# Patient Record
Sex: FEMALE | Race: WHITE | NOT HISPANIC OR LATINO | Employment: OTHER | URBAN - METROPOLITAN AREA
[De-identification: names, ages, dates, MRNs, and addresses within clinical notes are randomized per-mention and may not be internally consistent; named-entity substitution may affect disease eponyms.]

---

## 2018-02-05 ENCOUNTER — TELEPHONE (OUTPATIENT)
Dept: TRANSPLANT | Facility: CLINIC | Age: 48
End: 2018-02-05

## 2018-02-05 NOTE — TELEPHONE ENCOUNTER
----- Message from Eb Swanson sent at 2/5/2018  4:07 PM CST -----  Have medical recorders that where scanned into media from [facility]. Will call referring MD office if we need any additional information on the pt.    By: Eb Swanson

## 2018-02-05 NOTE — TELEPHONE ENCOUNTER
----- Message from Paula Estrella sent at 2/5/2018  3:24 PM CST -----  Regarding: External Referral/ Records   Dr Janes Retana from [facility] referring pt for Lozoya cirrhosis. Pt has . Records are saved in . Please review and call pt to set up consult. Fore more records, please contact Lulu with [facility] at [phone]. Thanks!

## 2018-02-09 ENCOUNTER — TELEPHONE (OUTPATIENT)
Dept: TRANSPLANT | Facility: CLINIC | Age: 48
End: 2018-02-09

## 2018-02-12 ENCOUNTER — TELEPHONE (OUTPATIENT)
Dept: TRANSPLANT | Facility: CLINIC | Age: 48
End: 2018-02-12

## 2018-02-12 NOTE — TELEPHONE ENCOUNTER
----- Message from Eb Swanson sent at 2/12/2018 12:01 PM CST -----  Called pt to [facility] appt w/MD. Pt [facility]'ed for FEB 26 @ 2PM/3PM w/Dr. Smallwood. Will mail out appt slip.

## 2018-02-19 DIAGNOSIS — Z76.82 ORGAN TRANSPLANT CANDIDATE: ICD-10-CM

## 2018-02-19 DIAGNOSIS — K75.81 NASH (NONALCOHOLIC STEATOHEPATITIS): Primary | ICD-10-CM

## 2018-02-23 RX ORDER — FERROUS SULFATE, DRIED 160(50) MG
1 TABLET, EXTENDED RELEASE ORAL 2 TIMES DAILY WITH MEALS
COMMUNITY
End: 2018-02-26

## 2018-02-23 RX ORDER — LEVOTHYROXINE SODIUM 112 UG/1
112 TABLET ORAL DAILY
Status: ON HOLD | COMMUNITY
End: 2019-06-07 | Stop reason: HOSPADM

## 2018-02-23 RX ORDER — HYDROGEN PEROXIDE 3 %
20 SOLUTION, NON-ORAL MISCELLANEOUS
COMMUNITY
End: 2018-05-09

## 2018-02-23 RX ORDER — VALSARTAN 80 MG/1
80 TABLET ORAL DAILY
Status: ON HOLD | COMMUNITY
End: 2019-04-24

## 2018-02-26 ENCOUNTER — OFFICE VISIT (OUTPATIENT)
Dept: TRANSPLANT | Facility: CLINIC | Age: 48
End: 2018-02-26
Payer: OTHER GOVERNMENT

## 2018-02-26 ENCOUNTER — LAB VISIT (OUTPATIENT)
Dept: LAB | Facility: HOSPITAL | Age: 48
End: 2018-02-26
Payer: OTHER GOVERNMENT

## 2018-02-26 ENCOUNTER — TELEPHONE (OUTPATIENT)
Dept: TRANSPLANT | Facility: CLINIC | Age: 48
End: 2018-02-26

## 2018-02-26 VITALS
BODY MASS INDEX: 42.57 KG/M2 | SYSTOLIC BLOOD PRESSURE: 161 MMHG | HEART RATE: 89 BPM | RESPIRATION RATE: 17 BRPM | OXYGEN SATURATION: 96 % | TEMPERATURE: 99 F | WEIGHT: 255.5 LBS | DIASTOLIC BLOOD PRESSURE: 78 MMHG | HEIGHT: 65 IN

## 2018-02-26 DIAGNOSIS — Z76.82 ORGAN TRANSPLANT CANDIDATE: ICD-10-CM

## 2018-02-26 DIAGNOSIS — Z86.010 HX OF COLONIC POLYPS: ICD-10-CM

## 2018-02-26 DIAGNOSIS — E03.9 HYPOTHYROIDISM, UNSPECIFIED TYPE: ICD-10-CM

## 2018-02-26 DIAGNOSIS — I10 ESSENTIAL HYPERTENSION: ICD-10-CM

## 2018-02-26 DIAGNOSIS — E66.01 MORBID OBESITY: ICD-10-CM

## 2018-02-26 DIAGNOSIS — E55.9 VITAMIN D DEFICIENCY: ICD-10-CM

## 2018-02-26 DIAGNOSIS — M19.90 OSTEOARTHRITIS, UNSPECIFIED OSTEOARTHRITIS TYPE, UNSPECIFIED SITE: ICD-10-CM

## 2018-02-26 DIAGNOSIS — K35.890 OTHER ACUTE APPENDICITIS: ICD-10-CM

## 2018-02-26 DIAGNOSIS — I85.00 ESOPHAGEAL VARICES WITHOUT BLEEDING, UNSPECIFIED ESOPHAGEAL VARICES TYPE: ICD-10-CM

## 2018-02-26 DIAGNOSIS — K76.0 FATTY LIVER: ICD-10-CM

## 2018-02-26 DIAGNOSIS — K75.81 NASH (NONALCOHOLIC STEATOHEPATITIS): ICD-10-CM

## 2018-02-26 DIAGNOSIS — R91.1 PULMONARY NODULE: ICD-10-CM

## 2018-02-26 DIAGNOSIS — G47.33 OSA (OBSTRUCTIVE SLEEP APNEA): ICD-10-CM

## 2018-02-26 DIAGNOSIS — K74.69 OTHER CIRRHOSIS OF LIVER: ICD-10-CM

## 2018-02-26 DIAGNOSIS — K21.9 GASTROESOPHAGEAL REFLUX DISEASE, ESOPHAGITIS PRESENCE NOT SPECIFIED: ICD-10-CM

## 2018-02-26 PROBLEM — K74.60 CIRRHOSIS: Status: ACTIVE | Noted: 2018-02-26

## 2018-02-26 PROBLEM — Z86.0100 HX OF COLONIC POLYPS: Status: ACTIVE | Noted: 2018-02-26

## 2018-02-26 PROBLEM — K35.80 ACUTE APPENDICITIS: Status: ACTIVE | Noted: 2018-02-26

## 2018-02-26 LAB
ABO + RH BLD: NORMAL
AFP SERPL-MCNC: 6.2 NG/ML
ALBUMIN SERPL BCP-MCNC: 2.8 G/DL
ALP SERPL-CCNC: 135 U/L
ALT SERPL W/O P-5'-P-CCNC: 37 U/L
AMPHET+METHAMPHET UR QL: NEGATIVE
ANION GAP SERPL CALC-SCNC: 8 MMOL/L
AST SERPL-CCNC: 80 U/L
BACTERIA #/AREA URNS AUTO: ABNORMAL /HPF
BARBITURATES UR QL SCN>200 NG/ML: NEGATIVE
BASOPHILS # BLD AUTO: 0.02 K/UL
BASOPHILS NFR BLD: 0.7 %
BENZODIAZ UR QL SCN>200 NG/ML: NEGATIVE
BILIRUB DIRECT SERPL-MCNC: 1.2 MG/DL
BILIRUB SERPL-MCNC: 3.3 MG/DL
BILIRUB UR QL STRIP: NEGATIVE
BLD GP AB SCN CELLS X3 SERPL QL: NORMAL
BUN SERPL-MCNC: 5 MG/DL
BZE UR QL SCN: NEGATIVE
CALCIUM SERPL-MCNC: 8.9 MG/DL
CANNABINOIDS UR QL SCN: NEGATIVE
CHLORIDE SERPL-SCNC: 109 MMOL/L
CLARITY UR REFRACT.AUTO: CLEAR
CO2 SERPL-SCNC: 23 MMOL/L
COLOR UR AUTO: YELLOW
CREAT SERPL-MCNC: 0.7 MG/DL
CREAT UR-MCNC: 82 MG/DL
DIFFERENTIAL METHOD: ABNORMAL
EOSINOPHIL # BLD AUTO: 0.2 K/UL
EOSINOPHIL NFR BLD: 7.5 %
ERYTHROCYTE [DISTWIDTH] IN BLOOD BY AUTOMATED COUNT: 15.3 %
EST. GFR  (AFRICAN AMERICAN): >60 ML/MIN/1.73 M^2
EST. GFR  (NON AFRICAN AMERICAN): >60 ML/MIN/1.73 M^2
ETHANOL UR-MCNC: <10 MG/DL
GGT SERPL-CCNC: 72 U/L
GLUCOSE SERPL-MCNC: 163 MG/DL
GLUCOSE UR QL STRIP: NEGATIVE
HCT VFR BLD AUTO: 34.3 %
HGB BLD-MCNC: 11.5 G/DL
HGB UR QL STRIP: ABNORMAL
IMM GRANULOCYTES # BLD AUTO: 0.01 K/UL
IMM GRANULOCYTES NFR BLD AUTO: 0.4 %
INR PPP: 1.3
KETONES UR QL STRIP: NEGATIVE
LEUKOCYTE ESTERASE UR QL STRIP: NEGATIVE
LYMPHOCYTES # BLD AUTO: 1 K/UL
LYMPHOCYTES NFR BLD: 35.6 %
MCH RBC QN AUTO: 29 PG
MCHC RBC AUTO-ENTMCNC: 33.5 G/DL
MCV RBC AUTO: 86 FL
METHADONE UR QL SCN>300 NG/ML: NEGATIVE
MICROSCOPIC COMMENT: ABNORMAL
MONOCYTES # BLD AUTO: 0.3 K/UL
MONOCYTES NFR BLD: 9.3 %
NEUTROPHILS # BLD AUTO: 1.3 K/UL
NEUTROPHILS NFR BLD: 46.5 %
NITRITE UR QL STRIP: NEGATIVE
NRBC BLD-RTO: 0 /100 WBC
OPIATES UR QL SCN: NEGATIVE
PCP UR QL SCN>25 NG/ML: NEGATIVE
PH UR STRIP: 6 [PH] (ref 5–8)
PLATELET # BLD AUTO: 69 K/UL
PMV BLD AUTO: 11.5 FL
POTASSIUM SERPL-SCNC: 3.7 MMOL/L
PROT SERPL-MCNC: 7 G/DL
PROT UR QL STRIP: NEGATIVE
PROTHROMBIN TIME: 13 SEC
RBC # BLD AUTO: 3.97 M/UL
RBC #/AREA URNS AUTO: 5 /HPF (ref 0–4)
SODIUM SERPL-SCNC: 140 MMOL/L
SP GR UR STRIP: 1.01 (ref 1–1.03)
SQUAMOUS #/AREA URNS AUTO: 3 /HPF
TOXICOLOGY INFORMATION: NORMAL
URN SPEC COLLECT METH UR: ABNORMAL
UROBILINOGEN UR STRIP-ACNC: 2 EU/DL
WBC # BLD AUTO: 2.81 K/UL
WBC #/AREA URNS AUTO: 2 /HPF (ref 0–5)

## 2018-02-26 PROCEDURE — 99205 OFFICE O/P NEW HI 60 MIN: CPT | Mod: S$PBB,TXP,, | Performed by: INTERNAL MEDICINE

## 2018-02-26 PROCEDURE — 82248 BILIRUBIN DIRECT: CPT | Mod: TXP

## 2018-02-26 PROCEDURE — 99214 OFFICE O/P EST MOD 30 MIN: CPT | Mod: PBBFAC,25,TXP | Performed by: INTERNAL MEDICINE

## 2018-02-26 PROCEDURE — 86850 RBC ANTIBODY SCREEN: CPT | Mod: TXP

## 2018-02-26 PROCEDURE — 99999 PR PBB SHADOW E&M-EST. PATIENT-LVL IV: CPT | Mod: PBBFAC,TXP,, | Performed by: INTERNAL MEDICINE

## 2018-02-26 PROCEDURE — 82105 ALPHA-FETOPROTEIN SERUM: CPT | Mod: TXP

## 2018-02-26 PROCEDURE — 81001 URINALYSIS AUTO W/SCOPE: CPT | Mod: 59,TXP

## 2018-02-26 PROCEDURE — 85610 PROTHROMBIN TIME: CPT | Mod: TXP

## 2018-02-26 PROCEDURE — 82977 ASSAY OF GGT: CPT | Mod: TXP

## 2018-02-26 PROCEDURE — 80053 COMPREHEN METABOLIC PANEL: CPT | Mod: TXP

## 2018-02-26 PROCEDURE — 80321 ALCOHOLS BIOMARKERS 1OR 2: CPT | Mod: TXP

## 2018-02-26 PROCEDURE — 85025 COMPLETE CBC W/AUTO DIFF WBC: CPT | Mod: TXP

## 2018-02-26 PROCEDURE — 80307 DRUG TEST PRSMV CHEM ANLYZR: CPT | Mod: TXP

## 2018-02-26 RX ORDER — IBUPROFEN 800 MG/1
1 TABLET ORAL EVERY 8 HOURS PRN
Status: ON HOLD | COMMUNITY
Start: 2018-01-24 | End: 2019-04-24

## 2018-02-26 NOTE — PATIENT INSTRUCTIONS
1. Obtain liver biopsy slides for review  2. Sleep study to evaluate for sleep apnea  3. Ct chest to evaluate pulmonary nodule  4. Repeat EGD in 07/18 to f/u on esophageal varices  5. Appendectomy after liver tx eval- urgent

## 2018-02-26 NOTE — Clinical Note
1. Obtain liver biopsy slides for review 2. Sleep study to evaluate for sleep apnea 3. Ct chest to evaluate pulmonary nodule 4. Repeat EGD in 07/18 to f/u on esophageal varices 5. Appendectomy after liver tx eval- urgent- can we try to do within a couple of weeks; will need to see general surgery as part of tx evaluation

## 2018-02-26 NOTE — LETTER
March 1, 2018        Janes Retana  [address] [place] [address]  Phone: [phone]  Fax: [phone]             [facility] - Liver Transplant  [address]  [facility] [address]  Phone: [phone]   Patient: Jihan Zamudio   MR Number: [id number]   YOB: 1970   Date of Visit: 2/26/2018       Dear Dr. Janes Retana    Thank you for referring Jihan Zamudio to me for evaluation. Attached you will find relevant portions of my assessment and plan of care.    If you have questions, please do not hesitate to call me. I look forward to following Jihan Zamudio along with you.    Sincerely,    Laura Smallwood MD    Enclosure    If you would like to receive this communication electronically, please contact [email or web address] or [phone] to request [employer] Link access.    PhoneAndPhone Link is a tool which provides read-only access to select patient information with whom you have a relationship. Its easy to use and provides real time access to review your patients record including encounter summaries, notes, results, and demographic information.    If you feel you have received this communication in error or would no longer like to receive these types of communications, please e-mail [email or web address]

## 2018-02-26 NOTE — TELEPHONE ENCOUNTER
Patient seen in clinic today by Dr. Smallwood.  Urgent liver transplant evaluation recommended. Patient needs appendectomy.  Eval needed prior to scheduling the surgery. Requesting financial clearance to proceed w/ outpatient work-up.

## 2018-02-26 NOTE — PROGRESS NOTES
Transplant Hepatology  Liver Transplant Recipient Evaluation      Original Referring Provider: Janes Retana  Current Corresponding Physician: Janes CARCAMO Native Liver Diagnosis: Cirrhosis: Fatty Liver (Lozoya)    Reason for Visit: evaluation for liver transplant     Subjective:     Jihan Zamudio is a 48 y.o. 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    She has a history of a lap band placement (2006) and then release (2009). She was diagnosed with a fatty liver back in 2006. She has since developed cirrhosis of the liver and had a confirmatory liver biopsy nov 30, 2017. EGD July 2017 revealed portal htn with small esophageal varices. She has not had any ascites or HE.    In Jan of this year (2018) she developed acute appendicitis and was not taken to the OR due to a history of cirrhosis with decompensation, MELD 18 (INR 1.7, Tbil elevated). There was concern for excessive bleeding and further decompensation of her liver disease. She was treated with 3 weeks of ABX.    She overall is feeling better now although she is c/o of fatigue. She intermittently has some RLQ pain.    Imaging of the abdomen revealed a pulmonary nodule.    Review of Systems   Constitutional: Negative.    HENT: Negative.    Eyes: Negative.    Respiratory: Negative.    Cardiovascular: Negative.    Gastrointestinal: Negative.    Genitourinary: Negative.    Musculoskeletal: Negative.    Skin: Negative.    Neurological: Negative.    Psychiatric/Behavioral: Negative.        Objective:   Physical Exam   Constitutional: She is oriented to person, place, and time. She appears well-developed and well-nourished.   HENT:   Head: Normocephalic and atraumatic.   Eyes: Conjunctivae and EOM are normal. Pupils are equal, round, and reactive to light. No scleral icterus.   Neck: Normal range of motion. Neck supple. No thyromegaly present.   Cardiovascular: Normal rate, regular rhythm and normal heart sounds.     Pulmonary/Chest: Effort normal and breath sounds normal. She has no rales.   Abdominal: Soft. Bowel sounds are normal. She exhibits no distension and no mass. There is no tenderness.   Musculoskeletal: Normal range of motion. She exhibits no edema.   Neurological: She is alert and oriented to person, place, and time.   Skin: Skin is warm and dry. No rash noted.   Psychiatric: She has a normal mood and affect.   Vitals reviewed.    MELD-Na score: 14 at 2/26/2018 12:50 PM  MELD score: 14 at 2/26/2018 12:50 PM  Calculated from:  Serum Creatinine: 0.7 mg/dL (Rounded to 1) at 2/26/2018 12:50 PM  Serum Sodium: 140 mmol/L (Rounded to 137) at 2/26/2018 12:50 PM  Total Bilirubin: 3.3 mg/dL at 2/26/2018 12:50 PM  INR(ratio): 1.3 at 2/26/2018 12:50 PM  Age: 48 years  Lab Results   Component Value Date     (H) 02/26/2018    BUN 5 (L) 02/26/2018    CREATININE 0.7 02/26/2018    CALCIUM 8.9 02/26/2018     02/26/2018    K 3.7 02/26/2018     02/26/2018    PROT 7.0 02/26/2018    CO2 23 02/26/2018    WBC 2.81 (L) 02/26/2018    RBC 3.97 (L) 02/26/2018    HGB 11.5 (L) 02/26/2018    HCT 34.3 (L) 02/26/2018    PLT 69 (L) 02/26/2018     Lab Results   Component Value Date    ALBUMIN 2.8 (L) 02/26/2018    BILITOT 3.3 (H) 02/26/2018    AST 80 (H) 02/26/2018    ALT 37 02/26/2018    ALKPHOS 135 02/26/2018    LABPROT 13.0 (H) 02/26/2018    INR 1.3 (H) 02/26/2018         Transplant Hepatology - Candidacy   Assessment/Plan:     Transplant Candidacy: Jihan Zamudio is a 48 y.o. female with ESLD secondary to nonalcoholic steatohepatitis here for evaluation for possible OLT.  In my opinion, because she requires an appendectomy and has decompensation of her liver disease, she should be urgently evaluated for liver transplant. She may require an urgent liver transplant brenna-operatively. She will be presented to selection committee after all tests and evaluations are complete. Current recommendations:  1. Decompensated cirrhosis,  MELD 14, proceed with URGENT liver tx evaluation. Obtain liver biopsy slides for review  2. Pulmonary nodule: ct chest  3. Portal htn and EV: repeat EGD 07/18  4. Acute appendicitis: to see general sx at time of liver tx eval.  5. Fatigue: sleep study      Laura Smallwood MD         [facility] Patient Status  Functional Status: 80% - Normal activity with effort: some symptoms of disease  Physical Capacity: No Limitations    Outside Records Request: liver biopsy

## 2018-03-05 LAB — PHOSPHATIDYLETHANOL (PETH): NEGATIVE NG/ML

## 2018-03-13 ENCOUNTER — TELEPHONE (OUTPATIENT)
Dept: TRANSPLANT | Facility: CLINIC | Age: 48
End: 2018-03-13

## 2018-03-14 NOTE — TELEPHONE ENCOUNTER
Called pt to inform her that financial clearance has been obtained from the insurance company today to initiate liver transplant evaluation.  Informed patient that evaluation will take approx 3 days to complete with all testing being done outpatient.  Patient voiced understanding of the need to have her caregiver present for the  and surgeon consult, as well as for the patient education.  All questions/ concerns regarding liver transplant evaluation answered/ addressed.  Will schedule liver transplant evaluation April 4, 5, 6.

## 2018-03-19 DIAGNOSIS — K21.9 GASTROESOPHAGEAL REFLUX DISEASE, ESOPHAGITIS PRESENCE NOT SPECIFIED: ICD-10-CM

## 2018-03-19 DIAGNOSIS — K76.0 FATTY LIVER: ICD-10-CM

## 2018-03-19 DIAGNOSIS — Z76.82 ORGAN TRANSPLANT CANDIDATE: ICD-10-CM

## 2018-03-19 DIAGNOSIS — Z76.82 ORGAN TRANSPLANT CANDIDATE: Primary | ICD-10-CM

## 2018-03-19 DIAGNOSIS — K35.890 OTHER ACUTE APPENDICITIS: ICD-10-CM

## 2018-03-19 DIAGNOSIS — K74.69 OTHER CIRRHOSIS OF LIVER: Primary | ICD-10-CM

## 2018-03-28 ENCOUNTER — OFFICE VISIT (OUTPATIENT)
Dept: SURGERY | Facility: CLINIC | Age: 48
End: 2018-03-28
Payer: OTHER GOVERNMENT

## 2018-03-28 VITALS
BODY MASS INDEX: 44.18 KG/M2 | HEIGHT: 64 IN | WEIGHT: 258.81 LBS | HEART RATE: 89 BPM | DIASTOLIC BLOOD PRESSURE: 72 MMHG | SYSTOLIC BLOOD PRESSURE: 140 MMHG | TEMPERATURE: 98 F

## 2018-03-28 DIAGNOSIS — K38.9 APPENDICOLITH: Primary | ICD-10-CM

## 2018-03-28 PROCEDURE — 99214 OFFICE O/P EST MOD 30 MIN: CPT | Mod: S$PBB,NTX,, | Performed by: SURGERY

## 2018-03-28 PROCEDURE — 99999 PR PBB SHADOW E&M-EST. PATIENT-LVL III: CPT | Mod: PBBFAC,TXP,, | Performed by: SURGERY

## 2018-03-28 PROCEDURE — 99213 OFFICE O/P EST LOW 20 MIN: CPT | Mod: PBBFAC,NTX | Performed by: SURGERY

## 2018-03-28 RX ORDER — TAMSULOSIN HYDROCHLORIDE 0.4 MG/1
0.4 CAPSULE ORAL DAILY
Status: ON HOLD | COMMUNITY
Start: 2018-03-05 | End: 2019-04-24

## 2018-03-28 RX ORDER — NYSTATIN 100000 [USP'U]/ML
SUSPENSION ORAL
Status: ON HOLD | COMMUNITY
Start: 2018-02-01 | End: 2019-04-24

## 2018-03-28 RX ORDER — OXYCODONE HYDROCHLORIDE 5 MG/1
TABLET ORAL
COMMUNITY
Start: 2018-01-24 | End: 2018-03-28

## 2018-03-28 RX ORDER — DOCUSATE CALCIUM 240 MG
CAPSULE ORAL
COMMUNITY
Start: 2018-01-24 | End: 2018-05-09 | Stop reason: CLARIF

## 2018-03-28 RX ORDER — OMEPRAZOLE 40 MG/1
40 CAPSULE, DELAYED RELEASE ORAL EVERY MORNING
Status: ON HOLD | COMMUNITY
Start: 2018-03-02 | End: 2019-05-02 | Stop reason: HOSPADM

## 2018-03-28 RX ORDER — CIPROFLOXACIN 500 MG/1
TABLET ORAL
COMMUNITY
Start: 2018-01-24 | End: 2018-03-28

## 2018-03-28 NOTE — LETTER
April 1, 2018      Laura Smallwood MD  [address] [facility] [address]           [facility]  [address]  [place] [address]  Phone: [phone]          Patient: Jihan Zamudio   MR Number: [id number]   YOB: 1970   Date of Visit: 3/28/2018       Dear Dr. Laura Smallwood:    Thank you for referring Jihan Zamudio to me for evaluation. Attached you will find relevant portions of my assessment and plan of care.    If you have questions, please do not hesitate to call me. I look forward to following Jihan Zamudio along with you.    Sincerely,    Lele Puentes MD    Enclosure  CC:  No Recipients    If you would like to receive this communication electronically, please contact [email or web address] or [phone] to request more information on [facility] Link access.    For providers and/or their staff who would like to refer a patient to [facility], please contact us through our one-stop-shop provider referral line, [facility], at [phone].    If you feel you have received this communication in error or would no longer like to receive these types of communications, please e-mail [email or web address]

## 2018-03-29 DIAGNOSIS — K74.69 OTHER CIRRHOSIS OF LIVER: Primary | ICD-10-CM

## 2018-03-29 DIAGNOSIS — Z76.82 ORGAN TRANSPLANT CANDIDATE: ICD-10-CM

## 2018-03-29 DIAGNOSIS — G47.33 OSA (OBSTRUCTIVE SLEEP APNEA): ICD-10-CM

## 2018-04-01 NOTE — PROGRESS NOTES
History & Physical    SUBJECTIVE:     History of Present Illness:  Patient is a 48 y.o. female presents with prior history of appendicolith and upcoming evaluation for possible liver transplant.   Patient denies current RLQ pain or othe GI cpmplaints. .     Chief Complaint   Patient presents with    Consult       Review of patient's allergies indicates:   Allergen Reactions    Metformin Diarrhea       Current Outpatient Prescriptions   Medication Sig Dispense Refill    CHOLECALCIFEROL, VITAMIN D3, (VITAMIN D3 ORAL) Take 1,200 mg by mouth once daily. Takes 2-600 mg gummies daily      esomeprazole (NEXIUM) 20 MG capsule Take 20 mg by mouth before breakfast.      ibuprofen (ADVIL,MOTRIN) 800 MG tablet Take 1 tablet by mouth every 8 (eight) hours as needed.      levothyroxine (SYNTHROID) 112 MCG tablet Take 112 mcg by mouth once daily.      nystatin (MYCOSTATIN) 100,000 unit/mL suspension       omeprazole (PRILOSEC) 40 MG capsule Take 40 mg by mouth every morning.       tamsulosin (FLOMAX) 0.4 mg Cp24 Take 0.4 mg by mouth once daily.       valsartan (DIOVAN) 80 MG tablet Take 80 mg by mouth once daily.      STOOL SOFTENER, DOCUSATE JUAN, 240 mg capsule        No current facility-administered medications for this visit.        Past Medical History:   Diagnosis Date    Cirrhosis     Esophageal varices 2018    Small with no banding     Essential hypertension 2018    Fatty liver 2018    GERD (gastroesophageal reflux disease)     Hx of colonic polyps 2018    On colonoscopy     Hypertension     Hypothyroidism 2018    Kidney stones     Morbid obesity 2018    Lap band with subsequent release    KADEEM (obstructive sleep apnea) 2018    Osteoarthritis 2018    Pulmonary nodule 2018    Vitamin D deficiency 2018     Past Surgical History:   Procedure Laterality Date     SECTION      TIMES 2     CHOLECYSTECTOMY      LAPAROSCOPIC      "CYSTOSCOPY W/ STONE MANIPULATION  2000    kidney stone removal    LAPAROSCOPIC GASTRIC BANDING  2006    removal 2009    LIVER BIOPSY  11/29/2017    KESSLER with bridging 11/29/17     Family History   Problem Relation Age of Onset    Heart disease Mother     Heart disease Father     Cancer Father      Social History   Substance Use Topics    Smoking status: Never Smoker    Smokeless tobacco: Never Used      Comment: patient denies    Alcohol use No      Comment: patient denies        Review of Systems:      I have reviewed the Patient Summary Reports.        Review of Systems  Hematology/Oncology:  Hematology Normal   Oncology Normal     Renal/:   Chronic Renal Disease    Musculoskeletal:   Arthritis     Neurological:  Neurology Normal    Dermatological:  Skin Normal    Psych:  Psychiatric Normal           OBJECTIVE:     Vital Signs (Most Recent)  Temp: 98.3 °F (36.8 °C) (03/28/18 1337)  Pulse: 89 (03/28/18 1337)  BP: (!) 140/72 (03/28/18 1337)  5' 4" (1.626 m)  117.4 kg (258 lb 13.1 oz)     Physical Exam:    Physical Exam  General:  Obesity, Well nourished    Airway/Jaw/Neck:  AIRWAY FINDINGS: Normal      Eyes/Ears/Nose:  EYES/EARS/NOSE FINDINGS: Normal   Dental:  DENTAL FINDINGS: Normal    Heart/Vascular:  Heart Findings: Normal Vascular Findings: Normal    Abdomen:  Abdomen Findings: Normal    Musculoskeletal:  Musculoskeletal Findings: Normal   Skin:  Skin Findings: Normal    Mental Status:  Mental Status Findings: Normal        Laboratory  none    Diagnostic Results:  none    ASSESSMENT/PLAN:     Will review previous CT abdomen    PLAN:Plan     Would doubt that if patient requires liver transplantation, that prophylactic appendectomy will satisfy a risk/benefit evaluation.       "

## 2018-04-02 ENCOUNTER — TELEPHONE (OUTPATIENT)
Dept: SLEEP MEDICINE | Facility: CLINIC | Age: 48
End: 2018-04-02

## 2018-04-04 ENCOUNTER — DOCUMENTATION ONLY (OUTPATIENT)
Dept: TRANSPLANT | Facility: CLINIC | Age: 48
End: 2018-04-04

## 2018-04-04 ENCOUNTER — SOCIAL WORK (OUTPATIENT)
Dept: TRANSPLANT | Facility: CLINIC | Age: 48
End: 2018-04-04
Payer: OTHER GOVERNMENT

## 2018-04-04 ENCOUNTER — LAB VISIT (OUTPATIENT)
Dept: LAB | Facility: HOSPITAL | Age: 48
End: 2018-04-04
Attending: INTERNAL MEDICINE
Payer: OTHER GOVERNMENT

## 2018-04-04 ENCOUNTER — HOSPITAL ENCOUNTER (OUTPATIENT)
Dept: CARDIOLOGY | Facility: CLINIC | Age: 48
Discharge: HOME | End: 2018-04-04
Attending: INTERNAL MEDICINE
Payer: OTHER GOVERNMENT

## 2018-04-04 ENCOUNTER — NUTRITION (OUTPATIENT)
Dept: TRANSPLANT | Facility: CLINIC | Age: 48
End: 2018-04-04
Payer: OTHER GOVERNMENT

## 2018-04-04 VITALS — HEIGHT: 64 IN | BODY MASS INDEX: 43.81 KG/M2 | WEIGHT: 256.63 LBS

## 2018-04-04 DIAGNOSIS — K76.0 FATTY LIVER: ICD-10-CM

## 2018-04-04 DIAGNOSIS — K74.69 OTHER CIRRHOSIS OF LIVER: ICD-10-CM

## 2018-04-04 DIAGNOSIS — Z76.82 ORGAN TRANSPLANT CANDIDATE: ICD-10-CM

## 2018-04-04 DIAGNOSIS — K35.890 OTHER ACUTE APPENDICITIS: ICD-10-CM

## 2018-04-04 DIAGNOSIS — Z01.818 ENCOUNTER FOR PRE-TRANSPLANT EVALUATION FOR LIVER TRANSPLANT: ICD-10-CM

## 2018-04-04 DIAGNOSIS — K75.81 NASH (NONALCOHOLIC STEATOHEPATITIS): Primary | ICD-10-CM

## 2018-04-04 DIAGNOSIS — K21.9 GASTROESOPHAGEAL REFLUX DISEASE, ESOPHAGITIS PRESENCE NOT SPECIFIED: ICD-10-CM

## 2018-04-04 LAB
A1AT SERPL-MCNC: 163 MG/DL
ABO + RH BLD: NORMAL
ALBUMIN SERPL BCP-MCNC: 3 G/DL
ALP SERPL-CCNC: 150 U/L
ALT SERPL W/O P-5'-P-CCNC: 35 U/L
ANION GAP SERPL CALC-SCNC: 7 MMOL/L
ANISOCYTOSIS BLD QL SMEAR: SLIGHT
AST SERPL-CCNC: 76 U/L
BASOPHILS # BLD AUTO: ABNORMAL K/UL
BASOPHILS NFR BLD: 1 %
BILIRUB SERPL-MCNC: 3.4 MG/DL
BLD GP AB SCN CELLS X3 SERPL QL: NORMAL
BUN SERPL-MCNC: 8 MG/DL
CALCIUM SERPL-MCNC: 8.8 MG/DL
CERULOPLASMIN SERPL-MCNC: 22 MG/DL
CHLORIDE SERPL-SCNC: 109 MMOL/L
CO2 SERPL-SCNC: 24 MMOL/L
CREAT SERPL-MCNC: 0.7 MG/DL
DIASTOLIC DYSFUNCTION: YES
DIFFERENTIAL METHOD: ABNORMAL
EOSINOPHIL # BLD AUTO: ABNORMAL K/UL
EOSINOPHIL NFR BLD: 8 %
ERYTHROCYTE [DISTWIDTH] IN BLOOD BY AUTOMATED COUNT: 15.7 %
EST. GFR  (AFRICAN AMERICAN): >60 ML/MIN/1.73 M^2
EST. GFR  (NON AFRICAN AMERICAN): >60 ML/MIN/1.73 M^2
ESTIMATED PA SYSTOLIC PRESSURE: 40.26
FERRITIN SERPL-MCNC: 82 NG/ML
GLUCOSE SERPL-MCNC: 118 MG/DL
HBV CORE AB SERPL QL IA: NEGATIVE
HBV SURFACE AB SER-ACNC: NEGATIVE M[IU]/ML
HBV SURFACE AG SERPL QL IA: NEGATIVE
HCG INTACT+B SERPL-ACNC: 1.2 MIU/ML
HCT VFR BLD AUTO: 34.4 %
HCV AB SERPL QL IA: NEGATIVE
HEPATITIS A ANTIBODY, IGG: NEGATIVE
HGB BLD-MCNC: 11.4 G/DL
HIV 1+2 AB+HIV1 P24 AG SERPL QL IA: NEGATIVE
HYPOCHROMIA BLD QL SMEAR: ABNORMAL
IMM GRANULOCYTES # BLD AUTO: ABNORMAL K/UL
IMM GRANULOCYTES NFR BLD AUTO: ABNORMAL %
INR PPP: 1.3
IRON SERPL-MCNC: 77 UG/DL
LYMPHOCYTES # BLD AUTO: ABNORMAL K/UL
LYMPHOCYTES NFR BLD: 17 %
MCH RBC QN AUTO: 29.5 PG
MCHC RBC AUTO-ENTMCNC: 33.1 G/DL
MCV RBC AUTO: 89 FL
MONOCYTES # BLD AUTO: ABNORMAL K/UL
MONOCYTES NFR BLD: 3 %
NEUTROPHILS NFR BLD: 70 %
NEUTS BAND NFR BLD MANUAL: 1 %
NRBC BLD-RTO: 0 /100 WBC
OVALOCYTES BLD QL SMEAR: ABNORMAL
PLATELET # BLD AUTO: 62 K/UL
PMV BLD AUTO: 12.6 FL
POIKILOCYTOSIS BLD QL SMEAR: SLIGHT
POLYCHROMASIA BLD QL SMEAR: ABNORMAL
POTASSIUM SERPL-SCNC: 3.7 MMOL/L
PROT SERPL-MCNC: 6.7 G/DL
PROTHROMBIN TIME: 13.3 SEC
RBC # BLD AUTO: 3.87 M/UL
RETIRED EF AND QEF - SEE NOTES: 70 (ref 55–65)
SATURATED IRON: 25 %
SODIUM SERPL-SCNC: 140 MMOL/L
TOTAL IRON BINDING CAPACITY: 309 UG/DL
TRANSFERRIN SERPL-MCNC: 209 MG/DL
TRICUSPID VALVE REGURGITATION: ABNORMAL
TSH SERPL DL<=0.005 MIU/L-ACNC: 2.08 UIU/ML
WBC # BLD AUTO: 1.55 K/UL

## 2018-04-04 PROCEDURE — 36415 COLL VENOUS BLD VENIPUNCTURE: CPT | Mod: TXP

## 2018-04-04 PROCEDURE — 82390 ASSAY OF CERULOPLASMIN: CPT | Mod: TXP

## 2018-04-04 PROCEDURE — 86644 CMV ANTIBODY: CPT | Mod: TXP

## 2018-04-04 PROCEDURE — 80053 COMPREHEN METABOLIC PANEL: CPT | Mod: TXP

## 2018-04-04 PROCEDURE — 86480 TB TEST CELL IMMUN MEASURE: CPT | Mod: TXP

## 2018-04-04 PROCEDURE — 99999 PR PBB SHADOW E&M-EST. PATIENT-LVL I: CPT | Mod: PBBFAC,TXP,, | Performed by: DIETITIAN, REGISTERED

## 2018-04-04 PROCEDURE — 86787 VARICELLA-ZOSTER ANTIBODY: CPT | Mod: TXP

## 2018-04-04 PROCEDURE — 86703 HIV-1/HIV-2 1 RESULT ANTBDY: CPT | Mod: TXP

## 2018-04-04 PROCEDURE — 85027 COMPLETE CBC AUTOMATED: CPT | Mod: TXP

## 2018-04-04 PROCEDURE — 93325 DOPPLER ECHO COLOR FLOW MAPG: CPT | Mod: PBBFAC,TXP | Performed by: INTERNAL MEDICINE

## 2018-04-04 PROCEDURE — 86790 VIRUS ANTIBODY NOS: CPT | Mod: TXP

## 2018-04-04 PROCEDURE — 93351 STRESS TTE COMPLETE: CPT | Mod: 26,S$PBB,TXP, | Performed by: INTERNAL MEDICINE

## 2018-04-04 PROCEDURE — 93320 DOPPLER ECHO COMPLETE: CPT | Mod: 26,S$PBB,TXP, | Performed by: INTERNAL MEDICINE

## 2018-04-04 PROCEDURE — 86850 RBC ANTIBODY SCREEN: CPT | Mod: TXP

## 2018-04-04 PROCEDURE — 84443 ASSAY THYROID STIM HORMONE: CPT | Mod: TXP

## 2018-04-04 PROCEDURE — 86803 HEPATITIS C AB TEST: CPT | Mod: TXP

## 2018-04-04 PROCEDURE — 87340 HEPATITIS B SURFACE AG IA: CPT | Mod: TXP

## 2018-04-04 PROCEDURE — 82103 ALPHA-1-ANTITRYPSIN TOTAL: CPT | Mod: TXP

## 2018-04-04 PROCEDURE — 80321 ALCOHOLS BIOMARKERS 1OR 2: CPT | Mod: TXP

## 2018-04-04 PROCEDURE — 86706 HEP B SURFACE ANTIBODY: CPT | Mod: TXP

## 2018-04-04 PROCEDURE — 85610 PROTHROMBIN TIME: CPT | Mod: TXP

## 2018-04-04 PROCEDURE — 84702 CHORIONIC GONADOTROPIN TEST: CPT | Mod: TXP

## 2018-04-04 PROCEDURE — 85007 BL SMEAR W/DIFF WBC COUNT: CPT | Mod: TXP

## 2018-04-04 PROCEDURE — 97802 MEDICAL NUTRITION INDIV IN: CPT | Mod: PBBFAC,TXP,59 | Performed by: DIETITIAN, REGISTERED

## 2018-04-04 PROCEDURE — 86592 SYPHILIS TEST NON-TREP QUAL: CPT | Mod: TXP

## 2018-04-04 PROCEDURE — 83540 ASSAY OF IRON: CPT | Mod: TXP

## 2018-04-04 PROCEDURE — 86704 HEP B CORE ANTIBODY TOTAL: CPT | Mod: TXP

## 2018-04-04 PROCEDURE — 99211 OFF/OP EST MAY X REQ PHY/QHP: CPT | Mod: PBBFAC,TXP,25 | Performed by: DIETITIAN, REGISTERED

## 2018-04-04 PROCEDURE — 82728 ASSAY OF FERRITIN: CPT | Mod: TXP

## 2018-04-04 NOTE — PROGRESS NOTES
TRANSPLANT NUTRITIONAL ASSESSMENT    Referring Provider: Laura Smallwood MD    Reason for Visit: Pre-liver transplant work-up    Age: 48 y.o.  Sex: female    Patient Active Problem List   Diagnosis    Cirrhosis    Morbid obesity    Essential hypertension    KADEEM (obstructive sleep apnea)    Hypothyroidism    Osteoarthritis    GERD (gastroesophageal reflux disease)    Fatty liver    Vitamin D deficiency    Hx of colonic polyps    Esophageal varices    Pulmonary nodule    History of Acute appendicitis     Past Medical History:   Diagnosis Date    Cirrhosis     Esophageal varices 2/26/2018    Small with no banding 07/17    Essential hypertension 2/26/2018    Fatty liver 2/26/2018    GERD (gastroesophageal reflux disease)     Hx of colonic polyps 2/26/2018    On colonoscopy 07/17    Hypertension     Hypothyroidism 2/26/2018    Kidney stones     Morbid obesity 2/26/2018    Lap band with subsequent release    KADEEM (obstructive sleep apnea) 2/26/2018    Osteoarthritis 2/26/2018    Pulmonary nodule 2/26/2018    Vitamin D deficiency 2/26/2018     Lab Results   Component Value Date     (H) 04/04/2018    K 3.7 04/04/2018    ALBUMIN 3.0 (L) 04/04/2018    CALCIUM 8.8 04/04/2018     Other Pertinent Labs: none    Current Outpatient Prescriptions   Medication Sig    CHOLECALCIFEROL, VITAMIN D3, (VITAMIN D3 ORAL) Take 1,200 mg by mouth once daily. Takes 2-600 mg gummies daily    esomeprazole (NEXIUM) 20 MG capsule Take 20 mg by mouth before breakfast.    ibuprofen (ADVIL,MOTRIN) 800 MG tablet Take 1 tablet by mouth every 8 (eight) hours as needed.    levothyroxine (SYNTHROID) 112 MCG tablet Take 112 mcg by mouth once daily.    nystatin (MYCOSTATIN) 100,000 unit/mL suspension     omeprazole (PRILOSEC) 40 MG capsule Take 40 mg by mouth every morning.     STOOL SOFTENER, DOCUSATE JUAN, 240 mg capsule     tamsulosin (FLOMAX) 0.4 mg Cp24 Take 0.4 mg by mouth once daily.     valsartan (DIOVAN) 80 MG  "tablet Take 80 mg by mouth once daily.     No current facility-administered medications for this visit.      Allergies: Metformin    Ht Readings from Last 1 Encounters:   04/04/18 5' 4.33" (1.634 m)     Wt Readings from Last 1 Encounters:   04/04/18 116.4 kg (256 lb 9.9 oz)      BMI: Body mass index is 43.6 kg/m².    Usual Weight: 225-230 lb  Weight Change/Time: weight gain over past 3 months  Current Diet: Regular  Appetite/Current Intake: good   Exercise/Physical Activity: functional in ADLs, has treadmill, was walking 30 min @ 2.5-2.7 pace, some fatigue  Nutritional/Herbal Supplements: none  Chewing/Swallowing Problems: none  Symptoms: none    Estimated Kcal Need: 5871-6311 kcal (15-20 kcal/kg)  Estimated Protein Need:  gm (0.8-1 gm/kg)    Nutritional History:   Pt present with her spouse today. Pt has good appetite, some taste changes/aversions. Pt reports at lowest adult weight around 220 lb, over past 5 years. Pt and spouse do grocery shopping and cooking at home. Pt uses treadmill when able to at home, walking up to 30 minutes. Pt provided the following diet recall:  B: maybe eggs, potatoes, cheese, onions (baked), or Activia yogurt, fresh fruit, toast, whole milk or orange juice    L: turkey/lettuce/tomato/cheese (1) @ home, or out [employer] (Italian)/BK/[employer] (big mac, 1 piece of bun only, fries), sweet tea (1-2 x/wk)  D: out to eat several times/week, steak restaurant/burger/Mexican, at home; Gabriel's pie/Lasagna/baked chicken/various vegetables (frozen or canned), sweet tea/sprite/water w/ crystal light  Uses low sodium salt in cooking  Snack: fresh fruit, maybe a few cookies    Nutritional Diagnoses  Problem: overweight/obesity  Etiology: r/t excessive calorie intake  Symptoms: aeb BMI >30    Educational Need? yes  Barriers: none identified  Discussed with: patient and spouse  Interventions: Patient taught nutrition information regarding Pre-liver transplant work-up.    Reviewed Low Sodium packet " (2g Na diet, Na content of foods, food label strategies, flavoring tips, & low sodium recipes).   Weight loss tips, 1700 calorie sample meal plan.  Reduce sugary beverage intake. Reduced starch intake.   Continue walking daily, at least 30 mins.   Reduce fast food intake, review option prior to going and select best option.  Goal: reduce weight to 220 lb.   Goals/Recommendations: gradual weight loss, choose healthy options when dining out and limit intake of concentrated sweets  Actions Taken: instruct/provide written information and provide resources for weight management  Patient and/or family comprehend instructions: yes  Outcome: Verbalizes understanding  Monitoring: Follow up in clinic if needed. RD contact info provided.    Counseling Time: 30 minutes

## 2018-04-04 NOTE — PROGRESS NOTES
Transplant Recipient Adult Psychosocial Assessment    Jihan Zamudio  [address]  [place] [address]    Telephone Information:   Mobile [phone]   Home  [phone] (home)  Work  There is no work phone number on file.  E-mail  No e-mail address on record    Sex: female  YOB: 1970  Age: 48 y.o.    Encounter Date: 2018  [place] Citizen: yes  Primary Language: English   Needed: no    Emergency Contact:  Name: Freeman Zamudio  Relationship:   Address: Same as patient   Phone Numbers:  [phone] (home), retired (work), n/a (mobile)    Family/Social Support:   Number of dependents/: One. The pt reported she has a 14 y.o. Daughter Catie who is currently being cared for by her brother in [place].   Marital history: The pt and  have been  for 25 yrs.    Other family dynamics: The pt reported both parents are .  The pt reported she has three brothers only one to assist with transplant Jay 48y.o. Pt reported she has one son Bhavesh 25 y.o. And one dtr 14 y.o. The pt reported they have one dog who is her husbands service dog.      Household Composition:  Name: Jihan Zamudio   Age: 48 y.o.  Relationship: patient  Does person drive? yes not at this time as pt reported she does not feel well     Name: Freeman Zaumdio   Age: 46 y.o.  Relationship:   Does person drive? yes    Name: Catie  Age: 14 y.o.  Relationship: daughter  Does person drive? no    Do you and your caregivers have access to reliable transportation? yes  PRIMARY CAREGIVER: Freeman Zamudio will be primary caregiver, phone number [phone].      provided in-depth information to patient and caregiver regarding pre- and post-transplant caregiver role.   strongly encourages patient and caregiver to have concrete plan regarding post-transplant care giving, including back-up caregiver(s) to ensure care giving needs are met as needed.    Patient and Caregiver  states understanding all aspects of caregiver role/commitment and is able/willing/committed to being caregiver to the fullest extent necessary.    Patient and Caregiver verbalizes understanding of the education provided today and caregiver responsibilities.         remains available. Patient and Caregiver agree to contact  in a timely manner if concerns arise.      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     Additional Significant Others who will Assist with Transplant:  Name: Jay Ulloa ph# [phone]  Age: 46 y.o.  City: [place] State: [place]  Relationship: brother  Does person drive? yes  Works as a  but reports he can take time off if needed to assist the patient     Name: Jenni Zamudio ph# [phone]  Age: 23 y.o.  City: [place]: [place]  Relationship: DIL   Does person drive? yes  Does not work and has a 7 y.o. But willing to pull her out of school and register local if needed.     Living Will: no  Healthcare Power of : no  Advance Directives on file: <<no information> per medical record.  Verbally reviewed LW/HCPA information.   provided patient with copy of LW/HCPA documents and provided education on completion of forms.    Living Donors: No.    Highest Education Level: High School (9-12) or GED  Reading Ability: 12th grade  Reports difficulty with: seeing, comprehension and memory  Learns Best By:  Hands on demonstration at this time and reading material      Status: no  VA Benefits: no     Working for Income: No  If no, reason not working: Patient Choice - Other has not worked in over 2 years. Pt was caregiver for her  2 yrs ago who had a brain tumor removed.    Patient is not working and has not applied for disabilty.  Pt plans to apply soon. ..    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      provided fundraising information/education.  Patient and Caregiver verbalizes understanding.   remains available.    Insurance:   Payor/Plan Subscr  Sex Relation Sub. Ins. ID Effective Group Num   1.  - TRI* JANAE MCDANIEL 1971 Male Self [id number]                                     [id number], [facility] [id number]     Primary Insurance (for UNOS reporting): Private Insurance  Secondary Insurance (for UNOS reporting): None  Patient and Caregiver verbalizes clear understanding that patient may experience difficulty obtaining and/or be denied insurance coverage post-surgery. This includes and is not limited to disability insurance, life insurance, health insurance, burial insurance, long term care insurance, and other insurances.    Patient and Caregiver also reports understanding that future health concerns related to or unrelated to transplantation may not be covered by patient's insurance.  Resources and information provided and reviewed.      Patient and Caregiver provides verbal permission to release any necessary information to outside resources for patient care and discharge planning.  Resources and information provided are reviewed.      Dialysis Adherence:  Patient and Caregiver reports she has had no history of HD.      Infusion Service: patient utilizing? no  Home Health: patient utilizing? no  DME: yes cane, walker, sc and wc.  Pt reported these are from her  and not ordered directly for her  Pulmonary/Cardiac Rehab: NONE   ADLS:  Pt reported she is completely independent with ADLs.  "    Adherence:   Pt reported high adherence stating "none of my doctors ever called me non compliant."   Adherence education and counseling provided.     Per History Section:  Past Medical History:   Diagnosis Date    Cirrhosis     Esophageal varices 2/26/2018    Small with no banding 07/17    Essential hypertension 2/26/2018    Fatty liver 2/26/2018    GERD (gastroesophageal reflux disease)     Hx of colonic polyps 2/26/2018    On colonoscopy 07/17    Hypertension     Hypothyroidism 2/26/2018    Kidney stones     Morbid obesity 2/26/2018    Lap band with subsequent release    KADEEM (obstructive sleep apnea) 2/26/2018    Osteoarthritis 2/26/2018    Pulmonary nodule 2/26/2018    Vitamin D deficiency 2/26/2018     Social History   Substance Use Topics    Smoking status: Never Smoker    Smokeless tobacco: Never Used      Comment: patient denies    Alcohol use No      Comment: patient denies     History   Drug Use No     Comment: patient denies     History   Sexual Activity    Sexual activity: Not on file       Per Today's Psychosocial:  Tobacco: none, patient denies any use.  Alcohol: none, patient denies any use in the past year stating "when I would have a glass of wine it would be a holiday or special occasion.  We stayed busy traveling with the [employer] so I was always busy and drinking wasn't for me."   Illicit Drugs/Non-prescribed Medications: none, patient denies any use.    Patient and Caregiver states clear understanding of the potential impact of substance use as it relates to transplant candidacy and is aware of possible random substance screening.  Substance abstinence/cessation counseling, education and resources provided and reviewed.     Arrests/DWI/Treatment/Rehab: patient denies    Psychiatric History:    Mental Health: depression with the dead of her parents and her grandmother.   Psychiatrist/Counselor: NONE  Medications:  Pt denied any medications.    Suicide/Homicide Issues: Pt " denied any currrent and past S.I./H.I.    Safety at home: Pt reported she feels safe at home and is free from abuse, verbal, emotional and physical     Knowledge: Patient and Caregiver states having clear understanding and realistic expectations regarding the potential risks and potential benefits of organ transplantation and organ donation, agrees to discuss with health care team members and support system members and to utilize available resources and express questions and/or concerns in order to further facilitate the pt informed decision-making.  Resources and information provided and reviewed.     Patient and Caregiver is aware of [facility]'s affiliation and/or partnership with agencies in home health care, LTAC, SNF, [facility], and other hospitals and clinics.    Understanding: Patient and Caregiver reports having a clear understanding of the many lifetime commitments involved with being a transplant recipient, including costs, compliance, medications, lab work, procedures, appointments, concrete and financial planning, preparedness, timely and appropriate communication of concerns, abstinence (ETOH, tobacco, illicit non-prescribed drugs), adherence to all health care team recommendations, support system and caregiver involvement, appropriate and timely resource utilization and follow-through, mental health counseling as needed/recommended, and patient and caregiver responsibilities.  Social Service Handbook, resources and detailed educational information provided and reviewed.  Educational information provided.    Patient and Caregiver also reports current and expected compliance with health care regime and states having a clear understanding of the importance of compliance.      Patient and Caregiver reports a clear understanding that risks and benefits may be involved with organ transplantation and with organ donation.      Patient and Caregiver also reports clear understanding that psychosocial risk factors may  "affect patient, and include but are not limited to feelings of depression, generalized anxiety, anxiety regarding dependence on others, post traumatic stress disorder, feelings of guilt and other emotional and/or mental concerns, and/or exacerbation of existing mental health concerns.  Detailed resources provided and discussed.     Patient and Caregiver agrees to access appropriate resources in a timely manner as needed and/or as recommended, and to communicate concerns appropriately.  Patient and Caregiver also reports a clear understanding of treatment options available.      reviewed education, provided additional information, and answered questions.    Feelings or Concerns: Pt denied any questions or concerns at this time but reported as the time past she is sure she will have some.     Coping: The reported adequate coping stating that she uses her "grandbaby and family to deal with her bad days."    Goals: Pt reported to travel and enjoy life and her family.      Interview Behavior: Patient and Caregiver presents as alert and oriented x 4, pleasant, good eye contact, well groomed, communicative, cooperative and asking and answering questions appropriately.          Transplant Social Work - Candidacy  Assessment/Plan:     Psychosocial Suitability: Patient presents as an acceptable candidate for liver transplant at this time. Based on psychosocial risk factors, patient presents as  medium risk, due to good suport, stable commiteed relationships; Although SW wants to note the  of the pt has a seizure d/o that is well managed with [facility]. Backups appear to be committed to caring for the pt in the event primary cannot fulfill caregiver duties.  Pts  who will serve as primary caregiver has a service animal.  As of now adequate income to meet needs. Good understanding of medical situation; history of good follow through on medical recommendations. Realistic expectations regarding liver " transplant.     Recommendations/Additional Comments:  Local Lodging and Fundraising.     Chanelle Ambriz

## 2018-04-05 ENCOUNTER — CLINICAL SUPPORT (OUTPATIENT)
Dept: TRANSPLANT | Facility: CLINIC | Age: 48
End: 2018-04-05
Payer: OTHER GOVERNMENT

## 2018-04-05 ENCOUNTER — HOSPITAL ENCOUNTER (OUTPATIENT)
Dept: RADIOLOGY | Facility: CLINIC | Age: 48
Discharge: HOME | End: 2018-04-05
Attending: INTERNAL MEDICINE
Payer: OTHER GOVERNMENT

## 2018-04-05 ENCOUNTER — HOSPITAL ENCOUNTER (OUTPATIENT)
Dept: RADIOLOGY | Facility: HOSPITAL | Age: 48
Discharge: HOME | End: 2018-04-05
Attending: INTERNAL MEDICINE
Payer: OTHER GOVERNMENT

## 2018-04-05 DIAGNOSIS — K35.890 OTHER ACUTE APPENDICITIS: ICD-10-CM

## 2018-04-05 DIAGNOSIS — Z76.82 ORGAN TRANSPLANT CANDIDATE: ICD-10-CM

## 2018-04-05 DIAGNOSIS — K74.69 OTHER CIRRHOSIS OF LIVER: ICD-10-CM

## 2018-04-05 DIAGNOSIS — K76.0 FATTY LIVER: ICD-10-CM

## 2018-04-05 DIAGNOSIS — G47.33 OSA (OBSTRUCTIVE SLEEP APNEA): ICD-10-CM

## 2018-04-05 DIAGNOSIS — K21.9 GASTROESOPHAGEAL REFLUX DISEASE, ESOPHAGITIS PRESENCE NOT SPECIFIED: ICD-10-CM

## 2018-04-05 LAB
AMPHET+METHAMPHET UR QL: NEGATIVE
BARBITURATES UR QL SCN>200 NG/ML: NEGATIVE
BENZODIAZ UR QL SCN>200 NG/ML: NEGATIVE
BILIRUB UR QL STRIP: NEGATIVE
BZE UR QL SCN: NEGATIVE
CANNABINOIDS UR QL SCN: NEGATIVE
CLARITY UR REFRACT.AUTO: ABNORMAL
COLOR UR AUTO: ABNORMAL
CREAT UR-MCNC: 148 MG/DL
ETHANOL UR-MCNC: <10 MG/DL
GLUCOSE UR QL STRIP: NEGATIVE
HGB UR QL STRIP: NEGATIVE
KETONES UR QL STRIP: NEGATIVE
LEUKOCYTE ESTERASE UR QL STRIP: NEGATIVE
METHADONE UR QL SCN>300 NG/ML: NEGATIVE
NITRITE UR QL STRIP: NEGATIVE
OPIATES UR QL SCN: NEGATIVE
PCP UR QL SCN>25 NG/ML: NEGATIVE
PH UR STRIP: 6 [PH] (ref 5–8)
PROT UR QL STRIP: NEGATIVE
RPR SER QL: NORMAL
SP GR UR STRIP: 1.02 (ref 1–1.03)
TOXICOLOGY INFORMATION: NORMAL
URN SPEC COLLECT METH UR: ABNORMAL
UROBILINOGEN UR STRIP-ACNC: 4 EU/DL
VARICELLA INTERPRETATION: POSITIVE
VARICELLA ZOSTER IGG: 3.28 ISR

## 2018-04-05 PROCEDURE — 77080 DXA BONE DENSITY AXIAL: CPT | Mod: 26,TXP,, | Performed by: INTERNAL MEDICINE

## 2018-04-05 PROCEDURE — 93975 VASCULAR STUDY: CPT | Mod: 26,TXP,, | Performed by: RADIOLOGY

## 2018-04-05 PROCEDURE — 93975 VASCULAR STUDY: CPT | Mod: TC,TXP

## 2018-04-05 PROCEDURE — 77080 DXA BONE DENSITY AXIAL: CPT | Mod: TC,TXP

## 2018-04-05 PROCEDURE — 81003 URINALYSIS AUTO W/O SCOPE: CPT | Mod: TXP,59

## 2018-04-05 PROCEDURE — 71046 X-RAY EXAM CHEST 2 VIEWS: CPT | Mod: 26,,, | Performed by: RADIOLOGY

## 2018-04-05 PROCEDURE — 74178 CT ABD&PLV WO CNTR FLWD CNTR: CPT | Mod: 26,,, | Performed by: RADIOLOGY

## 2018-04-05 PROCEDURE — 71046 X-RAY EXAM CHEST 2 VIEWS: CPT | Mod: TC,FY,TXP

## 2018-04-05 PROCEDURE — 71260 CT THORAX DX C+: CPT | Mod: TC,TXP

## 2018-04-05 PROCEDURE — 80307 DRUG TEST PRSMV CHEM ANLYZR: CPT | Mod: TXP

## 2018-04-05 PROCEDURE — 76700 US EXAM ABDOM COMPLETE: CPT | Mod: 26,59,TXP, | Performed by: RADIOLOGY

## 2018-04-05 PROCEDURE — 71260 CT THORAX DX C+: CPT | Mod: 26,,, | Performed by: RADIOLOGY

## 2018-04-05 PROCEDURE — 25500020 PHARM REV CODE 255: Mod: TXP | Performed by: INTERNAL MEDICINE

## 2018-04-05 PROCEDURE — 74178 CT ABD&PLV WO CNTR FLWD CNTR: CPT | Mod: TC,TXP

## 2018-04-05 RX ADMIN — IOHEXOL 100 ML: 350 INJECTION, SOLUTION INTRAVENOUS at 03:04

## 2018-04-05 NOTE — PROGRESS NOTES
PRE EDUCATION TEACHING NOTE    Jihan Zamudio was seen in clinic today.  Handbook on pre-liver transplant information (see outline below) was given to the patient and time was allowed for questions.  Patient's  and daughter accompanied her.  Informed consent signed and written information given on selection criteria.    LIVER TRANSPLANT WORK-UP EDUCATION   I. UNDERSTANDING THE TRANSPLANT PROCESS     A. Transplant team      B. MELD score      C. Balancing urgency and outcome     D. Liver Transplant Options         1.  Donor         2. Living Donor--rationale, benefits     E. Transplant Work-up         1. Medical         2. Psychological and Social--lifetime commitment, life changes, personal plan/ goal         3. Financial--fundraising     F.  Completed work-up and Next Steps    G. Wait Time         1.  Can be listed at more than one center         2.  Can transfer wait time     H. The Call       I. Possible donor options         1. DCD         2. Hep B Core and Hep C Positive         3. Increased Risk     J.  Liver Transplant Surgery         1. Length         2. Transfusions, cell saver         3. Surgical risks         4. What to expect after sugery--Central lines, drains, Hernandes catheter, incision, endotracheal              tube, NG tube, length of stay in ICU/ TSU  II.  HOW TO BEST CARE FOR YOURSELF (Take Five To Thrive)  III. UNDERSTANDING LIFE AFTER TRANSPLANT  A. Medicines after transplant      1. Immunosuppression--infection and rejection  B. Labs   IV. ADULT LIVER SURVIVAL RATES

## 2018-04-06 ENCOUNTER — LAB VISIT (OUTPATIENT)
Dept: LAB | Facility: HOSPITAL | Age: 48
End: 2018-04-06
Attending: INTERNAL MEDICINE
Payer: OTHER GOVERNMENT

## 2018-04-06 ENCOUNTER — EVALUATION (OUTPATIENT)
Dept: TRANSPLANT | Facility: CLINIC | Age: 48
End: 2018-04-06
Payer: OTHER GOVERNMENT

## 2018-04-06 VITALS
SYSTOLIC BLOOD PRESSURE: 140 MMHG | HEIGHT: 64 IN | TEMPERATURE: 98 F | OXYGEN SATURATION: 98 % | HEART RATE: 89 BPM | BODY MASS INDEX: 43.81 KG/M2 | RESPIRATION RATE: 16 BRPM | DIASTOLIC BLOOD PRESSURE: 70 MMHG | WEIGHT: 256.63 LBS

## 2018-04-06 DIAGNOSIS — Z01.818 PRE-TRANSPLANT EVALUATION FOR LIVER TRANSPLANT: ICD-10-CM

## 2018-04-06 DIAGNOSIS — K76.0 FATTY LIVER: ICD-10-CM

## 2018-04-06 DIAGNOSIS — Z76.82 ORGAN TRANSPLANT CANDIDATE: ICD-10-CM

## 2018-04-06 DIAGNOSIS — K35.890 OTHER ACUTE APPENDICITIS: ICD-10-CM

## 2018-04-06 DIAGNOSIS — K21.9 GASTROESOPHAGEAL REFLUX DISEASE, ESOPHAGITIS PRESENCE NOT SPECIFIED: ICD-10-CM

## 2018-04-06 DIAGNOSIS — K74.69 OTHER CIRRHOSIS OF LIVER: ICD-10-CM

## 2018-04-06 LAB
CMV IGG SERPL QL IA: REACTIVE
CREAT CL/1.73 SQ M 12H UR+SERPL-ARVRAT: 56 ML/MIN
CREAT SERPL-MCNC: 0.7 MG/DL
CREAT UR-MCNC: 81 MG/DL
CREATININE, URINE (MG/SPEC): 567 MG/SPEC
MITOGEN NIL: 2.9 IU/ML
NIL: 0.03 IU/ML
PROT 24H UR-MRATE: NORMAL MG/SPEC
PROT UR-MCNC: <7 MG/DL
TB ANTIGEN NIL: -0 IU/ML
TB ANTIGEN: 0.02 IU/ML
TB GOLD: NEGATIVE
URINE COLLECTION DURATION: 24 HR
URINE COLLECTION DURATION: 24 HR
URINE VOLUME: 700 ML
URINE VOLUME: 700 ML

## 2018-04-06 PROCEDURE — 99999 PR PBB SHADOW E&M-EST. PATIENT-LVL III: CPT | Mod: PBBFAC,TXP,,

## 2018-04-06 PROCEDURE — 82575 CREATININE CLEARANCE TEST: CPT | Mod: TXP

## 2018-04-06 PROCEDURE — 84156 ASSAY OF PROTEIN URINE: CPT | Mod: TXP

## 2018-04-06 PROCEDURE — 99204 OFFICE O/P NEW MOD 45 MIN: CPT | Mod: S$PBB,TXP,, | Performed by: TRANSPLANT SURGERY

## 2018-04-06 PROCEDURE — 99213 OFFICE O/P EST LOW 20 MIN: CPT | Mod: PBBFAC,TXP

## 2018-04-06 NOTE — PROGRESS NOTES
Transplant Surgery  Liver Transplant Recipient Evaluation    Referring Provider: Janes Retana    Subjective:     Reason for Visit: evaluation for liver transplant    History of Present Illness: Jihan Zamudio is a 48 y.o. female who is being evaluated for liver transplant due to Cirrhosis: Fatty Liver (Lozoya). Jihan reports fatigue, jaundice and thrombocytopenia.    Review of Systems   Constitutional: Negative for activity change, appetite change, chills and fever.   Respiratory: Negative for cough and shortness of breath.    Gastrointestinal: Negative for abdominal distention, constipation, diarrhea, nausea and vomiting.   Genitourinary: Negative for difficulty urinating and dyspareunia.   Musculoskeletal: Negative.    Skin: Negative for color change and rash.   Allergic/Immunologic: Negative for immunocompromised state.   Neurological: Negative for dizziness and headaches.   Psychiatric/Behavioral: Negative.        Objective:     Physical Exam   Constitutional: She is oriented to person, place, and time. She appears well-developed and well-nourished. No distress.   HENT:   Mouth/Throat: No oropharyngeal exudate.   Eyes: Pupils are equal, round, and reactive to light. No scleral icterus.   Neck: Neck supple. No thyromegaly present.   Cardiovascular: Normal rate, normal heart sounds and intact distal pulses.    No murmur heard.  Pulmonary/Chest: No respiratory distress. She has no wheezes. She has no rales.   Abdominal: Soft. Bowel sounds are normal. She exhibits no distension and no mass. There is no tenderness. There is no rebound and no guarding. No hernia.   Musculoskeletal: She exhibits no edema.   Lymphadenopathy:     She has no cervical adenopathy.   Neurological: She is alert and oriented to person, place, and time.   Skin: Skin is dry. She is not diaphoretic. No pallor.   Psychiatric: She has a normal mood and affect.   Nursing note and vitals reviewed.    Incision- lap port sites x3 RUQ, umbilical,  LUQ    MELD-Na score: 14 at 2018  9:10 AM  MELD score: 14 at 2018  9:10 AM  Calculated from:  Serum Creatinine: 0.7 mg/dL (Rounded to 1) at 2018  9:10 AM  Serum Sodium: 140 mmol/L (Rounded to 137) at 2018  9:53 AM  Total Bilirubin: 3.4 mg/dL at 2018  9:53 AM  INR(ratio): 1.3 at 2018  9:53 AM  Age: 48 years    Diagnoses:  No diagnosis found.    Transplant Surgery - Candidacy   Assessment/Plan:     Transplant Candidacy: Jihan Zamudio is a 48 y.o. female with ESLD secondary to Cirrhosis: Fatty Liver (Lozoya) here for evaluation for possible OLT.  Based on available information, Jihan is a suitable liver transplant candidate.  She will be presented to selection committee after all tests and evaluations are complete.    Dima Farfan MD       OS Patient Status  Functional Status: 50% - Requires considerable assistance and frequent medical care  Physical Capacity: No Limitations    Counseling: I discussed with Jihan the benefits of liver transplantation.  We discussed the evaluation and listing procedures.  We discussed the MELD system and the associated waiting times.  We discussed national and center specific survival results.  We discussed the option of being multiply listed in different OPOs.  We discussed the option of living donation versus  donor transplantation and the advantages and relative disadvantages of each.   We discussed the risks, benefits and potential complications related to surgery including the risks related to anesthesia, bleeding, infection, primary non-function of the allograft, the risk of reoperation as well as the risk of death.  We discussed the typical post-operative course, length of hospitalization, the long-term use of immunosuppressive therapy as well as the need for long-term routine followup.    PHS: I discussed the use of organs from donors with PHS increased-risk behavior, including the testing protocols utilized, as well as data from the  literature regarding the likelihood of transmission of hepatitis or HIV.  The patient is willing to consider such grafts.  DCD: I discussed the use of organs recovered by donation after cardiac death (DCD), including slightly decreased graft survival and greater risk of arterial and biliary complications. The potential advantage to the recipient is possibly receiving a transplant sooner by accepting such an organ. The patient is willing to consider such grafts.  HBcAb: I discussed the use of organs from donors with HBcAb in conjunction with long term use of HBV antiviral drugs, such as lamivudine. The small but measurable risk of hepatitis B seroconversion was discussed as well as the potentially life long need to continue antiviral drugs. The patient is willing to consider such grafts.  LDLT: I discussed the nature of living donor liver transplant, including donor risks and more frequent recipient complications. The patient is willing to consider such grafts.

## 2018-04-09 ENCOUNTER — DOCUMENTATION ONLY (OUTPATIENT)
Dept: TRANSPLANT | Facility: CLINIC | Age: 48
End: 2018-04-09

## 2018-04-11 ENCOUNTER — OFFICE VISIT (OUTPATIENT)
Dept: INFECTIOUS DISEASES | Facility: CLINIC | Age: 48
End: 2018-04-11
Payer: OTHER GOVERNMENT

## 2018-04-11 VITALS
HEART RATE: 77 BPM | DIASTOLIC BLOOD PRESSURE: 77 MMHG | WEIGHT: 256.63 LBS | HEIGHT: 64 IN | BODY MASS INDEX: 43.81 KG/M2 | SYSTOLIC BLOOD PRESSURE: 138 MMHG | TEMPERATURE: 98 F

## 2018-04-11 DIAGNOSIS — Z23 NEED FOR HEPATITIS A AND B VACCINATION: ICD-10-CM

## 2018-04-11 DIAGNOSIS — K76.0 FATTY LIVER: Primary | ICD-10-CM

## 2018-04-11 DIAGNOSIS — I85.00 ESOPHAGEAL VARICES WITHOUT BLEEDING, UNSPECIFIED ESOPHAGEAL VARICES TYPE: ICD-10-CM

## 2018-04-11 DIAGNOSIS — Z23 NEED FOR 23-POLYVALENT PNEUMOCOCCAL POLYSACCHARIDE VACCINE: ICD-10-CM

## 2018-04-11 DIAGNOSIS — Z23 NEED FOR SHINGLES VACCINE: ICD-10-CM

## 2018-04-11 DIAGNOSIS — Z01.818 PRE-TRANSPLANT EVALUATION FOR LIVER TRANSPLANT: ICD-10-CM

## 2018-04-11 DIAGNOSIS — Z71.85 VACCINE COUNSELING: ICD-10-CM

## 2018-04-11 PROCEDURE — 99999 PR PBB SHADOW E&M-EST. PATIENT-LVL III: CPT | Mod: PBBFAC,TXP,, | Performed by: INTERNAL MEDICINE

## 2018-04-11 PROCEDURE — 99213 OFFICE O/P EST LOW 20 MIN: CPT | Mod: PBBFAC,TXP | Performed by: INTERNAL MEDICINE

## 2018-04-11 PROCEDURE — 99204 OFFICE O/P NEW MOD 45 MIN: CPT | Mod: S$PBB,,, | Performed by: INTERNAL MEDICINE

## 2018-04-11 NOTE — PROGRESS NOTES
Pre Transplant Infectious Diseases Consult  Liver Transplant Recipient Evaluation    Requesting Physician: Selin    Reason for Visit:  Pre-transplant evaluation    History of Present Illness  48 y.o. female Liver disease currently being evaluated for Liver transplant.  Patient with history of KESSLER cirrhosis - biopsy proven - complicated by thrombocytopenia, coagulopathy, pHTN with Ev. No history of ascites, HE.      Patient denies any recent fever, chills, or infective illnesses.      1) Do you have a history of:   YES NO   Diabetes      [] [x]     Diabetic Foot Infection/Bone Infection  []        [x]     Surgical Removal of Spleen   []  [x]                  2) Have you had recurrent infections involving:             YES NO  Sinus infections  []         [x]   Sore Throat   []         [x]                            Bladder Infections  []         [x]                     Kidney Infections  []         [x]                               Intestinal Infections  []         [x]      Skin Infections   []         [x]       Reproductive Infections          []  [x]   Periodontal Disease  []         [x]        3)Have you ever had: YES     NO UNKNOWN      Chicken Pox   [x]         []  []   Shingles   []         [x]  []   Orolabial Herpes             []  [x]  []   Genital Herpes  []         [x]  []   Cytomegalovirus  []         [x]  []   Saadia-Barr Virus  []         [x]  []   Genital Warts   []         [x]  []   Hepatitis A   []         [x]  []   Hepatitis B   []         [x]  []   Hepatitis C   []         [x]  []   Syphilis   []         [x]  []   Gonorrhea   []         [x]  []   Pelvic Inflammatory   Disease   []         [x]  []   Chlamydia Infection  []         [x]  []   Intestinal parasites   or worms   []         [x]  []   Fungal Infections  []         [x]  []   Blood Infections  []         [x]  []       Comment:       4) Have you ever been exposed   YES NO  To someone with tuberculosis?  []   [x]   If yes, what treatment did  you receive:     5) What states have you lived in?   Multiple states - but never in [facility]     6) What countries have you visited for more than 2 weeks?      [place] - 2 years with                    YES NO  7) Did you have any associated infections?  []  [x]       8) Are you planning to travel outside the    [x]  []   [place] after your transplant?    9) Household                  YES NO  Do you have pets living in your house?   [x]         []   If yes, describe: 2 dogs    Do you spend time or live on a farm or    []         [x]   have livestock or other farm animals?  If yes, which ones:    Do you have a fish tank?         []  [x]       Do you have a litter box?     []         [x]     Do you fish or hunt?      [x]         []     Do you clean or skin fish or animals?   []         [x]     Do you consume raw or undercooked   []         [x]   meat, fish, or shellfish?      10) What occupations have you had?          11)Do you garden or otherwise  YES NO   work in the soil?    [x]         []   12)Do you hike, camp, or spend     time in wooded areas?   []         [x]        13) The patient's immunization history was reviewed.    Have you ever received:  YES NO UNKNOWN DATES   Routine Childhood vaccines  [x]         []  []       Influenza vaccine   [x]  []  [] 2017   Prevnar    [x]  []  [] 2017   Pneumovax    []  [x]  []     Tetanus-diptheria   [x]         []  [] 2017   Hepatitis A vaccine series       [x]  []  [] 2017   Hepatitis B vaccine series         [x]  []  [] 2017   Meningitis vaccine   []         [x]  []    Zoster vaccine    []         [x]  []      Review of Systems   Constitutional: Negative for chills, diaphoresis, fever and weight loss.   HENT: Negative for congestion, sinus pain and sore throat.    Eyes: Negative for photophobia and pain.   Respiratory: Negative for cough, sputum production and shortness of breath.    Cardiovascular: Negative for chest pain and leg swelling.    Gastrointestinal: Negative for abdominal pain, diarrhea, nausea and vomiting.   Genitourinary: Negative for dysuria and hematuria.   Musculoskeletal: Negative for joint pain.   Skin: Positive for itching and rash.   Neurological: Negative for focal weakness and headaches.   Psychiatric/Behavioral: Negative for depression. The patient is not nervous/anxious.        Physical Exam   Constitutional: She is oriented to person, place, and time. She appears well-developed and well-nourished. No distress.   HENT:   Head: Normocephalic and atraumatic.   Eyes: Conjunctivae and EOM are normal.   Neck: Normal range of motion. Neck supple.   Cardiovascular: Normal rate.    Pulmonary/Chest: Effort normal. No respiratory distress.   Abdominal: Soft. She exhibits no distension.   Red itchy rash in between abdominal skin folds consistent with Candida intertrigo   Musculoskeletal: Normal range of motion. She exhibits no edema.   Neurological: She is alert and oriented to person, place, and time.   Skin: Skin is warm and dry. No rash noted. She is not diaphoretic. No erythema.   Psychiatric: She has a normal mood and affect. Her behavior is normal.       Significant Labs Reviewed:  HepA Ab neg  HepBs Ab neg  HepBs Ag neg  HepBc Ab neg  HepC Ab neg    HIV neg  RPR neg  Quant gold neg    Pending Labs: Strongyloides - not sent       Transplant Infectious Diseases - Candidacy    Assessment   48-year-old female  - KESSLER cirrhosis c/b thrombocytopenia, coagulopathy, EV  - Vaccine counseling  - Liver transplant candidate  - Need for Hepatitis A/B vaccination  - Need for shingles vaccination  - Need for Pneumococcal vaccination  - Candida intertrigo    Plan:     Transplant Candidacy:   Based on available information, there are no identified significant barriers to transplantation from an infectious disease standpoint pending acceptable serologies.    - Strongyloides - lab ordered, please add to her next lab appointment when returns  Final  determination of transplant candidacy will be made once evaluation is complete and reviewed by the Transplant Selection Committee.    Counseling:  I discussed with the patient the risk for increased susceptibility to infections following transplantation including increased risk for infection right after transplant and if rejection should occur.    Specific guidance has been provided to the patient regarding the patients occupation, hobbies and activities to avoid future infectious complications including but not limited to avoiding undercooked meats and seafood, proper hygiene, and contact with animals.  The patients has been counseled on the importance of vaccinations including but not limited to a yearly flu vaccine.    Immunizations:  Based on the patients immunization history and serologies, prescription written for following immunizations [place] [facility]:  - Pneumovax  - TDaP  - Twinrix 0, 1, 6 months  - Shingrix 0, 2 months        The patient was encouraged to contact us about any problems that may develop after immunization and possible side effects were reviewed.     Candida intertrigo  - Fluconazole 150 mg PO x1, qweekly as needed   - clotrimazole-betamethasone cream BID topically    Roselyn Cabrera MD MPH  Infectious Diseases [facility]

## 2018-04-11 NOTE — LETTER
April 11, 2018      Laura Smallwood MD  [address]  [facility] [address]           [facility]  [address]  [facility] [address]  Phone: [phone]  Fax: [phone]          Patient: Jihan Zamudio   MR Number: [id number]   YOB: 1970   Date of Visit: 4/11/2018       Dear Dr. Laura Smallwood:    Thank you for referring Jihan Zamudio to me for evaluation. Attached you will find relevant portions of my assessment and plan of care.    If you have questions, please do not hesitate to call me. I look forward to following Jihan Zamudio along with you.    Sincerely,    Roselyn Cabrera MD    Enclosure  CC:  No Recipients    If you would like to receive this communication electronically, please contact [email or web address] or [phone] to request more information on [employer] access.    For providers and/or their staff who would like to refer a patient to [facility], please contact us through our one-stop-shop provider referral line, [facility], at [phone].    If you feel you have received this communication in error or would no longer like to receive these types of communications, please e-mail [email or web address]

## 2018-04-13 LAB — PHOSPHATIDYLETHANOL (PETH): NEGATIVE NG/ML

## 2018-04-18 ENCOUNTER — COMMITTEE REVIEW (OUTPATIENT)
Dept: TRANSPLANT | Facility: CLINIC | Age: 48
End: 2018-04-18

## 2018-04-18 ENCOUNTER — DOCUMENTATION ONLY (OUTPATIENT)
Dept: TRANSPLANT | Facility: CLINIC | Age: 48
End: 2018-04-18

## 2018-04-18 NOTE — COMMITTEE REVIEW
Jihan Zamudio's case presented to selection committee.  Patient has been declined. Patient does not meet criteria. Patient needs to be referred to cardiology for increased systolic PA pressure.   I was present at the committee meeting and attest to the decision of the committee.    Ronal Coello  04/18/2018

## 2018-04-18 NOTE — NURSING
PRE-TRANSPLANT NOTE:    This patient's medication therapy was evaluated as part of her pre-transplant evaluation.    The following pharmacologic concerns were noted: Patient currently on ibuprofen and levothyroxine.    Current Outpatient Prescriptions   Medication Sig Dispense Refill    CHOLECALCIFEROL, VITAMIN D3, (VITAMIN D3 ORAL) Take 1,200 mg by mouth once daily. Takes 2-600 mg gummies daily      esomeprazole (NEXIUM) 20 MG capsule Take 20 mg by mouth before breakfast.      ibuprofen (ADVIL,MOTRIN) 800 MG tablet Take 1 tablet by mouth every 8 (eight) hours as needed.      levothyroxine (SYNTHROID) 112 MCG tablet Take 112 mcg by mouth once daily.      nystatin (MYCOSTATIN) 100,000 unit/mL suspension       omeprazole (PRILOSEC) 40 MG capsule Take 40 mg by mouth every morning.       STOOL SOFTENER, DOCUSATE JUAN, 240 mg capsule       tamsulosin (FLOMAX) 0.4 mg Cp24 Take 0.4 mg by mouth once daily.       valsartan (DIOVAN) 80 MG tablet Take 80 mg by mouth once daily.       No current facility-administered medications for this visit.        I am available for consultation and can be contacted, as needed by the other members of the Liver Transplant team.

## 2018-04-19 ENCOUNTER — TELEPHONE (OUTPATIENT)
Dept: HEPATOLOGY | Facility: CLINIC | Age: 48
End: 2018-04-19

## 2018-04-19 DIAGNOSIS — I27.20 PULMONARY HYPERTENSION: Primary | ICD-10-CM

## 2018-04-19 NOTE — TELEPHONE ENCOUNTER
callt o pt re liver tx recs- medically early for liver transplant. No current need for appendectomy. Needs f/u appt with me. Explained also needs to see Dr Franklin for pulm htn evaluation. Does have sleep apnea which puts her at risk. Please mail her the quantiferon gold test result.

## 2018-04-19 NOTE — TELEPHONE ENCOUNTER
Patient to follow-up in Hepatology clinic.  Will inform that department of the need to schedule follow-up appt w/ Dr. Smallwood accordingly.    Appt card completed to schedule consult w/ cardiology for next available.  Results of quant gold mailed to patient as requested.

## 2018-05-09 ENCOUNTER — INITIAL CONSULT (OUTPATIENT)
Dept: TRANSPLANT | Facility: CLINIC | Age: 48
End: 2018-05-09
Payer: OTHER GOVERNMENT

## 2018-05-09 VITALS
HEIGHT: 64 IN | OXYGEN SATURATION: 97 % | HEART RATE: 85 BPM | BODY MASS INDEX: 45.27 KG/M2 | SYSTOLIC BLOOD PRESSURE: 141 MMHG | DIASTOLIC BLOOD PRESSURE: 63 MMHG | WEIGHT: 265.19 LBS

## 2018-05-09 DIAGNOSIS — Z01.818 PRE-TRANSPLANT EVALUATION FOR LIVER TRANSPLANT: ICD-10-CM

## 2018-05-09 DIAGNOSIS — I85.00 ESOPHAGEAL VARICES WITHOUT BLEEDING, UNSPECIFIED ESOPHAGEAL VARICES TYPE: ICD-10-CM

## 2018-05-09 DIAGNOSIS — E66.01 MORBID OBESITY: ICD-10-CM

## 2018-05-09 DIAGNOSIS — I10 ESSENTIAL HYPERTENSION: ICD-10-CM

## 2018-05-09 DIAGNOSIS — G47.33 OSA (OBSTRUCTIVE SLEEP APNEA): ICD-10-CM

## 2018-05-09 DIAGNOSIS — K76.0 FATTY LIVER: ICD-10-CM

## 2018-05-09 DIAGNOSIS — I35.0 AORTIC VALVE STENOSIS, ETIOLOGY OF CARDIAC VALVE DISEASE UNSPECIFIED: Primary | ICD-10-CM

## 2018-05-09 PROCEDURE — 99204 OFFICE O/P NEW MOD 45 MIN: CPT | Mod: S$PBB,,, | Performed by: INTERNAL MEDICINE

## 2018-05-09 PROCEDURE — 99999 PR PBB SHADOW E&M-EST. PATIENT-LVL III: CPT | Mod: PBBFAC,,, | Performed by: INTERNAL MEDICINE

## 2018-05-09 PROCEDURE — 99213 OFFICE O/P EST LOW 20 MIN: CPT | Mod: PBBFAC | Performed by: INTERNAL MEDICINE

## 2018-05-09 NOTE — PROGRESS NOTES
Subjective:    Patient ID:  Jihan Zamudio is a 48 y.o. female who presents for evaluation of Pulmonary Hypertension (New Consult visit)      HPI    48 year old female obese with a history of a murmur. ASymptomatic    Aortic valve 2.1 cm    CONCLUSIONS     1 - Normal left ventricular systolic function (EF 65-70%).     2 - Normal right ventricular systolic function .     3 - Impaired LV relaxation, elevated LAP (grade 2 diastolic dysfunction).     4 - Biatrial enlargement.     5 - The estimated PA systolic pressure is 40 mmHg.     6 - Intermediate central venous pressure.     No evidence of stress induced myocardial ischemia.  Review of Systems   Constitution: Negative for chills, decreased appetite, diaphoresis, fever, weakness, malaise/fatigue, night sweats, weight gain and weight loss.   Cardiovascular: Negative for chest pain, claudication, cyanosis, dyspnea on exertion, irregular heartbeat, leg swelling, near-syncope, orthopnea and palpitations.   Respiratory: Negative for cough, hemoptysis, shortness of breath, sleep disturbances due to breathing, snoring, sputum production and wheezing.    Gastrointestinal: Negative for abdominal pain and diarrhea.        Objective:    Physical Exam   Constitutional: She is oriented to person, place, and time. She appears well-developed and well-nourished.   HENT:   Head: Normocephalic and atraumatic.   Eyes: Conjunctivae and EOM are normal. Pupils are equal, round, and reactive to light.   Neck: Neck supple. No JVD present. No tracheal deviation present. No thyromegaly present.   Cardiovascular: Normal rate and regular rhythm.  Exam reveals no gallop and no friction rub.    Murmur heard.   Harsh midsystolic murmur is present with a grade of 2/6  at the upper right sternal border radiating to the neck  Pulmonary/Chest: Effort normal and breath sounds normal. No respiratory distress. She has no wheezes. She has no rales. She exhibits no tenderness.   Abdominal: Soft. Bowel  sounds are normal. She exhibits no distension and no mass. There is no tenderness. There is no rebound and no guarding.   Musculoskeletal: Normal range of motion. She exhibits no edema or tenderness.   Lymphadenopathy:     She has no cervical adenopathy.   Neurological: She is alert and oriented to person, place, and time. She has normal reflexes. No cranial nerve deficit. She exhibits normal muscle tone. Coordination normal.   Skin: Skin is warm and dry.   Psychiatric: She has a normal mood and affect. Her behavior is normal. Thought content normal.         Assessment:       1. Morbid obesity    2. Essential hypertension    3. Esophageal varices without bleeding, unspecified esophageal varices type    4. Fatty liver    5. KADEEM (obstructive sleep apnea)    6. Pre-transplant evaluation for liver transplant         Plan:       Aortic valve stenosis ( No need for further therapy for now)    PA pressure 40 mmHg ( I dont think a right heart cath is warranted at this time) more likely to obesity, diastolic dysfunction and sleep apnea?    She is not listed but she will be a low risk in my opinion    RTC 6 months with 2 D echo (check PA pressure)

## 2018-05-09 NOTE — LETTER
May 9, 2018        Janes Retana  [address] [place] [address]  Phone: [phone]  Fax: [phone]             [facility] [address] [place] [address]  Phone: [phone]   Patient: Jihan Zamudio   MR Number: [id number]   YOB: 1970   Date of Visit: 5/9/2018       Dear Dr. Janes Retana    Thank you for referring Jihan Zamudio to me for evaluation. Attached you will find relevant portions of my assessment and plan of care.    If you have questions, please do not hesitate to call me. I look forward to following Jihan Zamudio along with you.    Sincerely,    Mao Santiago MD    Enclosure    If you would like to receive this communication electronically, please contact [email or web address] or [phone] to request [facility] Link access.    [facility] Link is a tool which provides read-only access to select patient information with whom you have a relationship. Its easy to use and provides real time access to review your patients record including encounter summaries, notes, results, and demographic information.    If you feel you have received this communication in error or would no longer like to receive these types of communications, please e-mail [email or web address]

## 2018-05-15 ENCOUNTER — OFFICE VISIT (OUTPATIENT)
Dept: HEPATOLOGY | Facility: CLINIC | Age: 48
End: 2018-05-15
Payer: OTHER GOVERNMENT

## 2018-05-15 VITALS
BODY MASS INDEX: 45.69 KG/M2 | DIASTOLIC BLOOD PRESSURE: 67 MMHG | TEMPERATURE: 97 F | HEART RATE: 91 BPM | RESPIRATION RATE: 18 BRPM | SYSTOLIC BLOOD PRESSURE: 165 MMHG | OXYGEN SATURATION: 96 % | HEIGHT: 64 IN | WEIGHT: 267.63 LBS

## 2018-05-15 DIAGNOSIS — K74.60 HEPATIC CIRRHOSIS, UNSPECIFIED HEPATIC CIRRHOSIS TYPE, UNSPECIFIED WHETHER ASCITES PRESENT: Primary | ICD-10-CM

## 2018-05-15 DIAGNOSIS — K76.0 FATTY LIVER: ICD-10-CM

## 2018-05-15 DIAGNOSIS — I85.00 ESOPHAGEAL VARICES WITHOUT BLEEDING, UNSPECIFIED ESOPHAGEAL VARICES TYPE: ICD-10-CM

## 2018-05-15 PROCEDURE — 99214 OFFICE O/P EST MOD 30 MIN: CPT | Mod: S$PBB,,, | Performed by: INTERNAL MEDICINE

## 2018-05-15 PROCEDURE — 99999 PR PBB SHADOW E&M-EST. PATIENT-LVL IV: CPT | Mod: PBBFAC,,, | Performed by: INTERNAL MEDICINE

## 2018-05-15 PROCEDURE — 99214 OFFICE O/P EST MOD 30 MIN: CPT | Mod: PBBFAC | Performed by: INTERNAL MEDICINE

## 2018-05-15 NOTE — PATIENT INSTRUCTIONS
1. Dr Cabrera to help with the health dept issue re quantiferon gold that is positive at [facility] but negative here  2. I have asked Dr Franklin to review your echo results and determine if you need a Right heart cath.  3. Labs every 8 weeks  Return 8-12 weeks

## 2018-05-15 NOTE — PROGRESS NOTES
HEPATOLOGY FOLLOW UP    Referring Physician: Primary Doctor No  Current Corresponding Physician: Primary Doctor No    Jihan Zamudio is here for follow up of decompensated cirrhosis.    HPI  Since Jihan Zamudio's last visit she underwent a liver tx evaluation and saw general surgery. Her appendicitis had resolved and therefore an appendectomy was not done.MELD score is <15.    She saw Dr Santiago who feels her PA pressures are up due to diastolic dysfunction. She has no uncontrolled fluid retention or cognitive issues that would suggest HE.    Outpatient Encounter Prescriptions as of 5/15/2018   Medication Sig Dispense Refill    levothyroxine (SYNTHROID) 112 MCG tablet Take 112 mcg by mouth once daily.      omeprazole (PRILOSEC) 40 MG capsule Take 40 mg by mouth every morning.       valsartan (DIOVAN) 80 MG tablet Take 80 mg by mouth once daily.      ibuprofen (ADVIL,MOTRIN) 800 MG tablet Take 1 tablet by mouth every 8 (eight) hours as needed.      nystatin (MYCOSTATIN) 100,000 unit/mL suspension       tamsulosin (FLOMAX) 0.4 mg Cp24 Take 0.4 mg by mouth once daily.       [DISCONTINUED] CHOLECALCIFEROL, VITAMIN D3, (VITAMIN D3 ORAL) Take 1,200 mg by mouth once daily. Takes 2-600 mg gummies daily       No facility-administered encounter medications on file as of 5/15/2018.      Review of patient's allergies indicates:   Allergen Reactions    Metformin Diarrhea     Past Medical History:   Diagnosis Date    Cirrhosis     Esophageal varices 2/26/2018    Small with no banding 07/17    Essential hypertension 2/26/2018    Fatty liver 2/26/2018    GERD (gastroesophageal reflux disease)     Hx of colonic polyps 2/26/2018    On colonoscopy 07/17    Hypertension     Hypothyroidism 2/26/2018    Kidney stones     Morbid obesity 2/26/2018    Lap band with subsequent release    KADEEM (obstructive sleep apnea) 2/26/2018    Osteoarthritis 2/26/2018    Pulmonary nodule 2/26/2018    Vitamin D deficiency  2/26/2018       Review of Systems   Constitutional: Negative.    HENT: Negative.    Eyes: Negative.    Respiratory: Negative.    Cardiovascular: Negative.    Gastrointestinal: Negative.    Genitourinary: Negative.    Musculoskeletal: Negative.    Skin: Negative.    Neurological: Negative.    Psychiatric/Behavioral: Negative.      Vitals:    05/15/18 1349   BP: (!) 165/67   Pulse: 91   Resp: 18   Temp: 96.7 °F (35.9 °C)       Physical Exam   Constitutional: She is oriented to person, place, and time. She appears well-nourished.   HENT:   Head: Normocephalic.   Eyes: Pupils are equal, round, and reactive to light. No scleral icterus.   Neck: Neck supple. No thyromegaly present.   Cardiovascular: Normal rate, regular rhythm and normal heart sounds.    Pulmonary/Chest: Effort normal and breath sounds normal. She has no wheezes.   Abdominal: Soft. She exhibits no distension and no mass. There is no tenderness.   Musculoskeletal: Normal range of motion.   Neurological: She is alert and oriented to person, place, and time.   Skin: Skin is warm and dry. No rash noted.   Psychiatric: She has a normal mood and affect.       MELD-Na score: 14 at 4/6/2018  9:10 AM  MELD score: 14 at 4/6/2018  9:10 AM  Calculated from:  Serum Creatinine: 0.7 mg/dL (Rounded to 1) at 4/6/2018  9:10 AM  Serum Sodium: 140 mmol/L (Rounded to 137) at 4/4/2018  9:53 AM  Total Bilirubin: 3.4 mg/dL at 4/4/2018  9:53 AM  INR(ratio): 1.3 at 4/4/2018  9:53 AM  Age: 48 years    Lab Results   Component Value Date     (H) 04/04/2018    BUN 8 04/04/2018    CREATININE 0.7 04/06/2018    CALCIUM 8.8 04/04/2018     04/04/2018    K 3.7 04/04/2018     04/04/2018    PROT 6.7 04/04/2018    CO2 24 04/04/2018    ANIONGAP 7 (L) 04/04/2018    WBC 1.55 (LL) 04/04/2018    RBC 3.87 (L) 04/04/2018    HGB 11.4 (L) 04/04/2018    HCT 34.4 (L) 04/04/2018    MCV 89 04/04/2018    MCH 29.5 04/04/2018    MCHC 33.1 04/04/2018     Lab Results   Component Value Date     RDW 15.7 (H) 04/04/2018    PLT 62 (L) 04/04/2018    MPV 12.6 04/04/2018    GRAN 70.0 04/04/2018    GRAN 1.3 (L) 02/26/2018    LYMPH Test Not Performed 04/04/2018    LYMPH 17.0 (L) 04/04/2018    MONO Test Not Performed 04/04/2018    MONO 3.0 (L) 04/04/2018    EOSINOPHIL 8.0 04/04/2018    BASOPHIL 1.0 04/04/2018    EOS Test Not Performed 04/04/2018    BASO Test Not Performed 04/04/2018    GROUPTRH O POS 04/04/2018    ALBUMIN 3.0 (L) 04/04/2018    BILIDIR 1.2 (H) 02/26/2018    AST 76 (H) 04/04/2018    ALT 35 04/04/2018    ALKPHOS 150 (H) 04/04/2018    LABPROT 13.3 (H) 04/04/2018    INR 1.3 (H) 04/04/2018     MELD-Na score: 14 at 4/6/2018  9:10 AM  MELD score: 14 at 4/6/2018  9:10 AM  Calculated from:  Serum Creatinine: 0.7 mg/dL (Rounded to 1) at 4/6/2018  9:10 AM  Serum Sodium: 140 mmol/L (Rounded to 137) at 4/4/2018  9:53 AM  Total Bilirubin: 3.4 mg/dL at 4/4/2018  9:53 AM  INR(ratio): 1.3 at 4/4/2018  9:53 AM  Age: 48 years  Assessment and Plan:    Jihan Zamudio is a 48 y.o. female with decompensated cirrhosis. Current recommendations:  1. Decompensated cirrhosis: check meld labs every 8 weeks and hcc screening every 6 months  2. Elevated PA pressures - will have Dr Franklin review as second opinion  Return 8 weeks

## 2018-05-18 ENCOUNTER — TELEPHONE (OUTPATIENT)
Dept: HEPATOLOGY | Facility: CLINIC | Age: 48
End: 2018-05-18

## 2018-05-18 NOTE — TELEPHONE ENCOUNTER
MA called Kenia Swanson from [facility] she is asking for a copy of patient lab results for Quantiferon GOLD and HIV. Inform kenia we need to have patient sign a release first before faxing them the lab results. Kenia understood and we'll be waiting for the results.     MA called patient she want the release of info email to [email or web address]    Patient will sign it and email it back to us.

## 2018-05-18 NOTE — TELEPHONE ENCOUNTER
----- Message from Nuvia Ferrara sent at 5/18/2018 10:17 AM CDT -----  Contact: [facility]  Needs Medical Advice    Who Called: Kenia Swanson or Adarsh  Symptoms (please be specific):  n/a  How long has patient had these symptoms:  n/a  Pharmacy name and phone # if needed:    Communication Preference:  Additional Information:    Pt stated she had Quantrferon Gold and HIV test done and facility is requesting the results.     Best Contact:[phone]  FAX to :[phone]

## 2018-06-05 ENCOUNTER — TELEPHONE (OUTPATIENT)
Dept: HEPATOLOGY | Facility: CLINIC | Age: 48
End: 2018-06-05

## 2018-06-05 DIAGNOSIS — Z00.6 RESEARCH SUBJECT: Primary | ICD-10-CM

## 2018-06-05 NOTE — TELEPHONE ENCOUNTER
Pt contacted re Lilly 17 study. Unfortunately, pt is not a candidate at this time. Informed pt I would keep her on a list in case we have any clinical trials in the future that she may qualify for.

## 2019-04-23 ENCOUNTER — HOSPITAL ENCOUNTER (INPATIENT)
Facility: HOSPITAL | Age: 49
LOS: 9 days | Discharge: HOME | DRG: 441 | End: 2019-05-02
Attending: INTERNAL MEDICINE | Admitting: INTERNAL MEDICINE
Payer: OTHER GOVERNMENT

## 2019-04-23 DIAGNOSIS — G47.33 OSA (OBSTRUCTIVE SLEEP APNEA): ICD-10-CM

## 2019-04-23 DIAGNOSIS — I86.4 ESOPHAGEAL AND GASTRIC VARICES: ICD-10-CM

## 2019-04-23 DIAGNOSIS — E66.01 MORBID OBESITY: Primary | ICD-10-CM

## 2019-04-23 DIAGNOSIS — K21.9 GASTROESOPHAGEAL REFLUX DISEASE, ESOPHAGITIS PRESENCE NOT SPECIFIED: ICD-10-CM

## 2019-04-23 DIAGNOSIS — M19.90 OSTEOARTHRITIS, UNSPECIFIED OSTEOARTHRITIS TYPE, UNSPECIFIED SITE: ICD-10-CM

## 2019-04-23 DIAGNOSIS — I27.20 PULMONARY HYPERTENSION: ICD-10-CM

## 2019-04-23 DIAGNOSIS — I85.00 ESOPHAGEAL AND GASTRIC VARICES: ICD-10-CM

## 2019-04-23 DIAGNOSIS — R91.1 PULMONARY NODULE: ICD-10-CM

## 2019-04-23 DIAGNOSIS — E03.9 ACQUIRED HYPOTHYROIDISM: ICD-10-CM

## 2019-04-23 DIAGNOSIS — D62 ACUTE BLOOD LOSS ANEMIA: ICD-10-CM

## 2019-04-23 DIAGNOSIS — Z01.818 ENCOUNTER FOR PRE-TRANSPLANT EVALUATION FOR CHRONIC LIVER DISEASE: ICD-10-CM

## 2019-04-23 DIAGNOSIS — Z01.818 PRE-TRANSPLANT EVALUATION FOR LIVER TRANSPLANT: ICD-10-CM

## 2019-04-23 DIAGNOSIS — Z86.010 HX OF COLONIC POLYPS: ICD-10-CM

## 2019-04-23 DIAGNOSIS — E44.0 MODERATE MALNUTRITION: ICD-10-CM

## 2019-04-23 DIAGNOSIS — R60.1 ANASARCA: ICD-10-CM

## 2019-04-23 DIAGNOSIS — I10 ESSENTIAL HYPERTENSION: ICD-10-CM

## 2019-04-23 DIAGNOSIS — D68.9 COAGULOPATHY: ICD-10-CM

## 2019-04-23 DIAGNOSIS — R18.8 OTHER ASCITES: ICD-10-CM

## 2019-04-23 DIAGNOSIS — E55.9 VITAMIN D DEFICIENCY: ICD-10-CM

## 2019-04-23 DIAGNOSIS — K72.90 DECOMPENSATED HEPATIC CIRRHOSIS: ICD-10-CM

## 2019-04-23 DIAGNOSIS — K74.60 DECOMPENSATED HEPATIC CIRRHOSIS: ICD-10-CM

## 2019-04-23 DIAGNOSIS — E87.1 HYPONATREMIA: ICD-10-CM

## 2019-04-23 DIAGNOSIS — K76.82 HEPATIC ENCEPHALOPATHY: ICD-10-CM

## 2019-04-23 DIAGNOSIS — K35.890 OTHER ACUTE APPENDICITIS: ICD-10-CM

## 2019-04-23 DIAGNOSIS — I85.00 ESOPHAGEAL VARICES WITHOUT BLEEDING, UNSPECIFIED ESOPHAGEAL VARICES TYPE: ICD-10-CM

## 2019-04-23 DIAGNOSIS — K92.2 GI BLEED: ICD-10-CM

## 2019-04-23 PROCEDURE — 85730 THROMBOPLASTIN TIME PARTIAL: CPT

## 2019-04-23 PROCEDURE — 84443 ASSAY THYROID STIM HORMONE: CPT

## 2019-04-23 PROCEDURE — 85025 COMPLETE CBC W/AUTO DIFF WBC: CPT

## 2019-04-23 PROCEDURE — 20000000 HC ICU ROOM

## 2019-04-23 PROCEDURE — 85610 PROTHROMBIN TIME: CPT

## 2019-04-23 PROCEDURE — 86850 RBC ANTIBODY SCREEN: CPT

## 2019-04-23 PROCEDURE — 86920 COMPATIBILITY TEST SPIN: CPT

## 2019-04-23 PROCEDURE — 99291 CRITICAL CARE FIRST HOUR: CPT | Mod: ,,, | Performed by: NURSE PRACTITIONER

## 2019-04-23 PROCEDURE — 83880 ASSAY OF NATRIURETIC PEPTIDE: CPT

## 2019-04-23 PROCEDURE — 83735 ASSAY OF MAGNESIUM: CPT

## 2019-04-23 PROCEDURE — 80053 COMPREHEN METABOLIC PANEL: CPT

## 2019-04-23 PROCEDURE — 99291 PR CRITICAL CARE, E/M 30-74 MINUTES: ICD-10-PCS | Mod: ,,, | Performed by: NURSE PRACTITIONER

## 2019-04-23 PROCEDURE — 85384 FIBRINOGEN ACTIVITY: CPT

## 2019-04-23 PROCEDURE — 84100 ASSAY OF PHOSPHORUS: CPT

## 2019-04-24 ENCOUNTER — ANESTHESIA (OUTPATIENT)
Dept: ENDOSCOPY | Facility: HOSPITAL | Age: 49
DRG: 441 | End: 2019-04-24
Payer: OTHER GOVERNMENT

## 2019-04-24 ENCOUNTER — ANESTHESIA EVENT (OUTPATIENT)
Dept: ENDOSCOPY | Facility: HOSPITAL | Age: 49
DRG: 441 | End: 2019-04-24
Payer: OTHER GOVERNMENT

## 2019-04-24 DIAGNOSIS — K31.89 PORTAL HYPERTENSIVE GASTROPATHY: Primary | ICD-10-CM

## 2019-04-24 DIAGNOSIS — K76.6 PORTAL HYPERTENSIVE GASTROPATHY: Primary | ICD-10-CM

## 2019-04-24 PROBLEM — K72.90 DECOMPENSATED HEPATIC CIRRHOSIS: Status: ACTIVE | Noted: 2018-02-26

## 2019-04-24 LAB
ABO + RH BLD: NORMAL
ALBUMIN SERPL BCP-MCNC: 2.3 G/DL (ref 3.5–5.2)
ALBUMIN SERPL BCP-MCNC: 2.3 G/DL (ref 3.5–5.2)
ALP SERPL-CCNC: 89 U/L (ref 55–135)
ALP SERPL-CCNC: 91 U/L (ref 55–135)
ALT SERPL W/O P-5'-P-CCNC: 23 U/L (ref 10–44)
ALT SERPL W/O P-5'-P-CCNC: 26 U/L (ref 10–44)
ANION GAP SERPL CALC-SCNC: 6 MMOL/L (ref 8–16)
ANION GAP SERPL CALC-SCNC: 7 MMOL/L (ref 8–16)
ANISOCYTOSIS BLD QL SMEAR: SLIGHT
APTT BLDCRRT: 35.1 SEC (ref 21–32)
AST SERPL-CCNC: 89 U/L (ref 10–40)
AST SERPL-CCNC: 91 U/L (ref 10–40)
BACTERIA #/AREA URNS AUTO: NORMAL /HPF
BASOPHILS # BLD AUTO: 0.02 K/UL (ref 0–0.2)
BASOPHILS # BLD AUTO: 0.03 K/UL (ref 0–0.2)
BASOPHILS # BLD AUTO: 0.03 K/UL (ref 0–0.2)
BASOPHILS NFR BLD: 0.8 % (ref 0–1.9)
BASOPHILS NFR BLD: 0.9 % (ref 0–1.9)
BASOPHILS NFR BLD: 0.9 % (ref 0–1.9)
BASOPHILS NFR BLD: 1 % (ref 0–1.9)
BASOPHILS NFR BLD: 1.1 % (ref 0–1.9)
BILIRUB SERPL-MCNC: 9.1 MG/DL (ref 0.1–1)
BILIRUB SERPL-MCNC: 9.4 MG/DL (ref 0.1–1)
BILIRUB UR QL STRIP: NEGATIVE
BLD GP AB SCN CELLS X3 SERPL QL: NORMAL
BNP SERPL-MCNC: 103 PG/ML (ref 0–99)
BUN SERPL-MCNC: 6 MG/DL (ref 6–20)
BUN SERPL-MCNC: 7 MG/DL (ref 6–20)
CALCIUM SERPL-MCNC: 7.5 MG/DL (ref 8.7–10.5)
CALCIUM SERPL-MCNC: 7.7 MG/DL (ref 8.7–10.5)
CHLORIDE SERPL-SCNC: 106 MMOL/L (ref 95–110)
CHLORIDE SERPL-SCNC: 106 MMOL/L (ref 95–110)
CLARITY UR REFRACT.AUTO: CLEAR
CO2 SERPL-SCNC: 22 MMOL/L (ref 23–29)
CO2 SERPL-SCNC: 22 MMOL/L (ref 23–29)
COLOR UR AUTO: YELLOW
CREAT SERPL-MCNC: 0.7 MG/DL (ref 0.5–1.4)
CREAT SERPL-MCNC: 0.8 MG/DL (ref 0.5–1.4)
DIFFERENTIAL METHOD: ABNORMAL
EOSINOPHIL # BLD AUTO: 0.2 K/UL (ref 0–0.5)
EOSINOPHIL # BLD AUTO: 0.3 K/UL (ref 0–0.5)
EOSINOPHIL # BLD AUTO: 0.4 K/UL (ref 0–0.5)
EOSINOPHIL NFR BLD: 10.2 % (ref 0–8)
EOSINOPHIL NFR BLD: 10.3 % (ref 0–8)
EOSINOPHIL NFR BLD: 10.6 % (ref 0–8)
EOSINOPHIL NFR BLD: 12.2 % (ref 0–8)
EOSINOPHIL NFR BLD: 13.1 % (ref 0–8)
ERYTHROCYTE [DISTWIDTH] IN BLOOD BY AUTOMATED COUNT: 17.1 % (ref 11.5–14.5)
ERYTHROCYTE [DISTWIDTH] IN BLOOD BY AUTOMATED COUNT: 17.3 % (ref 11.5–14.5)
ERYTHROCYTE [DISTWIDTH] IN BLOOD BY AUTOMATED COUNT: 17.4 % (ref 11.5–14.5)
ERYTHROCYTE [DISTWIDTH] IN BLOOD BY AUTOMATED COUNT: 17.5 % (ref 11.5–14.5)
ERYTHROCYTE [DISTWIDTH] IN BLOOD BY AUTOMATED COUNT: 17.6 % (ref 11.5–14.5)
EST. GFR  (AFRICAN AMERICAN): >60 ML/MIN/1.73 M^2
EST. GFR  (AFRICAN AMERICAN): >60 ML/MIN/1.73 M^2
EST. GFR  (NON AFRICAN AMERICAN): >60 ML/MIN/1.73 M^2
EST. GFR  (NON AFRICAN AMERICAN): >60 ML/MIN/1.73 M^2
FIBRINOGEN PPP-MCNC: 128 MG/DL (ref 182–366)
GLUCOSE SERPL-MCNC: 103 MG/DL (ref 70–110)
GLUCOSE SERPL-MCNC: 117 MG/DL (ref 70–110)
GLUCOSE UR QL STRIP: NEGATIVE
HCT VFR BLD AUTO: 25.6 % (ref 37–48.5)
HCT VFR BLD AUTO: 25.9 % (ref 37–48.5)
HCT VFR BLD AUTO: 26 % (ref 37–48.5)
HCT VFR BLD AUTO: 26.7 % (ref 37–48.5)
HCT VFR BLD AUTO: 26.8 % (ref 37–48.5)
HGB BLD-MCNC: 8.4 G/DL (ref 12–16)
HGB BLD-MCNC: 8.4 G/DL (ref 12–16)
HGB BLD-MCNC: 8.5 G/DL (ref 12–16)
HGB BLD-MCNC: 8.8 G/DL (ref 12–16)
HGB BLD-MCNC: 8.8 G/DL (ref 12–16)
HGB UR QL STRIP: ABNORMAL
IMM GRANULOCYTES # BLD AUTO: 0.01 K/UL (ref 0–0.04)
IMM GRANULOCYTES # BLD AUTO: 0.01 K/UL (ref 0–0.04)
IMM GRANULOCYTES # BLD AUTO: 0.02 K/UL (ref 0–0.04)
IMM GRANULOCYTES # BLD AUTO: 0.02 K/UL (ref 0–0.04)
IMM GRANULOCYTES # BLD AUTO: 0.04 K/UL (ref 0–0.04)
IMM GRANULOCYTES NFR BLD AUTO: 0.4 % (ref 0–0.5)
IMM GRANULOCYTES NFR BLD AUTO: 0.5 % (ref 0–0.5)
IMM GRANULOCYTES NFR BLD AUTO: 0.8 % (ref 0–0.5)
IMM GRANULOCYTES NFR BLD AUTO: 0.9 % (ref 0–0.5)
IMM GRANULOCYTES NFR BLD AUTO: 1.3 % (ref 0–0.5)
INR PPP: 2.1 (ref 0.8–1.2)
KETONES UR QL STRIP: NEGATIVE
LEUKOCYTE ESTERASE UR QL STRIP: ABNORMAL
LYMPHOCYTES # BLD AUTO: 0.5 K/UL (ref 1–4.8)
LYMPHOCYTES # BLD AUTO: 0.5 K/UL (ref 1–4.8)
LYMPHOCYTES # BLD AUTO: 0.6 K/UL (ref 1–4.8)
LYMPHOCYTES # BLD AUTO: 0.6 K/UL (ref 1–4.8)
LYMPHOCYTES # BLD AUTO: 0.8 K/UL (ref 1–4.8)
LYMPHOCYTES NFR BLD: 18.8 % (ref 18–48)
LYMPHOCYTES NFR BLD: 22.1 % (ref 18–48)
LYMPHOCYTES NFR BLD: 24.9 % (ref 18–48)
LYMPHOCYTES NFR BLD: 26.1 % (ref 18–48)
LYMPHOCYTES NFR BLD: 28.2 % (ref 18–48)
MAGNESIUM SERPL-MCNC: 1.6 MG/DL (ref 1.6–2.6)
MAGNESIUM SERPL-MCNC: 1.9 MG/DL (ref 1.6–2.6)
MCH RBC QN AUTO: 29.8 PG (ref 27–31)
MCH RBC QN AUTO: 30 PG (ref 27–31)
MCH RBC QN AUTO: 30 PG (ref 27–31)
MCH RBC QN AUTO: 30.2 PG (ref 27–31)
MCH RBC QN AUTO: 30.4 PG (ref 27–31)
MCHC RBC AUTO-ENTMCNC: 32.4 G/DL (ref 32–36)
MCHC RBC AUTO-ENTMCNC: 32.7 G/DL (ref 32–36)
MCHC RBC AUTO-ENTMCNC: 32.8 G/DL (ref 32–36)
MCHC RBC AUTO-ENTMCNC: 32.8 G/DL (ref 32–36)
MCHC RBC AUTO-ENTMCNC: 33 G/DL (ref 32–36)
MCV RBC AUTO: 91 FL (ref 82–98)
MCV RBC AUTO: 91 FL (ref 82–98)
MCV RBC AUTO: 93 FL (ref 82–98)
MICROSCOPIC COMMENT: NORMAL
MONOCYTES # BLD AUTO: 0.2 K/UL (ref 0.3–1)
MONOCYTES # BLD AUTO: 0.3 K/UL (ref 0.3–1)
MONOCYTES NFR BLD: 10.6 % (ref 4–15)
MONOCYTES NFR BLD: 12.2 % (ref 4–15)
MONOCYTES NFR BLD: 12.2 % (ref 4–15)
MONOCYTES NFR BLD: 12.5 % (ref 4–15)
MONOCYTES NFR BLD: 9.9 % (ref 4–15)
NEUTROPHILS # BLD AUTO: 1 K/UL (ref 1.8–7.7)
NEUTROPHILS # BLD AUTO: 1.1 K/UL (ref 1.8–7.7)
NEUTROPHILS # BLD AUTO: 1.5 K/UL (ref 1.8–7.7)
NEUTROPHILS NFR BLD: 45.1 % (ref 38–73)
NEUTROPHILS NFR BLD: 49.5 % (ref 38–73)
NEUTROPHILS NFR BLD: 52.1 % (ref 38–73)
NEUTROPHILS NFR BLD: 53.9 % (ref 38–73)
NEUTROPHILS NFR BLD: 56.9 % (ref 38–73)
NITRITE UR QL STRIP: NEGATIVE
NRBC BLD-RTO: 0 /100 WBC
PH UR STRIP: 7 [PH] (ref 5–8)
PHOSPHATE SERPL-MCNC: 2.9 MG/DL (ref 2.7–4.5)
PHOSPHATE SERPL-MCNC: 3 MG/DL (ref 2.7–4.5)
PLATELET # BLD AUTO: 67 K/UL (ref 150–350)
PLATELET # BLD AUTO: 73 K/UL (ref 150–350)
PLATELET # BLD AUTO: 74 K/UL (ref 150–350)
PLATELET # BLD AUTO: 75 K/UL (ref 150–350)
PLATELET # BLD AUTO: 86 K/UL (ref 150–350)
PLATELET BLD QL SMEAR: ABNORMAL
PMV BLD AUTO: 10.9 FL (ref 9.2–12.9)
PMV BLD AUTO: 10.9 FL (ref 9.2–12.9)
PMV BLD AUTO: 11.9 FL (ref 9.2–12.9)
PMV BLD AUTO: 12.2 FL (ref 9.2–12.9)
PMV BLD AUTO: 12.6 FL (ref 9.2–12.9)
POCT GLUCOSE: 124 MG/DL (ref 70–110)
POLYCHROMASIA BLD QL SMEAR: ABNORMAL
POTASSIUM SERPL-SCNC: 3.7 MMOL/L (ref 3.5–5.1)
POTASSIUM SERPL-SCNC: 3.8 MMOL/L (ref 3.5–5.1)
PROT SERPL-MCNC: 5.3 G/DL (ref 6–8.4)
PROT SERPL-MCNC: 5.3 G/DL (ref 6–8.4)
PROT UR QL STRIP: NEGATIVE
PROTHROMBIN TIME: 20.1 SEC (ref 9–12.5)
RBC # BLD AUTO: 2.76 M/UL (ref 4–5.4)
RBC # BLD AUTO: 2.8 M/UL (ref 4–5.4)
RBC # BLD AUTO: 2.81 M/UL (ref 4–5.4)
RBC # BLD AUTO: 2.93 M/UL (ref 4–5.4)
RBC # BLD AUTO: 2.95 M/UL (ref 4–5.4)
RBC #/AREA URNS AUTO: 1 /HPF (ref 0–4)
SODIUM SERPL-SCNC: 134 MMOL/L (ref 136–145)
SODIUM SERPL-SCNC: 135 MMOL/L (ref 136–145)
SP GR UR STRIP: 1.01 (ref 1–1.03)
SQUAMOUS #/AREA URNS AUTO: 5 /HPF
TSH SERPL DL<=0.005 MIU/L-ACNC: 1.24 UIU/ML (ref 0.4–4)
URN SPEC COLLECT METH UR: ABNORMAL
WBC # BLD AUTO: 2.13 K/UL (ref 3.9–12.7)
WBC # BLD AUTO: 2.17 K/UL (ref 3.9–12.7)
WBC # BLD AUTO: 2.55 K/UL (ref 3.9–12.7)
WBC # BLD AUTO: 2.71 K/UL (ref 3.9–12.7)
WBC # BLD AUTO: 3.03 K/UL (ref 3.9–12.7)
WBC #/AREA URNS AUTO: 2 /HPF (ref 0–5)

## 2019-04-24 PROCEDURE — 37000008 HC ANESTHESIA 1ST 15 MINUTES: Performed by: INTERNAL MEDICINE

## 2019-04-24 PROCEDURE — 27202087 HC PROBE, APC: Performed by: INTERNAL MEDICINE

## 2019-04-24 PROCEDURE — D9220A PRA ANESTHESIA: Mod: CRNA,NTX,, | Performed by: NURSE ANESTHETIST, CERTIFIED REGISTERED

## 2019-04-24 PROCEDURE — 80053 COMPREHEN METABOLIC PANEL: CPT

## 2019-04-24 PROCEDURE — 25000003 PHARM REV CODE 250: Performed by: NURSE PRACTITIONER

## 2019-04-24 PROCEDURE — 99223 PR INITIAL HOSPITAL CARE,LEVL III: ICD-10-PCS | Mod: 25,,, | Performed by: INTERNAL MEDICINE

## 2019-04-24 PROCEDURE — 99222 1ST HOSP IP/OBS MODERATE 55: CPT | Mod: ,,, | Performed by: INTERNAL MEDICINE

## 2019-04-24 PROCEDURE — 80321 ALCOHOLS BIOMARKERS 1OR 2: CPT

## 2019-04-24 PROCEDURE — 43255 EGD CONTROL BLEEDING ANY: CPT | Mod: ,,, | Performed by: INTERNAL MEDICINE

## 2019-04-24 PROCEDURE — 85025 COMPLETE CBC W/AUTO DIFF WBC: CPT

## 2019-04-24 PROCEDURE — 81001 URINALYSIS AUTO W/SCOPE: CPT

## 2019-04-24 PROCEDURE — 84100 ASSAY OF PHOSPHORUS: CPT

## 2019-04-24 PROCEDURE — 25000003 PHARM REV CODE 250: Mod: NTX | Performed by: NURSE ANESTHETIST, CERTIFIED REGISTERED

## 2019-04-24 PROCEDURE — 94761 N-INVAS EAR/PLS OXIMETRY MLT: CPT

## 2019-04-24 PROCEDURE — C9113 INJ PANTOPRAZOLE SODIUM, VIA: HCPCS | Performed by: NURSE PRACTITIONER

## 2019-04-24 PROCEDURE — D9220A PRA ANESTHESIA: Mod: ANES,NTX,, | Performed by: ANESTHESIOLOGY

## 2019-04-24 PROCEDURE — D9220A PRA ANESTHESIA: ICD-10-PCS | Mod: ANES,NTX,, | Performed by: ANESTHESIOLOGY

## 2019-04-24 PROCEDURE — 20000000 HC ICU ROOM

## 2019-04-24 PROCEDURE — 43255 PR EGD, FLEX, W/CTRL BLEED, ANY METHOD: ICD-10-PCS | Mod: ,,, | Performed by: INTERNAL MEDICINE

## 2019-04-24 PROCEDURE — 99222 PR INITIAL HOSPITAL CARE,LEVL II: ICD-10-PCS | Mod: ,,, | Performed by: INTERNAL MEDICINE

## 2019-04-24 PROCEDURE — 63600175 PHARM REV CODE 636 W HCPCS: Mod: NTX | Performed by: NURSE ANESTHETIST, CERTIFIED REGISTERED

## 2019-04-24 PROCEDURE — 43255 EGD CONTROL BLEEDING ANY: CPT | Performed by: INTERNAL MEDICINE

## 2019-04-24 PROCEDURE — 99223 1ST HOSP IP/OBS HIGH 75: CPT | Mod: 25,,, | Performed by: INTERNAL MEDICINE

## 2019-04-24 PROCEDURE — 83735 ASSAY OF MAGNESIUM: CPT

## 2019-04-24 PROCEDURE — 63600175 PHARM REV CODE 636 W HCPCS: Performed by: NURSE PRACTITIONER

## 2019-04-24 PROCEDURE — 37000009 HC ANESTHESIA EA ADD 15 MINS: Performed by: INTERNAL MEDICINE

## 2019-04-24 PROCEDURE — D9220A PRA ANESTHESIA: ICD-10-PCS | Mod: CRNA,NTX,, | Performed by: NURSE ANESTHETIST, CERTIFIED REGISTERED

## 2019-04-24 RX ORDER — CEFTRIAXONE 1 G/1
1 INJECTION, POWDER, FOR SOLUTION INTRAMUSCULAR; INTRAVENOUS
Status: DISCONTINUED | OUTPATIENT
Start: 2019-04-24 | End: 2019-04-25

## 2019-04-24 RX ORDER — LIDOCAINE HCL/PF 100 MG/5ML
SYRINGE (ML) INTRAVENOUS
Status: DISCONTINUED | OUTPATIENT
Start: 2019-04-24 | End: 2019-04-24

## 2019-04-24 RX ORDER — SPIRONOLACTONE 25 MG/1
25 TABLET ORAL DAILY
Status: ON HOLD | COMMUNITY
End: 2019-05-02 | Stop reason: HOSPADM

## 2019-04-24 RX ORDER — SODIUM CHLORIDE 0.9 % (FLUSH) 0.9 %
10 SYRINGE (ML) INJECTION
Status: DISCONTINUED | OUTPATIENT
Start: 2019-04-24 | End: 2019-05-02 | Stop reason: HOSPADM

## 2019-04-24 RX ORDER — FUROSEMIDE 20 MG/1
20 TABLET ORAL DAILY
Status: ON HOLD | COMMUNITY
End: 2019-05-02 | Stop reason: HOSPADM

## 2019-04-24 RX ORDER — HYDROCODONE BITARTRATE AND ACETAMINOPHEN 500; 5 MG/1; MG/1
TABLET ORAL
Status: DISCONTINUED | OUTPATIENT
Start: 2019-04-24 | End: 2019-04-25

## 2019-04-24 RX ORDER — PROPOFOL 10 MG/ML
VIAL (ML) INTRAVENOUS
Status: DISCONTINUED | OUTPATIENT
Start: 2019-04-24 | End: 2019-04-24

## 2019-04-24 RX ORDER — PANTOPRAZOLE SODIUM 40 MG/1
40 TABLET, DELAYED RELEASE ORAL DAILY
Qty: 90 TABLET | Refills: 0 | Status: ON HOLD | OUTPATIENT
Start: 2019-04-24 | End: 2019-06-11 | Stop reason: HOSPADM

## 2019-04-24 RX ORDER — PANTOPRAZOLE SODIUM 40 MG/1
40 TABLET, DELAYED RELEASE ORAL DAILY
Status: DISCONTINUED | OUTPATIENT
Start: 2019-04-25 | End: 2019-05-02 | Stop reason: HOSPADM

## 2019-04-24 RX ORDER — LIDOCAINE 50 MG/G
1 PATCH TOPICAL
Status: ON HOLD | COMMUNITY
End: 2019-06-11 | Stop reason: HOSPADM

## 2019-04-24 RX ORDER — CIPROFLOXACIN 750 MG/1
750 TABLET, FILM COATED ORAL WEEKLY
Status: ON HOLD | COMMUNITY
End: 2019-04-26 | Stop reason: CLARIF

## 2019-04-24 RX ORDER — ONDANSETRON 2 MG/ML
4 INJECTION INTRAMUSCULAR; INTRAVENOUS EVERY 8 HOURS PRN
Status: DISCONTINUED | OUTPATIENT
Start: 2019-04-24 | End: 2019-04-26

## 2019-04-24 RX ORDER — LEVOTHYROXINE SODIUM 112 UG/1
112 TABLET ORAL
Status: DISCONTINUED | OUTPATIENT
Start: 2019-04-24 | End: 2019-05-02 | Stop reason: HOSPADM

## 2019-04-24 RX ORDER — SODIUM CHLORIDE 0.9 % (FLUSH) 0.9 %
10 SYRINGE (ML) INJECTION
Status: DISCONTINUED | OUTPATIENT
Start: 2019-04-24 | End: 2019-04-24 | Stop reason: HOSPADM

## 2019-04-24 RX ORDER — GUAIFENESIN/DEXTROMETHORPHAN 100-10MG/5
5 SYRUP ORAL EVERY 6 HOURS PRN
Status: ON HOLD | COMMUNITY
End: 2019-04-26 | Stop reason: CLARIF

## 2019-04-24 RX ORDER — PANTOPRAZOLE SODIUM 40 MG/10ML
40 INJECTION, POWDER, LYOPHILIZED, FOR SOLUTION INTRAVENOUS 2 TIMES DAILY
Status: DISCONTINUED | OUTPATIENT
Start: 2019-04-24 | End: 2019-04-24

## 2019-04-24 RX ORDER — SODIUM CHLORIDE 9 MG/ML
INJECTION, SOLUTION INTRAVENOUS CONTINUOUS PRN
Status: DISCONTINUED | OUTPATIENT
Start: 2019-04-24 | End: 2019-04-24

## 2019-04-24 RX ADMIN — SODIUM CHLORIDE: 0.9 INJECTION, SOLUTION INTRAVENOUS at 02:04

## 2019-04-24 RX ADMIN — PROPOFOL 100 MG: 10 INJECTION, EMULSION INTRAVENOUS at 02:04

## 2019-04-24 RX ADMIN — PANTOPRAZOLE SODIUM 40 MG: 40 INJECTION, POWDER, LYOPHILIZED, FOR SOLUTION INTRAVENOUS at 08:04

## 2019-04-24 RX ADMIN — RIFAXIMIN 550 MG: 550 TABLET ORAL at 08:04

## 2019-04-24 RX ADMIN — PROPOFOL 40 MG: 10 INJECTION, EMULSION INTRAVENOUS at 03:04

## 2019-04-24 RX ADMIN — CEFTRIAXONE SODIUM 1 G: 1 INJECTION, POWDER, FOR SOLUTION INTRAMUSCULAR; INTRAVENOUS at 02:04

## 2019-04-24 RX ADMIN — OCTREOTIDE ACETATE 50 MCG/HR: 1000 INJECTION, SOLUTION INTRAVENOUS; SUBCUTANEOUS at 02:04

## 2019-04-24 RX ADMIN — LEVOTHYROXINE SODIUM 112 MCG: 112 TABLET ORAL at 06:04

## 2019-04-24 RX ADMIN — RIFAXIMIN 550 MG: 550 TABLET ORAL at 09:04

## 2019-04-24 RX ADMIN — OCTREOTIDE ACETATE 50 MCG/HR: 1000 INJECTION, SOLUTION INTRAVENOUS; SUBCUTANEOUS at 11:04

## 2019-04-24 RX ADMIN — LIDOCAINE HYDROCHLORIDE 75 MG: 20 INJECTION, SOLUTION INTRAVENOUS at 02:04

## 2019-04-24 RX ADMIN — PROPOFOL 50 MG: 10 INJECTION, EMULSION INTRAVENOUS at 02:04

## 2019-04-24 RX ADMIN — PROPOFOL 40 MG: 10 INJECTION, EMULSION INTRAVENOUS at 02:04

## 2019-04-24 RX ADMIN — OCTREOTIDE ACETATE 50 MCG/HR: 1000 INJECTION, SOLUTION INTRAVENOUS; SUBCUTANEOUS at 09:04

## 2019-04-24 NOTE — [employer]OVATION PATIENT INSTRUCTIONS
Discharge Summary/Instructions after an Endoscopic Procedure  Patient Name: Jihan Zamudio  Patient MRN: [id number]  Patient YOB: 1970 Wednesday, April 24, 2019  Davian Hernández MD  RESTRICTIONS:  During your procedure today, you received medications for sedation.  These   medications may affect your judgment, balance and coordination.  Therefore,   for 24 hours, you have the following restrictions:   - DO NOT drive a car, operate machinery, make legal/financial decisions,   sign important papers or drink alcohol.    ACTIVITY:  Today: no heavy lifting, straining or running due to procedural   sedation/anesthesia.  The following day: return to full activity including work.  DIET:  Eat and drink normally unless instructed otherwise.     TREATMENT FOR COMMON SIDE EFFECTS:  - Mild abdominal pain, nausea, belching, bloating or excessive gas:  rest,   eat lightly and use a heating pad.  - Sore Throat: treat with throat lozenges and/or gargle with warm salt   water.  - Because air was used during the procedure, expelling large amounts of air   from your rectum or belching is normal.  - If a bowel prep was taken, you may not have a bowel movement for 1-3 days.    This is normal.  SYMPTOMS TO WATCH FOR AND REPORT TO YOUR PHYSICIAN:  1. Abdominal pain or bloating, other than gas cramps.  2. Chest pain.  3. Back pain.  4. Signs of infection such as: chills or fever occurring within 24 hours   after the procedure.  5. Rectal bleeding, which would show as bright red, maroon, or black stools.   (A tablespoon of blood from the rectum is not serious, especially if   hemorrhoids are present.)  6. Vomiting.  7. Weakness or dizziness.  GO DIRECTLY TO THE NEAREST EMERGENCY ROOM IF YOU HAVE ANY OF THE FOLLOWING:      Difficulty breathing              Chills and/or fever over 101 F   Persistent vomiting and/or vomiting blood   Severe abdominal pain   Severe chest pain   Black, tarry stools   Bleeding- more than one  tablespoon   Any other symptom or condition that you feel may need urgent attention  Your doctor recommends these additional instructions:  If any biopsies were taken, your doctors clinic will contact you in 1 to 2   weeks with any results.  - Return patient to hospital [facility].   - Repeat upper endoscopy in 6 months for surveillance.   - The findings and recommendations were discussed with the referring   physician.   - The findings and recommendations were discussed with the patient.   - Use Protonix 40 mg once daily (or any other full strength proton pump   inhibitor) - best taken 45 minutes before your first protein containing   meal for 12 weeks.  For questions, problems or results please call your physician - Davian Hernández MD at Work:  [phone].  [facility], EMERGENCY ROOM PHONE NUMBER: [phone]  IF A COMPLICATION OR EMERGENCY SITUATION ARISES AND YOU ARE UNABLE TO REACH   YOUR PHYSICIAN - GO DIRECTLY TO THE EMERGENCY ROOM.  Davian Hernández MD  4/24/2019 3:44:54 PM  This report has been verified and signed electronically.  [employer]

## 2019-04-24 NOTE — SUBJECTIVE & OBJECTIVE
Past Medical History:   Diagnosis Date    Cirrhosis     Esophageal varices 2018    Small with no banding     Essential hypertension 2018    Fatty liver 2018    GERD (gastroesophageal reflux disease)     Hx of colonic polyps 2018    On colonoscopy     Hypertension     Hypothyroidism 2018    Kidney stones     Morbid obesity 2018    Lap band with subsequent release    KADEEM (obstructive sleep apnea) 2018    Osteoarthritis 2018    Pulmonary nodule 2018    Vitamin D deficiency 2018       Past Surgical History:   Procedure Laterality Date     SECTION      TIMES 2     CHOLECYSTECTOMY      LAPAROSCOPIC     CYSTOSCOPY W/ STONE MANIPULATION      kidney stone removal    LAPAROSCOPIC GASTRIC BANDING  2006    removal 2009    LIVER BIOPSY  2017    KESSLER with bridging 17       Review of patient's allergies indicates:   Allergen Reactions    Metformin Rash    Pcn [penicillins] Other (See Comments)     Unsure of reaction, states it was as a child. Tolerated rocephin at [facility]       Family History     Problem Relation (Age of Onset)    Cancer Father    Heart disease Mother, Father        Tobacco Use    Smoking status: Never Smoker    Smokeless tobacco: Never Used    Tobacco comment: patient denies   Substance and Sexual Activity    Alcohol use: No     Comment: patient denies    Drug use: No     Comment: patient denies    Sexual activity: Not on file      Review of Systems   Constitutional: Negative for chills, diaphoresis and fever.   HENT: Negative for nosebleeds, sinus pressure and trouble swallowing.    Eyes: Negative for pain, discharge and itching.   Respiratory: Negative for cough, chest tightness, shortness of breath and wheezing.    Cardiovascular: Negative for chest pain, palpitations and leg swelling.   Gastrointestinal: Positive for abdominal distention, abdominal pain and blood in stool. Negative for nausea and  vomiting.   Endocrine: Negative for polydipsia, polyphagia and polyuria.   Genitourinary: Negative for dysuria, flank pain and hematuria.   Musculoskeletal: Negative for myalgias, neck pain and neck stiffness.   Neurological: Negative for speech difficulty, light-headedness and headaches.   Psychiatric/Behavioral: Negative for confusion and decreased concentration. The patient is not nervous/anxious.      Objective:     Vital Signs (Most Recent):  Temp: 99.1 °F (37.3 °C) (04/23/19 2330)  Pulse: 79 (04/24/19 0100)  Resp: (!) 31 (04/24/19 0100)  BP: 134/63 (04/24/19 0100)  SpO2: (!) 94 % (04/24/19 0100) Vital Signs (24h Range):  Temp:  [98.8 °F (37.1 °C)-99.5 °F (37.5 °C)] 99.1 °F (37.3 °C)  Pulse:  [79-83] 79  Resp:  [18-31] 31  SpO2:  [94 %-98 %] 94 %  BP: (124-141)/(59-63) 134/63   Weight: 119.7 kg (263 lb 14.3 oz)  Body mass index is 43.91 kg/m².    No intake or output data in the 24 hours ending 04/24/19 0125    Physical Exam   Constitutional: She is oriented to person, place, and time. She appears well-developed and well-nourished. No distress.   HENT:   Head: Normocephalic and atraumatic.   Right Ear: External ear normal.   Left Ear: External ear normal.   Nose: Nose normal.   Mouth/Throat: Oropharynx is clear and moist.   Eyes: Pupils are equal, round, and reactive to light. Conjunctivae and EOM are normal.   Neck: Normal range of motion. Neck supple.   Cardiovascular: Normal rate, regular rhythm and intact distal pulses.   Murmur heard.  Pulmonary/Chest: Effort normal and breath sounds normal. No respiratory distress. She has no wheezes.   Abdominal: Soft. Bowel sounds are normal. She exhibits distension. There is tenderness in the epigastric area.   Musculoskeletal: Normal range of motion. She exhibits no tenderness or deformity.   Neurological: She is alert and oriented to person, place, and time.   Skin: Skin is warm and dry. Capillary refill takes less than 2 seconds. She is not diaphoretic.    Psychiatric: She has a normal mood and affect. Her behavior is normal. Judgment and thought content normal.   Nursing note and vitals reviewed.      Vents:     Lines/Drains/Airways     Peripheral Intravenous Line                 Peripheral IV - Single Lumen 04/05/18 1443 Left Antecubital 383 days              Significant Labs:    CBC/Anemia Profile:  Recent Labs   Lab 04/23/19  2355   WBC 2.71*   HGB 8.4*   HCT 25.9*   PLT 73*   MCV 93   RDW 17.6*        Chemistries:  Recent Labs   Lab 04/23/19  2355   *   K 3.7      CO2 22*   BUN 7   CREATININE 0.8   CALCIUM 7.7*   ALBUMIN 2.3*   PROT 5.3*   BILITOT 9.4*   ALKPHOS 89   ALT 23   AST 89*   MG 1.6   PHOS 3.0       All pertinent labs within the past 24 hours have been reviewed.    Significant Imaging: I have reviewed and interpreted all pertinent imaging results/findings within the past 24 hours.

## 2019-04-24 NOTE — ASSESSMENT & PLAN NOTE
48 yo F with KESSLER cirrhosis and varices transferred from OSH after presenting with melena for evaluation of TIPS or BRTO. EGD done 4/23 showed varices. Unclear if she has gastric, esophageal, or both types of varices. Hepatology not considering TIPS given her high MELD score. We will further evaluate with an EGD, but may ultimately need advanced interventions if found to have gastric varices that are not amenable to banding.    Recommendations:  - Keep NPO  - Agree with octreotide gtt  - Transfuse to keep Hg>7  - Continue antibiotics  - Plan for EGD today  - Further recs to follow

## 2019-04-24 NOTE — PLAN OF CARE
Problem: Adult Inpatient Plan of Care  Goal: Patient-Specific Goal (Individualization)  Outcome: Ongoing (interventions implemented as appropriate)  Plan of care reviewed with pt and spouse.  Questions answered and encouraged.  VSS.  NPO since MN except for meds for possible TIPS procedure.  Ambulates to bathroom with minimal assist.  Abdomen large, obese, and with ascites.  Dressing to right lower quadrant cdi over old puncture site.  Voids spontaneously and without difficulty.  No skin breakdown noted.  Free from falls and injuries.  Refer to [facility] for assessments and updates.  Emotional support offered.

## 2019-04-24 NOTE — PROGRESS NOTES
[facility]  Critical Care Medicine  Progress Note    Patient Name: Jihan Zamudio  MRN: [id number]  Admission Date: 4/23/2019  Hospital Length of Stay: 1 days  Code Status: Full Code  Attending Provider: Arnoldo Stoll MD  Primary Care Provider: Primary Doctor No   Principal Problem: GI bleed    Subjective:     HPI:  Patient is a 49 y.o. 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 The patient denied recent NSAID and ETOH use. She denies any hematemesis and states that she has never had a similar episode to this previously. Paracentesis was performed in the ED with 5L removed. Para studies with PMN < 250. The patient was admitted there and had EGD performed on 4/23 which revealed portal hypertensive gastropathy, blood in the gastric body, grade 2 gastroesophageal varices without active bleeding but with stigmata of recent bleeding and and non-bleeding small esophageal varices without stigmata of recent bleeding. The esophageal varices were not banded as they were not suspected to be the source of bleeding.  CT abdomen showed sequela of portal HTN and patent portal vein without evidence of PVT. The patient was transferred to [facility] for evaluation for TIPS vs Balloon-Occluded Retrograde Transvenous Obliteration for treatment of gastric varices.         Hospital/ICU Course:  No notes on file    Interval History/Significant Events: No acute events overnight.. Pt is afebrile, hemodynamically stable. No report of further melanotic stools since admit. Hgb stable at 8.8. Will consult Hepatology this AM to evaluate for TIPS procedure.    Review of Systems   Constitutional: Negative for chills, diaphoresis and fever.   HENT: Negative for nosebleeds, sinus pressure and trouble swallowing.    Eyes: Negative for pain, discharge and  itching.   Respiratory: Negative for cough, chest tightness, shortness of breath and wheezing.    Cardiovascular: Negative for chest pain, palpitations and leg swelling.   Gastrointestinal: Positive for abdominal distention, abdominal pain and blood in stool. Negative for nausea and vomiting.   Endocrine: Negative for polydipsia, polyphagia and polyuria.   Genitourinary: Negative for dysuria, flank pain and hematuria.   Musculoskeletal: Negative for myalgias, neck pain and neck stiffness.   Neurological: Negative for speech difficulty, light-headedness and headaches.   Psychiatric/Behavioral: Negative for confusion and decreased concentration. The patient is not nervous/anxious.      Objective:     Vital Signs (Most Recent):  Temp: 99 °F (37.2 °C) (04/24/19 0300)  Pulse: 78 (04/24/19 0700)  Resp: (!) 21 (04/24/19 0700)  BP: (!) 116/55 (04/24/19 0700)  SpO2: (!) 94 % (04/24/19 0700) Vital Signs (24h Range):  Temp:  [98.8 °F (37.1 °C)-99.5 °F (37.5 °C)] 99 °F (37.2 °C)  Pulse:  [73-85] 78  Resp:  [18-31] 21  SpO2:  [93 %-98 %] 94 %  BP: (116-141)/(55-63) 116/55   Weight: 119.7 kg (263 lb 14.3 oz)  Body mass index is 43.91 kg/m².      Intake/Output Summary (Last 24 hours) at 4/24/2019 0744  Last data filed at 4/24/2019 0500  Gross per 24 hour   Intake 152 ml   Output 375 ml   Net -223 ml       Physical Exam   Constitutional: She is oriented to person, place, and time. She appears well-developed and well-nourished. No distress.   HENT:   Head: Normocephalic and atraumatic.   Right Ear: External ear normal.   Left Ear: External ear normal.   Nose: Nose normal.   Mouth/Throat: Oropharynx is clear and moist.   Eyes: Pupils are equal, round, and reactive to light. Conjunctivae and EOM are normal.   Neck: Normal range of motion. Neck supple.   Cardiovascular: Normal rate, regular rhythm and intact distal pulses.   Murmur heard.  Pulmonary/Chest: Effort normal and breath sounds normal. No respiratory distress. She has no  wheezes.   Abdominal: Soft. Bowel sounds are normal. She exhibits distension. There is tenderness in the epigastric area.   Musculoskeletal: Normal range of motion. She exhibits no tenderness or deformity.   Neurological: She is alert and oriented to person, place, and time.   Skin: Skin is warm and dry. Capillary refill takes less than 2 seconds. She is not diaphoretic.   Psychiatric: She has a normal mood and affect. Her behavior is normal. Judgment and thought content normal.   Nursing note and vitals reviewed.      Vents:     Lines/Drains/Airways     Peripheral Intravenous Line                 Peripheral IV - Single Lumen 04/23/19 1300 20 G Right Wrist less than 1 day         Peripheral IV - Single Lumen 04/23/19 1500 18 G Left Antecubital less than 1 day              Significant Labs:    CBC/Anemia Profile:  Recent Labs   Lab 04/23/19 2355 04/24/19 0345   WBC 2.71* 3.03*   HGB 8.4* 8.8*   HCT 25.9* 26.7*   PLT 73* 86*   MCV 93 91   RDW 17.6* 17.4*        Chemistries:  Recent Labs   Lab 04/23/19 2355 04/24/19  0345   * 134*   K 3.7 3.8    106   CO2 22* 22*   BUN 7 6   CREATININE 0.8 0.7   CALCIUM 7.7* 7.5*   ALBUMIN 2.3* 2.3*   PROT 5.3* 5.3*   BILITOT 9.4* 9.1*   ALKPHOS 89 91   ALT 23 26   AST 89* 91*   MG 1.6 1.9   PHOS 3.0 2.9       Bilirubin:   Recent Labs   Lab 04/23/19 2355 04/24/19 0345   BILITOT 9.4* 9.1*     Coagulation:   Recent Labs   Lab 04/23/19 2355   INR 2.1*   APTT 35.1*     Urine Studies:   Recent Labs   Lab 04/24/19  0330   COLORU Yellow   APPEARANCEUA Clear   PHUR 7.0   SPECGRAV 1.010   PROTEINUA Negative   GLUCUA Negative   KETONESU Negative   BILIRUBINUA Negative   OCCULTUA 1+*   NITRITE Negative   LEUKOCYTESUR 1+*   RBCUA 1   WBCUA 2   BACTERIA Rare   SQUAMEPITHEL 5       Significant Imaging:  I have reviewed all pertinent imaging results/findings within the past 24 hours.      ABG  No results for input(s): PH, PO2, PCO2, HCO3, BE in the last 168 hours.  Assessment/Plan:      Cardiac/Vascular  Essential hypertension  --holding lasix & aldactone in setting of ongoing bleeding  --currently normotensive off meds    Endocrine  Hypothyroidism  --checking TSH  --continue home synthroid    GI  * GI bleed  --EGD at [facility] showed portal hypertensive gastropathy, blood in the gastric body, grade 2 gastroesophageal varices without active bleeding but with stigmata of recent bleeding and and non-bleeding small esophageal varices without stigmata of recent bleeding  --transferred for TIPS vs Balloon-Occluded Retrograde Transvenous Obliteration for treatment of gastric varices  --continue ppi, octreotide, rifaximin & rocephin  --cbc Q6, transfuse for hgb < 7  --Hepatology consult in am    Fatty liver  MELD-Na score: 24 at 4/23/2019 11:55 PM  MELD score: 23 at 4/23/2019 11:55 PM  Calculated from:  Serum Creatinine: 0.8 mg/dL (Rounded to 1 mg/dL) at 4/23/2019 11:55 PM  Serum Sodium: 135 mmol/L at 4/23/2019 11:55 PM  Total Bilirubin: 9.4 mg/dL at 4/23/2019 11:55 PM  INR(ratio): 2.1 at 4/23/2019 11:55 PM  Age: 49 years  --KESSLER dx by liver biopsy in 11/2017  --paracentesis at [facility] with PMN < 250 & no bacteria on gram stain  --see GIB for further plan       Critical Care Daily Checklist:    A: Awake: RASS Goal/Actual Goal: RASS Goal: 0-->alert and calm  Actual: Alaniz Agitation Sedation Scale (RASS): Alert and calm   B: Spontaneous Breathing Trial Performed?  n/a   C: SAT & SBT Coordinated?  n/a   D: Delirium: CAM-ICU     E: Early Mobility Performed? Yes   F: Feeding Goal:    Status:     Current Diet Order   Procedures    Diet NPO Except for: Medication     Order Specific Question:   Except for     Answer:   Medication      AS: Analgesia/Sedation none   T: Thromboembolic Prophylaxis SCD's   H: HOB > 300 Yes   U: Stress Ulcer Prophylaxis (if needed) PPi   G: Glucose Control none   B: Bowel Function  no melanotic stools   I: Indwelling Catheter (Lines & Hernandes) Necessity PIV x2    D: De-escalation  of Antimicrobials/Pharmacotherapies Rocpehin, rifaximin    Plan for the day/ETD Hepatology eval    Code Status:  Family/Goals of Care: Full Code  Discussing with pt and family at bedside       Critical secondary to Patient has a condition that poses threat to life and bodily function: GIB due to gastric varices     Critical care was time spent personally by me on the following activities: development of treatment plan with patient or surrogate and bedside caregivers, discussions with consultants, evaluation of patient's response to treatment, examination of patient, ordering and performing treatments and interventions, ordering and review of laboratory studies, ordering and review of radiographic studies, pulse oximetry, re-evaluation of patient's condition. This critical care time did not overlap with that of any other provider or involve time for any procedures.     Mt Bocanegra MD  Critical Care Medicine  [facility]

## 2019-04-24 NOTE — CONSULTS
"[facility]  Gastroenterology  Consult Note    Patient Name: Jihan Zamudio  MRN: [id number]  Admission Date: 4/23/2019  Hospital Length of Stay: 1 days  Code Status: Full Code   Attending Provider: Arnoldo Stoll MD   Consulting Provider: Jt Cosby MD  Primary Care Physician: Primary Doctor No  Principal Problem:GI bleed    Inpatient consult to Gastroenterology  Consult performed by: Jt Cosby MD  Consult ordered by: Mt Bocanegra MD        Subjective:     HPI:  This is a 50 yo F with KESSLER cirrhosis c/b varices, latent TB, GERD, hypothyroidism, and hx of lap band in 2007 who was transferred from OSH for hepatology evaluation for TIPS. Patient presented to OSH on Saturday after reporting black, tarry stools. She had an EGD done there on 4/23 which found "grade 2 gastroesophageal varices without active bleeding but with stigmata of recent bleeding and non-bleeding small esophageal varices without stigmata of recent bleeding." CT AP obtained showed sequela of portal HTN, patent portal vein, and no PVT. She was transferred here for further evaluation and management of gastric varices. She denies any history of GI bleed. She reports some abdominal discomfort and distention. She denies any hematemesis or hematochezia. Per hepatology, TIPS is not favored given her high MELD (25) and hx of HE.    Past Medical History:   Diagnosis Date    Cirrhosis     Esophageal varices 2/26/2018    Small with no banding 07/17    Essential hypertension 2/26/2018    Fatty liver 2/26/2018    GERD (gastroesophageal reflux disease)     Hx of colonic polyps 2/26/2018    On colonoscopy 07/17    Hypertension     Hypothyroidism 2/26/2018    Kidney stones     Morbid obesity 2/26/2018    Lap band with subsequent release    KADEEM (obstructive sleep apnea) 2/26/2018    Osteoarthritis 2/26/2018    Pulmonary nodule 2/26/2018    Vitamin D deficiency 2/26/2018       Past Surgical History:   Procedure " Laterality Date     SECTION      TIMES 2     CHOLECYSTECTOMY      LAPAROSCOPIC     CYSTOSCOPY W/ STONE MANIPULATION      kidney stone removal    LAPAROSCOPIC GASTRIC BANDING  2006    removal 2009    LIVER BIOPSY  2017    KESSLER with bridging 17       Review of patient's allergies indicates:   Allergen Reactions    Metformin Rash    Pcn [penicillins] Other (See Comments)     Unsure of reaction, states it was as a child. Tolerated rocephin at osh     Family History     Problem Relation (Age of Onset)    Cancer Mother (50), Father (65)    Heart disease Mother, Father        Tobacco Use    Smoking status: Never Smoker    Smokeless tobacco: Never Used    Tobacco comment: patient denies   Substance and Sexual Activity    Alcohol use: No     Comment: patient denies    Drug use: No     Comment: patient denies    Sexual activity: Not on file     Review of Systems   Constitutional: Positive for activity change and appetite change. Negative for chills and fever.   HENT: Negative for sore throat and trouble swallowing.    Respiratory: Negative for cough and shortness of breath.    Cardiovascular: Negative for chest pain and leg swelling.   Gastrointestinal: Positive for abdominal distention, abdominal pain and blood in stool. Negative for constipation, diarrhea, nausea and vomiting.   Genitourinary: Negative for decreased urine volume and difficulty urinating.   Musculoskeletal: Positive for back pain. Negative for arthralgias.   Skin: Negative for color change and pallor.   Neurological: Negative for dizziness and light-headedness.   Psychiatric/Behavioral: Negative for agitation and confusion.     Objective:     Vital Signs (Most Recent):  Temp: 99 °F (37.2 °C) (19 0300)  Pulse: 83 (19 0815)  Resp: (!) 27 (19 0815)  BP: (!) 116/55 (19 0700)  SpO2: 95 % (19 0815) Vital Signs (24h Range):  Temp:  [98.8 °F (37.1 °C)-99.5 °F (37.5 °C)] 99 °F (37.2 °C)  Pulse:   [73-85] 83  Resp:  [18-31] 27  SpO2:  [93 %-98 %] 95 %  BP: (116-141)/(55-63) 116/55     Weight: 119.7 kg (263 lb 14.3 oz) (04/23/19 2330)  Body mass index is 43.91 kg/m².      Intake/Output Summary (Last 24 hours) at 4/24/2019 1350  Last data filed at 4/24/2019 1100  Gross per 24 hour   Intake 152 ml   Output 575 ml   Net -423 ml       Lines/Drains/Airways     Peripheral Intravenous Line                 Peripheral IV - Single Lumen 04/23/19 1300 20 G Right Wrist 1 day         Peripheral IV - Single Lumen 04/23/19 1500 18 G Left Antecubital less than 1 day                Physical Exam   Constitutional: No distress.   HENT:   Mouth/Throat: Oropharynx is clear and moist.   Eyes: Scleral icterus is present.   Cardiovascular: Normal rate and regular rhythm.   Pulmonary/Chest: Effort normal and breath sounds normal. No respiratory distress.   Abdominal: Bowel sounds are normal. She exhibits distension. She exhibits no mass. There is no guarding.   Tenderness to palpation epigastric/RUQ area   Skin: Skin is warm and dry.   jaundiced   Psychiatric: She has a normal mood and affect.   Vitals reviewed.      Significant Labs:  CBC:   Recent Labs   Lab 04/23/19  2355 04/24/19  0345 04/24/19  0912   WBC 2.71* 3.03* 2.13*   HGB 8.4* 8.8* 8.4*   HCT 25.9* 26.7* 25.6*   PLT 73* 86* 67*     CMP:   Recent Labs   Lab 04/24/19  0345      CALCIUM 7.5*   ALBUMIN 2.3*   PROT 5.3*   *   K 3.8   CO2 22*      BUN 6   CREATININE 0.7   ALKPHOS 91   ALT 26   AST 91*   BILITOT 9.1*     Coagulation:   Recent Labs   Lab 04/23/19  2355   INR 2.1*   APTT 35.1*           Assessment/Plan:     * GI bleed  50 yo F with KESSLER cirrhosis and varices transferred from OSH after presenting with melena for evaluation of TIPS or BRTO. EGD done 4/23 showed varices. Unclear if she has gastric, esophageal, or both types of varices. Hepatology not considering TIPS given her high MELD score. We will further evaluate with an EGD, but may ultimately  need advanced interventions if found to have gastric varices that are not amenable to banding.    Recommendations:  - Keep NPO  - Agree with octreotide gtt  - Transfuse to keep Hg>7  - Continue antibiotics  - Plan for EGD today  - Further recs to follow          Thank you for your consult. I will follow-up with patient. Please contact us if you have any additional questions.    Jt Cosby MD  Gastroenterology  [facility]

## 2019-04-24 NOTE — HOSPITAL COURSE
Pt was admitted to Critical Care Medicine as a direct transfer for further monitoring and evaluation of her GI bleed. Upon arrival to the floor, pt was found to be afebrile, hemodynamically stable, in no acute distress. Initial labs revealed no leukocytosis, stable Hgb @ 8.4, mildly elevated PT/INR (20.1/2.1), hyperbilirubinemia @ 9.1, and a MELD score of 24. Patient was continued on abx with Rocephin, PPI, rifaximin, and octreotide gtt. Hepatology was consulted to evaluate for possible TIPS procedure but recommended against TIPS at this time given her high MELD score and hx of HE. GI was consulted on case and agreed to re-perform EGD in attempt to address pt's gastric varices with advanced therapy. EGD with bleeding pre-pyloric PHG, s/p APC.

## 2019-04-24 NOTE — PLAN OF CARE
Dr. Gray at [facility] in [place]    No future appointments.    [facility] in [place]    Payor:  / Plan:  [employer] / Product Type: Government /     Extended Emergency Contact Information  Primary Emergency Contact: Freeman Zamudio   [place]  Mobile Phone: [phone]  Relation: Spouse        04/24/19 1338   Discharge Assessment   Assessment Type Discharge Planning Assessment   Confirmed/corrected address and phone number on facesheet? Yes   Assessment information obtained from? Patient   Expected Length of Stay (days) 4   Communicated expected length of stay with patient/caregiver no  (Per MD)   Prior to hospitilization cognitive status: Alert/Oriented   Prior to hospitalization functional status: Needs Assistance;Assistive Equipment   Current cognitive status: Alert/Oriented   Current Functional Status: Needs Assistance;Assistive Equipment   Lives With spouse;child(kennedy), dependent   Able to Return to Prior Arrangements yes   Is patient able to care for self after discharge? Unable to determine at this time (comments)   Who are your caregiver(s) and their phone number(s)? Freeman Zamudio- [phone]   Patient's perception of discharge disposition home or selfcare   Readmission Within the Last 30 Days no previous admission in last 30 days   Patient currently being followed by outpatient case management?   (Pt has a  at [facility] but she doesn't have CM name or #)   Patient currently receives any other outside agency services? No   Equipment Currently Used at Home bath bench;grab bar  (CPAP)   Do you have any problems affording any of your prescribed medications? No   Is the patient taking medications as prescribed? yes   Does the patient have transportation home? Yes   Does the patient receive services at the [facility]? No   Discharge Plan A Home with family   Discharge Plan B Home with family;Home Health   DME Needed Upon Discharge  none     CM met  with pt at bedside. Pt transferred to [facility] from [facility] in [place]. Pt lives with her  & 17 y/o daughter in a single story house with 1 step to enter.  provides assistance at home. Pt has/uses RW, Tub Bench, Grab Bar x1 & CPAP at home. Pt uses [facility] pharmacy and/or [facility] on [address]. Pt's PCP is Dr. Gray at [facility]. Pt's  will provide transportation home upon dc.     [employer] Packet explained & provided to pt.

## 2019-04-24 NOTE — ASSESSMENT & PLAN NOTE
MELD-Na score: 24 at 4/23/2019 11:55 PM  MELD score: 23 at 4/23/2019 11:55 PM  Calculated from:  Serum Creatinine: 0.8 mg/dL (Rounded to 1 mg/dL) at 4/23/2019 11:55 PM  Serum Sodium: 135 mmol/L at 4/23/2019 11:55 PM  Total Bilirubin: 9.4 mg/dL at 4/23/2019 11:55 PM  INR(ratio): 2.1 at 4/23/2019 11:55 PM  Age: 49 years  --KESSLER dx by liver biopsy in 11/2017  --paracentesis at [facility] with PMN < 250 & no bacteria on gram stain  --see GIB for further plan

## 2019-04-24 NOTE — ANESTHESIA PREPROCEDURE EVALUATION
[facility]  Anesthesia Pre-Operative Evaluation         Patient Name: Jihan Zamudio  YOB: 1970  MRN: [id number]    SUBJECTIVE:     04/24/2019    Pre-operative evaluation for Procedure(s) (LRB):  EGD (ESOPHAGOGASTRODUODENOSCOPY) (N/A)    Jihan Zamudio is a 49 y.o. 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 Paracentesis was performed in the ED with 5L removed. EGD performed on 4/23 which revealed portal hypertensive gastropathy, blood in the gastric body, grade 2 gastroesophageal varices without active bleeding but with stigmata of recent bleeding and and non-bleeding small esophageal varices without stigmata of recent bleeding. The esophageal varices were not banded as they were not suspected to be the source of bleeding. She was transferred to [facility] for evaluation for TIPS vs Balloon-Occluded Retrograde Transvenous Obliteration for treatment of gastric varices. Patient is HDS and is being stepped down to the floor today.    Patient now presents for the above procedure(s).      LDA:      18G Peripheral IV - Single Lumen 04/23/19 1500 18 G Left Antecubital (Active)   Number of days: 0           20G Peripheral IV - Single Lumen 04/23/19 1300 20 G Right Wrist (Active)   Number of days: 0       Previous airway:   None documented.      Drips:   octreotide (SANDOSTATIN) infusion 50 mcg/hr (04/24/19 1101)       Patient Active Problem List   Diagnosis    Cirrhosis    Morbid obesity    Essential hypertension    KADEEM (obstructive sleep apnea)    Hypothyroidism    Osteoarthritis    GERD (gastroesophageal reflux disease)    Fatty liver    Vitamin D deficiency    Hx of colonic polyps    Esophageal varices    Pulmonary nodule    History of Acute appendicitis    Pre-transplant evaluation for liver transplant    GI bleed        Review of patient's allergies indicates:   Allergen Reactions    Metformin Rash    Pcn [penicillins] Other (See Comments)     Unsure of reaction, states it was as a child. Tolerated rocephin at osh       Current Inpatient Medications:   cefTRIAXone (ROCEPHIN) IVPB  1 g Intravenous Q24H    levothyroxine  112 mcg Oral Before breakfast    pantoprazole  40 mg Intravenous BID    rifAXImin  550 mg Oral BID       No current facility-administered medications on file prior to encounter.      Current Outpatient Medications on File Prior to Encounter   Medication Sig Dispense Refill    ciprofloxacin HCl (CIPRO) 750 MG tablet Take 750 mg by mouth once a week.      dextromethorphan-guaifenesin  mg/5 ml (ROBITUSSIN-DM)  mg/5 mL liquid Take 5 mLs by mouth every 6 (six) hours as needed.      furosemide (LASIX) 20 MG tablet Take 20 mg by mouth once daily.      lidocaine (LIDODERM) 5 % Place 1 patch onto the skin every 24 hours. Remove & Discard patch within 12 hours or as directed by MD      rifAXImin (XIFAXAN) 200 mg Tab Take 550 mg by mouth 2 (two) times daily.      spironolactone (ALDACTONE) 25 MG tablet Take 25 mg by mouth once daily.      levothyroxine (SYNTHROID) 112 MCG tablet Take 112 mcg by mouth once daily.      omeprazole (PRILOSEC) 40 MG capsule Take 40 mg by mouth every morning.          Past Surgical History:   Procedure Laterality Date     SECTION      TIMES 2     CHOLECYSTECTOMY      LAPAROSCOPIC     CYSTOSCOPY W/ STONE MANIPULATION      kidney stone removal    LAPAROSCOPIC GASTRIC BANDING  2006    removal 2009    LIVER BIOPSY  2017    KESSLER with bridging 17       Social History     Socioeconomic History    Marital status:      Spouse name: Not on file    Number of children: Not on file    Years of education: Not on file    Highest education level: Not on file   Occupational History    Occupation: homemaker   Social Needs    Financial  resource strain: Not on file    Food insecurity:     Worry: Not on file     Inability: Not on file    Transportation needs:     Medical: Not on file     Non-medical: Not on file   Tobacco Use    Smoking status: Never Smoker    Smokeless tobacco: Never Used    Tobacco comment: patient denies   Substance and Sexual Activity    Alcohol use: No     Comment: patient denies    Drug use: No     Comment: patient denies    Sexual activity: Not on file   Lifestyle    Physical activity:     Days per week: Not on file     Minutes per session: Not on file    Stress: Not on file   Relationships    Social connections:     Talks on phone: Not on file     Gets together: Not on file     Attends Quaker service: Not on file     Active member of club or organization: Not on file     Attends meetings of clubs or organizations: Not on file     Relationship status: Not on file   Other Topics Concern    Not on file   Social History Narrative    Not on file       OBJECTIVE:     Vital Signs Range (Last 24H):  Temp:  [37.1 °C (98.8 °F)-37.5 °C (99.5 °F)]   Pulse:  [73-85]   Resp:  [18-31]   BP: (116-141)/(55-63)   SpO2:  [93 %-98 %]       Significant Labs:  Lab Results   Component Value Date    WBC 2.13 (L) 04/24/2019    HGB 8.4 (L) 04/24/2019    HCT 25.6 (L) 04/24/2019    PLT 67 (L) 04/24/2019    ALT 26 04/24/2019    AST 91 (H) 04/24/2019     (L) 04/24/2019    K 3.8 04/24/2019     04/24/2019    CREATININE 0.7 04/24/2019    BUN 6 04/24/2019    CO2 22 (L) 04/24/2019    TSH 1.244 04/23/2019    INR 2.1 (H) 04/23/2019         Diagnostic Studies:  No relevant studies.      Cardiac Studies:  EKG:   No recent studies available.    Dobutamine Stress Test w/ Color Flow (4/4/2018):  CONCLUSIONS     1 - Normal left ventricular systolic function (EF 65-70%).     2 - Normal right ventricular systolic function .     3 - Impaired LV relaxation, elevated LAP (grade 2 diastolic dysfunction).     4 - Biatrial enlargement.     5 - The  estimated PA systolic pressure is 40 mmHg.     6 - Intermediate central venous pressure.   No evidence of stress induced myocardial ischemia.       ASSESSMENT/PLAN:     Anesthesia Evaluation    I have reviewed the Patient Summary Reports.    I have reviewed the Nursing Notes.   I have reviewed the Medications.     Review of Systems  Anesthesia Hx:  No problems with previous Anesthesia Denies Hx of Anesthetic complications    Cardiovascular:   Hypertension    Pulmonary:   Denies COPD.  Denies Asthma. Sleep Apnea    Renal/:   Denies Chronic Renal Disease. renal calculi     Hepatic/GI:   GERD, well controlled Liver Disease,    Musculoskeletal:   Arthritis     Endocrine:   Hypothyroidism        Physical Exam  General:  Jaundice, Malnutrition    Airway/Jaw/Neck:  Airway Findings: Mouth Opening: Normal Tongue: Normal  Mallampati: III  Improves to II with phonation.  TM Distance: Normal, at least 6 cm  Jaw/Neck Findings:  Neck ROM: Normal ROM      Dental:  Dental Findings:    Chest/Lungs:  Chest/Lungs Clear    Heart/Vascular:  Heart Findings: Normal       Mental Status:  Mental Status Findings:  Cooperative, Alert and Oriented         Anesthesia Plan  Type of Anesthesia, risks & benefits discussed:  Anesthesia Type:  general, MAC  Patient's Preference:   Intra-op Monitoring Plan: standard ASA monitors  Intra-op Monitoring Plan Comments:   Post Op Pain Control Plan: multimodal analgesia, IV/PO Opioids PRN and per primary service following discharge from PACU  Post Op Pain Control Plan Comments:   Induction:   IV  Beta Blocker:  Patient is not currently on a Beta-Blocker (No further documentation required).       Informed Consent: Patient understands risks and agrees with Anesthesia plan.  Questions answered. Anesthesia consent signed with patient.  ASA Score: 3     Day of Surgery Review of History & Physical:    H&P update referred to the provider.         Ready For Surgery From Anesthesia Perspective.

## 2019-04-24 NOTE — ASSESSMENT & PLAN NOTE
Patient is a 50 y/o female with history of KESSLER cirrhosis, declined in the past for transplant due to elevated PA pressure, and has been lost to follow up since then, presented with GI bleeding, with reported GOV and EV and signs of recent bleeding, [place] [facility] [place]    The patient is not a candidate for TIPS given her high MELD score and high TBilirubin. We requested GI evaluation, and the patient underwent EGD earlier today, was found to have bleeding PHG s/p APC. No GV described, and small EV.   Overall the patient is decompensated currently with MELD of 25, and she needs evaluation for transplant. Since last TTE was >1 year ago will recommend re-evaluation with TTE. If PA pressure >35, will need RHC. We will proceed with inpatient evaluation for transplant.    -Decompensated cirrhosis due to GI bleeding: s/p EGD with PHG s/p APC. Can d/c octreotide, continue PPI PO daily. Continue ceftriaxone IV x5 days  -Volume overload: Recommend restarting home diuresis given volume overload. Will likely need uptitration of doses given decompensation  -HE: Continue home rifaximin. Mental status appropriate  -EV: Small varices on evaluation today  -HCC: Recommend U/S abdomen with dopplers; and AFP  -Transplant: Will re-evaluate TTE. Initiate inpatient workup given decompensation.   -Trend CBC, CMP, INR.

## 2019-04-24 NOTE — PROGRESS NOTES
Patient returned from PACU. VSS. Patient responsive and follows all commands. Will continue to monitor.

## 2019-04-24 NOTE — HPI
Patient is a 49 y.o. 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 The patient denied recent NSAID and ETOH use. She denies any hematemesis and states that she has never had a similar episode to this previously. Paracentesis was performed in the ED with 5L removed. Para studies with PMN < 250. The patient was admitted there and had EGD performed on 4/23 which revealed portal hypertensive gastropathy, blood in the gastric body, grade 2 gastroesophageal varices without active bleeding but with stigmata of recent bleeding and and non-bleeding small esophageal varices without stigmata of recent bleeding. The esophageal varices were not banded as they were not suspected to be the source of bleeding.  CT abdomen showed sequela of portal HTN and patent portal vein without evidence of PVT. The patient was transferred to [facility] for evaluation for TIPS vs Balloon-Occluded Retrograde Transvenous Obliteration for treatment of gastric varices.

## 2019-04-24 NOTE — SUBJECTIVE & OBJECTIVE
Interval History/Significant Events: No acute events overnight.. Pt is afebrile, hemodynamically stable. No report of further melanotic stools since admit. Hgb stable at 8.8. Will consult Hepatology this AM to evaluate for TIPS procedure.    Review of Systems   Constitutional: Negative for chills, diaphoresis and fever.   HENT: Negative for nosebleeds, sinus pressure and trouble swallowing.    Eyes: Negative for pain, discharge and itching.   Respiratory: Negative for cough, chest tightness, shortness of breath and wheezing.    Cardiovascular: Negative for chest pain, palpitations and leg swelling.   Gastrointestinal: Positive for abdominal distention, abdominal pain and blood in stool. Negative for nausea and vomiting.   Endocrine: Negative for polydipsia, polyphagia and polyuria.   Genitourinary: Negative for dysuria, flank pain and hematuria.   Musculoskeletal: Negative for myalgias, neck pain and neck stiffness.   Neurological: Negative for speech difficulty, light-headedness and headaches.   Psychiatric/Behavioral: Negative for confusion and decreased concentration. The patient is not nervous/anxious.      Objective:     Vital Signs (Most Recent):  Temp: 99 °F (37.2 °C) (04/24/19 0300)  Pulse: 78 (04/24/19 0700)  Resp: (!) 21 (04/24/19 0700)  BP: (!) 116/55 (04/24/19 0700)  SpO2: (!) 94 % (04/24/19 0700) Vital Signs (24h Range):  Temp:  [98.8 °F (37.1 °C)-99.5 °F (37.5 °C)] 99 °F (37.2 °C)  Pulse:  [73-85] 78  Resp:  [18-31] 21  SpO2:  [93 %-98 %] 94 %  BP: (116-141)/(55-63) 116/55   Weight: 119.7 kg (263 lb 14.3 oz)  Body mass index is 43.91 kg/m².      Intake/Output Summary (Last 24 hours) at 4/24/2019 0744  Last data filed at 4/24/2019 0500  Gross per 24 hour   Intake 152 ml   Output 375 ml   Net -223 ml       Physical Exam   Constitutional: She is oriented to person, place, and time. She appears well-developed and well-nourished. No distress.   HENT:   Head: Normocephalic and atraumatic.   Right Ear:  External ear normal.   Left Ear: External ear normal.   Nose: Nose normal.   Mouth/Throat: Oropharynx is clear and moist.   Eyes: Pupils are equal, round, and reactive to light. Conjunctivae and EOM are normal.   Neck: Normal range of motion. Neck supple.   Cardiovascular: Normal rate, regular rhythm and intact distal pulses.   Murmur heard.  Pulmonary/Chest: Effort normal and breath sounds normal. No respiratory distress. She has no wheezes.   Abdominal: Soft. Bowel sounds are normal. She exhibits distension. There is tenderness in the epigastric area.   Musculoskeletal: Normal range of motion. She exhibits no tenderness or deformity.   Neurological: She is alert and oriented to person, place, and time.   Skin: Skin is warm and dry. Capillary refill takes less than 2 seconds. She is not diaphoretic.   Psychiatric: She has a normal mood and affect. Her behavior is normal. Judgment and thought content normal.   Nursing note and vitals reviewed.      Vents:     Lines/Drains/Airways     Peripheral Intravenous Line                 Peripheral IV - Single Lumen 04/23/19 1300 20 G Right Wrist less than 1 day         Peripheral IV - Single Lumen 04/23/19 1500 18 G Left Antecubital less than 1 day              Significant Labs:    CBC/Anemia Profile:  Recent Labs   Lab 04/23/19 2355 04/24/19  0345   WBC 2.71* 3.03*   HGB 8.4* 8.8*   HCT 25.9* 26.7*   PLT 73* 86*   MCV 93 91   RDW 17.6* 17.4*        Chemistries:  Recent Labs   Lab 04/23/19 2355 04/24/19  0345   * 134*   K 3.7 3.8    106   CO2 22* 22*   BUN 7 6   CREATININE 0.8 0.7   CALCIUM 7.7* 7.5*   ALBUMIN 2.3* 2.3*   PROT 5.3* 5.3*   BILITOT 9.4* 9.1*   ALKPHOS 89 91   ALT 23 26   AST 89* 91*   MG 1.6 1.9   PHOS 3.0 2.9       Bilirubin:   Recent Labs   Lab 04/23/19 2355 04/24/19  0345   BILITOT 9.4* 9.1*     Coagulation:   Recent Labs   Lab 04/23/19 2355   INR 2.1*   APTT 35.1*     Urine Studies:   Recent Labs   Lab 04/24/19  0330   COLORU Yellow    APPEARANCEUA Clear   PHUR 7.0   SPECGRAV 1.010   PROTEINUA Negative   GLUCUA Negative   KETONESU Negative   BILIRUBINUA Negative   OCCULTUA 1+*   NITRITE Negative   LEUKOCYTESUR 1+*   RBCUA 1   WBCUA 2   BACTERIA Rare   SQUAMEPITHEL 5       Significant Imaging:  I have reviewed all pertinent imaging results/findings within the past 24 hours.

## 2019-04-24 NOTE — ANESTHESIA POSTPROCEDURE EVALUATION
Anesthesia Post Evaluation    Patient: Jihan Zamudio    Procedure(s) Performed: Procedure(s) (LRB):  EGD (ESOPHAGOGASTRODUODENOSCOPY) (N/A)    Final Anesthesia Type: general  Patient location during evaluation: PACU  Patient participation: Yes- Able to Participate  Level of consciousness: awake and alert and oriented  Post-procedure vital signs: reviewed and stable  Pain management: adequate  Airway patency: patent  PONV status at discharge: No PONV  Anesthetic complications: no      Cardiovascular status: blood pressure returned to baseline and hemodynamically stable  Respiratory status: unassisted  Hydration status: euvolemic  Follow-up not needed.          Vitals Value Taken Time   /53 4/24/2019  3:31 PM   Temp 36.7 °C (98.1 °F) 4/24/2019  3:29 PM   Pulse 79 4/24/2019  3:44 PM   Resp 26 4/24/2019  3:44 PM   SpO2 96 % 4/24/2019  3:47 PM   Vitals shown include unvalidated device data.      No case tracking events are documented in the log.      Pain/Lorin Score: No data recorded

## 2019-04-24 NOTE — HPI
Mrs. Zamudio is a 48 y/o female with history of KESSLER cirrhosis, was followed by Dr. Smallwood until 5/2018. Her cirrhosis is c/b volume overload, HE, and EV. She was being evaluated for transplant, however was deferred due to high PA pressure on echo. She was evaluated by cardiology who thought RHC is not needed. On last appointment with Dr. Smallwood, she was referred for a second opinion with Dr. Franklin, however the patient was lost to follow up, which she states was due to insurance issues.    The patient was doing well until 4/19 when she started to notice that her abdomen was more distended, and her LE were swollen. On 4/20, she started noticing her stool was black, and her  took her to the ED the next day. On 4/22, the patient underwent a paracentesis at OSH with ~5L removed, negative for SBP. Underwent EGD on 4/23, which was reported with small EV, Grade 2 GOV, and PHG, with blood in stomach, but no active bleeding. She was referred to [facility] for TIPS considerations.    The patient states that she is feeling well today, last BM was last night, and was black looking like coffee grounds. Denies abdominal pain but endorses continuous distention.   She is HDS since arrival

## 2019-04-24 NOTE — H&P
[facility]  Critical Care Medicine  History & Physical    Patient Name: Jihan Zamudio  MRN: [id number]  Admission Date: 4/23/2019  Hospital Length of Stay: 1 days  Code Status: Full Code  Attending Physician: Arnoldo Stoll MD   Primary Care Provider: Primary Doctor No   Principal Problem: GI bleed    Subjective:     HPI:  Patient is a 49 y.o. 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 The patient denied recent NSAID and ETOH use. She denies any hematemesis and states that she has never had a similar episode to this previously. Paracentesis was performed in the ED with 5L removed. Para studies with PMN < 250. The patient was admitted there and had EGD performed on 4/23 which revealed portal hypertensive gastropathy, blood in the gastric body, grade 2 gastroesophageal varices without active bleeding but with stigmata of recent bleeding and and non-bleeding small esophageal varices without stigmata of recent bleeding. The esophageal varices were not banded as they were not suspected to be the source of bleeding.  CT abdomen showed sequela of portal HTN and patent portal vein without evidence of PVT. The patient was transferred to [facility] [place] for evaluation for TIPS vs Balloon-Occluded Retrograde Transvenous Obliteration for treatment of gastric varices.         Hospital/ICU Course:  No notes on file     Past Medical History:   Diagnosis Date    Cirrhosis     Esophageal varices 2/26/2018    Small with no banding 07/17    Essential hypertension 2/26/2018    Fatty liver 2/26/2018    GERD (gastroesophageal reflux disease)     Hx of colonic polyps 2/26/2018    On colonoscopy 07/17    Hypertension     Hypothyroidism 2/26/2018    Kidney stones     Morbid obesity 2/26/2018    Lap band with subsequent release    KADEEM  (obstructive sleep apnea) 2018    Osteoarthritis 2018    Pulmonary nodule 2018    Vitamin D deficiency 2018       Past Surgical History:   Procedure Laterality Date     SECTION      TIMES 2     CHOLECYSTECTOMY      LAPAROSCOPIC     CYSTOSCOPY W/ STONE MANIPULATION      kidney stone removal    LAPAROSCOPIC GASTRIC BANDING  2006    removal 2009    LIVER BIOPSY  2017    KESSLER with bridging 17       Review of patient's allergies indicates:   Allergen Reactions    Metformin Rash    Pcn [penicillins] Other (See Comments)     Unsure of reaction, states it was as a child. Tolerated rocephin at osh       Family History     Problem Relation (Age of Onset)    Cancer Father    Heart disease Mother, Father        Tobacco Use    Smoking status: Never Smoker    Smokeless tobacco: Never Used    Tobacco comment: patient denies   Substance and Sexual Activity    Alcohol use: No     Comment: patient denies    Drug use: No     Comment: patient denies    Sexual activity: Not on file      Review of Systems   Constitutional: Negative for chills, diaphoresis and fever.   HENT: Negative for nosebleeds, sinus pressure and trouble swallowing.    Eyes: Negative for pain, discharge and itching.   Respiratory: Negative for cough, chest tightness, shortness of breath and wheezing.    Cardiovascular: Negative for chest pain, palpitations and leg swelling.   Gastrointestinal: Positive for abdominal distention, abdominal pain and blood in stool. Negative for nausea and vomiting.   Endocrine: Negative for polydipsia, polyphagia and polyuria.   Genitourinary: Negative for dysuria, flank pain and hematuria.   Musculoskeletal: Negative for myalgias, neck pain and neck stiffness.   Neurological: Negative for speech difficulty, light-headedness and headaches.   Psychiatric/Behavioral: Negative for confusion and decreased concentration. The patient is not nervous/anxious.      Objective:      Vital Signs (Most Recent):  Temp: 99.1 °F (37.3 °C) (04/23/19 2330)  Pulse: 79 (04/24/19 0100)  Resp: (!) 31 (04/24/19 0100)  BP: 134/63 (04/24/19 0100)  SpO2: (!) 94 % (04/24/19 0100) Vital Signs (24h Range):  Temp:  [98.8 °F (37.1 °C)-99.5 °F (37.5 °C)] 99.1 °F (37.3 °C)  Pulse:  [79-83] 79  Resp:  [18-31] 31  SpO2:  [94 %-98 %] 94 %  BP: (124-141)/(59-63) 134/63   Weight: 119.7 kg (263 lb 14.3 oz)  Body mass index is 43.91 kg/m².    No intake or output data in the 24 hours ending 04/24/19 0125    Physical Exam   Constitutional: She is oriented to person, place, and time. She appears well-developed and well-nourished. No distress.   HENT:   Head: Normocephalic and atraumatic.   Right Ear: External ear normal.   Left Ear: External ear normal.   Nose: Nose normal.   Mouth/Throat: Oropharynx is clear and moist.   Eyes: Pupils are equal, round, and reactive to light. Conjunctivae and EOM are normal.   Neck: Normal range of motion. Neck supple.   Cardiovascular: Normal rate, regular rhythm and intact distal pulses.   Murmur heard.  Pulmonary/Chest: Effort normal and breath sounds normal. No respiratory distress. She has no wheezes.   Abdominal: Soft. Bowel sounds are normal. She exhibits distension. There is tenderness in the epigastric area.   Musculoskeletal: Normal range of motion. She exhibits no tenderness or deformity.   Neurological: She is alert and oriented to person, place, and time.   Skin: Skin is warm and dry. Capillary refill takes less than 2 seconds. She is not diaphoretic.   Psychiatric: She has a normal mood and affect. Her behavior is normal. Judgment and thought content normal.   Nursing note and vitals reviewed.      Vents:     Lines/Drains/Airways     Peripheral Intravenous Line                 Peripheral IV - Single Lumen 04/05/18 1443 Left Antecubital 383 days              Significant Labs:    CBC/Anemia Profile:  Recent Labs   Lab 04/23/19  2355   WBC 2.71*   HGB 8.4*   HCT 25.9*   PLT 73*    MCV 93   RDW 17.6*        Chemistries:  Recent Labs   Lab 04/23/19  2355   *   K 3.7      CO2 22*   BUN 7   CREATININE 0.8   CALCIUM 7.7*   ALBUMIN 2.3*   PROT 5.3*   BILITOT 9.4*   ALKPHOS 89   ALT 23   AST 89*   MG 1.6   PHOS 3.0       All pertinent labs within the past 24 hours have been reviewed.    Significant Imaging: I have reviewed and interpreted all pertinent imaging results/findings within the past 24 hours.    Assessment/Plan:     Cardiac/Vascular  Essential hypertension  --holding lasix & aldactone in setting of ongoing bleeding  --currently normotensive off meds    Endocrine  Hypothyroidism  --checking TSH  --continue home synthroid    GI  * GI bleed  --EGD at [facility] showed portal hypertensive gastropathy, blood in the gastric body, grade 2 gastroesophageal varices without active bleeding but with stigmata of recent bleeding and and non-bleeding small esophageal varices without stigmata of recent bleeding  --transferred for TIPS vs Balloon-Occluded Retrograde Transvenous Obliteration for treatment of gastric varices  --continue ppi, octreotide, rifaximin & rocephin  --cbc Q6, transfuse for hgb < 7  --Hepatology consult in am    Fatty liver  MELD-Na score: 24 at 4/23/2019 11:55 PM  MELD score: 23 at 4/23/2019 11:55 PM  Calculated from:  Serum Creatinine: 0.8 mg/dL (Rounded to 1 mg/dL) at 4/23/2019 11:55 PM  Serum Sodium: 135 mmol/L at 4/23/2019 11:55 PM  Total Bilirubin: 9.4 mg/dL at 4/23/2019 11:55 PM  INR(ratio): 2.1 at 4/23/2019 11:55 PM  Age: 49 years  --KESSLER dx by liver biopsy in 11/2017  --paracentesis at [facility] with PMN < 250 & no bacteria on gram stain  --see GIB for further plan      Critical Care Daily Checklist:    A: Awake: RASS Goal/Actual Goal:    Actual:     B: Spontaneous Breathing Trial Performed?  n/a   C: SAT & SBT Coordinated?  n/a                      D: Delirium: CAM-ICU     E: Early Mobility Performed? Yes   F: Feeding Goal:    Status:     Current Diet Order    Procedures    Diet NPO Except for: Medication     Order Specific Question:   Except for     Answer:   Medication      AS: Analgesia/Sedation    T: Thromboembolic Prophylaxis scds   H: HOB > 300 Yes   U: Stress Ulcer Prophylaxis (if needed) ppi   G: Glucose Control    B: Bowel Function     I: Indwelling Catheter (Lines & Hernandes) Necessity PIV x2   D: De-escalation of Antimicrobials/Pharmacotherapies Rocephin, rifaximin    Plan for the day/ETD Hepatology eval    Code Status:  Family/Goals of Care: Full Code  Discussed with pt and family at bedside     Case discussed with [facility] Fellow Dr. Linda Kirkpatrick    Critical Care Time: 45 minutes  Critical secondary to Patient has a condition that poses threat to life and bodily function: GIB due to gastric varices     Critical care was time spent personally by me on the following activities: development of treatment plan with patient or surrogate and bedside caregivers, discussions with consultants, evaluation of patient's response to treatment, examination of patient, ordering and performing treatments and interventions, ordering and review of laboratory studies, ordering and review of radiographic studies, pulse oximetry, re-evaluation of patient's condition. This critical care time did not overlap with that of any other provider or involve time for any procedures.     Sarah Beyer NP  Critical Care Medicine  [facility]

## 2019-04-24 NOTE — CONSULTS
[facility] [address]  Hepatology  Consult Note    Patient Name: Jihan Zamudio  MRN: [id number]  Admission Date: 4/23/2019  Hospital Length of Stay: 1 days  Attending Provider: Arnoldo Stoll MD   Primary Care Physician: Primary Doctor No  Principal Problem:GI bleed    Hepatology  Consult performed by: Rod Sheppard MD  Consult ordered by: Sarah Beyer NP        Subjective:     Transplant status: No    HPI:  Mrs. Zamudio is a 48 y/o female with history of KESSLER cirrhosis, was followed by Dr. Smallwood until 5/2018. Her cirrhosis is c/b volume overload, HE, and EV. She was being evaluated for transplant, however was deferred due to high PA pressure on echo. She was evaluated by cardiology who thought RHC is not needed. On last appointment with Dr. Smallwood, she was referred for a second opinion with Dr. Franklin, however the patient was lost to follow up, which she states was due to insurance issues.    The patient was doing well until 4/19 when she started to notice that her abdomen was more distended, and her LE were swollen. On 4/20, she started noticing her stool was black, and her  took her to the ED the next day. On 4/22, the patient underwent a paracentesis at OSH with ~5L removed, negative for SBP. Underwent EGD on 4/23, which was reported with small EV, Grade 2 GOV, and PHG, with blood in stomach, but no active bleeding. She was referred to [facility] for TIPS considerations.    The patient states that she is feeling well today, last BM was last night, and was black looking like coffee grounds. Denies abdominal pain but endorses continuous distention.   She is HDS since arrival    Review of Systems   Constitutional: Positive for activity change and fatigue. Negative for appetite change, chills and fever.   HENT: Negative for trouble swallowing.    Respiratory: Positive for shortness of breath. Negative for cough, choking and chest tightness.    Cardiovascular: Positive for leg swelling. Negative for  chest pain.   Gastrointestinal: Positive for abdominal distention, abdominal pain and blood in stool. Negative for anal bleeding, constipation, diarrhea, nausea and vomiting.   Genitourinary: Negative for difficulty urinating and dysuria.   Musculoskeletal: Negative for arthralgias and back pain.   Skin: Positive for pallor. Negative for color change.   Neurological: Positive for dizziness. Negative for headaches.   Psychiatric/Behavioral: Negative for agitation and confusion.       Past Medical History:   Diagnosis Date    Cirrhosis     Esophageal varices 2018    Small with no banding     Essential hypertension 2018    Fatty liver 2018    GERD (gastroesophageal reflux disease)     Hx of colonic polyps 2018    On colonoscopy     Hypertension     Hypothyroidism 2018    Kidney stones     Morbid obesity 2018    Lap band with subsequent release    KADEEM (obstructive sleep apnea) 2018    Osteoarthritis 2018    Pulmonary nodule 2018    Vitamin D deficiency 2018       Past Surgical History:   Procedure Laterality Date     SECTION      TIMES 2     CHOLECYSTECTOMY      LAPAROSCOPIC     CYSTOSCOPY W/ STONE MANIPULATION      kidney stone removal    LAPAROSCOPIC GASTRIC BANDING  2006    removal     LIVER BIOPSY  2017    KESSLER with bridging 17       Family history of liver disease: No    Review of patient's allergies indicates:   Allergen Reactions    Metformin Rash    Pcn [penicillins] Other (See Comments)     Unsure of reaction, states it was as a child. Tolerated rocephin at osh       Tobacco Use    Smoking status: Never Smoker    Smokeless tobacco: Never Used    Tobacco comment: patient denies   Substance and Sexual Activity    Alcohol use: No     Comment: patient denies    Drug use: No     Comment: patient denies    Sexual activity: Not on file       Medications Prior to Admission   Medication Sig Dispense  Refill Last Dose    ciprofloxacin HCl (CIPRO) 750 MG tablet Take 750 mg by mouth once a week.       dextromethorphan-guaifenesin  mg/5 ml (ROBITUSSIN-DM)  mg/5 mL liquid Take 5 mLs by mouth every 6 (six) hours as needed.       furosemide (LASIX) 20 MG tablet Take 20 mg by mouth once daily.       lidocaine (LIDODERM) 5 % Place 1 patch onto the skin every 24 hours. Remove & Discard patch within 12 hours or as directed by MD       rifAXImin (XIFAXAN) 200 mg Tab Take 550 mg by mouth 2 (two) times daily.       spironolactone (ALDACTONE) 25 MG tablet Take 25 mg by mouth once daily.       levothyroxine (SYNTHROID) 112 MCG tablet Take 112 mcg by mouth once daily.   Taking    omeprazole (PRILOSEC) 40 MG capsule Take 40 mg by mouth every morning.    Taking       Objective:     Vital Signs (Most Recent):  Temp: 98.3 °F (36.8 °C) (04/24/19 1600)  Pulse: 84 (04/24/19 1700)  Resp: (!) 27 (04/24/19 1700)  BP: (!) 99/49 (04/24/19 1700)  SpO2: 96 % (04/24/19 1700) Vital Signs (24h Range):  Temp:  [98.1 °F (36.7 °C)-99.5 °F (37.5 °C)] 98.3 °F (36.8 °C)  Pulse:  [73-86] 84  Resp:  [18-31] 27  SpO2:  [93 %-98 %] 96 %  BP: ()/(49-67) 99/49     Weight: 119.7 kg (263 lb 14.3 oz) (04/23/19 2330)  Body mass index is 43.91 kg/m².    Physical Exam   Constitutional: She is oriented to person, place, and time. She appears well-developed. She appears lethargic. No distress.   HENT:   Head: Normocephalic.   Eyes: Conjunctivae are normal. Scleral icterus is present.   Neck: Normal range of motion. Neck supple.   Cardiovascular: Normal rate and regular rhythm.   Pulmonary/Chest: Effort normal and breath sounds normal.   Abdominal: Soft. Bowel sounds are normal. She exhibits distension. She exhibits no mass. There is no tenderness. There is no rebound and no guarding.   Musculoskeletal: Normal range of motion.   Neurological: She is oriented to person, place, and time. She appears lethargic.   Skin: Skin is warm and dry.    Psychiatric: She has a normal mood and affect.       MELD-Na score: 25 at 4/24/2019  3:45 AM  MELD score: 23 at 4/24/2019  3:45 AM  Calculated from:  Serum Creatinine: 0.7 mg/dL (Rounded to 1 mg/dL) at 4/24/2019  3:45 AM  Serum Sodium: 134 mmol/L at 4/24/2019  3:45 AM  Total Bilirubin: 9.1 mg/dL at 4/24/2019  3:45 AM  INR(ratio): 2.1 at 4/23/2019 11:55 PM  Age: 49 years    Significant Labs:  CBC:   Recent Labs   Lab 04/24/19  1615   WBC 2.55*   RBC 2.95*   HGB 8.8*   HCT 26.8*   PLT 74*     BMP:   Recent Labs   Lab 04/24/19  0345      *   K 3.8      CO2 22*   BUN 6   CREATININE 0.7   CALCIUM 7.5*     CMP:   Recent Labs   Lab 04/24/19  0345      CALCIUM 7.5*   ALBUMIN 2.3*   PROT 5.3*   *   K 3.8   CO2 22*      BUN 6   CREATININE 0.7   ALKPHOS 91   ALT 26   AST 91*   BILITOT 9.1*     Coagulation:   Recent Labs   Lab 04/23/19  2355   INR 2.1*   APTT 35.1*       Significant Imaging:  Labs: Reviewed    Assessment/Plan:     Decompensated hepatic cirrhosis  Patient is a 48 y/o female with history of KESSLER cirrhosis, declined in the past for transplant due to elevated PA pressure, and has been lost to follow up since then, presented with GI bleeding, with reported GOV and EV and signs of recent bleeding, transferred to [facility] for TIPS consideration    The patient is not a candidate for TIPS given her high MELD score and high TBilirubin. We requested GI evaluation, and the patient underwent EGD earlier today, was found to have bleeding PHG s/p APC. No GV described, and small EV.   Overall the patient is decompensated currently with MELD of 25, and she needs evaluation for transplant. Since last TTE was >1 year ago will recommend re-evaluation with TTE. If PA pressure >35, will need RHC. We will proceed with inpatient evaluation for transplant.    -Decompensated cirrhosis due to GI bleeding: s/p EGD with PHG s/p APC. Can d/c octreotide, continue PPI PO daily. Continue ceftriaxone IV x5  days  -Volume overload: Recommend restarting home diuresis given volume overload. Will likely need uptitration of doses given decompensation  -HE: Continue home rifaximin. Mental status appropriate  -EV: Small varices on evaluation today  -HCC: Recommend U/S abdomen with dopplers; and AFP  -Transplant: Will re-evaluate TTE. Initiate inpatient workup given decompensation.   -Trend CBC, CMP, INR.         Thank you for your consult. I will follow-up with patient. Please contact us if you have any additional questions.    Rod Sheppard MD  Hepatology  [facility]

## 2019-04-24 NOTE — DISCHARGE INSTRUCTIONS

## 2019-04-24 NOTE — H&P
[facility]  History & Physical    Subjective:      Chief Complaint/Reason for Admission:     EGD    Jihan Zamudio is a 49 y.o. female.    Past Medical History:   Diagnosis Date    Cirrhosis     Esophageal varices 2018    Small with no banding     Essential hypertension 2018    Fatty liver 2018    GERD (gastroesophageal reflux disease)     Hx of colonic polyps 2018    On colonoscopy     Hypertension     Hypothyroidism 2018    Kidney stones     Morbid obesity 2018    Lap band with subsequent release    KADEEM (obstructive sleep apnea) 2018    Osteoarthritis 2018    Pulmonary nodule 2018    Vitamin D deficiency 2018     Past Surgical History:   Procedure Laterality Date     SECTION      TIMES 2     CHOLECYSTECTOMY      LAPAROSCOPIC     CYSTOSCOPY W/ STONE MANIPULATION      kidney stone removal    LAPAROSCOPIC GASTRIC BANDING  2006    removal 2009    LIVER BIOPSY  2017    KESSLER with bridging 17     Family History   Problem Relation Age of Onset    Heart disease Mother     Heart disease Father     Cancer Father      Social History     Tobacco Use    Smoking status: Never Smoker    Smokeless tobacco: Never Used    Tobacco comment: patient denies   Substance Use Topics    Alcohol use: No     Comment: patient denies    Drug use: No     Comment: patient denies       PTA Medications   Medication Sig    ciprofloxacin HCl (CIPRO) 750 MG tablet Take 750 mg by mouth once a week.    dextromethorphan-guaifenesin  mg/5 ml (ROBITUSSIN-DM)  mg/5 mL liquid Take 5 mLs by mouth every 6 (six) hours as needed.    furosemide (LASIX) 20 MG tablet Take 20 mg by mouth once daily.    lidocaine (LIDODERM) 5 % Place 1 patch onto the skin every 24 hours. Remove & Discard patch within 12 hours or as directed by MD    rifAXImin (XIFAXAN) 200 mg Tab Take 550 mg by mouth 2 (two) times daily.     spironolactone (ALDACTONE) 25 MG tablet Take 25 mg by mouth once daily.    levothyroxine (SYNTHROID) 112 MCG tablet Take 112 mcg by mouth once daily.    omeprazole (PRILOSEC) 40 MG capsule Take 40 mg by mouth every morning.      Review of patient's allergies indicates:   Allergen Reactions    Metformin Rash    Pcn [penicillins] Other (See Comments)     Unsure of reaction, states it was as a child. Tolerated rocephin at osh        Review of Systems   Constitutional: Negative for chills and fever.   Respiratory: Negative for shortness of breath.    Cardiovascular: Negative for chest pain.   Gastrointestinal: Positive for melena.       Objective:      Vital Signs (Most Recent)  Temp: 99 °F (37.2 °C) (04/24/19 0300)  Pulse: 83 (04/24/19 0815)  Resp: (!) 27 (04/24/19 0815)  BP: (!) 116/55 (04/24/19 0700)  SpO2: 95 % (04/24/19 0815)    Vital Signs Range (Last 24H):  Temp:  [98.8 °F (37.1 °C)-99.5 °F (37.5 °C)]   Pulse:  [73-85]   Resp:  [18-31]   BP: (116-141)/(55-63)   SpO2:  [93 %-98 %]     Physical Exam   Constitutional: She is oriented to person, place, and time. She appears well-developed and well-nourished.   Cardiovascular: Normal rate.   Pulmonary/Chest: Effort normal.   Abdominal: Soft.   Neurological: She is alert and oriented to person, place, and time.   Psychiatric: She has a normal mood and affect. Her behavior is normal. Judgment and thought content normal.         Assessment:      Active Hospital Problems    Diagnosis  POA    *GI bleed [K92.2]  Yes    Essential hypertension [I10]  Yes    Hypothyroidism [E03.9]  Yes    Fatty liver [K76.0]  Yes     Dx 2006        Resolved Hospital Problems   No resolved problems to display.       Plan:    Direct access EGD for melena and cirrhosis to rule out varices.

## 2019-04-24 NOTE — TREATMENT PLAN
GI Treatment Plan    Jihan Zamudio is a 49 y.o. female admitted to hospital 4/23/2019 (Hospital Day: 2) due to GI bleed.     - Normal examined duodenum.  - Portal hypertensive gastropathy.  - Small (< 5 mm) esophageal varices.  - 1 cm hiatal hernia.  - Portal hypertensive gastropathy. Treated with argon plasma coagulation (APC).  - Red blood in the prepyloric region of the stomach. Treated with argon plasma coagulation (APC).    Plan  - s/p EGD with bleeding pre-pyloric PHG, s/p APC  - can d/c octreotide  - Use Protonix 40 mg once daily for 12 weeks.  - antibiotics x5 days for secondary prophylaxis   - Repeat upper endoscopy in 6 months for surveillance.  - further management per hepatology  - We are signing-off. Please call with any questions.    Thank you for involving us in the care of Jihan Zamudio. Please call with any additional questions, concerns or changes in the patient's clinical status.    Jaret Armando MD  Gastroenterology Fellow, PGY IV  Spectralink: [id number]

## 2019-04-24 NOTE — RESIDENT HANDOFF
Handoff     Primary Team: [facility] Room Number: [facility] ENDO POOL ROOM 2ND *     Patient Name: Jihan Zamudio MRN: [id number]     Date of Birth: [id number] Allergies: Metformin and Pcn [penicillins]     Age: 49 y.o. Admit Date: 4/23/2019     Sex: female  BMI: Body mass index is 43.91 kg/m².     Code Status: Full Code        Illness Level (current clinical status): Watcher - Yes - GI Bleed    Reason for Admission: GI bleed    Brief HPI (pertinent PMH and diagnosis or differential diagnosis):   Patient is a 49 y.o. 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 The patient denied recent NSAID and ETOH use. She denies any hematemesis and states that she has never had a similar episode to this previously. Paracentesis was performed in the ED with 5L removed. Para studies with PMN < 250. The patient was admitted there and had EGD performed on 4/23 which revealed portal hypertensive gastropathy, blood in the gastric body, grade 2 gastroesophageal varices without active bleeding but with stigmata of recent bleeding and and non-bleeding small esophageal varices without stigmata of recent bleeding. The esophageal varices were not banded as they were not suspected to be the source of bleeding.  CT abdomen showed sequela of portal HTN and patent portal vein without evidence of PVT. The patient was transferred [place] [facility] [place] for TIPS vs Balloon-Occluded Retrograde Transvenous Obliteration for treatment of gastric varices.     Procedure Date: EGD (04/24)    Hospital Course (updated, brief assessment by system or problem, significant events):     Pt was admitted to Critical Care Medicine as a direct transfer for further monitoring and evaluation of her GI bleed. Upon arrival to the floor, pt was found to be afebrile, hemodynamically  stable, in no acute distress. Initial labs revealed no leukocytosis, stable Hgb @ 8.4, mildly elevated PT/INR (20.1/2.1), hyperbilirubinemia @ 9.1, and a MELD score of 24. Patient was continued on abx with Rocephin, PPI, rifaximin, and octreotide gtt. Hepatology was consulted to evaluate for possible TIPS procedure but recommended against TIPS at this time given her high MELD score and hx of HE. GI was consulted on case and agreed to re-perform EGD in attempt to address pt's gastric varices with advanced therapy. She is hemodynamically stable at this time.       Tasks (specific, using if-then statements):     GI bleed  --EGD at [facility] showed portal hypertensive gastropathy, blood in the gastric body, grade 2 gastroesophageal varices without active bleeding but with stigmata of recent bleeding and and non-bleeding small esophageal varices without stigmata of recent bleeding  --transferred for TIPS vs Balloon-Occluded Retrograde Transvenous Obliteration for treatment of gastric varices  --continue ppi, octreotide, rifaximin & rocephin  --cbc Q6, transfuse for hgb < 7  --Hepatology consulted  - GI consulted -> performing EGD today -> follow-up results    Fatty liver  MELD-Na score: 25 at 4/24/2019  3:45 AM  MELD score: 23 at 4/24/2019  3:45 AM  Calculated from:  Serum Creatinine: 0.7 mg/dL (Rounded to 1 mg/dL) at 4/24/2019  3:45 AM  Serum Sodium: 134 mmol/L at 4/24/2019  3:45 AM  Total Bilirubin: 9.1 mg/dL at 4/24/2019  3:45 AM  INR(ratio): 2.1 at 4/23/2019 11:55 PM  Age: 49 years     Hypothyroidism  - Continue home synthroid    Essential HTN  - --holding lasix & aldactone in setting of ongoing bleeding  --currently normotensive off meds    Contingency Plan (special circumstances anticipated and plan):     - Based on findings of EGD, if patient continues to bleed and begins to become hemodynamically unstable, please contact Critical care medicine    Estimated Discharge Date: uncertain    Discharge Disposition: Home or  Self Care    Mentored By: ICU staff

## 2019-04-24 NOTE — TRANSFER OF CARE
"Anesthesia Transfer of Care Note    Patient: Jihan Zamudio    Procedure(s) Performed: Procedure(s) (LRB):  EGD (ESOPHAGOGASTRODUODENOSCOPY) (N/A)    Patient location: OPS    Anesthesia Type: general    Transport from OR: Transported from OR on room air with adequate spontaneous ventilation    Post pain: adequate analgesia    Post assessment: no apparent anesthetic complications    Post vital signs: stable    Level of consciousness: awake    Nausea/Vomiting: no nausea/vomiting    Complications: none    Transfer of care protocol was followed      Last vitals:   Visit Vitals  BP (!) 108/53 (BP Location: Right arm, Patient Position: Lying)   Pulse 81   Temp 37.2 °C (99 °F) (Oral)   Resp (!) 27   Ht 5' 5" (1.651 m)   Wt 119.7 kg (263 lb 14.3 oz)   SpO2 95%   Breastfeeding? No   BMI 43.91 kg/m²     "

## 2019-04-24 NOTE — HPI
"This is a 48 yo F with KESSLER cirrhosis c/b varices, latent TB, GERD, hypothyroidism, and hx of lap band in 2007 who was transferred from OSH for hepatology evaluation for TIPS. Patient presented to OSH on Saturday after reporting black, tarry stools. She had an EGD done there on 4/23 which found "grade 2 gastroesophageal varices without active bleeding but with stigmata of recent bleeding and non-bleeding small esophageal varices without stigmata of recent bleeding." CT AP obtained showed sequela of portal HTN, patent portal vein, and no PVT. She was transferred here for further evaluation and management of gastric varices. She denies any history of GI bleed. She reports some abdominal discomfort and distention. She denies any hematemesis or hematochezia. Per hepatology, TIPS is not favored given her high MELD (25) and hx of HE.  "

## 2019-04-24 NOTE — NURSING
Admitted to room 62970 from outside hospital facility via EMS.  Walked from stretcher to bed.  Oriented to SICU, monitors and call light.  Connected to monitors.  Questions answered and encouraged.  O2 sats >95% on room air.  Lungs clear/diminished bilat.  Abdomen large and obese.  Dressing to right lower quadrant cdi.  Voids spontaneously and without difficulty.  No skin breakdown noted.  MEGHANA Beyer NP, at bedside.  Refer to [facility] for assessments and updates.

## 2019-04-24 NOTE — SUBJECTIVE & OBJECTIVE
Review of Systems   Constitutional: Positive for activity change and fatigue. Negative for appetite change, chills and fever.   HENT: Negative for trouble swallowing.    Respiratory: Positive for shortness of breath. Negative for cough, choking and chest tightness.    Cardiovascular: Positive for leg swelling. Negative for chest pain.   Gastrointestinal: Positive for abdominal distention, abdominal pain and blood in stool. Negative for anal bleeding, constipation, diarrhea, nausea and vomiting.   Genitourinary: Negative for difficulty urinating and dysuria.   Musculoskeletal: Negative for arthralgias and back pain.   Skin: Positive for pallor. Negative for color change.   Neurological: Positive for dizziness. Negative for headaches.   Psychiatric/Behavioral: Negative for agitation and confusion.       Past Medical History:   Diagnosis Date    Cirrhosis     Esophageal varices 2018    Small with no banding     Essential hypertension 2018    Fatty liver 2018    GERD (gastroesophageal reflux disease)     Hx of colonic polyps 2018    On colonoscopy     Hypertension     Hypothyroidism 2018    Kidney stones     Morbid obesity 2018    Lap band with subsequent release    KADEEM (obstructive sleep apnea) 2018    Osteoarthritis 2018    Pulmonary nodule 2018    Vitamin D deficiency 2018       Past Surgical History:   Procedure Laterality Date     SECTION      TIMES 2     CHOLECYSTECTOMY      LAPAROSCOPIC     CYSTOSCOPY W/ STONE MANIPULATION      kidney stone removal    LAPAROSCOPIC GASTRIC BANDING  2006    removal     LIVER BIOPSY  2017    KESSLER with bridging 17       Family history of liver disease: No    Review of patient's allergies indicates:   Allergen Reactions    Metformin Rash    Pcn [penicillins] Other (See Comments)     Unsure of reaction, states it was as a child. Tolerated rocephin at [facility]       Tobacco Use     Smoking status: Never Smoker    Smokeless tobacco: Never Used    Tobacco comment: patient denies   Substance and Sexual Activity    Alcohol use: No     Comment: patient denies    Drug use: No     Comment: patient denies    Sexual activity: Not on file       Medications Prior to Admission   Medication Sig Dispense Refill Last Dose    ciprofloxacin HCl (CIPRO) 750 MG tablet Take 750 mg by mouth once a week.       dextromethorphan-guaifenesin  mg/5 ml (ROBITUSSIN-DM)  mg/5 mL liquid Take 5 mLs by mouth every 6 (six) hours as needed.       furosemide (LASIX) 20 MG tablet Take 20 mg by mouth once daily.       lidocaine (LIDODERM) 5 % Place 1 patch onto the skin every 24 hours. Remove & Discard patch within 12 hours or as directed by MD       rifAXImin (XIFAXAN) 200 mg Tab Take 550 mg by mouth 2 (two) times daily.       spironolactone (ALDACTONE) 25 MG tablet Take 25 mg by mouth once daily.       levothyroxine (SYNTHROID) 112 MCG tablet Take 112 mcg by mouth once daily.   Taking    omeprazole (PRILOSEC) 40 MG capsule Take 40 mg by mouth every morning.    Taking       Objective:     Vital Signs (Most Recent):  Temp: 98.3 °F (36.8 °C) (04/24/19 1600)  Pulse: 84 (04/24/19 1700)  Resp: (!) 27 (04/24/19 1700)  BP: (!) 99/49 (04/24/19 1700)  SpO2: 96 % (04/24/19 1700) Vital Signs (24h Range):  Temp:  [98.1 °F (36.7 °C)-99.5 °F (37.5 °C)] 98.3 °F (36.8 °C)  Pulse:  [73-86] 84  Resp:  [18-31] 27  SpO2:  [93 %-98 %] 96 %  BP: ()/(49-67) 99/49     Weight: 119.7 kg (263 lb 14.3 oz) (04/23/19 2330)  Body mass index is 43.91 kg/m².    Physical Exam   Constitutional: She is oriented to person, place, and time. She appears well-developed. She appears lethargic. No distress.   HENT:   Head: Normocephalic.   Eyes: Conjunctivae are normal. Scleral icterus is present.   Neck: Normal range of motion. Neck supple.   Cardiovascular: Normal rate and regular rhythm.   Pulmonary/Chest: Effort normal and breath  sounds normal.   Abdominal: Soft. Bowel sounds are normal. She exhibits distension. She exhibits no mass. There is no tenderness. There is no rebound and no guarding.   Musculoskeletal: Normal range of motion.   Neurological: She is oriented to person, place, and time. She appears lethargic.   Skin: Skin is warm and dry.   Psychiatric: She has a normal mood and affect.       MELD-Na score: 25 at 4/24/2019  3:45 AM  MELD score: 23 at 4/24/2019  3:45 AM  Calculated from:  Serum Creatinine: 0.7 mg/dL (Rounded to 1 mg/dL) at 4/24/2019  3:45 AM  Serum Sodium: 134 mmol/L at 4/24/2019  3:45 AM  Total Bilirubin: 9.1 mg/dL at 4/24/2019  3:45 AM  INR(ratio): 2.1 at 4/23/2019 11:55 PM  Age: 49 years    Significant Labs:  CBC:   Recent Labs   Lab 04/24/19  1615   WBC 2.55*   RBC 2.95*   HGB 8.8*   HCT 26.8*   PLT 74*     BMP:   Recent Labs   Lab 04/24/19  0345      *   K 3.8      CO2 22*   BUN 6   CREATININE 0.7   CALCIUM 7.5*     CMP:   Recent Labs   Lab 04/24/19  0345      CALCIUM 7.5*   ALBUMIN 2.3*   PROT 5.3*   *   K 3.8   CO2 22*      BUN 6   CREATININE 0.7   ALKPHOS 91   ALT 26   AST 91*   BILITOT 9.1*     Coagulation:   Recent Labs   Lab 04/23/19  2355   INR 2.1*   APTT 35.1*       Significant Imaging:  Labs: Reviewed

## 2019-04-24 NOTE — SUBJECTIVE & OBJECTIVE
Past Medical History:   Diagnosis Date    Cirrhosis     Esophageal varices 2018    Small with no banding     Essential hypertension 2018    Fatty liver 2018    GERD (gastroesophageal reflux disease)     Hx of colonic polyps 2018    On colonoscopy     Hypertension     Hypothyroidism 2018    Kidney stones     Morbid obesity 2018    Lap band with subsequent release    KADEEM (obstructive sleep apnea) 2018    Osteoarthritis 2018    Pulmonary nodule 2018    Vitamin D deficiency 2018       Past Surgical History:   Procedure Laterality Date     SECTION      TIMES 2     CHOLECYSTECTOMY      LAPAROSCOPIC     CYSTOSCOPY W/ STONE MANIPULATION      kidney stone removal    LAPAROSCOPIC GASTRIC BANDING  2006    removal 2009    LIVER BIOPSY  2017    KESSLER with bridging 17       Review of patient's allergies indicates:   Allergen Reactions    Metformin Rash    Pcn [penicillins] Other (See Comments)     Unsure of reaction, states it was as a child. Tolerated [facility]cephin at [facility]     Family History     Problem Relation (Age of Onset)    Cancer Mother (50), Father (65)    Heart disease Mother, Father        Tobacco Use    Smoking status: Never Smoker    Smokeless tobacco: Never Used    Tobacco comment: patient denies   Substance and Sexual Activity    Alcohol use: No     Comment: patient denies    Drug use: No     Comment: patient denies    Sexual activity: Not on file     Review of Systems   Constitutional: Positive for activity change and appetite change. Negative for chills and fever.   HENT: Negative for sore throat and trouble swallowing.    Respiratory: Negative for cough and shortness of breath.    Cardiovascular: Negative for chest pain and leg swelling.   Gastrointestinal: Positive for abdominal distention, abdominal pain and blood in stool. Negative for constipation, diarrhea, nausea and vomiting.   Genitourinary:  Negative for decreased urine volume and difficulty urinating.   Musculoskeletal: Positive for back pain. Negative for arthralgias.   Skin: Negative for color change and pallor.   Neurological: Negative for dizziness and light-headedness.   Psychiatric/Behavioral: Negative for agitation and confusion.     Objective:     Vital Signs (Most Recent):  Temp: 99 °F (37.2 °C) (04/24/19 0300)  Pulse: 83 (04/24/19 0815)  Resp: (!) 27 (04/24/19 0815)  BP: (!) 116/55 (04/24/19 0700)  SpO2: 95 % (04/24/19 0815) Vital Signs (24h Range):  Temp:  [98.8 °F (37.1 °C)-99.5 °F (37.5 °C)] 99 °F (37.2 °C)  Pulse:  [73-85] 83  Resp:  [18-31] 27  SpO2:  [93 %-98 %] 95 %  BP: (116-141)/(55-63) 116/55     Weight: 119.7 kg (263 lb 14.3 oz) (04/23/19 2330)  Body mass index is 43.91 kg/m².      Intake/Output Summary (Last 24 hours) at 4/24/2019 1350  Last data filed at 4/24/2019 1100  Gross per 24 hour   Intake 152 ml   Output 575 ml   Net -423 ml       Lines/Drains/Airways     Peripheral Intravenous Line                 Peripheral IV - Single Lumen 04/23/19 1300 20 G Right Wrist 1 day         Peripheral IV - Single Lumen 04/23/19 1500 18 G Left Antecubital less than 1 day                Physical Exam   Constitutional: No distress.   HENT:   Mouth/Throat: Oropharynx is clear and moist.   Eyes: Scleral icterus is present.   Cardiovascular: Normal rate and regular rhythm.   Pulmonary/Chest: Effort normal and breath sounds normal. No respiratory distress.   Abdominal: Bowel sounds are normal. She exhibits distension. She exhibits no mass. There is no guarding.   Tenderness to palpation epigastric/RUQ area   Skin: Skin is warm and dry.   jaundiced   Psychiatric: She has a normal mood and affect.   Vitals reviewed.      Significant Labs:  CBC:   Recent Labs   Lab 04/23/19  2355 04/24/19  0345 04/24/19  0912   WBC 2.71* 3.03* 2.13*   HGB 8.4* 8.8* 8.4*   HCT 25.9* 26.7* 25.6*   PLT 73* 86* 67*     CMP:   Recent Labs   Lab 04/24/19  0345       CALCIUM 7.5*   ALBUMIN 2.3*   PROT 5.3*   *   K 3.8   CO2 22*      BUN 6   CREATININE 0.7   ALKPHOS 91   ALT 26   AST 91*   BILITOT 9.1*     Coagulation:   Recent Labs   Lab 04/23/19  2355   INR 2.1*   APTT 35.1*

## 2019-04-25 LAB
AFP SERPL-MCNC: 2.7 NG/ML (ref 0–8.4)
ALBUMIN SERPL BCP-MCNC: 2.2 G/DL (ref 3.5–5.2)
ALP SERPL-CCNC: 87 U/L (ref 55–135)
ALT SERPL W/O P-5'-P-CCNC: 24 U/L (ref 10–44)
ANION GAP SERPL CALC-SCNC: 5 MMOL/L (ref 8–16)
ASCENDING AORTA: 2.73 CM
AST SERPL-CCNC: 88 U/L (ref 10–40)
AV INDEX (PROSTH): 0.58
AV MEAN GRADIENT: 17.2 MMHG
AV PEAK GRADIENT: 33.18 MMHG
AV VALVE AREA: 1.77 CM2
AV VELOCITY RATIO: 0.54
BASOPHILS # BLD AUTO: 0.02 K/UL (ref 0–0.2)
BASOPHILS # BLD AUTO: 0.03 K/UL (ref 0–0.2)
BASOPHILS NFR BLD: 0.8 % (ref 0–1.9)
BASOPHILS NFR BLD: 0.8 % (ref 0–1.9)
BILIRUB SERPL-MCNC: 8 MG/DL (ref 0.1–1)
BSA FOR ECHO PROCEDURE: 2.37 M2
BUN SERPL-MCNC: 7 MG/DL (ref 6–20)
CALCIUM SERPL-MCNC: 7.4 MG/DL (ref 8.7–10.5)
CHLORIDE SERPL-SCNC: 106 MMOL/L (ref 95–110)
CO2 SERPL-SCNC: 22 MMOL/L (ref 23–29)
CREAT SERPL-MCNC: 0.6 MG/DL (ref 0.5–1.4)
CV ECHO LV RWT: 0.24 CM
DIFFERENTIAL METHOD: ABNORMAL
DIFFERENTIAL METHOD: ABNORMAL
DOP CALC AO PEAK VEL: 2.88 M/S
DOP CALC AO VTI: 60.27 CM
DOP CALC LVOT AREA: 3.05 CM2
DOP CALC LVOT DIAMETER: 1.97 CM
DOP CALC LVOT PEAK VEL: 1.56 M/S
DOP CALC LVOT STROKE VOLUME: 106.69 CM3
DOP CALCLVOT PEAK VEL VTI: 35.02 CM
E WAVE DECELERATION TIME: 214.13 MSEC
E/A RATIO: 1.12
E/E' RATIO: 9.52
ECHO LV POSTERIOR WALL: 0.64 CM (ref 0.6–1.1)
EOSINOPHIL # BLD AUTO: 0.3 K/UL (ref 0–0.5)
EOSINOPHIL # BLD AUTO: 0.5 K/UL (ref 0–0.5)
EOSINOPHIL NFR BLD: 11.4 % (ref 0–8)
EOSINOPHIL NFR BLD: 11.9 % (ref 0–8)
ERYTHROCYTE [DISTWIDTH] IN BLOOD BY AUTOMATED COUNT: 17.2 % (ref 11.5–14.5)
ERYTHROCYTE [DISTWIDTH] IN BLOOD BY AUTOMATED COUNT: 17.2 % (ref 11.5–14.5)
EST. GFR  (AFRICAN AMERICAN): >60 ML/MIN/1.73 M^2
EST. GFR  (NON AFRICAN AMERICAN): >60 ML/MIN/1.73 M^2
FRACTIONAL SHORTENING: 37 % (ref 28–44)
GLUCOSE SERPL-MCNC: 81 MG/DL (ref 70–110)
HCT VFR BLD AUTO: 26.2 % (ref 37–48.5)
HCT VFR BLD AUTO: 28 % (ref 37–48.5)
HGB BLD-MCNC: 8.3 G/DL (ref 12–16)
HGB BLD-MCNC: 9 G/DL (ref 12–16)
IMM GRANULOCYTES # BLD AUTO: 0.02 K/UL (ref 0–0.04)
IMM GRANULOCYTES # BLD AUTO: 0.05 K/UL (ref 0–0.04)
IMM GRANULOCYTES NFR BLD AUTO: 0.8 % (ref 0–0.5)
IMM GRANULOCYTES NFR BLD AUTO: 1.3 % (ref 0–0.5)
INR PPP: 2.2 (ref 0.8–1.2)
INTERVENTRICULAR SEPTUM: 0.63 CM (ref 0.6–1.1)
LA MAJOR: 5.71 CM
LA MINOR: 5.39 CM
LA WIDTH: 4.56 CM
LEFT ATRIUM SIZE: 4.93 CM
LEFT ATRIUM VOLUME INDEX: 47.2 ML/M2
LEFT ATRIUM VOLUME: 105.97 CM3
LEFT INTERNAL DIMENSION IN SYSTOLE: 3.41 CM (ref 2.1–4)
LEFT VENTRICLE DIASTOLIC VOLUME INDEX: 64.07 ML/M2
LEFT VENTRICLE DIASTOLIC VOLUME: 143.98 ML
LEFT VENTRICLE MASS INDEX: 52.5 G/M2
LEFT VENTRICLE SYSTOLIC VOLUME INDEX: 21.2 ML/M2
LEFT VENTRICLE SYSTOLIC VOLUME: 47.64 ML
LEFT VENTRICULAR INTERNAL DIMENSION IN DIASTOLE: 5.44 CM (ref 3.5–6)
LEFT VENTRICULAR MASS: 118.01 G
LV LATERAL E/E' RATIO: 8.33
LV SEPTAL E/E' RATIO: 11.11
LYMPHOCYTES # BLD AUTO: 0.6 K/UL (ref 1–4.8)
LYMPHOCYTES # BLD AUTO: 0.7 K/UL (ref 1–4.8)
LYMPHOCYTES NFR BLD: 18.3 % (ref 18–48)
LYMPHOCYTES NFR BLD: 24.3 % (ref 18–48)
MAGNESIUM SERPL-MCNC: 1.7 MG/DL (ref 1.6–2.6)
MCH RBC QN AUTO: 29.5 PG (ref 27–31)
MCH RBC QN AUTO: 29.8 PG (ref 27–31)
MCHC RBC AUTO-ENTMCNC: 31.7 G/DL (ref 32–36)
MCHC RBC AUTO-ENTMCNC: 32.1 G/DL (ref 32–36)
MCV RBC AUTO: 93 FL (ref 82–98)
MCV RBC AUTO: 93 FL (ref 82–98)
MONOCYTES # BLD AUTO: 0.3 K/UL (ref 0.3–1)
MONOCYTES # BLD AUTO: 0.4 K/UL (ref 0.3–1)
MONOCYTES NFR BLD: 10.7 % (ref 4–15)
MONOCYTES NFR BLD: 9.4 % (ref 4–15)
MV PEAK A VEL: 0.89 M/S
MV PEAK E VEL: 1 M/S
NEUTROPHILS # BLD AUTO: 1.3 K/UL (ref 1.8–7.7)
NEUTROPHILS # BLD AUTO: 2.3 K/UL (ref 1.8–7.7)
NEUTROPHILS NFR BLD: 51.5 % (ref 38–73)
NEUTROPHILS NFR BLD: 58.8 % (ref 38–73)
NRBC BLD-RTO: 0 /100 WBC
NRBC BLD-RTO: 0 /100 WBC
PHOSPHATE SERPL-MCNC: 2.8 MG/DL (ref 2.7–4.5)
PISA TR MAX VEL: 2.5 M/S
PLATELET # BLD AUTO: 80 K/UL (ref 150–350)
PLATELET # BLD AUTO: 89 K/UL (ref 150–350)
PMV BLD AUTO: 10.8 FL (ref 9.2–12.9)
PMV BLD AUTO: 12.9 FL (ref 9.2–12.9)
POTASSIUM SERPL-SCNC: 4 MMOL/L (ref 3.5–5.1)
PROT SERPL-MCNC: 5.1 G/DL (ref 6–8.4)
PROTHROMBIN TIME: 21.4 SEC (ref 9–12.5)
PULM VEIN S/D RATIO: 1.14
PV PEAK D VEL: 0.59 M/S
PV PEAK S VEL: 0.67 M/S
RA MAJOR: 5.26 CM
RA WIDTH: 2.87 CM
RBC # BLD AUTO: 2.81 M/UL (ref 4–5.4)
RBC # BLD AUTO: 3.02 M/UL (ref 4–5.4)
RIGHT VENTRICULAR END-DIASTOLIC DIMENSION: 3.72 CM
SINUS: 3.08 CM
SODIUM SERPL-SCNC: 133 MMOL/L (ref 136–145)
STJ: 2.38 CM
TDI LATERAL: 0.12
TDI SEPTAL: 0.09
TDI: 0.11
TR MAX PG: 25 MMHG
TRICUSPID ANNULAR PLANE SYSTOLIC EXCURSION: 3.6 CM
WBC # BLD AUTO: 2.43 K/UL (ref 3.9–12.7)
WBC # BLD AUTO: 3.94 K/UL (ref 3.9–12.7)

## 2019-04-25 PROCEDURE — 99233 SBSQ HOSP IP/OBS HIGH 50: CPT | Mod: ,,, | Performed by: INTERNAL MEDICINE

## 2019-04-25 PROCEDURE — 82105 ALPHA-FETOPROTEIN SERUM: CPT

## 2019-04-25 PROCEDURE — 25000003 PHARM REV CODE 250: Performed by: NURSE PRACTITIONER

## 2019-04-25 PROCEDURE — 63600175 PHARM REV CODE 636 W HCPCS: Performed by: NURSE PRACTITIONER

## 2019-04-25 PROCEDURE — 84100 ASSAY OF PHOSPHORUS: CPT

## 2019-04-25 PROCEDURE — 99233 PR SUBSEQUENT HOSPITAL CARE,LEVL III: ICD-10-PCS | Mod: ,,, | Performed by: INTERNAL MEDICINE

## 2019-04-25 PROCEDURE — 80053 COMPREHEN METABOLIC PANEL: CPT

## 2019-04-25 PROCEDURE — 25000003 PHARM REV CODE 250: Performed by: INTERNAL MEDICINE

## 2019-04-25 PROCEDURE — 85610 PROTHROMBIN TIME: CPT

## 2019-04-25 PROCEDURE — 83735 ASSAY OF MAGNESIUM: CPT

## 2019-04-25 PROCEDURE — 63600175 PHARM REV CODE 636 W HCPCS: Performed by: STUDENT IN AN ORGANIZED HEALTH CARE EDUCATION/TRAINING PROGRAM

## 2019-04-25 PROCEDURE — 25000003 PHARM REV CODE 250: Performed by: STUDENT IN AN ORGANIZED HEALTH CARE EDUCATION/TRAINING PROGRAM

## 2019-04-25 PROCEDURE — 85025 COMPLETE CBC W/AUTO DIFF WBC: CPT | Mod: 91

## 2019-04-25 PROCEDURE — 20000000 HC ICU ROOM

## 2019-04-25 RX ORDER — CEFTRIAXONE 1 G/1
1 INJECTION, POWDER, FOR SOLUTION INTRAMUSCULAR; INTRAVENOUS
Status: COMPLETED | OUTPATIENT
Start: 2019-04-26 | End: 2019-04-27

## 2019-04-25 RX ORDER — SPIRONOLACTONE 25 MG/1
25 TABLET ORAL DAILY
Status: DISCONTINUED | OUTPATIENT
Start: 2019-04-25 | End: 2019-04-26

## 2019-04-25 RX ORDER — FUROSEMIDE 20 MG/1
20 TABLET ORAL DAILY
Status: DISCONTINUED | OUTPATIENT
Start: 2019-04-25 | End: 2019-04-26

## 2019-04-25 RX ORDER — MAGNESIUM SULFATE HEPTAHYDRATE 40 MG/ML
2 INJECTION, SOLUTION INTRAVENOUS ONCE
Status: COMPLETED | OUTPATIENT
Start: 2019-04-25 | End: 2019-04-25

## 2019-04-25 RX ORDER — CEFTRIAXONE 1 G/1
1 INJECTION, POWDER, FOR SOLUTION INTRAMUSCULAR; INTRAVENOUS
Status: DISCONTINUED | OUTPATIENT
Start: 2019-04-26 | End: 2019-04-25

## 2019-04-25 RX ORDER — TRAMADOL HYDROCHLORIDE 50 MG/1
50 TABLET ORAL ONCE
Status: COMPLETED | OUTPATIENT
Start: 2019-04-25 | End: 2019-04-25

## 2019-04-25 RX ORDER — CALCIUM CARBONATE 200(500)MG
1000 TABLET,CHEWABLE ORAL DAILY PRN
Status: DISCONTINUED | OUTPATIENT
Start: 2019-04-25 | End: 2019-05-02 | Stop reason: HOSPADM

## 2019-04-25 RX ADMIN — RIFAXIMIN 550 MG: 550 TABLET ORAL at 08:04

## 2019-04-25 RX ADMIN — SPIRONOLACTONE 25 MG: 25 TABLET, FILM COATED ORAL at 09:04

## 2019-04-25 RX ADMIN — MAGNESIUM SULFATE IN WATER 2 G: 40 INJECTION, SOLUTION INTRAVENOUS at 09:04

## 2019-04-25 RX ADMIN — TRAMADOL HYDROCHLORIDE 50 MG: 50 TABLET, FILM COATED ORAL at 08:04

## 2019-04-25 RX ADMIN — CEFTRIAXONE SODIUM 1 G: 1 INJECTION, POWDER, FOR SOLUTION INTRAMUSCULAR; INTRAVENOUS at 02:04

## 2019-04-25 RX ADMIN — CALCIUM CARBONATE (ANTACID) CHEW TAB 500 MG 1000 MG: 500 CHEW TAB at 03:04

## 2019-04-25 RX ADMIN — LEVOTHYROXINE SODIUM 112 MCG: 112 TABLET ORAL at 06:04

## 2019-04-25 RX ADMIN — PANTOPRAZOLE SODIUM 40 MG: 40 TABLET, DELAYED RELEASE ORAL at 08:04

## 2019-04-25 RX ADMIN — FUROSEMIDE 20 MG: 20 TABLET ORAL at 09:04

## 2019-04-25 NOTE — NURSING
Pt AAOx4. VSS. RA. Diet advanced. 1 normal BM. Voids per toilet. Ambulatory. Abdominal US and TTEs done today. Awaiting transfer. No acute events. Bed locked in low position. Call light in reach. Spouse at bedside. Updated on plan of care.

## 2019-04-25 NOTE — PROGRESS NOTES
[facility]  Critical Care Medicine  Progress Note    Patient Name: Jihan Zamudio  MRN: [id number]  Admission Date: 4/23/2019  Hospital Length of Stay: 2 days  Code Status: Full Code  Attending Provider: Arnoldo Stoll MD  Primary Care Provider: Primary Doctor No   Principal Problem: GI bleed    Subjective:     HPI:  Patient is a 49 y.o. 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 The patient denied recent NSAID and ETOH use. She denies any hematemesis and states that she has never had a similar episode to this previously. Paracentesis was performed in the ED with 5L removed. Para studies with PMN < 250. The patient was admitted there and had EGD performed on 4/23 which revealed portal hypertensive gastropathy, blood in the gastric body, grade 2 gastroesophageal varices without active bleeding but with stigmata of recent bleeding and and non-bleeding small esophageal varices without stigmata of recent bleeding. The esophageal varices were not banded as they were not suspected to be the source of bleeding.  CT abdomen showed sequela of portal HTN and patent portal vein without evidence of PVT. The patient was transferred to [facility] for evaluation for TIPS vs Balloon-Occluded Retrograde Transvenous Obliteration for treatment of gastric varices.         Hospital/ICU Course:  Pt was admitted to Critical Care Medicine as a direct transfer for further monitoring and evaluation of her GI bleed. Upon arrival to the floor, pt was found to be afebrile, hemodynamically stable, in no acute distress. Initial labs revealed no leukocytosis, stable Hgb @ 8.4, mildly elevated PT/INR (20.1/2.1), hyperbilirubinemia @ 9.1, and a MELD score of 24. Patient was continued on abx with Rocephin, PPI, rifaximin, and octreotide gtt. Hepatology was  consulted to evaluate for possible TIPS procedure but recommended against TIPS at this time given her high MELD score and hx of HE. GI was consulted on case and agreed to re-perform EGD in attempt to address pt's gastric varices with advanced therapy. EGD with bleeding pre-pyloric PHG, s/p APC.        Interval History/Significant Events: EGD with bleeding pre-pyloric PHG, s/p APC. Patient hemodynamically stable. Hgb stable at 8.5.     Review of Systems   Constitutional: Negative for chills, diaphoresis and fever.   HENT: Negative for nosebleeds, sinus pressure and trouble swallowing.    Eyes: Negative for pain, discharge and itching.   Respiratory: Negative for cough, chest tightness, shortness of breath and wheezing.    Cardiovascular: Negative for chest pain, palpitations and leg swelling.   Gastrointestinal: Negative for abdominal distention, abdominal pain, blood in stool, nausea and vomiting.   Endocrine: Negative for polydipsia, polyphagia and polyuria.   Genitourinary: Negative for dysuria, flank pain and hematuria.   Musculoskeletal: Negative for myalgias, neck pain and neck stiffness.   Neurological: Negative for speech difficulty, light-headedness and headaches.   Psychiatric/Behavioral: Negative for confusion and decreased concentration. The patient is not nervous/anxious.      Objective:     Vital Signs (Most Recent):  Temp: 98.5 °F (36.9 °C) (04/25/19 0700)  Pulse: 77 (04/25/19 0700)  Resp: (!) 23 (04/25/19 0700)  BP: (!) 125/59 (04/25/19 0400)  SpO2: (!) 94 % (04/25/19 0700) Vital Signs (24h Range):  Temp:  [98.1 °F (36.7 °C)-98.6 °F (37 °C)] 98.5 °F (36.9 °C)  Pulse:  [77-89] 77  Resp:  [18-35] 23  SpO2:  [91 %-98 %] 94 %  BP: ()/(49-67) 125/59   Weight: 122.7 kg (270 lb 8.1 oz)  Body mass index is 45.01 kg/m².      Intake/Output Summary (Last 24 hours) at 4/25/2019 0808  Last data filed at 4/25/2019 0700  Gross per 24 hour   Intake 751.77 ml   Output 700 ml   Net 51.77 ml       Physical Exam    Constitutional: She is oriented to person, place, and time. She appears well-developed and well-nourished. No distress.   HENT:   Head: Normocephalic and atraumatic.   Right Ear: External ear normal.   Left Ear: External ear normal.   Nose: Nose normal.   Mouth/Throat: Oropharynx is clear and moist.   Eyes: Pupils are equal, round, and reactive to light. Conjunctivae and EOM are normal.   Neck: Normal range of motion. Neck supple.   Cardiovascular: Normal rate, regular rhythm and intact distal pulses.   Murmur heard.  Pulmonary/Chest: Effort normal and breath sounds normal. No respiratory distress. She has no wheezes. She exhibits tenderness.   Abdominal: Soft. Bowel sounds are normal. She exhibits no distension. There is tenderness in the epigastric area.   Musculoskeletal: Normal range of motion. She exhibits no tenderness or deformity.   Neurological: She is alert and oriented to person, place, and time.   Skin: Skin is warm and dry. Capillary refill takes less than 2 seconds. She is not diaphoretic.   Psychiatric: She has a normal mood and affect. Her behavior is normal. Judgment and thought content normal.   Nursing note and vitals reviewed.      Vents:     Lines/Drains/Airways     Peripheral Intravenous Line                 Peripheral IV - Single Lumen 04/23/19 1300 20 G Right Wrist 1 day         Peripheral IV - Single Lumen 04/23/19 1500 18 G Left Antecubital 1 day              Significant Labs:    CBC/Anemia Profile:  Recent Labs   Lab 04/24/19  1615 04/24/19  2159 04/25/19  0321   WBC 2.55* 2.17* 2.43*   HGB 8.8* 8.5* 8.3*   HCT 26.8* 26.0* 26.2*   PLT 74* 75* 80*   MCV 91 93 93   RDW 17.3* 17.1* 17.2*        Chemistries:  Recent Labs   Lab 04/23/19  2355 04/24/19  0345 04/25/19  0321   * 134* 133*   K 3.7 3.8 4.0    106 106   CO2 22* 22* 22*   BUN 7 6 7   CREATININE 0.8 0.7 0.6   CALCIUM 7.7* 7.5* 7.4*   ALBUMIN 2.3* 2.3* 2.2*   PROT 5.3* 5.3* 5.1*   BILITOT 9.4* 9.1* 8.0*   ALKPHOS 89 91 87    ALT 23 26 24   AST 89* 91* 88*   MG 1.6 1.9 1.7   PHOS 3.0 2.9 2.8       None    Significant Imaging:  I have reviewed all pertinent imaging results/findings within the past 24 hours.      ABG  No results for input(s): PH, PO2, PCO2, HCO3, BE in the last 168 hours.  Assessment/Plan:     Cardiac/Vascular  Essential hypertension  - currently normotensive off meds    Endocrine  Hypothyroidism  --checking TSH  --continue home synthroid    GI  * GI bleed  --EGD at [facility] showed portal hypertensive gastropathy, blood in the gastric body, grade 2 gastroesophageal varices without active bleeding but with stigmata of recent bleeding and and non-bleeding small esophageal varices without stigmata of recent bleeding  --transferred for TIPS vs Balloon-Occluded Retrograde Transvenous Obliteration for treatment of gastric varices  --continue ppi, rifaximin & rocephin  --cbc Q6, transfuse for hgb < 7  --Hepatology consulted; appreciate recs     Fatty liver  MELD-Na score: 25 at 4/25/2019  3:21 AM  MELD score: 23 at 4/25/2019  3:21 AM  Calculated from:  Serum Creatinine: 0.6 mg/dL (Rounded to 1 mg/dL) at 4/25/2019  3:21 AM  Serum Sodium: 133 mmol/L at 4/25/2019  3:21 AM  Total Bilirubin: 8.0 mg/dL at 4/25/2019  3:21 AM  INR(ratio): 2.1 at 4/23/2019 11:55 PM  Age: 49 years  --KESSLER dx by liver biopsy in 11/2017  --paracentesis at [facility] with PMN < 250 & no bacteria on gram stain  --see GIB for further plan    Decompensated hepatic cirrhosis  Per Hepatology, patient is not a candidate for TIPS given her high MELD(25) and elevated T-Bili. Recommending evaluation for transplant.     - will order TTE and Liver Ultrasound for pre-transplant evaluation   - will DC octreotide and continue daily PO PPI   - will continue with IV ceftriaxone x 5 days  - will resume diuresis with aldactone and lasix   - will continue with home rifaximin        Critical Care Daily Checklist:    A: Awake: RASS Goal/Actual Goal: RASS Goal: 0-->alert and  calm  Actual: Alaniz Agitation Sedation Scale (RASS): Alert and calm   B: Spontaneous Breathing Trial Performed?     C: SAT & SBT Coordinated?  n/a                      D: Delirium: CAM-ICU Overall CAM-ICU: Negative   E: Early Mobility Performed? Yes   F: Feeding Goal:    Status:     Current Diet Order   Procedures    Diet Dysphagia Mechanical Soft (IDDSI Level 5) [facility]     Order Specific Question:   Indicate patient location for additional diet options:     Answer:   [facility]     Order Specific Question:   Additional Diet Options:     Answer:   [place]      AS: Analgesia/Sedation N/A   T: Thromboembolic Prophylaxis No 2/2 GIB   H: HOB > 300 Yes   U: Stress Ulcer Prophylaxis (if needed) Yes   G: Glucose Control N/A   B: Bowel Function Stool Occurrence: 1   I: Indwelling Catheter (Lines & Hernandes) Necessity    D: De-escalation of Antimicrobials/Pharmacotherapies No    Plan for the day/ETD Liver Tx and stepdown from ICU    Code Status:  Family/Goals of Care: Full Code         Critical secondary to Patient has a condition that poses threat to life and bodily function: [facility]      Critical care was time spent personally by me on the following activities: development of treatment plan with patient or surrogate and bedside caregivers, discussions with consultants, evaluation of patient's response to treatment, examination of patient, ordering and performing treatments and interventions, ordering and review of laboratory studies, ordering and review of radiographic studies, pulse oximetry, re-evaluation of patient's condition. This critical care time did not overlap with that of any other provider or involve time for any procedures.     Clifton Yarbrough MD  Critical Care Medicine  [facility]      I have seen the patient, reviewed the Resident's history and physical, assessment and plan. I have personally interviewed and examined the patient at bedside and: agree with the  findings  Okay to transfer this patient to the floor today.    Arnoldo Stoll MD  [facility]

## 2019-04-25 NOTE — ASSESSMENT & PLAN NOTE
MELD-Na score: 25 at 4/25/2019  3:21 AM  MELD score: 23 at 4/25/2019  3:21 AM  Calculated from:  Serum Creatinine: 0.6 mg/dL (Rounded to 1 mg/dL) at 4/25/2019  3:21 AM  Serum Sodium: 133 mmol/L at 4/25/2019  3:21 AM  Total Bilirubin: 8.0 mg/dL at 4/25/2019  3:21 AM  INR(ratio): 2.1 at 4/23/2019 11:55 PM  Age: 49 years  --KESSLER dx by liver biopsy in 11/2017  --paracentesis at [facility] with PMN < 250 & no bacteria on gram stain  --see GIB for further plan

## 2019-04-25 NOTE — ASSESSMENT & PLAN NOTE
Per Hepatology, patient is not a candidate for TIPS given her high MELD(25) and elevated T-Bili. Recommending evaluation for transplant.     - will order TTE and Liver Ultrasound for pre-transplant evaluation   - will DC octreotide and continue daily PO PPI   - will continue with IV ceftriaxone x 5 days  - will resume diuresis with aldactone and lasix   - will continue with home rifaximin

## 2019-04-25 NOTE — SUBJECTIVE & OBJECTIVE
Interval History/Significant Events: EGD with bleeding pre-pyloric PHG, s/p APC. Patient hemodynamically stable. Hgb stable at 8.5.     Review of Systems   Constitutional: Negative for chills, diaphoresis and fever.   HENT: Negative for nosebleeds, sinus pressure and trouble swallowing.    Eyes: Negative for pain, discharge and itching.   Respiratory: Negative for cough, chest tightness, shortness of breath and wheezing.    Cardiovascular: Negative for chest pain, palpitations and leg swelling.   Gastrointestinal: Negative for abdominal distention, abdominal pain, blood in stool, nausea and vomiting.   Endocrine: Negative for polydipsia, polyphagia and polyuria.   Genitourinary: Negative for dysuria, flank pain and hematuria.   Musculoskeletal: Negative for myalgias, neck pain and neck stiffness.   Neurological: Negative for speech difficulty, light-headedness and headaches.   Psychiatric/Behavioral: Negative for confusion and decreased concentration. The patient is not nervous/anxious.      Objective:     Vital Signs (Most Recent):  Temp: 98.5 °F (36.9 °C) (04/25/19 0700)  Pulse: 77 (04/25/19 0700)  Resp: (!) 23 (04/25/19 0700)  BP: (!) 125/59 (04/25/19 0400)  SpO2: (!) 94 % (04/25/19 0700) Vital Signs (24h Range):  Temp:  [98.1 °F (36.7 °C)-98.6 °F (37 °C)] 98.5 °F (36.9 °C)  Pulse:  [77-89] 77  Resp:  [18-35] 23  SpO2:  [91 %-98 %] 94 %  BP: ()/(49-67) 125/59   Weight: 122.7 kg (270 lb 8.1 oz)  Body mass index is 45.01 kg/m².      Intake/Output Summary (Last 24 hours) at 4/25/2019 0808  Last data filed at 4/25/2019 0700  Gross per 24 hour   Intake 751.77 ml   Output 700 ml   Net 51.77 ml       Physical Exam   Constitutional: She is oriented to person, place, and time. She appears well-developed and well-nourished. No distress.   HENT:   Head: Normocephalic and atraumatic.   Right Ear: External ear normal.   Left Ear: External ear normal.   Nose: Nose normal.   Mouth/Throat: Oropharynx is clear and moist.    Eyes: Pupils are equal, round, and reactive to light. Conjunctivae and EOM are normal.   Neck: Normal range of motion. Neck supple.   Cardiovascular: Normal rate, regular rhythm and intact distal pulses.   Murmur heard.  Pulmonary/Chest: Effort normal and breath sounds normal. No respiratory distress. She has no wheezes. She exhibits tenderness.   Abdominal: Soft. Bowel sounds are normal. She exhibits no distension. There is tenderness in the epigastric area.   Musculoskeletal: Normal range of motion. She exhibits no tenderness or deformity.   Neurological: She is alert and oriented to person, place, and time.   Skin: Skin is warm and dry. Capillary refill takes less than 2 seconds. She is not diaphoretic.   Psychiatric: She has a normal mood and affect. Her behavior is normal. Judgment and thought content normal.   Nursing note and vitals reviewed.      Vents:     Lines/Drains/Airways     Peripheral Intravenous Line                 Peripheral IV - Single Lumen 04/23/19 1300 20 G Right Wrist 1 day         Peripheral IV - Single Lumen 04/23/19 1500 18 G Left Antecubital 1 day              Significant Labs:    CBC/Anemia Profile:  Recent Labs   Lab 04/24/19  1615 04/24/19  2159 04/25/19  0321   WBC 2.55* 2.17* 2.43*   HGB 8.8* 8.5* 8.3*   HCT 26.8* 26.0* 26.2*   PLT 74* 75* 80*   MCV 91 93 93   RDW 17.3* 17.1* 17.2*        Chemistries:  Recent Labs   Lab 04/23/19  2355 04/24/19  0345 04/25/19  0321   * 134* 133*   K 3.7 3.8 4.0    106 106   CO2 22* 22* 22*   BUN 7 6 7   CREATININE 0.8 0.7 0.6   CALCIUM 7.7* 7.5* 7.4*   ALBUMIN 2.3* 2.3* 2.2*   PROT 5.3* 5.3* 5.1*   BILITOT 9.4* 9.1* 8.0*   ALKPHOS 89 91 87   ALT 23 26 24   AST 89* 91* 88*   MG 1.6 1.9 1.7   PHOS 3.0 2.9 2.8       None    Significant Imaging:  I have reviewed all pertinent imaging results/findings within the past 24 hours.

## 2019-04-25 NOTE — ASSESSMENT & PLAN NOTE
--EGD at [facility] showed portal hypertensive gastropathy, blood in the gastric body, grade 2 gastroesophageal varices without active bleeding but with stigmata of recent bleeding and and non-bleeding small esophageal varices without stigmata of recent bleeding  --transferred for TIPS vs Balloon-Occluded Retrograde Transvenous Obliteration for treatment of gastric varices  --continue ppi, rifaximin & rocephin  --cbc Q6, transfuse for hgb < 7  --Hepatology consulted; appreciate recs

## 2019-04-26 ENCOUNTER — DOCUMENTATION ONLY (OUTPATIENT)
Dept: TRANSPLANT | Facility: CLINIC | Age: 49
End: 2019-04-26

## 2019-04-26 PROBLEM — E44.0 MODERATE MALNUTRITION: Status: ACTIVE | Noted: 2019-04-26

## 2019-04-26 LAB
ABO + RH BLD: NORMAL
ALBUMIN SERPL BCP-MCNC: 2.1 G/DL (ref 3.5–5.2)
ALP SERPL-CCNC: 92 U/L (ref 55–135)
ALT SERPL W/O P-5'-P-CCNC: 23 U/L (ref 10–44)
ANION GAP SERPL CALC-SCNC: 6 MMOL/L (ref 8–16)
APPEARANCE FLD: NORMAL
AST SERPL-CCNC: 79 U/L (ref 10–40)
BASOPHILS # BLD AUTO: 0.02 K/UL (ref 0–0.2)
BASOPHILS # BLD AUTO: 0.02 K/UL (ref 0–0.2)
BASOPHILS # BLD AUTO: 0.03 K/UL (ref 0–0.2)
BASOPHILS NFR BLD: 0.6 % (ref 0–1.9)
BASOPHILS NFR BLD: 0.7 % (ref 0–1.9)
BASOPHILS NFR BLD: 0.8 % (ref 0–1.9)
BILIRUB SERPL-MCNC: 7.7 MG/DL (ref 0.1–1)
BLD GP AB SCN CELLS X3 SERPL QL: NORMAL
BODY FLD TYPE: NORMAL
BUN SERPL-MCNC: 8 MG/DL (ref 6–20)
CALCIUM SERPL-MCNC: 7.4 MG/DL (ref 8.7–10.5)
CHLORIDE SERPL-SCNC: 106 MMOL/L (ref 95–110)
CO2 SERPL-SCNC: 21 MMOL/L (ref 23–29)
COLOR FLD: NORMAL
CREAT SERPL-MCNC: 0.7 MG/DL (ref 0.5–1.4)
DIFFERENTIAL METHOD: ABNORMAL
EOSINOPHIL # BLD AUTO: 0.3 K/UL (ref 0–0.5)
EOSINOPHIL NFR BLD: 10.5 % (ref 0–8)
EOSINOPHIL NFR BLD: 11.1 % (ref 0–8)
EOSINOPHIL NFR BLD: 8.2 % (ref 0–8)
EOSINOPHIL NFR FLD MANUAL: 1 %
ERYTHROCYTE [DISTWIDTH] IN BLOOD BY AUTOMATED COUNT: 17.1 % (ref 11.5–14.5)
ERYTHROCYTE [DISTWIDTH] IN BLOOD BY AUTOMATED COUNT: 17.2 % (ref 11.5–14.5)
ERYTHROCYTE [DISTWIDTH] IN BLOOD BY AUTOMATED COUNT: 17.2 % (ref 11.5–14.5)
EST. GFR  (AFRICAN AMERICAN): >60 ML/MIN/1.73 M^2
EST. GFR  (NON AFRICAN AMERICAN): >60 ML/MIN/1.73 M^2
GLUCOSE SERPL-MCNC: 109 MG/DL (ref 70–110)
HAV IGM SERPL QL IA: NEGATIVE
HBV CORE AB SERPL QL IA: NEGATIVE
HBV CORE IGM SERPL QL IA: NEGATIVE
HBV SURFACE AB SER-ACNC: POSITIVE M[IU]/ML
HBV SURFACE AG SERPL QL IA: NEGATIVE
HCT VFR BLD AUTO: 26.7 % (ref 37–48.5)
HCT VFR BLD AUTO: 27.3 % (ref 37–48.5)
HCT VFR BLD AUTO: 28.2 % (ref 37–48.5)
HCV AB SERPL QL IA: NEGATIVE
HEPATITIS A ANTIBODY, IGG: POSITIVE
HGB BLD-MCNC: 8.3 G/DL (ref 12–16)
HGB BLD-MCNC: 8.7 G/DL (ref 12–16)
HGB BLD-MCNC: 9.3 G/DL (ref 12–16)
HIV 1+2 AB+HIV1 P24 AG SERPL QL IA: NEGATIVE
IMM GRANULOCYTES # BLD AUTO: 0.02 K/UL (ref 0–0.04)
IMM GRANULOCYTES # BLD AUTO: 0.03 K/UL (ref 0–0.04)
IMM GRANULOCYTES # BLD AUTO: 0.04 K/UL (ref 0–0.04)
IMM GRANULOCYTES NFR BLD AUTO: 0.6 % (ref 0–0.5)
IMM GRANULOCYTES NFR BLD AUTO: 0.8 % (ref 0–0.5)
IMM GRANULOCYTES NFR BLD AUTO: 1.3 % (ref 0–0.5)
INR PPP: 2.2 (ref 0.8–1.2)
LYMPHOCYTES # BLD AUTO: 0.6 K/UL (ref 1–4.8)
LYMPHOCYTES # BLD AUTO: 0.6 K/UL (ref 1–4.8)
LYMPHOCYTES # BLD AUTO: 0.7 K/UL (ref 1–4.8)
LYMPHOCYTES NFR BLD: 18.3 % (ref 18–48)
LYMPHOCYTES NFR BLD: 19.7 % (ref 18–48)
LYMPHOCYTES NFR BLD: 21.5 % (ref 18–48)
LYMPHOCYTES NFR FLD MANUAL: 85 %
MAGNESIUM SERPL-MCNC: 1.8 MG/DL (ref 1.6–2.6)
MCH RBC QN AUTO: 29.5 PG (ref 27–31)
MCH RBC QN AUTO: 29.9 PG (ref 27–31)
MCH RBC QN AUTO: 30.7 PG (ref 27–31)
MCHC RBC AUTO-ENTMCNC: 31.1 G/DL (ref 32–36)
MCHC RBC AUTO-ENTMCNC: 31.9 G/DL (ref 32–36)
MCHC RBC AUTO-ENTMCNC: 33 G/DL (ref 32–36)
MCV RBC AUTO: 93 FL (ref 82–98)
MCV RBC AUTO: 93 FL (ref 82–98)
MCV RBC AUTO: 96 FL (ref 82–98)
MESOTHL CELL NFR FLD MANUAL: 4 %
MONOCYTES # BLD AUTO: 0.3 K/UL (ref 0.3–1)
MONOCYTES # BLD AUTO: 0.4 K/UL (ref 0.3–1)
MONOCYTES # BLD AUTO: 0.4 K/UL (ref 0.3–1)
MONOCYTES NFR BLD: 10.6 % (ref 4–15)
MONOCYTES NFR BLD: 11.4 % (ref 4–15)
MONOCYTES NFR BLD: 9.4 % (ref 4–15)
MONOS+MACROS NFR FLD MANUAL: 3 %
NEUTROPHILS # BLD AUTO: 1.7 K/UL (ref 1.8–7.7)
NEUTROPHILS # BLD AUTO: 1.8 K/UL (ref 1.8–7.7)
NEUTROPHILS # BLD AUTO: 2.3 K/UL (ref 1.8–7.7)
NEUTROPHILS NFR BLD: 56 % (ref 38–73)
NEUTROPHILS NFR BLD: 57.2 % (ref 38–73)
NEUTROPHILS NFR BLD: 61.3 % (ref 38–73)
NEUTROPHILS NFR FLD MANUAL: 7 %
NRBC BLD-RTO: 0 /100 WBC
NRBC BLD-RTO: 0 /100 WBC
NRBC BLD-RTO: 1 /100 WBC
PHOSPHATE SERPL-MCNC: 2.6 MG/DL (ref 2.7–4.5)
PLATELET # BLD AUTO: 64 K/UL (ref 150–350)
PLATELET # BLD AUTO: 70 K/UL (ref 150–350)
PLATELET # BLD AUTO: 78 K/UL (ref 150–350)
PMV BLD AUTO: 10.8 FL (ref 9.2–12.9)
PMV BLD AUTO: 10.8 FL (ref 9.2–12.9)
PMV BLD AUTO: 11.9 FL (ref 9.2–12.9)
POTASSIUM SERPL-SCNC: 3.8 MMOL/L (ref 3.5–5.1)
PROT SERPL-MCNC: 5.1 G/DL (ref 6–8.4)
PROTHROMBIN TIME: 21 SEC (ref 9–12.5)
RBC # BLD AUTO: 2.78 M/UL (ref 4–5.4)
RBC # BLD AUTO: 2.95 M/UL (ref 4–5.4)
RBC # BLD AUTO: 3.03 M/UL (ref 4–5.4)
RPR SER QL: NORMAL
SODIUM SERPL-SCNC: 133 MMOL/L (ref 136–145)
WBC # BLD AUTO: 2.98 K/UL (ref 3.9–12.7)
WBC # BLD AUTO: 3.15 K/UL (ref 3.9–12.7)
WBC # BLD AUTO: 3.78 K/UL (ref 3.9–12.7)
WBC # FLD: 143 /CU MM

## 2019-04-26 PROCEDURE — 99233 SBSQ HOSP IP/OBS HIGH 50: CPT | Mod: ,,, | Performed by: HOSPITALIST

## 2019-04-26 PROCEDURE — 36415 COLL VENOUS BLD VENIPUNCTURE: CPT

## 2019-04-26 PROCEDURE — 86703 HIV-1/HIV-2 1 RESULT ANTBDY: CPT

## 2019-04-26 PROCEDURE — 25000003 PHARM REV CODE 250: Performed by: NURSE PRACTITIONER

## 2019-04-26 PROCEDURE — 63600175 PHARM REV CODE 636 W HCPCS: Performed by: HOSPITALIST

## 2019-04-26 PROCEDURE — 86644 CMV ANTIBODY: CPT

## 2019-04-26 PROCEDURE — 11000001 HC ACUTE MED/SURG PRIVATE ROOM

## 2019-04-26 PROCEDURE — 80053 COMPREHEN METABOLIC PANEL: CPT

## 2019-04-26 PROCEDURE — 85025 COMPLETE CBC W/AUTO DIFF WBC: CPT | Mod: 91

## 2019-04-26 PROCEDURE — 86682 HELMINTH ANTIBODY: CPT

## 2019-04-26 PROCEDURE — 87070 CULTURE OTHR SPECIMN AEROBIC: CPT

## 2019-04-26 PROCEDURE — 86704 HEP B CORE ANTIBODY TOTAL: CPT

## 2019-04-26 PROCEDURE — 83735 ASSAY OF MAGNESIUM: CPT

## 2019-04-26 PROCEDURE — 86790 VIRUS ANTIBODY NOS: CPT

## 2019-04-26 PROCEDURE — 63600175 PHARM REV CODE 636 W HCPCS: Performed by: STUDENT IN AN ORGANIZED HEALTH CARE EDUCATION/TRAINING PROGRAM

## 2019-04-26 PROCEDURE — 25000003 PHARM REV CODE 250: Performed by: HOSPITALIST

## 2019-04-26 PROCEDURE — 86592 SYPHILIS TEST NON-TREP QUAL: CPT

## 2019-04-26 PROCEDURE — 89051 BODY FLUID CELL COUNT: CPT

## 2019-04-26 PROCEDURE — 86480 TB TEST CELL IMMUN MEASURE: CPT

## 2019-04-26 PROCEDURE — 80074 ACUTE HEPATITIS PANEL: CPT

## 2019-04-26 PROCEDURE — 99233 PR SUBSEQUENT HOSPITAL CARE,LEVL III: ICD-10-PCS | Mod: ,,, | Performed by: HOSPITALIST

## 2019-04-26 PROCEDURE — 86665 EPSTEIN-BARR CAPSID VCA: CPT

## 2019-04-26 PROCEDURE — 85610 PROTHROMBIN TIME: CPT

## 2019-04-26 PROCEDURE — 87075 CULTR BACTERIA EXCEPT BLOOD: CPT

## 2019-04-26 PROCEDURE — 84100 ASSAY OF PHOSPHORUS: CPT

## 2019-04-26 PROCEDURE — 86787 VARICELLA-ZOSTER ANTIBODY: CPT

## 2019-04-26 PROCEDURE — 86706 HEP B SURFACE ANTIBODY: CPT

## 2019-04-26 PROCEDURE — 86850 RBC ANTIBODY SCREEN: CPT

## 2019-04-26 RX ORDER — ACETAMINOPHEN 500 MG
1000 TABLET ORAL DAILY PRN
Status: ON HOLD | COMMUNITY
End: 2019-06-07 | Stop reason: HOSPADM

## 2019-04-26 RX ORDER — ERGOCALCIFEROL 1.25 MG/1
50000 CAPSULE ORAL
Status: ON HOLD | COMMUNITY
End: 2019-06-11 | Stop reason: HOSPADM

## 2019-04-26 RX ORDER — FUROSEMIDE 10 MG/ML
40 INJECTION INTRAMUSCULAR; INTRAVENOUS 3 TIMES DAILY
Status: DISCONTINUED | OUTPATIENT
Start: 2019-04-26 | End: 2019-04-28

## 2019-04-26 RX ORDER — CIPROFLOXACIN 500 MG/1
500 TABLET ORAL DAILY
Status: ON HOLD | COMMUNITY
End: 2019-05-20 | Stop reason: CLARIF

## 2019-04-26 RX ORDER — ONDANSETRON 4 MG/1
4 TABLET, FILM COATED ORAL EVERY 6 HOURS PRN
Status: DISCONTINUED | OUTPATIENT
Start: 2019-04-26 | End: 2019-05-02 | Stop reason: HOSPADM

## 2019-04-26 RX ORDER — SPIRONOLACTONE 100 MG/1
100 TABLET, FILM COATED ORAL DAILY
Status: DISCONTINUED | OUTPATIENT
Start: 2019-04-26 | End: 2019-04-28

## 2019-04-26 RX ORDER — ONDANSETRON 2 MG/ML
4 INJECTION INTRAMUSCULAR; INTRAVENOUS EVERY 8 HOURS PRN
Status: DISCONTINUED | OUTPATIENT
Start: 2019-04-26 | End: 2019-05-02 | Stop reason: HOSPADM

## 2019-04-26 RX ORDER — CHOLECALCIFEROL (VITAMIN D3) 125 MCG
1 TABLET ORAL DAILY
Status: ON HOLD | COMMUNITY
End: 2019-05-02 | Stop reason: HOSPADM

## 2019-04-26 RX ORDER — MINERAL OIL
180 ENEMA (ML) RECTAL DAILY PRN
Status: ON HOLD | COMMUNITY
End: 2019-06-11 | Stop reason: HOSPADM

## 2019-04-26 RX ADMIN — FUROSEMIDE 20 MG: 20 TABLET ORAL at 08:04

## 2019-04-26 RX ADMIN — FUROSEMIDE 40 MG: 10 INJECTION, SOLUTION INTRAMUSCULAR; INTRAVENOUS at 11:04

## 2019-04-26 RX ADMIN — RIFAXIMIN 550 MG: 550 TABLET ORAL at 09:04

## 2019-04-26 RX ADMIN — FUROSEMIDE 40 MG: 10 INJECTION, SOLUTION INTRAMUSCULAR; INTRAVENOUS at 04:04

## 2019-04-26 RX ADMIN — SPIRONOLACTONE 100 MG: 100 TABLET, FILM COATED ORAL at 11:04

## 2019-04-26 RX ADMIN — CEFTRIAXONE SODIUM 1 G: 1 INJECTION, POWDER, FOR SOLUTION INTRAMUSCULAR; INTRAVENOUS at 02:04

## 2019-04-26 RX ADMIN — SPIRONOLACTONE 25 MG: 25 TABLET, FILM COATED ORAL at 08:04

## 2019-04-26 RX ADMIN — RIFAXIMIN 550 MG: 550 TABLET ORAL at 08:04

## 2019-04-26 RX ADMIN — PHYTONADIONE 10 MG: 10 INJECTION, EMULSION INTRAMUSCULAR; INTRAVENOUS; SUBCUTANEOUS at 11:04

## 2019-04-26 RX ADMIN — FUROSEMIDE 40 MG: 10 INJECTION, SOLUTION INTRAMUSCULAR; INTRAVENOUS at 09:04

## 2019-04-26 RX ADMIN — PANTOPRAZOLE SODIUM 40 MG: 40 TABLET, DELAYED RELEASE ORAL at 08:04

## 2019-04-26 RX ADMIN — LEVOTHYROXINE SODIUM 112 MCG: 112 TABLET ORAL at 06:04

## 2019-04-26 NOTE — PLAN OF CARE
04/26/19 1236   Discharge Reassessment   Assessment Type Discharge Planning Reassessment     Critical care stepdown to IML medicine team for ongoing medical management.  CM to continue to follow for ongoing discharge needs.

## 2019-04-26 NOTE — H&P
Inpatient Radiology Pre-procedure Note    History of Present Illness:  Jihan Zamudio is a 49 y.o. female who presents for ultrasound guided paracentesis.  Admission H&P reviewed.  Past Medical History:   Diagnosis Date    Cirrhosis     Esophageal varices 2018    Small with no banding     Essential hypertension 2018    Fatty liver 2018    GERD (gastroesophageal reflux disease)     Hx of colonic polyps 2018    On colonoscopy     Hypertension     Hypothyroidism 2018    Kidney stones     Morbid obesity 2018    Lap band with subsequent release    KADEEM (obstructive sleep apnea) 2018    Osteoarthritis 2018    Pulmonary nodule 2018    Vitamin D deficiency 2018     Past Surgical History:   Procedure Laterality Date     SECTION      TIMES 2     CHOLECYSTECTOMY      LAPAROSCOPIC     CYSTOSCOPY W/ STONE MANIPULATION      kidney stone removal    EGD (ESOPHAGOGASTRODUODENOSCOPY) N/A 2019    Performed by [facility]ian [facility]dez [facility] at [facility] ([facility]ND F[facility])    LAPAROSCOPIC GASTRIC BANDING  2006    removal     LIVER BIOPSY  2017    KESSLER with bridging 17       Review of Systems:   As documented in primary team H&P    Home Meds:   Prior to Admission medications    Medication Sig Start Date End Date Taking? Authorizing Provider   acetaminophen (TYLENOL) 500 MG tablet Take 1,000 mg by mouth daily as needed for Pain (headache and bodyaches).   Yes Historical Provider, MD   ciprofloxacin HCl (CIPRO) 500 MG tablet Take 500 mg by mouth once daily.   Yes Historical Provider, MD   cranberry extract 50 mg Chew Take 1 tablet by mouth daily as needed.   Yes Historical Provider, MD   ergocalciferol (ERGOCALCIFEROL) 50,000 unit Cap Take 50,000 Units by mouth every 7 days. Monday   Yes Historical Provider, MD   ergocalciferol, vitamin D2, 2,000 unit Tab Take 1 tablet by mouth once daily.   Yes Historical Provider, MD   fexofenadine  (ALLEGRA) 180 MG tablet Take 180 mg by mouth daily as needed.   Yes Historical Provider, MD   furosemide (LASIX) 20 MG tablet Take 20 mg by mouth once daily.   Yes Historical Provider, MD   levothyroxine (SYNTHROID) 112 MCG tablet Take 112 mcg by mouth once daily.   Yes Historical Provider, MD   lidocaine (LIDODERM) 5 % Place 1 patch onto the skin every 24 hours. Remove & Discard patch within 12 hours  As needed or as directed by MD   Yes Historical Provider, MD   pantoprazole (PROTONIX) 40 MG tablet Take 1 tablet (40 mg total) by mouth once daily. 4/24/19 7/23/19 Yes Jaret Armando MD   rifAXImin (XIFAXAN) 200 mg Tab Take 550 mg by mouth 2 (two) times daily.   Yes Historical Provider, MD   spironolactone (ALDACTONE) 25 MG tablet Take 25 mg by mouth once daily.   Yes Historical Provider, MD   omeprazole (PRILOSEC) 40 MG capsule Take 40 mg by mouth every morning.  3/2/18   Historical Provider, MD   ciprofloxacin HCl (CIPRO) 750 MG tablet Take 750 mg by mouth once a week.  4/26/19  Historical Provider, MD   dextromethorphan-guaifenesin  mg/5 ml (ROBITUSSIN-DM)  mg/5 mL liquid Take 5 mLs by mouth every 6 (six) hours as needed.  4/26/19  Historical Provider, MD     Scheduled Meds:    cefTRIAXone (ROCEPHIN) IVPB  1 g Intravenous Q24H    furosemide  40 mg Intravenous TID    levothyroxine  112 mcg Oral Before breakfast    pantoprazole  40 mg Oral Daily    phytonadione ((AQUA-MEPHYTON) IVPB  10 mg Intravenous Daily    rifAXImin  550 mg Oral BID    spironolactone  100 mg Oral Daily     Continuous Infusions:   PRN Meds:calcium carbonate, ondansetron, ondansetron, sodium chloride 0.9%  Anticoagulants/Antiplatelets: no anticoagulation    Allergies:   Review of patient's allergies indicates:   Allergen Reactions    Metformin Rash    Pcn [penicillins] Other (See Comments)     Unsure of reaction, states it was as a child. Tolerated rocephin at [facility]     Sedation Hx: have not been any systemic  reactions    Labs:  Recent Labs   Lab 04/26/19  1000   INR 2.2*       Recent Labs   Lab 04/26/19  0856   WBC 2.98*   HGB 8.7*   HCT 27.3*   MCV 93   PLT 70*      Recent Labs   Lab 04/26/19  0243      *   K 3.8      CO2 21*   BUN 8   CREATININE 0.7   CALCIUM 7.4*   MG 1.8   ALT 23   AST 79*   ALBUMIN 2.1*   BILITOT 7.7*         Vitals:  Temp: 98.5 °F (36.9 °C) (04/26/19 1141)  Pulse: 83 (04/26/19 1141)  Resp: 14 (04/26/19 1141)  BP: (!) 131/58 (04/26/19 1141)  SpO2: (!) 94 % (04/26/19 1141)     Physical Exam:  ASA: 3  Mallampati: n/a    General: no acute distress  Mental Status: alert and oriented to person, place and time  HEENT: normocephalic, atraumatic  Chest: unlabored breathing  Heart: regular heart rate  Abdomen: distended  Extremity: moves all extremities    Plan: ultrasound guided paracentesis  Sedation Plan: local    WIN Palacio, ASTON  Interventional Radiology  [phone] spectralink

## 2019-04-26 NOTE — PROGRESS NOTES
PHARM.D. PRE-TRANSPLANT NOTE:    This patient's medication therapy was evaluated as part of her pre-transplant evaluation.      The following general pharmacologic concerns were noted: none    The following pharmacologic concerns related to HCV therapy were noted: none      This patient's medication profile was reviewed for contraindications for DAA Hepatitis C therapy:    [X]  No current inducers of CYP 3A4 or PGP  [X]  No amiodarone on this patient's EMR profile in the last 24 months  [X]  No past or current atrial fibrillation on this patient's EMR profile       Current Facility-Administered Medications   Medication Dose Route Frequency Provider Last Rate Last Dose    calcium carbonate 200 mg calcium (500 mg) chewable tablet 1,000 mg  1,000 mg Oral Daily PRN Sarah Beyer NP   1,000 mg at 04/25/19 0358    cefTRIAXone injection 1 g  1 g Intravenous Q24H Blane Gottlieb MD   1 g at 04/26/19 0212    furosemide tablet 20 mg  20 mg Oral Daily Sarah Beyer NP   20 mg at 04/26/19 0807    levothyroxine tablet 112 mcg  112 mcg Oral Before breakfast Sarah Beyer NP   112 mcg at 04/26/19 0616    ondansetron injection 4 mg  4 mg Intravenous Q8H PRN Blane Gottlieb MD        ondansetron tablet 4 mg  4 mg Oral Q6H PRN Blane Gottlieb MD        pantoprazole EC tablet 40 mg  40 mg Oral Daily Sarah Beyer NP   40 mg at 04/26/19 0807    phytonadione vitamin k (AQUA-MEPHYTON) 10 mg in dextrose 5 % 50 mL IVPB  10 mg Intravenous Daily Blane Gottlieb MD        rifAXIMin tablet 550 mg  550 mg Oral BID Sarah Beyer NP   550 mg at 04/26/19 0807    sodium chloride 0.9% flush 10 mL  10 mL Intravenous PRN Sarah Beyer NP        spironolactone tablet 25 mg  25 mg Oral Daily Sarah Beyer NP   25 mg at 04/26/19 0807       I am available for consultation and can be contacted, as needed by the other members of the Liver Transplant team.

## 2019-04-26 NOTE — PLAN OF CARE
Problem: Adult Inpatient Plan of Care  Goal: Plan of Care Review    Recommendations    Recommendation/Intervention:     Pt with acute moderate malnutrition.     1. Continue Low Na diet as tolerated.   2. If po intake <50%, recommend Boost Plus.   3. RD following.     Goals: consume >50% of all meals  Nutrition Goal Status: new

## 2019-04-26 NOTE — NURSING
PRE EDUCATION TEACHING NOTE    Jihan Zamudio was seen in the hospital today.  Handbook on pre-liver transplant information (see outline below) was given to the patient and time was allowed for questions.  Patient's  and daughter were at her bedside.   Informed consent signed and written information given on selection criteria.      LIVER TRANSPLANT WORK-UP EDUCATION  I. NATIONAL REGISTRY LISTING  A. Information for listing  B. Regions  C. Per UNOS, can be listed at more than one center  II. SURGERY  A. Length  B. Complications: bleeding, infection  C. Central lines, drains, Hernandes catheter, incision, endotracheal tube, NG tube  D. Transfusions, cell saver  III. SHORT TERM RECOVERY  A. ICU, PICU, Hospital stay  IV. LONG TERM RECOVERY  A. Labs at home  B. Clinic visits  C. Complications: infection, rejection, readmissions  D. Normal immunity and immunosuppression  E. Incidence of re-admit in 1st year  V. REJECTION  A. Incidence  B. Treatment: Solumedrol, Prograf, Thymoglobulin (actions and side effects)  VI. IMMUNOSUPPRESSIVES  A. Prednisone  B. Imuran/Cellcept  C. Cyclosporin/Prograf  D. Rapamune  E. Need for lifetime compliance  F. Actions and side effects  G. Costs  VII. RECURRENCE OF VIRAL HEPATITIS

## 2019-04-26 NOTE — CONSULTS
"  [facility]  Adult Nutrition  Consult Note    SUMMARY     Recommendations    Recommendation/Intervention:     Pt with acute moderate malnutrition.     1. Continue Low Na diet as tolerated.   2. If po intake <50%, recommend Boost Plus.   3. RD following.     Goals: consume >50% of all meals  Nutrition Goal Status: new  Communication of RD Recs: (POC)    Reason for Assessment    Reason For Assessment: consult  Diagnosis: ( GI bleed)  Relevant Medical History: cirrhosis, HTN, GERD, HLD  General Information Comments: Paracentesis in ED - 5L removed. Pt reports decreased appetite and eating ~50% of meals. Noted pt ate 100% of breakfast and lunch yesterday. Denies N/V/D/C. Pt states that appetite was very poor for ~1.5 months - only ate bites of meals, altered taste. UBW 325lbs 6 months ago. Wt gain likely 2/2 fluid. NFPE completed. Pt with acute moderate malnutrition.   Nutrition Discharge Planning: adequate po intake    Nutrition Risk Screen    Nutrition Risk Screen: no indicators present    Nutrition/Diet History    Spiritual, Cultural Beliefs, Catholic Practices, Values that Affect Care: no  Factors Affecting Nutritional Intake: decreased appetite, altered taste    Anthropometrics    Temp: 98.5 °F (36.9 °C)  Height Method: Stated  Height: 5' 5" (165.1 cm)  Height (inches): 65 in  Weight Method: Bed Scale  Weight: 122.5 kg (270 lb)  Weight (lb): 270 lb  Ideal Body Weight (IBW), Female: 125 lb  % Ideal Body Weight, Female (lb): 216 lb  BMI (Calculated): 45  BMI Grade: greater than 40 - morbid obesity  Usual Body Weight (UBW), k.8 kg(per pt 6 months ago)  % Usual Body Weight: 114.91  % Weight Change From Usual Weight: 14.67 %     Lab/Procedures/Meds    Pertinent Labs Reviewed: reviewed  Pertinent Labs Comments: Na 133, phos 2.6, alk phos 5.1, T bili 7.7, AST 79  Pertinent Medications Reviewed: reviewed  Pertinent Medications Comments: lasix, levothyroxine, pantoprazole, " tramadol    Estimated/Assessed Needs    Weight Used For Calorie Calculations: 122.5 kg (270 lb 1 oz)  Energy Calorie Requirements (kcal): 2208 kcal/day  Energy Need Method: Hendry-St Jeor(x 1.2)  Protein Requirements: 123 gm/day(1.0 gm/kg)  Weight Used For Protein Calculations: 122.5 kg (270 lb 1 oz)  Fluid Requirements (mL): 1 mL/kcal or per MD     RDA Method (mL): 2208     Nutrition Prescription Ordered    Current Diet Order: Low Na    Evaluation of Received Nutrient/Fluid Intake    Comments: LBM 4/25  Tolerance: tolerating  % Intake of Estimated Energy Needs: 50 - 75 %  % Meal Intake: 50 - 75 %    Nutrition Risk    Level of Risk/Frequency of Follow-up: low(f/u 1 x wk )     Assessment and Plan    Moderate malnutrition  Malnutrition in the context of Acute Illness/Injury    Related to (etiology):  Inadequate Energy Intake    Signs and Symptoms (as evidenced by):  Energy Intake: <50% of estimated energy requirement for 1.5 months per pt  Body Fat Depletion: moderate depletion of triceps   Muscle Mass Depletion: mild depletion of clavicle region ; PATRICIA lower extremities at this time  Weight Loss: PATRICIA 2/2 fluid    Interventions:  Collaboration and Referral of Nutrition Care    Nutrition Diagnosis Status:  New           Monitor and Evaluation    Food and Nutrient Intake: energy intake, food and beverage intake  Food and Nutrient Adminstration: diet order  Physical Activity and Function: nutrition-related ADLs and IADLs  Anthropometric Measurements: weight, weight change, body mass index  Biochemical Data, Medical Tests and Procedures: electrolyte and renal panel, gastrointestinal profile, glucose/endocrine profile, inflammatory profile, lipid profile  Nutrition-Focused Physical Findings: overall appearance     Malnutrition Assessment  Malnutrition Type: acute illness or injury          Weight Loss (Malnutrition): (PATRICIA 2/2 fluid)  Energy Intake (Malnutrition): less than or equal to 50% for greater than or equal to 1  month   Upper Arm Region (Subcutaneous Fat Loss): moderate depletion   Clavicle Bone Region (Muscle Loss): mild depletion  Anterior Thigh Region (Muscle Loss): (PATRICIA)  Posterior Calf Region (Muscle Loss): (PATRICIA)                 Nutrition Follow-Up    RD Follow-up?: Yes

## 2019-04-26 NOTE — PHYSICIAN QUERY
"PT Name: Jihan Zamudio  MR #: [id number]     PHYSICIAN QUERY -  ACUITY OF CONDITION CLARIFICATION      Denise Hernandez RN, CCDS  Desk # [phone]; smooth # [phone] [email or web address]    This form is a permanent document in the medical record.     Query Date: April 26, 2019    By submitting this query, we are merely seeking further clarification of documentation to reflect the severity of illness of your patient. Please utilize your independent clinical judgment when addressing the question(s) below.    The Medical record reflects the following:     Indicators  Supporting Clinical Findings Location in Medical Record   x Documentation of condition  She is oriented to person, place, and time.   She appears lethargic.    Hx of HE     Her cirrhosis is c/b volume overload, HE, and EV.    Hepatology CN 4/25      GI CN 4/24    Hepatology CN 4/24    Lab Value(s)      Radiology Findings     x Treatment                                 Medication HE: Continue home rifaximin.  Being evaluated for transplant    Rifaximin Tablet 550 mg PO 2 times daily start 4/24  Spironolactone 100 mg tab daily start 4/26   Hepatology CN 4/24      MAR     x Other Per hepatology, TIPS is not favored given her high MELD (25) and hx of HE. GI CN 4/24     Provider, please specify the acuity/chronicity of  "HE" (Hepatic Encephalopathy):     [   ] Acute   [   ] Chronic   [x   ] Acute and/on chronic   [   ] Past history only, not a current diagnosis   [   ] Ruled Out   [   ]  Clinically Undetermined     Please document in your progress notes daily for the duration of treatment until resolved, and include in your discharge summary.  "

## 2019-04-26 NOTE — PLAN OF CARE
Problem: Adult Inpatient Plan of Care  Goal: Plan of Care Review  Outcome: Ongoing (interventions implemented as appropriate)  POC reviewed with pt and family.  Pt verbalized understanding.  Questions and concerns addressed, and no acute events today.  Patient had paracentesis performed in IR, also requiring multiple dressing changes to previous paracentesis sites.  Pt progressing toward goals, will continue to monitor.  See flowsheets for full assessment and VS info.

## 2019-04-26 NOTE — PHARMACY MED REC
"Admission Medication Reconciliation - Pharmacy Consult Note    The home medication history was taken by Debbie Perez, Pharmacy Technician.    You may go to "Admission" then "Reconcile Home Medications" tabs to review and/or act upon these items.      No issues noted with the medication reconciliation.    Lorna Partida, PharmD, BCPS  [id number]                .    .            "

## 2019-04-26 NOTE — PROCEDURES
Radiology Post-Procedure Note    Pre Op Diagnosis: Ascites  Post Op Diagnosis: Same    Procedure: Ultrasound Guided Paracentesis    Procedure performed by: Emily MARY, Alessandra     Written Informed Consent Obtained: Yes  Specimen Removed: YES chyna  Estimated Blood Loss: Minimal    Findings:   Successful paracentesis.  Albumin administered PRN per protocol.    Patient tolerated procedure well.    Alessandra Diaz, APRN, FNP  Interventional Radiology  [phone] [employer]

## 2019-04-26 NOTE — NURSING TRANSFER
Nursing Transfer Note      4/24/2019     Transfer to: [id number]    Transfer via: Stretcher    Transfer with: Transport Monitor    Transported by: RN and PCT    Medicines sent: N/A    Chart send with patient: Yes    Notified: Report called to TERE Waters      
  Nursing Transfer Note      4/26/2019     Transfer to MSU room 654 from SICU    Transfer via wheelchair    Transfer with personal belongings     Transported by RN    Medicines sent: none     Chart send with patient: yes    Notified: receiving RN Moise    Patient reassessed at: 4/26/19 0315    Upon arrival to floor: patient ambulated from wheelchair to bed independently, oriented to room, call light in reach  
Strong peripheral pulses

## 2019-04-26 NOTE — PROGRESS NOTES
Transplant Recipient Adult Psychosocial Assessment  Updated completed inpatient with the patient, Primary Caregiver and her daughter Catie Zamudio  [address]  [place] [address]  Telephone Information:   Mobile [phone]   Home  [phone] (home)  Work  There is no work phone number on file.  E-mail  No e-mail address on record    Sex: female  YOB: 1970  Age: 49 y.o.    Encounter Date: 2019  U[place] Citizen: yes  Primary Language: English   Needed: no    Emergency Contact:  Name: Freeman Zamudio  Relationship:   Address: Same as patient   Phone Numbers:  [phone] (moblie), retired (work), n/a (home)    Family/Social Support:   Number of dependents/: One. The pt reported she has a 15 y.o. Daughter Catie who is currently present for the assessment.   Marital history: The pt and  have been  for 26 yrs.    Other family dynamics: The pt reported both parents are .  The pt reported she has three brothers town brother will assist Mitchell 42 y.o.one to assist with transplant Jay 49 y.o. Pt reported she has one son Bhavesh 26 y.o. And one dtr Catie 15 y.o. The pt reported they have one dog who is her husbands service dog and will be accompanying them here post liver transplant.       Household Composition:  Name: Jihan Zamudio   Age: 49 y.o.  Relationship: patient  Does person drive? yes not at this time as pt reported she does not feel well     Name: Freeman Zamudio   Age: 47 y.o.  Relationship:   Does person drive? yes    Name: Catie  Age: 13 y.o.  Relationship: daughter  Does person drive? no    Do you and your caregivers have access to reliable transportation? yes  PRIMARY CAREGIVER: Freeman Zamudio will be primary caregiver, phone number [phone].      provided in-depth information to patient and caregiver regarding pre- and post-transplant caregiver role.   strongly encourages patient  and caregiver to have concrete plan regarding post-transplant care giving, including back-up caregiver(s) to ensure care giving needs are met as needed.    Patient and Caregiver states understanding all aspects of caregiver role/commitment and is able/willing/committed to being caregiver to the fullest extent necessary.    Patient and Caregiver verbalizes understanding of the education provided today and caregiver responsibilities.         remains available. Patient and Caregiver agree to contact  in a timely manner if concerns arise.      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     Additional Significant Others who will Assist with Transplant:  Name: Jay Ulloa ph# [phone] (confirmed)   Age: 47 y.o.  City: [place]: [place]  Relationship: brother  Does person drive? yes  Works as a  but reports he can take time off if needed to assist the patient     Name: Leobardo Ulloa # [phone] (confrimed) Laura is a nurse and will likely assist when the pt returns to [place]  Age: 40 y.o.  City: [place]: [place]  Relationship: brother and DANTE   Does person drive? yes      Living Will: no  Healthcare Power of : no  Advance Directives on file: <<no information> per medical record.  Verbally reviewed LW/HCPA information.   provided patient with copy of LW/HCPA documents and provided education on completion of forms.    Living Donors: No.    Highest Education Level: High School (9-12) or GED  Reading Ability: 12th grade  Reports difficulty with: seeing, comprehension and memory  Learns Best By:  Hands on demonstration at this time and reading material      Status: no  VA Benefits: no     Working for Income: No  If no, reason not working: Patient Choice - Other has not worked in over 3 years. 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    Patient is not working and has not applied for disabilty.  Pt plans to apply soon. . Pts  is the sole provider    Spouse/Significant Other Employment:  of the pt is a retired [employer] Medical Professional due to his brain operation     Disabled: no    Monthly Income:  senior care: $4000.00  Able to afford all costs now and if transplanted, including medications: yes  Patient and Caregiver verbalizes understanding of personal responsibilities related to transplant costs and the importance of having a financial plan to ensure that patients transplant costs are fully covered.      provided fundraising information/education.  Patient and Caregiver verbalizes understanding.   remains available.    Insurance:   Payor/Plan Subscr  Sex Relation Sub. Ins. ID Effective Group Num   1.  - TRI* JANAE MCDANIEL 1971 Male Self [id number]                                     [id number], [facility] [id number]     Primary Insurance (for UNOS reporting): Private Insurance Pt and  asked to speak with  regarding their out of pocket expense for liver transplant.  YULIYA orwarded the request. Austin Bhatt reported she will meet with them today.     Secondary Insurance (for UNOS reporting): None  Patient and Caregiver verbalizes clear understanding that patient may experience difficulty obtaining and/or be denied insurance coverage post-surgery. This includes and is not limited to disability insurance, life insurance, health insurance, burial insurance, long term care insurance, and other insurances.    Patient and Caregiver also reports understanding that future health concerns related to or unrelated to transplantation may not be covered by patient's insurance.  Resources and information provided and reviewed.      Patient and Caregiver provides verbal permission to release any necessary information to  "outside resources for patient care and discharge planning.  Resources and information provided are reviewed.      Dialysis Adherence:  Patient and Caregiver reports she has had no history of HD.      Infusion Service: patient utilizing? no  Home Health: patient utilizing? no  DME: yes walker and cane  Pulmonary/Cardiac Rehab: NONE   ADLS:  Pt reported she is completely independent with ADLs.     Adherence:   Pt reported high adherence stating "none of my doctors ever called me non compliant."   Adherence education and counseling provided.     Per History Section:  Past Medical History:   Diagnosis Date    Cirrhosis     Esophageal varices 2/26/2018    Small with no banding 07/17    Essential hypertension 2/26/2018    Fatty liver 2/26/2018    GERD (gastroesophageal reflux disease)     Hx of colonic polyps 2/26/2018    On colonoscopy 07/17    Hypertension     Hypothyroidism 2/26/2018    Kidney stones     Morbid obesity 2/26/2018    Lap band with subsequent release    KADEEM (obstructive sleep apnea) 2/26/2018    Osteoarthritis 2/26/2018    Pulmonary nodule 2/26/2018    Vitamin D deficiency 2/26/2018     Social History     Tobacco Use    Smoking status: Never Smoker    Smokeless tobacco: Never Used    Tobacco comment: patient denies   Substance Use Topics    Alcohol use: No     Comment: patient denies     Social History     Substance and Sexual Activity   Drug Use No    Comment: patient denies     Social History     Substance and Sexual Activity   Sexual Activity Not on file       Per Today's Psychosocial:  Tobacco: none, patient denies any use.  Alcohol: none, patient denies any use in the past year stating "when I would have a glass of wine it would be a holiday or special occasion.  We stayed busy traveling with the  so I was always busy and drinking wasn't for me."   Illicit Drugs/Non-prescribed Medications: none, patient denies any use.    Patient and Caregiver states clear understanding of " the potential impact of substance use as it relates to transplant candidacy and is aware of possible random substance screening.  Substance abstinence/cessation counseling, education and resources provided and reviewed.     Arrests/DWI/Treatment/Rehab: patient denies    Psychiatric History:    Mental Health: depression with the dead of her parents and her grandmother. Reported she was on medication for a while but was safely tapered off by her doctors after she felt it was not working for her.   Psychiatrist/Counselor: NONE  Medications:  Pt denied any medications currently. Previously took Wellbutrin    Suicide/Homicide Issues: Pt denied any currrent and past S.I./H.I.    Safety at home: Pt reported she feels safe at home and is free from abuse, verbal, emotional and physical     Knowledge: Patient and Caregiver states having clear understanding and realistic expectations regarding the potential risks and potential benefits of organ transplantation and organ donation, agrees to discuss with health care team members and support system members and to utilize available resources and express questions and/or concerns in order to further facilitate the pt informed decision-making.  Resources and information provided and reviewed.     Patient and Caregiver is aware of [facility]'s affiliation and/or partnership with agencies in home health care, LTAC, SNF, [facility] and other hospitals and clinics.    Understanding: Patient and Caregiver reports having a clear understanding of the many lifetime commitments involved with being a transplant recipient, including costs, compliance, medications, lab work, procedures, appointments, concrete and financial planning, preparedness, timely and appropriate communication of concerns, abstinence (ETOH, tobacco, illicit non-prescribed drugs), adherence to all health care team recommendations, support system and caregiver involvement, appropriate and timely resource utilization and  "follow-through, mental health counseling as needed/recommended, and patient and caregiver responsibilities.  Social Service Handbook, resources and detailed educational information provided and reviewed.  Educational information provided.    Patient and Caregiver also reports current and expected compliance with health care regime and states having a clear understanding of the importance of compliance.      Patient and Caregiver reports a clear understanding that risks and benefits may be involved with organ transplantation and with organ donation.      Patient and Caregiver also reports clear understanding that psychosocial risk factors may affect patient, and include but are not limited to feelings of depression, generalized anxiety, anxiety regarding dependence on others, post traumatic stress disorder, feelings of guilt and other emotional and/or mental concerns, and/or exacerbation of existing mental health concerns.  Detailed resources provided and discussed.     Patient and Caregiver agrees to access appropriate resources in a timely manner as needed and/or as recommended, and to communicate concerns appropriately.  Patient and Caregiver also reports a clear understanding of treatment options available.      reviewed education, provided additional information, and answered questions.    Feelings or Concerns: Pt denied any questions or concerns at this time but reported as the time past she is sure she will have some and knows how to contact Transplant if she does.     Coping: The reported adequate coping stating that she uses her "grandbaby and family to deal with her bad days."    Goals: Pt reported to travel and enjoy life and her family.      Interview Behavior: Patient and Caregiver presents as alert and oriented x 4, pleasant, good eye contact, well groomed, communicative, cooperative and asking and answering questions appropriately.          Transplant Social Work - " Candidacy  Assessment/Plan:     Psychosocial Suitability: Patient presents as an acceptable candidate for liver transplant at this time. Based on psychosocial risk factors, patient presents as  Low risk, due to good suport, stable commiteed relationships; The pts  of the pt has a seizure d/o that is well managed with [facility]. Backups appear to be committed to caring for the pt in the event primary cannot fulfill caregiver duties.  Pts  who will serve as primary caregiver has a service animal.  As of now adequate income to meet needs. Good understanding of medical situation; history of good follow through on medical recommendations. Realistic expectations regarding liver transplant.     Recommendations/Additional Comments:  Local Lodging ([employer]) and Fundraising.     Chanelle Ambriz

## 2019-04-26 NOTE — ASSESSMENT & PLAN NOTE
Malnutrition in the context of Acute Illness/Injury    Related to (etiology):  Inadequate Energy Intake    Signs and Symptoms (as evidenced by):  Energy Intake: <50% of estimated energy requirement for 1.5 months per pt  Body Fat Depletion: moderate depletion of triceps   Muscle Mass Depletion: mild depletion of clavicle region ; PATRICIA lower extremities at this time  Weight Loss: PATRICIA 2/2 fluid    Interventions:  Collaboration and Referral of Nutrition Care    Nutrition Diagnosis Status:  New

## 2019-04-27 PROBLEM — D62 ACUTE BLOOD LOSS ANEMIA: Status: ACTIVE | Noted: 2019-04-27

## 2019-04-27 PROBLEM — R18.8 OTHER ASCITES: Status: ACTIVE | Noted: 2019-04-27

## 2019-04-27 PROBLEM — K76.82 HEPATIC ENCEPHALOPATHY: Status: ACTIVE | Noted: 2019-04-27

## 2019-04-27 PROBLEM — D68.9 COAGULOPATHY: Status: ACTIVE | Noted: 2019-04-27

## 2019-04-27 PROBLEM — R60.1 ANASARCA: Status: ACTIVE | Noted: 2019-04-27

## 2019-04-27 PROBLEM — E87.1 HYPONATREMIA: Status: ACTIVE | Noted: 2019-04-27

## 2019-04-27 PROBLEM — I27.20 PULMONARY HYPERTENSION: Status: ACTIVE | Noted: 2019-04-27

## 2019-04-27 LAB
ALBUMIN SERPL BCP-MCNC: 2.2 G/DL (ref 3.5–5.2)
ALP SERPL-CCNC: 94 U/L (ref 55–135)
ALT SERPL W/O P-5'-P-CCNC: 20 U/L (ref 10–44)
ANION GAP SERPL CALC-SCNC: 6 MMOL/L (ref 8–16)
AST SERPL-CCNC: 76 U/L (ref 10–40)
BASOPHILS # BLD AUTO: 0.02 K/UL (ref 0–0.2)
BASOPHILS NFR BLD: 0.7 % (ref 0–1.9)
BILIRUB SERPL-MCNC: 7.8 MG/DL (ref 0.1–1)
BLD PROD TYP BPU: NORMAL
BLOOD UNIT EXPIRATION DATE: NORMAL
BLOOD UNIT TYPE CODE: 5100
BLOOD UNIT TYPE: NORMAL
BUN SERPL-MCNC: 8 MG/DL (ref 6–20)
CALCIUM SERPL-MCNC: 7.3 MG/DL (ref 8.7–10.5)
CHLORIDE SERPL-SCNC: 100 MMOL/L (ref 95–110)
CO2 SERPL-SCNC: 26 MMOL/L (ref 23–29)
CODING SYSTEM: NORMAL
CREAT SERPL-MCNC: 0.7 MG/DL (ref 0.5–1.4)
DIFFERENTIAL METHOD: ABNORMAL
DISPENSE STATUS: NORMAL
EOSINOPHIL # BLD AUTO: 0.3 K/UL (ref 0–0.5)
EOSINOPHIL NFR BLD: 9.7 % (ref 0–8)
ERYTHROCYTE [DISTWIDTH] IN BLOOD BY AUTOMATED COUNT: 17 % (ref 11.5–14.5)
EST. GFR  (AFRICAN AMERICAN): >60 ML/MIN/1.73 M^2
EST. GFR  (NON AFRICAN AMERICAN): >60 ML/MIN/1.73 M^2
FIBRINOGEN PPP-MCNC: 96 MG/DL (ref 182–366)
GLUCOSE SERPL-MCNC: 88 MG/DL (ref 70–110)
HAPTOGLOB SERPL-MCNC: <10 MG/DL (ref 30–250)
HCT VFR BLD AUTO: 25.2 % (ref 37–48.5)
HGB BLD-MCNC: 8.4 G/DL (ref 12–16)
IMM GRANULOCYTES # BLD AUTO: 0.02 K/UL (ref 0–0.04)
IMM GRANULOCYTES NFR BLD AUTO: 0.7 % (ref 0–0.5)
INR PPP: 2 (ref 0.8–1.2)
LDH SERPL L TO P-CCNC: 281 U/L (ref 110–260)
LYMPHOCYTES # BLD AUTO: 0.7 K/UL (ref 1–4.8)
LYMPHOCYTES NFR BLD: 24.3 % (ref 18–48)
MAGNESIUM SERPL-MCNC: 1.5 MG/DL (ref 1.6–2.6)
MCH RBC QN AUTO: 30.2 PG (ref 27–31)
MCHC RBC AUTO-ENTMCNC: 33.3 G/DL (ref 32–36)
MCV RBC AUTO: 91 FL (ref 82–98)
MONOCYTES # BLD AUTO: 0.3 K/UL (ref 0.3–1)
MONOCYTES NFR BLD: 11.6 % (ref 4–15)
NEUTROPHILS # BLD AUTO: 1.4 K/UL (ref 1.8–7.7)
NEUTROPHILS NFR BLD: 53 % (ref 38–73)
NRBC BLD-RTO: 0 /100 WBC
PHOSPHATE SERPL-MCNC: 2.8 MG/DL (ref 2.7–4.5)
PLATELET # BLD AUTO: 54 K/UL (ref 150–350)
PMV BLD AUTO: 12.5 FL (ref 9.2–12.9)
POTASSIUM SERPL-SCNC: 3 MMOL/L (ref 3.5–5.1)
PREALB SERPL-MCNC: 3 MG/DL (ref 20–43)
PROT SERPL-MCNC: 5.1 G/DL (ref 6–8.4)
PROTHROMBIN TIME: 19.7 SEC (ref 9–12.5)
RBC # BLD AUTO: 2.78 M/UL (ref 4–5.4)
SODIUM SERPL-SCNC: 132 MMOL/L (ref 136–145)
TRANS ERYTHROCYTES VOL PATIENT: NORMAL ML
WBC # BLD AUTO: 2.67 K/UL (ref 3.9–12.7)

## 2019-04-27 PROCEDURE — 99233 SBSQ HOSP IP/OBS HIGH 50: CPT | Mod: ,,, | Performed by: HOSPITALIST

## 2019-04-27 PROCEDURE — 25000003 PHARM REV CODE 250: Performed by: NURSE PRACTITIONER

## 2019-04-27 PROCEDURE — 84134 ASSAY OF PREALBUMIN: CPT

## 2019-04-27 PROCEDURE — 84100 ASSAY OF PHOSPHORUS: CPT

## 2019-04-27 PROCEDURE — 63600175 PHARM REV CODE 636 W HCPCS: Performed by: HOSPITALIST

## 2019-04-27 PROCEDURE — 85025 COMPLETE CBC W/AUTO DIFF WBC: CPT

## 2019-04-27 PROCEDURE — 83615 LACTATE (LD) (LDH) ENZYME: CPT

## 2019-04-27 PROCEDURE — 11000001 HC ACUTE MED/SURG PRIVATE ROOM

## 2019-04-27 PROCEDURE — 25000003 PHARM REV CODE 250: Performed by: HOSPITALIST

## 2019-04-27 PROCEDURE — 99233 PR SUBSEQUENT HOSPITAL CARE,LEVL III: ICD-10-PCS | Mod: ,,, | Performed by: HOSPITALIST

## 2019-04-27 PROCEDURE — 85610 PROTHROMBIN TIME: CPT

## 2019-04-27 PROCEDURE — 36415 COLL VENOUS BLD VENIPUNCTURE: CPT

## 2019-04-27 PROCEDURE — 83010 ASSAY OF HAPTOGLOBIN QUANT: CPT

## 2019-04-27 PROCEDURE — 83735 ASSAY OF MAGNESIUM: CPT

## 2019-04-27 PROCEDURE — 80053 COMPREHEN METABOLIC PANEL: CPT

## 2019-04-27 PROCEDURE — 85384 FIBRINOGEN ACTIVITY: CPT

## 2019-04-27 RX ORDER — LACTULOSE 10 G/15ML
30 SOLUTION ORAL 3 TIMES DAILY
Status: DISCONTINUED | OUTPATIENT
Start: 2019-04-28 | End: 2019-04-28

## 2019-04-27 RX ORDER — MAGNESIUM SULFATE HEPTAHYDRATE 40 MG/ML
2 INJECTION, SOLUTION INTRAVENOUS ONCE
Status: COMPLETED | OUTPATIENT
Start: 2019-04-27 | End: 2019-04-27

## 2019-04-27 RX ORDER — POTASSIUM CHLORIDE 20 MEQ/1
40 TABLET, EXTENDED RELEASE ORAL ONCE
Status: COMPLETED | OUTPATIENT
Start: 2019-04-27 | End: 2019-04-27

## 2019-04-27 RX ORDER — ZINC SULFATE 50(220)MG
220 CAPSULE ORAL DAILY
Status: DISCONTINUED | OUTPATIENT
Start: 2019-04-28 | End: 2019-05-02 | Stop reason: HOSPADM

## 2019-04-27 RX ADMIN — CEFTRIAXONE SODIUM 1 G: 1 INJECTION, POWDER, FOR SOLUTION INTRAMUSCULAR; INTRAVENOUS at 01:04

## 2019-04-27 RX ADMIN — MAGNESIUM SULFATE IN WATER 2 G: 40 INJECTION, SOLUTION INTRAVENOUS at 09:04

## 2019-04-27 RX ADMIN — POTASSIUM BICARBONATE 50 MEQ: 977.5 TABLET, EFFERVESCENT ORAL at 09:04

## 2019-04-27 RX ADMIN — PHYTONADIONE 10 MG: 10 INJECTION, EMULSION INTRAMUSCULAR; INTRAVENOUS; SUBCUTANEOUS at 09:04

## 2019-04-27 RX ADMIN — RIFAXIMIN 550 MG: 550 TABLET ORAL at 09:04

## 2019-04-27 RX ADMIN — SPIRONOLACTONE 100 MG: 100 TABLET, FILM COATED ORAL at 09:04

## 2019-04-27 RX ADMIN — FUROSEMIDE 40 MG: 10 INJECTION, SOLUTION INTRAMUSCULAR; INTRAVENOUS at 09:04

## 2019-04-27 RX ADMIN — FUROSEMIDE 40 MG: 10 INJECTION, SOLUTION INTRAMUSCULAR; INTRAVENOUS at 03:04

## 2019-04-27 RX ADMIN — LEVOTHYROXINE SODIUM 112 MCG: 112 TABLET ORAL at 05:04

## 2019-04-27 RX ADMIN — POTASSIUM CHLORIDE 40 MEQ: 1500 TABLET, EXTENDED RELEASE ORAL at 03:04

## 2019-04-27 RX ADMIN — PANTOPRAZOLE SODIUM 40 MG: 40 TABLET, DELAYED RELEASE ORAL at 09:04

## 2019-04-27 NOTE — PROGRESS NOTES
Progress Note   Hospital Medicine         Patient Name: Jihan Zamudio  MRN:  [id number]  [facility] Medicine Team: [facility] Blane Gottlieb MD  Date of Admission:  4/23/2019     Length of Stay:  LOS: 4 days   Expected Discharge Date: 4/29/2019  Principal Problem:  GI bleed       Subjective:     Interval History/Overnight Events:  Patient transferred down from the ICU to [facility] over night; family at the bedside; under going liver transplant eval; patient is markedly volume overloaded, started on IV lasix and IR paracentesis performed with 4.5L removed and negative for SBP; patient states had a bad reaction to dobutamine stress test last year, will plan for cardiac pet  - H/H stable;     Review of Systems   Constitutional: Negative for chills, fatigue, fever.   HENT: Negative for sore throat, trouble swallowing.    Eyes: Negative for photophobia, visual disturbance.   Respiratory: Negative for cough, shortness of breath.    Cardiovascular: Negative for chest pain, palpitations, leg swelling.   Gastrointestinal: Negative for abdominal pain, constipation, diarrhea, nausea, vomiting.   Endocrine: Negative for cold intolerance, heat intolerance.   Genitourinary: Negative for dysuria, frequency.   Musculoskeletal: Negative for arthralgias, myalgias.   Skin: Negative for rash, wound, erythema   Neurological: Negative for dizziness, syncope, weakness, light-headedness.   Psychiatric/Behavioral: Negative for confusion, hallucinations, anxiety  All other systems reviewed and are negative.    Objective:     Temp:  [98.4 °F (36.9 °C)-99.2 °F (37.3 °C)]   Pulse:  [80-91]   Resp:  [14-26]   BP: (128-147)/(58-66)   SpO2:  [91 %-95 %]       Physical Exam:  Constitutional: appears weak and ill  Head: Normocephalic and atraumatic.   Mouth/Throat: Oropharynx is clear and moist.   Eyes: EOM are normal. Pupils are equal, round, and reactive to light. No scleral icterus.   Neck: Normal range of motion. Neck supple.   Cardiovascular:  Normal rate and regular rhythm.  No murmur heard.  Pulmonary/Chest: Effort normal and breath sounds normal. No respiratory distress. No wheezes, rales, or rhonchi  Abdominal: Soft. Bowel sounds are normal.  Positive distension, no TTP  Musculoskeletal: Normal range of motion. Plus 3 pitting edema.   Neurological: Alert and oriented to person, place, and time.   Skin: Skin is warm and dry.   Psychiatric: Normal mood and affect. Behavior is normal.     Recent Labs   Lab 04/24/19  2159 04/25/19  0321 04/25/19  1635 04/26/19  0243 04/26/19  0856 04/26/19  1801   WBC 2.17* 2.43* 3.94 3.15* 2.98* 3.78*   HGB 8.5* 8.3* 9.0* 8.3* 8.7* 9.3*   HCT 26.0* 26.2* 28.0* 26.7* 27.3* 28.2*   PLT 75* 80* 89* 64* 70* 78*     Recent Labs   Lab 04/24/19  0345 04/25/19  0321 04/26/19  0243   * 133* 133*   K 3.8 4.0 3.8    106 106   CO2 22* 22* 21*   BUN 6 7 8   CREATININE 0.7 0.6 0.7    81 109   CALCIUM 7.5* 7.4* 7.4*   MG 1.9 1.7 1.8   PHOS 2.9 2.8 2.6*     Recent Labs   Lab 04/23/19  2355 04/24/19  0345 04/25/19  0321 04/25/19  0935 04/26/19  0243 04/26/19  1000   ALKPHOS 89 91 87  --  92  --    ALT 23 26 24  --  23  --    AST 89* 91* 88*  --  79*  --    ALBUMIN 2.3* 2.3* 2.2*  --  2.1*  --    PROT 5.3* 5.3* 5.1*  --  5.1*  --    BILITOT 9.4* 9.1* 8.0*  --  7.7*  --    INR 2.1*  --   --  2.2*  --  2.2*     Recent Labs   Lab 04/24/19  0009   POCTGLUCOSE 124*        cefTRIAXone (ROCEPHIN) IVPB  1 g Intravenous Q24H    furosemide  40 mg Intravenous TID    levothyroxine  112 mcg Oral Before breakfast    pantoprazole  40 mg Oral Daily    phytonadione ((AQUA-MEPHYTON) IVPB  10 mg Intravenous Daily    rifAXImin  550 mg Oral BID    spironolactone  100 mg Oral Daily       Assessment and Plan     Ms. Jihan Zamudio is a 49 y.o. female who presented to [facility] on 4/23/2019 with     Hospital Course:    Ms. Jihan Zamudio was admitted to Hospital Medicine for management of     Active Hospital Problems    Diagnosis  POA     *GI bleed [K92.2]  Yes    Acute blood loss anemia [D62]  Yes    Coagulopathy [D68.9]  Yes    Anasarca [R60.1]  Yes    Other ascites [R18.8]  Yes    Pulmonary hypertension [I27.20]  Yes    Hyponatremia [E87.1]  Yes    Hepatic encephalopathy [K72.90]  Yes    Moderate malnutrition [E44.0]  Yes    Essential hypertension [I10]  Yes    Hypothyroidism [E03.9]  Yes    Fatty liver [K76.0]  Yes     Dx 2006      Decompensated hepatic cirrhosis [K72.90]  Yes     From KESSLER    Liver biopsy 11/29/17- KESSLER with bridging fibrosis, Laura, interface activity; lymph and occ plasma cells      Esophageal varices [I85.00]  Yes     Small with no banding 07/17      Morbid obesity [E66.01]  Yes     Lap band 2006 (lost 75-80 lbs) with subsequent release (2009) (due to obstruction)        Resolved Hospital Problems   No resolved problems to display.     # Gastrointestinal hemorrhage with melena  # Acute blood loss anemia  # Portal Hypertensive gastropathy  - s/p EGD with APC treatment  - H/H is stable  - cont rocephin for 5 days    # Decompensated KESSLER Cirrhosis with ascites  MELD-Na score: 25 at 4/26/2019 10:00 AM  MELD score: 23 at 4/26/2019 10:00 AM  Calculated from:  Serum Creatinine: 0.7 mg/dL (Rounded to 1 mg/dL) at 4/26/2019  2:43 AM  Serum Sodium: 133 mmol/L at 4/26/2019  2:43 AM  Total Bilirubin: 7.7 mg/dL at 4/26/2019  2:43 AM  INR(ratio): 2.2 at 4/26/2019 10:00 AM  Age: 49 years  - hepatology consulted  - under going liver transplant eval  - multiple consulted and tests pending  - IR paracentesis on 4/26 with 4.5L removed and negative for SBP  - on IV diuresis     # Anasarca  # Pulmonary HTN  - echo shows normal EF  - started on IV lasix 40 mg IV TID and aldactone  - strict I/O and daily weights  - needs PA pressure    # Coagulopathy  # Thrombocytopenia  - vitamin K for 3 days  - DIC labs    # Chronic Hepatic Encephalopathy  - cont rifaximin    # Hyponatremia  - fluid restrict to 1.5L    # Morbid Obesity   Body  mass index is 44.93 kg/m².  - dietary     # Hypothyroidism  - cont levothyroxine     # Moderate protein terrence malnutrition  - PAB and dietary     Diet: low sodium, fluid restrict    GI PPx:    DVT PPx:    Goals of Care:  full      Disposition:  Possibly Monday; under going liver tx arcadio Gottlieb MD  Medical Director [facility] Medicine  Spectra:  [phone]  Pager: [phone]

## 2019-04-27 NOTE — PLAN OF CARE
Problem: Adult Inpatient Plan of Care  Goal: Plan of Care Review  Patient AAOX4. Vital signs stable, no distress noted. Tolerated all meds. Strict I &O's charted. Paracentesis done today (4L removed) ,active drainage to site.Free of falls, will continue to monitor.

## 2019-04-27 NOTE — PLAN OF CARE
Problem: Adult Inpatient Plan of Care  Goal: Plan of Care Review  Outcome: Ongoing (interventions implemented as appropriate)  meds administered as ordered, puncture site to RLQ draining serous drainage, dressing changed multiple times this shift, able to make needs known, no distress noted, will continue to monitor.    Problem: Fall Injury Risk  Goal: Absence of Fall and Fall-Related Injury    Intervention: Identify and Manage Contributors to Fall Injury Risk     04/27/19 1811   Manage Acute Allergic Reaction   Medication Review/Management medications reviewed   Identify and Manage Contributors to Fall Injury Risk   Self-Care Promotion independence encouraged;BADL personal objects within reach

## 2019-04-28 LAB
ALBUMIN SERPL BCP-MCNC: 2.1 G/DL (ref 3.5–5.2)
ALP SERPL-CCNC: 88 U/L (ref 55–135)
ALT SERPL W/O P-5'-P-CCNC: 22 U/L (ref 10–44)
AMMONIA PLAS-SCNC: 81 UMOL/L (ref 10–50)
ANION GAP SERPL CALC-SCNC: 8 MMOL/L (ref 8–16)
AST SERPL-CCNC: 72 U/L (ref 10–40)
BASOPHILS # BLD AUTO: 0.01 K/UL (ref 0–0.2)
BASOPHILS NFR BLD: 0.4 % (ref 0–1.9)
BILIRUB SERPL-MCNC: 8.1 MG/DL (ref 0.1–1)
BUN SERPL-MCNC: 8 MG/DL (ref 6–20)
CALCIUM SERPL-MCNC: 7.3 MG/DL (ref 8.7–10.5)
CHLORIDE SERPL-SCNC: 101 MMOL/L (ref 95–110)
CO2 SERPL-SCNC: 25 MMOL/L (ref 23–29)
CREAT SERPL-MCNC: 0.7 MG/DL (ref 0.5–1.4)
DIFFERENTIAL METHOD: ABNORMAL
EOSINOPHIL # BLD AUTO: 0.2 K/UL (ref 0–0.5)
EOSINOPHIL NFR BLD: 9.5 % (ref 0–8)
ERYTHROCYTE [DISTWIDTH] IN BLOOD BY AUTOMATED COUNT: 17.2 % (ref 11.5–14.5)
EST. GFR  (AFRICAN AMERICAN): >60 ML/MIN/1.73 M^2
EST. GFR  (NON AFRICAN AMERICAN): >60 ML/MIN/1.73 M^2
GLUCOSE SERPL-MCNC: 88 MG/DL (ref 70–110)
HCT VFR BLD AUTO: 24.6 % (ref 37–48.5)
HGB BLD-MCNC: 8.1 G/DL (ref 12–16)
IMM GRANULOCYTES # BLD AUTO: 0.01 K/UL (ref 0–0.04)
IMM GRANULOCYTES NFR BLD AUTO: 0.4 % (ref 0–0.5)
INR PPP: 1.9 (ref 0.8–1.2)
LYMPHOCYTES # BLD AUTO: 0.6 K/UL (ref 1–4.8)
LYMPHOCYTES NFR BLD: 24.3 % (ref 18–48)
MAGNESIUM SERPL-MCNC: 1.6 MG/DL (ref 1.6–2.6)
MCH RBC QN AUTO: 30.2 PG (ref 27–31)
MCHC RBC AUTO-ENTMCNC: 32.9 G/DL (ref 32–36)
MCV RBC AUTO: 92 FL (ref 82–98)
MONOCYTES # BLD AUTO: 0.3 K/UL (ref 0.3–1)
MONOCYTES NFR BLD: 11.1 % (ref 4–15)
NEUTROPHILS # BLD AUTO: 1.3 K/UL (ref 1.8–7.7)
NEUTROPHILS NFR BLD: 54.3 % (ref 38–73)
NRBC BLD-RTO: 0 /100 WBC
PHOSPHATE SERPL-MCNC: 2.4 MG/DL (ref 2.7–4.5)
PLATELET # BLD AUTO: 68 K/UL (ref 150–350)
PMV BLD AUTO: 12.1 FL (ref 9.2–12.9)
POTASSIUM SERPL-SCNC: 3.5 MMOL/L (ref 3.5–5.1)
PROT SERPL-MCNC: 5.1 G/DL (ref 6–8.4)
PROTHROMBIN TIME: 19 SEC (ref 9–12.5)
RBC # BLD AUTO: 2.68 M/UL (ref 4–5.4)
SODIUM SERPL-SCNC: 134 MMOL/L (ref 136–145)
WBC # BLD AUTO: 2.43 K/UL (ref 3.9–12.7)

## 2019-04-28 PROCEDURE — 84100 ASSAY OF PHOSPHORUS: CPT

## 2019-04-28 PROCEDURE — 36415 COLL VENOUS BLD VENIPUNCTURE: CPT

## 2019-04-28 PROCEDURE — 83735 ASSAY OF MAGNESIUM: CPT

## 2019-04-28 PROCEDURE — 11000001 HC ACUTE MED/SURG PRIVATE ROOM

## 2019-04-28 PROCEDURE — 63600175 PHARM REV CODE 636 W HCPCS: Performed by: HOSPITALIST

## 2019-04-28 PROCEDURE — 99231 SBSQ HOSP IP/OBS SF/LOW 25: CPT | Mod: ,,, | Performed by: INTERNAL MEDICINE

## 2019-04-28 PROCEDURE — 25000003 PHARM REV CODE 250: Performed by: NURSE PRACTITIONER

## 2019-04-28 PROCEDURE — 99233 SBSQ HOSP IP/OBS HIGH 50: CPT | Mod: ,,, | Performed by: HOSPITALIST

## 2019-04-28 PROCEDURE — 85025 COMPLETE CBC W/AUTO DIFF WBC: CPT

## 2019-04-28 PROCEDURE — 25000003 PHARM REV CODE 250: Performed by: HOSPITALIST

## 2019-04-28 PROCEDURE — 82140 ASSAY OF AMMONIA: CPT

## 2019-04-28 PROCEDURE — 99233 PR SUBSEQUENT HOSPITAL CARE,LEVL III: ICD-10-PCS | Mod: ,,, | Performed by: HOSPITALIST

## 2019-04-28 PROCEDURE — 80053 COMPREHEN METABOLIC PANEL: CPT

## 2019-04-28 PROCEDURE — 85610 PROTHROMBIN TIME: CPT

## 2019-04-28 PROCEDURE — 99231 PR SUBSEQUENT HOSPITAL CARE,LEVL I: ICD-10-PCS | Mod: ,,, | Performed by: INTERNAL MEDICINE

## 2019-04-28 RX ORDER — LACTULOSE 10 G/15ML
45 SOLUTION ORAL 3 TIMES DAILY
Status: DISCONTINUED | OUTPATIENT
Start: 2019-04-28 | End: 2019-04-29

## 2019-04-28 RX ORDER — SPIRONOLACTONE 100 MG/1
200 TABLET, FILM COATED ORAL DAILY
Status: DISCONTINUED | OUTPATIENT
Start: 2019-04-29 | End: 2019-05-02 | Stop reason: HOSPADM

## 2019-04-28 RX ORDER — FUROSEMIDE 10 MG/ML
80 INJECTION INTRAMUSCULAR; INTRAVENOUS 3 TIMES DAILY
Status: DISCONTINUED | OUTPATIENT
Start: 2019-04-28 | End: 2019-05-01

## 2019-04-28 RX ADMIN — SPIRONOLACTONE 100 MG: 100 TABLET, FILM COATED ORAL at 09:04

## 2019-04-28 RX ADMIN — LACTULOSE 45 G: 20 SOLUTION ORAL at 08:04

## 2019-04-28 RX ADMIN — LACTULOSE 45 G: 20 SOLUTION ORAL at 03:04

## 2019-04-28 RX ADMIN — FUROSEMIDE 40 MG: 10 INJECTION, SOLUTION INTRAMUSCULAR; INTRAVENOUS at 09:04

## 2019-04-28 RX ADMIN — LACTULOSE 30 G: 20 SOLUTION ORAL at 09:04

## 2019-04-28 RX ADMIN — Medication 220 MG: at 09:04

## 2019-04-28 RX ADMIN — PHYTONADIONE 10 MG: 10 INJECTION, EMULSION INTRAMUSCULAR; INTRAVENOUS; SUBCUTANEOUS at 09:04

## 2019-04-28 RX ADMIN — PANTOPRAZOLE SODIUM 40 MG: 40 TABLET, DELAYED RELEASE ORAL at 09:04

## 2019-04-28 RX ADMIN — RIFAXIMIN 550 MG: 550 TABLET ORAL at 09:04

## 2019-04-28 RX ADMIN — FUROSEMIDE 80 MG: 10 INJECTION, SOLUTION INTRAMUSCULAR; INTRAVENOUS at 08:04

## 2019-04-28 RX ADMIN — RIFAXIMIN 550 MG: 550 TABLET ORAL at 08:04

## 2019-04-28 RX ADMIN — LEVOTHYROXINE SODIUM 112 MCG: 112 TABLET ORAL at 05:04

## 2019-04-28 NOTE — PLAN OF CARE
Problem: Adult Inpatient Plan of Care  Goal: Plan of Care Review  Outcome: Ongoing (interventions implemented as appropriate)  RLQ puncture site draining serous drainage, able to make needs known, meds administered as ordered, no distress noted, will continue to monitor.    Problem: Fall Injury Risk  Goal: Absence of Fall and Fall-Related Injury    Intervention: Identify and Manage Contributors to Fall Injury Risk     04/28/19 1815   Manage Acute Allergic Reaction   Medication Review/Management medications reviewed   Identify and Manage Contributors to Fall Injury Risk   Self-Care Promotion independence encouraged;BADL personal objects within reach

## 2019-04-28 NOTE — ASSESSMENT & PLAN NOTE
Patient is a 48 y/o female with history of KESSLER cirrhosis, declined in the past for transplant due to elevated PA pressure, and has been lost to follow up since then, presented with GI bleeding, with reported GOV and EV and signs of recent bleeding, transferred to [facility] for TIPS/transplant consideration    S/p EGD with PHG s/p APC. No GV described, and small EV.   Overall the patient is decompensated currently with MELD of 24, we will proceed with inpatient evaluation for transplant.    -Decompensated cirrhosis due to GI bleeding: s/p EGD with PHG s/p APC. Continue PPI PO daily.   -Volume overload: Continue with IV lasix. Will likely need uptitration of doses given decompensation  -HE: Continue home lactulose and rifaximin. Mental status appropriate  -EV: Small varices on evaluation 4/24/2019  -HCC: Negative on imaging during this admission; and AFP  -Transplant: Proceed with inpatient evaluation. Stress test scheduled for tomorrow.  -Trend CBC, CMP, INR.

## 2019-04-28 NOTE — PROGRESS NOTES
Progress Note   Hospital Medicine         Patient Name: Jihan Zamudio  MRN:  [id number]  [facility] Medicine Team: [facility] MED L Blane Gottlieb MD  Date of Admission:  4/23/2019     Length of Stay:  LOS: 5 days   Expected Discharge Date: 4/29/2019  Principal Problem:  GI bleed       Subjective:     Interval History/Overnight Events:  Patient is still lethargic today, has had 2 large BMs so far, increasing lactulose again today with the goal being to have atleast 5 BMs; family at the bedside;  - diuresing well, increasing lasix to 80 mg IV TID  - finishing patient's liver transplant evaluation, needs a cardiac pet stress and presentation on Wednesday; will get 2d echo to evaluate PA pressures for tomorrow     Review of Systems   Constitutional: Negative for chills, fatigue, fever.   HENT: Negative for sore throat, trouble swallowing.    Eyes: Negative for photophobia, visual disturbance.   Respiratory: Negative for cough, shortness of breath.    Cardiovascular: Negative for chest pain, palpitations, leg swelling.   Gastrointestinal: Negative for abdominal pain, constipation, diarrhea, nausea, vomiting.   Endocrine: Negative for cold intolerance, heat intolerance.   Genitourinary: Negative for dysuria, frequency.   Musculoskeletal: Negative for arthralgias, myalgias.   Skin: Negative for rash, wound, erythema   Neurological: Negative for dizziness, syncope, weakness, light-headedness.   Psychiatric/Behavioral: Negative for confusion, hallucinations, anxiety  All other systems reviewed and are negative.    Objective:     Temp:  [98 °F (36.7 °C)-99.3 °F (37.4 °C)]   Pulse:  [85-88]   Resp:  [13-20]   BP: (103-135)/(51-60)   SpO2:  [93 %-96 %]       Physical Exam:  Constitutional: appears weak and ill  Head: Normocephalic and atraumatic.   Mouth/Throat: Oropharynx is clear and moist.   Eyes: EOM are normal. Pupils are equal, round, and reactive to light. No scleral icterus.   Neck: Normal range of motion. Neck supple.    Cardiovascular: Normal rate and regular rhythm.  No murmur heard.  Pulmonary/Chest: Effort normal and breath sounds normal. No respiratory distress. No wheezes, rales, or rhonchi  Abdominal: Soft. Bowel sounds are normal.  Positive distension, no TTP  Musculoskeletal: Normal range of motion. Plus 3 pitting edema.   Neurological: Alert and oriented to person, place, and time.   Skin: Skin is warm and dry.   Psychiatric: Normal mood and affect. Behavior is normal.     Recent Labs   Lab 04/25/19  1635 04/26/19  0243 04/26/19  0856 04/26/19  1801 04/27/19  0329 04/28/19  0345   WBC 3.94 3.15* 2.98* 3.78* 2.67* 2.43*   HGB 9.0* 8.3* 8.7* 9.3* 8.4* 8.1*   HCT 28.0* 26.7* 27.3* 28.2* 25.2* 24.6*   PLT 89* 64* 70* 78* 54* 68*     Recent Labs   Lab 04/26/19  0243 04/27/19  0329 04/28/19  0345   * 132* 134*   K 3.8 3.0* 3.5    100 101   CO2 21* 26 25   BUN 8 8 8   CREATININE 0.7 0.7 0.7    88 88   CALCIUM 7.4* 7.3* 7.3*   MG 1.8 1.5* 1.6   PHOS 2.6* 2.8 2.4*     Recent Labs   Lab 04/26/19  0243 04/26/19  1000 04/27/19  0329 04/28/19  0345   ALKPHOS 92  --  94 88   ALT 23  --  20 22   AST 79*  --  76* 72*   ALBUMIN 2.1*  --  2.2* 2.1*   PROT 5.1*  --  5.1* 5.1*   BILITOT 7.7*  --  7.8* 8.1*   INR  --  2.2* 2.0* 1.9*     Recent Labs   Lab 04/24/19  0009   POCTGLUCOSE 124*        furosemide  80 mg Intravenous TID    lactulose  45 g Oral TID    levothyroxine  112 mcg Oral Before breakfast    pantoprazole  40 mg Oral Daily    rifAXImin  550 mg Oral BID    [START ON 4/29/2019] spironolactone  200 mg Oral Daily    zinc sulfate  220 mg Oral Daily       Assessment and Plan     Ms. Jihan Zamudio is a 49 y.o. female who presented to [facility] on 4/23/2019 with     Hospital Course:    Ms. Jihan Zamudio was admitted to Hospital Medicine for management of     Active Hospital Problems    Diagnosis  POA    *GI bleed [K92.2]  Yes    Acute blood loss anemia [D62]  Yes    Coagulopathy [D68.9]  Yes    Anasarca  [R60.1]  Yes    Other ascites [R18.8]  Yes    Pulmonary hypertension [I27.20]  Yes    Hyponatremia [E87.1]  Yes    Hepatic encephalopathy [K72.90]  Yes    Moderate malnutrition [E44.0]  Yes    Essential hypertension [I10]  Yes    Hypothyroidism [E03.9]  Yes    Fatty liver [K76.0]  Yes     Dx 2006      Decompensated hepatic cirrhosis [K72.90]  Yes     From KESSLER    Liver biopsy 11/29/17- KESSLER with bridging fibrosis, Laura, interface activity; lymph and occ plasma cells      Esophageal varices [I85.00]  Yes     Small with no banding 07/17      Morbid obesity [E66.01]  Yes     Lap band 2006 (lost 75-80 lbs) with subsequent release (2009) (due to obstruction)        Resolved Hospital Problems   No resolved problems to display.     # Gastrointestinal hemorrhage with melena  # Acute blood loss anemia  # Portal Hypertensive gastropathy  - s/p EGD with APC treatment  - H/H is stable  - cont rocephin for 5 days    # Decompensated KESSLER Cirrhosis with ascites  MELD-Na score: 24 at 4/28/2019  3:45 AM  MELD score: 22 at 4/28/2019  3:45 AM  Calculated from:  Serum Creatinine: 0.7 mg/dL (Rounded to 1 mg/dL) at 4/28/2019  3:45 AM  Serum Sodium: 134 mmol/L at 4/28/2019  3:45 AM  Total Bilirubin: 8.1 mg/dL at 4/28/2019  3:45 AM  INR(ratio): 1.9 at 4/28/2019  3:45 AM  Age: 49 years  - hepatology consulted  - under going liver transplant eval  - multiple consulted and tests pending  - IR paracentesis on 4/26 with 4.5L removed and negative for SBP  - on IV diuresis     # Anasarca  # Pulmonary HTN  - echo shows normal EF  - started on IV lasix 40 mg IV TID and aldactone, put out 6L  - strict I/O and daily weights  - needs PA pressure, plan for tomorrow     # Coagulopathy  # Thrombocytopenia  - vitamin K for 3 days  - DIC labs    # Acute on Chronic Hepatic Encephalopathy  - cont rifaximin  - adding lactulose and zinc today, needs to have 3 to 4 BMs daily    # Hyponatremia  - fluid restrict to 1.5L    # Morbid Obesity   Body  mass index is 40.17 kg/m².  - dietary     # Hypothyroidism  - cont levothyroxine     # Moderate protein terrence malnutrition  - PAB and dietary     # Hypokalemia  - replace    Diet: low sodium, fluid restrict    GI PPx:    DVT PPx:    Goals of Care:  full      Disposition:  Possibly wednesady; under going liver tx arcadio Gottlieb MD  Medical Director [facility] Medicine  Spectra:  [phone]  Pager: [phone]

## 2019-04-28 NOTE — PROGRESS NOTES
Progress Note   Hospital Medicine         Patient Name: Jihan Zamudio  MRN:  [id number]  [facility] Medicine [place] [facility] [facility] [place] Blane Gottlieb MD  Date of Admission:  4/23/2019     Length of Stay:  LOS: 4 days   Expected Discharge Date: 4/29/2019  Principal Problem:  GI bleed       Subjective:     Interval History/Overnight Events:  Patient seems a little encephalopathic on examination, only had 1 BM yesterday and today, only on rifaximin, restarting lactulose to have 3 to 4 BMs daily; patient is having good diuresis and still is atleast 30 pounds of fluid over weight; under going liver eval    Review of Systems   Constitutional: Negative for chills, fatigue, fever.   HENT: Negative for sore throat, trouble swallowing.    Eyes: Negative for photophobia, visual disturbance.   Respiratory: Negative for cough, shortness of breath.    Cardiovascular: Negative for chest pain, palpitations, leg swelling.   Gastrointestinal: Negative for abdominal pain, constipation, diarrhea, nausea, vomiting.   Endocrine: Negative for cold intolerance, heat intolerance.   Genitourinary: Negative for dysuria, frequency.   Musculoskeletal: Negative for arthralgias, myalgias.   Skin: Negative for rash, wound, erythema   Neurological: Negative for dizziness, syncope, weakness, light-headedness.   Psychiatric/Behavioral: Negative for confusion, hallucinations, anxiety  All other systems reviewed and are negative.    Objective:     Temp:  [98.5 °F (36.9 °C)-99.3 °F (37.4 °C)]   Pulse:  [82-88]   Resp:  [17-20]   BP: (103-159)/(51-68)   SpO2:  [93 %-96 %]       Physical Exam:  Constitutional: appears weak and ill  Head: Normocephalic and atraumatic.   Mouth/Throat: Oropharynx is clear and moist.   Eyes: EOM are normal. Pupils are equal, round, and reactive to light. No scleral icterus.   Neck: Normal range of motion. Neck supple.   Cardiovascular: Normal rate and regular rhythm.  No murmur heard.  Pulmonary/Chest: Effort normal and breath  sounds normal. No respiratory distress. No wheezes, rales, or rhonchi  Abdominal: Soft. Bowel sounds are normal.  Positive distension, no TTP  Musculoskeletal: Normal range of motion. Plus 3 pitting edema.   Neurological: Alert and oriented to person, place, and time.   Skin: Skin is warm and dry.   Psychiatric: Normal mood and affect. Behavior is normal.     Recent Labs   Lab 04/25/19  0321 04/25/19  1635 04/26/19  0243 04/26/19  0856 04/26/19  1801 04/27/19  0329   WBC 2.43* 3.94 3.15* 2.98* 3.78* 2.67*   HGB 8.3* 9.0* 8.3* 8.7* 9.3* 8.4*   HCT 26.2* 28.0* 26.7* 27.3* 28.2* 25.2*   PLT 80* 89* 64* 70* 78* 54*     Recent Labs   Lab 04/25/19  0321 04/26/19  0243 04/27/19  0329   * 133* 132*   K 4.0 3.8 3.0*    106 100   CO2 22* 21* 26   BUN 7 8 8   CREATININE 0.6 0.7 0.7   GLU 81 109 88   CALCIUM 7.4* 7.4* 7.3*   MG 1.7 1.8 1.5*   PHOS 2.8 2.6* 2.8     Recent Labs   Lab 04/25/19  0321 04/25/19  0935 04/26/19  0243 04/26/19  1000 04/27/19  0329   ALKPHOS 87  --  92  --  94   ALT 24  --  23  --  20   AST 88*  --  79*  --  76*   ALBUMIN 2.2*  --  2.1*  --  2.2*   PROT 5.1*  --  5.1*  --  5.1*   BILITOT 8.0*  --  7.7*  --  7.8*   INR  --  2.2*  --  2.2* 2.0*     Recent Labs   Lab 04/24/19  0009   POCTGLUCOSE 124*        furosemide  40 mg Intravenous TID    levothyroxine  112 mcg Oral Before breakfast    pantoprazole  40 mg Oral Daily    phytonadione ((AQUA-MEPHYTON) IVPB  10 mg Intravenous Daily    rifAXImin  550 mg Oral BID    spironolactone  100 mg Oral Daily       Assessment and Plan     Ms. Jihan Zamudio is a 49 y.o. female who presented to [facility] on 4/23/2019 with     Hospital Course:    Ms. Jihan Zamudio was admitted to Hospital Medicine for management of     Active Hospital Problems    Diagnosis  POA    *GI bleed [K92.2]  Yes    Acute blood loss anemia [D62]  Yes    Coagulopathy [D68.9]  Yes    Anasarca [R60.1]  Yes    Other ascites [R18.8]  Yes    Pulmonary hypertension [I27.20]   Yes    Hyponatremia [E87.1]  Yes    Hepatic encephalopathy [K72.90]  Yes    Moderate malnutrition [E44.0]  Yes    Essential hypertension [I10]  Yes    Hypothyroidism [E03.9]  Yes    Fatty liver [K76.0]  Yes     Dx 2006      Decompensated hepatic cirrhosis [K72.90]  Yes     From KESSLER    Liver biopsy 11/29/17- KESSLER with bridging fibrosis, Laura, interface activity; lymph and occ plasma cells      Esophageal varices [I85.00]  Yes     Small with no banding 07/17      Morbid obesity [E66.01]  Yes     Lap band 2006 (lost 75-80 lbs) with subsequent release (2009) (due to obstruction)        Resolved Hospital Problems   No resolved problems to display.     # Gastrointestinal hemorrhage with melena  # Acute blood loss anemia  # Portal Hypertensive gastropathy  - s/p EGD with APC treatment  - H/H is stable  - cont rocephin for 5 days    # Decompensated KESSLER Cirrhosis with ascites  MELD-Na score: 25 at 4/27/2019  3:29 AM  MELD score: 22 at 4/27/2019  3:29 AM  Calculated from:  Serum Creatinine: 0.7 mg/dL (Rounded to 1 mg/dL) at 4/27/2019  3:29 AM  Serum Sodium: 132 mmol/L at 4/27/2019  3:29 AM  Total Bilirubin: 7.8 mg/dL at 4/27/2019  3:29 AM  INR(ratio): 2.0 at 4/27/2019  3:29 AM  Age: 49 years  - hepatology consulted  - under going liver transplant eval  - multiple consulted and tests pending  - IR paracentesis on 4/26 with 4.5L removed and negative for SBP  - on IV diuresis     # Anasarca  # Pulmonary HTN  - echo shows normal EF  - started on IV lasix 40 mg IV TID and aldactone, put out 6L  - strict I/O and daily weights  - needs PA pressure    # Coagulopathy  # Thrombocytopenia  - vitamin K for 3 days  - DIC labs    # Acute on Chronic Hepatic Encephalopathy  - cont rifaximin  - adding lactulose and zinc today, needs to have 3 to 4 BMs daily    # Hyponatremia  - fluid restrict to 1.5L    # Morbid Obesity   Body mass index is 44.93 kg/m².  - dietary     # Hypothyroidism  - cont levothyroxine     # Moderate protein  terrence malnutrition  - PAB and dietary     Diet: low sodium, fluid restrict    GI PPx:    DVT PPx:    Goals of Care:  full      Disposition:  Possibly Tuesday; under going liver tx arcadio Gottlieb MD  Medical Director [facility] Medicine  Spectra:  [phone]  Pager: [phone]

## 2019-04-28 NOTE — PT/OT/SLP PROGRESS
Physical Therapy      Patient Name:  Jihan Zamudio   MRN:  [id number]    Patient not seen today secondary to unwilling to participate. Pt stated she did not want to get up and wasn't feeling well. PT encouraged pt to perform OOB activity daily to improve healing and outcomes. Pt stated she has been able to walk to the bathroom and back with (A) x1  . Will follow-up tomorrow.    Howie Cerna, PT

## 2019-04-28 NOTE — SUBJECTIVE & OBJECTIVE
Interval History: Patient feels relatively well today. Slow responses, but appropriate.   Being diuresed with IV lasix    Current Facility-Administered Medications   Medication    calcium carbonate 200 mg calcium (500 mg) chewable tablet 1,000 mg    furosemide injection 40 mg    lactulose 20 gram/30 mL solution Soln 30 g    levothyroxine tablet 112 mcg    ondansetron injection 4 mg    ondansetron tablet 4 mg    pantoprazole EC tablet 40 mg    rifAXIMin tablet 550 mg    sodium chloride 0.9% flush 10 mL    spironolactone tablet 100 mg    zinc sulfate capsule 220 mg       Objective:     Vital Signs (Most Recent):  Temp: 98.8 °F (37.1 °C) (04/28/19 0808)  Pulse: 85 (04/28/19 0808)  Resp: 13 (04/28/19 0808)  BP: (!) 108/54 (04/28/19 0808)  SpO2: (!) 94 % (04/28/19 0808) Vital Signs (24h Range):  Temp:  [98 °F (36.7 °C)-99.3 °F (37.4 °C)] 98.8 °F (37.1 °C)  Pulse:  [85-88] 85  Resp:  [13-20] 13  SpO2:  [93 %-96 %] 94 %  BP: (103-159)/(51-68) 108/54     Weight: 109.5 kg (241 lb 6.4 oz) (04/28/19 0400)  Body mass index is 40.17 kg/m².    Physical Exam   Constitutional: She is oriented to person, place, and time. She appears lethargic. She appears ill. No distress.   HENT:   Head: Normocephalic.   Eyes: Conjunctivae are normal. Scleral icterus is present.   Neck: Normal range of motion. Neck supple.   Cardiovascular: Normal rate and regular rhythm.   Pulmonary/Chest: Effort normal and breath sounds normal.   Abdominal: Soft. Bowel sounds are normal. She exhibits distension. She exhibits no mass. There is no tenderness. There is no rebound and no guarding.   Musculoskeletal: Normal range of motion. She exhibits edema.   Neurological: She is oriented to person, place, and time. She appears lethargic.   Skin: Skin is warm and dry.   Psychiatric: She has a normal mood and affect.       MELD-Na score: 24 at 4/28/2019  3:45 AM  MELD score: 22 at 4/28/2019  3:45 AM  Calculated from:  Serum Creatinine: 0.7 mg/dL (Rounded  to 1 mg/dL) at 4/28/2019  3:45 AM  Serum Sodium: 134 mmol/L at 4/28/2019  3:45 AM  Total Bilirubin: 8.1 mg/dL at 4/28/2019  3:45 AM  INR(ratio): 1.9 at 4/28/2019  3:45 AM  Age: 49 years    Significant Labs:  CBC:   Recent Labs   Lab 04/28/19 0345   WBC 2.43*   RBC 2.68*   HGB 8.1*   HCT 24.6*   PLT 68*     BMP:   Recent Labs   Lab 04/28/19  0345   GLU 88   *   K 3.5      CO2 25   BUN 8   CREATININE 0.7   CALCIUM 7.3*     CMP:   Recent Labs   Lab 04/28/19  0345   GLU 88   CALCIUM 7.3*   ALBUMIN 2.1*   PROT 5.1*   *   K 3.5   CO2 25      BUN 8   CREATININE 0.7   ALKPHOS 88   ALT 22   AST 72*   BILITOT 8.1*     Coagulation:   Recent Labs   Lab 04/23/19  2355  04/28/19 0345   INR 2.1*   < > 1.9*   APTT 35.1*  --   --     < > = values in this interval not displayed.       Significant Imaging:  Labs: Reviewed

## 2019-04-28 NOTE — PROGRESS NOTES
[facility]  Hepatology  Progress Note    Patient Name: Jihan Zamudio  MRN: [id number]  Admission Date: 4/23/2019  Hospital Length of Stay: 5 days  Attending Provider: Blane Gottlieb MD   Primary Care Physician: Primary Doctor No  Principal Problem:GI bleed    Subjective:     Transplant status: No    HPI: Mrs. Zamudio is a 48 y/o female with history of KESSLER cirrhosis, was followed by Dr. Smallwood until 5/2018. Her cirrhosis is c/b volume overload, HE, and EV. She was being evaluated for transplant, however was deferred due to high PA pressure on echo. She was evaluated by cardiology who thought RHC is not needed. On last appointment with Dr. Smallwood, she was referred for a second opinion with Dr. Fraknlin, however the patient was lost to follow up, which she states was due to insurance issues.    The patient was doing well until 4/19 when she started to notice that her abdomen was more distended, and her LE were swollen. On 4/20, she started noticing her stool was black, and her  took her to the ED the next day. On 4/22, the patient underwent a paracentesis at OSH with ~5L removed, negative for SBP. Underwent EGD on 4/23, which was reported with small EV, Grade 2 GOV, and PHG, with blood in stomach, but no active bleeding. She was referred to [facility] for TIPS considerations.    The patient states that she is feeling well today, last BM was last night, and was black looking like coffee grounds. Denies abdominal pain but endorses continuous distention.   She is HDS since arrival    Interval History: Patient feels relatively well today. Slow responses, but appropriate.   Being diuresed with IV lasix    Current Facility-Administered Medications   Medication    calcium carbonate 200 mg calcium (500 mg) chewable tablet 1,000 mg    furosemide injection 40 mg    lactulose 20 gram/30 mL solution Soln 30 g    levothyroxine tablet 112 mcg    ondansetron injection 4 mg    ondansetron tablet 4 mg     pantoprazole EC tablet 40 mg    rifAXIMin tablet 550 mg    sodium chloride 0.9% flush 10 mL    spironolactone tablet 100 mg    zinc sulfate capsule 220 mg       Objective:     Vital Signs (Most Recent):  Temp: 98.8 °F (37.1 °C) (04/28/19 0808)  Pulse: 85 (04/28/19 0808)  Resp: 13 (04/28/19 0808)  BP: (!) 108/54 (04/28/19 0808)  SpO2: (!) 94 % (04/28/19 0808) Vital Signs (24h Range):  Temp:  [98 °F (36.7 °C)-99.3 °F (37.4 °C)] 98.8 °F (37.1 °C)  Pulse:  [85-88] 85  Resp:  [13-20] 13  SpO2:  [93 %-96 %] 94 %  BP: (103-159)/(51-68) 108/54     Weight: 109.5 kg (241 lb 6.4 oz) (04/28/19 0400)  Body mass index is 40.17 kg/m².    Physical Exam   Constitutional: She is oriented to person, place, and time. She appears lethargic. She appears ill. No distress.   HENT:   Head: Normocephalic.   Eyes: Conjunctivae are normal. Scleral icterus is present.   Neck: Normal range of motion. Neck supple.   Cardiovascular: Normal rate and regular rhythm.   Pulmonary/Chest: Effort normal and breath sounds normal.   Abdominal: Soft. Bowel sounds are normal. She exhibits distension. She exhibits no mass. There is no tenderness. There is no rebound and no guarding.   Musculoskeletal: Normal range of motion. She exhibits edema.   Neurological: She is oriented to person, place, and time. She appears lethargic.   Skin: Skin is warm and dry.   Psychiatric: She has a normal mood and affect.       MELD-Na score: 24 at 4/28/2019  3:45 AM  MELD score: 22 at 4/28/2019  3:45 AM  Calculated from:  Serum Creatinine: 0.7 mg/dL (Rounded to 1 mg/dL) at 4/28/2019  3:45 AM  Serum Sodium: 134 mmol/L at 4/28/2019  3:45 AM  Total Bilirubin: 8.1 mg/dL at 4/28/2019  3:45 AM  INR(ratio): 1.9 at 4/28/2019  3:45 AM  Age: 49 years    Significant Labs:  CBC:   Recent Labs   Lab 04/28/19  0345   WBC 2.43*   RBC 2.68*   HGB 8.1*   HCT 24.6*   PLT 68*     BMP:   Recent Labs   Lab 04/28/19  0345   GLU 88   *   K 3.5      CO2 25   BUN 8   CREATININE 0.7    CALCIUM 7.3*     CMP:   Recent Labs   Lab 04/28/19  0345   GLU 88   CALCIUM 7.3*   ALBUMIN 2.1*   PROT 5.1*   *   K 3.5   CO2 25      BUN 8   CREATININE 0.7   ALKPHOS 88   ALT 22   AST 72*   BILITOT 8.1*     Coagulation:   Recent Labs   Lab 04/23/19  2355  04/28/19  0345   INR 2.1*   < > 1.9*   APTT 35.1*  --   --     < > = values in this interval not displayed.       Significant Imaging:  Labs: Reviewed    Assessment/Plan:     Decompensated hepatic cirrhosis  Patient is a 50 y/o female with history of KESSLER cirrhosis, declined in the past for transplant due to elevated PA pressure, and has been lost to follow up since then, presented with GI bleeding, with reported GOV and EV and signs of recent bleeding, transferred to [facility] for TIPS/transplant consideration    S/p EGD with PHG s/p APC. No GV described, and small EV.   Overall the patient is decompensated currently with MELD of 24, we will proceed with inpatient evaluation for transplant.    -Decompensated cirrhosis due to GI bleeding: s/p EGD with PHG s/p APC. Continue PPI PO daily.   -Volume overload: Continue with IV lasix. Will likely need uptitration of doses given decompensation  -HE: Continue home lactulose and rifaximin. Mental status appropriate  -EV: Small varices on evaluation 4/24/2019  -HCC: Negative on imaging during this admission; and AFP  -Transplant: Proceed with inpatient evaluation. Stress test scheduled for tomorrow.  -Trend CBC, CMP, INR.         Thank you for your consult. I will follow-up with patient. Please contact us if you have any additional questions.    Rod Sheppard MD  Hepatology  [facility]

## 2019-04-29 ENCOUNTER — ANESTHESIA EVENT (OUTPATIENT)
Dept: TRANSPLANT | Facility: HOSPITAL | Age: 49
End: 2019-04-29

## 2019-04-29 LAB
ALBUMIN SERPL BCP-MCNC: 2.4 G/DL (ref 3.5–5.2)
ALP SERPL-CCNC: 103 U/L (ref 55–135)
ALT SERPL W/O P-5'-P-CCNC: 24 U/L (ref 10–44)
ANION GAP SERPL CALC-SCNC: 7 MMOL/L (ref 8–16)
ANION GAP SERPL CALC-SCNC: 8 MMOL/L (ref 8–16)
AST SERPL-CCNC: 79 U/L (ref 10–40)
BACTERIA SPEC AEROBE CULT: NO GROWTH
BASOPHILS # BLD AUTO: 0.02 K/UL (ref 0–0.2)
BASOPHILS NFR BLD: 0.8 % (ref 0–1.9)
BILIRUB SERPL-MCNC: 9.4 MG/DL (ref 0.1–1)
BSA FOR ECHO PROCEDURE: 2.24 M2
BUN SERPL-MCNC: 8 MG/DL (ref 6–20)
BUN SERPL-MCNC: 8 MG/DL (ref 6–20)
CALCIUM SERPL-MCNC: 7.8 MG/DL (ref 8.7–10.5)
CALCIUM SERPL-MCNC: 7.8 MG/DL (ref 8.7–10.5)
CHLORIDE SERPL-SCNC: 100 MMOL/L (ref 95–110)
CHLORIDE SERPL-SCNC: 99 MMOL/L (ref 95–110)
CO2 SERPL-SCNC: 26 MMOL/L (ref 23–29)
CO2 SERPL-SCNC: 28 MMOL/L (ref 23–29)
CREAT SERPL-MCNC: 0.7 MG/DL (ref 0.5–1.4)
CREAT SERPL-MCNC: 0.8 MG/DL (ref 0.5–1.4)
CV ECHO LV RWT: 0.3 CM
DIFFERENTIAL METHOD: ABNORMAL
ECHO LV POSTERIOR WALL: 0.75 CM (ref 0.6–1.1)
EOSINOPHIL # BLD AUTO: 0.2 K/UL (ref 0–0.5)
EOSINOPHIL NFR BLD: 8.9 % (ref 0–8)
ERYTHROCYTE [DISTWIDTH] IN BLOOD BY AUTOMATED COUNT: 17.2 % (ref 11.5–14.5)
EST. GFR  (AFRICAN AMERICAN): >60 ML/MIN/1.73 M^2
EST. GFR  (AFRICAN AMERICAN): >60 ML/MIN/1.73 M^2
EST. GFR  (NON AFRICAN AMERICAN): >60 ML/MIN/1.73 M^2
EST. GFR  (NON AFRICAN AMERICAN): >60 ML/MIN/1.73 M^2
FRACTIONAL SHORTENING: 46 % (ref 28–44)
GLUCOSE SERPL-MCNC: 93 MG/DL (ref 70–110)
GLUCOSE SERPL-MCNC: 94 MG/DL (ref 70–110)
HCT VFR BLD AUTO: 26.3 % (ref 37–48.5)
HGB BLD-MCNC: 8.8 G/DL (ref 12–16)
IMM GRANULOCYTES # BLD AUTO: 0.01 K/UL (ref 0–0.04)
IMM GRANULOCYTES NFR BLD AUTO: 0.4 % (ref 0–0.5)
INR PPP: 1.9 (ref 0.8–1.2)
INTERVENTRICULAR SEPTUM: 0.62 CM (ref 0.6–1.1)
LEFT ATRIUM SIZE: 4.37 CM
LEFT INTERNAL DIMENSION IN SYSTOLE: 2.72 CM (ref 2.1–4)
LEFT VENTRICLE DIASTOLIC VOLUME INDEX: 55.72 ML/M2
LEFT VENTRICLE DIASTOLIC VOLUME: 119.32 ML
LEFT VENTRICLE MASS INDEX: 52.5 G/M2
LEFT VENTRICLE SYSTOLIC VOLUME INDEX: 12.9 ML/M2
LEFT VENTRICLE SYSTOLIC VOLUME: 27.64 ML
LEFT VENTRICULAR INTERNAL DIMENSION IN DIASTOLE: 5.02 CM (ref 3.5–6)
LEFT VENTRICULAR MASS: 112.43 G
LYMPHOCYTES # BLD AUTO: 0.6 K/UL (ref 1–4.8)
LYMPHOCYTES NFR BLD: 23.3 % (ref 18–48)
MAGNESIUM SERPL-MCNC: 1.4 MG/DL (ref 1.6–2.6)
MCH RBC QN AUTO: 30.8 PG (ref 27–31)
MCHC RBC AUTO-ENTMCNC: 33.5 G/DL (ref 32–36)
MCV RBC AUTO: 92 FL (ref 82–98)
MONOCYTES # BLD AUTO: 0.3 K/UL (ref 0.3–1)
MONOCYTES NFR BLD: 10.9 % (ref 4–15)
NEUTROPHILS # BLD AUTO: 1.4 K/UL (ref 1.8–7.7)
NEUTROPHILS NFR BLD: 55.7 % (ref 38–73)
NRBC BLD-RTO: 0 /100 WBC
PHOSPHATE SERPL-MCNC: 3.1 MG/DL (ref 2.7–4.5)
PISA TR MAX VEL: 2.38 M/S
PLATELET # BLD AUTO: 75 K/UL (ref 150–350)
PMV BLD AUTO: 10.5 FL (ref 9.2–12.9)
POTASSIUM SERPL-SCNC: 3.2 MMOL/L (ref 3.5–5.1)
POTASSIUM SERPL-SCNC: 3.6 MMOL/L (ref 3.5–5.1)
PROT SERPL-MCNC: 5.7 G/DL (ref 6–8.4)
PROTHROMBIN TIME: 18.4 SEC (ref 9–12.5)
RBC # BLD AUTO: 2.86 M/UL (ref 4–5.4)
SODIUM SERPL-SCNC: 134 MMOL/L (ref 136–145)
SODIUM SERPL-SCNC: 134 MMOL/L (ref 136–145)
STRONGYLOIDES ANTIBODY IGG: NEGATIVE
TR MAX PG: 22.66 MMHG
WBC # BLD AUTO: 2.58 K/UL (ref 3.9–12.7)

## 2019-04-29 PROCEDURE — 36415 COLL VENOUS BLD VENIPUNCTURE: CPT

## 2019-04-29 PROCEDURE — 80053 COMPREHEN METABOLIC PANEL: CPT

## 2019-04-29 PROCEDURE — 99499 NO LOS: ICD-10-PCS | Mod: ,,, | Performed by: PHYSICIAN ASSISTANT

## 2019-04-29 PROCEDURE — 85025 COMPLETE CBC W/AUTO DIFF WBC: CPT

## 2019-04-29 PROCEDURE — 88175 CYTOPATH C/V AUTO FLUID REDO: CPT | Mod: NTX

## 2019-04-29 PROCEDURE — 99232 SBSQ HOSP IP/OBS MODERATE 35: CPT | Mod: NTX,,, | Performed by: INTERNAL MEDICINE

## 2019-04-29 PROCEDURE — 25000003 PHARM REV CODE 250: Performed by: NURSE PRACTITIONER

## 2019-04-29 PROCEDURE — 83735 ASSAY OF MAGNESIUM: CPT

## 2019-04-29 PROCEDURE — 63600175 PHARM REV CODE 636 W HCPCS: Performed by: HOSPITALIST

## 2019-04-29 PROCEDURE — 87624 HPV HI-RISK TYP POOLED RSLT: CPT | Mod: NTX

## 2019-04-29 PROCEDURE — 99233 PR SUBSEQUENT HOSPITAL CARE,LEVL III: ICD-10-PCS | Mod: ,,, | Performed by: HOSPITALIST

## 2019-04-29 PROCEDURE — 99499 UNLISTED E&M SERVICE: CPT | Mod: ,,, | Performed by: PHYSICIAN ASSISTANT

## 2019-04-29 PROCEDURE — 85610 PROTHROMBIN TIME: CPT

## 2019-04-29 PROCEDURE — 99233 SBSQ HOSP IP/OBS HIGH 50: CPT | Mod: ,,, | Performed by: HOSPITALIST

## 2019-04-29 PROCEDURE — 99232 PR SUBSEQUENT HOSPITAL CARE,LEVL II: ICD-10-PCS | Mod: NTX,,, | Performed by: INTERNAL MEDICINE

## 2019-04-29 PROCEDURE — 20600001 HC STEP DOWN PRIVATE ROOM

## 2019-04-29 PROCEDURE — 84100 ASSAY OF PHOSPHORUS: CPT

## 2019-04-29 PROCEDURE — 80048 BASIC METABOLIC PNL TOTAL CA: CPT

## 2019-04-29 PROCEDURE — 25000003 PHARM REV CODE 250: Performed by: HOSPITALIST

## 2019-04-29 RX ORDER — LORAZEPAM/0.9% SODIUM CHLORIDE 100MG/0.1L
2 PLASTIC BAG, INJECTION (ML) INTRAVENOUS ONCE
Status: COMPLETED | OUTPATIENT
Start: 2019-04-29 | End: 2019-04-29

## 2019-04-29 RX ORDER — POTASSIUM CHLORIDE 20 MEQ/1
40 TABLET, EXTENDED RELEASE ORAL ONCE
Status: COMPLETED | OUTPATIENT
Start: 2019-04-29 | End: 2019-04-29

## 2019-04-29 RX ORDER — LACTULOSE 10 G/15ML
30 SOLUTION ORAL 3 TIMES DAILY
Status: DISCONTINUED | OUTPATIENT
Start: 2019-04-30 | End: 2019-05-02 | Stop reason: HOSPADM

## 2019-04-29 RX ADMIN — FUROSEMIDE 80 MG: 10 INJECTION, SOLUTION INTRAMUSCULAR; INTRAVENOUS at 09:04

## 2019-04-29 RX ADMIN — PANTOPRAZOLE SODIUM 40 MG: 40 TABLET, DELAYED RELEASE ORAL at 09:04

## 2019-04-29 RX ADMIN — POTASSIUM CHLORIDE 40 MEQ: 1500 TABLET, EXTENDED RELEASE ORAL at 01:04

## 2019-04-29 RX ADMIN — LEVOTHYROXINE SODIUM 112 MCG: 112 TABLET ORAL at 06:04

## 2019-04-29 RX ADMIN — Medication 220 MG: at 09:04

## 2019-04-29 RX ADMIN — SPIRONOLACTONE 200 MG: 100 TABLET, FILM COATED ORAL at 09:04

## 2019-04-29 RX ADMIN — RIFAXIMIN 550 MG: 550 TABLET ORAL at 08:04

## 2019-04-29 RX ADMIN — FUROSEMIDE 80 MG: 10 INJECTION, SOLUTION INTRAMUSCULAR; INTRAVENOUS at 03:04

## 2019-04-29 RX ADMIN — MAGNESIUM SULFATE IN WATER 2 G: 40 INJECTION, SOLUTION INTRAVENOUS at 08:04

## 2019-04-29 RX ADMIN — POTASSIUM CHLORIDE 40 MEQ: 1500 TABLET, EXTENDED RELEASE ORAL at 09:04

## 2019-04-29 RX ADMIN — RIFAXIMIN 550 MG: 550 TABLET ORAL at 09:04

## 2019-04-29 NOTE — PROGRESS NOTES
[facility]  Hepatology  Progress Note    Patient Name: Jihan Zamudio  MRN: [id number]  Admission Date: 4/23/2019  Hospital Length of Stay: 6 days  Attending Provider: Blane Gottlieb MD   Primary Care Physician: Primary Doctor No  Principal Problem:GI bleed    Subjective:     HPI: Mrs. Zamudio is a 50 y/o female with history of KESSLER cirrhosis, was followed by Dr. Smallwood until 5/2018. Her cirrhosis is c/b volume overload, HE, and EV. She was being evaluated for transplant, however was deferred due to high PA pressure on echo. She was evaluated by cardiology who thought RHC is not needed. On last appointment with Dr. Smallwood, she was referred for a second opinion with Dr. Franklin, however the patient was lost to follow up, which she states was due to insurance issues.    The patient was doing well until 4/19 when she started to notice that her abdomen was more distended, and her LE were swollen. On 4/20, she started noticing her stool was black, and her  took her to the ED the next day. On 4/22, the patient underwent a paracentesis at OSH with ~5L removed, negative for SBP. Underwent EGD on 4/23, which was reported with small EV, Grade 2 GOV, and PHG, with blood in stomach, but no active bleeding. She was referred to [facility] for TIPS considerations.    The patient states that she is feeling well today, last BM was last night, and was black looking like coffee grounds. Denies abdominal pain but endorses continuous distention.   She is HDS since arrival    Interval History:   No acute events overnight, scheduled for a stress test today.    Current Facility-Administered Medications   Medication    calcium carbonate 200 mg calcium (500 mg) chewable tablet 1,000 mg    furosemide injection 80 mg    [START ON 4/30/2019] lactulose 20 gram/30 mL solution Soln 30 g    levothyroxine tablet 112 mcg    ondansetron injection 4 mg    ondansetron tablet 4 mg    pantoprazole EC tablet 40 mg    rifAXIMin  tablet 550 mg    sodium chloride 0.9% flush 10 mL    spironolactone tablet 200 mg    zinc sulfate capsule 220 mg       Objective:     Vital Signs (Most Recent):  Temp: 99.7 °F (37.6 °C) (04/29/19 1157)  Pulse: 93 (04/29/19 1157)  Resp: 16 (04/29/19 1157)  BP: 138/60 (04/29/19 1157)  SpO2: (!) 93 % (04/29/19 1157) Vital Signs (24h Range):  Temp:  [97.2 °F (36.2 °C)-99.7 °F (37.6 °C)] 99.7 °F (37.6 °C)  Pulse:  [76-93] 93  Resp:  [16-18] 16  SpO2:  [93 %-96 %] 93 %  BP: (109-138)/(52-62) 138/60     Weight: 109.3 kg (241 lb) (04/28/19 1830)  Body mass index is 40.1 kg/m².    Physical Exam   Constitutional: She is oriented to person, place, and time. No distress.   HENT:   Head: Normocephalic and atraumatic.   Eyes: Scleral icterus is present.   Cardiovascular: Normal rate and regular rhythm.   Pulmonary/Chest: Effort normal and breath sounds normal.   Abdominal: Soft. Bowel sounds are normal. She exhibits distension. She exhibits no mass. There is no tenderness. There is no rebound and no guarding. No hernia.   Musculoskeletal: She exhibits no edema.   Neurological: She is alert and oriented to person, place, and time.   Skin: She is not diaphoretic.   Nursing note and vitals reviewed.      MELD-Na score: 24 at 4/29/2019  2:53 PM  MELD score: 22 at 4/29/2019  2:53 PM  Calculated from:  Serum Creatinine: 0.7 mg/dL (Rounded to 1 mg/dL) at 4/29/2019  2:53 PM  Serum Sodium: 134 mmol/L at 4/29/2019  2:53 PM  Total Bilirubin: 9.4 mg/dL at 4/29/2019  6:07 AM  INR(ratio): 1.9 at 4/29/2019  6:07 AM  Age: 49 years    Significant Labs:  CBC:   Recent Labs   Lab 04/29/19  0607   WBC 2.58*   RBC 2.86*   HGB 8.8*   HCT 26.3*   PLT 75*     CMP:   Recent Labs   Lab 04/29/19  0607 04/29/19  1453   GLU 94 93   CALCIUM 7.8* 7.8*   ALBUMIN 2.4*  --    PROT 5.7*  --    * 134*   K 3.2* 3.6   CO2 26 28    99   BUN 8 8   CREATININE 0.8 0.7   ALKPHOS 103  --    ALT 24  --    AST 79*  --    BILITOT 9.4*  --      Coagulation:    Recent Labs   Lab 04/23/19  2355  04/29/19  0607   INR 2.1*   < > 1.9*   APTT 35.1*  --   --     < > = values in this interval not displayed.       Significant Imaging:  US: Reviewed    Assessment/Plan:     Decompensated hepatic cirrhosis  49 year old female with history of KESSLER cirrhosis currently undergoing inpatient evaluation for liver transplant. Patient stable with no acute events overnight therefore plan is to proceed with testing and committee presentation on Wednesday.    Recommendations:  --Daily CMP, CBC, and INR  --Strict I/O and monitor Cr closely  --Continue diuretics  --Continue lactulose, rifaximin, zinc  --Continue inpatient evaluation for presentation on Wednesday (pending: OB consult, ID consult, nutrition consult, mammogram)        Thank you for your consult. I will follow-up with patient. Please contact us if you have any additional questions.    Alexa Richard M.D.  Gastroenterology Fellow, PGY-V  Pager: [phone]  [facility]

## 2019-04-29 NOTE — PHYSICIAN QUERY
PT Name: Jihan Zamudio  MR #: [id number]  Physician Query Form -Hematology Clarification      Denise Hernandez RN, CCDS  Desk # [phone]; smooth # [phone] [email or web address]      This form is a permanent document in the medical record.      Query Date: April 29, 2019    By submitting this query, we are merely seeking further clarification of documentation. Please utilize your independent clinical judgment when addressing the question(s) below.    The medical record contains the following:   Indicators  Supporting Clinical Findings Location in Medical Record   x PT        INR PT:    21.4 - 18.4  INR:     2.2 -   1.9 Lab 4/23 - 29   x Platelets Plts: 54 - 89 Lab 4/23 - 29   x Coagulopathy or Coagulation Defect documented # Coagulopathy  # Thrombocytopenia     Hosp Pn 4/28    Medication     x Treatment Vitamin K IV daily x3 doses 4/16 - 28  - vitamin K for 3 days  - DIC labs Mar  Hosp Pn 4/28   x Other Decompensated KESSLER Cirrhosis with ascites  Anasarca  Pulmonary HTN  Acute on Chronic Hepatic Encephalopathy  Hyponatremia  Morbid Obesity   Hypothyroidism  Moderate protein terrence malnutrition  Hypokalemia Hosp Pn 4/28     Provider, please specify diagnosis or diagnoses associated with above clinical findings.  [   ] Abnormal INR/Coagulation Profile   [   ] Coagulation Defect (please specify):   [   ] Coagulopathy Secondary to Anticoagulation Therapy   [   ] DIC (Disseminated Intravascular Coagulation)   [ x  ] Coagulation Factor Deficiency due to Liver Disease   [   ] Coagulation Factor Deficiency due to Vitamin K Deficiency   [   ] Other (please specify):   [  ] Clinically Undetermined     Please document in your progress notes daily for the duration of treatment until resolved, and include in your discharge summary.

## 2019-04-29 NOTE — CONSULTS
[facility]  Infectious Disease  Consult Note    Patient Name: Jihan Zamudio  MRN: [id number]  Admission Date: 4/23/2019  Hospital Length of Stay: 6 days  Attending Physician: Blane Gottlieb MD  Primary Care Provider: Primary Doctor No         Inpatient consult to Infectious Diseases  Consult performed by: LUI Cain Jr.  Consult ordered by: Blane Gottlieb MD        Consult received.  Full consult to follow.    LUI Schwarz  Infectious Disease  [facility]

## 2019-04-29 NOTE — ANESTHESIA PREPROCEDURE EVALUATION
[facility] [address]  Anesthesia Pre-Operative Evaluation         Patient Name: Jihan Zamudio  YOB: 1970  MRN: [id number]    SUBJECTIVE:     04/29/2019    Pre-operative evaluation for Procedure(s) (LRB):  COLONOSCOPY (N/A)    Jihan Zamudio is a 49 y.o. 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  Patient currently undergoing inpatient evaluation for possible transplant.     Patient now presents for the above procedure(s).      LDA:          Peripheral IV - Single Lumen 04/23/19 1500 18 G Left Antecubital (Active)   Site Assessment Clean;Dry;Intact 4/28/2019  7:35 PM   Line Status Saline locked 4/28/2019  7:35 PM   Dressing Status Clean;Dry;Intact 4/28/2019  7:35 PM   Dressing Intervention Dressing changed 4/23/2019 11:30 PM   Dressing Change Due 04/27/19 4/25/2019  8:00 PM   Site Change Due 04/27/19 4/25/2019  8:00 PM   Reason Not Rotated Not due 4/25/2019  8:00 PM   Number of days: 5            Peripheral IV - Single Lumen 04/28/19 0434 22 G Left;Posterior Hand (Active)   Number of days: 1       Previous airway:   None documented.      Drips:   None documented.      Patient Active Problem List   Diagnosis    Decompensated hepatic cirrhosis    Morbid obesity    Essential hypertension    KADEEM (obstructive sleep apnea)    Hypothyroidism    Osteoarthritis    GERD (gastroesophageal reflux disease)    Fatty liver    Vitamin D deficiency    Hx of colonic polyps    Esophageal varices    Pulmonary nodule    History of Acute appendicitis    Pre-transplant evaluation for liver transplant    GI bleed    Moderate malnutrition    Acute blood loss anemia    Coagulopathy    Anasarca    Other ascites    Pulmonary hypertension    Hyponatremia    Hepatic encephalopathy       Review of patient's allergies indicates:   Allergen Reactions    Metformin Rash    Pcn [penicillins]  Other (See Comments)     Unsure of reaction, states it was as a child. Tolerated rocephin at [facility]       Current Inpatient Medications:   furosemide  80 mg Intravenous TID    [START ON 2019] lactulose  30 g Oral TID    levothyroxine  112 mcg Oral Before breakfast    pantoprazole  40 mg Oral Daily    potassium chloride SA  40 mEq Oral Once    rifAXImin  550 mg Oral BID    spironolactone  200 mg Oral Daily    zinc sulfate  220 mg Oral Daily       No current facility-administered medications on file prior to encounter.      Current Outpatient Medications on File Prior to Encounter   Medication Sig Dispense Refill    acetaminophen (TYLENOL) 500 MG tablet Take 1,000 mg by mouth daily as needed for Pain (headache and bodyaches).      ciprofloxacin HCl (CIPRO) 500 MG tablet Take 500 mg by mouth once daily.      cranberry extract 50 mg Chew Take 1 tablet by mouth daily as needed.      ergocalciferol (ERGOCALCIFEROL) 50,000 unit Cap Take 50,000 Units by mouth every 7 days. Monday      ergocalciferol, vitamin D2, 2,000 unit Tab Take 1 tablet by mouth once daily.      fexofenadine (ALLEGRA) 180 MG tablet Take 180 mg by mouth daily as needed.      furosemide (LASIX) 20 MG tablet Take 20 mg by mouth once daily.      levothyroxine (SYNTHROID) 112 MCG tablet Take 112 mcg by mouth once daily.      lidocaine (LIDODERM) 5 % Place 1 patch onto the skin every 24 hours. Remove & Discard patch within 12 hours  As needed or as directed by MD      rifAXImin (XIFAXAN) 200 mg Tab Take 550 mg by mouth 2 (two) times daily.      spironolactone (ALDACTONE) 25 MG tablet Take 25 mg by mouth once daily.      omeprazole (PRILOSEC) 40 MG capsule Take 40 mg by mouth every morning.          Past Surgical History:   Procedure Laterality Date     SECTION      TIMES 2     CHOLECYSTECTOMY      LAPAROSCOPIC     CYSTOSCOPY W/ STONE MANIPULATION      kidney stone removal    EGD (ESOPHAGOGASTRODUODENOSCOPY) N/A  4/24/2019    Performed by Davian Hernández MD at [facility] ENDO (2ND FLR)    LAPAROSCOPIC GASTRIC BANDING  2006    removal 2009    LIVER BIOPSY  11/29/2017    KESSLER with bridging 11/29/17       Social History     Socioeconomic History    Marital status:      Spouse name: Not on file    Number of children: Not on file    Years of education: Not on file    Highest education level: Not on file   Occupational History    Occupation: homemaker   Social Needs    Financial resource strain: Not on file    Food insecurity:     Worry: Not on file     Inability: Not on file    Transportation needs:     Medical: Not on file     Non-medical: Not on file   Tobacco Use    Smoking status: Never Smoker    Smokeless tobacco: Never Used    Tobacco comment: patient denies   Substance and Sexual Activity    Alcohol use: No     Comment: patient denies    Drug use: No     Comment: patient denies    Sexual activity: Not on file   Lifestyle    Physical activity:     Days per week: Not on file     Minutes per session: Not on file    Stress: Not on file   Relationships    Social connections:     Talks on phone: Not on file     Gets together: Not on file     Attends [employer]ist service: Not on file     Active member of club or organization: Not on file     Attends meetings of clubs or organizations: Not on file     Relationship status: Not on file   Other Topics Concern    Not on file   Social History Narrative    Not on file       OBJECTIVE:     Vital Signs Range (Last 24H):  Temp:  [36.2 °C (97.2 °F)-37.6 °C (99.7 °F)]   Pulse:  [76-93]   Resp:  [16-20]   BP: (109-138)/(52-62)   SpO2:  [93 %-96 %]       Significant Labs:  Lab Results   Component Value Date    WBC 2.58 (L) 04/29/2019    HGB 8.8 (L) 04/29/2019    HCT 26.3 (L) 04/29/2019    PLT 75 (L) 04/29/2019    ALT 24 04/29/2019    AST 79 (H) 04/29/2019     (L) 04/29/2019    K 3.2 (L) 04/29/2019     04/29/2019    CREATININE 0.8 04/29/2019    BUN 8 04/29/2019     CO2 26 04/29/2019    TSH 1.244 04/23/2019    INR 1.9 (H) 04/29/2019         Diagnostic Studies:  No relevant studies.      Cardiac Studies:  EKG:   No recent studies available.    2D Echo:  · Normal left ventricular systolic function. The estimated ejection fraction is 65%  · Normal LV diastolic function.  · No wall motion abnormalities.  · Normal right ventricular systolic function.  · Moderate left atrial enlargement.  · Mild right atrial enlargement.  · Mild mitral regurgitation.  · Mild tricuspid regurgitation.  · There is trace tricuspid regurgitation.  The Doppler signal is faint, but the best estimate of the PA systolic pressure is 26mm Hg, which is normal.    ASSESSMENT/PLAN:         Anesthesia Evaluation    I have reviewed the Patient Summary Reports.    I have reviewed the Nursing Notes.   I have reviewed the Medications.     Review of Systems  Anesthesia Hx:  No problems with previous Anesthesia Denies Hx of Anesthetic complications  History of prior surgery of interest to airway management or planning:  Denies Personal Hx of Anesthesia complications.   Social:  Non-Smoker, No Alcohol Use    Hematology/Oncology:     Oncology Normal    -- Anemia: Hematology Comments: 8.8/26    EENT/Dental:EENT/Dental Normal   Cardiovascular:   Hypertension    Pulmonary:   Denies COPD.  Denies Asthma. Sleep Apnea, CPAP    Renal/:   Denies Chronic Renal Disease. renal calculi     Hepatic/GI:   GERD, well controlled Liver Disease,    Musculoskeletal:   Arthritis     Endocrine:   Hypothyroidism        Physical Exam  General:  Jaundice, Morbid Obesity    Airway/Jaw/Neck:  Airway Findings: Mouth Opening: Normal Tongue: Normal  Mallampati: III  Improves to II with phonation.  TM Distance: Normal, at least 6 cm  Jaw/Neck Findings:  Neck ROM: Normal ROM      Dental:  Dental Findings:    Chest/Lungs:  Chest/Lungs Findings: Clear to auscultation, Normal Respiratory Rate     Heart/Vascular:  Heart Findings: Rate: Normal  Rhythm:  Regular Rhythm  Sounds: Normal  Heart Murmur  Systolic  Systolic Heart Murmur Description: Holosystolic  Systolic Heart Murmur Grade: Grade III        Mental Status:  Mental Status Findings:  Cooperative, Alert and Oriented         Anesthesia Plan  Type of Anesthesia, risks & benefits discussed:  Anesthesia Type:  general, MAC  Patient's Preference:   Intra-op Monitoring Plan: standard ASA monitors  Intra-op Monitoring Plan Comments:   Post Op Pain Control Plan: multimodal analgesia, IV/PO Opioids PRN and per primary service following discharge from PACU  Post Op Pain Control Plan Comments:   Induction:   IV  Beta Blocker:  Patient is not currently on a Beta-Blocker (No further documentation required).       Informed Consent: Patient understands risks and agrees with Anesthesia plan.  Questions answered. Anesthesia consent signed with patient.  ASA Score: 3     Day of Surgery Review of History & Physical:    H&P update referred to the provider.         Ready For Surgery From Anesthesia Perspective.

## 2019-04-29 NOTE — NURSING
Patient transferred to room 8071 via w/c with PCT, PIV to LAC 18 g SL and Rt wrist 22g SL intact without complication, wafer bag 1 piece intact to RLQ ABD, puncture site, NAD.  with patient.

## 2019-04-29 NOTE — CONSULTS
Consult Note  Gynecology    Consult Requested By: Transplant team  Reason for Consult: Screening for liver transplant    SUBJECTIVE:     History of Present Illness:  Patient is a 49 y.o. with liver failure currently being evaluated for liver transplant. GYN services requested for pap smear and well woman exam.    Patient is postmenopausal, sees physicians at [facility] regular GYN for annual exams. She has no gynecologic complaints at this time. Denies vaginal bleeding, abnormal discharge, difficulty with urination, incontinence, breast masses, skin changes, or nipple discharge.     OB History:     x2    GYN History:  Menopause at age 44, LMP age 44 (prior to endometrial ablation), cycles irregular prior to that, lasting 7-14days with heavy flow  Last pap 2 years ago, reports was normal  Denies h/o abnormal paps or excisional procedures  Not sexually active  Denies h/o STDs  Last MMG 2 years ago, reports was normal  Denies h/o abnormal MMG    Family History:  Mother with history of colon cancer treated surgically,  from other cause  No family history of breast, uterine, or ovarian cancer.    PMHx:   Past Medical History:   Diagnosis Date    Cirrhosis     Esophageal varices 2018    Small with no banding     Essential hypertension 2018    Fatty liver 2018    GERD (gastroesophageal reflux disease)     Hx of colonic polyps 2018    On colonoscopy     Hypertension     Hypothyroidism 2018    Kidney stones     Morbid obesity 2018    Lap band with subsequent release    KADEEM (obstructive sleep apnea) 2018    Osteoarthritis 2018    Pulmonary nodule 2018    Vitamin D deficiency 2018       PSHx:   Past Surgical History:   Procedure Laterality Date     SECTION      TIMES 2     CHOLECYSTECTOMY      LAPAROSCOPIC     CYSTOSCOPY W/ STONE MANIPULATION      kidney stone removal    EGD (ESOPHAGOGASTRODUODENOSCOPY) N/A  4/24/2019    Performed by [facility] at [facility]O (2ND FLR)    LAPAROSCOPIC GASTRIC BANDING  2006    removal 2009    LIVER BIOPSY  11/29/2017    KESSLER with bridging 11/29/17       All:   Review of patient's allergies indicates:   Allergen Reactions    Metformin Rash    Pcn [penicillins] Other (See Comments)     Unsure of reaction, states it was as a child. Tolerated rocephin at [facility]       Meds:   Medications Prior to Admission   Medication Sig Dispense Refill Last Dose    acetaminophen (TYLENOL) 500 MG tablet Take 1,000 mg by mouth daily as needed for Pain (headache and bodyaches).       ciprofloxacin HCl (CIPRO) 500 MG tablet Take 500 mg by mouth once daily.       cranberry extract 50 mg Chew Take 1 tablet by mouth daily as needed.       ergocalciferol (ERGOCALCIFEROL) 50,000 unit Cap Take 50,000 Units by mouth every 7 days. Monday       ergocalciferol, vitamin D2, 2,000 unit Tab Take 1 tablet by mouth once daily.       fexofenadine (ALLEGRA) 180 MG tablet Take 180 mg by mouth daily as needed.       furosemide (LASIX) 20 MG tablet Take 20 mg by mouth once daily.       levothyroxine (SYNTHROID) 112 MCG tablet Take 112 mcg by mouth once daily.   Taking    lidocaine (LIDODERM) 5 % Place 1 patch onto the skin every 24 hours. Remove & Discard patch within 12 hours  As needed or as directed by MD       rifAXImin (XIFAXAN) 200 mg Tab Take 550 mg by mouth 2 (two) times daily.       spironolactone (ALDACTONE) 25 MG tablet Take 25 mg by mouth once daily.       omeprazole (PRILOSEC) 40 MG capsule Take 40 mg by mouth every morning.    Taking       SH:   Social History     Socioeconomic History    Marital status:      Spouse name: Not on file    Number of children: Not on file    Years of education: Not on file    Highest education level: Not on file   Occupational History    Occupation: homemaker   Social Needs    Financial resource strain: Not on file    Food insecurity:     Worry: Not on  file     Inability: Not on file    Transportation needs:     Medical: Not on file     Non-medical: Not on file   Tobacco Use    Smoking status: Never Smoker    Smokeless tobacco: Never Used    Tobacco comment: patient denies   Substance and Sexual Activity    Alcohol use: No     Comment: patient denies    Drug use: No     Comment: patient denies    Sexual activity: Not on file   Lifestyle    Physical activity:     Days per week: Not on file     Minutes per session: Not on file    Stress: Not on file   Relationships    Social connections:     Talks on phone: Not on file     Gets together: Not on file     Attends Hindu service: Not on file     Active member of club or organization: Not on file     Attends meetings of clubs or organizations: Not on file     Relationship status: Not on file   Other Topics Concern    Not on file   Social History Narrative    Not on file       FH:   Family History   Problem Relation Age of Onset    Heart disease Mother     Cancer Mother 50        colon cancer    Heart disease Father     Cancer Father 65        liver cancer       Review of Systems:  Constitutional: no fever or chills  Respiratory: no cough or shortness of breath  Cardiovascular: no chest pain or palpitations  Gastrointestinal: no nausea or vomiting, tolerating diet, negative for change in bowel habits  Genitourinary: no hematuria or dysuria, negative for urinary incontinence  Integument/Breast: no rash or pruritis, negative for breast lump, nipple discharge and skin lesion(s)  Hematologic/Lymphatic: no easy bruising or lymphadenopathy     OBJECTIVE:     Temp:  [97.2 °F (36.2 °C)-99.7 °F (37.6 °C)] 99.7 °F (37.6 °C)  Pulse:  [76-93] 93  Resp:  [16-20] 16  SpO2:  [93 %-96 %] 93 %  BP: (109-138)/(52-62) 138/60    Physical Exam:  GEN: No apparent distress. Alert and oriented. Obese  NECK: No thyroid tenderness, no palpable masses or nodules.  CV: Regular rate   LUNGS: No increased work of breathing. No  wheezes  BREASTS: No tenderness or palpable masses. No skin changes or nipple discharge. No axillary lymphadenopathy.  ABDOMEN: Soft, non tender, non-distended. No guarding or rebound tenderness.  EXT: No erythema, no edema  EXT. GENITALIA: appear normal, no lesions noted  VAGINA: Pink, moist, and atrophic. No blood or discharge present, no lesions noted  CERVIX: Appears normal, no lesions noted, no cervical motion tenderness  UTERUS: Difficult to assess uterine size 2/2 body habitus. Nontender  ADNEXA: No palpable masses, no adnexal tenderness        ASSESSMENT/PLAN:     Patient Active Problem List   Diagnosis    Decompensated hepatic cirrhosis    Morbid obesity    Essential hypertension    KADEEM (obstructive sleep apnea)    Hypothyroidism    Osteoarthritis    GERD (gastroesophageal reflux disease)    Fatty liver    Vitamin D deficiency    Hx of colonic polyps    Esophageal varices    Pulmonary nodule    History of Acute appendicitis    Pre-transplant evaluation for liver transplant    GI bleed    Moderate malnutrition    Acute blood loss anemia    Coagulopathy    Anasarca    Other ascites    Pulmonary hypertension    Hyponatremia    Hepatic encephalopathy           Gynecologic malignancy screening. Low risk of malignancy at this time.  - Pap and HPV collected and sent to pathology lab. Will follow up results and contact patient if further management is indicated.  - MMG order in place  - Recommend yearly well woman exams as an outpatient    Thank you for the consult. Please call GYN with any questions.  Will sign off.    Ligia Ko MD  Ob/Gyn PGY-3

## 2019-04-29 NOTE — ASSESSMENT & PLAN NOTE
49 year old female with history of KESSLER cirrhosis currently undergoing inpatient evaluation for liver transplant. Patient stable with no acute events overnight therefore plan is to proceed with testing and committee presentation on Wednesday.    Recommendations:  --Daily CMP, CBC, and INR  --Strict I/O and monitor Cr closely  --Continue diuretics  --Continue lactulose, rifaximin, zinc  --Continue inpatient evaluation for presentation on Wednesday (pending: OB consult, ID consult, nutrition consult, mammogram)

## 2019-04-29 NOTE — PROGRESS NOTES
Progress Note   Hospital Medicine         Patient Name: Jihan Zamudio  MRN:  [id number]  [facility] [place]: [facility] Blane Gottlieb MD  Date of Admission:  4/23/2019     Length of Stay:  LOS: 6 days   Expected Discharge Date: 5/2/2019  Principal Problem:  GI bleed       Subjective:     Interval History/Overnight Events:  Patient's mentation is much improved today, had several BMs over night and put out good diuresis; planning for pet stress today, C-scope and mammogram tomorrow and presentation Wednesday; can likely be d/butch Wednesday  - will attempt another paracentesis today    Review of Systems   Constitutional: Negative for chills, fatigue, fever.   HENT: Negative for sore throat, trouble swallowing.    Eyes: Negative for photophobia, visual disturbance.   Respiratory: Negative for cough, shortness of breath.    Cardiovascular: Negative for chest pain, palpitations, leg swelling.   Gastrointestinal: Negative for abdominal pain, constipation, diarrhea, nausea, vomiting.   Endocrine: Negative for cold intolerance, heat intolerance.   Genitourinary: Negative for dysuria, frequency.   Musculoskeletal: Negative for arthralgias, myalgias.   Skin: Negative for rash, wound, erythema   Neurological: Negative for dizziness, syncope, weakness, light-headedness.   Psychiatric/Behavioral: Negative for confusion, hallucinations, anxiety  All other systems reviewed and are negative.    Objective:     Temp:  [97.2 °F (36.2 °C)-99.7 °F (37.6 °C)]   Pulse:  [76-93]   Resp:  [16-20]   BP: (109-138)/(52-62)   SpO2:  [93 %-96 %]       Physical Exam:  Constitutional: appears weak and ill  Head: Normocephalic and atraumatic.   Mouth/Throat: Oropharynx is clear and moist.   Eyes: EOM are normal. Pupils are equal, round, and reactive to light. No scleral icterus.   Neck: Normal range of motion. Neck supple.   Cardiovascular: Normal rate and regular rhythm.  No murmur heard.  Pulmonary/Chest: Effort normal and breath sounds  normal. No respiratory distress. No wheezes, rales, or rhonchi  Abdominal: Soft. Bowel sounds are normal.  Positive distension, no TTP  Musculoskeletal: Normal range of motion. Plus 3 pitting edema.   Neurological: Alert and oriented to person, place, and time.   Skin: Skin is warm and dry.   Psychiatric: Normal mood and affect. Behavior is normal.     Recent Labs   Lab 04/26/19  0243 04/26/19  0856 04/26/19  1801 04/27/19  0329 04/28/19  0345 04/29/19  0607   WBC 3.15* 2.98* 3.78* 2.67* 2.43* 2.58*   HGB 8.3* 8.7* 9.3* 8.4* 8.1* 8.8*   HCT 26.7* 27.3* 28.2* 25.2* 24.6* 26.3*   PLT 64* 70* 78* 54* 68* 75*     Recent Labs   Lab 04/27/19  0329 04/28/19  0345 04/29/19  0607   * 134* 134*   K 3.0* 3.5 3.2*    101 100   CO2 26 25 26   BUN 8 8 8   CREATININE 0.7 0.7 0.8   GLU 88 88 94   CALCIUM 7.3* 7.3* 7.8*   MG 1.5* 1.6 1.4*   PHOS 2.8 2.4* 3.1     Recent Labs   Lab 04/27/19  0329 04/28/19  0345 04/29/19  0607   ALKPHOS 94 88 103   ALT 20 22 24   AST 76* 72* 79*   ALBUMIN 2.2* 2.1* 2.4*   PROT 5.1* 5.1* 5.7*   BILITOT 7.8* 8.1* 9.4*   INR 2.0* 1.9* 1.9*     Recent Labs   Lab 04/24/19  0009   POCTGLUCOSE 124*        furosemide  80 mg Intravenous TID    [START ON 4/30/2019] lactulose  30 g Oral TID    levothyroxine  112 mcg Oral Before breakfast    pantoprazole  40 mg Oral Daily    rifAXImin  550 mg Oral BID    spironolactone  200 mg Oral Daily    zinc sulfate  220 mg Oral Daily       Assessment and Plan     Ms. Jihan Zamudio is a 49 y.o. female who presented to [facility] on 4/23/2019 with     Hospital Course:    Ms. Jihan Zamudio was admitted to Hospital Medicine for management of     Active Hospital Problems    Diagnosis  POA    *GI bleed [K92.2]  Yes    Acute blood loss anemia [D62]  Yes    Coagulopathy [D68.9]  Yes    Anasarca [R60.1]  Yes    Other ascites [R18.8]  Yes    Pulmonary hypertension [I27.20]  Yes    Hyponatremia [E87.1]  Yes    Hepatic encephalopathy [K72.90]  Yes     Moderate malnutrition [E44.0]  Yes    Pre-transplant evaluation for liver transplant [Z01.818]  Not Applicable    Essential hypertension [I10]  Yes    Hypothyroidism [E03.9]  Yes    Fatty liver [K76.0]  Yes     Dx 2006      Decompensated hepatic cirrhosis [K72.90]  Yes     From KESSLER    Liver biopsy 11/29/17- KESSLER with bridging fibrosis, Laura, interface activity; lymph and occ plasma cells      Esophageal varices [I85.00]  Yes     Small with no banding 07/17      Morbid obesity [E66.01]  Yes     Lap band 2006 (lost 75-80 lbs) with subsequent release (2009) (due to obstruction)        Resolved Hospital Problems   No resolved problems to display.     # Gastrointestinal hemorrhage with melena  # Acute blood loss anemia  # Portal Hypertensive gastropathy  - s/p EGD with APC treatment  - H/H is stable  - cont rocephin for 5 days, completed    # Decompensated KESSLER Cirrhosis with ascites  MELD-Na score: 24 at 4/29/2019  6:07 AM  MELD score: 22 at 4/29/2019  6:07 AM  Calculated from:  Serum Creatinine: 0.8 mg/dL (Rounded to 1 mg/dL) at 4/29/2019  6:07 AM  Serum Sodium: 134 mmol/L at 4/29/2019  6:07 AM  Total Bilirubin: 9.4 mg/dL at 4/29/2019  6:07 AM  INR(ratio): 1.9 at 4/29/2019  6:07 AM  Age: 49 years  - hepatology consulted  - under going liver transplant eval  - multiple consulted and tests pending  - IR paracentesis on 4/26 with 4.5L removed and negative for SBP  - on IV diuresis   - pet stress today, C-scope tomorrow and mammogram wednesday and presentation wednesday    # Anasarca  # Pulmonary HTN  - echo shows normal EF  - started on IV lasix 40 mg IV TID and aldactone, put out 6L  - strict I/O and daily weights  - repeat 2d echo shows PA systolic of 26    # Coagulopathy  # Thrombocytopenia  - vitamin K for 3 days  - DIC labs    # Acute on Chronic Hepatic Encephalopathy  - cont rifaximin  - doing much better after several BMs with lactulose, continue     # Hyponatremia  - fluid restrict to 1.5L    # Morbid  Obesity   Body mass index is 40.1 kg/m².  - dietary     # Hypothyroidism  - cont levothyroxine     # Moderate protein terrence malnutrition  - PAB and dietary     # Hypokalemia  - replace    Diet: low sodium, fluid restrict    GI PPx:    DVT PPx:    Goals of Care:  full      Disposition:  Possibly wednesady; under going liver tx eval, will be presented then    Blane Gottlieb MD  Medical Director [facility]  Spectra:  [phone]  Pager: [phone]

## 2019-04-29 NOTE — PT/OT/SLP PROGRESS
Physical Therapy      Patient Name:  Jihan Zamudio   MRN:  [id number]    Patient not seen today secondary to Unavailable (Comment)(MD in room ). Will follow-up as able.    Clemente Marshall, PT

## 2019-04-29 NOTE — SUBJECTIVE & OBJECTIVE
Interval History:   No acute events overnight, scheduled for a stress test today.    Current Facility-Administered Medications   Medication    calcium carbonate 200 mg calcium (500 mg) chewable tablet 1,000 mg    furosemide injection 80 mg    [START ON 4/30/2019] lactulose 20 gram/30 mL solution Soln 30 g    levothyroxine tablet 112 mcg    ondansetron injection 4 mg    ondansetron tablet 4 mg    pantoprazole EC tablet 40 mg    rifAXIMin tablet 550 mg    sodium chloride 0.9% flush 10 mL    spironolactone tablet 200 mg    zinc sulfate capsule 220 mg       Objective:     Vital Signs (Most Recent):  Temp: 99.7 °F (37.6 °C) (04/29/19 1157)  Pulse: 93 (04/29/19 1157)  Resp: 16 (04/29/19 1157)  BP: 138/60 (04/29/19 1157)  SpO2: (!) 93 % (04/29/19 1157) Vital Signs (24h Range):  Temp:  [97.2 °F (36.2 °C)-99.7 °F (37.6 °C)] 99.7 °F (37.6 °C)  Pulse:  [76-93] 93  Resp:  [16-18] 16  SpO2:  [93 %-96 %] 93 %  BP: (109-138)/(52-62) 138/60     Weight: 109.3 kg (241 lb) (04/28/19 1830)  Body mass index is 40.1 kg/m².    Physical Exam   Constitutional: She is oriented to person, place, and time. No distress.   HENT:   Head: Normocephalic and atraumatic.   Eyes: Scleral icterus is present.   Cardiovascular: Normal rate and regular rhythm.   Pulmonary/Chest: Effort normal and breath sounds normal.   Abdominal: Soft. Bowel sounds are normal. She exhibits distension. She exhibits no mass. There is no tenderness. There is no rebound and no guarding. No hernia.   Musculoskeletal: She exhibits no edema.   Neurological: She is alert and oriented to person, place, and time.   Skin: She is not diaphoretic.   Nursing note and vitals reviewed.      MELD-Na score: 24 at 4/29/2019  2:53 PM  MELD score: 22 at 4/29/2019  2:53 PM  Calculated from:  Serum Creatinine: 0.7 mg/dL (Rounded to 1 mg/dL) at 4/29/2019  2:53 PM  Serum Sodium: 134 mmol/L at 4/29/2019  2:53 PM  Total Bilirubin: 9.4 mg/dL at 4/29/2019  6:07 AM  INR(ratio): 1.9 at  4/29/2019  6:07 AM  Age: 49 years    Significant Labs:  CBC:   Recent Labs   Lab 04/29/19  0607   WBC 2.58*   RBC 2.86*   HGB 8.8*   HCT 26.3*   PLT 75*     CMP:   Recent Labs   Lab 04/29/19  0607 04/29/19  1453   GLU 94 93   CALCIUM 7.8* 7.8*   ALBUMIN 2.4*  --    PROT 5.7*  --    * 134*   K 3.2* 3.6   CO2 26 28    99   BUN 8 8   CREATININE 0.8 0.7   ALKPHOS 103  --    ALT 24  --    AST 79*  --    BILITOT 9.4*  --      Coagulation:   Recent Labs   Lab 04/23/19  2355  04/29/19  0607   INR 2.1*   < > 1.9*   APTT 35.1*  --   --     < > = values in this interval not displayed.       Significant Imaging:  US: Reviewed

## 2019-04-30 ENCOUNTER — ANESTHESIA (OUTPATIENT)
Dept: TRANSPLANT | Facility: HOSPITAL | Age: 49
End: 2019-04-30

## 2019-04-30 LAB
ALBUMIN SERPL BCP-MCNC: 2.4 G/DL (ref 3.5–5.2)
ALP SERPL-CCNC: 101 U/L (ref 55–135)
ALT SERPL W/O P-5'-P-CCNC: 25 U/L (ref 10–44)
ANION GAP SERPL CALC-SCNC: 8 MMOL/L (ref 8–16)
AST SERPL-CCNC: 80 U/L (ref 10–40)
BASOPHILS # BLD AUTO: 0.03 K/UL (ref 0–0.2)
BASOPHILS NFR BLD: 0.8 % (ref 0–1.9)
BILIRUB SERPL-MCNC: 10.1 MG/DL (ref 0.1–1)
BUN SERPL-MCNC: 9 MG/DL (ref 6–20)
CALCIUM SERPL-MCNC: 7.9 MG/DL (ref 8.7–10.5)
CHLORIDE SERPL-SCNC: 97 MMOL/L (ref 95–110)
CMV IGG SERPL QL IA: REACTIVE
CO2 SERPL-SCNC: 27 MMOL/L (ref 23–29)
CREAT SERPL-MCNC: 0.8 MG/DL (ref 0.5–1.4)
DIFFERENTIAL METHOD: ABNORMAL
EOSINOPHIL # BLD AUTO: 0.3 K/UL (ref 0–0.5)
EOSINOPHIL NFR BLD: 7.7 % (ref 0–8)
ERYTHROCYTE [DISTWIDTH] IN BLOOD BY AUTOMATED COUNT: 17.8 % (ref 11.5–14.5)
EST. GFR  (AFRICAN AMERICAN): >60 ML/MIN/1.73 M^2
EST. GFR  (NON AFRICAN AMERICAN): >60 ML/MIN/1.73 M^2
GLUCOSE SERPL-MCNC: 87 MG/DL (ref 70–110)
HCT VFR BLD AUTO: 26.5 % (ref 37–48.5)
HGB BLD-MCNC: 8.7 G/DL (ref 12–16)
IMM GRANULOCYTES # BLD AUTO: 0.02 K/UL (ref 0–0.04)
IMM GRANULOCYTES NFR BLD AUTO: 0.5 % (ref 0–0.5)
INR PPP: 1.9 (ref 0.8–1.2)
LYMPHOCYTES # BLD AUTO: 0.8 K/UL (ref 1–4.8)
LYMPHOCYTES NFR BLD: 21 % (ref 18–48)
M TB IFN-G CD4+ BCKGRND COR BLD-ACNC: 0 IU/ML
MAGNESIUM SERPL-MCNC: 1.7 MG/DL (ref 1.6–2.6)
MCH RBC QN AUTO: 30.2 PG (ref 27–31)
MCHC RBC AUTO-ENTMCNC: 32.8 G/DL (ref 32–36)
MCV RBC AUTO: 92 FL (ref 82–98)
MITOGEN IGNF BCKGRD COR BLD-ACNC: 0.77 IU/ML
MITOGEN IGNF BCKGRD COR BLD-ACNC: NEGATIVE [IU]/ML
MONOCYTES # BLD AUTO: 0.4 K/UL (ref 0.3–1)
MONOCYTES NFR BLD: 10.7 % (ref 4–15)
NEUTROPHILS # BLD AUTO: 2.2 K/UL (ref 1.8–7.7)
NEUTROPHILS NFR BLD: 59.3 % (ref 38–73)
NIL: 0.05 IU/ML
NRBC BLD-RTO: 0 /100 WBC
PHOSPHATE SERPL-MCNC: 2.6 MG/DL (ref 2.7–4.5)
PHOSPHATIDYLETHANOL (PETH): NEGATIVE NG/ML
PLATELET # BLD AUTO: 94 K/UL (ref 150–350)
PMV BLD AUTO: 12.3 FL (ref 9.2–12.9)
POTASSIUM SERPL-SCNC: 3.6 MMOL/L (ref 3.5–5.1)
PROT SERPL-MCNC: 5.8 G/DL (ref 6–8.4)
PROTHROMBIN TIME: 18.7 SEC (ref 9–12.5)
RBC # BLD AUTO: 2.88 M/UL (ref 4–5.4)
SODIUM SERPL-SCNC: 132 MMOL/L (ref 136–145)
TB2 - NIL: -0.01 IU/ML
VARICELLA INTERPRETATION: POSITIVE
VARICELLA ZOSTER IGG: 4.22 ISR (ref 0–0.9)
WBC # BLD AUTO: 3.66 K/UL (ref 3.9–12.7)

## 2019-04-30 PROCEDURE — 80053 COMPREHEN METABOLIC PANEL: CPT | Mod: NTX

## 2019-04-30 PROCEDURE — 97161 PT EVAL LOW COMPLEX 20 MIN: CPT | Mod: NTX

## 2019-04-30 PROCEDURE — 25000003 PHARM REV CODE 250: Mod: NTX | Performed by: NURSE PRACTITIONER

## 2019-04-30 PROCEDURE — 85025 COMPLETE CBC W/AUTO DIFF WBC: CPT | Mod: NTX

## 2019-04-30 PROCEDURE — 86635 COCCIDIOIDES ANTIBODY: CPT | Mod: NTX

## 2019-04-30 PROCEDURE — 84100 ASSAY OF PHOSPHORUS: CPT | Mod: NTX

## 2019-04-30 PROCEDURE — 36415 COLL VENOUS BLD VENIPUNCTURE: CPT | Mod: NTX

## 2019-04-30 PROCEDURE — 25000003 PHARM REV CODE 250: Mod: NTX | Performed by: HOSPITALIST

## 2019-04-30 PROCEDURE — 86403 PARTICLE AGGLUT ANTBDY SCRN: CPT | Mod: NTX

## 2019-04-30 PROCEDURE — 20600001 HC STEP DOWN PRIVATE ROOM: Mod: NTX

## 2019-04-30 PROCEDURE — 99233 PR SUBSEQUENT HOSPITAL CARE,LEVL III: ICD-10-PCS | Mod: NTX,,, | Performed by: HOSPITALIST

## 2019-04-30 PROCEDURE — 25000242 PHARM REV CODE 250 ALT 637 W/ HCPCS: Mod: NTX | Performed by: HOSPITALIST

## 2019-04-30 PROCEDURE — 63600175 PHARM REV CODE 636 W HCPCS: Mod: NTX | Performed by: HOSPITALIST

## 2019-04-30 PROCEDURE — 85610 PROTHROMBIN TIME: CPT | Mod: NTX

## 2019-04-30 PROCEDURE — 87385 HISTOPLASMA CAPSUL AG IA: CPT | Mod: NTX

## 2019-04-30 PROCEDURE — 83735 ASSAY OF MAGNESIUM: CPT | Mod: NTX

## 2019-04-30 PROCEDURE — 99233 SBSQ HOSP IP/OBS HIGH 50: CPT | Mod: NTX,,, | Performed by: HOSPITALIST

## 2019-04-30 RX ORDER — CETIRIZINE HYDROCHLORIDE 5 MG/1
10 TABLET ORAL DAILY
Status: DISCONTINUED | OUTPATIENT
Start: 2019-04-30 | End: 2019-04-30

## 2019-04-30 RX ORDER — CETIRIZINE HYDROCHLORIDE 5 MG/1
5 TABLET ORAL NIGHTLY
Status: DISCONTINUED | OUTPATIENT
Start: 2019-04-30 | End: 2019-05-02 | Stop reason: HOSPADM

## 2019-04-30 RX ORDER — POTASSIUM CHLORIDE 20 MEQ/1
40 TABLET, EXTENDED RELEASE ORAL ONCE
Status: COMPLETED | OUTPATIENT
Start: 2019-04-30 | End: 2019-04-30

## 2019-04-30 RX ORDER — FLUTICASONE PROPIONATE 50 MCG
2 SPRAY, SUSPENSION (ML) NASAL DAILY
Status: DISCONTINUED | OUTPATIENT
Start: 2019-04-30 | End: 2019-05-02 | Stop reason: HOSPADM

## 2019-04-30 RX ADMIN — FUROSEMIDE 80 MG: 10 INJECTION, SOLUTION INTRAMUSCULAR; INTRAVENOUS at 10:04

## 2019-04-30 RX ADMIN — Medication 220 MG: at 10:04

## 2019-04-30 RX ADMIN — LACTULOSE 30 G: 20 SOLUTION ORAL at 10:04

## 2019-04-30 RX ADMIN — RIFAXIMIN 550 MG: 550 TABLET ORAL at 08:04

## 2019-04-30 RX ADMIN — PANTOPRAZOLE SODIUM 40 MG: 40 TABLET, DELAYED RELEASE ORAL at 10:04

## 2019-04-30 RX ADMIN — FUROSEMIDE 80 MG: 10 INJECTION, SOLUTION INTRAMUSCULAR; INTRAVENOUS at 08:04

## 2019-04-30 RX ADMIN — LEVOTHYROXINE SODIUM 112 MCG: 112 TABLET ORAL at 06:04

## 2019-04-30 RX ADMIN — SPIRONOLACTONE 200 MG: 100 TABLET, FILM COATED ORAL at 10:04

## 2019-04-30 RX ADMIN — FLUTICASONE PROPIONATE 100 MCG: 50 SPRAY, METERED NASAL at 04:04

## 2019-04-30 RX ADMIN — CETIRIZINE HYDROCHLORIDE 5 MG: 5 TABLET ORAL at 08:04

## 2019-04-30 RX ADMIN — FUROSEMIDE 80 MG: 10 INJECTION, SOLUTION INTRAMUSCULAR; INTRAVENOUS at 04:04

## 2019-04-30 RX ADMIN — CALCIUM CARBONATE (ANTACID) CHEW TAB 500 MG 1000 MG: 500 CHEW TAB at 12:04

## 2019-04-30 RX ADMIN — POTASSIUM CHLORIDE 40 MEQ: 1500 TABLET, EXTENDED RELEASE ORAL at 12:04

## 2019-04-30 RX ADMIN — RIFAXIMIN 550 MG: 550 TABLET ORAL at 10:04

## 2019-04-30 NOTE — PLAN OF CARE
Problem: Adult Inpatient Plan of Care  Goal: Plan of Care Review  Outcome: Ongoing (interventions implemented as appropriate)  POC reviewed with patient. AAOx4, VSS. Patient transported to IR, no fluid removed from Paracentesis. Patient reports 4 BMs, 1500 Lactulose held. Patient reports feeling weak with Lactulose and Lasix, bed alarm initiated and patient told to call prior to getting up, patient verbalized understanding. No evidence of distress, safety maintained, hourly rounding performed. Will continue to monitor.

## 2019-04-30 NOTE — NURSING
Patient transferred from 6th floor to room 8071. AAOx4. VSS. Spouse present at bedside. Oriented to room and surroundings. Wafer intact to right lower abd, small amount yellow fluid in bag. Requires assistance of 1 person. Review of orders and NPO after MN for Echo MAURICE and possible colonoscopy. No acute distress. No falls. Will continue to monitor

## 2019-04-30 NOTE — PLAN OF CARE
Problem: Physical Therapy Goal  Goal: Physical Therapy Goal  Outcome: Outcome(s) achieved Date Met: 04/30/19  No goals set

## 2019-04-30 NOTE — PROGRESS NOTES
Unable to perform paracentesis due to insufficient fluid volume. See MD report for details. Pt awaiting transport.

## 2019-04-30 NOTE — PLAN OF CARE
Problem: Adult Inpatient Plan of Care  Goal: Plan of Care Review  Outcome: Ongoing (interventions implemented as appropriate)  POC reviewed with patient.  No acute events overnight.  Currently NPO.  VSS.  Frequent rounding done.  Family remaining at bedside.  Call light within reach.  Safety maintained.  Will continue to monitor.

## 2019-04-30 NOTE — CONSULTS
Nutrition consult received regarding liver transplant evaluation.   Please see note from 4/26 for full RD assessment, RD to follow-up 5/3.    Recommendations     Recommendation/Intervention:      Pt with acute moderate malnutrition.      1. Continue Low Na diet as tolerated.   2. If po intake <50%, recommend Boost Plus.   3. RD following.    Thanks! WYATT Lay

## 2019-04-30 NOTE — ASSESSMENT & PLAN NOTE
48 y/o female with history of KESSLER cirrhosis, declined in the past for transplant due to elevated PA pressure, and has been lost to follow up since then, presented with GI bleeding, with reported GOV and EV and signs of recent bleeding. She has patent PV and ascites++. She had previous lap cholecystectomy and lap gastric band. Lap band is still in place and has lap port site palpable on ROQ. She is a high risk medical and surgical complexity C.

## 2019-04-30 NOTE — SUBJECTIVE & OBJECTIVE
Past Medical History:   Diagnosis Date    Cirrhosis     Esophageal varices 2018    Small with no banding     Essential hypertension 2018    Fatty liver 2018    GERD (gastroesophageal reflux disease)     Hx of colonic polyps 2018    On colonoscopy     Hypertension     Hypothyroidism 2018    Kidney stones     Morbid obesity 2018    Lap band with subsequent release    KADEEM (obstructive sleep apnea) 2018    Osteoarthritis 2018    Pulmonary nodule 2018    Vitamin D deficiency 2018     Past Surgical History:   Procedure Laterality Date     SECTION      TIMES 2     CHOLECYSTECTOMY      LAPAROSCOPIC     CYSTOSCOPY W/ STONE MANIPULATION      kidney stone removal    EGD (ESOPHAGOGASTRODUODENOSCOPY) N/A 2019    [facility]    LAPAROSCOPIC GASTRIC BANDING      removal     LIVER BIOPSY  2017    KESSLER with bridging 17       Review of patient's allergies indicates:   Allergen Reactions    Metformin Rash    Pcn [penicillins] Other (See Comments)     Unsure of reaction, states it was as a child. Tolerated rocephin at [facility]       Family History     Problem Relation (Age of Onset)    Cancer Mother (50), Father (65)    Heart disease Mother, Father        Tobacco Use    Smoking status: Never Smoker    Smokeless tobacco: Never Used    Tobacco comment: patient denies   Substance and Sexual Activity    Alcohol use: No     Comment: patient denies    Drug use: No     Comment: patient denies    Sexual activity: Not on file       Scheduled Meds:   furosemide  80 mg Intravenous TID    [START ON 2019] lactulose  30 g Oral TID    levothyroxine  112 mcg Oral Before breakfast    pantoprazole  40 mg Oral Daily    rifAXImin  550 mg Oral BID    spironolactone  200 mg Oral Daily    zinc sulfate  220 mg Oral Daily     Continuous Infusions:  PRN Meds:calcium carbonate,  ondansetron, ondansetron, sodium chloride 0.9%    Review of Systems   Constitutional: Positive for activity change, appetite change and fatigue. Negative for chills and fever.   Respiratory: Positive for shortness of breath. Negative for cough.    Cardiovascular: Positive for leg swelling. Negative for chest pain.   Gastrointestinal: Positive for abdominal distention, abdominal pain and nausea. Negative for constipation, diarrhea and rectal pain.   Genitourinary: Negative for difficulty urinating and dysuria.   Musculoskeletal: Negative.    Skin: Negative for color change and rash.   Allergic/Immunologic: Negative for immunocompromised state.   Neurological: Negative for dizziness and light-headedness.   Hematological: Bruises/bleeds easily.   Psychiatric/Behavioral: Positive for decreased concentration.   All other systems reviewed and are negative.     Objective:     Vital Signs (Most Recent):  Temp: 99.4 °F (37.4 °C) (04/29/19 2016)  Pulse: 95 (04/29/19 2016)  Resp: 16 (04/29/19 2016)  BP: (!) 131/58 (04/29/19 2016)  SpO2: (!) 94 % (04/29/19 2016) Vital Signs (24h Range):  Temp:  [97.2 °F (36.2 °C)-99.8 °F (37.7 °C)] 99.4 °F (37.4 °C)  Pulse:  [90-95] 95  Resp:  [16-18] 16  SpO2:  [93 %-96 %] 94 %  BP: (109-142)/(52-64) 131/58     Weight: 106.4 kg (234 lb 7.4 oz)  Body mass index is 39.02 kg/m².      Intake/Output Summary (Last 24 hours) at 4/29/2019 2242  Last data filed at 4/29/2019 2147  Gross per 24 hour   Intake 660 ml   Output 1000 ml   Net -340 ml       Physical Exam   Constitutional: She is oriented to person, place, and time. No distress.   HENT:   Mouth/Throat: No oropharyngeal exudate.   Eyes: Pupils are equal, round, and reactive to light. Scleral icterus is present.   Neck: Neck supple. No thyromegaly present.   Cardiovascular: Normal heart sounds and intact distal pulses.   No murmur heard.  Pulmonary/Chest: No respiratory distress. She has wheezes. She has no rales.   Abdominal: Soft. Bowel sounds  are normal. She exhibits distension. She exhibits no mass. There is no tenderness. There is no rebound and no guarding. No hernia.       Musculoskeletal: She exhibits edema. She exhibits no tenderness.   Lymphadenopathy:     She has no cervical adenopathy.   Neurological: She is alert and oriented to person, place, and time.   Skin: Skin is dry. She is not diaphoretic. No pallor.   Psychiatric: She has a normal mood and affect.   Nursing note and vitals reviewed.      Laboratory:  CBC:   Recent Labs   Lab 04/29/19  0607   WBC 2.58*   RBC 2.86*   HGB 8.8*   HCT 26.3*   PLT 75*     BMP:   Recent Labs   Lab 04/29/19  1453   GLU 93   *   K 3.6   CL 99   CO2 28   BUN 8   CREATININE 0.7   CALCIUM 7.8*     CMP:   Recent Labs   Lab 04/29/19  0607 04/29/19  1453   GLU 94 93   CALCIUM 7.8* 7.8*   ALBUMIN 2.4*  --    PROT 5.7*  --    * 134*   K 3.2* 3.6   CO2 26 28    99   BUN 8 8   CREATININE 0.8 0.7   ALKPHOS 103  --    ALT 24  --    AST 79*  --    BILITOT 9.4*  --      Coagulation:   Recent Labs   Lab 04/23/19  2355  04/29/19  0607   INR 2.1*   < > 1.9*   APTT 35.1*  --   --     < > = values in this interval not displayed.     ABGs: No results for input(s): PH, PCO2, HCO3, POCSATURATED, BE in the last 168 hours.    Diagnostic Results:  CT Abd/Pelvis: No results found for this or any previous visit.

## 2019-04-30 NOTE — HOSPITAL COURSE
Patient feels relatively well today. Slow responses, but appropriate. Being diuresed with IV lasix

## 2019-04-30 NOTE — CONSULTS
"[facility]  Liver Transplant  Consult Note    Patient Name: Jihan Zamudio  MRN: [id number]  Admission Date: 4/23/2019  Hospital Length of Stay: 7 days  Code Status: Full Code  Attending Provider: Blane Gottlieb MD  Primary Care Provider: Primary Doctor No    Consults  Subjective:     History of Present Illness:  MELD-Na score: 24 at 4/29/2019  2:53 PM  MELD score: 22 at 4/29/2019  2:53 PM  Calculated from:  Serum Creatinine: 0.7 mg/dL (Rounded to 1 mg/dL) at 4/29/2019  2:53 PM  Serum Sodium: 134 mmol/L at 4/29/2019  2:53 PM  Total Bilirubin: 9.4 mg/dL at 4/29/2019  6:07 AM  INR(ratio): 1.9 at 4/29/2019  6:07 AM  Age: 49 years   Estimated body mass index is 39.02 kg/m² as calculated from the following:    Height as of this encounter: 5' 5" (1.651 m).    Weight as of this encounter: 106.4 kg (234 lb 7.4 oz).     O POS   49 y.o. female with significant PMH  of KESSLER cirrhosis(confirmed by liver biopsy 11/2017) with varices, latent TB, GERD, hypothyroidism, lap band 2007, HLD presented to [employer] c/o melanotic stool onset Saturday. The patient denied recent NSAID and ETOH use. She denies any hematemesis and states that she has never had a similar episode to this previously. Paracentesis was performed in the ED with 5L removed. Para studies with PMN < 250. The patient was admitted there and had EGD performed on 4/23 which revealed portal hypertensive gastropathy, blood in the gastric body, grade 2 gastroesophageal varices without active bleeding but with stigmata of recent bleeding and and non-bleeding small esophageal varices without stigmata of recent bleeding. The esophageal varices were not banded as they were not suspected to be the source of bleeding.  CT abdomen showed sequela of portal HTN and patent portal vein without evidence of PVT.        Past Medical History:   Diagnosis Date    Cirrhosis     Esophageal varices 2/26/2018    Small with no banding 07/17    Essential hypertension " 2018    Fatty liver 2018    GERD (gastroesophageal reflux disease)     Hx of colonic polyps 2018    On colonoscopy     Hypertension     Hypothyroidism 2018    Kidney stones     Morbid obesity 2018    Lap band with subsequent release    KADEEM (obstructive sleep apnea) 2018    Osteoarthritis 2018    Pulmonary nodule 2018    Vitamin D deficiency 2018     Past Surgical History:   Procedure Laterality Date     SECTION      TIMES 2     CHOLECYSTECTOMY      LAPAROSCOPIC     CYSTOSCOPY W/ STONE MANIPULATION      kidney stone removal    EGD (ESOPHAGOGASTRODUODENOSCOPY) N/A 2019    Performed by [facility] at [facility] (2ND F[facility])    LAPAROSCOPIC GASTRIC BANDING  2006    removal     LIVER BIOPSY  2017    KESSLER with bridging 17       Review of patient's allergies indicates:   Allergen Reactions    Metformin Rash    Pcn [penicillins] Other (See Comments)     Unsure of reaction, states it was as a child. Tolerated rocephin at [facility]       Family History     Problem Relation (Age of Onset)    Cancer Mother (50), Father (65)    Heart disease Mother, Father        Tobacco Use    Smoking status: Never Smoker    Smokeless tobacco: Never Used    Tobacco comment: patient denies   Substance and Sexual Activity    Alcohol use: No     Comment: patient denies    Drug use: No     Comment: patient denies    Sexual activity: Not on file       Scheduled Meds:   furosemide  80 mg Intravenous TID    [START ON 2019] lactulose  30 g Oral TID    levothyroxine  112 mcg Oral Before breakfast    pantoprazole  40 mg Oral Daily    rifAXImin  550 mg Oral BID    spironolactone  200 mg Oral Daily    zinc sulfate  220 mg Oral Daily     Continuous Infusions:  PRN Meds:calcium carbonate, ondansetron, ondansetron, sodium chloride 0.9%    Review of Systems   Constitutional: Positive for activity change, appetite change and fatigue.  Negative for chills and fever.   Respiratory: Positive for shortness of breath. Negative for cough.    Cardiovascular: Positive for leg swelling. Negative for chest pain.   Gastrointestinal: Positive for abdominal distention, abdominal pain and nausea. Negative for constipation, diarrhea and rectal pain.   Genitourinary: Negative for difficulty urinating and dysuria.   Musculoskeletal: Negative.    Skin: Negative for color change and rash.   Allergic/Immunologic: Negative for immunocompromised state.   Neurological: Negative for dizziness and light-headedness.   Hematological: Bruises/bleeds easily.   Psychiatric/Behavioral: Positive for decreased concentration.   All other systems reviewed and are negative.     Objective:     Vital Signs (Most Recent):  Temp: 99.4 °F (37.4 °C) (04/29/19 2016)  Pulse: 95 (04/29/19 2016)  Resp: 16 (04/29/19 2016)  BP: (!) 131/58 (04/29/19 2016)  SpO2: (!) 94 % (04/29/19 2016) Vital Signs (24h Range):  Temp:  [97.2 °F (36.2 °C)-99.8 °F (37.7 °C)] 99.4 °F (37.4 °C)  Pulse:  [90-95] 95  Resp:  [16-18] 16  SpO2:  [93 %-96 %] 94 %  BP: (109-142)/(52-64) 131/58     Weight: 106.4 kg (234 lb 7.4 oz)  Body mass index is 39.02 kg/m².      Intake/Output Summary (Last 24 hours) at 4/29/2019 2242  Last data filed at 4/29/2019 2147  Gross per 24 hour   Intake 660 ml   Output 1000 ml   Net -340 ml       Physical Exam   Constitutional: She is oriented to person, place, and time. No distress.   HENT:   Mouth/Throat: No oropharyngeal exudate.   Eyes: Pupils are equal, round, and reactive to light. Scleral icterus is present.   Neck: Neck supple. No thyromegaly present.   Cardiovascular: Normal heart sounds and intact distal pulses.   No murmur heard.  Pulmonary/Chest: No respiratory distress. She has wheezes. She has no rales.   Abdominal: Soft. Bowel sounds are normal. She exhibits distension. She exhibits no mass. There is no tenderness. There is no rebound and no guarding. No hernia.        Musculoskeletal: She exhibits edema. She exhibits no tenderness.   Lymphadenopathy:     She has no cervical adenopathy.   Neurological: She is alert and oriented to person, place, and time.   Skin: Skin is dry. She is not diaphoretic. No pallor.   Psychiatric: She has a normal mood and affect.   Nursing note and vitals reviewed.      Laboratory:  CBC:   Recent Labs   Lab 04/29/19  0607   WBC 2.58*   RBC 2.86*   HGB 8.8*   HCT 26.3*   PLT 75*     BMP:   Recent Labs   Lab 04/29/19  1453   GLU 93   *   K 3.6   CL 99   CO2 28   BUN 8   CREATININE 0.7   CALCIUM 7.8*     CMP:   Recent Labs   Lab 04/29/19  0607 04/29/19  1453   GLU 94 93   CALCIUM 7.8* 7.8*   ALBUMIN 2.4*  --    PROT 5.7*  --    * 134*   K 3.2* 3.6   CO2 26 28    99   BUN 8 8   CREATININE 0.8 0.7   ALKPHOS 103  --    ALT 24  --    AST 79*  --    BILITOT 9.4*  --      Coagulation:   Recent Labs   Lab 04/23/19  2355  04/29/19  0607   INR 2.1*   < > 1.9*   APTT 35.1*  --   --     < > = values in this interval not displayed.     ABGs: No results for input(s): PH, PCO2, HCO3, POCSATURATED, BE in the last 168 hours.    Diagnostic Results:  CT Abd/Pelvis: No results found for this or any previous visit.    Assessment/Plan:     Encounter for pre-transplant evaluation for chronic liver disease  48 y/o female with history of KESSLER cirrhosis, declined in the past for transplant due to elevated PA pressure, and has been lost to follow up since then, presented with GI bleeding, with reported GOV and EV and signs of recent bleeding. She has patent PV and ascites++. She had previous lap cholecystectomy and lap gastric band. Lap band is still in place and has lap port site palpable on ROQ. She is a high risk medical and surgical complexity C.  She may need lap band removed during or pre transplant, bariatric surgery consult needed.              Transplant Candidacy: Patient is a 49 y.o. year old female with KESSLER (non-alcoholic steatohepatitis) being  evaluated for possible OLT.  In my opinion, she is a high-risk liver transplant candidate.  She will be presented to selection committee after all tests and evaluations are complete.    UNOS Patient Status  Functional Status: 50% - Requires considerable assistance and frequent medical care  Physical Capacity: Limited Mobility    Counseling: I discussed with the patient the benefits of liver transplantation.  We discussed the evaluation and listing procedures.  We discussed the MELD system and the associated waiting times.  We discussed national and center specific survival results.  We discussed the option of being multiply listed in different OPOs.   We discussed the risks, benefits and potential complications related to surgery including the risks related to anesthesia, bleeding, infection, primary non-function of the allograft, the risk of reoperation as well as the risk of death.  We discussed the typical post-operative course, length of hospitalization, the long-term use of immunosuppressive therapy as well as the need for long-term routine follow-up.    PHS: I discussed the use of organs from donors with PHS increased-risk behavior, including the testing protocols utilized, as well as data from the literature regarding the likelihood of transmission of hepatitis or HIV.  The patient that she is willing to consider such grafts.  DCD: I discussed the use of organs recovered by donation after cardiac death (DCD), including slightly decreased graft survival and greater risk of arterial and biliary complications. The potential advantage to the recipient is possibly receiving a transplant sooner by accepting such an organ. The patient that she is willing to consider such grafts.  HBcAb: I discussed the use of organs from donors with HBcAb in conjunction with long term use of HBV antiviral drugs, such as lamivudine. The small but measurable risk of hepatitis B seroconversion was discussed as well as the potentially  life long need to continue antiviral drugs. The patient that she is willing to consider such grafts.  HCV Non-viremic recipient: I discussed the use of HCV-positive organs in naive recipients, including the risk of viral transmission to the patients or others, potential insurance barriers for antiviral medication coverage, risk for fibrosing cholestatic hepatitis, death or graft loss. The potential advantage to the recipient is the possibility of receiving a transplant sooner with decreased mortality risk by accepting such an organ. The patient that she is willing to consider such grafts.    Dillon Gottlieb MD  Liver Transplant  [facility]

## 2019-04-30 NOTE — PROCEDURES
Radiology Post-Procedure Note    Pre Op Diagnosis: Ascites  Post Op Diagnosis: Same    Procedure: Paracentesis    Procedure performed by: abel    Written Informed Consent Obtained: Yes  Specimen Removed: NO  Estimated Blood Loss: None    Findings:   No fluid. Not performed.    Patient tolerated procedure well.    Edd Arrington MD  Radiology

## 2019-04-30 NOTE — PLAN OF CARE
04/30/19 1251   Discharge Reassessment   Assessment Type Discharge Planning Reassessment   Discharge Plan A Home with family;Home Health   Discharge Plan B Home with family     Possible liver transplant committee presentation tomorrow.

## 2019-04-30 NOTE — PT/OT/SLP EVAL
Physical Therapy Evaluation and Discharge Note    Patient Name:  Jihan Zamudio   MRN:  [id number]    Recommendations:     Discharge Recommendations:  home   Discharge Equipment Recommendations: none   Barriers to discharge: None    Assessment:     Jihan Zamudio is a 49 y.o. female admitted with a medical diagnosis of GI bleed. .  At this time, patient is functioning at their prior level of function and does not require further acute PT services.     Recent Surgery: Procedure(s) (LRB):  EGD (ESOPHAGOGASTRODUODENOSCOPY) (N/A) 6 Days Post-Op    Plan:     During this hospitalization, patient does not require further acute PT services.  Please re-consult if situation changes.      Subjective     Chief Complaint: none stated  Patient/Family Comments/goals: to go home  Pain/Comfort:  · Pain Rating 1: 0/10  · Pain Rating Post-Intervention 1: 0/10    Patients cultural, spiritual, [employer]alism conflicts given the current situation: no    Living Environment:  Pt. Lives with spouse and daughter in [facility] with no TAMMIE  Prior to admission, patients level of function was indep. until recently required RW for amb.  Equipment used at home: walker, rolling, cane, quad.  Upon discharge, patient will have assistance from spouse.    Objective:     Communicated with nursing prior to session.  Patient found supine with peripheral IV upon PT entry to room.    General Precautions: Standard, fall   Orthopedic Precautions:N/A   Braces:       Exams:  · RUE ROM: WFL  · RUE Strength: WFL  · LUE ROM: WFL  · LUE Strength: WFL  · RLE ROM: WFL  · RLE Strength: WFL  · LLE ROM: WFL  · LLE Strength: WFL    Functional Mobility:  · Bed Mobility:     · Rolling Left:  modified independence  · Scooting: modified independence  · Supine to Sit: modified independence  · Sit to Supine: modified independence  · Transfers:     · Sit to Stand:  modified independence with no AD  · Gait: 200' with Mod. indep. without AD or LOB  · Balance: good    AM-PAC 6 CLICK  MOBILITY  Total Score:24       Therapeutic Activities and Exercises:   Discussed therapy needs, goals, and POC.    AM-PAC 6 CLICK MOBILITY  Total Score:24     Patient left supine with all lines intact and call button in reach.    GOALS:   Multidisciplinary Problems     Physical Therapy Goals     Not on file          Multidisciplinary Problems (Resolved)        Problem: Physical Therapy Goal    Goal Priority Disciplines Outcome Goal Variances Interventions   Physical Therapy Goal   (Resolved)     PT, PT/OT Outcome(s) achieved                     History:     Past Medical History:   Diagnosis Date    Cirrhosis     Esophageal varices 2018    Small with no banding     Essential hypertension 2018    Fatty liver 2018    GERD (gastroesophageal reflux disease)     Hx of colonic polyps 2018    On colonoscopy     Hypertension     Hypothyroidism 2018    Kidney stones     Morbid obesity 2018    Lap band with subsequent release    KADEEM (obstructive sleep apnea) 2018    Osteoarthritis 2018    Pulmonary nodule 2018    Vitamin D deficiency 2018       Past Surgical History:   Procedure Laterality Date     SECTION      TIMES 2     CHOLECYSTECTOMY      LAPAROSCOPIC     CYSTOSCOPY W/ STONE MANIPULATION      kidney stone removal    EGD (ESOPHAGOGASTRODUODENOSCOPY) N/A 2019    Performed by Davian Hern[facility]dez MD at [facility] (2ND FLR)    LAPAROSCOPIC GASTRIC BANDING  2006    removal 2009    LIVER BIOPSY  2017    KESSLER with bridging 17       Time Tracking:     PT Received On: 19  PT Start Time: 1421     PT Stop Time: 1434  PT Total Time (min): 13 min     Billable Minutes: Evaluation 13      Theodore Coles, PT  2019

## 2019-04-30 NOTE — CONSULTS
Pre Transplant Infectious Diseases Consult  Liver Transplant Recipient Evaluation    Requesting Physician: Blane Gottlieb MD    Reason for Visit:  Pre liver transplant Eval    History of Present Illness  Jihan Zamudio is a 49 y.o. year old White female with advanced Liver disease currently being evaluated for Liver transplant. She had a recent paracentesis at [facility] though does not know the results.  Asictes fluid 4/26 with 143 WBC and 7 segs.  Patient denies any recent fever, chills, or infective illnesses.      1) Do you have a history of:   YES NO   Diabetes      [] [x]     Diabetic Foot Infection/Bone Infection  []        [x]     Surgical Removal of Spleen   []        [x]       2) Have you had recurrent infections involving:         Organs:   YES NO  Sinus infections  []         [x]   Sore Throat   []         [x]                 Prostate Infections  []         [x]              Bladder Infections  []         [x]                     Kidney Infections  []         [x]                               Intestinal Infections  []         [x]      Skin Infections   []         [x]       Reproductive Infections []         [x]        Periodontal Disease  []         [x]        3)Have you ever had: YES     NO     Chicken Pox   [x]         []    Shingles   []         [x]    Orolabial Herpes  []         [x]    Genital Herpes  []         [x]    Cytomegalovirus  []         [x]    Saadia-Barr Virus  []         [x]    Genital Warts   []         [x]    Hepatitis A   []         [x]    Hepatitis B   []         [x]     Hepatitis C   []         [x]    Syphilis   []         [x]    Gonorrhea   []         [x]   Pelvic Inflammatory   Disease   []         [x]    Chlamydia Infection  []         [x]    Intestinal parasites/worms []         [x]    Fungal Infections  []         [x]    Blood Infections  []         [x]     Comment:      4) Have you ever been exposed   YES NO  To someone with tuberculosis?  []   [x]   If yes, what treatment did  you receive: She reports having multiple positive skin TB tests in the distant past x1  but negative TB blood test since that time.  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  She reports her positive TB skin test was prior to going to [facility] many years ago, had negative TB blood tests and CXRs since that time.    5) What states have you lived in[place] in [place]    6) What countries have you visited for more than 2 weeks?  [facility] x 2 years                       YES NO  7) Did you have any associated travel infections? []  [x]       8) Are you planning to travel outside the    [x]  []   [place] after your transplant?    9) Household                  YES NO  Do you have pets living in your house/pet contact? [x]         []   If yes, describe: Dogs    Do you spend time or live on a farm or    []         [x]   have livestock or other farm animals?  If yes, which ones:    Do you have a fish tank?          []   [x]       Do you have a litter box?     []         [x]     Do you fish or hunt?      []         [x]     Do you clean or skin fish or animals?   []         [x]     Do you consume raw or undercooked   []         [x]   meat, fish, or shellfish?      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                                             YES NO  12)Do you garden or otherwise   work in the soil?      []         [x]     13)Do you hike, camp, or spend   time in wooded areas?     []         [x]        14) The patient's immunization history was reviewed.    Have you ever received:  YES NO UNKNOWN DATES   Routine Childhood vaccines  [x]         []  []      Influenza vaccine   [x]  []  []    Pneumovax vaccine   [x]  []  []     Tetanus-diptheria vaccine  [x]         []   []    Hepatitis A vaccine series       [x]  []  []    Hepatitis B vaccine series         [x]  []  []    Meningitis vaccine   []         [x]  []    Varicella vaccine   [x]         []  []    From prior Tx eval on 18   Immunizations:  Based on the patients immunization history and serologies, prescription written for following immunizations to be received at the base:  - Pneumovax  - TDaP  - Twinrix 0, 1, 6 months  - Shingrix 0, 2 months         Based on the patients immunization history and serologies, immunizations were ordered:         Ordered  Not Ordered  Influenza vaccine     []    [x]   Hepatitis A vaccine series at 0 and 6 months []    [x]   Hepatitis B at 0, 1, and 6 months   []    [x]   Hepatitis B High Dose 0, 1, and 6 months  []    [x]   Prevnar vaccine     []    [x]   Pneumovax vaccine     []    [x]    TDap vaccine      []    [x]    Varicella vaccine     []    [x]   Menactra (meningitis) vaccine   []    [x]            The patient was encouraged to contact us about any problems that may develop after immunization and possible side effects were reviewed.      Previous Transplant: no    Etiology of Liver Disease: KESSLER    Allergies: Metformin and Pcn [penicillins]    There is no immunization history on file for this patient.  Past Medical History:   Diagnosis Date    Cirrhosis     Esophageal varices 2018    Small with no banding     Essential hypertension 2018    Fatty liver 2018    GERD (gastroesophageal reflux disease)     Hx of colonic polyps 2018    On colonoscopy     Hypertension     Hypothyroidism 2018    Kidney stones     Morbid obesity 2018    Lap band with subsequent release    KAEDEM (obstructive sleep apnea) 2018    Osteoarthritis 2018    Pulmonary nodule 2018    Vitamin D deficiency 2018     Past Surgical History:   Procedure Laterality Date     SECTION      TIMES 2     CHOLECYSTECTOMY      LAPAROSCOPIC      CYSTOSCOPY W/ STONE MANIPULATION  2000    kidney stone removal    EGD (ESOPHAGOGASTRODUODENOSCOPY) N/A 4/24/2019    Performed by Davian Hernández MD at [facility] ([address] [place] [address])    LAPAROSCOPIC GASTRIC BANDING  2006    removal 2009    LIVER BIOPSY  11/29/2017    KESSLER with bridging 11/29/17      Social History     Socioeconomic History    Marital status:      Spouse name: Not on file    Number of children: Not on file    Years of education: Not on file    Highest education level: Not on file   Occupational History    Occupation: homemaker   Social Needs    Financial resource strain: Not on file    Food insecurity:     Worry: Not on file     Inability: Not on file    Transportation needs:     Medical: Not on file     Non-medical: Not on file   Tobacco Use    Smoking status: Never Smoker    Smokeless tobacco: Never Used    Tobacco comment: patient denies   Substance and Sexual Activity    Alcohol use: No     Comment: patient denies    Drug use: No     Comment: patient denies    Sexual activity: Not on file   Lifestyle    Physical activity:     Days per week: Not on file     Minutes per session: Not on file    Stress: Not on file   Relationships    Social connections:     Talks on phone: Not on file     Gets together: Not on file     Attends [employer] service: Not on file     Active member of club or organization: Not on file     Attends meetings of clubs or organizations: Not on file     Relationship status: Not on file   Other Topics Concern    Not on file   Social History Narrative    Not on file       Review of Systems   Constitution: Positive for chills, decreased appetite, malaise/fatigue and weight loss. Negative for fever, night sweats and weight gain.   HENT: Negative for congestion, ear pain, hearing loss, hoarse voice, sore throat and tinnitus.    Eyes: Negative for blurred vision, redness and visual disturbance.   Cardiovascular: Positive for leg swelling. Negative for chest pain and  palpitations.   Respiratory: Negative for cough, hemoptysis, shortness of breath, sputum production and wheezing.    Endocrine: Negative for cold intolerance and heat intolerance.   Hematologic/Lymphatic: Negative for adenopathy. Does not bruise/bleed easily.   Skin: Positive for dry skin. Negative for itching, rash and suspicious lesions.   Musculoskeletal: Negative for back pain, joint pain, myalgias and neck pain.   Gastrointestinal: Positive for diarrhea (due to lactulose). Negative for abdominal pain, constipation, heartburn, nausea and vomiting.   Genitourinary: Negative for dysuria, flank pain, frequency, hematuria, hesitancy and urgency.   Neurological: Positive for weakness. Negative for dizziness, headaches, numbness and paresthesias.   Psychiatric/Behavioral: Negative for depression and memory loss. The patient does not have insomnia and is not nervous/anxious.    Allergic/Immunologic: Negative for environmental allergies, HIV exposure, hives and persistent infections.     Physical Exam   Constitutional: She is oriented to person, place, and time. She appears well-developed and well-nourished. No distress.   HENT:   Head: Normocephalic and atraumatic.   Eyes: Pupils are equal, round, and reactive to light. EOM are normal. Scleral icterus is present.   Neck: Normal range of motion. Neck supple.   Cardiovascular: Normal rate and regular rhythm. Exam reveals no gallop and no friction rub.   Murmur heard.  Pulmonary/Chest: Effort normal and breath sounds normal. No stridor. No respiratory distress. She has no wheezes. She has no rales.   Abdominal: Soft. Bowel sounds are normal. She exhibits no distension and no mass. There is hepatomegaly. There is no splenomegaly. There is tenderness. There is no guarding.   Musculoskeletal: She exhibits no edema.   Neurological: She is alert and oriented to person, place, and time.   Skin: Skin is warm and dry. She is not diaphoretic.   Jaundice     Psychiatric: She has a  normal mood and affect. Her behavior is normal.     Diagnostics:   RPR   Date Value Ref Range Status   04/26/2019 Non-reactive Non-reactive Final     No results found for: CMVANTIBODIE  No results found for: HIV1X2  No results found for: HTLVIIIANTIB  Hepatitis B Surface Ag   Date Value Ref Range Status   04/26/2019 Negative  Final     Hep B Core Total Ab   Date Value Ref Range Status   04/26/2019 Negative  Final     Hepatitis C Ab   Date Value Ref Range Status   04/26/2019 Negative  Final     No results found for: TOXOIGG  No components found for: TOXOIGGINTER  No results found for: RPP3JBL  No results found for: HIH5DZI  Varicella Zoster IgG   Date Value Ref Range Status   04/04/2018 3.28 (H) 0.00 - 0.90 ISR Final     Varicella Interpretation   Date Value Ref Range Status   04/04/2018 Positive (A) Negative Final     Comment:     <or=0.90     Negative        No detectable IgG antibody to Varicella zoster  by the SERAFIN test. Such individuals are presumed to be   uninfected with Varicella zoster and to be susceptible to   primary infection.  0.91-1.09    Equivocal  >or=1.10     Positive        Indicates presence of detectable IgG antibody to Varicella   zoster by the SERAFIN test. Indicative of previous or current   infection.       Strongyloides Ab IgG   Date Value Ref Range Status   04/26/2019 Negative Negative Final     Comment:     No detectable levels of IgG antibodies to Strongyloides.  Repeat testing in 1-2 weeks if clinically indicated.  Test Performed by:  [facility]  [address] [place] [address]       No results found for: EPSTEINBARRV  Hep B S Ab   Date Value Ref Range Status   04/26/2019 Positive (A)  Final     No results found for: QUANTIFERON  Hep A IgM   Date Value Ref Range Status   04/26/2019 Negative  Final     No results found for: PPD  No results found for this or any previous visit.       Ref. Range 4/4/2018 09:53 4/26/2019 06:17 4/26/2019  10:00 4/27/2019 03:29   Hep A IgM Unknown  Negative     Hep B C IgM Unknown  Negative     Hep B Core Total Ab Unknown Negative Negative     Hep B S Ab Unknown Negative Positive (A)     Hepatitis B Surface Ag Unknown Negative Negative     Haptoglobin Latest Ref Range: 30 - 250 mg/dL    <10 (L)   Hepatitis C Ab Unknown Negative Negative     Mitogen - Nil Latest Ref Range: See text IU/mL 2.903 0.770     NIL Latest Ref Range: See text IU/mL 0.026 0.050     TB Antigen Latest Ref Range: See text IU/mL 0.024      TB Antigen - Nil Latest Ref Range: See Text IU/mL -0.002      TB Gold Unknown Negative      TB Gold Plus Unknown  Negative     TB1 - Nil Latest Ref Range: See text IU/mL  0.000     TB2 - Nil Latest Ref Range: See text IU/mL  -0.010     HIV 1/2 Ag/Ab Latest Ref Range: Negative  Negative Negative     RPR Latest Ref Range: Non-reactive  Non-reactive Non-reactive     CMV IgG Interpretation Unknown Reactive (A)  Reactive (A)    Strongyloides Ab IgG Latest Ref Range: Negative   Negative     Varicella IgG Latest Ref Range: 0.00 - 0.90 ISR 3.28 (H)  4.22 (H)    Varicella Interpretation Latest Ref Range: Negative  Positive (A)  Positive (A)         Ref. Range 4/4/2018 09:53 4/26/2019 06:17   Hepatitis A Antibody IgG Unknown Negative Positive (A)       Imaging:  IR Paracentesis with Imaging [[id number]] Resulted: 04/29/19 1746   Order Status: Completed Updated: 04/29/19 1748   Narrative:     EXAMINATION:  Ultrasound-guided paracentesis    Procedural Personnel    Attending physician(s): Louis Rodriguez MD    Fellow physician(s): None    Resident physician(s): None    Advanced practice provider(s): WIN Palacio, JAKEP    Pre-procedure diagnosis: Ascites    Post-procedure diagnosis: Same    Complications: No immediate complications.    CLINICAL HISTORY:  Recurrent Ascites    TECHNIQUE:  - Ultrasound-guided paracentesis    FINDINGS:  Pre-procedure    Consent: Informed consent for the procedure was obtained and time-out was  performed prior to the procedure.    Preparation: The site was prepared and draped using maximal sterile barrier technique including cutaneous antisepsis.    Anesthesia/sedation    Level of anesthesia/sedation: No sedation    Anesthesia/sedation administered by: Not applicable    Total intra-service sedation time (minutes): 0    Limited abdominal ultrasound    Limited abdominal ultrasound was performed.    Moderate ascites. A safe window for paracentesis was identified.    Paracentesis    Local anesthesia was administered. The peritoneal cavity was accessed on the right lower quadrant and fluid return confirmed position. Ascites was drained.    Paracentesis access technique: Real-time ultrasound guidance    Catheter placed: 5F Yueh    Closure    The catheter was removed. A sterile bandage was applied.    Post-drainage ultrasound: Not performed    Additional Details    Additional description of procedure: None    Equipment details: None    Specimens removed: Abdominal fluid    Estimated blood loss (mL): Less than 10    Standardized report: SIR_Paracentesis_v2    Attestation    Signer name: Louis Rodriguez    I attest that I reviewed the stored images and agree with the report as written.   Impression:       Ultrasound-guided paracentesis with drainage of 4500 mL of serosanguinous fluid.    _______________________________________________________________    Electronically signed by resident: Alessandra Diaz NP  Date: 04/29/2019  Time: 16:31    Electronically signed by: Louis Rodriguez  Date: 04/29/2019  Time: 17:46   US Abdomen Complete with Dopp_Pre Liver Transplant [[id number]] Resulted: 04/25/19 1052   Order Status: Completed Updated: 04/25/19 1055   Narrative:     EXAMINATION:  US ABDOMEN COMP WITH DOPPLER_PRE LIVER TRANSPLANT    CLINICAL HISTORY:  decompensated cirrhosis, also rule out HCC;    TECHNIQUE:  Complete abdominal ultrasound (including pancreas, liver, gallbladder, common bile duct, spleen, aorta, IVC, and  kidneys) was performed.    Complete abdominal doppler exam was also performed.    COMPARISON:  CT abdomen/pelvis with/without contrast 04/05/2018; complete abdominal ultrasound with Doppler 04/05/2018    FINDINGS:  Liver: Normal in size, measuring 14.8 cm. Heterogeneous echotexture with nodular contour suggesting cirrhosis.  No focal hepatic lesions.    Gallbladder: The gallbladder is surgically absent.    Biliary system: The common duct is not dilated, measuring 2 mm.  No intrahepatic ductal dilatation.    Spleen: Enlarged in size with a homogeneous echotexture, measuring 19.5 x 5.5 cm.    Pancreas: The visualized portions of pancreas appear normal.    Right kidney: Normal in size with no hydronephrosis, measuring 11.3 cm.    Left kidney: Normal in size with no hydronephrosis, measuring 11.9 cm.    Aorta: Proximal portion not well visualized.  Distal portion appears nonaneurysmal.    Inferior vena cava: Normal in appearance.    Miscellaneous: Moderate amount of ascites.    Main hepatic artery: Patent.    Portal veins: Note is made that the portal vasculature is not well visualized on today's examination, however flow is visualized throughout the main, left, and right branches suggesting patency.  Flow appears bidirectional throughout.    SMV: Not well visualized, however appears patent.    IVC: Patent    Left, middle, and right hepatic veins: Patent.    Umbilical vein: Not patent.    Celiac artery: Normal on expiration.    Enlarged venous collateral vessels are present at the splenic hilum.   Impression:       Hepatic cirrhosis with sequela of portal hypertension such as splenomegaly, ascites, and enlarged venous collateral vessels at the splenic hilum.    Doppler evaluation demonstrates bidirectional flow throughout the portal venous system noting poor visualization on today's examination.    Electronically signed by resident: Daron Summers  Date: 04/25/2019  Time: 09:09    Electronically signed by: Edd  MD Sky  Date: 04/25/2019  Time: 10:52     CT Abdomen Pelvis W Wo Contrast   Order: [id number]   Status:  Final result   Visible to patient:  No (Not Released) Next appt:  Today at 03:20 PM in Interventional Radiology ([facility] IR1-124/125) Dx:  Fatty liver; Organ transplant candida...   Details     Reading Physician Reading Date Result Priority   Colt Apodaca MD 4/5/2018    Carloz Kearney MD 4/5/2018       Narrative     EXAMINATION:  CT ABDOMEN PELVIS W WO CONTRAST    CLINICAL HISTORY:  Patient in evaluation for a liver transplant; Other cirrhosis of liver    TECHNIQUE:  Low dose axial images, sagittal and coronal reformations were obtained from the thoracic inlet to the pubic symphysis following the IV administration of 100 mL of Omnipaque 350  and the oral administration of 30 mL of Omnipaque 350.  Non- contrast, arterial, portal venous, and delayed phases were acquired over the abdomen.  Triple phase liver protocol.    COMPARISON:  01/18/2018    FINDINGS:  Thoracic soft tissues: Unremarkable.    Lungs: Well aerated, without consolidation or pleural fluid.  0.5 cm solid pulmonary nodule within the left lower lobe (axial series 4, image 150).    Heart: Normal in size. No pericardial effusion.    Mediastinum: Unremarkable.    Liver: The liver is nodular in contour compatible with cirrhosis.  No focal arterial enhancing hepatic lesions identified.  The main, left, and right portal vein are patent.  Sequelae of portal hypertension including splenomegaly, numerous collaterals, spontaneous splenorenal shunt.    Gallbladder: Cholecystectomy.    Bile Ducts: Common bile duct diameter is upper limit of normal and measures 8 mm.    Pancreas: No mass or peripancreatic fat stranding.    Spleen: Splenomegaly.    Adrenals: Unremarkable.    Kidneys/ Ureters: There are 2 punctate nonobstructing right renal stones.  No hydronephrosis or ureteral dilatation.    Bladder: No evidence of wall thickening.    Reproductive  organs: Unremarkable.    GI Tract/Mesentery: A gastric lap band is present.  No evidence of bowel obstruction or inflammation.    Peritoneal Space: No ascites. No free air.    Retroperitoneum: No significant adenopathy.    Abdominal wall: Unremarkable.    Vasculature: No significant atherosclerosis or aneurysm.    Bones: No acute fracture.      Impression       1. Liver cirrhosis and sequela of portal hypertension including splenomegaly, numerous collaterals, and spontaneous splenorenal shunt.  2. Portal vein is patent.  3. Common bile duct is upper limit of normal for size measuring 8 mm in this patient status post cholecystectomy.  4. Two punctate nonobstructing right renal stones.  5. Gastric lap band is present.  6. 0.5 cm solid pulmonary nodule within the left lower lobe (axial series 4, image 150).  For a solid nodule <6 mm, Fleischner Society 2017 guidelines recommend no routine follow up for a low risk patient, or follow-up with non-contrast chest CT at 12 months in a high risk patient.  This report was flagged in Epic as abnormal.    Electronically signed by resident: Carloz Kearney  Date: 04/05/2018  Time: 16:21    Electronically signed by: Colt Apodaca MD  Date: 04/05/2018  Time: 17:21                Transplant Infectious Diseases - Candidacy    Assessment/Plan:     Transplant Candidacy: Based on available information, there are no identified significant barriers to transplantation from an infectious disease standpoint pending negative fungal tesiting oulitned below given lung nodule seen on CT chest. Reconsult if positive. Rec pulmonary consult for evaluation, if not already done so, re CT chest findings.  Suspect reported TB skin test was false positive especially in light of negative quantiferon testing x 2 and no significant exposure reported.  Final determination of transplant candidacy will be made once evaluation is complete and reviewed by the Transplant Selection Committee.    Daron OLIVER  LUI Villalobos  Vaccine and Serology Needs:  Serologies:  1. Histoplasma serum and urine antigen  2. Serum cryptococcal Ag  3. Fungal immunodiffusion assay  4. Blastomyces urine Ag       Counseling:I discussed with Jihan the risk for increased susceptibility to infections following transplantation including increased risk for infection right after transplant and if rejection should occur.  The patients has been counseled on the importance of vaccinations including but not limited to a yearly flu vaccine.  Specific guidance has been provided to the patient regarding the patients occupation, hobbies and activities to avoid future infectious complications including but not limited to avoiding undercooked meats and seafood, proper hygiene, and contact with animals.b

## 2019-04-30 NOTE — HPI
"MELD-Na score: 24 at 4/29/2019  2:53 PM  MELD score: 22 at 4/29/2019  2:53 PM  Calculated from:  Serum Creatinine: 0.7 mg/dL (Rounded to 1 mg/dL) at 4/29/2019  2:53 PM  Serum Sodium: 134 mmol/L at 4/29/2019  2:53 PM  Total Bilirubin: 9.4 mg/dL at 4/29/2019  6:07 AM  INR(ratio): 1.9 at 4/29/2019  6:07 AM  Age: 49 years   Estimated body mass index is 39.02 kg/m² as calculated from the following:    Height as of this encounter: 5' 5" (1.651 m).    Weight as of this encounter: 106.4 kg (234 lb 7.4 oz).     O POS   49 y.o. female with significant PMH  of KESSLER cirrhosis(confirmed by liver biopsy 11/2017) with varices, latent TB, GERD, hypothyroidism, lap band 2007, HLD presented to [facility] c/o melanotic stool onset Saturday. The patient denied recent NSAID and ETOH use. She denies any hematemesis and states that she has never had a similar episode to this previously. Paracentesis was performed in the ED with 5L removed. Para studies with PMN < 250. The patient was admitted there and had EGD performed on 4/23 which revealed portal hypertensive gastropathy, blood in the gastric body, grade 2 gastroesophageal varices without active bleeding but with stigmata of recent bleeding and and non-bleeding small esophageal varices without stigmata of recent bleeding. The esophageal varices were not banded as they were not suspected to be the source of bleeding.  CT abdomen showed sequela of portal HTN and patent portal vein without evidence of PVT.      "

## 2019-05-01 ENCOUNTER — CLINICAL SUPPORT (OUTPATIENT)
Dept: CARDIOLOGY | Facility: CLINIC | Age: 49
DRG: 441 | End: 2019-05-01
Attending: INTERNAL MEDICINE
Payer: OTHER GOVERNMENT

## 2019-05-01 ENCOUNTER — COMMITTEE REVIEW (OUTPATIENT)
Dept: TRANSPLANT | Facility: CLINIC | Age: 49
End: 2019-05-01

## 2019-05-01 VITALS — HEIGHT: 65 IN | BODY MASS INDEX: 38.15 KG/M2 | WEIGHT: 229 LBS

## 2019-05-01 LAB
ALBUMIN SERPL BCP-MCNC: 2.2 G/DL (ref 3.5–5.2)
ALP SERPL-CCNC: 99 U/L (ref 55–135)
ALT SERPL W/O P-5'-P-CCNC: 24 U/L (ref 10–44)
ANION GAP SERPL CALC-SCNC: 10 MMOL/L (ref 8–16)
AST SERPL-CCNC: 76 U/L (ref 10–40)
BASOPHILS # BLD AUTO: 0.02 K/UL (ref 0–0.2)
BASOPHILS NFR BLD: 0.6 % (ref 0–1.9)
BILIRUB SERPL-MCNC: 9.4 MG/DL (ref 0.1–1)
BUN SERPL-MCNC: 10 MG/DL (ref 6–20)
CALCIUM SERPL-MCNC: 7.9 MG/DL (ref 8.7–10.5)
CHLORIDE SERPL-SCNC: 97 MMOL/L (ref 95–110)
CO2 SERPL-SCNC: 26 MMOL/L (ref 23–29)
CREAT SERPL-MCNC: 0.8 MG/DL (ref 0.5–1.4)
CRYPTOC AG SER QL LA: NEGATIVE
CV STRESS BASE HR: 94 BPM
DIASTOLIC BLOOD PRESSURE: 65 MMHG
DIFFERENTIAL METHOD: ABNORMAL
END DIASTOLIC INDEX-HIGH: 170 ML/M2
END SYSTOLIC INDEX-HIGH: 70 ML/M2
EOSINOPHIL # BLD AUTO: 0.3 K/UL (ref 0–0.5)
EOSINOPHIL NFR BLD: 9.7 % (ref 0–8)
ERYTHROCYTE [DISTWIDTH] IN BLOOD BY AUTOMATED COUNT: 17.8 % (ref 11.5–14.5)
EST. GFR  (AFRICAN AMERICAN): >60 ML/MIN/1.73 M^2
EST. GFR  (NON AFRICAN AMERICAN): >60 ML/MIN/1.73 M^2
GLUCOSE SERPL-MCNC: 89 MG/DL (ref 70–110)
HCT VFR BLD AUTO: 25.7 % (ref 37–48.5)
HGB BLD-MCNC: 8.4 G/DL (ref 12–16)
IMM GRANULOCYTES # BLD AUTO: 0.02 K/UL (ref 0–0.04)
IMM GRANULOCYTES NFR BLD AUTO: 0.6 % (ref 0–0.5)
INR PPP: 1.9 (ref 0.8–1.2)
LYMPHOCYTES # BLD AUTO: 0.9 K/UL (ref 1–4.8)
LYMPHOCYTES NFR BLD: 26.3 % (ref 18–48)
MAGNESIUM SERPL-MCNC: 1.5 MG/DL (ref 1.6–2.6)
MCH RBC QN AUTO: 30.3 PG (ref 27–31)
MCHC RBC AUTO-ENTMCNC: 32.7 G/DL (ref 32–36)
MCV RBC AUTO: 93 FL (ref 82–98)
MONOCYTES # BLD AUTO: 0.4 K/UL (ref 0.3–1)
MONOCYTES NFR BLD: 11.2 % (ref 4–15)
NEUTROPHILS # BLD AUTO: 1.7 K/UL (ref 1.8–7.7)
NEUTROPHILS NFR BLD: 51.6 % (ref 38–73)
NRBC BLD-RTO: 0 /100 WBC
NUC REST DIASTOLIC VOLUME INDEX: 93
NUC REST EJECTION FRACTION: 77
NUC REST SYSTOLIC VOLUME INDEX: 22
NUC STRESS DIASTOLIC VOLUME INDEX: 91
NUC STRESS EJECTION FRACTION: 78 %
NUC STRESS SYSTOLIC VOLUME INDEX: 20
OHS CV CPX 85 PERCENT MAX PREDICTED HEART RATE MALE: 137
OHS CV CPX MAX PREDICTED HEART RATE: 161
OHS CV CPX PATIENT IS FEMALE: 1
OHS CV CPX PATIENT IS MALE: 0
OHS CV CPX PEAK DIASTOLIC BLOOD PRESSURE: 50 MMHG
OHS CV CPX PEAK HEAR RATE: 94 BPM
OHS CV CPX PEAK RATE PRESSURE PRODUCT: NORMAL
OHS CV CPX PEAK SYSTOLIC BLOOD PRESSURE: 139 MMHG
OHS CV CPX PERCENT MAX PREDICTED HEART RATE ACHIEVED: 58
OHS CV CPX RATE PRESSURE PRODUCT PRESENTING: NORMAL
PHOSPHATE SERPL-MCNC: 3.1 MG/DL (ref 2.7–4.5)
PLATELET # BLD AUTO: 87 K/UL (ref 150–350)
PMV BLD AUTO: 12.7 FL (ref 9.2–12.9)
POTASSIUM SERPL-SCNC: 3.8 MMOL/L (ref 3.5–5.1)
PROT SERPL-MCNC: 5.7 G/DL (ref 6–8.4)
PROTHROMBIN TIME: 18.5 SEC (ref 9–12.5)
RBC # BLD AUTO: 2.77 M/UL (ref 4–5.4)
RETIRED EF AND QEF - SEE NOTES: 51 %
SODIUM SERPL-SCNC: 133 MMOL/L (ref 136–145)
SYSTOLIC BLOOD PRESSURE: 154 MMHG
WBC # BLD AUTO: 3.31 K/UL (ref 3.9–12.7)

## 2019-05-01 PROCEDURE — 93016 CV STRESS TEST SUPVJ ONLY: CPT | Mod: NTX,,, | Performed by: INTERNAL MEDICINE

## 2019-05-01 PROCEDURE — 78492 MYOCRD IMG PET MLT RST&STRS: CPT | Mod: 26,NTX,, | Performed by: INTERNAL MEDICINE

## 2019-05-01 PROCEDURE — 99232 SBSQ HOSP IP/OBS MODERATE 35: CPT | Mod: NTX,,, | Performed by: HOSPITALIST

## 2019-05-01 PROCEDURE — 80053 COMPREHEN METABOLIC PANEL: CPT | Mod: NTX

## 2019-05-01 PROCEDURE — 99211 OFF/OP EST MAY X REQ PHY/QHP: CPT | Mod: PBBFAC,25,NTX

## 2019-05-01 PROCEDURE — 25000003 PHARM REV CODE 250: Mod: NTX | Performed by: NURSE PRACTITIONER

## 2019-05-01 PROCEDURE — 25000003 PHARM REV CODE 250: Mod: NTX | Performed by: HOSPITALIST

## 2019-05-01 PROCEDURE — 87449 NOS EACH ORGANISM AG IA: CPT | Mod: NTX

## 2019-05-01 PROCEDURE — 36415 COLL VENOUS BLD VENIPUNCTURE: CPT | Mod: NTX

## 2019-05-01 PROCEDURE — 78492 MYOCRD IMG PET MLT RST&STRS: CPT | Mod: NTX

## 2019-05-01 PROCEDURE — 99223 1ST HOSP IP/OBS HIGH 75: CPT | Mod: NTX,,, | Performed by: INTERNAL MEDICINE

## 2019-05-01 PROCEDURE — 86635 COCCIDIOIDES ANTIBODY: CPT | Mod: NTX

## 2019-05-01 PROCEDURE — 99232 PR SUBSEQUENT HOSPITAL CARE,LEVL II: ICD-10-PCS | Mod: NTX,,, | Performed by: HOSPITALIST

## 2019-05-01 PROCEDURE — 99233 SBSQ HOSP IP/OBS HIGH 50: CPT | Mod: NTX,,, | Performed by: INTERNAL MEDICINE

## 2019-05-01 PROCEDURE — 83735 ASSAY OF MAGNESIUM: CPT | Mod: NTX

## 2019-05-01 PROCEDURE — 84100 ASSAY OF PHOSPHORUS: CPT | Mod: NTX

## 2019-05-01 PROCEDURE — 99223 PR INITIAL HOSPITAL CARE,LEVL III: ICD-10-PCS | Mod: NTX,,, | Performed by: INTERNAL MEDICINE

## 2019-05-01 PROCEDURE — 93018 PET STRESS (CUPID ONLY): ICD-10-PCS | Mod: NTX,,, | Performed by: INTERNAL MEDICINE

## 2019-05-01 PROCEDURE — 99223 PR INITIAL HOSPITAL CARE,LEVL III: ICD-10-PCS | Mod: NTX,,, | Performed by: PHYSICIAN ASSISTANT

## 2019-05-01 PROCEDURE — 99999 PR PBB SHADOW E&M-EST. PATIENT-LVL I: CPT | Mod: PBBFAC,NTX,,

## 2019-05-01 PROCEDURE — 93016 PET STRESS (CUPID ONLY): ICD-10-PCS | Mod: NTX,,, | Performed by: INTERNAL MEDICINE

## 2019-05-01 PROCEDURE — 93018 CV STRESS TEST I&R ONLY: CPT | Mod: NTX,,, | Performed by: INTERNAL MEDICINE

## 2019-05-01 PROCEDURE — 99223 1ST HOSP IP/OBS HIGH 75: CPT | Mod: NTX,,, | Performed by: PHYSICIAN ASSISTANT

## 2019-05-01 PROCEDURE — A9555 PET STRESS (CUPID ONLY): ICD-10-PCS | Mod: NTX,,, | Performed by: INTERNAL MEDICINE

## 2019-05-01 PROCEDURE — 20600001 HC STEP DOWN PRIVATE ROOM: Mod: NTX

## 2019-05-01 PROCEDURE — 99999 PR PBB SHADOW E&M-EST. PATIENT-LVL I: ICD-10-PCS | Mod: PBBFAC,NTX,,

## 2019-05-01 PROCEDURE — A9555 RB82 RUBIDIUM: HCPCS | Mod: NTX,,, | Performed by: INTERNAL MEDICINE

## 2019-05-01 PROCEDURE — 99233 PR SUBSEQUENT HOSPITAL CARE,LEVL III: ICD-10-PCS | Mod: NTX,,, | Performed by: INTERNAL MEDICINE

## 2019-05-01 PROCEDURE — 85610 PROTHROMBIN TIME: CPT | Mod: NTX

## 2019-05-01 PROCEDURE — 78492 PET STRESS (CUPID ONLY): ICD-10-PCS | Mod: 26,NTX,, | Performed by: INTERNAL MEDICINE

## 2019-05-01 PROCEDURE — 87385 HISTOPLASMA CAPSUL AG IA: CPT | Mod: NTX

## 2019-05-01 PROCEDURE — 85025 COMPLETE CBC W/AUTO DIFF WBC: CPT | Mod: NTX

## 2019-05-01 RX ORDER — LORAZEPAM/0.9% SODIUM CHLORIDE 100MG/0.1L
2 PLASTIC BAG, INJECTION (ML) INTRAVENOUS ONCE
Status: COMPLETED | OUTPATIENT
Start: 2019-05-01 | End: 2019-05-01

## 2019-05-01 RX ORDER — DIPYRIDAMOLE 5 MG/ML
60 INJECTION INTRAVENOUS
Status: COMPLETED | OUTPATIENT
Start: 2019-05-01 | End: 2019-05-01

## 2019-05-01 RX ORDER — FUROSEMIDE 80 MG/1
80 TABLET ORAL 2 TIMES DAILY
Status: DISCONTINUED | OUTPATIENT
Start: 2019-05-01 | End: 2019-05-02 | Stop reason: HOSPADM

## 2019-05-01 RX ORDER — ACETAMINOPHEN 500 MG
500 TABLET ORAL EVERY 6 HOURS PRN
Status: DISCONTINUED | OUTPATIENT
Start: 2019-05-01 | End: 2019-05-02 | Stop reason: HOSPADM

## 2019-05-01 RX ADMIN — MAGNESIUM SULFATE IN WATER 2 G: 40 INJECTION, SOLUTION INTRAVENOUS at 10:05

## 2019-05-01 RX ADMIN — Medication 220 MG: at 02:05

## 2019-05-01 RX ADMIN — CETIRIZINE HYDROCHLORIDE 5 MG: 5 TABLET ORAL at 08:05

## 2019-05-01 RX ADMIN — RIFAXIMIN 550 MG: 550 TABLET ORAL at 02:05

## 2019-05-01 RX ADMIN — LEVOTHYROXINE SODIUM 112 MCG: 112 TABLET ORAL at 05:05

## 2019-05-01 RX ADMIN — DIPYRIDAMOLE 60 MG: 5 INJECTION INTRAVENOUS at 09:05

## 2019-05-01 RX ADMIN — SPIRONOLACTONE 200 MG: 100 TABLET, FILM COATED ORAL at 02:05

## 2019-05-01 RX ADMIN — LACTULOSE 30 G: 20 SOLUTION ORAL at 04:05

## 2019-05-01 RX ADMIN — FLUTICASONE PROPIONATE 100 MCG: 50 SPRAY, METERED NASAL at 08:05

## 2019-05-01 RX ADMIN — RIFAXIMIN 550 MG: 550 TABLET ORAL at 08:05

## 2019-05-01 RX ADMIN — PANTOPRAZOLE SODIUM 40 MG: 40 TABLET, DELAYED RELEASE ORAL at 02:05

## 2019-05-01 RX ADMIN — FUROSEMIDE 80 MG: 80 TABLET ORAL at 04:05

## 2019-05-01 NOTE — CONSULTS
[facility]  Pulmonology  Consult Note    Patient Name: Jihan Zamudio  MRN: [id number]  Admission Date: 4/23/2019  Hospital Length of Stay: 8 days  Code Status: Full Code  Attending Physician: Maritza Messina MD  Primary Care Provider: Primary Doctor No   Principal Problem: GI bleed    Inpatient consult to Pulmonology  Consult performed by: Howie Aggarwal MD  Consult ordered by: Maritza Messina MD        Subjective:     HPI:  Patient is a 49 y.o. 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 The patient denied recent NSAID and ETOH use. She denies any hematemesis and states that she has never had a similar episode to this previously. Paracentesis was performed in the ED with 5L removed. Para studies with PMN < 250. The patient was admitted there and had EGD performed on 4/23 which revealed portal hypertensive gastropathy, blood in the gastric body, grade 2 gastroesophageal varices without active bleeding but with stigmata of recent bleeding and and non-bleeding small esophageal varices without stigmata of recent bleeding. The esophageal varices were not banded as they were not suspected to be the source of bleeding.  CT abdomen showed sequela of portal HTN and patent portal vein without evidence of PVT. [place] [facility] [place]IPS vs Balloon-Occluded Retrograde Transvenous Obliteration for treatment of gastric varices.     Pulmonology consulted for evaluation of pulmonary nodule found on imaging.     Past Medical History:   Diagnosis Date    Cirrhosis     Esophageal varices 2/26/2018    Small with no banding 07/17    Essential hypertension 2/26/2018    Fatty liver 2/26/2018    GERD (gastroesophageal reflux disease)     Hx of colonic polyps 2/26/2018    On colonoscopy 07/17     Hypertension     Hypothyroidism 2018    Kidney stones     Morbid obesity 2018    Lap band with subsequent release    KADEEM (obstructive sleep apnea) 2018    Osteoarthritis 2018    Pulmonary nodule 2018    Vitamin D deficiency 2018       Past Surgical History:   Procedure Laterality Date     SECTION      TIMES 2     CHOLECYSTECTOMY      LAPAROSCOPIC     CYSTOSCOPY W/ STONE MANIPULATION      kidney stone removal    EGD (ESOPHAGOGASTRODUODENOSCOPY) N/A 2019    Performed by Davian Hern[facility]dez MD at [facility]    LAPAROSCOPIC GASTRIC BANDING  2006    removal     LIVER BIOPSY  2017    KESSLER with bridging 17       Review of patient's allergies indicates:   Allergen Reactions    Metformin Rash    Pcn [penicillins] Other (See Comments)     Unsure of reaction, states it was as a child. Tolerated rocephin at osh       Family History     Problem Relation (Age of Onset)    Cancer Mother (50), Father (65)    Heart disease Mother, Father        Tobacco Use    Smoking status: Never Smoker    Smokeless tobacco: Never Used    Tobacco comment: patient denies   Substance and Sexual Activity    Alcohol use: No     Comment: patient denies    Drug use: No     Comment: patient denies    Sexual activity: Not on file         Review of Systems   Constitutional: Positive for fatigue. Negative for chills and unexpected weight change.   HENT: Negative for trouble swallowing.    Respiratory: Negative for cough, chest tightness and shortness of breath.    Cardiovascular: Negative for chest pain.   Gastrointestinal: Positive for abdominal distention.   Genitourinary: Negative for dysuria.   Skin: Positive for color change.   Hematological: Negative for adenopathy.     Objective:     Vital Signs (Most Recent):  Temp: 98.5 °F (36.9 °C) (19 1237)  Pulse: 96 (19 1237)  Resp: 18 (19 1237)  BP: (!) 129/59 (19 1237)  SpO2: (!) 92 %  (05/01/19 1237) Vital Signs (24h Range):  Temp:  [98.5 °F (36.9 °C)-99.5 °F (37.5 °C)] 98.5 °F (36.9 °C)  Pulse:  [92-97] 96  Resp:  [12-18] 18  SpO2:  [92 %-94 %] 92 %  BP: (105-134)/(51-62) 129/59     Weight: 104 kg (229 lb 4.5 oz)  Body mass index is 38.15 kg/m².      Intake/Output Summary (Last 24 hours) at 5/1/2019 1430  Last data filed at 5/1/2019 0410  Gross per 24 hour   Intake --   Output 2800 ml   Net -2800 ml       Physical Exam   Constitutional: She is oriented to person, place, and time. No distress.   HENT:   Head: Normocephalic and atraumatic.   Mouth/Throat: No oropharyngeal exudate.   Pulmonary/Chest: Effort normal and breath sounds normal. No stridor. No respiratory distress. She has no wheezes. She has no rales. She exhibits no tenderness.   Abdominal: She exhibits distension.   Neurological: She is alert and oriented to person, place, and time.   Skin: She is not diaphoretic.   Nursing note and vitals reviewed.      Vents:       Lines/Drains/Airways     Peripheral Intravenous Line                 Peripheral IV - Single Lumen 04/30/19 1830 20 G;1 3/4 in Left Forearm less than 1 day                Significant Labs:    CBC/Anemia Profile:  Recent Labs   Lab 04/30/19  0413 05/01/19  0411   WBC 3.66* 3.31*   HGB 8.7* 8.4*   HCT 26.5* 25.7*   PLT 94* 87*   MCV 92 93   RDW 17.8* 17.8*        Chemistries:  Recent Labs   Lab 04/29/19  1453 04/30/19  0413 05/01/19  0411   * 132* 133*   K 3.6 3.6 3.8   CL 99 97 97   CO2 28 27 26   BUN 8 9 10   CREATININE 0.7 0.8 0.8   CALCIUM 7.8* 7.9* 7.9*   ALBUMIN  --  2.4* 2.2*   PROT  --  5.8* 5.7*   BILITOT  --  10.1* 9.4*   ALKPHOS  --  101 99   ALT  --  25 24   AST  --  80* 76*   MG  --  1.7 1.5*   PHOS  --  2.6* 3.1       All pertinent labs within the past 24 hours have been reviewed.    Significant Imaging:   I have reviewed and interpreted all pertinent imaging results/findings within the past 24 hours.    Assessment/Plan:     Pulmonary nodule  49 F  currently undergoing evaluation for liver transplant. Pulmonology consulted for assessment of pulmonary nodule found on imaging.   - CT A/P 3/19/2018: 0.5cm solid pulmonary nodule within left lower lobe  - Has history of latent TB  - Pt denies smoking history, environmental exposure, cough, hemoptysis, or unexpected weight loss     Recommendations:  - Repeat CT chest w/ contrast    - Further recommendations pending results         Thank you for your consult. I will follow-up with patient. Please contact us if you have any additional questions.     Howie Aggarwal MD  Pulmonology  [facility]

## 2019-05-01 NOTE — PROGRESS NOTES
Progress Note  Hospital Medicine  [facility]       Patient Name: Jihan Zamudio  MRN:  [id number]  Hospital Medicine Team: [facility] Maritza Messina MD  Date of Admission:  4/23/2019     Length of Stay:  LOS: 7 days   Expected Discharge Date: 5/2/2019  Principal Problem:  GI bleed     Subjective:     Interval History/Overnight Events:    Doing well today wit no acute problems  IR paracentesis attempted but insufficient ascites identified for safe paracentesis  MELD 25   Hbg stable   Remains on IV diuresis with lasix . Cr stable   Cardiac PET stress  tmrw AM  Plan for OLT presentation on 5/1?      cetirizine  5 mg Oral QHS    fluticasone propionate  2 spray Each Nare Daily    furosemide  80 mg Intravenous TID    lactulose  30 g Oral TID    levothyroxine  112 mcg Oral Before breakfast    pantoprazole  40 mg Oral Daily    rifAXImin  550 mg Oral BID    spironolactone  200 mg Oral Daily    zinc sulfate  220 mg Oral Daily           calcium carbonate, ondansetron, ondansetron, sodium chloride 0.9%    Review of Systems   Constitutional: Negative for chills, fatigue, fever.   HENT: Negative for sore throat, trouble swallowing.    Eyes: Negative for photophobia, visual disturbance.   Respiratory: Negative for cough, shortness of breath.    Cardiovascular: Negative for chest pain, palpitations, leg swelling.   Gastrointestinal: Negative for abdominal pain, constipation, diarrhea, nausea, vomiting.   Endocrine: Negative for cold intolerance, heat intolerance.   Genitourinary: Negative for dysuria, frequency.   Musculoskeletal: Negative for arthralgias, myalgias.   Skin: Negative for rash, wound, erythema   Neurological: Negative for dizziness, syncope, weakness, light-headedness.   Psychiatric/Behavioral: Negative for confusion, hallucinations, anxiety  All other systems reviewed and are negative.    Objective:     Temp:  [98.2 °F (36.8 °C)-99.6 °F (37.6 °C)]   Pulse:  [94-99]    Resp:  [16-18]   BP: (120-137)/(51-65)   SpO2:  [92 %-99 %]       Physical Exam:  Constitutional: chronically ill looking,  non-distressed, not diaphoretic.   HENT: NC/AT, external ears normal, oropharynx clear, MMM w/o exudates.   Eyes: PERRL, EOMI, conjunctiva normal, no discharge b/l, +scleral icterus   Neck: normal ROM, supple  CV: RRR, no m/r/g, no carotid bruits, +2 peripheral pulses.  Pulmonary/Chest wall: Breathing comfortably w/o distress, CTAB, no w/r/r, no crackles.   GI: Soft, non-tender, (+) BS, (+) BM   +distended. Healed post surgical scars   Musculoskeletal: Normal ROM, no atrophy,  + pitting edema in LE bilaterally   Neurological: AAO x 4, CN II-XI in tact, nl sensation, nl strength/tone  No asterixis   Skin: warm, dry   +pallor, jaundice, + spider angiomas, +bruising   Psych: normal mood and affect, normal behavior, thought content and judgement.    Labs:    Chemistries:   Recent Labs   Lab 04/28/19  0345 04/29/19  0607 04/29/19  1453 04/30/19  0413   * 134* 134* 132*   K 3.5 3.2* 3.6 3.6    100 99 97   CO2 25 26 28 27   BUN 8 8 8 9   CREATININE 0.7 0.8 0.7 0.8   CALCIUM 7.3* 7.8* 7.8* 7.9*   PROT 5.1* 5.7*  --  5.8*   BILITOT 8.1* 9.4*  --  10.1*   ALKPHOS 88 103  --  101   ALT 22 24  --  25   AST 72* 79*  --  80*   MG 1.6 1.4*  --  1.7   PHOS 2.4* 3.1  --  2.6*        WBC:   Recent Labs   Lab 04/26/19  1801 04/27/19  0329 04/28/19  0345 04/29/19  0607 04/30/19  0413   WBC 3.78* 2.67* 2.43* 2.58* 3.66*     Bands:     CBC/Anemia Labs: Coags:    Recent Labs   Lab 04/28/19  0345 04/29/19  0607 04/30/19  0413   WBC 2.43* 2.58* 3.66*   HGB 8.1* 8.8* 8.7*   HCT 24.6* 26.3* 26.5*   PLT 68* 75* 94*   MCV 92 92 92   RDW 17.2* 17.2* 17.8*    Recent Labs   Lab 04/23/19  2355  04/28/19  0345 04/29/19  0607 04/30/19  0413   INR 2.1*   < > 1.9* 1.9* 1.9*   APTT 35.1*  --   --   --   --     < > = values in this interval not displayed.          Assessment and Plan     Hospital Course:    Ms. Jihan  Lizz was admitted to Hospital Medicine for management of GIB and decomp cirrhosis     Active Hospital Problems    Diagnosis  POA    *GI bleed [K92.2]  Yes    Acute blood loss anemia [D62]  Yes    Coagulopathy [D68.9]  Yes    Anasarca [R60.1]  Yes    Other ascites [R18.8]  Yes    Pulmonary hypertension [I27.20]  Yes    Hyponatremia [E87.1]  Yes    Hepatic encephalopathy [K72.90]  Yes    Moderate malnutrition [E44.0]  Yes    Encounter for pre-transplant evaluation for chronic liver disease [Z01.818]  Not Applicable    Essential hypertension [I10]  Yes    Hypothyroidism [E03.9]  Yes    Fatty liver [K76.0]  Yes     Dx 2006      Decompensated hepatic cirrhosis [K72.90]  Yes     From KESSLER    Liver biopsy 11/29/17- KESSLER with bridging fibrosis, Laura, interface activity; lymph and occ plasma cells      Esophageal varices [I85.00]  Yes     Small with no banding 07/17      Morbid obesity [E66.01]  Yes     Lap band 2006 (lost 75-80 lbs) with subsequent release (2009) (due to obstruction)        Resolved Hospital Problems   No resolved problems to display.       # Gastrointestinal hemorrhage with melena  # Acute blood loss anemia  # Portal Hypertensive gastropathy  - s/p EGD with APC treatment  - H/H is stable at 8-9  - cont rocephin for 5 days, completed     # Decompensated KESSLER Cirrhosis with ascites  MELD-Na score: 25 at 4/30/2019  4:13 AM  MELD score: 22 at 4/30/2019  4:13 AM  Calculated from:  Serum Creatinine: 0.8 mg/dL (Rounded to 1 mg/dL) at 4/30/2019  4:13 AM  Serum Sodium: 132 mmol/L at 4/30/2019  4:13 AM  Total Bilirubin: 10.1 mg/dL at 4/30/2019  4:13 AM  INR(ratio): 1.9 at 4/30/2019  4:13 AM  Age: 49 years  - hepatology consulted  - undergoing liver transplant eval  - multiple consulted and tests pending  - IR paracentesis on 4/26 with 4.5L removed and negative for SBP  - on IV diuresis as above   - pet stress 5/1, C-scope pending and mammogram pending , presentation possibly 5/1     #  Anasarca  # Pulmonary HTN  - echo shows normal EF  - started on IV lasix 80 mg IV TID and aldactone  - repeat 2d echo shows PA systolic of 26     # Coagulopathy  # Thrombocytopenia  - vitamin K for 3 days  - DIC labs reviewed      # Acute on Chronic Hepatic Encephalopathy  - cont rifaximin  - cont  Lactulose  - resolved       # Hyponatremia  - fluid restrict to 1.5L     # Morbid Obesity   Body mass index is 40.1 kg/m².  - dietary consulted      # Hypothyroidism  - cont levothyroxine      # Moderate protein terrence malnutrition  - PAB and dietary      # Hypokalemia  - replace     Diet: low sodium, fluid restrict    GI PPx:    DVT PPx:    Goals of Care:  full        Disposition:  under going liver tx eval, tbd    Signing Physician:     Maritza Messina MD  Department of Hospital Medicine   [facility]  Pager [phone]  4/30/2019

## 2019-05-01 NOTE — SUBJECTIVE & OBJECTIVE
Past Medical History:   Diagnosis Date    Cirrhosis     Esophageal varices 2018    Small with no banding     Essential hypertension 2018    Fatty liver 2018    GERD (gastroesophageal reflux disease)     Hx of colonic polyps 2018    On colonoscopy     Hypertension     Hypothyroidism 2018    Kidney stones     Morbid obesity 2018    Lap band with subsequent release    KADEEM (obstructive sleep apnea) 2018    Osteoarthritis 2018    Pulmonary nodule 2018    Vitamin D deficiency 2018       Past Surgical History:   Procedure Laterality Date     SECTION      TIMES 2     CHOLECYSTECTOMY      LAPAROSCOPIC     CYSTOSCOPY W/ STONE MANIPULATION      kidney stone removal    EGD (ESOPHAGOGASTRODUODENOSCOPY) N/A 2019    [facility]    LAPAROSCOPIC GASTRIC BANDING  2006    removal     LIVER BIOPSY  2017    KESSLER with bridging 17       Review of patient's allergies indicates:   Allergen Reactions    Metformin Rash    Pcn [penicillins] Other (See Comments)     Unsure of reaction, states it was as a child. Tolerated rocephin at [facility]       Family History     Problem Relation (Age of Onset)    Cancer Mother (50), Father (65)    Heart disease Mother, Father        Tobacco Use    Smoking status: Never Smoker    Smokeless tobacco: Never Used    Tobacco comment: patient denies   Substance and Sexual Activity    Alcohol use: No     Comment: patient denies    Drug use: No     Comment: patient denies    Sexual activity: Not on file         Review of Systems   Constitutional: Positive for fatigue. Negative for chills and unexpected weight change.   HENT: Negative for trouble swallowing.    Respiratory: Negative for cough, chest tightness and shortness of breath.    Cardiovascular: Negative for chest pain.   Gastrointestinal: Positive for abdominal distention.   Genitourinary:  Negative for dysuria.   Skin: Positive for color change.   Hematological: Negative for adenopathy.     Objective:     Vital Signs (Most Recent):  Temp: 98.5 °F (36.9 °C) (05/01/19 1237)  Pulse: 96 (05/01/19 1237)  Resp: 18 (05/01/19 1237)  BP: (!) 129/59 (05/01/19 1237)  SpO2: (!) 92 % (05/01/19 1237) Vital Signs (24h Range):  Temp:  [98.5 °F (36.9 °C)-99.5 °F (37.5 °C)] 98.5 °F (36.9 °C)  Pulse:  [92-97] 96  Resp:  [12-18] 18  SpO2:  [92 %-94 %] 92 %  BP: (105-134)/(51-62) 129/59     Weight: 104 kg (229 lb 4.5 oz)  Body mass index is 38.15 kg/m².      Intake/Output Summary (Last 24 hours) at 5/1/2019 1430  Last data filed at 5/1/2019 0410  Gross per 24 hour   Intake --   Output 2800 ml   Net -2800 ml       Physical Exam   Constitutional: She is oriented to person, place, and time. No distress.   HENT:   Head: Normocephalic and atraumatic.   Mouth/Throat: No oropharyngeal exudate.   Pulmonary/Chest: Effort normal and breath sounds normal. No stridor. No respiratory distress. She has no wheezes. She has no rales. She exhibits no tenderness.   Abdominal: She exhibits distension.   Neurological: She is alert and oriented to person, place, and time.   Skin: She is not diaphoretic.   Nursing note and vitals reviewed.      Vents:       Lines/Drains/Airways     Peripheral Intravenous Line                 Peripheral IV - Single Lumen 04/30/19 1830 20 G;1 3/4 in Left Forearm less than 1 day                Significant Labs:    CBC/Anemia Profile:  Recent Labs   Lab 04/30/19  0413 05/01/19  0411   WBC 3.66* 3.31*   HGB 8.7* 8.4*   HCT 26.5* 25.7*   PLT 94* 87*   MCV 92 93   RDW 17.8* 17.8*        Chemistries:  Recent Labs   Lab 04/29/19  1453 04/30/19  0413 05/01/19  0411   * 132* 133*   K 3.6 3.6 3.8   CL 99 97 97   CO2 28 27 26   BUN 8 9 10   CREATININE 0.7 0.8 0.8   CALCIUM 7.8* 7.9* 7.9*   ALBUMIN  --  2.4* 2.2*   PROT  --  5.8* 5.7*   BILITOT  --  10.1* 9.4*   ALKPHOS  --  101 99   ALT  --  25 24   AST  --  80* 76*    MG  --  1.7 1.5*   PHOS  --  2.6* 3.1       All pertinent labs within the past 24 hours have been reviewed.    Significant Imaging:   I have reviewed and interpreted all pertinent imaging results/findings within the past 24 hours.

## 2019-05-01 NOTE — ASSESSMENT & PLAN NOTE
49 F currently undergoing evaluation for liver transplant. Pulmonology consulted for assessment of pulmonary nodule found on imaging.   - CT A/P 3/19/2018: 0.5cm solid pulmonary nodule within left lower lobe  - Has history of latent TB  - Pt denies smoking history, environmental exposure, cough, hemoptysis, or unexpected weight loss     Recommendations:  - Repeat CT chest w/ contrast    - Further recommendations pending results

## 2019-05-01 NOTE — PLAN OF CARE
Problem: Adult Inpatient Plan of Care  Goal: Plan of Care Review  Outcome: Ongoing (interventions implemented as appropriate)  VSS. A/Ox4.  No complaints of pain.  Patient refused evening lactulose stating she had already had 4 BMs.  NPO since midnight for stress test in AM.  No acute events overnight. Safety maintained.  All questions answered. Patient updated on plan of care.  Will continue to monitor.

## 2019-05-01 NOTE — PROGRESS NOTES
[facility]  Hepatology  Progress Note    Patient Name: Jihan Zamudio  MRN: [id number]  Admission Date: 4/23/2019  Hospital Length of Stay: 8 days  Attending Provider: Maritza Messina MD   Primary Care Physician: Primary Doctor No  Principal Problem:GI bleed    Subjective:     HPI: Mrs. Zamudio is a 50 y/o female with history of KESSLER cirrhosis, was followed by Dr. Smallwood until 5/2018. Her cirrhosis is c/b volume overload, HE, and EV. She was being evaluated for transplant, however was deferred due to high PA pressure on echo. She was evaluated by cardiology who thought RHC is not needed. On last appointment with Dr. Smallwood, she was referred for a second opinion with Dr. Franklin, however the patient was lost to follow up, which she states was due to insurance issues.    The patient was doing well until 4/19 when she started to notice that her abdomen was more distended, and her LE were swollen. On 4/20, she started noticing her stool was black, and her  took her to the ED the next day. On 4/22, the patient underwent a paracentesis at OSH with ~5L removed, negative for SBP. Underwent EGD on 4/23, which was reported with small EV, Grade 2 GOV, and PHG, with blood in stomach, but no active bleeding. She was referred to [facility] for TIPS considerations.    The patient states that she is feeling well today, last BM was last night, and was black looking like coffee grounds. Denies abdominal pain but endorses continuous distention.   She is HDS since arrival    Interval History:   Patient seen after completing her stress test. She did report a mild HA but otherwise doing OK.    Current Facility-Administered Medications   Medication    acetaminophen tablet 500 mg    calcium carbonate 200 mg calcium (500 mg) chewable tablet 1,000 mg    cetirizine tablet 5 mg    fluticasone propionate 50 mcg/actuation nasal spray 100 mcg    furosemide tablet 80 mg    lactulose 20 gram/30 mL solution Soln 30 g     levothyroxine tablet 112 mcg    magnesium sulfate 2 g/50 ml IVPB    ondansetron injection 4 mg    ondansetron tablet 4 mg    pantoprazole EC tablet 40 mg    rifAXIMin tablet 550 mg    sodium chloride 0.9% flush 10 mL    spironolactone tablet 200 mg    zinc sulfate capsule 220 mg       Objective:     Vital Signs (Most Recent):  Temp: 99 °F (37.2 °C) (05/01/19 0724)  Pulse: 92 (05/01/19 0724)  Resp: 18 (05/01/19 0724)  BP: (!) 105/54 (05/01/19 0724)  SpO2: (!) 92 % (05/01/19 0724) Vital Signs (24h Range):  Temp:  [98.8 °F (37.1 °C)-99.5 °F (37.5 °C)] 99 °F (37.2 °C)  Pulse:  [92-97] 92  Resp:  [12-18] 18  SpO2:  [92 %-99 %] 92 %  BP: (105-134)/(51-62) 105/54     Weight: 104 kg (229 lb 4.5 oz) (05/01/19 0400)  Body mass index is 38.15 kg/m².    Physical Exam   Constitutional: She is oriented to person, place, and time. No distress.   HENT:   Head: Normocephalic and atraumatic.   Eyes: Scleral icterus is present.   Cardiovascular: Normal rate and regular rhythm.   Pulmonary/Chest: Effort normal and breath sounds normal.   Abdominal: Soft. Bowel sounds are normal. She exhibits distension. She exhibits no mass. There is no tenderness. There is no rebound and no guarding. No hernia.   Musculoskeletal: She exhibits no edema.   Neurological: She is alert and oriented to person, place, and time.   Skin: She is not diaphoretic.   Nursing note and vitals reviewed.      MELD-Na score: 24 at 5/1/2019  4:11 AM  MELD score: 22 at 5/1/2019  4:11 AM  Calculated from:  Serum Creatinine: 0.8 mg/dL (Rounded to 1 mg/dL) at 5/1/2019  4:11 AM  Serum Sodium: 133 mmol/L at 5/1/2019  4:11 AM  Total Bilirubin: 9.4 mg/dL at 5/1/2019  4:11 AM  INR(ratio): 1.9 at 5/1/2019  4:11 AM  Age: 49 years    Significant Labs:  CBC:   Recent Labs   Lab 05/01/19 0411   WBC 3.31*   RBC 2.77*   HGB 8.4*   HCT 25.7*   PLT 87*     CMP:   Recent Labs   Lab 05/01/19 0411   GLU 89   CALCIUM 7.9*   ALBUMIN 2.2*   PROT 5.7*   *   K 3.8   CO2 26    CL 97   BUN 10   CREATININE 0.8   ALKPHOS 99   ALT 24   AST 76*   BILITOT 9.4*     Coagulation:   Recent Labs   Lab 05/01/19  0411   INR 1.9*       Significant Imaging:  X-Ray: Reviewed  US: Reviewed  CT: Reviewed    Assessment/Plan:     Decompensated hepatic cirrhosis  49 year old female with history of KESSLER cirrhosis currently undergoing inpatient evaluation for liver transplant. Patient stable with no acute events overnight and only mild HA after stress test today. Plan is to present patient at transplant selection committee today.    Recommendations:  --Daily CMP, CBC, and INR  --Strict I/O and monitor Cr closely  --Continue diuretics however will transition the lasix from IV to oral  --Continue lactulose, rifaximin, zinc  --Present at transplant selection committee today        Thank you for your consult. I will follow-up with patient. Please contact us if you have any additional questions.    Alexa Richard M.D.  Gastroenterology Fellow, PGY-V  Pager: [phone]  [facility]

## 2019-05-01 NOTE — SUBJECTIVE & OBJECTIVE
Interval History:   Patient seen after completing her stress test. She did report a mild HA but otherwise doing OK.    Current Facility-Administered Medications   Medication    acetaminophen tablet 500 mg    calcium carbonate 200 mg calcium (500 mg) chewable tablet 1,000 mg    cetirizine tablet 5 mg    fluticasone propionate 50 mcg/actuation nasal spray 100 mcg    furosemide tablet 80 mg    lactulose 20 gram/30 mL solution Soln 30 g    levothyroxine tablet 112 mcg    magnesium sulfate 2 g/50 ml IVPB    ondansetron injection 4 mg    ondansetron tablet 4 mg    pantoprazole EC tablet 40 mg    rifAXIMin tablet 550 mg    sodium chloride 0.9% flush 10 mL    spironolactone tablet 200 mg    zinc sulfate capsule 220 mg       Objective:     Vital Signs (Most Recent):  Temp: 99 °F (37.2 °C) (05/01/19 0724)  Pulse: 92 (05/01/19 0724)  Resp: 18 (05/01/19 0724)  BP: (!) 105/54 (05/01/19 0724)  SpO2: (!) 92 % (05/01/19 0724) Vital Signs (24h Range):  Temp:  [98.8 °F (37.1 °C)-99.5 °F (37.5 °C)] 99 °F (37.2 °C)  Pulse:  [92-97] 92  Resp:  [12-18] 18  SpO2:  [92 %-99 %] 92 %  BP: (105-134)/(51-62) 105/54     Weight: 104 kg (229 lb 4.5 oz) (05/01/19 0400)  Body mass index is 38.15 kg/m².    Physical Exam   Constitutional: She is oriented to person, place, and time. No distress.   HENT:   Head: Normocephalic and atraumatic.   Eyes: Scleral icterus is present.   Cardiovascular: Normal rate and regular rhythm.   Pulmonary/Chest: Effort normal and breath sounds normal.   Abdominal: Soft. Bowel sounds are normal. She exhibits distension. She exhibits no mass. There is no tenderness. There is no rebound and no guarding. No hernia.   Musculoskeletal: She exhibits no edema.   Neurological: She is alert and oriented to person, place, and time.   Skin: She is not diaphoretic.   Nursing note and vitals reviewed.      MELD-Na score: 24 at 5/1/2019  4:11 AM  MELD score: 22 at 5/1/2019  4:11 AM  Calculated from:  Serum Creatinine:  0.8 mg/dL (Rounded to 1 mg/dL) at 5/1/2019  4:11 AM  Serum Sodium: 133 mmol/L at 5/1/2019  4:11 AM  Total Bilirubin: 9.4 mg/dL at 5/1/2019  4:11 AM  INR(ratio): 1.9 at 5/1/2019  4:11 AM  Age: 49 years    Significant Labs:  CBC:   Recent Labs   Lab 05/01/19 0411   WBC 3.31*   RBC 2.77*   HGB 8.4*   HCT 25.7*   PLT 87*     CMP:   Recent Labs   Lab 05/01/19 0411   GLU 89   CALCIUM 7.9*   ALBUMIN 2.2*   PROT 5.7*   *   K 3.8   CO2 26   CL 97   BUN 10   CREATININE 0.8   ALKPHOS 99   ALT 24   AST 76*   BILITOT 9.4*     Coagulation:   Recent Labs   Lab 05/01/19 0411   INR 1.9*       Significant Imaging:  X-Ray: Reviewed  US: Reviewed  CT: Reviewed

## 2019-05-01 NOTE — HPI
Patient is a 49 y.o. 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 The patient denied recent NSAID and ETOH use. She denies any hematemesis and states that she has never had a similar episode to this previously. Paracentesis was performed in the ED with 5L removed. Para studies with PMN < 250. The patient was admitted there and had EGD performed on 4/23 which revealed portal hypertensive gastropathy, blood in the gastric body, grade 2 gastroesophageal varices without active bleeding but with stigmata of recent bleeding and and non-bleeding small esophageal varices without stigmata of recent bleeding. The esophageal varices were not banded as they were not suspected to be the source of bleeding.  CT abdomen showed sequela of portal HTN and patent portal vein without evidence of PVT. The patient was transferred to [facility] for evaluation for TIPS vs Balloon-Occluded Retrograde Transvenous Obliteration for treatment of gastric varices.     Pulmonology consulted for evaluation of pulmonary nodule found on imaging.

## 2019-05-01 NOTE — ASSESSMENT & PLAN NOTE
49 year old female with history of KESSLER cirrhosis currently undergoing inpatient evaluation for liver transplant. Patient stable with no acute events overnight and only mild HA after stress test today. Plan is to present patient at transplant selection committee today.    Recommendations:  --Daily CMP, CBC, and INR  --Strict I/O and monitor Cr closely  --Continue diuretics however will transition the lasix from IV to oral  --Continue lactulose, rifaximin, zinc  --Present at transplant selection committee today

## 2019-05-01 NOTE — PLAN OF CARE
"Problem: Adult Inpatient Plan of Care  Goal: Plan of Care Review  Outcome: Ongoing (interventions implemented as appropriate)  POC reviewed with patient. AAOx4, VSS. Patient transported to [facility] for PET Stress Test, patient states that medication for test "gave (me) a headache and abdominal cramping", AM meds held. Patient transported to [facility] for Mammogram, patient tolerated well. IV Mag Sulfate given x 1. No evidence of distress, safety maintained. Hourly rounding performed. Will continue to monitor.      "

## 2019-05-01 NOTE — COMMITTEE REVIEW
Jihan Zamudio's case presented to selection committee.  Patient has been accepted for liver transplant due to Cirrhosis: Fatty Liver ([place]) and complications of end stage liver disease including hyperbilirubinemia, hypoalbuminemia, coagulopathy, ascites, severe malnutrition, encephalopathy, jaundice, portal hypertension, splenomegaly, thrombocytopenia, esophageal varices and anemia, fatigue with a MELD score of 24.  Patient has no absolute contraindications for liver transplant.  Patient will be listed pending pulmonary consult for lung nodule and financial approval.    Patient will accept HBcAb positive livers.  Patient will accept HCVAB positive livers.  Patient will accept DCD livers.  Patient will accept HCV GERTRUDIS positive livers  Patient will accept HBV GERTRUDIS positive livers    I was present at the liver transplant committee meeting and attest to the decision of the committee.

## 2019-05-02 ENCOUNTER — TELEPHONE (OUTPATIENT)
Dept: TRANSPLANT | Facility: CLINIC | Age: 49
End: 2019-05-02

## 2019-05-02 VITALS
BODY MASS INDEX: 38.42 KG/M2 | RESPIRATION RATE: 18 BRPM | WEIGHT: 230.63 LBS | HEART RATE: 93 BPM | HEIGHT: 65 IN | DIASTOLIC BLOOD PRESSURE: 59 MMHG | SYSTOLIC BLOOD PRESSURE: 123 MMHG | TEMPERATURE: 98 F | OXYGEN SATURATION: 93 %

## 2019-05-02 LAB
ALBUMIN SERPL BCP-MCNC: 2.4 G/DL (ref 3.5–5.2)
ALP SERPL-CCNC: 106 U/L (ref 55–135)
ALT SERPL W/O P-5'-P-CCNC: 25 U/L (ref 10–44)
ANION GAP SERPL CALC-SCNC: 7 MMOL/L (ref 8–16)
AST SERPL-CCNC: 75 U/L (ref 10–40)
BASOPHILS # BLD AUTO: 0.02 K/UL (ref 0–0.2)
BASOPHILS NFR BLD: 0.6 % (ref 0–1.9)
BILIRUB SERPL-MCNC: 9.4 MG/DL (ref 0.1–1)
BUN SERPL-MCNC: 13 MG/DL (ref 6–20)
CALCIUM SERPL-MCNC: 8.2 MG/DL (ref 8.7–10.5)
CHLORIDE SERPL-SCNC: 96 MMOL/L (ref 95–110)
CO2 SERPL-SCNC: 27 MMOL/L (ref 23–29)
CREAT SERPL-MCNC: 0.9 MG/DL (ref 0.5–1.4)
DIFFERENTIAL METHOD: ABNORMAL
EBV VCA IGG SER QL IA: POSITIVE
EOSINOPHIL # BLD AUTO: 0.4 K/UL (ref 0–0.5)
EOSINOPHIL NFR BLD: 9.9 % (ref 0–8)
ERYTHROCYTE [DISTWIDTH] IN BLOOD BY AUTOMATED COUNT: 18 % (ref 11.5–14.5)
EST. GFR  (AFRICAN AMERICAN): >60 ML/MIN/1.73 M^2
EST. GFR  (NON AFRICAN AMERICAN): >60 ML/MIN/1.73 M^2
GLUCOSE SERPL-MCNC: 86 MG/DL (ref 70–110)
HCT VFR BLD AUTO: 26.8 % (ref 37–48.5)
HGB BLD-MCNC: 8.7 G/DL (ref 12–16)
IMM GRANULOCYTES # BLD AUTO: 0.02 K/UL (ref 0–0.04)
IMM GRANULOCYTES NFR BLD AUTO: 0.6 % (ref 0–0.5)
INR PPP: 1.9 (ref 0.8–1.2)
LYMPHOCYTES # BLD AUTO: 0.9 K/UL (ref 1–4.8)
LYMPHOCYTES NFR BLD: 24 % (ref 18–48)
MAGNESIUM SERPL-MCNC: 1.8 MG/DL (ref 1.6–2.6)
MCH RBC QN AUTO: 30.3 PG (ref 27–31)
MCHC RBC AUTO-ENTMCNC: 32.5 G/DL (ref 32–36)
MCV RBC AUTO: 93 FL (ref 82–98)
MONOCYTES # BLD AUTO: 0.4 K/UL (ref 0.3–1)
MONOCYTES NFR BLD: 10.5 % (ref 4–15)
NEUTROPHILS # BLD AUTO: 2 K/UL (ref 1.8–7.7)
NEUTROPHILS NFR BLD: 54.4 % (ref 38–73)
NRBC BLD-RTO: 0 /100 WBC
PHOSPHATE SERPL-MCNC: 3.5 MG/DL (ref 2.7–4.5)
PLATELET # BLD AUTO: 88 K/UL (ref 150–350)
PMV BLD AUTO: 12.6 FL (ref 9.2–12.9)
POTASSIUM SERPL-SCNC: 3.8 MMOL/L (ref 3.5–5.1)
PROT SERPL-MCNC: 5.9 G/DL (ref 6–8.4)
PROTHROMBIN TIME: 18.6 SEC (ref 9–12.5)
RBC # BLD AUTO: 2.87 M/UL (ref 4–5.4)
SODIUM SERPL-SCNC: 130 MMOL/L (ref 136–145)
WBC # BLD AUTO: 3.63 K/UL (ref 3.9–12.7)

## 2019-05-02 PROCEDURE — 99239 PR HOSPITAL DISCHARGE DAY,>30 MIN: ICD-10-PCS | Mod: NTX,,, | Performed by: HOSPITALIST

## 2019-05-02 PROCEDURE — 36415 COLL VENOUS BLD VENIPUNCTURE: CPT | Mod: NTX

## 2019-05-02 PROCEDURE — 25000003 PHARM REV CODE 250: Mod: NTX | Performed by: HOSPITALIST

## 2019-05-02 PROCEDURE — 85025 COMPLETE CBC W/AUTO DIFF WBC: CPT | Mod: NTX

## 2019-05-02 PROCEDURE — 99239 HOSP IP/OBS DSCHRG MGMT >30: CPT | Mod: NTX,,, | Performed by: HOSPITALIST

## 2019-05-02 PROCEDURE — 83735 ASSAY OF MAGNESIUM: CPT | Mod: NTX

## 2019-05-02 PROCEDURE — 25000003 PHARM REV CODE 250: Mod: NTX | Performed by: NURSE PRACTITIONER

## 2019-05-02 PROCEDURE — 85610 PROTHROMBIN TIME: CPT | Mod: NTX

## 2019-05-02 PROCEDURE — 80053 COMPREHEN METABOLIC PANEL: CPT | Mod: NTX

## 2019-05-02 PROCEDURE — 84100 ASSAY OF PHOSPHORUS: CPT | Mod: NTX

## 2019-05-02 RX ORDER — SPIRONOLACTONE 100 MG/1
200 TABLET, FILM COATED ORAL DAILY
Qty: 60 TABLET | Refills: 2 | Status: ON HOLD | OUTPATIENT
Start: 2019-05-03 | End: 2019-06-07 | Stop reason: HOSPADM

## 2019-05-02 RX ORDER — FUROSEMIDE 80 MG/1
80 TABLET ORAL 2 TIMES DAILY
Qty: 60 TABLET | Refills: 2 | Status: ON HOLD | OUTPATIENT
Start: 2019-05-02 | End: 2019-06-11 | Stop reason: SDUPTHER

## 2019-05-02 RX ORDER — ZINC SULFATE 50(220)MG
220 CAPSULE ORAL DAILY
Status: ON HOLD | COMMUNITY
Start: 2019-05-03 | End: 2019-06-07 | Stop reason: HOSPADM

## 2019-05-02 RX ORDER — LACTULOSE 10 G/15ML
20 SOLUTION ORAL 3 TIMES DAILY
Qty: 2700 ML | Refills: 2 | Status: ON HOLD
Start: 2019-05-02 | End: 2019-06-07 | Stop reason: HOSPADM

## 2019-05-02 RX ADMIN — LACTULOSE 30 G: 20 SOLUTION ORAL at 09:05

## 2019-05-02 RX ADMIN — Medication 220 MG: at 09:05

## 2019-05-02 RX ADMIN — SPIRONOLACTONE 200 MG: 100 TABLET, FILM COATED ORAL at 09:05

## 2019-05-02 RX ADMIN — FLUTICASONE PROPIONATE 100 MCG: 50 SPRAY, METERED NASAL at 09:05

## 2019-05-02 RX ADMIN — PANTOPRAZOLE SODIUM 40 MG: 40 TABLET, DELAYED RELEASE ORAL at 09:05

## 2019-05-02 RX ADMIN — RIFAXIMIN 550 MG: 550 TABLET ORAL at 09:05

## 2019-05-02 RX ADMIN — FUROSEMIDE 80 MG: 80 TABLET ORAL at 09:05

## 2019-05-02 RX ADMIN — LEVOTHYROXINE SODIUM 112 MCG: 112 TABLET ORAL at 06:05

## 2019-05-02 NOTE — PLAN OF CARE
Problem: Adult Inpatient Plan of Care  Goal: Plan of Care Review  Outcome: Ongoing (interventions implemented as appropriate)  VSS. A/Ox4.  No complaints of pain.  Urine sample sent overnight.  While awake patient O2 sats are 92% and higher but when asleep patient sats drop to upper 80s, patient stated she does have sleep apnea.  Incentive spirometer was given and patient was instructed on use, patient verbalized understanding and was able to demonstrate proper technique.  No acute events overnight. Safety maintained.  All questions answered. Patient updated on plan of care.  Will continue to monitor.

## 2019-05-02 NOTE — ASSESSMENT & PLAN NOTE
49 F currently undergoing evaluation for liver transplant. Pulmonology consulted for assessment of pulmonary nodule found on imaging.   - CT A/P 3/19/2018: 0.5cm solid pulmonary nodule within left lower lobe  - Has history of latent TB  - Pt denies smoking history, family history of lung cancer, environmental exposure, cough, hemoptysis, or unexpected weight loss   - Low risk for malignancy given pt's history and stable appearing pulmonary nodule. Does not need additional imaging at this time.

## 2019-05-02 NOTE — HPI
Patient is a 49 y.o. 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 Patient is currently undergoing inpatient OLT workup and Bariatrics has been consulted to evaluate need for potential lap band removal that the patient had placed back in 2007, in [place].     Patient has experienced 70 lb total weight loss since lap band placement, which has been stable for sometime. She claims that she has no significant issues with the band, aside from some discomfort associated with the port. She also states that several years ago, she was told that the band had become malpositioned and it was emptied at that time. No history of obstructive symptoms, bloating, or abdominal pain.

## 2019-05-02 NOTE — TELEPHONE ENCOUNTER
Report to Sydnee Davis at patient discharge:    Consuelo Preciado, RN  Kamryn Singleton RN   Cc: Laura Samllwood MD             Consuelo is a patient with KESSLER.   She was evaluated inpatient and deferred. She has a Meld of about 26 and will need weekly labs. She has been getting work done on [employer].   She Will need follow up in a week to two weeks with Dr. Smallwood.     Sydnee she was deferred for her Mammo that was pending.  It came back and was fine.   The other issue was a pulmonary nodule that is 0.5cm and needs to be resolved.  Per the  she has had previous CT scans of her chest and the nodule has always remained stable.  Isa is requesting the scans to be sent to us to be presented along with her most recent Chest CT for comparison at IR conference. I asked her to let her to let me know when they come in.   Thanks   Nimco

## 2019-05-02 NOTE — PROGRESS NOTES
Patient's case was presented to the liver transplant selection committee today.  She is an acceptable candidate for transplant, pending clearance by Pulmonology regarding the finding of a 0.5 cm lung nodule on chest CT.      I have informed the patient's , Mr. Zamudio, of the above, as the patient was away from her room getting a mammogram.  Mr. Zamudio stated that a stable lung nodule, about a half cm in size, was previously seen on a chest CT, and had been stable for approximately 3 years.  We will try to get a copy of the report or downloaded copy from the source, which is [facility].

## 2019-05-02 NOTE — PROGRESS NOTES
[facility]  Pulmonology  Progress Note    Patient Name: Jihan Zamudio  MRN: [id number]  Admission Date: 4/23/2019  Hospital Length of Stay: 9 days  Code Status: Full Code  Attending Provider: Maritza Messina MD  Primary Care Provider: Primary Doctor No   Principal Problem: GI bleed    Subjective:     Interval History: Pt remains stable on room air. Denies pain or discomfort.     Objective:     Vital Signs (Most Recent):  Temp: 98.4 °F (36.9 °C) (05/02/19 1127)  Pulse: 93 (05/02/19 1127)  Resp: 18 (05/02/19 1127)  BP: (!) 123/59 (05/02/19 1127)  SpO2: (!) 93 % (05/02/19 1127) Vital Signs (24h Range):  Temp:  [96.4 °F (35.8 °C)-98.8 °F (37.1 °C)] 98.4 °F (36.9 °C)  Pulse:  [91-98] 93  Resp:  [14-18] 18  SpO2:  [87 %-94 %] 93 %  BP: (123-138)/(59-71) 123/59     Weight: 104.6 kg (230 lb 9.6 oz)  Body mass index is 38.37 kg/m².      Intake/Output Summary (Last 24 hours) at 5/2/2019 1255  Last data filed at 5/2/2019 0603  Gross per 24 hour   Intake 240 ml   Output 200 ml   Net 40 ml       Physical Exam   Constitutional: She is oriented to person, place, and time. No distress.   HENT:   Head: Normocephalic and atraumatic.   Mouth/Throat: No oropharyngeal exudate.   Pulmonary/Chest: Effort normal and breath sounds normal. No stridor. No respiratory distress. She has no wheezes. She has no rales. She exhibits no tenderness.   Abdominal: She exhibits distension.   Neurological: She is alert and oriented to person, place, and time.   Skin: She is not diaphoretic.   Nursing note and vitals reviewed.      Vents:       Lines/Drains/Airways     Peripheral Intravenous Line                 Peripheral IV - Single Lumen 04/30/19 1830 20 G;1 3/4 in Left Forearm 1 day                Significant Labs:    CBC/Anemia Profile:  Recent Labs   Lab 05/01/19  0411 05/02/19  0452   WBC 3.31* 3.63*   HGB 8.4* 8.7*   HCT 25.7* 26.8*   PLT 87* 88*   MCV 93 93   RDW 17.8* 18.0*        Chemistries:  Recent Labs   Lab  05/01/19  0411 05/02/19  0452   * 130*   K 3.8 3.8   CL 97 96   CO2 26 27   BUN 10 13   CREATININE 0.8 0.9   CALCIUM 7.9* 8.2*   ALBUMIN 2.2* 2.4*   PROT 5.7* 5.9*   BILITOT 9.4* 9.4*   ALKPHOS 99 106   ALT 24 25   AST 76* 75*   MG 1.5* 1.8   PHOS 3.1 3.5       All pertinent labs within the past 24 hours have been reviewed.    Significant Imaging:  I have reviewed and interpreted all pertinent imaging results/findings within the past 24 hours.    Assessment/Plan:     Pulmonary nodule  49 F currently undergoing evaluation for liver transplant. Pulmonology consulted for assessment of pulmonary nodule found on imaging.   - CT A/P 3/19/2018: 0.5cm solid pulmonary nodule within left lower lobe  - Has history of latent TB  - Pt denies smoking history, family history of lung cancer, environmental exposure, cough, hemoptysis, or unexpected weight loss   - Low risk for malignancy given pt's history and stable appearing pulmonary nodule. Does not need additional imaging at this time.            Howie Aggarwal MD  Pulmonology  [facility]

## 2019-05-02 NOTE — ASSESSMENT & PLAN NOTE
Patient with lap band in place for >10 years with ~70lb weight loss since that time. Also with KESSLER cirrhosis, and is a candidate for OLT. Bariatric Surgery consulted to discuss lap band removal.     - Although band has been emptied, it likely continues to provide some degree of gastric constriction and therefore weight management. Lap band removal would certainly be possible at the time of potential liver transplantation; however, this may ultimately lead to postoperative weight gain.   - Liver transplantation in itself should not require lap band removal, as it is unlikely to get in the way of the incision and subsequent dissection. Therefore, the patient and her  will discuss these options further and decide if she would like it removed.   - Please contact the Bariatric Surgery service if patient and her  ultimately decide to proceed with lap band removal, and this can be coordinated with the Transplant team.

## 2019-05-02 NOTE — DISCHARGE SUMMARY
DISCHARGE SUMMARY  Hospital Medicine    Team: [facility]    Patient Name: Jihan Zamudio  YOB: 1970    Admit Date: 4/23/2019    Discharge Date: 05/02/2019    Discharge Attending Physician: Maritza Messina MD     Chief Complaint: GI bleed     Princilpal Diagnoses:  Active Hospital Problems    Diagnosis  POA    *GI bleed [K92.2]  Yes    Acute blood loss anemia [D62]  Yes    Coagulopathy [D68.9]  Yes    Anasarca [R60.1]  Yes    Other ascites [R18.8]  Yes    Pulmonary hypertension [I27.20]  Yes    Hyponatremia [E87.1]  Yes    Hepatic encephalopathy [K72.90]  Yes    Moderate malnutrition [E44.0]  Yes    Pre-transplant evaluation for liver transplant [Z01.818]  Not Applicable    Essential hypertension [I10]  Yes    Hypothyroidism [E03.9]  Yes    Fatty liver [K76.0]  Yes     Dx 2006      Decompensated hepatic cirrhosis [K72.90]  Yes     From KESSLER    Liver biopsy 11/29/17- KESSLER with bridging fibrosis, Laura, interface activity; lymph and occ plasma cells      Esophageal varices [I85.00]  Yes     Small with no banding 07/17      Morbid obesity [E66.01]  Yes     Lap band 2006 (lost 75-80 lbs) with subsequent release (2009) (due to obstruction)        Resolved Hospital Problems   No resolved problems to display.       Discharged Condition: Admit problems have stabilized     HOSPITAL COURSE:      Initial Presentation:    As per admitting provider,     49 y.o. 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 The patient denied recent NSAID and ETOH use. She denies any hematemesis and states that she has never had a similar episode to this previously. Paracentesis was performed in the ED with 5L removed. Para studies with PMN < 250. The patient was admitted there and had EGD performed on 4/23 which revealed portal  hypertensive gastropathy, blood in the gastric body, grade 2 gastroesophageal varices without active bleeding but with stigmata of recent bleeding and and non-bleeding small esophageal varices without stigmata of recent bleeding. The esophageal varices were not banded as they were not suspected to be the source of bleeding.  CT abdomen showed sequela of portal HTN and patent portal vein without evidence of PVT. The patient was transferred to [facility] for evaluation for TIPS vs Balloon-Occluded Retrograde Transvenous Obliteration for treatment of gastric varices.     Course of Principle Problem for Admission:    # Gastrointestinal hemorrhage with melena  # Acute blood loss anemia  # Portal Hypertensive gastropathy  - s/p EGD with APC treatment  - H/H is stable at 8-9  - cont rocephin for 5 days, completed     # Decompensated KESSLER Cirrhosis with ascites  MELD-Na score: 24 at 5/1/2019  4:11 AM  MELD score: 22 at 5/1/2019  4:11 AM  Calculated from:  Serum Creatinine: 0.8 mg/dL (Rounded to 1 mg/dL) at 5/1/2019  4:11 AM  Serum Sodium: 133 mmol/L at 5/1/2019  4:11 AM  Total Bilirubin: 9.4 mg/dL at 5/1/2019  4:11 AM  INR(ratio): 1.9 at 5/1/2019  4:11 AM  Age: 49 years  - hepatology consulted  - s/p liver transplant eval  - IR paracentesis on 4/26 with 4.5L removed and negative for SBP  - IV Lasix 80 t.i.d. transition to p.o. Lasix 80 b.i.d.  - continue spironolactone 200 mg daily  - patient presented to liver transplant committee on 05/01/2019 and approved for liver transplantation.  Has history of 0.5 cm left lower lobe solid lung nodule that has been stable, need outside records and images      On the day of d/c, patient was doing well with no acute issues. D/c meds as below.  Pending financial approved for OLT listing, pending review of OSH chest images in regards to lung nodule     Physical Exam:  Constitutional: chronically ill looking,  non-distressed, not diaphoretic.   HENT: NC/AT, external ears normal, oropharynx clear,  MMM w/o exudates.   Eyes: PERRL, EOMI, conjunctiva normal, no discharge b/l, +scleral icterus   Neck: normal ROM, supple  CV: RRR, no m/r/g, no carotid bruits, +2 peripheral pulses.  Pulmonary/Chest wall: Breathing comfortably w/o distress, CTAB, no w/r/r, no crackles.   GI: Soft, non-tender, (+) BS, (+) BM   +distended. Healed post surgical scars   Musculoskeletal: Normal ROM, no atrophy,  + pitting edema in LE bilaterally   Neurological: AAO x 4, CN II-XI in tact, nl sensation, nl strength/tone  No asterixis   Skin: warm, dry   +pallor, jaundice, + spider angiomas, +bruising   Psych: normal mood and affect, normal behavior, thought content and judgement.      Other Medical Problems Addressed in the Hospital:    # Anasarca  # Pulmonary HTN  - echo shows normal EF  - started on IV lasix 80 mg IV TID and aldactone-- diuretics as above  - repeat 2d echo shows PA systolic of 26     # Coagulopathy  # Thrombocytopenia  - vitamin K for 3 days  - DIC labs reviewed      # Acute on Chronic Hepatic Encephalopathy  - cont rifaximin  - cont  Lactulose  - resolved       # Hyponatremia  - fluid restrict to 1.5L     # Morbid Obesity   Body mass index is 40.1 kg/m².  - dietary consulted      # Hypothyroidism  - cont levothyroxine      # Moderate protein terrence malnutrition  - PAB and dietary      # Hypokalemia  - replaced      CONSULTS: icu, GI, hepatology, ID , pulm     PROCEDURES: EGD    Labs:    Chemistries:   Recent Labs   Lab 04/30/19  0413 05/01/19  0411 05/02/19  0452   * 133* 130*   K 3.6 3.8 3.8   CL 97 97 96   CO2 27 26 27   BUN 9 10 13   CREATININE 0.8 0.8 0.9   CALCIUM 7.9* 7.9* 8.2*   PROT 5.8* 5.7* 5.9*   BILITOT 10.1* 9.4* 9.4*   ALKPHOS 101 99 106   ALT 25 24 25   AST 80* 76* 75*   MG 1.7 1.5* 1.8   PHOS 2.6* 3.1 3.5        WBC:   Recent Labs   Lab 04/28/19  0345 04/29/19  0607 04/30/19  0413 05/01/19  0411 05/02/19  0452   WBC 2.43* 2.58* 3.66* 3.31* 3.63*     Bands:     CBC/Anemia Labs: Coags:    Recent Labs    Lab 04/30/19  0413 05/01/19  0411 05/02/19  0452   WBC 3.66* 3.31* 3.63*   HGB 8.7* 8.4* 8.7*   HCT 26.5* 25.7* 26.8*   PLT 94* 87* 88*   MCV 92 93 93   RDW 17.8* 17.8* 18.0*    Recent Labs   Lab 04/30/19  0413 05/01/19  0411 05/02/19  0452   INR 1.9* 1.9* 1.9*        Diagnostic Results:    Microbiology Results (last 7 days)     Procedure Component Value Units Date/Time    Cryptococcal antigen [[id number]] Collected:  04/30/19 2304    Order Status:  Completed Specimen:  Blood Updated:  05/01/19 1301     Cryptococcal Ag, Blood Negative    HPV High Risk Genotypes, PCR [[id number]] Collected:  04/29/19 1434    Order Status:  No result Updated:  05/01/19 1242    Culture, Anaerobe [[id number]] Collected:  04/26/19 1514    Order Status:  Completed Specimen:  Body Fluid from Ascites Updated:  04/30/19 0856     Anaerobic Culture Culture in progress    HPV High Risk Genotypes, PCR [[id number]]     Order Status:  No result     Aerobic culture [[id number]] Collected:  04/26/19 1514    Order Status:  Completed Specimen:  Body Fluid from Ascites Updated:  04/29/19 0834     Aerobic Bacterial Culture No growth          Disposition:  Home       Follow-up Plans from This Hospitalization:  Hepatology- pending OLT listing    Discharge Medication List:       Jihan Zamudio   Home Medication Instructions LUISA:[id number]    Printed on:05/02/19 1110   Medication Information                      acetaminophen (TYLENOL) 500 MG tablet  Take 1,000 mg by mouth daily as needed for Pain (headache and bodyaches).             ciprofloxacin HCl (CIPRO) 500 MG tablet  Take 500 mg by mouth once daily.             cranberry extract 50 mg Chew  Take 1 tablet by mouth daily as needed.             ergocalciferol (ERGOCALCIFEROL) 50,000 unit Cap  Take 50,000 Units by mouth every 7 days. Monday             fexofenadine (ALLEGRA) 180 MG tablet  Take 180 mg by mouth daily as needed.             furosemide (LASIX) 80 MG tablet  Take 1 tablet (80 mg total) by  mouth 2 (two) times daily.             lactulose (CHRONULAC) 20 gram/30 mL Soln  Take 30 mLs (20 g total) by mouth 3 (three) times daily. TITRATE TO 3-4 BOWEL MOVEMENTS DAILY.             levothyroxine (SYNTHROID) 112 MCG tablet  Take 112 mcg by mouth once daily.             lidocaine (LIDODERM) 5 %  Place 1 patch onto the skin every 24 hours. Remove & Discard patch within 12 hours  As needed or as directed by MD             pantoprazole (PROTONIX) 40 MG tablet  Take 1 tablet (40 mg total) by mouth once daily.             rifAXImin (XIFAXAN) 200 mg Tab  Take 550 mg by mouth 2 (two) times daily.             spironolactone (ALDACTONE) 100 MG tablet  Take 2 tablets (200 mg total) by mouth once daily.             zinc sulfate (ZINCATE) 220 (50) mg capsule  Take 1 capsule (220 mg total) by mouth once daily.                   At the time of discharge patient was told to take all medications as prescribed, to keep all followup appointments, and to call their primary care physician or return to the emergency room if they have any worsening or concerning symptoms.    Time spent on the discharge of the patient including review of hospital course with the patient. reviewing discharge medications and arranging follow-up care 45 minutes.  Patient was seen and examined on the date of discharge and determined to be suitable for discharge.        Signing Physician:  Maritza Messina MD

## 2019-05-02 NOTE — SUBJECTIVE & OBJECTIVE
Interval History: Pt remains stable on room air. Denies pain or discomfort.     Objective:     Vital Signs (Most Recent):  Temp: 98.4 °F (36.9 °C) (05/02/19 1127)  Pulse: 93 (05/02/19 1127)  Resp: 18 (05/02/19 1127)  BP: (!) 123/59 (05/02/19 1127)  SpO2: (!) 93 % (05/02/19 1127) Vital Signs (24h Range):  Temp:  [96.4 °F (35.8 °C)-98.8 °F (37.1 °C)] 98.4 °F (36.9 °C)  Pulse:  [91-98] 93  Resp:  [14-18] 18  SpO2:  [87 %-94 %] 93 %  BP: (123-138)/(59-71) 123/59     Weight: 104.6 kg (230 lb 9.6 oz)  Body mass index is 38.37 kg/m².      Intake/Output Summary (Last 24 hours) at 5/2/2019 1255  Last data filed at 5/2/2019 0603  Gross per 24 hour   Intake 240 ml   Output 200 ml   Net 40 ml       Physical Exam   Constitutional: She is oriented to person, place, and time. No distress.   HENT:   Head: Normocephalic and atraumatic.   Mouth/Throat: No oropharyngeal exudate.   Pulmonary/Chest: Effort normal and breath sounds normal. No stridor. No respiratory distress. She has no wheezes. She has no rales. She exhibits no tenderness.   Abdominal: She exhibits distension.   Neurological: She is alert and oriented to person, place, and time.   Skin: She is not diaphoretic.   Nursing note and vitals reviewed.      Vents:       Lines/Drains/Airways     Peripheral Intravenous Line                 Peripheral IV - Single Lumen 04/30/19 1830 20 G;1 3/4 in Left Forearm 1 day                Significant Labs:    CBC/Anemia Profile:  Recent Labs   Lab 05/01/19 0411 05/02/19  0452   WBC 3.31* 3.63*   HGB 8.4* 8.7*   HCT 25.7* 26.8*   PLT 87* 88*   MCV 93 93   RDW 17.8* 18.0*        Chemistries:  Recent Labs   Lab 05/01/19 0411 05/02/19  0452   * 130*   K 3.8 3.8   CL 97 96   CO2 26 27   BUN 10 13   CREATININE 0.8 0.9   CALCIUM 7.9* 8.2*   ALBUMIN 2.2* 2.4*   PROT 5.7* 5.9*   BILITOT 9.4* 9.4*   ALKPHOS 99 106   ALT 24 25   AST 76* 75*   MG 1.5* 1.8   PHOS 3.1 3.5       All pertinent labs within the past 24 hours have been  reviewed.    Significant Imaging:  I have reviewed and interpreted all pertinent imaging results/findings within the past 24 hours.

## 2019-05-02 NOTE — SUBJECTIVE & OBJECTIVE
No current facility-administered medications on file prior to encounter.      Current Outpatient Medications on File Prior to Encounter   Medication Sig    acetaminophen (TYLENOL) 500 MG tablet Take 1,000 mg by mouth daily as needed for Pain (headache and bodyaches).    ciprofloxacin HCl (CIPRO) 500 MG tablet Take 500 mg by mouth once daily.    cranberry extract 50 mg Chew Take 1 tablet by mouth daily as needed.    ergocalciferol (ERGOCALCIFEROL) 50,000 unit Cap Take 50,000 Units by mouth every 7 days. Monday    ergocalciferol, vitamin D2, 2,000 unit Tab Take 1 tablet by mouth once daily.    fexofenadine (ALLEGRA) 180 MG tablet Take 180 mg by mouth daily as needed.    furosemide (LASIX) 20 MG tablet Take 20 mg by mouth once daily.    levothyroxine (SYNTHROID) 112 MCG tablet Take 112 mcg by mouth once daily.    lidocaine (LIDODERM) 5 % Place 1 patch onto the skin every 24 hours. Remove & Discard patch within 12 hours  As needed or as directed by MD    rifAXImin (XIFAXAN) 200 mg Tab Take 550 mg by mouth 2 (two) times daily.    spironolactone (ALDACTONE) 25 MG tablet Take 25 mg by mouth once daily.    omeprazole (PRILOSEC) 40 MG capsule Take 40 mg by mouth every morning.        Review of patient's allergies indicates:   Allergen Reactions    Metformin Rash    Pcn [penicillins] Other (See Comments)     Unsure of reaction, states it was as a child. Tolerated rocephin at [facility]       Past Medical History:   Diagnosis Date    Cirrhosis     Esophageal varices 2/26/2018    Small with no banding 07/17    Essential hypertension 2/26/2018    Fatty liver 2/26/2018    GERD (gastroesophageal reflux disease)     Hx of colonic polyps 2/26/2018    On colonoscopy 07/17    Hypertension     Hypothyroidism 2/26/2018    Kidney stones     Morbid obesity 2/26/2018    Lap band with subsequent release    KADEEM (obstructive sleep apnea) 2/26/2018    Osteoarthritis 2/26/2018    Pulmonary nodule 2/26/2018    Vitamin D  deficiency 2018     Past Surgical History:   Procedure Laterality Date     SECTION      TIMES 2     CHOLECYSTECTOMY      LAPAROSCOPIC     CYSTOSCOPY W/ STONE MANIPULATION      kidney stone removal    EGD (ESOPHAGOGASTRODUODENOSCOPY) N/A 2019    Performed by Davian Hern[facility] at [facility] (2ND FLR)    LAPAROSCOPIC GASTRIC BANDING  2006    removal     LIVER BIOPSY  2017    KESSLER with bridging 17     Family History     Problem Relation (Age of Onset)    Breast cancer Maternal Grandmother    Cancer Mother (50), Father (65)    Heart disease Mother, Father        Tobacco Use    Smoking status: Never Smoker    Smokeless tobacco: Never Used    Tobacco comment: patient denies   Substance and Sexual Activity    Alcohol use: No     Comment: patient denies    Drug use: No     Comment: patient denies    Sexual activity: Not on file     Review of Systems   A 10-point ROS was negative, except as outlined in the HPI.     Objective:     Vital Signs (Most Recent):  Temp: 98.7 °F (37.1 °C) (19)  Pulse: 93 (19)  Resp: 14 (19)  BP: 138/64 (19)  SpO2: (!) 91 % (19) Vital Signs (24h Range):  Temp:  [97.5 °F (36.4 °C)-99.5 °F (37.5 °C)] 98.7 °F (37.1 °C)  Pulse:  [92-96] 93  Resp:  [12-18] 14  SpO2:  [91 %-94 %] 91 %  BP: (105-138)/(54-64) 138/64     Weight: 104 kg (229 lb 4.5 oz)  Body mass index is 38.15 kg/m².    Physical Exam   Constitutional: She is oriented to person, place, and time. She appears well-developed and well-nourished. No distress.   HENT:   Head: Normocephalic and atraumatic.   Cardiovascular: Normal rate and regular rhythm.   Pulmonary/Chest: Effort normal. No respiratory distress.   Abdominal: Soft. She exhibits no distension. There is no tenderness.   Lap band port in place in RUQ   Neurological: She is alert and oriented to person, place, and time.   Skin: Skin is warm and dry.       Significant Labs:  CBC:    Recent Labs   Lab 05/01/19 0411   WBC 3.31*   RBC 2.77*   HGB 8.4*   HCT 25.7*   PLT 87*   MCV 93   MCH 30.3   MCHC 32.7     CMP:   Recent Labs   Lab 05/01/19 0411   GLU 89   CALCIUM 7.9*   ALBUMIN 2.2*   PROT 5.7*   *   K 3.8   CO2 26   CL 97   BUN 10   CREATININE 0.8   ALKPHOS 99   ALT 24   AST 76*   BILITOT 9.4*       Significant Diagnostics:  CT reviewed

## 2019-05-02 NOTE — PLAN OF CARE
D/C orders noted. Plan is for pt. to d/c home with family. Hepatology f/u for liver transplant.        05/02/19 1249   Final Note   Assessment Type Final Discharge Note   Hospital Follow Up  Appt(s) scheduled?   (to be made by liver transplant team)   Right Care Referral Info   Post Acute Recommendation No Care

## 2019-05-02 NOTE — CONSULTS
[facility]  General Surgery  Consult Note    Patient Name: Jihan Zamudio  MRN: [id number]  Code Status: Full Code  Admission Date: 4/23/2019  Hospital Length of Stay: 8 days  Attending Physician: Maritza Messina MD  Primary Care Provider: Primary Doctor No    Patient information was obtained from patient and past medical records.     Inpatient consult to General Surgery  Consult performed by: Ronal Mena MD  Consult ordered by: Maritza Messina MD        Subjective:     Principal Problem: GI bleed    History of Present Illness: Patient is a 49 y.o. 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 Patient is currently undergoing inpatient OLT workup and Bariatrics has been consulted to evaluate need for potential lap band removal that the patient had placed back in 2007, in [place].     Patient has experienced 70 lb total weight loss since lap band placement, which has been stable for sometime. She claims that she has no significant issues with the band, aside from some discomfort associated with the port. She also states that several years ago, she was told that the band had become malpositioned and it was emptied at that time. No history of obstructive symptoms, bloating, or abdominal pain.         No current facility-administered medications on file prior to encounter.      Current Outpatient Medications on File Prior to Encounter   Medication Sig    acetaminophen (TYLENOL) 500 MG tablet Take 1,000 mg by mouth daily as needed for Pain (headache and bodyaches).    ciprofloxacin HCl (CIPRO) 500 MG tablet Take 500 mg by mouth once daily.    cranberry extract 50 mg Chew Take 1 tablet by mouth daily as needed.    ergocalciferol (ERGOCALCIFEROL) 50,000 unit Cap Take 50,000 Units by mouth every 7 days. Monday     ergocalciferol, vitamin D2, 2,000 unit Tab Take 1 tablet by mouth once daily.    fexofenadine (ALLEGRA) 180 MG tablet Take 180 mg by mouth daily as needed.    furosemide (LASIX) 20 MG tablet Take 20 mg by mouth once daily.    levothyroxine (SYNTHROID) 112 MCG tablet Take 112 mcg by mouth once daily.    lidocaine (LIDODERM) 5 % Place 1 patch onto the skin every 24 hours. Remove & Discard patch within 12 hours  As needed or as directed by MD    rifAXImin (XIFAXAN) 200 mg Tab Take 550 mg by mouth 2 (two) times daily.    spironolactone (ALDACTONE) 25 MG tablet Take 25 mg by mouth once daily.    omeprazole (PRILOSEC) 40 MG capsule Take 40 mg by mouth every morning.        Review of patient's allergies indicates:   Allergen Reactions    Metformin Rash    Pcn [penicillins] Other (See Comments)     Unsure of reaction, states it was as a child. [facility]phin [facility]       Past Medical History:   Diagnosis Date    Cirrhosis     Esophageal varices 2018    Small with no banding     Essential hypertension 2018    Fatty liver 2018    GERD (gastroesophageal reflux disease)     Hx of colonic polyps 2018    On colonoscopy     Hypertension     Hypothyroidism 2018    Kidney stones     Morbid obesity 2018    Lap band with subsequent release    KADEEM (obstructive sleep apnea) 2018    Osteoarthritis 2018    Pulmonary nodule 2018    Vitamin D deficiency 2018     Past Surgical History:   Procedure Laterality Date     SECTION      TIMES 2     CHOLECYSTECTOMY      LAPAROSCOPIC     CYSTOSCOPY W/ STONE MANIPULATION      kidney stone removal    EGD (ESOPHAGOGASTRODUODENOSCOPY) N/A 2019    Performed by [facility] at [facility] (2ND FLR)    LAPAROSCOPIC GASTRIC BANDING  2006    removal 2009    LIVER BIOPSY  2017    KESSLER with bridging 17     Family History     Problem Relation (Age of Onset)    Breast cancer  Maternal Grandmother    Cancer Mother (50), Father (65)    Heart disease Mother, Father        Tobacco Use    Smoking status: Never Smoker    Smokeless tobacco: Never Used    Tobacco comment: patient denies   Substance and Sexual Activity    Alcohol use: No     Comment: patient denies    Drug use: No     Comment: patient denies    Sexual activity: Not on file     Review of Systems   A 10-point ROS was negative, except as outlined in the HPI.     Objective:     Vital Signs (Most Recent):  Temp: 98.7 °F (37.1 °C) (05/01/19 1905)  Pulse: 93 (05/01/19 1905)  Resp: 14 (05/01/19 1905)  BP: 138/64 (05/01/19 1905)  SpO2: (!) 91 % (05/01/19 1905) Vital Signs (24h Range):  Temp:  [97.5 °F (36.4 °C)-99.5 °F (37.5 °C)] 98.7 °F (37.1 °C)  Pulse:  [92-96] 93  Resp:  [12-18] 14  SpO2:  [91 %-94 %] 91 %  BP: (105-138)/(54-64) 138/64     Weight: 104 kg (229 lb 4.5 oz)  Body mass index is 38.15 kg/m².    Physical Exam   Constitutional: She is oriented to person, place, and time. She appears well-developed and well-nourished. No distress.   HENT:   Head: Normocephalic and atraumatic.   Cardiovascular: Normal rate and regular rhythm.   Pulmonary/Chest: Effort normal. No respiratory distress.   Abdominal: Soft. She exhibits no distension. There is no tenderness.   Lap band port in place in RUQ   Neurological: She is alert and oriented to person, place, and time.   Skin: Skin is warm and dry.       Significant Labs:  CBC:   Recent Labs   Lab 05/01/19 0411   WBC 3.31*   RBC 2.77*   HGB 8.4*   HCT 25.7*   PLT 87*   MCV 93   MCH 30.3   MCHC 32.7     CMP:   Recent Labs   Lab 05/01/19 0411   GLU 89   CALCIUM 7.9*   ALBUMIN 2.2*   PROT 5.7*   *   K 3.8   CO2 26   CL 97   BUN 10   CREATININE 0.8   ALKPHOS 99   ALT 24   AST 76*   BILITOT 9.4*       Significant Diagnostics:  CT reviewed    Assessment/Plan:     Morbid obesity  Patient with lap band in place for >10 years with ~70lb weight loss since that time. Also with KESSLER  cirrhosis, and is a candidate for OLT. Bariatric Surgery consulted to discuss lap band removal.     - Although band has been emptied, it likely continues to provide some degree of gastric constriction and therefore weight management. Lap band removal would certainly be possible at the time of potential liver transplantation; however, this may ultimately lead to postoperative weight gain.   - Liver transplantation in itself should not require lap band removal, as it is unlikely to get in the way of the incision and subsequent dissection. Therefore, the patient and her  will discuss these options further and decide if she would like it removed.   - Please contact the Bariatric Surgery service if patient and her  ultimately decide to proceed with lap band removal, and this can be coordinated with the Transplant team.       VTE Risk Mitigation (From admission, onward)        Ordered     Place sequential compression device  Until discontinued      04/24/19 0040     Reason for No Pharmacological VTE Prophylaxis  Once      04/24/19 0040     IP VTE HIGH RISK PATIENT  Once      04/24/19 0040          Thank you for your consult. I will follow-up with patient. Please contact us if you have any additional questions.    Ronal Mena MD  General Surgery  [facility]

## 2019-05-02 NOTE — PROGRESS NOTES
Progress Note  Hospital Medicine  [facility]       Patient Name: Jihan Zamudio  MRN:  [id number]  Hospital Medicine Team: [facility] Maritza Messina MD  Date of Admission:  4/23/2019     Length of Stay:  LOS: 8 days   Expected Discharge Date: 5/2/2019  Principal Problem:  GI bleed     Subjective:     Interval History/Overnight Events:    Doing well today wit no acute problems  Hbg stable   Transition to oral diuretics  Cardiac PET stress - showed no evidence perfusion defects.  Mammogram was normal  Presented at liver transplant committee today and was approved for liver transplantation     cetirizine  5 mg Oral QHS    fluticasone propionate  2 spray Each Nare Daily    furosemide  80 mg Oral BID    lactulose  30 g Oral TID    levothyroxine  112 mcg Oral Before breakfast    pantoprazole  40 mg Oral Daily    rifAXImin  550 mg Oral BID    spironolactone  200 mg Oral Daily    zinc sulfate  220 mg Oral Daily           acetaminophen, calcium carbonate, ondansetron, ondansetron, sodium chloride 0.9%    Review of Systems   Constitutional: Negative for chills, fatigue, fever.   HENT: Negative for sore throat, trouble swallowing.    Eyes: Negative for photophobia, visual disturbance.   Respiratory: Negative for cough, shortness of breath.    Cardiovascular: Negative for chest pain, palpitations, leg swelling.   Gastrointestinal: Negative for abdominal pain, constipation, diarrhea, nausea, vomiting.   Endocrine: Negative for cold intolerance, heat intolerance.   Genitourinary: Negative for dysuria, frequency.   Musculoskeletal: Negative for arthralgias, myalgias.   Skin: Negative for rash, wound, erythema   Neurological: Negative for dizziness, syncope, weakness, light-headedness.   Psychiatric/Behavioral: Negative for confusion, hallucinations, anxiety  All other systems reviewed and are negative.    Objective:     Temp:  [97.5 °F (36.4 °C)-99.5 °F (37.5 °C)]   Pulse:  [92-96]    Resp:  [12-18]   BP: (105-138)/(54-64)   SpO2:  [91 %-94 %]       Physical Exam:  Constitutional: chronically ill looking,  non-distressed, not diaphoretic.   HENT: NC/AT, external ears normal, oropharynx clear, MMM w/o exudates.   Eyes: PERRL, EOMI, conjunctiva normal, no discharge b/l, +scleral icterus   Neck: normal ROM, supple  CV: RRR, no m/r/g, no carotid bruits, +2 peripheral pulses.  Pulmonary/Chest wall: Breathing comfortably w/o distress, CTAB, no w/r/r, no crackles.   GI: Soft, non-tender, (+) BS, (+) BM   +distended. Healed post surgical scars   Musculoskeletal: Normal ROM, no atrophy,  + pitting edema in LE bilaterally   Neurological: AAO x 4, CN II-XI in tact, nl sensation, nl strength/tone  No asterixis   Skin: warm, dry   +pallor, jaundice, + spider angiomas, +bruising   Psych: normal mood and affect, normal behavior, thought content and judgement.    Labs:    Chemistries:   Recent Labs   Lab 04/29/19  0607 04/29/19  1453 04/30/19  0413 05/01/19  0411   * 134* 132* 133*   K 3.2* 3.6 3.6 3.8    99 97 97   CO2 26 28 27 26   BUN 8 8 9 10   CREATININE 0.8 0.7 0.8 0.8   CALCIUM 7.8* 7.8* 7.9* 7.9*   PROT 5.7*  --  5.8* 5.7*   BILITOT 9.4*  --  10.1* 9.4*   ALKPHOS 103  --  101 99   ALT 24  --  25 24   AST 79*  --  80* 76*   MG 1.4*  --  1.7 1.5*   PHOS 3.1  --  2.6* 3.1        WBC:   Recent Labs   Lab 04/27/19  0329 04/28/19  0345 04/29/19  0607 04/30/19  0413 05/01/19  0411   WBC 2.67* 2.43* 2.58* 3.66* 3.31*     Bands:     CBC/Anemia Labs: Coags:    Recent Labs   Lab 04/29/19  0607 04/30/19  0413 05/01/19  0411   WBC 2.58* 3.66* 3.31*   HGB 8.8* 8.7* 8.4*   HCT 26.3* 26.5* 25.7*   PLT 75* 94* 87*   MCV 92 92 93   RDW 17.2* 17.8* 17.8*    Recent Labs   Lab 04/29/19  0607 04/30/19  0413 05/01/19  0411   INR 1.9* 1.9* 1.9*          Assessment and Plan     Hospital Course:    Ms. Jihan Zamudio was admitted to Hospital Medicine for management of GIB and decomp cirrhosis     Active Hospital  Problems    Diagnosis  POA    *GI bleed [K92.2]  Yes    Acute blood loss anemia [D62]  Yes    Coagulopathy [D68.9]  Yes    Anasarca [R60.1]  Yes    Other ascites [R18.8]  Yes    Pulmonary hypertension [I27.20]  Yes    Hyponatremia [E87.1]  Yes    Hepatic encephalopathy [K72.90]  Yes    Moderate malnutrition [E44.0]  Yes    Pre-transplant evaluation for liver transplant [Z01.818]  Not Applicable    Essential hypertension [I10]  Yes    Hypothyroidism [E03.9]  Yes    Fatty liver [K76.0]  Yes     Dx 2006      Decompensated hepatic cirrhosis [K72.90]  Yes     From KESSLER    Liver biopsy 11/29/17- KESSLER with bridging fibrosis, Laura, interface activity; lymph and occ plasma cells      Esophageal varices [I85.00]  Yes     Small with no banding 07/17      Morbid obesity [E66.01]  Yes     Lap band 2006 (lost 75-80 lbs) with subsequent release (2009) (due to obstruction)        Resolved Hospital Problems   No resolved problems to display.       # Gastrointestinal hemorrhage with melena  # Acute blood loss anemia  # Portal Hypertensive gastropathy  - s/p EGD with APC treatment  - H/H is stable at 8-9  - cont rocephin for 5 days, completed     # Decompensated KESSLER Cirrhosis with ascites  MELD-Na score: 24 at 5/1/2019  4:11 AM  MELD score: 22 at 5/1/2019  4:11 AM  Calculated from:  Serum Creatinine: 0.8 mg/dL (Rounded to 1 mg/dL) at 5/1/2019  4:11 AM  Serum Sodium: 133 mmol/L at 5/1/2019  4:11 AM  Total Bilirubin: 9.4 mg/dL at 5/1/2019  4:11 AM  INR(ratio): 1.9 at 5/1/2019  4:11 AM  Age: 49 years  - hepatology consulted  - s/p liver transplant eval  - IR paracentesis on 4/26 with 4.5L removed and negative for SBP  - IV Lasix 80 t.i.d. transition to p.o. Lasix 80 b.i.d.  - continue spironolactone 200 mg daily  - patient presented to liver transplant committee on 05/01/2019 and approved for liver transplantation.  Has history of 0.5 cm left lower lobe solid lung nodule that has been stable, need outside records and  images     # Anasarca  # Pulmonary HTN  - echo shows normal EF  - started on IV lasix 80 mg IV TID and aldactone-- diuretics as above  - repeat 2d echo shows PA systolic of 26     # Coagulopathy  # Thrombocytopenia  - vitamin K for 3 days  - DIC labs reviewed      # Acute on Chronic Hepatic Encephalopathy  - cont rifaximin  - cont  Lactulose  - resolved       # Hyponatremia  - fluid restrict to 1.5L     # Morbid Obesity   Body mass index is 40.1 kg/m².  - dietary consulted      # Hypothyroidism  - cont levothyroxine      # Moderate protein terrence malnutrition  - PAB and dietary      # Hypokalemia  - replace     Diet: low sodium, fluid restrict    GI PPx:    DVT PPx:    Goals of Care:  full        Disposition:   patient has been approved for liver transplantation on 05/01/2019.  Plan to discharge home on 05/02/2019 close hepatology follow-up    Signing Physician:     Maritza Messina MD  Department of Hospital Medicine   [facility]  Pager [phone] [id number] [phone]  5/1/2019

## 2019-05-02 NOTE — NURSING
Patient discharge instructions given with iv discontinued. Medications delivered by pharmacy. Understanding verbalized, safety maintained. Transported down by wheelchair [id number] by transport.

## 2019-05-03 LAB
BACTERIA SPEC ANAEROBE CULT: NORMAL
HPV HR 12 DNA CVX QL NAA+PROBE: NEGATIVE
HPV16 AG SPEC QL: NEGATIVE
HPV18 DNA SPEC QL NAA+PROBE: NEGATIVE

## 2019-05-04 LAB
HISTOPLASMA AG INTERP.: NEGATIVE
HISTOPLASMA RESULT: NORMAL NG/ML

## 2019-05-05 LAB
HISTOPLASMA AG VALUE: 0 NG/ML
HISTOPLASMA ANTIGEN URINE: NEGATIVE

## 2019-05-06 DIAGNOSIS — K76.0 FATTY LIVER: ICD-10-CM

## 2019-05-06 DIAGNOSIS — Z01.818 PRE-TRANSPLANT EVALUATION FOR LIVER TRANSPLANT: ICD-10-CM

## 2019-05-06 DIAGNOSIS — K74.60 DECOMPENSATED HEPATIC CIRRHOSIS: Primary | ICD-10-CM

## 2019-05-06 DIAGNOSIS — K72.90 DECOMPENSATED HEPATIC CIRRHOSIS: Primary | ICD-10-CM

## 2019-05-06 LAB
BLASTOMYCES AG INTERP: NEGATIVE
BLASTOMYCES AG RESULT: NORMAL NG/ML
BLASTOMYCES AG SPECIMEN: NORMAL

## 2019-05-08 LAB
ASPERGILLUS AB SER QL ID: ABNORMAL
ASPERGILLUS AB SER QL ID: ABNORMAL
B DERMAT AB SER QL ID: ABNORMAL
B DERMAT AB SER QL ID: ABNORMAL
C IMMITIS AB SER QL ID: ABNORMAL
C IMMITIS AB SER QL ID: ABNORMAL
H CAPSUL AB SER QL ID: ABNORMAL
H CAPSUL AB SER QL ID: ABNORMAL

## 2019-05-16 ENCOUNTER — OFFICE VISIT (OUTPATIENT)
Dept: TRANSPLANT | Facility: CLINIC | Age: 49
End: 2019-05-16
Attending: INTERNAL MEDICINE
Payer: OTHER GOVERNMENT

## 2019-05-16 ENCOUNTER — HOSPITAL ENCOUNTER (INPATIENT)
Facility: HOSPITAL | Age: 49
LOS: 26 days | Discharge: HOME HEALTH | DRG: 005 | End: 2019-06-11
Attending: EMERGENCY MEDICINE | Admitting: HOSPITALIST
Payer: OTHER GOVERNMENT

## 2019-05-16 VITALS
TEMPERATURE: 98 F | OXYGEN SATURATION: 89 % | SYSTOLIC BLOOD PRESSURE: 122 MMHG | BODY MASS INDEX: 38.39 KG/M2 | DIASTOLIC BLOOD PRESSURE: 55 MMHG | RESPIRATION RATE: 20 BRPM | WEIGHT: 224.88 LBS | HEART RATE: 96 BPM | HEIGHT: 64 IN

## 2019-05-16 DIAGNOSIS — K76.82 HEPATIC ENCEPHALOPATHY: ICD-10-CM

## 2019-05-16 DIAGNOSIS — R09.02 HYPOXEMIA: ICD-10-CM

## 2019-05-16 DIAGNOSIS — I85.00 ESOPHAGEAL VARICES WITHOUT BLEEDING, UNSPECIFIED ESOPHAGEAL VARICES TYPE: ICD-10-CM

## 2019-05-16 DIAGNOSIS — K72.10 END STAGE LIVER DISEASE: ICD-10-CM

## 2019-05-16 DIAGNOSIS — Z94.4 S/P LIVER TRANSPLANT: Primary | ICD-10-CM

## 2019-05-16 DIAGNOSIS — K74.60 DECOMPENSATED HEPATIC CIRRHOSIS: ICD-10-CM

## 2019-05-16 DIAGNOSIS — D68.9 COAGULOPATHY: ICD-10-CM

## 2019-05-16 DIAGNOSIS — K72.90 DECOMPENSATED HEPATIC CIRRHOSIS: ICD-10-CM

## 2019-05-16 DIAGNOSIS — J96.01 ACUTE HYPOXEMIC RESPIRATORY FAILURE: ICD-10-CM

## 2019-05-16 DIAGNOSIS — E03.8 OTHER SPECIFIED HYPOTHYROIDISM: ICD-10-CM

## 2019-05-16 DIAGNOSIS — J90 PLEURAL EFFUSION, RIGHT: ICD-10-CM

## 2019-05-16 DIAGNOSIS — K74.60 HEPATIC CIRRHOSIS: ICD-10-CM

## 2019-05-16 DIAGNOSIS — I85.00 IDIOPATHIC ESOPHAGEAL VARICES WITHOUT BLEEDING: ICD-10-CM

## 2019-05-16 DIAGNOSIS — E44.0 MODERATE MALNUTRITION: ICD-10-CM

## 2019-05-16 DIAGNOSIS — D62 ACUTE BLOOD LOSS ANEMIA: ICD-10-CM

## 2019-05-16 DIAGNOSIS — D69.6 THROMBOCYTOPENIA: ICD-10-CM

## 2019-05-16 DIAGNOSIS — R60.1 ANASARCA: ICD-10-CM

## 2019-05-16 DIAGNOSIS — R23.9 ALTERATION IN SKIN INTEGRITY: ICD-10-CM

## 2019-05-16 DIAGNOSIS — J90 PLEURAL EFFUSION: ICD-10-CM

## 2019-05-16 DIAGNOSIS — R17 JAUNDICE: ICD-10-CM

## 2019-05-16 DIAGNOSIS — D64.9 ACUTE ON CHRONIC ANEMIA: ICD-10-CM

## 2019-05-16 DIAGNOSIS — D63.8 ANEMIA OF CHRONIC DISEASE: ICD-10-CM

## 2019-05-16 DIAGNOSIS — R18.8 OTHER ASCITES: ICD-10-CM

## 2019-05-16 DIAGNOSIS — D68.4 ACQUIRED COAGULATION FACTOR DEFICIENCY: ICD-10-CM

## 2019-05-16 DIAGNOSIS — E87.1 HYPONATREMIA: ICD-10-CM

## 2019-05-16 DIAGNOSIS — K74.60 LIVER CIRRHOSIS SECONDARY TO NASH: ICD-10-CM

## 2019-05-16 DIAGNOSIS — K74.60 HEPATIC CIRRHOSIS, UNSPECIFIED HEPATIC CIRRHOSIS TYPE, UNSPECIFIED WHETHER ASCITES PRESENT: Primary | ICD-10-CM

## 2019-05-16 DIAGNOSIS — K75.81 LIVER CIRRHOSIS SECONDARY TO NASH: ICD-10-CM

## 2019-05-16 DIAGNOSIS — R09.02 HYPOXIA: ICD-10-CM

## 2019-05-16 DIAGNOSIS — R73.9 ACUTE HYPERGLYCEMIA: ICD-10-CM

## 2019-05-16 DIAGNOSIS — Z01.818 PRE-TRANSPLANT EVALUATION FOR LIVER TRANSPLANT: ICD-10-CM

## 2019-05-16 DIAGNOSIS — E66.01 MORBID OBESITY: ICD-10-CM

## 2019-05-16 DIAGNOSIS — R53.1 WEAKNESS: ICD-10-CM

## 2019-05-16 DIAGNOSIS — Z99.11 ENCOUNTER FOR WEANING FROM VENTILATOR: ICD-10-CM

## 2019-05-16 DIAGNOSIS — Z29.89 PROPHYLACTIC IMMUNOTHERAPY: ICD-10-CM

## 2019-05-16 DIAGNOSIS — N17.9 AKI (ACUTE KIDNEY INJURY): ICD-10-CM

## 2019-05-16 DIAGNOSIS — Z79.60 LONG-TERM USE OF IMMUNOSUPPRESSANT MEDICATION: ICD-10-CM

## 2019-05-16 PROBLEM — E87.6 HYPOKALEMIA: Status: ACTIVE | Noted: 2019-05-16

## 2019-05-16 PROBLEM — K92.2 GI BLEED: Status: RESOLVED | Noted: 2019-04-23 | Resolved: 2019-05-16

## 2019-05-16 LAB
ALBUMIN SERPL BCP-MCNC: 2.2 G/DL (ref 3.5–5.2)
ALP SERPL-CCNC: 114 U/L (ref 55–135)
ALT SERPL W/O P-5'-P-CCNC: 34 U/L (ref 10–44)
AMMONIA PLAS-SCNC: 65 UMOL/L (ref 10–50)
ANION GAP SERPL CALC-SCNC: 8 MMOL/L (ref 8–16)
APTT BLDCRRT: 32 SEC (ref 21–32)
AST SERPL-CCNC: 85 U/L (ref 10–40)
BACTERIA #/AREA URNS AUTO: ABNORMAL /HPF
BASOPHILS # BLD AUTO: 0.04 K/UL (ref 0–0.2)
BASOPHILS NFR BLD: 0.7 % (ref 0–1.9)
BILIRUB SERPL-MCNC: 11.4 MG/DL (ref 0.1–1)
BILIRUB UR QL STRIP: NEGATIVE
BUN SERPL-MCNC: 13 MG/DL (ref 6–20)
CALCIUM SERPL-MCNC: 8 MG/DL (ref 8.7–10.5)
CAOX CRY UR QL COMP ASSIST: ABNORMAL
CHLORIDE SERPL-SCNC: 93 MMOL/L (ref 95–110)
CLARITY UR REFRACT.AUTO: ABNORMAL
CO2 SERPL-SCNC: 23 MMOL/L (ref 23–29)
COLOR UR AUTO: ABNORMAL
CREAT SERPL-MCNC: 0.8 MG/DL (ref 0.5–1.4)
DIFFERENTIAL METHOD: ABNORMAL
EOSINOPHIL # BLD AUTO: 0.4 K/UL (ref 0–0.5)
EOSINOPHIL NFR BLD: 7.1 % (ref 0–8)
ERYTHROCYTE [DISTWIDTH] IN BLOOD BY AUTOMATED COUNT: 20.6 % (ref 11.5–14.5)
EST. GFR  (AFRICAN AMERICAN): >60 ML/MIN/1.73 M^2
EST. GFR  (NON AFRICAN AMERICAN): >60 ML/MIN/1.73 M^2
GLUCOSE SERPL-MCNC: 99 MG/DL (ref 70–110)
GLUCOSE UR QL STRIP: NEGATIVE
HCT VFR BLD AUTO: 27.4 % (ref 37–48.5)
HGB BLD-MCNC: 9.2 G/DL (ref 12–16)
HGB UR QL STRIP: ABNORMAL
HYALINE CASTS UR QL AUTO: 14 /LPF
IMM GRANULOCYTES # BLD AUTO: 0.07 K/UL (ref 0–0.04)
IMM GRANULOCYTES NFR BLD AUTO: 1.2 % (ref 0–0.5)
KETONES UR QL STRIP: NEGATIVE
LACTATE SERPL-SCNC: 1.8 MMOL/L (ref 0.5–2.2)
LACTATE SERPL-SCNC: 2.5 MMOL/L (ref 0.5–2.2)
LEUKOCYTE ESTERASE UR QL STRIP: NEGATIVE
LYMPHOCYTES # BLD AUTO: 1 K/UL (ref 1–4.8)
LYMPHOCYTES NFR BLD: 17.2 % (ref 18–48)
MAGNESIUM SERPL-MCNC: 1.5 MG/DL (ref 1.6–2.6)
MCH RBC QN AUTO: 31.7 PG (ref 27–31)
MCHC RBC AUTO-ENTMCNC: 33.6 G/DL (ref 32–36)
MCV RBC AUTO: 95 FL (ref 82–98)
MICROSCOPIC COMMENT: ABNORMAL
MONOCYTES # BLD AUTO: 0.5 K/UL (ref 0.3–1)
MONOCYTES NFR BLD: 8.8 % (ref 4–15)
NEUTROPHILS # BLD AUTO: 3.9 K/UL (ref 1.8–7.7)
NEUTROPHILS NFR BLD: 65 % (ref 38–73)
NITRITE UR QL STRIP: NEGATIVE
NRBC BLD-RTO: 0 /100 WBC
PH UR STRIP: 5 [PH] (ref 5–8)
PHOSPHATE SERPL-MCNC: 3.2 MG/DL (ref 2.7–4.5)
PLATELET # BLD AUTO: 89 K/UL (ref 150–350)
PMV BLD AUTO: 12.3 FL (ref 9.2–12.9)
POTASSIUM SERPL-SCNC: 3.2 MMOL/L (ref 3.5–5.1)
PROCALCITONIN SERPL IA-MCNC: 0.08 NG/ML
PROT SERPL-MCNC: 6.1 G/DL (ref 6–8.4)
PROT UR QL STRIP: NEGATIVE
RBC # BLD AUTO: 2.9 M/UL (ref 4–5.4)
RBC #/AREA URNS AUTO: 18 /HPF (ref 0–4)
SODIUM SERPL-SCNC: 124 MMOL/L (ref 136–145)
SP GR UR STRIP: 1.01 (ref 1–1.03)
SQUAMOUS #/AREA URNS AUTO: 50 /HPF
URN SPEC COLLECT METH UR: ABNORMAL
WBC # BLD AUTO: 6.05 K/UL (ref 3.9–12.7)
WBC #/AREA URNS AUTO: 2 /HPF (ref 0–5)

## 2019-05-16 PROCEDURE — 99221 PR INITIAL HOSPITAL CARE,LEVL I: ICD-10-PCS | Mod: NTX,,, | Performed by: INTERNAL MEDICINE

## 2019-05-16 PROCEDURE — 81001 URINALYSIS AUTO W/SCOPE: CPT | Mod: NTX

## 2019-05-16 PROCEDURE — 93010 ELECTROCARDIOGRAM REPORT: CPT | Mod: NTX,,, | Performed by: INTERNAL MEDICINE

## 2019-05-16 PROCEDURE — 82140 ASSAY OF AMMONIA: CPT | Mod: NTX

## 2019-05-16 PROCEDURE — 80053 COMPREHEN METABOLIC PANEL: CPT | Mod: 91,NTX

## 2019-05-16 PROCEDURE — 83605 ASSAY OF LACTIC ACID: CPT | Mod: NTX

## 2019-05-16 PROCEDURE — 93005 ELECTROCARDIOGRAM TRACING: CPT | Mod: NTX

## 2019-05-16 PROCEDURE — 99221 1ST HOSP IP/OBS SF/LOW 40: CPT | Mod: NTX,,, | Performed by: INTERNAL MEDICINE

## 2019-05-16 PROCEDURE — 25000003 PHARM REV CODE 250: Mod: NTX | Performed by: HOSPITALIST

## 2019-05-16 PROCEDURE — 99215 PR OFFICE/OUTPT VISIT, EST, LEVL V, 40-54 MIN: ICD-10-PCS | Mod: S$GLB,,, | Performed by: INTERNAL MEDICINE

## 2019-05-16 PROCEDURE — 99285 EMERGENCY DEPT VISIT HI MDM: CPT | Mod: 25,NTX

## 2019-05-16 PROCEDURE — 94761 N-INVAS EAR/PLS OXIMETRY MLT: CPT | Mod: NTX

## 2019-05-16 PROCEDURE — 99285 PR EMERGENCY DEPT VISIT,LEVEL V: ICD-10-PCS | Mod: ,,, | Performed by: EMERGENCY MEDICINE

## 2019-05-16 PROCEDURE — 83735 ASSAY OF MAGNESIUM: CPT | Mod: NTX

## 2019-05-16 PROCEDURE — 99223 PR INITIAL HOSPITAL CARE,LEVL III: ICD-10-PCS | Mod: NTX,,, | Performed by: HOSPITALIST

## 2019-05-16 PROCEDURE — 85730 THROMBOPLASTIN TIME PARTIAL: CPT | Mod: NTX

## 2019-05-16 PROCEDURE — 96374 THER/PROPH/DIAG INJ IV PUSH: CPT | Mod: NTX

## 2019-05-16 PROCEDURE — 99215 OFFICE O/P EST HI 40 MIN: CPT | Mod: S$GLB,,, | Performed by: INTERNAL MEDICINE

## 2019-05-16 PROCEDURE — 99285 EMERGENCY DEPT VISIT HI MDM: CPT | Mod: ,,, | Performed by: EMERGENCY MEDICINE

## 2019-05-16 PROCEDURE — 87040 BLOOD CULTURE FOR BACTERIA: CPT | Mod: NTX

## 2019-05-16 PROCEDURE — 83605 ASSAY OF LACTIC ACID: CPT | Mod: 91,NTX

## 2019-05-16 PROCEDURE — 85025 COMPLETE CBC W/AUTO DIFF WBC: CPT | Mod: NTX

## 2019-05-16 PROCEDURE — 63600175 PHARM REV CODE 636 W HCPCS: Mod: NTX | Performed by: HOSPITALIST

## 2019-05-16 PROCEDURE — 93010 EKG 12-LEAD: ICD-10-PCS | Mod: NTX,,, | Performed by: INTERNAL MEDICINE

## 2019-05-16 PROCEDURE — 20600001 HC STEP DOWN PRIVATE ROOM: Mod: NTX

## 2019-05-16 PROCEDURE — 96361 HYDRATE IV INFUSION ADD-ON: CPT | Mod: NTX

## 2019-05-16 PROCEDURE — 84145 PROCALCITONIN (PCT): CPT | Mod: NTX

## 2019-05-16 PROCEDURE — 99223 1ST HOSP IP/OBS HIGH 75: CPT | Mod: NTX,,, | Performed by: HOSPITALIST

## 2019-05-16 PROCEDURE — 63600175 PHARM REV CODE 636 W HCPCS: Mod: NTX | Performed by: EMERGENCY MEDICINE

## 2019-05-16 PROCEDURE — 84100 ASSAY OF PHOSPHORUS: CPT | Mod: NTX

## 2019-05-16 PROCEDURE — 25000003 PHARM REV CODE 250: Mod: NTX | Performed by: PHYSICIAN ASSISTANT

## 2019-05-16 RX ORDER — GLUCAGON 1 MG
1 KIT INJECTION
Status: DISCONTINUED | OUTPATIENT
Start: 2019-05-16 | End: 2019-06-06

## 2019-05-16 RX ORDER — CEFTRIAXONE 1 G/1
1 INJECTION, POWDER, FOR SOLUTION INTRAMUSCULAR; INTRAVENOUS
Status: COMPLETED | OUTPATIENT
Start: 2019-05-16 | End: 2019-05-16

## 2019-05-16 RX ORDER — IBUPROFEN 200 MG
16 TABLET ORAL
Status: DISCONTINUED | OUTPATIENT
Start: 2019-05-16 | End: 2019-06-06

## 2019-05-16 RX ORDER — MAGNESIUM SULFATE HEPTAHYDRATE 40 MG/ML
2 INJECTION, SOLUTION INTRAVENOUS ONCE
Status: COMPLETED | OUTPATIENT
Start: 2019-05-16 | End: 2019-05-17

## 2019-05-16 RX ORDER — FUROSEMIDE 40 MG/1
80 TABLET ORAL 2 TIMES DAILY
Status: DISCONTINUED | OUTPATIENT
Start: 2019-05-17 | End: 2019-05-17

## 2019-05-16 RX ORDER — FUROSEMIDE 10 MG/ML
40 INJECTION INTRAMUSCULAR; INTRAVENOUS 2 TIMES DAILY
Status: DISCONTINUED | OUTPATIENT
Start: 2019-05-17 | End: 2019-05-16

## 2019-05-16 RX ORDER — ONDANSETRON 2 MG/ML
4 INJECTION INTRAMUSCULAR; INTRAVENOUS EVERY 8 HOURS PRN
Status: DISCONTINUED | OUTPATIENT
Start: 2019-05-16 | End: 2019-06-06

## 2019-05-16 RX ORDER — IBUPROFEN 200 MG
24 TABLET ORAL
Status: DISCONTINUED | OUTPATIENT
Start: 2019-05-16 | End: 2019-06-06

## 2019-05-16 RX ORDER — SODIUM CHLORIDE 0.9 % (FLUSH) 0.9 %
5 SYRINGE (ML) INJECTION
Status: DISCONTINUED | OUTPATIENT
Start: 2019-05-16 | End: 2019-06-06

## 2019-05-16 RX ORDER — LEVOFLOXACIN 750 MG/1
750 TABLET ORAL DAILY
Status: DISCONTINUED | OUTPATIENT
Start: 2019-05-17 | End: 2019-05-17

## 2019-05-16 RX ORDER — PANTOPRAZOLE SODIUM 40 MG/1
40 TABLET, DELAYED RELEASE ORAL DAILY
Status: DISCONTINUED | OUTPATIENT
Start: 2019-05-17 | End: 2019-06-06

## 2019-05-16 RX ORDER — SPIRONOLACTONE 100 MG/1
200 TABLET, FILM COATED ORAL DAILY
Status: DISCONTINUED | OUTPATIENT
Start: 2019-05-17 | End: 2019-05-17

## 2019-05-16 RX ORDER — LEVOTHYROXINE SODIUM 112 UG/1
112 TABLET ORAL DAILY
Status: DISCONTINUED | OUTPATIENT
Start: 2019-05-17 | End: 2019-06-11 | Stop reason: HOSPADM

## 2019-05-16 RX ORDER — POTASSIUM CHLORIDE 20 MEQ/1
60 TABLET, EXTENDED RELEASE ORAL ONCE
Status: COMPLETED | OUTPATIENT
Start: 2019-05-16 | End: 2019-05-16

## 2019-05-16 RX ORDER — FUROSEMIDE 10 MG/ML
80 INJECTION INTRAMUSCULAR; INTRAVENOUS 2 TIMES DAILY
Status: DISCONTINUED | OUTPATIENT
Start: 2019-05-17 | End: 2019-05-16

## 2019-05-16 RX ORDER — LACTULOSE 10 G/15ML
20 SOLUTION ORAL 3 TIMES DAILY
Status: DISCONTINUED | OUTPATIENT
Start: 2019-05-17 | End: 2019-05-20

## 2019-05-16 RX ADMIN — POTASSIUM CHLORIDE 60 MEQ: 1500 TABLET, EXTENDED RELEASE ORAL at 10:05

## 2019-05-16 RX ADMIN — CEFTRIAXONE SODIUM 1 G: 1 INJECTION, POWDER, FOR SOLUTION INTRAMUSCULAR; INTRAVENOUS at 08:05

## 2019-05-16 RX ADMIN — MAGNESIUM SULFATE IN WATER 2 G: 40 INJECTION, SOLUTION INTRAVENOUS at 11:05

## 2019-05-16 RX ADMIN — RIFAXIMIN 550 MG: 550 TABLET ORAL at 11:05

## 2019-05-16 RX ADMIN — SODIUM CHLORIDE 1000 ML: 0.9 INJECTION, SOLUTION INTRAVENOUS at 06:05

## 2019-05-16 NOTE — LETTER
May 16, 2019        Janes Retana  [address] [facility]  [place] [address]  Phone: [phone]  Fax: [phone]             [facility] [address]  [address] [facility] [address]  [facility] [address]  Phone: [phone]  Fax: [phone]   Patient: Jihan Zamudio   MR Number: [id number]   YOB: 1970   Date of Visit: 5/16/2019       Dear Dr. Janes Retana    Thank you for referring Jihan Zamudio to me for evaluation. Attached you will find relevant portions of my assessment and plan of care.    If you have questions, please do not hesitate to call me. I look forward to following Jihan Zamudio along with you.    Sincerely,    Laura Smallwood MD    Enclosure    If you would like to receive this communication electronically, please contact [email or web address] or [phone] to request Mobile [employer] access.    [employer] is a tool which provides read-only access to select patient information with whom you have a relationship. Its easy to use and provides real time access to review your patients record including encounter summaries, notes, results, and demographic information.    If you feel you have received this communication in error or would no longer like to receive these types of communications, please e-mail [email or web address]

## 2019-05-16 NOTE — PROGRESS NOTES
HEPATOLOGY FOLLOW UP    Referring Physician: Janes Retana MD  Current Corresponding Physician: Janes Retana MD    Jihan Zamudio is here for follow up of decompensated   Cirrhosis.     I last saw this patient in May 2018. She had undergone a liver tx evaluation at that time due to a potential need for an appendectomy in the setting of cirrhosis. However, her acute appendicitis had resolved and she was not listed.     HPI  Since Jihan Zamudio's last visit she was well until the end of April 2019. She presented to [facility] c/o melanotic stool. Paracentesis was performed in the ED with 5L removed. Para studies with PMN < 250. The patient was admitted there and had EGD performed on 4/23 which revealed portal hypertensive gastropathy, blood in the gastric body, grade 2 gastroesophageal varices without active bleeding but with stigmata of recent bleeding and and non-bleeding small esophageal varices without stigmata of recent bleeding. The esophageal varices were not banded as they were not suspected to be the source of bleeding.  CT abdomen showed sequela of portal HTN and patent portal vein without evidence of PVT. The [place] [facility] [place] [facility] [place] her acute problems included:    1. Gastrointestinal hemorrhage with melena   Acute blood loss anemia   Portal Hypertensive gastropathy  - s/p EGD with APC treatment  - H/H stable at 8-9  - given rocephin for 5 days     2. Decompensated KESSLER Cirrhosis with ascites  MELD-Na score: 24 at 5/1/2019    - hepatology consulted  - s/p liver transplant evaluation   - IR paracentesis on 4/26 with 4.5L removed and negative for SBP  - IV Lasix 80 t.i.d. transition to p.o. Lasix 80 b.i.d.  -  spironolactone 200 mg daily  - patient presented to liver transplant committee on 05/01/2019 and approved for liver transplantation;  financial approval pending and pending review of OSH chest images in regards to lung nodule and pulmonary nodule    3. Lung nodule  -Has history of  0.5 cm left lower lobe solid lung nodule that has been stable, need outside records and images; pulmonary consult     Since discharge, she has gotten progressively weaker and she has developed SOB. Her O2 saturation was 89% in the clinic today. She denies fevers or chills, N/V, abdominal pain or diarrhea.    Outpatient Encounter Medications as of 5/16/2019   Medication Sig Dispense Refill    acetaminophen (TYLENOL) 500 MG tablet Take 1,000 mg by mouth daily as needed for Pain (headache and bodyaches).      ciprofloxacin HCl (CIPRO) 500 MG tablet Take 500 mg by mouth once daily.      cranberry extract 50 mg Chew Take 1 tablet by mouth daily as needed.      ergocalciferol (ERGOCALCIFEROL) 50,000 unit Cap Take 50,000 Units by mouth every 7 days. Monday      fexofenadine (ALLEGRA) 180 MG tablet Take 180 mg by mouth daily as needed.      furosemide (LASIX) 80 MG tablet Take 1 tablet (80 mg total) by mouth 2 (two) times daily. 60 tablet 2    lactulose (CHRONULAC) 20 gram/30 mL Soln Take 30 mLs (20 g total) by mouth 3 (three) times daily. TITRATE TO 3-4 BOWEL MOVEMENTS DAILY. 2700 mL 2    levothyroxine (SYNTHROID) 112 MCG tablet Take 112 mcg by mouth once daily.      lidocaine (LIDODERM) 5 % Place 1 patch onto the skin every 24 hours. Remove & Discard patch within 12 hours  As needed or as directed by MD      pantoprazole (PROTONIX) 40 MG tablet Take 1 tablet (40 mg total) by mouth once daily. 90 tablet 0    rifAXImin (XIFAXAN) 200 mg Tab Take 550 mg by mouth 2 (two) times daily.      spironolactone (ALDACTONE) 100 MG tablet Take 2 tablets (200 mg total) by mouth once daily. 60 tablet 2    zinc sulfate (ZINCATE) 220 (50) mg capsule Take 1 capsule (220 mg total) by mouth once daily.       No facility-administered encounter medications on file as of 5/16/2019.      Review of patient's allergies indicates:   Allergen Reactions    Metformin Rash    Pcn [penicillins] Other (See Comments)     Unsure of reaction,  states it was as a child. [facility]phin [facility]     Past Medical History:   Diagnosis Date    Cirrhosis     Esophageal varices 2/26/2018    Small with no banding 07/17    Essential hypertension 2/26/2018    Fatty liver 2/26/2018    GERD (gastroesophageal reflux disease)     Hx of colonic polyps 2/26/2018    On colonoscopy 07/17    Hypertension     Hypothyroidism 2/26/2018    Kidney stones     Morbid obesity 2/26/2018    Lap band with subsequent release    KADEEM (obstructive sleep apnea) 2/26/2018    Osteoarthritis 2/26/2018    Pulmonary nodule 2/26/2018    Vitamin D deficiency 2/26/2018       Review of Systems   Constitutional: Positive for fatigue.   HENT: Negative.    Eyes: Negative.    Respiratory: Positive for shortness of breath.    Cardiovascular: Negative.    Gastrointestinal: Negative.    Genitourinary: Negative.    Musculoskeletal: Negative.    Skin: Negative.    Neurological: Positive for weakness.   Psychiatric/Behavioral: Negative.      Vitals:    05/16/19 1351   BP: (!) 122/55   Pulse: 96   Resp: 20   Temp: 98.3 °F (36.8 °C)       Physical Exam   Constitutional: She is oriented to person, place, and time.   Looks unwell   HENT:   Head: Normocephalic and atraumatic.   Eyes: Pupils are equal, round, and reactive to light. Conjunctivae and EOM are normal. Scleral icterus is present.   Neck: Normal range of motion. Neck supple. No thyromegaly present.   Cardiovascular: Normal rate, regular rhythm and normal heart sounds.   Pulmonary/Chest: Effort normal. She has no rales.   Decreased air entry right post chest   Abdominal: Soft. Bowel sounds are normal. She exhibits distension. She exhibits no mass. There is no tenderness.   Musculoskeletal: Normal range of motion. She exhibits edema.   Neurological: She is alert and oriented to person, place, and time.   Skin: Skin is warm and dry. No rash noted.   Psychiatric: She has a normal mood and affect.   Vitals reviewed.      MELD-Na score: 26 at  5/2/2019  4:52 AM  MELD score: 22 at 5/2/2019  4:52 AM  Calculated from:  Serum Creatinine: 0.9 mg/dL (Rounded to 1 mg/dL) at 5/2/2019  4:52 AM  Serum Sodium: 130 mmol/L at 5/2/2019  4:52 AM  Total Bilirubin: 9.4 mg/dL at 5/2/2019  4:52 AM  INR(ratio): 1.9 at 5/2/2019  4:52 AM  Age: 49 years    Lab Results   Component Value Date    GLU 86 05/02/2019    BUN 13 05/02/2019    CREATININE 0.9 05/02/2019    CALCIUM 8.2 (L) 05/02/2019     (L) 05/02/2019    K 3.8 05/02/2019    CL 96 05/02/2019    PROT 5.9 (L) 05/02/2019    CO2 27 05/02/2019    ANIONGAP 7 (L) 05/02/2019    WBC 3.63 (L) 05/02/2019    RBC 2.87 (L) 05/02/2019    HGB 8.7 (L) 05/02/2019    HCT 26.8 (L) 05/02/2019    MCV 93 05/02/2019    MCH 30.3 05/02/2019    MCHC 32.5 05/02/2019     Lab Results   Component Value Date    RDW 18.0 (H) 05/02/2019    PLT 88 (L) 05/02/2019    MPV 12.6 05/02/2019    GRAN 2.0 05/02/2019    GRAN 54.4 05/02/2019    LYMPH 0.9 (L) 05/02/2019    LYMPH 24.0 05/02/2019    MONO 0.4 05/02/2019    MONO 10.5 05/02/2019    EOSINOPHIL 9.9 (H) 05/02/2019    BASOPHIL 0.6 05/02/2019    EOS 0.4 05/02/2019    EOS 1 04/26/2019    BASO 0.02 05/02/2019    APTT 35.1 (H) 04/23/2019    GROUPTRH O POS 04/26/2019     (H) 04/23/2019    ALBUMIN 2.4 (L) 05/02/2019    BILIDIR 1.2 (H) 02/26/2018    AST 75 (H) 05/02/2019    ALT 25 05/02/2019    ALKPHOS 106 05/02/2019    MG 1.8 05/02/2019    LABPROT 18.6 (H) 05/02/2019    INR 1.9 (H) 05/02/2019       Assessment and Plan:    Jihan Zamudio is a 49 y.o. female with decompensated cirrhosis. She appears acutely ill today. She is barely walking and O2 saturation is 89%. I am recommending evaluation in the Emergency room. Other recommendations:  1. Decompensated cirrhosis: check meld; f/u on financial approval.  2. SOB and hypoxemia: recommend cxr; ABG; consider thoracentesis if hydrothorax identified  3. Ascites, ongoing: paracentesis- r/o sbp  4. Portal hyeprtensive gastropathy/EV/recent bleeding: monitor  Hct  5. Lung nodule: obtain old ct scans of chest to see if pulm nodule is new/has it grown; pulmonary consult  6. Weakness: recommend septic w/u and empiric abx with ceftriaxone    Addendum:  CMP reveals low Na of 124, and an increase in [place] to 12.3

## 2019-05-17 ENCOUNTER — TELEPHONE (OUTPATIENT)
Dept: TRANSPLANT | Facility: CLINIC | Age: 49
End: 2019-05-17

## 2019-05-17 LAB
ALBUMIN SERPL BCP-MCNC: 2.2 G/DL (ref 3.5–5.2)
ALP SERPL-CCNC: 118 U/L (ref 55–135)
ALT SERPL W/O P-5'-P-CCNC: 33 U/L (ref 10–44)
ANION GAP SERPL CALC-SCNC: 8 MMOL/L (ref 8–16)
AST SERPL-CCNC: 85 U/L (ref 10–40)
BASOPHILS # BLD AUTO: 0.04 K/UL (ref 0–0.2)
BASOPHILS NFR BLD: 0.8 % (ref 0–1.9)
BILIRUB SERPL-MCNC: 11.9 MG/DL (ref 0.1–1)
BUN SERPL-MCNC: 13 MG/DL (ref 6–20)
CALCIUM SERPL-MCNC: 8.5 MG/DL (ref 8.7–10.5)
CHLORIDE SERPL-SCNC: 94 MMOL/L (ref 95–110)
CO2 SERPL-SCNC: 23 MMOL/L (ref 23–29)
CREAT SERPL-MCNC: 0.9 MG/DL (ref 0.5–1.4)
DIFFERENTIAL METHOD: ABNORMAL
EOSINOPHIL # BLD AUTO: 0.4 K/UL (ref 0–0.5)
EOSINOPHIL NFR BLD: 7.8 % (ref 0–8)
ERYTHROCYTE [DISTWIDTH] IN BLOOD BY AUTOMATED COUNT: 19 % (ref 11.5–14.5)
EST. GFR  (AFRICAN AMERICAN): >60 ML/MIN/1.73 M^2
EST. GFR  (NON AFRICAN AMERICAN): >60 ML/MIN/1.73 M^2
GLUCOSE SERPL-MCNC: 104 MG/DL (ref 70–110)
HCT VFR BLD AUTO: 24.1 % (ref 37–48.5)
HGB BLD-MCNC: 8.4 G/DL (ref 12–16)
IMM GRANULOCYTES # BLD AUTO: 0.06 K/UL (ref 0–0.04)
IMM GRANULOCYTES NFR BLD AUTO: 1.2 % (ref 0–0.5)
INR PPP: 1.9 (ref 0.8–1.2)
LDH SERPL L TO P-CCNC: 337 U/L (ref 110–260)
LYMPHOCYTES # BLD AUTO: 0.8 K/UL (ref 1–4.8)
LYMPHOCYTES NFR BLD: 15.2 % (ref 18–48)
MAGNESIUM SERPL-MCNC: 2 MG/DL (ref 1.6–2.6)
MCH RBC QN AUTO: 30.9 PG (ref 27–31)
MCHC RBC AUTO-ENTMCNC: 34.9 G/DL (ref 32–36)
MCV RBC AUTO: 89 FL (ref 82–98)
MONOCYTES # BLD AUTO: 0.6 K/UL (ref 0.3–1)
MONOCYTES NFR BLD: 11.4 % (ref 4–15)
NEUTROPHILS # BLD AUTO: 3.2 K/UL (ref 1.8–7.7)
NEUTROPHILS NFR BLD: 63.6 % (ref 38–73)
NRBC BLD-RTO: 0 /100 WBC
PHOSPHATE SERPL-MCNC: 3.4 MG/DL (ref 2.7–4.5)
PLATELET # BLD AUTO: 79 K/UL (ref 150–350)
PMV BLD AUTO: 11.8 FL (ref 9.2–12.9)
POTASSIUM SERPL-SCNC: 3.8 MMOL/L (ref 3.5–5.1)
PROT SERPL-MCNC: 6.3 G/DL (ref 6–8.4)
PROTHROMBIN TIME: 18.5 SEC (ref 9–12.5)
RBC # BLD AUTO: 2.72 M/UL (ref 4–5.4)
SODIUM SERPL-SCNC: 125 MMOL/L (ref 136–145)
WBC # BLD AUTO: 5 K/UL (ref 3.9–12.7)

## 2019-05-17 PROCEDURE — 83615 LACTATE (LD) (LDH) ENZYME: CPT | Mod: NTX

## 2019-05-17 PROCEDURE — 25000003 PHARM REV CODE 250: Mod: NTX | Performed by: STUDENT IN AN ORGANIZED HEALTH CARE EDUCATION/TRAINING PROGRAM

## 2019-05-17 PROCEDURE — 87206 SMEAR FLUORESCENT/ACID STAI: CPT | Mod: NTX

## 2019-05-17 PROCEDURE — 25000003 PHARM REV CODE 250: Mod: NTX | Performed by: HOSPITALIST

## 2019-05-17 PROCEDURE — 88112 CYTOPATH CELL ENHANCE TECH: CPT | Mod: 26,NTX,, | Performed by: PATHOLOGY

## 2019-05-17 PROCEDURE — 87070 CULTURE OTHR SPECIMN AEROBIC: CPT | Mod: NTX

## 2019-05-17 PROCEDURE — 82945 GLUCOSE OTHER FLUID: CPT | Mod: NTX

## 2019-05-17 PROCEDURE — 88305 CYTOLOGY SPECIMEN- MEDICAL CYTOLOGY (FLUID/WASH/BRUSH): ICD-10-PCS | Mod: 26,NTX,, | Performed by: PATHOLOGY

## 2019-05-17 PROCEDURE — 83615 LACTATE (LD) (LDH) ENZYME: CPT | Mod: 91,NTX

## 2019-05-17 PROCEDURE — 88305 TISSUE EXAM BY PATHOLOGIST: CPT | Mod: NTX | Performed by: PATHOLOGY

## 2019-05-17 PROCEDURE — 80053 COMPREHEN METABOLIC PANEL: CPT | Mod: NTX

## 2019-05-17 PROCEDURE — 99223 PR INITIAL HOSPITAL CARE,LEVL III: ICD-10-PCS | Mod: NTX,,, | Performed by: INTERNAL MEDICINE

## 2019-05-17 PROCEDURE — 87075 CULTR BACTERIA EXCEPT BLOOD: CPT | Mod: NTX

## 2019-05-17 PROCEDURE — 20600001 HC STEP DOWN PRIVATE ROOM: Mod: NTX

## 2019-05-17 PROCEDURE — 99223 1ST HOSP IP/OBS HIGH 75: CPT | Mod: NTX,,, | Performed by: INTERNAL MEDICINE

## 2019-05-17 PROCEDURE — 84157 ASSAY OF PROTEIN OTHER: CPT | Mod: NTX

## 2019-05-17 PROCEDURE — 87116 MYCOBACTERIA CULTURE: CPT | Mod: NTX

## 2019-05-17 PROCEDURE — 85610 PROTHROMBIN TIME: CPT | Mod: NTX

## 2019-05-17 PROCEDURE — 63600175 PHARM REV CODE 636 W HCPCS: Mod: NTX | Performed by: HOSPITALIST

## 2019-05-17 PROCEDURE — 88305 TISSUE EXAM BY PATHOLOGIST: CPT | Mod: 26,NTX,, | Performed by: PATHOLOGY

## 2019-05-17 PROCEDURE — 88112 CYTOLOGY SPECIMEN- MEDICAL CYTOLOGY (FLUID/WASH/BRUSH): ICD-10-PCS | Mod: 26,NTX,, | Performed by: PATHOLOGY

## 2019-05-17 PROCEDURE — 82042 OTHER SOURCE ALBUMIN QUAN EA: CPT | Mod: NTX

## 2019-05-17 PROCEDURE — 83735 ASSAY OF MAGNESIUM: CPT | Mod: NTX

## 2019-05-17 PROCEDURE — 80321 ALCOHOLS BIOMARKERS 1OR 2: CPT | Mod: NTX

## 2019-05-17 PROCEDURE — 87205 SMEAR GRAM STAIN: CPT | Mod: NTX

## 2019-05-17 PROCEDURE — 89051 BODY FLUID CELL COUNT: CPT | Mod: NTX

## 2019-05-17 PROCEDURE — 36415 COLL VENOUS BLD VENIPUNCTURE: CPT | Mod: NTX

## 2019-05-17 PROCEDURE — 85025 COMPLETE CBC W/AUTO DIFF WBC: CPT | Mod: NTX

## 2019-05-17 PROCEDURE — 99233 PR SUBSEQUENT HOSPITAL CARE,LEVL III: ICD-10-PCS | Mod: NTX,,, | Performed by: HOSPITALIST

## 2019-05-17 PROCEDURE — 84100 ASSAY OF PHOSPHORUS: CPT | Mod: NTX

## 2019-05-17 PROCEDURE — 99233 SBSQ HOSP IP/OBS HIGH 50: CPT | Mod: NTX,,, | Performed by: HOSPITALIST

## 2019-05-17 RX ORDER — SODIUM CHLORIDE 0.9 % (FLUSH) 0.9 %
10 SYRINGE (ML) INJECTION
Status: DISCONTINUED | OUTPATIENT
Start: 2019-05-17 | End: 2019-06-01

## 2019-05-17 RX ORDER — CYCLOBENZAPRINE HCL 5 MG
5 TABLET ORAL 2 TIMES DAILY PRN
Status: DISCONTINUED | OUTPATIENT
Start: 2019-05-17 | End: 2019-06-06

## 2019-05-17 RX ORDER — LIDOCAINE HYDROCHLORIDE 10 MG/ML
10 INJECTION INFILTRATION; PERINEURAL ONCE
Status: COMPLETED | OUTPATIENT
Start: 2019-05-17 | End: 2019-05-17

## 2019-05-17 RX ORDER — ACETAMINOPHEN 325 MG/1
325 TABLET ORAL EVERY 8 HOURS PRN
Status: DISCONTINUED | OUTPATIENT
Start: 2019-05-17 | End: 2019-06-06

## 2019-05-17 RX ADMIN — LEVOFLOXACIN 750 MG: 750 TABLET, FILM COATED ORAL at 08:05

## 2019-05-17 RX ADMIN — SPIRONOLACTONE 200 MG: 100 TABLET, FILM COATED ORAL at 08:05

## 2019-05-17 RX ADMIN — FUROSEMIDE 80 MG: 40 TABLET ORAL at 08:05

## 2019-05-17 RX ADMIN — LIDOCAINE HYDROCHLORIDE 10 ML: 10 INJECTION, SOLUTION INFILTRATION; PERINEURAL at 02:05

## 2019-05-17 RX ADMIN — LACTULOSE 20 G: 20 SOLUTION ORAL at 08:05

## 2019-05-17 RX ADMIN — RIFAXIMIN 550 MG: 550 TABLET ORAL at 09:05

## 2019-05-17 RX ADMIN — ACETAMINOPHEN 325 MG: 325 TABLET ORAL at 06:05

## 2019-05-17 RX ADMIN — RIFAXIMIN 550 MG: 550 TABLET ORAL at 08:05

## 2019-05-17 RX ADMIN — CYCLOBENZAPRINE HYDROCHLORIDE 5 MG: 5 TABLET, FILM COATED ORAL at 06:05

## 2019-05-17 RX ADMIN — PANTOPRAZOLE SODIUM 40 MG: 40 TABLET, DELAYED RELEASE ORAL at 08:05

## 2019-05-17 NOTE — HPI
This is a 48 yo F with hx of KESSLER cirrhosis c/b varices, latent TB, GERD, hypothyroidism, lap band 2007, HLD who was sent to the ED from hepatology clinic for shortness of breath. She follows with Dr. Smallwood. She has been undergoing transplant evaluation and is pending financial approval and clarification of lung nodule. She reports feeling more short of breath the past few days and increased abdominal swelling. She reports occasional confusion as well. She was found to have a low sodium as well. With regards to her lung nodule, we have been awaiting films for an outside facility for comparison. She was found to have a large pleural effusion while here, something she has not had before. Pulmonology has been consulted.

## 2019-05-17 NOTE — CONSULTS
[facility]  Hepatology  Consult Note    Patient Name: Jihan Zamudio  MRN: [id number]  Admission Date: 5/16/2019  Hospital Length of Stay: 1 days  Attending Provider: Maritza Messina MD   Primary Care Physician: Primary Doctor No  Principal Problem:Acute hypoxemic respiratory failure    Inpatient consult to Hepatology  Consult performed by: Jt Cosby MD  Consult ordered by: Carmela Evans MD        Subjective:     Transplant status: No    HPI:  This is a 50 yo F with hx of KESSLER cirrhosis c/b varices, latent TB, GERD, hypothyroidism, lap band 2007, HLD who was sent to the ED from hepatology clinic for shortness of breath. She follows with Dr. Smlalwood. She has been undergoing transplant evaluation and is pending financial approval and clarification of lung nodule. She reports feeling more short of breath the past few days and increased abdominal swelling. She reports occasional confusion as well. She was found to have a low sodium as well. With regards to her lung nodule, we have been awaiting films for an outside facility for comparison. She was found to have a large pleural effusion while here, something she has not had before. Pulmonology has been consulted.         Review of Systems   Constitutional: Positive for activity change, appetite change and fatigue. Negative for chills and fever.   HENT: Negative for sore throat and trouble swallowing.    Respiratory: Positive for cough and shortness of breath.    Cardiovascular: Negative for chest pain and leg swelling.   Gastrointestinal: Positive for abdominal distention and abdominal pain. Negative for constipation, diarrhea, nausea and vomiting.   Genitourinary: Negative for decreased urine volume and difficulty urinating.   Musculoskeletal: Negative for arthralgias and back pain.   Skin: Positive for color change. Negative for pallor.   Psychiatric/Behavioral: Positive for agitation and confusion.       Past Medical History:   Diagnosis  Date    Cirrhosis     Esophageal varices 2018    Small with no banding     Essential hypertension 2018    Fatty liver 2018    GERD (gastroesophageal reflux disease)     Hx of colonic polyps 2018    On colonoscopy     Hypertension     Hypothyroidism 2018    Kidney stones     Morbid obesity 2018    Lap band with subsequent release    KADEEM (obstructive sleep apnea) 2018    Osteoarthritis 2018    Pulmonary nodule 2018    Vitamin D deficiency 2018       Past Surgical History:   Procedure Laterality Date     SECTION      TIMES 2     CHOLECYSTECTOMY      LAPAROSCOPIC     CYSTOSCOPY W/ STONE MANIPULATION      kidney stone removal    EGD (ESOPHAGOGASTRODUODENOSCOPY) N/A 2019    Performed by Davian Hernández MD at [facility] (2ND FLR)    LAPAROSCOPIC GASTRIC BANDING      removal     LIVER BIOPSY  2017    KESSLER with bridging 17       Family history of liver disease: No    Review of patient's allergies indicates:   Allergen Reactions    Metformin Rash    Pcn [penicillins] Other (See Comments)     Unsure of reaction, states it was as a child. Tolerated [facility]phin at [facility]       Tobacco Use    Smoking status: Never Smoker    Smokeless tobacco: Never Used    Tobacco comment: patient denies   Substance and Sexual Activity    Alcohol use: No     Comment: patient denies    Drug use: No     Comment: patient denies    Sexual activity: Not on file       Medications Prior to Admission   Medication Sig Dispense Refill Last Dose    ciprofloxacin HCl (CIPRO) 500 MG tablet Take 500 mg by mouth once daily.   Taking    cranberry extract 50 mg Chew Take 1 tablet by mouth daily as needed.   Taking    ergocalciferol (ERGOCALCIFEROL) 50,000 unit Cap Take 50,000 Units by mouth every 7 days. Monday   Taking    fexofenadine (ALLEGRA) 180 MG tablet Take 180 mg by mouth daily as needed.   Taking    furosemide (LASIX) 80 MG  tablet Take 1 tablet (80 mg total) by mouth 2 (two) times daily. 60 tablet 2 Taking    lactulose (CHRONULAC) 20 gram/30 mL Soln Take 30 mLs (20 g total) by mouth 3 (three) times daily. TITRATE TO 3-4 BOWEL MOVEMENTS DAILY. 2700 mL 2 Taking    levothyroxine (SYNTHROID) 112 MCG tablet Take 112 mcg by mouth once daily.   Taking    lidocaine (LIDODERM) 5 % Place 1 patch onto the skin every 24 hours. Remove & Discard patch within 12 hours  As needed or as directed by MD   Taking    pantoprazole (PROTONIX) 40 MG tablet Take 1 tablet (40 mg total) by mouth once daily. 90 tablet 0 Taking    rifAXImin (XIFAXAN) 200 mg Tab Take 550 mg by mouth 2 (two) times daily.   Taking    spironolactone (ALDACTONE) 100 MG tablet Take 2 tablets (200 mg total) by mouth once daily. 60 tablet 2 Taking    acetaminophen (TYLENOL) 500 MG tablet Take 1,000 mg by mouth daily as needed for Pain (headache and bodyaches).   Taking    zinc sulfate (ZINCATE) 220 (50) mg capsule Take 1 capsule (220 mg total) by mouth once daily.   Taking       Objective:     Vital Signs (Most Recent):  Temp: 98.5 °F (36.9 °C) (05/17/19 1410)  Pulse: 91 (05/17/19 1523)  Resp: 20 (05/17/19 1410)  BP: 124/60 (05/17/19 1410)  SpO2: (!) 90 % (05/17/19 1410) Vital Signs (24h Range):  Temp:  [97.6 °F (36.4 °C)-98.9 °F (37.2 °C)] 98.5 °F (36.9 °C)  Pulse:  [79-94] 91  Resp:  [18-24] 20  SpO2:  [88 %-95 %] 90 %  BP: (118-127)/(55-60) 124/60     Weight: 101.6 kg (224 lb) (05/16/19 1603)  Body mass index is 38.45 kg/m².    Physical Exam   Constitutional: She is oriented to person, place, and time.   Appears uncomfortable   HENT:   Mouth/Throat: Oropharynx is clear and moist.   Eyes: Scleral icterus is present.   Cardiovascular: Normal rate and regular rhythm.   Pulmonary/Chest: Effort normal.   Decreased breath sounds right side  On nasal cannula  tachypneic   Abdominal: Soft. She exhibits no distension. There is no tenderness. There is no guarding.   Musculoskeletal: She  exhibits no edema or deformity.   Neurological: She is alert and oriented to person, place, and time.   Skin: Skin is warm and dry.   Psychiatric: She has a normal mood and affect.   Vitals reviewed.      MELD-Na score: 30 at 5/17/2019  5:18 AM  MELD score: 23 at 5/17/2019  5:18 AM  Calculated from:  Serum Creatinine: 0.9 mg/dL (Rounded to 1 mg/dL) at 5/17/2019  5:18 AM  Serum Sodium: 125 mmol/L at 5/17/2019  5:18 AM  Total Bilirubin: 11.9 mg/dL at 5/17/2019  5:18 AM  INR(ratio): 1.9 at 5/17/2019  5:18 AM  Age: 49 years    Significant Labs:  CBC:   Recent Labs   Lab 05/17/19 0518   WBC 5.00   RBC 2.72*   HGB 8.4*   HCT 24.1*   PLT 79*     CMP:   Recent Labs   Lab 05/17/19 0518      CALCIUM 8.5*   ALBUMIN 2.2*   PROT 6.3   *   K 3.8   CO2 23   CL 94*   BUN 13   CREATININE 0.9   ALKPHOS 118   ALT 33   AST 85*   BILITOT 11.9*     Coagulation:   Recent Labs   Lab 05/16/19  1923 05/17/19 0518   INR  --  1.9*   APTT 32.0  --          Assessment/Plan:     Liver cirrhosis secondary to KESSLER  50 yo F with hx of KESSLER cirrhosis c/b varices, latent TB, GERD, hypothyroidism, lap band 2007, HLD who was sent to the ED from hepatology clinic for shortness of breath. She is found to have a right sided pleural effusion, hyponatremia, and some confusion. She has completed the transplant eval and is awaiting financial clearance as well as clarification of lung nodule previously seen.    Recommendations:  - Obtain new CT chest for comparison  - Plan to discuss in IR conference next week  - Pulmonology to perform thoracentesis  - Fluid restriction for hyponatremia  - Daily CBC, CMP, INR  - Avoid sedatives        Thank you for your consult. I will follow-up with patient. Please contact us if you have any additional questions.    Jt Cosby MD  Hepatology  [facility]

## 2019-05-17 NOTE — PLAN OF CARE
Problem: Adult Inpatient Plan of Care  Goal: Plan of Care Review  Outcome: Ongoing (interventions implemented as appropriate)     05/17/19 0500   Plan of Care Review   Plan of Care Reviewed With patient   AAO  x4, denies pain : assisted x1 to bsc; Abdomen distention noted , possible Paracentesis this am : noted with sob with supplement oxygen per nasal cannula @ 2 liters : Fluid restriction 1000 ml : Liver pt : safety rounds performed : SID.

## 2019-05-17 NOTE — HPI
"Ms Zamudio is a 50 yo woman who is a never smoker with KESSLER decompensated cirrhosis who presents from hepatology clinic with acute respiratory failure.     The patient reports it started 4 days ago. It was gradual worsening of her breathing with exertion. It did not happen suddenly. She first noticed with exertion. She is not sure if she had fevers, but does think shes been having some chills. She denies any lower extremity edema, and doesn't think her abdomen is significantly distended ("it has been much worse"). She has had paracentesis before twice in the past, but not on regular basis. She denies any prior history of heart attacks, most recent EF 65%. She does report some weight loss but thinks it is related to when she gets fluid removed.      She was recently approved for transplant (pending financial approval) on 5/1/2019. She was seen in hepatology clinic and noted to be hypoxic with Hb-O2 at 89%. She was sent to the ED and responded to 2L of NC with improvement to 95%. Her CXR revealed R sided opacification, c/w pleural effusion. She was initated on levaquin. Was evaluated by CC team due to hyponatremia, but ultimately deemed safe for the floor.     The patient does not have any history of CHF, normal EF on last ECHO. She does not have any kidney problems. She denies any recent infectious symptoms or autoimmune diseases. No prior occupational exposures. No new drugs. She denies any new or recent weight loss. No history of joint pain. She used to work in a NH many years ago.     "

## 2019-05-17 NOTE — CONSULTS
[facility]  Critical Care Medicine  Consult Note    Patient Name: Jihan Zamudio  MRN: [id number]  Admission Date: 5/16/2019  Hospital Length of Stay: 0 days  Code Status: Prior  Attending Physician: Maritza Messina MD   Primary Care Provider: Primary Doctor No   Principal Problem: Acute hypoxemic respiratory failure    Consults  Subjective:     HPI:  Patient is a 49 y.o. 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 Patient reports this had been going on for the past 2 days, denies any recent sick contact. She has also reported increased in her abdominal fluid but denies any fevers chills, chest pain, palpitations, confusion, focal neurologic deficits. Patient reported to the ED for further evaluation. ICU was consulted for clearance to the floor regarding patient's Na 124.  Patient has a history of ascites with paracentesis of 5L removal recently. She also had an EGD performed on 4/23 which revealed portal hypertensive gastropathy, blood in the gastric body, grade 2 gastroesophageal varices without active bleeding but with stigmata of recent bleeding and and non-bleeding small esophageal varices without stigmata of recent bleeding. Patient does not report being on O2 at home. US Abdomen in April 2018 revealed left portal vein thrombus.     Hospital/ICU Course:  No notes on file    Past Medical History:   Diagnosis Date    Cirrhosis     Esophageal varices 2/26/2018    Small with no banding 07/17    Essential hypertension 2/26/2018    Fatty liver 2/26/2018    GERD (gastroesophageal reflux disease)     Hx of colonic polyps 2/26/2018    On colonoscopy 07/17    Hypertension     Hypothyroidism 2/26/2018    Kidney stones     Morbid obesity 2/26/2018    Lap band with subsequent release     KADEEM (obstructive sleep apnea) 2018    Osteoarthritis 2018    Pulmonary nodule 2018    Vitamin D deficiency 2018       Past Surgical History:   Procedure Laterality Date     SECTION      TIMES 2     CHOLECYSTECTOMY      LAPAROSCOPIC     CYSTOSCOPY W/ STONE MANIPULATION      kidney stone removal    EGD (ESOPHAGOGASTRODUODENOSCOPY) N/A 2019    Performed by Davian Hern[facility]dez [facility] at [facility]O (2ND FLR)    LAPAROSCOPIC GASTRIC BANDING  2006    removal     LIVER BIOPSY  2017    KESSLER with bridging 17       Review of patient's allergies indicates:   Allergen Reactions    Metformin Rash    Pcn [penicillins] Other (See Comments)     Unsure of reaction, states it was as a child. Tolerated rocephin at [facility]       Family History     Problem Relation (Age of Onset)    Breast cancer Maternal Grandmother    Cancer Mother (50), Father (65)    Heart disease Mother, Father        Tobacco Use    Smoking status: Never Smoker    Smokeless tobacco: Never Used    Tobacco comment: patient denies   Substance and Sexual Activity    Alcohol use: No     Comment: patient denies    Drug use: No     Comment: patient denies    Sexual activity: Not on file      Review of Systems   Constitutional: Negative.    HENT: Negative.    Respiratory: Positive for shortness of breath. Negative for apnea, cough, choking, chest tightness, wheezing and stridor.    Cardiovascular: Negative for chest pain, palpitations and leg swelling.   Gastrointestinal: Positive for abdominal distention. Negative for abdominal pain, constipation, diarrhea, nausea and vomiting.   Endocrine: Negative.    Genitourinary: Negative.    Neurological: Negative.      Objective:     Vital Signs (Most Recent):  Temp: 98.9 °F (37.2 °C) (19)  Pulse: 90 (19 1802)  Resp: (!) 24 (19)  BP: 125/60 (19)  SpO2: 95 % (19) Vital Signs (24h Range):  Temp:  [98.3 °F (36.8 °C)-98.9 °F  (37.2 °C)] 98.9 °F (37.2 °C)  Pulse:  [90-96] 90  Resp:  [20-24] 24  SpO2:  [88 %-95 %] 95 %  BP: (122-133)/(55-60) 125/60   Weight: 101.6 kg (224 lb)  Body mass index is 38.45 kg/m².      Intake/Output Summary (Last 24 hours) at 5/16/2019 2027  Last data filed at 5/16/2019 1923  Gross per 24 hour   Intake 1000 ml   Output --   Net 1000 ml       Physical Exam   Constitutional: She is oriented to person, place, and time.   Does not appear in any type of distress at this time   HENT:   Head: Normocephalic and atraumatic.   Eyes: Pupils are equal, round, and reactive to light. EOM are normal.   Slight scleral icterus   Cardiovascular: Normal rate, regular rhythm and normal heart sounds. Exam reveals no gallop and no friction rub.   No murmur heard.  Pulmonary/Chest: No stridor. No respiratory distress. She has no wheezes. She has rales (Right pulmonary field).   Abdominal: She exhibits distension. She exhibits no mass. There is no tenderness. There is no rebound and no guarding. No hernia.   Neurological: She is alert and oriented to person, place, and time.   Skin: Skin is warm and dry.   Psychiatric: She has a normal mood and affect. Her behavior is normal. Thought content normal.       Vents:     Lines/Drains/Airways     Peripheral Intravenous Line                 Peripheral IV - Single Lumen 04/30/19 1830 Left Forearm 16 days              Significant Labs:    CBC/Anemia Profile:  Recent Labs   Lab 05/16/19  1751 05/16/19  1754   WBC 6.05 5.66   HGB 9.2* 8.9*   HCT 27.4* 25.8*   PLT 89* 113*   MCV 95 88   RDW 20.6* 19.0*        Chemistries:  Recent Labs   Lab 05/16/19  1603 05/16/19  1923   * 124*   K 3.1* 3.2*   CL 91* 93*   CO2 22* 23   BUN 13 13   CREATININE 0.9 0.8   CALCIUM 8.2* 8.0*   ALBUMIN 2.5* 2.2*   PROT 6.5 6.1   BILITOT 12.3* 11.4*   ALKPHOS 112 114   ALT 38 34   AST 84* 85*   MG  --  1.5*   PHOS  --  3.2             ABG  No results for input(s): PH, PO2, PCO2, HCO3, BE in the last 168  hours.  Assessment/Plan:     Pulmonary  Pleural effusion, right  Likely the reason for patient's hypoxia  Would recommend thoracentesis with send off studies for analysis since patient reports she's never had a thora before  If urgent on thoracentesis, please do not hesitate ICU on call  Otherwise, ok to call pulmonary fellow in the morning      Renal/  Hyponatremia  Sodium 124, decreased from baseline of 130s  Likely hypervolemic hyponatremia worsened from grossly fluid overload state  Recommend urine and serum osmolalities along with urine sodium level  Recommend paracentesis to see if fluid shifts will improve electrolyte abnormalities at this time  Ok to go to appropriate floor from ICU standpoint  Would recommend re-consult if patient's Na drops to critical levels or becomes symptomatic       GI  Decompensated hepatic cirrhosis  Has been approved for liver transplant? Pending insurance        [facility]     Critical care was time spent personally by me on the following activities: development of treatment plan with patient or surrogate and bedside caregivers, discussions with consultants, evaluation of patient's response to treatment, examination of patient, ordering and performing treatments and interventions, ordering and review of laboratory studies, ordering and review of radiographic studies, pulse oximetry, re-evaluation of patient's condition. This critical care time did not overlap with that of any other provider or involve time for any procedures.    Thank you for your consult. I will sign off. Please contact us if you have any additional questions.     Noah Velez, DO  Critical Care Medicine  [facility]

## 2019-05-17 NOTE — ASSESSMENT & PLAN NOTE
Likely the reason for patient's hypoxia  Would recommend thoracentesis with send off studies for analysis since patient reports she's never had a thora before  If urgent on thoracentesis, please do not hesitate ICU on call  Otherwise, ok to call pulmonary fellow in the morning

## 2019-05-17 NOTE — ASSESSMENT & PLAN NOTE
Ms Zamudio is a 50 yo woman with KESSLER decompensated cirrhosis who presents with new Right pleural effusion, suspicious for hepatic hydrothorax in the abscence of infectious symptoms.     Plan:  - Bedside thoracentesis today for therapeutic and diagnosis, will send for studies.   - Agree with sodium restriction and diuresis with lasix/spironolactone, maximize as tolerated.   - Currently undergoing liver transplant evaluation (pending financial approval).

## 2019-05-17 NOTE — SUBJECTIVE & OBJECTIVE
Past Medical History:   Diagnosis Date    Cirrhosis     Esophageal varices 2018    Small with no banding     Essential hypertension 2018    Fatty liver 2018    GERD (gastroesophageal reflux disease)     Hx of colonic polyps 2018    On colonoscopy     Hypertension     Hypothyroidism 2018    Kidney stones     Morbid obesity 2018    Lap band with subsequent release    KADEEM (obstructive sleep apnea) 2018    Osteoarthritis 2018    Pulmonary nodule 2018    Vitamin D deficiency 2018       Past Surgical History:   Procedure Laterality Date     SECTION      TIMES 2     CHOLECYSTECTOMY      LAPAROSCOPIC     CYSTOSCOPY W/ STONE MANIPULATION      kidney stone removal    EGD (ESOPHAGOGASTRODUODENOSCOPY) N/A 2019    [facility]    LAPAROSCOPIC GASTRIC BANDING  2006    removal     LIVER BIOPSY  2017    KESSLER with bridging 17       Review of patient's allergies indicates:   Allergen Reactions    Metformin Rash    Pcn [penicillins] Other (See Comments)     Unsure of reaction, states it was as a child. Tolerated rocephin at [facility]       Family History     Problem Relation (Age of Onset)    Breast cancer Maternal Grandmother    Cancer Mother (50), Father (65)    Heart disease Mother, Father        Tobacco Use    Smoking status: Never Smoker    Smokeless tobacco: Never Used    Tobacco comment: patient denies   Substance and Sexual Activity    Alcohol use: No     Comment: patient denies    Drug use: No     Comment: patient denies    Sexual activity: Not on file         Review of Systems   Constitutional: Negative for fever.   Eyes: Negative for visual disturbance.   Respiratory: Positive for shortness of breath.    Cardiovascular: Negative for chest pain.   Gastrointestinal: Negative for abdominal pain.   Genitourinary: Negative for difficulty urinating.   Skin: Negative for  rash.   Allergic/Immunologic: Negative for immunocompromised state.   Neurological: Negative for syncope.   Hematological: Does not bruise/bleed easily.     Objective:     Vital Signs (Most Recent):  Temp: 98.5 °F (36.9 °C) (05/17/19 1410)  Pulse: 93 (05/17/19 1410)  Resp: 20 (05/17/19 1410)  BP: 124/60 (05/17/19 1410)  SpO2: (!) 90 % (05/17/19 1410) Vital Signs (24h Range):  Temp:  [97.6 °F (36.4 °C)-98.9 °F (37.2 °C)] 98.5 °F (36.9 °C)  Pulse:  [79-94] 93  Resp:  [18-24] 20  SpO2:  [88 %-95 %] 90 %  BP: (118-133)/(55-60) 124/60     Weight: 101.6 kg (224 lb)  Body mass index is 38.45 kg/m².      Intake/Output Summary (Last 24 hours) at 5/17/2019 1453  Last data filed at 5/17/2019 0852  Gross per 24 hour   Intake 1250 ml   Output 400 ml   Net 850 ml       Physical Exam   Constitutional: She is oriented to person, place, and time. She appears well-developed. No distress.   HENT:   Head: Normocephalic.   Eyes: Pupils are equal, round, and reactive to light. No scleral icterus.   Neck: No JVD present.   Cardiovascular: Normal rate.   Pulmonary/Chest: Effort normal. No respiratory distress.   Right sided, diminished breath sounds throughout   Abdominal: Soft. Bowel sounds are normal. She exhibits no distension. There is no tenderness.   Musculoskeletal: Normal range of motion. She exhibits no edema.   Neurological: She is alert and oriented to person, place, and time. No cranial nerve deficit.   Skin: Skin is warm. No erythema.   Psychiatric: She has a normal mood and affect.   Nursing note and vitals reviewed.      Vents:       Lines/Drains/Airways     Peripheral Intravenous Line                 Peripheral IV - Single Lumen 04/30/19 1830 Left Forearm 16 days                Significant Labs:    CBC/Anemia Profile:  Recent Labs   Lab 05/16/19  1751 05/16/19  1754 05/17/19  0518   WBC 6.05 5.66 5.00   HGB 9.2* 8.9* 8.4*   HCT 27.4* 25.8* 24.1*   PLT 89* 113* 79*   MCV 95 88 89   RDW 20.6* 19.0* 19.0*         Chemistries:  Recent Labs   Lab 05/16/19  1603 05/16/19 1923 05/17/19 0518   * 124* 125*   K 3.1* 3.2* 3.8   CL 91* 93* 94*   CO2 22* 23 23   BUN 13 13 13   CREATININE 0.9 0.8 0.9   CALCIUM 8.2* 8.0* 8.5*   ALBUMIN 2.5* 2.2* 2.2*   PROT 6.5 6.1 6.3   BILITOT 12.3* 11.4* 11.9*   ALKPHOS 112 114 118   ALT 38 34 33   AST 84* 85* 85*   MG  --  1.5* 2.0   PHOS  --  3.2 3.4       CMP:   Recent Labs   Lab 05/16/19 1603 05/16/19 1923 05/17/19 0518   * 124* 125*   K 3.1* 3.2* 3.8   CL 91* 93* 94*   CO2 22* 23 23   GLU 98 99 104   BUN 13 13 13   CREATININE 0.9 0.8 0.9   CALCIUM 8.2* 8.0* 8.5*   PROT 6.5 6.1 6.3   ALBUMIN 2.5* 2.2* 2.2*   BILITOT 12.3* 11.4* 11.9*   ALKPHOS 112 114 118   AST 84* 85* 85*   ALT 38 34 33   ANIONGAP 11 8 8   EGFRNONAA >60 >60.0 >60.0     POCT Glucose: No results for input(s): POCTGLUCOSE in the last 48 hours.    Significant Imaging:   I have reviewed and interpreted all pertinent imaging results/findings within the past 24 hours.

## 2019-05-17 NOTE — CONSULTS
"[facility]  Pulmonology  Consult Note    Patient Name: Jihan Zamudio  MRN: [id number]  Admission Date: 5/16/2019  Hospital Length of Stay: 1 days  Code Status: Full Code  Attending Physician: Maritza Messina MD  Primary Care Provider: Primary Doctor No   Principal Problem: Acute hypoxemic respiratory failure    Consults  Subjective:     HPI:  Ms Zamudio is a 50 yo woman who is a never smoker with KESSLER decompensated cirrhosis who presents from hepatology clinic with acute respiratory failure.     The patient reports it started 4 days ago. It was gradual worsening of her breathing with exertion. It did not happen suddenly. She first noticed with exertion. She is not sure if she had fevers, but does think shes been having some chills. She denies any lower extremity edema, and doesn't think her abdomen is significantly distended ("it has been much worse"). She has had paracentesis before twice in the past, but not on regular basis. She denies any prior history of heart attacks, most recent EF 65%. She does report some weight loss but thinks it is related to when she gets fluid removed.      She was recently approved for transplant (pending financial approval) on 5/1/2019. She was seen in hepatology clinic and noted to be hypoxic with Hb-O2 at 89%. She was sent to the ED and responded to 2L of NC with improvement to 95%. Her CXR revealed R sided opacification, c/w pleural effusion. She was initated on levaquin. Was evaluated by CC team due to hyponatremia, but ultimately deemed safe for the floor.     The patient does not have any history of CHF, normal EF on last ECHO. She does not have any kidney problems. She denies any recent infectious symptoms or autoimmune diseases. No prior occupational exposures. No new drugs. She denies any new or recent weight loss. No history of joint pain. She used to work in a NH many years ago.       Past Medical History:   Diagnosis Date    Cirrhosis     " Esophageal varices 2018    Small with no banding     Essential hypertension 2018    Fatty liver 2018    GERD (gastroesophageal reflux disease)     Hx of colonic polyps 2018    On colonoscopy     Hypertension     Hypothyroidism 2018    Kidney stones     Morbid obesity 2018    Lap band with subsequent release    KADEEM (obstructive sleep apnea) 2018    Osteoarthritis 2018    Pulmonary nodule 2018    Vitamin D deficiency 2018       Past Surgical History:   Procedure Laterality Date     SECTION      TIMES 2     CHOLECYSTECTOMY      LAPAROSCOPIC     CYSTOSCOPY W/ STONE MANIPULATION      kidney stone removal    EGD (ESOPHAGOGASTRODUODENOSCOPY) N/A 2019    Performed by [facility]ian Hern[facility]dez [facility] at [facility] ENDO (2ND FLR)    LAPAROSCOPIC GASTRIC BANDING  2006    removal     LIVER BIOPSY  2017    KESSLER with bridging 17       Review of patient's allergies indicates:   Allergen Reactions    Metformin Rash    Pcn [penicillins] Other (See Comments)     Unsure of reaction, states it was as a child. Tolerated rocephin at [facility]       Family History     Problem Relation (Age of Onset)    Breast cancer Maternal Grandmother    Cancer Mother (50), Father (65)    Heart disease Mother, Father        Tobacco Use    Smoking status: Never Smoker    Smokeless tobacco: Never Used    Tobacco comment: patient denies   Substance and Sexual Activity    Alcohol use: No     Comment: patient denies    Drug use: No     Comment: patient denies    Sexual activity: Not on file         Review of Systems   Constitutional: Negative for fever.   Eyes: Negative for visual disturbance.   Respiratory: Positive for shortness of breath.    Cardiovascular: Negative for chest pain.   Gastrointestinal: Negative for abdominal pain.   Genitourinary: Negative for difficulty urinating.   Skin: Negative for rash.   Allergic/Immunologic: Negative for  immunocompromised state.   Neurological: Negative for syncope.   Hematological: Does not bruise/bleed easily.     Objective:     Vital Signs (Most Recent):  Temp: 98.5 °F (36.9 °C) (05/17/19 1410)  Pulse: 93 (05/17/19 1410)  Resp: 20 (05/17/19 1410)  BP: 124/60 (05/17/19 1410)  SpO2: (!) 90 % (05/17/19 1410) Vital Signs (24h Range):  Temp:  [97.6 °F (36.4 °C)-98.9 °F (37.2 °C)] 98.5 °F (36.9 °C)  Pulse:  [79-94] 93  Resp:  [18-24] 20  SpO2:  [88 %-95 %] 90 %  BP: (118-133)/(55-60) 124/60     Weight: 101.6 kg (224 lb)  Body mass index is 38.45 kg/m².      Intake/Output Summary (Last 24 hours) at 5/17/2019 1453  Last data filed at 5/17/2019 0852  Gross per 24 hour   Intake 1250 ml   Output 400 ml   Net 850 ml       Physical Exam   Constitutional: She is oriented to person, place, and time. She appears well-developed. No distress.   HENT:   Head: Normocephalic.   Eyes: Pupils are equal, round, and reactive to light. No scleral icterus.   Neck: No JVD present.   Cardiovascular: Normal rate.   Pulmonary/Chest: Effort normal. No respiratory distress.   Right sided, diminished breath sounds throughout   Abdominal: Soft. Bowel sounds are normal. She exhibits no distension. There is no tenderness.   Musculoskeletal: Normal range of motion. She exhibits no edema.   Neurological: She is alert and oriented to person, place, and time. No cranial nerve deficit.   Skin: Skin is warm. No erythema.   Psychiatric: She has a normal mood and affect.   Nursing note and vitals reviewed.      Vents:       Lines/Drains/Airways     Peripheral Intravenous Line                 Peripheral IV - Single Lumen 04/30/19 1830 Left Forearm 16 days                Significant Labs:    CBC/Anemia Profile:  Recent Labs   Lab 05/16/19  1751 05/16/19  1754 05/17/19  0518   WBC 6.05 5.66 5.00   HGB 9.2* 8.9* 8.4*   HCT 27.4* 25.8* 24.1*   PLT 89* 113* 79*   MCV 95 88 89   RDW 20.6* 19.0* 19.0*        Chemistries:  Recent Labs   Lab 05/16/19  7087  05/16/19 1923 05/17/19 0518   * 124* 125*   K 3.1* 3.2* 3.8   CL 91* 93* 94*   CO2 22* 23 23   BUN 13 13 13   CREATININE 0.9 0.8 0.9   CALCIUM 8.2* 8.0* 8.5*   ALBUMIN 2.5* 2.2* 2.2*   PROT 6.5 6.1 6.3   BILITOT 12.3* 11.4* 11.9*   ALKPHOS 112 114 118   ALT 38 34 33   AST 84* 85* 85*   MG  --  1.5* 2.0   PHOS  --  3.2 3.4       CMP:   Recent Labs   Lab 05/16/19  1603 05/16/19 1923 05/17/19 0518   * 124* 125*   K 3.1* 3.2* 3.8   CL 91* 93* 94*   CO2 22* 23 23   GLU 98 99 104   BUN 13 13 13   CREATININE 0.9 0.8 0.9   CALCIUM 8.2* 8.0* 8.5*   PROT 6.5 6.1 6.3   ALBUMIN 2.5* 2.2* 2.2*   BILITOT 12.3* 11.4* 11.9*   ALKPHOS 112 114 118   AST 84* 85* 85*   ALT 38 34 33   ANIONGAP 11 8 8   EGFRNONAA >60 >60.0 >60.0     POCT Glucose: No results for input(s): POCTGLUCOSE in the last 48 hours.    Significant Imaging:   I have reviewed and interpreted all pertinent imaging results/findings within the past 24 hours.    Assessment/Plan:     * Acute hypoxemic respiratory failure  Ms Zamudio is a 48 yo woman with KESSLER decompensated cirrhosis who presents with new Right pleural effusion, suspicious for hepatic hydrothorax in the abscence of infectious symptoms.     Plan:  - Bedside thoracentesis today for therapeutic and diagnosis, will send for studies.   - Agree with sodium restriction and diuresis with lasix/spironolactone, maximize as tolerated.   - Currently undergoing liver transplant evaluation (pending financial approval).             Thank you for your consult. I will follow-up with patient. Please contact us if you have any additional questions.     Aaron Morales MD  Pulmonology  [facility]

## 2019-05-17 NOTE — TELEPHONE ENCOUNTER
Pt never had a ct chest at the afb facility at [facility]  Ct of the abd and pelvis still has been routed to [facility] just incase it is needed will be receiving the cd on 5/17/19.

## 2019-05-17 NOTE — CONSULTS
Please refer to consult note. Patient is stable for the floor. Case discussed with Dr. Hills.     Noah Velez DO [facility]  Critical Care   Call Back number [phone]

## 2019-05-17 NOTE — ASSESSMENT & PLAN NOTE
Sodium 124, decreased from baseline of 130s  Likely hypervolemic hyponatremia worsened from grossly fluid overload state  Recommend urine and serum osmolalities along with urine sodium level  Recommend paracentesis to see if fluid shifts will improve electrolyte abnormalities at this time  Ok to go to appropriate floor from ICU standpoint  Would recommend re-consult if patient's Na drops to critical levels or becomes symptomatic

## 2019-05-17 NOTE — HPI
Patient is a 49 y.o. 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 Patient reports this had been going on for the past 2 days, denies any recent sick contact. She has also reported increased in her abdominal fluid but denies any fevers chills, chest pain, palpitations, confusion, focal neurologic deficits. Patient reported to the ED for further evaluation. ICU was consulted for clearance to the floor regarding patient's Na 124.  Patient has a history of ascites with paracentesis of 5L removal recently. She also had an EGD performed on 4/23 which revealed portal hypertensive gastropathy, blood in the gastric body, grade 2 gastroesophageal varices without active bleeding but with stigmata of recent bleeding and and non-bleeding small esophageal varices without stigmata of recent bleeding. Patient does not report being on O2 at home. US Abdomen in April 2018 revealed left portal vein thrombus.

## 2019-05-17 NOTE — ED PROVIDER NOTES
Encounter Date: 5/16/2019    SCRIBE #1 NOTE: I, Janessa Hassan, am scribing for, and in the presence of,  Dr. Rachel Lindsey. I have scribed the following portions of the note - the EKG reading. Other sections scribed: CXR Interpretation.       History     Chief Complaint   Patient presents with    Weakness     Pt c/o generalized weakness.  States sent here for admission.  Pt being w/u for liver transplant.      Patient is a 49-year-old female with a PMH of liver cirrhosis (currently pre-transplant, evaluation approved pending financial clearance), HTN, GERD, KADEEM, and esophageal varices who presents to the ED per recommendation of her hepatologist Dr. Smallwood for evaluation of weakness and hypoxia.  Since her most recent discharge on 05/02/19, patient reports she has gotten progressively weaker and short of breath. At her follow-up visit today, she was noted to be hypoxic at 88% and appeared weak and jaundiced.  Patient endorses a persistent cough.  She denies fever/chills, headache, chest pain, abdominal pain or distension, diarrhea, constipation, dysuria, hematuria, or focal weakness.    The history is provided by the patient.     Review of patient's allergies indicates:   Allergen Reactions    Metformin Rash    Pcn [penicillins] Other (See Comments)     Unsure of reaction, states it was as a child. Tolerated rocephin at [facility]     Past Medical History:   Diagnosis Date    Cirrhosis     Esophageal varices 2/26/2018    Small with no banding 07/17    Essential hypertension 2/26/2018    Fatty liver 2/26/2018    GERD (gastroesophageal reflux disease)     Hx of colonic polyps 2/26/2018    On colonoscopy 07/17    Hypertension     Hypothyroidism 2/26/2018    Kidney stones     Morbid obesity 2/26/2018    Lap band with subsequent release    KADEEM (obstructive sleep apnea) 2/26/2018    Osteoarthritis 2/26/2018    Pulmonary nodule 2/26/2018    Vitamin D deficiency 2/26/2018     Past Surgical History:   Procedure  Laterality Date     SECTION      TIMES 2     CHOLECYSTECTOMY      LAPAROSCOPIC     CYSTOSCOPY W/ STONE MANIPULATION      kidney stone removal    EGD (ESOPHAGOGASTRODUODENOSCOPY) N/A 2019    Performed by Davian Hernández MD at [facility] ([address] [place] [address])    LAPAROSCOPIC GASTRIC BANDING  2006    removal 2009    LIVER BIOPSY  2017    KESSLER with bridging 17     Family History   Problem Relation Age of Onset    Heart disease Mother     Cancer Mother 50        colon cancer    Heart disease Father     Cancer Father 65        liver cancer    Breast cancer Maternal Grandmother      Social History     Tobacco Use    Smoking status: Never Smoker    Smokeless tobacco: Never Used    Tobacco comment: patient denies   Substance Use Topics    Alcohol use: No     Comment: patient denies    Drug use: No     Comment: patient denies     Review of Systems   Constitutional: Negative for chills and fever.   HENT: Negative for sore throat.    Eyes: Negative for visual disturbance.   Respiratory: Positive for cough and shortness of breath.    Cardiovascular: Negative for chest pain and leg swelling.   Gastrointestinal: Negative for abdominal distention, abdominal pain, blood in stool, constipation, diarrhea, nausea and vomiting.   Genitourinary: Negative for dysuria and hematuria.   Musculoskeletal: Negative for myalgias.   Skin: Positive for color change. Negative for rash.   Neurological: Positive for weakness. Negative for dizziness and headaches.   Psychiatric/Behavioral: Negative for dysphoric mood.       Physical Exam     Initial Vitals [19 1603]   BP Pulse Resp Temp SpO2   133/60 92 20 98.3 °F (36.8 °C) (!) 92 %      MAP       --         Physical Exam    Nursing note and vitals reviewed.  Constitutional: She appears well-developed and well-nourished. She has a sickly appearance.   Patient appears jaundiced and lethargic. Slowed, but clear speech. Hypoxic to 88-92% on RA. No acute  respiratory distress.   HENT:   Head: Normocephalic and atraumatic.   Mouth/Throat: Oropharynx is clear and moist. No oropharyngeal exudate.   Eyes: EOM are normal. Pupils are equal, round, and reactive to light. Scleral icterus is present.   Neck: Normal range of motion. Neck supple.   Cardiovascular: Normal rate, regular rhythm, normal heart sounds and intact distal pulses.   Pulmonary/Chest: No respiratory distress. She has no wheezes. She has no rales.   Distant breath sounds over the right lung.    Abdominal: Soft. Bowel sounds are normal. She exhibits no distension. There is no tenderness. There is no rebound and no guarding.   Musculoskeletal: Normal range of motion. She exhibits no edema or tenderness.   Neurological: She is alert and oriented to person, place, and time. GCS score is 15. GCS eye subscore is 4. GCS verbal subscore is 5. GCS motor subscore is 6.   Skin: Skin is warm and dry.   Psychiatric: She has a normal mood and affect. Thought content normal.         ED Course   Procedures  Labs Reviewed   CBC W/ AUTO DIFFERENTIAL - Abnormal; Notable for the following components:       Result Value    RBC 2.90 (*)     Hemoglobin 9.2 (*)     Hematocrit 27.4 (*)     Mean Corpuscular Hemoglobin 31.7 (*)     RDW 20.6 (*)     Platelets 89 (*)     Immature Granulocytes 1.2 (*)     Immature Grans (Abs) 0.07 (*)     Lymph% 17.2 (*)     All other components within normal limits   URINALYSIS, REFLEX TO URINE CULTURE - Abnormal; Notable for the following components:    Appearance, UA Cloudy (*)     Occult Blood UA 2+ (*)     All other components within normal limits    Narrative:     Preferred Collection Type->Urine, Clean Catch   AMMONIA - Abnormal; Notable for the following components:    Ammonia 65 (*)     All other components within normal limits   URINALYSIS MICROSCOPIC - Abnormal; Notable for the following components:    RBC, UA 18 (*)     Hyaline Casts, UA 14 (*)     All other components within normal limits     Narrative:     Preferred Collection Type->Urine, Clean Catch   MAGNESIUM - Abnormal; Notable for the following components:    Magnesium 1.5 (*)     All other components within normal limits   COMPREHENSIVE METABOLIC PANEL - Abnormal; Notable for the following components:    Sodium 124 (*)     Potassium 3.2 (*)     Chloride 93 (*)     Calcium 8.0 (*)     Albumin 2.2 (*)     Total Bilirubin 11.4 (*)     AST 85 (*)     All other components within normal limits   LACTIC ACID, PLASMA - Abnormal; Notable for the following components:    Lactate (Lactic Acid) 2.5 (*)     All other components within normal limits   PROCALCITONIN   PHOSPHORUS   APTT     EKG Readings: (Independently Interpreted)   Normal sinus rhythm. Rate of 91. . QRS 84. QTc 487. No STEMI. No ST depressions or elevations.        ECG Results          EKG 12-lead (Final result)  Result time 05/17/19 13:36:29    Final result by Interface, Lab In [facility] (05/17/19 13:36:29)                 Narrative:    Test Reason : R53.1,    Vent. Rate : 091 BPM     Atrial Rate : 091 BPM     P-R Int : 142 ms          QRS Dur : 084 ms      QT Int : 396 ms       P-R-T Axes : 032 012 050 degrees     QTc Int : 487 ms      Normal sinus rhythm  QT prolongation  Abnormal ECG  When compared with ECG of 01-MAY-2019 08:53,  QT duration is more prolonged  Confirmed by ARMIN LEIVA MD ([id number]) on 5/17/2019 1:36:25 PM    Referred By: [email or web address]   SELF           Confirmed By:ARMIN LEIVA MD                            Imaging Results          X-Ray Chest AP Portable (Final result)  Result time 05/16/19 18:35:37    Final result by Mitchell Roblero MD (05/16/19 18:35:37)                 Impression:      1. Near complete opacification of the right hemithorax suggesting pleural effusion, underlying consolidation not excluded.  Some degree of mucous plugging not excluded.  Correlation advised.      Electronically signed by: Mitchell Roblero MD  Date:    05/16/2019  Time:    18:35           "   Narrative:    EXAMINATION:  XR CHEST AP PORTABLE    CLINICAL HISTORY:  Sepsis;    TECHNIQUE:  Single frontal view of the chest was performed.    COMPARISON:  04/05/2018, CT 04/5/2018    FINDINGS:  The cardiomediastinal silhouette is not enlarged, noting mediastinum is shifted to the left..  There is a large right pleural effusion.  The trachea is midline.  The lungs are symmetrically expanded bilaterally with near complete opacification of the right hemithorax.  There is coarse interstitial attenuation involving the left hemithorax.  There is no pneumothorax.  The osseous structures are remarkable for degenerative change..                              X-Rays:   Independently Interpreted Readings:   Chest X-Ray: Complete opacification of the right side. Appears to be a pleural effusion.       Medical Decision Making:   History:   Old Medical Records: I decided to obtain old medical records.  Old Records Summarized: records from clinic visits.       <> Summary of Records: Per hepatologist, Dr. Smallwood recommendations:  "1. Decompensated cirrhosis: check meld; f/u on financial approval.  2. SOB and hypoxemia: recommend cxr; ABG; consider thoracentesis if hydrothorax identified  3. Ascites, ongoing: paracentesis- r/o sbp  4. Portal hyeprtensive gastropathy/EV/recent bleeding: monitor Hct  5. Lung nodule: obtain old ct scans of chest to see if pulm nodule is new/has it grown; pulmonary consult  6. Weakness: recommend septic w/u and empiric abx with ceftriaxone"      Initial Assessment:   Patient is a 49-year-old female with a PMH of liver cirrhosis (currently pre-transplant, evaluation approved pending financial clearance), HTN, GERD, KADEEM, and esophageal varices who presents to the ED per recommendation of her hepatologist Dr. Smallwood for evaluation of weakness and hypoxia. PE reveals a non-toxic, afebrile, sickly-appearing female in no acute distress. Vitals notable for acute hypoxia (88-92%). Normotensive. 2LNC " applied.  Differential Diagnosis:   DDx includes but is not limited to: pneumothorax, pleural effusion, pneumonia, SBP, liver decompensation, renal failure, hepatic encephalopathy.  Clinical Tests:   Lab Tests: Ordered and Reviewed  Radiological Study: Ordered and Reviewed  Medical Tests: Ordered and Reviewed  Sepsis Perfusion Assessment: I attest, a sepsis perfusion exam was performed within 6 hours of Septic Shock presentation, following fluid resuscitation.  ED Management:  Will obtain a septic workup and place on cardiac monitor and pulse oximetry and closely monitor. Will give 1L IVFs.    CBC remarkable for WBC 6.05 (leukopenic at baseline), low but stable H&H at 9.2 and 27.4. CMP remarkable for Na 124 (hyponatremic at baseline), K 3.2, Cl 93, normal renal function, Ca 8.0, T-bili 11.4, AST 85. Hypomagnesemia. Phos WNL. Elevated lactate at 2.5. Ammonia elevated at 65 (down from previous 81). UA without signs of infection. X-ray significant for marked pleural effusion with complete opacification of the right lung.     Discussed case with Critical Care, who state patient is stable for the floor. Plan to admit patient to hospital medicine for further evaluation and management of acute hypoxia 2/2 pleural effusion, ESLD. Given Rocephin 1g IV in ED. Patient informed of plan for admission and is agreeable. I have discussed patient case with my supervisory physician, who is in agreement with my assessment and plan.          Other:   I have discussed this case with another health care provider.            Scribe Attestation:   Scribe #1: I performed the above scribed service and the documentation accurately describes the services I performed. I attest to the accuracy of the note.    Attending Attestation:     Physician Attestation Statement for NP/PA:   I have conducted a face to face encounter with this patient in addition to the NP/PA, due to Medical Complexity    Other NP/PA Attestation Additions:    History of  Present Illness: 49-year-old female with ESLD presenting with increased fatigue and shortness of breath in the past 3 days, although she notes that she has been coughing significantly for several days prior.  On exam patient appears    fatigued and weak and, blood pressure stable however hypoxic to 91% on room air, increased to 94% on 3 L    nasal cannula.  Distant breath sounds in right anterior and posterior field, scattered coarse breath sounds in    right lower lobe and clear breath sounds left field.  Skin is jaundiced and scleral icterus present.  Abdomen    distended, however soft and nontender. Trace lower extremity edema.      Medical Decision Making: DDx: PNA vs PTX vs pleural effusion  Bedside ultrasound shows decreased lung slide in right apex, concern for PTX secondary to coughing, likely secondary to pneumonia vs hepatothorax, planned admission.                      Clinical Impression:       ICD-10-CM ICD-9-CM   1. Hypoxia R09.02 799.02   2. Weakness R53.1 780.79   3. Hyponatremia E87.1 276.1   4. End stage liver disease K72.90 572.8   5. Pleural effusion J90 511.9         Disposition:   Disposition: Admitted  Condition: Molly Jacob PA-C  05/17/19 1754

## 2019-05-17 NOTE — SUBJECTIVE & OBJECTIVE
Review of Systems   Constitutional: Positive for activity change, appetite change and fatigue. Negative for chills and fever.   HENT: Negative for sore throat and trouble swallowing.    Respiratory: Positive for cough and shortness of breath.    Cardiovascular: Negative for chest pain and leg swelling.   Gastrointestinal: Positive for abdominal distention and abdominal pain. Negative for constipation, diarrhea, nausea and vomiting.   Genitourinary: Negative for decreased urine volume and difficulty urinating.   Musculoskeletal: Negative for arthralgias and back pain.   Skin: Positive for color change. Negative for pallor.   Psychiatric/Behavioral: Positive for agitation and confusion.       Past Medical History:   Diagnosis Date    Cirrhosis     Esophageal varices 2018    Small with no banding     Essential hypertension 2018    Fatty liver 2018    GERD (gastroesophageal reflux disease)     Hx of colonic polyps 2018    On colonoscopy     Hypertension     Hypothyroidism 2018    Kidney stones     Morbid obesity 2018    Lap band with subsequent release    KADEEM (obstructive sleep apnea) 2018    Osteoarthritis 2018    Pulmonary nodule 2018    Vitamin D deficiency 2018       Past Surgical History:   Procedure Laterality Date     SECTION      TIMES 2     CHOLECYSTECTOMY      LAPAROSCOPIC     CYSTOSCOPY W/ STONE MANIPULATION      kidney stone removal    EGD (ESOPHAGOGASTRODUODENOSCOPY) N/A 2019    Performed by Davian Hernández MD at [facility] ([address] [place] [address])    LAPAROSCOPIC GASTRIC BANDING  2006    removal     LIVER BIOPSY  2017    KESSLER with bridging 17       Family history of liver disease: No    Review of patient's allergies indicates:   Allergen Reactions    Metformin Rash    Pcn [penicillins] Other (See Comments)     Unsure of reaction, states it was as a child. Tolerated rocephin at [facility]       Tobacco Use     Smoking status: Never Smoker    Smokeless tobacco: Never Used    Tobacco comment: patient denies   Substance and Sexual Activity    Alcohol use: No     Comment: patient denies    Drug use: No     Comment: patient denies    Sexual activity: Not on file       Medications Prior to Admission   Medication Sig Dispense Refill Last Dose    ciprofloxacin HCl (CIPRO) 500 MG tablet Take 500 mg by mouth once daily.   Taking    cranberry extract 50 mg Chew Take 1 tablet by mouth daily as needed.   Taking    ergocalciferol (ERGOCALCIFEROL) 50,000 unit Cap Take 50,000 Units by mouth every 7 days. Monday   Taking    fexofenadine (ALLEGRA) 180 MG tablet Take 180 mg by mouth daily as needed.   Taking    furosemide (LASIX) 80 MG tablet Take 1 tablet (80 mg total) by mouth 2 (two) times daily. 60 tablet 2 Taking    lactulose (CHRONULAC) 20 gram/30 mL Soln Take 30 mLs (20 g total) by mouth 3 (three) times daily. TITRATE TO 3-4 BOWEL MOVEMENTS DAILY. 2700 mL 2 Taking    levothyroxine (SYNTHROID) 112 MCG tablet Take 112 mcg by mouth once daily.   Taking    lidocaine (LIDODERM) 5 % Place 1 patch onto the skin every 24 hours. Remove & Discard patch within 12 hours  As needed or as directed by MD   Taking    pantoprazole (PROTONIX) 40 MG tablet Take 1 tablet (40 mg total) by mouth once daily. 90 tablet 0 Taking    rifAXImin (XIFAXAN) 200 mg Tab Take 550 mg by mouth 2 (two) times daily.   Taking    spironolactone (ALDACTONE) 100 MG tablet Take 2 tablets (200 mg total) by mouth once daily. 60 tablet 2 Taking    acetaminophen (TYLENOL) 500 MG tablet Take 1,000 mg by mouth daily as needed for Pain (headache and bodyaches).   Taking    zinc sulfate (ZINCATE) 220 (50) mg capsule Take 1 capsule (220 mg total) by mouth once daily.   Taking       Objective:     Vital Signs (Most Recent):  Temp: 98.5 °F (36.9 °C) (05/17/19 1410)  Pulse: 91 (05/17/19 1523)  Resp: 20 (05/17/19 1410)  BP: 124/60 (05/17/19 1410)  SpO2: (!) 90 %  (05/17/19 1410) Vital Signs (24h Range):  Temp:  [97.6 °F (36.4 °C)-98.9 °F (37.2 °C)] 98.5 °F (36.9 °C)  Pulse:  [79-94] 91  Resp:  [18-24] 20  SpO2:  [88 %-95 %] 90 %  BP: (118-127)/(55-60) 124/60     Weight: 101.6 kg (224 lb) (05/16/19 1603)  Body mass index is 38.45 kg/m².    Physical Exam   Constitutional: She is oriented to person, place, and time.   Appears uncomfortable   HENT:   Mouth/Throat: Oropharynx is clear and moist.   Eyes: Scleral icterus is present.   Cardiovascular: Normal rate and regular rhythm.   Pulmonary/Chest: Effort normal.   Decreased breath sounds right side  On nasal cannula  tachypneic   Abdominal: Soft. She exhibits no distension. There is no tenderness. There is no guarding.   Musculoskeletal: She exhibits no edema or deformity.   Neurological: She is alert and oriented to person, place, and time.   Skin: Skin is warm and dry.   Psychiatric: She has a normal mood and affect.   Vitals reviewed.      MELD-Na score: 30 at 5/17/2019  5:18 AM  MELD score: 23 at 5/17/2019  5:18 AM  Calculated from:  Serum Creatinine: 0.9 mg/dL (Rounded to 1 mg/dL) at 5/17/2019  5:18 AM  Serum Sodium: 125 mmol/L at 5/17/2019  5:18 AM  Total Bilirubin: 11.9 mg/dL at 5/17/2019  5:18 AM  INR(ratio): 1.9 at 5/17/2019  5:18 AM  Age: 49 years    Significant Labs:  CBC:   Recent Labs   Lab 05/17/19 0518   WBC 5.00   RBC 2.72*   HGB 8.4*   HCT 24.1*   PLT 79*     CMP:   Recent Labs   Lab 05/17/19 0518      CALCIUM 8.5*   ALBUMIN 2.2*   PROT 6.3   *   K 3.8   CO2 23   CL 94*   BUN 13   CREATININE 0.9   ALKPHOS 118   ALT 33   AST 85*   BILITOT 11.9*     Coagulation:   Recent Labs   Lab 05/16/19  1923 05/17/19 0518   INR  --  1.9*   APTT 32.0  --

## 2019-05-17 NOTE — ASSESSMENT & PLAN NOTE
48 yo F with hx of KESSLER cirrhosis c/b varices, latent TB, GERD, hypothyroidism, lap band 2007, HLD who was sent to the ED from hepatology clinic for shortness of breath. She is found to have a right sided pleural effusion, hyponatremia, and some confusion. She has completed the transplant eval and is awaiting financial clearance as well as clarification of lung nodule previously seen.    Recommendations:  - Obtain new CT chest for comparison  - Plan to discuss in IR conference next week  - Pulmonology to perform thoracentesis  - Fluid restriction for hyponatremia  - Daily CBC, CMP, INR  - Avoid sedatives

## 2019-05-17 NOTE — H&P
[facility]  History and Physical      Patient Name: Jihan Zamudio  MRN:  [id number]  [facility] Team: [facility]  Carmela Evans MD  Date of Admission:  5/16/2019     Principal Problem:  Acute hypoxemic respiratory failure   Primary Care Physician: Primary Doctor No       History of Present Illness:    Ms. Jihan Zamudio is a 49 y.o. 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  She mentions that over the last 3-4 days, she has been feeling SOB at rest and with exertion.  She denies any fever or chills, but does endorse a cough productive of clear colored sputum.  She claims to be compliant with all of her medications.  She denies any abdominal pain or LE swelling.  She feels like her abdomen is not distended.    In the ED, she was satting 88% on RA.  She was placed on 2L NC with improvement in her oxygen sats to 95%.  CXR showed complete opacification of the right lung.  ICU was consulted for hyponatremia of Sodium 124, and felt she was stable for the floor.  MELD on arrival was 29.  She was admitted to Hospital Medicine for further management.      Review of Systems:  Constitutional: Negative for chills, fatigue, fever.   HENT: Negative for sore throat, trouble swallowing.    Eyes: Negative for photophobia, visual disturbance.   Respiratory: + cough, shortness of breath.    Cardiovascular: Negative for chest pain, palpitations, leg swelling.   Gastrointestinal: Negative for abdominal pain, constipation, diarrhea, nausea, vomiting.   Endocrine: Negative for cold intolerance, heat intolerance.   Genitourinary: Negative for dysuria, frequency.   Musculoskeletal: Negative for arthralgias, myalgias.   Skin: Negative for rash, wound, erythema   Neurological: Negative for dizziness, syncope, weakness, light-headedness.   Psychiatric/Behavioral:  Negative for confusion, hallucinations, anxiety  All other systems reviewed and are negative.      Past Medical History: Patient has a past medical history of Cirrhosis, Esophageal varices (2018), Essential hypertension (2018), Fatty liver (2018), GERD (gastroesophageal reflux disease), colonic polyps (2018), Hypertension, Hypothyroidism (2018), Kidney stones, Morbid obesity (2018), KADEEM (obstructive sleep apnea) (2018), Osteoarthritis (2018), Pulmonary nodule (2018), and Vitamin D deficiency (2018).      Past Surgical History: Patient has a past surgical history that includes Laparoscopic gastric banding (); Cholecystectomy ();  section; Liver biopsy (2017); Cystoscopy w/ stone manipulation (); and Esophagogastroduodenoscopy (N/A, 2019).      Social History: Patient reports that she has never smoked. She has never used smokeless tobacco. She reports that she does not drink alcohol or use drugs.      Family History: Patient's family history includes Breast cancer in her maternal grandmother; Cancer (age of onset: 50) in her mother; Cancer (age of onset: 65) in her father; Heart disease in her father and mother.      Medications: Scheduled Meds:   [START ON 2019] furosemide  80 mg Oral BID    [START ON 2019] lactulose  20 g Oral TID    [START ON 2019] levoFLOXacin  750 mg Oral Daily    [START ON 2019] levothyroxine  112 mcg Oral Daily    magnesium sulfate IVPB  2 g Intravenous Once    [START ON 2019] pantoprazole  40 mg Oral Daily    potassium chloride  60 mEq Oral Once    rifAXImin  550 mg Oral BID    [START ON 2019] spironolactone  200 mg Oral Daily     Continuous Infusions:  PRN Meds:.dextrose 50%, dextrose 50%, glucagon (human recombinant), glucose, glucose, ondansetron, sodium chloride 0.9%, trazodone      Allergies: Patient is allergic to metformin and pcn [penicillins].      Physical  Exam:    Temp:  [98.3 °F (36.8 °C)-98.9 °F (37.2 °C)]   Pulse:  [90-96]   Resp:  [20-24]   BP: (122-133)/(55-60)   SpO2:  [88 %-95 %]     Constitutional: Appears sickly  Head: Normocephalic and atraumatic.   Mouth/Throat: Oropharynx is clear and moist.   Eyes: EOM are normal. Pupils are equal, round, and reactive to light. No scleral icterus.   Neck: Normal range of motion. Neck supple.   Cardiovascular: Normal heart rate.  Regular heart rhythm.  No murmur heard.  Pulmonary/Chest: Effort normal. No respiratory distress. Comfortable on NC.  Decreased breath sounds on the right.  Abdominal: Soft. Bowel sounds are normal.  Distension.  No tenderness  Musculoskeletal: Normal range of motion. No edema.   Neurological: Alert and oriented to person, place, and time.   Skin: Skin is warm and dry.   Psychiatric: Normal mood and affect. Behavior is normal.         Intake/Output Summary (Last 24 hours) at 5/16/2019 2218  Last data filed at 5/16/2019 1923  Gross per 24 hour   Intake 1000 ml   Output --   Net 1000 ml     Recent Labs   Lab 05/16/19  1751 05/16/19 1754   WBC 6.05 5.66   HGB 9.2* 8.9*   HCT 27.4* 25.8*   PLT 89* 113*     Recent Labs   Lab 05/16/19  1603 05/16/19 1923   * 124*   K 3.1* 3.2*   CL 91* 93*   CO2 22* 23   BUN 13 13   CREATININE 0.9 0.8   GLU 98 99   CALCIUM 8.2* 8.0*   MG  --  1.5*   PHOS  --  3.2     Recent Labs   Lab 05/16/19  1603 05/16/19  1754 05/16/19 1923   ALKPHOS 112  --  114   ALT 38  --  34   AST 84*  --  85*   ALBUMIN 2.5*  --  2.2*   PROT 6.5  --  6.1   BILITOT 12.3*  --  11.4*   INR  --  1.6*  --       No results for input(s): POCTGLUCOSE in the last 168 hours.  No results for input(s): CPK, CPKMB, MB, TROPONINI in the last 72 hours.    Recent Labs     05/16/19 1923   LACTATE 2.5*          Assessment and Plan:    Ms. Jihan Zamudio is a 49 y.o. female who presented to [facility] on 5/16/2019 with acute hypoxemic respiratory failure 2/2 pleural effusion.    Acute Hypoxemic  Respiratory Failure 2/2 Pleural Effusion  · Satting 88% on RA that improved to 95% with 2L NC  · CXR with near opacification of right lung  · Pulm consult for thora  · Start Levaquin    Decompensated Liver Disease  KESSLER Cirrhosis  · MELD-Na score: 29 at 5/16/2019  7:23 PM  · MELD score: 21 at 5/16/2019  7:23 PM  · Calculated from:  · Serum Creatinine: 0.8 mg/dL (Rounded to 1 mg/dL) at 5/16/2019  7:23 PM  · Serum Sodium: 124 mmol/L (Rounded to 125 mmol/L) at 5/16/2019  7:23 PM  · Total Bilirubin: 11.4 mg/dL at 5/16/2019  7:23 PM  · INR(ratio): 1.6 at 5/16/2019  5:54 PM  · Age: 49 years  · Hepatology consulted, appreciate assistance  · Abdomen soft and patient says it feels around baseline so will hold on para  · Check PETH  · Low Sodium diet with 1L fluid restriction  · Start Lasix 80mg PO BID  · Start Aldactone 200mg PO daily  · Start Lactulose, titrate 3-4 BM/day  · Start Rifaximin 550mg PO BID    Esophageal Varices  · Chronic and stable  · Monitor    Anemia  · Hgb 8, baseline 8-9  · Monitor for bleeding    Coagulopathy  · 2/2 liver disease  · Monitor for bleeding    Hyponatremia  · Sodium 124 2/2 chronic liver disease  · Fluid restriction    Hypokalemia  · K 3.2 2/2 low Mag  · Replace    Hypomagnesemia  · Mag 1.5  · Replace    Thrombocytopenia  · Plt chronically low 2/2 coagulopathy from liver disease  · Monitor for bleeding    Essential HTN  · Chronic and stable  · Continue Lasix 80mg PO BID  · Continue Aldactone 200mg PO daily    Hypothyroidism  · Chronic and stable  · Continue Synthroid 112mcg PO daily    GERD  · Chronic and stable  · Continue PPI    Morbid Obesity  · Body mass index is 38.45 kg/m².  · Encourage diet, weight loss, exercise     Diet:  Low Sodium, 1L fluid restriction with hyponatremia  GI PPx:  PPI  DVT PPx:  TANNA hose  Goals of Care:  Full    High Risk Conditions:   Patient has a condition that poses threat to life and bodily function: Liver failure       Disposition:  Pending Pulm and Hep  recs  Discharge Needs:  TBD      Carmela Evans MD  [facility]  Cell:  [phone]  Spectra:  [phone]  Pager:  [phone]

## 2019-05-17 NOTE — PLAN OF CARE
05/17/19 1051   Discharge Assessment   Assessment Type Discharge Planning Assessment   Confirmed/corrected address and phone number on facesheet? Yes   Assessment information obtained from? Patient;Other;Medical Record  (Spouse)   Expected Length of Stay (days) 5   Communicated expected length of stay with patient/caregiver no   Prior to hospitilization cognitive status: Alert/Oriented;No Deficits   Prior to hospitalization functional status: Assistive Equipment;Needs Assistance   Current cognitive status: Alert/Oriented;No Deficits   Current Functional Status: Assistive Equipment;Needs Assistance   Facility Arrived From: Hepatology Clinic   Lives With spouse;child(kennedy), dependent  (15 year old Daughter)   Able to Return to Prior Arrangements yes   Is patient able to care for self after discharge? Unable to determine at this time (comments)   Who are your caregiver(s) and their phone number(s)? Freeman Zamudio (Spouse) [phone]   Patient's perception of discharge disposition home health   Readmission Within the Last 30 Days current reason for admission unrelated to previous admission  (Last admission was for a GI Bleed)   If yes, most recent facility name: [facility]   Patient currently being followed by outpatient case management?   (Patient has a CM through [facility])   Patient currently receives any other outside agency services? No   Equipment Currently Used at Home cane, straight;walker, rolling   Do you have any problems affording any of your prescribed medications? No   Is the patient taking medications as prescribed? yes   Does the patient have transportation home? Yes   Transportation Anticipated family or friend will provide   Dialysis Name and Scheduled days N/A   Does the patient receive services at the [facility]mad[facility] Clinic? No   Discharge Plan A Home with family;Home Health   Discharge Plan B Home with family   DME Needed Upon Discharge  none   Patient/Family in Agreement with  Plan yes   Readmission Questionnaire   At the time of your discharge, did someone talk to you about what your health problems were? Yes   At the time of discharge, did someone talk to you about what to watch out for regarding worsening of your health problem? Yes   At the time of discharge, did someone talk to you about what to do if you experienced worsening of your health problem? Yes   At the time of discharge, did someone talk to you about which medication to take when you left the hospital and which ones to stop taking? Yes   At the time of discharge, did someone talk to you about when and where to follow up with a doctor after you left the hospital? Yes   What do you believe caused you to be sick enough to be re-admitted? Patient sent from Hepatology Clinic for Hyponatremia   How often do you need to have someone help you when you read instructions, pamphlets, or other written material from your doctor or pharmacy? Sometimes   Do you have problems taking your medications as prescribed? No   Do you have any problems affording any of  your prescribed medications? No   Living Arrangements house   Does the patient have family/friends to help with healtcare needs after discharge? yes   Does your caregiver provide all the help you need? Yes   Are you currently feeling confused? No   Are you currently having problems thinking? No   Are you currently having memory problems? No       CM to the Bedside at this time to see the patient. Spouse was also present at the time of visit. The patient currently resides with her Spouse and 15 year old Daughter in a Single Story Home without TAMMIE. The patient uses the following DME to assist with ADL's: Straight Cane and Rolling Walker. The patient and her Spouse were notified that the CM team would be following throughout this hospital admission for D/C needs and/or recommendations. Current D/C plan is likely Home with Family and Home Health vs Home with Family. CM name and number  were placed on the board at the Bedside for the patient or her Spouse to call with D/C needs or questions. Understanding verbalized.     My Health Packet at the Bedside and in use from previous admission.    Patient's PCP is Dr Jose Garcia at [facility] in [facility]. The clinic # is [phone].

## 2019-05-17 NOTE — CONSULTS
Consult received, full note to follow. Please call with questions.     Aaron Morales MD  PGY2 Medicine

## 2019-05-17 NOTE — SUBJECTIVE & OBJECTIVE
Past Medical History:   Diagnosis Date    Cirrhosis     Esophageal varices 2018    Small with no banding     Essential hypertension 2018    Fatty liver 2018    GERD (gastroesophageal reflux disease)     Hx of colonic polyps 2018    On colonoscopy     Hypertension     Hypothyroidism 2018    Kidney stones     Morbid obesity 2018    Lap band with subsequent release    KADEEM (obstructive sleep apnea) 2018    Osteoarthritis 2018    Pulmonary nodule 2018    Vitamin D deficiency 2018       Past Surgical History:   Procedure Laterality Date     SECTION      TIMES 2     CHOLECYSTECTOMY      LAPAROSCOPIC     CYSTOSCOPY W/ STONE MANIPULATION      kidney stone removal    EGD (ESOPHAGOGASTRODUODENOSCOPY) N/A 2019    Performed by Davian Hern[facility]dez MD at [facility] ([address] [place] [address])    LAPAROSCOPIC GASTRIC BANDING  2006    removal     LIVER BIOPSY  2017    KESSLER with bridging 17       Review of patient's allergies indicates:   Allergen Reactions    Metformin Rash    Pcn [penicillins] Other (See Comments)     Unsure of reaction, states it was as a child. Tolerated rocephin at [facility]       Family History     Problem Relation (Age of Onset)    Breast cancer Maternal Grandmother    Cancer Mother (50), Father (65)    Heart disease Mother, Father        Tobacco Use    Smoking status: Never Smoker    Smokeless tobacco: Never Used    Tobacco comment: patient denies   Substance and Sexual Activity    Alcohol use: No     Comment: patient denies    Drug use: No     Comment: patient denies    Sexual activity: Not on file      Review of Systems   Constitutional: Negative.    HENT: Negative.    Respiratory: Positive for shortness of breath. Negative for apnea, cough, choking, chest tightness, wheezing and stridor.    Cardiovascular: Negative for chest pain, palpitations and leg swelling.   Gastrointestinal: Positive for abdominal  distention. Negative for abdominal pain, constipation, diarrhea, nausea and vomiting.   Endocrine: Negative.    Genitourinary: Negative.    Neurological: Negative.      Objective:     Vital Signs (Most Recent):  Temp: 98.9 °F (37.2 °C) (05/16/19 2012)  Pulse: 90 (05/16/19 1802)  Resp: (!) 24 (05/16/19 2012)  BP: 125/60 (05/16/19 2002)  SpO2: 95 % (05/16/19 2002) Vital Signs (24h Range):  Temp:  [98.3 °F (36.8 °C)-98.9 °F (37.2 °C)] 98.9 °F (37.2 °C)  Pulse:  [90-96] 90  Resp:  [20-24] 24  SpO2:  [88 %-95 %] 95 %  BP: (122-133)/(55-60) 125/60   Weight: 101.6 kg (224 lb)  Body mass index is 38.45 kg/m².      Intake/Output Summary (Last 24 hours) at 5/16/2019 2027  Last data filed at 5/16/2019 1923  Gross per 24 hour   Intake 1000 ml   Output --   Net 1000 ml       Physical Exam   Constitutional: She is oriented to person, place, and time.   Does not appear in any type of distress at this time   HENT:   Head: Normocephalic and atraumatic.   Eyes: Pupils are equal, round, and reactive to light. EOM are normal.   Slight scleral icterus   Cardiovascular: Normal rate, regular rhythm and normal heart sounds. Exam reveals no gallop and no friction rub.   No murmur heard.  Pulmonary/Chest: No stridor. No respiratory distress. She has no wheezes. She has rales (Right pulmonary field).   Abdominal: She exhibits distension. She exhibits no mass. There is no tenderness. There is no rebound and no guarding. No hernia.   Neurological: She is alert and oriented to person, place, and time.   Skin: Skin is warm and dry.   Psychiatric: She has a normal mood and affect. Her behavior is normal. Thought content normal.       Vents:     Lines/Drains/Airways     Peripheral Intravenous Line                 Peripheral IV - Single Lumen 04/30/19 1830 Left Forearm 16 days              Significant Labs:    CBC/Anemia Profile:  Recent Labs   Lab 05/16/19  1751 05/16/19  1754   WBC 6.05 5.66   HGB 9.2* 8.9*   HCT 27.4* 25.8*   PLT 89* 113*   MCV  95 88   RDW 20.6* 19.0*        Chemistries:  Recent Labs   Lab 05/16/19  1603 05/16/19  1923   * 124*   K 3.1* 3.2*   CL 91* 93*   CO2 22* 23   BUN 13 13   CREATININE 0.9 0.8   CALCIUM 8.2* 8.0*   ALBUMIN 2.5* 2.2*   PROT 6.5 6.1   BILITOT 12.3* 11.4*   ALKPHOS 112 114   ALT 38 34   AST 84* 85*   MG  --  1.5*   PHOS  --  3.2

## 2019-05-18 LAB
ALBUMIN FLD-MCNC: 0.6 G/DL
ALBUMIN SERPL BCP-MCNC: 2.1 G/DL (ref 3.5–5.2)
ALP SERPL-CCNC: 115 U/L (ref 55–135)
ALT SERPL W/O P-5'-P-CCNC: 32 U/L (ref 10–44)
ANION GAP SERPL CALC-SCNC: 8 MMOL/L (ref 8–16)
APPEARANCE FLD: NORMAL
AST SERPL-CCNC: 79 U/L (ref 10–40)
BASOPHILS # BLD AUTO: 0.03 K/UL (ref 0–0.2)
BASOPHILS NFR BLD: 0.6 % (ref 0–1.9)
BILIRUB SERPL-MCNC: 10.2 MG/DL (ref 0.1–1)
BODY FLD TYPE: NORMAL
BODY FLUID SOURCE, LDH: NORMAL
BUN SERPL-MCNC: 14 MG/DL (ref 6–20)
CALCIUM SERPL-MCNC: 8 MG/DL (ref 8.7–10.5)
CHLORIDE SERPL-SCNC: 92 MMOL/L (ref 95–110)
CO2 SERPL-SCNC: 22 MMOL/L (ref 23–29)
COLOR FLD: YELLOW
CREAT SERPL-MCNC: 0.9 MG/DL (ref 0.5–1.4)
DIFFERENTIAL METHOD: ABNORMAL
EOSINOPHIL # BLD AUTO: 0.3 K/UL (ref 0–0.5)
EOSINOPHIL NFR BLD: 5.3 % (ref 0–8)
EOSINOPHIL NFR FLD MANUAL: 1 %
ERYTHROCYTE [DISTWIDTH] IN BLOOD BY AUTOMATED COUNT: 18.6 % (ref 11.5–14.5)
EST. GFR  (AFRICAN AMERICAN): >60 ML/MIN/1.73 M^2
EST. GFR  (NON AFRICAN AMERICAN): >60 ML/MIN/1.73 M^2
GLUCOSE FLD-MCNC: 106 MG/DL
GLUCOSE SERPL-MCNC: 77 MG/DL (ref 70–110)
GRAM STN SPEC: NORMAL
GRAM STN SPEC: NORMAL
HCT VFR BLD AUTO: 22.5 % (ref 37–48.5)
HGB BLD-MCNC: 7.8 G/DL (ref 12–16)
IMM GRANULOCYTES # BLD AUTO: 0.03 K/UL (ref 0–0.04)
IMM GRANULOCYTES NFR BLD AUTO: 0.6 % (ref 0–0.5)
INR PPP: 2 (ref 0.8–1.2)
LDH FLD L TO P-CCNC: 73 U/L
LYMPHOCYTES # BLD AUTO: 0.6 K/UL (ref 1–4.8)
LYMPHOCYTES NFR BLD: 12.3 % (ref 18–48)
LYMPHOCYTES NFR FLD MANUAL: 88 %
MAGNESIUM SERPL-MCNC: 1.8 MG/DL (ref 1.6–2.6)
MCH RBC QN AUTO: 31.2 PG (ref 27–31)
MCHC RBC AUTO-ENTMCNC: 34.7 G/DL (ref 32–36)
MCV RBC AUTO: 90 FL (ref 82–98)
MESOTHL CELL NFR FLD MANUAL: 9 %
MONOCYTES # BLD AUTO: 0.4 K/UL (ref 0.3–1)
MONOCYTES NFR BLD: 7.9 % (ref 4–15)
MONOS+MACROS NFR FLD MANUAL: 1 %
NEUTROPHILS # BLD AUTO: 3.4 K/UL (ref 1.8–7.7)
NEUTROPHILS NFR BLD: 73.3 % (ref 38–73)
NEUTROPHILS NFR FLD MANUAL: 1 %
NRBC BLD-RTO: 0 /100 WBC
OSMOLALITY SERPL: 266 MOSM/KG (ref 275–295)
PHOSPHATE SERPL-MCNC: 3.5 MG/DL (ref 2.7–4.5)
PLATELET # BLD AUTO: 52 K/UL (ref 150–350)
PMV BLD AUTO: 12.6 FL (ref 9.2–12.9)
POTASSIUM SERPL-SCNC: 3.7 MMOL/L (ref 3.5–5.1)
PROT FLD-MCNC: 1.2 G/DL
PROT SERPL-MCNC: 5.7 G/DL (ref 6–8.4)
PROTHROMBIN TIME: 19.4 SEC (ref 9–12.5)
RBC # BLD AUTO: 2.5 M/UL (ref 4–5.4)
SODIUM SERPL-SCNC: 122 MMOL/L (ref 136–145)
SPECIMEN SOURCE: NORMAL
WBC # BLD AUTO: 4.7 K/UL (ref 3.9–12.7)
WBC # FLD: 69 /CU MM

## 2019-05-18 PROCEDURE — 99233 PR SUBSEQUENT HOSPITAL CARE,LEVL III: ICD-10-PCS | Mod: 25,NTX,, | Performed by: INTERNAL MEDICINE

## 2019-05-18 PROCEDURE — 36415 COLL VENOUS BLD VENIPUNCTURE: CPT | Mod: NTX

## 2019-05-18 PROCEDURE — G8979 MOBILITY GOAL STATUS: HCPCS | Mod: CJ,NTX

## 2019-05-18 PROCEDURE — 97161 PT EVAL LOW COMPLEX 20 MIN: CPT | Mod: NTX

## 2019-05-18 PROCEDURE — 83930 ASSAY OF BLOOD OSMOLALITY: CPT | Mod: NTX

## 2019-05-18 PROCEDURE — 80053 COMPREHEN METABOLIC PANEL: CPT | Mod: NTX

## 2019-05-18 PROCEDURE — 97530 THERAPEUTIC ACTIVITIES: CPT | Mod: NTX

## 2019-05-18 PROCEDURE — 25000003 PHARM REV CODE 250: Mod: NTX | Performed by: HOSPITALIST

## 2019-05-18 PROCEDURE — 32554 ASPIRATE PLEURA W/O IMAGING: CPT | Mod: RT,NTX,, | Performed by: INTERNAL MEDICINE

## 2019-05-18 PROCEDURE — 85610 PROTHROMBIN TIME: CPT | Mod: NTX

## 2019-05-18 PROCEDURE — 20600001 HC STEP DOWN PRIVATE ROOM: Mod: NTX

## 2019-05-18 PROCEDURE — G8978 MOBILITY CURRENT STATUS: HCPCS | Mod: CK,NTX

## 2019-05-18 PROCEDURE — 83735 ASSAY OF MAGNESIUM: CPT | Mod: NTX

## 2019-05-18 PROCEDURE — 99233 SBSQ HOSP IP/OBS HIGH 50: CPT | Mod: NTX,,, | Performed by: HOSPITALIST

## 2019-05-18 PROCEDURE — 32554 PR THORACEN W/O IMAG GUIDANC: ICD-10-PCS | Mod: RT,NTX,, | Performed by: INTERNAL MEDICINE

## 2019-05-18 PROCEDURE — 63600175 PHARM REV CODE 636 W HCPCS: Mod: NTX | Performed by: HOSPITALIST

## 2019-05-18 PROCEDURE — 99233 PR SUBSEQUENT HOSPITAL CARE,LEVL III: ICD-10-PCS | Mod: NTX,,, | Performed by: HOSPITALIST

## 2019-05-18 PROCEDURE — 85025 COMPLETE CBC W/AUTO DIFF WBC: CPT | Mod: NTX

## 2019-05-18 PROCEDURE — 84100 ASSAY OF PHOSPHORUS: CPT | Mod: NTX

## 2019-05-18 PROCEDURE — 99233 SBSQ HOSP IP/OBS HIGH 50: CPT | Mod: 25,NTX,, | Performed by: INTERNAL MEDICINE

## 2019-05-18 RX ADMIN — PHYTONADIONE 10 MG: 10 INJECTION, EMULSION INTRAMUSCULAR; INTRAVENOUS; SUBCUTANEOUS at 02:05

## 2019-05-18 RX ADMIN — RIFAXIMIN 550 MG: 550 TABLET ORAL at 08:05

## 2019-05-18 RX ADMIN — PANTOPRAZOLE SODIUM 40 MG: 40 TABLET, DELAYED RELEASE ORAL at 08:05

## 2019-05-18 RX ADMIN — LACTULOSE 20 G: 20 SOLUTION ORAL at 08:05

## 2019-05-18 RX ADMIN — TRAZODONE HYDROCHLORIDE 25 MG: 50 TABLET ORAL at 01:05

## 2019-05-18 RX ADMIN — LEVOTHYROXINE SODIUM 112 MCG: 112 TABLET ORAL at 05:05

## 2019-05-18 NOTE — PLAN OF CARE
Problem: Physical Therapy Goal  Goal: Physical Therapy Goal  Goals to be met by: 19     Patient will increase functional independence with mobility by performin. Supine to sit with Hunt.  2. Sit to supine with Hunt.  3. Sit to stand transfer with Supervision.  4. Bed to chair transfer with Supervision using Single-point Cane.  5. Gait  x 50 feet with Supervision using Single-point Cane.   6. Ascend/Descend 6 inch curb step with Supervision using Single-point Cane.  7. Lower extremity exercise program x 20 reps per handout, with assistance as needed.    Outcome: Ongoing (interventions implemented as appropriate)  PT goals established.

## 2019-05-18 NOTE — PROGRESS NOTES
[facility] Pulmonology/Critical Care Progress Note    Subjective:      Reporting poor sleep last night 2/2 thoracentesis site pain.  Work of breathing is dramatically improved following thoracentesis per patient. Otherwise she is in better spirits     Objective:   Last 24 Hour Vital Signs:  BP  Min: 111/51  Max: 129/60  Temp  Av.6 °F (37 °C)  Min: 98.3 °F (36.8 °C)  Max: 98.9 °F (37.2 °C)  Pulse  Av.1  Min: 83  Max: 97  Resp  Av  Min: 16  Max: 20  SpO2  Av.7 %  Min: 90 %  Max: 95 %  I/O last 3 completed shifts:  In: 1500 [P.O.:450; I.V.:50; IV Piggyback:1000]  Out: 640 [Urine:640]    Physical Examination:  General: Alert and awake.  Comfortable appearing  HENT:  NCAT; icteric sclera with EOMi; OP clear with dry MM  Cardio:  Regular rate and rhythm with normal S1 and S2; 3/6 decrescendo murmur over RUSB  Resp:  Unlabored. Crackles in right base.  Decreased BS in RML and RLL.  Improved right apical aeration.  No wheezes  Abdom: Soft, NTND with normoactive bowel sounds  Extrem: Warm and dry.  No edema  Neuro:  AAOx3; cooperative and pleasant with no focal deficits      Laboratory:  Laboratory Data Reviewed: yes  Pertinent Findings:  Lab Results   Component Value Date    WBC 4.70 2019    HGB 7.8 (L) 2019    HCT 22.5 (L) 2019    MCV 90 2019    PLT 52 (L) 2019     Lab Results   Component Value Date    CREATININE 0.9 2019    BUN 14 2019     (L) 2019    K 3.7 2019    CL 92 (L) 2019    CO2 22 (L) 2019     Lab Results   Component Value Date    ALT 32 2019    AST 79 (H) 2019    GGT 72 (H) 2018    ALKPHOS 115 2019    BILITOT 10.2 (H) 2019       Microbiology Data Reviewed: yes  Pertinent Findings:   Pleural fluid GS -- negative   Pleural fluid Cx -- pending   Pleural fluid cytology -- pending      Radiology Data Reviewed: yes  Pertinent Findings:   CXR s/p thoracentesis -- no PTX, improved aeration  with decreased effusion      Assessment:     Jihan Zamudio is a 49 y.o.female with hx KESSLER decompensated cirrhosis presenting with right pleural effusion thought 2/2 hepatic hydrothorax. Now s/p thoracentesis on 5/17 with studies c/w transudative process.  Cx pending with low suspicious of infectious etiology, as she remains afebrile with norm WBC     Plan:     - Chest CT ordered per primary.  Will review and determine need for further intervention if there is large amount of residual pleural fluid  - Recommend aggressive diuresis with Lasix and Spironolactone to maintain daily net negative fluid balance.   - Strict I/O with daily weights.  Continue fluid restriction. Avoid excessive IVF  - Nees to be mobilized and OOBTC often as tolerated.  Aggressive PT/OT      Thank you for the consult. Please feel free to contact us with any questions or concerns regarding the care of this patient.  .  Farooq Kwan MD  [facility] Pulmonology/Critical Care, [facility]

## 2019-05-18 NOTE — PLAN OF CARE
Problem: Adult Inpatient Plan of Care  Goal: Plan of Care Review  Outcome: Ongoing (interventions implemented as appropriate)     05/18/19 0621   Plan of Care Review   Plan of Care Reviewed With patient   AAO x 4, pt refused pm dose of lactulose ,  5 bm's : c/o insomnia prn trazodone admin : assist x1 bsc : supplement oxygen @ 2.5 liters per nasal cannula : s/p Thoracentesis to rt lobe: dressing to rt lower back : pt noted with dry cough : safety rounds performed : VSS: SID.

## 2019-05-18 NOTE — PLAN OF CARE
Problem: Adult Inpatient Plan of Care  Goal: Plan of Care Review  Outcome: Ongoing (interventions implemented as appropriate)     05/17/19 1940   Plan of Care Review   Plan of Care Reviewed With patient;spouse     Pt remains free from falls/injury. No acute events during shift. VSS, NAD. Bed in lowest position, wheels locked, side rails up times 2, call light w/in reach, bed alarm on, family at bedside. Room clear of obstacles. Will continue to monitor.

## 2019-05-18 NOTE — PROGRESS NOTES
Progress Note  Hospital Medicine  [facility]       Patient Name: Jihan Zamudio  MRN:  [id number]  Hospital Medicine Team: [facility] Maritza Messina MD  Date of Admission:  5/16/2019     Length of Stay:  LOS: 1 day   Expected Discharge Date: 5/21/2019  Principal Problem:  Acute hypoxemic respiratory failure     Subjective:     Interval History/Overnight Events:    Admitted overnight, with decompensated cirrhosis and right-sided hepatic hydrothorax  Underwent thoracentesis with pulmonary  Diuretics stopped Due to hyponatremia of 125  Hepatology is consulted     lactulose  20 g Oral TID    levothyroxine  112 mcg Oral Daily    pantoprazole  40 mg Oral Daily    rifAXImin  550 mg Oral BID           acetaminophen, cyclobenzaprine, dextrose 50%, dextrose 50%, glucagon (human recombinant), glucose, glucose, ondansetron, sodium chloride 0.9%, sodium chloride 0.9%, trazodone    Review of Systems   Constitutional: Negative for chills, fatigue, fever.   HENT: Negative for sore throat, trouble swallowing.    Eyes: Negative for photophobia, visual disturbance.   Respiratory: Negative for cough, shortness of breath.    Cardiovascular: Negative for chest pain, palpitations, leg swelling.   Gastrointestinal: Negative for abdominal pain, constipation, diarrhea, nausea, vomiting.   Endocrine: Negative for cold intolerance, heat intolerance.   Genitourinary: Negative for dysuria, frequency.   Musculoskeletal: Negative for arthralgias, myalgias.   Skin: Negative for rash, wound, erythema   Neurological: Negative for dizziness, syncope, weakness, light-headedness.   Psychiatric/Behavioral: Negative for confusion, hallucinations, anxiety  All other systems reviewed and are negative.    Objective:     Temp:  [97.6 °F (36.4 °C)-98.9 °F (37.2 °C)]   Pulse:  [79-94]   Resp:  [18-24]   BP: (118-127)/(55-60)   SpO2:  [90 %-95 %]       Physical Exam:  Constitutional: appears older than stated age,  chronically ill looking,  non-distressed, not diaphoretic.   HENT: NC/AT, external ears normal, oropharynx clear, MMM w/o exudates.   Eyes: PERRL, EOMI, conjunctiva normal, no discharge b/l, +scleral icterus   Neck: normal ROM, supple  CV: RRR, no m/r/g, no carotid bruits, +2 peripheral pulses.  Pulmonary/Chest wall: Breathing comfortably +distress   Decreased breath sounds at lung bases/ absent breath sounds in right lung  GI: Soft, non-tender, (+) BS, (+) BM   +distended  Musculoskeletal: Normal ROM, no atrophy,  + pitting edema in LE bilaterally   Neurological: AAO x 4, CN II-XI in tact, nl sensation, nl strength/tone  No asterixis   Skin: warm, dry   +pallor, jaundice, + spider angiomas, +bruising   Psych: normal mood and affect, normal behavior, thought content and judgement.    Labs:    Chemistries:   Recent Labs   Lab 05/16/19 1603 05/16/19 1923 05/17/19 0518   * 124* 125*   K 3.1* 3.2* 3.8   CL 91* 93* 94*   CO2 22* 23 23   BUN 13 13 13   CREATININE 0.9 0.8 0.9   CALCIUM 8.2* 8.0* 8.5*   PROT 6.5 6.1 6.3   BILITOT 12.3* 11.4* 11.9*   ALKPHOS 112 114 118   ALT 38 34 33   AST 84* 85* 85*   MG  --  1.5* 2.0   PHOS  --  3.2 3.4        WBC:   Recent Labs   Lab 05/16/19 1751 05/16/19 1754 05/17/19 0518   WBC 6.05 5.66 5.00     Bands:     CBC/Anemia Labs: Coags:    Recent Labs   Lab 05/16/19 1751 05/16/19 1754 05/17/19 0518   WBC 6.05 5.66 5.00   HGB 9.2* 8.9* 8.4*   HCT 27.4* 25.8* 24.1*   PLT 89* 113* 79*   MCV 95 88 89   RDW 20.6* 19.0* 19.0*    Recent Labs   Lab 05/16/19 1754 05/16/19 1923 05/17/19  0518   INR 1.6*  --  1.9*   APTT  --  32.0  --         POCT Glucose: HbA1c:    No results for input(s): POCTGLUCOSE in the last 168 hours. No results found for: HGBA1C     Diagnostic Results:        Assessment and Plan     Hospital Course:    Ms. 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 [K75.81, K74.60]  Yes    Hyponatremia [E87.1]  Yes    Hepatic encephalopathy [K72.90]  Yes    Coagulopathy [D68.9]  Yes    Acute blood loss anemia [D62]  Yes    Decompensated hepatic cirrhosis [K72.90]  Yes     From KESSLER    Liver biopsy 11/29/17- KESSLER with bridging fibrosis, Laura, interface activity; lymph and occ plasma cells      Esophageal varices [I85.00]  Yes     Small with no banding 07/17      Essential hypertension [I10]  Yes    Hypothyroidism [E03.9]  Yes    GERD (gastroesophageal reflux disease) [K21.9]  Yes    Morbid obesity [E66.01]  Yes     Lap band 2006 (lost 75-80 lbs) with subsequent release (2009) (due to obstruction)        Resolved Hospital Problems   No resolved problems to display.       Acute Hypoxemic Respiratory Failure 2/2 Pleural Effusion  Hepatic Hydrothorax  · Satting 88% on RA that improved to 95% with 2L NC  · R sided hydrothorax  · Stop Levaquin as there is no suspicion for infection  · S/p thora on 5/17 with pulm   · F/u pleural fluid studies      Decompensated Liver Disease  KESSLER Cirrhosis  MELD-Na score: 30 at 5/17/2019  5:18 AM  MELD score: 23 at 5/17/2019  5:18 AM  Calculated from:  Serum Creatinine: 0.9 mg/dL (Rounded to 1 mg/dL) at 5/17/2019  5:18 AM  Serum Sodium: 125 mmol/L at 5/17/2019  5:18 AM  Total Bilirubin: 11.9 mg/dL at 5/17/2019  5:18 AM  INR(ratio): 1.9 at 5/17/2019  5:18 AM  Age: 49 years  · Hepatology consulted, appreciate assistance  · Previously approved for liver transplant pending re-evaluation of pulmonary nodule with prior outside hospital CT scans  · Abdomen soft and patient says it feels around baseline so will hold on para  · Check PETH  · Low Sodium diet with 1L fluid restriction  · Hold diuretics due to hyponatremia  · Start Lactulose, titrate 3-4 BM/day  · Start Rifaximin 550mg PO  BID  · Has history of pulmonary nodule and outside CT scans were supposed to be reviewed   ·  Plan to discuss in IR conference next week, as per hepatology     Esophageal Varices  · Chronic and stable  · Monitor     Anemia  · Hgb 8, baseline 8-9  · Monitor for bleeding     Coagulopathy  · 2/2 liver disease  · Monitor for bleeding     Hyponatremia  · Sodium 124 2/2 chronic liver disease  · Fluid restriction  · Hold diuretics      Hypokalemia  · K 3.2 2/2 low Mag  · Replace     Hypomagnesemia  · Mag 1.5  · Replace     Thrombocytopenia  · Plt chronically low 2/2 coagulopathy from liver disease  · Monitor for bleeding     Hypothyroidism  · Chronic and stable  · Continue Synthroid 112mcg PO daily     GERD  · Chronic and stable  · Continue PPI     Morbid Obesity  · Body mass index is 38.45 kg/m².      Diet:  Low Na with fluid restriction   GI PPx:  PO PPI  DVT PPx:  SCDs due to coagulopathy   Goals of Care:  Full     High Risk Conditions:  resp failure     Disposition:    Next week    Signing Physician:     Maritza Messina MD  Department of [facility] Medicine   [facility]  Pager [phone] [place] [phone]  5/17/2019

## 2019-05-18 NOTE — PLAN OF CARE
Problem: Adult Inpatient Plan of Care  Goal: Plan of Care Review  Outcome: Ongoing (interventions implemented as appropriate)  Pt AAx4, breathing even and unlabored, call light in reach, bed in lowest position. Family at bedside throughout shift. Educated pt regarding fluid restriction. Pt stated understanding. VSS, frequent hourly rounding completed. Discussed with pt need to ambulate more. Pt stated understanding. Will continue to monitor.

## 2019-05-18 NOTE — PT/OT/SLP EVAL
Physical Therapy Evaluation    Patient Name:  Jihan Zamudio   MRN:  [id number]    Recommendations:     Discharge Recommendations:  outpatient PT   Discharge Equipment Recommendations: none   Barriers to discharge: None    Assessment:     Jihan Zamudio is a 49 y.o. female admitted with a medical diagnosis of Acute hypoxemic respiratory failure.  She presents with the following impairments/functional limitations:  weakness, impaired endurance, impaired self care skills, decreased coordination, impaired functional mobilty, gait instability, impaired balance, decreased safety awareness, impaired cardiopulmonary response to activity, edema.  Pt presents with decompensated liver failure 2/2 KESSLER cirrhosis complicated by esophageal varices, coagulopathy, HE, and thrombocytopenia, who presents to the ED from Hepatology clinic for evaluation of SOB.  She mentions that over the last 3-4 days, she has been feeling SOB at rest and with exertion. In the ED, she was satting 88% on RA.  She was placed on 2L NC with improvement in her oxygen sats to 95%.  CXR showed complete opacification of the right lung.     Pt is safe to perform stand pivot transfers with RW, with CGA of 1 person.  Pt refused gait training at time of evaluation, stating that she didn't sleep last night.      Rehab Prognosis: Good; patient would benefit from acute skilled PT services to address these deficits and reach maximum level of function.    Recent Surgery: * No surgery found *      Plan:     During this hospitalization, patient to be seen 3 x/week to address the identified rehab impairments via gait training, therapeutic activities, therapeutic exercises, neuromuscular re-education and progress toward the following goals:    · Plan of Care Expires:  06/13/19    Subjective     Chief Complaint: Fatigue  Patient/Family Comments/goals: To go home  Pain/Comfort:  · Pain Rating 1: 0/10    Patients cultural, spiritual, Restoration conflicts given the current  "situation: no    Living Environment:  Pt lives with spouse and daughter, H, 1 threshold to enter.  Prior to admission, patients level of function required use of cane to amb, A with tub transfers, A with dressing.  Equipment used at home: cane, straight, walker, rolling.  DME owned (not currently used): none.  Upon discharge, patient will have assistance from spouse.    Objective:     Communicated with nursing prior to session.  Patient found supine with peripheral IV  upon PT entry to room.    "They gave me a sleeping pill last night so I can't get up." - PT treated pt at 1250    General Precautions: Standard, fall   Orthopedic Precautions:N/A   Braces: N/A     Exams:  · Cognitive Exam:  Patient is oriented to Person, Place, Time and Situation  · Fine Motor Coordination:    · -       Impaired  Left hand thumb/finger opposition skills slow and shaky and Right hand thumb/finger opposition skills slow and shaky  · Sensation:    · -       Intact  · Skin Integrity/Edema:      · -       Edema: Pitting B LEs  · RUE ROM: WFL  · RUE Strength: WFL  · LUE ROM: WFL  · LUE Strength: WFL  · RLE ROM: WFL  · RLE Strength: WFL  · LLE ROM: WFL  · LLE Strength: WFL    Functional Mobility:  · Bed Mobility:     · Rolling Right: supervision  · Scooting: supervision  · Supine to Sit: supervision  · Sit to Supine: supervision  · Transfers:     · Sit to Stand:  contact guard assistance with rolling walker  · Bed to Chair: contact guard assistance with  rolling walker  using  Stand Pivot  · Gait: Pt amb a few steps during stand pivot transfer, CGA, RW  · Balance: CGA: dynamic standing balance with AD      Therapeutic Activities and Exercises:   PT POC established with pt  Whiteboard updated    AM-PAC 6 CLICK MOBILITY  Total Score:18     Patient left supine with all lines intact and call button in reach.    GOALS:   Multidisciplinary Problems     Physical Therapy Goals        Problem: Physical Therapy Goal    Goal Priority Disciplines " Outcome Goal Variances Interventions   Physical Therapy Goal     PT, PT/OT Ongoing (interventions implemented as appropriate)     Description:  Goals to be met by: 19     Patient will increase functional independence with mobility by performin. Supine to sit with [place].  2. Sit to supine with [place].  3. Sit to stand transfer with Supervision.  4. Bed to chair transfer with Supervision using Single-point Cane.  5. Gait  x 50 feet with Supervision using Single-point Cane.   6. Ascend/Descend 6 inch curb step with Supervision using Single-point Cane.  7. Lower extremity exercise program x 20 reps per handout, with assistance as needed.                      History:     Past Medical History:   Diagnosis Date    Cirrhosis     Esophageal varices 2018    Small with no banding     Essential hypertension 2018    Fatty liver 2018    GERD (gastroesophageal reflux disease)     Hx of colonic polyps 2018    On colonoscopy     Hypertension     Hypothyroidism 2018    Kidney stones     Morbid obesity 2018    Lap band with subsequent release    KADEEM (obstructive sleep apnea) 2018    Osteoarthritis 2018    Pulmonary nodule 2018    Vitamin D deficiency 2018       Past Surgical History:   Procedure Laterality Date     SECTION      TIMES 2     CHOLECYSTECTOMY      LAPAROSCOPIC     CYSTOSCOPY W/ STONE MANIPULATION      kidney stone removal    EGD (ESOPHAGOGASTRODUODENOSCOPY) N/A 2019    Performed by Davian Hern[facility]dez MD at [facility] (2ND F[facility])    LAPAROSCOPIC GASTRIC BANDING  2006    removal     LIVER BIOPSY  2017    KESSLER with bridging 17       Time Tracking:     PT Received On: 19  PT Start Time: 1249     PT Stop Time: 1304  PT Total Time (min): 15 min     Billable Minutes: Evaluation 7 and Therapeutic Activity 8      Eva Parsons, PT  2019

## 2019-05-18 NOTE — PROCEDURES
"Jihan Zamudio is a 49 y.o. female patient.    Temp: 98.9 °F (37.2 °C) (19)  Pulse: 92 (19 0735)  Resp: 18 (19)  BP: (!) 117/54 (19 07)  SpO2: 95 % (19 0938)  Weight: 101.6 kg (224 lb) (19 1603)  Height: 5' 4" (162.6 cm) (19 2321)       Thoracentesis  Date/Time: 2019 11:03 AM  Performed by: Farooq Kwan MD  Authorized by: Farooq Kwan MD   Assisting provider: Aaron Morales MD  Consent Done: Yes  Consent: Written consent obtained.  Risks and benefits: risks, benefits and alternatives were discussed  Consent given by: patient  Patient understanding: patient states understanding of the procedure being performed  Patient consent: the patient's understanding of the procedure matches consent given  Procedure consent: procedure consent matches procedure scheduled  Relevant documents: relevant documents present and verified  Test results: test results available and properly labeled  Site marked: the operative site was marked  Imaging studies: imaging studies available  Required items: required blood products, implants, devices, and special equipment available  Patient identity confirmed: , MRN and name  Time out: Immediately prior to procedure a "time out" was called to verify the correct patient, procedure, equipment, support staff and site/side marked as required.  Procedure purpose: diagnostic and therapeutic  Indications: pleural effusion  Preparation: Patient was prepped and draped in the usual sterile fashion.  Local anesthesia used: yes  Anesthesia: local infiltration    Anesthesia:  Local anesthesia used: yes  Local Anesthetic: lidocaine 1% without epinephrine  Anesthetic total: 5 mL  Patient sedated: no  Preparation: skin prepped with ChloraPrep  Patient position: sitting  Ultrasound guidance: no  Location: right posterior  Intercostal space: 6th  Puncture method: over-the-needle catheter  Number of attempts: 1  Drainage amount: 1500 " ml  Drainage characteristics: serosanguinous  Patient tolerance: Patient tolerated the procedure well with no immediate complications  Chest x-ray performed: yes  Chest x-ray interpreted by radiologist and me.  Chest x-ray findings: pleural effusion  Complications: No  Estimated blood loss (mL): 3          Farooq Kwan  5/18/2019

## 2019-05-19 LAB
ALBUMIN SERPL BCP-MCNC: 2 G/DL (ref 3.5–5.2)
ALP SERPL-CCNC: 141 U/L (ref 55–135)
ALT SERPL W/O P-5'-P-CCNC: 31 U/L (ref 10–44)
ANION GAP SERPL CALC-SCNC: 7 MMOL/L (ref 8–16)
AST SERPL-CCNC: 76 U/L (ref 10–40)
BASOPHILS # BLD AUTO: 0.03 K/UL (ref 0–0.2)
BASOPHILS NFR BLD: 0.7 % (ref 0–1.9)
BILIRUB SERPL-MCNC: 8.9 MG/DL (ref 0.1–1)
BUN SERPL-MCNC: 16 MG/DL (ref 6–20)
CALCIUM SERPL-MCNC: 8.2 MG/DL (ref 8.7–10.5)
CHLORIDE SERPL-SCNC: 93 MMOL/L (ref 95–110)
CO2 SERPL-SCNC: 22 MMOL/L (ref 23–29)
CREAT SERPL-MCNC: 0.8 MG/DL (ref 0.5–1.4)
DIFFERENTIAL METHOD: ABNORMAL
EOSINOPHIL # BLD AUTO: 0.3 K/UL (ref 0–0.5)
EOSINOPHIL NFR BLD: 7.2 % (ref 0–8)
ERYTHROCYTE [DISTWIDTH] IN BLOOD BY AUTOMATED COUNT: 18.6 % (ref 11.5–14.5)
EST. GFR  (AFRICAN AMERICAN): >60 ML/MIN/1.73 M^2
EST. GFR  (NON AFRICAN AMERICAN): >60 ML/MIN/1.73 M^2
FIBRINOGEN PPP-MCNC: 86 MG/DL (ref 182–366)
GLUCOSE SERPL-MCNC: 90 MG/DL (ref 70–110)
HCT VFR BLD AUTO: 21.9 % (ref 37–48.5)
HGB BLD-MCNC: 7.5 G/DL (ref 12–16)
IMM GRANULOCYTES # BLD AUTO: 0.03 K/UL (ref 0–0.04)
IMM GRANULOCYTES NFR BLD AUTO: 0.7 % (ref 0–0.5)
INR PPP: 1.9 (ref 0.8–1.2)
LYMPHOCYTES # BLD AUTO: 0.8 K/UL (ref 1–4.8)
LYMPHOCYTES NFR BLD: 16.3 % (ref 18–48)
MAGNESIUM SERPL-MCNC: 1.9 MG/DL (ref 1.6–2.6)
MCH RBC QN AUTO: 30.4 PG (ref 27–31)
MCHC RBC AUTO-ENTMCNC: 34.2 G/DL (ref 32–36)
MCV RBC AUTO: 89 FL (ref 82–98)
MONOCYTES # BLD AUTO: 0.4 K/UL (ref 0.3–1)
MONOCYTES NFR BLD: 8.9 % (ref 4–15)
NEUTROPHILS # BLD AUTO: 3 K/UL (ref 1.8–7.7)
NEUTROPHILS NFR BLD: 66.2 % (ref 38–73)
NRBC BLD-RTO: 0 /100 WBC
OSMOLALITY UR: 535 MOSM/KG (ref 50–1200)
PHOSPHATE SERPL-MCNC: 3.5 MG/DL (ref 2.7–4.5)
PLATELET # BLD AUTO: 57 K/UL (ref 150–350)
PMV BLD AUTO: 10.5 FL (ref 9.2–12.9)
POTASSIUM SERPL-SCNC: 3.8 MMOL/L (ref 3.5–5.1)
PROT SERPL-MCNC: 5.6 G/DL (ref 6–8.4)
PROTHROMBIN TIME: 18.1 SEC (ref 9–12.5)
RBC # BLD AUTO: 2.47 M/UL (ref 4–5.4)
SODIUM SERPL-SCNC: 122 MMOL/L (ref 136–145)
WBC # BLD AUTO: 4.59 K/UL (ref 3.9–12.7)

## 2019-05-19 PROCEDURE — 80053 COMPREHEN METABOLIC PANEL: CPT | Mod: NTX

## 2019-05-19 PROCEDURE — 25000003 PHARM REV CODE 250: Mod: NTX | Performed by: HOSPITALIST

## 2019-05-19 PROCEDURE — 20600001 HC STEP DOWN PRIVATE ROOM: Mod: NTX

## 2019-05-19 PROCEDURE — 36415 COLL VENOUS BLD VENIPUNCTURE: CPT | Mod: NTX

## 2019-05-19 PROCEDURE — 85610 PROTHROMBIN TIME: CPT | Mod: NTX

## 2019-05-19 PROCEDURE — 99233 SBSQ HOSP IP/OBS HIGH 50: CPT | Mod: NTX,,, | Performed by: HOSPITALIST

## 2019-05-19 PROCEDURE — 83935 ASSAY OF URINE OSMOLALITY: CPT | Mod: NTX

## 2019-05-19 PROCEDURE — 83735 ASSAY OF MAGNESIUM: CPT | Mod: NTX

## 2019-05-19 PROCEDURE — 85384 FIBRINOGEN ACTIVITY: CPT | Mod: NTX

## 2019-05-19 PROCEDURE — 99231 PR SUBSEQUENT HOSPITAL CARE,LEVL I: ICD-10-PCS | Mod: NTX,,, | Performed by: INTERNAL MEDICINE

## 2019-05-19 PROCEDURE — 85025 COMPLETE CBC W/AUTO DIFF WBC: CPT | Mod: NTX

## 2019-05-19 PROCEDURE — 84100 ASSAY OF PHOSPHORUS: CPT | Mod: NTX

## 2019-05-19 PROCEDURE — 99231 SBSQ HOSP IP/OBS SF/LOW 25: CPT | Mod: NTX,,, | Performed by: INTERNAL MEDICINE

## 2019-05-19 PROCEDURE — 27000221 HC OXYGEN, UP TO 24 HOURS: Mod: NTX

## 2019-05-19 PROCEDURE — 97165 OT EVAL LOW COMPLEX 30 MIN: CPT | Mod: NTX

## 2019-05-19 PROCEDURE — 99233 PR SUBSEQUENT HOSPITAL CARE,LEVL III: ICD-10-PCS | Mod: NTX,,, | Performed by: HOSPITALIST

## 2019-05-19 PROCEDURE — 63600175 PHARM REV CODE 636 W HCPCS: Mod: NTX | Performed by: HOSPITALIST

## 2019-05-19 RX ADMIN — PANTOPRAZOLE SODIUM 40 MG: 40 TABLET, DELAYED RELEASE ORAL at 09:05

## 2019-05-19 RX ADMIN — LACTULOSE 20 G: 20 SOLUTION ORAL at 09:05

## 2019-05-19 RX ADMIN — RIFAXIMIN 550 MG: 550 TABLET ORAL at 09:05

## 2019-05-19 RX ADMIN — LEVOTHYROXINE SODIUM 112 MCG: 112 TABLET ORAL at 05:05

## 2019-05-19 RX ADMIN — PHYTONADIONE 10 MG: 10 INJECTION, EMULSION INTRAMUSCULAR; INTRAVENOUS; SUBCUTANEOUS at 09:05

## 2019-05-19 RX ADMIN — RIFAXIMIN 550 MG: 550 TABLET ORAL at 08:05

## 2019-05-19 NOTE — PLAN OF CARE
Problem: Adult Inpatient Plan of Care  Goal: Plan of Care Review  Outcome: Ongoing (interventions implemented as appropriate)  Plan of care reviewed with patient. Patient went for CT of chest overnight. Safety maintained, call light in reach, bed in lowest position, will continue to monitor.

## 2019-05-19 NOTE — PROGRESS NOTES
[facility]  Hepatology  Progress Note    Patient Name: Jihan Zamudio  MRN: [id number]  Admission Date: 5/16/2019  Hospital Length of Stay: 3 days  Attending Provider: Blane Gottlieb MD   Primary Care Physician: Primary Doctor No  Principal Problem:Acute hypoxemic respiratory failure    Subjective:     Transplant status: No    HPI: This is a 50 yo F with hx of KESSLER cirrhosis c/b varices, latent TB, GERD, hypothyroidism, lap band 2007, HLD who was sent to the ED from hepatology clinic for shortness of breath. She follows with Dr. Smallwood. She has been undergoing transplant evaluation and is pending financial approval and clarification of lung nodule. She reports feeling more short of breath the past few days and increased abdominal swelling. She reports occasional confusion as well. She was found to have a low sodium as well. With regards to her lung nodule, we have been awaiting films for an outside facility for comparison. She was found to have a large pleural effusion while here, something she has not had before. Pulmonology has been consulted.         Interval History: Patient reports feeling ok. CT Chest obtained yesterday showing stable lung nodule when compared to 2018. Also showed reaccumulation of pleural fluid. Sodium remains low despite fluid restriction.    Current Facility-Administered Medications   Medication    acetaminophen tablet 325 mg    cyclobenzaprine tablet 5 mg    dextrose 50% injection 12.5 g    dextrose 50% injection 25 g    glucagon (human recombinant) injection 1 mg    glucose chewable tablet 16 g    glucose chewable tablet 24 g    lactulose 20 gram/30 mL solution Soln 20 g    levothyroxine tablet 112 mcg    ondansetron injection 4 mg    pantoprazole EC tablet 40 mg    phytonadione vitamin k (AQUA-MEPHYTON) 10 mg in dextrose 5 % 50 mL IVPB    rifAXIMin tablet 550 mg    sodium chloride 0.9% flush 10 mL    sodium chloride 0.9% flush 5 mL    trazodone split  tablet 25 mg       Objective:     Vital Signs (Most Recent):  Temp: 98.3 °F (36.8 °C) (05/19/19 0721)  Pulse: 87 (05/19/19 0725)  Resp: 18 (05/19/19 0721)  BP: 122/60 (05/19/19 0721)  SpO2: (!) 94 % (05/19/19 0725) Vital Signs (24h Range):  Temp:  [98.3 °F (36.8 °C)-98.8 °F (37.1 °C)] 98.3 °F (36.8 °C)  Pulse:  [85-93] 87  Resp:  [17-18] 18  SpO2:  [91 %-95 %] 94 %  BP: (110-129)/(56-60) 122/60     Weight: 101.6 kg (224 lb) (05/16/19 1603)  Body mass index is 38.45 kg/m².    Physical Exam   Constitutional: She is oriented to person, place, and time. No distress.   A   Eyes: Scleral icterus is present.   Cardiovascular: Normal rate and regular rhythm.   Pulmonary/Chest: Effort normal.   Decreased breath sounds right side  On nasal cannula   Abdominal: Soft. Bowel sounds are normal. She exhibits no distension. There is no tenderness.   Musculoskeletal: She exhibits no edema or deformity.   Neurological: She is alert and oriented to person, place, and time.   Skin: Skin is warm and dry.   Psychiatric: She has a normal mood and affect.   Vitals reviewed.      MELD-Na score: 29 at 5/19/2019  4:27 AM  MELD score: 22 at 5/19/2019  4:27 AM  Calculated from:  Serum Creatinine: 0.8 mg/dL (Rounded to 1 mg/dL) at 5/19/2019  4:27 AM  Serum Sodium: 122 mmol/L (Rounded to 125 mmol/L) at 5/19/2019  4:27 AM  Total Bilirubin: 8.9 mg/dL at 5/19/2019  4:27 AM  INR(ratio): 1.9 at 5/19/2019  4:27 AM  Age: 49 years    Significant Labs:  CBC:   Recent Labs   Lab 05/19/19 0427   WBC 4.59   RBC 2.47*   HGB 7.5*   HCT 21.9*   PLT 57*     Coagulation:   Recent Labs   Lab 05/16/19 1923 05/19/19 0427   INR  --    < > 1.9*   APTT 32.0  --   --     < > = values in this interval not displayed.     Assessment/Plan:     Liver cirrhosis secondary to KESSLER  50 yo F with hx of KESSLER cirrhosis c/b varices, latent TB, GERD, hypothyroidism, lap band 2007, HLD who was sent to the ED from hepatology clinic for shortness of breath. She is found to have a  right sided pleural effusion, hyponatremia, and some confusion. She has completed the transplant eval and is awaiting financial clearance as well as clarification of lung nodule previously seen.    Recommendations:  - CT chest with stable lung nodule, favoring benign process  - Plan to discuss in IR conference on Tues and present Weds  - Fluid restriction for hyponatremia  - Daily CBC, CMP, INR  - Avoid sedatives        Thank you for your consult. I will follow-up with patient. Please contact us if you have any additional questions.    Jt Cosby MD  Hepatology  [facility]

## 2019-05-19 NOTE — PLAN OF CARE
"Problem: Adult Inpatient Plan of Care  Goal: Plan of Care Review  Outcome: Ongoing (interventions implemented as appropriate)  Pt AAx4, call light in reach, bed in lowest position. Pt ambulates to bedside commode independently. Pt stated not "feeling well today, you know how you have good days and bad days, today is a bad day." Pt unable to identify specifically how she felt and if nurse could do anything for pt. Educated pt regarding fluid restriction and necessity due to sodium level. Multiple BM's today. VSS, frequent hourly rounding completed. No significant events throughout shift. Will continue to monitor.      "

## 2019-05-19 NOTE — ASSESSMENT & PLAN NOTE
48 yo F with hx of KESSLER cirrhosis c/b varices, latent TB, GERD, hypothyroidism, lap band 2007, HLD who was sent to the ED from hepatology clinic for shortness of breath. She is found to have a right sided pleural effusion, hyponatremia, and some confusion. She has completed the transplant eval and is awaiting financial clearance as well as clarification of lung nodule previously seen.    Recommendations:  - CT chest with stable lung nodule, favoring benign process  - Plan to discuss in IR conference on Tues and present Weds  - Fluid restriction for hyponatremia  - Daily CBC, CMP, INR  - Avoid sedatives

## 2019-05-19 NOTE — PLAN OF CARE
Problem: Occupational Therapy Goal  Goal: Occupational Therapy Goal  Goals to be met by: 5/26/19    Patient will increase functional independence with ADLs by performing:    Grooming while EOB with Stand-by Assistance.  Toileting from bedside commode with Stand-by Assistance for hygiene and clothing management.   Supine to sit with [place].  Toilet transfer to bedside commode with Stand-by Assistance.    Con't POC.    ROSA Almanza

## 2019-05-19 NOTE — PROGRESS NOTES
Progress Note  Hospital Medicine  [facility], [place] [place]       Patient Name: Jihan Zamudio  MRN:  [id number]  Hospital Medicine Team: [facility] [place] HOSP MED L Blane Gottlieb MD  Date of Admission:  5/16/2019     Length of Stay:  LOS: 2 days   Expected Discharge Date: 5/21/2019  Principal Problem:  Acute hypoxemic respiratory failure     Subjective:     Interval History/Overnight Events:    - patient states she is doing ok today; had 1.5L removed by thoracentesis yesterday; sodium still about the same; cont fluid restriction;   - getting CT chest to evaluate a prior pulmonary nodule; patient has been approved for liver transplant, pending financial clearance for listing          lactulose  20 g Oral TID    levothyroxine  112 mcg Oral Daily    pantoprazole  40 mg Oral Daily    phytonadione ((AQUA-MEPHYTON) IVPB  10 mg Intravenous Daily    rifAXImin  550 mg Oral BID           acetaminophen, cyclobenzaprine, dextrose 50%, dextrose 50%, glucagon (human recombinant), glucose, glucose, ondansetron, sodium chloride 0.9%, sodium chloride 0.9%, trazodone    Review of Systems   Constitutional: Negative for chills, fatigue, fever.   HENT: Negative for sore throat, trouble swallowing.    Eyes: Negative for photophobia, visual disturbance.   Respiratory: Negative for cough, shortness of breath.    Cardiovascular: Negative for chest pain, palpitations, leg swelling.   Gastrointestinal: Negative for abdominal pain, constipation, diarrhea, nausea, vomiting.   Endocrine: Negative for cold intolerance, heat intolerance.   Genitourinary: Negative for dysuria, frequency.   Musculoskeletal: Negative for arthralgias, myalgias.   Skin: Negative for rash, wound, erythema   Neurological: Negative for dizziness, syncope, weakness, light-headedness.   Psychiatric/Behavioral: Negative for confusion, hallucinations, anxiety  All other systems reviewed and are negative.    Objective:     Temp:  [98.3 °F (36.8 °C)-98.9 °F (37.2  °C)]   Pulse:  [85-97]   Resp:  [16-18]   BP: (111-129)/(51-60)   SpO2:  [91 %-95 %]       Physical Exam:  Constitutional: appears older than stated age, chronically ill looking,  non-distressed, not diaphoretic.   HENT: NC/AT, external ears normal, oropharynx clear, MMM w/o exudates.   Eyes: PERRL, EOMI, conjunctiva normal, no discharge b/l, +scleral icterus   Neck: normal ROM, supple  CV: RRR, no m/r/g, no carotid bruits, +2 peripheral pulses.  Pulmonary/Chest wall: Breathing comfortably +distress   Decreased breath sounds at lung bases/ absent breath sounds in right lung  GI: Soft, non-tender, (+) BS, (+) BM   +distended  Musculoskeletal: Normal ROM, no atrophy,  + pitting edema in LE bilaterally   Neurological: AAO x 4, CN II-XI in tact, nl sensation, nl strength/tone  No asterixis   Skin: warm, dry   +pallor, jaundice, + spider angiomas, +bruising   Psych: normal mood and affect, normal behavior, thought content and judgement.    Labs:    Chemistries:   Recent Labs   Lab 05/16/19 1923 05/17/19 0518 05/18/19  0641   * 125* 122*   K 3.2* 3.8 3.7   CL 93* 94* 92*   CO2 23 23 22*   BUN 13 13 14   CREATININE 0.8 0.9 0.9   CALCIUM 8.0* 8.5* 8.0*   PROT 6.1 6.3 5.7*   BILITOT 11.4* 11.9* 10.2*   ALKPHOS 114 118 115   ALT 34 33 32   AST 85* 85* 79*   MG 1.5* 2.0 1.8   PHOS 3.2 3.4 3.5        WBC:   Recent Labs   Lab 05/16/19 1751 05/16/19 1754 05/17/19 0518 05/18/19  0641   WBC 6.05 5.66 5.00 4.70     Bands:     CBC/Anemia Labs: Coags:    Recent Labs   Lab 05/16/19 1754 05/17/19 0518 05/18/19  0641   WBC 5.66 5.00 4.70   HGB 8.9* 8.4* 7.8*   HCT 25.8* 24.1* 22.5*   * 79* 52*   MCV 88 89 90   RDW 19.0* 19.0* 18.6*    Recent Labs   Lab 05/16/19  1754 05/16/19  1923 05/17/19  0518 05/18/19  0641   INR 1.6*  --  1.9* 2.0*   APTT  --  32.0  --   --         POCT Glucose: HbA1c:    No results for input(s): POCTGLUCOSE in the last 168 hours. No results found for: HGBA1C     Diagnostic  Results:        Assessment and Plan     Hospital Course:    Ms. 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.81, K74.60]  Yes    Hyponatremia [E87.1]  Yes    Hepatic encephalopathy [K72.90]  Yes    Coagulopathy [D68.9]  Yes    Acute blood loss anemia [D62]  Yes    Decompensated hepatic cirrhosis [K72.90]  Yes     From KESSLER    Liver biopsy 11/29/17- KESSLER with bridging fibrosis, Laura, interface activity; lymph and occ plasma cells      Esophageal varices [I85.00]  Yes     Small with no banding 07/17      Essential hypertension [I10]  Yes    Hypothyroidism [E03.9]  Yes    GERD (gastroesophageal reflux disease) [K21.9]  Yes    Morbid obesity [E66.01]  Yes     Lap band 2006 (lost 75-80 lbs) with subsequent release (2009) (due to obstruction)        Resolved Hospital Problems   No resolved problems to display.       Acute Hypoxemic Respiratory Failure 2/2 Pleural Effusion  Hepatic Hydrothorax  · Satting 88% on RA that improved to 95% with 2L NC  · R sided hydrothorax  · Stop Levaquin as there is no suspicion for infection  · S/p thora on 5/17 with pulm, 1.5 L removed   · Getting CT chest to evaluate pulmonary nodule      Decompensated Liver Disease  KESSLER Cirrhosis  MELD-Na score: 30 at 5/18/2019  6:41 AM  MELD score: 23 at 5/18/2019  6:41 AM  Calculated from:  Serum Creatinine: 0.9 mg/dL (Rounded to 1 mg/dL) at 5/18/2019  6:41 AM  Serum Sodium: 122 mmol/L (Rounded to 125 mmol/L) at 5/18/2019  6:41 AM  Total Bilirubin: 10.2 mg/dL at 5/18/2019  6:41 AM  INR(ratio): 2.0 at 5/18/2019  6:41 AM  Age: 49 years  · Hepatology consulted, appreciate assistance  · Previously approved for liver transplant pending re-evaluation of pulmonary nodule with  prior outside hospital CT scans  · Abdomen soft and patient says it feels around baseline so will hold on para  · Check PETH  · Low Sodium diet with 1L fluid restriction  · Hold diuretics due to hyponatremia  · Start Lactulose, titrate 3-4 BM/day  · Start Rifaximin 550mg PO BID  · Has history of pulmonary nodule and outside CT scans were supposed to be reviewed   ·  Plan to discuss in IR conference next week, as per hepatology     Esophageal Varices  · Chronic and stable  · Monitor     Anemia  · Hgb 8, baseline 8-9  · Monitor for bleeding     Coagulopathy  · 2/2 liver disease  · Monitor for bleeding     Hyponatremia  · Sodium 124 2/2 chronic liver disease  · Fluid restriction  · Hold diuretics   · - urine and serum osmols     Hypokalemia  · K 3.2 2/2 low Mag  · Replace     Hypomagnesemia  · Mag 1.5  · Replace     Thrombocytopenia  · Plt chronically low 2/2 coagulopathy from liver disease  · Monitor for bleeding     Hypothyroidism  · Chronic and stable  · Continue Synthroid 112mcg PO daily     GERD  · Chronic and stable  · Continue PPI     Morbid Obesity  · Body mass index is 38.45 kg/m².      Diet:  Low Na with fluid restriction   GI PPx:  PO PPI  DVT PPx:  SCDs due to coagulopathy   Goals of Care:  Full     High Risk Conditions:  resp failure     Disposition:    - likely too sick to leave prior to a liver transplant;

## 2019-05-19 NOTE — PROGRESS NOTES
Progress Note  Hospital Medicine  [facility], [place]       Patient Name: Jihan Zamudio  MRN:  [id number]  Hospital Medicine Team: [facility] [place] HOSP MED L Blane Gottlieb MD  Date of Admission:  5/16/2019     Length of Stay:  LOS: 3 days   Expected Discharge Date: 5/21/2019  Principal Problem:  Acute hypoxemic respiratory failure     Subjective:     Interval History/Overnight Events:    - patient states she feels weak today; had 4 BMs yesterday and 2 today; sodium down to 122, will further restrict fluid to 500 cc; patient does complain of some SOB with PT/OT today; CT chest done yesterday shows stable pulmonary nodules and re accumulation of pleural effusion; patient may need a symptomatic thoracentesis tomorrow; awaiting financial clearance for listing;          lactulose  20 g Oral TID    levothyroxine  112 mcg Oral Daily    pantoprazole  40 mg Oral Daily    phytonadione ((AQUA-MEPHYTON) IVPB  10 mg Intravenous Daily    rifAXImin  550 mg Oral BID           acetaminophen, cyclobenzaprine, dextrose 50%, dextrose 50%, glucagon (human recombinant), glucose, glucose, ondansetron, sodium chloride 0.9%, sodium chloride 0.9%, trazodone    Review of Systems   Constitutional: Negative for chills, fatigue, fever.   HENT: Negative for sore throat, trouble swallowing.    Eyes: Negative for photophobia, visual disturbance.   Respiratory: Negative for cough, shortness of breath.    Cardiovascular: Negative for chest pain, palpitations, leg swelling.   Gastrointestinal: Negative for abdominal pain, constipation, diarrhea, nausea, vomiting.   Endocrine: Negative for cold intolerance, heat intolerance.   Genitourinary: Negative for dysuria, frequency.   Musculoskeletal: Negative for arthralgias, myalgias.   Skin: Negative for rash, wound, erythema   Neurological: Negative for dizziness, syncope, weakness, light-headedness.   Psychiatric/Behavioral: Negative for confusion, hallucinations, anxiety  All other  systems reviewed and are negative.    Objective:     Temp:  [97.1 °F (36.2 °C)-98.7 °F (37.1 °C)]   Pulse:  [85-99]   Resp:  [17-18]   BP: (110-140)/(56-64)   SpO2:  [91 %-98 %]       Physical Exam:  Constitutional: appears older than stated age, chronically ill looking,  non-distressed, not diaphoretic.   HENT: NC/AT, external ears normal, oropharynx clear, MMM w/o exudates.   Eyes: PERRL, EOMI, conjunctiva normal, no discharge b/l, +scleral icterus   Neck: normal ROM, supple  CV: RRR, no m/r/g, no carotid bruits, +2 peripheral pulses.  Pulmonary/Chest wall: Breathing comfortably +distress   Decreased breath sounds at lung bases/ absent breath sounds in right lung  GI: Soft, non-tender, (+) BS, (+) BM   +distended  Musculoskeletal: Normal ROM, no atrophy,  + pitting edema in LE bilaterally   Neurological: AAO x 4, CN II-XI in tact, nl sensation, nl strength/tone  No asterixis   Skin: warm, dry   +pallor, jaundice, + spider angiomas, +bruising   Psych: normal mood and affect, normal behavior, thought content and judgement.    Labs:    Chemistries:   Recent Labs   Lab 05/17/19 0518 05/18/19  0641 05/19/19  0427   * 122* 122*   K 3.8 3.7 3.8   CL 94* 92* 93*   CO2 23 22* 22*   BUN 13 14 16   CREATININE 0.9 0.9 0.8   CALCIUM 8.5* 8.0* 8.2*   PROT 6.3 5.7* 5.6*   BILITOT 11.9* 10.2* 8.9*   ALKPHOS 118 115 141*   ALT 33 32 31   AST 85* 79* 76*   MG 2.0 1.8 1.9   PHOS 3.4 3.5 3.5        WBC:   Recent Labs   Lab 05/16/19  1751 05/16/19  1754 05/17/19  0518 05/18/19  0641 05/19/19  0427   WBC 6.05 5.66 5.00 4.70 4.59     Bands:     CBC/Anemia Labs: Coags:    Recent Labs   Lab 05/17/19 0518 05/18/19  0641 05/19/19  0427   WBC 5.00 4.70 4.59   HGB 8.4* 7.8* 7.5*   HCT 24.1* 22.5* 21.9*   PLT 79* 52* 57*   MCV 89 90 89   RDW 19.0* 18.6* 18.6*    Recent Labs   Lab 05/16/19  1923 05/17/19  0518 05/18/19  0641 05/19/19  0427   INR  --  1.9* 2.0* 1.9*   APTT 32.0  --   --   --         POCT Glucose: HbA1c:    No results for  input(s): POCTGLUCOSE in the last 168 hours. No results found for: HGBA1C     Diagnostic Results:        Assessment and Plan     Hospital Course:    Ms. 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.81, K74.60]  Yes    Hyponatremia [E87.1]  Yes    Hepatic encephalopathy [K72.90]  Yes    Coagulopathy [D68.9]  Yes    Acute blood loss anemia [D62]  Yes    Decompensated hepatic cirrhosis [K72.90]  Yes     From KESSLER    Liver biopsy 11/29/17- KESSLER with bridging fibrosis, Laura, interface activity; lymph and occ plasma cells      Esophageal varices [I85.00]  Yes     Small with no banding 07/17      Essential hypertension [I10]  Yes    Hypothyroidism [E03.9]  Yes    GERD (gastroesophageal reflux disease) [K21.9]  Yes    Morbid obesity [E66.01]  Yes     Lap band 2006 (lost 75-80 lbs) with subsequent release (2009) (due to obstruction)        Resolved Hospital Problems   No resolved problems to display.       Acute Hypoxemic Respiratory Failure 2/2 Pleural Effusion  Hepatic Hydrothorax  · Satting 88% on RA that improved to 95% with 2L NC  · R sided hydrothorax  · Stop Levaquin as there is no suspicion for infection  · S/p thora on 5/17 with pulm, 1.5 L removed   · CT chest completed and shows stable pulmonary nodules; may need symptomatic thoracentesis tomorrow      Decompensated Liver Disease  KESSLER Cirrhosis  MELD-Na score: 29 at 5/19/2019  4:27 AM  MELD score: 22 at 5/19/2019  4:27 AM  Calculated from:  Serum Creatinine: 0.8 mg/dL (Rounded to 1 mg/dL) at 5/19/2019  4:27 AM  Serum Sodium: 122 mmol/L (Rounded to 125 mmol/L) at 5/19/2019  4:27 AM  Total Bilirubin: 8.9 mg/dL at 5/19/2019  4:27 AM  INR(ratio): 1.9 at 5/19/2019  4:27 AM  Age: 49  years  · Hepatology consulted, appreciate assistance  · Previously approved for liver transplant pending re-evaluation of pulmonary nodule with prior outside hospital CT scans  · Abdomen soft and patient says it feels around baseline so will hold on para  · Check PETH  · Low Sodium diet with 1L fluid restriction  · Hold diuretics due to hyponatremia  · Start Lactulose, titrate 3-4 BM/day  · Start Rifaximin 550mg PO BID  · Has history of pulmonary nodule and outside CT scans were supposed to be reviewed   · Awaiting financial clearance for listing      Esophageal Varices  · Chronic and stable  · Monitor     Anemia  · Hgb 8, baseline 8-9  · Monitor for bleeding     Coagulopathy  · 2/2 liver disease  · Monitor for bleeding     Hyponatremia  · Sodium 122 2/2 chronic liver disease  · Fluid restriction to 500 cc today  · Hold diuretics   · - urine and serum osmols     Hypokalemia  · K 3.2 2/2 low Mag  · Replace     Hypomagnesemia  · Mag 1.5  · Replace     Thrombocytopenia  · Plt chronically low 2/2 coagulopathy from liver disease  · Monitor for bleeding     Hypothyroidism  · Chronic and stable  · Continue Synthroid 112mcg PO daily     GERD  · Chronic and stable  · Continue PPI     Morbid Obesity  · Body mass index is 38.45 kg/m².      Diet:  Low Na with fluid restriction   GI PPx:  PO PPI  DVT PPx:  SCDs due to coagulopathy   Goals of Care:  Full     High Risk Conditions:  resp failure     Disposition:    - likely too sick to leave prior to a liver transplant; awaiting financial clearance for listing

## 2019-05-19 NOTE — SUBJECTIVE & OBJECTIVE
Interval History: Patient reports feeling ok. CT Chest obtained yesterday showing stable lung nodule when compared to 2018. Also showed reaccumulation of pleural fluid. Sodium remains low despite fluid restriction.    Current Facility-Administered Medications   Medication    acetaminophen tablet 325 mg    cyclobenzaprine tablet 5 mg    dextrose 50% injection 12.5 g    dextrose 50% injection 25 g    glucagon (human recombinant) injection 1 mg    glucose chewable tablet 16 g    glucose chewable tablet 24 g    lactulose 20 gram/30 mL solution Soln 20 g    levothyroxine tablet 112 mcg    ondansetron injection 4 mg    pantoprazole EC tablet 40 mg    phytonadione vitamin k (AQUA-MEPHYTON) 10 mg in dextrose 5 % 50 mL IVPB    rifAXIMin tablet 550 mg    sodium chloride 0.9% flush 10 mL    sodium chloride 0.9% flush 5 mL    trazodone split tablet 25 mg       Objective:     Vital Signs (Most Recent):  Temp: 98.3 °F (36.8 °C) (05/19/19 0721)  Pulse: 87 (05/19/19 0725)  Resp: 18 (05/19/19 0721)  BP: 122/60 (05/19/19 0721)  SpO2: (!) 94 % (05/19/19 0725) Vital Signs (24h Range):  Temp:  [98.3 °F (36.8 °C)-98.8 °F (37.1 °C)] 98.3 °F (36.8 °C)  Pulse:  [85-93] 87  Resp:  [17-18] 18  SpO2:  [91 %-95 %] 94 %  BP: (110-129)/(56-60) 122/60     Weight: 101.6 kg (224 lb) (05/16/19 1603)  Body mass index is 38.45 kg/m².    Physical Exam   Constitutional: She is oriented to person, place, and time. No distress.   A   Eyes: Scleral icterus is present.   Cardiovascular: Normal rate and regular rhythm.   Pulmonary/Chest: Effort normal.   Decreased breath sounds right side  On nasal cannula   Abdominal: Soft. Bowel sounds are normal. She exhibits no distension. There is no tenderness.   Musculoskeletal: She exhibits no edema or deformity.   Neurological: She is alert and oriented to person, place, and time.   Skin: Skin is warm and dry.   Psychiatric: She has a normal mood and affect.   Vitals reviewed.      MELD-Na score: 29  at 5/19/2019  4:27 AM  MELD score: 22 at 5/19/2019  4:27 AM  Calculated from:  Serum Creatinine: 0.8 mg/dL (Rounded to 1 mg/dL) at 5/19/2019  4:27 AM  Serum Sodium: 122 mmol/L (Rounded to 125 mmol/L) at 5/19/2019  4:27 AM  Total Bilirubin: 8.9 mg/dL at 5/19/2019  4:27 AM  INR(ratio): 1.9 at 5/19/2019  4:27 AM  Age: 49 years    Significant Labs:  CBC:   Recent Labs   Lab 05/19/19 0427   WBC 4.59   RBC 2.47*   HGB 7.5*   HCT 21.9*   PLT 57*     Coagulation:   Recent Labs   Lab 05/16/19 1923 05/19/19 0427   INR  --    < > 1.9*   APTT 32.0  --   --     < > = values in this interval not displayed.

## 2019-05-19 NOTE — PT/OT/SLP EVAL
Occupational Therapy   Evaluation    Name: Jihan Zamudio  MRN: [id number]  Admitting Diagnosis:  Acute hypoxemic respiratory failure      Recommendations:     Discharge Recommendations: home health OT  Discharge Equipment Recommendations:  none  Barriers to discharge:  None    Assessment:     Jihan Zamudio is a 49 y.o. female with a medical diagnosis of Acute hypoxemic respiratory failure.  She presents with weakness and SOB which inhibits her functional mobility and ADL performance. Pt stated she was tired from lack of sleep and did not want to sit up in the chair.. Performance deficits affecting function: weakness, impaired self care skills, impaired balance, impaired functional mobilty, impaired endurance, gait instability.      Rehab Prognosis: Fair; patient would benefit from acute skilled OT services to address these deficits and reach maximum level of function.       Plan:     Patient to be seen 3 x/week to address the above listed problems via self-care/home management, community/work re-entry, therapeutic exercises  · Plan of Care Expires: 06/18/19  · Plan of Care Reviewed with: patient    Subjective     Chief Complaint: weakness  Patient/Family Comments/goals: to go home    Occupational Profile:  Living Environment: Pt lives in a [facility] with her  and daughter. Their bathroom contains a tub/shower combo.  Previous level of function: Pt was (I) in ADLs but was not driving or performing IADLs because of her condition  Equipment Used at Home:  cane, straight, walker, rolling  Assistance upon Discharge:  and daughter available upon discharge.    Pain/Comfort:  · Pain Rating 1: 0/10    Patients cultural, spiritual, Methodist conflicts given the current situation:      Objective:     Communicated with: RN prior to session.  Patient found supine with oxygen, peripheral IV, telemetry upon OT entry to room.    General Precautions: Standard, fall   Orthopedic Precautions:N/A   Braces: N/A      Occupational Performance:    Bed Mobility:    · Patient completed Rolling/Turning to Right with stand by assistance  · Patient completed Scooting/Bridging with stand by assistance  · Patient completed Supine to Sit with stand by assistance  · Patient completed Sit to Supine with stand by assistance    Functional Mobility/Transfers:  · Patient completed Sit <> Stand Transfer with contact guard assistance  with  rolling walker   · Functional Mobility: Pt walked ~30 ft CGA with RW.     Activities of Daily Living:  · Upper Body Dressing: minimum assistance to don gown  · Lower Body Dressing: contact guard assistance to don and doff socks    Physical Exam:  Upper Extremity Range of Motion:     -       Right Upper Extremity: WFL  -       Left Upper Extremity: WFL  Upper Extremity Strength:    -       Right Upper Extremity: WFL  -       Left Upper Extremity: WFL    AMPAC 6 Click ADL:  AMPAC Total Score: 20    Treatment & Education:  -UE ROM/MMT  -addressed roles and expectations  -educated on safety when doing transfers and ADLs  -educated the importance of OOB activity to promote strength, endurance, and breathing.  -discussed POC and D/C plan    Education:    Patient left supine with call button in reach    GOALS:   Multidisciplinary Problems     Occupational Therapy Goals        Problem: Occupational Therapy Goal    Goal Priority Disciplines Outcome Interventions   Occupational Therapy Goal     OT, PT/OT     Description:  Goals to be met by: 5/26/19    Patient will increase functional independence with ADLs by performing:    Grooming while at the sink with Stand-by Assistance.  Toileting from toilet with Stand-by Assistance for hygiene and clothing management.   Supine to sit with [place].  Toilet transfer to bedside toilet with Stand-by Assistance.                      History:     Past Medical History:   Diagnosis Date    Cirrhosis     Esophageal varices 2/26/2018    Small with no banding 07/17     Essential hypertension 2018    Fatty liver 2018    GERD (gastroesophageal reflux disease)     Hx of colonic polyps 2018    On colonoscopy     Hypertension     Hypothyroidism 2018    Kidney stones     Morbid obesity 2018    Lap band with subsequent release    KADEEM (obstructive sleep apnea) 2018    Osteoarthritis 2018    Pulmonary nodule 2018    Vitamin D deficiency 2018       Past Surgical History:   Procedure Laterality Date     SECTION      TIMES 2     CHOLECYSTECTOMY      LAPAROSCOPIC     CYSTOSCOPY W/ STONE MANIPULATION      kidney stone removal    EGD (ESOPHAGOGASTRODUODENOSCOPY) N/A 2019    Performed by Davian Hern[facility]dez [facility] at [facility] (2ND F[facility])    LAPAROSCOPIC GASTRIC BANDING  2006    removal     LIVER BIOPSY  2017    KESSLER with bridging 17       Time Tracking:     OT Date of Treatment: 19  OT Start Time: 1256  OT Stop Time: 1317  OT Total Time (min): 21 min    Billable Minutes:Evaluation 21    ROSA Almanza  2019

## 2019-05-20 ENCOUNTER — TELEPHONE (OUTPATIENT)
Dept: TRANSPLANT | Facility: CLINIC | Age: 49
End: 2019-05-20

## 2019-05-20 LAB
ALBUMIN SERPL BCP-MCNC: 1.9 G/DL (ref 3.5–5.2)
ALP SERPL-CCNC: 130 U/L (ref 55–135)
ALT SERPL W/O P-5'-P-CCNC: 33 U/L (ref 10–44)
ANION GAP SERPL CALC-SCNC: 8 MMOL/L (ref 8–16)
AST SERPL-CCNC: 82 U/L (ref 10–40)
BASOPHILS # BLD AUTO: 0.03 K/UL (ref 0–0.2)
BASOPHILS NFR BLD: 0.8 % (ref 0–1.9)
BILIRUB SERPL-MCNC: 7.6 MG/DL (ref 0.1–1)
BUN SERPL-MCNC: 18 MG/DL (ref 6–20)
CALCIUM SERPL-MCNC: 7.9 MG/DL (ref 8.7–10.5)
CHLORIDE SERPL-SCNC: 93 MMOL/L (ref 95–110)
CO2 SERPL-SCNC: 21 MMOL/L (ref 23–29)
CORTIS SERPL-MCNC: 11.8 UG/DL
CREAT SERPL-MCNC: 0.8 MG/DL (ref 0.5–1.4)
DIFFERENTIAL METHOD: ABNORMAL
EOSINOPHIL # BLD AUTO: 0.3 K/UL (ref 0–0.5)
EOSINOPHIL NFR BLD: 8.2 % (ref 0–8)
ERYTHROCYTE [DISTWIDTH] IN BLOOD BY AUTOMATED COUNT: 18.6 % (ref 11.5–14.5)
EST. GFR  (AFRICAN AMERICAN): >60 ML/MIN/1.73 M^2
EST. GFR  (NON AFRICAN AMERICAN): >60 ML/MIN/1.73 M^2
GLUCOSE SERPL-MCNC: 77 MG/DL (ref 70–110)
HCT VFR BLD AUTO: 22.1 % (ref 37–48.5)
HGB BLD-MCNC: 7.4 G/DL (ref 12–16)
IMM GRANULOCYTES # BLD AUTO: 0.01 K/UL (ref 0–0.04)
IMM GRANULOCYTES NFR BLD AUTO: 0.3 % (ref 0–0.5)
INR PPP: 1.8 (ref 0.8–1.2)
LYMPHOCYTES # BLD AUTO: 0.6 K/UL (ref 1–4.8)
LYMPHOCYTES NFR BLD: 16.9 % (ref 18–48)
MAGNESIUM SERPL-MCNC: 1.9 MG/DL (ref 1.6–2.6)
MCH RBC QN AUTO: 31.1 PG (ref 27–31)
MCHC RBC AUTO-ENTMCNC: 33.5 G/DL (ref 32–36)
MCV RBC AUTO: 93 FL (ref 82–98)
MONOCYTES # BLD AUTO: 0.4 K/UL (ref 0.3–1)
MONOCYTES NFR BLD: 10 % (ref 4–15)
NEUTROPHILS # BLD AUTO: 2.4 K/UL (ref 1.8–7.7)
NEUTROPHILS NFR BLD: 63.8 % (ref 38–73)
NRBC BLD-RTO: 0 /100 WBC
PHOSPHATE SERPL-MCNC: 3.6 MG/DL (ref 2.7–4.5)
PLATELET # BLD AUTO: 66 K/UL (ref 150–350)
PMV BLD AUTO: 12.8 FL (ref 9.2–12.9)
POTASSIUM SERPL-SCNC: 4.1 MMOL/L (ref 3.5–5.1)
PROT SERPL-MCNC: 5.4 G/DL (ref 6–8.4)
PROTHROMBIN TIME: 17.6 SEC (ref 9–12.5)
RBC # BLD AUTO: 2.38 M/UL (ref 4–5.4)
SODIUM SERPL-SCNC: 121 MMOL/L (ref 136–145)
SODIUM SERPL-SCNC: 121 MMOL/L (ref 136–145)
SODIUM SERPL-SCNC: 122 MMOL/L (ref 136–145)
WBC # BLD AUTO: 3.79 K/UL (ref 3.9–12.7)

## 2019-05-20 PROCEDURE — 97110 THERAPEUTIC EXERCISES: CPT | Mod: NTX

## 2019-05-20 PROCEDURE — 97116 GAIT TRAINING THERAPY: CPT | Mod: NTX

## 2019-05-20 PROCEDURE — 63600175 PHARM REV CODE 636 W HCPCS: Mod: NTX | Performed by: HOSPITALIST

## 2019-05-20 PROCEDURE — 84100 ASSAY OF PHOSPHORUS: CPT | Mod: NTX

## 2019-05-20 PROCEDURE — 25000003 PHARM REV CODE 250: Mod: NTX | Performed by: HOSPITALIST

## 2019-05-20 PROCEDURE — 82533 TOTAL CORTISOL: CPT | Mod: NTX

## 2019-05-20 PROCEDURE — 20600001 HC STEP DOWN PRIVATE ROOM: Mod: NTX

## 2019-05-20 PROCEDURE — 99233 SBSQ HOSP IP/OBS HIGH 50: CPT | Mod: NTX,,, | Performed by: HOSPITALIST

## 2019-05-20 PROCEDURE — 80053 COMPREHEN METABOLIC PANEL: CPT | Mod: NTX

## 2019-05-20 PROCEDURE — 83735 ASSAY OF MAGNESIUM: CPT | Mod: NTX

## 2019-05-20 PROCEDURE — 85025 COMPLETE CBC W/AUTO DIFF WBC: CPT | Mod: NTX

## 2019-05-20 PROCEDURE — 36415 COLL VENOUS BLD VENIPUNCTURE: CPT | Mod: NTX

## 2019-05-20 PROCEDURE — 84295 ASSAY OF SERUM SODIUM: CPT | Mod: 91,NTX

## 2019-05-20 PROCEDURE — 85610 PROTHROMBIN TIME: CPT | Mod: NTX

## 2019-05-20 PROCEDURE — 99233 PR SUBSEQUENT HOSPITAL CARE,LEVL III: ICD-10-PCS | Mod: NTX,,, | Performed by: HOSPITALIST

## 2019-05-20 RX ORDER — ACETAMINOPHEN 500 MG
1 TABLET ORAL DAILY
Status: ON HOLD | COMMUNITY
End: 2019-06-11 | Stop reason: HOSPADM

## 2019-05-20 RX ORDER — TOLVAPTAN 15 MG/1
15 TABLET ORAL DAILY
Status: DISCONTINUED | OUTPATIENT
Start: 2019-05-20 | End: 2019-05-22

## 2019-05-20 RX ORDER — LACTULOSE 10 G/15ML
15 SOLUTION ORAL 3 TIMES DAILY
Status: DISCONTINUED | OUTPATIENT
Start: 2019-05-20 | End: 2019-05-23

## 2019-05-20 RX ADMIN — PHYTONADIONE 10 MG: 10 INJECTION, EMULSION INTRAMUSCULAR; INTRAVENOUS; SUBCUTANEOUS at 08:05

## 2019-05-20 RX ADMIN — RIFAXIMIN 550 MG: 550 TABLET ORAL at 08:05

## 2019-05-20 RX ADMIN — PANTOPRAZOLE SODIUM 40 MG: 40 TABLET, DELAYED RELEASE ORAL at 08:05

## 2019-05-20 RX ADMIN — LEVOTHYROXINE SODIUM 112 MCG: 112 TABLET ORAL at 05:05

## 2019-05-20 RX ADMIN — TOLVAPTAN 15 MG: 15 TABLET ORAL at 01:05

## 2019-05-20 RX ADMIN — LACTULOSE 20 G: 20 SOLUTION ORAL at 08:05

## 2019-05-20 NOTE — PLAN OF CARE
Problem: Adult Inpatient Plan of Care  Goal: Plan of Care Review  Outcome: Ongoing (interventions implemented as appropriate)  Patient started on Tolvaptan with orders to monitor Sodium labs and notify physician for changes in vitals, mental status, or sodium increases. Lactulose held for 4 bowel movements.

## 2019-05-20 NOTE — PLAN OF CARE
Problem: Adult Inpatient Plan of Care  Goal: Plan of Care Review  Outcome: Ongoing (interventions implemented as appropriate)  Plan of care reviewed with patient. No acute changes overnight.  Maintaining 500 ML fluid restriction. Safety maintained, call light in reach, bed in lowest position, will continue to monitor.

## 2019-05-20 NOTE — PLAN OF CARE
Problem: Physical Therapy Goal  Goal: Physical Therapy Goal  Goals to be met by: 19     Patient will increase functional independence with mobility by performin. Supine to sit with [place].  2. Sit to supine with [place].  3. Sit to stand transfer with Supervision.  4. Bed to chair transfer with Supervision using Single-point Cane.  5. Gait  x 50 feet with Supervision using Single-point Cane.   6. Ascend/Descend 6 inch curb step with Supervision using Single-point Cane.  7. Lower extremity exercise program x 20 reps per handout, with assistance as needed.     Outcome: Ongoing (interventions implemented as appropriate)  Pt. Progressing towards goals

## 2019-05-20 NOTE — PROGRESS NOTES
[facility] Pulmonology [facility] Critical Care Fellow Progress Note:  - remains stable on 2.5LPM per NC.  Large residual pleural effusion remains.  Despite hyponatremia, which is almost certainly the result of her liver disease, patient needs to be actively diuresed.  Loop diuretics in the setting of Heart or Liver Failure can improve serum sodium, as correction of volume status aides in perfusion of kidneys and normalizes mineralocorticoid abnormalities. This, in turn, will help increase serum sodium concentration.    Farooq Kwan MD  [facility] Pulmonology/Critical Care, HO-IV  3:01 PM 05/20/2019

## 2019-05-20 NOTE — PROGRESS NOTES
Progress Note  Hospital Medicine  [facility], [facility]       Patient Name: Jihan Zamudio  MRN:  [id number]  Hospital Medicine Team: [facility] Blane Gottlieb MD  Date of Admission:  5/16/2019     Length of Stay:  LOS: 4 days   Expected Discharge Date: 5/25/2019  Principal Problem:  Acute hypoxemic respiratory failure     Subjective:     Interval History/Overnight Events:    - patient states feeling much better today; denies SOB; working with PT; sodium is still 122 despite fluid restriction, plan on starting tolvaptan today with a goal to get sodium to 125 to be able to be listed; patient will be discussed at IR conference tomorrow to see if pulmonary nodules need to be biopsied, unlikely; likely listing by either tomorrow or Wednesday         lactulose  15 g Oral TID    levothyroxine  112 mcg Oral Daily    pantoprazole  40 mg Oral Daily    rifAXImin  550 mg Oral BID    tolvaptan  15 mg Oral Daily           acetaminophen, cyclobenzaprine, dextrose 50%, dextrose 50%, glucagon (human recombinant), glucose, glucose, ondansetron, sodium chloride 0.9%, sodium chloride 0.9%, trazodone    Review of Systems   Constitutional: Negative for chills, fatigue, fever.   HENT: Negative for sore throat, trouble swallowing.    Eyes: Negative for photophobia, visual disturbance.   Respiratory: Negative for cough, shortness of breath.    Cardiovascular: Negative for chest pain, palpitations, leg swelling.   Gastrointestinal: Negative for abdominal pain, constipation, diarrhea, nausea, vomiting.   Endocrine: Negative for cold intolerance, heat intolerance.   Genitourinary: Negative for dysuria, frequency.   Musculoskeletal: Negative for arthralgias, myalgias.   Skin: Negative for rash, wound, erythema   Neurological: Negative for dizziness, syncope, weakness, light-headedness.   Psychiatric/Behavioral: Negative for confusion, hallucinations, anxiety  All other systems reviewed and are  negative.    Objective:     Temp:  [98.5 °F (36.9 °C)-99.1 °F (37.3 °C)]   Pulse:  [86-96]   Resp:  [16-18]   BP: (105-130)/(51-63)   SpO2:  [93 %-97 %]       Physical Exam:  Constitutional: appears older than stated age, chronically ill looking,  non-distressed, not diaphoretic.   HENT: NC/AT, external ears normal, oropharynx clear, MMM w/o exudates.   Eyes: PERRL, EOMI, conjunctiva normal, no discharge b/l, +scleral icterus   Neck: normal ROM, supple  CV: RRR, no m/r/g, no carotid bruits, +2 peripheral pulses.  Pulmonary/Chest wall: Breathing comfortably +distress   Decreased breath sounds at lung bases/ absent breath sounds in right lung  GI: Soft, non-tender, (+) BS, (+) BM   +distended  Musculoskeletal: Normal ROM, no atrophy,  + pitting edema in LE bilaterally   Neurological: AAO x 4, CN II-XI in tact, nl sensation, nl strength/tone  No asterixis   Skin: warm, dry   +pallor, jaundice, + spider angiomas, +bruising   Psych: normal mood and affect, normal behavior, thought content and judgement.    Labs:    Chemistries:   Recent Labs   Lab 05/18/19  0641 05/19/19  0427 05/20/19  0258 05/20/19  0834   * 122* 122* 121*   K 3.7 3.8 4.1  --    CL 92* 93* 93*  --    CO2 22* 22* 21*  --    BUN 14 16 18  --    CREATININE 0.9 0.8 0.8  --    CALCIUM 8.0* 8.2* 7.9*  --    PROT 5.7* 5.6* 5.4*  --    BILITOT 10.2* 8.9* 7.6*  --    ALKPHOS 115 141* 130  --    ALT 32 31 33  --    AST 79* 76* 82*  --    MG 1.8 1.9 1.9  --    PHOS 3.5 3.5 3.6  --         WBC:   Recent Labs   Lab 05/16/19  1754 05/17/19  0518 05/18/19 0641 05/19/19 0427 05/20/19 0258   WBC 5.66 5.00 4.70 4.59 3.79*     Bands:     CBC/Anemia Labs: Coags:    Recent Labs   Lab 05/18/19 0641 05/19/19 0427 05/20/19 0258   WBC 4.70 4.59 3.79*   HGB 7.8* 7.5* 7.4*   HCT 22.5* 21.9* 22.1*   PLT 52* 57* 66*   MCV 90 89 93   RDW 18.6* 18.6* 18.6*    Recent Labs   Lab 05/16/19  1923  05/18/19  0641 05/19/19 0427 05/20/19  0258   INR  --    < > 2.0* 1.9* 1.8*    APTT 32.0  --   --   --   --     < > = values in this interval not displayed.        POCT Glucose: HbA1c:    No results for input(s): POCTGLUCOSE in the last 168 hours. No results found for: HGBA1C     Diagnostic Results:        Assessment and Plan     Hospital Course:    Ms. 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.81, K74.60]  Yes    Hyponatremia [E87.1]  Yes    Hepatic encephalopathy [K72.90]  Yes    Coagulopathy [D68.9]  Yes    Acute blood loss anemia [D62]  Yes    Decompensated hepatic cirrhosis [K72.90]  Yes     From KESSLER    Liver biopsy 11/29/17- KESSLER with bridging fibrosis, Laura, interface activity; lymph and occ plasma cells      Esophageal varices [I85.00]  Yes     Small with no banding 07/17      Essential hypertension [I10]  Yes    Hypothyroidism [E03.9]  Yes    GERD (gastroesophageal reflux disease) [K21.9]  Yes    Morbid obesity [E66.01]  Yes     Lap band 2006 (lost 75-80 lbs) with subsequent release (2009) (due to obstruction)        Resolved Hospital Problems   No resolved problems to display.       Acute Hypoxemic Respiratory Failure 2/2 Pleural Effusion  Hepatic Hydrothorax  · Satting 88% on RA that improved to 95% with 2L NC  · R sided hydrothorax  · Stop Levaquin as there is no suspicion for infection  · S/p thora on 5/17 with pulm, 1.5 L removed   · CT chest completed and shows stable pulmonary nodules; may need symptomatic thoracentesis tomorrow      Decompensated Liver Disease  KESSLER Cirrhosis  MELD-Na score: 29 at 5/20/2019  8:34 AM  MELD score: 21 at 5/20/2019  2:58 AM  Calculated from:  Serum Creatinine: 0.8 mg/dL (Rounded to 1 mg/dL) at 5/20/2019  2:58 AM  Serum Sodium: 121 mmol/L (Rounded to 125 mmol/L)  at 5/20/2019  8:34 AM  Total Bilirubin: 7.6 mg/dL at 5/20/2019  2:58 AM  INR(ratio): 1.8 at 5/20/2019  2:58 AM  Age: 49 years  · Hepatology consulted, appreciate assistance  · Previously approved for liver transplant pending re-evaluation of pulmonary nodule with prior outside hospital CT scans  · Abdomen soft and patient says it feels around baseline so will hold on para  · Check PETH  · Low Sodium diet with 1L fluid restriction  · Hold diuretics due to hyponatremia  · Start Lactulose, titrate 3-4 BM/day  · Start Rifaximin 550mg PO BID  · Has history of pulmonary nodule and outside CT scans were supposed to be reviewed   · Awaiting financial clearance for listing      Esophageal Varices  · Chronic and stable  · Monitor     Anemia  · Hgb 8, baseline 8-9  · Monitor for bleeding     Coagulopathy  · 2/2 liver disease  · Monitor for bleeding     Hyponatremia  · Sodium 122 2/2 chronic liver disease  · On 500 cc fluid restriction  · Will start patient on tolvaptan today and stop fluid restriction      Hypokalemia  · K 3.2 2/2 low Mag  · Replace     Hypomagnesemia  · Mag 1.5  · Replace     Thrombocytopenia  · Plt chronically low 2/2 coagulopathy from liver disease  · Monitor for bleeding     Hypothyroidism  · Chronic and stable  · Continue Synthroid 112mcg PO daily     GERD  · Chronic and stable  · Continue PPI     Morbid Obesity  · Body mass index is 38.45 kg/m².      Diet:  Low Na with fluid restriction   GI PPx:  PO PPI  DVT PPx:  SCDs due to coagulopathy   Goals of Care:  Full     High Risk Conditions:  resp failure     Disposition:    - likely too sick to leave prior to a liver transplant; awaiting financial clearance for listing; need sodium to be 125 for listing

## 2019-05-20 NOTE — PHARMACY MED REC
"Admission Medication Reconciliation - Pharmacy Consult Note    The home medication history was taken by Cynthia Mehta, Pharmacy Tech.     You may go to "Admission" then "Reconcile Home Medications" tabs to review and/or act upon these items.      No issues noted with the medication reconciliation.      Potential issues to be addressed PRIOR TO DISCHARGE  o Patient request for refills  o Lactulose    Lorna Partida, PharmD, BCPS  [id number]                .    .            "

## 2019-05-20 NOTE — TELEPHONE ENCOUNTER
..  Patient: Jihan Zamudio       MRN: [id number]      : 1970     Age: 49 y.o.  [id number]  [place] [address]    Provider: Hepatologist - Mode/Selin    Urgency of review: urgent    Patient Transplant Status: In Evaluation    Reason for presentation: Other Comparison of lung nodule in spaced studies.    Clinical Summary: 50 yo F with hx of KESSLER cirrhosis approved for liver txp pending clarification of lung nodule..     Imaging to be reviewed: Chest CT scans    HCC Treatment History: N/A    ABO: O POS    Platelets:   Lab Results   Component Value Date/Time    PLT 66 (L) 2019 02:58 AM     Creatinine:   Lab Results   Component Value Date/Time    CREATININE 0.8 2019 02:58 AM     Bilirubin:   Lab Results   Component Value Date/Time    BILITOT 7.6 (H) 2019 02:58 AM     AFP Last 3 each if available:   Lab Results   Component Value Date/Time    AFP 2.7 2019 03:21 AM    AFP 6.2 2018 12:50 PM       MELD: MELD-Na score: 29 at 2019  8:34 AM  MELD score: 21 at 2019  2:58 AM  Calculated from:  Serum Creatinine: 0.8 mg/dL (Rounded to 1 mg/dL) at 2019  2:58 AM  Serum Sodium: 121 mmol/L (Rounded to 125 mmol/L) at 2019  8:34 AM  Total Bilirubin: 7.6 mg/dL at 2019  2:58 AM  INR(ratio): 1.8 at 2019  2:58 AM  Age: 49 years    Plan:     Follow-up Provider:

## 2019-05-20 NOTE — PT/OT/SLP PROGRESS
Physical Therapy Treatment    Patient Name:  Jihan Zamudio   MRN:  [id number]    Recommendations:     Discharge Recommendations:  home health PT   Discharge Equipment Recommendations: none   Barriers to discharge: None    Assessment:     Jihan Zamudio is a 49 y.o. female admitted with a medical diagnosis of Acute hypoxemic respiratory failure.  She presents with the following impairments/functional limitations:  weakness, impaired endurance, impaired functional mobilty, gait instability, impaired balance Pt. cooperative and tolerated treatment well. Pt. progressing with mobility. Pt. would benefit from continued PT to address functional deficits.    Rehab Prognosis: Good; patient would benefit from acute skilled PT services to address these deficits and reach maximum level of function.    Recent Surgery: * No surgery found *      Plan:     During this hospitalization, patient to be seen 3 x/week to address the identified rehab impairments via gait training, therapeutic activities, therapeutic exercises and progress toward the following goals:    · Plan of Care Expires:  06/13/19    Subjective     Chief Complaint: weakness  Patient/Family Comments/goals: to get stronger  Pain/Comfort:  · Pain Rating 1: 0/10  · Pain Rating Post-Intervention 1: 0/10      Objective:     Communicated with nursing prior to session.  Patient found supine with oxygen, peripheral IV, pulse ox (continuous), telemetry(3L oxygen) upon PT entry to room.     General Precautions: Standard, fall   Orthopedic Precautions:N/A   Braces:       Functional Mobility:  · Bed Mobility:     · Rolling Left:  stand by assistance  · Scooting: stand by assistance  · Supine to Sit: contact guard assistance  · Transfers:     · Sit to Stand:  contact guard assistance with rolling walker  · Gait: 100' with RW and portable oxygen @ 3L with CGA and x3 standing rest breaks. Pt. amb. with decreased step length/yandel.  · Balance: fair      AM-PAC 6 CLICK  MOBILITY  Turning over in bed (including adjusting bedclothes, sheets and blankets)?: 3  Sitting down on and standing up from a chair with arms (e.g., wheelchair, bedside commode, etc.): 3  Moving from lying on back to sitting on the side of the bed?: 3  Moving to and from a bed to a chair (including a wheelchair)?: 3  Need to walk in hospital room?: 3  Climbing 3-5 steps with a railing?: 3  Basic Mobility Total Score: 18       Therapeutic Activities and Exercises:   Pt. Performed (B) LE therex x15 reps seated on EOB with AROM. Discussed pt.'s progress, goals, and POC.    Patient left seated EOB with tray table in front with all lines intact and call button in reach..    GOALS:   Multidisciplinary Problems     Physical Therapy Goals        Problem: Physical Therapy Goal    Goal Priority Disciplines Outcome Goal Variances Interventions   Physical Therapy Goal     PT, PT/OT Ongoing (interventions implemented as appropriate)     Description:  Goals to be met by: 19     Patient will increase functional independence with mobility by performin. Supine to sit with Gary.  2. Sit to supine with Gary.  3. Sit to stand transfer with Supervision.  4. Bed to chair transfer with Supervision using Single-point Cane.  5. Gait  x 50 feet with Supervision using Single-point Cane.   6. Ascend/Descend 6 inch curb step with Supervision using Single-point Cane.  7. Lower extremity exercise program x 20 reps per handout, with assistance as needed.                      Time Tracking:     PT Received On: 19  PT Start Time: 1113     PT Stop Time: 1136  PT Total Time (min): 23 min     Billable Minutes: Gait Training 15 and Therapeutic Exercise 8    Treatment Type: Treatment  PT/PTA: PT           Theodore Coles, PT  2019

## 2019-05-21 ENCOUNTER — CONFERENCE (OUTPATIENT)
Dept: TRANSPLANT | Facility: CLINIC | Age: 49
End: 2019-05-21

## 2019-05-21 LAB
ALBUMIN SERPL BCP-MCNC: 2 G/DL (ref 3.5–5.2)
ALP SERPL-CCNC: 131 U/L (ref 55–135)
ALT SERPL W/O P-5'-P-CCNC: 36 U/L (ref 10–44)
ANION GAP SERPL CALC-SCNC: 6 MMOL/L (ref 8–16)
AST SERPL-CCNC: 91 U/L (ref 10–40)
BACTERIA BLD CULT: NORMAL
BACTERIA BLD CULT: NORMAL
BACTERIA SPEC AEROBE CULT: NO GROWTH
BASOPHILS # BLD AUTO: 0.01 K/UL (ref 0–0.2)
BASOPHILS NFR BLD: 0.3 % (ref 0–1.9)
BILIRUB SERPL-MCNC: 7.6 MG/DL (ref 0.1–1)
BUN SERPL-MCNC: 18 MG/DL (ref 6–20)
CALCIUM SERPL-MCNC: 7.7 MG/DL (ref 8.7–10.5)
CHLORIDE SERPL-SCNC: 91 MMOL/L (ref 95–110)
CO2 SERPL-SCNC: 23 MMOL/L (ref 23–29)
CREAT SERPL-MCNC: 0.8 MG/DL (ref 0.5–1.4)
DIFFERENTIAL METHOD: ABNORMAL
EOSINOPHIL # BLD AUTO: 0.3 K/UL (ref 0–0.5)
EOSINOPHIL NFR BLD: 9.4 % (ref 0–8)
ERYTHROCYTE [DISTWIDTH] IN BLOOD BY AUTOMATED COUNT: 18.6 % (ref 11.5–14.5)
EST. GFR  (AFRICAN AMERICAN): >60 ML/MIN/1.73 M^2
EST. GFR  (NON AFRICAN AMERICAN): >60 ML/MIN/1.73 M^2
GLUCOSE SERPL-MCNC: 80 MG/DL (ref 70–110)
HCT VFR BLD AUTO: 22.3 % (ref 37–48.5)
HGB BLD-MCNC: 7.6 G/DL (ref 12–16)
IMM GRANULOCYTES # BLD AUTO: 0.01 K/UL (ref 0–0.04)
IMM GRANULOCYTES NFR BLD AUTO: 0.3 % (ref 0–0.5)
INR PPP: 1.8 (ref 0.8–1.2)
LYMPHOCYTES # BLD AUTO: 0.6 K/UL (ref 1–4.8)
LYMPHOCYTES NFR BLD: 19.1 % (ref 18–48)
MAGNESIUM SERPL-MCNC: 1.8 MG/DL (ref 1.6–2.6)
MCH RBC QN AUTO: 31.3 PG (ref 27–31)
MCHC RBC AUTO-ENTMCNC: 34.1 G/DL (ref 32–36)
MCV RBC AUTO: 92 FL (ref 82–98)
MONOCYTES # BLD AUTO: 0.3 K/UL (ref 0.3–1)
MONOCYTES NFR BLD: 10.4 % (ref 4–15)
NEUTROPHILS # BLD AUTO: 1.8 K/UL (ref 1.8–7.7)
NEUTROPHILS NFR BLD: 60.5 % (ref 38–73)
NRBC BLD-RTO: 0 /100 WBC
PHOSPHATE SERPL-MCNC: 3.6 MG/DL (ref 2.7–4.5)
PLATELET # BLD AUTO: 50 K/UL (ref 150–350)
PMV BLD AUTO: 10.4 FL (ref 9.2–12.9)
POTASSIUM SERPL-SCNC: 4.3 MMOL/L (ref 3.5–5.1)
PROT SERPL-MCNC: 5.6 G/DL (ref 6–8.4)
PROTHROMBIN TIME: 17.4 SEC (ref 9–12.5)
RBC # BLD AUTO: 2.43 M/UL (ref 4–5.4)
SODIUM SERPL-SCNC: 117 MMOL/L (ref 136–145)
SODIUM SERPL-SCNC: 120 MMOL/L (ref 136–145)
SODIUM UR-SCNC: <20 MMOL/L (ref 20–250)
WBC # BLD AUTO: 2.98 K/UL (ref 3.9–12.7)

## 2019-05-21 PROCEDURE — 85610 PROTHROMBIN TIME: CPT | Mod: NTX

## 2019-05-21 PROCEDURE — 25000003 PHARM REV CODE 250: Mod: NTX | Performed by: HOSPITALIST

## 2019-05-21 PROCEDURE — 80053 COMPREHEN METABOLIC PANEL: CPT | Mod: NTX

## 2019-05-21 PROCEDURE — 99233 PR SUBSEQUENT HOSPITAL CARE,LEVL III: ICD-10-PCS | Mod: NTX,,, | Performed by: HOSPITALIST

## 2019-05-21 PROCEDURE — 85025 COMPLETE CBC W/AUTO DIFF WBC: CPT | Mod: NTX

## 2019-05-21 PROCEDURE — 84300 ASSAY OF URINE SODIUM: CPT | Mod: NTX

## 2019-05-21 PROCEDURE — 84295 ASSAY OF SERUM SODIUM: CPT | Mod: 91,NTX

## 2019-05-21 PROCEDURE — 99233 SBSQ HOSP IP/OBS HIGH 50: CPT | Mod: NTX,,, | Performed by: HOSPITALIST

## 2019-05-21 PROCEDURE — 84295 ASSAY OF SERUM SODIUM: CPT | Mod: NTX

## 2019-05-21 PROCEDURE — 84100 ASSAY OF PHOSPHORUS: CPT | Mod: NTX

## 2019-05-21 PROCEDURE — 20600001 HC STEP DOWN PRIVATE ROOM: Mod: NTX

## 2019-05-21 PROCEDURE — 36415 COLL VENOUS BLD VENIPUNCTURE: CPT | Mod: NTX

## 2019-05-21 PROCEDURE — 83735 ASSAY OF MAGNESIUM: CPT | Mod: NTX

## 2019-05-21 RX ORDER — DICYCLOMINE HYDROCHLORIDE 20 MG/1
20 TABLET ORAL ONCE
Status: COMPLETED | OUTPATIENT
Start: 2019-05-21 | End: 2019-05-21

## 2019-05-21 RX ADMIN — RIFAXIMIN 550 MG: 550 TABLET ORAL at 10:05

## 2019-05-21 RX ADMIN — Medication 10 ML: at 01:05

## 2019-05-21 RX ADMIN — LEVOTHYROXINE SODIUM 112 MCG: 112 TABLET ORAL at 05:05

## 2019-05-21 RX ADMIN — DICYCLOMINE HYDROCHLORIDE 20 MG: 20 TABLET ORAL at 01:05

## 2019-05-21 RX ADMIN — LACTULOSE 15 G: 20 SOLUTION ORAL at 08:05

## 2019-05-21 RX ADMIN — RIFAXIMIN 550 MG: 550 TABLET ORAL at 08:05

## 2019-05-21 RX ADMIN — PANTOPRAZOLE SODIUM 40 MG: 40 TABLET, DELAYED RELEASE ORAL at 08:05

## 2019-05-21 RX ADMIN — TOLVAPTAN 15 MG: 15 TABLET ORAL at 10:05

## 2019-05-21 NOTE — PLAN OF CARE
05/21/19 1410   Discharge Reassessment   Assessment Type Discharge Planning Reassessment   Discharge Plan A Home with family;Home Health   Discharge Plan B Home with family     Awaiting financial clearance for listing.

## 2019-05-21 NOTE — PLAN OF CARE
Problem: Adult Inpatient Plan of Care  Goal: Plan of Care Review  Outcome: Ongoing (interventions implemented as appropriate)  Patient started on bentyl and dukes solution for stomach upset. Afternoon dose of lactulose held for four bowel movements. Sodium is critical at 117.

## 2019-05-21 NOTE — PLAN OF CARE
Problem: Adult Inpatient Plan of Care  Goal: Plan of Care Review  Outcome: Ongoing (interventions implemented as appropriate)  Plan of care reviewed with patient. No acute changes overnight. Safety maintained, bed in lowest position, call light in reach, will continue to monitor.

## 2019-05-21 NOTE — TELEPHONE ENCOUNTER
..  Patient: Jihan Zamudio       MRN: [id number]      : 1970     Age: 49 y.o.  [id number]  [place] [address]    Provider: Hepatologist - Mode/Selin    Urgency of review: urgent    Patient Transplant Status: In Evaluation    Reason for presentation: Other Comparison of lung nodule in spaced studies.    Clinical Summary: 50 yo F with hx of KESSLER cirrhosis approved for liver txp pending clarification of lung nodule..     Imaging to be reviewed: Chest CT scans    HCC Treatment History: N/A    ABO: O POS    Platelets:   Lab Results   Component Value Date/Time    PLT 66 (L) 2019 02:58 AM     Creatinine:   Lab Results   Component Value Date/Time    CREATININE 0.8 2019 02:58 AM     Bilirubin:   Lab Results   Component Value Date/Time    BILITOT 7.6 (H) 2019 02:58 AM     AFP Last 3 each if available:   Lab Results   Component Value Date/Time    AFP 2.7 2019 03:21 AM    AFP 6.2 2018 12:50 PM       MELD: MELD-Na score: 29 at 2019  5:38 PM  MELD score: 21 at 2019  2:58 AM  Calculated from:  Serum Creatinine: 0.8 mg/dL (Rounded to 1 mg/dL) at 2019  2:58 AM  Serum Sodium: 121 mmol/L (Rounded to 125 mmol/L) at 2019  5:38 PM  Total Bilirubin: 7.6 mg/dL at 2019  2:58 AM  INR(ratio): 1.8 at 2019  2:58 AM  Age: 49 years    Plan: Committee discussion--lung nodule--4mm; low risk of malignancy in non smoker; stable over 1 yr.  ok to list    Follow-up Provider: Misty

## 2019-05-22 LAB
ALBUMIN SERPL BCP-MCNC: 2.2 G/DL (ref 3.5–5.2)
ALP SERPL-CCNC: 136 U/L (ref 55–135)
ALT SERPL W/O P-5'-P-CCNC: 40 U/L (ref 10–44)
ANION GAP SERPL CALC-SCNC: 6 MMOL/L (ref 8–16)
AST SERPL-CCNC: 108 U/L (ref 10–40)
BASOPHILS # BLD AUTO: 0.02 K/UL (ref 0–0.2)
BASOPHILS NFR BLD: 0.7 % (ref 0–1.9)
BILIRUB SERPL-MCNC: 7.4 MG/DL (ref 0.1–1)
BNP SERPL-MCNC: 34 PG/ML (ref 0–99)
BUN SERPL-MCNC: 18 MG/DL (ref 6–20)
CALCIUM SERPL-MCNC: 7.9 MG/DL (ref 8.7–10.5)
CHLORIDE SERPL-SCNC: 89 MMOL/L (ref 95–110)
CO2 SERPL-SCNC: 23 MMOL/L (ref 23–29)
CREAT SERPL-MCNC: 1.1 MG/DL (ref 0.5–1.4)
DIFFERENTIAL METHOD: ABNORMAL
EOSINOPHIL # BLD AUTO: 0.3 K/UL (ref 0–0.5)
EOSINOPHIL NFR BLD: 9.1 % (ref 0–8)
ERYTHROCYTE [DISTWIDTH] IN BLOOD BY AUTOMATED COUNT: 18.5 % (ref 11.5–14.5)
EST. GFR  (AFRICAN AMERICAN): >60 ML/MIN/1.73 M^2
EST. GFR  (NON AFRICAN AMERICAN): 58.3 ML/MIN/1.73 M^2
GLUCOSE SERPL-MCNC: 82 MG/DL (ref 70–110)
HCT VFR BLD AUTO: 22.9 % (ref 37–48.5)
HGB BLD-MCNC: 8 G/DL (ref 12–16)
IMM GRANULOCYTES # BLD AUTO: 0.03 K/UL (ref 0–0.04)
IMM GRANULOCYTES NFR BLD AUTO: 1 % (ref 0–0.5)
INR PPP: 1.7 (ref 0.8–1.2)
LYMPHOCYTES # BLD AUTO: 0.6 K/UL (ref 1–4.8)
LYMPHOCYTES NFR BLD: 21.5 % (ref 18–48)
MAGNESIUM SERPL-MCNC: 1.9 MG/DL (ref 1.6–2.6)
MCH RBC QN AUTO: 31.3 PG (ref 27–31)
MCHC RBC AUTO-ENTMCNC: 34.9 G/DL (ref 32–36)
MCV RBC AUTO: 90 FL (ref 82–98)
MONOCYTES # BLD AUTO: 0.4 K/UL (ref 0.3–1)
MONOCYTES NFR BLD: 13.1 % (ref 4–15)
NEUTROPHILS # BLD AUTO: 1.6 K/UL (ref 1.8–7.7)
NEUTROPHILS NFR BLD: 54.6 % (ref 38–73)
NRBC BLD-RTO: 0 /100 WBC
OSMOLALITY SERPL: 260 MOSM/KG (ref 275–295)
OSMOLALITY UR: 242 MOSM/KG (ref 50–1200)
PHOSPHATE SERPL-MCNC: 3.7 MG/DL (ref 2.7–4.5)
PLATELET # BLD AUTO: 57 K/UL (ref 150–350)
PMV BLD AUTO: 12.1 FL (ref 9.2–12.9)
POTASSIUM SERPL-SCNC: 4.5 MMOL/L (ref 3.5–5.1)
PROT SERPL-MCNC: 6 G/DL (ref 6–8.4)
PROTHROMBIN TIME: 16.5 SEC (ref 9–12.5)
RBC # BLD AUTO: 2.56 M/UL (ref 4–5.4)
SODIUM SERPL-SCNC: 117 MMOL/L (ref 136–145)
SODIUM SERPL-SCNC: 118 MMOL/L (ref 136–145)
SODIUM SERPL-SCNC: 120 MMOL/L (ref 136–145)
SODIUM SERPL-SCNC: 120 MMOL/L (ref 136–145)
SODIUM SERPL-SCNC: 123 MMOL/L (ref 136–145)
SODIUM UR-SCNC: <20 MMOL/L (ref 20–250)
WBC # BLD AUTO: 2.98 K/UL (ref 3.9–12.7)

## 2019-05-22 PROCEDURE — 80053 COMPREHEN METABOLIC PANEL: CPT | Mod: NTX

## 2019-05-22 PROCEDURE — 99233 SBSQ HOSP IP/OBS HIGH 50: CPT | Mod: NTX,,, | Performed by: HOSPITALIST

## 2019-05-22 PROCEDURE — 83930 ASSAY OF BLOOD OSMOLALITY: CPT | Mod: NTX

## 2019-05-22 PROCEDURE — 97530 THERAPEUTIC ACTIVITIES: CPT | Mod: NTX

## 2019-05-22 PROCEDURE — 84295 ASSAY OF SERUM SODIUM: CPT | Mod: NTX

## 2019-05-22 PROCEDURE — 27000221 HC OXYGEN, UP TO 24 HOURS: Mod: NTX

## 2019-05-22 PROCEDURE — 99233 PR SUBSEQUENT HOSPITAL CARE,LEVL III: ICD-10-PCS | Mod: NTX,,, | Performed by: HOSPITALIST

## 2019-05-22 PROCEDURE — 84100 ASSAY OF PHOSPHORUS: CPT | Mod: NTX

## 2019-05-22 PROCEDURE — 25000003 PHARM REV CODE 250: Mod: NTX | Performed by: HOSPITALIST

## 2019-05-22 PROCEDURE — 83735 ASSAY OF MAGNESIUM: CPT | Mod: NTX

## 2019-05-22 PROCEDURE — 84295 ASSAY OF SERUM SODIUM: CPT | Mod: 91,NTX

## 2019-05-22 PROCEDURE — 36415 COLL VENOUS BLD VENIPUNCTURE: CPT | Mod: NTX

## 2019-05-22 PROCEDURE — 85610 PROTHROMBIN TIME: CPT | Mod: NTX

## 2019-05-22 PROCEDURE — 83935 ASSAY OF URINE OSMOLALITY: CPT | Mod: NTX

## 2019-05-22 PROCEDURE — 85025 COMPLETE CBC W/AUTO DIFF WBC: CPT | Mod: NTX

## 2019-05-22 PROCEDURE — 84300 ASSAY OF URINE SODIUM: CPT | Mod: NTX

## 2019-05-22 PROCEDURE — 83880 ASSAY OF NATRIURETIC PEPTIDE: CPT | Mod: NTX

## 2019-05-22 PROCEDURE — 20600001 HC STEP DOWN PRIVATE ROOM: Mod: NTX

## 2019-05-22 PROCEDURE — 94761 N-INVAS EAR/PLS OXIMETRY MLT: CPT | Mod: NTX

## 2019-05-22 RX ADMIN — PANTOPRAZOLE SODIUM 40 MG: 40 TABLET, DELAYED RELEASE ORAL at 09:05

## 2019-05-22 RX ADMIN — RIFAXIMIN 550 MG: 550 TABLET ORAL at 09:05

## 2019-05-22 RX ADMIN — LEVOTHYROXINE SODIUM 112 MCG: 112 TABLET ORAL at 06:05

## 2019-05-22 RX ADMIN — RIFAXIMIN 550 MG: 550 TABLET ORAL at 08:05

## 2019-05-22 NOTE — PT/OT/SLP PROGRESS
Occupational Therapy   Treatment    Name: Jihan Zamudio  MRN: [id number]  Admitting Diagnosis:  Acute hypoxemic respiratory failure       Recommendations:     Discharge Recommendations: home with home health  Discharge Equipment Recommendations:  none  Barriers to discharge:  None    Assessment:     Jihan Zamudio is a 49 y.o. female with a medical diagnosis of Acute hypoxemic respiratory failure.  She presents with weakness and dizziness which inhibits her functional mobility and ability to perform ADLs. Pt was resistant to therapy stating she was up all night and was tired. When walking, pt needed to stop for a moment because she felt like her knees were buckling. Pt refused to sit in chair and was put back in bed. Performance deficits affecting function are weakness, impaired functional mobilty, impaired endurance, gait instability, impaired balance, impaired self care skills.     Rehab Prognosis:  Good; patient would benefit from acute skilled OT services to address these deficits and reach maximum level of function.       Plan:     Patient to be seen 3 x/week to address the above listed problems via self-care/home management, therapeutic activities, therapeutic exercises  · Plan of Care Expires: 06/18/19  · Plan of Care Reviewed with: patient, spouse    Subjective     Pain/Comfort:  · Pain Rating 1: 0/10    Objective:     Communicated with: RN prior to session.  Patient found supine with peripheral IV, telemetry upon OT entry to room.    General Precautions: Standard, fall   Orthopedic Precautions:N/A   Braces: N/A     Occupational Performance:     Bed Mobility:    · Patient completed Rolling/Turning to Left with  stand by assistance  · Patient completed Scooting/Bridging with stand by assistance  · Patient completed Supine to Sit with stand by assistance     Functional Mobility/Transfers:  · Patient completed Sit <> Stand Transfer with contact guard assistance  with  rolling walker   · Functional Mobility:  Pt walked ~100 ft CGA with RW.    Activities of Daily Living:  · Upper Body Dressing: minimum assistance to don her robe.      [facility] 6 Click ADL:      Treatment & Education:  -addressed roles and expectations  -educated on safety when doing transfers and ADLs  -educated the importance of OOB activity to promote strength, endurance, and breathing.  -exercise (X8) resisted bicep curls, tricep and back, and shoulders using red theraband      Patient left supine with call button in reachEducation:      GOALS:   Multidisciplinary Problems     Occupational Therapy Goals        Problem: Occupational Therapy Goal    Goal Priority Disciplines Outcome Interventions   Occupational Therapy Goal     OT, PT/OT Ongoing (interventions implemented as appropriate)    Description:  Goals to be met by: 5/26/19    Patient will increase functional independence with ADLs by performing:    Grooming while EOB with Stand-by Assistance.  Toileting from bedside commode with Stand-by Assistance for hygiene and clothing management.   Supine to sit with [place].  Toilet transfer to bedside commode with Stand-by Assistance.                      Time Tracking:     OT Date of Treatment: 05/22/19  OT Start Time: 0155  OT Stop Time: 0216  OT Total Time (min): 21 min    Billable Minutes:Therapeutic Activity 10  Therapeutic Exercise 11    ROSA Almanza  5/22/2019

## 2019-05-22 NOTE — ASSESSMENT & PLAN NOTE
- Needs to be addressed with pulmonology; will not resolve with diureses and likely needs another thoracentesis

## 2019-05-22 NOTE — PLAN OF CARE
Problem: Adult Inpatient Plan of Care  Goal: Plan of Care Review  Outcome: Ongoing (interventions implemented as appropriate)     05/22/19 0525   Plan of Care Review   Plan of Care Reviewed With patient   Progress no change   Pt AAOx4. No acute events overnight. POC reviewed with pt. Vitals stable. Denied  pain or discomfort. Awaiting financial clearance. I&'s documented on flow sheet. Safety maintained. Will continue to monitor.

## 2019-05-22 NOTE — SUBJECTIVE & OBJECTIVE
Past Medical History:   Diagnosis Date    Cirrhosis     Esophageal varices 2018    Small with no banding     Essential hypertension 2018    Fatty liver 2018    GERD (gastroesophageal reflux disease)     Hx of colonic polyps 2018    On colonoscopy     Hypertension     Hypothyroidism 2018    Kidney stones     Morbid obesity 2018    Lap band with subsequent release    KADEEM (obstructive sleep apnea) 2018    Osteoarthritis 2018    Pulmonary nodule 2018    Vitamin D deficiency 2018       Past Surgical History:   Procedure Laterality Date     SECTION      TIMES 2     CHOLECYSTECTOMY      LAPAROSCOPIC     CYSTOSCOPY W/ STONE MANIPULATION      kidney stone removal    EGD (ESOPHAGOGASTRODUODENOSCOPY) N/A 2019    Performed by Davian Hernández MD at [facility] (2ND FLR)    LAPAROSCOPIC GASTRIC BANDING  2006    removal     LIVER BIOPSY  2017    KESSLER with bridging 17       Review of patient's allergies indicates:   Allergen Reactions    Metformin Rash    Pcn [penicillins] Other (See Comments)     Unsure of reaction, states it was as a child. Tolerated rocephin at [facility]     Current Facility-Administered Medications   Medication Frequency    acetaminophen tablet 325 mg Q8H PRN    cyclobenzaprine tablet 5 mg BID PRN    dextrose 50% injection 12.5 g PRN    dextrose 50% injection 25 g PRN    duke's soln (benadryl 30 mL, mylanta 30 mL, lidocaine 30 mL, nystatin 30 mL) 120 mL QID    glucagon (human recombinant) injection 1 mg PRN    glucose chewable tablet 16 g PRN    glucose chewable tablet 24 g PRN    lactulose 20 gram/30 mL solution Soln 15 g TID    levothyroxine tablet 112 mcg Daily    ondansetron injection 4 mg Q8H PRN    pantoprazole EC tablet 40 mg Daily    rifAXIMin tablet 550 mg BID    sodium chloride 0.9% flush 10 mL PRN    sodium chloride 0.9% flush 5 mL PRN    trazodone split tablet 25 mg  Nightly PRN     Family History     Problem Relation (Age of Onset)    Breast cancer Maternal Grandmother    Cancer Mother (50), Father (65)    Heart disease Mother, Father        Tobacco Use    Smoking status: Never Smoker    Smokeless tobacco: Never Used    Tobacco comment: patient denies   Substance and Sexual Activity    Alcohol use: No     Comment: patient denies    Drug use: No     Comment: patient denies    Sexual activity: Not on file     Review of Systems   Constitutional: Positive for activity change, appetite change and fatigue. Negative for chills and fever.   HENT: Negative for sore throat and trouble swallowing.    Respiratory: Positive for cough and shortness of breath.    Cardiovascular: Negative for chest pain and leg swelling.   Gastrointestinal: Positive for abdominal distention and abdominal pain. Negative for constipation, diarrhea, nausea and vomiting.   Genitourinary: Negative for decreased urine volume and difficulty urinating.   Musculoskeletal: Negative for arthralgias and back pain.   Skin: Positive for color change. Negative for pallor.   Psychiatric/Behavioral: Positive for agitation and confusion.     Objective:     Vital Signs (Most Recent):  Temp: 98.5 °F (36.9 °C) (05/22/19 1543)  Pulse: 88 (05/22/19 1543)  Resp: 16 (05/22/19 1543)  BP: (!) 102/58 (05/22/19 1543)  SpO2: 95 % (05/22/19 1543)  O2 Device (Oxygen Therapy): nasal cannula (05/22/19 1543) Vital Signs (24h Range):  Temp:  [97.4 °F (36.3 °C)-98.5 °F (36.9 °C)] 98.5 °F (36.9 °C)  Pulse:  [] 88  Resp:  [16-20] 16  SpO2:  [95 %-98 %] 95 %  BP: (100-126)/(46-58) 102/58     Weight: 101.6 kg (224 lb) (05/16/19 1603)  Body mass index is 38.45 kg/m².  Body surface area is 2.14 meters squared.    I/O last 3 completed shifts:  In: 300 [P.O.:300]  Out: 2090 [Urine:2090]    Physical Exam   Constitutional: She is oriented to person, place, and time. She appears well-developed.   Appears uncomfortable   HENT:   Head:  Normocephalic and atraumatic.   Eyes: Pupils are equal, round, and reactive to light. EOM are normal. Scleral icterus is present.   Neck: Neck supple. No JVD present.   Cardiovascular: Normal rate and regular rhythm.   Pulmonary/Chest: Effort normal.   Decreased breath sounds right side  On nasal cannula  tachypneic   Abdominal: Soft. She exhibits no distension. There is no tenderness. There is no guarding.   Musculoskeletal: She exhibits no edema or deformity.   Neurological: She is alert and oriented to person, place, and time.   Skin: Skin is warm and dry.   Psychiatric: She has a normal mood and affect.   Nursing note and vitals reviewed.      Significant Labs:  CBC:   Recent Labs   Lab 05/22/19  0455   WBC 2.98*   RBC 2.56*   HGB 8.0*   HCT 22.9*   PLT 57*   MCV 90   MCH 31.3*   MCHC 34.9     CMP:   Recent Labs   Lab 05/22/19  0455 05/22/19  1256   GLU 82  --    CALCIUM 7.9*  --    ALBUMIN 2.2*  --    PROT 6.0  --    *  117* 123*   K 4.5  --    CO2 23  --    CL 89*  --    BUN 18  --    CREATININE 1.1  --    ALKPHOS 136*  --    ALT 40  --    *  --    BILITOT 7.4*  --      All labs within the past 24 hours have been reviewed.    Significant Imaging:  CXR personally reviewed.

## 2019-05-22 NOTE — ASSESSMENT & PLAN NOTE
Patient with hyponatremia, likely hypervolemic hypotonic secondary to liver disease causing hypervolemia with decreased effective arterial volume.  This in turn increases ADH causing the low sodium in urine seen in this patient.      Plan:  - Free Water restriction ~1000mL/24 hrs  - Agree with Tolvaptan; can hold today and reassess tomorrow as sodium increased from 120 - 123 (avoid increases above 6 in 24 hr period)  - renal function panel in AM  - Strict I/Os and chart  - MAP > 65  - Will follow closely

## 2019-05-22 NOTE — CONSULTS
[facility]  Nephrology  Consult Note    Patient Name: Jihan Zamudio  MRN: [id number]  Admission Date: 2019  Hospital Length of Stay: 6 days  Attending Provider: Blane Gottlieb MD   Primary Care Physician: Primary Doctor No  Principal Problem:Acute hypoxemic respiratory failure    Inpatient consult to Nephrology  Consult performed by: Finesse Rivas MD  Consult ordered by: Blane Gottlieb MD        Subjective:     HPI: 48 y/o White woman with hx of KESSLER cirrhosis c/b varices, latent TB, GERD, hypothyroidism, lap band , HLD, no history of chronic kidney disease, currently being worked up for liver txp, admitted 2019 to [facility] due to increased shortness of breath.  Since admission patient presented with hyponatremia 124, decreased to 120,118 in last 24 hrs, reason for consult nephrology, for further workup and recs.    Nephrology consulted for hyponatremia.    Past Medical History:   Diagnosis Date    Cirrhosis     Esophageal varices 2018    Small with no banding     Essential hypertension 2018    Fatty liver 2018    GERD (gastroesophageal reflux disease)     Hx of colonic polyps 2018    On colonoscopy     Hypertension     Hypothyroidism 2018    Kidney stones     Morbid obesity 2018    Lap band with subsequent release    KADEEM (obstructive sleep apnea) 2018    Osteoarthritis 2018    Pulmonary nodule 2018    Vitamin D deficiency 2018       Past Surgical History:   Procedure Laterality Date     SECTION      TIMES 2     CHOLECYSTECTOMY      LAPAROSCOPIC     CYSTOSCOPY W/ STONE MANIPULATION      kidney stone removal    EGD (ESOPHAGOGASTRODUODENOSCOPY) N/A 2019    Performed by [facility]ian [facility] at [facility] (2ND FLR)    LAPAROSCOPIC GASTRIC BANDING  2006    removal 2009    LIVER BIOPSY  2017    KESSLER with bridging 17       Review of patient's allergies indicates:    Allergen Reactions    Metformin Rash    Pcn [penicillins] Other (See Comments)     Unsure of reaction, states it was as a child. Tolerated rocephin at [facility]     Current Facility-Administered Medications   Medication Frequency    acetaminophen tablet 325 mg Q8H PRN    cyclobenzaprine tablet 5 mg BID PRN    dextrose 50% injection 12.5 g PRN    dextrose 50% injection 25 g PRN    duke's soln (benadryl 30 mL, mylanta 30 mL, lidocaine 30 mL, nystatin 30 mL) 120 mL QID    glucagon (human recombinant) injection 1 mg PRN    glucose chewable tablet 16 g PRN    glucose chewable tablet 24 g PRN    lactulose 20 gram/30 mL solution Soln 15 g TID    levothyroxine tablet 112 mcg Daily    ondansetron injection 4 mg Q8H PRN    pantoprazole EC tablet 40 mg Daily    rifAXIMin tablet 550 mg BID    sodium chloride 0.9% flush 10 mL PRN    sodium chloride 0.9% flush 5 mL PRN    trazodone split tablet 25 mg Nightly PRN     Family History     Problem Relation (Age of Onset)    Breast cancer Maternal Grandmother    Cancer Mother (50), Father (65)    Heart disease Mother, Father        Tobacco Use    Smoking status: Never Smoker    Smokeless tobacco: Never Used    Tobacco comment: patient denies   Substance and Sexual Activity    Alcohol use: No     Comment: patient denies    Drug use: No     Comment: patient denies    Sexual activity: Not on file     Review of Systems   Constitutional: Positive for activity change, appetite change and fatigue. Negative for chills and fever.   HENT: Negative for sore throat and trouble swallowing.    Respiratory: Positive for cough and shortness of breath.    Cardiovascular: Negative for chest pain and leg swelling.   Gastrointestinal: Positive for abdominal distention and abdominal pain. Negative for constipation, diarrhea, nausea and vomiting.   Genitourinary: Negative for decreased urine volume and difficulty urinating.   Musculoskeletal: Negative for arthralgias and back pain.    Skin: Positive for color change. Negative for pallor.   Psychiatric/Behavioral: Positive for agitation and confusion.     Objective:     Vital Signs (Most Recent):  Temp: 98.5 °F (36.9 °C) (05/22/19 1543)  Pulse: 88 (05/22/19 1543)  Resp: 16 (05/22/19 1543)  BP: (!) 102/58 (05/22/19 1543)  SpO2: 95 % (05/22/19 1543)  O2 Device (Oxygen Therapy): nasal cannula (05/22/19 1543) Vital Signs (24h Range):  Temp:  [97.4 °F (36.3 °C)-98.5 °F (36.9 °C)] 98.5 °F (36.9 °C)  Pulse:  [] 88  Resp:  [16-20] 16  SpO2:  [95 %-98 %] 95 %  BP: (100-126)/(46-58) 102/58     Weight: 101.6 kg (224 lb) (05/16/19 1603)  Body mass index is 38.45 kg/m².  Body surface area is 2.14 meters squared.    I/O last 3 completed shifts:  In: 300 [P.O.:300]  Out: 2090 [Urine:2090]    Physical Exam   Constitutional: She is oriented to person, place, and time. She appears well-developed.   Appears uncomfortable   HENT:   Head: Normocephalic and atraumatic.   Eyes: Pupils are equal, round, and reactive to light. EOM are normal. Scleral icterus is present.   Neck: Neck supple. No JVD present.   Cardiovascular: Normal rate and regular rhythm.   Pulmonary/Chest: Effort normal.   Decreased breath sounds right side  On nasal cannula  tachypneic   Abdominal: Soft. She exhibits no distension. There is no tenderness. There is no guarding.   Musculoskeletal: She exhibits no edema or deformity.   Neurological: She is alert and oriented to person, place, and time.   Skin: Skin is warm and dry.   Psychiatric: She has a normal mood and affect.   Nursing note and vitals reviewed.      Significant Labs:  CBC:   Recent Labs   Lab 05/22/19  0455   WBC 2.98*   RBC 2.56*   HGB 8.0*   HCT 22.9*   PLT 57*   MCV 90   MCH 31.3*   MCHC 34.9     CMP:   Recent Labs   Lab 05/22/19  0455 05/22/19  1256   GLU 82  --    CALCIUM 7.9*  --    ALBUMIN 2.2*  --    PROT 6.0  --    *  117* 123*   K 4.5  --    CO2 23  --    CL 89*  --    BUN 18  --    CREATININE 1.1  --     ALKPHOS 136*  --    ALT 40  --    *  --    BILITOT 7.4*  --      All labs within the past 24 hours have been reviewed.    Significant Imaging:  CXR personally reviewed.    Assessment/Plan:     Hyponatremia  Patient with hyponatremia, likely hypervolemic hypotonic secondary to liver disease causing hypervolemia with decreased effective arterial volume.  This in turn increases ADH causing the low sodium in urine seen in this patient.      Plan:  - Free Water restriction ~1000mL/24 hrs  - Agree with Tolvaptan; can hold today and reassess tomorrow as sodium increased from 120 - 123 (avoid increases above 6 in 24 hr period)  - renal function panel in AM  - Strict I/Os and chart  - MAP > 65  - Will follow closely    Liver cirrhosis secondary to KESSLER  - Managed by primary team    Pleural effusion, right  - Needs to be addressed with pulmonology; will not resolve with diureses and likely needs another thoracentesis        Thank you for your consult. I will follow-up with patient. Please contact us if you have any additional questions.    Finesse Rivas MD  Nephrology  [facility]

## 2019-05-22 NOTE — PLAN OF CARE
Problem: Adult Inpatient Plan of Care  Goal: Plan of Care Review  No acute changes on shift. Pt refused lactulose doses on shift due to excessive BM's throughout night and day. 4 BM's noted on day shift alone. Na levels trending up. Urine sample collected and sent to lab. No other issues noted at this time. Pt updated on POC. WCTM.

## 2019-05-22 NOTE — HPI
50 y/o White woman with hx of KESSLER cirrhosis c/b varices, latent TB, GERD, hypothyroidism, lap band 2007, HLD, no history of chronic kidney disease. Patient has been hospitalized for over three weeks admitted since 05/16/2019.  She is now s/p orthoptic Liver transplant with biliary reconstruction 6/6/2019. EBL 2500, removal of 2000mL of ascites. 3.8L crystalloid, 3u pRBCs, 2u FFP, 3u Cryo, 3u plts were administered with 720cc of cell saver given back. Hospital course complicated by transudative pleural effusion treated with ceftriaxone and hyponatremia. Nephrology consulted for ANTONIO with worsening sCr from baseline.

## 2019-05-22 NOTE — PROGRESS NOTES
Progress Note  Hospital Medicine  [facility]       Patient Name: Jihan Zamudio  MRN:  [id number]  Hospital Medicine Team: [facility] Blane Gottlieb MD  Date of Admission:  5/16/2019     Length of Stay:  LOS: 5 days   Expected Discharge Date: 5/25/2019  Principal Problem:  Acute hypoxemic respiratory failure     Subjective:     Interval History/Overnight Events:    - patient doing ok today; case discussed in IR conference and there is no need to biopsy pulmonary nodule, awaiting financial clearance for listing  - sodium dropped down to 120, told patient to cut back on fluids and we are continuing tolvaptan          duke's soln (benadryl 30 mL, mylanta 30 mL, lidocaine 30 mL, nystatin 30 mL) 120 mL  10 mL Oral QID    lactulose  15 g Oral TID    levothyroxine  112 mcg Oral Daily    pantoprazole  40 mg Oral Daily    rifAXImin  550 mg Oral BID    tolvaptan  15 mg Oral Daily           acetaminophen, cyclobenzaprine, dextrose 50%, dextrose 50%, glucagon (human recombinant), glucose, glucose, ondansetron, sodium chloride 0.9%, sodium chloride 0.9%, trazodone    Review of Systems   Constitutional: Negative for chills, fatigue, fever.   HENT: Negative for sore throat, trouble swallowing.    Eyes: Negative for photophobia, visual disturbance.   Respiratory: Negative for cough, shortness of breath.    Cardiovascular: Negative for chest pain, palpitations, leg swelling.   Gastrointestinal: Negative for abdominal pain, constipation, diarrhea, nausea, vomiting.   Endocrine: Negative for cold intolerance, heat intolerance.   Genitourinary: Negative for dysuria, frequency.   Musculoskeletal: Negative for arthralgias, myalgias.   Skin: Negative for rash, wound, erythema   Neurological: Negative for dizziness, syncope, weakness, light-headedness.   Psychiatric/Behavioral: Negative for confusion, hallucinations, anxiety  All other systems reviewed and are negative.    Objective:     Temp:   [98.3 °F (36.8 °C)-98.8 °F (37.1 °C)]   Pulse:  [80-91]   Resp:  [16-18]   BP: (101-110)/(49-56)   SpO2:  [93 %-98 %]       Physical Exam:  Constitutional: appears older than stated age, chronically ill looking,  non-distressed, not diaphoretic.   HENT: NC/AT, external ears normal, oropharynx clear, MMM w/o exudates.   Eyes: PERRL, EOMI, conjunctiva normal, no discharge b/l, +scleral icterus   Neck: normal ROM, supple  CV: RRR, no m/r/g, no carotid bruits, +2 peripheral pulses.  Pulmonary/Chest wall: Breathing comfortably +distress   Decreased breath sounds at lung bases/ absent breath sounds in right lung  GI: Soft, non-tender, (+) BS, (+) BM   +distended  Musculoskeletal: Normal ROM, no atrophy,  + pitting edema in LE bilaterally   Neurological: AAO x 4, CN II-XI in tact, nl sensation, nl strength/tone  No asterixis   Skin: warm, dry   +pallor, jaundice, + spider angiomas, +bruising   Psych: normal mood and affect, normal behavior, thought content and judgement.    Labs:    Chemistries:   Recent Labs   Lab 05/19/19  0427 05/20/19  0258  05/21/19  0432 05/21/19  1159 05/21/19  1732   * 122*   < > 120* 120* 120*   K 3.8 4.1  --  4.3  --   --    CL 93* 93*  --  91*  --   --    CO2 22* 21*  --  23  --   --    BUN 16 18  --  18  --   --    CREATININE 0.8 0.8  --  0.8  --   --    CALCIUM 8.2* 7.9*  --  7.7*  --   --    PROT 5.6* 5.4*  --  5.6*  --   --    BILITOT 8.9* 7.6*  --  7.6*  --   --    ALKPHOS 141* 130  --  131  --   --    ALT 31 33  --  36  --   --    AST 76* 82*  --  91*  --   --    MG 1.9 1.9  --  1.8  --   --    PHOS 3.5 3.6  --  3.6  --   --     < > = values in this interval not displayed.        WBC:   Recent Labs   Lab 05/17/19  0518 05/18/19  0641 05/19/19 0427 05/20/19 0258 05/21/19 0432   WBC 5.00 4.70 4.59 3.79* 2.98*     Bands:     CBC/Anemia Labs: Coags:    Recent Labs   Lab 05/19/19 0427 05/20/19 0258 05/21/19  0432   WBC 4.59 3.79* 2.98*   HGB 7.5* 7.4* 7.6*   HCT 21.9* 22.1* 22.3*    PLT 57* 66* 50*   MCV 89 93 92   RDW 18.6* 18.6* 18.6*    Recent Labs   Lab 05/16/19  1923  05/19/19  0427 05/20/19  0258 05/21/19  0432   INR  --    < > 1.9* 1.8* 1.8*   APTT 32.0  --   --   --   --     < > = values in this interval not displayed.        POCT Glucose: HbA1c:    No results for input(s): POCTGLUCOSE in the last 168 hours. No results found for: HGBA1C     Diagnostic Results:        Assessment and Plan     Hospital Course:    Ms. 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.81, K74.60]  Yes    Hyponatremia [E87.1]  Yes    Hepatic encephalopathy [K72.90]  Yes    Coagulopathy [D68.9]  Yes    Acute blood loss anemia [D62]  Yes    Decompensated hepatic cirrhosis [K72.90]  Yes     From KESSLER    Liver biopsy 11/29/17- KESSLER with bridging fibrosis, Laura, interface activity; lymph and occ plasma cells      Esophageal varices [I85.00]  Yes     Small with no banding 07/17      Essential hypertension [I10]  Yes    Hypothyroidism [E03.9]  Yes    GERD (gastroesophageal reflux disease) [K21.9]  Yes    Morbid obesity [E66.01]  Yes     Lap band 2006 (lost 75-80 lbs) with subsequent release (2009) (due to obstruction)        Resolved Hospital Problems   No resolved problems to display.       Acute Hypoxemic Respiratory Failure 2/2 Pleural Effusion  Hepatic Hydrothorax  · Satting 88% on RA that improved to 95% with 2L NC  · R sided hydrothorax  · Stop Levaquin as there is no suspicion for infection  · S/p thora on 5/17 with pulm, 1.5 L removed   · CT chest completed and shows stable pulmonary nodules; may need symptomatic thoracentesis tomorrow      Decompensated Liver Disease  KESSLER Cirrhosis  MELD-Na score: 29 at 5/21/2019  5:32 PM  MELD  score: 21 at 5/21/2019  4:32 AM  Calculated from:  Serum Creatinine: 0.8 mg/dL (Rounded to 1 mg/dL) at 5/21/2019  4:32 AM  Serum Sodium: 120 mmol/L (Rounded to 125 mmol/L) at 5/21/2019  5:32 PM  Total Bilirubin: 7.6 mg/dL at 5/21/2019  4:32 AM  INR(ratio): 1.8 at 5/21/2019  4:32 AM  Age: 49 years  · Hepatology consulted, appreciate assistance  · Previously approved for liver transplant pending re-evaluation of pulmonary nodule with prior outside hospital CT scans  · Abdomen soft and patient says it feels around baseline so will hold on para  · Check PETH  · Low Sodium diet with 1L fluid restriction  · Hold diuretics due to hyponatremia  · Start Lactulose, titrate 3-4 BM/day  · Start Rifaximin 550mg PO BID  · Has history of pulmonary nodule and outside CT scans were supposed to be reviewed   · Awaiting financial clearance for listing      Esophageal Varices  · Chronic and stable  · Monitor     Anemia  · Hgb 8, baseline 8-9  · Monitor for bleeding     Coagulopathy  · 2/2 liver disease  · Monitor for bleeding     Hyponatremia  · Sodium 120 2/2 chronic liver disease  · Continue tolvaptan and fluid restrict      Hypokalemia  · K 3.2 2/2 low Mag  · Replace     Hypomagnesemia  · Mag 1.5  · Replace     Thrombocytopenia  · Plt chronically low 2/2 coagulopathy from liver disease  · Monitor for bleeding     Hypothyroidism  · Chronic and stable  · Continue Synthroid 112mcg PO daily     GERD  · Chronic and stable  · Continue PPI     Morbid Obesity  · Body mass index is 38.45 kg/m².      Diet:  Low Na with fluid restriction   GI PPx:  PO PPI  DVT PPx:  SCDs due to coagulopathy   Goals of Care:  Full     High Risk Conditions:  resp failure     Disposition:    - likely too sick to leave prior to a liver transplant; awaiting financial clearance for listing; need sodium to be 125 for listing

## 2019-05-22 NOTE — PLAN OF CARE
Problem: Occupational Therapy Goal  Goal: Occupational Therapy Goal  Goals to be met by: 5/26/19    Patient will increase functional independence with ADLs by performing:    Grooming while EOB with Stand-by Assistance.  Toileting from bedside commode with Stand-by Assistance for hygiene and clothing management.   Supine to sit with Bennett.  Toilet transfer to bedside commode with Stand-by Assistance.     Outcome: Ongoing (interventions implemented as appropriate)  Con't POC.    ROSA Almanza

## 2019-05-23 LAB
ALBUMIN SERPL BCP-MCNC: 2.1 G/DL (ref 3.5–5.2)
ALP SERPL-CCNC: 129 U/L (ref 55–135)
ALT SERPL W/O P-5'-P-CCNC: 41 U/L (ref 10–44)
ANION GAP SERPL CALC-SCNC: 6 MMOL/L (ref 8–16)
ANION GAP SERPL CALC-SCNC: 7 MMOL/L (ref 8–16)
AST SERPL-CCNC: 112 U/L (ref 10–40)
BASOPHILS # BLD AUTO: 0.02 K/UL (ref 0–0.2)
BASOPHILS NFR BLD: 0.7 % (ref 0–1.9)
BILIRUB SERPL-MCNC: 7.6 MG/DL (ref 0.1–1)
BUN SERPL-MCNC: 18 MG/DL (ref 6–20)
BUN SERPL-MCNC: 18 MG/DL (ref 6–20)
CALCIUM SERPL-MCNC: 8.1 MG/DL (ref 8.7–10.5)
CALCIUM SERPL-MCNC: 8.3 MG/DL (ref 8.7–10.5)
CHLORIDE SERPL-SCNC: 90 MMOL/L (ref 95–110)
CHLORIDE SERPL-SCNC: 93 MMOL/L (ref 95–110)
CO2 SERPL-SCNC: 22 MMOL/L (ref 23–29)
CO2 SERPL-SCNC: 24 MMOL/L (ref 23–29)
CREAT SERPL-MCNC: 0.9 MG/DL (ref 0.5–1.4)
CREAT SERPL-MCNC: 0.9 MG/DL (ref 0.5–1.4)
DIFFERENTIAL METHOD: ABNORMAL
EOSINOPHIL # BLD AUTO: 0.3 K/UL (ref 0–0.5)
EOSINOPHIL NFR BLD: 8.4 % (ref 0–8)
ERYTHROCYTE [DISTWIDTH] IN BLOOD BY AUTOMATED COUNT: 18.4 % (ref 11.5–14.5)
EST. GFR  (AFRICAN AMERICAN): >60 ML/MIN/1.73 M^2
EST. GFR  (AFRICAN AMERICAN): >60 ML/MIN/1.73 M^2
EST. GFR  (NON AFRICAN AMERICAN): >60 ML/MIN/1.73 M^2
EST. GFR  (NON AFRICAN AMERICAN): >60 ML/MIN/1.73 M^2
GLUCOSE SERPL-MCNC: 104 MG/DL (ref 70–110)
GLUCOSE SERPL-MCNC: 82 MG/DL (ref 70–110)
HCT VFR BLD AUTO: 23 % (ref 37–48.5)
HGB BLD-MCNC: 7.8 G/DL (ref 12–16)
IMM GRANULOCYTES # BLD AUTO: 0.02 K/UL (ref 0–0.04)
IMM GRANULOCYTES NFR BLD AUTO: 0.7 % (ref 0–0.5)
INR PPP: 1.6 (ref 0.8–1.2)
LYMPHOCYTES # BLD AUTO: 0.7 K/UL (ref 1–4.8)
LYMPHOCYTES NFR BLD: 23.6 % (ref 18–48)
MAGNESIUM SERPL-MCNC: 2 MG/DL (ref 1.6–2.6)
MCH RBC QN AUTO: 30.7 PG (ref 27–31)
MCHC RBC AUTO-ENTMCNC: 33.9 G/DL (ref 32–36)
MCV RBC AUTO: 91 FL (ref 82–98)
MONOCYTES # BLD AUTO: 0.3 K/UL (ref 0.3–1)
MONOCYTES NFR BLD: 9.8 % (ref 4–15)
NEUTROPHILS # BLD AUTO: 1.7 K/UL (ref 1.8–7.7)
NEUTROPHILS NFR BLD: 56.8 % (ref 38–73)
NRBC BLD-RTO: 0 /100 WBC
PHOSPHATE SERPL-MCNC: 4.2 MG/DL (ref 2.7–4.5)
PHOSPHATIDYLETHANOL (PETH): NEGATIVE NG/ML
PLATELET # BLD AUTO: 65 K/UL (ref 150–350)
PMV BLD AUTO: 12.4 FL (ref 9.2–12.9)
POTASSIUM SERPL-SCNC: 4.4 MMOL/L (ref 3.5–5.1)
POTASSIUM SERPL-SCNC: 4.5 MMOL/L (ref 3.5–5.1)
PROT SERPL-MCNC: 5.9 G/DL (ref 6–8.4)
PROTHROMBIN TIME: 16.2 SEC (ref 9–12.5)
RBC # BLD AUTO: 2.54 M/UL (ref 4–5.4)
SODIUM SERPL-SCNC: 119 MMOL/L (ref 136–145)
SODIUM SERPL-SCNC: 120 MMOL/L (ref 136–145)
SODIUM SERPL-SCNC: 120 MMOL/L (ref 136–145)
SODIUM SERPL-SCNC: 121 MMOL/L (ref 136–145)
SODIUM SERPL-SCNC: 122 MMOL/L (ref 136–145)
WBC # BLD AUTO: 2.97 K/UL (ref 3.9–12.7)

## 2019-05-23 PROCEDURE — 20600001 HC STEP DOWN PRIVATE ROOM: Mod: NTX

## 2019-05-23 PROCEDURE — 83735 ASSAY OF MAGNESIUM: CPT | Mod: NTX

## 2019-05-23 PROCEDURE — 25000003 PHARM REV CODE 250: Mod: NTX | Performed by: HOSPITALIST

## 2019-05-23 PROCEDURE — 84295 ASSAY OF SERUM SODIUM: CPT | Mod: 91,NTX

## 2019-05-23 PROCEDURE — 97116 GAIT TRAINING THERAPY: CPT | Mod: NTX

## 2019-05-23 PROCEDURE — 80048 BASIC METABOLIC PNL TOTAL CA: CPT | Mod: NTX

## 2019-05-23 PROCEDURE — 84295 ASSAY OF SERUM SODIUM: CPT | Mod: NTX

## 2019-05-23 PROCEDURE — 84100 ASSAY OF PHOSPHORUS: CPT | Mod: NTX

## 2019-05-23 PROCEDURE — 99233 SBSQ HOSP IP/OBS HIGH 50: CPT | Mod: NTX,,, | Performed by: HOSPITALIST

## 2019-05-23 PROCEDURE — 36415 COLL VENOUS BLD VENIPUNCTURE: CPT | Mod: NTX

## 2019-05-23 PROCEDURE — 85025 COMPLETE CBC W/AUTO DIFF WBC: CPT | Mod: NTX

## 2019-05-23 PROCEDURE — 80053 COMPREHEN METABOLIC PANEL: CPT | Mod: NTX

## 2019-05-23 PROCEDURE — 85610 PROTHROMBIN TIME: CPT | Mod: NTX

## 2019-05-23 PROCEDURE — 99233 PR SUBSEQUENT HOSPITAL CARE,LEVL III: ICD-10-PCS | Mod: NTX,,, | Performed by: HOSPITALIST

## 2019-05-23 RX ORDER — LACTULOSE 10 G/15ML
10 SOLUTION ORAL 2 TIMES DAILY
Status: DISCONTINUED | OUTPATIENT
Start: 2019-05-23 | End: 2019-05-24

## 2019-05-23 RX ORDER — TOLVAPTAN 15 MG/1
15 TABLET ORAL ONCE
Status: COMPLETED | OUTPATIENT
Start: 2019-05-23 | End: 2019-05-23

## 2019-05-23 RX ADMIN — RIFAXIMIN 550 MG: 550 TABLET ORAL at 08:05

## 2019-05-23 RX ADMIN — PANTOPRAZOLE SODIUM 40 MG: 40 TABLET, DELAYED RELEASE ORAL at 09:05

## 2019-05-23 RX ADMIN — TOLVAPTAN 15 MG: 15 TABLET ORAL at 11:05

## 2019-05-23 RX ADMIN — RIFAXIMIN 550 MG: 550 TABLET ORAL at 09:05

## 2019-05-23 RX ADMIN — LACTULOSE 10 G: 20 SOLUTION ORAL at 09:05

## 2019-05-23 RX ADMIN — LEVOTHYROXINE SODIUM 112 MCG: 112 TABLET ORAL at 05:05

## 2019-05-23 NOTE — PT/OT/SLP PROGRESS
Physical Therapy Treatment    Patient Name:  Jihan Zamudio   MRN:  [id number]  Admitting Diagnosis: Acute hypoxemic respiratory failure  Recent Surgery: * No surgery found *      Recommendations:     Discharge Recommendations:  home health PT   Discharge Equipment Recommendations: none   Barriers to discharge: None    Plan:     During this hospitalization, patient to be seen 3 x/week to address the above listed problems via gait training, therapeutic activities, therapeutic exercises  · Plan of Care Expires:  06/13/19   Plan of Care Reviewed with: patient, spouse    This Plan of care has been discussed with the patient who was involved in its development and understands and is in agreement with the identified goals and treatment plan    Subjective     Communicated with RN (Libby) prior to session.     Patient comments: Initially pt refusing to get OOB 2* stomach discomfort, however, agreeable after encouragement and education  Pain/Comfort:  · Pain Rating 1: 0/10  · Pain Rating Post-Intervention 1: 0/10    Objective:     Patient found with: telemetry    Patient found sup in bed with HOB at 45* angle upon PT entry to room, agreeable to treatment.   present in the room.    General Precautions: Standard, fall   Orthopedic Precautions:N/A   Braces: N/A       BED MOBILITY (vc's for hand placement sequencing of task) HOB at 45* angle, no use of bedrails:        Rolling to the R:  NT.       Rolling to the L:  NT.        Sup > sit at the EOB:  Min A, exiting on the L side.       Sit > sup:  Mod A for trunk and LE's.       Scooting hips to EOB with SBA, increased time                SITTING AT THE EDGE OF THE BED    Assistance Level Required: close sup for safety with B UE support     TRANSFERS  (vc's for hand placement, sequencing of task and safety)   Patient completed Sit <> Stand Transfer from EOB with CGA for balance and safety with rolling walker x2 trial(s)   Patient completed Stand <> Sit Transfer to EOB  with CGA for balance and safety with rolling walker    GAIT: within room   Patient ambulated: 17ft and then 45ft with turns incorporated.  Seated rest break in between trials   Patient required: CGA for balance and safety   Patient used:  Rolling Walker   Gait Pattern observed: reciprocal gait   Gait Deviation(s): decreased step length, decreased stride length and decreased toe-to-floor clearance    Comments: vc's for safety    Pt's SpO2 remains between 91% and 94% on RA with activity    EDUCATION  Patient provided with daily orientation and goals of this PT session. They were educated to call for assistance and to transfer with hospital staff only.  Also, pt was educated on the effects of prolonged immobility and the importance of performing OOB activity and exercises to promote healing and reduce recovery time    Whiteboard updated with correct mobility information. RN/PCT notified.  Pt safe to transfer OOB/BTB and amb short distances with RN/PCT: Use RW with CGA of 1 person.    Patient left HOB elevated, with  all lines intact, call button in reach and RN notified    AM-PAC 6 CLICK MOBILITY  Turning over in bed (including adjusting bedclothes, sheets and blankets)?: 2  Sitting down on and standing up from a chair with arms (e.g., wheelchair, bedside commode, etc.): 3  Moving from lying on back to sitting on the side of the bed?: 2  Moving to and from a bed to a chair (including a wheelchair)?: 3  Need to walk in hospital room?: 3  Climbing 3-5 steps with a railing?: 2  Basic Mobility Total Score: 15     Assessment:     Jihan Zamudio is a 49 y.o. female admitted with a medical diagnosis of Acute hypoxemic respiratory failure.  She presents with the following impairments/functional limitations:  weakness, impaired endurance, impaired self care skills, impaired functional mobilty, gait instability, impaired balance, decreased upper extremity function, decreased lower extremity function. requiring assistance and  verbal cues for bed mob, transfers and gait to prevent falls due to weakness.  Pt will cont to benefit from skilled PT intervention to address deficits and improve functional mobility.    Rehab Prognosis:  Fair; patient would benefit from acute skilled PT services to address these deficits and reach maximum level of function.      GOALS:   Multidisciplinary Problems     Physical Therapy Goals        Problem: Physical Therapy Goal    Goal Priority Disciplines Outcome Goal Variances Interventions   Physical Therapy Goal     PT, PT/OT Ongoing (interventions implemented as appropriate)     Description:  Goals to be met by: 19     Patient will increase functional independence with mobility by performin. Supine to sit with [place].  2. Sit to supine with [place].  3. Sit to stand transfer with Supervision.  4. Bed to chair transfer with Supervision using Single-point Cane.  5. Gait  x 50 feet with Supervision using Single-point Cane.   6. Ascend/Descend 6 inch curb step with Supervision using Single-point Cane.  7. Lower extremity exercise program x 20 reps per handout, with assistance as needed.                      Time Tracking:     PT Received On: 19  PT Start Time: 1552     PT Stop Time: 1611  PT Total Time (min): 19 min     Billable Minutes: Gait Training 19    Treatment Type: Treatment  PT/PTA: PTA     PTA Visit Number: 1       Regine Abdullahi PTA.  Pager [phone]    2019    .

## 2019-05-23 NOTE — ASSESSMENT & PLAN NOTE
Patient with hyponatremia, likely hypervolemic hypotonic secondary to liver disease causing hypervolemia with decreased effective arterial volume.  This in turn increases ADH causing the low sodium in urine seen in this patient.      Plan:  - Free Water restriction ~1000mL/24 hrs  - Recommend another dose of Tolvaptan and monitor sodium levels in PM  - Continue strict I/Os and chart  - MAP > 65  - Will follow closely

## 2019-05-23 NOTE — PLAN OF CARE
Problem: Adult Inpatient Plan of Care  Goal: Plan of Care Review  No acute changes on shift. Pt compliant with fluid restriction. Latest Na reading 122. 4 BM's noted on shift. Vital signs stable. No other issues noted. Pt and  updated on POC. WCTM.

## 2019-05-23 NOTE — PROGRESS NOTES
[facility]  Nephrology  Progress Note    Patient Name: Jihan Zamudio  MRN: [id number]  Admission Date: 5/16/2019  Hospital Length of Stay: 7 days  Attending Provider: Blane Gottlieb MD   Primary Care Physician: Primary Doctor No  Principal Problem:Acute hypoxemic respiratory failure    Subjective:     HPI: 48 y/o White woman with hx of KESSLER cirrhosis c/b varices, latent TB, GERD, hypothyroidism, lap band 2007, HLD, no history of chronic kidney disease, currently being worked up for liver txp, admitted 5/16/2019 to [facility] due to increased shortness of breath.  Since admission patient presented with hyponatremia 124, decreased to 120,118 in last 24 hrs, reason for consult nephrology, for further workup and recs.    Nephrology consulted for hyponatremia.    Interval History: Patient evaluated bedside, stable vital signs.  Night uneventful.  Tolvaptan held yesterday.  Sodium today 120 from 118 (did have a 123 midday).    Review of patient's allergies indicates:   Allergen Reactions    Metformin Rash    Pcn [penicillins] Other (See Comments)     Unsure of reaction, states it was as a child. Tolerated rocephin at [facility]     Current Facility-Administered Medications   Medication Frequency    acetaminophen tablet 325 mg Q8H PRN    cyclobenzaprine tablet 5 mg BID PRN    dextrose 50% injection 12.5 g PRN    dextrose 50% injection 25 g PRN    duke's soln (benadryl 30 mL, mylanta 30 mL, lidocaine 30 mL, nystatin 30 mL) 120 mL QID    glucagon (human recombinant) injection 1 mg PRN    glucose chewable tablet 16 g PRN    glucose chewable tablet 24 g PRN    lactulose 20 gram/30 mL solution Soln 10 g BID    levothyroxine tablet 112 mcg Daily    ondansetron injection 4 mg Q8H PRN    pantoprazole EC tablet 40 mg Daily    rifAXIMin tablet 550 mg BID    sodium chloride 0.9% flush 10 mL PRN    sodium chloride 0.9% flush 5 mL PRN    trazodone split tablet 25 mg Nightly PRN       Objective:     Vital Signs  (Most Recent):  Temp: 98.4 °F (36.9 °C) (05/23/19 0725)  Pulse: 89 (05/23/19 0725)  Resp: 16 (05/23/19 0725)  BP: (!) 101/54 (05/23/19 0725)  SpO2: 95 % (05/23/19 0725)  O2 Device (Oxygen Therapy): room air (05/23/19 0725) Vital Signs (24h Range):  Temp:  [98 °F (36.7 °C)-98.8 °F (37.1 °C)] 98.4 °F (36.9 °C)  Pulse:  [] 89  Resp:  [16-18] 16  SpO2:  [94 %-97 %] 95 %  BP: (101-126)/(49-58) 101/54     Weight: 101.6 kg (224 lb) (05/16/19 1603)  Body mass index is 38.45 kg/m².  Body surface area is 2.14 meters squared.    I/O last 3 completed shifts:  In: 1150 [P.O.:1150]  Out: 1620 [Urine:1620]    Physical Exam   Constitutional: She is oriented to person, place, and time. She appears well-developed. No distress.   HENT:   Head: Normocephalic and atraumatic.   Eyes: Pupils are equal, round, and reactive to light. EOM are normal. Scleral icterus is present.   Neck: Neck supple. No JVD present.   Cardiovascular: Normal rate and regular rhythm.   Pulmonary/Chest: Effort normal.   Decreased breath sounds right side   Abdominal: Soft. She exhibits distension. There is no tenderness. There is no guarding.   Musculoskeletal: She exhibits no edema or deformity.   Neurological: She is alert and oriented to person, place, and time.   Skin: Skin is warm and dry.   Psychiatric: She has a normal mood and affect.   Nursing note and vitals reviewed.      Significant Labs:  CBC:   Recent Labs   Lab 05/23/19  0646   WBC 2.97*   RBC 2.54*   HGB 7.8*   HCT 23.0*   PLT 65*   MCV 91   MCH 30.7   MCHC 33.9     CMP:   Recent Labs   Lab 05/23/19  0646   GLU 82   CALCIUM 8.3*   ALBUMIN 2.1*   PROT 5.9*   *  120*   K 4.4   CO2 24   CL 90*   BUN 18   CREATININE 0.9   ALKPHOS 129   ALT 41   *   BILITOT 7.6*     All labs within the past 24 hours have been reviewed.       Assessment/Plan:     Hyponatremia  Patient with hyponatremia, likely hypervolemic hypotonic secondary to liver disease causing hypervolemia with decreased  effective arterial volume.  This in turn increases ADH causing the low sodium in urine seen in this patient.      Plan:  - Free Water restriction ~1000mL/24 hrs  - Recommend another dose of Tolvaptan and monitor sodium levels in PM  - Continue strict I/Os and chart  - MAP > 65  - Will follow closely    Liver cirrhosis secondary to KESSLER  - Managed by primary team    Pleural effusion, right  - Needs to be addressed with pulmonology; will not resolve with diureses and likely needs another thoracentesis        Thank you for your consult. I will follow-up with patient. Please contact us if you have any additional questions.    Finesse Rivas MD  Nephrology  [facility]

## 2019-05-23 NOTE — SUBJECTIVE & OBJECTIVE
Interval History: Patient evaluated bedside, stable vital signs.  Night uneventful.  Tolvaptan held yesterday.  Sodium today 120 from 118 (did have a 123 midday).    Review of patient's allergies indicates:   Allergen Reactions    Metformin Rash    Pcn [penicillins] Other (See Comments)     Unsure of reaction, states it was as a child. Tolerated rocephin at [facility]     Current Facility-Administered Medications   Medication Frequency    acetaminophen tablet 325 mg Q8H PRN    cyclobenzaprine tablet 5 mg BID PRN    dextrose 50% injection 12.5 g PRN    dextrose 50% injection 25 g PRN    duke's soln (benadryl 30 mL, mylanta 30 mL, lidocaine 30 mL, nystatin 30 mL) 120 mL QID    glucagon (human recombinant) injection 1 mg PRN    glucose chewable tablet 16 g PRN    glucose chewable tablet 24 g PRN    lactulose 20 gram/30 mL solution Soln 10 g BID    levothyroxine tablet 112 mcg Daily    ondansetron injection 4 mg Q8H PRN    pantoprazole EC tablet 40 mg Daily    rifAXIMin tablet 550 mg BID    sodium chloride 0.9% flush 10 mL PRN    sodium chloride 0.9% flush 5 mL PRN    trazodone split tablet 25 mg Nightly PRN       Objective:     Vital Signs (Most Recent):  Temp: 98.4 °F (36.9 °C) (05/23/19 0725)  Pulse: 89 (05/23/19 0725)  Resp: 16 (05/23/19 0725)  BP: (!) 101/54 (05/23/19 0725)  SpO2: 95 % (05/23/19 0725)  O2 Device (Oxygen Therapy): room air (05/23/19 0725) Vital Signs (24h Range):  Temp:  [98 °F (36.7 °C)-98.8 °F (37.1 °C)] 98.4 °F (36.9 °C)  Pulse:  [] 89  Resp:  [16-18] 16  SpO2:  [94 %-97 %] 95 %  BP: (101-126)/(49-58) 101/54     Weight: 101.6 kg (224 lb) (05/16/19 1603)  Body mass index is 38.45 kg/m².  Body surface area is 2.14 meters squared.    I/O last 3 completed shifts:  In: 1150 [P.O.:1150]  Out: 1620 [Urine:1620]    Physical Exam   Constitutional: She is oriented to person, place, and time. She appears well-developed. No distress.   HENT:   Head: Normocephalic and atraumatic.   Eyes:  Pupils are equal, round, and reactive to light. EOM are normal. Scleral icterus is present.   Neck: Neck supple. No JVD present.   Cardiovascular: Normal rate and regular rhythm.   Pulmonary/Chest: Effort normal.   Decreased breath sounds right side   Abdominal: Soft. She exhibits distension. There is no tenderness. There is no guarding.   Musculoskeletal: She exhibits no edema or deformity.   Neurological: She is alert and oriented to person, place, and time.   Skin: Skin is warm and dry.   Psychiatric: She has a normal mood and affect.   Nursing note and vitals reviewed.      Significant Labs:  CBC:   Recent Labs   Lab 05/23/19  0646   WBC 2.97*   RBC 2.54*   HGB 7.8*   HCT 23.0*   PLT 65*   MCV 91   MCH 30.7   MCHC 33.9     CMP:   Recent Labs   Lab 05/23/19  0646   GLU 82   CALCIUM 8.3*   ALBUMIN 2.1*   PROT 5.9*   *  120*   K 4.4   CO2 24   CL 90*   BUN 18   CREATININE 0.9   ALKPHOS 129   ALT 41   *   BILITOT 7.6*     All labs within the past 24 hours have been reviewed.

## 2019-05-23 NOTE — PLAN OF CARE
Problem: Adult Inpatient Plan of Care  Goal: Plan of Care Review  Outcome: Ongoing (interventions implemented as appropriate)  POC reviewed at bedside with patient. Questions and concerns addressed. VSS. On room air throughout night. 02 sats >92% this shift. No SOB reported. Up to BSC to void and have stools. Safety maintained. Bed in low and locked position. Call light within reach. Side rails up x2. Frequent rounds.

## 2019-05-23 NOTE — SIGNIFICANT EVENT
Treatment Plan Update    Patient seen at bedside, on room air and not in respiratory distress.  Most recent chest x-ray with complete opacification of the right hemithorax.  She previously had a thoracentesis on 5/17/19 with 1500 cc removed: transudate profile, cytology negative for malignant cells.      Treatment plan moving forward for her hepatic hydrothorax should be a low sodium diet and an aggressive diuretic regimen.  She is currently +380 cc and has received sporadic doses of diuretics (no diuretic administration in the last +72 hrs).  A repeat thoracentesis at this time would likely lead to reoccurrence of her effusion, especially in the setting of an unoptimized diuretic regimen.    Sebastien Thomas MD, MSc  Pulmonary/Critical Care Fellow

## 2019-05-23 NOTE — PLAN OF CARE
Problem: Physical Therapy Goal  Goal: Physical Therapy Goal  Goals to be met by: 19     Patient will increase functional independence with mobility by performin. Supine to sit with [place].  2. Sit to supine with [place].  3. Sit to stand transfer with Supervision.  4. Bed to chair transfer with Supervision using Single-point Cane.  5. Gait  x 50 feet with Supervision using Single-point Cane.   6. Ascend/Descend 6 inch curb step with Supervision using Single-point Cane.  7. Lower extremity exercise program x 20 reps per handout, with assistance as needed.       Discharge Recommendations: HH PT    Pt safe to transfer OOB/BTB and amb short distances with RN/PCT: Use RW with CGA of 1 person.    Goals remain appropriate.     Regine Abdullahi, PTA.   [phone]   2019

## 2019-05-23 NOTE — PROGRESS NOTES
Progress Note  Hospital Medicine  [facility]       Patient Name: Jihan Zamudio  MRN:  [id number]  Hospital Medicine Team: [facility] Blane Gottlieb MD  Date of Admission:  5/16/2019     Length of Stay:  LOS: 7 days   Expected Discharge Date: 5/25/2019  Principal Problem:  Acute hypoxemic respiratory failure     Subjective:     Interval History/Overnight Events:    - patient doing well today; nephrology recommended regular diet and fluid restriction at 1 L and another dose of tolvaptan, sodium upto 122; needs to be at 125 for listing and still awaiting financial clearance for listing, hopefully by tomorrow           lactulose  10 g Oral BID    levothyroxine  112 mcg Oral Daily    pantoprazole  40 mg Oral Daily    rifAXImin  550 mg Oral BID           acetaminophen, cyclobenzaprine, dextrose 50%, dextrose 50%, glucagon (human recombinant), glucose, glucose, ondansetron, sodium chloride 0.9%, sodium chloride 0.9%, trazodone    Review of Systems   Constitutional: Negative for chills, fatigue, fever.   HENT: Negative for sore throat, trouble swallowing.    Eyes: Negative for photophobia, visual disturbance.   Respiratory: Negative for cough, shortness of breath.    Cardiovascular: Negative for chest pain, palpitations, leg swelling.   Gastrointestinal: Negative for abdominal pain, constipation, diarrhea, nausea, vomiting.   Endocrine: Negative for cold intolerance, heat intolerance.   Genitourinary: Negative for dysuria, frequency.   Musculoskeletal: Negative for arthralgias, myalgias.   Skin: Negative for rash, wound, erythema   Neurological: Negative for dizziness, syncope, weakness, light-headedness.   Psychiatric/Behavioral: Negative for confusion, hallucinations, anxiety  All other systems reviewed and are negative.    Objective:     Temp:  [98 °F (36.7 °C)-99.1 °F (37.3 °C)]   Pulse:  [83-91]   Resp:  [16-18]   BP: (101-120)/(49-67)   SpO2:  [94 %-95 %]       Physical  Exam:  Constitutional: appears older than stated age, chronically ill looking,  non-distressed, not diaphoretic.   HENT: NC/AT, external ears normal, oropharynx clear, MMM w/o exudates.   Eyes: PERRL, EOMI, conjunctiva normal, no discharge b/l, +scleral icterus   Neck: normal ROM, supple  CV: RRR, no m/r/g, no carotid bruits, +2 peripheral pulses.  Pulmonary/Chest wall: Breathing comfortably +distress   Decreased breath sounds at lung bases/ absent breath sounds in right lung  GI: Soft, non-tender, (+) BS, (+) BM   +distended  Musculoskeletal: Normal ROM, no atrophy,  + pitting edema in LE bilaterally   Neurological: AAO x 4, CN II-XI in tact, nl sensation, nl strength/tone  No asterixis   Skin: warm, dry   +pallor, jaundice, + spider angiomas, +bruising   Psych: normal mood and affect, normal behavior, thought content and judgement.    Labs:    Chemistries:   Recent Labs   Lab 05/21/19  0432  05/22/19  0455  05/23/19  0027 05/23/19  0646 05/23/19  1502   *   < > 118*  117*   < > 119* 120*  120* 122*   K 4.3  --  4.5  --   --  4.4 4.5   CL 91*  --  89*  --   --  90* 93*   CO2 23  --  23  --   --  24 22*   BUN 18  --  18  --   --  18 18   CREATININE 0.8  --  1.1  --   --  0.9 0.9   CALCIUM 7.7*  --  7.9*  --   --  8.3* 8.1*   PROT 5.6*  --  6.0  --   --  5.9*  --    BILITOT 7.6*  --  7.4*  --   --  7.6*  --    ALKPHOS 131  --  136*  --   --  129  --    ALT 36  --  40  --   --  41  --    AST 91*  --  108*  --   --  112*  --    MG 1.8  --  1.9  --   --  2.0  --    PHOS 3.6  --  3.7  --   --  4.2  --     < > = values in this interval not displayed.        WBC:   Recent Labs   Lab 05/19/19  0427 05/20/19  0258 05/21/19  0432 05/22/19  0455 05/23/19  0646   WBC 4.59 3.79* 2.98* 2.98* 2.97*     Bands:     CBC/Anemia Labs: Coags:    Recent Labs   Lab 05/21/19  0432 05/22/19  0455 05/23/19  0646   WBC 2.98* 2.98* 2.97*   HGB 7.6* 8.0* 7.8*   HCT 22.3* 22.9* 23.0*   PLT 50* 57* 65*   MCV 92 90 91   RDW 18.6* 18.5*  18.4*    Recent Labs   Lab 05/16/19  1923  05/21/19  0432 05/22/19  0455 05/23/19  0646   INR  --    < > 1.8* 1.7* 1.6*   APTT 32.0  --   --   --   --     < > = values in this interval not displayed.        POCT Glucose: HbA1c:    No results for input(s): POCTGLUCOSE in the last 168 hours. No results found for: HGBA1C     Diagnostic Results:        Assessment and Plan     Hospital Course:    Ms. 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.81, K74.60]  Yes    Hyponatremia [E87.1]  Yes    Hepatic encephalopathy [K72.90]  Yes    Coagulopathy [D68.9]  Yes    Acute blood loss anemia [D62]  Yes    Decompensated hepatic cirrhosis [K72.90]  Yes     From KESSLER    Liver biopsy 11/29/17- KESSLER with bridging fibrosis, Laura, interface activity; lymph and occ plasma cells      Esophageal varices [I85.00]  Yes     Small with no banding 07/17      Essential hypertension [I10]  Yes    Hypothyroidism [E03.9]  Yes    GERD (gastroesophageal reflux disease) [K21.9]  Yes    Morbid obesity [E66.01]  Yes     Lap band 2006 (lost 75-80 lbs) with subsequent release (2009) (due to obstruction)        Resolved Hospital Problems   No resolved problems to display.       Acute Hypoxemic Respiratory Failure 2/2 Pleural Effusion  Hepatic Hydrothorax  · Satting 88% on RA that improved to 95% with 2L NC  · R sided hydrothorax  · Stop Levaquin as there is no suspicion for infection  · S/p thora on 5/17 with pulm, 1.5 L removed   · CT chest completed and shows stable pulmonary nodules; may need symptomatic thoracentesis tomorrow      Decompensated Liver Disease  KESSLER Cirrhosis  MELD-Na score: 27 at 5/23/2019  3:02 PM  MELD score: 19 at 5/23/2019  3:02 PM  Calculated  from:  Serum Creatinine: 0.9 mg/dL (Rounded to 1 mg/dL) at 5/23/2019  3:02 PM  Serum Sodium: 122 mmol/L (Rounded to 125 mmol/L) at 5/23/2019  3:02 PM  Total Bilirubin: 7.6 mg/dL at 5/23/2019  6:46 AM  INR(ratio): 1.6 at 5/23/2019  6:46 AM  Age: 49 years  · Hepatology consulted, appreciate assistance  · Previously approved for liver transplant pending re-evaluation of pulmonary nodule with prior outside hospital CT scans  · Abdomen soft and patient says it feels around baseline so will hold on para  · Check PETH  · Low Sodium diet with 1L fluid restriction  · Hold diuretics due to hyponatremia  · Start Lactulose, titrate 3-4 BM/day  · Start Rifaximin 550mg PO BID  · Has history of pulmonary nodule and outside CT scans were supposed to be reviewed   · Awaiting financial clearance for listing      Esophageal Varices  · Chronic and stable  · Monitor     Anemia  · Hgb 8, baseline 8-9  · Monitor for bleeding     Coagulopathy  · 2/2 liver disease  · Monitor for bleeding     Hyponatremia  · Sodium 122 2/2 chronic liver disease  · Continue tolvaptan and fluid restrict 1L and regular diet  · Nephrology consulted      Hypokalemia  · K 3.2 2/2 low Mag  · Replace     Hypomagnesemia  · Mag 1.5  · Replace     Thrombocytopenia  · Plt chronically low 2/2 coagulopathy from liver disease  · Monitor for bleeding     Hypothyroidism  · Chronic and stable  · Continue Synthroid 112mcg PO daily     GERD  · Chronic and stable  · Continue PPI     Morbid Obesity  · Body mass index is 38.45 kg/m².      Diet:  regular with fluid restriction   GI PPx:  PO PPI  DVT PPx:  SCDs due to coagulopathy   Goals of Care:  Full     High Risk Conditions:  resp failure     Disposition:    - likely too sick to leave prior to a liver transplant; awaiting financial clearance for listing; need sodium to be 125 for listing

## 2019-05-23 NOTE — PROGRESS NOTES
Progress Note  Hospital Medicine  [facility], [facility]       Patient Name: Jihan Zamudio  MRN:  [id number]  Hospital Medicine Team: [facility] L Blane Gottlieb MD  Date of Admission:  5/16/2019     Length of Stay:  LOS: 7 days   Expected Discharge Date: 5/25/2019  Principal Problem:  Acute hypoxemic respiratory failure     Subjective:     Interval History/Overnight Events:    - patient states she feels well today, however sodium had dropped to 118, now upto 120, nephrology consulted and state cont tolvaptan and fluid restrict to 1L   - awaiting financial clearance for listing; can not list until sodium 125         duke's soln (benadryl 30 mL, mylanta 30 mL, lidocaine 30 mL, nystatin 30 mL) 120 mL  10 mL Oral QID    lactulose  15 g Oral TID    levothyroxine  112 mcg Oral Daily    pantoprazole  40 mg Oral Daily    rifAXImin  550 mg Oral BID           acetaminophen, cyclobenzaprine, dextrose 50%, dextrose 50%, glucagon (human recombinant), glucose, glucose, ondansetron, sodium chloride 0.9%, sodium chloride 0.9%, trazodone    Review of Systems   Constitutional: Negative for chills, fatigue, fever.   HENT: Negative for sore throat, trouble swallowing.    Eyes: Negative for photophobia, visual disturbance.   Respiratory: Negative for cough, shortness of breath.    Cardiovascular: Negative for chest pain, palpitations, leg swelling.   Gastrointestinal: Negative for abdominal pain, constipation, diarrhea, nausea, vomiting.   Endocrine: Negative for cold intolerance, heat intolerance.   Genitourinary: Negative for dysuria, frequency.   Musculoskeletal: Negative for arthralgias, myalgias.   Skin: Negative for rash, wound, erythema   Neurological: Negative for dizziness, syncope, weakness, light-headedness.   Psychiatric/Behavioral: Negative for confusion, hallucinations, anxiety  All other systems reviewed and are negative.    Objective:     Temp:  [97.4 °F (36.3 °C)-98.8 °F (37.1 °C)]    Pulse:  []   Resp:  [16-20]   BP: (100-126)/(46-58)   SpO2:  [94 %-98 %]       Physical Exam:  Constitutional: appears older than stated age, chronically ill looking,  non-distressed, not diaphoretic.   HENT: NC/AT, external ears normal, oropharynx clear, MMM w/o exudates.   Eyes: PERRL, EOMI, conjunctiva normal, no discharge b/l, +scleral icterus   Neck: normal ROM, supple  CV: RRR, no m/r/g, no carotid bruits, +2 peripheral pulses.  Pulmonary/Chest wall: Breathing comfortably +distress   Decreased breath sounds at lung bases/ absent breath sounds in right lung  GI: Soft, non-tender, (+) BS, (+) BM   +distended  Musculoskeletal: Normal ROM, no atrophy,  + pitting edema in LE bilaterally   Neurological: AAO x 4, CN II-XI in tact, nl sensation, nl strength/tone  No asterixis   Skin: warm, dry   +pallor, jaundice, + spider angiomas, +bruising   Psych: normal mood and affect, normal behavior, thought content and judgement.    Labs:    Chemistries:   Recent Labs   Lab 05/20/19  0258  05/21/19  0432  05/22/19  0455 05/22/19  1256 05/22/19  1806   *   < > 120*   < > 118*  117* 123* 120*   K 4.1  --  4.3  --  4.5  --   --    CL 93*  --  91*  --  89*  --   --    CO2 21*  --  23  --  23  --   --    BUN 18  --  18  --  18  --   --    CREATININE 0.8  --  0.8  --  1.1  --   --    CALCIUM 7.9*  --  7.7*  --  7.9*  --   --    PROT 5.4*  --  5.6*  --  6.0  --   --    BILITOT 7.6*  --  7.6*  --  7.4*  --   --    ALKPHOS 130  --  131  --  136*  --   --    ALT 33  --  36  --  40  --   --    AST 82*  --  91*  --  108*  --   --    MG 1.9  --  1.8  --  1.9  --   --    PHOS 3.6  --  3.6  --  3.7  --   --     < > = values in this interval not displayed.        WBC:   Recent Labs   Lab 05/18/19  0641 05/19/19  0427 05/20/19 0258 05/21/19  0432 05/22/19  0455   WBC 4.70 4.59 3.79* 2.98* 2.98*     Bands:     CBC/Anemia Labs: Coags:    Recent Labs   Lab 05/20/19  0258 05/21/19  0432 05/22/19  0455   WBC 3.79* 2.98* 2.98*   HGB  7.4* 7.6* 8.0*   HCT 22.1* 22.3* 22.9*   PLT 66* 50* 57*   MCV 93 92 90   RDW 18.6* 18.6* 18.5*    Recent Labs   Lab 05/16/19  1923  05/20/19  0258 05/21/19  0432 05/22/19  0455   INR  --    < > 1.8* 1.8* 1.7*   APTT 32.0  --   --   --   --     < > = values in this interval not displayed.        POCT Glucose: HbA1c:    No results for input(s): POCTGLUCOSE in the last 168 hours. No results found for: HGBA1C     Diagnostic Results:        Assessment and Plan     Hospital Course:    Ms. 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.81, K74.60]  Yes    Hyponatremia [E87.1]  Yes    Hepatic encephalopathy [K72.90]  Yes    Coagulopathy [D68.9]  Yes    Acute blood loss anemia [D62]  Yes    Decompensated hepatic cirrhosis [K72.90]  Yes     From KESSLER    Liver biopsy 11/29/17- KESSLER with bridging fibrosis, Laura, interface activity; lymph and occ plasma cells      Esophageal varices [I85.00]  Yes     Small with no banding 07/17      Essential hypertension [I10]  Yes    Hypothyroidism [E03.9]  Yes    GERD (gastroesophageal reflux disease) [K21.9]  Yes    Morbid obesity [E66.01]  Yes     Lap band 2006 (lost 75-80 lbs) with subsequent release (2009) (due to obstruction)        Resolved Hospital Problems   No resolved problems to display.       Acute Hypoxemic Respiratory Failure 2/2 Pleural Effusion  Hepatic Hydrothorax  · Satting 88% on RA that improved to 95% with 2L NC  · R sided hydrothorax  · Stop Levaquin as there is no suspicion for infection  · S/p thora on 5/17 with pulm, 1.5 L removed   · CT chest completed and shows stable pulmonary nodules; may need symptomatic thoracentesis tomorrow      Decompensated Liver Disease  KESSLER Cirrhosis  MELD-Na  score: 29 at 5/22/2019  6:06 PM  MELD score: 21 at 5/22/2019  4:55 AM  Calculated from:  Serum Creatinine: 1.1 mg/dL at 5/22/2019  4:55 AM  Serum Sodium: 120 mmol/L (Rounded to 125 mmol/L) at 5/22/2019  6:06 PM  Total Bilirubin: 7.4 mg/dL at 5/22/2019  4:55 AM  INR(ratio): 1.7 at 5/22/2019  4:55 AM  Age: 49 years  · Hepatology consulted, appreciate assistance  · Previously approved for liver transplant pending re-evaluation of pulmonary nodule with prior outside hospital CT scans  · Abdomen soft and patient says it feels around baseline so will hold on para  · Check PETH  · Low Sodium diet with 1L fluid restriction  · Hold diuretics due to hyponatremia  · Start Lactulose, titrate 3-4 BM/day  · Start Rifaximin 550mg PO BID  · Has history of pulmonary nodule and outside CT scans were supposed to be reviewed   · Awaiting financial clearance for listing      Esophageal Varices  · Chronic and stable  · Monitor     Anemia  · Hgb 8, baseline 8-9  · Monitor for bleeding     Coagulopathy  · 2/2 liver disease  · Monitor for bleeding     Hyponatremia  · Sodium 120 2/2 chronic liver disease  · Continue tolvaptan and fluid restrict  · Nephrology consulted      Hypokalemia  · K 3.2 2/2 low Mag  · Replace     Hypomagnesemia  · Mag 1.5  · Replace     Thrombocytopenia  · Plt chronically low 2/2 coagulopathy from liver disease  · Monitor for bleeding     Hypothyroidism  · Chronic and stable  · Continue Synthroid 112mcg PO daily     GERD  · Chronic and stable  · Continue PPI     Morbid Obesity  · Body mass index is 38.45 kg/m².      Diet:  Low Na with fluid restriction   GI PPx:  PO PPI  DVT PPx:  SCDs due to coagulopathy   Goals of Care:  Full     High Risk Conditions:  resp failure     Disposition:    - likely too sick to leave prior to a liver transplant; awaiting financial clearance for listing; need sodium to be 125 for listing

## 2019-05-24 ENCOUNTER — TELEPHONE (OUTPATIENT)
Dept: TRANSPLANT | Facility: CLINIC | Age: 49
End: 2019-05-24

## 2019-05-24 LAB
ALBUMIN SERPL BCP-MCNC: 1.9 G/DL (ref 3.5–5.2)
ALP SERPL-CCNC: 120 U/L (ref 55–135)
ALT SERPL W/O P-5'-P-CCNC: 37 U/L (ref 10–44)
ANION GAP SERPL CALC-SCNC: 6 MMOL/L (ref 8–16)
ASCENDING AORTA: 3.06 CM
AST SERPL-CCNC: 105 U/L (ref 10–40)
AV INDEX (PROSTH): 0.48
AV MEAN GRADIENT: 19.09 MMHG
AV PEAK GRADIENT: 32.49 MMHG
AV VALVE AREA: 1.57 CM2
AV VELOCITY RATIO: 0.39
BASOPHILS # BLD AUTO: 0.02 K/UL (ref 0–0.2)
BASOPHILS NFR BLD: 0.6 % (ref 0–1.9)
BILIRUB SERPL-MCNC: 7.3 MG/DL (ref 0.1–1)
BSA FOR ECHO PROCEDURE: 2.16 M2
BUN SERPL-MCNC: 17 MG/DL (ref 6–20)
CALCIUM SERPL-MCNC: 7.9 MG/DL (ref 8.7–10.5)
CHLORIDE SERPL-SCNC: 91 MMOL/L (ref 95–110)
CO2 SERPL-SCNC: 22 MMOL/L (ref 23–29)
CREAT SERPL-MCNC: 0.8 MG/DL (ref 0.5–1.4)
CV ECHO LV RWT: 0.34 CM
DIFFERENTIAL METHOD: ABNORMAL
DOP CALC AO PEAK VEL: 2.85 M/S
DOP CALC AO VTI: 56.83 CM
DOP CALC LVOT AREA: 3.3 CM2
DOP CALC LVOT DIAMETER: 2.05 CM
DOP CALC LVOT PEAK VEL: 1.1 M/S
DOP CALC LVOT STROKE VOLUME: 89.4 CM3
DOP CALCLVOT PEAK VEL VTI: 27.1 CM
E WAVE DECELERATION TIME: 318.56 MSEC
E/A RATIO: 0.94
E/E' RATIO: 8.48
ECHO LV POSTERIOR WALL: 0.87 CM (ref 0.6–1.1)
EOSINOPHIL # BLD AUTO: 0.3 K/UL (ref 0–0.5)
EOSINOPHIL NFR BLD: 9.2 % (ref 0–8)
ERYTHROCYTE [DISTWIDTH] IN BLOOD BY AUTOMATED COUNT: 18.4 % (ref 11.5–14.5)
EST. GFR  (AFRICAN AMERICAN): >60 ML/MIN/1.73 M^2
EST. GFR  (NON AFRICAN AMERICAN): >60 ML/MIN/1.73 M^2
FRACTIONAL SHORTENING: 38 % (ref 28–44)
GLUCOSE SERPL-MCNC: 97 MG/DL (ref 70–110)
HCT VFR BLD AUTO: 21.7 % (ref 37–48.5)
HGB BLD-MCNC: 7.5 G/DL (ref 12–16)
IMM GRANULOCYTES # BLD AUTO: 0.04 K/UL (ref 0–0.04)
IMM GRANULOCYTES NFR BLD AUTO: 1.3 % (ref 0–0.5)
INR PPP: 1.7 (ref 0.8–1.2)
INTERVENTRICULAR SEPTUM: 0.81 CM (ref 0.6–1.1)
LA MAJOR: 5.32 CM
LA MINOR: 4.5 CM
LA WIDTH: 3.7 CM
LEFT ATRIUM SIZE: 4.11 CM
LEFT ATRIUM VOLUME INDEX: 30.4 ML/M2
LEFT ATRIUM VOLUME: 63.02 CM3
LEFT INTERNAL DIMENSION IN SYSTOLE: 3.12 CM (ref 2.1–4)
LEFT VENTRICLE DIASTOLIC VOLUME INDEX: 58.81 ML/M2
LEFT VENTRICLE DIASTOLIC VOLUME: 122.07 ML
LEFT VENTRICLE MASS INDEX: 71.3 G/M2
LEFT VENTRICLE SYSTOLIC VOLUME INDEX: 18.6 ML/M2
LEFT VENTRICLE SYSTOLIC VOLUME: 38.61 ML
LEFT VENTRICULAR INTERNAL DIMENSION IN DIASTOLE: 5.07 CM (ref 3.5–6)
LEFT VENTRICULAR MASS: 148.05 G
LV LATERAL E/E' RATIO: 7.57
LV SEPTAL E/E' RATIO: 9.64
LYMPHOCYTES # BLD AUTO: 0.8 K/UL (ref 1–4.8)
LYMPHOCYTES NFR BLD: 25.3 % (ref 18–48)
MAGNESIUM SERPL-MCNC: 1.9 MG/DL (ref 1.6–2.6)
MCH RBC QN AUTO: 30.6 PG (ref 27–31)
MCHC RBC AUTO-ENTMCNC: 34.6 G/DL (ref 32–36)
MCV RBC AUTO: 89 FL (ref 82–98)
MONOCYTES # BLD AUTO: 0.4 K/UL (ref 0.3–1)
MONOCYTES NFR BLD: 12 % (ref 4–15)
MV PEAK A VEL: 1.13 M/S
MV PEAK E VEL: 1.06 M/S
NEUTROPHILS # BLD AUTO: 1.6 K/UL (ref 1.8–7.7)
NEUTROPHILS NFR BLD: 51.6 % (ref 38–73)
NRBC BLD-RTO: 0 /100 WBC
OSMOLALITY UR: 168 MOSM/KG (ref 50–1200)
PHOSPHATE SERPL-MCNC: 3.7 MG/DL (ref 2.7–4.5)
PISA TR MAX VEL: 2.42 M/S
PLATELET # BLD AUTO: 57 K/UL (ref 150–350)
PMV BLD AUTO: 12.8 FL (ref 9.2–12.9)
POTASSIUM SERPL-SCNC: 4.3 MMOL/L (ref 3.5–5.1)
PROT SERPL-MCNC: 5.6 G/DL (ref 6–8.4)
PROTHROMBIN TIME: 16.3 SEC (ref 9–12.5)
RA MAJOR: 4.4 CM
RA WIDTH: 3.73 CM
RBC # BLD AUTO: 2.45 M/UL (ref 4–5.4)
RIGHT VENTRICULAR END-DIASTOLIC DIMENSION: 3.79 CM
SINUS: 3.03 CM
SODIUM SERPL-SCNC: 119 MMOL/L (ref 136–145)
SODIUM SERPL-SCNC: 122 MMOL/L (ref 136–145)
SODIUM SERPL-SCNC: 124 MMOL/L (ref 136–145)
SODIUM UR-SCNC: 27 MMOL/L (ref 20–250)
STJ: 2.9 CM
TDI LATERAL: 0.14
TDI SEPTAL: 0.11
TDI: 0.13
TR MAX PG: 23.43 MMHG
TRICUSPID ANNULAR PLANE SYSTOLIC EXCURSION: 3.24 CM
WBC # BLD AUTO: 3.16 K/UL (ref 3.9–12.7)

## 2019-05-24 PROCEDURE — 36415 COLL VENOUS BLD VENIPUNCTURE: CPT | Mod: NTX

## 2019-05-24 PROCEDURE — 83735 ASSAY OF MAGNESIUM: CPT | Mod: NTX

## 2019-05-24 PROCEDURE — 20600001 HC STEP DOWN PRIVATE ROOM: Mod: NTX

## 2019-05-24 PROCEDURE — 25000003 PHARM REV CODE 250: Mod: NTX | Performed by: HOSPITALIST

## 2019-05-24 PROCEDURE — 85025 COMPLETE CBC W/AUTO DIFF WBC: CPT | Mod: NTX

## 2019-05-24 PROCEDURE — 85610 PROTHROMBIN TIME: CPT | Mod: NTX

## 2019-05-24 PROCEDURE — 80053 COMPREHEN METABOLIC PANEL: CPT | Mod: NTX

## 2019-05-24 PROCEDURE — P9047 ALBUMIN (HUMAN), 25%, 50ML: HCPCS | Mod: JG,NTX | Performed by: HOSPITALIST

## 2019-05-24 PROCEDURE — 99233 SBSQ HOSP IP/OBS HIGH 50: CPT | Mod: NTX,,, | Performed by: HOSPITALIST

## 2019-05-24 PROCEDURE — 84295 ASSAY OF SERUM SODIUM: CPT | Mod: 91,NTX

## 2019-05-24 PROCEDURE — 84100 ASSAY OF PHOSPHORUS: CPT | Mod: NTX

## 2019-05-24 PROCEDURE — 63600175 PHARM REV CODE 636 W HCPCS: Mod: JG,NTX | Performed by: HOSPITALIST

## 2019-05-24 PROCEDURE — 99233 PR SUBSEQUENT HOSPITAL CARE,LEVL III: ICD-10-PCS | Mod: NTX,,, | Performed by: HOSPITALIST

## 2019-05-24 PROCEDURE — 83935 ASSAY OF URINE OSMOLALITY: CPT | Mod: NTX

## 2019-05-24 PROCEDURE — 84300 ASSAY OF URINE SODIUM: CPT | Mod: NTX

## 2019-05-24 RX ORDER — FUROSEMIDE 10 MG/ML
40 INJECTION INTRAMUSCULAR; INTRAVENOUS 3 TIMES DAILY
Status: DISCONTINUED | OUTPATIENT
Start: 2019-05-24 | End: 2019-05-25

## 2019-05-24 RX ORDER — LACTULOSE 10 G/15ML
5 SOLUTION ORAL DAILY
Status: DISCONTINUED | OUTPATIENT
Start: 2019-05-25 | End: 2019-06-06

## 2019-05-24 RX ORDER — ALBUMIN HUMAN 250 G/1000ML
25 SOLUTION INTRAVENOUS 3 TIMES DAILY
Status: COMPLETED | OUTPATIENT
Start: 2019-05-24 | End: 2019-05-24

## 2019-05-24 RX ADMIN — LEVOTHYROXINE SODIUM 112 MCG: 112 TABLET ORAL at 05:05

## 2019-05-24 RX ADMIN — FUROSEMIDE 40 MG: 10 INJECTION, SOLUTION INTRAMUSCULAR; INTRAVENOUS at 11:05

## 2019-05-24 RX ADMIN — PANTOPRAZOLE SODIUM 40 MG: 40 TABLET, DELAYED RELEASE ORAL at 10:05

## 2019-05-24 RX ADMIN — FUROSEMIDE 40 MG: 10 INJECTION, SOLUTION INTRAMUSCULAR; INTRAVENOUS at 03:05

## 2019-05-24 RX ADMIN — ALBUMIN HUMAN 25 G: 0.25 SOLUTION INTRAVENOUS at 03:05

## 2019-05-24 RX ADMIN — ALBUMIN HUMAN 25 G: 0.25 SOLUTION INTRAVENOUS at 09:05

## 2019-05-24 RX ADMIN — ALBUMIN HUMAN 25 G: 0.25 SOLUTION INTRAVENOUS at 11:05

## 2019-05-24 RX ADMIN — RIFAXIMIN 550 MG: 550 TABLET ORAL at 10:05

## 2019-05-24 RX ADMIN — FUROSEMIDE 40 MG: 10 INJECTION, SOLUTION INTRAMUSCULAR; INTRAVENOUS at 09:05

## 2019-05-24 RX ADMIN — RIFAXIMIN 550 MG: 550 TABLET ORAL at 09:05

## 2019-05-24 NOTE — TELEPHONE ENCOUNTER
Team notified patient cleared for listing financially.  Per Dr. Coello we will not list due to hyponatremia.  On call liver team and procurement notified of potential for weekend listing of this patient.

## 2019-05-24 NOTE — ASSESSMENT & PLAN NOTE
Patient with hyponatremia, likely hypervolemic hypotonic secondary to liver disease causing hypervolemia with decreased effective arterial volume.  This in turn increases ADH causing the low sodium in urine seen in this patient.      Plan:  - Free Water restriction ~1000mL/24 hrs but needs to eat (patient with poor oral intake)  - Has had abundant diarrheal episodes in last 48 hrs, probably losing fluid causing intravascular volume depletion, increase in ADH and driving this hyponatremia, as sodium levels did increase after tolvaptan administration yesterday but again decreased.  - To confirm this, urine osms and urine Na sent; if confirmed (High urine osms low Na), would give a 500 bolus NSS and reassess  - Continue strict I/Os and chart  - MAP > 65  - Will follow closely

## 2019-05-24 NOTE — PROGRESS NOTES
[facility]  Nephrology  Progress Note    Patient Name: Jihan Zamudio  MRN: [id number]  Admission Date: 5/16/2019  Hospital Length of Stay: 8 days  Attending Provider: lBane Gottlieb MD   Primary Care Physician: Primary Doctor No  Principal Problem:Acute hypoxemic respiratory failure    Subjective:     HPI: 48 y/o White woman with hx of KESSLER cirrhosis c/b varices, latent TB, GERD, hypothyroidism, lap band 2007, HLD, no history of chronic kidney disease, currently being worked up for liver txp, admitted 5/16/2019 to [facility] due to increased shortness of breath.  Since admission patient presented with hyponatremia 124, decreased to 120,118 in last 24 hrs, reason for consult nephrology, for further workup and recs.    Nephrology consulted for hyponatremia.    Interval History: Patient evaluated bedside, stable vital signs.  Night uneventful.     Review of patient's allergies indicates:   Allergen Reactions    Metformin Rash    Pcn [penicillins] Other (See Comments)     Unsure of reaction, states it was as a child. Tolerated rocephin at osh     Current Facility-Administered Medications   Medication Frequency    acetaminophen tablet 325 mg Q8H PRN    albumin human 25% bottle 25 g TID    cyclobenzaprine tablet 5 mg BID PRN    dextrose 50% injection 12.5 g PRN    dextrose 50% injection 25 g PRN    furosemide injection 40 mg TID    glucagon (human recombinant) injection 1 mg PRN    glucose chewable tablet 16 g PRN    glucose chewable tablet 24 g PRN    [START ON 5/25/2019] lactulose 20 gram/30 mL solution Soln 5 g Daily    levothyroxine tablet 112 mcg Daily    ondansetron injection 4 mg Q8H PRN    pantoprazole EC tablet 40 mg Daily    rifAXIMin tablet 550 mg BID    sodium chloride 0.9% flush 10 mL PRN    sodium chloride 0.9% flush 5 mL PRN    trazodone split tablet 25 mg Nightly PRN       Objective:     Vital Signs (Most Recent):  Temp: 98.1 °F (36.7 °C) (05/24/19 0813)  Pulse: 86  (05/24/19 1059)  Resp: 16 (05/24/19 0813)  BP: (!) 105/51 (05/24/19 0813)  SpO2: (!) 93 % (05/24/19 0813)  O2 Device (Oxygen Therapy): room air (05/24/19 0813) Vital Signs (24h Range):  Temp:  [97.9 °F (36.6 °C)-99.1 °F (37.3 °C)] 98.1 °F (36.7 °C)  Pulse:  [82-93] 86  Resp:  [16-18] 16  SpO2:  [92 %-94 %] 93 %  BP: (105-114)/(51-67) 105/51     Weight: 101.6 kg (224 lb) (05/24/19 1059)  Body mass index is 37.28 kg/m².  Body surface area is 2.16 meters squared.    I/O last 3 completed shifts:  In: 1060 [P.O.:1060]  Out: 1320 [Urine:1320]    Physical Exam   Constitutional: She is oriented to person, place, and time. She appears well-developed. No distress.   HENT:   Head: Normocephalic and atraumatic.   Eyes: Pupils are equal, round, and reactive to light. EOM are normal. Scleral icterus is present.   Neck: Neck supple. No JVD present.   Cardiovascular: Normal rate and regular rhythm.   Pulmonary/Chest: Effort normal.   Decreased breath sounds right side   Abdominal: Soft. She exhibits distension. There is no tenderness. There is no guarding.   Musculoskeletal: She exhibits no edema or deformity.   Neurological: She is alert and oriented to person, place, and time.   Skin: Skin is warm and dry.   Psychiatric: She has a normal mood and affect.   Nursing note and vitals reviewed.      Significant Labs:  CBC:   Recent Labs   Lab 05/24/19 0437   WBC 3.16*   RBC 2.45*   HGB 7.5*   HCT 21.7*   PLT 57*   MCV 89   MCH 30.6   MCHC 34.6     CMP:   Recent Labs   Lab 05/24/19 0437   GLU 97   CALCIUM 7.9*   ALBUMIN 1.9*   PROT 5.6*   *   K 4.3   CO2 22*   CL 91*   BUN 17   CREATININE 0.8   ALKPHOS 120   ALT 37   *   BILITOT 7.3*     All labs within the past 24 hours have been reviewed.       Assessment/Plan:     Hyponatremia  Patient with hyponatremia, likely hypervolemic hypotonic secondary to liver disease causing hypervolemia with decreased effective arterial volume.  This in turn increases ADH causing the low  sodium in urine seen in this patient.      Plan:  - Free Water restriction ~1000mL/24 hrs but needs to eat (patient with poor oral intake)  - Has had abundant diarrheal episodes in last 48 hrs, probably losing fluid causing intravascular volume depletion, increase in ADH and driving this hyponatremia, as sodium levels did increase after tolvaptan administration yesterday but again decreased.  - To confirm this, urine osms and urine Na sent; if confirmed (High urine osms low Na), would give a 500 bolus NSS and reassess  - Continue strict I/Os and chart  - MAP > 65  - Will follow closely    Liver cirrhosis secondary to KESSLER  - Managed by primary team    Pleural effusion, right  - Needs to be addressed with pulmonology; will not resolve with diureses and likely needs another thoracentesis        Thank you for your consult. I will follow-up with patient. Please contact us if you have any additional questions.    Finesse Rivas MD  Nephrology  [facility]

## 2019-05-24 NOTE — PLAN OF CARE
Problem: Adult Inpatient Plan of Care  Goal: Plan of Care Review  Outcome: Ongoing (interventions implemented as appropriate)  Plan of care reviewed with patient. No acute changes overnight. Safety maintained, call light in reach, bed in lowest position, WCTM.

## 2019-05-24 NOTE — PLAN OF CARE
CM met with patient to discuss ongoing CM needs during hospitalization.   CM name and phone # left on whiteboard.     05/24/19 6615   Discharge Reassessment   Assessment Type Discharge Planning Reassessment   Do you have any problems affording any of your prescribed medications? No   Discharge Plan B Home with family   DME Needed Upon Discharge  other (see comments)  (tbd)   Can the patient answer the patient profile reliably? Yes, cognitively intact   How does the patient rate their overall health at the present time? Poor

## 2019-05-24 NOTE — PLAN OF CARE
Problem: Adult Inpatient Plan of Care  Goal: Plan of Care Review  Outcome: Ongoing (interventions implemented as appropriate)  Plan of care reviewed with patient. C diff stool and protocol canceled due formed stool specimen. No other acute changes. Safety maintained. No falls. Will continue to monitor.

## 2019-05-24 NOTE — SUBJECTIVE & OBJECTIVE
Interval History: Patient evaluated bedside, stable vital signs.  Night uneventful.     Review of patient's allergies indicates:   Allergen Reactions    Metformin Rash    Pcn [penicillins] Other (See Comments)     Unsure of reaction, states it was as a child. Tolerated rocephin at osh     Current Facility-Administered Medications   Medication Frequency    acetaminophen tablet 325 mg Q8H PRN    albumin human 25% bottle 25 g TID    cyclobenzaprine tablet 5 mg BID PRN    dextrose 50% injection 12.5 g PRN    dextrose 50% injection 25 g PRN    furosemide injection 40 mg TID    glucagon (human recombinant) injection 1 mg PRN    glucose chewable tablet 16 g PRN    glucose chewable tablet 24 g PRN    [START ON 5/25/2019] lactulose 20 gram/30 mL solution Soln 5 g Daily    levothyroxine tablet 112 mcg Daily    ondansetron injection 4 mg Q8H PRN    pantoprazole EC tablet 40 mg Daily    rifAXIMin tablet 550 mg BID    sodium chloride 0.9% flush 10 mL PRN    sodium chloride 0.9% flush 5 mL PRN    trazodone split tablet 25 mg Nightly PRN       Objective:     Vital Signs (Most Recent):  Temp: 98.1 °F (36.7 °C) (05/24/19 0813)  Pulse: 86 (05/24/19 1059)  Resp: 16 (05/24/19 0813)  BP: (!) 105/51 (05/24/19 0813)  SpO2: (!) 93 % (05/24/19 0813)  O2 Device (Oxygen Therapy): room air (05/24/19 0813) Vital Signs (24h Range):  Temp:  [97.9 °F (36.6 °C)-99.1 °F (37.3 °C)] 98.1 °F (36.7 °C)  Pulse:  [82-93] 86  Resp:  [16-18] 16  SpO2:  [92 %-94 %] 93 %  BP: (105-114)/(51-67) 105/51     Weight: 101.6 kg (224 lb) (05/24/19 1059)  Body mass index is 37.28 kg/m².  Body surface area is 2.16 meters squared.    I/O last 3 completed shifts:  In: 1060 [P.O.:1060]  Out: 1320 [Urine:1320]    Physical Exam   Constitutional: She is oriented to person, place, and time. She appears well-developed. No distress.   HENT:   Head: Normocephalic and atraumatic.   Eyes: Pupils are equal, round, and reactive to light. EOM are normal. Scleral  icterus is present.   Neck: Neck supple. No JVD present.   Cardiovascular: Normal rate and regular rhythm.   Pulmonary/Chest: Effort normal.   Decreased breath sounds right side   Abdominal: Soft. She exhibits distension. There is no tenderness. There is no guarding.   Musculoskeletal: She exhibits no edema or deformity.   Neurological: She is alert and oriented to person, place, and time.   Skin: Skin is warm and dry.   Psychiatric: She has a normal mood and affect.   Nursing note and vitals reviewed.      Significant Labs:  CBC:   Recent Labs   Lab 05/24/19 0437   WBC 3.16*   RBC 2.45*   HGB 7.5*   HCT 21.7*   PLT 57*   MCV 89   MCH 30.6   MCHC 34.6     CMP:   Recent Labs   Lab 05/24/19  0437   GLU 97   CALCIUM 7.9*   ALBUMIN 1.9*   PROT 5.6*   *   K 4.3   CO2 22*   CL 91*   BUN 17   CREATININE 0.8   ALKPHOS 120   ALT 37   *   BILITOT 7.3*     All labs within the past 24 hours have been reviewed.

## 2019-05-25 LAB
ABO + RH BLD: NORMAL
ALBUMIN SERPL BCP-MCNC: 2.8 G/DL (ref 3.5–5.2)
ALP SERPL-CCNC: 103 U/L (ref 55–135)
ALT SERPL W/O P-5'-P-CCNC: 35 U/L (ref 10–44)
ANION GAP SERPL CALC-SCNC: 7 MMOL/L (ref 8–16)
ANISOCYTOSIS BLD QL SMEAR: SLIGHT
AST SERPL-CCNC: 95 U/L (ref 10–40)
BACTERIA #/AREA URNS AUTO: ABNORMAL /HPF
BASOPHILS # BLD AUTO: ABNORMAL K/UL (ref 0–0.2)
BASOPHILS NFR BLD: 0 % (ref 0–1.9)
BILIRUB SERPL-MCNC: 7.3 MG/DL (ref 0.1–1)
BILIRUB UR QL STRIP: NEGATIVE
BLD GP AB SCN CELLS X3 SERPL QL: NORMAL
BLD PROD TYP BPU: NORMAL
BLOOD UNIT EXPIRATION DATE: NORMAL
BLOOD UNIT TYPE CODE: 5100
BLOOD UNIT TYPE: NORMAL
BUN SERPL-MCNC: 16 MG/DL (ref 6–20)
BURR CELLS BLD QL SMEAR: ABNORMAL
CALCIUM SERPL-MCNC: 8.2 MG/DL (ref 8.7–10.5)
CHLORIDE SERPL-SCNC: 95 MMOL/L (ref 95–110)
CLARITY UR REFRACT.AUTO: ABNORMAL
CO2 SERPL-SCNC: 23 MMOL/L (ref 23–29)
CODING SYSTEM: NORMAL
COLOR UR AUTO: ABNORMAL
CREAT SERPL-MCNC: 0.8 MG/DL (ref 0.5–1.4)
DACRYOCYTES BLD QL SMEAR: ABNORMAL
DIFFERENTIAL METHOD: ABNORMAL
DISPENSE STATUS: NORMAL
EOSINOPHIL # BLD AUTO: ABNORMAL K/UL (ref 0–0.5)
EOSINOPHIL NFR BLD: 9 % (ref 0–8)
ERYTHROCYTE [DISTWIDTH] IN BLOOD BY AUTOMATED COUNT: 18.4 % (ref 11.5–14.5)
EST. GFR  (AFRICAN AMERICAN): >60 ML/MIN/1.73 M^2
EST. GFR  (NON AFRICAN AMERICAN): >60 ML/MIN/1.73 M^2
FIBRINOGEN PPP-MCNC: 84 MG/DL (ref 182–366)
GLUCOSE SERPL-MCNC: 79 MG/DL (ref 70–110)
GLUCOSE UR QL STRIP: NEGATIVE
HCT VFR BLD AUTO: 19.5 % (ref 37–48.5)
HCT VFR BLD AUTO: 19.7 % (ref 37–48.5)
HGB BLD-MCNC: 6.7 G/DL (ref 12–16)
HGB BLD-MCNC: 6.9 G/DL (ref 12–16)
HGB UR QL STRIP: ABNORMAL
HYALINE CASTS UR QL AUTO: 1 /LPF
HYPOCHROMIA BLD QL SMEAR: ABNORMAL
IMM GRANULOCYTES # BLD AUTO: ABNORMAL K/UL (ref 0–0.04)
IMM GRANULOCYTES NFR BLD AUTO: ABNORMAL % (ref 0–0.5)
INR PPP: 1.8 (ref 0.8–1.2)
KETONES UR QL STRIP: NEGATIVE
LACTATE SERPL-SCNC: 2.9 MMOL/L (ref 0.5–2.2)
LEUKOCYTE ESTERASE UR QL STRIP: NEGATIVE
LYMPHOCYTES # BLD AUTO: ABNORMAL K/UL (ref 1–4.8)
LYMPHOCYTES NFR BLD: 33 % (ref 18–48)
MAGNESIUM SERPL-MCNC: 1.9 MG/DL (ref 1.6–2.6)
MCH RBC QN AUTO: 31.8 PG (ref 27–31)
MCHC RBC AUTO-ENTMCNC: 35 G/DL (ref 32–36)
MCV RBC AUTO: 91 FL (ref 82–98)
MICROSCOPIC COMMENT: ABNORMAL
MONOCYTES # BLD AUTO: ABNORMAL K/UL (ref 0.3–1)
MONOCYTES NFR BLD: 13 % (ref 4–15)
NEUTROPHILS NFR BLD: 45 % (ref 38–73)
NITRITE UR QL STRIP: NEGATIVE
NRBC BLD-RTO: 0 /100 WBC
OVALOCYTES BLD QL SMEAR: ABNORMAL
PH UR STRIP: 5 [PH] (ref 5–8)
PHOSPHATE SERPL-MCNC: 3.6 MG/DL (ref 2.7–4.5)
PLATELET # BLD AUTO: 46 K/UL (ref 150–350)
PLATELET BLD QL SMEAR: ABNORMAL
PMV BLD AUTO: 12.6 FL (ref 9.2–12.9)
POIKILOCYTOSIS BLD QL SMEAR: SLIGHT
POLYCHROMASIA BLD QL SMEAR: ABNORMAL
POTASSIUM SERPL-SCNC: 4.2 MMOL/L (ref 3.5–5.1)
PROCALCITONIN SERPL IA-MCNC: 0.09 NG/ML
PROT SERPL-MCNC: 5.8 G/DL (ref 6–8.4)
PROT UR QL STRIP: NEGATIVE
PROTHROMBIN TIME: 17.5 SEC (ref 9–12.5)
RBC # BLD AUTO: 2.17 M/UL (ref 4–5.4)
RBC #/AREA URNS AUTO: 3 /HPF (ref 0–4)
SCHISTOCYTES BLD QL SMEAR: ABNORMAL
SODIUM SERPL-SCNC: 123 MMOL/L (ref 136–145)
SODIUM SERPL-SCNC: 124 MMOL/L (ref 136–145)
SODIUM SERPL-SCNC: 125 MMOL/L (ref 136–145)
SP GR UR STRIP: 1.01 (ref 1–1.03)
SQUAMOUS #/AREA URNS AUTO: 12 /HPF
TRANS ERYTHROCYTES VOL PATIENT: NORMAL ML
URN SPEC COLLECT METH UR: ABNORMAL
WBC # BLD AUTO: 1.84 K/UL (ref 3.9–12.7)
WBC #/AREA URNS AUTO: 3 /HPF (ref 0–5)
YEAST UR QL AUTO: ABNORMAL

## 2019-05-25 PROCEDURE — 25000003 PHARM REV CODE 250: Mod: NTX | Performed by: HOSPITALIST

## 2019-05-25 PROCEDURE — 99232 PR SUBSEQUENT HOSPITAL CARE,LEVL II: ICD-10-PCS | Mod: NTX,,, | Performed by: INTERNAL MEDICINE

## 2019-05-25 PROCEDURE — 36415 COLL VENOUS BLD VENIPUNCTURE: CPT | Mod: NTX

## 2019-05-25 PROCEDURE — 80053 COMPREHEN METABOLIC PANEL: CPT | Mod: NTX

## 2019-05-25 PROCEDURE — 85610 PROTHROMBIN TIME: CPT | Mod: NTX

## 2019-05-25 PROCEDURE — 99233 SBSQ HOSP IP/OBS HIGH 50: CPT | Mod: NTX,,, | Performed by: INTERNAL MEDICINE

## 2019-05-25 PROCEDURE — 99232 SBSQ HOSP IP/OBS MODERATE 35: CPT | Mod: NTX,,, | Performed by: INTERNAL MEDICINE

## 2019-05-25 PROCEDURE — 85014 HEMATOCRIT: CPT | Mod: NTX

## 2019-05-25 PROCEDURE — 84295 ASSAY OF SERUM SODIUM: CPT | Mod: 91,NTX

## 2019-05-25 PROCEDURE — 85007 BL SMEAR W/DIFF WBC COUNT: CPT | Mod: NTX

## 2019-05-25 PROCEDURE — 83605 ASSAY OF LACTIC ACID: CPT | Mod: NTX

## 2019-05-25 PROCEDURE — 86901 BLOOD TYPING SEROLOGIC RH(D): CPT | Mod: NTX

## 2019-05-25 PROCEDURE — 84100 ASSAY OF PHOSPHORUS: CPT | Mod: NTX

## 2019-05-25 PROCEDURE — 83735 ASSAY OF MAGNESIUM: CPT | Mod: NTX

## 2019-05-25 PROCEDURE — P9021 RED BLOOD CELLS UNIT: HCPCS | Mod: NTX

## 2019-05-25 PROCEDURE — 85384 FIBRINOGEN ACTIVITY: CPT | Mod: NTX

## 2019-05-25 PROCEDURE — 85018 HEMOGLOBIN: CPT | Mod: NTX

## 2019-05-25 PROCEDURE — 99233 PR SUBSEQUENT HOSPITAL CARE,LEVL III: ICD-10-PCS | Mod: NTX,,, | Performed by: HOSPITALIST

## 2019-05-25 PROCEDURE — 99233 SBSQ HOSP IP/OBS HIGH 50: CPT | Mod: NTX,,, | Performed by: HOSPITALIST

## 2019-05-25 PROCEDURE — 84145 PROCALCITONIN (PCT): CPT | Mod: NTX

## 2019-05-25 PROCEDURE — 20600001 HC STEP DOWN PRIVATE ROOM: Mod: NTX

## 2019-05-25 PROCEDURE — 36430 TRANSFUSION BLD/BLD COMPNT: CPT

## 2019-05-25 PROCEDURE — 99233 PR SUBSEQUENT HOSPITAL CARE,LEVL III: ICD-10-PCS | Mod: NTX,,, | Performed by: INTERNAL MEDICINE

## 2019-05-25 PROCEDURE — 86920 COMPATIBILITY TEST SPIN: CPT | Mod: NTX

## 2019-05-25 PROCEDURE — 85027 COMPLETE CBC AUTOMATED: CPT | Mod: NTX

## 2019-05-25 PROCEDURE — 87040 BLOOD CULTURE FOR BACTERIA: CPT | Mod: 59,NTX

## 2019-05-25 PROCEDURE — 81001 URINALYSIS AUTO W/SCOPE: CPT | Mod: NTX

## 2019-05-25 RX ORDER — SODIUM CHLORIDE 0.9 % (FLUSH) 0.9 %
10 SYRINGE (ML) INJECTION
Status: DISCONTINUED | OUTPATIENT
Start: 2019-05-25 | End: 2019-06-01

## 2019-05-25 RX ORDER — SIMETHICONE 80 MG
1 TABLET,CHEWABLE ORAL 3 TIMES DAILY PRN
Status: DISCONTINUED | OUTPATIENT
Start: 2019-05-25 | End: 2019-06-06

## 2019-05-25 RX ORDER — HYDROCODONE BITARTRATE AND ACETAMINOPHEN 500; 5 MG/1; MG/1
TABLET ORAL
Status: DISCONTINUED | OUTPATIENT
Start: 2019-05-25 | End: 2019-06-01

## 2019-05-25 RX ADMIN — LACTULOSE 5 G: 20 SOLUTION ORAL at 09:05

## 2019-05-25 RX ADMIN — RIFAXIMIN 550 MG: 550 TABLET ORAL at 09:05

## 2019-05-25 RX ADMIN — SODIUM CHLORIDE 500 ML: 0.9 INJECTION, SOLUTION INTRAVENOUS at 09:05

## 2019-05-25 RX ADMIN — LEVOTHYROXINE SODIUM 112 MCG: 112 TABLET ORAL at 05:05

## 2019-05-25 RX ADMIN — PANTOPRAZOLE SODIUM 40 MG: 40 TABLET, DELAYED RELEASE ORAL at 09:05

## 2019-05-25 NOTE — PLAN OF CARE
Problem: Adult Inpatient Plan of Care  Goal: Plan of Care Review  Pt remains AAOx4, vital signs are stable. Call light and personal belongings within reach. No BMs over night. Plan of care reviewed with pt, all questions and concerns were addressed. Will continue to monitor.

## 2019-05-25 NOTE — ASSESSMENT & PLAN NOTE
49 year old female with a history of KESSLER cirrhosis c/b varices currently admitted for decompensated cirrhosis. Patient has been approved for listing however continue to have issues with hyponatremia. This morning although she denies any specific complaints she is not as talkative and a bit withdrawn. Furthermore, her labs are remarkable for worsening pancytopenia and increasing INR. Apart from giving her a PRBC transfusion would recommend repeating an infectious workup in order to be certain an underlying infection is not the cause of deterioration.     Recommendations:  --Daily CMP, CBC, and IN  --Obtain infectious workup  --Transfuse as hgb is less then 7  --Continue PPI  --Continue lactulose and rifaximin  --Listing pending improvement of hyponatremia

## 2019-05-25 NOTE — PROGRESS NOTES
[facility]  Hepatology  Progress Note    Patient Name: Jihan Zamudio  MRN: [id number]  Admission Date: 5/16/2019  Hospital Length of Stay: 9 days  Attending Provider: Blane Gottlieb MD   Primary Care Physician: Primary Doctor No  Principal Problem:Acute hypoxemic respiratory failure    Subjective:     HPI: This is a 48 yo F with hx of KESSLER cirrhosis c/b varices, latent TB, GERD, hypothyroidism, lap band 2007, HLD who was sent to the ED from hepatology clinic for shortness of breath. She follows with Dr. Smallwood. She has been undergoing transplant evaluation and is pending financial approval and clarification of lung nodule. She reports feeling more short of breath the past few days and increased abdominal swelling. She reports occasional confusion as well. She was found to have a low sodium as well. With regards to her lung nodule, we have been awaiting films for an outside facility for comparison. She was found to have a large pleural effusion while here, something she has not had before. Pulmonology has been consulted.         Interval History:   Patient reports OK however unable to sleep much during the night.  She denies any chest pain, shortness of breath, nausea, emesis, abdominal pain, or overt signs of bleeding.    Current Facility-Administered Medications   Medication    0.9%  NaCl infusion (for blood administration)    acetaminophen tablet 325 mg    cyclobenzaprine tablet 5 mg    dextrose 50% injection 12.5 g    dextrose 50% injection 25 g    glucagon (human recombinant) injection 1 mg    glucose chewable tablet 16 g    glucose chewable tablet 24 g    lactulose 20 gram/30 mL solution Soln 5 g    levothyroxine tablet 112 mcg    ondansetron injection 4 mg    pantoprazole EC tablet 40 mg    rifAXIMin tablet 550 mg    simethicone chewable tablet 80 mg    sodium chloride 0.9% flush 10 mL    sodium chloride 0.9% flush 10 mL    sodium chloride 0.9% flush 5 mL    trazodone  split tablet 25 mg       Objective:     Vital Signs (Most Recent):  Temp: 99.1 °F (37.3 °C) (05/25/19 1347)  Pulse: 95 (05/25/19 1347)  Resp: 18 (05/25/19 1347)  BP: (!) 111/47 (05/25/19 1347)  SpO2: 95 % (05/25/19 1347) Vital Signs (24h Range):  Temp:  [98 °F (36.7 °C)-99.1 °F (37.3 °C)] 99.1 °F (37.3 °C)  Pulse:  [54-98] 95  Resp:  [16-18] 18  SpO2:  [91 %-95 %] 95 %  BP: (105-119)/(47-58) 111/47     Weight: 101.6 kg (224 lb) (05/24/19 1059)  Body mass index is 37.28 kg/m².    Physical Exam   Constitutional: She is oriented to person, place, and time. No distress.   HENT:   Head: Normocephalic and atraumatic.   Eyes: Scleral icterus is present.   Cardiovascular: Normal rate and regular rhythm.   Pulmonary/Chest:   On room air but decreased breath sounds bilaterally   Abdominal: Soft. Bowel sounds are normal. She exhibits distension. She exhibits no mass. There is no tenderness. There is no rebound and no guarding. No hernia.   Musculoskeletal: She exhibits no edema.   Neurological: She is alert and oriented to person, place, and time.   Skin: She is not diaphoretic.   Nursing note and vitals reviewed.      MELD-Na score: 29 at 5/25/2019  5:56 AM  MELD score: 21 at 5/25/2019  5:13 AM  Calculated from:  Serum Creatinine: 0.8 mg/dL (Rounded to 1 mg/dL) at 5/25/2019  5:13 AM  Serum Sodium: 123 mmol/L (Rounded to 125 mmol/L) at 5/25/2019  5:56 AM  Total Bilirubin: 7.3 mg/dL at 5/25/2019  5:13 AM  INR(ratio): 1.8 at 5/25/2019  5:13 AM  Age: 49 years    Significant Labs:  CBC:   Recent Labs   Lab 05/25/19  0513 05/25/19  0724   WBC 1.84*  --    RBC 2.17*  --    HGB 6.9* 6.7*   HCT 19.7* 19.5*   PLT 46*  --      CMP:   Recent Labs   Lab 05/25/19  0513 05/25/19  0556   GLU 79  --    CALCIUM 8.2*  --    ALBUMIN 2.8*  --    PROT 5.8*  --    * 123*   K 4.2  --    CO2 23  --    CL 95  --    BUN 16  --    CREATININE 0.8  --    ALKPHOS 103  --    ALT 35  --    AST 95*  --    BILITOT 7.3*  --      Coagulation:   Recent Labs    Lab 05/25/19  0513   INR 1.8*       Significant Imaging:  X-Ray: Reviewed  US: Reviewed  CT: Reviewed    Assessment/Plan:     Liver cirrhosis secondary to KESSLER  49 year old female with a history of KESSLER cirrhosis c/b varices currently admitted for decompensated cirrhosis. Patient has been approved for listing however continue to have issues with hyponatremia. This morning although she denies any specific complaints she is not as talkative and a bit withdrawn. Furthermore, her labs are remarkable for worsening pancytopenia and increasing INR. Apart from giving her a PRBC transfusion would recommend repeating an infectious workup in order to be certain an underlying infection is not the cause of deterioration.     Recommendations:  --Daily CMP, CBC, and IN  --Obtain infectious workup  --Transfuse as hgb is less then 7  --Continue PPI  --Continue lactulose and rifaximin  --Listing pending improvement of hyponatremia          Thank you for your consult. I will follow-up with patient. Please contact us if you have any additional questions.    Alexa Richard M.D.  Gastroenterology Fellow, PGY-V  Pager: [phone]  [facility]

## 2019-05-25 NOTE — PROGRESS NOTES
Progress Note  Hospital Medicine  [facility]       Patient Name: Jihan Zamudio  MRN:  [id number]  Hospital Medicine Team: [facility] L Blane Gottlieb MD  Date of Admission:  5/16/2019     Length of Stay:  LOS: 9 days   Expected Discharge Date: 5/27/2019  Principal Problem:  Acute hypoxemic respiratory failure     Subjective:     Interval History/Overnight Events:    - patient's hemoglobin dropped down to 6.7 today, likely due to DIC and hemolysis, no evidence of overt GI bleed; transfusing 1 unit of PRBC; family at the bedside  - sodium improved to 124, concern for dehydration as concern for sodium with numerous BMs; will give 500 cc bolus of NS  - patient financially cleared for listing, awaiting sodium to get 125  - doing infectious work up today; IR paracentesis monday          lactulose  5 g Oral Daily    levothyroxine  112 mcg Oral Daily    pantoprazole  40 mg Oral Daily    rifAXImin  550 mg Oral BID           sodium chloride, acetaminophen, cyclobenzaprine, dextrose 50%, dextrose 50%, glucagon (human recombinant), glucose, glucose, ondansetron, simethicone, sodium chloride 0.9%, sodium chloride 0.9%, sodium chloride 0.9%, trazodone    Review of Systems   Constitutional: Negative for chills, fatigue, fever.   HENT: Negative for sore throat, trouble swallowing.    Eyes: Negative for photophobia, visual disturbance.   Respiratory: Negative for cough, shortness of breath.    Cardiovascular: Negative for chest pain, palpitations, leg swelling.   Gastrointestinal: Negative for abdominal pain, constipation, diarrhea, nausea, vomiting.   Endocrine: Negative for cold intolerance, heat intolerance.   Genitourinary: Negative for dysuria, frequency.   Musculoskeletal: Negative for arthralgias, myalgias.   Skin: Negative for rash, wound, erythema   Neurological: Negative for dizziness, syncope, weakness, light-headedness.   Psychiatric/Behavioral: Negative for confusion,  hallucinations, anxiety  All other systems reviewed and are negative.    Objective:     Temp:  [98 °F (36.7 °C)-99.1 °F (37.3 °C)]   Pulse:  [54-98]   Resp:  [16-18]   BP: (105-119)/(47-58)   SpO2:  [91 %-95 %]       Physical Exam:  Constitutional: appears older than stated age, chronically ill looking,  non-distressed, not diaphoretic.   HENT: NC/AT, external ears normal, oropharynx clear, MMM w/o exudates.   Eyes: PERRL, EOMI, conjunctiva normal, no discharge b/l, +scleral icterus   Neck: normal ROM, supple  CV: RRR, no m/r/g, no carotid bruits, +2 peripheral pulses.  Pulmonary/Chest wall: Breathing comfortably +distress   Decreased breath sounds at lung bases/ absent breath sounds in right lung  GI: Soft, non-tender, (+) BS, (+) BM   +distended  Musculoskeletal: Normal ROM, no atrophy,  + pitting edema in LE bilaterally   Neurological: AAO x 4, CN II-XI in tact, nl sensation, nl strength/tone  No asterixis   Skin: warm, dry   +pallor, jaundice, + spider angiomas, +bruising   Psych: normal mood and affect, normal behavior, thought content and judgement.    Labs:    Chemistries:   Recent Labs   Lab 05/23/19  0646 05/23/19  1502  05/24/19  0437  05/25/19  0140 05/25/19  0513 05/25/19  0556   *  120* 122*   < > 119*   < > 124* 125* 123*   K 4.4 4.5  --  4.3  --   --  4.2  --    CL 90* 93*  --  91*  --   --  95  --    CO2 24 22*  --  22*  --   --  23  --    BUN 18 18  --  17  --   --  16  --    CREATININE 0.9 0.9  --  0.8  --   --  0.8  --    CALCIUM 8.3* 8.1*  --  7.9*  --   --  8.2*  --    PROT 5.9*  --   --  5.6*  --   --  5.8*  --    BILITOT 7.6*  --   --  7.3*  --   --  7.3*  --    ALKPHOS 129  --   --  120  --   --  103  --    ALT 41  --   --  37  --   --  35  --    *  --   --  105*  --   --  95*  --    MG 2.0  --   --  1.9  --   --  1.9  --    PHOS 4.2  --   --  3.7  --   --  3.6  --     < > = values in this interval not displayed.        WBC:   Recent Labs   Lab 05/21/19  0432 05/22/19  1130  05/23/19  0646 05/24/19  0437 05/25/19  0513   WBC 2.98* 2.98* 2.97* 3.16* 1.84*     Bands:     CBC/Anemia Labs: Coags:    Recent Labs   Lab 05/23/19  0646 05/24/19 0437 05/25/19  0513 05/25/19  0724   WBC 2.97* 3.16* 1.84*  --    HGB 7.8* 7.5* 6.9* 6.7*   HCT 23.0* 21.7* 19.7* 19.5*   PLT 65* 57* 46*  --    MCV 91 89 91  --    RDW 18.4* 18.4* 18.4*  --     Recent Labs   Lab 05/23/19  0646 05/24/19 0437 05/25/19  0513   INR 1.6* 1.7* 1.8*        POCT Glucose: HbA1c:    No results for input(s): POCTGLUCOSE in the last 168 hours. No results found for: HGBA1C     Diagnostic Results:        Assessment and Plan     Hospital Course:    Ms. Jihan Zamduio was admitted to Hospital Medicine for management of     Active Hospital Problems    Diagnosis  POA    *Acute hypoxemic respiratory failure [J96.01]  Yes    Pleural effusion, right [J90]  Yes    Thrombocytopenia [D69.6]  Yes    Hypokalemia [E87.6]  Yes    Liver cirrhosis secondary to KESSLER [K75.81, K74.60]  Yes    Hyponatremia [E87.1]  Yes    Hepatic encephalopathy [K72.90]  Yes    Coagulopathy [D68.9]  Yes    Acute blood loss anemia [D62]  Yes    Decompensated hepatic cirrhosis [K72.90]  Yes     From KESSLER    Liver biopsy 11/29/17- KESSLER with bridging fibrosis, Laura, interface activity; lymph and occ plasma cells      Esophageal varices [I85.00]  Yes     Small with no banding 07/17      Essential hypertension [I10]  Yes    Hypothyroidism [E03.9]  Yes    GERD (gastroesophageal reflux disease) [K21.9]  Yes    Morbid obesity [E66.01]  Yes     Lap band 2006 (lost 75-80 lbs) with subsequent release (2009) (due to obstruction)        Resolved Hospital Problems   No resolved problems to display.       Acute Hypoxemic Respiratory Failure 2/2 Pleural Effusion  Hepatic Hydrothorax  · Satting 88% on RA that improved to 95% with 2L NC  · R sided hydrothorax  · Stop Levaquin as there is no suspicion for infection  · S/p thora on 5/17 with pulm, 1.5 L removed   · CT  chest completed and shows stable pulmonary nodules;      Decompensated Liver Disease  KESSLER Cirrhosis  MELD-Na score: 29 at 5/25/2019  5:56 AM  MELD score: 21 at 5/25/2019  5:13 AM  Calculated from:  Serum Creatinine: 0.8 mg/dL (Rounded to 1 mg/dL) at 5/25/2019  5:13 AM  Serum Sodium: 123 mmol/L (Rounded to 125 mmol/L) at 5/25/2019  5:56 AM  Total Bilirubin: 7.3 mg/dL at 5/25/2019  5:13 AM  INR(ratio): 1.8 at 5/25/2019  5:13 AM  Age: 49 years  · Hepatology consulted, appreciate assistance  · Previously approved for liver transplant pending re-evaluation of pulmonary nodule with prior outside hospital CT scans  · Abdomen soft and patient says it feels around baseline so will hold on para  · Check PETH  · Low Sodium diet with 1L fluid restriction  · Hold diuretics due to hyponatremia  · Start Lactulose, titrate 3-4 BM/day  · Start Rifaximin 550mg PO BID  · Has history of pulmonary nodule and outside CT scans were supposed to be reviewed   · Patient cleared for listing, however waiting until sodium gets to 125     Esophageal Varices  · Chronic and stable  · Monitor     Anemia  · Hemoglobin 6.7 today, likely hemolysis and DIC; no overt GI bleeding; transfusing 1 unit of PRBC     Coagulopathy  · 2/2 liver disease  · Monitor for bleeding     Hyponatremia  · Sodium 120 2/2 chronic liver disease  · Switching patient to IV lasix and albumin and monitoring   · Nephrology consulted      Hypokalemia  · K 3.2 2/2 low Mag  · Replace     Hypomagnesemia  · Mag 1.5  · Replace     Thrombocytopenia  · Plt chronically low 2/2 coagulopathy from liver disease  · Monitor for bleeding     Hypothyroidism  · Chronic and stable  · Continue Synthroid 112mcg PO daily     GERD  · Chronic and stable  · Continue PPI     Morbid Obesity  · Body mass index is 38.45 kg/m².      Diet:  regular with fluid restriction   GI PPx:  PO PPI  DVT PPx:  SCDs due to coagulopathy   Goals of Care:  Full     High Risk Conditions:  resp failure     Disposition:    -  likely too sick to leave prior to a liver transplant; financial cleared for listing; need sodium to be 125 for listing

## 2019-05-25 NOTE — PROGRESS NOTES
Progress Note  Hospital Medicine  [facility]       Patient Name: Jihan Zamudio  MRN:  [id number]  Hospital Medicine Team: [facility]OS[facility] MED L Blane Gottlieb MD  Date of Admission:  5/16/2019     Length of Stay:  LOS: 9 days   Expected Discharge Date: 5/27/2019  Principal Problem:  Acute hypoxemic respiratory failure     Subjective:     Interval History/Overnight Events:    - patient switched over to IV lasix and albumin today and monitoring; have received approval for financial listing, however have to wait until sodium reaches 125           lactulose  5 g Oral Daily    levothyroxine  112 mcg Oral Daily    pantoprazole  40 mg Oral Daily    rifAXImin  550 mg Oral BID           sodium chloride, acetaminophen, cyclobenzaprine, dextrose 50%, dextrose 50%, glucagon (human recombinant), glucose, glucose, ondansetron, simethicone, sodium chloride 0.9%, sodium chloride 0.9%, sodium chloride 0.9%, trazodone    Review of Systems   Constitutional: Negative for chills, fatigue, fever.   HENT: Negative for sore throat, trouble swallowing.    Eyes: Negative for photophobia, visual disturbance.   Respiratory: Negative for cough, shortness of breath.    Cardiovascular: Negative for chest pain, palpitations, leg swelling.   Gastrointestinal: Negative for abdominal pain, constipation, diarrhea, nausea, vomiting.   Endocrine: Negative for cold intolerance, heat intolerance.   Genitourinary: Negative for dysuria, frequency.   Musculoskeletal: Negative for arthralgias, myalgias.   Skin: Negative for rash, wound, erythema   Neurological: Negative for dizziness, syncope, weakness, light-headedness.   Psychiatric/Behavioral: Negative for confusion, hallucinations, anxiety  All other systems reviewed and are negative.    Objective:     Temp:  [98 °F (36.7 °C)-99.1 °F (37.3 °C)]   Pulse:  [54-98]   Resp:  [16-18]   BP: (105-119)/(47-58)   SpO2:  [91 %-95 %]       Physical Exam:  Constitutional: appears older  than stated age, chronically ill looking,  non-distressed, not diaphoretic.   HENT: NC/AT, external ears normal, oropharynx clear, MMM w/o exudates.   Eyes: PERRL, EOMI, conjunctiva normal, no discharge b/l, +scleral icterus   Neck: normal ROM, supple  CV: RRR, no m/r/g, no carotid bruits, +2 peripheral pulses.  Pulmonary/Chest wall: Breathing comfortably +distress   Decreased breath sounds at lung bases/ absent breath sounds in right lung  GI: Soft, non-tender, (+) BS, (+) BM   +distended  Musculoskeletal: Normal ROM, no atrophy,  + pitting edema in LE bilaterally   Neurological: AAO x 4, CN II-XI in tact, nl sensation, nl strength/tone  No asterixis   Skin: warm, dry   +pallor, jaundice, + spider angiomas, +bruising   Psych: normal mood and affect, normal behavior, thought content and judgement.    Labs:    Chemistries:   Recent Labs   Lab 05/23/19  0646 05/23/19  1502  05/24/19  0437  05/25/19  0140 05/25/19  0513 05/25/19  0556   *  120* 122*   < > 119*   < > 124* 125* 123*   K 4.4 4.5  --  4.3  --   --  4.2  --    CL 90* 93*  --  91*  --   --  95  --    CO2 24 22*  --  22*  --   --  23  --    BUN 18 18  --  17  --   --  16  --    CREATININE 0.9 0.9  --  0.8  --   --  0.8  --    CALCIUM 8.3* 8.1*  --  7.9*  --   --  8.2*  --    PROT 5.9*  --   --  5.6*  --   --  5.8*  --    BILITOT 7.6*  --   --  7.3*  --   --  7.3*  --    ALKPHOS 129  --   --  120  --   --  103  --    ALT 41  --   --  37  --   --  35  --    *  --   --  105*  --   --  95*  --    MG 2.0  --   --  1.9  --   --  1.9  --    PHOS 4.2  --   --  3.7  --   --  3.6  --     < > = values in this interval not displayed.        WBC:   Recent Labs   Lab 05/21/19  0432 05/22/19  0455 05/23/19  0646 05/24/19  0437 05/25/19  0513   WBC 2.98* 2.98* 2.97* 3.16* 1.84*     Bands:     CBC/Anemia Labs: Coags:    Recent Labs   Lab 05/23/19  0646 05/24/19  0437 05/25/19  0513 05/25/19  0724   WBC 2.97* 3.16* 1.84*  --    HGB 7.8* 7.5* 6.9* 6.7*   HCT 23.0*  21.7* 19.7* 19.5*   PLT 65* 57* 46*  --    MCV 91 89 91  --    RDW 18.4* 18.4* 18.4*  --     Recent Labs   Lab 05/23/19  0646 05/24/19  0437 05/25/19  0513   INR 1.6* 1.7* 1.8*        POCT Glucose: HbA1c:    No results for input(s): POCTGLUCOSE in the last 168 hours. No results found for: HGBA1C     Diagnostic Results:        Assessment and Plan     Hospital Course:    Ms. 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.81, K74.60]  Yes    Hyponatremia [E87.1]  Yes    Hepatic encephalopathy [K72.90]  Yes    Coagulopathy [D68.9]  Yes    Acute blood loss anemia [D62]  Yes    Decompensated hepatic cirrhosis [K72.90]  Yes     From KESSLER    Liver biopsy 11/29/17- KESSLER with bridging fibrosis, Laura, interface activity; lymph and occ plasma cells      Esophageal varices [I85.00]  Yes     Small with no banding 07/17      Essential hypertension [I10]  Yes    Hypothyroidism [E03.9]  Yes    GERD (gastroesophageal reflux disease) [K21.9]  Yes    Morbid obesity [E66.01]  Yes     Lap band 2006 (lost 75-80 lbs) with subsequent release (2009) (due to obstruction)        Resolved Hospital Problems   No resolved problems to display.       Acute Hypoxemic Respiratory Failure 2/2 Pleural Effusion  Hepatic Hydrothorax  · Satting 88% on RA that improved to 95% with 2L NC  · R sided hydrothorax  · Stop Levaquin as there is no suspicion for infection  · S/p thora on 5/17 with pulm, 1.5 L removed   · CT chest completed and shows stable pulmonary nodules; may need symptomatic thoracentesis tomorrow      Decompensated Liver Disease  KESSLER Cirrhosis  MELD-Na score: 29 at 5/25/2019  5:56 AM  MELD score: 21 at 5/25/2019  5:13 AM  Calculated from:  Serum Creatinine: 0.8  mg/dL (Rounded to 1 mg/dL) at 5/25/2019  5:13 AM  Serum Sodium: 123 mmol/L (Rounded to 125 mmol/L) at 5/25/2019  5:56 AM  Total Bilirubin: 7.3 mg/dL at 5/25/2019  5:13 AM  INR(ratio): 1.8 at 5/25/2019  5:13 AM  Age: 49 years  · Hepatology consulted, appreciate assistance  · Previously approved for liver transplant pending re-evaluation of pulmonary nodule with prior outside hospital CT scans  · Abdomen soft and patient says it feels around baseline so will hold on para  · Check PETH  · Low Sodium diet with 1L fluid restriction  · Hold diuretics due to hyponatremia  · Start Lactulose, titrate 3-4 BM/day  · Start Rifaximin 550mg PO BID  · Has history of pulmonary nodule and outside CT scans were supposed to be reviewed   · Patient cleared for listing, however waiting until sodium gets to 125     Esophageal Varices  · Chronic and stable  · Monitor     Anemia  · Hgb 8, baseline 8-9  · Monitor for bleeding     Coagulopathy  · 2/2 liver disease  · Monitor for bleeding     Hyponatremia  · Sodium 120 2/2 chronic liver disease  · Switching patient to IV lasix and albumin and monitoring   · Nephrology consulted      Hypokalemia  · K 3.2 2/2 low Mag  · Replace     Hypomagnesemia  · Mag 1.5  · Replace     Thrombocytopenia  · Plt chronically low 2/2 coagulopathy from liver disease  · Monitor for bleeding     Hypothyroidism  · Chronic and stable  · Continue Synthroid 112mcg PO daily     GERD  · Chronic and stable  · Continue PPI     Morbid Obesity  · Body mass index is 38.45 kg/m².      Diet:  regular with fluid restriction   GI PPx:  PO PPI  DVT PPx:  SCDs due to coagulopathy   Goals of Care:  Full     High Risk Conditions:  resp failure     Disposition:    - likely too sick to leave prior to a liver transplant; financial cleared for listing; need sodium to be 125 for listing

## 2019-05-26 LAB
ALBUMIN SERPL BCP-MCNC: 2.6 G/DL (ref 3.5–5.2)
ALP SERPL-CCNC: 106 U/L (ref 55–135)
ALT SERPL W/O P-5'-P-CCNC: 36 U/L (ref 10–44)
ANION GAP SERPL CALC-SCNC: 6 MMOL/L (ref 8–16)
ANISOCYTOSIS BLD QL SMEAR: SLIGHT
AST SERPL-CCNC: 102 U/L (ref 10–40)
BASOPHILS # BLD AUTO: 0.02 K/UL (ref 0–0.2)
BASOPHILS NFR BLD: 1 % (ref 0–1.9)
BILIRUB SERPL-MCNC: 8.9 MG/DL (ref 0.1–1)
BUN SERPL-MCNC: 13 MG/DL (ref 6–20)
CALCIUM SERPL-MCNC: 8.2 MG/DL (ref 8.7–10.5)
CHLORIDE SERPL-SCNC: 95 MMOL/L (ref 95–110)
CO2 SERPL-SCNC: 22 MMOL/L (ref 23–29)
CREAT SERPL-MCNC: 0.8 MG/DL (ref 0.5–1.4)
DACRYOCYTES BLD QL SMEAR: ABNORMAL
DIFFERENTIAL METHOD: ABNORMAL
EOSINOPHIL # BLD AUTO: 0.2 K/UL (ref 0–0.5)
EOSINOPHIL NFR BLD: 8.9 % (ref 0–8)
ERYTHROCYTE [DISTWIDTH] IN BLOOD BY AUTOMATED COUNT: 18 % (ref 11.5–14.5)
EST. GFR  (AFRICAN AMERICAN): >60 ML/MIN/1.73 M^2
EST. GFR  (NON AFRICAN AMERICAN): >60 ML/MIN/1.73 M^2
FIBRINOGEN PPP-MCNC: 106 MG/DL (ref 182–366)
GLUCOSE SERPL-MCNC: 76 MG/DL (ref 70–110)
HCT VFR BLD AUTO: 22.1 % (ref 37–48.5)
HGB BLD-MCNC: 7.6 G/DL (ref 12–16)
HYPOCHROMIA BLD QL SMEAR: ABNORMAL
IMM GRANULOCYTES # BLD AUTO: 0.01 K/UL (ref 0–0.04)
IMM GRANULOCYTES NFR BLD AUTO: 0.5 % (ref 0–0.5)
INR PPP: 1.6 (ref 0.8–1.2)
LACTATE SERPL-SCNC: 1.7 MMOL/L (ref 0.5–2.2)
LYMPHOCYTES # BLD AUTO: 0.5 K/UL (ref 1–4.8)
LYMPHOCYTES NFR BLD: 25.7 % (ref 18–48)
MAGNESIUM SERPL-MCNC: 1.7 MG/DL (ref 1.6–2.6)
MCH RBC QN AUTO: 30.8 PG (ref 27–31)
MCHC RBC AUTO-ENTMCNC: 34.4 G/DL (ref 32–36)
MCV RBC AUTO: 90 FL (ref 82–98)
MONOCYTES # BLD AUTO: 0.2 K/UL (ref 0.3–1)
MONOCYTES NFR BLD: 10.4 % (ref 4–15)
NEUTROPHILS # BLD AUTO: 1.1 K/UL (ref 1.8–7.7)
NEUTROPHILS NFR BLD: 53.5 % (ref 38–73)
NRBC BLD-RTO: 0 /100 WBC
OVALOCYTES BLD QL SMEAR: ABNORMAL
PHOSPHATE SERPL-MCNC: 3.2 MG/DL (ref 2.7–4.5)
PLATELET # BLD AUTO: 36 K/UL (ref 150–350)
PLATELET BLD QL SMEAR: ABNORMAL
PMV BLD AUTO: 11.3 FL (ref 9.2–12.9)
POIKILOCYTOSIS BLD QL SMEAR: SLIGHT
POTASSIUM SERPL-SCNC: 3.9 MMOL/L (ref 3.5–5.1)
PROT SERPL-MCNC: 5.5 G/DL (ref 6–8.4)
PROTHROMBIN TIME: 16.1 SEC (ref 9–12.5)
RBC # BLD AUTO: 2.47 M/UL (ref 4–5.4)
SODIUM SERPL-SCNC: 123 MMOL/L (ref 136–145)
SODIUM SERPL-SCNC: 123 MMOL/L (ref 136–145)
SODIUM SERPL-SCNC: 124 MMOL/L (ref 136–145)
SODIUM SERPL-SCNC: 124 MMOL/L (ref 136–145)
TARGETS BLD QL SMEAR: ABNORMAL
WBC # BLD AUTO: 2.02 K/UL (ref 3.9–12.7)

## 2019-05-26 PROCEDURE — 85025 COMPLETE CBC W/AUTO DIFF WBC: CPT | Mod: NTX

## 2019-05-26 PROCEDURE — 36415 COLL VENOUS BLD VENIPUNCTURE: CPT | Mod: NTX

## 2019-05-26 PROCEDURE — 20600001 HC STEP DOWN PRIVATE ROOM: Mod: NTX

## 2019-05-26 PROCEDURE — 99233 PR SUBSEQUENT HOSPITAL CARE,LEVL III: ICD-10-PCS | Mod: NTX,,, | Performed by: INTERNAL MEDICINE

## 2019-05-26 PROCEDURE — 25000003 PHARM REV CODE 250: Mod: NTX | Performed by: HOSPITALIST

## 2019-05-26 PROCEDURE — 83605 ASSAY OF LACTIC ACID: CPT | Mod: NTX

## 2019-05-26 PROCEDURE — 84100 ASSAY OF PHOSPHORUS: CPT | Mod: NTX

## 2019-05-26 PROCEDURE — 85384 FIBRINOGEN ACTIVITY: CPT | Mod: NTX

## 2019-05-26 PROCEDURE — 83735 ASSAY OF MAGNESIUM: CPT | Mod: NTX

## 2019-05-26 PROCEDURE — 87040 BLOOD CULTURE FOR BACTERIA: CPT | Mod: NTX

## 2019-05-26 PROCEDURE — 85610 PROTHROMBIN TIME: CPT | Mod: NTX

## 2019-05-26 PROCEDURE — 99233 PR SUBSEQUENT HOSPITAL CARE,LEVL III: ICD-10-PCS | Mod: NTX,,, | Performed by: HOSPITALIST

## 2019-05-26 PROCEDURE — 99233 SBSQ HOSP IP/OBS HIGH 50: CPT | Mod: NTX,,, | Performed by: INTERNAL MEDICINE

## 2019-05-26 PROCEDURE — 80053 COMPREHEN METABOLIC PANEL: CPT | Mod: NTX

## 2019-05-26 PROCEDURE — 63600175 PHARM REV CODE 636 W HCPCS: Mod: NTX | Performed by: HOSPITALIST

## 2019-05-26 PROCEDURE — 84295 ASSAY OF SERUM SODIUM: CPT | Mod: NTX

## 2019-05-26 PROCEDURE — 99233 SBSQ HOSP IP/OBS HIGH 50: CPT | Mod: NTX,,, | Performed by: HOSPITALIST

## 2019-05-26 RX ORDER — MICONAZOLE NITRATE 2 %
POWDER (GRAM) TOPICAL 2 TIMES DAILY
Status: DISCONTINUED | OUTPATIENT
Start: 2019-05-26 | End: 2019-06-10

## 2019-05-26 RX ADMIN — LEVOTHYROXINE SODIUM 112 MCG: 112 TABLET ORAL at 05:05

## 2019-05-26 RX ADMIN — RIFAXIMIN 550 MG: 550 TABLET ORAL at 09:05

## 2019-05-26 RX ADMIN — ACETAMINOPHEN 325 MG: 325 TABLET ORAL at 08:05

## 2019-05-26 RX ADMIN — RIFAXIMIN 550 MG: 550 TABLET ORAL at 08:05

## 2019-05-26 RX ADMIN — PANTOPRAZOLE SODIUM 40 MG: 40 TABLET, DELAYED RELEASE ORAL at 09:05

## 2019-05-26 RX ADMIN — MICONAZOLE NITRATE 1 EACH: 20 POWDER TOPICAL at 08:05

## 2019-05-26 RX ADMIN — MICONAZOLE NITRATE: 20 POWDER TOPICAL at 12:05

## 2019-05-26 RX ADMIN — CEFTRIAXONE 2 G: 2 INJECTION, SOLUTION INTRAVENOUS at 09:05

## 2019-05-26 NOTE — PROGRESS NOTES
Progress Note  Hospital Medicine  [facility]       Patient Name: Jihan Zamudio  MRN:  [id number]  Hospital Medicine Team: [facility]OSP MED L Blane Gottlieb MD  Date of Admission:  5/16/2019     Length of Stay:  LOS: 10 days   Expected Discharge Date: 5/27/2019  Principal Problem:  Acute hypoxemic respiratory failure     Subjective:     Interval History/Overnight Events:    - patient feeling better today; H/H improved after blood transfusing; sodium at 123; infectious work up so far negative; planning for IR paracentesis tomorrow and IR thoracentesis;   - will ask surgeons tomorrow if it is ok to list patient         lactulose  5 g Oral Daily    levothyroxine  112 mcg Oral Daily    miconazole NITRATE 2 %   Topical (Top) BID    pantoprazole  40 mg Oral Daily    rifAXImin  550 mg Oral BID           sodium chloride, acetaminophen, cyclobenzaprine, dextrose 50%, dextrose 50%, glucagon (human recombinant), glucose, glucose, ondansetron, simethicone, sodium chloride 0.9%, sodium chloride 0.9%, sodium chloride 0.9%, trazodone    Review of Systems   Constitutional: Negative for chills, fatigue, fever.   HENT: Negative for sore throat, trouble swallowing.    Eyes: Negative for photophobia, visual disturbance.   Respiratory: Negative for cough, shortness of breath.    Cardiovascular: Negative for chest pain, palpitations, leg swelling.   Gastrointestinal: Negative for abdominal pain, constipation, diarrhea, nausea, vomiting.   Endocrine: Negative for cold intolerance, heat intolerance.   Genitourinary: Negative for dysuria, frequency.   Musculoskeletal: Negative for arthralgias, myalgias.   Skin: Negative for rash, wound, erythema   Neurological: Negative for dizziness, syncope, weakness, light-headedness.   Psychiatric/Behavioral: Negative for confusion, hallucinations, anxiety  All other systems reviewed and are negative.    Objective:     Temp:  [98 °F (36.7 °C)-99.2 °F (37.3 °C)]    Pulse:  [89-98]   Resp:  [16-20]   BP: (104-126)/(51-55)   SpO2:  [93 %-100 %]       Physical Exam:  Constitutional: appears older than stated age, chronically ill looking,  non-distressed, not diaphoretic.   HENT: NC/AT, external ears normal, oropharynx clear, MMM w/o exudates.   Eyes: PERRL, EOMI, conjunctiva normal, no discharge b/l, +scleral icterus   Neck: normal ROM, supple  CV: RRR, no m/r/g, no carotid bruits, +2 peripheral pulses.  Pulmonary/Chest wall: Breathing comfortably +distress   Decreased breath sounds at lung bases/ absent breath sounds in right lung  GI: Soft, non-tender, (+) BS, (+) BM   +distended  Musculoskeletal: Normal ROM, no atrophy,  + pitting edema in LE bilaterally   Neurological: AAO x 4, CN II-XI in tact, nl sensation, nl strength/tone  No asterixis   Skin: warm, dry   +pallor, jaundice, + spider angiomas, +bruising   Psych: normal mood and affect, normal behavior, thought content and judgement.    Labs:    Chemistries:   Recent Labs   Lab 05/24/19  0437  05/25/19  0513  05/25/19  2320 05/26/19  0654 05/26/19  1314   *   < > 125*   < > 124* 123*  124* 123*   K 4.3  --  4.2  --   --  3.9  --    CL 91*  --  95  --   --  95  --    CO2 22*  --  23  --   --  22*  --    BUN 17  --  16  --   --  13  --    CREATININE 0.8  --  0.8  --   --  0.8  --    CALCIUM 7.9*  --  8.2*  --   --  8.2*  --    PROT 5.6*  --  5.8*  --   --  5.5*  --    BILITOT 7.3*  --  7.3*  --   --  8.9*  --    ALKPHOS 120  --  103  --   --  106  --    ALT 37  --  35  --   --  36  --    *  --  95*  --   --  102*  --    MG 1.9  --  1.9  --   --  1.7  --    PHOS 3.7  --  3.6  --   --  3.2  --     < > = values in this interval not displayed.        WBC:   Recent Labs   Lab 05/22/19  0455 05/23/19  0646 05/24/19  0437 05/25/19  0513 05/26/19  0654   WBC 2.98* 2.97* 3.16* 1.84* 2.02*     Bands:     CBC/Anemia Labs: Coags:    Recent Labs   Lab 05/24/19  0437 05/25/19  0513 05/25/19  0724 05/26/19  0654   WBC 3.16*  1.84*  --  2.02*   HGB 7.5* 6.9* 6.7* 7.6*   HCT 21.7* 19.7* 19.5* 22.1*   PLT 57* 46*  --  36*   MCV 89 91  --  90   RDW 18.4* 18.4*  --  18.0*    Recent Labs   Lab 05/24/19  0437 05/25/19  0513 05/26/19  0654   INR 1.7* 1.8* 1.6*        POCT Glucose: HbA1c:    No results for input(s): POCTGLUCOSE in the last 168 hours. No results found for: HGBA1C     Diagnostic Results:        Assessment and Plan     Hospital Course:    Ms. 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.81, K74.60]  Yes    Hyponatremia [E87.1]  Yes    Hepatic encephalopathy [K72.90]  Yes    Coagulopathy [D68.9]  Yes    Acute blood loss anemia [D62]  Yes    Decompensated hepatic cirrhosis [K72.90]  Yes     From KESSLER    Liver biopsy 11/29/17- KESSLER with bridging fibrosis, Laura, interface activity; lymph and occ plasma cells      Esophageal varices [I85.00]  Yes     Small with no banding 07/17      Essential hypertension [I10]  Yes    Hypothyroidism [E03.9]  Yes    GERD (gastroesophageal reflux disease) [K21.9]  Yes    Morbid obesity [E66.01]  Yes     Lap band 2006 (lost 75-80 lbs) with subsequent release (2009) (due to obstruction)        Resolved Hospital Problems   No resolved problems to display.       Acute Hypoxemic Respiratory Failure 2/2 Pleural Effusion  Hepatic Hydrothorax  · Satting 88% on RA that improved to 95% with 2L NC  · R sided hydrothorax  · Stop Levaquin as there is no suspicion for infection  · S/p thora on 5/17 with pulm, 1.5 L removed   · CT chest completed and shows stable pulmonary nodules;      Decompensated Liver Disease  KESSLER Cirrhosis  MELD-Na score: 28 at 5/26/2019  1:14 PM  MELD score: 20 at 5/26/2019  6:54 AM  Calculated from:  Serum  Creatinine: 0.8 mg/dL (Rounded to 1 mg/dL) at 5/26/2019  6:54 AM  Serum Sodium: 123 mmol/L (Rounded to 125 mmol/L) at 5/26/2019  1:14 PM  Total Bilirubin: 8.9 mg/dL at 5/26/2019  6:54 AM  INR(ratio): 1.6 at 5/26/2019  6:54 AM  Age: 49 years  · Hepatology consulted, appreciate assistance  · Previously approved for liver transplant pending re-evaluation of pulmonary nodule with prior outside hospital CT scans  · Abdomen soft and patient says it feels around baseline so will hold on para  · Check PETH  · Low Sodium diet with 1L fluid restriction  · Hold diuretics due to hyponatremia  · Start Lactulose, titrate 3-4 BM/day  · Start Rifaximin 550mg PO BID  · Has history of pulmonary nodule and outside CT scans were supposed to be reviewed   · Patient cleared for listing, however waiting until sodium gets to 125; will ask surgeons tomorrow  · IR paracentesis in AM     Esophageal Varices  · Chronic and stable  · Monitor     Anemia  · Hemoglobin 6.7 yesterday, likely hemolysis and DIC; no overt GI bleeding; s/p 1 unit of PRBC with good response      Coagulopathy  · 2/2 liver disease  · Monitor for bleeding     Hyponatremia  · Sodium 120 2/2 chronic liver disease  · Switching patient to IV lasix and albumin and monitoring   · Nephrology consulted      Hypokalemia  · K 3.2 2/2 low Mag  · Replace     Hypomagnesemia  · Mag 1.5  · Replace     Thrombocytopenia  · Plt chronically low 2/2 coagulopathy from liver disease  · Monitor for bleeding     Hypothyroidism  · Chronic and stable  · Continue Synthroid 112mcg PO daily     GERD  · Chronic and stable  · Continue PPI     Morbid Obesity  · Body mass index is 38.45 kg/m².      Diet:  regular with fluid restriction   GI PPx:  PO PPI  DVT PPx:  SCDs due to coagulopathy   Goals of Care:  Full     High Risk Conditions:  resp failure     Disposition:    - likely too sick to leave prior to a liver transplant; financial cleared for listing; need sodium to be 125 for listing, will ask  committee tomorrw

## 2019-05-26 NOTE — SUBJECTIVE & OBJECTIVE
Interval History:   Patient more awake/alert this morning.  Sodium less then 125 and Hgb improved with transfusion.    Current Facility-Administered Medications   Medication    0.9%  NaCl infusion (for blood administration)    acetaminophen tablet 325 mg    cyclobenzaprine tablet 5 mg    dextrose 50% injection 12.5 g    dextrose 50% injection 25 g    glucagon (human recombinant) injection 1 mg    glucose chewable tablet 16 g    glucose chewable tablet 24 g    lactulose 20 gram/30 mL solution Soln 5 g    levothyroxine tablet 112 mcg    miconazole NITRATE 2 % top powder    ondansetron injection 4 mg    pantoprazole EC tablet 40 mg    rifAXIMin tablet 550 mg    simethicone chewable tablet 80 mg    sodium chloride 0.9% flush 10 mL    sodium chloride 0.9% flush 10 mL    sodium chloride 0.9% flush 5 mL    trazodone split tablet 25 mg       Objective:     Vital Signs (Most Recent):  Temp: 98.6 °F (37 °C) (05/26/19 1516)  Pulse: 91 (05/26/19 1516)  Resp: 18 (05/26/19 1516)  BP: (!) 113/53 (05/26/19 1516)  SpO2: 96 % (05/26/19 1516) Vital Signs (24h Range):  Temp:  [98 °F (36.7 °C)-99.2 °F (37.3 °C)] 98.6 °F (37 °C)  Pulse:  [89-98] 91  Resp:  [16-20] 18  SpO2:  [93 %-100 %] 96 %  BP: (104-126)/(51-55) 113/53     Weight: 101.6 kg (224 lb) (05/24/19 1059)  Body mass index is 37.28 kg/m².    Physical Exam   Constitutional: She is oriented to person, place, and time. No distress.   HENT:   Head: Normocephalic and atraumatic.   Eyes: Scleral icterus is present.   Cardiovascular: Normal rate and regular rhythm.   Pulmonary/Chest:   On 2L NC but decreased breath sounds bilaterally (morer prominent on the right side)   Abdominal: Soft. Bowel sounds are normal. She exhibits distension. She exhibits no mass. There is no tenderness. There is no rebound and no guarding. No hernia.   Musculoskeletal: She exhibits no edema.   Neurological: She is alert and oriented to person, place, and time.   Skin: She is not  diaphoretic.   Nursing note and vitals reviewed.      MELD-Na score: 28 at 5/26/2019  1:14 PM  MELD score: 20 at 5/26/2019  6:54 AM  Calculated from:  Serum Creatinine: 0.8 mg/dL (Rounded to 1 mg/dL) at 5/26/2019  6:54 AM  Serum Sodium: 123 mmol/L (Rounded to 125 mmol/L) at 5/26/2019  1:14 PM  Total Bilirubin: 8.9 mg/dL at 5/26/2019  6:54 AM  INR(ratio): 1.6 at 5/26/2019  6:54 AM  Age: 49 years    Significant Labs:  CBC:   Recent Labs   Lab 05/26/19 0654   WBC 2.02*   RBC 2.47*   HGB 7.6*   HCT 22.1*   PLT 36*     CMP:   Recent Labs   Lab 05/26/19  0654 05/26/19  1314   GLU 76  --    CALCIUM 8.2*  --    ALBUMIN 2.6*  --    PROT 5.5*  --    *  124* 123*   K 3.9  --    CO2 22*  --    CL 95  --    BUN 13  --    CREATININE 0.8  --    ALKPHOS 106  --    ALT 36  --    *  --    BILITOT 8.9*  --      Coagulation:   Recent Labs   Lab 05/26/19 0654   INR 1.6*       Significant Imaging:  X-Ray: Reviewed  US: Reviewed  CT: Reviewed

## 2019-05-26 NOTE — PROGRESS NOTES
[facility]  Hepatology  Progress Note    Patient Name: Jihan Zamudio  MRN: [id number]  Admission Date: 5/16/2019  Hospital Length of Stay: 10 days  Attending Provider: Blane Gottlieb MD   Primary Care Physician: Primary Doctor No  Principal Problem:Acute hypoxemic respiratory failure    Subjective:     HPI: This is a 50 yo F with hx of KESSLER cirrhosis c/b varices, latent TB, GERD, hypothyroidism, lap band 2007, HLD who was sent to the ED from hepatology clinic for shortness of breath. She follows with Dr. Smallwood. She has been undergoing transplant evaluation and is pending financial approval and clarification of lung nodule. She reports feeling more short of breath the past few days and increased abdominal swelling. She reports occasional confusion as well. She was found to have a low sodium as well. With regards to her lung nodule, we have been awaiting films for an outside facility for comparison. She was found to have a large pleural effusion while here, something she has not had before. Pulmonology has been consulted.         Interval History:   Patient more awake/alert this morning.  Sodium less then 125 and Hgb improved with transfusion.    Current Facility-Administered Medications   Medication    0.9%  NaCl infusion (for blood administration)    acetaminophen tablet 325 mg    cyclobenzaprine tablet 5 mg    dextrose 50% injection 12.5 g    dextrose 50% injection 25 g    glucagon (human recombinant) injection 1 mg    glucose chewable tablet 16 g    glucose chewable tablet 24 g    lactulose 20 gram/30 mL solution Soln 5 g    levothyroxine tablet 112 mcg    miconazole NITRATE 2 % top powder    ondansetron injection 4 mg    pantoprazole EC tablet 40 mg    rifAXIMin tablet 550 mg    simethicone chewable tablet 80 mg    sodium chloride 0.9% flush 10 mL    sodium chloride 0.9% flush 10 mL    sodium chloride 0.9% flush 5 mL    trazodone split tablet 25 mg       Objective:      Vital Signs (Most Recent):  Temp: 98.6 °F (37 °C) (05/26/19 1516)  Pulse: 91 (05/26/19 1516)  Resp: 18 (05/26/19 1516)  BP: (!) 113/53 (05/26/19 1516)  SpO2: 96 % (05/26/19 1516) Vital Signs (24h Range):  Temp:  [98 °F (36.7 °C)-99.2 °F (37.3 °C)] 98.6 °F (37 °C)  Pulse:  [89-98] 91  Resp:  [16-20] 18  SpO2:  [93 %-100 %] 96 %  BP: (104-126)/(51-55) 113/53     Weight: 101.6 kg (224 lb) (05/24/19 1059)  Body mass index is 37.28 kg/m².    Physical Exam   Constitutional: She is oriented to person, place, and time. No distress.   HENT:   Head: Normocephalic and atraumatic.   Eyes: Scleral icterus is present.   Cardiovascular: Normal rate and regular rhythm.   Pulmonary/Chest:   On 2L NC but decreased breath sounds bilaterally (morer prominent on the right side)   Abdominal: Soft. Bowel sounds are normal. She exhibits distension. She exhibits no mass. There is no tenderness. There is no rebound and no guarding. No hernia.   Musculoskeletal: She exhibits no edema.   Neurological: She is alert and oriented to person, place, and time.   Skin: She is not diaphoretic.   Nursing note and vitals reviewed.      MELD-Na score: 28 at 5/26/2019  1:14 PM  MELD score: 20 at 5/26/2019  6:54 AM  Calculated from:  Serum Creatinine: 0.8 mg/dL (Rounded to 1 mg/dL) at 5/26/2019  6:54 AM  Serum Sodium: 123 mmol/L (Rounded to 125 mmol/L) at 5/26/2019  1:14 PM  Total Bilirubin: 8.9 mg/dL at 5/26/2019  6:54 AM  INR(ratio): 1.6 at 5/26/2019  6:54 AM  Age: 49 years    Significant Labs:  CBC:   Recent Labs   Lab 05/26/19  0654   WBC 2.02*   RBC 2.47*   HGB 7.6*   HCT 22.1*   PLT 36*     CMP:   Recent Labs   Lab 05/26/19 0654 05/26/19  1314   GLU 76  --    CALCIUM 8.2*  --    ALBUMIN 2.6*  --    PROT 5.5*  --    *  124* 123*   K 3.9  --    CO2 22*  --    CL 95  --    BUN 13  --    CREATININE 0.8  --    ALKPHOS 106  --    ALT 36  --    *  --    BILITOT 8.9*  --      Coagulation:   Recent Labs   Lab 05/26/19 0654   INR 1.6*        Significant Imaging:  X-Ray: Reviewed  US: Reviewed  CT: Reviewed    Assessment/Plan:     Liver cirrhosis secondary to KESSLER  49 year old female with a history of KESSLER cirrhosis c/b varices currently admitted for decompensated cirrhosis. Patient has been approved for listing however continue to have issues with hyponatremia. This morning patient more awake/alert and feeling better. Sodium still not at goal but will discuss listening with LTS if sodium remains between 123-125.    Recommendations:  --Daily CMP, CBC, and INR  --Transfuse as hgb is less then 7  --Continue PPI  --Continue lactulose and rifaximin  --Obtain CXR AM with possible thoracentesis pending finding  --Obtain paracentesis in AM          Thank you for your consult. I will follow-up with patient. Please contact us if you have any additional questions.    Alexa Richard M.D.  Gastroenterology Fellow, PGY-V  Pager: [phone]  [facility]

## 2019-05-26 NOTE — ASSESSMENT & PLAN NOTE
49 year old female with a history of KESSLER cirrhosis c/b varices currently admitted for decompensated cirrhosis. Patient has been approved for listing however continue to have issues with hyponatremia. This morning patient more awake/alert and feeling better. Sodium still not at goal but will discuss listening with LTS if sodium remains between 123-125.    Recommendations:  --Daily CMP, CBC, and INR  --Transfuse as hgb is less then 7  --Continue PPI  --Continue lactulose and rifaximin  --Obtain CXR AM with possible thoracentesis pending finding  --Obtain paracentesis in AM

## 2019-05-26 NOTE — PLAN OF CARE
Problem: Adult Inpatient Plan of Care  Goal: Plan of Care Review  Outcome: Ongoing (interventions implemented as appropriate)  POC reviewed with patient. VSS. Complaints of SOB today. CXR ordered . Received 1 unit PRBCs. Blood cultures and lactic acid done. Na level up to 124. Safety maintained  No falls. Will continue to monitor.

## 2019-05-26 NOTE — PLAN OF CARE
Problem: Adult Inpatient Plan of Care  Goal: Plan of Care Review  Pt remains AAOx4, vital signs are stable. Call light and personal belongings within reach. Pt had 1 BM over night. Pt on 2L O2 per nasal cannula satting 95-97%, Pt asked to call for assistance prior to ambulating.  Plan of care reviewed with pt, all questions and concerns were addressed. Will continue to monitor.

## 2019-05-27 LAB
ALBUMIN SERPL BCP-MCNC: 2.4 G/DL (ref 3.5–5.2)
ALP SERPL-CCNC: 98 U/L (ref 55–135)
ALT SERPL W/O P-5'-P-CCNC: 36 U/L (ref 10–44)
ANION GAP SERPL CALC-SCNC: 9 MMOL/L (ref 8–16)
ANISOCYTOSIS BLD QL SMEAR: SLIGHT
APPEARANCE FLD: NORMAL
APPEARANCE FLD: NORMAL
AST SERPL-CCNC: 93 U/L (ref 10–40)
BACTERIA SPEC ANAEROBE CULT: NORMAL
BASOPHILS # BLD AUTO: 0.03 K/UL (ref 0–0.2)
BASOPHILS NFR BLD: 1.2 % (ref 0–1.9)
BASOPHILS NFR FLD MANUAL: 1 %
BASOPHILS NFR FLD MANUAL: 1 %
BILIRUB SERPL-MCNC: 8.4 MG/DL (ref 0.1–1)
BODY FLD TYPE: NORMAL
BODY FLD TYPE: NORMAL
BODY FLUID SOURCE, LDH: NORMAL
BUN SERPL-MCNC: 14 MG/DL (ref 6–20)
CALCIUM SERPL-MCNC: 8.3 MG/DL (ref 8.7–10.5)
CHLORIDE SERPL-SCNC: 95 MMOL/L (ref 95–110)
CO2 SERPL-SCNC: 21 MMOL/L (ref 23–29)
COLOR FLD: NORMAL
COLOR FLD: YELLOW
CREAT SERPL-MCNC: 0.8 MG/DL (ref 0.5–1.4)
DACRYOCYTES BLD QL SMEAR: ABNORMAL
DIFFERENTIAL METHOD: ABNORMAL
EOSINOPHIL # BLD AUTO: 0.2 K/UL (ref 0–0.5)
EOSINOPHIL NFR BLD: 8 % (ref 0–8)
ERYTHROCYTE [DISTWIDTH] IN BLOOD BY AUTOMATED COUNT: 17.9 % (ref 11.5–14.5)
EST. GFR  (AFRICAN AMERICAN): >60 ML/MIN/1.73 M^2
EST. GFR  (NON AFRICAN AMERICAN): >60 ML/MIN/1.73 M^2
GLUCOSE SERPL-MCNC: 81 MG/DL (ref 70–110)
HCT VFR BLD AUTO: 22 % (ref 37–48.5)
HGB BLD-MCNC: 7.7 G/DL (ref 12–16)
HYPOCHROMIA BLD QL SMEAR: ABNORMAL
IMM GRANULOCYTES # BLD AUTO: 0.01 K/UL (ref 0–0.04)
IMM GRANULOCYTES NFR BLD AUTO: 0.4 % (ref 0–0.5)
INR PPP: 1.7 (ref 0.8–1.2)
LDH FLD L TO P-CCNC: 75 U/L
LDH SERPL L TO P-CCNC: 265 U/L (ref 110–260)
LYMPHOCYTES # BLD AUTO: 0.6 K/UL (ref 1–4.8)
LYMPHOCYTES NFR BLD: 25.6 % (ref 18–48)
LYMPHOCYTES NFR FLD MANUAL: 51 %
LYMPHOCYTES NFR FLD MANUAL: 69 %
MAGNESIUM SERPL-MCNC: 1.5 MG/DL (ref 1.6–2.6)
MCH RBC QN AUTO: 31 PG (ref 27–31)
MCHC RBC AUTO-ENTMCNC: 35 G/DL (ref 32–36)
MCV RBC AUTO: 89 FL (ref 82–98)
MESOTHL CELL NFR FLD MANUAL: 20 %
MESOTHL CELL NFR FLD MANUAL: 3 %
MONOCYTES # BLD AUTO: 0.2 K/UL (ref 0.3–1)
MONOCYTES NFR BLD: 9.6 % (ref 4–15)
MONOS+MACROS NFR FLD MANUAL: 21 %
MONOS+MACROS NFR FLD MANUAL: 21 %
NEUTROPHILS # BLD AUTO: 1.4 K/UL (ref 1.8–7.7)
NEUTROPHILS NFR BLD: 55.2 % (ref 38–73)
NEUTROPHILS NFR FLD MANUAL: 6 %
NEUTROPHILS NFR FLD MANUAL: 7 %
NRBC BLD-RTO: 0 /100 WBC
OVALOCYTES BLD QL SMEAR: ABNORMAL
PHOSPHATE SERPL-MCNC: 3.4 MG/DL (ref 2.7–4.5)
PLATELET # BLD AUTO: 36 K/UL (ref 150–350)
PLATELET BLD QL SMEAR: ABNORMAL
PMV BLD AUTO: 12.1 FL (ref 9.2–12.9)
POIKILOCYTOSIS BLD QL SMEAR: SLIGHT
POLYCHROMASIA BLD QL SMEAR: ABNORMAL
POTASSIUM SERPL-SCNC: 3.9 MMOL/L (ref 3.5–5.1)
PROT FLD-MCNC: 1.2 G/DL
PROT SERPL-MCNC: 5.4 G/DL (ref 6–8.4)
PROTHROMBIN TIME: 16.8 SEC (ref 9–12.5)
RBC # BLD AUTO: 2.48 M/UL (ref 4–5.4)
SCHISTOCYTES BLD QL SMEAR: ABNORMAL
SODIUM SERPL-SCNC: 125 MMOL/L (ref 136–145)
SPECIMEN SOURCE: NORMAL
WBC # BLD AUTO: 2.5 K/UL (ref 3.9–12.7)
WBC # FLD: 150 /CU MM
WBC # FLD: 173 /CU MM

## 2019-05-27 PROCEDURE — 99232 SBSQ HOSP IP/OBS MODERATE 35: CPT | Mod: NTX,,, | Performed by: INTERNAL MEDICINE

## 2019-05-27 PROCEDURE — 88112 CYTOLOGY SPECIMEN- MEDICAL CYTOLOGY (FLUID/WASH/BRUSH): ICD-10-PCS | Mod: 26,NTX,, | Performed by: PATHOLOGY

## 2019-05-27 PROCEDURE — 89051 BODY FLUID CELL COUNT: CPT | Mod: 91,NTX

## 2019-05-27 PROCEDURE — 99223 PR INITIAL HOSPITAL CARE,LEVL III: ICD-10-PCS | Mod: NTX,,, | Performed by: INTERNAL MEDICINE

## 2019-05-27 PROCEDURE — 89051 BODY FLUID CELL COUNT: CPT | Mod: NTX

## 2019-05-27 PROCEDURE — 20600001 HC STEP DOWN PRIVATE ROOM: Mod: NTX

## 2019-05-27 PROCEDURE — 99000 SPECIMEN HANDLING OFFICE-LAB: CPT | Mod: 91,NTX

## 2019-05-27 PROCEDURE — 84157 ASSAY OF PROTEIN OTHER: CPT | Mod: NTX

## 2019-05-27 PROCEDURE — 36415 COLL VENOUS BLD VENIPUNCTURE: CPT | Mod: NTX

## 2019-05-27 PROCEDURE — 85025 COMPLETE CBC W/AUTO DIFF WBC: CPT | Mod: NTX

## 2019-05-27 PROCEDURE — 80053 COMPREHEN METABOLIC PANEL: CPT | Mod: NTX

## 2019-05-27 PROCEDURE — 25000003 PHARM REV CODE 250: Mod: NTX | Performed by: HOSPITALIST

## 2019-05-27 PROCEDURE — 85610 PROTHROMBIN TIME: CPT | Mod: NTX

## 2019-05-27 PROCEDURE — 88305 CYTOLOGY SPECIMEN- MEDICAL CYTOLOGY (FLUID/WASH/BRUSH): ICD-10-PCS | Mod: 26,NTX,, | Performed by: PATHOLOGY

## 2019-05-27 PROCEDURE — 83615 LACTATE (LD) (LDH) ENZYME: CPT | Mod: NTX

## 2019-05-27 PROCEDURE — 99233 PR SUBSEQUENT HOSPITAL CARE,LEVL III: ICD-10-PCS | Mod: NTX,,, | Performed by: HOSPITALIST

## 2019-05-27 PROCEDURE — 84100 ASSAY OF PHOSPHORUS: CPT | Mod: NTX

## 2019-05-27 PROCEDURE — 88305 TISSUE EXAM BY PATHOLOGIST: CPT | Mod: NTX | Performed by: PATHOLOGY

## 2019-05-27 PROCEDURE — 88112 CYTOPATH CELL ENHANCE TECH: CPT | Mod: 26,NTX,, | Performed by: PATHOLOGY

## 2019-05-27 PROCEDURE — 99232 PR SUBSEQUENT HOSPITAL CARE,LEVL II: ICD-10-PCS | Mod: NTX,,, | Performed by: INTERNAL MEDICINE

## 2019-05-27 PROCEDURE — 83615 LACTATE (LD) (LDH) ENZYME: CPT | Mod: 91,NTX

## 2019-05-27 PROCEDURE — 83735 ASSAY OF MAGNESIUM: CPT | Mod: NTX

## 2019-05-27 PROCEDURE — 63600175 PHARM REV CODE 636 W HCPCS: Mod: NTX | Performed by: HOSPITALIST

## 2019-05-27 PROCEDURE — 99233 SBSQ HOSP IP/OBS HIGH 50: CPT | Mod: NTX,,, | Performed by: HOSPITALIST

## 2019-05-27 PROCEDURE — 99223 1ST HOSP IP/OBS HIGH 75: CPT | Mod: NTX,,, | Performed by: INTERNAL MEDICINE

## 2019-05-27 RX ORDER — MAGNESIUM SULFATE HEPTAHYDRATE 40 MG/ML
2 INJECTION, SOLUTION INTRAVENOUS ONCE
Status: COMPLETED | OUTPATIENT
Start: 2019-05-27 | End: 2019-05-27

## 2019-05-27 RX ADMIN — ACETAMINOPHEN 325 MG: 325 TABLET ORAL at 03:05

## 2019-05-27 RX ADMIN — CYCLOBENZAPRINE HYDROCHLORIDE 5 MG: 5 TABLET, FILM COATED ORAL at 02:05

## 2019-05-27 RX ADMIN — PANTOPRAZOLE SODIUM 40 MG: 40 TABLET, DELAYED RELEASE ORAL at 03:05

## 2019-05-27 RX ADMIN — MICONAZOLE NITRATE: 20 POWDER TOPICAL at 03:05

## 2019-05-27 RX ADMIN — LEVOTHYROXINE SODIUM 112 MCG: 112 TABLET ORAL at 06:05

## 2019-05-27 RX ADMIN — MAGNESIUM SULFATE IN WATER 2 G: 40 INJECTION, SOLUTION INTRAVENOUS at 05:05

## 2019-05-27 NOTE — PROCEDURES
Radiology Post-Procedure Note    Pre Op Diagnosis: Ascites, KESSLER, cirrhosis  Post Op Diagnosis: Same    Procedure: Paracentesis    Procedure performed by: Amanda ADDISON, Morales Cornejo    Written Informed Consent Obtained: Yes  Specimen Removed: YES thin yellow fluid  Estimated Blood Loss: Minimal    Findings:   Successful paracentesis.  Albumin administered PRN per protocol.    Patient tolerated procedure well.    Clemente Patel MD  Staff Radiologist  Department of Radiology  Pager: [phone]

## 2019-05-27 NOTE — CONSULTS
Consult received. Full note to follow.    Please call for questions.    Thanks,  Serina Hightower MD  Infectious Disease Fellow, PGY-5  Pager: [phone] or ext: [phone]  [facility]

## 2019-05-27 NOTE — SUBJECTIVE & OBJECTIVE
Past Medical History:   Diagnosis Date    Cirrhosis     Esophageal varices 2018    Small with no banding     Essential hypertension 2018    Fatty liver 2018    GERD (gastroesophageal reflux disease)     Hx of colonic polyps 2018    On colonoscopy     Hypertension     Hypothyroidism 2018    Kidney stones     Morbid obesity 2018    Lap band with subsequent release    KADEEM (obstructive sleep apnea) 2018    Osteoarthritis 2018    Pulmonary nodule 2018    Vitamin D deficiency 2018       Past Surgical History:   Procedure Laterality Date     SECTION      TIMES 2     CHOLECYSTECTOMY      LAPAROSCOPIC     CYSTOSCOPY W/ STONE MANIPULATION      kidney stone removal    EGD (ESOPHAGOGASTRODUODENOSCOPY) N/A 2019    Performed by Davian Hernández MD at [facility] ([address] [place] [address])    LAPAROSCOPIC GASTRIC BANDING      removal     LIVER BIOPSY  2017    KESSLER with bridging 17       Review of patient's allergies indicates:   Allergen Reactions    Metformin Rash    Pcn [penicillins] Other (See Comments)     Unsure of reaction, states it was as a child. Tolerated rocephin at [facility]       Medications:  Medications Prior to Admission   Medication Sig    acetaminophen (TYLENOL) 500 MG tablet Take 1,000 mg by mouth daily as needed for Pain (headache and bodyaches).    cholecalciferol, vitamin D3, (VITAMIN D3) 2,000 unit Cap Take 1 capsule by mouth once daily.    ergocalciferol (ERGOCALCIFEROL) 50,000 unit Cap Take 50,000 Units by mouth every 7 days. Monday    fexofenadine (ALLEGRA) 180 MG tablet Take 180 mg by mouth daily as needed.    furosemide (LASIX) 80 MG tablet Take 1 tablet (80 mg total) by mouth 2 (two) times daily.    lactulose (CHRONULAC) 20 gram/30 mL Soln Take 30 mLs (20 g total) by mouth 3 (three) times daily. TITRATE TO 3-4 BOWEL MOVEMENTS DAILY.    levothyroxine (SYNTHROID) 112 MCG tablet Take 112 mcg by mouth  once daily.    lidocaine (LIDODERM) 5 % Place 1 patch onto the skin every 24 hours. Remove & Discard patch within 12 hours  As needed or as directed by MD    pantoprazole (PROTONIX) 40 MG tablet Take 1 tablet (40 mg total) by mouth once daily.    rifAXIMin (XIFAXAN) 550 mg Tab Take 550 mg by mouth 2 (two) times daily.     spironolactone (ALDACTONE) 100 MG tablet Take 2 tablets (200 mg total) by mouth once daily. (Patient taking differently: Take 100 mg by mouth 2 (two) times daily. )    zinc sulfate (ZINCATE) 220 (50) mg capsule Take 1 capsule (220 mg total) by mouth once daily.     Antibiotics (From admission, onward)    Start     Stop Route Frequency Ordered    05/26/19 2115  cefTRIAXone (ROCEPHIN) 2 g in dextrose 5 % 50 mL IVPB      -- IV Every 24 hours (non-standard times) 05/26/19 2005 05/16/19 2300  rifAXIMin tablet 550 mg      -- Oral 2 times daily 05/16/19 2208        Antifungals (From admission, onward)    Start     Stop Route Frequency Ordered    05/26/19 1145  miconazole NITRATE 2 % top powder      -- Top 2 times daily 05/26/19 1036        Antivirals (From admission, onward)    None             There is no immunization history on file for this patient.    Family History     Problem Relation (Age of Onset)    Breast cancer Maternal Grandmother    Cancer Mother (50), Father (65)    Heart disease Mother, Father        Social History     Socioeconomic History    Marital status:      Spouse name: Not on file    Number of children: Not on file    Years of education: Not on file    Highest education level: Not on file   Occupational History    Occupation: homemaker   Social Needs    Financial resource strain: Not on file    Food insecurity:     Worry: Not on file     Inability: Not on file    Transportation needs:     Medical: Not on file     Non-medical: Not on file   Tobacco Use    Smoking status: Never Smoker    Smokeless tobacco: Never Used    Tobacco comment: patient denies    Substance and Sexual Activity    Alcohol use: No     Comment: patient denies    Drug use: No     Comment: patient denies    Sexual activity: Not on file   Lifestyle    Physical activity:     Days per week: Not on file     Minutes per session: Not on file    Stress: Not on file   Relationships    Social connections:     Talks on phone: Not on file     Gets together: Not on file     Attends Quaker service: Not on file     Active member of club or organization: Not on file     Attends meetings of clubs or organizations: Not on file     Relationship status: Not on file   Other Topics Concern    Not on file   Social History Narrative    Not on file     Review of Systems   Constitutional: Negative for chills, diaphoresis, fatigue and fever.   HENT: Negative for congestion, mouth sores, rhinorrhea and sore throat.    Respiratory: Negative for cough and shortness of breath.    Gastrointestinal: Negative for abdominal distention, abdominal pain, diarrhea, nausea and vomiting.   Genitourinary: Negative for dysuria and urgency.   Musculoskeletal: Negative for joint swelling and myalgias.   Allergic/Immunologic: Negative for environmental allergies, food allergies and immunocompromised state.   Neurological: Negative for dizziness and numbness.   Hematological: Negative for adenopathy.   Psychiatric/Behavioral: Negative for decreased concentration, hallucinations and sleep disturbance.     Objective:     Vital Signs (Most Recent):  Temp: 98.2 °F (36.8 °C) (05/27/19 0706)  Pulse: 92 (05/27/19 0946)  Resp: 18 (05/27/19 0946)  BP: (!) 100/54 (05/27/19 0946)  SpO2: 96 % (05/27/19 0946) Vital Signs (24h Range):  Temp:  [98.2 °F (36.8 °C)-101.3 °F (38.5 °C)] 98.2 °F (36.8 °C)  Pulse:  [89-96] 92  Resp:  [14-18] 18  SpO2:  [95 %-98 %] 96 %  BP: (100-122)/(51-56) 100/54     Weight: 101.6 kg (223 lb 15.8 oz)  Body mass index is 37.27 kg/m².    Estimated Creatinine Clearance: 98.2 mL/min (based on SCr of 0.8  mg/dL).    Physical Exam   Constitutional: She is oriented to person, place, and time. She appears well-developed and well-nourished.   HENT:   Head: Normocephalic and atraumatic.   Eyes: Pupils are equal, round, and reactive to light. EOM are normal.   Neck: Normal range of motion.   Cardiovascular: Normal rate, regular rhythm and normal heart sounds.   Pulmonary/Chest: Effort normal and breath sounds normal.   Abdominal: Soft. Bowel sounds are normal. She exhibits distension. There is tenderness.   Musculoskeletal: Normal range of motion. She exhibits no edema.   Neurological: She is alert and oriented to person, place, and time.   Skin: No rash noted.       Significant Labs:   Blood Culture:   Recent Labs   Lab 05/16/19  1810 05/16/19  1830 05/25/19  1602 05/26/19  2048   LABBLOO No growth after 5 days. No growth after 5 days. No Growth to date  No Growth to date  No Growth to date  No Growth to date No Growth to date  No Growth to date     BMP:   Recent Labs   Lab 05/27/19  0416   GLU 81   *   K 3.9   CL 95   CO2 21*   BUN 14   CREATININE 0.8   CALCIUM 8.3*   MG 1.5*     CBC:   Recent Labs   Lab 05/26/19  0654 05/27/19  0416   WBC 2.02* 2.50*   HGB 7.6* 7.7*   HCT 22.1* 22.0*   PLT 36* 36*     CMP:   Recent Labs   Lab 05/26/19  0654 05/26/19  1314 05/27/19  0416   *  124* 123* 125*   K 3.9  --  3.9   CL 95  --  95   CO2 22*  --  21*   GLU 76  --  81   BUN 13  --  14   CREATININE 0.8  --  0.8   CALCIUM 8.2*  --  8.3*   PROT 5.5*  --  5.4*   ALBUMIN 2.6*  --  2.4*   BILITOT 8.9*  --  8.4*   ALKPHOS 106  --  98   *  --  93*   ALT 36  --  36   ANIONGAP 6*  --  9   EGFRNONAA >60.0  --  >60.0     Microbiology Results (last 7 days)     Procedure Component Value Units Date/Time    Culture, Anaerobic [[id number]] Collected:  05/17/19 1640    Order Status:  Completed Specimen:  Body Fluid from Pleural Fluid Updated:  05/27/19 0716     Anaerobic Culture No anaerobes isolated    Blood culture  [[id number]] Collected:  05/26/19 2048    Order Status:  Completed Specimen:  Blood Updated:  05/27/19 0315     Blood Culture, Routine No Growth to date    Blood culture [[id number]] Collected:  05/26/19 2048    Order Status:  Completed Specimen:  Blood Updated:  05/27/19 0315     Blood Culture, Routine No Growth to date    Blood culture [[id number]]     Order Status:  Canceled Specimen:  Blood     Blood culture [[id number]] Collected:  05/25/19 1602    Order Status:  Completed Specimen:  Blood Updated:  05/26/19 1812     Blood Culture, Routine No Growth to date     Blood Culture, Routine No Growth to date    Narrative:       Collection has been rescheduled by [facility] at 05/25/2019 12:25 Reason: pt   receiving blood spoke with FayRN will call when ready for labs to   be drawn  Collection has been rescheduled by [facility] at 05/25/2019 14:14 Reason: pt   still receiving blood spoke with TERE Aponte  Collection has been rescheduled by [facility] at 05/25/2019 12:25 Reason: pt   receiving blood spoke with FayRN will call when ready for labs to   be drawn  Collection has been rescheduled by [facility] at 05/25/2019 14:14 Reason: pt   still receiving blood spoke with TERE Aponte    Blood culture [[id number]] Collected:  05/25/19 1602    Order Status:  Completed Specimen:  Blood Updated:  05/26/19 1812     Blood Culture, Routine No Growth to date     Blood Culture, Routine No Growth to date    Narrative:       Collection has been rescheduled by [facility] at 05/25/2019 12:25 Reason: pt   receiving blood spoke with FayRN will call when ready for labs to   be drawn  Collection has been rescheduled by [facility] at 05/25/2019 14:14 Reason: pt   still receiving blood spoke with TERE Aponte  Collection has been rescheduled by [facility] at 05/25/2019 12:25 Reason: pt   receiving blood spoke with TERE Aponte will call when ready for labs to   be drawn  Collection has been rescheduled by [facility] at 05/25/2019 14:14 Reason: pt   still receiving blood spoke with TERE Aponte    Clostridium  difficile EIA [[id number]]     Order Status:  Canceled Specimen:  Stool     Blood culture x two cultures. Draw prior to antibiotics. [[id number]] Collected:  05/16/19 1830    Order Status:  Completed Specimen:  Blood from Peripheral, Antecubital, Left Updated:  05/21/19 2212     Blood Culture, Routine No growth after 5 days.    Narrative:       Aerobic and anaerobic    Blood culture x two cultures. Draw prior to antibiotics. [[id number]] Collected:  05/16/19 1810    Order Status:  Completed Specimen:  Blood from Peripheral, Antecubital, Left Updated:  05/21/19 2212     Blood Culture, Routine No growth after 5 days.    Narrative:       Aerobic and anaerobic    Aerobic culture [[id number]] Collected:  05/17/19 1640    Order Status:  Completed Specimen:  Body Fluid from Pleural Fluid Updated:  05/21/19 0648     Aerobic Bacterial Culture No growth          Significant Imaging: I have reviewed all pertinent imaging results/findings within the past 24 hours.

## 2019-05-27 NOTE — PT/OT/SLP PROGRESS
Occupational Therapy      Patient Name:  Jihan Zamudio   MRN:  [id number]    Patient not seen today secondary to due to fatigue from earlier procedures  . Will follow-up 5/28/19.    ROSA Almanza  5/27/2019

## 2019-05-27 NOTE — PROGRESS NOTES
"  [facility]  Adult Nutrition  Consult Note    SUMMARY     Recommendations    1. Continue current Low sodium diet, fluid restriction per MD.    - Add Boost Plus ONS if PO intake declines.  2. RD to monitor & follow-up.    Goals: PO intake >50%  Nutrition Goal Status: new  Communication of RD Recs: reviewed with RN    Reason for Assessment    Reason For Assessment: length of stay  Diagnosis: other (see comments)(Resp. fx)  Relevant Medical History: Cirrhosis   Interdisciplinary Rounds: did not attend    General Information Comments: Pt KENYA at time of visit, noticed breakfast tray at bedside w/ 100% of meal consumed. Planning for paracentesis & thoracentesis today. Pt diagnosed w/ moderate malnutrtion at last admit (last month). RD to assess for malnutrition at time of follow-up.  Nutrition Discharge Planning: Adequate PO intake    Nutrition/Diet History    Patient Reported Diet/Restrictions/Preferences: other (see comments)(PATRICIA)  Spiritual, Cultural Beliefs, Shinto Practices, Values that Affect Care: no  Factors Affecting Nutritional Intake: None identified at this time    Anthropometrics    Temp: 98.2 °F (36.8 °C)  Height Method: Stated  Height: 5' 5" (165.1 cm)  Height (inches): 65 in  Weight Method: Bed Scale  Weight: 101.6 kg (223 lb 15.8 oz)  Weight (lb): 223.99 lb  Ideal Body Weight (IBW), Female: 125 lb  % Ideal Body Weight, Female (lb): 179.19 lb  BMI (Calculated): 37.4  BMI Grade: 35 - 39.9 - obesity - grade II  Usual Body Weight (UBW), k.8 kg  % Usual Body Weight: 95.33  % Weight Change From Usual Weight: -4.87 %    Lab/Procedures/Meds    Pertinent Labs Reviewed: reviewed  Pertinent Labs Comments: Bili 8.4  Pertinent Medications Reviewed: reviewed  Pertinent Medications Comments: Lactulose    Estimated/Assessed Needs    Weight Used For Calorie Calculations: 101.6 kg (223 lb 15.8 oz)     Energy Calorie Requirements (kcal): 2040 kcal/d  Energy Need Method: [place](1.25 " PAL)     Protein Requirements: 102 g/d (1 g/kg)  Weight Used For Protein Calculations: 101.6 kg (223 lb 15.8 oz)     Estimated Fluid Requirement Method: other (see comments)(Per MD or 1 mL/kcal)    Nutrition Prescription Ordered    Current Diet Order: Low sodium  Nutrition Order Comments: 1000 mL FR    Evaluation of Received Nutrient/Fluid Intake    Comments: LBM: 5/26    Tolerance: tolerating    Nutrition Risk    Level of Risk/Frequency of Follow-up: (1x/week)     Assessment and Plan    No nutritional dx at this time.    Monitor and Evaluation    Food and Nutrient Intake: energy intake, food and beverage intake  Food and Nutrient Adminstration: diet order  Physical Activity and Function: nutrition-related ADLs and IADLs  Anthropometric Measurements: weight, weight change  Biochemical Data, Medical Tests and Procedures: inflammatory profile, lipid profile, glucose/endocrine profile, gastrointestinal profile, electrolyte and renal panel  Nutrition-Focused Physical Findings: overall appearance     Nutrition Follow-Up    RD Follow-up?: Yes

## 2019-05-27 NOTE — PROGRESS NOTES
INPATIENT NEPHROLOGY PROGRESS NOTE    S: event over last 24 hrs noted.     O: VITALS: /80 mmHg; O2 Sat >92% RA  Intake/Output: 1.2 L in / 1.6 L (UOP 1.6 L) = - 0.3 L/L24h  Gen: AAOx3 NAD  Skin: no rash, jaundice  CV: S1 S2 reg no rub  Lungs: CTAB  Abd: soft NT, distended, ascites  MSK: no edema in LE  Neuro: no asterixis    LABS  Serum Na 123 - 124 (up from 119)       IMPRESSION  1. Acute on chronic hyponatremia. Underlying hypoNa due to ESLD. Acute drop improving with water restriction, lasix, albumin, possible depletional component. Pt asymptomatic. If goal to correct above 130, would consider tolvaptan 30 mg (no great effect noted with 15 mg 5/21, 5/23).     RECS:   1. Water restriction < 1.2 L per day  2. OK to use Lasix if needed for volume management    Anish Sky MD  Nephrology Attending

## 2019-05-27 NOTE — PT/OT/SLP PROGRESS
Physical Therapy      Patient Name:  Jihan Zamudio   MRN:  [id number]    Patient not seen today secondary to away for procedure  . Will follow-up next scheduled treatment visit.    GENE Mckinney   5/27/2019

## 2019-05-27 NOTE — H&P
Inpatient Radiology Pre-procedure Note    History of Present Illness:  Jihan Zamudio is a 49 y.o. female with KESSLER cirrhosis who presents for paracentesis.  Admission H&P reviewed.  Past Medical History:   Diagnosis Date    Cirrhosis     Esophageal varices 2018    Small with no banding     Essential hypertension 2018    Fatty liver 2018    GERD (gastroesophageal reflux disease)     Hx of colonic polyps 2018    On colonoscopy     Hypertension     Hypothyroidism 2018    Kidney stones     Morbid obesity 2018    Lap band with subsequent release    KADEEM (obstructive sleep apnea) 2018    Osteoarthritis 2018    Pulmonary nodule 2018    Vitamin D deficiency 2018     Past Surgical History:   Procedure Laterality Date     SECTION      TIMES 2     CHOLECYSTECTOMY      LAPAROSCOPIC     CYSTOSCOPY W/ STONE MANIPULATION      kidney stone removal    EGD (ESOPHAGOGASTRODUODENOSCOPY) N/A 2019    Performed by Davian Hern[facility]dez MD at [facility] (2ND [facility])    LAPAROSCOPIC GASTRIC BANDING  2006    removal     LIVER BIOPSY  2017    KESSLER with bridging 17       Review of Systems:   As documented in primary team H&P    Home Meds:   Prior to Admission medications    Medication Sig Start Date End Date Taking? Authorizing Provider   acetaminophen (TYLENOL) 500 MG tablet Take 1,000 mg by mouth daily as needed for Pain (headache and bodyaches).   Yes Historical Provider, MD   cholecalciferol, vitamin D3, (VITAMIN D3) 2,000 unit Cap Take 1 capsule by mouth once daily.   Yes Historical Provider, MD   ergocalciferol (ERGOCALCIFEROL) 50,000 unit Cap Take 50,000 Units by mouth every 7 days. Monday   Yes Historical Provider, MD   fexofenadine (ALLEGRA) 180 MG tablet Take 180 mg by mouth daily as needed.   Yes Historical Provider, MD   furosemide (LASIX) 80 MG tablet Take 1 tablet (80 mg total) by mouth 2 (two) times daily. 19  Yes Maritza Messina MD   lactulose (CHRONULAC) 20 gram/30 mL Soln Take 30 mLs (20 g total) by mouth 3 (three) times daily. TITRATE TO 3-4 BOWEL MOVEMENTS DAILY. 5/2/19 6/1/19 Yes Maritza Messina MD   levothyroxine (SYNTHROID) 112 MCG tablet Take 112 mcg by mouth once daily.   Yes Historical Provider, MD   lidocaine (LIDODERM) 5 % Place 1 patch onto the skin every 24 hours. Remove & Discard patch within 12 hours  As needed or as directed by MD   Yes Historical Provider, MD   pantoprazole (PROTONIX) 40 MG tablet Take 1 tablet (40 mg total) by mouth once daily. 4/24/19 7/23/19 Yes Jaret Armando MD   rifAXIMin (XIFAXAN) 550 mg Tab Take 550 mg by mouth 2 (two) times daily.    Yes Historical Provider, MD   spironolactone (ALDACTONE) 100 MG tablet Take 2 tablets (200 mg total) by mouth once daily.  Patient taking differently: Take 100 mg by mouth 2 (two) times daily.  5/3/19 5/2/20 Yes Maritza Messina MD   zinc sulfate (ZINCATE) 220 (50) mg capsule Take 1 capsule (220 mg total) by mouth once daily. 5/3/19   Maritza Messina MD     Scheduled Meds:    cefTRIAXone (ROCEPHIN) IVPB  2 g Intravenous Q24H    lactulose  5 g Oral Daily    levothyroxine  112 mcg Oral Daily    miconazole NITRATE 2 %   Topical (Top) BID    pantoprazole  40 mg Oral Daily    rifAXImin  550 mg Oral BID     Continuous Infusions:   PRN Meds:sodium chloride, acetaminophen, cyclobenzaprine, dextrose 50%, dextrose 50%, glucagon (human recombinant), glucose, glucose, ondansetron, simethicone, sodium chloride 0.9%, sodium chloride 0.9%, sodium chloride 0.9%, trazodone  Anticoagulants/Antiplatelets: no anticoagulation    Allergies:   Review of patient's allergies indicates:   Allergen Reactions    Metformin Rash    Pcn [penicillins] Other (See Comments)     Unsure of reaction, states it was as a child. Tolerated rocephin at [facility]     Sedation Hx: have not been any systemic reactions    Labs:  Recent Labs   Lab 05/27/19  0416   INR 1.7*        Recent Labs   Lab 05/27/19 0416   WBC 2.50*   HGB 7.7*   HCT 22.0*   MCV 89   PLT 36*      Recent Labs   Lab 05/27/19 0416   GLU 81   *   K 3.9   CL 95   CO2 21*   BUN 14   CREATININE 0.8   CALCIUM 8.3*   MG 1.5*   ALT 36   AST 93*   ALBUMIN 2.4*   BILITOT 8.4*         Vitals:  Temp: 98.2 °F (36.8 °C) (05/27/19 0706)  Pulse: 92 (05/27/19 0946)  Resp: 18 (05/27/19 0946)  BP: (!) 100/54 (05/27/19 0946)  SpO2: 96 % (05/27/19 0946)     Physical Exam:  ASA: 3  Mallampati: 2    General: no acute distress  Mental Status: alert and oriented to person, place and time  HEENT: normocephalic, atraumatic  Chest: unlabored breathing  Heart: regular heart rate  Abdomen: nondistended  Extremity: moves all extremities    Plan:   US guided paracentesis.   Sedation Plan: local anesthesia.       Stephanie Nielsen MD   PGY-2 Radiology

## 2019-05-27 NOTE — HPI
Case of a 48 y/o female with PMHx KESSLER cirrhosis c/w esophageal varices, GERD. Patient admitted on 5/16/19 referred from hepatology clinic due to c/c of worsening SOB, increasing abdominal girth and some occasional confusion. Patient underwent ID evaluation for liver tpx candidacy found to not have any significant contraindications. Has a history of positive tuberculin skin test with negative quant gold x2 with no significant exposure. Patient underwent thoracentesis on 5/18 with non infectious appearing. Now with fevers T max 101 and CT with large volume pleural effusion. Undergoing today repeat thora and para bu IR. ID consulted to evaluate if patient still considered adequate candidate for liver transplant in setting of new fevers.

## 2019-05-27 NOTE — PROGRESS NOTES
Progress Note  Hospital Medicine  [facility]       Patient Name: Jihan Zamudio  MRN:  [id number]  Hospital Medicine Team: [facility]OSP MED L Blane Gottlieb MD  Date of Admission:  5/16/2019     Length of Stay:  LOS: 11 days   Expected Discharge Date: 6/4/2019  Principal Problem:  Acute hypoxemic respiratory failure     Subjective:     Interval History/Overnight Events:    - patient spiked a fever of 101.3 over night, blood cultures NGTD; started empirically on rocephin 2g last night; went for both IR para and thora today; 3.2L taken out with para, negative for SBP; 1.5L taken out with thora and negative for lights criteria; transudate fluid  - need clearance from transplant ID with patient's recent fever prior to listing patient;          cefTRIAXone (ROCEPHIN) IVPB  2 g Intravenous Q24H    lactulose  5 g Oral Daily    levothyroxine  112 mcg Oral Daily    miconazole NITRATE 2 %   Topical (Top) BID    pantoprazole  40 mg Oral Daily    rifAXImin  550 mg Oral BID           sodium chloride, acetaminophen, cyclobenzaprine, dextrose 50%, dextrose 50%, glucagon (human recombinant), glucose, glucose, ondansetron, simethicone, sodium chloride 0.9%, sodium chloride 0.9%, sodium chloride 0.9%, trazodone    Review of Systems   Constitutional: Negative for chills, fatigue, positive fever.   HENT: Negative for sore throat, trouble swallowing.    Eyes: Negative for photophobia, visual disturbance.   Respiratory: Negative for cough, shortness of breath.    Cardiovascular: Negative for chest pain, palpitations, leg swelling.   Gastrointestinal: Negative for abdominal pain, constipation, diarrhea, nausea, vomiting.   Endocrine: Negative for cold intolerance, heat intolerance.   Genitourinary: Negative for dysuria, frequency.   Musculoskeletal: Negative for arthralgias, myalgias.   Skin: Negative for rash, wound, erythema   Neurological: Negative for dizziness, syncope, weakness, light-headedness.    Psychiatric/Behavioral: Negative for confusion, hallucinations, anxiety  All other systems reviewed and are negative.    Objective:     Temp:  [98.2 °F (36.8 °C)-101.3 °F (38.5 °C)]   Pulse:  [89-96]   Resp:  [14-18]   BP: (100-122)/(50-56)   SpO2:  [95 %-100 %]       Physical Exam:  Constitutional: appears older than stated age, chronically ill looking,  non-distressed, not diaphoretic.   HENT: NC/AT, external ears normal, oropharynx clear, MMM w/o exudates.   Eyes: PERRL, EOMI, conjunctiva normal, no discharge b/l, +scleral icterus   Neck: normal ROM, supple  CV: RRR, no m/r/g, no carotid bruits, +2 peripheral pulses.  Pulmonary/Chest wall: Breathing comfortably +distress   Decreased breath sounds at lung bases/ absent breath sounds in right lung  GI: Soft, non-tender, (+) BS, (+) BM   +distended  Musculoskeletal: Normal ROM, no atrophy,  + pitting edema in LE bilaterally   Neurological: AAO x 4, CN II-XI in tact, nl sensation, nl strength/tone  No asterixis   Skin: warm, dry   +pallor, jaundice, + spider angiomas, +bruising   Psych: normal mood and affect, normal behavior, thought content and judgement.    Labs:    Chemistries:   Recent Labs   Lab 05/25/19  0513  05/26/19  0654 05/26/19  1314 05/27/19  0416   *   < > 123*  124* 123* 125*   K 4.2  --  3.9  --  3.9   CL 95  --  95  --  95   CO2 23  --  22*  --  21*   BUN 16  --  13  --  14   CREATININE 0.8  --  0.8  --  0.8   CALCIUM 8.2*  --  8.2*  --  8.3*   PROT 5.8*  --  5.5*  --  5.4*   BILITOT 7.3*  --  8.9*  --  8.4*   ALKPHOS 103  --  106  --  98   ALT 35  --  36  --  36   AST 95*  --  102*  --  93*   MG 1.9  --  1.7  --  1.5*   PHOS 3.6  --  3.2  --  3.4    < > = values in this interval not displayed.        WBC:   Recent Labs   Lab 05/23/19  0646 05/24/19  0437 05/25/19  0513 05/26/19  0654 05/27/19  0416   WBC 2.97* 3.16* 1.84* 2.02* 2.50*     Bands:     CBC/Anemia Labs: Coags:    Recent Labs   Lab 05/25/19  0513 05/25/19  0724 05/26/19  0654  05/27/19  0416   WBC 1.84*  --  2.02* 2.50*   HGB 6.9* 6.7* 7.6* 7.7*   HCT 19.7* 19.5* 22.1* 22.0*   PLT 46*  --  36* 36*   MCV 91  --  90 89   RDW 18.4*  --  18.0* 17.9*    Recent Labs   Lab 05/25/19  0513 05/26/19  0654 05/27/19  0416   INR 1.8* 1.6* 1.7*        POCT Glucose: HbA1c:    No results for input(s): POCTGLUCOSE in the last 168 hours. No results found for: HGBA1C     Diagnostic Results:        Assessment and Plan     Hospital Course:    Ms. 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.81, K74.60]  Yes    Hyponatremia [E87.1]  Yes    Hepatic encephalopathy [K72.90]  Yes    Coagulopathy [D68.9]  Yes    Acute blood loss anemia [D62]  Yes    Decompensated hepatic cirrhosis [K72.90]  Yes     From KESSLER    Liver biopsy 11/29/17- KESSLER with bridging fibrosis, Laura, interface activity; lymph and occ plasma cells      Esophageal varices [I85.00]  Yes     Small with no banding 07/17      Essential hypertension [I10]  Yes    Hypothyroidism [E03.9]  Yes    GERD (gastroesophageal reflux disease) [K21.9]  Yes    Morbid obesity [E66.01]  Yes     Lap band 2006 (lost 75-80 lbs) with subsequent release (2009) (due to obstruction)        Resolved Hospital Problems   No resolved problems to display.     # Fever  - unclear etiology; 101.3 and one time fever; started on rocephin; blood cultures NGTD; LA is normal; negative for SBP on tap today and pleural fluid is transudate   U/A pending and transplant ID consult for clearance for listing     Acute Hypoxemic Respiratory Failure 2/2 Pleural Effusion  Hepatic Hydrothorax  · Satting 88% on RA that improved to 95% with 2L NC  · R sided hydrothorax  · Stop Levaquin as there is no suspicion for  infection  · S/p thora on 5/17 with pulm, 1.5 L removed   · CT chest completed and shows stable pulmonary nodules;   · S/p thoracentesis on 5/27 1.5L removed     Decompensated Liver Disease  KESSLER Cirrhosis  MELD-Na score: 28 at 5/27/2019  4:16 AM  MELD score: 20 at 5/27/2019  4:16 AM  Calculated from:  Serum Creatinine: 0.8 mg/dL (Rounded to 1 mg/dL) at 5/27/2019  4:16 AM  Serum Sodium: 125 mmol/L at 5/27/2019  4:16 AM  Total Bilirubin: 8.4 mg/dL at 5/27/2019  4:16 AM  INR(ratio): 1.7 at 5/27/2019  4:16 AM  Age: 49 years  · Hepatology consulted, appreciate assistance  · Previously approved for liver transplant pending re-evaluation of pulmonary nodule with prior outside hospital CT scans  · Abdomen soft and patient says it feels around baseline so will hold on para  · Check PETH  · Low Sodium diet with 1L fluid restriction  · Hold diuretics due to hyponatremia  · Start Lactulose, titrate 3-4 BM/day  · Start Rifaximin 550mg PO BID  · Has history of pulmonary nodule and outside CT scans were supposed to be reviewed   · Patient cleared for listing, however waiting for ID clearance with fever for listing  · Paracentesis on 5/27 with 3.2L removed and negative for SBP     Esophageal Varices  · Chronic and stable  · Monitor     Anemia  · Hemoglobin 6.7 5/25, likely hemolysis and DIC; no overt GI bleeding; s/p 1 unit of PRBC with good response      Coagulopathy  · 2/2 liver disease  · Monitor for bleeding     Hyponatremia  · Sodium 125 2/2 chronic liver disease  · Fluid restriction    · Nephrology consulted      Hypokalemia  · K 3.2 2/2 low Mag  · Replace     Hypomagnesemia  · Mag 1.5  · Replace     Thrombocytopenia  · Plt chronically low 2/2 coagulopathy from liver disease  · Monitor for bleeding     Hypothyroidism  · Chronic and stable  · Continue Synthroid 112mcg PO daily     GERD  · Chronic and stable  · Continue PPI     Morbid Obesity  · Body mass index is 38.45 kg/m².      Diet:  regular with fluid restriction   GI  PPx:  PO PPI  DVT PPx:  SCDs due to coagulopathy   Goals of Care:  Full     High Risk Conditions:  resp failure     Disposition:    - likely too sick to leave prior to a liver transplant; financial cleared for listing; need ID clearance for listing

## 2019-05-27 NOTE — PLAN OF CARE
Problem: Adult Inpatient Plan of Care  Goal: Plan of Care Review  Outcome: Ongoing (interventions implemented as appropriate)  POC reviewed with patient and . AAOx4, VSS. Patient to IR for Thoracentesis and Paracentesis, 1.5L and 3.2L removed respectively per report. Ambulates to bedside commode, remains free of falls. PRN Tylenol and Flexeril given for pain and spasms. No evidence of distress, safety maintained. Hourly rounding performed. Will continue to monitor.

## 2019-05-27 NOTE — PROCEDURES
Radiology Post-Procedure Note    Pre Op Diagnosis: KESSLER, cirrhosis, right pleural effusion, SOB  Post Op Diagnosis: Same    Procedure: Right thoracentesis    Procedure performed by: Clemente Patel MD    Written Informed Consent Obtained: Yes  Specimen Removed: YES thin cyhna fluid  Estimated Blood Loss: Minimal    Findings:   Successful right thoracentesis.      Patient tolerated procedure well.    Clemente Patel MD  Staff Radiologist  Department of Radiology  Pager: [phone]

## 2019-05-27 NOTE — NURSING
Paracentesis complete, 3.2L removed, labs sent per order. DSD applied to RUQ & R back [facility] site.  Report called to Raghavendra CARRANZA. Awaits transport back to floor.

## 2019-05-27 NOTE — CONSULTS
[facility]  Infectious Disease  Consult Note    Patient Name: Jihan Zamudio  MRN: [id number]  Admission Date: 5/16/2019  Hospital Length of Stay: 11 days  Attending Physician: Blane Gottlieb MD  Primary Care Provider: Primary Doctor No     Isolation Status: No active isolations    Patient information was obtained from patient and ER records.      Consults  Assessment/Plan:     Pleural effusion, right  48 y/o female with PMHx KESSLER cirrhosis c/w esophageal varices, GERD. Patient admitted on 5/16/19 referred from hepatology clinic due to c/c of worsening SOB, increasing abdominal girth and some occasional confusion. Now with new fevers T max 101. Patient has been receiving ceftriaxone since emergence of fevers. CT 5/19 with evidence of large volume right sided pleural effusion with associated compression atelectasis and lower lobe pulmonary nodules. Underwent thoracentesis (1.5 L removed) [id number]; 7%segs and paracentesis (3.2L removed) WBC:173; 6% segs likely patient had ascites with associated hydrothorax, atelectasis could be source of fevers. Ok to continue on ceftriaxone for now and monitor progress. Previous work up negative for histo, blasto, strongy, RPR, HIV, quant gold, crypto Ag. Cocci was indeterminate from the fungal immunodiffusion assay(means test did not work). Thank you for the consult. Will monitor progress. Patient does not have absolute contraindication for transplant but would monitor for next 24hrs to see if fevers resolve.       Recommendations:   - continue ceftriaxone             Thank you for your consult. I will follow-up with patient. Please contact us if you have any additional questions.    Serina Parker MD  Infectious Disease  [facility]    Subjective:     Principal Problem: Acute hypoxemic respiratory failure    HPI: Case of a 48 y/o female with PMHx KESSLER cirrhosis c/w esophageal varices, GERD. Patient admitted on 5/16/19 referred from  hepatology clinic due to c/c of worsening SOB, increasing abdominal girth and some occasional confusion. Patient underwent ID evaluation for liver tpx candidacy found to not have any significant contraindications. Has a history of positive tuberculin skin test with negative quant gold x2 with no significant exposure. Patient underwent thoracentesis on  with non infectious appearing. Now with fevers T max 101 and CT with large volume pleural effusion. Undergoing today repeat thora and para bu IR. ID consulted to evaluate if patient still considered adequate candidate for liver transplant in setting of new fevers.      Past Medical History:   Diagnosis Date    Cirrhosis     Esophageal varices 2018    Small with no banding     Essential hypertension 2018    Fatty liver 2018    GERD (gastroesophageal reflux disease)     Hx of colonic polyps 2018    On colonoscopy     Hypertension     Hypothyroidism 2018    Kidney stones     Morbid obesity 2018    Lap band with subsequent release    KADEEM (obstructive sleep apnea) 2018    Osteoarthritis 2018    Pulmonary nodule 2018    Vitamin D deficiency 2018       Past Surgical History:   Procedure Laterality Date     SECTION      TIMES 2     CHOLECYSTECTOMY      LAPAROSCOPIC     CYSTOSCOPY W/ STONE MANIPULATION      kidney stone removal    EGD (ESOPHAGOGASTRODUODENOSCOPY) N/A 2019    Performed by Davian Hernández MD at [facility] ENDO (2ND FLR)    LAPAROSCOPIC GASTRIC BANDING  2006    removal     LIVER BIOPSY  2017    KESSLER with bridging 17       Review of patient's allergies indicates:   Allergen Reactions    Metformin Rash    Pcn [penicillins] Other (See Comments)     Unsure of reaction, states it was as a child. Tolerated rocephin at [facility]       Medications:  Medications Prior to Admission   Medication Sig    acetaminophen (TYLENOL) 500 MG tablet Take 1,000 mg by mouth  daily as needed for Pain (headache and bodyaches).    cholecalciferol, vitamin D3, (VITAMIN D3) 2,000 unit Cap Take 1 capsule by mouth once daily.    ergocalciferol (ERGOCALCIFEROL) 50,000 unit Cap Take 50,000 Units by mouth every 7 days. Monday    fexofenadine (ALLEGRA) 180 MG tablet Take 180 mg by mouth daily as needed.    furosemide (LASIX) 80 MG tablet Take 1 tablet (80 mg total) by mouth 2 (two) times daily.    lactulose (CHRONULAC) 20 gram/30 mL Soln Take 30 mLs (20 g total) by mouth 3 (three) times daily. TITRATE TO 3-4 BOWEL MOVEMENTS DAILY.    levothyroxine (SYNTHROID) 112 MCG tablet Take 112 mcg by mouth once daily.    lidocaine (LIDODERM) 5 % Place 1 patch onto the skin every 24 hours. Remove & Discard patch within 12 hours  As needed or as directed by MD    pantoprazole (PROTONIX) 40 MG tablet Take 1 tablet (40 mg total) by mouth once daily.    rifAXIMin (XIFAXAN) 550 mg Tab Take 550 mg by mouth 2 (two) times daily.     spironolactone (ALDACTONE) 100 MG tablet Take 2 tablets (200 mg total) by mouth once daily. (Patient taking differently: Take 100 mg by mouth 2 (two) times daily. )    zinc sulfate (ZINCATE) 220 (50) mg capsule Take 1 capsule (220 mg total) by mouth once daily.     Antibiotics (From admission, onward)    Start     Stop Route Frequency Ordered    05/26/19 2115  cefTRIAXone (ROCEPHIN) 2 g in dextrose 5 % 50 mL IVPB      -- IV Every 24 hours (non-standard times) 05/26/19 2005 05/16/19 2300  rifAXIMin tablet 550 mg      -- Oral 2 times daily 05/16/19 2208        Antifungals (From admission, onward)    Start     Stop Route Frequency Ordered    05/26/19 1145  miconazole NITRATE 2 % top powder      -- Top 2 times daily 05/26/19 1036        Antivirals (From admission, onward)    None             There is no immunization history on file for this patient.    Family History     Problem Relation (Age of Onset)    Breast cancer Maternal Grandmother    Cancer Mother (50), Father (65)     Heart disease Mother, Father        Social History     Socioeconomic History    Marital status:      Spouse name: Not on file    Number of children: Not on file    Years of education: Not on file    Highest education level: Not on file   Occupational History    Occupation: homemaker   Social Needs    Financial resource strain: Not on file    Food insecurity:     Worry: Not on file     Inability: Not on file    Transportation needs:     Medical: Not on file     Non-medical: Not on file   Tobacco Use    Smoking status: Never Smoker    Smokeless tobacco: Never Used    Tobacco comment: patient denies   Substance and Sexual Activity    Alcohol use: No     Comment: patient denies    Drug use: No     Comment: patient denies    Sexual activity: Not on file   Lifestyle    Physical activity:     Days per week: Not on file     Minutes per session: Not on file    Stress: Not on file   Relationships    Social connections:     Talks on phone: Not on file     Gets together: Not on file     Attends Restoration service: Not on file     Active member of club or organization: Not on file     Attends meetings of clubs or organizations: Not on file     Relationship status: Not on file   Other Topics Concern    Not on file   Social History Narrative    Not on file     Review of Systems   Constitutional: Negative for chills, diaphoresis, fatigue and fever.   HENT: Negative for congestion, mouth sores, rhinorrhea and sore throat.    Respiratory: Negative for cough and shortness of breath.    Gastrointestinal: Negative for abdominal distention, abdominal pain, diarrhea, nausea and vomiting.   Genitourinary: Negative for dysuria and urgency.   Musculoskeletal: Negative for joint swelling and myalgias.   Allergic/Immunologic: Negative for environmental allergies, food allergies and immunocompromised state.   Neurological: Negative for dizziness and numbness.   Hematological: Negative for adenopathy.    Psychiatric/Behavioral: Negative for decreased concentration, hallucinations and sleep disturbance.     Objective:     Vital Signs (Most Recent):  Temp: 98.2 °F (36.8 °C) (05/27/19 0706)  Pulse: 92 (05/27/19 0946)  Resp: 18 (05/27/19 0946)  BP: (!) 100/54 (05/27/19 0946)  SpO2: 96 % (05/27/19 0946) Vital Signs (24h Range):  Temp:  [98.2 °F (36.8 °C)-101.3 °F (38.5 °C)] 98.2 °F (36.8 °C)  Pulse:  [89-96] 92  Resp:  [14-18] 18  SpO2:  [95 %-98 %] 96 %  BP: (100-122)/(51-56) 100/54     Weight: 101.6 kg (223 lb 15.8 oz)  Body mass index is 37.27 kg/m².    Estimated Creatinine Clearance: 98.2 mL/min (based on SCr of 0.8 mg/dL).    Physical Exam   Constitutional: She is oriented to person, place, and time. She appears well-developed and well-nourished.   HENT:   Head: Normocephalic and atraumatic.   Eyes: Pupils are equal, round, and reactive to light. EOM are normal.   Neck: Normal range of motion.   Cardiovascular: Normal rate, regular rhythm and normal heart sounds.   Pulmonary/Chest: Effort normal and breath sounds normal.   Abdominal: Soft. Bowel sounds are normal. She exhibits distension. There is tenderness.   Musculoskeletal: Normal range of motion. She exhibits no edema.   Neurological: She is alert and oriented to person, place, and time.   Skin: No rash noted.       Significant Labs:   Blood Culture:   Recent Labs   Lab 05/16/19  1810 05/16/19  1830 05/25/19  1602 05/26/19  2048   LABBLOO No growth after 5 days. No growth after 5 days. No Growth to date  No Growth to date  No Growth to date  No Growth to date No Growth to date  No Growth to date     BMP:   Recent Labs   Lab 05/27/19  0416   GLU 81   *   K 3.9   CL 95   CO2 21*   BUN 14   CREATININE 0.8   CALCIUM 8.3*   MG 1.5*     CBC:   Recent Labs   Lab 05/26/19  0654 05/27/19  0416   WBC 2.02* 2.50*   HGB 7.6* 7.7*   HCT 22.1* 22.0*   PLT 36* 36*     CMP:   Recent Labs   Lab 05/26/19  0654 05/26/19  1314 05/27/19  0416   *  124* 123* 125*    K 3.9  --  3.9   CL 95  --  95   CO2 22*  --  21*   GLU 76  --  81   BUN 13  --  14   CREATININE 0.8  --  0.8   CALCIUM 8.2*  --  8.3*   PROT 5.5*  --  5.4*   ALBUMIN 2.6*  --  2.4*   BILITOT 8.9*  --  8.4*   ALKPHOS 106  --  98   *  --  93*   ALT 36  --  36   ANIONGAP 6*  --  9   EGFRNONAA >60.0  --  >60.0     Microbiology Results (last 7 days)     Procedure Component Value Units Date/Time    Culture, Anaerobic [[id number]] Collected:  05/17/19 1640    Order Status:  Completed Specimen:  Body Fluid from Pleural Fluid Updated:  05/27/19 0716     Anaerobic Culture No anaerobes isolated    Blood culture [[id number]] Collected:  05/26/19 2048    Order Status:  Completed Specimen:  Blood Updated:  05/27/19 0315     Blood Culture, Routine No Growth to date    Blood culture [[id number]] Collected:  05/26/19 2048    Order Status:  Completed Specimen:  Blood Updated:  05/27/19 0315     Blood Culture, Routine No Growth to date    Blood culture [[id number]]     Order Status:  Canceled Specimen:  Blood     Blood culture [[id number]] Collected:  05/25/19 1602    Order Status:  Completed Specimen:  Blood Updated:  05/26/19 1812     Blood Culture, Routine No Growth to date     Blood Culture, Routine No Growth to date    Narrative:       Collection has been rescheduled by [facility] at 05/25/2019 12:25 Reason: pt   receiving blood spoke with TERE Aponte will call when ready for labs to   be drawn  Collection has been rescheduled by [facility] at 05/25/2019 14:14 Reason: pt   still receiving blood spoke with TERE Aponte  Collection has been rescheduled by [facility] at 05/25/2019 12:25 Reason: pt   receiving blood spoke with TERE Aponte will call when ready for labs to   be drawn  Collection has been rescheduled by [facility] at 05/25/2019 14:14 Reason: pt   still receiving blood spoke with TERE Aponte    Blood culture [[id number]] Collected:  05/25/19 1602    Order Status:  Completed Specimen:  Blood Updated:  05/26/19 1812     Blood Culture, Routine No Growth to  date     Blood Culture, Routine No Growth to date    Narrative:       Collection has been rescheduled by [facility] at 05/25/2019 12:25 Reason: pt   receiving blood spoke with Fay,RN will call when ready for labs to   be drawn  Collection has been rescheduled by [facility] at 05/25/2019 14:14 Reason: pt   still receiving blood spoke with FayRN  Collection has been rescheduled by CX at 05/25/2019 12:25 Reason: pt   receiving blood spoke with Fay,RN will call when ready for labs to   be drawn  Collection has been rescheduled by [facility] at 05/25/2019 14:14 Reason: pt   still receiving blood spoke with TERE Aponte    Clostridium difficile EIA [[id number]]     Order Status:  Canceled Specimen:  Stool     Blood culture x two cultures. Draw prior to antibiotics. [[id number]] Collected:  05/16/19 1830    Order Status:  Completed Specimen:  Blood from Peripheral, Antecubital, Left Updated:  05/21/19 2212     Blood Culture, Routine No growth after 5 days.    Narrative:       Aerobic and anaerobic    Blood culture x two cultures. Draw prior to antibiotics. [[id number]] Collected:  05/16/19 1810    Order Status:  Completed Specimen:  Blood from Peripheral, Antecubital, Left Updated:  05/21/19 2212     Blood Culture, Routine No growth after 5 days.    Narrative:       Aerobic and anaerobic    Aerobic culture [[id number]] Collected:  05/17/19 1640    Order Status:  Completed Specimen:  Body Fluid from Pleural Fluid Updated:  05/21/19 0648     Aerobic Bacterial Culture No growth          Significant Imaging: I have reviewed all pertinent imaging results/findings within the past 24 hours.

## 2019-05-27 NOTE — ASSESSMENT & PLAN NOTE
48 y/o female with PMHx KESSLER cirrhosis c/w esophageal varices, GERD. Patient admitted on 5/16/19 referred from hepatology clinic due to c/c of worsening SOB, increasing abdominal girth and some occasional confusion. Now with new fevers T max 101. Patient has been receiving ceftriaxone since emergence of fevers. CT 5/19 with evidence of large volume right sided pleural effusion with associated compression atelectasis and lower lobe pulmonary nodules. Underwent thoracentesis (1.5 L removed) [id number]; 7%segs and paracentesis (3.2L removed) WBC:173; 6% segs likely patient had ascites with associated hydrothorax, atelectasis could be source of fevers. Ok to continue on ceftriaxone for now and monitor progress. Previous work up negative for histo, blasto, strongy, RPR, HIV, quant gold, crypto Ag. Cocci was indeterminate from the fungal immunodiffusion assay(means test did not work). Thank you for the consult. Will monitor progress. Patient does not have absolute contraindication for transplant but would monitor for next 24hrs to see if fevers resolve.       Recommendations:   - continue ceftriaxone

## 2019-05-27 NOTE — PLAN OF CARE
Problem: Adult Inpatient Plan of Care  Goal: Plan of Care Review  Outcome: Ongoing (interventions implemented as appropriate)  Pt spiked fever of 101.3 at beginning of shift. Tylenol given. BCx2 and lactic collected, rocephin started. Pt temp controlled rest of night. Pt had cough last night, denied desire for cough or sleep meds. Murmur auscultated. Pt did not sleep well. Pt still seems too unwell to care for self at this time. Pt has been NPO since MN.

## 2019-05-27 NOTE — PLAN OF CARE
Pt not medically ready for d/c and will remain in-house for transplant. Pt received a thoracentesis and paracentesis today; also, pt febrile overnight.     05/27/19 1245   Discharge Reassessment   Assessment Type Discharge Planning Reassessment   Discharge Plan A Home with family;Home Health   Discharge Plan B Home with family   Anticipated Discharge Disposition Home-Health   Post-Acute Status   Discharge Delays None known at this time

## 2019-05-27 NOTE — H&P
Radiology History & Physical      SUBJECTIVE:     Chief Complaint: Shortness of breath.    History of Present Illness:  Jihan Zamudio is a 49 y.o. female with history of KESSLER cirrhosis with shortness of breath who presents for right sided thoracentesis.    Past Medical History:   Diagnosis Date    Cirrhosis     Esophageal varices 2018    Small with no banding     Essential hypertension 2018    Fatty liver 2018    GERD (gastroesophageal reflux disease)     Hx of colonic polyps 2018    On colonoscopy     Hypertension     Hypothyroidism 2018    Kidney stones     Morbid obesity 2018    Lap band with subsequent release    KADEEM (obstructive sleep apnea) 2018    Osteoarthritis 2018    Pulmonary nodule 2018    Vitamin D deficiency 2018     Past Surgical History:   Procedure Laterality Date     SECTION      TIMES 2     CHOLECYSTECTOMY      LAPAROSCOPIC     CYSTOSCOPY W/ STONE MANIPULATION      kidney stone removal    EGD (ESOPHAGOGASTRODUODENOSCOPY) N/A 2019    Performed by Davian Hern[facility]dez [facility] at [facility] (2ND F[facility])    LAPAROSCOPIC GASTRIC BANDING  2006    removal     LIVER BIOPSY  2017    KESSLER with bridging 17       Home Meds:   Prior to Admission medications    Medication Sig Start Date End Date Taking? Authorizing Provider   acetaminophen (TYLENOL) 500 MG tablet Take 1,000 mg by mouth daily as needed for Pain (headache and bodyaches).   Yes Historical Provider, MD   cholecalciferol, vitamin D3, (VITAMIN D3) 2,000 unit Cap Take 1 capsule by mouth once daily.   Yes Historical Provider, MD   ergocalciferol (ERGOCALCIFEROL) 50,000 unit Cap Take 50,000 Units by mouth every 7 days. Monday   Yes Historical Provider, MD   fexofenadine (ALLEGRA) 180 MG tablet Take 180 mg by mouth daily as needed.   Yes Historical Provider, MD   furosemide (LASIX) 80 MG tablet Take 1 tablet (80 mg total) by mouth 2 (two) times  daily. 5/2/19 5/1/20 Yes Maritza Messina MD   lactulose (CHRONULAC) 20 gram/30 mL Soln Take 30 mLs (20 g total) by mouth 3 (three) times daily. TITRATE TO 3-4 BOWEL MOVEMENTS DAILY. 5/2/19 6/1/19 Yes Maritza Messina MD   levothyroxine (SYNTHROID) 112 MCG tablet Take 112 mcg by mouth once daily.   Yes Historical Provider, MD   lidocaine (LIDODERM) 5 % Place 1 patch onto the skin every 24 hours. Remove & Discard patch within 12 hours  As needed or as directed by MD   Yes Historical Provider, MD   pantoprazole (PROTONIX) 40 MG tablet Take 1 tablet (40 mg total) by mouth once daily. 4/24/19 7/23/19 Yes Jaret Armando MD   rifAXIMin (XIFAXAN) 550 mg Tab Take 550 mg by mouth 2 (two) times daily.    Yes Historical Provider, MD   spironolactone (ALDACTONE) 100 MG tablet Take 2 tablets (200 mg total) by mouth once daily.  Patient taking differently: Take 100 mg by mouth 2 (two) times daily.  5/3/19 5/2/20 Yes Maritza Messina MD   zinc sulfate (ZINCATE) 220 (50) mg capsule Take 1 capsule (220 mg total) by mouth once daily. 5/3/19   Maritza Messina MD     Anticoagulants/Antiplatelets: no anticoagulation    Allergies:   Review of patient's allergies indicates:   Allergen Reactions    Metformin Rash    Pcn [penicillins] Other (See Comments)     Unsure of reaction, states it was as a child. Tolerated rocephin at [facility]     Sedation History:  no adverse reactions    Review of Systems:   Hematological: no known coagulopathies  Respiratory: no shortness of breath  Cardiovascular: no chest pain  Gastrointestinal: no abdominal pain  Genito-Urinary: no dysuria  Musculoskeletal: negative  Neurological: no TIA or stroke symptoms         OBJECTIVE:     Vital Signs (Most Recent)  Temp: 98.2 °F (36.8 °C) (05/27/19 0706)  Pulse: 92 (05/27/19 0852)  Resp: 18 (05/27/19 0852)  BP: (!) 122/51 (05/27/19 0852)  SpO2: 98 % (05/27/19 0852)    Physical Exam:  ASA: 3  Mallampati: 2    General: no acute distress  Mental Status: alert and  oriented to person, place and time  HEENT: normocephalic, atraumatic  Chest: unlabored breathing  Heart: regular heart rate  Abdomen: nondistended  Extremity: moves all extremities    Laboratory  Lab Results   Component Value Date    INR 1.7 (H) 05/27/2019       Lab Results   Component Value Date    WBC 2.50 (L) 05/27/2019    HGB 7.7 (L) 05/27/2019    HCT 22.0 (L) 05/27/2019    MCV 89 05/27/2019    PLT 36 (LL) 05/27/2019      Lab Results   Component Value Date    GLU 81 05/27/2019     (L) 05/27/2019    K 3.9 05/27/2019    CL 95 05/27/2019    CO2 21 (L) 05/27/2019    BUN 14 05/27/2019    CREATININE 0.8 05/27/2019    CALCIUM 8.3 (L) 05/27/2019    MG 1.5 (L) 05/27/2019    ALT 36 05/27/2019    AST 93 (H) 05/27/2019    ALBUMIN 2.4 (L) 05/27/2019    BILITOT 8.4 (H) 05/27/2019    BILIDIR 1.2 (H) 02/26/2018       ASSESSMENT/PLAN:     Sedation Plan: Local.  Patient will undergo Thoracentesis.    Paxton Roth MD  PGY-II Radiology Resident  [address] [facility]  Pager: [phone]

## 2019-05-28 ENCOUNTER — TELEPHONE (OUTPATIENT)
Dept: TRANSPLANT | Facility: CLINIC | Age: 49
End: 2019-05-28

## 2019-05-28 PROBLEM — R50.9 FEVER: Status: ACTIVE | Noted: 2019-05-28

## 2019-05-28 LAB
ALBUMIN SERPL BCP-MCNC: 2.3 G/DL (ref 3.5–5.2)
ALP SERPL-CCNC: 116 U/L (ref 55–135)
ALT SERPL W/O P-5'-P-CCNC: 34 U/L (ref 10–44)
ANION GAP SERPL CALC-SCNC: 8 MMOL/L (ref 8–16)
AST SERPL-CCNC: 86 U/L (ref 10–40)
BACTERIA #/AREA URNS AUTO: ABNORMAL /HPF
BASOPHILS # BLD AUTO: 0.02 K/UL (ref 0–0.2)
BASOPHILS NFR BLD: 0.6 % (ref 0–1.9)
BILIRUB SERPL-MCNC: 7.9 MG/DL (ref 0.1–1)
BILIRUB UR QL STRIP: NEGATIVE
BUN SERPL-MCNC: 16 MG/DL (ref 6–20)
CALCIUM SERPL-MCNC: 7.7 MG/DL (ref 8.7–10.5)
CHLORIDE SERPL-SCNC: 95 MMOL/L (ref 95–110)
CLARITY UR REFRACT.AUTO: ABNORMAL
CO2 SERPL-SCNC: 20 MMOL/L (ref 23–29)
COLOR UR AUTO: ABNORMAL
CREAT SERPL-MCNC: 0.8 MG/DL (ref 0.5–1.4)
DIFFERENTIAL METHOD: ABNORMAL
EOSINOPHIL # BLD AUTO: 0.2 K/UL (ref 0–0.5)
EOSINOPHIL NFR BLD: 7.4 % (ref 0–8)
ERYTHROCYTE [DISTWIDTH] IN BLOOD BY AUTOMATED COUNT: 18.1 % (ref 11.5–14.5)
EST. GFR  (AFRICAN AMERICAN): >60 ML/MIN/1.73 M^2
EST. GFR  (NON AFRICAN AMERICAN): >60 ML/MIN/1.73 M^2
GLUCOSE SERPL-MCNC: 89 MG/DL (ref 70–110)
GLUCOSE UR QL STRIP: NEGATIVE
HCT VFR BLD AUTO: 22.1 % (ref 37–48.5)
HGB BLD-MCNC: 7.4 G/DL (ref 12–16)
HGB UR QL STRIP: ABNORMAL
HYALINE CASTS UR QL AUTO: 10 /LPF
IMM GRANULOCYTES # BLD AUTO: 0.01 K/UL (ref 0–0.04)
IMM GRANULOCYTES NFR BLD AUTO: 0.3 % (ref 0–0.5)
INR PPP: 1.8 (ref 0.8–1.2)
KETONES UR QL STRIP: NEGATIVE
LEUKOCYTE ESTERASE UR QL STRIP: NEGATIVE
LYMPHOCYTES # BLD AUTO: 0.7 K/UL (ref 1–4.8)
LYMPHOCYTES NFR BLD: 21.2 % (ref 18–48)
MAGNESIUM SERPL-MCNC: 2.2 MG/DL (ref 1.6–2.6)
MCH RBC QN AUTO: 31 PG (ref 27–31)
MCHC RBC AUTO-ENTMCNC: 33.5 G/DL (ref 32–36)
MCV RBC AUTO: 93 FL (ref 82–98)
MICROSCOPIC COMMENT: ABNORMAL
MONOCYTES # BLD AUTO: 0.3 K/UL (ref 0.3–1)
MONOCYTES NFR BLD: 8 % (ref 4–15)
NEUTROPHILS # BLD AUTO: 2 K/UL (ref 1.8–7.7)
NEUTROPHILS NFR BLD: 62.5 % (ref 38–73)
NITRITE UR QL STRIP: NEGATIVE
NON-SQ EPI CELLS #/AREA URNS AUTO: 0 /HPF
NRBC BLD-RTO: 0 /100 WBC
PH UR STRIP: 5 [PH] (ref 5–8)
PHOSPHATE SERPL-MCNC: 3.5 MG/DL (ref 2.7–4.5)
PLATELET # BLD AUTO: 51 K/UL (ref 150–350)
PMV BLD AUTO: 12.3 FL (ref 9.2–12.9)
POTASSIUM SERPL-SCNC: 4 MMOL/L (ref 3.5–5.1)
PROT SERPL-MCNC: 5.3 G/DL (ref 6–8.4)
PROT UR QL STRIP: NEGATIVE
PROTHROMBIN TIME: 17.4 SEC (ref 9–12.5)
RBC # BLD AUTO: 2.39 M/UL (ref 4–5.4)
RBC #/AREA URNS AUTO: 4 /HPF (ref 0–4)
SODIUM SERPL-SCNC: 123 MMOL/L (ref 136–145)
SP GR UR STRIP: 1.02 (ref 1–1.03)
SQUAMOUS #/AREA URNS AUTO: 9 /HPF
URN SPEC COLLECT METH UR: ABNORMAL
WBC # BLD AUTO: 3.26 K/UL (ref 3.9–12.7)
WBC #/AREA URNS AUTO: 5 /HPF (ref 0–5)

## 2019-05-28 PROCEDURE — 99233 SBSQ HOSP IP/OBS HIGH 50: CPT | Mod: NTX,,, | Performed by: HOSPITALIST

## 2019-05-28 PROCEDURE — 63600175 PHARM REV CODE 636 W HCPCS: Mod: JG,NTX | Performed by: HOSPITALIST

## 2019-05-28 PROCEDURE — 80053 COMPREHEN METABOLIC PANEL: CPT | Mod: NTX

## 2019-05-28 PROCEDURE — 99233 PR SUBSEQUENT HOSPITAL CARE,LEVL III: ICD-10-PCS | Mod: NTX,,, | Performed by: INTERNAL MEDICINE

## 2019-05-28 PROCEDURE — 85610 PROTHROMBIN TIME: CPT | Mod: NTX

## 2019-05-28 PROCEDURE — 97530 THERAPEUTIC ACTIVITIES: CPT | Mod: NTX

## 2019-05-28 PROCEDURE — 83735 ASSAY OF MAGNESIUM: CPT | Mod: NTX

## 2019-05-28 PROCEDURE — 81001 URINALYSIS AUTO W/SCOPE: CPT | Mod: NTX

## 2019-05-28 PROCEDURE — 99233 SBSQ HOSP IP/OBS HIGH 50: CPT | Mod: NTX,,, | Performed by: INTERNAL MEDICINE

## 2019-05-28 PROCEDURE — P9047 ALBUMIN (HUMAN), 25%, 50ML: HCPCS | Mod: JG,NTX | Performed by: HOSPITALIST

## 2019-05-28 PROCEDURE — 99233 PR SUBSEQUENT HOSPITAL CARE,LEVL III: ICD-10-PCS | Mod: NTX,,, | Performed by: HOSPITALIST

## 2019-05-28 PROCEDURE — 36415 COLL VENOUS BLD VENIPUNCTURE: CPT | Mod: NTX

## 2019-05-28 PROCEDURE — 25000003 PHARM REV CODE 250: Mod: NTX | Performed by: HOSPITALIST

## 2019-05-28 PROCEDURE — 85025 COMPLETE CBC W/AUTO DIFF WBC: CPT | Mod: NTX

## 2019-05-28 PROCEDURE — 20600001 HC STEP DOWN PRIVATE ROOM: Mod: NTX

## 2019-05-28 PROCEDURE — 97535 SELF CARE MNGMENT TRAINING: CPT | Mod: NTX

## 2019-05-28 PROCEDURE — 84100 ASSAY OF PHOSPHORUS: CPT | Mod: NTX

## 2019-05-28 RX ORDER — ALBUMIN HUMAN 250 G/1000ML
25 SOLUTION INTRAVENOUS 3 TIMES DAILY
Status: COMPLETED | OUTPATIENT
Start: 2019-05-28 | End: 2019-05-29

## 2019-05-28 RX ADMIN — CEFTRIAXONE 2 G: 2 INJECTION, SOLUTION INTRAVENOUS at 09:05

## 2019-05-28 RX ADMIN — RIFAXIMIN 550 MG: 550 TABLET ORAL at 09:05

## 2019-05-28 RX ADMIN — MICONAZOLE NITRATE: 20 POWDER TOPICAL at 08:05

## 2019-05-28 RX ADMIN — ALBUMIN HUMAN 25 G: 0.25 SOLUTION INTRAVENOUS at 05:05

## 2019-05-28 RX ADMIN — RIFAXIMIN 550 MG: 550 TABLET ORAL at 08:05

## 2019-05-28 RX ADMIN — ALBUMIN HUMAN 25 G: 0.25 SOLUTION INTRAVENOUS at 09:05

## 2019-05-28 RX ADMIN — MICONAZOLE NITRATE: 20 POWDER TOPICAL at 09:05

## 2019-05-28 RX ADMIN — RIFAXIMIN 550 MG: 550 TABLET ORAL at 12:05

## 2019-05-28 RX ADMIN — LEVOTHYROXINE SODIUM 112 MCG: 112 TABLET ORAL at 06:05

## 2019-05-28 RX ADMIN — CEFTRIAXONE 2 G: 2 INJECTION, SOLUTION INTRAVENOUS at 12:05

## 2019-05-28 RX ADMIN — PANTOPRAZOLE SODIUM 40 MG: 40 TABLET, DELAYED RELEASE ORAL at 08:05

## 2019-05-28 RX ADMIN — MICONAZOLE NITRATE: 20 POWDER TOPICAL at 12:05

## 2019-05-28 NOTE — SUBJECTIVE & OBJECTIVE
Interval History:   Sodium better this morning but febrile overnight.  She denies any complaints this morning.    Current Facility-Administered Medications   Medication    0.9%  NaCl infusion (for blood administration)    acetaminophen tablet 325 mg    cefTRIAXone (ROCEPHIN) 2 g in dextrose 5 % 50 mL IVPB    cyclobenzaprine tablet 5 mg    dextrose 50% injection 12.5 g    dextrose 50% injection 25 g    glucagon (human recombinant) injection 1 mg    glucose chewable tablet 16 g    glucose chewable tablet 24 g    lactulose 20 gram/30 mL solution Soln 5 g    levothyroxine tablet 112 mcg    magnesium sulfate 2g in water 50mL IVPB (premix)    miconazole NITRATE 2 % top powder    ondansetron injection 4 mg    pantoprazole EC tablet 40 mg    rifAXIMin tablet 550 mg    simethicone chewable tablet 80 mg    sodium chloride 0.9% flush 10 mL    sodium chloride 0.9% flush 10 mL    sodium chloride 0.9% flush 5 mL    trazodone split tablet 25 mg       Objective:     Vital Signs (Most Recent):  Temp: 98.2 °F (36.8 °C) (05/27/19 0706)  Pulse: 87 (05/27/19 1548)  Resp: 18 (05/27/19 1050)  BP: (!) 114/50 (05/27/19 1050)  SpO2: 100 % (05/27/19 1050) Vital Signs (24h Range):  Temp:  [98.2 °F (36.8 °C)-101.3 °F (38.5 °C)] 98.2 °F (36.8 °C)  Pulse:  [87-95] 87  Resp:  [14-18] 18  SpO2:  [95 %-100 %] 100 %  BP: (100-122)/(50-56) 114/50     Weight: 101.6 kg (223 lb 15.8 oz) (05/27/19 1000)  Body mass index is 37.27 kg/m².    Physical Exam   Constitutional: She is oriented to person, place, and time. No distress.   HENT:   Head: Normocephalic and atraumatic.   Eyes: Scleral icterus is present.   Cardiovascular: Normal rate and regular rhythm.   Pulmonary/Chest:   Decreased breath sounds bilaterally (morer prominent on the right side)   Abdominal: Soft. Bowel sounds are normal. She exhibits distension. She exhibits no mass. There is no tenderness. There is no rebound and no guarding. No hernia.   Musculoskeletal: She  exhibits no edema.   Neurological: She is alert and oriented to person, place, and time.   Skin: She is not diaphoretic.   Nursing note and vitals reviewed.      MELD-Na score: 28 at 5/27/2019  4:16 AM  MELD score: 20 at 5/27/2019  4:16 AM  Calculated from:  Serum Creatinine: 0.8 mg/dL (Rounded to 1 mg/dL) at 5/27/2019  4:16 AM  Serum Sodium: 125 mmol/L at 5/27/2019  4:16 AM  Total Bilirubin: 8.4 mg/dL at 5/27/2019  4:16 AM  INR(ratio): 1.7 at 5/27/2019  4:16 AM  Age: 49 years    Significant Labs:  CBC:   Recent Labs   Lab 05/27/19 0416   WBC 2.50*   RBC 2.48*   HGB 7.7*   HCT 22.0*   PLT 36*     CMP:   Recent Labs   Lab 05/27/19 0416   GLU 81   CALCIUM 8.3*   ALBUMIN 2.4*   PROT 5.4*   *   K 3.9   CO2 21*   CL 95   BUN 14   CREATININE 0.8   ALKPHOS 98   ALT 36   AST 93*   BILITOT 8.4*     Coagulation:   Recent Labs   Lab 05/27/19 0416   INR 1.7*       Significant Imaging:  X-Ray: Reviewed  US: Reviewed  CT: Reviewed

## 2019-05-28 NOTE — TELEPHONE ENCOUNTER
"  LIVER WAIT LISTING NOTE    **NOTE:   IF ANY EXTERNAL LABS ARE USED FOR LISTING THE VALUES AND DATES MUST BE ENTERED IN EPIC TO GENERATE THIS NOTE**    Date of Financial clearance to list: 2019    [facility]:        Organ: Liver  Name:       Jihan Zamudio    : 1970          Gender:     female      MRN#: [id number]                                 State of Permanent Residence:  [address] [place] [id number]  Ethnicity: /White   Race:      White    CLINICAL INFORMATION       ABO  ABO Blood Group:   O POS     ABO Confirmation: (THESE DATES MUST BE PRIOR TO THE LIST DATE AND SUPPORTED BY SEPARATE LAB REPORTS)    Internal Results    Lab Results   Component Value Date    GROUPTRH O POS 2019    GROUPTRH O POS 2019     No results found for: ABO    External Results    ABO Date 1:  ABO Result 1:  ABO Date 2:  ABO Result 2:     Are either of these ABO results based on External Labs? no  (If Yes, STOP and go to source document in Media Tab for verification).    VITALS  Height:    Ht Readings from Last 1 Encounters:   19 5' 5" (1.651 m)     Weight:    Wt Readings from Last 1 Encounters:   19 101.6 kg (223 lb 15.8 oz)       LIVER ORGAN INFORMATION  Candidate Medical Urgency Status: Active    Number of Previous Transplants: 0    MELD/PELD Data Collection:  Had dialysis twice, or 24 hours of CVVHD, within 1 week prior to the serum creatinine test: No  Encephalopathy: 1 - 2 Date: 2019  Ascites: moderate Date: 2019          MELD Score:  MELD-Na score: 29 at 2019  4:08 AM  MELD score: 21 at 2019  4:08 AM  Calculated from:  Serum Creatinine: 0.8 mg/dL (Rounded to 1 mg/dL) at 2019  4:08 AM  Serum Sodium: 123 mmol/L (Rounded to 125 mmol/L) at 2019  4:08 AM  Total Bilirubin: 7.9 mg/dL at 2019  4:08 AM  INR(ratio): 1.8 at 2019  4:08 AM  Age: 49 years  Lab Results   Component Value Date    ALBUMIN 2.3 (L) 2019       Additional " "Organs: none  Kidney: No    If Kidney is "Yes" above, check Diagnosis and enter the medical eligibility below for a simultaneous liver/kidney:    Diagnosis: Chronic kidney disease (CKD) with measured or calculated GFR less than or equal to 60 ml/min for greater than 90 consecutive days. At least one of the following must qualify for CKD:  Date Begun Dialysis CrCl (ml/min)  Must be < or = 30 eGFR (ml/min) Must be < or = 30    Yes/no:                    Diagnosis: Sustained acute kidney injury (must be confirmed at least once every 7 days). Please select at least one of the following criteria:  Date of test or treatment Dialysis received CrCl (ml/min) Must be < or = 30 eGFR (ml/min) Must be < or = 25 Number of days since previous test or treatment (must be less than or equal to 7 days)    Yes/No:                  Diagnosis: Metabolic disease, Check all diagnosis that apply:     Hyperoxaluria    Atypical hemolytic uremic syndrome (HUS) from mutations in factor H or factor I    Familial non-neuropathic systemic amyloidosis    Methylmalonic aciduria     Transplant nephrologist confirming candidate's most recent diagnosis for SLK: n/a               ## Please submit a separate Kidney Listing note for combined Liver/Kidney patients. ##    Will Recipient Accept?   Accept HBcAB Positive Organ:  yes  Accept HBV GERTRUDIS Positive Organ:  yes  Accept HCV Antibody Positive Organ: yes   Accept HCV GERTRUDIS Positive Organ:  yes                        Local: No                           Import: No  Accept DCD Organ:    yes  Minimum acceptable donor age:  5 years  Maximum acceptable donor age:  99 years  Minimum acceptable donor weight:  40 lbs    Maximum acceptable donor weight:  440 lb  Maximum miles the organ or  Recovery team will travel:   5000 miles    TCR Information    Citizenship: [place] Citizen   Country of permanent residence:   Year of entry to the [place]:   Highest education level: High School (9-12) or GED    Patient on Life Support: " No  Functional Status: 40% - disabled: requires special care and assistance   Working for income: No  If no, reason not working: Patient Choice - Homemaker  Previous Pancreas Islet Infusion - No  Source of payment: Private Insurance  Diabetes: No  Any previous malignancy: No  Neoadjuvant Therapy: No  Has candidate ever had a diagnosis of HCC: No    Liver Medical Factors  Previous abdominal surgery: Yes  Spontaneous Bacterial Peritonitis: No  History of Portal Vein Thrombosis: No  Transjugular Intrahepatic Portosystemic Shunt: No

## 2019-05-28 NOTE — PT/OT/SLP PROGRESS
Occupational Therapy   Treatment    Name: Jihan Zamudio  MRN: [id number]  Admitting Diagnosis:  Acute hypoxemic respiratory failure       Recommendations:     Discharge Recommendations: home health OT  Discharge Equipment Recommendations:  none  Barriers to discharge:  None    Assessment:     Jihan Zamudio is a 49 y.o. female with a medical diagnosis of Acute hypoxemic respiratory failure.  She presents with weakness and gait instability which inhibits functional mobility and ADL performance. Pt became SOB when moving supine > sit. Educated on pursed lip breathing. Pt refused to sit in chair after therapy session. Performance deficits affecting function are weakness, impaired endurance, impaired self care skills, impaired balance, impaired functional mobilty, gait instability.     Rehab Prognosis:  Good; patient would benefit from acute skilled OT services to address these deficits and reach maximum level of function.       Plan:     Patient to be seen 3 x/week to address the above listed problems via self-care/home management, therapeutic activities, therapeutic exercises  · Plan of Care Expires: 06/18/19  · Plan of Care Reviewed with: patient    Subjective     Pain/Comfort:  · Pain Rating 1: other (see comments)  · Location 1: back(incision site)    Objective:     Communicated with: RN prior to session.  Patient found supine with oxygen, telemetry upon OT entry to room.    General Precautions: Standard, fall   Orthopedic Precautions:N/A   Braces: N/A     Occupational Performance:     Bed Mobility:    · Patient completed Rolling/Turning to Left with  stand by assistance  · Patient completed Scooting/Bridging with stand by assistance  · Patient completed Supine to Sit with minimum assistance  · Patient completed Sit to Supine with stand by assistance     Functional Mobility/Transfers:  · Patient completed Sit <> Stand Transfer with contact guard assistance  with  rolling walker   · Functional Mobility: Pt walked  ~15 ft CGA with RW to bathroom.    Activities of Daily Living:  · Grooming: stand by assistance for oral care and applying deoderant  · Upper Body Dressing: set up to don robe      [facility] 6 Click ADL: 20    Treatment & Education:  -educated on safety when doing transfers and ADLs  -educated the importance of OOB activity to promote strength, endurance, and breathing.  -educated on pursed lip breathing    Patient left supine with call button in reachEducation:      GOALS:   Multidisciplinary Problems     Occupational Therapy Goals        Problem: Occupational Therapy Goal    Goal Priority Disciplines Outcome Interventions   Occupational Therapy Goal     OT, PT/OT Ongoing (interventions implemented as appropriate)    Description:  Goals to be met by: 5/26/19    Patient will increase functional independence with ADLs by performing:    Grooming while standing with Stand-by Assistance. MET 5/28  Revised to set up  Toileting from bedside commode with Stand-by Assistance for hygiene and clothing management.   Supine to sit with Bertram.  Toilet transfer to bedside commode with Stand-by Assistance.                       Time Tracking:     OT Date of Treatment: 05/21/19  OT Start Time: 0858  OT Stop Time: 0924  OT Total Time (min): 26 min    Billable Minutes:Self Care/Home Management 18  Therapeutic Activity 8    ROSA Almanza  5/28/2019

## 2019-05-28 NOTE — SUBJECTIVE & OBJECTIVE
Interval History:   Patient s/p thora and para with no acute events overnight.    Current Facility-Administered Medications   Medication    0.9%  NaCl infusion (for blood administration)    acetaminophen tablet 325 mg    albumin human 25% bottle 25 g    cefTRIAXone (ROCEPHIN) 2 g in dextrose 5 % 50 mL IVPB    cyclobenzaprine tablet 5 mg    dextrose 50% injection 12.5 g    dextrose 50% injection 25 g    glucagon (human recombinant) injection 1 mg    glucose chewable tablet 16 g    glucose chewable tablet 24 g    lactulose 20 gram/30 mL solution Soln 5 g    levothyroxine tablet 112 mcg    miconazole NITRATE 2 % top powder    ondansetron injection 4 mg    pantoprazole EC tablet 40 mg    rifAXIMin tablet 550 mg    simethicone chewable tablet 80 mg    sodium chloride 0.9% flush 10 mL    sodium chloride 0.9% flush 10 mL    sodium chloride 0.9% flush 5 mL    trazodone split tablet 25 mg       Objective:     Vital Signs (Most Recent):  Temp: 98.3 °F (36.8 °C) (05/28/19 1513)  Pulse: 89 (05/28/19 1517)  Resp: 18 (05/28/19 1513)  BP: (!) 117/56 (05/28/19 1513)  SpO2: (!) 92 % (05/28/19 1513) Vital Signs (24h Range):  Temp:  [98.3 °F (36.8 °C)-99.4 °F (37.4 °C)] 98.3 °F (36.8 °C)  Pulse:  [86-91] 89  Resp:  [17-18] 18  SpO2:  [91 %-94 %] 92 %  BP: (103-120)/(51-58) 117/56     Weight: 101.6 kg (223 lb 15.8 oz) (05/27/19 1000)  Body mass index is 37.27 kg/m².    Physical Exam   Constitutional: She is oriented to person, place, and time. No distress.   HENT:   Head: Normocephalic and atraumatic.   Eyes: Scleral icterus is present.   Cardiovascular: Normal rate and regular rhythm.   Pulmonary/Chest:   Decreased breath sounds bilaterally, on NC   Abdominal: Soft. Bowel sounds are normal. She exhibits distension. She exhibits no mass. There is no tenderness. There is no rebound and no guarding. No hernia.   Musculoskeletal: She exhibits no edema.   Neurological: She is alert and oriented to person, place, and  time.   Skin: She is not diaphoretic.   Nursing note and vitals reviewed.      MELD-Na score: 29 at 5/28/2019  4:08 AM  MELD score: 21 at 5/28/2019  4:08 AM  Calculated from:  Serum Creatinine: 0.8 mg/dL (Rounded to 1 mg/dL) at 5/28/2019  4:08 AM  Serum Sodium: 123 mmol/L (Rounded to 125 mmol/L) at 5/28/2019  4:08 AM  Total Bilirubin: 7.9 mg/dL at 5/28/2019  4:08 AM  INR(ratio): 1.8 at 5/28/2019  4:08 AM  Age: 49 years    Significant Labs:  CBC:   Recent Labs   Lab 05/28/19  0407   WBC 3.26*   RBC 2.39*   HGB 7.4*   HCT 22.1*   PLT 51*     CMP:   Recent Labs   Lab 05/28/19  0408   GLU 89   CALCIUM 7.7*   ALBUMIN 2.3*   PROT 5.3*   *   K 4.0   CO2 20*   CL 95   BUN 16   CREATININE 0.8   ALKPHOS 116   ALT 34   AST 86*   BILITOT 7.9*     Coagulation:   Recent Labs   Lab 05/28/19  0408   INR 1.8*       Significant Imaging:  X-Ray: Reviewed  US: Reviewed  CT: Reviewed

## 2019-05-28 NOTE — PROGRESS NOTES
[facility]  Hepatology  Progress Note    Patient Name: Jihan Zamudio  MRN: [id number]  Admission Date: 5/16/2019  Hospital Length of Stay: 11 days  Attending Provider: Blane Gottlieb MD   Primary Care Physician: Primary Doctor No  Principal Problem:Acute hypoxemic respiratory failure    Subjective:     HPI: This is a 50 yo F with hx of KESSLER cirrhosis c/b varices, latent TB, GERD, hypothyroidism, lap band 2007, HLD who was sent to the ED from hepatology clinic for shortness of breath. She follows with Dr. Smallwood. She has been undergoing transplant evaluation and is pending financial approval and clarification of lung nodule. She reports feeling more short of breath the past few days and increased abdominal swelling. She reports occasional confusion as well. She was found to have a low sodium as well. With regards to her lung nodule, we have been awaiting films for an outside facility for comparison. She was found to have a large pleural effusion while here, something she has not had before. Pulmonology has been consulted.         Interval History:   Sodium better this morning but febrile overnight.  She denies any complaints this morning.    Current Facility-Administered Medications   Medication    0.9%  NaCl infusion (for blood administration)    acetaminophen tablet 325 mg    cefTRIAXone (ROCEPHIN) 2 g in dextrose 5 % 50 mL IVPB    cyclobenzaprine tablet 5 mg    dextrose 50% injection 12.5 g    dextrose 50% injection 25 g    glucagon (human recombinant) injection 1 mg    glucose chewable tablet 16 g    glucose chewable tablet 24 g    lactulose 20 gram/30 mL solution Soln 5 g    levothyroxine tablet 112 mcg    magnesium sulfate 2g in water 50mL IVPB (premix)    miconazole NITRATE 2 % top powder    ondansetron injection 4 mg    pantoprazole EC tablet 40 mg    rifAXIMin tablet 550 mg    simethicone chewable tablet 80 mg    sodium chloride 0.9% flush 10 mL    sodium chloride 0.9%  flush 10 mL    sodium chloride 0.9% flush 5 mL    trazodone split tablet 25 mg       Objective:     Vital Signs (Most Recent):  Temp: 98.2 °F (36.8 °C) (05/27/19 0706)  Pulse: 87 (05/27/19 1548)  Resp: 18 (05/27/19 1050)  BP: (!) 114/50 (05/27/19 1050)  SpO2: 100 % (05/27/19 1050) Vital Signs (24h Range):  Temp:  [98.2 °F (36.8 °C)-101.3 °F (38.5 °C)] 98.2 °F (36.8 °C)  Pulse:  [87-95] 87  Resp:  [14-18] 18  SpO2:  [95 %-100 %] 100 %  BP: (100-122)/(50-56) 114/50     Weight: 101.6 kg (223 lb 15.8 oz) (05/27/19 1000)  Body mass index is 37.27 kg/m².    Physical Exam   Constitutional: She is oriented to person, place, and time. No distress.   HENT:   Head: Normocephalic and atraumatic.   Eyes: Scleral icterus is present.   Cardiovascular: Normal rate and regular rhythm.   Pulmonary/Chest:   Decreased breath sounds bilaterally (morer prominent on the right side)   Abdominal: Soft. Bowel sounds are normal. She exhibits distension. She exhibits no mass. There is no tenderness. There is no rebound and no guarding. No hernia.   Musculoskeletal: She exhibits no edema.   Neurological: She is alert and oriented to person, place, and time.   Skin: She is not diaphoretic.   Nursing note and vitals reviewed.      MELD-Na score: 28 at 5/27/2019  4:16 AM  MELD score: 20 at 5/27/2019  4:16 AM  Calculated from:  Serum Creatinine: 0.8 mg/dL (Rounded to 1 mg/dL) at 5/27/2019  4:16 AM  Serum Sodium: 125 mmol/L at 5/27/2019  4:16 AM  Total Bilirubin: 8.4 mg/dL at 5/27/2019  4:16 AM  INR(ratio): 1.7 at 5/27/2019  4:16 AM  Age: 49 years    Significant Labs:  CBC:   Recent Labs   Lab 05/27/19  0416   WBC 2.50*   RBC 2.48*   HGB 7.7*   HCT 22.0*   PLT 36*     CMP:   Recent Labs   Lab 05/27/19 0416   GLU 81   CALCIUM 8.3*   ALBUMIN 2.4*   PROT 5.4*   *   K 3.9   CO2 21*   CL 95   BUN 14   CREATININE 0.8   ALKPHOS 98   ALT 36   AST 93*   BILITOT 8.4*     Coagulation:   Recent Labs   Lab 05/27/19 0416   INR 1.7*       Significant  Imaging:  X-Ray: Reviewed  US: Reviewed  CT: Reviewed    Assessment/Plan:     Liver cirrhosis secondary to KESSLER  49 year old female with a history of KESSLER cirrhosis c/b varices currently admitted for decompensated cirrhosis. Patient has been approved for listing however was not listed due to issues with hyponatremia. This morning despite sodium going up to 125 she was febrile overnight. Prior to listing would like to observe patient as well as obtain transplant ID input.    Recommendations:  --Daily CMP, CBC, and INR  --Continue PPI  --Continue antibiotics  --Continue lactulose and rifaximin  --Obtain paracentesis and thoracentesis  --Consult Transplant ID            Thank you for your consult. I will follow-up with patient. Please contact us if you have any additional questions.    Alexa Richard M.D.  Gastroenterology Fellow, PGY-V  Pager: [phone]  [facility]

## 2019-05-28 NOTE — PLAN OF CARE
Problem: Adult Inpatient Plan of Care  Goal: Plan of Care Review  Outcome: Ongoing (interventions implemented as appropriate)  Pt AAOx4. VSS. No acute changes during shift. Pt listed for Liver Tx. Pt on 1000 mL fluid restriction. Pt is compliant. Frequent rounding performed. Bed locked and loewest position, Call bell in reach. WCTM.

## 2019-05-28 NOTE — PROGRESS NOTES
[facility]  Infectious Disease  Progress Note    Patient Name: Jihan Zamudio  MRN: [id number]  Admission Date: 5/16/2019  Length of Stay: 12 days  Attending Physician: Blane Gottlieb MD  Primary Care Provider: Primary Doctor No    Isolation Status: No active isolations  Assessment/Plan:      Fever  49-year-old female with history of KESSLER cirrhosis c/b hepatic hydrothorax, ascites, EV, HE, listed for liver transplant, who developed a fever.  Ascites fluid with no evidence of SBP, pleural fluid analysis consistent with transudative effusion, UA unremarkable, blood cultures with NGTD.  Patient otherwise with no other localizing signs or symptoms.  Fever  possibly in setting of PNA or atelectasis given large hepatic hydrothorax.    Recommendations:  - Continue ceftriaxone 2g IV q24 hours, total 5 day course  - Patient clinically improving on ceftriaxone - no contraindication to transplantation from an ID standpoint                Thank you for your consult. I will sign off. Please contact us if you have any additional questions.    Roselyn Cabrera MD  Infectious Disease  [facility]    Subjective:     Principal Problem:Acute hypoxemic respiratory failure    HPI: Case of a 50 y/o female with PMHx KESSLER cirrhosis c/w esophageal varices, GERD. Patient admitted on 5/16/19 referred from hepatology clinic due to c/c of worsening SOB, increasing abdominal girth and some occasional confusion. Patient underwent ID evaluation for liver tpx candidacy found to not have any significant contraindications. Has a history of positive tuberculin skin test with negative quant gold x2 with no significant exposure. Patient underwent thoracentesis on 5/18 with non infectious appearing. Now with fevers T max 101 and CT with large volume pleural effusion. Undergoing today repeat thora and para bu IR. ID consulted to evaluate if patient still considered adequate candidate for liver transplant in setting of  new fevers.    Interval History:   No fevers overnight  Intermittent cough, but non-productive  Reports respiratory status has improved after thoracentesis  Reports abdominal tenderness after paracentesis.    Review of Systems   Constitutional: Negative for chills, diaphoresis and fever.   HENT: Negative for rhinorrhea and sore throat.    Respiratory: Positive for cough. Negative for shortness of breath.    Cardiovascular: Negative for chest pain and leg swelling.   Gastrointestinal: Negative for abdominal pain, diarrhea, nausea and vomiting.   Genitourinary: Negative for dysuria and hematuria.   Musculoskeletal: Negative for arthralgias and myalgias.   Skin: Negative for rash.   Neurological: Negative for headaches.     Objective:     Vital Signs (Most Recent):  Temp: 98.3 °F (36.8 °C) (05/28/19 1513)  Pulse: 89 (05/28/19 1517)  Resp: 18 (05/28/19 1513)  BP: (!) 117/56 (05/28/19 1513)  SpO2: (!) 92 % (05/28/19 1513) Vital Signs (24h Range):  Temp:  [98.3 °F (36.8 °C)-99.4 °F (37.4 °C)] 98.3 °F (36.8 °C)  Pulse:  [86-91] 89  Resp:  [17-18] 18  SpO2:  [91 %-94 %] 92 %  BP: (103-120)/(51-58) 117/56     Weight: 101.6 kg (223 lb 15.8 oz)  Body mass index is 37.27 kg/m².    Estimated Creatinine Clearance: 98.2 mL/min (based on SCr of 0.8 mg/dL).    Physical Exam   Constitutional: She is oriented to person, place, and time. She appears well-developed and well-nourished. No distress.   HENT:   Head: Normocephalic and atraumatic.   Eyes: Conjunctivae and EOM are normal.   Neck: Normal range of motion. Neck supple.   Cardiovascular: Normal rate, regular rhythm and normal heart sounds.   Pulmonary/Chest: Effort normal. No respiratory distress. She has rales.   Abdominal: Soft. Bowel sounds are normal. She exhibits no distension. There is no tenderness.   Musculoskeletal: Normal range of motion. She exhibits no edema.   Neurological: She is alert and oriented to person, place, and time.   Skin: Skin is warm and dry. No rash  noted. She is not diaphoretic. No erythema.   Psychiatric: She has a normal mood and affect. Her behavior is normal.       Significant Labs: All pertinent labs within the past 24 hours have been reviewed.    Significant Imaging: I have reviewed all pertinent imaging results/findings within the past 24 hours.

## 2019-05-28 NOTE — SUBJECTIVE & OBJECTIVE
Interval History:   No fevers overnight  Intermittent cough, but non-productive  Reports respiratory status has improved after thoracentesis  Reports abdominal tenderness after paracentesis.    Review of Systems   Constitutional: Negative for chills, diaphoresis and fever.   HENT: Negative for rhinorrhea and sore throat.    Respiratory: Positive for cough. Negative for shortness of breath.    Cardiovascular: Negative for chest pain and leg swelling.   Gastrointestinal: Negative for abdominal pain, diarrhea, nausea and vomiting.   Genitourinary: Negative for dysuria and hematuria.   Musculoskeletal: Negative for arthralgias and myalgias.   Skin: Negative for rash.   Neurological: Negative for headaches.     Objective:     Vital Signs (Most Recent):  Temp: 98.3 °F (36.8 °C) (05/28/19 1513)  Pulse: 89 (05/28/19 1517)  Resp: 18 (05/28/19 1513)  BP: (!) 117/56 (05/28/19 1513)  SpO2: (!) 92 % (05/28/19 1513) Vital Signs (24h Range):  Temp:  [98.3 °F (36.8 °C)-99.4 °F (37.4 °C)] 98.3 °F (36.8 °C)  Pulse:  [86-91] 89  Resp:  [17-18] 18  SpO2:  [91 %-94 %] 92 %  BP: (103-120)/(51-58) 117/56     Weight: 101.6 kg (223 lb 15.8 oz)  Body mass index is 37.27 kg/m².    Estimated Creatinine Clearance: 98.2 mL/min (based on SCr of 0.8 mg/dL).    Physical Exam   Constitutional: She is oriented to person, place, and time. She appears well-developed and well-nourished. No distress.   HENT:   Head: Normocephalic and atraumatic.   Eyes: Conjunctivae and EOM are normal.   Neck: Normal range of motion. Neck supple.   Cardiovascular: Normal rate, regular rhythm and normal heart sounds.   Pulmonary/Chest: Effort normal. No respiratory distress. She has rales.   Abdominal: Soft. Bowel sounds are normal. She exhibits no distension. There is no tenderness.   Musculoskeletal: Normal range of motion. She exhibits no edema.   Neurological: She is alert and oriented to person, place, and time.   Skin: Skin is warm and dry. No rash noted. She is not  diaphoretic. No erythema.   Psychiatric: She has a normal mood and affect. Her behavior is normal.       Significant Labs: All pertinent labs within the past 24 hours have been reviewed.    Significant Imaging: I have reviewed all pertinent imaging results/findings within the past 24 hours.

## 2019-05-28 NOTE — PLAN OF CARE
Problem: Occupational Therapy Goal  Goal: Occupational Therapy Goal  Goals to be met by: 5/26/19    Patient will increase functional independence with ADLs by performing:    Grooming while EOB with Stand-by Assistance.  Toileting from bedside commode with Stand-by Assistance for hygiene and clothing management.   Supine to sit with [place].  Toilet transfer to bedside commode with Stand-by Assistance.     Outcome: Ongoing (interventions implemented as appropriate)  Con't POC. One goal met.    ROSA Almanza

## 2019-05-28 NOTE — PT/OT/SLP PROGRESS
Physical Therapy      Patient Name:  Jihan Zamudio   MRN:  [id number]    PT attempted to see pt on today. Pt fatigue reported working with OT earlier In day. PT adamently expressed pt need to mobilize daily including ambulating in room with nsg assistance 3x. Reported PT will attempt pt on following day to ambulate in hallway. Pt agreeable.    Merline Jarrell, PT, DPT  5/28/2019

## 2019-05-28 NOTE — PLAN OF CARE
Problem: Adult Inpatient Plan of Care  Goal: Plan of Care Review  POC reviewed with pt. O2 remained over 90% on 2LNC. Pt remains A&Ox4. Up with assistance to BSC. Mild edema noted to bilateral lower extremities. Pt denies shortness of breath. Pt remained free of falls. Will continue to monitor.

## 2019-05-28 NOTE — PROGRESS NOTES
Progress Note  Hospital Medicine  [facility]       Patient Name: Jihan Zamudio  MRN:  [id number]  Hospital Medicine Team: [facility] Blane Gottlieb MD  Date of Admission:  5/16/2019     Length of Stay:  LOS: 12 days   Expected Discharge Date: 6/4/2019  Principal Problem:  Acute hypoxemic respiratory failure     Subjective:     Interval History/Overnight Events:    - patient feeling well today; sodium 123; afebrile for over 24 hours; appreciate ID recs;     - patient has been approved for listing today         cefTRIAXone (ROCEPHIN) IVPB  2 g Intravenous Q24H    lactulose  5 g Oral Daily    levothyroxine  112 mcg Oral Daily    miconazole NITRATE 2 %   Topical (Top) BID    pantoprazole  40 mg Oral Daily    rifAXImin  550 mg Oral BID           sodium chloride, acetaminophen, cyclobenzaprine, dextrose 50%, dextrose 50%, glucagon (human recombinant), glucose, glucose, ondansetron, simethicone, sodium chloride 0.9%, sodium chloride 0.9%, sodium chloride 0.9%, trazodone    Review of Systems   Constitutional: Negative for chills, fatigue, positive fever.   HENT: Negative for sore throat, trouble swallowing.    Eyes: Negative for photophobia, visual disturbance.   Respiratory: Negative for cough, shortness of breath.    Cardiovascular: Negative for chest pain, palpitations, leg swelling.   Gastrointestinal: Negative for abdominal pain, constipation, diarrhea, nausea, vomiting.   Endocrine: Negative for cold intolerance, heat intolerance.   Genitourinary: Negative for dysuria, frequency.   Musculoskeletal: Negative for arthralgias, myalgias.   Skin: Negative for rash, wound, erythema   Neurological: Negative for dizziness, syncope, weakness, light-headedness.   Psychiatric/Behavioral: Negative for confusion, hallucinations, anxiety  All other systems reviewed and are negative.    Objective:     Temp:  [98.5 °F (36.9 °C)-99.4 °F (37.4 °C)]   Pulse:  [86-91]   Resp:  [17-18]   BP:  (103-120)/(51-58)   SpO2:  [91 %-94 %]       Physical Exam:  Constitutional: appears older than stated age, chronically ill looking,  non-distressed, not diaphoretic.   HENT: NC/AT, external ears normal, oropharynx clear, MMM w/o exudates.   Eyes: PERRL, EOMI, conjunctiva normal, no discharge b/l, +scleral icterus   Neck: normal ROM, supple  CV: RRR, no m/r/g, no carotid bruits, +2 peripheral pulses.  Pulmonary/Chest wall: Breathing comfortably +distress   Decreased breath sounds at lung bases/ absent breath sounds in right lung  GI: Soft, non-tender, (+) BS, (+) BM   +distended  Musculoskeletal: Normal ROM, no atrophy,  + pitting edema in LE bilaterally   Neurological: AAO x 4, CN II-XI in tact, nl sensation, nl strength/tone  No asterixis   Skin: warm, dry   +pallor, jaundice, + spider angiomas, +bruising   Psych: normal mood and affect, normal behavior, thought content and judgement.    Labs:    Chemistries:   Recent Labs   Lab 05/26/19  0654 05/26/19  1314 05/27/19  0416 05/28/19  0408   *  124* 123* 125* 123*   K 3.9  --  3.9 4.0   CL 95  --  95 95   CO2 22*  --  21* 20*   BUN 13  --  14 16   CREATININE 0.8  --  0.8 0.8   CALCIUM 8.2*  --  8.3* 7.7*   PROT 5.5*  --  5.4* 5.3*   BILITOT 8.9*  --  8.4* 7.9*   ALKPHOS 106  --  98 116   ALT 36  --  36 34   *  --  93* 86*   MG 1.7  --  1.5* 2.2   PHOS 3.2  --  3.4 3.5        WBC:   Recent Labs   Lab 05/24/19  0437 05/25/19  0513 05/26/19  0654 05/27/19  0416 05/28/19  0407   WBC 3.16* 1.84* 2.02* 2.50* 3.26*     Bands:     CBC/Anemia Labs: Coags:    Recent Labs   Lab 05/26/19  0654 05/27/19  0416 05/28/19  0407   WBC 2.02* 2.50* 3.26*   HGB 7.6* 7.7* 7.4*   HCT 22.1* 22.0* 22.1*   PLT 36* 36* 51*   MCV 90 89 93   RDW 18.0* 17.9* 18.1*    Recent Labs   Lab 05/26/19  0654 05/27/19  0416 05/28/19  0408   INR 1.6* 1.7* 1.8*        POCT Glucose: HbA1c:    No results for input(s): POCTGLUCOSE in the last 168 hours. No results found for: HGBA1C     Diagnostic  Results:        Assessment and Plan     Hospital Course:    Ms. 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.81, K74.60]  Yes    Hyponatremia [E87.1]  Yes    Hepatic encephalopathy [K72.90]  Yes    Coagulopathy [D68.9]  Yes    Acute blood loss anemia [D62]  Yes    Decompensated hepatic cirrhosis [K72.90]  Yes     From KESSLER    Liver biopsy 11/29/17- KESSLER with bridging fibrosis, Laura, interface activity; lymph and occ plasma cells      Esophageal varices [I85.00]  Yes     Small with no banding 07/17      Essential hypertension [I10]  Yes    Hypothyroidism [E03.9]  Yes    GERD (gastroesophageal reflux disease) [K21.9]  Yes    Morbid obesity [E66.01]  Yes     Lap band 2006 (lost 75-80 lbs) with subsequent release (2009) (due to obstruction)        Resolved Hospital Problems   No resolved problems to display.     # Fever  - likely respiratory source  101.3 and one time fever; started on rocephin; blood cultures NGTD; LA is normal; negative for SBP on tap today and pleural fluid is transudate   U/A negative and transplant ID consult for clearance for listing   - will need 5 days of rocephin and ok to list as per ID    Acute Hypoxemic Respiratory Failure 2/2 Pleural Effusion  Hepatic Hydrothorax  · Satting 88% on RA that improved to 95% with 2L NC  · R sided hydrothorax  · Stop Levaquin as there is no suspicion for infection  · S/p thora on 5/17 with pulm, 1.5 L removed   · CT chest completed and shows stable pulmonary nodules;   · S/p thoracentesis on 5/27 1.5L removed     Decompensated Liver Disease  KESSLER Cirrhosis  MELD-Na score: 29 at 5/28/2019  4:08 AM  MELD score: 21 at 5/28/2019  4:08 AM  Calculated from:  Serum Creatinine: 0.8 mg/dL  (Rounded to 1 mg/dL) at 5/28/2019  4:08 AM  Serum Sodium: 123 mmol/L (Rounded to 125 mmol/L) at 5/28/2019  4:08 AM  Total Bilirubin: 7.9 mg/dL at 5/28/2019  4:08 AM  INR(ratio): 1.8 at 5/28/2019  4:08 AM  Age: 49 years  · Hepatology consulted, appreciate assistance  · Previously approved for liver transplant pending re-evaluation of pulmonary nodule with prior outside hospital CT scans  · Abdomen soft and patient says it feels around baseline so will hold on para  · Check PETH  · Low Sodium diet with 1L fluid restriction  · Hold diuretics due to hyponatremia  · Start Lactulose, titrate 3-4 BM/day  · Start Rifaximin 550mg PO BID  · Has history of pulmonary nodule and outside CT scans were supposed to be reviewed   · Patient cleared for listing, however waiting for ID clearance with fever for listing  · Paracentesis on 5/27 with 3.2L removed and negative for SBP     Esophageal Varices  · Chronic and stable  · Monitor     Anemia  · Hemoglobin 6.7 5/25, likely hemolysis and DIC; no overt GI bleeding; s/p 1 unit of PRBC with good response      Coagulopathy  · 2/2 liver disease  · Monitor for bleeding     Hyponatremia  · Sodium 123 2/2 chronic liver disease  · Fluid restriction  ; giving albumin  · Nephrology consulted      Hypokalemia  · K 3.2 2/2 low Mag  · Replace     Hypomagnesemia  · Mag 1.5  · Replace     Thrombocytopenia  · Plt chronically low 2/2 coagulopathy from liver disease  · Monitor for bleeding     Hypothyroidism  · Chronic and stable  · Continue Synthroid 112mcg PO daily     GERD  · Chronic and stable  · Continue PPI     Morbid Obesity  · Body mass index is 38.45 kg/m².      Diet:  regular with fluid restriction   GI PPx:  PO PPI  DVT PPx:  SCDs due to coagulopathy   Goals of Care:  Full     High Risk Conditions:  resp failure     Disposition:    - likely too sick to leave prior to a liver transplant; patient has been approved for listing and will be transferred to LTS

## 2019-05-28 NOTE — ASSESSMENT & PLAN NOTE
49 year old female with a history of KESSLER cirrhosis c/b varices currently admitted for decompensated cirrhosis. Patient has been approved for listing however was not listed due to issues with hyponatremia. This morning despite sodium going up to 125 she was febrile overnight. Prior to listing would like to observe patient as well as obtain transplant ID input.    Recommendations:  --Daily CMP, CBC, and INR  --Continue PPI  --Continue antibiotics  --Continue lactulose and rifaximin  --Obtain paracentesis and thoracentesis  --Consult Transplant ID  --Listing pending medical stability

## 2019-05-29 ENCOUNTER — TELEPHONE (OUTPATIENT)
Dept: TRANSPLANT | Facility: CLINIC | Age: 49
End: 2019-05-29

## 2019-05-29 PROBLEM — D64.9 ACUTE ON CHRONIC ANEMIA: Status: ACTIVE | Noted: 2019-05-29

## 2019-05-29 PROBLEM — D68.4 ACQUIRED COAGULATION FACTOR DEFICIENCY: Status: ACTIVE | Noted: 2019-05-29

## 2019-05-29 LAB
ABO + RH BLD: NORMAL
ALBUMIN SERPL BCP-MCNC: 2.6 G/DL (ref 3.5–5.2)
ALP SERPL-CCNC: 97 U/L (ref 55–135)
ALT SERPL W/O P-5'-P-CCNC: 29 U/L (ref 10–44)
ANION GAP SERPL CALC-SCNC: 8 MMOL/L (ref 8–16)
ANISOCYTOSIS BLD QL SMEAR: SLIGHT
AST SERPL-CCNC: 76 U/L (ref 10–40)
BASOPHILS # BLD AUTO: 0.02 K/UL (ref 0–0.2)
BASOPHILS NFR BLD: 1 % (ref 0–1.9)
BILIRUB SERPL-MCNC: 7.3 MG/DL (ref 0.1–1)
BLD GP AB SCN CELLS X3 SERPL QL: NORMAL
BLD PROD TYP BPU: NORMAL
BLOOD UNIT EXPIRATION DATE: NORMAL
BLOOD UNIT TYPE CODE: 5100
BLOOD UNIT TYPE: NORMAL
BUN SERPL-MCNC: 16 MG/DL (ref 6–20)
CALCIUM SERPL-MCNC: 7.9 MG/DL (ref 8.7–10.5)
CHLORIDE SERPL-SCNC: 97 MMOL/L (ref 95–110)
CO2 SERPL-SCNC: 20 MMOL/L (ref 23–29)
CODING SYSTEM: NORMAL
CREAT SERPL-MCNC: 0.7 MG/DL (ref 0.5–1.4)
DACRYOCYTES BLD QL SMEAR: ABNORMAL
DIFFERENTIAL METHOD: ABNORMAL
DISPENSE STATUS: NORMAL
EOSINOPHIL # BLD AUTO: 0.2 K/UL (ref 0–0.5)
EOSINOPHIL NFR BLD: 7.7 % (ref 0–8)
ERYTHROCYTE [DISTWIDTH] IN BLOOD BY AUTOMATED COUNT: 18.5 % (ref 11.5–14.5)
EST. GFR  (AFRICAN AMERICAN): >60 ML/MIN/1.73 M^2
EST. GFR  (NON AFRICAN AMERICAN): >60 ML/MIN/1.73 M^2
GLUCOSE SERPL-MCNC: 91 MG/DL (ref 70–110)
HCT VFR BLD AUTO: 20.4 % (ref 37–48.5)
HGB BLD-MCNC: 6.9 G/DL (ref 12–16)
HYPOCHROMIA BLD QL SMEAR: ABNORMAL
IMM GRANULOCYTES # BLD AUTO: 0 K/UL (ref 0–0.04)
IMM GRANULOCYTES NFR BLD AUTO: 0 % (ref 0–0.5)
INR PPP: 1.9 (ref 0.8–1.2)
LYMPHOCYTES # BLD AUTO: 0.5 K/UL (ref 1–4.8)
LYMPHOCYTES NFR BLD: 22.1 % (ref 18–48)
MAGNESIUM SERPL-MCNC: 2 MG/DL (ref 1.6–2.6)
MCH RBC QN AUTO: 31.4 PG (ref 27–31)
MCHC RBC AUTO-ENTMCNC: 33.8 G/DL (ref 32–36)
MCV RBC AUTO: 93 FL (ref 82–98)
MONOCYTES # BLD AUTO: 0.2 K/UL (ref 0.3–1)
MONOCYTES NFR BLD: 10.6 % (ref 4–15)
NEUTROPHILS # BLD AUTO: 1.2 K/UL (ref 1.8–7.7)
NEUTROPHILS NFR BLD: 58.6 % (ref 38–73)
NRBC BLD-RTO: 0 /100 WBC
OSMOLALITY SERPL: 263 MOSM/KG (ref 275–295)
OSMOLALITY UR: 435 MOSM/KG (ref 50–1200)
OVALOCYTES BLD QL SMEAR: ABNORMAL
PHOSPHATE SERPL-MCNC: 3.2 MG/DL (ref 2.7–4.5)
PLATELET # BLD AUTO: 35 K/UL (ref 150–350)
PLATELET BLD QL SMEAR: ABNORMAL
PMV BLD AUTO: 11.4 FL (ref 9.2–12.9)
POIKILOCYTOSIS BLD QL SMEAR: SLIGHT
POLYCHROMASIA BLD QL SMEAR: ABNORMAL
POTASSIUM SERPL-SCNC: 3.7 MMOL/L (ref 3.5–5.1)
PROT SERPL-MCNC: 5.3 G/DL (ref 6–8.4)
PROTHROMBIN TIME: 18.1 SEC (ref 9–12.5)
RBC # BLD AUTO: 2.2 M/UL (ref 4–5.4)
SODIUM SERPL-SCNC: 125 MMOL/L (ref 136–145)
SODIUM UR-SCNC: <20 MMOL/L (ref 20–250)
TRANS ERYTHROCYTES VOL PATIENT: NORMAL ML
WBC # BLD AUTO: 2.08 K/UL (ref 3.9–12.7)

## 2019-05-29 PROCEDURE — 85610 PROTHROMBIN TIME: CPT | Mod: NTX

## 2019-05-29 PROCEDURE — P9047 ALBUMIN (HUMAN), 25%, 50ML: HCPCS | Mod: JG,NTX | Performed by: PHYSICIAN ASSISTANT

## 2019-05-29 PROCEDURE — 36430 TRANSFUSION BLD/BLD COMPNT: CPT

## 2019-05-29 PROCEDURE — 25000003 PHARM REV CODE 250: Mod: NTX | Performed by: HOSPITALIST

## 2019-05-29 PROCEDURE — 36415 COLL VENOUS BLD VENIPUNCTURE: CPT | Mod: NTX

## 2019-05-29 PROCEDURE — 86920 COMPATIBILITY TEST SPIN: CPT | Mod: NTX

## 2019-05-29 PROCEDURE — 99233 PR SUBSEQUENT HOSPITAL CARE,LEVL III: ICD-10-PCS | Mod: NTX,,, | Performed by: PHYSICIAN ASSISTANT

## 2019-05-29 PROCEDURE — 63600175 PHARM REV CODE 636 W HCPCS: Mod: JG,NTX | Performed by: PHYSICIAN ASSISTANT

## 2019-05-29 PROCEDURE — 84100 ASSAY OF PHOSPHORUS: CPT | Mod: NTX

## 2019-05-29 PROCEDURE — P9047 ALBUMIN (HUMAN), 25%, 50ML: HCPCS | Mod: JG,NTX | Performed by: HOSPITALIST

## 2019-05-29 PROCEDURE — 85025 COMPLETE CBC W/AUTO DIFF WBC: CPT | Mod: NTX

## 2019-05-29 PROCEDURE — 83930 ASSAY OF BLOOD OSMOLALITY: CPT | Mod: NTX

## 2019-05-29 PROCEDURE — 86850 RBC ANTIBODY SCREEN: CPT | Mod: NTX

## 2019-05-29 PROCEDURE — P9021 RED BLOOD CELLS UNIT: HCPCS | Mod: NTX

## 2019-05-29 PROCEDURE — 99233 SBSQ HOSP IP/OBS HIGH 50: CPT | Mod: NTX,,, | Performed by: PHYSICIAN ASSISTANT

## 2019-05-29 PROCEDURE — 83735 ASSAY OF MAGNESIUM: CPT | Mod: NTX

## 2019-05-29 PROCEDURE — 80053 COMPREHEN METABOLIC PANEL: CPT | Mod: NTX

## 2019-05-29 PROCEDURE — 83935 ASSAY OF URINE OSMOLALITY: CPT | Mod: NTX

## 2019-05-29 PROCEDURE — 84300 ASSAY OF URINE SODIUM: CPT | Mod: NTX

## 2019-05-29 PROCEDURE — 63600175 PHARM REV CODE 636 W HCPCS: Mod: NTX | Performed by: HOSPITALIST

## 2019-05-29 PROCEDURE — 20600001 HC STEP DOWN PRIVATE ROOM: Mod: NTX

## 2019-05-29 PROCEDURE — 97110 THERAPEUTIC EXERCISES: CPT | Mod: NTX

## 2019-05-29 PROCEDURE — 25000003 PHARM REV CODE 250: Mod: NTX | Performed by: PHYSICIAN ASSISTANT

## 2019-05-29 RX ORDER — ALBUMIN HUMAN 250 G/1000ML
25 SOLUTION INTRAVENOUS 3 TIMES DAILY
Status: COMPLETED | OUTPATIENT
Start: 2019-05-29 | End: 2019-05-30

## 2019-05-29 RX ORDER — HYDROCODONE BITARTRATE AND ACETAMINOPHEN 500; 5 MG/1; MG/1
TABLET ORAL
Status: DISCONTINUED | OUTPATIENT
Start: 2019-05-29 | End: 2019-06-01

## 2019-05-29 RX ADMIN — MICONAZOLE NITRATE: 20 POWDER TOPICAL at 08:05

## 2019-05-29 RX ADMIN — LACTULOSE 5 G: 20 SOLUTION ORAL at 08:05

## 2019-05-29 RX ADMIN — CEFTRIAXONE 2 G: 2 INJECTION, SOLUTION INTRAVENOUS at 09:05

## 2019-05-29 RX ADMIN — LEVOTHYROXINE SODIUM 112 MCG: 112 TABLET ORAL at 06:05

## 2019-05-29 RX ADMIN — ALBUMIN (HUMAN) 25 G: 25 SOLUTION INTRAVENOUS at 04:05

## 2019-05-29 RX ADMIN — ACETAMINOPHEN 325 MG: 325 TABLET ORAL at 10:05

## 2019-05-29 RX ADMIN — SODIUM CHLORIDE: 0.9 INJECTION, SOLUTION INTRAVENOUS at 01:05

## 2019-05-29 RX ADMIN — MICONAZOLE NITRATE: 20 POWDER TOPICAL at 09:05

## 2019-05-29 RX ADMIN — RIFAXIMIN 550 MG: 550 TABLET ORAL at 08:05

## 2019-05-29 RX ADMIN — RIFAXIMIN 550 MG: 550 TABLET ORAL at 09:05

## 2019-05-29 RX ADMIN — ALBUMIN (HUMAN) 25 G: 25 SOLUTION INTRAVENOUS at 10:05

## 2019-05-29 RX ADMIN — ALBUMIN HUMAN 25 G: 0.25 SOLUTION INTRAVENOUS at 08:05

## 2019-05-29 RX ADMIN — PANTOPRAZOLE SODIUM 40 MG: 40 TABLET, DELAYED RELEASE ORAL at 08:05

## 2019-05-29 NOTE — ASSESSMENT & PLAN NOTE
- 2/2 KESSLER  - approved for transplant list  - Paracentesis on 5/27 with 3.2L removed and negative for SBP  - Low Sodium diet with 1L fluid restriction  - Hold diuretics due to hyponatremia  - Cont Lactulose, titrate 3-4 BM/day  - Rifaximin 550mg PO BID    MELD-Na score: 29 at 5/29/2019  4:10 AM  MELD score: 21 at 5/29/2019  4:10 AM  Calculated from:  Serum Creatinine: 0.7 mg/dL (Rounded to 1 mg/dL) at 5/29/2019  4:10 AM  Serum Sodium: 125 mmol/L at 5/29/2019  4:10 AM  Total Bilirubin: 7.3 mg/dL at 5/29/2019  4:10 AM  INR(ratio): 1.9 at 5/29/2019  4:10 AM  Age: 49 years

## 2019-05-29 NOTE — ASSESSMENT & PLAN NOTE
- 2/2 pleural effusion and R sided hydrothorax  - Satting 95% on 2L NC  - S/p thora on 5/17 with pulm, 1.5 L removed   - CT chest completed and shows stable pulmonary nodules;   - S/p thoracentesis on 5/27 1.5L removed

## 2019-05-29 NOTE — HPI
This is a 48 yo F with hx of KESSLER cirrhosis c/b varices, latent TB, GERD, hypothyroidism, lap band 2007, HLD who was sent to the ED from hepatology clinic for shortness of breath. She follows with Dr. Smallwood. She reports feeling more short of breath the past few days and increased abdominal swelling. She reports occasional confusion as well. She was found to have a low sodium as well. With regards to her lung nodule, we have been awaiting films for an outside facility for comparison. She was found to have a large pleural effusion while here, something she has not had before. Pulmonology has been consulted.

## 2019-05-29 NOTE — HOSPITAL COURSE
Pt approved for liver transplant and transferred to LTS on 5/29. Pt w/ SOB. Last Thoracentesis on 5/27. Cultures NGTD. Remains on Rocephin x 5 days, per ID recs, completed 5/30. Ongoing issue with hyponatremia, improved with albumin daily. Na cont to improve with free water restriction and albumin administration. She is now s/p OLTx on 6/5/19. Tolerated surgery well. She was transferred from ICU to TSU on POD#2. VSS. H&H stable. WBC within normal limits. Chest tube inadvertently pulled out POD 3. Chest x-ray following with no acute finding. Breathing comfortable on RA after removal.    Post op with increasing Cr. Lasix trial POD 3 without improvement. Nephrology consulted for further assistance.    Interval Hx.   NAEON. This morning, she is doing fine. +incisional pain. Reports diarrhea - 3-4 episodes yesterday. Will discontinue stool softeners and monitor. Cr improved 2.6 --> 2.4 today. UOP improved over the last 24 hours - 650 cc output. Nephrology following- UPC, UA and retroperitoneal in process per recs.  Prograf held yesterday - will hold today. Volume up on exam, giving albumin x 1 and lasix 40 mg IV x 1. Cont strict I &O. VSS. LFTs continue to downtrend. Will continue to monitor.

## 2019-05-29 NOTE — PROGRESS NOTES
[facility]  Hepatology  Progress Note    Patient Name: Jihan Zamudio  MRN: [id number]  Admission Date: 5/16/2019  Hospital Length of Stay: 12 days  Attending Provider: Blane Gottlieb MD   Primary Care Physician: Primary Doctor No  Principal Problem:Acute hypoxemic respiratory failure    Subjective:     HPI: This is a 48 yo F with hx of KESSLER cirrhosis c/b varices, latent TB, GERD, hypothyroidism, lap band 2007, HLD who was sent to the ED from hepatology clinic for shortness of breath. She follows with Dr. Smallwood. She has been undergoing transplant evaluation and is pending financial approval and clarification of lung nodule. She reports feeling more short of breath the past few days and increased abdominal swelling. She reports occasional confusion as well. She was found to have a low sodium as well. With regards to her lung nodule, we have been awaiting films for an outside facility for comparison. She was found to have a large pleural effusion while here, something she has not had before. Pulmonology has been consulted.         Interval History:   Patient s/p thora and para with no acute events overnight.    Current Facility-Administered Medications   Medication    0.9%  NaCl infusion (for blood administration)    acetaminophen tablet 325 mg    albumin human 25% bottle 25 g    cefTRIAXone (ROCEPHIN) 2 g in dextrose 5 % 50 mL IVPB    cyclobenzaprine tablet 5 mg    dextrose 50% injection 12.5 g    dextrose 50% injection 25 g    glucagon (human recombinant) injection 1 mg    glucose chewable tablet 16 g    glucose chewable tablet 24 g    lactulose 20 gram/30 mL solution Soln 5 g    levothyroxine tablet 112 mcg    miconazole NITRATE 2 % top powder    ondansetron injection 4 mg    pantoprazole EC tablet 40 mg    rifAXIMin tablet 550 mg    simethicone chewable tablet 80 mg    sodium chloride 0.9% flush 10 mL    sodium chloride 0.9% flush 10 mL    sodium chloride 0.9% flush 5 mL     trazodone split tablet 25 mg       Objective:     Vital Signs (Most Recent):  Temp: 98.3 °F (36.8 °C) (05/28/19 1513)  Pulse: 89 (05/28/19 1517)  Resp: 18 (05/28/19 1513)  BP: (!) 117/56 (05/28/19 1513)  SpO2: (!) 92 % (05/28/19 1513) Vital Signs (24h Range):  Temp:  [98.3 °F (36.8 °C)-99.4 °F (37.4 °C)] 98.3 °F (36.8 °C)  Pulse:  [86-91] 89  Resp:  [17-18] 18  SpO2:  [91 %-94 %] 92 %  BP: (103-120)/(51-58) 117/56     Weight: 101.6 kg (223 lb 15.8 oz) (05/27/19 1000)  Body mass index is 37.27 kg/m².    Physical Exam   Constitutional: She is oriented to person, place, and time. No distress.   HENT:   Head: Normocephalic and atraumatic.   Eyes: Scleral icterus is present.   Cardiovascular: Normal rate and regular rhythm.   Pulmonary/Chest:   Decreased breath sounds bilaterally, on NC   Abdominal: Soft. Bowel sounds are normal. She exhibits distension. She exhibits no mass. There is no tenderness. There is no rebound and no guarding. No hernia.   Musculoskeletal: She exhibits no edema.   Neurological: She is alert and oriented to person, place, and time.   Skin: She is not diaphoretic.   Nursing note and vitals reviewed.      MELD-Na score: 29 at 5/28/2019  4:08 AM  MELD score: 21 at 5/28/2019  4:08 AM  Calculated from:  Serum Creatinine: 0.8 mg/dL (Rounded to 1 mg/dL) at 5/28/2019  4:08 AM  Serum Sodium: 123 mmol/L (Rounded to 125 mmol/L) at 5/28/2019  4:08 AM  Total Bilirubin: 7.9 mg/dL at 5/28/2019  4:08 AM  INR(ratio): 1.8 at 5/28/2019  4:08 AM  Age: 49 years    Significant Labs:  CBC:   Recent Labs   Lab 05/28/19  0407   WBC 3.26*   RBC 2.39*   HGB 7.4*   HCT 22.1*   PLT 51*     CMP:   Recent Labs   Lab 05/28/19  0408   GLU 89   CALCIUM 7.7*   ALBUMIN 2.3*   PROT 5.3*   *   K 4.0   CO2 20*   CL 95   BUN 16   CREATININE 0.8   ALKPHOS 116   ALT 34   AST 86*   BILITOT 7.9*     Coagulation:   Recent Labs   Lab 05/28/19  0408   INR 1.8*       Significant Imaging:  X-Ray: Reviewed  US: Reviewed  CT:  Reviewed    Assessment/Plan:     Liver cirrhosis secondary to KESSLER  49 year old female with a history of KESSLER cirrhosis c/b varices currently admitted for decompensated cirrhosis. Presented at transplant selection committee today and accepted for listing.     Recommendations:  --Daily CMP, CBC, and INR  --Administer albumin   --Continue PPI  --Continue antibiotics  --Continue lactulose and rifaximin            Thank you for your consult. I will follow-up with patient. Please contact us if you have any additional questions.    Alexa Richard M.D.  Gastroenterology Fellow, PGY-V  Pager: [phone]  [facility]

## 2019-05-29 NOTE — PROGRESS NOTES
Admit Note     Met with patient to assess needs. Patient is a 49 y.o.  female, admitted for respiratory failure pre liver transplant.      Patient admitted from emergency room on 5/16/2019 .  At this time, patient presents as alert and oriented x 4, pleasant, good eye contact, recall good, communicative, cooperative and asking and answering questions appropriately.  At this time, patients caregiver was not present at the pt reported he is staying home and driving to see her daily.  Pt questioned when her family can have a [place] apartment.  SW provided education stating its Post Liver transplant only and at the time of discharge. .    Household/Family Systems     Patient resides with patient's spouse and daughter, at [address] [facility] [place] [address].  Support system includes Daughter and  Freeman Zamudio.  Patient does have dependents that are in need of being cared for. Patient's 16 y.o. daughter are being cared for by her  Freeman .     Patients primary caregiver is Freeman Zamudio, patients , phone number [phone].  Confirmed patients contact information is [phone] (home);   Telephone Information:   Mobile [phone]   .    During admission, patient's caregiver plans to stay at home.  Confirmed patient and patients caregivers do have access to reliable transportation.    Cognitive Status/Learning     Patient reports reading ability as 12th grade and states patient does not have difficulty with N/A.  Patient reports patient learns best by demonstration and reading material.   Needed: No.   Highest education level: High School (9-12) or GED    Vocation/Disability   .  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    Adherence     Patient reports a high level of adherence to patients health care regimen.  Adherence counseling and education provided. Patient verbalizes understanding.    Substance  Use    Patient reports the following substance usage.    Tobacco: none, patient denies any use.  Alcohol: none, patient denies any use.  Illicit Drugs/Non-prescribed Medications: none, patient denies any use.  Patient states clear understanding of the potential impact of substance use.  Substance abstinence/cessation counseling, education and resources provided and reviewed.     Services Utilizing/ADLS    Infusion Service: Prior to admission, patient utilizing? no  Home Health: Prior to admission, patient utilizing? no  DME: Prior to admission, yes cane and walker  Pulmonary/Cardiac Rehab: Prior to admission, no  Dialysis:  Prior to admission, no  Transplant Specialty Pharmacy:  Prior to admission, no.    Prior to admission, patient reports patient was independent with ADLS and was not driving.  Patient reports patient is not able to care for self at this time due to compromised medical condition (as documented in medical record) and physical weakness. And her current need for oxygen.  Patient indicates a willingness to care for self once medically cleared to do so.    Insurance/Medications    Insured by   Payor/Plan Subscr  Sex Relation Sub. Ins. ID Effective Group Num   1.  - TRI* JANAE MCDANIEL 1971 Male Self [id number]                                    [facility] [address] [id number]      Primary Insurance (for UNOS reporting): Private Insurance  Secondary Insurance (for UNOS reporting): None    Patient reports patient is able to obtain and afford medications at this time and at time of discharge.    Living Will/Healthcare Power of     Patient states patient does not have a LW and/or HCPA.   provided education regarding LW and HCPA and the completion of forms.    Coping/Mental Health    Patient is coping adequately with the aid of  family members and friends. Pt does endorse she wishes to be transplanted very soon.  Patient denies mental health difficulties.      Discharge Planning    At time of discharge, patient plans to return to patient's home under the care of Evelyn Zamudio.  Patients  will transport patient.  Per rounds today, expected discharge date has not been medically determined at this time. Patient and patients caregiver  verbalize understanding and are involved in treatment planning and discharge process.    Additional Concerns    Patient is being followed for needs, education, resources, information, emotional support, supportive counseling, and for supportive and skilled discharge plan of care.  providing ongoing psychosocial support, education, resources and d/c planning as needed.  SW remains available.  provided resource list, patient choice, psychosocial and supportive counseling, resources, education, assistance and discharge planning with patient and caregiver involvement, ongoing SW availability and services as appropriate.

## 2019-05-29 NOTE — ASSESSMENT & PLAN NOTE
- MELD-Na score: 29 at 5/29/2019  4:10 AM  MELD score: 21 at 5/29/2019  4:10 AM  Calculated from:  Serum Creatinine: 0.7 mg/dL (Rounded to 1 mg/dL) at 5/29/2019  4:10 AM  Serum Sodium: 125 mmol/L at 5/29/2019  4:10 AM  Total Bilirubin: 7.3 mg/dL at 5/29/2019  4:10 AM  INR(ratio): 1.9 at 5/29/2019  4:10 AM  Age: 49 years

## 2019-05-29 NOTE — TELEPHONE ENCOUNTER
PATIENT NAME: Jihan Zamudio  [facility] #: [id number]    Lab Results   Component Value Date    CREATININE 0.7 05/29/2019     (L) 05/29/2019    BILITOT 7.3 (H) 05/29/2019    ALBUMIN 2.6 (L) 05/29/2019    INR 1.9 (H) 05/29/2019     MELD 29  Encephalopathy: 1 - 2  Ascites: moderate  Dialysis: no     Recertification requestor: Jodi Quintero

## 2019-05-29 NOTE — PT/OT/SLP PROGRESS
"Physical Therapy Treatment    Patient Name:  Jihan Zamudio   MRN:  [id number]    Recommendations:     Discharge Recommendations:  home health PT   Discharge Equipment Recommendations: none   Barriers to discharge: None    Assessment:     Jihan Zamudio is a 49 y.o. female admitted with a medical diagnosis of Acute hypoxemic respiratory failure.  She presents with the following impairments/functional limitations:  weakness, impaired endurance, impaired self care skills, impaired functional mobilty, gait instability, impaired balance. Pt session limited as pt is receiving blood this day on attempt. Pt agreeable to supine therex as pt has not been seen by PT for any mobility or therex in several days. Pt will continue to benefit from therapy services to address impairments listed above.     Rehab Prognosis: Fair; patient would benefit from acute skilled PT services to address these deficits and reach maximum level of function.    Recent Surgery: * No surgery found *      Plan:     During this hospitalization, patient to be seen 3 x/week to address the identified rehab impairments via gait training, therapeutic activities, therapeutic exercises and progress toward the following goals:    · Plan of Care Expires:  06/13/19    Subjective     Chief Complaint: fatigue  Patient/Family Comments/goals: "I'm just tired."  Pain/Comfort:  · Pain Rating 1: 0/10  · Pain Rating Post-Intervention 1: 0/10      Objective:     Communicated with NSG prior to session.  Patient found HOB elevated with oxygen, telemetry upon PTA entry to room.     General Precautions: Standard, fall   Orthopedic Precautions:N/A   Braces: N/A     Functional Mobility:  · n/p      AM-PAC 6 CLICK MOBILITY  Turning over in bed (including adjusting bedclothes, sheets and blankets)?: 2  Sitting down on and standing up from a chair with arms (e.g., wheelchair, bedside commode, etc.): 3  Moving from lying on back to sitting on the side of the bed?: 2  Moving to " and from a bed to a chair (including a wheelchair)?: 3  Need to walk in hospital room?: 3  Climbing 3-5 steps with a railing?: 2  Basic Mobility Total Score: 15       Therapeutic Activities and Exercises:   Pt performs BLE supine therex: AP, GS, HS, Hip ABD/ADD, SAQ x 20 reps.   Pt educated on importance of OOB mobility as tolerated and participation with therapy services.     Patient left HOB elevated with all lines intact and call button in reach.    GOALS:   Multidisciplinary Problems     Physical Therapy Goals        Problem: Physical Therapy Goal    Goal Priority Disciplines Outcome Goal Variances Interventions   Physical Therapy Goal     PT, PT/OT Ongoing (interventions implemented as appropriate)     Description:  Goals to be met by: 19     Patient will increase functional independence with mobility by performin. Supine to sit with [place].  2. Sit to supine with [place].  3. Sit to stand transfer with Supervision.  4. Bed to chair transfer with Super[place] using Single-point Cane.  5. Gait  x 50 feet with Supervision using Single-point Cane.   6. Ascend/Descend 6 inch curb step with Supervision using Single-point Cane.  7. Lower extremity exercise program x 20 reps per handout, with assistance as needed.                      Time Tracking:     PT Received On: 19  PT Start Time: 1328     PT Stop Time: 1343  PT Total Time (min): 15 min     Billable Minutes: Therapeutic Exercise 15    Treatment Type: Treatment  PT/PTA: PTA     PTA Visit Number: 2     Louis Hammond, PTA  2019

## 2019-05-29 NOTE — ASSESSMENT & PLAN NOTE
- 2/2 ESLD  - H/H decreased this am, possibly from dilution  - transfused 1u pRBC  - Cont to monitor w/ daily labs

## 2019-05-29 NOTE — ASSESSMENT & PLAN NOTE
- no obvious signs of bleeding, denies melena or hematochezia  - continue to monitor. Consider FFP/Vit K for invasive procedures.

## 2019-05-29 NOTE — PLAN OF CARE
Problem: Adult Inpatient Plan of Care  Goal: Plan of Care Review  POC reviewed with pt. A&Ox4, slowed speech. Pt has been approved for Liver transplant per nursing communication orders, pt notified. No c/o pain during shift. No acute changes noted. Pt remains free of falls. Will continue to monitor.

## 2019-05-29 NOTE — PROGRESS NOTES
[facility]  Liver Transplant  Progress Note    Patient Name: Jihan Zamudio  MRN: [id number]  Admission Date: 5/16/2019  Hospital Length of Stay: 13 days  Code Status: Full Code  Primary Care Provider: Primary Doctor No    Subjective:     History of Present Illness:  This is a 48 yo F with hx of KESSLER cirrhosis c/b varices, latent TB, GERD, hypothyroidism, lap band 2007, HLD who was sent to the ED from hepatology clinic for shortness of breath. She follows with Dr. Smallwood. She reports feeling more short of breath the past few days and increased abdominal swelling. She reports occasional confusion as well. She was found to have a low sodium as well. With regards to her lung nodule, we have been awaiting films for an outside facility for comparison. She was found to have a large pleural effusion while here, something she has not had before. Pulmonology has been consulted.      Hospital Course:  Pt approved for liver transplant and transferred to LTS on 5/29. This morning pt reports SOB, O2 sats 92% on 2L. Last Thoracentesis on 5/27. Cultures NGTD. Remains on Rocephin x 5 days, per ID recs, to complete on 5/30. H/H low this am. Transfued 1u pRBC. Na better this am. Cont 1L FR. Hydration w/ albumin x 3. Afebrile. VSS. Cont lactulose/rifaximin. Urine-lytes pending. Cont to monitor     Scheduled Meds:   albumin human 25%  25 g Intravenous TID    cefTRIAXone (ROCEPHIN) IVPB  2 g Intravenous Q24H    lactulose  5 g Oral Daily    levothyroxine  112 mcg Oral Daily    miconazole NITRATE 2 %   Topical (Top) BID    pantoprazole  40 mg Oral Daily    rifAXImin  550 mg Oral BID     Continuous Infusions:  PRN Meds:sodium chloride, sodium chloride, acetaminophen, cyclobenzaprine, dextrose 50%, dextrose 50%, glucagon (human recombinant), glucose, glucose, ondansetron, simethicone, sodium chloride 0.9%, sodium chloride 0.9%, sodium chloride 0.9%, trazodone    Review of Systems   Constitutional: Positive for  appetite change and fatigue. Negative for chills and fever.   Respiratory: Positive for cough and shortness of breath.    Cardiovascular: Negative for chest pain and leg swelling.   Gastrointestinal: Negative for abdominal distention, abdominal pain, constipation, diarrhea, nausea and vomiting.   Allergic/Immunologic: Negative for immunocompromised state.   Neurological: Positive for weakness. Negative for tremors.   Psychiatric/Behavioral: Negative for confusion and decreased concentration.     Objective:     Vital Signs (Most Recent):  Temp: 98.2 °F (36.8 °C) (05/29/19 1201)  Pulse: 92 (05/29/19 1201)  Resp: 16 (05/29/19 1201)  BP: 128/62 (05/29/19 1201)  SpO2: (!) 92 % (05/29/19 1201) Vital Signs (24h Range):  Temp:  [98.2 °F (36.8 °C)-99.3 °F (37.4 °C)] 98.2 °F (36.8 °C)  Pulse:  [87-92] 92  Resp:  [15-18] 16  SpO2:  [92 %-96 %] 92 %  BP: ()/(49-62) 128/62     Weight: 101.6 kg (223 lb 15.8 oz)  Body mass index is 37.27 kg/m².    Intake/Output - Last 3 Shifts       05/27 0700 - 05/28 0659 05/28 0700 - 05/29 0659 05/29 0700 - 05/30 0659    P.O. 300 830 240    I.V. (mL/kg) 50 (0.5)      IV Piggyback       Total Intake(mL/kg) 350 (3.4) 830 (8.2) 240 (2.4)    Urine (mL/kg/hr)  825 (0.3) 200 (0.3)    Other 4700      Stool 0 0 0    Total Output 4700 825 200    Net -4350 +5 +40           Urine Occurrence 5 x 4 x     Stool Occurrence 3 x 3 x 1 x          Physical Exam   Constitutional: She is oriented to person, place, and time. No distress. Nasal cannula in place.   Eyes: Pupils are equal, round, and reactive to light. EOM are normal.   Neck: Normal range of motion.   Cardiovascular: Normal rate and regular rhythm.   No murmur heard.  Pulmonary/Chest: Effort normal. No respiratory distress. She has decreased breath sounds. She has no wheezes.   Abdominal: Soft. Bowel sounds are normal. She exhibits no distension. There is no tenderness. There is no guarding.   Musculoskeletal: Normal range of motion.    Neurological: She is alert and oriented to person, place, and time.   Skin: Skin is warm and dry. She is not diaphoretic.   Psychiatric: She has a normal mood and affect. Her behavior is normal. Thought content normal.   Nursing note and vitals reviewed.      Laboratory:  Immunosuppressants     None        CBC:   Recent Labs   Lab 05/29/19  0410   WBC 2.08*   RBC 2.20*   HGB 6.9*   HCT 20.4*   PLT 35*   MCV 93   MCH 31.4*   MCHC 33.8     CMP:   Recent Labs   Lab 05/29/19  0410   GLU 91   CALCIUM 7.9*   ALBUMIN 2.6*   PROT 5.3*   *   K 3.7   CO2 20*   CL 97   BUN 16   CREATININE 0.7   ALKPHOS 97   ALT 29   AST 76*   BILITOT 7.3*     Labs within the past 24 hours have been reviewed.    Diagnostic Results:  I have personally reviewed all pertinent imaging studies.    Assessment/Plan:     * Acute hypoxemic respiratory failure  - 2/2 pleural effusion and R sided hydrothorax  - Satting 95% on 2L NC  - S/p thora on 5/17 with pulm, 1.5 L removed   - CT chest completed and shows stable pulmonary nodules;   - S/p thoracentesis on 5/27 1.5L removed      Acquired coagulation factor deficiency  - no obvious signs of bleeding, denies melena or hematochezia  - continue to monitor. Consider FFP/Vit K for invasive procedures.       Acute on chronic anemia  - 2/2 ESLD  - H/H decreased this am, possibly from dilution  - transfused 1u pRBC  - Cont to monitor w/ daily labs      Fever  - afebrile at this time. 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  - MELD-Na score: 29 at 5/29/2019  4:10 AM  MELD score: 21 at 5/29/2019  4:10 AM  Calculated from:  Serum Creatinine: 0.7 mg/dL (Rounded to 1 mg/dL) at 5/29/2019  4:10 AM  Serum Sodium: 125 mmol/L at 5/29/2019  4:10 AM  Total Bilirubin: 7.3 mg/dL at 5/29/2019  4:10 AM  INR(ratio): 1.9 at 5/29/2019  4:10 AM  Age: 49 years      Hypokalemia  - Replace PRN  - Monitor      Pleural effusion, right  - see acute hypoxemic resp failure      Hepatic encephalopathy  -  Chronic and  stable  - Cont lactulose/rifaximin      Hyponatremia  - 2/2 chronic liver disease  - 1L Fluid restriction  - giving albumin x 3       Coagulopathy  - 2/2 liver disease  - monitor for bleedingt    Esophageal varices  - chronic and stable. Monitor      GERD (gastroesophageal reflux disease)  - chronic and stable  - Cont PPI      Hypothyroidism  - Chronic and stable  - Continue Synthroid 112mcg PO daily      Decompensated hepatic cirrhosis  - 2/2 KESSLER  - approved for transplant list  - Paracentesis on 5/27 with 3.2L removed and negative for SBP  - Low Sodium diet with 1L fluid restriction  - Hold diuretics due to hyponatremia  - Cont Lactulose, titrate 3-4 BM/day  - Rifaximin 550mg PO BID    MELD-Na score: 29 at 5/29/2019  4:10 AM  MELD score: 21 at 5/29/2019  4:10 AM  Calculated from:  Serum Creatinine: 0.7 mg/dL (Rounded to 1 mg/dL) at 5/29/2019  4:10 AM  Serum Sodium: 125 mmol/L at 5/29/2019  4:10 AM  Total Bilirubin: 7.3 mg/dL at 5/29/2019  4:10 AM  INR(ratio): 1.9 at 5/29/2019  4:10 AM  Age: 49 years        Debility/Functional status: Patient debilitated by evidence of Muscle wasting and atrophy, Weakness, Chronic fatigue, unspecified and Cachexia. Physical and occupational therapy ordered daily to evaluate and treat. Debility was: present on admission.    VTE Risk Mitigation (From admission, onward)        Ordered     IP VTE HIGH RISK PATIENT  Once      05/17/19 0023     Place sequential compression device  Until discontinued      05/17/19 0023          The patients clinical status was discussed at multidisplinary rounds, involving transplant surgery, transplant medicine, pharmacy, nursing, nutrition, and social work    Discharge Planning:  No Patient Care Coordination Note on file.      Laura Benitez PA-C  Liver Transplant  [facility]

## 2019-05-29 NOTE — ASSESSMENT & PLAN NOTE
49 year old female with a history of KESSLER cirrhosis c/b varices currently admitted for decompensated cirrhosis. Presented at transplant selection committee today and accepted for listing.     Recommendations:  --Daily CMP, CBC, and INR  --Administer albumin   --Continue PPI  --Continue antibiotics  --Continue lactulose and rifaximin

## 2019-05-29 NOTE — PLAN OF CARE
Problem: Physical Therapy Goal  Goal: Physical Therapy Goal  Goals to be met by: 19     Patient will increase functional independence with mobility by performin. Supine to sit with [place].  2. Sit to supine with [place].  3. Sit to stand transfer with Supervision.  4. Bed to chair transfer with Supervision using Single-point Cane.  5. Gait  x 50 feet with Supervision using Single-point Cane.   6. Ascend/Descend 6 inch curb step with Supervision using Single-point Cane.  7. Lower extremity exercise program x 20 reps per handout, with assistance as needed.     Outcome: Ongoing (interventions implemented as appropriate)  Goals remain appropriate.

## 2019-05-29 NOTE — SUBJECTIVE & OBJECTIVE
Scheduled Meds:   albumin human 25%  25 g Intravenous TID    cefTRIAXone (ROCEPHIN) IVPB  2 g Intravenous Q24H    lactulose  5 g Oral Daily    levothyroxine  112 mcg Oral Daily    miconazole NITRATE 2 %   Topical (Top) BID    pantoprazole  40 mg Oral Daily    rifAXImin  550 mg Oral BID     Continuous Infusions:  PRN Meds:sodium chloride, sodium chloride, acetaminophen, cyclobenzaprine, dextrose 50%, dextrose 50%, glucagon (human recombinant), glucose, glucose, ondansetron, simethicone, sodium chloride 0.9%, sodium chloride 0.9%, sodium chloride 0.9%, trazodone    Review of Systems   Constitutional: Positive for appetite change and fatigue. Negative for chills and fever.   Respiratory: Positive for cough and shortness of breath.    Cardiovascular: Negative for chest pain and leg swelling.   Gastrointestinal: Negative for abdominal distention, abdominal pain, constipation, diarrhea, nausea and vomiting.   Allergic/Immunologic: Negative for immunocompromised state.   Neurological: Positive for weakness. Negative for tremors.   Psychiatric/Behavioral: Negative for confusion and decreased concentration.     Objective:     Vital Signs (Most Recent):  Temp: 98.2 °F (36.8 °C) (05/29/19 1201)  Pulse: 92 (05/29/19 1201)  Resp: 16 (05/29/19 1201)  BP: 128/62 (05/29/19 1201)  SpO2: (!) 92 % (05/29/19 1201) Vital Signs (24h Range):  Temp:  [98.2 °F (36.8 °C)-99.3 °F (37.4 °C)] 98.2 °F (36.8 °C)  Pulse:  [87-92] 92  Resp:  [15-18] 16  SpO2:  [92 %-96 %] 92 %  BP: ()/(49-62) 128/62     Weight: 101.6 kg (223 lb 15.8 oz)  Body mass index is 37.27 kg/m².    Intake/Output - Last 3 Shifts       05/27 0700 - 05/28 0659 05/28 0700 - 05/29 0659 05/29 0700 - 05/30 0659    P.O. 300 830 240    I.V. (mL/kg) 50 (0.5)      IV Piggyback       Total Intake(mL/kg) 350 (3.4) 830 (8.2) 240 (2.4)    Urine (mL/kg/hr)  825 (0.3) 200 (0.3)    Other 4700      Stool 0 0 0    Total Output 4700 825 200    Net -4350 +5 +40           Urine  Occurrence 5 x 4 x     Stool Occurrence 3 x 3 x 1 x          Physical Exam   Constitutional: She is oriented to person, place, and time. No distress. Nasal cannula in place.   Eyes: Pupils are equal, round, and reactive to light. EOM are normal.   Neck: Normal range of motion.   Cardiovascular: Normal rate and regular rhythm.   No murmur heard.  Pulmonary/Chest: Effort normal. No respiratory distress. She has decreased breath sounds. She has no wheezes.   Abdominal: Soft. Bowel sounds are normal. She exhibits no distension. There is no tenderness. There is no guarding.   Musculoskeletal: Normal range of motion.   Neurological: She is alert and oriented to person, place, and time.   Skin: Skin is warm and dry. She is not diaphoretic.   Psychiatric: She has a normal mood and affect. Her behavior is normal. Thought content normal.   Nursing note and vitals reviewed.      Laboratory:  Immunosuppressants     None        CBC:   Recent Labs   Lab 05/29/19  0410   WBC 2.08*   RBC 2.20*   HGB 6.9*   HCT 20.4*   PLT 35*   MCV 93   MCH 31.4*   MCHC 33.8     CMP:   Recent Labs   Lab 05/29/19  0410   GLU 91   CALCIUM 7.9*   ALBUMIN 2.6*   PROT 5.3*   *   K 3.7   CO2 20*   CL 97   BUN 16   CREATININE 0.7   ALKPHOS 97   ALT 29   AST 76*   BILITOT 7.3*     Labs within the past 24 hours have been reviewed.    Diagnostic Results:  I have personally reviewed all pertinent imaging studies.

## 2019-05-30 ENCOUNTER — TELEPHONE (OUTPATIENT)
Dept: TRANSPLANT | Facility: CLINIC | Age: 49
End: 2019-05-30

## 2019-05-30 LAB
ALBUMIN SERPL BCP-MCNC: 3.4 G/DL (ref 3.5–5.2)
ALBUMIN SERPL BCP-MCNC: 3.8 G/DL (ref 3.5–5.2)
ALP SERPL-CCNC: 89 U/L (ref 55–135)
ALP SERPL-CCNC: 93 U/L (ref 55–135)
ALT SERPL W/O P-5'-P-CCNC: 27 U/L (ref 10–44)
ALT SERPL W/O P-5'-P-CCNC: 28 U/L (ref 10–44)
ANION GAP SERPL CALC-SCNC: 8 MMOL/L (ref 8–16)
ANION GAP SERPL CALC-SCNC: 9 MMOL/L (ref 8–16)
AST SERPL-CCNC: 67 U/L (ref 10–40)
AST SERPL-CCNC: 71 U/L (ref 10–40)
BACTERIA BLD CULT: NORMAL
BACTERIA BLD CULT: NORMAL
BASOPHILS # BLD AUTO: 0.01 K/UL (ref 0–0.2)
BASOPHILS NFR BLD: 0.4 % (ref 0–1.9)
BILIRUB SERPL-MCNC: 8.6 MG/DL (ref 0.1–1)
BILIRUB SERPL-MCNC: 9.6 MG/DL (ref 0.1–1)
BUN SERPL-MCNC: 11 MG/DL (ref 6–20)
BUN SERPL-MCNC: 13 MG/DL (ref 6–20)
CALCIUM SERPL-MCNC: 8.5 MG/DL (ref 8.7–10.5)
CALCIUM SERPL-MCNC: 8.7 MG/DL (ref 8.7–10.5)
CHLORIDE SERPL-SCNC: 96 MMOL/L (ref 95–110)
CHLORIDE SERPL-SCNC: 97 MMOL/L (ref 95–110)
CO2 SERPL-SCNC: 22 MMOL/L (ref 23–29)
CO2 SERPL-SCNC: 22 MMOL/L (ref 23–29)
CREAT SERPL-MCNC: 0.7 MG/DL (ref 0.5–1.4)
CREAT SERPL-MCNC: 0.7 MG/DL (ref 0.5–1.4)
DIFFERENTIAL METHOD: ABNORMAL
EOSINOPHIL # BLD AUTO: 0.1 K/UL (ref 0–0.5)
EOSINOPHIL NFR BLD: 5.5 % (ref 0–8)
ERYTHROCYTE [DISTWIDTH] IN BLOOD BY AUTOMATED COUNT: 18.1 % (ref 11.5–14.5)
EST. GFR  (AFRICAN AMERICAN): >60 ML/MIN/1.73 M^2
EST. GFR  (AFRICAN AMERICAN): >60 ML/MIN/1.73 M^2
EST. GFR  (NON AFRICAN AMERICAN): >60 ML/MIN/1.73 M^2
EST. GFR  (NON AFRICAN AMERICAN): >60 ML/MIN/1.73 M^2
GLUCOSE SERPL-MCNC: 82 MG/DL (ref 70–110)
GLUCOSE SERPL-MCNC: 97 MG/DL (ref 70–110)
HCT VFR BLD AUTO: 21.9 % (ref 37–48.5)
HGB BLD-MCNC: 7.4 G/DL (ref 12–16)
IMM GRANULOCYTES # BLD AUTO: 0.01 K/UL (ref 0–0.04)
IMM GRANULOCYTES NFR BLD AUTO: 0.4 % (ref 0–0.5)
INR PPP: 1.9 (ref 0.8–1.2)
LYMPHOCYTES # BLD AUTO: 0.5 K/UL (ref 1–4.8)
LYMPHOCYTES NFR BLD: 22.8 % (ref 18–48)
MAGNESIUM SERPL-MCNC: 2.1 MG/DL (ref 1.6–2.6)
MCH RBC QN AUTO: 31 PG (ref 27–31)
MCHC RBC AUTO-ENTMCNC: 33.8 G/DL (ref 32–36)
MCV RBC AUTO: 92 FL (ref 82–98)
MONOCYTES # BLD AUTO: 0.3 K/UL (ref 0.3–1)
MONOCYTES NFR BLD: 10.5 % (ref 4–15)
NEUTROPHILS # BLD AUTO: 1.4 K/UL (ref 1.8–7.7)
NEUTROPHILS NFR BLD: 60.4 % (ref 38–73)
NRBC BLD-RTO: 0 /100 WBC
PHOSPHATE SERPL-MCNC: 2.8 MG/DL (ref 2.7–4.5)
PLATELET # BLD AUTO: 45 K/UL (ref 150–350)
PMV BLD AUTO: 12.7 FL (ref 9.2–12.9)
POTASSIUM SERPL-SCNC: 3.7 MMOL/L (ref 3.5–5.1)
POTASSIUM SERPL-SCNC: 3.8 MMOL/L (ref 3.5–5.1)
PROT SERPL-MCNC: 5.7 G/DL (ref 6–8.4)
PROT SERPL-MCNC: 5.9 G/DL (ref 6–8.4)
PROTHROMBIN TIME: 18.7 SEC (ref 9–12.5)
RBC # BLD AUTO: 2.39 M/UL (ref 4–5.4)
SODIUM SERPL-SCNC: 126 MMOL/L (ref 136–145)
SODIUM SERPL-SCNC: 128 MMOL/L (ref 136–145)
WBC # BLD AUTO: 2.37 K/UL (ref 3.9–12.7)

## 2019-05-30 PROCEDURE — 36415 COLL VENOUS BLD VENIPUNCTURE: CPT | Mod: NTX

## 2019-05-30 PROCEDURE — 80053 COMPREHEN METABOLIC PANEL: CPT | Mod: NTX

## 2019-05-30 PROCEDURE — 63600175 PHARM REV CODE 636 W HCPCS: Mod: JG,NTX | Performed by: PHYSICIAN ASSISTANT

## 2019-05-30 PROCEDURE — 20600001 HC STEP DOWN PRIVATE ROOM: Mod: NTX

## 2019-05-30 PROCEDURE — 84100 ASSAY OF PHOSPHORUS: CPT | Mod: NTX

## 2019-05-30 PROCEDURE — 83735 ASSAY OF MAGNESIUM: CPT | Mod: NTX

## 2019-05-30 PROCEDURE — 85610 PROTHROMBIN TIME: CPT | Mod: NTX

## 2019-05-30 PROCEDURE — 99233 PR SUBSEQUENT HOSPITAL CARE,LEVL III: ICD-10-PCS | Mod: NTX,,, | Performed by: PHYSICIAN ASSISTANT

## 2019-05-30 PROCEDURE — 85025 COMPLETE CBC W/AUTO DIFF WBC: CPT | Mod: NTX

## 2019-05-30 PROCEDURE — 80053 COMPREHEN METABOLIC PANEL: CPT | Mod: 91,NTX

## 2019-05-30 PROCEDURE — 99233 SBSQ HOSP IP/OBS HIGH 50: CPT | Mod: NTX,,, | Performed by: PHYSICIAN ASSISTANT

## 2019-05-30 PROCEDURE — 25000003 PHARM REV CODE 250: Mod: NTX | Performed by: HOSPITALIST

## 2019-05-30 PROCEDURE — P9047 ALBUMIN (HUMAN), 25%, 50ML: HCPCS | Mod: JG,NTX | Performed by: PHYSICIAN ASSISTANT

## 2019-05-30 PROCEDURE — 97110 THERAPEUTIC EXERCISES: CPT | Mod: NTX

## 2019-05-30 PROCEDURE — 63600175 PHARM REV CODE 636 W HCPCS: Mod: NTX | Performed by: HOSPITALIST

## 2019-05-30 RX ORDER — FUROSEMIDE 10 MG/ML
20 INJECTION INTRAMUSCULAR; INTRAVENOUS ONCE
Status: COMPLETED | OUTPATIENT
Start: 2019-05-31 | End: 2019-05-31

## 2019-05-30 RX ORDER — FUROSEMIDE 10 MG/ML
20 INJECTION INTRAMUSCULAR; INTRAVENOUS ONCE
Status: COMPLETED | OUTPATIENT
Start: 2019-05-30 | End: 2019-05-30

## 2019-05-30 RX ORDER — ALBUMIN HUMAN 250 G/1000ML
25 SOLUTION INTRAVENOUS 3 TIMES DAILY
Status: COMPLETED | OUTPATIENT
Start: 2019-05-30 | End: 2019-05-31

## 2019-05-30 RX ADMIN — ALBUMIN (HUMAN) 25 G: 25 SOLUTION INTRAVENOUS at 09:05

## 2019-05-30 RX ADMIN — RIFAXIMIN 550 MG: 550 TABLET ORAL at 09:05

## 2019-05-30 RX ADMIN — CEFTRIAXONE 2 G: 2 INJECTION, SOLUTION INTRAVENOUS at 11:05

## 2019-05-30 RX ADMIN — PANTOPRAZOLE SODIUM 40 MG: 40 TABLET, DELAYED RELEASE ORAL at 08:05

## 2019-05-30 RX ADMIN — LEVOTHYROXINE SODIUM 112 MCG: 112 TABLET ORAL at 06:05

## 2019-05-30 RX ADMIN — RIFAXIMIN 550 MG: 550 TABLET ORAL at 08:05

## 2019-05-30 RX ADMIN — ALBUMIN (HUMAN) 25 G: 25 SOLUTION INTRAVENOUS at 08:05

## 2019-05-30 RX ADMIN — LACTULOSE 5 G: 20 SOLUTION ORAL at 08:05

## 2019-05-30 RX ADMIN — MICONAZOLE NITRATE: 20 POWDER TOPICAL at 08:05

## 2019-05-30 RX ADMIN — ALBUMIN (HUMAN) 25 G: 25 SOLUTION INTRAVENOUS at 03:05

## 2019-05-30 RX ADMIN — FUROSEMIDE 20 MG: 10 INJECTION, SOLUTION INTRAMUSCULAR; INTRAVENOUS at 10:05

## 2019-05-30 RX ADMIN — MICONAZOLE NITRATE: 20 POWDER TOPICAL at 09:05

## 2019-05-30 NOTE — ASSESSMENT & PLAN NOTE
- 2/2 KESSLER  - approved for transplant list  - Paracentesis on 5/27 with 3.2L removed and negative for SBP  - Low Sodium diet with 1L fluid restriction  - Hold diuretics due to hyponatremia  - Cont Lactulose, titrate 3-4 BM/day  - Rifaximin 550mg PO BID  - tentative paracentesis monday     MELD-Na score: 29 at 5/30/2019  3:12 AM  MELD score: 22 at 5/30/2019  3:12 AM  Calculated from:  Serum Creatinine: 0.7 mg/dL (Rounded to 1 mg/dL) at 5/30/2019  3:12 AM  Serum Sodium: 126 mmol/L at 5/30/2019  3:12 AM  Total Bilirubin: 8.6 mg/dL at 5/30/2019  3:12 AM  INR(ratio): 1.9 at 5/30/2019  3:12 AM  Age: 49 years

## 2019-05-30 NOTE — ASSESSMENT & PLAN NOTE
- MELD-Na score: 29 at 5/30/2019  3:12 AM  MELD score: 22 at 5/30/2019  3:12 AM  Calculated from:  Serum Creatinine: 0.7 mg/dL (Rounded to 1 mg/dL) at 5/30/2019  3:12 AM  Serum Sodium: 126 mmol/L at 5/30/2019  3:12 AM  Total Bilirubin: 8.6 mg/dL at 5/30/2019  3:12 AM  INR(ratio): 1.9 at 5/30/2019  3:12 AM  Age: 49 years

## 2019-05-30 NOTE — ASSESSMENT & PLAN NOTE
- 2/2 ESLD  - transfused 1u pRBC 5/29 w/ good response.   - H/H stable  - Cont to monitor w/ daily labs

## 2019-05-30 NOTE — SUBJECTIVE & OBJECTIVE
Scheduled Meds:   albumin human 25%  25 g Intravenous TID    cefTRIAXone (ROCEPHIN) IVPB  2 g Intravenous Q24H    lactulose  5 g Oral Daily    levothyroxine  112 mcg Oral Daily    miconazole NITRATE 2 %   Topical (Top) BID    pantoprazole  40 mg Oral Daily    rifAXImin  550 mg Oral BID     Continuous Infusions:  PRN Meds:sodium chloride, sodium chloride, acetaminophen, cyclobenzaprine, dextrose 50%, dextrose 50%, glucagon (human recombinant), glucose, glucose, ondansetron, simethicone, sodium chloride 0.9%, sodium chloride 0.9%, sodium chloride 0.9%, trazodone    Review of Systems   Constitutional: Positive for appetite change and fatigue. Negative for chills and fever.   Respiratory: Positive for cough and shortness of breath.    Cardiovascular: Negative for chest pain and leg swelling.   Gastrointestinal: Negative for abdominal distention, abdominal pain, constipation, diarrhea, nausea and vomiting.   Allergic/Immunologic: Negative for immunocompromised state.   Neurological: Positive for weakness. Negative for tremors.   Psychiatric/Behavioral: Negative for confusion and decreased concentration.     Objective:     Vital Signs (Most Recent):  Temp: 99.2 °F (37.3 °C) (05/30/19 0753)  Pulse: 89 (05/30/19 0753)  Resp: (!) 22 (05/30/19 0753)  BP: 127/61 (05/30/19 0753)  SpO2: (!) 91 % (05/30/19 0753) Vital Signs (24h Range):  Temp:  [97.7 °F (36.5 °C)-99.2 °F (37.3 °C)] 99.2 °F (37.3 °C)  Pulse:  [87-97] 89  Resp:  [16-22] 22  SpO2:  [91 %-94 %] 91 %  BP: (106-128)/(54-62) 127/61     Weight: 101.1 kg (222 lb 14.2 oz)  Body mass index is 37.09 kg/m².    Intake/Output - Last 3 Shifts       05/28 0700 - 05/29 0659 05/29 0700 - 05/30 0659 05/30 0700 - 05/31 0659    P.O. 830 240 150    I.V. (mL/kg)       Blood   100    Total Intake(mL/kg) 830 (8.2) 240 (2.4) 250 (2.5)    Urine (mL/kg/hr) 825 (0.3) 200 (0.1) 450 (1)    Other       Stool 0 0 0    Total Output 825 200 450    Net +5 +40 -200           Urine Occurrence 4  x 1 x     Stool Occurrence 3 x 2 x 2 x          Physical Exam   Constitutional: She is oriented to person, place, and time. No distress. Nasal cannula in place.   Eyes: Pupils are equal, round, and reactive to light. EOM are normal. Scleral icterus is present.   Neck: Normal range of motion.   Cardiovascular: Normal rate and regular rhythm.   No murmur heard.  Pulmonary/Chest: Effort normal. No respiratory distress. She has decreased breath sounds. She has no wheezes.   Abdominal: Soft. Bowel sounds are normal. She exhibits no distension. There is no tenderness. There is no guarding.   Musculoskeletal: Normal range of motion.   Neurological: She is alert and oriented to person, place, and time.   Skin: Skin is warm and dry. She is not diaphoretic.   Psychiatric: She has a normal mood and affect. Her behavior is normal. Thought content normal.   Nursing note and vitals reviewed.      Laboratory:  Immunosuppressants     None        CBC:   Recent Labs   Lab 05/30/19  0312   WBC 2.37*   RBC 2.39*   HGB 7.4*   HCT 21.9*   PLT 45*   MCV 92   MCH 31.0   MCHC 33.8     CMP:   Recent Labs   Lab 05/30/19  0312   GLU 82   CALCIUM 8.5*   ALBUMIN 3.4*   PROT 5.7*   *   K 3.8   CO2 22*   CL 96   BUN 13   CREATININE 0.7   ALKPHOS 89   ALT 28   AST 71*   BILITOT 8.6*     Labs within the past 24 hours have been reviewed.    Diagnostic Results:  I have personally reviewed all pertinent imaging studies.

## 2019-05-30 NOTE — PT/OT/SLP PROGRESS
Physical Therapy      Patient Name:  Jihan Zamudio   MRN:  [id number]    Patient not seen today secondary to (pt. working with OT and sleeping soundly in PM x2 attempts ). Will follow-up tomorrow.    Theodore Coles, PT   5/30/2019

## 2019-05-30 NOTE — TELEPHONE ENCOUNTER
PATIENT NAME: Jihan Zamudio  [facility] #: [id number]    Lab Results   Component Value Date    CREATININE 0.7 05/30/2019     (L) 05/30/2019    BILITOT 8.6 (H) 05/30/2019    ALBUMIN 3.4 (L) 05/30/2019    INR 1.9 (H) 05/30/2019     MELD 29  Encephalopathy: 1 - 2  Ascites: moderate  Dialysis: no     Recertification requestor: Jodi Quintero

## 2019-05-30 NOTE — PLAN OF CARE
Problem: Adult Inpatient Plan of Care  Goal: Plan of Care Review  AAOx4 VS stable. POC reviewed with pt. Pt received 1 unit of blood today due to abnormal H&H, well tolerated. No complaint of pain or discomfort on shift. No acute events on shift.

## 2019-05-30 NOTE — PLAN OF CARE
Problem: Adult Inpatient Plan of Care  Goal: Plan of Care Review  Outcome: Ongoing (interventions implemented as appropriate)     05/30/19 4270   Plan of Care Review   Plan of Care Reviewed With patient;spouse     Pt remains free from falls/injury. No acute events during shift. VSS, NAD. Bed in lowest position, wheels locked, side rails up times 2, call light w/in reach, commode at bedside, bed alarm on, family at bedside. Room clear of obstacles. Pt remains afibrile during shift. No other S&S of infection noticed. WCTM.

## 2019-05-30 NOTE — PLAN OF CARE
Problem: Occupational Therapy Goal  Goal: Occupational Therapy Goal  Goals to be met by: 5/26/19    Patient will increase functional independence with ADLs by performing:    Grooming while standing with Stand-by Assistance. MET 5/28  Revised to set up  Toileting from bedside commode with Stand-by Assistance for hygiene and clothing management.   Supine to sit with [place].  Toilet transfer to toilet with Stand-by Assistance.       Outcome: Ongoing (interventions implemented as appropriate)  Con't POC.    LAVELL Mcdonough

## 2019-05-30 NOTE — PT/OT/SLP PROGRESS
Occupational Therapy   Treatment    Name: Jihan Zamudio  MRN: [id number]  Admitting Diagnosis:  Acute hypoxemic respiratory failure       Recommendations:     Discharge Recommendations: home with home health  Discharge Equipment Recommendations:  none  Barriers to discharge:       Assessment:     Jihan Zamudio is a 49 y.o. female with a medical diagnosis of Acute hypoxemic respiratory failure.  She presents with continued overall weakness and decreased endurance. Pt able to walk to bathroom with  to use bathroom (declining OT's assistance). Continue OT to maximize functional ability. Performance deficits affecting function are weakness, impaired endurance, impaired functional mobilty, impaired self care skills, impaired balance, gait instability.     Rehab Prognosis:  Good; patient would benefit from acute skilled OT services to address these deficits and reach maximum level of function.       Plan:     Patient to be seen 3 x/week to address the above listed problems via self-care/home management, therapeutic activities, therapeutic exercises  · Plan of Care Expires: 06/18/19  · Plan of Care Reviewed with: patient, spouse    Subjective     Pain/Comfort:  · Pain Rating 1: 0/10    Objective:     Communicated with: rn prior to session.  Patient found supine with oxygen upon OT entry to room.    General Precautions: Standard, fall   Orthopedic Precautions:N/A   Braces:       Occupational Performance:     Bed Mobility:    · Patient completed Rolling/Turning to Right with modified independence  · Patient completed Scooting/Bridging with modified independence  · Patient completed Supine to Sit with modified independence     Functional Mobility/Transfers:  · Patient completed Sit <> Stand Transfer with supervision  with  no assistive device   · Functional Mobility: Walked CGA to bathroom with .    [facility] 6 Click ADL: 21    Treatment & Education:  From EOB, pt completed BUE therex (x 10-15 reps each) with 3#  dowel including:  - elbow flexion/extension  - shld presses  - hor Abd/Add    Pt declined further exercise due to pain at para site and fatigue.    Patient left EOB with  presentEducation:      GOALS:   Multidisciplinary Problems     Occupational Therapy Goals        Problem: Occupational Therapy Goal    Goal Priority Disciplines Outcome Interventions   Occupational Therapy Goal     OT, PT/OT Ongoing (interventions implemented as appropriate)    Description:  Goals to be met by: 5/26/19    Patient will increase functional independence with ADLs by performing:    Grooming while standing with Stand-by Assistance. MET 5/28  Revised to set up  Toileting from bedside commode with Stand-by Assistance for hygiene and clothing management.   Supine to sit with [place].  Toilet transfer to toilet with Stand-by Assistance.                        Time Tracking:     OT Date of Treatment: 05/30/19  OT Start Time: 1339  OT Stop Time: 1355  OT Total Time (min): 16 min    Billable Minutes:Therapeutic Exercise 16    LAVELL Mcdonough  5/30/2019

## 2019-05-30 NOTE — PROGRESS NOTES
[facility]  Liver Transplant  Progress Note    Patient Name: Jihan Zamudio  MRN: [id number]  Admission Date: 5/16/2019  Hospital Length of Stay: 14 days  Code Status: Full Code  Primary Care Provider: Primary Doctor No    Subjective:     History of Present Illness:  This is a 50 yo F with hx of KESSLER cirrhosis c/b varices, latent TB, GERD, hypothyroidism, lap band 2007, HLD who was sent to the ED from hepatology clinic for shortness of breath. She follows with Dr. Smallwood. She reports feeling more short of breath the past few days and increased abdominal swelling. She reports occasional confusion as well. She was found to have a low sodium as well. With regards to her lung nodule, we have been awaiting films for an outside facility for comparison. She was found to have a large pleural effusion while here, something she has not had before. Pulmonology has been consulted.      Hospital Course:  Pt approved for liver transplant and transferred to LTS on 5/29. Pt w/ SOB. Last Thoracentesis on 5/27. Cultures NGTD. Remains on Rocephin x 5 days, per ID recs, to complete on 5/30. Ongoing issue with hyponatremia, improving with albumin daily.     Interval Hx. No acute events overnight. Received 1u pRBC yesterday w/ appropriate response on morning labs. H/H stable. Na cont to improve with free water restriction and albumin administration. Albumin x 3 today. Pt reports worsening SOB, requiring increased O2 to 3L this am. Repeat CXR pending. Lasix 20mg IV given. Cont 1L FR. Repeat CMP this evening to monitor Na levels. Daily weights and strict I/Os. Cont lactulose/rifaximin. Monitor     Scheduled Meds:   albumin human 25%  25 g Intravenous TID    cefTRIAXone (ROCEPHIN) IVPB  2 g Intravenous Q24H    lactulose  5 g Oral Daily    levothyroxine  112 mcg Oral Daily    miconazole NITRATE 2 %   Topical (Top) BID    pantoprazole  40 mg Oral Daily    rifAXImin  550 mg Oral BID     Continuous Infusions:  PRN  Meds:sodium chloride, sodium chloride, acetaminophen, cyclobenzaprine, dextrose 50%, dextrose 50%, glucagon (human recombinant), glucose, glucose, ondansetron, simethicone, sodium chloride 0.9%, sodium chloride 0.9%, sodium chloride 0.9%, trazodone    Review of Systems   Constitutional: Positive for appetite change and fatigue. Negative for chills and fever.   Respiratory: Positive for cough and shortness of breath.    Cardiovascular: Negative for chest pain and leg swelling.   Gastrointestinal: Negative for abdominal distention, abdominal pain, constipation, diarrhea, nausea and vomiting.   Allergic/Immunologic: Negative for immunocompromised state.   Neurological: Positive for weakness. Negative for tremors.   Psychiatric/Behavioral: Negative for confusion and decreased concentration.     Objective:     Vital Signs (Most Recent):  Temp: 99.2 °F (37.3 °C) (05/30/19 0753)  Pulse: 89 (05/30/19 0753)  Resp: (!) 22 (05/30/19 0753)  BP: 127/61 (05/30/19 0753)  SpO2: (!) 91 % (05/30/19 0753) Vital Signs (24h Range):  Temp:  [97.7 °F (36.5 °C)-99.2 °F (37.3 °C)] 99.2 °F (37.3 °C)  Pulse:  [87-97] 89  Resp:  [16-22] 22  SpO2:  [91 %-94 %] 91 %  BP: (106-128)/(54-62) 127/61     Weight: 101.1 kg (222 lb 14.2 oz)  Body mass index is 37.09 kg/m².    Intake/Output - Last 3 Shifts       05/28 0700 - 05/29 0659 05/29 0700 - 05/30 0659 05/30 0700 - 05/31 0659    P.O. 830 240 150    I.V. (mL/kg)       Blood   100    Total Intake(mL/kg) 830 (8.2) 240 (2.4) 250 (2.5)    Urine (mL/kg/hr) 825 (0.3) 200 (0.1) 450 (1)    Other       Stool 0 0 0    Total Output 825 200 450    Net +5 +40 -200           Urine Occurrence 4 x 1 x     Stool Occurrence 3 x 2 x 2 x          Physical Exam   Constitutional: She is oriented to person, place, and time. No distress. Nasal cannula in place.   Eyes: Pupils are equal, round, and reactive to light. EOM are normal. Scleral icterus is present.   Neck: Normal range of motion.   Cardiovascular: Normal rate  and regular rhythm.   No murmur heard.  Pulmonary/Chest: Effort normal. No respiratory distress. She has decreased breath sounds. She has no wheezes.   Abdominal: Soft. Bowel sounds are normal. She exhibits no distension. There is no tenderness. There is no guarding.   Musculoskeletal: Normal range of motion.   Neurological: She is alert and oriented to person, place, and time.   Skin: Skin is warm and dry. She is not diaphoretic.   Psychiatric: She has a normal mood and affect. Her behavior is normal. Thought content normal.   Nursing note and vitals reviewed.      Laboratory:  Immunosuppressants     None        CBC:   Recent Labs   Lab 05/30/19 0312   WBC 2.37*   RBC 2.39*   HGB 7.4*   HCT 21.9*   PLT 45*   MCV 92   MCH 31.0   MCHC 33.8     CMP:   Recent Labs   Lab 05/30/19 0312   GLU 82   CALCIUM 8.5*   ALBUMIN 3.4*   PROT 5.7*   *   K 3.8   CO2 22*   CL 96   BUN 13   CREATININE 0.7   ALKPHOS 89   ALT 28   AST 71*   BILITOT 8.6*     Labs within the past 24 hours have been reviewed.    Diagnostic Results:  I have personally reviewed all pertinent imaging studies.    Assessment/Plan:     * Acute hypoxemic respiratory failure  - 2/2 pleural effusion and R sided hydrothorax  - Satting 95% on 2L NC  - S/p thora on 5/17 with pulm, 1.5 L removed   - CT chest completed and shows stable pulmonary nodules;   - S/p thoracentesis on 5/27 1.5L removed  - On 3L O2 this am. Repeat CXR pending  - Lasix 20mg administered.      Hypoxia  - remains on 3L O2 today  - repeat CXR today  - Lasix 20mg      Acquired coagulation factor deficiency  - no obvious signs of bleeding, denies melena or hematochezia  - continue to monitor. Consider FFP/Vit K for invasive procedures.       Acute on chronic anemia  - 2/2 ESLD  - transfused 1u pRBC 5/29 w/ good response.   - H/H stable  - Cont to monitor w/ daily labs      Fever  - afebrile at this time. 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  - MELD-Na score: 29 at 5/30/2019   3:12 AM  MELD score: 22 at 5/30/2019  3:12 AM  Calculated from:  Serum Creatinine: 0.7 mg/dL (Rounded to 1 mg/dL) at 5/30/2019  3:12 AM  Serum Sodium: 126 mmol/L at 5/30/2019  3:12 AM  Total Bilirubin: 8.6 mg/dL at 5/30/2019  3:12 AM  INR(ratio): 1.9 at 5/30/2019  3:12 AM  Age: 49 years      Hypokalemia  - Replace PRN  - Monitor      Pleural effusion, right  - see acute hypoxemic resp failure      Hepatic encephalopathy  -  Chronic and stable  - Cont lactulose/rifaximin      Hyponatremia  - 2/2 chronic liver disease  - 1L Fluid restriction  - giving albumin x 3       Coagulopathy  - 2/2 liver disease  - monitor for bleedingt    Esophageal varices  - chronic and stable. Monitor      GERD (gastroesophageal reflux disease)  - chronic and stable  - Cont PPI      Hypothyroidism  - Chronic and stable  - Continue Synthroid 112mcg PO daily      Decompensated hepatic cirrhosis  - 2/2 KESSLER  - approved for transplant list  - Paracentesis on 5/27 with 3.2L removed and negative for SBP  - Low Sodium diet with 1L fluid restriction  - Hold diuretics due to hyponatremia  - Cont Lactulose, titrate 3-4 BM/day  - Rifaximin 550mg PO BID  - tentative paracentesis monday     MELD-Na score: 29 at 5/30/2019  3:12 AM  MELD score: 22 at 5/30/2019  3:12 AM  Calculated from:  Serum Creatinine: 0.7 mg/dL (Rounded to 1 mg/dL) at 5/30/2019  3:12 AM  Serum Sodium: 126 mmol/L at 5/30/2019  3:12 AM  Total Bilirubin: 8.6 mg/dL at 5/30/2019  3:12 AM  INR(ratio): 1.9 at 5/30/2019  3:12 AM  Age: 49 years        Debility/Functional status: Patient debilitated by evidence of Muscle wasting and atrophy, Weakness, Chronic fatigue, unspecified and Cachexia. Physical and occupational therapy ordered daily to evaluate and treat. Debility was: present on admission.    VTE Risk Mitigation (From admission, onward)        Ordered     IP VTE HIGH RISK PATIENT  Once      05/17/19 0023     Place sequential compression device  Until discontinued      05/17/19 0023           The patients clinical status was discussed at multidisplinary rounds, involving transplant surgery, transplant medicine, pharmacy, nursing, nutrition, and social work    Discharge Planning:  No Patient Care Coordination Note on file.      Laura Benitez PA-C  Liver Transplant  [facility]

## 2019-05-30 NOTE — ASSESSMENT & PLAN NOTE
- 2/2 pleural effusion and R sided hydrothorax  - Satting 95% on 2L NC  - S/p thora on 5/17 with pulm, 1.5 L removed   - CT chest completed and shows stable pulmonary nodules;   - S/p thoracentesis on 5/27 1.5L removed  - On 3L O2 this am. Repeat CXR pending  - Lasix 20mg administered.

## 2019-05-31 ENCOUNTER — TELEPHONE (OUTPATIENT)
Dept: TRANSPLANT | Facility: CLINIC | Age: 49
End: 2019-05-31

## 2019-05-31 PROBLEM — R23.9 ALTERATION IN SKIN INTEGRITY: Status: ACTIVE | Noted: 2019-05-31

## 2019-05-31 LAB
ALBUMIN SERPL BCP-MCNC: 3.7 G/DL (ref 3.5–5.2)
ALP SERPL-CCNC: 84 U/L (ref 55–135)
ALT SERPL W/O P-5'-P-CCNC: 24 U/L (ref 10–44)
ANION GAP SERPL CALC-SCNC: 8 MMOL/L (ref 8–16)
APPEARANCE FLD: NORMAL
AST SERPL-CCNC: 62 U/L (ref 10–40)
BACTERIA BLD CULT: NORMAL
BACTERIA BLD CULT: NORMAL
BASOPHILS # BLD AUTO: 0.01 K/UL (ref 0–0.2)
BASOPHILS NFR BLD: 0.5 % (ref 0–1.9)
BILIRUB SERPL-MCNC: 9.3 MG/DL (ref 0.1–1)
BLD PROD TYP BPU: NORMAL
BLOOD UNIT EXPIRATION DATE: NORMAL
BLOOD UNIT TYPE CODE: 5100
BLOOD UNIT TYPE: NORMAL
BODY FLD TYPE: NORMAL
BUN SERPL-MCNC: 11 MG/DL (ref 6–20)
CALCIUM SERPL-MCNC: 8.6 MG/DL (ref 8.7–10.5)
CHLORIDE SERPL-SCNC: 97 MMOL/L (ref 95–110)
CO2 SERPL-SCNC: 22 MMOL/L (ref 23–29)
CODING SYSTEM: NORMAL
COLOR FLD: NORMAL
CREAT SERPL-MCNC: 0.7 MG/DL (ref 0.5–1.4)
DIFFERENTIAL METHOD: ABNORMAL
DISPENSE STATUS: NORMAL
EOSINOPHIL # BLD AUTO: 0.1 K/UL (ref 0–0.5)
EOSINOPHIL NFR BLD: 5.2 % (ref 0–8)
ERYTHROCYTE [DISTWIDTH] IN BLOOD BY AUTOMATED COUNT: 18.7 % (ref 11.5–14.5)
EST. GFR  (AFRICAN AMERICAN): >60 ML/MIN/1.73 M^2
EST. GFR  (NON AFRICAN AMERICAN): >60 ML/MIN/1.73 M^2
GLUCOSE SERPL-MCNC: 77 MG/DL (ref 70–110)
GRAM STN SPEC: NORMAL
GRAM STN SPEC: NORMAL
HCT VFR BLD AUTO: 20.7 % (ref 37–48.5)
HGB BLD-MCNC: 7 G/DL (ref 12–16)
IMM GRANULOCYTES # BLD AUTO: 0.01 K/UL (ref 0–0.04)
IMM GRANULOCYTES NFR BLD AUTO: 0.5 % (ref 0–0.5)
INR PPP: 2.1 (ref 0.8–1.2)
LYMPHOCYTES # BLD AUTO: 0.6 K/UL (ref 1–4.8)
LYMPHOCYTES NFR BLD: 27.1 % (ref 18–48)
LYMPHOCYTES NFR FLD MANUAL: 76 %
MAGNESIUM SERPL-MCNC: 2.1 MG/DL (ref 1.6–2.6)
MCH RBC QN AUTO: 31.3 PG (ref 27–31)
MCHC RBC AUTO-ENTMCNC: 33.8 G/DL (ref 32–36)
MCV RBC AUTO: 92 FL (ref 82–98)
MESOTHL CELL NFR FLD MANUAL: 9 %
MONOCYTES # BLD AUTO: 0.2 K/UL (ref 0.3–1)
MONOCYTES NFR BLD: 7.6 % (ref 4–15)
MONOS+MACROS NFR FLD MANUAL: 7 %
NEUTROPHILS # BLD AUTO: 1.2 K/UL (ref 1.8–7.7)
NEUTROPHILS NFR BLD: 59.1 % (ref 38–73)
NEUTROPHILS NFR FLD MANUAL: 8 %
NRBC BLD-RTO: 0 /100 WBC
PHOSPHATE SERPL-MCNC: 2.8 MG/DL (ref 2.7–4.5)
PLATELET # BLD AUTO: 32 K/UL (ref 150–350)
PLATELET BLD QL SMEAR: ABNORMAL
PMV BLD AUTO: 11.3 FL (ref 9.2–12.9)
POLYCHROMASIA BLD QL SMEAR: ABNORMAL
POTASSIUM SERPL-SCNC: 3.6 MMOL/L (ref 3.5–5.1)
PROT SERPL-MCNC: 5.7 G/DL (ref 6–8.4)
PROTHROMBIN TIME: 20.5 SEC (ref 9–12.5)
RBC # BLD AUTO: 2.24 M/UL (ref 4–5.4)
SODIUM SERPL-SCNC: 127 MMOL/L (ref 136–145)
TRANS ERYTHROCYTES VOL PATIENT: NORMAL ML
WBC # BLD AUTO: 2.1 K/UL (ref 3.9–12.7)
WBC # FLD: 231 /CU MM

## 2019-05-31 PROCEDURE — 87205 SMEAR GRAM STAIN: CPT | Mod: NTX

## 2019-05-31 PROCEDURE — 85610 PROTHROMBIN TIME: CPT | Mod: NTX

## 2019-05-31 PROCEDURE — 25000003 PHARM REV CODE 250: Mod: NTX | Performed by: HOSPITALIST

## 2019-05-31 PROCEDURE — P9021 RED BLOOD CELLS UNIT: HCPCS | Mod: NTX

## 2019-05-31 PROCEDURE — 87075 CULTR BACTERIA EXCEPT BLOOD: CPT | Mod: NTX

## 2019-05-31 PROCEDURE — 87070 CULTURE OTHR SPECIMN AEROBIC: CPT | Mod: NTX

## 2019-05-31 PROCEDURE — 87206 SMEAR FLUORESCENT/ACID STAI: CPT | Mod: NTX

## 2019-05-31 PROCEDURE — 20600001 HC STEP DOWN PRIVATE ROOM: Mod: NTX

## 2019-05-31 PROCEDURE — 63600175 PHARM REV CODE 636 W HCPCS: Mod: NTX | Performed by: PHYSICIAN ASSISTANT

## 2019-05-31 PROCEDURE — 36415 COLL VENOUS BLD VENIPUNCTURE: CPT | Mod: NTX

## 2019-05-31 PROCEDURE — 99233 PR SUBSEQUENT HOSPITAL CARE,LEVL III: ICD-10-PCS | Mod: NTX,,, | Performed by: PHYSICIAN ASSISTANT

## 2019-05-31 PROCEDURE — 80053 COMPREHEN METABOLIC PANEL: CPT | Mod: NTX

## 2019-05-31 PROCEDURE — 97110 THERAPEUTIC EXERCISES: CPT | Mod: NTX

## 2019-05-31 PROCEDURE — 99233 SBSQ HOSP IP/OBS HIGH 50: CPT | Mod: NTX,,, | Performed by: PHYSICIAN ASSISTANT

## 2019-05-31 PROCEDURE — 89051 BODY FLUID CELL COUNT: CPT | Mod: NTX

## 2019-05-31 PROCEDURE — 83735 ASSAY OF MAGNESIUM: CPT | Mod: NTX

## 2019-05-31 PROCEDURE — 25000003 PHARM REV CODE 250: Mod: NTX | Performed by: PHYSICIAN ASSISTANT

## 2019-05-31 PROCEDURE — 84100 ASSAY OF PHOSPHORUS: CPT | Mod: NTX

## 2019-05-31 PROCEDURE — 85025 COMPLETE CBC W/AUTO DIFF WBC: CPT | Mod: NTX

## 2019-05-31 PROCEDURE — 87116 MYCOBACTERIA CULTURE: CPT | Mod: NTX

## 2019-05-31 PROCEDURE — 36430 TRANSFUSION BLD/BLD COMPNT: CPT

## 2019-05-31 PROCEDURE — P9047 ALBUMIN (HUMAN), 25%, 50ML: HCPCS | Mod: JG,NTX | Performed by: PHYSICIAN ASSISTANT

## 2019-05-31 PROCEDURE — 87015 SPECIMEN INFECT AGNT CONCNTJ: CPT | Mod: NTX

## 2019-05-31 PROCEDURE — 87102 FUNGUS ISOLATION CULTURE: CPT | Mod: NTX

## 2019-05-31 RX ORDER — HYDROCODONE BITARTRATE AND ACETAMINOPHEN 500; 5 MG/1; MG/1
TABLET ORAL
Status: DISCONTINUED | OUTPATIENT
Start: 2019-05-31 | End: 2019-06-01

## 2019-05-31 RX ORDER — TRAMADOL HYDROCHLORIDE 50 MG/1
50 TABLET ORAL ONCE
Status: COMPLETED | OUTPATIENT
Start: 2019-05-31 | End: 2019-05-31

## 2019-05-31 RX ORDER — FUROSEMIDE 10 MG/ML
40 INJECTION INTRAMUSCULAR; INTRAVENOUS ONCE
Status: COMPLETED | OUTPATIENT
Start: 2019-05-31 | End: 2019-05-31

## 2019-05-31 RX ADMIN — MICONAZOLE NITRATE: 20 POWDER TOPICAL at 09:05

## 2019-05-31 RX ADMIN — MICONAZOLE NITRATE: 20 POWDER TOPICAL at 08:05

## 2019-05-31 RX ADMIN — LACTULOSE 5 G: 20 SOLUTION ORAL at 09:05

## 2019-05-31 RX ADMIN — FUROSEMIDE 40 MG: 10 INJECTION, SOLUTION INTRAMUSCULAR; INTRAVENOUS at 04:05

## 2019-05-31 RX ADMIN — RIFAXIMIN 550 MG: 550 TABLET ORAL at 09:05

## 2019-05-31 RX ADMIN — RIFAXIMIN 550 MG: 550 TABLET ORAL at 08:05

## 2019-05-31 RX ADMIN — LEVOTHYROXINE SODIUM 112 MCG: 112 TABLET ORAL at 06:05

## 2019-05-31 RX ADMIN — PANTOPRAZOLE SODIUM 40 MG: 40 TABLET, DELAYED RELEASE ORAL at 09:05

## 2019-05-31 RX ADMIN — TRAMADOL HYDROCHLORIDE 50 MG: 50 TABLET, FILM COATED ORAL at 11:05

## 2019-05-31 RX ADMIN — FUROSEMIDE 20 MG: 10 INJECTION, SOLUTION INTRAMUSCULAR; INTRAVENOUS at 01:05

## 2019-05-31 RX ADMIN — ALBUMIN (HUMAN) 25 G: 25 SOLUTION INTRAVENOUS at 09:05

## 2019-05-31 NOTE — PLAN OF CARE
Problem: Physical Therapy Goal  Goal: Physical Therapy Goal  Goals to be met by: 19     Patient will increase functional independence with mobility by performin. Supine to sit with [place].  2. Sit to supine with [place].  3. Sit to stand transfer with Supervision.  4. Bed to chair transfer with Supervision using Single-point Cane.  5. Gait  x 50 feet with Supervision using Single-point Cane.   6. Ascend/Descend 6 inch curb step with Supervision using Single-point Cane.  7. Lower extremity exercise program x 20 reps per handout, with assistance as needed.     Outcome: Ongoing (interventions implemented as appropriate)  No goals met today

## 2019-05-31 NOTE — PROCEDURES
Radiology Post-Procedure Note    Pre Op Diagnosis: Right pleural effusion  Post Op Diagnosis: Same    Procedure: Right thoracentesis    Procedure performed by: Augustine Leahy MD    Written Informed Consent Obtained: Yes  Specimen Removed: YES 1.5 L serosanguinous  Estimated Blood Loss: Minimal    Findings:   US guided right thoracentesis.    Patient tolerated procedure well.    Augustine Leahy MD  Department of Radiology  Pager: [phone]

## 2019-05-31 NOTE — PT/OT/SLP PROGRESS
Physical Therapy Treatment    Patient Name:  Jihan Zamudio   MRN:  [id number]    Recommendations:     Discharge Recommendations:  home health PT   Discharge Equipment Recommendations: none   Barriers to discharge: None    Assessment:     Jihan Zamudio is a 49 y.o. female admitted with a medical diagnosis of Acute hypoxemic respiratory failure.  She presents with the following impairments/functional limitations:  weakness, impaired endurance, impaired self care skills, impaired functional mobilty, gait instability, impaired balance, pain Pt. reluctant, but cooperative after max encouragement and tolerated treatment fairly well. Pt. Only agreeable to therex in supine in bed.    Rehab Prognosis: Good; patient would benefit from acute skilled PT services to address these deficits and reach maximum level of function.    Recent Surgery: * No surgery found *      Plan:     During this hospitalization, patient to be seen 3 x/week to address the identified rehab impairments via gait training, therapeutic activities, therapeutic exercises and progress toward the following goals:    · Plan of Care Expires:  06/13/19    Subjective     Chief Complaint: weakness/fatigue  Patient/Family Comments/goals: to get stronger  Pain/Comfort:  · Pain Rating 1: (pt. did not rate)      Objective:     Communicated with nursing prior to session.  Patient found supine with peripheral IV upon PT entry to room.     General Precautions: Standard, fall   Orthopedic Precautions:N/A   Braces:       Functional Mobility:  ·       AM-PAC 6 CLICK MOBILITY  Turning over in bed (including adjusting bedclothes, sheets and blankets)?: 2  Sitting down on and standing up from a chair with arms (e.g., wheelchair, bedside commode, etc.): 3  Moving from lying on back to sitting on the side of the bed?: 2  Moving to and from a bed to a chair (including a wheelchair)?: 3  Need to walk in hospital room?: 3  Climbing 3-5 steps with a railing?: 2  Basic Mobility  Total Score: 15       Therapeutic Activities and Exercises:   Pt. Performed (B) LE therex x10 reps A-AAROM supine in bed. Discussed POC.    Patient left supine with all lines intact and call button in reach..    GOALS:   Multidisciplinary Problems     Physical Therapy Goals        Problem: Physical Therapy Goal    Goal Priority Disciplines Outcome Goal Variances Interventions   Physical Therapy Goal     PT, PT/OT Ongoing (interventions implemented as appropriate)     Description:  Goals to be met by: 19     Patient will increase functional independence with mobility by performin. Supine to sit with Wallace.  2. Sit to supine with Wallace.  3. Sit to stand transfer with Supervision.  4. Bed to chair transfer with Supervision using Single-point Cane.  5. Gait  x 50 feet with Supervision using Single-point Cane.   6. Ascend/Descend 6 inch curb step with Supervision using Single-point Cane.  7. Lower extremity exercise program x 20 reps per handout, with assistance as needed.                      Time Tracking:     PT Received On: 19  PT Start Time: 1537     PT Stop Time: 1552  PT Total Time (min): 15 min     Billable Minutes: Therapeutic Exercise 15    Treatment Type: Treatment  PT/PTA: PT     PTA Visit Number: 0     Theodore Coles, PT  2019

## 2019-05-31 NOTE — ASSESSMENT & PLAN NOTE
- 2/2 chronic liver disease  - 1L Fluid restriction  - Na improving with daily albumin x 3.  - No additional albumin today.

## 2019-05-31 NOTE — TELEPHONE ENCOUNTER
PATIENT NAME: Jihan Zamudio  [facility] #: [id number]    Lab Results   Component Value Date    CREATININE 0.7 05/31/2019     (L) 05/31/2019    BILITOT 9.3 (H) 05/31/2019    ALBUMIN 3.7 05/31/2019    INR 2.1 (H) 05/31/2019     MELD 29  Encephalopathy: 1 - 2  Ascites: moderate  Dialysis: no     Recertification requestor: Jodi Quintero

## 2019-05-31 NOTE — ASSESSMENT & PLAN NOTE
- see acute hypoxemic resp failure  - CXR 5/30 w/ large right pleural effusion  - Thoracentesis 5/31, 1.5L removed. Fluid sent for analysis

## 2019-05-31 NOTE — PROGRESS NOTES
Consult received on patient. Partial thickness skin break noted noted to sacral spine near gluteal cleft with blanchable redness noted to periwound. Wound appears to be moisture related.    Recommendations:  Waffle overlay  BID/prn- clean with bathing cloth, apply zinc barrier cream  Turn every 2hrs.    Nursing to continue care. Wound care to follow as needed.       05/31/19 1516        Wound 05/16/19 2300 Ulceration Sacral Spine   Date First Assessed/Time First Assessed: 05/16/19 2300   Pre-existing: Yes  Primary Wound Type: Ulceration  Location: Sacral Spine  Removal Indication and Assessment: not present upon hospital arrival   Wound Image    Wound WDL ex   Drainage Amount None   Drainage Characteristics/Odor No odor   Appearance Moist;Pink   Tissue loss description Partial thickness   Periwound Area Redness  (blanchable)   Wound Edges Open   Wound Length (cm) 0.5 cm   Wound Width (cm) 0.5 cm   Wound Depth (cm) 0.1 cm   Wound Volume (cm^3) 0.02 cm^3   Wound Surface Area (cm^2) 0.25 cm^2   Care Cleansed with:;Applied:;Skin Barrier  (bathing cloth)

## 2019-05-31 NOTE — PLAN OF CARE
Problem: Adult Inpatient Plan of Care  Goal: Plan of Care Review  Outcome: Ongoing (interventions implemented as appropriate)  POC reviewed with Pt. No acute changes. A&Ox4. VSS. Family at bedside. Pt complains of shortness of breath. MD aware. Thoracentesis today in IR. Pt tolerating it well. All questions answered. Safety checks performed. Call light in reach. Will continue to monitor.

## 2019-05-31 NOTE — ASSESSMENT & PLAN NOTE
- 2/2 pleural effusion and R sided hydrothorax  - Satting 95% on 2L NC  - S/p thora on 5/17 with pulm, 1.5 L removed   - CT chest completed and shows stable pulmonary nodules;   - S/p thoracentesis on 5/27 1.5L removed  - On 2L O2 this am. Cont to wean as tolerated

## 2019-05-31 NOTE — ASSESSMENT & PLAN NOTE
- 2/2 KESSLER  - approved for transplant list  - Paracentesis on 5/27 with 3.2L removed and negative for SBP  - Low Sodium diet with 1L fluid restriction  - Hold diuretics due to hyponatremia  - Cont Lactulose, titrate 3-4 BM/day  - Rifaximin 550mg PO BID  - tentative paracentesis monday     MELD-Na score: 29 at 5/31/2019  4:14 AM  MELD score: 23 at 5/31/2019  4:14 AM  Calculated from:  Serum Creatinine: 0.7 mg/dL (Rounded to 1 mg/dL) at 5/31/2019  4:14 AM  Serum Sodium: 127 mmol/L at 5/31/2019  4:14 AM  Total Bilirubin: 9.3 mg/dL at 5/31/2019  4:14 AM  INR(ratio): 2.1 at 5/31/2019  4:14 AM  Age: 49 years

## 2019-05-31 NOTE — PROGRESS NOTES
Thoracentesis complete. 1500 mLs pleural fluid drained. Pt tolerated well. Dressing to right poserior back clean, dry, and intact. Specimens sent per lab order. Report called to floor nurse Iron. Awaiting transport to floor with O2 on.

## 2019-05-31 NOTE — SUBJECTIVE & OBJECTIVE
Scheduled Meds:   cefTRIAXone (ROCEPHIN) IVPB  2 g Intravenous Q24H    furosemide  40 mg Intravenous Once    lactulose  5 g Oral Daily    levothyroxine  112 mcg Oral Daily    miconazole NITRATE 2 %   Topical (Top) BID    pantoprazole  40 mg Oral Daily    rifAXImin  550 mg Oral BID     Continuous Infusions:  PRN Meds:sodium chloride, sodium chloride, sodium chloride, acetaminophen, cyclobenzaprine, dextrose 50%, dextrose 50%, glucagon (human recombinant), glucose, glucose, ondansetron, phenyleph-min oil-petrolatum, simethicone, sodium chloride 0.9%, sodium chloride 0.9%, sodium chloride 0.9%, trazodone    Review of Systems   Constitutional: Positive for appetite change and fatigue. Negative for chills and fever.   Respiratory: Positive for cough and shortness of breath.    Cardiovascular: Negative for chest pain and leg swelling.   Gastrointestinal: Positive for abdominal distention. Negative for abdominal pain, constipation, diarrhea, nausea and vomiting.   Allergic/Immunologic: Negative for immunocompromised state.   Neurological: Positive for weakness. Negative for tremors.   Psychiatric/Behavioral: Negative for confusion and decreased concentration.     Objective:     Vital Signs (Most Recent):  Temp: 98.3 °F (36.8 °C) (05/31/19 1215)  Pulse: 92 (05/31/19 1215)  Resp: 20 (05/31/19 0733)  BP: (!) 131/58 (05/31/19 1215)  SpO2: (!) 93 % (05/31/19 1215) Vital Signs (24h Range):  Temp:  [97.4 °F (36.3 °C)-99.2 °F (37.3 °C)] 98.3 °F (36.8 °C)  Pulse:  [] 92  Resp:  [18-20] 20  SpO2:  [91 %-96 %] 93 %  BP: (104-132)/(50-60) 131/58     Weight: 101.1 kg (222 lb 14.2 oz)  Body mass index is 37.09 kg/m².    Intake/Output - Last 3 Shifts       05/29 0700 - 05/30 0659 05/30 0700 - 05/31 0659 05/31 0700 - 06/01 0659    P.O. 240 150     Blood  200     Total Intake(mL/kg) 240 (2.4) 350 (3.5)     Urine (mL/kg/hr) 200 (0.1) 1750 (0.7) 200 (0.3)    Stool 0 0 0    Total Output 200 1750 200    Net +40 -1400 -200            Urine Occurrence 1 x  1 x    Stool Occurrence 2 x 5 x 1 x          Physical Exam   Constitutional: She is oriented to person, place, and time. No distress. Nasal cannula in place.   Eyes: Pupils are equal, round, and reactive to light. EOM are normal. Scleral icterus is present.   Neck: Normal range of motion.   Cardiovascular: Normal rate and regular rhythm.   No murmur heard.  Pulmonary/Chest: Effort normal. No respiratory distress. She has decreased breath sounds in the right middle field and the right lower field. She has no wheezes.   Abdominal: Soft. Bowel sounds are normal. She exhibits no distension. There is no tenderness. There is no guarding.   Musculoskeletal: Normal range of motion.   Neurological: She is alert and oriented to person, place, and time.   Skin: Skin is warm and dry. She is not diaphoretic.   Psychiatric: She has a normal mood and affect. Her behavior is normal. Thought content normal.   Nursing note and vitals reviewed.      Laboratory:  Immunosuppressants     None        CBC:   Recent Labs   Lab 05/31/19  0414   WBC 2.10*   RBC 2.24*   HGB 7.0*   HCT 20.7*   PLT 32*   MCV 92   MCH 31.3*   MCHC 33.8     CMP:   Recent Labs   Lab 05/31/19  0414   GLU 77   CALCIUM 8.6*   ALBUMIN 3.7   PROT 5.7*   *   K 3.6   CO2 22*   CL 97   BUN 11   CREATININE 0.7   ALKPHOS 84   ALT 24   AST 62*   BILITOT 9.3*     Labs within the past 24 hours have been reviewed.    Diagnostic Results:  I have personally reviewed all pertinent imaging studies.

## 2019-05-31 NOTE — ASSESSMENT & PLAN NOTE
- 2/2 ESLD  - transfused 1u pRBC 5/29 w/ good response.   - Transfuse 1u pRBC today  - Cont to monitor w/ daily labs

## 2019-05-31 NOTE — PLAN OF CARE
Problem: Adult Inpatient Plan of Care  Goal: Plan of Care Review  Outcome: Ongoing (interventions implemented as appropriate)     05/31/19 0560   Plan of Care Review   Plan of Care Reviewed With patient   Progress no change   POC reviewed with Pt.  No acute events overnight. Denied pain or discomfort. Awaiting transfer to TSU once room is available. Up to BSC with standby assist. IV lasix given, I&O's documented on flow sheet. Vitals stable. Safety maintained. Will continue to monitor.

## 2019-05-31 NOTE — PROGRESS NOTES
[facility]  Liver Transplant  Progress Note    Patient Name: Jihan Zamudio  MRN: [id number]  Admission Date: 5/16/2019  Hospital Length of Stay: 15 days  Code Status: Full Code  Primary Care Provider: Primary Doctor No    Subjective:     History of Present Illness:  This is a 48 yo F with hx of KESSLER cirrhosis c/b varices, latent TB, GERD, hypothyroidism, lap band 2007, HLD who was sent to the ED from hepatology clinic for shortness of breath. She follows with Dr. Smallwood. She reports feeling more short of breath the past few days and increased abdominal swelling. She reports occasional confusion as well. She was found to have a low sodium as well. With regards to her lung nodule, we have been awaiting films for an outside facility for comparison. She was found to have a large pleural effusion while here, something she has not had before. Pulmonology has been consulted.      Hospital Course:  Pt approved for liver transplant and transferred to LTS on 5/29. Pt w/ SOB. Last Thoracentesis on 5/27. Cultures NGTD. Remains on Rocephin x 5 days, per ID recs, to complete on 5/30. Ongoing issue with hyponatremia, improving with albumin daily. Na cont to improve with free water restriction and albumin administration.    Interval Hx. No acute events overnight. Remains on 2L O2 this am. CXR w/ large pleural effusion, required additional lasix overnight. IR consulted for thoracentesis today, 5/31 with 1.5L removed. Fluid analysis pending.  Lasix 40mg IV given. Will transfuse 1u pRBC. Cont 1L FR. Daily weights and strict I/Os. Cont lactulose/rifaximin. Monitor     Scheduled Meds:   cefTRIAXone (ROCEPHIN) IVPB  2 g Intravenous Q24H    furosemide  40 mg Intravenous Once    lactulose  5 g Oral Daily    levothyroxine  112 mcg Oral Daily    miconazole NITRATE 2 %   Topical (Top) BID    pantoprazole  40 mg Oral Daily    rifAXImin  550 mg Oral BID     Continuous Infusions:  PRN Meds:sodium chloride, sodium  chloride, sodium chloride, acetaminophen, cyclobenzaprine, dextrose 50%, dextrose 50%, glucagon (human recombinant), glucose, glucose, ondansetron, phenyleph-min oil-petrolatum, simethicone, sodium chloride 0.9%, sodium chloride 0.9%, sodium chloride 0.9%, trazodone    Review of Systems   Constitutional: Positive for appetite change and fatigue. Negative for chills and fever.   Respiratory: Positive for cough and shortness of breath.    Cardiovascular: Negative for chest pain and leg swelling.   Gastrointestinal: Positive for abdominal distention. Negative for abdominal pain, constipation, diarrhea, nausea and vomiting.   Allergic/Immunologic: Negative for immunocompromised state.   Neurological: Positive for weakness. Negative for tremors.   Psychiatric/Behavioral: Negative for confusion and decreased concentration.     Objective:     Vital Signs (Most Recent):  Temp: 98.3 °F (36.8 °C) (05/31/19 1215)  Pulse: 92 (05/31/19 1215)  Resp: 20 (05/31/19 0733)  BP: (!) 131/58 (05/31/19 1215)  SpO2: (!) 93 % (05/31/19 1215) Vital Signs (24h Range):  Temp:  [97.4 °F (36.3 °C)-99.2 °F (37.3 °C)] 98.3 °F (36.8 °C)  Pulse:  [] 92  Resp:  [18-20] 20  SpO2:  [91 %-96 %] 93 %  BP: (104-132)/(50-60) 131/58     Weight: 101.1 kg (222 lb 14.2 oz)  Body mass index is 37.09 kg/m².    Intake/Output - Last 3 Shifts       05/29 0700 - 05/30 0659 05/30 0700 - 05/31 0659 05/31 0700 - 06/01 0659    P.O. 240 150     Blood  200     Total Intake(mL/kg) 240 (2.4) 350 (3.5)     Urine (mL/kg/hr) 200 (0.1) 1750 (0.7) 200 (0.3)    Stool 0 0 0    Total Output 200 1750 200    Net +40 -1400 -200           Urine Occurrence 1 x  1 x    Stool Occurrence 2 x 5 x 1 x          Physical Exam   Constitutional: She is oriented to person, place, and time. No distress. Nasal cannula in place.   Eyes: Pupils are equal, round, and reactive to light. EOM are normal. Scleral icterus is present.   Neck: Normal range of motion.   Cardiovascular: Normal rate and  "regular rhythm.   No murmur heard.  Pulmonary/Chest: Effort normal. No respiratory distress. She has decreased breath sounds in the right middle field and the right lower field. She has no wheezes.   Abdominal: Soft. Bowel sounds are normal. She exhibits no distension. There is no tenderness. There is no guarding.   Musculoskeletal: Normal range of motion.   Neurological: She is alert and oriented to person, place, and time.   Skin: Skin is warm and dry. She is not diaphoretic.   Psychiatric: She has a normal mood and affect. Her behavior is normal. Thought content normal.   Nursing note and vitals reviewed.      Laboratory:  Immunosuppressants     None        CBC:   Recent Labs   Lab 05/31/19 0414   WBC 2.10*   RBC 2.24*   HGB 7.0*   HCT 20.7*   PLT 32*   MCV 92   MCH 31.3*   MCHC 33.8     CMP:   Recent Labs   Lab 05/31/19 0414   GLU 77   CALCIUM 8.6*   ALBUMIN 3.7   PROT 5.7*   *   K 3.6   CO2 22*   CL 97   BUN 11   CREATININE 0.7   ALKPHOS 84   ALT 24   AST 62*   BILITOT 9.3*     Labs within the past 24 hours have been reviewed.    Diagnostic Results:  I have personally reviewed all pertinent imaging studies.    Assessment/Plan:     * Acute hypoxemic respiratory failure  - 2/2 pleural effusion and R sided hydrothorax  - Satting 95% on 2L NC  - S/p thora on 5/17 with pulm, 1.5 L removed   - CT chest completed and shows stable pulmonary nodules;   - S/p thoracentesis on 5/27 1.5L removed  - On 2L O2 this am. Cont to wean as tolerated    Hypoxia  - see "acute hypoxemic resp failure"      Acquired coagulation factor deficiency  - no obvious signs of bleeding, denies melena or hematochezia  - continue to monitor. Consider FFP/Vit K for invasive procedures.       Acute on chronic anemia  - 2/2 ESLD  - transfused 1u pRBC 5/29 w/ good response.   - Transfuse 1u pRBC today  - Cont to monitor w/ daily labs      Fever  - afebrile at this time. 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 Fluid sent for analysis      Hepatic encephalopathy  -  Chronic and stable  - Cont lactulose/rifaximin      Hyponatremia  - 2/2 chronic liver disease  - 1L Fluid restriction  - Na improving with daily albumin x 3.  - No additional albumin today.      Coagulopathy  - 2/2 liver disease  - monitor for bleeding    Esophageal varices  - chronic and stable. Monitor      GERD (gastroesophageal reflux disease)  - chronic and stable  - Cont PPI      Hypothyroidism  - Chronic and stable  - Continue Synthroid 112mcg PO daily      Decompensated hepatic cirrhosis  - 2/2 KESSLER  - approved for transplant list  - Paracentesis on 5/27 with 3.2L removed and negative for SBP  - Low Sodium diet with 1L fluid restriction  - Hold diuretics due to hyponatremia  - Cont Lactulose, titrate 3-4 BM/day  - Rifaximin 550mg PO BID  - tentative paracentesis monday     MELD-Na score: 29 at 5/31/2019  4:14 AM  MELD score: 23 at 5/31/2019  4:14 AM  Calculated from:  Serum Creatinine: 0.7 mg/dL (Rounded to 1 mg/dL) at 5/31/2019  4:14 AM  Serum Sodium: 127 mmol/L at 5/31/2019  4:14 AM  Total Bilirubin: 9.3 mg/dL at 5/31/2019  4:14 AM  INR(ratio): 2.1 at 5/31/2019  4:14 AM  Age: 49 years          Debility/Functional status: Patient debilitated by evidence of Muscle wasting and atrophy, Weakness, Chronic fatigue, unspecified and Cachexia. Physical and occupational therapy ordered daily to evaluate and treat. Debility was: present on admission.    VTE Risk Mitigation  (From admission, onward)        Ordered     IP VTE HIGH RISK PATIENT  Once      05/17/19 0023     Place sequential compression device  Until discontinued      05/17/19 0023          The patients clinical status was discussed at multidisplinary rounds, involving transplant surgery, transplant medicine, pharmacy, nursing, nutrition, and social work    Discharge Planning:  No Patient Care Coordination Note on file.      Laura Benitez PA-C  Liver Transplant  [facility]

## 2019-05-31 NOTE — CONSULTS
Radiology History & Physical      SUBJECTIVE:     Chief Complaint: Shortness of breath.    History of Present Illness:  Jihan Zamudio is a 49 y.o. female with history of KESSLER cirrhosis being evaluated for liver transplant. IR consulted for right thoracentesis.    Past Medical History:   Diagnosis Date    Cirrhosis     Esophageal varices 2018    Small with no banding     Essential hypertension 2018    Fatty liver 2018    GERD (gastroesophageal reflux disease)     Hx of colonic polyps 2018    On colonoscopy     Hypertension     Hypothyroidism 2018    Kidney stones     Morbid obesity 2018    Lap band with subsequent release    KADEEM (obstructive sleep apnea) 2018    Osteoarthritis 2018    Pulmonary nodule 2018    Vitamin D deficiency 2018     Past Surgical History:   Procedure Laterality Date     SECTION      TIMES 2     CHOLECYSTECTOMY      LAPAROSCOPIC     CYSTOSCOPY W/ STONE MANIPULATION      kidney stone removal    EGD (ESOPHAGOGASTRODUODENOSCOPY) N/A 2019    Performed by Davian Hernández MD at [facility] (2ND FLR)    LAPAROSCOPIC GASTRIC BANDING  2006    removal     LIVER BIOPSY  2017    KESSLER with bridging 17       Home Meds:   Prior to Admission medications    Medication Sig Start Date End Date Taking? Authorizing Provider   acetaminophen (TYLENOL) 500 MG tablet Take 1,000 mg by mouth daily as needed for Pain (headache and bodyaches).   Yes Historical Provider, MD   cholecalciferol, vitamin D3, (VITAMIN D3) 2,000 unit Cap Take 1 capsule by mouth once daily.   Yes Historical Provider, MD   ergocalciferol (ERGOCALCIFEROL) 50,000 unit Cap Take 50,000 Units by mouth every 7 days. Monday   Yes Historical Provider, MD   fexofenadine (ALLEGRA) 180 MG tablet Take 180 mg by mouth daily as needed.   Yes Historical Provider, MD   furosemide (LASIX) 80 MG tablet Take 1 tablet (80 mg total) by mouth 2 (two)  times daily. 5/2/19 5/1/20 Yes Maritza Messina MD   lactulose (CHRONULAC) 20 gram/30 mL Soln Take 30 mLs (20 g total) by mouth 3 (three) times daily. TITRATE TO 3-4 BOWEL MOVEMENTS DAILY. 5/2/19 6/1/19 Yes Maritza Messina MD   levothyroxine (SYNTHROID) 112 MCG tablet Take 112 mcg by mouth once daily.   Yes Historical Provider, MD   lidocaine (LIDODERM) 5 % Place 1 patch onto the skin every 24 hours. Remove & Discard patch within 12 hours  As needed or as directed by MD   Yes Historical Provider, MD   pantoprazole (PROTONIX) 40 MG tablet Take 1 tablet (40 mg total) by mouth once daily. 4/24/19 7/23/19 Yes Jaret Armando MD   rifAXIMin (XIFAXAN) 550 mg Tab Take 550 mg by mouth 2 (two) times daily.    Yes Historical Provider, MD   spironolactone (ALDACTONE) 100 MG tablet Take 2 tablets (200 mg total) by mouth once daily.  Patient taking differently: Take 100 mg by mouth 2 (two) times daily.  5/3/19 5/2/20 Yes Maritza Messina MD   zinc sulfate (ZINCATE) 220 (50) mg capsule Take 1 capsule (220 mg total) by mouth once daily. 5/3/19   Maritza Messina MD     Anticoagulants/Antiplatelets: no anticoagulation    Allergies:   Review of patient's allergies indicates:   Allergen Reactions    Metformin Rash    Pcn [penicillins] Other (See Comments)     Unsure of reaction, states it was as a child. Tolerated rocephin at [facility]     Sedation History:  no adverse reactions    Review of Systems:   Hematological: no known coagulopathies  Respiratory: no shortness of breath  Cardiovascular: no chest pain  Gastrointestinal: no abdominal pain  Genito-Urinary: no dysuria  Musculoskeletal: negative  Neurological: no TIA or stroke symptoms         OBJECTIVE:     Vital Signs (Most Recent)  Temp: 99.2 °F (37.3 °C) (05/31/19 0733)  Pulse: 90 (05/31/19 0733)  Resp: 20 (05/31/19 0733)  BP: (!) 127/59 (05/31/19 0733)  SpO2: (!) 92 % (05/31/19 0733)    Physical Exam:  ASA: 3  Mallampati: 2    General: no acute distress  Mental Status:  alert and oriented to person, place and time  HEENT: normocephalic, atraumatic  Chest: unlabored breathing  Heart: regular heart rate  Abdomen: nondistended  Extremity: moves all extremities    Laboratory  Lab Results   Component Value Date    INR 2.1 (H) 05/31/2019       Lab Results   Component Value Date    WBC 2.10 (L) 05/31/2019    HGB 7.0 (L) 05/31/2019    HCT 20.7 (L) 05/31/2019    MCV 92 05/31/2019    PLT 32 (LL) 05/31/2019      Lab Results   Component Value Date    GLU 77 05/31/2019     (L) 05/31/2019    K 3.6 05/31/2019    CL 97 05/31/2019    CO2 22 (L) 05/31/2019    BUN 11 05/31/2019    CREATININE 0.7 05/31/2019    CALCIUM 8.6 (L) 05/31/2019    MG 2.1 05/31/2019    ALT 24 05/31/2019    AST 62 (H) 05/31/2019    ALBUMIN 3.7 05/31/2019    BILITOT 9.3 (H) 05/31/2019    BILIDIR 1.2 (H) 02/26/2018       ASSESSMENT/PLAN:     Sedation Plan: Local.  Patient will undergo Thoracentesis.    Paxton Roth MD  PGY-II Radiology Resident  [address] [facility]  Pager: [phone]

## 2019-06-01 LAB
ALBUMIN SERPL BCP-MCNC: 3.4 G/DL (ref 3.5–5.2)
ALP SERPL-CCNC: 78 U/L (ref 55–135)
ALT SERPL W/O P-5'-P-CCNC: 23 U/L (ref 10–44)
ANION GAP SERPL CALC-SCNC: 8 MMOL/L (ref 8–16)
ANISOCYTOSIS BLD QL SMEAR: SLIGHT
AST SERPL-CCNC: 63 U/L (ref 10–40)
BASOPHILS # BLD AUTO: 0.02 K/UL (ref 0–0.2)
BASOPHILS NFR BLD: 0.8 % (ref 0–1.9)
BILIRUB SERPL-MCNC: 11.4 MG/DL (ref 0.1–1)
BUN SERPL-MCNC: 11 MG/DL (ref 6–20)
CALCIUM SERPL-MCNC: 8.6 MG/DL (ref 8.7–10.5)
CHLORIDE SERPL-SCNC: 100 MMOL/L (ref 95–110)
CO2 SERPL-SCNC: 22 MMOL/L (ref 23–29)
CREAT SERPL-MCNC: 0.7 MG/DL (ref 0.5–1.4)
DACRYOCYTES BLD QL SMEAR: ABNORMAL
DIFFERENTIAL METHOD: ABNORMAL
EOSINOPHIL # BLD AUTO: 0.1 K/UL (ref 0–0.5)
EOSINOPHIL NFR BLD: 4.6 % (ref 0–8)
ERYTHROCYTE [DISTWIDTH] IN BLOOD BY AUTOMATED COUNT: 18.4 % (ref 11.5–14.5)
EST. GFR  (AFRICAN AMERICAN): >60 ML/MIN/1.73 M^2
EST. GFR  (NON AFRICAN AMERICAN): >60 ML/MIN/1.73 M^2
GLUCOSE SERPL-MCNC: 75 MG/DL (ref 70–110)
HCT VFR BLD AUTO: 23.8 % (ref 37–48.5)
HGB BLD-MCNC: 8.1 G/DL (ref 12–16)
HYPOCHROMIA BLD QL SMEAR: ABNORMAL
IMM GRANULOCYTES # BLD AUTO: 0.01 K/UL (ref 0–0.04)
IMM GRANULOCYTES NFR BLD AUTO: 0.4 % (ref 0–0.5)
INR PPP: 2.2 (ref 0.8–1.2)
LYMPHOCYTES # BLD AUTO: 0.6 K/UL (ref 1–4.8)
LYMPHOCYTES NFR BLD: 23.9 % (ref 18–48)
MAGNESIUM SERPL-MCNC: 2 MG/DL (ref 1.6–2.6)
MCH RBC QN AUTO: 30.9 PG (ref 27–31)
MCHC RBC AUTO-ENTMCNC: 34 G/DL (ref 32–36)
MCV RBC AUTO: 91 FL (ref 82–98)
MONOCYTES # BLD AUTO: 0.2 K/UL (ref 0.3–1)
MONOCYTES NFR BLD: 8.4 % (ref 4–15)
NEUTROPHILS # BLD AUTO: 1.5 K/UL (ref 1.8–7.7)
NEUTROPHILS NFR BLD: 61.9 % (ref 38–73)
NRBC BLD-RTO: 0 /100 WBC
OVALOCYTES BLD QL SMEAR: ABNORMAL
PHOSPHATE SERPL-MCNC: 3.1 MG/DL (ref 2.7–4.5)
PLATELET # BLD AUTO: 38 K/UL (ref 150–350)
PLATELET BLD QL SMEAR: ABNORMAL
PMV BLD AUTO: 10.1 FL (ref 9.2–12.9)
POIKILOCYTOSIS BLD QL SMEAR: SLIGHT
POLYCHROMASIA BLD QL SMEAR: ABNORMAL
POTASSIUM SERPL-SCNC: 3.6 MMOL/L (ref 3.5–5.1)
PROT SERPL-MCNC: 5.5 G/DL (ref 6–8.4)
PROTHROMBIN TIME: 21.8 SEC (ref 9–12.5)
RBC # BLD AUTO: 2.62 M/UL (ref 4–5.4)
SODIUM SERPL-SCNC: 130 MMOL/L (ref 136–145)
WBC # BLD AUTO: 2.38 K/UL (ref 3.9–12.7)

## 2019-06-01 PROCEDURE — 85610 PROTHROMBIN TIME: CPT | Mod: NTX

## 2019-06-01 PROCEDURE — 85025 COMPLETE CBC W/AUTO DIFF WBC: CPT | Mod: NTX

## 2019-06-01 PROCEDURE — 25000003 PHARM REV CODE 250: Mod: NTX | Performed by: HOSPITALIST

## 2019-06-01 PROCEDURE — 20600001 HC STEP DOWN PRIVATE ROOM: Mod: NTX

## 2019-06-01 PROCEDURE — 99233 PR SUBSEQUENT HOSPITAL CARE,LEVL III: ICD-10-PCS | Mod: NTX,,, | Performed by: INTERNAL MEDICINE

## 2019-06-01 PROCEDURE — 99233 SBSQ HOSP IP/OBS HIGH 50: CPT | Mod: NTX,,, | Performed by: INTERNAL MEDICINE

## 2019-06-01 PROCEDURE — 80053 COMPREHEN METABOLIC PANEL: CPT | Mod: NTX

## 2019-06-01 PROCEDURE — 83735 ASSAY OF MAGNESIUM: CPT | Mod: NTX

## 2019-06-01 PROCEDURE — 36415 COLL VENOUS BLD VENIPUNCTURE: CPT | Mod: NTX

## 2019-06-01 PROCEDURE — 84100 ASSAY OF PHOSPHORUS: CPT | Mod: NTX

## 2019-06-01 RX ADMIN — MICONAZOLE NITRATE: 20 POWDER TOPICAL at 09:06

## 2019-06-01 RX ADMIN — LACTULOSE 5 G: 20 SOLUTION ORAL at 08:06

## 2019-06-01 RX ADMIN — PANTOPRAZOLE SODIUM 40 MG: 40 TABLET, DELAYED RELEASE ORAL at 08:06

## 2019-06-01 RX ADMIN — MICONAZOLE NITRATE: 20 POWDER TOPICAL at 08:06

## 2019-06-01 RX ADMIN — RIFAXIMIN 550 MG: 550 TABLET ORAL at 09:06

## 2019-06-01 RX ADMIN — LEVOTHYROXINE SODIUM 112 MCG: 112 TABLET ORAL at 06:06

## 2019-06-01 RX ADMIN — RIFAXIMIN 550 MG: 550 TABLET ORAL at 08:06

## 2019-06-01 NOTE — PROGRESS NOTES
[facility]  Liver Transplant  Progress Note    Patient Name: Jihan Zamudio  MRN: [id number]  Admission Date: 5/16/2019  Hospital Length of Stay: 16 days  Code Status: Full Code  Primary Care Provider: Primary Doctor No    Subjective:     History of Present Illness:  This is a 50 yo F with hx of KESSLER cirrhosis c/b varices, latent TB, GERD, hypothyroidism, lap band 2007, HLD who was sent to the ED from hepatology clinic for shortness of breath. She follows with Dr. Smallwood. She reports feeling more short of breath the past few days and increased abdominal swelling. She reports occasional confusion as well. She was found to have a low sodium as well. With regards to her lung nodule, we have been awaiting films for an outside facility for comparison. She was found to have a large pleural effusion while here, something she has not had before. Pulmonology has been consulted.      Hospital Course:  Pt approved for liver transplant and transferred to LTS on 5/29. Pt w/ SOB. Last Thoracentesis on 5/27. Cultures NGTD. Remains on Rocephin x 5 days, per ID recs, to complete on 5/30. Ongoing issue with hyponatremia, improving with albumin daily. Na cont to improve with free water restriction and albumin administration.    Interval Hx. No acute events overnight.   -  Pleural effusion: Remains on 2L O2 this am. Had thoracentesis by IR yesterday 5/31/19 with removal of 1.5 L srosanguinous fluid.  Fluid , segs 8%, no evid. of infection.  Due to rapid reaccumulation, required albumin+ diuretic.   -  Lasix 40mg IV given. Daily weights and strict I/Os.   -  Anemia: transfused 1u pRBC. H/H improved, 8.1/23/8 today.  -  HE: Alert, oriented.  Cont lactulose/rifaximin. Monitor.     Scheduled Meds:   lactulose  5 g Oral Daily    levothyroxine  112 mcg Oral Daily    miconazole NITRATE 2 %   Topical (Top) BID    pantoprazole  40 mg Oral Daily    rifAXImin  550 mg Oral BID     Continuous Infusions:  PRN  Meds:acetaminophen, cyclobenzaprine, dextrose 50%, dextrose 50%, glucagon (human recombinant), glucose, glucose, ondansetron, phenyleph-min oil-petrolatum, simethicone, sodium chloride 0.9%, trazodone    Review of Systems   Constitutional: Positive for appetite change and fatigue. Negative for chills and fever.   Respiratory: Positive for cough and shortness of breath.    Cardiovascular: Negative for chest pain and leg swelling.   Gastrointestinal: Positive for abdominal distention. Negative for abdominal pain, constipation, diarrhea, nausea and vomiting.   Allergic/Immunologic: Negative for immunocompromised state.   Neurological: Positive for weakness. Negative for tremors.   Psychiatric/Behavioral: Negative for confusion and decreased concentration.     Objective:     Vital Signs (Most Recent):  Temp: 98.7 °F (37.1 °C) (06/01/19 1219)  Pulse: 95 (06/01/19 1506)  Resp: 20 (06/01/19 1219)  BP: (!) 109/56 (06/01/19 1219)  SpO2: (!) 94 % (06/01/19 1219) Vital Signs (24h Range):  Temp:  [98.2 °F (36.8 °C)-99.3 °F (37.4 °C)] 98.7 °F (37.1 °C)  Pulse:  [82-95] 95  Resp:  [18-31] 20  SpO2:  [93 %-98 %] 94 %  BP: (109-134)/(56-78) 109/56     Weight: 101.1 kg (222 lb 14.2 oz)  Body mass index is 37.09 kg/m².    Intake/Output - Last 3 Shifts       05/30 0700 - 05/31 0659 05/31 0700 - 06/01 0659 06/01 0700 - 06/02 0659    P.O. 150 340 457    Blood 200      Total Intake(mL/kg) 350 (3.5) 340 (3.4) 457 (4.5)    Urine (mL/kg/hr) 1750 (0.7) 400 (0.2) 350 (0.4)    Other  1500     Stool 0 0 0    Total Output 1750 1900 350    Net -1400 -1560 +107           Urine Occurrence  1 x 3 x    Stool Occurrence 5 x 2 x 3 x          Physical Exam   Constitutional: She is oriented to person, place, and time. No distress. Nasal cannula in place.   Eyes: Pupils are equal, round, and reactive to light. EOM are normal. Scleral icterus is present.   Neck: Normal range of motion.   Cardiovascular: Normal rate and regular rhythm.   No murmur  "heard.  Pulmonary/Chest: Effort normal. No respiratory distress. She has decreased breath sounds in the right middle field and the right lower field. She has no wheezes.   Abdominal: Soft. Bowel sounds are normal. She exhibits no distension. There is no tenderness. There is no guarding.   Musculoskeletal: Normal range of motion.   Neurological: She is alert and oriented to person, place, and time.   Skin: Skin is warm and dry. She is not diaphoretic.   Psychiatric: She has a normal mood and affect. Her behavior is normal. Thought content normal.   Nursing note and vitals reviewed.      Laboratory:  Immunosuppressants     None        CBC:   Recent Labs   Lab 06/01/19  0644   WBC 2.38*   RBC 2.62*   HGB 8.1*   HCT 23.8*   PLT 38*   MCV 91   MCH 30.9   MCHC 34.0     CMP:   Recent Labs   Lab 06/01/19  0644   GLU 75   CALCIUM 8.6*   ALBUMIN 3.4*   PROT 5.5*   *   K 3.6   CO2 22*      BUN 11   CREATININE 0.7   ALKPHOS 78   ALT 23   AST 63*   BILITOT 11.4*     MELD-Na score: 28 at 6/1/2019  6:44 AM  MELD score: 24 at 6/1/2019  6:44 AM  Calculated from:  Serum Creatinine: 0.7 mg/dL (Rounded to 1 mg/dL) at 6/1/2019  6:44 AM  Serum Sodium: 130 mmol/L at 6/1/2019  6:44 AM  Total Bilirubin: 11.4 mg/dL at 6/1/2019  6:44 AM  INR(ratio): 2.2 at 6/1/2019  6:44 AM  Age: 49 years    Labs within the past 24 hours have been reviewed.    Diagnostic Results:  I have personally reviewed all pertinent imaging studies.    Assessment/Plan:     * Acute hypoxemic respiratory failure  - 2/2 pleural effusion and R sided hydrothorax  - Satting 95% on 2L NC  - S/p thora x 3 on 5/17, 5/27 and 5/31 with 1.5 L removed at each procedure  - CT chest completed and shows stable pulmonary nodules;   - On 2L O2 this am. Cont to wean as tolerated    Hypoxia  - see "acute hypoxemic resp failure"      Acquired coagulation factor deficiency  - no obvious signs of bleeding, denies melena or hematochezia  - continue to monitor. Consider FFP/Vit K for " invasive procedures.       Acute on chronic anemia  - 2/2 ESLD  - transfused 1u pRBC 5/29 w/ good response.   - Transfuse 1u pRBC today  - Cont to monitor w/ daily labs      Fever  - afebrile at this time. 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 Fluid sent for analysis      Hepatic encephalopathy  -  Chronic and stable  - Cont lactulose/rifaximin  -  No asterixis    Hyponatremia  - 2/2 chronic liver disease  - 1L Fluid restriction  - Na improving with daily albumin x 3.  - No additional albumin today.      Coagulopathy  - 2/2 liver disease  - monitor for bleeding    Esophageal varices  - chronic and stable. Monitor      GERD (gastroesophageal reflux disease)  - chronic and stable  - Cont PPI      Hypothyroidism  - Chronic and stable  - Continue Synthroid 112mcg PO daily      Decompensated hepatic cirrhosis  - 2/2 KESSLER  - approved for transplant list  - Paracentesis on 5/27 with 3.2L removed and negative for SBP  - Low Sodium diet with 1L fluid restriction  - Hold diuretics due to hyponatremia  - Cont Lactulose, titrate 3-4 BM/day  - Rifaximin 550mg PO BID  - tentative paracentesis monday     MELD-Na score: 28 at 6/1/2019  6:44 AM  MELD score: 24 at 6/1/2019  6:44 AM  Calculated from:  Serum Creatinine: 0.7 mg/dL (Rounded to 1 mg/dL) at 6/1/2019  6:44 AM  Serum Sodium: 130 mmol/L at 6/1/2019  6:44 AM  Total Bilirubin: 11.4 mg/dL at 6/1/2019  6:44 AM  INR(ratio): 2.2 at 6/1/2019  6:44 AM  Age: 49  years          Debility/Functional status: Patient debilitated by evidence of Weakness, Chronic fatigue, unspecified, Other malaise and Limitation of activities due to disability. Physical and occupational therapy ordered daily to evaluate and treat. Debility was: present on admission.    VTE Risk Mitigation (From admission, onward)        Ordered     IP VTE HIGH RISK PATIENT  Once      05/17/19 0023     Place sequential compression device  Until discontinued      05/17/19 0023          The patients clinical status was discussed at multidisplinary rounds, involving transplant surgery, transplant medicine, pharmacy, nursing, nutrition, and social work    Discharge Planning:  No Patient Care Coordination Note on file.      Sydnee Shea MD  Liver Transplant  [facility]

## 2019-06-01 NOTE — PLAN OF CARE
"Problem: Adult Inpatient Plan of Care  Goal: Plan of Care Review  Outcome: Ongoing (interventions implemented as appropriate)  VSS, in bed with upper siderails raised x2, bed in lowest/locked position, call light/personal belongings within reach. Pt instructed to call for assistance. Pt verbalizes understanding. Pt afebrile at this time.  Proper hand hygiene performed before and after pt care.      Listed with MELD of 29.  Thora (5/31) with 1.5L off. Cx in process.  Hyponatremic, low Na diet + 1L FR continued.  Pt neutropenic, WBC 2.1.  H&H dec (5/31) - 1u PRBC transfused (5/31). Will recheck H&H with AM labs.   Sacral wound noted, cleansed + applied skin barrier cream and mepalex. WC consulted, see note.  Pt AAOx4 thru most of shift - occasional confusion, but reorients quickly.  Lactulose continued daily.  Pt on 3L NC with O2 sats mid 90's.  Purewick intermittently placed per pt request, pt states "I'm too weak to get out of bed."  No bm so far this shift.   Pt on tele monitoring, NSR.  Possible paracentesis (6/3) per notes.    Will continue to monitor patient.         "

## 2019-06-01 NOTE — ASSESSMENT & PLAN NOTE
- 2/2 pleural effusion and R sided hydrothorax  - Satting 95% on 2L NC  - S/p thora x 3 on 5/17, 5/27 and 5/31 with 1.5 L removed at each procedure  - CT chest completed and shows stable pulmonary nodules;   - On 2L O2 this am. Cont to wean as tolerated

## 2019-06-01 NOTE — PROGRESS NOTES
Pt transferred to TSU. Sacral wound noted, this RN cleansed wound and applied barrier cream + foam dressing d/t active bleeding. Blood transfusing at 75cc/h. Frequent vitals taken. Pt on 3L NC, O2 sats >92%. All other VSS. See flowsheet for full assessment.

## 2019-06-01 NOTE — PLAN OF CARE
Problem: Adult Inpatient Plan of Care  Goal: Plan of Care Review  Outcome: Ongoing (interventions implemented as appropriate)  Patient aao x4. Call bell within reach. She is up with 1 person assist. Assisted to bsc as needed. Pt had 3 bms so far this shift with 3 unmeasured urine each time. Wound to sacrum cleaned and thick barrier cream paste applied. Waffle mattress added to bed. Pt with  appetite. Boost breeze ordered. Pt aware of fluid restriction. Will continue to monitor.

## 2019-06-01 NOTE — SUBJECTIVE & OBJECTIVE
Scheduled Meds:   lactulose  5 g Oral Daily    levothyroxine  112 mcg Oral Daily    miconazole NITRATE 2 %   Topical (Top) BID    pantoprazole  40 mg Oral Daily    rifAXImin  550 mg Oral BID     Continuous Infusions:  PRN Meds:acetaminophen, cyclobenzaprine, dextrose 50%, dextrose 50%, glucagon (human recombinant), glucose, glucose, ondansetron, phenyleph-min oil-petrolatum, simethicone, sodium chloride 0.9%, trazodone    Review of Systems   Constitutional: Positive for appetite change and fatigue. Negative for chills and fever.   Respiratory: Positive for cough and shortness of breath.    Cardiovascular: Negative for chest pain and leg swelling.   Gastrointestinal: Positive for abdominal distention. Negative for abdominal pain, constipation, diarrhea, nausea and vomiting.   Allergic/Immunologic: Negative for immunocompromised state.   Neurological: Positive for weakness. Negative for tremors.   Psychiatric/Behavioral: Negative for confusion and decreased concentration.     Objective:     Vital Signs (Most Recent):  Temp: 98.7 °F (37.1 °C) (06/01/19 1219)  Pulse: 95 (06/01/19 1506)  Resp: 20 (06/01/19 1219)  BP: (!) 109/56 (06/01/19 1219)  SpO2: (!) 94 % (06/01/19 1219) Vital Signs (24h Range):  Temp:  [98.2 °F (36.8 °C)-99.3 °F (37.4 °C)] 98.7 °F (37.1 °C)  Pulse:  [82-95] 95  Resp:  [18-31] 20  SpO2:  [93 %-98 %] 94 %  BP: (109-134)/(56-78) 109/56     Weight: 101.1 kg (222 lb 14.2 oz)  Body mass index is 37.09 kg/m².    Intake/Output - Last 3 Shifts       05/30 0700 - 05/31 0659 05/31 0700 - 06/01 0659 06/01 0700 - 06/02 0659    P.O. 150 340 457    Blood 200      Total Intake(mL/kg) 350 (3.5) 340 (3.4) 457 (4.5)    Urine (mL/kg/hr) 1750 (0.7) 400 (0.2) 350 (0.4)    Other  1500     Stool 0 0 0    Total Output 1750 1900 350    Net -1400 -1560 +107           Urine Occurrence  1 x 3 x    Stool Occurrence 5 x 2 x 3 x          Physical Exam   Constitutional: She is oriented to person, place, and time. No distress.  Nasal cannula in place.   Eyes: Pupils are equal, round, and reactive to light. EOM are normal. Scleral icterus is present.   Neck: Normal range of motion.   Cardiovascular: Normal rate and regular rhythm.   No murmur heard.  Pulmonary/Chest: Effort normal. No respiratory distress. She has decreased breath sounds in the right middle field and the right lower field. She has no wheezes.   Abdominal: Soft. Bowel sounds are normal. She exhibits no distension. There is no tenderness. There is no guarding.   Musculoskeletal: Normal range of motion.   Neurological: She is alert and oriented to person, place, and time.   Skin: Skin is warm and dry. She is not diaphoretic.   Psychiatric: She has a normal mood and affect. Her behavior is normal. Thought content normal.   Nursing note and vitals reviewed.      Laboratory:  Immunosuppressants     None        CBC:   Recent Labs   Lab 06/01/19  0644   WBC 2.38*   RBC 2.62*   HGB 8.1*   HCT 23.8*   PLT 38*   MCV 91   MCH 30.9   MCHC 34.0     CMP:   Recent Labs   Lab 06/01/19  0644   GLU 75   CALCIUM 8.6*   ALBUMIN 3.4*   PROT 5.5*   *   K 3.6   CO2 22*      BUN 11   CREATININE 0.7   ALKPHOS 78   ALT 23   AST 63*   BILITOT 11.4*     MELD-Na score: 28 at 6/1/2019  6:44 AM  MELD score: 24 at 6/1/2019  6:44 AM  Calculated from:  Serum Creatinine: 0.7 mg/dL (Rounded to 1 mg/dL) at 6/1/2019  6:44 AM  Serum Sodium: 130 mmol/L at 6/1/2019  6:44 AM  Total Bilirubin: 11.4 mg/dL at 6/1/2019  6:44 AM  INR(ratio): 2.2 at 6/1/2019  6:44 AM  Age: 49 years    Labs within the past 24 hours have been reviewed.    Diagnostic Results:  I have personally reviewed all pertinent imaging studies.

## 2019-06-01 NOTE — NURSING TRANSFER
Nursing Transfer Note      5/31/2019     Transfer [id number] to [id number]    Transfer via bed    Transfer with IV pole/ pump RBC's running at 75ml/hr, 3LNC, personal belongings with pt      Transported by Iron Manzo RN / Sowmya Galvan RN    Medicines sent: Miconazole powder/ Preparation H    Chart send with patient: Yes in folder    Notified: Jamil Perdue called report to nurse on TSU    Patient reassessed at: 5/31/19 @ 2020    Upon arrival to floor: 2030

## 2019-06-01 NOTE — ASSESSMENT & PLAN NOTE
- 2/2 KESSLER  - approved for transplant list  - Paracentesis on 5/27 with 3.2L removed and negative for SBP  - Low Sodium diet with 1L fluid restriction  - Hold diuretics due to hyponatremia  - Cont Lactulose, titrate 3-4 BM/day  - Rifaximin 550mg PO BID  - tentative paracentesis monday     MELD-Na score: 28 at 6/1/2019  6:44 AM  MELD score: 24 at 6/1/2019  6:44 AM  Calculated from:  Serum Creatinine: 0.7 mg/dL (Rounded to 1 mg/dL) at 6/1/2019  6:44 AM  Serum Sodium: 130 mmol/L at 6/1/2019  6:44 AM  Total Bilirubin: 11.4 mg/dL at 6/1/2019  6:44 AM  INR(ratio): 2.2 at 6/1/2019  6:44 AM  Age: 49 years

## 2019-06-02 LAB
ALBUMIN SERPL BCP-MCNC: 3.1 G/DL (ref 3.5–5.2)
ALP SERPL-CCNC: 89 U/L (ref 55–135)
ALT SERPL W/O P-5'-P-CCNC: 25 U/L (ref 10–44)
ANION GAP SERPL CALC-SCNC: 8 MMOL/L (ref 8–16)
AST SERPL-CCNC: 64 U/L (ref 10–40)
BASOPHILS # BLD AUTO: 0.02 K/UL (ref 0–0.2)
BASOPHILS NFR BLD: 0.9 % (ref 0–1.9)
BILIRUB SERPL-MCNC: 11.8 MG/DL (ref 0.1–1)
BUN SERPL-MCNC: 11 MG/DL (ref 6–20)
CALCIUM SERPL-MCNC: 8.4 MG/DL (ref 8.7–10.5)
CHLORIDE SERPL-SCNC: 101 MMOL/L (ref 95–110)
CO2 SERPL-SCNC: 22 MMOL/L (ref 23–29)
CREAT SERPL-MCNC: 0.6 MG/DL (ref 0.5–1.4)
DIFFERENTIAL METHOD: ABNORMAL
EOSINOPHIL # BLD AUTO: 0.1 K/UL (ref 0–0.5)
EOSINOPHIL NFR BLD: 5.3 % (ref 0–8)
ERYTHROCYTE [DISTWIDTH] IN BLOOD BY AUTOMATED COUNT: 18.5 % (ref 11.5–14.5)
EST. GFR  (AFRICAN AMERICAN): >60 ML/MIN/1.73 M^2
EST. GFR  (NON AFRICAN AMERICAN): >60 ML/MIN/1.73 M^2
GLUCOSE SERPL-MCNC: 86 MG/DL (ref 70–110)
HCT VFR BLD AUTO: 24.1 % (ref 37–48.5)
HGB BLD-MCNC: 8 G/DL (ref 12–16)
IMM GRANULOCYTES # BLD AUTO: 0.02 K/UL (ref 0–0.04)
IMM GRANULOCYTES NFR BLD AUTO: 0.9 % (ref 0–0.5)
INR PPP: 2.2 (ref 0.8–1.2)
LYMPHOCYTES # BLD AUTO: 0.6 K/UL (ref 1–4.8)
LYMPHOCYTES NFR BLD: 25.7 % (ref 18–48)
MAGNESIUM SERPL-MCNC: 1.8 MG/DL (ref 1.6–2.6)
MCH RBC QN AUTO: 30.8 PG (ref 27–31)
MCHC RBC AUTO-ENTMCNC: 33.2 G/DL (ref 32–36)
MCV RBC AUTO: 93 FL (ref 82–98)
MONOCYTES # BLD AUTO: 0.2 K/UL (ref 0.3–1)
MONOCYTES NFR BLD: 9.3 % (ref 4–15)
NEUTROPHILS # BLD AUTO: 1.3 K/UL (ref 1.8–7.7)
NEUTROPHILS NFR BLD: 57.9 % (ref 38–73)
NRBC BLD-RTO: 0 /100 WBC
PHOSPHATE SERPL-MCNC: 3.4 MG/DL (ref 2.7–4.5)
PLATELET # BLD AUTO: 47 K/UL (ref 150–350)
PMV BLD AUTO: 11.4 FL (ref 9.2–12.9)
POTASSIUM SERPL-SCNC: 3.9 MMOL/L (ref 3.5–5.1)
PROT SERPL-MCNC: 5.3 G/DL (ref 6–8.4)
PROTHROMBIN TIME: 21.4 SEC (ref 9–12.5)
RBC # BLD AUTO: 2.6 M/UL (ref 4–5.4)
SODIUM SERPL-SCNC: 131 MMOL/L (ref 136–145)
WBC # BLD AUTO: 2.26 K/UL (ref 3.9–12.7)

## 2019-06-02 PROCEDURE — 85610 PROTHROMBIN TIME: CPT | Mod: NTX

## 2019-06-02 PROCEDURE — 99233 SBSQ HOSP IP/OBS HIGH 50: CPT | Mod: NTX,,, | Performed by: INTERNAL MEDICINE

## 2019-06-02 PROCEDURE — 25000003 PHARM REV CODE 250: Mod: NTX | Performed by: HOSPITALIST

## 2019-06-02 PROCEDURE — 84100 ASSAY OF PHOSPHORUS: CPT | Mod: NTX

## 2019-06-02 PROCEDURE — 85025 COMPLETE CBC W/AUTO DIFF WBC: CPT | Mod: NTX

## 2019-06-02 PROCEDURE — 80053 COMPREHEN METABOLIC PANEL: CPT | Mod: NTX

## 2019-06-02 PROCEDURE — 20600001 HC STEP DOWN PRIVATE ROOM: Mod: NTX

## 2019-06-02 PROCEDURE — 83735 ASSAY OF MAGNESIUM: CPT | Mod: NTX

## 2019-06-02 PROCEDURE — 99233 PR SUBSEQUENT HOSPITAL CARE,LEVL III: ICD-10-PCS | Mod: NTX,,, | Performed by: INTERNAL MEDICINE

## 2019-06-02 PROCEDURE — 36415 COLL VENOUS BLD VENIPUNCTURE: CPT | Mod: NTX

## 2019-06-02 RX ADMIN — MICONAZOLE NITRATE: 20 POWDER TOPICAL at 08:06

## 2019-06-02 RX ADMIN — LEVOTHYROXINE SODIUM 112 MCG: 112 TABLET ORAL at 06:06

## 2019-06-02 RX ADMIN — PANTOPRAZOLE SODIUM 40 MG: 40 TABLET, DELAYED RELEASE ORAL at 08:06

## 2019-06-02 RX ADMIN — RIFAXIMIN 550 MG: 550 TABLET ORAL at 08:06

## 2019-06-02 NOTE — ASSESSMENT & PLAN NOTE
- MELD-Na score: 28 at 6/2/2019  7:16 AM  MELD score: 25 at 6/2/2019  7:16 AM  Calculated from:  Serum Creatinine: 0.6 mg/dL (Rounded to 1 mg/dL) at 6/2/2019  7:16 AM  Serum Sodium: 131 mmol/L at 6/2/2019  7:16 AM  Total Bilirubin: 11.8 mg/dL at 6/2/2019  7:16 AM  INR(ratio): 2.2 at 6/2/2019  7:16 AM  Age: 49 years     Listed with MELD 29

## 2019-06-02 NOTE — SUBJECTIVE & OBJECTIVE
Scheduled Meds:   lactulose  5 g Oral Daily    levothyroxine  112 mcg Oral Daily    miconazole NITRATE 2 %   Topical (Top) BID    pantoprazole  40 mg Oral Daily    rifAXImin  550 mg Oral BID     Continuous Infusions:  PRN Meds:acetaminophen, cyclobenzaprine, dextrose 50%, dextrose 50%, glucagon (human recombinant), glucose, glucose, ondansetron, phenyleph-min oil-petrolatum, simethicone, sodium chloride 0.9%, trazodone    Review of Systems   Constitutional: Positive for appetite change and fatigue. Negative for chills and fever.   Respiratory: Positive for cough (occasional cough, non-productive) and shortness of breath.    Cardiovascular: Negative for chest pain and leg swelling.   Gastrointestinal: Positive for abdominal distention. Negative for abdominal pain, constipation, diarrhea, nausea and vomiting.   Allergic/Immunologic: Negative for immunocompromised state.   Neurological: Positive for weakness. Negative for tremors.   Psychiatric/Behavioral: Negative for confusion and decreased concentration.     Objective:     Vital Signs (Most Recent):  Temp: 99.7 °F (37.6 °C) (06/01/19 2000)  Pulse: 97 (06/02/19 0300)  Resp: (!) 27 (06/02/19 0000)  BP: 122/65 (06/02/19 0000)  SpO2: (!) 93 % (06/02/19 0000) Vital Signs (24h Range):  Temp:  [98.6 °F (37 °C)-99.7 °F (37.6 °C)] 99.7 °F (37.6 °C)  Pulse:  [82-97] 97  Resp:  [18-30] 27  SpO2:  [92 %-95 %] 93 %  BP: (103-122)/(53-65) 122/65     Weight: 101.1 kg (222 lb 14.2 oz)  Body mass index is 37.09 kg/m².    Intake/Output - Last 3 Shifts       05/31 0700 - 06/01 0659 06/01 0700 - 06/02 0659    P.O. 340 607    Total Intake(mL/kg) 340 (3.4) 607 (6)    Urine (mL/kg/hr) 400 (0.2) 350 (0.1)    Other 1500     Stool 0 0    Total Output 1900 350    Net -1560 +257          Urine Occurrence 1 x 4 x    Stool Occurrence 2 x 3 x          Physical Exam   Constitutional: She is oriented to person, place, and time. No distress. Nasal cannula in place.   Eyes: Pupils are equal,  round, and reactive to light. EOM are normal. Scleral icterus is present.   Neck: Normal range of motion.   Cardiovascular: Normal rate and regular rhythm.   No murmur heard.  Pulmonary/Chest: Effort normal. No respiratory distress. She has decreased breath sounds in the right middle field and the right lower field. She has no wheezes.   Abdominal: Soft. Bowel sounds are normal. She exhibits no distension. There is no tenderness. There is no guarding.   Musculoskeletal: Normal range of motion.   Neurological: She is alert and oriented to person, place, and time.   Skin: Skin is warm and dry. She is not diaphoretic.   Psychiatric: She has a normal mood and affect. Her behavior is normal. Thought content normal.   Nursing note and vitals reviewed.      Laboratory:  Immunosuppressants     None        CBC:   Recent Labs   Lab 06/01/19  0644   WBC 2.38*   RBC 2.62*   HGB 8.1*   HCT 23.8*   PLT 38*   MCV 91   MCH 30.9   MCHC 34.0     CMP:   Recent Labs   Lab 06/01/19  0644   GLU 75   CALCIUM 8.6*   ALBUMIN 3.4*   PROT 5.5*   *   K 3.6   CO2 22*      BUN 11   CREATININE 0.7   ALKPHOS 78   ALT 23   AST 63*   BILITOT 11.4*     MELD-Na score: 28 at 6/1/2019  6:44 AM  MELD score: 24 at 6/1/2019  6:44 AM  Calculated from:  Serum Creatinine: 0.7 mg/dL (Rounded to 1 mg/dL) at 6/1/2019  6:44 AM  Serum Sodium: 130 mmol/L at 6/1/2019  6:44 AM  Total Bilirubin: 11.4 mg/dL at 6/1/2019  6:44 AM  INR(ratio): 2.2 at 6/1/2019  6:44 AM  Age: 49 years    Labs within the past 24 hours have been reviewed.    Diagnostic Results:  I have personally reviewed all pertinent imaging studies.

## 2019-06-02 NOTE — PROGRESS NOTES
[facility]  Liver Transplant  Progress Note    Patient Name: Jihan Zamudio  MRN: [id number]  Admission Date: 5/16/2019  Hospital Length of Stay: 17 days  Code Status: Full Code  Primary Care Provider: Primary Doctor No    Subjective:     History of Present Illness:  This is a 50 yo F with hx of KESSLER cirrhosis c/b varices, latent TB, GERD, hypothyroidism, lap band 2007, HLD who was sent to the ED from hepatology clinic for shortness of breath. She follows with Dr. Smallwood. She reports feeling more short of breath the past few days and increased abdominal swelling. She reports occasional confusion as well. She was found to have a low sodium as well. With regards to her lung nodule, we have been awaiting films for an outside facility for comparison. She was found to have a large pleural effusion while here, something she has not had before. Pulmonology has been consulted.      Hospital Course:  Pt approved for liver transplant and transferred to LTS on 5/29. Pt w/ SOB. Last Thoracentesis on 5/27. Cultures NGTD. Remains on Rocephin x 5 days, per ID recs, to complete on 5/30. Ongoing issue with hyponatremia, improving with albumin daily. Na cont to improve with free water restriction and albumin administration.    Interval Hx. No acute events overnight.   -  Pleural effusion: Remains on 2L O2 this am. Had thoracentesis by IR yesterday 5/31/19 with removal of 1.5 L serosanguinous fluid.  Fluid , segs 8%, no evid. of infection.  Due to rapid reaccumulation, required albumin+ diuretic.   -  Wt down by nearly 3 kg in last 4 days.  Daily weights and strict I/Os.   -  Anemia: transfused 1u pRBC. H/H improved, 8.1/24 today.  -  HE: Alert, oriented.  Cont lactulose/rifaximin. Monitor.     Scheduled Meds:   lactulose  5 g Oral Daily    levothyroxine  112 mcg Oral Daily    miconazole NITRATE 2 %   Topical (Top) BID    pantoprazole  40 mg Oral Daily    rifAXImin  550 mg Oral BID     Continuous  Infusions:  PRN Meds:acetaminophen, cyclobenzaprine, dextrose 50%, dextrose 50%, glucagon (human recombinant), glucose, glucose, ondansetron, phenyleph-min oil-petrolatum, simethicone, sodium chloride 0.9%, trazodone    Review of Systems   Constitutional: Positive for appetite change and fatigue. Negative for chills and fever.   Respiratory: Positive for cough (occasional cough, non-productive) and shortness of breath.    Cardiovascular: Negative for chest pain and leg swelling.   Gastrointestinal: Positive for abdominal distention. Negative for abdominal pain, constipation, diarrhea, nausea and vomiting.   Allergic/Immunologic: Negative for immunocompromised state.   Neurological: Positive for weakness. Negative for tremors.   Psychiatric/Behavioral: Negative for confusion and decreased concentration.     Objective:     Vital Signs (Most Recent):  Temp: 99.7 °F (37.6 °C) (06/01/19 2000)  Pulse: 97 (06/02/19 0300)  Resp: (!) 27 (06/02/19 0000)  BP: 122/65 (06/02/19 0000)  SpO2: (!) 93 % (06/02/19 0000) Vital Signs (24h Range):  Temp:  [98.6 °F (37 °C)-99.7 °F (37.6 °C)] 99.7 °F (37.6 °C)  Pulse:  [82-97] 97  Resp:  [18-30] 27  SpO2:  [92 %-95 %] 93 %  BP: (103-122)/(53-65) 122/65     Weight: 101.1 kg (222 lb 14.2 oz)  Body mass index is 37.09 kg/m².    Intake/Output - Last 3 Shifts       05/31 0700 - 06/01 0659 06/01 0700 - 06/02 0659    P.O. 340 607    Total Intake(mL/kg) 340 (3.4) 607 (6)    Urine (mL/kg/hr) 400 (0.2) 350 (0.1)    Other 1500     Stool 0 0    Total Output 1900 350    Net -1560 +257          Urine Occurrence 1 x 4 x    Stool Occurrence 2 x 3 x          Physical Exam   Constitutional: She is oriented to person, place, and time. No distress. Nasal cannula in place.   Eyes: Pupils are equal, round, and reactive to light. EOM are normal. Scleral icterus is present.   Neck: Normal range of motion.   Cardiovascular: Normal rate and regular rhythm.   No murmur heard.  Pulmonary/Chest: Effort normal. No  "respiratory distress. She has decreased breath sounds in the right middle field and the right lower field. She has no wheezes.   Abdominal: Soft. Bowel sounds are normal. She exhibits no distension. There is no tenderness. There is no guarding.   Musculoskeletal: Normal range of motion.   Neurological: She is alert and oriented to person, place, and time.   Skin: Skin is warm and dry. She is not diaphoretic.   Psychiatric: She has a normal mood and affect. Her behavior is normal. Thought content normal.   Nursing note and vitals reviewed.      Laboratory:  Immunosuppressants     None        CBC:   Recent Labs   Lab 06/01/19  0644   WBC 2.38*   RBC 2.62*   HGB 8.1*   HCT 23.8*   PLT 38*   MCV 91   MCH 30.9   MCHC 34.0     CMP:   Recent Labs   Lab 06/01/19 0644   GLU 75   CALCIUM 8.6*   ALBUMIN 3.4*   PROT 5.5*   *   K 3.6   CO2 22*      BUN 11   CREATININE 0.7   ALKPHOS 78   ALT 23   AST 63*   BILITOT 11.4*     MELD-Na score: 28 at 6/1/2019  6:44 AM  MELD score: 24 at 6/1/2019  6:44 AM  Calculated from:  Serum Creatinine: 0.7 mg/dL (Rounded to 1 mg/dL) at 6/1/2019  6:44 AM  Serum Sodium: 130 mmol/L at 6/1/2019  6:44 AM  Total Bilirubin: 11.4 mg/dL at 6/1/2019  6:44 AM  INR(ratio): 2.2 at 6/1/2019  6:44 AM  Age: 49 years    Labs within the past 24 hours have been reviewed.    Diagnostic Results:  I have personally reviewed all pertinent imaging studies.    Assessment/Plan:     * Acute hypoxemic respiratory failure  - 2/2 pleural effusion and R sided hydrothorax  - Satting 95% on 2L NC  - S/p thora x 3 on 5/17, 5/27 and 5/31 with 1.5 L removed at each procedure  - CT chest completed and shows stable pulmonary nodules;   - On 2L O2 this am. Cont to wean as tolerated    Hypoxia  - see "acute hypoxemic resp failure"      Acquired coagulation factor deficiency  - no obvious signs of bleeding, denies melena or hematochezia  - continue to monitor. Consider FFP/Vit K for invasive procedures.       Acute on " chronic anemia  - 2/2 ESLD  - transfused 1u pRBC 5/29 w/ good response.   - Transfuse 1u pRBC today  - Cont to monitor w/ daily labs      Fever  - afebrile at this time. 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 Fluid sent for analysis      Hepatic encephalopathy  -  Chronic and stable  - Cont lactulose/rifaximin  -  No asterixis    Hyponatremia  - 2/2 chronic liver disease  - 1L Fluid restriction  - Na improved with daily albumin x 3.  - No additional albumin today  - Na 131 today, and slowly increasing toward normal.      Coagulopathy  - 2/2 liver disease  - monitor for bleeding    Esophageal varices  - chronic and stable. Monitor      GERD (gastroesophageal reflux disease)  - chronic and stable  - Cont PPI      Hypothyroidism  - Chronic and stable  - Continue Synthroid 112mcg PO daily      Decompensated hepatic cirrhosis  - 2/2 KESSLER  - approved for transplant list  - Paracentesis on 5/27 with 3.2L removed and negative for SBP  - Low Sodium diet with 1L fluid restriction  - Hold diuretics due to hyponatremia  - Cont Lactulose, titrate 3-4 BM/day  - Rifaximin 550mg PO BID  - tentative paracentesis monday     MELD-Na score: 28 at 6/2/2019  7:16 AM  MELD score: 25 at 6/2/2019  7:16 AM  Calculated from:  Serum Creatinine: 0.6 mg/dL (Rounded to 1 mg/dL) at 6/2/2019  7:16 AM  Serum Sodium: 131 mmol/L at 6/2/2019  7:16 AM  Total Bilirubin: 11.8 mg/dL at 6/2/2019  7:16 AM  INR(ratio): 2.2 at  6/2/2019  7:16 AM  Age: 49 years          Debility/Functional status: Patient debilitated by evidence of Weakness, Chronic fatigue, unspecified, Other malaise and Other reduced mobility. Physical and occupational therapy ordered daily to evaluate and treat. Debility was: present on admission.    VTE Risk Mitigation (From admission, onward)        Ordered     IP VTE HIGH RISK PATIENT  Once      05/17/19 0023     Place sequential compression device  Until discontinued      05/17/19 0023          The patients clinical status was discussed at multidisplinary rounds, involving transplant surgery, transplant medicine, pharmacy, nursing, nutrition, and social work    Discharge Planning:  No Patient Care Coordination Note on file.      Sydnee Shea MD  Liver Transplant  [facility]

## 2019-06-02 NOTE — PLAN OF CARE
Problem: Adult Inpatient Plan of Care  Goal: Plan of Care Review  Outcome: Ongoing (interventions implemented as appropriate)  AAOx4. VSS, in bed with upper siderails raised x2, bed in lowest/locked position, call light/personal belongings within reach. Pt instructed to call for assistance. Pt verbalizes understanding. Pt afebrile at this time.  Proper hand hygiene performed before and after pt care.       Thora (5/31) with 1.5L off. Cx in process.  Hyponatremic, low Na diet + 1L FR continued.  WBC dec.  H&H stable.  Sacral wound cleansed + applied skin barrier cream. On waffle mattress.  Lactulose continued daily.  Pt on 3L NC with O2 sats mid 90's.  Up to BSCC with 1 person assist. No bm so far this shift.   Pt on tele monitoring, NSR.  Possible paracentesis (6/3) per notes.     Will continue to monitor patient.

## 2019-06-02 NOTE — PLAN OF CARE
Problem: Adult Inpatient Plan of Care  Goal: Plan of Care Review  Outcome: Ongoing (interventions implemented as appropriate)  Patient aao x4. Call bell within reach. She is up with 1 person assist.  at bedside today and attentive to pt's needs. Pt had shower today and up to chair. She was able to go outside in wheelchair with  for about 30 minutes. Pt stated this was the first time she has been outside in over 3 weeks. Pt with poor appetite. Nutrition consult ordered. Possible paracentesis tomorrow. Will continue to monitor.

## 2019-06-02 NOTE — ASSESSMENT & PLAN NOTE
- 2/2 KESSLER  - approved for transplant list  - Paracentesis on 5/27 with 3.2L removed and negative for SBP  - Low Sodium diet with 1L fluid restriction  - Hold diuretics due to hyponatremia  - Cont Lactulose, titrate 3-4 BM/day  - Rifaximin 550mg PO BID  - tentative paracentesis monday     MELD-Na score: 28 at 6/2/2019  7:16 AM  MELD score: 25 at 6/2/2019  7:16 AM  Calculated from:  Serum Creatinine: 0.6 mg/dL (Rounded to 1 mg/dL) at 6/2/2019  7:16 AM  Serum Sodium: 131 mmol/L at 6/2/2019  7:16 AM  Total Bilirubin: 11.8 mg/dL at 6/2/2019  7:16 AM  INR(ratio): 2.2 at 6/2/2019  7:16 AM  Age: 49 years

## 2019-06-02 NOTE — ASSESSMENT & PLAN NOTE
- 2/2 chronic liver disease  - 1L Fluid restriction  - Na improved with daily albumin x 3.  - No additional albumin today  - Na 131 today, and slowly increasing toward normal.

## 2019-06-03 ENCOUNTER — TELEPHONE (OUTPATIENT)
Dept: TRANSPLANT | Facility: CLINIC | Age: 49
End: 2019-06-03

## 2019-06-03 LAB
ALBUMIN SERPL BCP-MCNC: 3.1 G/DL (ref 3.5–5.2)
ALP SERPL-CCNC: 89 U/L (ref 55–135)
ALT SERPL W/O P-5'-P-CCNC: 27 U/L (ref 10–44)
ANION GAP SERPL CALC-SCNC: 6 MMOL/L (ref 8–16)
AST SERPL-CCNC: 65 U/L (ref 10–40)
BACTERIA SPEC AEROBE CULT: NO GROWTH
BASOPHILS # BLD AUTO: 0.01 K/UL (ref 0–0.2)
BASOPHILS NFR BLD: 0.5 % (ref 0–1.9)
BILIRUB SERPL-MCNC: 14.1 MG/DL (ref 0.1–1)
BUN SERPL-MCNC: 12 MG/DL (ref 6–20)
CALCIUM SERPL-MCNC: 8.6 MG/DL (ref 8.7–10.5)
CHLORIDE SERPL-SCNC: 101 MMOL/L (ref 95–110)
CO2 SERPL-SCNC: 22 MMOL/L (ref 23–29)
CREAT SERPL-MCNC: 0.6 MG/DL (ref 0.5–1.4)
DIFFERENTIAL METHOD: ABNORMAL
EOSINOPHIL # BLD AUTO: 0.2 K/UL (ref 0–0.5)
EOSINOPHIL NFR BLD: 7.1 % (ref 0–8)
ERYTHROCYTE [DISTWIDTH] IN BLOOD BY AUTOMATED COUNT: 18.3 % (ref 11.5–14.5)
EST. GFR  (AFRICAN AMERICAN): >60 ML/MIN/1.73 M^2
EST. GFR  (NON AFRICAN AMERICAN): >60 ML/MIN/1.73 M^2
GLUCOSE SERPL-MCNC: 93 MG/DL (ref 70–110)
HCT VFR BLD AUTO: 24.2 % (ref 37–48.5)
HGB BLD-MCNC: 8.3 G/DL (ref 12–16)
IMM GRANULOCYTES # BLD AUTO: 0.01 K/UL (ref 0–0.04)
IMM GRANULOCYTES NFR BLD AUTO: 0.5 % (ref 0–0.5)
INR PPP: 2.1 (ref 0.8–1.2)
LYMPHOCYTES # BLD AUTO: 0.5 K/UL (ref 1–4.8)
LYMPHOCYTES NFR BLD: 22.6 % (ref 18–48)
MAGNESIUM SERPL-MCNC: 1.9 MG/DL (ref 1.6–2.6)
MCH RBC QN AUTO: 31.6 PG (ref 27–31)
MCHC RBC AUTO-ENTMCNC: 34.3 G/DL (ref 32–36)
MCV RBC AUTO: 92 FL (ref 82–98)
MONOCYTES # BLD AUTO: 0.3 K/UL (ref 0.3–1)
MONOCYTES NFR BLD: 12.3 % (ref 4–15)
NEUTROPHILS # BLD AUTO: 1.2 K/UL (ref 1.8–7.7)
NEUTROPHILS NFR BLD: 57 % (ref 38–73)
NRBC BLD-RTO: 0 /100 WBC
PHOSPHATE SERPL-MCNC: 3.3 MG/DL (ref 2.7–4.5)
PLATELET # BLD AUTO: 54 K/UL (ref 150–350)
PMV BLD AUTO: 12.3 FL (ref 9.2–12.9)
POTASSIUM SERPL-SCNC: 4 MMOL/L (ref 3.5–5.1)
PROT SERPL-MCNC: 5.4 G/DL (ref 6–8.4)
PROTHROMBIN TIME: 20.2 SEC (ref 9–12.5)
RBC # BLD AUTO: 2.63 M/UL (ref 4–5.4)
SODIUM SERPL-SCNC: 129 MMOL/L (ref 136–145)
WBC # BLD AUTO: 2.12 K/UL (ref 3.9–12.7)

## 2019-06-03 PROCEDURE — 80053 COMPREHEN METABOLIC PANEL: CPT | Mod: NTX

## 2019-06-03 PROCEDURE — 99233 SBSQ HOSP IP/OBS HIGH 50: CPT | Mod: NTX,,, | Performed by: NURSE PRACTITIONER

## 2019-06-03 PROCEDURE — 25000003 PHARM REV CODE 250: Mod: NTX | Performed by: HOSPITALIST

## 2019-06-03 PROCEDURE — 97116 GAIT TRAINING THERAPY: CPT | Mod: NTX

## 2019-06-03 PROCEDURE — 97110 THERAPEUTIC EXERCISES: CPT | Mod: NTX

## 2019-06-03 PROCEDURE — 85025 COMPLETE CBC W/AUTO DIFF WBC: CPT | Mod: NTX

## 2019-06-03 PROCEDURE — 84100 ASSAY OF PHOSPHORUS: CPT | Mod: NTX

## 2019-06-03 PROCEDURE — 83735 ASSAY OF MAGNESIUM: CPT | Mod: NTX

## 2019-06-03 PROCEDURE — 99233 PR SUBSEQUENT HOSPITAL CARE,LEVL III: ICD-10-PCS | Mod: NTX,,, | Performed by: NURSE PRACTITIONER

## 2019-06-03 PROCEDURE — 85610 PROTHROMBIN TIME: CPT | Mod: NTX

## 2019-06-03 PROCEDURE — 97535 SELF CARE MNGMENT TRAINING: CPT | Mod: NTX

## 2019-06-03 PROCEDURE — 36415 COLL VENOUS BLD VENIPUNCTURE: CPT | Mod: NTX

## 2019-06-03 PROCEDURE — 20600001 HC STEP DOWN PRIVATE ROOM: Mod: NTX

## 2019-06-03 RX ADMIN — MICONAZOLE NITRATE: 20 POWDER TOPICAL at 08:06

## 2019-06-03 RX ADMIN — RIFAXIMIN 550 MG: 550 TABLET ORAL at 09:06

## 2019-06-03 RX ADMIN — TRAZODONE HYDROCHLORIDE 25 MG: 50 TABLET ORAL at 08:06

## 2019-06-03 RX ADMIN — RIFAXIMIN 550 MG: 550 TABLET ORAL at 08:06

## 2019-06-03 RX ADMIN — LACTULOSE 5 G: 20 SOLUTION ORAL at 09:06

## 2019-06-03 RX ADMIN — MICONAZOLE NITRATE: 20 POWDER TOPICAL at 09:06

## 2019-06-03 RX ADMIN — PANTOPRAZOLE SODIUM 40 MG: 40 TABLET, DELAYED RELEASE ORAL at 09:06

## 2019-06-03 RX ADMIN — LEVOTHYROXINE SODIUM 112 MCG: 112 TABLET ORAL at 06:06

## 2019-06-03 NOTE — SUBJECTIVE & OBJECTIVE
Scheduled Meds:   lactulose  5 g Oral Daily    levothyroxine  112 mcg Oral Daily    miconazole NITRATE 2 %   Topical (Top) BID    pantoprazole  40 mg Oral Daily    rifAXImin  550 mg Oral BID     Continuous Infusions:  PRN Meds:acetaminophen, cyclobenzaprine, dextrose 50%, dextrose 50%, glucagon (human recombinant), glucose, glucose, ondansetron, phenyleph-min oil-petrolatum, simethicone, sodium chloride 0.9%, trazodone    Review of Systems   Constitutional: Positive for appetite change and fatigue. Negative for chills and fever.   Respiratory: Positive for cough (occasional cough, non-productive) and shortness of breath.    Cardiovascular: Negative for chest pain and leg swelling.   Gastrointestinal: Positive for abdominal distention. Negative for abdominal pain, constipation, diarrhea, nausea and vomiting.   Allergic/Immunologic: Negative for immunocompromised state.   Neurological: Positive for weakness. Negative for tremors.   Psychiatric/Behavioral: Negative for confusion and decreased concentration.     Objective:     Vital Signs (Most Recent):  Temp: 98.1 °F (36.7 °C) (06/03/19 1119)  Pulse: 104 (06/03/19 1517)  Resp: (!) 22 (06/03/19 1119)  BP: (!) 125/59 (06/03/19 1119)  SpO2: (!) 91 % (06/03/19 1119) Vital Signs (24h Range):  Temp:  [98.1 °F (36.7 °C)-99.4 °F (37.4 °C)] 98.1 °F (36.7 °C)  Pulse:  [] 104  Resp:  [14-22] 22  SpO2:  [91 %-94 %] 91 %  BP: (115-125)/(54-59) 125/59     Weight: 98.4 kg (216 lb 14.9 oz)  Body mass index is 36.1 kg/m².    Intake/Output - Last 3 Shifts       06/01 0700 - 06/02 0659 06/02 0700 - 06/03 0659 06/03 0700 - 06/04 0659    P.O. 707 640 245    Total Intake(mL/kg) 707 (7.2) 640 (6.5) 245 (2.5)    Urine (mL/kg/hr) 575 (0.2)      Other       Stool 0      Total Output 575      Net +132 +640 +245           Urine Occurrence 4 x 3 x     Stool Occurrence 3 x 2 x 1 x          Physical Exam   Constitutional: She is oriented to person, place, and time. No distress. Nasal  cannula in place.   Eyes: Pupils are equal, round, and reactive to light. EOM are normal. Scleral icterus is present.   Neck: Normal range of motion.   Cardiovascular: Normal rate and regular rhythm.   No murmur heard.  Pulmonary/Chest: Effort normal. No respiratory distress. She has decreased breath sounds in the right middle field and the right lower field. She has no wheezes.   Abdominal: Soft. Bowel sounds are normal. She exhibits no distension. There is no tenderness. There is no guarding.   Musculoskeletal: Normal range of motion.   Neurological: She is alert and oriented to person, place, and time.   Skin: Skin is warm and dry. She is not diaphoretic.   Psychiatric: She has a normal mood and affect. Her behavior is normal. Thought content normal.   Nursing note and vitals reviewed.      Laboratory:  Immunosuppressants     None        CBC:   Recent Labs   Lab 06/03/19 0628   WBC 2.12*   RBC 2.63*   HGB 8.3*   HCT 24.2*   PLT 54*   MCV 92   MCH 31.6*   MCHC 34.3     CMP:   Recent Labs   Lab 06/03/19 0628   GLU 93   CALCIUM 8.6*   ALBUMIN 3.1*   PROT 5.4*   *   K 4.0   CO2 22*      BUN 12   CREATININE 0.6   ALKPHOS 89   ALT 27   AST 65*   BILITOT 14.1*     MELD-Na score: 29 at 6/3/2019  6:28 AM  MELD score: 25 at 6/3/2019  6:28 AM  Calculated from:  Serum Creatinine: 0.6 mg/dL (Rounded to 1 mg/dL) at 6/3/2019  6:28 AM  Serum Sodium: 129 mmol/L at 6/3/2019  6:28 AM  Total Bilirubin: 14.1 mg/dL at 6/3/2019  6:28 AM  INR(ratio): 2.1 at 6/3/2019  6:28 AM  Age: 49 years    Labs within the past 24 hours have been reviewed.    Diagnostic Results:  I have personally reviewed all pertinent imaging studies.

## 2019-06-03 NOTE — TELEPHONE ENCOUNTER
PATIENT NAME: Jihan Zamudio  [facility] #: [id number]     Meld 29    Lab Results   Component Value Date    CREATININE 0.6 06/03/2019     (L) 06/03/2019    BILITOT 14.1 (H) 06/03/2019    ALBUMIN 3.1 (L) 06/03/2019    INR 2.1 (H) 06/03/2019       Encephalopathy: 1 - 2  Ascites: moderate  Dialysis: no     Recertification requestor: Lavelle Warner

## 2019-06-03 NOTE — PLAN OF CARE
Problem: Physical Therapy Goal  Goal: Physical Therapy Goal  Goals to be met by: 19     Patient will increase functional independence with mobility by performin. Supine to sit with [place].  2. Sit to supine with [place].  3. Sit to stand transfer with Supervision.  4. Bed to chair transfer with Supervision using Single-point Cane.  5. Gait  x 50 feet with Supervision using Single-point Cane.   6. Ascend/Descend 6 inch curb step with Supervision using Single-point Cane.  7. Lower extremity exercise program x 20 reps per handout, with assistance as needed.     Pt is progressing towards goals. Continue PT POC. Goals remain appropriate.  Steve Munguia, LIZZYA  6/3/2019

## 2019-06-03 NOTE — ASSESSMENT & PLAN NOTE
- no obvious signs of bleeding, denies melena or BRBPR.  - continue to monitor. Consider FFP/Vit K for invasive procedures.

## 2019-06-03 NOTE — PT/OT/SLP PROGRESS
Physical Therapy Treatment    Patient Name:  Jihan Zamudio   MRN:  [id number]    Recommendations:     Discharge Recommendations:  home health PT   Discharge Equipment Recommendations: none   Barriers to discharge: None    Assessment:     Jihan Zamudio is a 49 y.o. female admitted with a medical diagnosis of Acute hypoxemic respiratory failure.  She presents with the following impairments/functional limitations:  weakness, impaired endurance, impaired self care skills, impaired functional mobilty, gait instability, impaired balance, decreased lower extremity function, impaired cardiopulmonary response to activity .    Pt tolerated tx well and demo good effort, however gets fatigued quickly and hyperventilates during activity. Pt requested to use RW for safety and stability, exhibited gait instability. Pt was compliant throughout the session. Pt is progressing towards PT POC. Goals remain appropriate.    Rehab Prognosis: Good; patient would benefit from acute skilled PT services to address these deficits and reach maximum level of function.    Recent Surgery: * No surgery found *      Plan:     During this hospitalization, patient to be seen 3 x/week to address the identified rehab impairments via gait training, therapeutic activities, therapeutic exercises, neuromuscular re-education and progress toward the following goals:    · Plan of Care Expires:  06/13/19    Subjective     Chief Complaint: pt c/o fatigue and SOB  Patient/Family Comments/goals: to get better  Pain/Comfort:  · Pain Rating 1: 0/10      Objective:     Communicated with RN prior to session.  Patient found up in chair with telemetry, oxygen upon PT entry to room.     General Precautions: Standard, fall   Orthopedic Precautions:N/A   Braces: N/A     Functional Mobility:  · Transfers:     · Sit to Stand:  stand by assistance with no AD  · Gait: 80' x2 SBA w/ RW, O2 in tow. Required standing rest break in between trials d/t fatique and SOB. Demo  downward gaze, step through gait pattern, FFP, slow yandel, decreased step length. VCs to maintain upright position.      AM-PAC 6 CLICK MOBILITY  Turning over in bed (including adjusting bedclothes, sheets and blankets)?: 2  Sitting down on and standing up from a chair with arms (e.g., wheelchair, bedside commode, etc.): 3  Moving from lying on back to sitting on the side of the bed?: 2  Moving to and from a bed to a chair (including a wheelchair)?: 3  Need to walk in hospital room?: 3  Climbing 3-5 steps with a railing?: 2  Basic Mobility Total Score: 15       Therapeutic Activities and Exercises:   pt performed BLE AROM seated in chair x10 reps each of the following: LAQ, AP, Hip flex, Hip ABD/ADD. Educated pt on relaxation txn and paced breathing. SpO2: 95% NC. Educated pt on importance of PT POC, safety, effects of prolonged immobility, and benefits of OOB activities and exercises to promote healing and reduce recovery time. Allowed pt opportunity to ask questions/concerns; verbally expressed understanding.    Patient left up in chair with all lines intact, call button in reach and RN notified..    GOALS:   Multidisciplinary Problems     Physical Therapy Goals        Problem: Physical Therapy Goal    Goal Priority Disciplines Outcome Goal Variances Interventions   Physical Therapy Goal     PT, PT/OT Ongoing (interventions implemented as appropriate)     Description:  Goals to be met by: 19     Patient will increase functional independence with mobility by performin. Supine to sit with [place].  2. Sit to supine with [place].  3. Sit to stand transfer with Supervision.  4. Bed to chair transfer with Supervision using Single-point Cane.  5. Gait  x 50 feet with Supervision using Single-point Cane.   6. Ascend/Descend 6 inch curb step with Supervision using Single-point Cane.  7. Lower extremity exercise program x 20 reps per handout, with assistance as needed.                      Time  Tracking:     PT Received On: 06/03/19  PT Start Time: 1007     PT Stop Time: 1031  PT Total Time (min): 24 min     Billable Minutes: Gait Training 14 mins and Therapeutic Exercise 10 mins    Treatment Type: Treatment  PT/PTA: PTA     PTA Visit Number: 1     Steve Munguia [facility]  06/03/2019

## 2019-06-03 NOTE — PT/OT/SLP PROGRESS
Occupational Therapy   Treatment    Name: Jihan Zamudio  MRN: [id number]  Admitting Diagnosis:  Acute hypoxemic respiratory failure       Recommendations:     Discharge Recommendations: home health OT  Discharge Equipment Recommendations:  none  Barriers to discharge:  None    Assessment:     Jihan Zamudio is a 49 y.o. female with a medical diagnosis of Acute hypoxemic respiratory failure.  She presents with a flat affect but alert and awake during tx session. Pt with impaired endurance with noted rapid breathing after ambulating from restroom. Encouraged pursed lip breathing; pt verbalized understanding. Pt tolerated standing ADLs well with SPV and no LOBs. Pt completed functional activities at a decreased pace with fatigue noted. Pt will continue to benefit from skilled OT to build strength and endurance to complete occupations of choice. Performance deficits affecting function are weakness, impaired endurance, impaired balance, impaired self care skills, impaired functional mobilty, impaired cardiopulmonary response to activity.     Rehab Prognosis:  Good; patient would benefit from acute skilled OT services to address these deficits and reach maximum level of function.       Plan:     Patient to be seen 3 x/week to address the above listed problems via self-care/home management, therapeutic activities, therapeutic exercises  · Plan of Care Expires: 06/18/19  · Plan of Care Reviewed with: patient    Subjective     Pain/Comfort:  · Pain Rating 1: 0/10  · Pain Rating Post-Intervention 1: 0/10    Objective:     Communicated with: RN prior to session.  Patient found up in chair with telemetry, peripheral IV, oxygen(2 L via NC) upon OT entry to room.    General Precautions: Standard, fall   Orthopedic Precautions:N/A   Braces: N/A     Occupational Performance:     Bed Mobility:    · NT this date 2/2 pt UIC upon OT's arrival     Functional Mobility/Transfers:  · Patient completed Sit <> Stand Transfer from chair  with supervision  with  no assistive device   · Functional Mobility: Pt ambulated to and from bathroom with CGA and no AD. Mild gait instability noted. 02 not donned during self care performance in bathroom.    Activities of Daily Living:  · Grooming: supervision standing at sink to complete dental and facial hygiene with no AD  · Upper Body Dressing: Set up assistance to don gown as jacket in sitting       AMPAC 6 Click ADL: 21    Treatment & Education:  - Role of OT/ OT POC  - Self care safety/ independence  - Functional transfer/ mobility safety  - Pursed lip breathing  - Importance of sitting UIC to improve endurance   - Energy conservation techniques such as taking rest breaks as needed  - HEP introduced; pt demo'd and verbalized understanding  - Pt performed 10 reps of arm raises, chest presses, chest pulls, and bicep curls seated in chair with RBs in between each set due to fatigue       Patient left up in chair with all lines intact, call button in reach, RN  notified     GOALS:   Multidisciplinary Problems     Occupational Therapy Goals        Problem: Occupational Therapy Goal    Goal Priority Disciplines Outcome Interventions   Occupational Therapy Goal     OT, PT/OT Ongoing (interventions implemented as appropriate)    Description:  Goals to be met by: 5/26/19    Patient will increase functional independence with ADLs by performing:    Grooming while standing with Stand-by Assistance. MET 5/28  Revised to set up- progressing   Toileting from bedside commode with Stand-by Assistance for hygiene and clothing management.   Supine to sit with [place].  Toilet transfer to toilet with Stand-by Assistance.                         Time Tracking:     OT Date of Treatment: 06/03/19  OT Start Time: 0851  OT Stop Time: 0911  OT Total Time (min): 20 min    Billable Minutes:Self Care/Home Management 20    Roselyn Lowe OT  6/3/2019

## 2019-06-03 NOTE — PLAN OF CARE
Problem: Occupational Therapy Goal  Goal: Occupational Therapy Goal  Goals to be met by: 5/26/19    Patient will increase functional independence with ADLs by performing:    Grooming while standing with Stand-by Assistance. MET 5/28  Revised to set up- progressing   Toileting from bedside commode with Stand-by Assistance for hygiene and clothing management.   Supine to sit with [place].  Toilet transfer to toilet with Stand-by Assistance.       Outcome: Ongoing (interventions implemented as appropriate)  OT POC remains appropriate for patient on this date. Pt will continue to benefit from skilled OT to address deficits affecting ADL performance.       Comments: Roselyn Lowe OTR/L

## 2019-06-03 NOTE — ASSESSMENT & PLAN NOTE
- see acute hypoxemic resp failure  - CXR 5/30 w/ large right pleural effusion  - Thoracentesis 5/31, 1.5L removed. Fluid sent for analysis.  - repeat CXR in am to assess for re accumulation of fluid.

## 2019-06-03 NOTE — ASSESSMENT & PLAN NOTE
- 2/2 KESSLER  - approved for transplant list  - Paracentesis on 5/27 with 3.2L removed and negative for SBP  - Low Sodium diet with 1L fluid restriction  - Hold diuretics due to hyponatremia  - Cont Lactulose, titrate 3-4 BM/day  - Rifaximin 550mg PO BID  - paracentesis 5/27/19     MELD-Na score: 29 at 6/3/2019  6:28 AM  MELD score: 25 at 6/3/2019  6:28 AM  Calculated from:  Serum Creatinine: 0.6 mg/dL (Rounded to 1 mg/dL) at 6/3/2019  6:28 AM  Serum Sodium: 129 mmol/L at 6/3/2019  6:28 AM  Total Bilirubin: 14.1 mg/dL at 6/3/2019  6:28 AM  INR(ratio): 2.1 at 6/3/2019  6:28 AM  Age: 49 years

## 2019-06-03 NOTE — PLAN OF CARE
Problem: Adult Inpatient Plan of Care  Goal: Plan of Care Review  Outcome: Ongoing (interventions implemented as appropriate)  AAOx4. VSS, in bed with upper siderails raised x2, bed in lowest/locked position, call light/personal belongings within reach. Pt instructed to call for assistance. Pt verbalizes understanding. Pt afebrile at this time.  Proper hand hygiene performed before and after pt care.      No changes overnight.   Midline consult placed d/t  IV and poor venous access.   Thora () with 1.5L off. See cx results.  Hyponatremic, low Na diet + 1L FR continued. Appetite dec, Boost Breeze started.  WBC dec.  H&H stable.  Sacral wound cleansed + applied skin barrier cream. On waffle mattress, pt turning self.  Lactulose continued daily. No bm so far this shift.   Pt on 3L NC with O2 sats mid 90's.  Up to BSCC with 1 person assist.   Pt on tele monitoring, NSR.  Possible paracentesis (6/3) per notes.     Will continue to monitor patient.

## 2019-06-03 NOTE — ASSESSMENT & PLAN NOTE
- 2/2 chronic liver disease  - 1L Fluid restriction  - Na improved with albumin.  - Na 129 today, and slowly increasing toward normal.

## 2019-06-03 NOTE — PLAN OF CARE
Problem: Adult Inpatient Plan of Care  Goal: Plan of Care Review  Outcome: Ongoing (interventions implemented as appropriate)  Pt is AAOX 4. Pt up in recliner multiple for several hours this shift but was encouraged to ambulate halls more often at least 3x/day    -Titrated o2 from 2 to 1L. Pt has had previous thoracentesis x 3 with 1.5L off each time due to right pleural effusion  -abdomen rounded, soft, non tender, no paracentesis was ordered for today  -lactulose given with a goal of 3-4 bms, pt has had 3 so far this shift  -received one unit of blood on 5/29, H/H stable now and holding  -continues to be hyponatremic, 1L FR and low Na diet, pt compliant with restriction  -poor appetite, encouraged to drink supplement drink if cant tolerate the food  -VSS  -Tele in place, NSR rate in 80s  -has a very small reddned area to sacrum with partial thickness skin removal, barrier cream applied, t/p q 2 hours, waffle matress on bed, pt switched from bed to recliner multiple x's this shift  -fall precautions in place

## 2019-06-03 NOTE — PROGRESS NOTES
[facility]  Liver Transplant  Progress Note    Patient Name: Jihan Zamudio  MRN: [id number]  Admission Date: 5/16/2019  Hospital Length of Stay: 18 days  Code Status: Full Code  Primary Care Provider: Primary Doctor No    Subjective:     History of Present Illness:  This is a 48 yo F with hx of KESSLER cirrhosis c/b varices, latent TB, GERD, hypothyroidism, lap band 2007, HLD who was sent to the ED from hepatology clinic for shortness of breath. She follows with Dr. Smallwood. She reports feeling more short of breath the past few days and increased abdominal swelling. She reports occasional confusion as well. She was found to have a low sodium as well. With regards to her lung nodule, we have been awaiting films for an outside facility for comparison. She was found to have a large pleural effusion while here, something she has not had before. Pulmonology has been consulted.      Hospital Course:  Pt approved for liver transplant and transferred to LTS on 5/29. Pt w/ SOB. Last Thoracentesis on 5/27. Cultures NGTD. Remains on Rocephin x 5 days, per ID recs, to complete on 5/30. Ongoing issue with hyponatremia, improving with albumin daily. Na cont to improve with free water restriction and albumin administration.    Interval Hx. No acute events overnight.  Patient AOx4.  No asterixis noted.  Cont lactulose and Rifaximin.  t bili up today.  Re MELD w 29.  Patient has hx of pleural effusion noted.  Remains on 2L O2 this am. Wean O2 to maintain O2 sat >92%.  Had thoracentesis by IR yesterday 5/31/19 with removal of 1.5 L serosanguinous fluid.  Fluid , segs 8%, no evid. of infection.  Due to rapid reaccumulation, required albumin and diuretic.  Wt down by nearly 3 kg in last 4 days.  Cont weights and strict I/Os.  Last transfused 1u pRBC 5/29 w appropriate response.  H/H stable.  Monitor.     Scheduled Meds:   lactulose  5 g Oral Daily    levothyroxine  112 mcg Oral Daily    miconazole NITRATE 2 %    Topical (Top) BID    pantoprazole  40 mg Oral Daily    rifAXImin  550 mg Oral BID     Continuous Infusions:  PRN Meds:acetaminophen, cyclobenzaprine, dextrose 50%, dextrose 50%, glucagon (human recombinant), glucose, glucose, ondansetron, phenyleph-min oil-petrolatum, simethicone, sodium chloride 0.9%, trazodone    Review of Systems   Constitutional: Positive for appetite change and fatigue. Negative for chills and fever.   Respiratory: Positive for cough (occasional cough, non-productive) and shortness of breath.    Cardiovascular: Negative for chest pain and leg swelling.   Gastrointestinal: Positive for abdominal distention. Negative for abdominal pain, constipation, diarrhea, nausea and vomiting.   Allergic/Immunologic: Negative for immunocompromised state.   Neurological: Positive for weakness. Negative for tremors.   Psychiatric/Behavioral: Negative for confusion and decreased concentration.     Objective:     Vital Signs (Most Recent):  Temp: 98.1 °F (36.7 °C) (06/03/19 1119)  Pulse: 104 (06/03/19 1517)  Resp: (!) 22 (06/03/19 1119)  BP: (!) 125/59 (06/03/19 1119)  SpO2: (!) 91 % (06/03/19 1119) Vital Signs (24h Range):  Temp:  [98.1 °F (36.7 °C)-99.4 °F (37.4 °C)] 98.1 °F (36.7 °C)  Pulse:  [] 104  Resp:  [14-22] 22  SpO2:  [91 %-94 %] 91 %  BP: (115-125)/(54-59) 125/59     Weight: 98.4 kg (216 lb 14.9 oz)  Body mass index is 36.1 kg/m².    Intake/Output - Last 3 Shifts       06/01 0700 - 06/02 0659 06/02 0700 - 06/03 0659 06/03 0700 - 06/04 0659    P.O. 707 640 245    Total Intake(mL/kg) 707 (7.2) 640 (6.5) 245 (2.5)    Urine (mL/kg/hr) 575 (0.2)      Other       Stool 0      Total Output 575      Net +132 +640 +245           Urine Occurrence 4 x 3 x     Stool Occurrence 3 x 2 x 1 x          Physical Exam   Constitutional: She is oriented to person, place, and time. No distress. Nasal cannula in place.   Eyes: Pupils are equal, round, and reactive to light. EOM are normal. Scleral icterus is  "present.   Neck: Normal range of motion.   Cardiovascular: Normal rate and regular rhythm.   No murmur heard.  Pulmonary/Chest: Effort normal. No respiratory distress. She has decreased breath sounds in the right middle field and the right lower field. She has no wheezes.   Abdominal: Soft. Bowel sounds are normal. She exhibits no distension. There is no tenderness. There is no guarding.   Musculoskeletal: Normal range of motion.   Neurological: She is alert and oriented to person, place, and time.   Skin: Skin is warm and dry. She is not diaphoretic.   Psychiatric: She has a normal mood and affect. Her behavior is normal. Thought content normal.   Nursing note and vitals reviewed.      Laboratory:  Immunosuppressants     None        CBC:   Recent Labs   Lab 06/03/19  0628   WBC 2.12*   RBC 2.63*   HGB 8.3*   HCT 24.2*   PLT 54*   MCV 92   MCH 31.6*   MCHC 34.3     CMP:   Recent Labs   Lab 06/03/19  0628   GLU 93   CALCIUM 8.6*   ALBUMIN 3.1*   PROT 5.4*   *   K 4.0   CO2 22*      BUN 12   CREATININE 0.6   ALKPHOS 89   ALT 27   AST 65*   BILITOT 14.1*     MELD-Na score: 29 at 6/3/2019  6:28 AM  MELD score: 25 at 6/3/2019  6:28 AM  Calculated from:  Serum Creatinine: 0.6 mg/dL (Rounded to 1 mg/dL) at 6/3/2019  6:28 AM  Serum Sodium: 129 mmol/L at 6/3/2019  6:28 AM  Total Bilirubin: 14.1 mg/dL at 6/3/2019  6:28 AM  INR(ratio): 2.1 at 6/3/2019  6:28 AM  Age: 49 years    Labs within the past 24 hours have been reviewed.    Diagnostic Results:  I have personally reviewed all pertinent imaging studies.    Assessment/Plan:     * Acute hypoxemic respiratory failure  - 2/2 pleural effusion and R sided hydrothorax  - Sat % on 2L NC  - S/p thora x 3 on 5/17, 5/27 and 5/31 with 1.5 L removed at each procedure  - CT chest 5/19/19 completed and shows stable pulmonary nodules.   - On 2L O2 this am. Cont to wean as tolerated    Hypoxia  - see "acute hypoxemic resp failure"      Acquired coagulation factor " deficiency  - no obvious signs of bleeding, denies melena or BRBPR.  - continue to monitor. Consider FFP/Vit K for invasive procedures.       Acute on chronic anemia  - 2/2 ESLD  - transfused 1u pRBC 5/29 w/ good response.   - Transfuse 1u pRBC 5/29/19 w appropriate response.   - Cont to monitor w/ daily labs      Fever  - afebrile at this time. 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  Cont to monitor.       Pleural effusion, right  - see acute hypoxemic resp failure  - CXR 5/30 w/ large right pleural effusion  - Thoracentesis 5/31, 1.5L removed. Fluid sent for analysis.  - repeat CXR in am to assess for re accumulation of fluid.       Weakness  - PT/OT following.       Hepatic encephalopathy  -  Chronic and stable  - Cont lactulose/rifaximin.  -  No asterixis    Hyponatremia  - 2/2 chronic liver disease  - 1L Fluid restriction  - Na improved with albumin.  - Na 129 today, and slowly increasing toward normal.      Coagulopathy  - 2/2 liver disease.  INR 2.1.    - monitor for bleeding    Moderate malnutrition        Esophageal varices  - chronic and stable. Monitor      GERD (gastroesophageal reflux disease)  - chronic and stable.  Cont Pantoprazole.     - Cont PPI      Hypothyroidism  - Chronic and stable  - Continue Synthroid 112mcg PO daily      Decompensated hepatic cirrhosis  - 2/2 KESSLER  - approved for transplant list  - Paracentesis on 5/27 with 3.2L removed and negative for SBP  - Low Sodium diet with 1L fluid restriction  - Hold diuretics due to hyponatremia  - Cont Lactulose, titrate  3-4 BM/day  - Rifaximin 550mg PO BID  - paracentesis 5/27/19     MELD-Na score: 29 at 6/3/2019  6:28 AM  MELD score: 25 at 6/3/2019  6:28 AM  Calculated from:  Serum Creatinine: 0.6 mg/dL (Rounded to 1 mg/dL) at 6/3/2019  6:28 AM  Serum Sodium: 129 mmol/L at 6/3/2019  6:28 AM  Total Bilirubin: 14.1 mg/dL at 6/3/2019  6:28 AM  INR(ratio): 2.1 at 6/3/2019  6:28 AM  Age: 49 years          Debility/Functional status: Patient debilitated by evidence of Weakness. Physical and occupational therapy ordered daily to evaluate and treat. Debility was: present on admission.    VTE Risk Mitigation (From admission, onward)        Ordered     IP VTE HIGH RISK PATIENT  Once      05/17/19 0023     Place sequential compression device  Until discontinued      05/17/19 0023          The patients clinical status was discussed at multidisplinary rounds, involving transplant surgery, transplant medicine, pharmacy, nursing, nutrition, and social work    Discharge Planning:  No plan for discharge.    Eugenia Albrecht, NP  Liver Transplant  [facility]

## 2019-06-03 NOTE — ASSESSMENT & PLAN NOTE
- 2/2 ESLD  - transfused 1u pRBC 5/29 w/ good response.   - Transfuse 1u pRBC 5/29/19 w appropriate response.   - Cont to monitor w/ daily labs

## 2019-06-03 NOTE — ASSESSMENT & PLAN NOTE
- 2/2 pleural effusion and R sided hydrothorax  - Sat % on 2L NC  - S/p thora x 3 on 5/17, 5/27 and 5/31 with 1.5 L removed at each procedure  - CT chest 5/19/19 completed and shows stable pulmonary nodules.   - On 2L O2 this am. Cont to wean as tolerated

## 2019-06-03 NOTE — ASSESSMENT & PLAN NOTE
- MELD-Na score: 29 at 6/3/2019  6:28 AM  MELD score: 25 at 6/3/2019  6:28 AM  Calculated from:  Serum Creatinine: 0.6 mg/dL (Rounded to 1 mg/dL) at 6/3/2019  6:28 AM  Serum Sodium: 129 mmol/L at 6/3/2019  6:28 AM  Total Bilirubin: 14.1 mg/dL at 6/3/2019  6:28 AM  INR(ratio): 2.1 at 6/3/2019  6:28 AM  Age: 49 years     Listed with MELD 29

## 2019-06-03 NOTE — PLAN OF CARE
Problem: Adult Inpatient Plan of Care  Goal: Plan of Care Review  Recommendations     Recommendation/Intervention: 1.) Continue 2gm Na diet. 2.) Continue Boost Breeze TID (pt dislikes Boost Plus).  Goals: 1.) Pt to consume/tolerate 75% EEN and EPN.   Nutrition Goal Status: progressing towards goal  Communication of RD Recs: (POC)    Assessment and Plan  Nutrition Problem  Inadequate oral intakes     Related to (etiology):   Taste acuity     Signs and Symptoms (as evidenced by):   Oral intakes meeting <75% of nutritional needs.      Interventions/Recommendations (treatment strategy):  Collaboration of other providers & referral of care     Nutrition Diagnosis Status:   New

## 2019-06-03 NOTE — CONSULTS
"  [facility]  Adult Nutrition  Consult Note    SUMMARY     Recommendations    Recommendation/Intervention: 1.) Continue 2gm Na diet. 2.) Continue Boost Breeze TID (pt dislikes Boost Plus).  Goals: 1.) Pt to consume/tolerate 75% EEN and EPN.   Nutrition Goal Status: progressing towards goal  Communication of RD Recs: (POC)    Reason for Assessment    Reason For Assessment: consult, RD follow-up  Diagnosis: other (see comments)(Resp. fx)  Relevant Medical History: Cirrhosis   Interdisciplinary Rounds: did not attend  General Information Comments: Pt sitting up in chair reports poor appetite. Pt denies consuming bfkst only drinking cran-apple juice. She reports she is giving Boost Breeze a try this afternoon. She states smells are affecting her PO intake of meals. Urged pt to order meals without strong odor (sandwiches).  Pt refused. She requested pizza and quesadilla for lunch. Reviewed low Na diet and reasons to why she is unable to order these items. Pt upset and states her sodium levels are normal and wants a regular diet. Updated labs reflect low Na labs at this time. Ecnouraged bites/sips of meals/beverages throughout the day. She denies GI distress. PTA, good PO intake of meals with no recent wt changes. S/p thoracentesis 5/31 with 1.5L removed. NFPE completed at bedside. Pt with some mild fat and muscle loss, but does not meet malnutrition at this time.   Nutrition Discharge Planning: Adequate intake to meet nutritional needs.     Nutrition Risk Screen    Nutrition Risk Screen: no indicators present    Nutrition/Diet History    Patient Reported Diet/Restrictions/Preferences: other (see comments)(PATRICIA)  Spiritual, Cultural Beliefs, Restorationism Practices, Values that Affect Care: no  Factors Affecting Nutritional Intake: None identified at this time    Anthropometrics    Temp: 98.1 °F (36.7 °C)  Height Method: Stated  Height: 5' 5" (165.1 cm)  Height (inches): 65 in  Weight Method: Bed " Scale  Weight: 98.4 kg (216 lb 14.9 oz)  Weight (lb): 216.93 lb  Ideal Body Weight (IBW), Female: 125 lb  % Ideal Body Weight, Female (lb): 179.19 lb  BMI (Calculated): 37.4  BMI Grade: 35 - 39.9 - obesity - grade II  Usual Body Weight (UBW), k.8 kg  % Usual Body Weight: 95.33  % Weight Change From Usual Weight: -4.87 %       Lab/Procedures/Meds    Pertinent Labs Reviewed: reviewed  Pertinent Labs Comments: Na 129, Ca 8.6, AST 65  Pertinent Medications Reviewed: reviewed  Pertinent Medications Comments: lactulose, protonix      Estimated/Assessed Needs    Weight Used For Calorie Calculations: 101.6 kg (223 lb 15.8 oz)  Energy Calorie Requirements (kcal):  kcal/d  Energy Need Method: Jerome-St Jeor(1.25 PAL)  Protein Requirements: 102 g/d (1 g/kg)  Weight Used For Protein Calculations: 101.6 kg (223 lb 15.8 oz)     Estimated Fluid Requirement Method: other (see comments)(Per MD or 1 mL/kcal)  RDA Method (mL):          Nutrition Prescription Ordered    Current Diet Order: 2gm sodium  Nutrition Order Comments: 1000 mL FR  Oral Nutrition Supplement: Boost Breeze TID    Evaluation of Received Nutrient/Fluid Intake    I/O: -9L since   Comments: LBM 6/3  Tolerance: tolerating  % Intake of Estimated Energy Needs: 50 - 75 %  % Meal Intake: 0 - 25 %    Nutrition Risk    Level of Risk/Frequency of Follow-up: low     Assessment and Plan  Nutrition Problem  Inadequate oral intakes    Related to (etiology):   Taste acuity    Signs and Symptoms (as evidenced by):   Oral intakes meeting <75% of nutritional needs.     Interventions/Recommendations (treatment strategy):  Collaboration of other providers & referral of care    Nutrition Diagnosis Status:   New           Monitor and Evaluation    Food and Nutrient Intake: energy intake, food and beverage intake  Food and Nutrient Adminstration: diet order  Knowledge/Beliefs/Attitudes: food and nutrition knowledge/skill  Physical Activity and Function:  nutrition-related ADLs and IADLs  Anthropometric Measurements: weight, weight change, body mass index  Biochemical Data, Medical Tests and Procedures: electrolyte and renal panel, gastrointestinal profile, glucose/endocrine profile, inflammatory profile, lipid profile  Nutrition-Focused Physical Findings: overall appearance     Malnutrition Assessment                 Orbital Region (Subcutaneous Fat Loss): well nourished  Upper Arm Region (Subcutaneous Fat Loss): moderate depletion   [place] Region (Muscle Loss): moderate depletion  Clavicle Bone Region (Muscle Loss): well nourished  Clavicle and Acromion Bone Region (Muscle Loss): well nourished  Dorsal Hand (Muscle Loss): mild depletion  Anterior Thigh Region (Muscle Loss): mild depletion  Posterior Calf Region (Muscle Loss): mild depletion   Edema (Fluid Accumulation): 0-->no edema present   Subcutaneous Fat Loss (Final Summary): mild protein-calorie malnutrition  Muscle Loss Evaluation (Final Summary): mild protein-calorie malnutrition         Nutrition Follow-Up    RD Follow-up?: Yes

## 2019-06-04 ENCOUNTER — TELEPHONE (OUTPATIENT)
Dept: TRANSPLANT | Facility: CLINIC | Age: 49
End: 2019-06-04

## 2019-06-04 ENCOUNTER — DOCUMENTATION ONLY (OUTPATIENT)
Dept: TRANSPLANT | Facility: HOSPITAL | Age: 49
End: 2019-06-04

## 2019-06-04 LAB
ABO + RH BLD: NORMAL
ALBUMIN SERPL BCP-MCNC: 3.1 G/DL (ref 3.5–5.2)
ALBUMIN SERPL BCP-MCNC: 3.2 G/DL (ref 3.5–5.2)
ALBUMIN SERPL BCP-MCNC: 3.2 G/DL (ref 3.5–5.2)
ALP SERPL-CCNC: 93 U/L (ref 55–135)
ALP SERPL-CCNC: 93 U/L (ref 55–135)
ALT SERPL W/O P-5'-P-CCNC: 30 U/L (ref 10–44)
ALT SERPL W/O P-5'-P-CCNC: 30 U/L (ref 10–44)
ANION GAP SERPL CALC-SCNC: 6 MMOL/L (ref 8–16)
ANION GAP SERPL CALC-SCNC: 6 MMOL/L (ref 8–16)
ANION GAP SERPL CALC-SCNC: 7 MMOL/L (ref 8–16)
APTT BLDCRRT: 41.4 SEC (ref 21–32)
AST SERPL-CCNC: 71 U/L (ref 10–40)
AST SERPL-CCNC: 71 U/L (ref 10–40)
BACTERIA #/AREA URNS AUTO: ABNORMAL /HPF
BASOPHILS # BLD AUTO: 0.02 K/UL (ref 0–0.2)
BASOPHILS # BLD AUTO: 0.02 K/UL (ref 0–0.2)
BASOPHILS NFR BLD: 1 % (ref 0–1.9)
BASOPHILS NFR BLD: 1 % (ref 0–1.9)
BILIRUB DIRECT SERPL-MCNC: 4.4 MG/DL (ref 0.1–0.3)
BILIRUB SERPL-MCNC: 15 MG/DL (ref 0.1–1)
BILIRUB SERPL-MCNC: 15 MG/DL (ref 0.1–1)
BILIRUB UR QL STRIP: ABNORMAL
BLD GP AB SCN CELLS X3 SERPL QL: NORMAL
BUN SERPL-MCNC: 12 MG/DL (ref 6–20)
CALCIUM SERPL-MCNC: 8.5 MG/DL (ref 8.7–10.5)
CALCIUM SERPL-MCNC: 8.8 MG/DL (ref 8.7–10.5)
CALCIUM SERPL-MCNC: 8.8 MG/DL (ref 8.7–10.5)
CHLORIDE SERPL-SCNC: 100 MMOL/L (ref 95–110)
CHLORIDE SERPL-SCNC: 100 MMOL/L (ref 95–110)
CHLORIDE SERPL-SCNC: 99 MMOL/L (ref 95–110)
CLARITY UR REFRACT.AUTO: ABNORMAL
CO2 SERPL-SCNC: 24 MMOL/L (ref 23–29)
CO2 SERPL-SCNC: 24 MMOL/L (ref 23–29)
CO2 SERPL-SCNC: 25 MMOL/L (ref 23–29)
COLOR UR AUTO: ABNORMAL
CREAT SERPL-MCNC: 0.7 MG/DL (ref 0.5–1.4)
CREAT SERPL-MCNC: 0.7 MG/DL (ref 0.5–1.4)
CREAT SERPL-MCNC: 0.8 MG/DL (ref 0.5–1.4)
DIFFERENTIAL METHOD: ABNORMAL
DIFFERENTIAL METHOD: ABNORMAL
EOSINOPHIL # BLD AUTO: 0.2 K/UL (ref 0–0.5)
EOSINOPHIL # BLD AUTO: 0.2 K/UL (ref 0–0.5)
EOSINOPHIL NFR BLD: 8.7 % (ref 0–8)
EOSINOPHIL NFR BLD: 8.7 % (ref 0–8)
ERYTHROCYTE [DISTWIDTH] IN BLOOD BY AUTOMATED COUNT: 18.9 % (ref 11.5–14.5)
ERYTHROCYTE [DISTWIDTH] IN BLOOD BY AUTOMATED COUNT: 18.9 % (ref 11.5–14.5)
EST. GFR  (AFRICAN AMERICAN): >60 ML/MIN/1.73 M^2
EST. GFR  (NON AFRICAN AMERICAN): >60 ML/MIN/1.73 M^2
ESTIMATED AVG GLUCOSE: 71 MG/DL (ref 68–131)
FIBRINOGEN PPP-MCNC: 83 MG/DL (ref 182–366)
GLUCOSE SERPL-MCNC: 93 MG/DL (ref 70–110)
GLUCOSE SERPL-MCNC: 95 MG/DL (ref 70–110)
GLUCOSE SERPL-MCNC: 95 MG/DL (ref 70–110)
GLUCOSE UR QL STRIP: NEGATIVE
HBA1C MFR BLD HPLC: 4.1 % (ref 4–5.6)
HBV SURFACE AG SERPL QL IA: NEGATIVE
HCT VFR BLD AUTO: 23.6 % (ref 37–48.5)
HCT VFR BLD AUTO: 23.6 % (ref 37–48.5)
HGB BLD-MCNC: 8 G/DL (ref 12–16)
HGB BLD-MCNC: 8 G/DL (ref 12–16)
HGB UR QL STRIP: ABNORMAL
HIV 1+2 AB+HIV1 P24 AG SERPL QL IA: NEGATIVE
IMM GRANULOCYTES # BLD AUTO: 0.01 K/UL (ref 0–0.04)
IMM GRANULOCYTES # BLD AUTO: 0.01 K/UL (ref 0–0.04)
IMM GRANULOCYTES NFR BLD AUTO: 0.5 % (ref 0–0.5)
IMM GRANULOCYTES NFR BLD AUTO: 0.5 % (ref 0–0.5)
INR PPP: 2.1 (ref 0.8–1.2)
INR PPP: 2.1 (ref 0.8–1.2)
KETONES UR QL STRIP: NEGATIVE
LEUKOCYTE ESTERASE UR QL STRIP: ABNORMAL
LYMPHOCYTES # BLD AUTO: 0.6 K/UL (ref 1–4.8)
LYMPHOCYTES # BLD AUTO: 0.6 K/UL (ref 1–4.8)
LYMPHOCYTES NFR BLD: 29 % (ref 18–48)
LYMPHOCYTES NFR BLD: 29 % (ref 18–48)
MAGNESIUM SERPL-MCNC: 1.5 MG/DL (ref 1.6–2.6)
MAGNESIUM SERPL-MCNC: 1.8 MG/DL (ref 1.6–2.6)
MAGNESIUM SERPL-MCNC: 1.8 MG/DL (ref 1.6–2.6)
MCH RBC QN AUTO: 30.7 PG (ref 27–31)
MCH RBC QN AUTO: 30.7 PG (ref 27–31)
MCHC RBC AUTO-ENTMCNC: 33.9 G/DL (ref 32–36)
MCHC RBC AUTO-ENTMCNC: 33.9 G/DL (ref 32–36)
MCV RBC AUTO: 90 FL (ref 82–98)
MCV RBC AUTO: 90 FL (ref 82–98)
MICROSCOPIC COMMENT: ABNORMAL
MONOCYTES # BLD AUTO: 0.2 K/UL (ref 0.3–1)
MONOCYTES # BLD AUTO: 0.2 K/UL (ref 0.3–1)
MONOCYTES NFR BLD: 11.6 % (ref 4–15)
MONOCYTES NFR BLD: 11.6 % (ref 4–15)
NEUTROPHILS # BLD AUTO: 1 K/UL (ref 1.8–7.7)
NEUTROPHILS # BLD AUTO: 1 K/UL (ref 1.8–7.7)
NEUTROPHILS NFR BLD: 49.2 % (ref 38–73)
NEUTROPHILS NFR BLD: 49.2 % (ref 38–73)
NITRITE UR QL STRIP: NEGATIVE
NRBC BLD-RTO: 0 /100 WBC
NRBC BLD-RTO: 0 /100 WBC
PH UR STRIP: 6 [PH] (ref 5–8)
PHOSPHATE SERPL-MCNC: 3.2 MG/DL (ref 2.7–4.5)
PHOSPHATE SERPL-MCNC: 3.5 MG/DL (ref 2.7–4.5)
PLATELET # BLD AUTO: 55 K/UL (ref 150–350)
PLATELET # BLD AUTO: 55 K/UL (ref 150–350)
PMV BLD AUTO: 12.5 FL (ref 9.2–12.9)
PMV BLD AUTO: 12.5 FL (ref 9.2–12.9)
POTASSIUM SERPL-SCNC: 3.5 MMOL/L (ref 3.5–5.1)
POTASSIUM SERPL-SCNC: 3.7 MMOL/L (ref 3.5–5.1)
POTASSIUM SERPL-SCNC: 3.7 MMOL/L (ref 3.5–5.1)
PROT SERPL-MCNC: 5.6 G/DL (ref 6–8.4)
PROT SERPL-MCNC: 5.6 G/DL (ref 6–8.4)
PROT UR QL STRIP: NEGATIVE
PROTHROMBIN TIME: 20.6 SEC (ref 9–12.5)
PROTHROMBIN TIME: 20.6 SEC (ref 9–12.5)
RBC # BLD AUTO: 2.61 M/UL (ref 4–5.4)
RBC # BLD AUTO: 2.61 M/UL (ref 4–5.4)
RBC #/AREA URNS AUTO: 3 /HPF (ref 0–4)
SODIUM SERPL-SCNC: 130 MMOL/L (ref 136–145)
SODIUM SERPL-SCNC: 130 MMOL/L (ref 136–145)
SODIUM SERPL-SCNC: 131 MMOL/L (ref 136–145)
SP GR UR STRIP: 1.01 (ref 1–1.03)
SQUAMOUS #/AREA URNS AUTO: 11 /HPF
URN SPEC COLLECT METH UR: ABNORMAL
WBC # BLD AUTO: 2.07 K/UL (ref 3.9–12.7)
WBC # BLD AUTO: 2.07 K/UL (ref 3.9–12.7)
WBC #/AREA URNS AUTO: 6 /HPF (ref 0–5)
YEAST UR QL AUTO: ABNORMAL

## 2019-06-04 PROCEDURE — 85730 THROMBOPLASTIN TIME PARTIAL: CPT | Mod: NTX

## 2019-06-04 PROCEDURE — 93005 ELECTROCARDIOGRAM TRACING: CPT | Mod: NTX

## 2019-06-04 PROCEDURE — 93010 EKG 12-LEAD: ICD-10-PCS | Mod: NTX,,, | Performed by: INTERNAL MEDICINE

## 2019-06-04 PROCEDURE — 99233 SBSQ HOSP IP/OBS HIGH 50: CPT | Mod: NTX,,, | Performed by: NURSE PRACTITIONER

## 2019-06-04 PROCEDURE — 84100 ASSAY OF PHOSPHORUS: CPT | Mod: NTX

## 2019-06-04 PROCEDURE — 83036 HEMOGLOBIN GLYCOSYLATED A1C: CPT | Mod: NTX

## 2019-06-04 PROCEDURE — 83735 ASSAY OF MAGNESIUM: CPT | Mod: NTX

## 2019-06-04 PROCEDURE — 25000003 PHARM REV CODE 250: Mod: NTX | Performed by: HOSPITALIST

## 2019-06-04 PROCEDURE — 93010 ELECTROCARDIOGRAM REPORT: CPT | Mod: NTX,,, | Performed by: INTERNAL MEDICINE

## 2019-06-04 PROCEDURE — 80069 RENAL FUNCTION PANEL: CPT | Mod: NTX

## 2019-06-04 PROCEDURE — 80053 COMPREHEN METABOLIC PANEL: CPT | Mod: NTX

## 2019-06-04 PROCEDURE — 99000 SPECIMEN HANDLING OFFICE-LAB: CPT | Mod: NTX

## 2019-06-04 PROCEDURE — 85025 COMPLETE CBC W/AUTO DIFF WBC: CPT | Mod: NTX

## 2019-06-04 PROCEDURE — 86703 HIV-1/HIV-2 1 RESULT ANTBDY: CPT | Mod: NTX

## 2019-06-04 PROCEDURE — 87522 HEPATITIS C REVRS TRNSCRPJ: CPT | Mod: NTX

## 2019-06-04 PROCEDURE — 99233 PR SUBSEQUENT HOSPITAL CARE,LEVL III: ICD-10-PCS | Mod: NTX,,, | Performed by: NURSE PRACTITIONER

## 2019-06-04 PROCEDURE — 99499 UNLISTED E&M SERVICE: CPT | Mod: ,,, | Performed by: TRANSPLANT SURGERY

## 2019-06-04 PROCEDURE — 86850 RBC ANTIBODY SCREEN: CPT | Mod: NTX

## 2019-06-04 PROCEDURE — 86920 COMPATIBILITY TEST SPIN: CPT

## 2019-06-04 PROCEDURE — 85610 PROTHROMBIN TIME: CPT | Mod: NTX

## 2019-06-04 PROCEDURE — 82248 BILIRUBIN DIRECT: CPT | Mod: NTX

## 2019-06-04 PROCEDURE — 87517 HEPATITIS B DNA QUANT: CPT | Mod: NTX

## 2019-06-04 PROCEDURE — 63600175 PHARM REV CODE 636 W HCPCS: Mod: NTX | Performed by: NURSE PRACTITIONER

## 2019-06-04 PROCEDURE — 85384 FIBRINOGEN ACTIVITY: CPT | Mod: NTX

## 2019-06-04 PROCEDURE — 36415 COLL VENOUS BLD VENIPUNCTURE: CPT | Mod: NTX

## 2019-06-04 PROCEDURE — 81001 URINALYSIS AUTO W/SCOPE: CPT | Mod: NTX

## 2019-06-04 PROCEDURE — 87340 HEPATITIS B SURFACE AG IA: CPT | Mod: NTX

## 2019-06-04 PROCEDURE — 99499 NO LOS: ICD-10-PCS | Mod: ,,, | Performed by: TRANSPLANT SURGERY

## 2019-06-04 PROCEDURE — 20600001 HC STEP DOWN PRIVATE ROOM: Mod: NTX

## 2019-06-04 PROCEDURE — 83735 ASSAY OF MAGNESIUM: CPT | Mod: 91,NTX

## 2019-06-04 RX ORDER — FUROSEMIDE 10 MG/ML
40 INJECTION INTRAMUSCULAR; INTRAVENOUS ONCE
Status: COMPLETED | OUTPATIENT
Start: 2019-06-04 | End: 2019-06-04

## 2019-06-04 RX ORDER — HYDROCODONE BITARTRATE AND ACETAMINOPHEN 500; 5 MG/1; MG/1
TABLET ORAL
Status: DISCONTINUED | OUTPATIENT
Start: 2019-06-04 | End: 2019-06-06 | Stop reason: HOSPADM

## 2019-06-04 RX ORDER — MAGNESIUM SULFATE HEPTAHYDRATE 40 MG/ML
2 INJECTION, SOLUTION INTRAVENOUS
Status: ACTIVE | OUTPATIENT
Start: 2019-06-04 | End: 2019-06-05

## 2019-06-04 RX ORDER — HYDROCODONE BITARTRATE AND ACETAMINOPHEN 500; 5 MG/1; MG/1
TABLET ORAL CONTINUOUS
Status: ACTIVE | OUTPATIENT
Start: 2019-06-04 | End: 2019-06-05

## 2019-06-04 RX ORDER — CIPROFLOXACIN 2 MG/ML
400 INJECTION, SOLUTION INTRAVENOUS
Status: COMPLETED | OUTPATIENT
Start: 2019-06-04 | End: 2019-06-05

## 2019-06-04 RX ORDER — METHYLPREDNISOLONE SODIUM SUCCINATE 500 MG/8ML
500 INJECTION INTRAMUSCULAR; INTRAVENOUS
Status: ACTIVE | OUTPATIENT
Start: 2019-06-04 | End: 2019-06-04

## 2019-06-04 RX ORDER — VANCOMYCIN HCL IN 5 % DEXTROSE 1G/250ML
1000 PLASTIC BAG, INJECTION (ML) INTRAVENOUS
Status: COMPLETED | OUTPATIENT
Start: 2019-06-04 | End: 2019-06-05

## 2019-06-04 RX ORDER — FUROSEMIDE 10 MG/ML
20 INJECTION INTRAMUSCULAR; INTRAVENOUS ONCE
Status: COMPLETED | OUTPATIENT
Start: 2019-06-04 | End: 2019-06-04

## 2019-06-04 RX ADMIN — RIFAXIMIN 550 MG: 550 TABLET ORAL at 09:06

## 2019-06-04 RX ADMIN — FUROSEMIDE 20 MG: 10 INJECTION, SOLUTION INTRAMUSCULAR; INTRAVENOUS at 05:06

## 2019-06-04 RX ADMIN — LACTULOSE 5 G: 20 SOLUTION ORAL at 09:06

## 2019-06-04 RX ADMIN — MICONAZOLE NITRATE: 20 POWDER TOPICAL at 09:06

## 2019-06-04 RX ADMIN — LEVOTHYROXINE SODIUM 112 MCG: 112 TABLET ORAL at 05:06

## 2019-06-04 RX ADMIN — FUROSEMIDE 40 MG: 10 INJECTION, SOLUTION INTRAMUSCULAR; INTRAVENOUS at 09:06

## 2019-06-04 RX ADMIN — PANTOPRAZOLE SODIUM 40 MG: 40 TABLET, DELAYED RELEASE ORAL at 09:06

## 2019-06-04 RX ADMIN — TRAZODONE HYDROCHLORIDE 25 MG: 50 TABLET ORAL at 09:06

## 2019-06-04 NOTE — CONSULTS
MEDICAL NUTRITION THERAPY    RD received consult regarding pre-transplant. Per chart review, patient to receive liver transplant. RD already following and will continue to follow  post-op. Pt received low Na diet education. Pt will receive a full assessment and education. Please reconsult after patient receives transplant.     Thanks.

## 2019-06-04 NOTE — PROGRESS NOTES
"(Tentative) Transplant Note  SW presented to pt's TSU room this morning for f/u after pt being eligible as primary recipient for liver offer.  Pt found lying in bed with pt's 15 y/o daughter Catie seated at bedside.  Pt presented a&ox4, communicative, and with oxygen in use.  Pt reported feeling "apprehensive" about going through with liver transplant following pre-operative discussion with transplant surgeon.  Pt expressed some anxiety specifically regarding the anticipated length of time pt would be in surgery and the risks involved with transplant surgery and post-transplant surgery recovery.  Pt also became tearful as she considered the donor and donor family.  SW provided reflective listening, normalization, validation, and emotional support.  SW also discussed option of writing letter to donor family after post-transplant recovery.  Pt expressed interest in doing so.  Pt maintains plan to go through with transplant as pt expressed belief that transplant is the best option for her and that she needs to continue living to see her daughter graduate high school and college.  Pt denies any additional concerns regarding coping and/or mental health difficulties.  Pt reports her /primary caregiver Freeman Zamudio is on his way from home to the hospital now.  Pt stated plans of using [place] apartment as local lodging option post-transplant.  SW provided pt/daughter with financial profile to complete and return to  when completed.  SW explained [place] apartment lay out and check-in process.  Pt expressed understanding and satisfaction regarding same.  Pt also states preference for using [facility] for filling post-transplant medications.  Pt and daughter deny having any questions or psychosocial concerns at this time.  SW remains available for further psychosocial support and will f/u as needed.  "

## 2019-06-04 NOTE — SUBJECTIVE & OBJECTIVE
Scheduled Meds:   furosemide  20 mg Intravenous Once    lactulose  5 g Oral Daily    levothyroxine  112 mcg Oral Daily    methylPREDNISolone sodium succinate  500 mg Intravenous Once Pre-Op    miconazole NITRATE 2 %   Topical (Top) BID    pantoprazole  40 mg Oral Daily    rifAXImin  550 mg Oral BID     Continuous Infusions:   sodium chloride 0.9%       PRN Meds:sodium chloride, acetaminophen, vancomycin (VANCOCIN) IVPB **AND** ciprofloxacin, cyclobenzaprine, dextrose 50%, dextrose 50%, glucagon (human recombinant), glucose, glucose, magnesium sulfate IVPB, ondansetron, phenyleph-min oil-petrolatum, simethicone, sodium chloride 0.9%, sodium phosphate IVPB, sodium phosphate IVPB, sodium phosphate IVPB, trazodone    Review of Systems   Constitutional: Positive for appetite change and fatigue. Negative for chills and fever.   Respiratory: Positive for cough (occasional cough, non-productive) and shortness of breath.    Cardiovascular: Negative for chest pain and leg swelling.   Gastrointestinal: Positive for abdominal distention. Negative for abdominal pain, constipation, diarrhea, nausea and vomiting.   Allergic/Immunologic: Negative for immunocompromised state.   Neurological: Positive for weakness. Negative for tremors.   Psychiatric/Behavioral: Negative for confusion and decreased concentration.     Objective:     Vital Signs (Most Recent):  Temp: 98.5 °F (36.9 °C) (06/04/19 1531)  Pulse: 93 (06/04/19 1300)  Resp: (!) 22 (06/04/19 1300)  BP: (!) 112/57 (06/04/19 1300)  SpO2: (!) 91 % (06/04/19 1300) Vital Signs (24h Range):  Temp:  [98.4 °F (36.9 °C)-99 °F (37.2 °C)] 98.5 °F (36.9 °C)  Pulse:  [] 93  Resp:  [14-28] 22  SpO2:  [91 %-95 %] 91 %  BP: (112-133)/(57-60) 112/57     Weight: 97.7 kg (215 lb 4.8 oz)  Body mass index is 35.83 kg/m².    Intake/Output - Last 3 Shifts       06/02 0700 - 06/03 0659 06/03 0700 - 06/04 0659 06/04 0700 - 06/05 0659    P.O. 640 952     Total Intake(mL/kg) 029 (6.5) 033  (3.7)     Urine (mL/kg/hr)   500 (0.6)    Stool   0    Total Output   500    Net +640 +365 -500           Urine Occurrence 3 x 2 x     Stool Occurrence 2 x 3 x 1 x          Physical Exam   Constitutional: She is oriented to person, place, and time. No distress. Nasal cannula in place.   Eyes: Pupils are equal, round, and reactive to light. EOM are normal. Scleral icterus is present.   Neck: Normal range of motion.   Cardiovascular: Normal rate and regular rhythm.   No murmur heard.  Pulmonary/Chest: Effort normal. No respiratory distress. She has decreased breath sounds in the right middle field and the right lower field. She has no wheezes.   Abdominal: Soft. Bowel sounds are normal. She exhibits no distension. There is no tenderness. There is no guarding.   Musculoskeletal: Normal range of motion.   Neurological: She is alert and oriented to person, place, and time.   Skin: Skin is warm and dry. She is not diaphoretic.   Psychiatric: She has a normal mood and affect. Her behavior is normal. Thought content normal.   Nursing note and vitals reviewed.      Laboratory:  Immunosuppressants     None        CBC:   Recent Labs   Lab 06/04/19  0438   WBC 2.07*  2.07*   RBC 2.61*  2.61*   HGB 8.0*  8.0*   HCT 23.6*  23.6*   PLT 55*  55*   MCV 90  90   MCH 30.7  30.7   MCHC 33.9  33.9     CMP:   Recent Labs   Lab 06/04/19  0438   GLU 95  95   CALCIUM 8.8  8.8   ALBUMIN 3.2*  3.2*   PROT 5.6*  5.6*   *  130*   K 3.7  3.7   CO2 24  24     100   BUN 12  12   CREATININE 0.7  0.7   ALKPHOS 93  93   ALT 30  30   AST 71*  71*   BILITOT 15.0*  15.0*     MELD-Na score: 28 at 6/4/2019  4:38 AM  MELD score: 25 at 6/4/2019  4:38 AM  Calculated from:  Serum Creatinine: 0.7 mg/dL (Rounded to 1 mg/dL) at 6/4/2019  4:38 AM  Serum Sodium: 130 mmol/L at 6/4/2019  4:38 AM  Total Bilirubin: 15.0 mg/dL at 6/4/2019  4:38 AM  INR(ratio): 2.1 at 6/4/2019  4:38 AM  Age: 49 years    Labs within the past 24 hours  have been reviewed.    Diagnostic Results:  I have personally reviewed all pertinent imaging studies.

## 2019-06-04 NOTE — PLAN OF CARE
"Problem: Adult Inpatient Plan of Care  Goal: Plan of Care Review  Outcome: Ongoing (interventions implemented as appropriate)  Pt is AAOX4. Pre workup complete for liver transplant    -Lasix IV x 2 doses this shift  -Pt has poor appetite, fluids encouraged, can continue to eat until further directed  -consents signed  -wound care performed to sacrum  -ambulated to BR but was feeling "too tired" to ambulate far distances  -LS diminished on right side, O2 on at 2L to maintain adequate sats  -abdomen rounded, but soft, no pain on palpation  -fall precautions in place  -TANNA hose placed on pt  -pt has had 2 bms this shift  -family at bedside      "

## 2019-06-04 NOTE — PLAN OF CARE
Problem: Adult Inpatient Plan of Care  Goal: Plan of Care Review  Pt aaox4 vswnl except increased resp rate.  at bedside. Bed in low position and callbell within reach. Telemetry mantained NSR. sats on 1 liter >92%. abd distended. cellcept held wbc of 2.1. Rt sacrum st 2 with barrier ointment and foam drsg. Midline c/s placed yesterday but no one placed it yet. Will reconsult. Pt had 4 bms in 24hrs. Pt ambulates with 1 assist. Pt turns independently. Standard precautions maintained

## 2019-06-04 NOTE — PLAN OF CARE
Pre-operative Discussion Note  Liver Transplant Surgery    Jihan Zamudio is a 49 y.o. female admitted for liver transplant.  I discussed the planned procedure in detail, including expected hospital course and outcomes, benefits, risks, and potential complications.  Complications discussed included death, graft failure, bleeding, infection, rejection, and neurologic problems.  I discussed the risks of anesthesia, as well as the potential need for re-operation.  The possibility of other complications not specifically mentioned was also discussed.  Also, I discussed the need for lifelong immunosuppression and the possibility of serious complications from immunosuppressive drugs.    We also discussed options regarding her lap band currently in place.     The discussion included the risks that the patient will incur if she elects to not have the proposed procedure.    Relevant donor-specific risk factors were disclosed and discussed with the patient, including:   Not applicable    Specific PHS Increased Risk Behavior criteria for the organ donor include:  None    HCV Infection Not applicable.    Hepatocellular Carcinoma Recurrence: Not applicable.    All questions were answered.  The patient and available family members voice understanding and agree to proceed with the transplant.    UNOS Patient Status  Note on scores:  ICU = 10 = total assistance  TSU = 20-30 = partial assistance  Outpatient admitted for transplant requiring medical care in last year = 40-50 = partial assistance  Scores 60 or higher indicate no assistance, meaning no need for medical care in last year. This would be very unusual for a transplant candidate.    Functional Status: 20% - Very sick, hospitalization necessary: active treatment necessary  Physical Capacity: Limited Mobility     Discussion held today at approx 930am. Her Daughter was present, also Eugenia POE was present.

## 2019-06-04 NOTE — ASSESSMENT & PLAN NOTE
- 2/2 chronic liver disease  - 1L Fluid restriction  - Na improved with albumin.  - Na 130 today, and slowly increasing toward normal.  Dosing diuretics daily as needed given hyponatremia.  Last lasix 60 mg on 5/31.

## 2019-06-04 NOTE — PROGRESS NOTES
[facility]  Liver Transplant  Progress Note    Patient Name: Jihan Zamudio  MRN: [id number]  Admission Date: 5/16/2019  Hospital Length of Stay: 19 days  Code Status: Full Code  Primary Care Provider: Primary Doctor No    Subjective:     History of Present Illness:  This is a 48 yo F with hx of KESSLER cirrhosis c/b varices, latent TB, GERD, hypothyroidism, lap band 2007, HLD who was sent to the ED from hepatology clinic for shortness of breath. She follows with Dr. Smallwood. She reports feeling more short of breath the past few days and increased abdominal swelling. She reports occasional confusion as well. She was found to have a low sodium as well. With regards to her lung nodule, we have been awaiting films for an outside facility for comparison. She was found to have a large pleural effusion while here, something she has not had before. Pulmonology has been consulted.      Hospital Course:  Pt approved for liver transplant and transferred to LTS on 5/29. Pt w/ SOB. Last Thoracentesis on 5/27. Cultures NGTD. Remains on Rocephin x 5 days, per ID recs, to complete on 5/30. Ongoing issue with hyponatremia, improving with albumin daily. Na cont to improve with free water restriction and albumin administration.    Interval Hx. No acute events overnight.  Patient AOx4.  No asterixis noted.  Cont lactulose and Rifaximin.  t bili 14.1.  MELD  29.  Patient has hx of pleural effusion noted.  Repeated CXR this am, significant right pleural effusion.  O2 requirements stable 91-95% on 1L.  Goal to keep O2 sat >92%.  Last thoracentesis by 5/31/19 with removal of 1.5 L serosanguinous fluid.  Fluid , segs 8%, no evid. of infection.  Due to rapid reaccumulation, required albumin and diuretic then.  Plan to diurese w Lasix today(total 60 mg).  Holding on repeating thora given pt has offer for liver tranplant- no OR times yet.  Monitor respiratory status.  Cont weights and strict I/Os.  Last transfused 1u  pRBC 5/29 w appropriate response.  H/H stable.  Monitor.     Scheduled Meds:   furosemide  20 mg Intravenous Once    lactulose  5 g Oral Daily    levothyroxine  112 mcg Oral Daily    methylPREDNISolone sodium succinate  500 mg Intravenous Once Pre-Op    miconazole NITRATE 2 %   Topical (Top) BID    pantoprazole  40 mg Oral Daily    rifAXImin  550 mg Oral BID     Continuous Infusions:   sodium chloride 0.9%       PRN Meds:sodium chloride, acetaminophen, vancomycin (VANCOCIN) IVPB **AND** ciprofloxacin, cyclobenzaprine, dextrose 50%, dextrose 50%, glucagon (human recombinant), glucose, glucose, magnesium sulfate IVPB, ondansetron, phenyleph-min oil-petrolatum, simethicone, sodium chloride 0.9%, sodium phosphate IVPB, sodium phosphate IVPB, sodium phosphate IVPB, trazodone    Review of Systems   Constitutional: Positive for appetite change and fatigue. Negative for chills and fever.   Respiratory: Positive for cough (occasional cough, non-productive) and shortness of breath.    Cardiovascular: Negative for chest pain and leg swelling.   Gastrointestinal: Positive for abdominal distention. Negative for abdominal pain, constipation, diarrhea, nausea and vomiting.   Allergic/Immunologic: Negative for immunocompromised state.   Neurological: Positive for weakness. Negative for tremors.   Psychiatric/Behavioral: Negative for confusion and decreased concentration.     Objective:     Vital Signs (Most Recent):  Temp: 98.5 °F (36.9 °C) (06/04/19 1531)  Pulse: 93 (06/04/19 1300)  Resp: (!) 22 (06/04/19 1300)  BP: (!) 112/57 (06/04/19 1300)  SpO2: (!) 91 % (06/04/19 1300) Vital Signs (24h Range):  Temp:  [98.4 °F (36.9 °C)-99 °F (37.2 °C)] 98.5 °F (36.9 °C)  Pulse:  [] 93  Resp:  [14-28] 22  SpO2:  [91 %-95 %] 91 %  BP: (112-133)/(57-60) 112/57     Weight: 97.7 kg (215 lb 4.8 oz)  Body mass index is 35.83 kg/m².    Intake/Output - Last 3 Shifts       06/02 0700 - 06/03 0659 06/03 0700 - 06/04 0659 06/04 0700 -  06/05 0659    P.O. 640 365     Total Intake(mL/kg) 640 (6.5) 365 (3.7)     Urine (mL/kg/hr)   500 (0.6)    Stool   0    Total Output   500    Net +640 +365 -500           Urine Occurrence 3 x 2 x     Stool Occurrence 2 x 3 x 1 x          Physical Exam   Constitutional: She is oriented to person, place, and time. No distress. Nasal cannula in place.   Eyes: Pupils are equal, round, and reactive to light. EOM are normal. Scleral icterus is present.   Neck: Normal range of motion.   Cardiovascular: Normal rate and regular rhythm.   No murmur heard.  Pulmonary/Chest: Effort normal. No respiratory distress. She has decreased breath sounds in the right middle field and the right lower field. She has no wheezes.   Abdominal: Soft. Bowel sounds are normal. She exhibits no distension. There is no tenderness. There is no guarding.   Musculoskeletal: Normal range of motion.   Neurological: She is alert and oriented to person, place, and time.   Skin: Skin is warm and dry. She is not diaphoretic.   Psychiatric: She has a normal mood and affect. Her behavior is normal. Thought content normal.   Nursing note and vitals reviewed.      Laboratory:  Immunosuppressants     None        CBC:   Recent Labs   Lab 06/04/19  0438   WBC 2.07*  2.07*   RBC 2.61*  2.61*   HGB 8.0*  8.0*   HCT 23.6*  23.6*   PLT 55*  55*   MCV 90  90   MCH 30.7  30.7   MCHC 33.9  33.9     CMP:   Recent Labs   Lab 06/04/19  0438   GLU 95  95   CALCIUM 8.8  8.8   ALBUMIN 3.2*  3.2*   PROT 5.6*  5.6*   *  130*   K 3.7  3.7   CO2 24  24     100   BUN 12  12   CREATININE 0.7  0.7   ALKPHOS 93  93   ALT 30  30   AST 71*  71*   BILITOT 15.0*  15.0*     MELD-Na score: 28 at 6/4/2019  4:38 AM  MELD score: 25 at 6/4/2019  4:38 AM  Calculated from:  Serum Creatinine: 0.7 mg/dL (Rounded to 1 mg/dL) at 6/4/2019  4:38 AM  Serum Sodium: 130 mmol/L at 6/4/2019  4:38 AM  Total Bilirubin: 15.0 mg/dL at 6/4/2019  4:38 AM  INR(ratio): 2.1 at  "6/4/2019  4:38 AM  Age: 49 years    Labs within the past 24 hours have been reviewed.    Diagnostic Results:  I have personally reviewed all pertinent imaging studies.    Assessment/Plan:     * Acute hypoxemic respiratory failure  - 2/2 pleural effusion and R sided hydrothorax  - Sat % on 2L NC  - S/p thora x 3 on 5/17, 5/27 and 5/31 with 1.5 L removed at each procedure  - CT chest 5/19/19 completed and shows stable pulmonary nodules.   - On 1-2L O2 to keep O2 sat >92%.  CXR this am to reassess re accumulation of fluid shows increased right pleural effusion.  O2 sat stable.  Plan to give Lasix and monitor since pt has liver transplant over.  Will need to monitor respiratory status closely as pt may require thora if OR times for transplant delayed.      Hypoxia  - see "acute hypoxemic resp failure"      Acquired coagulation factor deficiency  - no obvious signs of bleeding, denies melena or BRBPR.  - continue to monitor. Consider FFP/Vit K for invasive procedures.       Acute on chronic anemia  - 2/2 ESLD  - Transfuse 1u pRBC 5/29/19 w appropriate response.   - Cont to monitor w/ daily labs      Fever  - afebrile at this time. 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  - MELD-Na score: 28 at 6/4/2019  4:38 AM  MELD score: 25 at 6/4/2019  4:38 AM  Calculated from:  Serum Creatinine: 0.7 mg/dL (Rounded to 1 mg/dL) at 6/4/2019  4:38 AM  Serum Sodium: 130 mmol/L at 6/4/2019  4:38 AM  Total Bilirubin: 15.0 mg/dL at 6/4/2019  4:38 AM  INR(ratio): 2.1 at 6/4/2019  4:38 AM  Age: 49 years     Listed with MELD 29 thru 6/10.      Hypokalemia  - Replace PRN  - Monitor      Thrombocytopenia  - stable.  Cont to monitor.       Pleural effusion, right  - see acute hypoxemic resp failure  - CXR 5/30 w/ large right pleural effusion  - Thoracentesis 5/31, 1.5L removed. Fluid sent for analysis.  - repeat CXR this am showed re accumulation of fluid- pt has offer for liver transplant- will hold off on thora (lessen risk " for complication during procedure that would prevent transplant).  O2 sat stable.         Weakness  - PT/OT following. Ambulates w minimal assist.       Hepatic encephalopathy  -  Chronic and stable  - Cont lactulose/rifaximin.  -  No asterixis    Hyponatremia  - 2/2 chronic liver disease  - 1L Fluid restriction  - Na improved with albumin.  - Na 130 today, and slowly increasing toward normal.  Dosing diuretics daily as needed given hyponatremia.  Last lasix 60 mg on 5/31.      Coagulopathy  - 2/2 liver disease.  INR 2.1.    - monitor for bleeding    Moderate malnutrition        Esophageal varices  - chronic and stable. Monitor      GERD (gastroesophageal reflux disease)  - chronic and stable.  Cont Pantoprazole.     - Cont PPI      Hypothyroidism  - Chronic and stable  - Continue Synthroid 112mcg PO daily      Decompensated hepatic cirrhosis  - 2/2 KESSLER  - approved for transplant list  - Paracentesis on 5/27 with 3.2L removed and negative for SBP  - Low Sodium diet with 1L fluid restriction  - dosing diuretics daily due to hyponatremia  - Cont Lactulose, titrate 3-4 BM/day  - Rifaximin 550mg PO BID  - paracentesis 5/27/19     MELD-Na score: 28 at 6/4/2019  4:38 AM  MELD score: 25 at 6/4/2019  4:38 AM  Calculated from:  Serum Creatinine: 0.7 mg/dL (Rounded to 1 mg/dL) at 6/4/2019  4:38 AM  Serum Sodium: 130 mmol/L at 6/4/2019  4:38 AM  Total Bilirubin: 15.0 mg/dL at 6/4/2019  4:38 AM  INR(ratio): 2.1 at 6/4/2019  4:38 AM  Age: 49 years          Debility/Functional status: Patient debilitated by evidence of Muscle wasting and atrophy and Weakness. Physical and occupational therapy ordered daily to evaluate and treat. Debility was: present on admission.    VTE Risk Mitigation (From admission, onward)        Ordered     IP VTE HIGH RISK PATIENT  Once      06/04/19 0415     Send SCD stockings and TANNA hose with patient to OR  Continuous      06/04/19 0415     Place sequential compression device  Until discontinued       05/17/19 0023          The patients clinical status was discussed at multidisplinary rounds, involving transplant surgery, transplant medicine, pharmacy, nursing, nutrition, and social work    Discharge Planning:  No plan for discharge, patient has offer for liver transplant.    Eugenia Albrecht NP  Liver Transplant  [facility]

## 2019-06-04 NOTE — ASSESSMENT & PLAN NOTE
- 2/2 ESLD  - Transfuse 1u pRBC 5/29/19 w appropriate response.   - Cont to monitor w/ daily labs

## 2019-06-04 NOTE — PLAN OF CARE
Problem: Physical Therapy Goal  Goal: Physical Therapy Goal  Goals to be met by: 19, extended to 19    Patient will increase functional independence with mobility by performin. Supine to sit with Dickens.  2. Sit to supine with Dickens.  3. Sit to stand transfer with Supervision.  4. Bed to chair transfer with Supervision using Single-point Cane.  5. Gait  x 50 feet with Supervision using Single-point Cane.   6. Ascend/Descend 6 inch curb step with Supervision using Single-point Cane.  7. Lower extremity exercise program x 20 reps per handout, with assistance as needed.     Outcome: Ongoing (interventions implemented as appropriate)  Goals extended

## 2019-06-04 NOTE — ASSESSMENT & PLAN NOTE
- see acute hypoxemic resp failure  - CXR 5/30 w/ large right pleural effusion  - Thoracentesis 5/31, 1.5L removed. Fluid sent for analysis.  - repeat CXR this am showed re accumulation of fluid- pt has offer for liver transplant- will hold off on thora (lessen risk for complication during procedure that would prevent transplant).  O2 sat stable.

## 2019-06-04 NOTE — ASSESSMENT & PLAN NOTE
- 2/2 KESSLER  - approved for transplant list  - Paracentesis on 5/27 with 3.2L removed and negative for SBP  - Low Sodium diet with 1L fluid restriction  - dosing diuretics daily due to hyponatremia  - Cont Lactulose, titrate 3-4 BM/day  - Rifaximin 550mg PO BID  - paracentesis 5/27/19     MELD-Na score: 28 at 6/4/2019  4:38 AM  MELD score: 25 at 6/4/2019  4:38 AM  Calculated from:  Serum Creatinine: 0.7 mg/dL (Rounded to 1 mg/dL) at 6/4/2019  4:38 AM  Serum Sodium: 130 mmol/L at 6/4/2019  4:38 AM  Total Bilirubin: 15.0 mg/dL at 6/4/2019  4:38 AM  INR(ratio): 2.1 at 6/4/2019  4:38 AM  Age: 49 years

## 2019-06-04 NOTE — ASSESSMENT & PLAN NOTE
- 2/2 pleural effusion and R sided hydrothorax  - Sat % on 2L NC  - S/p thora x 3 on 5/17, 5/27 and 5/31 with 1.5 L removed at each procedure  - CT chest 5/19/19 completed and shows stable pulmonary nodules.   - On 1-2L O2 to keep O2 sat >92%.  CXR this am to reassess re accumulation of fluid shows increased right pleural effusion.  O2 sat stable.  Plan to give Lasix and monitor since pt has liver transplant over.  Will need to monitor respiratory status closely as pt may require thora if OR times for transplant delayed.

## 2019-06-04 NOTE — ASSESSMENT & PLAN NOTE
- MELD-Na score: 28 at 6/4/2019  4:38 AM  MELD score: 25 at 6/4/2019  4:38 AM  Calculated from:  Serum Creatinine: 0.7 mg/dL (Rounded to 1 mg/dL) at 6/4/2019  4:38 AM  Serum Sodium: 130 mmol/L at 6/4/2019  4:38 AM  Total Bilirubin: 15.0 mg/dL at 6/4/2019  4:38 AM  INR(ratio): 2.1 at 6/4/2019  4:38 AM  Age: 49 years     Listed with MELD 29 thru 6/10.

## 2019-06-04 NOTE — TELEPHONE ENCOUNTER
ORGAN OFFER NOTE    Notified by Janes Ren, , of liver offer with donor information.  Donor and recipient information read back and verified.  Spoke with Jihan Zamudio's  and Dr. Witt. Patient is inpatient.

## 2019-06-05 ENCOUNTER — DOCUMENTATION ONLY (OUTPATIENT)
Dept: TRANSPLANT | Facility: HOSPITAL | Age: 49
End: 2019-06-05

## 2019-06-05 ENCOUNTER — ANESTHESIA EVENT (OUTPATIENT)
Dept: SURGERY | Facility: HOSPITAL | Age: 49
DRG: 005 | End: 2019-06-05
Payer: OTHER GOVERNMENT

## 2019-06-05 ENCOUNTER — ANESTHESIA (OUTPATIENT)
Dept: SURGERY | Facility: HOSPITAL | Age: 49
DRG: 005 | End: 2019-06-05
Payer: OTHER GOVERNMENT

## 2019-06-05 LAB
ALBUMIN SERPL BCP-MCNC: 2.9 G/DL (ref 3.5–5.2)
ALBUMIN SERPL BCP-MCNC: 3 G/DL (ref 3.5–5.2)
ALP SERPL-CCNC: 94 U/L (ref 55–135)
ALT SERPL W/O P-5'-P-CCNC: 30 U/L (ref 10–44)
ANION GAP SERPL CALC-SCNC: 8 MMOL/L (ref 8–16)
ANION GAP SERPL CALC-SCNC: 8 MMOL/L (ref 8–16)
ANION GAP SERPL CALC-SCNC: 9 MMOL/L (ref 8–16)
ANISOCYTOSIS BLD QL SMEAR: SLIGHT
APPEARANCE FLD: CLEAR
APTT BLDCRRT: 38.9 SEC (ref 21–32)
APTT BLDCRRT: 41.2 SEC (ref 21–32)
AST SERPL-CCNC: 72 U/L (ref 10–40)
BASOPHILS NFR BLD: 2 % (ref 0–1.9)
BILIRUB SERPL-MCNC: 15 MG/DL (ref 0.1–1)
BLD PROD TYP BPU: NORMAL
BLOOD UNIT EXPIRATION DATE: NORMAL
BLOOD UNIT TYPE CODE: 5100
BLOOD UNIT TYPE CODE: 5100
BLOOD UNIT TYPE CODE: 6200
BLOOD UNIT TYPE: NORMAL
BODY FLD TYPE: NORMAL
BUN SERPL-MCNC: 13 MG/DL (ref 6–20)
BUN SERPL-MCNC: 13 MG/DL (ref 6–20)
BUN SERPL-MCNC: 15 MG/DL (ref 6–20)
CA-I BLDV-SCNC: 0.98 MMOL/L (ref 1.06–1.42)
CA-I BLDV-SCNC: 1.03 MMOL/L (ref 1.06–1.42)
CALCIUM SERPL-MCNC: 8.4 MG/DL (ref 8.7–10.5)
CALCIUM SERPL-MCNC: 8.7 MG/DL (ref 8.7–10.5)
CALCIUM SERPL-MCNC: 8.7 MG/DL (ref 8.7–10.5)
CHLORIDE SERPL-SCNC: 100 MMOL/L (ref 95–110)
CO2 SERPL-SCNC: 23 MMOL/L (ref 23–29)
CO2 SERPL-SCNC: 24 MMOL/L (ref 23–29)
CO2 SERPL-SCNC: 24 MMOL/L (ref 23–29)
CODING SYSTEM: NORMAL
COLOR FLD: YELLOW
CREAT SERPL-MCNC: 0.7 MG/DL (ref 0.5–1.4)
CREAT SERPL-MCNC: 0.7 MG/DL (ref 0.5–1.4)
CREAT SERPL-MCNC: 0.8 MG/DL (ref 0.5–1.4)
DACRYOCYTES BLD QL SMEAR: ABNORMAL
DIFFERENTIAL METHOD: ABNORMAL
DISPENSE STATUS: NORMAL
EOSINOPHIL NFR BLD: 6 % (ref 0–8)
ERYTHROCYTE [DISTWIDTH] IN BLOOD BY AUTOMATED COUNT: 18.8 % (ref 11.5–14.5)
EST. GFR  (AFRICAN AMERICAN): >60 ML/MIN/1.73 M^2
EST. GFR  (NON AFRICAN AMERICAN): >60 ML/MIN/1.73 M^2
FIBRINOGEN PPP-MCNC: 272 MG/DL (ref 182–366)
FIBRINOGEN PPP-MCNC: 75 MG/DL (ref 182–366)
GLUCOSE SERPL-MCNC: 85 MG/DL (ref 70–110)
GLUCOSE SERPL-MCNC: 89 MG/DL (ref 70–110)
GLUCOSE SERPL-MCNC: 89 MG/DL (ref 70–110)
GLUCOSE SERPL-MCNC: 98 MG/DL (ref 70–110)
GRAM STN SPEC: NORMAL
GRAM STN SPEC: NORMAL
HBV DNA SERPL NAA+PROBE-ACNC: <10 IU/ML
HBV DNA SERPL NAA+PROBE-LOG IU: <1 LOG (10) IU/ML
HBV DNA SERPL QL NAA+PROBE: NOT DETECTED
HCT VFR BLD AUTO: 20.3 % (ref 37–48.5)
HCT VFR BLD AUTO: 22.7 % (ref 37–48.5)
HCT VFR BLD AUTO: 22.8 % (ref 37–48.5)
HCV RNA SERPL NAA+PROBE-LOG IU: <1.08 LOG (10) IU/ML
HCV RNA SERPL QL NAA+PROBE: NOT DETECTED IU/ML
HCV RNA SPEC NAA+PROBE-ACNC: <12 IU/ML
HGB BLD-MCNC: 7 G/DL (ref 12–16)
HGB BLD-MCNC: 7.7 G/DL (ref 12–16)
HGB BLD-MCNC: 7.7 G/DL (ref 12–16)
IMM GRANULOCYTES # BLD AUTO: ABNORMAL K/UL (ref 0–0.04)
IMM GRANULOCYTES NFR BLD AUTO: ABNORMAL % (ref 0–0.5)
INR PPP: 1.5 (ref 0.8–1.2)
INR PPP: 2.2 (ref 0.8–1.2)
INR PPP: 2.2 (ref 0.8–1.2)
LYMPHOCYTES NFR BLD: 12 % (ref 18–48)
LYMPHOCYTES NFR FLD MANUAL: 66 %
MAGNESIUM SERPL-MCNC: 1.5 MG/DL (ref 1.6–2.6)
MAGNESIUM SERPL-MCNC: 1.6 MG/DL (ref 1.6–2.6)
MAGNESIUM SERPL-MCNC: 1.7 MG/DL (ref 1.6–2.6)
MAGNESIUM SERPL-MCNC: 1.7 MG/DL (ref 1.6–2.6)
MCH RBC QN AUTO: 30.9 PG (ref 27–31)
MCHC RBC AUTO-ENTMCNC: 33.8 G/DL (ref 32–36)
MCV RBC AUTO: 92 FL (ref 82–98)
MESOTHL CELL NFR FLD MANUAL: 12 %
MONOCYTES NFR BLD: 8 % (ref 4–15)
MONOS+MACROS NFR FLD MANUAL: 12 %
NEUTROPHILS NFR BLD: 72 % (ref 38–73)
NEUTROPHILS NFR FLD MANUAL: 10 %
NRBC BLD-RTO: 0 /100 WBC
OVALOCYTES BLD QL SMEAR: ABNORMAL
PHOSPHATE SERPL-MCNC: 3.8 MG/DL (ref 2.7–4.5)
PLATELET # BLD AUTO: 55 K/UL (ref 150–350)
PLATELET # BLD AUTO: 58 K/UL (ref 150–350)
PLATELET # BLD AUTO: 85 K/UL (ref 150–350)
PLATELET BLD QL SMEAR: ABNORMAL
PMV BLD AUTO: 10.6 FL (ref 9.2–12.9)
PMV BLD AUTO: 11.7 FL (ref 9.2–12.9)
PMV BLD AUTO: 12.5 FL (ref 9.2–12.9)
POIKILOCYTOSIS BLD QL SMEAR: SLIGHT
POLYCHROMASIA BLD QL SMEAR: ABNORMAL
POOLED CRYOPPT GVN BPU: NORMAL
POTASSIUM SERPL-SCNC: 3.8 MMOL/L (ref 3.5–5.1)
POTASSIUM SERPL-SCNC: 3.8 MMOL/L (ref 3.5–5.1)
POTASSIUM SERPL-SCNC: 3.9 MMOL/L (ref 3.5–5.1)
POTASSIUM SERPL-SCNC: 4 MMOL/L (ref 3.5–5.1)
PROT SERPL-MCNC: 5.5 G/DL (ref 6–8.4)
PROTHROMBIN TIME: 14.4 SEC (ref 9–12.5)
PROTHROMBIN TIME: 20.9 SEC (ref 9–12.5)
PROTHROMBIN TIME: 21.6 SEC (ref 9–12.5)
RBC # BLD AUTO: 2.49 M/UL (ref 4–5.4)
SCHISTOCYTES BLD QL SMEAR: PRESENT
SODIUM SERPL-SCNC: 132 MMOL/L (ref 136–145)
TRANS PLATPHERESIS VOL PATIENT: NORMAL ML
WBC # BLD AUTO: 1.85 K/UL (ref 3.9–12.7)
WBC # FLD: 355 /CU MM

## 2019-06-05 PROCEDURE — 99233 SBSQ HOSP IP/OBS HIGH 50: CPT | Mod: NTX,,, | Performed by: NURSE PRACTITIONER

## 2019-06-05 PROCEDURE — D9220A PRA ANESTHESIA: ICD-10-PCS | Mod: CRNA,,, | Performed by: NURSE ANESTHETIST, CERTIFIED REGISTERED

## 2019-06-05 PROCEDURE — 80100014 HC HEMODIALYSIS 1:1: Mod: NTX

## 2019-06-05 PROCEDURE — 63600175 PHARM REV CODE 636 W HCPCS: Mod: JG | Performed by: TRANSPLANT SURGERY

## 2019-06-05 PROCEDURE — 85014 HEMATOCRIT: CPT

## 2019-06-05 PROCEDURE — 84100 ASSAY OF PHOSPHORUS: CPT | Mod: NTX

## 2019-06-05 PROCEDURE — 36000931 HC OR TIME LEV VII EA ADD 15 MIN: Performed by: TRANSPLANT SURGERY

## 2019-06-05 PROCEDURE — 27200675 HC TRANSDUCER MONITOR KIT 4 LINES: Mod: NTX | Performed by: NURSE ANESTHETIST, CERTIFIED REGISTERED

## 2019-06-05 PROCEDURE — 85610 PROTHROMBIN TIME: CPT | Mod: 91

## 2019-06-05 PROCEDURE — P9012 CRYOPRECIPITATE EACH UNIT: HCPCS

## 2019-06-05 PROCEDURE — P9017 PLASMA 1 DONOR FRZ W/IN 8 HR: HCPCS

## 2019-06-05 PROCEDURE — 87075 CULTR BACTERIA EXCEPT BLOOD: CPT

## 2019-06-05 PROCEDURE — 27000191 HC C-V MONITORING: Mod: NTX

## 2019-06-05 PROCEDURE — D9220A PRA ANESTHESIA: ICD-10-PCS | Mod: ANES,,, | Performed by: ANESTHESIOLOGY

## 2019-06-05 PROCEDURE — 63600175 PHARM REV CODE 636 W HCPCS: Performed by: NURSE ANESTHETIST, CERTIFIED REGISTERED

## 2019-06-05 PROCEDURE — 27201041 HC RESERVOIR, CARDIOTOMY: Mod: NTX

## 2019-06-05 PROCEDURE — 36556 PR INSERT NON-TUNNEL CV CATH 5+ YRS OLD: ICD-10-PCS | Mod: 59,,, | Performed by: ANESTHESIOLOGY

## 2019-06-05 PROCEDURE — 85002 BLEEDING TIME TEST: CPT | Mod: NTX

## 2019-06-05 PROCEDURE — 81300005 HC LIVER TRANSPORT, GROUND 4-5 HOURS

## 2019-06-05 PROCEDURE — 81300000 HC LIVER ACQUISITION CHARGE

## 2019-06-05 PROCEDURE — 85610 PROTHROMBIN TIME: CPT | Mod: NTX

## 2019-06-05 PROCEDURE — 36620 PR INSERT CATH,ART,PERCUT,SHORTTERM: ICD-10-PCS | Mod: 59,,, | Performed by: ANESTHESIOLOGY

## 2019-06-05 PROCEDURE — 84132 ASSAY OF SERUM POTASSIUM: CPT

## 2019-06-05 PROCEDURE — 47135 TRANSPLANTATION OF LIVER: CPT | Mod: 82,,, | Performed by: TRANSPLANT SURGERY

## 2019-06-05 PROCEDURE — 84450 TRANSFERASE (AST) (SGOT): CPT

## 2019-06-05 PROCEDURE — P9021 RED BLOOD CELLS UNIT: HCPCS

## 2019-06-05 PROCEDURE — 27800505 HC CATH, RADIAL ARTERY KIT: Mod: NTX | Performed by: NURSE ANESTHETIST, CERTIFIED REGISTERED

## 2019-06-05 PROCEDURE — 85007 BL SMEAR W/DIFF WBC COUNT: CPT | Mod: NTX

## 2019-06-05 PROCEDURE — D9220A PRA ANESTHESIA: Mod: CRNA,,, | Performed by: NURSE ANESTHETIST, CERTIFIED REGISTERED

## 2019-06-05 PROCEDURE — 87102 FUNGUS ISOLATION CULTURE: CPT

## 2019-06-05 PROCEDURE — 85027 COMPLETE CBC AUTOMATED: CPT | Mod: NTX

## 2019-06-05 PROCEDURE — 85018 HEMOGLOBIN: CPT

## 2019-06-05 PROCEDURE — 85730 THROMBOPLASTIN TIME PARTIAL: CPT

## 2019-06-05 PROCEDURE — 37000008 HC ANESTHESIA 1ST 15 MINUTES: Performed by: TRANSPLANT SURGERY

## 2019-06-05 PROCEDURE — 47135 PR TRANSPLANT LIVER,ALLOTRANSPLANT: ICD-10-PCS | Mod: ,,, | Performed by: TRANSPLANT SURGERY

## 2019-06-05 PROCEDURE — 27100088 HC CELL SAVER: Mod: NTX

## 2019-06-05 PROCEDURE — 27201423 OPTIME MED/SURG SUP & DEVICES STERILE SUPPLY: Performed by: TRANSPLANT SURGERY

## 2019-06-05 PROCEDURE — 93503 INSERT/PLACE HEART CATHETER: CPT | Mod: 59,,, | Performed by: ANESTHESIOLOGY

## 2019-06-05 PROCEDURE — C1751 CATH, INF, PER/CENT/MIDLINE: HCPCS | Mod: NTX | Performed by: NURSE ANESTHETIST, CERTIFIED REGISTERED

## 2019-06-05 PROCEDURE — 84295 ASSAY OF SERUM SODIUM: CPT

## 2019-06-05 PROCEDURE — 83735 ASSAY OF MAGNESIUM: CPT | Mod: 91

## 2019-06-05 PROCEDURE — 80069 RENAL FUNCTION PANEL: CPT | Mod: NTX

## 2019-06-05 PROCEDURE — 82330 ASSAY OF CALCIUM: CPT

## 2019-06-05 PROCEDURE — 87070 CULTURE OTHR SPECIMN AEROBIC: CPT

## 2019-06-05 PROCEDURE — 87205 SMEAR GRAM STAIN: CPT

## 2019-06-05 PROCEDURE — 85049 AUTOMATED PLATELET COUNT: CPT | Mod: 91

## 2019-06-05 PROCEDURE — 84460 ALANINE AMINO (ALT) (SGPT): CPT

## 2019-06-05 PROCEDURE — C1894 INTRO/SHEATH, NON-LASER: HCPCS | Mod: NTX | Performed by: NURSE ANESTHETIST, CERTIFIED REGISTERED

## 2019-06-05 PROCEDURE — 47135 TRANSPLANTATION OF LIVER: CPT | Mod: ,,, | Performed by: TRANSPLANT SURGERY

## 2019-06-05 PROCEDURE — 27100021 HC MULTIPORT INFUSION MANIFOLD: Mod: NTX | Performed by: NURSE ANESTHETIST, CERTIFIED REGISTERED

## 2019-06-05 PROCEDURE — D9220A PRA ANESTHESIA: Mod: ANES,,, | Performed by: ANESTHESIOLOGY

## 2019-06-05 PROCEDURE — 27000221 HC OXYGEN, UP TO 24 HOURS: Mod: NTX

## 2019-06-05 PROCEDURE — 32556 PR PLR DRN W/CATH PL W/O IMG: ICD-10-PCS | Mod: 59,51,RT, | Performed by: TRANSPLANT SURGERY

## 2019-06-05 PROCEDURE — 37000009 HC ANESTHESIA EA ADD 15 MINS: Performed by: TRANSPLANT SURGERY

## 2019-06-05 PROCEDURE — P9035 PLATELET PHERES LEUKOREDUCED: HCPCS

## 2019-06-05 PROCEDURE — 12000002 HC ACUTE/MED SURGE SEMI-PRIVATE ROOM: Mod: NTX

## 2019-06-05 PROCEDURE — 32556 INSERT CATH PLEURA W/O IMAGE: CPT | Mod: 59,51,RT, | Performed by: TRANSPLANT SURGERY

## 2019-06-05 PROCEDURE — 85520 HEPARIN ASSAY: CPT | Mod: NTX

## 2019-06-05 PROCEDURE — 99233 PR SUBSEQUENT HOSPITAL CARE,LEVL III: ICD-10-PCS | Mod: NTX,,, | Performed by: NURSE PRACTITIONER

## 2019-06-05 PROCEDURE — 83735 ASSAY OF MAGNESIUM: CPT | Mod: 91,NTX

## 2019-06-05 PROCEDURE — 47135 PR TRANSPLANT LIVER,ALLOTRANSPLANT: ICD-10-PCS | Mod: 82,,, | Performed by: TRANSPLANT SURGERY

## 2019-06-05 PROCEDURE — 93503 PR INSERT/PLACE FLOW DIRECT CATH: ICD-10-PCS | Mod: 59,,, | Performed by: ANESTHESIOLOGY

## 2019-06-05 PROCEDURE — 89051 BODY FLUID CELL COUNT: CPT

## 2019-06-05 PROCEDURE — P9045 ALBUMIN (HUMAN), 5%, 250 ML: HCPCS | Mod: JG | Performed by: TRANSPLANT SURGERY

## 2019-06-05 PROCEDURE — 47143 PR TRANSPLANT,PREP DONOR LIVER, WHOLE: ICD-10-PCS | Mod: ,,, | Performed by: SURGERY

## 2019-06-05 PROCEDURE — 36556 INSERT NON-TUNNEL CV CATH: CPT | Mod: 59,,, | Performed by: ANESTHESIOLOGY

## 2019-06-05 PROCEDURE — 25000003 PHARM REV CODE 250: Performed by: NURSE ANESTHETIST, CERTIFIED REGISTERED

## 2019-06-05 PROCEDURE — 25000003 PHARM REV CODE 250: Mod: NTX | Performed by: HOSPITALIST

## 2019-06-05 PROCEDURE — 83735 ASSAY OF MAGNESIUM: CPT | Mod: NTX

## 2019-06-05 PROCEDURE — 85384 FIBRINOGEN ACTIVITY: CPT

## 2019-06-05 PROCEDURE — 36000930 HC OR TIME LEV VII 1ST 15 MIN: Performed by: TRANSPLANT SURGERY

## 2019-06-05 PROCEDURE — 82947 ASSAY GLUCOSE BLOOD QUANT: CPT

## 2019-06-05 PROCEDURE — 86965 POOLING BLOOD PLATELETS: CPT

## 2019-06-05 PROCEDURE — C1729 CATH, DRAINAGE: HCPCS | Performed by: TRANSPLANT SURGERY

## 2019-06-05 PROCEDURE — 80053 COMPREHEN METABOLIC PANEL: CPT | Mod: NTX

## 2019-06-05 PROCEDURE — 27800506 HC CATH, RAPID INFUSION (7.5&8.5): Mod: NTX | Performed by: NURSE ANESTHETIST, CERTIFIED REGISTERED

## 2019-06-05 PROCEDURE — 63600175 PHARM REV CODE 636 W HCPCS: Mod: NTX | Performed by: STUDENT IN AN ORGANIZED HEALTH CARE EDUCATION/TRAINING PROGRAM

## 2019-06-05 PROCEDURE — 36415 COLL VENOUS BLD VENIPUNCTURE: CPT | Mod: NTX

## 2019-06-05 PROCEDURE — 36620 INSERTION CATHETER ARTERY: CPT | Mod: 59,,, | Performed by: ANESTHESIOLOGY

## 2019-06-05 PROCEDURE — 82040 ASSAY OF SERUM ALBUMIN: CPT | Mod: 91

## 2019-06-05 PROCEDURE — 27100025 HC TUBING, SET FLUID WARMER: Mod: NTX | Performed by: NURSE ANESTHETIST, CERTIFIED REGISTERED

## 2019-06-05 PROCEDURE — 27200656 HC CATH, FEMORAL ARTERY: Mod: NTX | Performed by: NURSE ANESTHETIST, CERTIFIED REGISTERED

## 2019-06-05 RX ORDER — LIDOCAINE HCL/PF 100 MG/5ML
SYRINGE (ML) INTRAVENOUS
Status: DISCONTINUED | OUTPATIENT
Start: 2019-06-05 | End: 2019-06-06

## 2019-06-05 RX ORDER — MAGNESIUM SULFATE HEPTAHYDRATE 40 MG/ML
INJECTION, SOLUTION INTRAVENOUS
Status: DISCONTINUED | OUTPATIENT
Start: 2019-06-05 | End: 2019-06-06

## 2019-06-05 RX ORDER — PROPOFOL 10 MG/ML
VIAL (ML) INTRAVENOUS
Status: DISCONTINUED | OUTPATIENT
Start: 2019-06-05 | End: 2019-06-06

## 2019-06-05 RX ORDER — FENTANYL CITRATE 50 UG/ML
INJECTION, SOLUTION INTRAMUSCULAR; INTRAVENOUS
Status: DISCONTINUED | OUTPATIENT
Start: 2019-06-05 | End: 2019-06-06

## 2019-06-05 RX ORDER — ONDANSETRON 2 MG/ML
INJECTION INTRAMUSCULAR; INTRAVENOUS
Status: DISCONTINUED | OUTPATIENT
Start: 2019-06-05 | End: 2019-06-06

## 2019-06-05 RX ORDER — VASOPRESSIN 20 [USP'U]/ML
INJECTION, SOLUTION INTRAMUSCULAR; SUBCUTANEOUS
Status: DISCONTINUED | OUTPATIENT
Start: 2019-06-05 | End: 2019-06-06

## 2019-06-05 RX ORDER — ALBUMIN HUMAN 50 G/1000ML
SOLUTION INTRAVENOUS CONTINUOUS PRN
Status: COMPLETED | OUTPATIENT
Start: 2019-06-05 | End: 2019-06-05

## 2019-06-05 RX ORDER — SUCCINYLCHOLINE CHLORIDE 20 MG/ML
INJECTION INTRAMUSCULAR; INTRAVENOUS
Status: DISCONTINUED | OUTPATIENT
Start: 2019-06-05 | End: 2019-06-06

## 2019-06-05 RX ORDER — HEPARIN SODIUM 1000 [USP'U]/ML
INJECTION, SOLUTION INTRAVENOUS; SUBCUTANEOUS
Status: DISCONTINUED | OUTPATIENT
Start: 2019-06-05 | End: 2019-06-06

## 2019-06-05 RX ORDER — FUROSEMIDE 10 MG/ML
INJECTION INTRAMUSCULAR; INTRAVENOUS
Status: DISCONTINUED | OUTPATIENT
Start: 2019-06-05 | End: 2019-06-06

## 2019-06-05 RX ORDER — FLUCONAZOLE 2 MG/ML
400 INJECTION, SOLUTION INTRAVENOUS ONCE
Status: DISCONTINUED | OUTPATIENT
Start: 2019-06-05 | End: 2019-06-06 | Stop reason: HOSPADM

## 2019-06-05 RX ORDER — HEPARIN SODIUM 1000 [USP'U]/ML
INJECTION, SOLUTION INTRAVENOUS; SUBCUTANEOUS
Status: DISCONTINUED | OUTPATIENT
Start: 2019-06-05 | End: 2019-06-06 | Stop reason: HOSPADM

## 2019-06-05 RX ORDER — MIDAZOLAM HYDROCHLORIDE 1 MG/ML
INJECTION INTRAMUSCULAR; INTRAVENOUS
Status: DISCONTINUED | OUTPATIENT
Start: 2019-06-05 | End: 2019-06-06

## 2019-06-05 RX ORDER — PHENYLEPHRINE HYDROCHLORIDE 10 MG/ML
INJECTION INTRAVENOUS
Status: DISCONTINUED | OUTPATIENT
Start: 2019-06-05 | End: 2019-06-06

## 2019-06-05 RX ORDER — ROCURONIUM BROMIDE 10 MG/ML
INJECTION, SOLUTION INTRAVENOUS
Status: DISCONTINUED | OUTPATIENT
Start: 2019-06-05 | End: 2019-06-06

## 2019-06-05 RX ORDER — MANNITOL 250 MG/ML
INJECTION, SOLUTION INTRAVENOUS
Status: DISCONTINUED | OUTPATIENT
Start: 2019-06-05 | End: 2019-06-06

## 2019-06-05 RX ADMIN — SUCCINYLCHOLINE CHLORIDE 120 MG: 20 INJECTION, SOLUTION INTRAMUSCULAR; INTRAVENOUS at 07:06

## 2019-06-05 RX ADMIN — SODIUM CHLORIDE 0.25 MCG/KG/MIN: 9 INJECTION, SOLUTION INTRAVENOUS at 09:06

## 2019-06-05 RX ADMIN — RIFAXIMIN 550 MG: 550 TABLET ORAL at 09:06

## 2019-06-05 RX ADMIN — LEVOTHYROXINE SODIUM 112 MCG: 112 TABLET ORAL at 05:06

## 2019-06-05 RX ADMIN — FENTANYL CITRATE 200 MCG: 50 INJECTION, SOLUTION INTRAMUSCULAR; INTRAVENOUS at 11:06

## 2019-06-05 RX ADMIN — PROPOFOL 30 MG: 10 INJECTION, EMULSION INTRAVENOUS at 11:06

## 2019-06-05 RX ADMIN — VASOPRESSIN 2 UNITS: 20 INJECTION INTRAVENOUS at 10:06

## 2019-06-05 RX ADMIN — HEPARIN SODIUM 2000 UNITS: 1000 INJECTION, SOLUTION INTRAVENOUS; SUBCUTANEOUS at 10:06

## 2019-06-05 RX ADMIN — MIDAZOLAM HYDROCHLORIDE 2 MG: 1 INJECTION, SOLUTION INTRAMUSCULAR; INTRAVENOUS at 07:06

## 2019-06-05 RX ADMIN — PHENYLEPHRINE HYDROCHLORIDE 100 MCG: 10 INJECTION INTRAVENOUS at 09:06

## 2019-06-05 RX ADMIN — MANNITOL 24 G: 250 INJECTION, SOLUTION INTRAVENOUS at 08:06

## 2019-06-05 RX ADMIN — Medication 1000 MG: at 08:06

## 2019-06-05 RX ADMIN — FENTANYL CITRATE 50 MCG: 50 INJECTION, SOLUTION INTRAMUSCULAR; INTRAVENOUS at 08:06

## 2019-06-05 RX ADMIN — METHYLPREDNISOLONE SODIUM SUCCINATE 500 MG: 40 INJECTION, POWDER, FOR SOLUTION INTRAMUSCULAR; INTRAVENOUS at 09:06

## 2019-06-05 RX ADMIN — ROCURONIUM BROMIDE 20 MG: 10 INJECTION, SOLUTION INTRAVENOUS at 08:06

## 2019-06-05 RX ADMIN — ROCURONIUM BROMIDE 30 MG: 10 INJECTION, SOLUTION INTRAVENOUS at 08:06

## 2019-06-05 RX ADMIN — VASOPRESSIN 2 UNITS: 20 INJECTION INTRAVENOUS at 09:06

## 2019-06-05 RX ADMIN — PANTOPRAZOLE SODIUM 40 MG: 40 TABLET, DELAYED RELEASE ORAL at 09:06

## 2019-06-05 RX ADMIN — CIPROFLOXACIN 400 MG: 2 INJECTION, SOLUTION INTRAVENOUS at 08:06

## 2019-06-05 RX ADMIN — MANNITOL 24 G: 250 INJECTION, SOLUTION INTRAVENOUS at 11:06

## 2019-06-05 RX ADMIN — PROPOFOL 100 MG: 10 INJECTION, EMULSION INTRAVENOUS at 07:06

## 2019-06-05 RX ADMIN — SODIUM CHLORIDE, SODIUM GLUCONATE, SODIUM ACETATE, POTASSIUM CHLORIDE, MAGNESIUM CHLORIDE, SODIUM PHOSPHATE, DIBASIC, AND POTASSIUM PHOSPHATE: .53; .5; .37; .037; .03; .012; .00082 INJECTION, SOLUTION INTRAVENOUS at 08:06

## 2019-06-05 RX ADMIN — SODIUM CHLORIDE, SODIUM GLUCONATE, SODIUM ACETATE, POTASSIUM CHLORIDE, MAGNESIUM CHLORIDE, SODIUM PHOSPHATE, DIBASIC, AND POTASSIUM PHOSPHATE: .53; .5; .37; .037; .03; .012; .00082 INJECTION, SOLUTION INTRAVENOUS at 07:06

## 2019-06-05 RX ADMIN — CALCIUM CHLORIDE 1000 MG: 100 INJECTION, SOLUTION INTRAVENOUS at 11:06

## 2019-06-05 RX ADMIN — LIDOCAINE HYDROCHLORIDE 100 MG: 20 INJECTION, SOLUTION INTRAVENOUS at 07:06

## 2019-06-05 RX ADMIN — MICONAZOLE NITRATE: 20 POWDER TOPICAL at 11:06

## 2019-06-05 RX ADMIN — MAGNESIUM SULFATE IN WATER 3 G: 40 INJECTION, SOLUTION INTRAVENOUS at 09:06

## 2019-06-05 RX ADMIN — LACTULOSE 5 G: 20 SOLUTION ORAL at 09:06

## 2019-06-05 RX ADMIN — ROCURONIUM BROMIDE 50 MG: 10 INJECTION, SOLUTION INTRAVENOUS at 07:06

## 2019-06-05 RX ADMIN — FENTANYL CITRATE 100 MCG: 50 INJECTION, SOLUTION INTRAMUSCULAR; INTRAVENOUS at 09:06

## 2019-06-05 RX ADMIN — FUROSEMIDE 98 MG: 10 INJECTION, SOLUTION INTRAMUSCULAR; INTRAVENOUS at 11:06

## 2019-06-05 RX ADMIN — FENTANYL CITRATE 50 MCG: 50 INJECTION, SOLUTION INTRAMUSCULAR; INTRAVENOUS at 07:06

## 2019-06-05 RX ADMIN — PHENYLEPHRINE HYDROCHLORIDE 200 MCG: 10 INJECTION INTRAVENOUS at 07:06

## 2019-06-05 RX ADMIN — PHENYLEPHRINE HYDROCHLORIDE 200 MCG: 10 INJECTION INTRAVENOUS at 09:06

## 2019-06-05 RX ADMIN — FUROSEMIDE 98 MG: 10 INJECTION, SOLUTION INTRAMUSCULAR; INTRAVENOUS at 08:06

## 2019-06-05 RX ADMIN — ROCURONIUM BROMIDE 20 MG: 10 INJECTION, SOLUTION INTRAVENOUS at 11:06

## 2019-06-05 RX ADMIN — VASOPRESSIN 0.04 UNITS/MIN: 20 INJECTION INTRAVENOUS at 08:06

## 2019-06-05 RX ADMIN — PHENYLEPHRINE HYDROCHLORIDE 100 MCG: 10 INJECTION INTRAVENOUS at 08:06

## 2019-06-05 NOTE — PLAN OF CARE
Problem: Adult Inpatient Plan of Care  Goal: Plan of Care Review  Pt is aaox4 vswnl and no c/o pain.  at bedside. Pt planned for liver tx on Wednesday. 2liters o2 maintained.

## 2019-06-05 NOTE — PROGRESS NOTES
[facility]  Liver Transplant  Progress Note    Patient Name: Jihan Zamudio  MRN: [id number]  Admission Date: 5/16/2019  Hospital Length of Stay: 20 days  Code Status: Full Code  Primary Care Provider: Primary Doctor No    Subjective:     History of Present Illness:  This is a 50 yo F with hx of KESSLER cirrhosis c/b varices, latent TB, GERD, hypothyroidism, lap band 2007, HLD who was sent to the ED from hepatology clinic for shortness of breath. She follows with Dr. Smallwood. She reports feeling more short of breath the past few days and increased abdominal swelling. She reports occasional confusion as well. She was found to have a low sodium as well. With regards to her lung nodule, we have been awaiting films for an outside facility for comparison. She was found to have a large pleural effusion while here, something she has not had before. Pulmonology has been consulted.      Hospital Course:  Pt approved for liver transplant and transferred to LTS on 5/29. Pt w/ SOB. Last Thoracentesis on 5/27. Cultures NGTD. Remains on Rocephin x 5 days, per ID recs, to complete on 5/30. Ongoing issue with hyponatremia, improving with albumin daily. Na cont to improve with free water restriction and albumin administration.    Interval Hx. No acute events overnight.  Patient AOx4.  No asterixis noted.  Cont lactulose and Rifaximin.  t bili 15.  Na MELD 27.  Listed w MELD 29.  Patient has hx of pleural effusion. Thora 5/17, 5/27 and 5/31- no evidence of infection.   Repeat CXR 6/4 w significant right pleural effusion.  Lasix 60 mg given 6/4, diuresed ~850 ml.  Sodium 132.  O2 requirements increased to 2L over past 24 hours.  Patient has Liver transplant offer, OR times are 2000 and 2130.  Plan for thora this am.  Cont weights and strict I/Os.  Last transfused 1u pRBC 5/29 w appropriate response.  H/H stable.  Small amount of bleeding from sacral tear.  Appetite is fair.  She is tolerating supplements.  She is  ambulating around unit w assist of walker.  Monitor.     Scheduled Meds:   lactulose  5 g Oral Daily    levothyroxine  112 mcg Oral Daily    miconazole NITRATE 2 %   Topical (Top) BID    pantoprazole  40 mg Oral Daily    rifAXImin  550 mg Oral BID     Continuous Infusions:   sodium chloride 0.9%       PRN Meds:sodium chloride, acetaminophen, vancomycin (VANCOCIN) IVPB **AND** ciprofloxacin, cyclobenzaprine, dextrose 50%, dextrose 50%, glucagon (human recombinant), glucose, glucose, magnesium sulfate IVPB, ondansetron, phenyleph-min oil-petrolatum, simethicone, sodium chloride 0.9%, sodium phosphate IVPB, sodium phosphate IVPB, sodium phosphate IVPB, trazodone    Review of Systems   Constitutional: Positive for appetite change and fatigue. Negative for chills and fever.   Respiratory: Positive for cough (occasional cough, non-productive) and shortness of breath.    Cardiovascular: Negative for chest pain and leg swelling.   Gastrointestinal: Positive for abdominal distention. Negative for abdominal pain, constipation, diarrhea, nausea and vomiting.   Allergic/Immunologic: Negative for immunocompromised state.   Neurological: Positive for weakness. Negative for tremors.   Psychiatric/Behavioral: Negative for confusion and decreased concentration.     Objective:     Vital Signs (Most Recent):  Temp: 98.9 °F (37.2 °C) (06/05/19 1152)  Pulse: 90 (06/05/19 1143)  Resp: (!) 28 (06/05/19 0832)  BP: (!) 118/59 (06/05/19 1038)  SpO2: 97 % (06/05/19 1111) Vital Signs (24h Range):  Temp:  [98.2 °F (36.8 °C)-98.9 °F (37.2 °C)] 98.9 °F (37.2 °C)  Pulse:  [90-99] 90  Resp:  [17-30] 28  SpO2:  [89 %-97 %] 97 %  BP: (112-128)/(57-71) 118/59     Weight: 97.8 kg (215 lb 9.8 oz)  Body mass index is 35.88 kg/m².    Intake/Output - Last 3 Shifts       06/03 0700 - 06/04 0659 06/04 0700 - 06/05 0659 06/05 0700 - 06/06 0659    P.O. 365 600     Total Intake(mL/kg) 365 (3.7) 600 (6.1)     Urine (mL/kg/hr)  850 (0.4)     Stool  0      Total Output  850     Net +365 -250            Urine Occurrence 2 x      Stool Occurrence 3 x 2 x           Physical Exam   Constitutional: She is oriented to person, place, and time. She appears well-developed. No distress. Nasal cannula in place.   HENT:   Head: Normocephalic.   Eyes: Pupils are equal, round, and reactive to light. EOM are normal. Scleral icterus is present.   Neck: Normal range of motion. Neck supple.   Cardiovascular: Normal rate, regular rhythm and normal heart sounds.   No murmur heard.  Pulmonary/Chest: Effort normal. No respiratory distress. She has decreased breath sounds in the right middle field and the right lower field. She has no wheezes.   Diminished to R side   Abdominal: Soft. Bowel sounds are normal. She exhibits no distension. There is no tenderness. There is no guarding.   Musculoskeletal: Normal range of motion.   Neurological: She is alert and oriented to person, place, and time.   Skin: Skin is warm and dry. She is not diaphoretic.   Psychiatric: She has a normal mood and affect. Her behavior is normal. Thought content normal.   Nursing note and vitals reviewed.      Laboratory:  Immunosuppressants     None        CBC:   Recent Labs   Lab 06/05/19  0642   WBC 1.85*   RBC 2.49*   HGB 7.7*   HCT 22.8*   PLT 55*   MCV 92   MCH 30.9   MCHC 33.8     CMP:   Recent Labs   Lab 06/05/19  0642   GLU 89  89   CALCIUM 8.7  8.7   ALBUMIN 3.0*  3.0*   PROT 5.5*   *  132*   K 3.8  3.8   CO2 24  24     100   BUN 13  13   CREATININE 0.7  0.7   ALKPHOS 94   ALT 30   AST 72*   BILITOT 15.0*     MELD-Na score: 27 at 6/5/2019  6:42 AM  MELD score: 25 at 6/5/2019  6:42 AM  Calculated from:  Serum Creatinine: 0.7 mg/dL (Rounded to 1 mg/dL) at 6/5/2019  6:42 AM  Serum Sodium: 132 mmol/L at 6/5/2019  6:42 AM  Total Bilirubin: 15.0 mg/dL at 6/5/2019  6:42 AM  INR(ratio): 2.2 at 6/5/2019  6:42 AM  Age: 49 years    Labs within the past 24 hours have been reviewed.    Diagnostic  "Results:  I have personally reviewed all pertinent imaging studies.    Assessment/Plan:     * Decompensated hepatic cirrhosis  - 2/2 KESSLER  - approved for transplant list  - Paracentesis on 5/27 with 3.2L removed and negative for SBP  - Low Sodium diet with 1L fluid restriction  - dosing diuretics daily due to hyponatremia  - Cont Lactulose, titrate 3-4 BM/day  - Rifaximin 550mg PO BID  - paracentesis 5/27/19     MELD-Na score: 27 at 6/5/2019  6:42 AM  MELD score: 25 at 6/5/2019  6:42 AM  Calculated from:  Serum Creatinine: 0.7 mg/dL (Rounded to 1 mg/dL) at 6/5/2019  6:42 AM  Serum Sodium: 132 mmol/L at 6/5/2019  6:42 AM  Total Bilirubin: 15.0 mg/dL at 6/5/2019  6:42 AM  INR(ratio): 2.2 at 6/5/2019  6:42 AM  Age: 49 years      Hypoxia  - see "acute hypoxemic resp failure"      Acquired coagulation factor deficiency  - no obvious signs of bleeding, denies melena or BRBPR.  - continue to monitor. Consider FFP/Vit K for invasive procedures.       Acute on chronic anemia  - 2/2 ESLD  - Transfuse 1u pRBC 5/29/19 w appropriate response.   - H/H stable.  VSS.  Small bleed from open sacral tear noted.    - Cont to monitor w/ daily labs      Fever  - remains afebrile. Cont to monitor      Hypokalemia  - Replace PRN  - Monitor      Thrombocytopenia  - stable.  Cont to monitor.       Acute hypoxemic respiratory failure  - 2/2 pleural effusion and R sided hydrothorax  - Sat 93-97% on 2L NC  - S/p thora x 3 on 5/17, 5/27 and 5/31 with 1.5 L removed at each procedure.  No infection.    - CT chest 5/19/19 completed and shows stable pulmonary nodules.   - On 2L O2  sat >89-95%.  Will increase to maintain sat >92%.  CXR 6/4 w significant right pleural effusion.  S/w surgeon, recommend proceeding w thora given OR times delayed to late tonight.  Lasix 60 mg given 6/4, diuresed ~850ml.      Pleural effusion, right  - see acute hypoxemic resp failure  - CXR 5/30 w/ large right pleural effusion  - Thoracentesis 5/31, 1.5L removed. Fluid " sent for analysis.  - repeat CXR 6/4 showed re accumulation of fluid.  Offer for liver transplant- times delayed.  Plan for thora today.  OR times 2000 and 2130.  Cont O2 to maintain sat >92%.         Weakness  - PT/OT following. Ambulates w minimal assist.       Hepatic encephalopathy  - Chronic and stable  - Cont lactulose/rifaximin.  -  No asterixis    Hyponatremia  - 2/2 chronic liver disease  - 1L Fluid restriction  - Na improved with albumin.  - Na 132 today, and slowly increasing toward normal.  Dosing diuretics daily as needed given hyponatremia.  Last lasix 60 mg on 6/4.      Coagulopathy  - 2/2 liver disease.  INR 2.2.    - monitor for bleeding    Moderate malnutrition        Esophageal varices  - chronic and stable. Monitor      GERD (gastroesophageal reflux disease)  - chronic and stable.  Cont Pantoprazole.     - Cont PPI      Hypothyroidism  - Chronic and stable.  - Continue Synthroid 112mcg PO daily.          Debility/Functional status: Patient debilitated by evidence of Muscle wasting and atrophy and Weakness. Physical and occupational therapy ordered daily to evaluate and treat. Debility was: present on admission.    VTE Risk Mitigation (From admission, onward)        Ordered     IP VTE HIGH RISK PATIENT  Once      06/04/19 0415     Send SCD stockings and TANNA hose with patient to OR  Continuous      06/04/19 0415     Place sequential compression device  Until discontinued      05/17/19 0023          The patients clinical status was discussed at multidisplinary rounds, involving transplant surgery, transplant medicine, pharmacy, nursing, nutrition, and social work    Discharge Planning:  No plan for discharge, pt has offer for liver transplant tonight.    Eugenia Albrecht NP  Liver Transplant  [facility]

## 2019-06-05 NOTE — PT/OT/SLP PROGRESS
Physical Therapy      Patient Name:  Jihan Zamudio   MRN:  [id number]    Pt not seen on this date d/t pt off floor for thoracentesis.  PT unable to return later in day for session. Per note and RN - possible liver tx on this date; Chart reviewed and pt remain appropriate for PT services at this time.  Will see pt as schedule allows.      Jaret Cabrales, PT,DPT  6/5/2019

## 2019-06-05 NOTE — PROGRESS NOTES
Pt is in the bathroom with  who is assisting pt do hibiclens bath. Instruction provided and towels and washcloths provided. Pt and spouse notified to let nurse know if they need anything.

## 2019-06-05 NOTE — PROCEDURES
Radiology Post-Procedure Note    Pre Op Diagnosis: Recurrent right pleural effusion  Post Op Diagnosis: Same    Procedure: US guided right thoracentesis    Procedure performed by: Augustine Leahy MD    Written Informed Consent Obtained: Yes  Specimen Removed: YES 1.5 L serosanguinous  Estimated Blood Loss: Minimal    Findings:   US guided right thoracentesis perfomed    Patient tolerated procedure well.    Augustine Leahy MD  Department of Radiology  Pager: [phone]

## 2019-06-05 NOTE — ASSESSMENT & PLAN NOTE
- 2/2 chronic liver disease  - 1L Fluid restriction  - Na improved with albumin.  - Na 132 today, and slowly increasing toward normal.  Dosing diuretics daily as needed given hyponatremia.  Last lasix 60 mg on 6/4.

## 2019-06-05 NOTE — PROGRESS NOTES
Pt back from IR for thoracentesis. No acute distress noted. Oxygen on at 2L. Pts coughing has subsided. Instructed to remain NPO. Updated on new OR times. Family went home but will return later in the evening.

## 2019-06-05 NOTE — ASSESSMENT & PLAN NOTE
- see acute hypoxemic resp failure  - CXR 5/30 w/ large right pleural effusion  - Thoracentesis 5/31, 1.5L removed. Fluid sent for analysis.  - repeat CXR 6/4 showed re accumulation of fluid.  Offer for liver transplant- times delayed.  Plan for thora today.  OR times 2000 and 2130.  Cont O2 to maintain sat >92%.

## 2019-06-05 NOTE — SUBJECTIVE & OBJECTIVE
Scheduled Meds:   lactulose  5 g Oral Daily    levothyroxine  112 mcg Oral Daily    miconazole NITRATE 2 %   Topical (Top) BID    pantoprazole  40 mg Oral Daily    rifAXImin  550 mg Oral BID     Continuous Infusions:   sodium chloride 0.9%       PRN Meds:sodium chloride, acetaminophen, vancomycin (VANCOCIN) IVPB **AND** ciprofloxacin, cyclobenzaprine, dextrose 50%, dextrose 50%, glucagon (human recombinant), glucose, glucose, magnesium sulfate IVPB, ondansetron, phenyleph-min oil-petrolatum, simethicone, sodium chloride 0.9%, sodium phosphate IVPB, sodium phosphate IVPB, sodium phosphate IVPB, trazodone    Review of Systems   Constitutional: Positive for appetite change and fatigue. Negative for chills and fever.   Respiratory: Positive for cough (occasional cough, non-productive) and shortness of breath.    Cardiovascular: Negative for chest pain and leg swelling.   Gastrointestinal: Positive for abdominal distention. Negative for abdominal pain, constipation, diarrhea, nausea and vomiting.   Allergic/Immunologic: Negative for immunocompromised state.   Neurological: Positive for weakness. Negative for tremors.   Psychiatric/Behavioral: Negative for confusion and decreased concentration.     Objective:     Vital Signs (Most Recent):  Temp: 98.9 °F (37.2 °C) (06/05/19 1152)  Pulse: 90 (06/05/19 1143)  Resp: (!) 28 (06/05/19 0832)  BP: (!) 118/59 (06/05/19 1038)  SpO2: 97 % (06/05/19 1111) Vital Signs (24h Range):  Temp:  [98.2 °F (36.8 °C)-98.9 °F (37.2 °C)] 98.9 °F (37.2 °C)  Pulse:  [90-99] 90  Resp:  [17-30] 28  SpO2:  [89 %-97 %] 97 %  BP: (112-128)/(57-71) 118/59     Weight: 97.8 kg (215 lb 9.8 oz)  Body mass index is 35.88 kg/m².    Intake/Output - Last 3 Shifts       06/03 0700 - 06/04 0659 06/04 0700 - 06/05 0659 06/05 0700 - 06/06 0659    P.O. 365 600     Total Intake(mL/kg) 365 (3.7) 600 (6.1)     Urine (mL/kg/hr)  850 (0.4)     Stool  0     Total Output  850     Net +365 -250            Urine  Occurrence 2 x      Stool Occurrence 3 x 2 x           Physical Exam   Constitutional: She is oriented to person, place, and time. She appears well-developed. No distress. Nasal cannula in place.   HENT:   Head: Normocephalic.   Eyes: Pupils are equal, round, and reactive to light. EOM are normal. Scleral icterus is present.   Neck: Normal range of motion. Neck supple.   Cardiovascular: Normal rate, regular rhythm and normal heart sounds.   No murmur heard.  Pulmonary/Chest: Effort normal. No respiratory distress. She has decreased breath sounds in the right middle field and the right lower field. She has no wheezes.   Diminished to R side   Abdominal: Soft. Bowel sounds are normal. She exhibits no distension. There is no tenderness. There is no guarding.   Musculoskeletal: Normal range of motion.   Neurological: She is alert and oriented to person, place, and time.   Skin: Skin is warm and dry. She is not diaphoretic.   Psychiatric: She has a normal mood and affect. Her behavior is normal. Thought content normal.   Nursing note and vitals reviewed.      Laboratory:  Immunosuppressants     None        CBC:   Recent Labs   Lab 06/05/19  0642   WBC 1.85*   RBC 2.49*   HGB 7.7*   HCT 22.8*   PLT 55*   MCV 92   MCH 30.9   MCHC 33.8     CMP:   Recent Labs   Lab 06/05/19  0642   GLU 89  89   CALCIUM 8.7  8.7   ALBUMIN 3.0*  3.0*   PROT 5.5*   *  132*   K 3.8  3.8   CO2 24  24     100   BUN 13  13   CREATININE 0.7  0.7   ALKPHOS 94   ALT 30   AST 72*   BILITOT 15.0*     MELD-Na score: 27 at 6/5/2019  6:42 AM  MELD score: 25 at 6/5/2019  6:42 AM  Calculated from:  Serum Creatinine: 0.7 mg/dL (Rounded to 1 mg/dL) at 6/5/2019  6:42 AM  Serum Sodium: 132 mmol/L at 6/5/2019  6:42 AM  Total Bilirubin: 15.0 mg/dL at 6/5/2019  6:42 AM  INR(ratio): 2.2 at 6/5/2019  6:42 AM  Age: 49 years    Labs within the past 24 hours have been reviewed.    Diagnostic Results:  I have personally reviewed all pertinent imaging  studies.

## 2019-06-05 NOTE — ASSESSMENT & PLAN NOTE
- 2/2 ESLD  - Transfuse 1u pRBC 5/29/19 w appropriate response.   - H/H stable.  VSS.  Small bleed from open sacral tear noted.    - Cont to monitor w/ daily labs

## 2019-06-05 NOTE — PT/OT/SLP PROGRESS
Occupational Therapy      Patient Name:  Jihan Zamudio   MRN:  [id number]    Patient not seen today secondary to Nursing care(Thorocentisis). Will follow-up Tomorrow.    Yovany Scruggs, OT  6/5/2019

## 2019-06-05 NOTE — ASSESSMENT & PLAN NOTE
- 2/2 KESSLER  - approved for transplant list  - Paracentesis on 5/27 with 3.2L removed and negative for SBP  - Low Sodium diet with 1L fluid restriction  - dosing diuretics daily due to hyponatremia  - Cont Lactulose, titrate 3-4 BM/day  - Rifaximin 550mg PO BID  - paracentesis 5/27/19     MELD-Na score: 27 at 6/5/2019  6:42 AM  MELD score: 25 at 6/5/2019  6:42 AM  Calculated from:  Serum Creatinine: 0.7 mg/dL (Rounded to 1 mg/dL) at 6/5/2019  6:42 AM  Serum Sodium: 132 mmol/L at 6/5/2019  6:42 AM  Total Bilirubin: 15.0 mg/dL at 6/5/2019  6:42 AM  INR(ratio): 2.2 at 6/5/2019  6:42 AM  Age: 49 years

## 2019-06-05 NOTE — PROGRESS NOTES
Thoracentesis procedure complete. Pt tolerated well. 1.5L of brownish-red fluid removed. Chest xray completed, report called to RN. Patient taken in transport back to room.

## 2019-06-05 NOTE — ANESTHESIA PREPROCEDURE EVALUATION
[facility]  Anesthesia Pre-Operative Evaluation         Patient Name: Jihan Zamudio  YOB: 1970  MRN: [id number]    SUBJECTIVE:     Pre-operative evaluation for Procedure(s) (LRB):  TRANSPLANT, LIVER (N/A)     06/05/2019    Jihan Zamudio is a 49 y.o. female w/ a significant PMHx of KESSLER liver cirrhosis with varices, latent TB, s/p lap band in 2007, HTN and hypothyroidism. She has recurrent R sided pleural effusions s/p thoracentesis x3 (last one on 5/31) with re-accumulation.    MELD-Na score: 27 at 6/5/2019  2:01 PM  MELD score: 25 at 6/5/2019  2:01 PM  Calculated from:  Serum Creatinine: 0.8 mg/dL (Rounded to 1 mg/dL) at 6/5/2019  2:01 PM  Serum Sodium: 132 mmol/L at 6/5/2019  2:01 PM  Total Bilirubin: 15.0 mg/dL at 6/5/2019  6:42 AM  INR(ratio): 2.2 at 6/5/2019  6:42 AM  Age: 49 years    Patient now presents for the above procedure(s).      LDA:        Peripheral IV - Single Lumen 05/26/19 2007 20 G Anterior;Distal;Right Forearm (Active)   Site Assessment Clean;Intact;Dry 6/5/2019  2:00 PM   Line Status Flushed;Tubing changed 6/5/2019  2:00 PM   Dressing Status Clean;Dry;Intact 6/5/2019  2:00 PM   Dressing Intervention Dressing reinforced 6/5/2019  2:00 PM   Dressing Change Due 05/30/19 6/5/2019  2:00 PM   Site Change Due 05/30/19 6/4/2019  9:30 PM   Reason Not Rotated Poor venous access 6/5/2019  2:00 PM   Number of days: 9       Prev airway: None documented.    Drips: None documented.      Patient Active Problem List   Diagnosis    Decompensated hepatic cirrhosis    Morbid obesity    KADEEM (obstructive sleep apnea)    Hypothyroidism    Osteoarthritis    GERD (gastroesophageal reflux disease)    Fatty liver    Vitamin D deficiency    Hx of colonic polyps    Esophageal varices    Pulmonary nodule    History of Acute appendicitis    Pre-transplant evaluation for liver transplant    Moderate malnutrition    Coagulopathy    Anasarca    Other ascites    Pulmonary  hypertension    Hyponatremia    Hepatic encephalopathy    Weakness    Pleural effusion, right    Acute hypoxemic respiratory failure    Thrombocytopenia    Hypokalemia    Fever    Acute on chronic anemia    Acquired coagulation factor deficiency    Hypoxia    Alteration in skin integrity       Review of patient's allergies indicates:   Allergen Reactions    Metformin Rash    Pcn [penicillins] Other (See Comments)     Unsure of reaction, states it was as a child. Tolerated [facility]phin at [facility]       Current Inpatient Medications:   lactulose  5 g Oral Daily    levothyroxine  112 mcg Oral Daily    miconazole NITRATE 2 %   Topical (Top) BID    pantoprazole  40 mg Oral Daily    rifAXImin  550 mg Oral BID       No current facility-administered medications on file prior to encounter.      Current Outpatient Medications on File Prior to Encounter   Medication Sig Dispense Refill    acetaminophen (TYLENOL) 500 MG tablet Take 1,000 mg by mouth daily as needed for Pain (headache and bodyaches).      cholecalciferol, vitamin D3, (VITAMIN D3) 2,000 unit Cap Take 1 capsule by mouth once daily.      ergocalciferol (ERGOCALCIFEROL) 50,000 unit Cap Take 50,000 Units by mouth every 7 days. Monday      fexofenadine (ALLEGRA) 180 MG tablet Take 180 mg by mouth daily as needed.      furosemide (LASIX) 80 MG tablet Take 1 tablet (80 mg total) by mouth 2 (two) times daily. 60 tablet 2    levothyroxine (SYNTHROID) 112 MCG tablet Take 112 mcg by mouth once daily.      lidocaine (LIDODERM) 5 % Place 1 patch onto the skin every 24 hours. Remove & Discard patch within 12 hours  As needed or as directed by MD      pantoprazole (PROTONIX) 40 MG tablet Take 1 tablet (40 mg total) by mouth once daily. 90 tablet 0    rifAXIMin (XIFAXAN) 550 mg Tab Take 550 mg by mouth 2 (two) times daily.       spironolactone (ALDACTONE) 100 MG tablet Take 2 tablets (200 mg total) by mouth once daily. (Patient taking differently: Take 100  mg by mouth 2 (two) times daily. ) 60 tablet 2    zinc sulfate (ZINCATE) 220 (50) mg capsule Take 1 capsule (220 mg total) by mouth once daily.         Past Surgical History:   Procedure Laterality Date     SECTION      TIMES 2     CHOLECYSTECTOMY      LAPAROSCOPIC     CYSTOSCOPY W/ STONE MANIPULATION      kidney stone removal    EGD (ESOPHAGOGASTRODUODENOSCOPY) N/A 2019    Performed by Davian Hernández MD at [facility] ENDO (2ND FLR)    LAPAROSCOPIC GASTRIC BANDING  2006    removal     LIVER BIOPSY  2017    KESSLER with bridging 17    TRANSPLANT, LIVER N/A 2019    Performed by Clemente Brown Jr., MD at [facility] OR 2ND FLR       Social History     Socioeconomic History    Marital status:      Spouse name: Not on file    Number of children: Not on file    Years of education: Not on file    Highest education level: Not on file   Occupational History    Occupation: homemaker   Social Needs    Financial resource strain: Not on file    Food insecurity:     Worry: Not on file     Inability: Not on file    Transportation needs:     Medical: Not on file     Non-medical: Not on file   Tobacco Use    Smoking status: Never Smoker    Smokeless tobacco: Never Used    Tobacco comment: patient denies   Substance and Sexual Activity    Alcohol use: No     Comment: patient denies    Drug use: No     Comment: patient denies    Sexual activity: Not on file   Lifestyle    Physical activity:     Days per week: Not on file     Minutes per session: Not on file    Stress: Not on file   Relationships    Social connections:     Talks on phone: Not on file     Gets together: Not on file     Attends Jainism service: Not on file     Active member of club or organization: Not on file     Attends meetings of clubs or organizations: Not on file     Relationship status: Not on file   Other Topics Concern    Not on file   Social History Narrative    Not on file       OBJECTIVE:      Vital Signs Range (Last 24H):  Temp:  [36.8 °C (98.2 °F)-37.2 °C (98.9 °F)]   Pulse:  [90-99]   Resp:  [17-30]   BP: (117-128)/(59-71)   SpO2:  [89 %-97 %]       Significant Labs:  Lab Results   Component Value Date    WBC 1.85 (LL) 2019    HGB 7.7 (L) 2019    HCT 22.8 (L) 2019    PLT 55 (L) 2019    ALT 30 2019    AST 72 (H) 2019     (L) 2019    K 3.9 2019     2019    CREATININE 0.8 2019    BUN 15 2019    CO2 23 2019    TSH 1.244 2019    INR 2.2 (H) 2019    HGBA1C 4.1 2019       Diagnostic Studies: CXR 2019  Status post thoracocentesis.  The right-sided pleural effusion decreased as compared to the previous study.  There is still opacification at the right lung base with blunted costophrenic angle remain.  No pneumothorax identified.  The left lung is clear.    EK2019  Vent. Rate : 092 BPM     Atrial Rate : 092 BPM     P-R Int : 142 ms          QRS Dur : 084 ms      QT Int : 374 ms       P-R-T Axes : 019 -03 028 degrees     QTc Int : 462 ms    Normal sinus rhythm  Anterolateral infarct ,age undetermined    Abnormal ECG  When compared with ECG of 16-MAY-2019 18:57,  Criteria for Anterolateral infarct ,age undetermined New since previous  tracing    2D ECHO:  2019  · Normal left ventricular systolic function. The estimated ejection fraction is 65%  · Normal LV diastolic function.    ASSESSMENT/PLAN:         Anesthesia Evaluation    I have reviewed the Patient Summary Reports.    I have reviewed the Nursing Notes.   I have reviewed the Medications.     Review of Systems  Anesthesia Hx:  No problems with previous Anesthesia Denies Hx of Anesthetic complications  History of prior surgery of interest to airway management or planning: Previous anesthesia: General, MAC  Denies Personal Hx of Anesthesia complications.   Social:  Non-Smoker, No Alcohol Use    Hematology/Oncology:     Oncology Normal    --  Anemia:   EENT/Dental:EENT/Dental Normal   Cardiovascular:   Hypertension    Pulmonary:   Denies COPD.  Denies Asthma. Sleep Apnea, CPAP    Renal/:   Denies Chronic Renal Disease. renal calculi     Hepatic/GI:   GERD, well controlled Liver Disease,    Musculoskeletal:   Arthritis     Endocrine:   Hypothyroidism        Physical Exam  General:  Jaundice, Morbid Obesity    Airway/Jaw/Neck:  Airway Findings: Mouth Opening: Normal Tongue: Normal  Mallampati: III  Improves to II with phonation.  TM Distance: Normal, at least 6 cm  Jaw/Neck Findings:  Neck ROM: Normal ROM      Dental:  Dental Findings:    Chest/Lungs:  Chest/Lungs Findings: Clear to auscultation, Normal Respiratory Rate     Heart/Vascular:  Heart Findings: Rate: Normal  Rhythm: Regular Rhythm  Sounds: Normal  Heart Murmur  Systolic  Systolic Heart Murmur Description: Holosystolic  Systolic Heart Murmur Grade: Grade III        Mental Status:  Mental Status Findings:  Cooperative, Alert and Oriented         Anesthesia Plan  Type of Anesthesia, risks & benefits discussed:  Anesthesia Type:  general  Patient's Preference:   Intra-op Monitoring Plan: arterial line, central line, Pearsall-Naun, cardiac output and standard ASA monitors  Intra-op Monitoring Plan Comments:   Post Op Pain Control Plan: per primary service following discharge from PACU, IV/PO Opioids PRN and multimodal analgesia  Post Op Pain Control Plan Comments:   Induction:   IV  Beta Blocker:  Patient is not currently on a Beta-Blocker (No further documentation required).       Informed Consent: Patient understands risks and agrees with Anesthesia plan.  Questions answered. Anesthesia consent signed with patient.  ASA Score: 4     Day of Surgery Review of History & Physical:    H&P update referred to the surgeon.         Ready For Surgery From Anesthesia Perspective.

## 2019-06-05 NOTE — ASSESSMENT & PLAN NOTE
- 2/2 pleural effusion and R sided hydrothorax  - Sat 93-97% on 2L NC  - S/p thora x 3 on 5/17, 5/27 and 5/31 with 1.5 L removed at each procedure.  No infection.    - CT chest 5/19/19 completed and shows stable pulmonary nodules.   - On 2L O2  sat >89-95%.  Will increase to maintain sat >92%.  CXR 6/4 w significant right pleural effusion.  S/w surgeon, recommend proceeding w thora given OR times delayed to late tonight.  Lasix 60 mg given 6/4, diuresed ~850ml.

## 2019-06-05 NOTE — PROGRESS NOTES
Pt arrived to IR  for thoracentesis. Alleriges, labs, and consent reviewed. Pt in no apparent distress, on 2LNC.

## 2019-06-05 NOTE — NURSING
Pt continues to remove telemetry leads during the night after many times of reapplying leads. Pt states they itch.

## 2019-06-05 NOTE — PLAN OF CARE
Problem: Adult Inpatient Plan of Care  Goal: Plan of Care Review  Outcome: Ongoing (interventions implemented as appropriate)  Pt is AAOX4. Waiting until 2000 and 2130 for OR times    -left floor this morning to IR for thoracentesis of right lung, 1.5L off, pt remains on 2L O2, no resp distress noted  -very jaundiced, no itching  -lacutlose give, 1 XL bm this shift  -ambulated in halls with SBA  -wound care to sacrum performed  -pt has been NPO since noon  -pt denies pain  -fall precautions in place, call light in reach

## 2019-06-06 ENCOUNTER — TELEPHONE (OUTPATIENT)
Dept: TRANSPLANT | Facility: CLINIC | Age: 49
End: 2019-06-06

## 2019-06-06 PROBLEM — R73.9 ACUTE HYPERGLYCEMIA: Status: ACTIVE | Noted: 2019-06-06

## 2019-06-06 PROBLEM — Z94.4 S/P LIVER TRANSPLANT: Status: ACTIVE | Noted: 2019-06-06

## 2019-06-06 PROBLEM — Z29.89 PROPHYLACTIC IMMUNOTHERAPY: Status: ACTIVE | Noted: 2019-06-06

## 2019-06-06 PROBLEM — T38.0X5A ADRENAL CORTICAL STEROIDS CAUSING ADVERSE EFFECT IN THERAPEUTIC USE: Status: ACTIVE | Noted: 2019-06-06

## 2019-06-06 LAB
ALBUMIN SERPL BCP-MCNC: 2.2 G/DL (ref 3.5–5.2)
ALBUMIN SERPL BCP-MCNC: 2.4 G/DL (ref 3.5–5.2)
ALBUMIN SERPL BCP-MCNC: 2.4 G/DL (ref 3.5–5.2)
ALLENS TEST: ABNORMAL
ALP SERPL-CCNC: 93 U/L (ref 55–135)
ALT SERPL W/O P-5'-P-CCNC: 138 U/L (ref 10–44)
ALT SERPL W/O P-5'-P-CCNC: 199 U/L (ref 10–44)
ANION GAP SERPL CALC-SCNC: 6 MMOL/L (ref 8–16)
ANION GAP SERPL CALC-SCNC: 8 MMOL/L (ref 8–16)
ANION GAP SERPL CALC-SCNC: 8 MMOL/L (ref 8–16)
ANISOCYTOSIS BLD QL SMEAR: SLIGHT
APTT BLDCRRT: 30.6 SEC (ref 21–32)
APTT BLDCRRT: 32.5 SEC (ref 21–32)
APTT BLDCRRT: 35.1 SEC (ref 21–32)
AST SERPL-CCNC: 192 U/L (ref 10–40)
AST SERPL-CCNC: 238 U/L (ref 10–40)
AST SERPL-CCNC: 255 U/L (ref 10–40)
AST SERPL-CCNC: 278 U/L (ref 10–40)
AST SERPL-CCNC: 328 U/L (ref 10–40)
AST SERPL-CCNC: 387 U/L (ref 10–40)
AST SERPL-CCNC: 437 U/L (ref 10–40)
BASOPHILS # BLD AUTO: 0 K/UL (ref 0–0.2)
BASOPHILS # BLD AUTO: 0.01 K/UL (ref 0–0.2)
BASOPHILS NFR BLD: 0 % (ref 0–1.9)
BASOPHILS NFR BLD: 0.3 % (ref 0–1.9)
BASOPHILS NFR BLD: 0.4 % (ref 0–1.9)
BASOPHILS NFR BLD: 0.5 % (ref 0–1.9)
BILIRUB DIRECT SERPL-MCNC: 5.2 MG/DL (ref 0.1–0.3)
BILIRUB SERPL-MCNC: 9.5 MG/DL (ref 0.1–1)
BLD PROD TYP BPU: NORMAL
BLOOD UNIT EXPIRATION DATE: NORMAL
BLOOD UNIT TYPE CODE: 5100
BLOOD UNIT TYPE CODE: 9500
BLOOD UNIT TYPE: NORMAL
BUN SERPL-MCNC: 11 MG/DL (ref 6–20)
BUN SERPL-MCNC: 15 MG/DL (ref 6–20)
BUN SERPL-MCNC: 8 MG/DL (ref 6–20)
BURR CELLS BLD QL SMEAR: ABNORMAL
BURR CELLS BLD QL SMEAR: ABNORMAL
CA-I BLDV-SCNC: 1.12 MMOL/L (ref 1.06–1.42)
CALCIUM SERPL-MCNC: 7.9 MG/DL (ref 8.7–10.5)
CALCIUM SERPL-MCNC: 8 MG/DL (ref 8.7–10.5)
CALCIUM SERPL-MCNC: 8.4 MG/DL (ref 8.7–10.5)
CHLORIDE SERPL-SCNC: 105 MMOL/L (ref 95–110)
CO2 SERPL-SCNC: 21 MMOL/L (ref 23–29)
CO2 SERPL-SCNC: 22 MMOL/L (ref 23–29)
CO2 SERPL-SCNC: 24 MMOL/L (ref 23–29)
CODING SYSTEM: NORMAL
CREAT SERPL-MCNC: 0.6 MG/DL (ref 0.5–1.4)
CREAT SERPL-MCNC: 0.8 MG/DL (ref 0.5–1.4)
CREAT SERPL-MCNC: 1.1 MG/DL (ref 0.5–1.4)
DELSYS: ABNORMAL
DIFFERENTIAL METHOD: ABNORMAL
DISPENSE STATUS: NORMAL
DOHLE BOD BLD QL SMEAR: PRESENT
EOSINOPHIL # BLD AUTO: 0 K/UL (ref 0–0.5)
EOSINOPHIL # BLD AUTO: 0.1 K/UL (ref 0–0.5)
EOSINOPHIL NFR BLD: 0 % (ref 0–8)
EOSINOPHIL NFR BLD: 0 % (ref 0–8)
EOSINOPHIL NFR BLD: 0.4 % (ref 0–8)
EOSINOPHIL NFR BLD: 0.5 % (ref 0–8)
EOSINOPHIL NFR BLD: 1.5 % (ref 0–8)
EOSINOPHIL NFR BLD: 2.8 % (ref 0–8)
ERYTHROCYTE [DISTWIDTH] IN BLOOD BY AUTOMATED COUNT: 17.1 % (ref 11.5–14.5)
ERYTHROCYTE [DISTWIDTH] IN BLOOD BY AUTOMATED COUNT: 17.1 % (ref 11.5–14.5)
ERYTHROCYTE [DISTWIDTH] IN BLOOD BY AUTOMATED COUNT: 17.2 % (ref 11.5–14.5)
ERYTHROCYTE [DISTWIDTH] IN BLOOD BY AUTOMATED COUNT: 17.3 % (ref 11.5–14.5)
ERYTHROCYTE [DISTWIDTH] IN BLOOD BY AUTOMATED COUNT: 17.3 % (ref 11.5–14.5)
ERYTHROCYTE [DISTWIDTH] IN BLOOD BY AUTOMATED COUNT: 17.4 % (ref 11.5–14.5)
ERYTHROCYTE [SEDIMENTATION RATE] IN BLOOD BY WESTERGREN METHOD: 16 MM/H
EST. GFR  (AFRICAN AMERICAN): >60 ML/MIN/1.73 M^2
EST. GFR  (NON AFRICAN AMERICAN): 58.3 ML/MIN/1.73 M^2
EST. GFR  (NON AFRICAN AMERICAN): >60 ML/MIN/1.73 M^2
EST. GFR  (NON AFRICAN AMERICAN): >60 ML/MIN/1.73 M^2
FIBRINOGEN PPP-MCNC: 280 MG/DL (ref 182–366)
FIBRINOGEN PPP-MCNC: 291 MG/DL (ref 182–366)
FIO2: 40
FIO2: 60
FLOW: 60
GGT SERPL-CCNC: 92 U/L (ref 8–55)
GLUCOSE SERPL-MCNC: 138 MG/DL (ref 70–110)
GLUCOSE SERPL-MCNC: 139 MG/DL (ref 70–110)
GLUCOSE SERPL-MCNC: 139 MG/DL (ref 70–110)
GLUCOSE SERPL-MCNC: 142 MG/DL (ref 70–110)
GLUCOSE SERPL-MCNC: 143 MG/DL (ref 70–110)
GLUCOSE SERPL-MCNC: 144 MG/DL (ref 70–110)
GLUCOSE SERPL-MCNC: 148 MG/DL (ref 70–110)
GLUCOSE SERPL-MCNC: 252 MG/DL (ref 70–110)
GLUCOSE SERPL-MCNC: 85 MG/DL (ref 70–110)
GLUCOSE SERPL-MCNC: 96 MG/DL (ref 70–110)
HCO3 UR-SCNC: 23.1 MMOL/L (ref 24–28)
HCO3 UR-SCNC: 23.9 MMOL/L (ref 24–28)
HCO3 UR-SCNC: 23.9 MMOL/L (ref 24–28)
HCO3 UR-SCNC: 24.1 MMOL/L (ref 24–28)
HCO3 UR-SCNC: 24.5 MMOL/L (ref 24–28)
HCO3 UR-SCNC: 26 MMOL/L (ref 24–28)
HCO3 UR-SCNC: 26.3 MMOL/L (ref 24–28)
HCO3 UR-SCNC: 27 MMOL/L (ref 24–28)
HCO3 UR-SCNC: 27.5 MMOL/L (ref 24–28)
HCT VFR BLD AUTO: 22.9 % (ref 37–48.5)
HCT VFR BLD AUTO: 23.1 % (ref 37–48.5)
HCT VFR BLD AUTO: 23.4 % (ref 37–48.5)
HCT VFR BLD AUTO: 23.5 % (ref 37–48.5)
HCT VFR BLD AUTO: 24.1 % (ref 37–48.5)
HCT VFR BLD AUTO: 24.3 % (ref 37–48.5)
HCT VFR BLD AUTO: 26.2 % (ref 37–48.5)
HCT VFR BLD CALC: 19 %PCV (ref 36–54)
HCT VFR BLD CALC: 20 %PCV (ref 36–54)
HCT VFR BLD CALC: 22 %PCV (ref 36–54)
HCT VFR BLD CALC: 24 %PCV (ref 36–54)
HCT VFR BLD CALC: 25 %PCV (ref 36–54)
HCT VFR BLD CALC: 25 %PCV (ref 36–54)
HGB BLD-MCNC: 7.7 G/DL (ref 12–16)
HGB BLD-MCNC: 7.8 G/DL (ref 12–16)
HGB BLD-MCNC: 7.9 G/DL (ref 12–16)
HGB BLD-MCNC: 7.9 G/DL (ref 12–16)
HGB BLD-MCNC: 8 G/DL (ref 12–16)
HGB BLD-MCNC: 8.5 G/DL (ref 12–16)
HGB BLD-MCNC: 8.6 G/DL (ref 12–16)
HYPOCHROMIA BLD QL SMEAR: ABNORMAL
HYPOCHROMIA BLD QL SMEAR: ABNORMAL
IMM GRANULOCYTES # BLD AUTO: 0.01 K/UL (ref 0–0.04)
IMM GRANULOCYTES # BLD AUTO: 0.01 K/UL (ref 0–0.04)
IMM GRANULOCYTES # BLD AUTO: 0.02 K/UL (ref 0–0.04)
IMM GRANULOCYTES # BLD AUTO: 0.02 K/UL (ref 0–0.04)
IMM GRANULOCYTES # BLD AUTO: 0.03 K/UL (ref 0–0.04)
IMM GRANULOCYTES # BLD AUTO: 0.05 K/UL (ref 0–0.04)
IMM GRANULOCYTES NFR BLD AUTO: 0.3 % (ref 0–0.5)
IMM GRANULOCYTES NFR BLD AUTO: 0.5 % (ref 0–0.5)
IMM GRANULOCYTES NFR BLD AUTO: 0.6 % (ref 0–0.5)
IMM GRANULOCYTES NFR BLD AUTO: 0.8 % (ref 0–0.5)
IMM GRANULOCYTES NFR BLD AUTO: 1.1 % (ref 0–0.5)
IMM GRANULOCYTES NFR BLD AUTO: 1.7 % (ref 0–0.5)
INR PPP: 1.2 (ref 0.8–1.2)
INR PPP: 1.3 (ref 0.8–1.2)
INR PPP: 1.3 (ref 0.8–1.2)
LACTATE SERPL-SCNC: 2.3 MMOL/L (ref 0.5–2.2)
LDH SERPL L TO P-CCNC: 1325 U/L (ref 110–260)
LYMPHOCYTES # BLD AUTO: 0.2 K/UL (ref 1–4.8)
LYMPHOCYTES # BLD AUTO: 0.2 K/UL (ref 1–4.8)
LYMPHOCYTES # BLD AUTO: 0.3 K/UL (ref 1–4.8)
LYMPHOCYTES NFR BLD: 10.2 % (ref 18–48)
LYMPHOCYTES NFR BLD: 11 % (ref 18–48)
LYMPHOCYTES NFR BLD: 11.7 % (ref 18–48)
LYMPHOCYTES NFR BLD: 8.2 % (ref 18–48)
LYMPHOCYTES NFR BLD: 8.4 % (ref 18–48)
LYMPHOCYTES NFR BLD: 8.5 % (ref 18–48)
MAGNESIUM SERPL-MCNC: 1.7 MG/DL (ref 1.6–2.6)
MAGNESIUM SERPL-MCNC: 1.9 MG/DL (ref 1.6–2.6)
MAGNESIUM SERPL-MCNC: 2 MG/DL (ref 1.6–2.6)
MCH RBC QN AUTO: 30.4 PG (ref 27–31)
MCH RBC QN AUTO: 30.7 PG (ref 27–31)
MCH RBC QN AUTO: 31 PG (ref 27–31)
MCH RBC QN AUTO: 31 PG (ref 27–31)
MCH RBC QN AUTO: 31.1 PG (ref 27–31)
MCH RBC QN AUTO: 31.3 PG (ref 27–31)
MCHC RBC AUTO-ENTMCNC: 32.8 G/DL (ref 32–36)
MCHC RBC AUTO-ENTMCNC: 32.8 G/DL (ref 32–36)
MCHC RBC AUTO-ENTMCNC: 33.2 G/DL (ref 32–36)
MCHC RBC AUTO-ENTMCNC: 33.3 G/DL (ref 32–36)
MCHC RBC AUTO-ENTMCNC: 34.2 G/DL (ref 32–36)
MCHC RBC AUTO-ENTMCNC: 34.5 G/DL (ref 32–36)
MCV RBC AUTO: 91 FL (ref 82–98)
MCV RBC AUTO: 91 FL (ref 82–98)
MCV RBC AUTO: 93 FL (ref 82–98)
MCV RBC AUTO: 93 FL (ref 82–98)
MCV RBC AUTO: 94 FL (ref 82–98)
MCV RBC AUTO: 94 FL (ref 82–98)
MEGAKARYOCYTIC FRAGMENTS: ABNORMAL
MODE: ABNORMAL
MONOCYTES # BLD AUTO: 0.1 K/UL (ref 0.3–1)
MONOCYTES # BLD AUTO: 0.2 K/UL (ref 0.3–1)
MONOCYTES # BLD AUTO: 0.3 K/UL (ref 0.3–1)
MONOCYTES NFR BLD: 2.5 % (ref 4–15)
MONOCYTES NFR BLD: 3.3 % (ref 4–15)
MONOCYTES NFR BLD: 3.7 % (ref 4–15)
MONOCYTES NFR BLD: 4.1 % (ref 4–15)
MONOCYTES NFR BLD: 5.5 % (ref 4–15)
MONOCYTES NFR BLD: 9.7 % (ref 4–15)
NEUTROPHILS # BLD AUTO: 1.9 K/UL (ref 1.8–7.7)
NEUTROPHILS # BLD AUTO: 2 K/UL (ref 1.8–7.7)
NEUTROPHILS # BLD AUTO: 2.2 K/UL (ref 1.8–7.7)
NEUTROPHILS # BLD AUTO: 2.2 K/UL (ref 1.8–7.7)
NEUTROPHILS # BLD AUTO: 2.8 K/UL (ref 1.8–7.7)
NEUTROPHILS # BLD AUTO: 2.9 K/UL (ref 1.8–7.7)
NEUTROPHILS NFR BLD: 74.5 % (ref 38–73)
NEUTROPHILS NFR BLD: 79.8 % (ref 38–73)
NEUTROPHILS NFR BLD: 86.1 % (ref 38–73)
NEUTROPHILS NFR BLD: 86.8 % (ref 38–73)
NEUTROPHILS NFR BLD: 86.9 % (ref 38–73)
NEUTROPHILS NFR BLD: 87.1 % (ref 38–73)
NRBC BLD-RTO: 0 /100 WBC
NUM UNITS TRANS FFP: NORMAL
OVALOCYTES BLD QL SMEAR: ABNORMAL
PCO2 BLDA: 32.3 MMHG (ref 35–45)
PCO2 BLDA: 33.6 MMHG (ref 35–45)
PCO2 BLDA: 34.8 MMHG (ref 35–45)
PCO2 BLDA: 37.2 MMHG (ref 35–45)
PCO2 BLDA: 37.5 MMHG (ref 35–45)
PCO2 BLDA: 42.3 MMHG (ref 35–45)
PCO2 BLDA: 45.1 MMHG (ref 35–45)
PCO2 BLDA: 45.2 MMHG (ref 35–45)
PCO2 BLDA: 47.7 MMHG (ref 35–45)
PEEP: 10
PEEP: 5
PEEP: 8
PEEP: 8
PH SMN: 7.33 [PH] (ref 7.35–7.45)
PH SMN: 7.34 [PH] (ref 7.35–7.45)
PH SMN: 7.38 [PH] (ref 7.35–7.45)
PH SMN: 7.42 [PH] (ref 7.35–7.45)
PH SMN: 7.45 [PH] (ref 7.35–7.45)
PH SMN: 7.48 [PH] (ref 7.35–7.45)
PH SMN: 7.49 [PH] (ref 7.35–7.45)
PHOSPHATE SERPL-MCNC: 4.1 MG/DL (ref 2.7–4.5)
PIP: 20
PLATELET # BLD AUTO: 41 K/UL (ref 150–350)
PLATELET # BLD AUTO: 45 K/UL (ref 150–350)
PLATELET # BLD AUTO: 47 K/UL (ref 150–350)
PLATELET # BLD AUTO: 48 K/UL (ref 150–350)
PLATELET # BLD AUTO: 55 K/UL (ref 150–350)
PLATELET # BLD AUTO: 71 K/UL (ref 150–350)
PLATELET # BLD AUTO: 87 K/UL (ref 150–350)
PLATELET BLD QL SMEAR: ABNORMAL
PMV BLD AUTO: 10.9 FL (ref 9.2–12.9)
PMV BLD AUTO: 11.1 FL (ref 9.2–12.9)
PMV BLD AUTO: 11.1 FL (ref 9.2–12.9)
PMV BLD AUTO: 11.6 FL (ref 9.2–12.9)
PMV BLD AUTO: 11.9 FL (ref 9.2–12.9)
PMV BLD AUTO: 11.9 FL (ref 9.2–12.9)
PMV BLD AUTO: 12.6 FL (ref 9.2–12.9)
PO2 BLDA: 188 MMHG (ref 80–100)
PO2 BLDA: 191 MMHG (ref 80–100)
PO2 BLDA: 206 MMHG (ref 80–100)
PO2 BLDA: 222 MMHG (ref 80–100)
PO2 BLDA: 229 MMHG (ref 80–100)
PO2 BLDA: 250 MMHG (ref 80–100)
PO2 BLDA: 328 MMHG (ref 80–100)
PO2 BLDA: 57 MMHG (ref 40–60)
PO2 BLDA: 83 MMHG (ref 80–100)
POC BE: -1 MMOL/L
POC BE: -2 MMOL/L
POC BE: 0 MMOL/L
POC BE: 2 MMOL/L
POC BE: 3 MMOL/L
POC BE: 3 MMOL/L
POC IONIZED CALCIUM: 0.83 MMOL/L (ref 1.06–1.42)
POC IONIZED CALCIUM: 1.02 MMOL/L (ref 1.06–1.42)
POC IONIZED CALCIUM: 1.08 MMOL/L (ref 1.06–1.42)
POC IONIZED CALCIUM: 1.09 MMOL/L (ref 1.06–1.42)
POC IONIZED CALCIUM: 1.17 MMOL/L (ref 1.06–1.42)
POC IONIZED CALCIUM: 1.19 MMOL/L (ref 1.06–1.42)
POC SATURATED O2: 100 % (ref 95–100)
POC SATURATED O2: 90 % (ref 95–100)
POC SATURATED O2: 97 % (ref 95–100)
POC TCO2: 24 MMOL/L (ref 23–27)
POC TCO2: 25 MMOL/L (ref 23–27)
POC TCO2: 26 MMOL/L (ref 24–29)
POC TCO2: 27 MMOL/L (ref 23–27)
POC TCO2: 27 MMOL/L (ref 23–27)
POC TCO2: 28 MMOL/L (ref 23–27)
POC TCO2: 29 MMOL/L (ref 23–27)
POCT GLUCOSE: 111 MG/DL (ref 70–110)
POCT GLUCOSE: 117 MG/DL (ref 70–110)
POCT GLUCOSE: 122 MG/DL (ref 70–110)
POCT GLUCOSE: 129 MG/DL (ref 70–110)
POCT GLUCOSE: 134 MG/DL (ref 70–110)
POCT GLUCOSE: 140 MG/DL (ref 70–110)
POCT GLUCOSE: 143 MG/DL (ref 70–110)
POCT GLUCOSE: 146 MG/DL (ref 70–110)
POCT GLUCOSE: 150 MG/DL (ref 70–110)
POCT GLUCOSE: 151 MG/DL (ref 70–110)
POCT GLUCOSE: 153 MG/DL (ref 70–110)
POCT GLUCOSE: 153 MG/DL (ref 70–110)
POCT GLUCOSE: 171 MG/DL (ref 70–110)
POCT GLUCOSE: 179 MG/DL (ref 70–110)
POCT GLUCOSE: 203 MG/DL (ref 70–110)
POCT GLUCOSE: 208 MG/DL (ref 70–110)
POCT GLUCOSE: 216 MG/DL (ref 70–110)
POCT GLUCOSE: 228 MG/DL (ref 70–110)
POCT GLUCOSE: 238 MG/DL (ref 70–110)
POCT GLUCOSE: 249 MG/DL (ref 70–110)
POCT GLUCOSE: 258 MG/DL (ref 70–110)
POCT GLUCOSE: 271 MG/DL (ref 70–110)
POIKILOCYTOSIS BLD QL SMEAR: SLIGHT
POLYCHROMASIA BLD QL SMEAR: ABNORMAL
POTASSIUM BLD-SCNC: 3.5 MMOL/L (ref 3.5–5.1)
POTASSIUM BLD-SCNC: 3.7 MMOL/L (ref 3.5–5.1)
POTASSIUM BLD-SCNC: 3.7 MMOL/L (ref 3.5–5.1)
POTASSIUM BLD-SCNC: 3.9 MMOL/L (ref 3.5–5.1)
POTASSIUM SERPL-SCNC: 3.6 MMOL/L (ref 3.5–5.1)
POTASSIUM SERPL-SCNC: 3.9 MMOL/L (ref 3.5–5.1)
POTASSIUM SERPL-SCNC: 4.1 MMOL/L (ref 3.5–5.1)
PROT SERPL-MCNC: 4.6 G/DL (ref 6–8.4)
PROTHROMBIN TIME: 12 SEC (ref 9–12.5)
PROTHROMBIN TIME: 12.1 SEC (ref 9–12.5)
PROTHROMBIN TIME: 12.1 SEC (ref 9–12.5)
PROTHROMBIN TIME: 12.3 SEC (ref 9–12.5)
PROTHROMBIN TIME: 12.5 SEC (ref 9–12.5)
PROTHROMBIN TIME: 12.9 SEC (ref 9–12.5)
PROTHROMBIN TIME: 13.4 SEC (ref 9–12.5)
PS: 10
RBC # BLD AUTO: 2.48 M/UL (ref 4–5.4)
RBC # BLD AUTO: 2.51 M/UL (ref 4–5.4)
RBC # BLD AUTO: 2.52 M/UL (ref 4–5.4)
RBC # BLD AUTO: 2.58 M/UL (ref 4–5.4)
RBC # BLD AUTO: 2.6 M/UL (ref 4–5.4)
RBC # BLD AUTO: 2.8 M/UL (ref 4–5.4)
SAMPLE: ABNORMAL
SCHISTOCYTES BLD QL SMEAR: ABNORMAL
SITE: ABNORMAL
SODIUM BLD-SCNC: 133 MMOL/L (ref 136–145)
SODIUM BLD-SCNC: 133 MMOL/L (ref 136–145)
SODIUM BLD-SCNC: 136 MMOL/L (ref 136–145)
SODIUM BLD-SCNC: 137 MMOL/L (ref 136–145)
SODIUM BLD-SCNC: 137 MMOL/L (ref 136–145)
SODIUM BLD-SCNC: 138 MMOL/L (ref 136–145)
SODIUM SERPL-SCNC: 133 MMOL/L (ref 136–145)
SODIUM SERPL-SCNC: 134 MMOL/L (ref 136–145)
SODIUM SERPL-SCNC: 137 MMOL/L (ref 136–145)
SP02: 100
TACROLIMUS BLD-MCNC: <1.5 NG/ML (ref 5–15)
TARGETS BLD QL SMEAR: ABNORMAL
TOXIC GRANULES BLD QL SMEAR: PRESENT
VT: 430
WBC # BLD AUTO: 2.15 K/UL (ref 3.9–12.7)
WBC # BLD AUTO: 2.36 K/UL (ref 3.9–12.7)
WBC # BLD AUTO: 2.74 K/UL (ref 3.9–12.7)
WBC # BLD AUTO: 2.9 K/UL (ref 3.9–12.7)
WBC # BLD AUTO: 3.16 K/UL (ref 3.9–12.7)
WBC # BLD AUTO: 3.28 K/UL (ref 3.9–12.7)

## 2019-06-06 PROCEDURE — 88313 TISSUE SPECIMEN TO PATHOLOGY - SURGERY: ICD-10-PCS | Mod: 26,,, | Performed by: PATHOLOGY

## 2019-06-06 PROCEDURE — S0028 INJECTION, FAMOTIDINE, 20 MG: HCPCS | Performed by: TRANSPLANT SURGERY

## 2019-06-06 PROCEDURE — 84295 ASSAY OF SERUM SODIUM: CPT

## 2019-06-06 PROCEDURE — 85025 COMPLETE CBC W/AUTO DIFF WBC: CPT | Mod: 91

## 2019-06-06 PROCEDURE — 82330 ASSAY OF CALCIUM: CPT

## 2019-06-06 PROCEDURE — 63600175 PHARM REV CODE 636 W HCPCS: Performed by: STUDENT IN AN ORGANIZED HEALTH CARE EDUCATION/TRAINING PROGRAM

## 2019-06-06 PROCEDURE — 94002 VENT MGMT INPAT INIT DAY: CPT

## 2019-06-06 PROCEDURE — 25000003 PHARM REV CODE 250: Performed by: TRANSPLANT SURGERY

## 2019-06-06 PROCEDURE — 97164 PT RE-EVAL EST PLAN CARE: CPT

## 2019-06-06 PROCEDURE — 85384 FIBRINOGEN ACTIVITY: CPT | Mod: 91

## 2019-06-06 PROCEDURE — 20000000 HC ICU ROOM

## 2019-06-06 PROCEDURE — P9045 ALBUMIN (HUMAN), 5%, 250 ML: HCPCS | Mod: JG | Performed by: STUDENT IN AN ORGANIZED HEALTH CARE EDUCATION/TRAINING PROGRAM

## 2019-06-06 PROCEDURE — 85730 THROMBOPLASTIN TIME PARTIAL: CPT | Mod: 91

## 2019-06-06 PROCEDURE — 88307 TISSUE SPECIMEN TO PATHOLOGY - SURGERY: ICD-10-PCS | Mod: 26,,, | Performed by: PATHOLOGY

## 2019-06-06 PROCEDURE — 99223 PR INITIAL HOSPITAL CARE,LEVL III: ICD-10-PCS | Mod: ,,, | Performed by: NURSE PRACTITIONER

## 2019-06-06 PROCEDURE — 85610 PROTHROMBIN TIME: CPT | Mod: 91

## 2019-06-06 PROCEDURE — 83615 LACTATE (LD) (LDH) ENZYME: CPT

## 2019-06-06 PROCEDURE — 82040 ASSAY OF SERUM ALBUMIN: CPT

## 2019-06-06 PROCEDURE — 83735 ASSAY OF MAGNESIUM: CPT

## 2019-06-06 PROCEDURE — 82977 ASSAY OF GGT: CPT

## 2019-06-06 PROCEDURE — 81300000 HC LIVER ACQUISITION CHARGE

## 2019-06-06 PROCEDURE — 84132 ASSAY OF SERUM POTASSIUM: CPT

## 2019-06-06 PROCEDURE — 27000221 HC OXYGEN, UP TO 24 HOURS

## 2019-06-06 PROCEDURE — 85027 COMPLETE CBC AUTOMATED: CPT

## 2019-06-06 PROCEDURE — 94799 UNLISTED PULMONARY SVC/PX: CPT

## 2019-06-06 PROCEDURE — 63600175 PHARM REV CODE 636 W HCPCS: Performed by: NURSE ANESTHETIST, CERTIFIED REGISTERED

## 2019-06-06 PROCEDURE — 85014 HEMATOCRIT: CPT

## 2019-06-06 PROCEDURE — 85049 AUTOMATED PLATELET COUNT: CPT

## 2019-06-06 PROCEDURE — P9045 ALBUMIN (HUMAN), 5%, 250 ML: HCPCS | Mod: JG | Performed by: SURGERY

## 2019-06-06 PROCEDURE — 25000003 PHARM REV CODE 250: Performed by: STUDENT IN AN ORGANIZED HEALTH CARE EDUCATION/TRAINING PROGRAM

## 2019-06-06 PROCEDURE — 63600175 PHARM REV CODE 636 W HCPCS: Mod: JG | Performed by: STUDENT IN AN ORGANIZED HEALTH CARE EDUCATION/TRAINING PROGRAM

## 2019-06-06 PROCEDURE — 84100 ASSAY OF PHOSPHORUS: CPT

## 2019-06-06 PROCEDURE — 63600175 PHARM REV CODE 636 W HCPCS: Performed by: TRANSPLANT SURGERY

## 2019-06-06 PROCEDURE — 88309 TISSUE EXAM BY PATHOLOGIST: CPT | Mod: 26,,, | Performed by: PATHOLOGY

## 2019-06-06 PROCEDURE — 83605 ASSAY OF LACTIC ACID: CPT

## 2019-06-06 PROCEDURE — 82947 ASSAY GLUCOSE BLOOD QUANT: CPT

## 2019-06-06 PROCEDURE — 80048 BASIC METABOLIC PNL TOTAL CA: CPT | Mod: 91

## 2019-06-06 PROCEDURE — 85007 BL SMEAR W/DIFF WBC COUNT: CPT

## 2019-06-06 PROCEDURE — 99900026 HC AIRWAY MAINTENANCE (STAT)

## 2019-06-06 PROCEDURE — 81300005 HC LIVER TRANSPORT, GROUND 4-5 HOURS

## 2019-06-06 PROCEDURE — 80053 COMPREHEN METABOLIC PANEL: CPT

## 2019-06-06 PROCEDURE — 99223 1ST HOSP IP/OBS HIGH 75: CPT | Mod: ,,, | Performed by: NURSE PRACTITIONER

## 2019-06-06 PROCEDURE — 99900035 HC TECH TIME PER 15 MIN (STAT)

## 2019-06-06 PROCEDURE — 36415 COLL VENOUS BLD VENIPUNCTURE: CPT

## 2019-06-06 PROCEDURE — 94150 VITAL CAPACITY TEST: CPT

## 2019-06-06 PROCEDURE — 97530 THERAPEUTIC ACTIVITIES: CPT

## 2019-06-06 PROCEDURE — 99223 1ST HOSP IP/OBS HIGH 75: CPT | Mod: 24,,, | Performed by: SURGERY

## 2019-06-06 PROCEDURE — 85018 HEMOGLOBIN: CPT

## 2019-06-06 PROCEDURE — 94761 N-INVAS EAR/PLS OXIMETRY MLT: CPT

## 2019-06-06 PROCEDURE — 85384 FIBRINOGEN ACTIVITY: CPT

## 2019-06-06 PROCEDURE — 88313 SPECIAL STAINS GROUP 2: CPT | Mod: 26,,, | Performed by: PATHOLOGY

## 2019-06-06 PROCEDURE — 84450 TRANSFERASE (AST) (SGOT): CPT | Mod: 91

## 2019-06-06 PROCEDURE — 37799 UNLISTED PX VASCULAR SURGERY: CPT

## 2019-06-06 PROCEDURE — 82248 BILIRUBIN DIRECT: CPT

## 2019-06-06 PROCEDURE — 83735 ASSAY OF MAGNESIUM: CPT | Mod: 91

## 2019-06-06 PROCEDURE — 25000003 PHARM REV CODE 250: Performed by: NURSE ANESTHETIST, CERTIFIED REGISTERED

## 2019-06-06 PROCEDURE — 82803 BLOOD GASES ANY COMBINATION: CPT

## 2019-06-06 PROCEDURE — 88307 TISSUE EXAM BY PATHOLOGIST: CPT | Performed by: PATHOLOGY

## 2019-06-06 PROCEDURE — 63600175 PHARM REV CODE 636 W HCPCS: Mod: JG | Performed by: SURGERY

## 2019-06-06 PROCEDURE — 80197 ASSAY OF TACROLIMUS: CPT

## 2019-06-06 PROCEDURE — 88309 TISSUE SPECIMEN TO PATHOLOGY - SURGERY: ICD-10-PCS | Mod: 26,,, | Performed by: PATHOLOGY

## 2019-06-06 PROCEDURE — 99223 PR INITIAL HOSPITAL CARE,LEVL III: ICD-10-PCS | Mod: 24,,, | Performed by: SURGERY

## 2019-06-06 PROCEDURE — 94010 BREATHING CAPACITY TEST: CPT

## 2019-06-06 PROCEDURE — 27200966 HC CLOSED SUCTION SYSTEM

## 2019-06-06 RX ORDER — POTASSIUM CHLORIDE 29.8 MG/ML
80 INJECTION INTRAVENOUS
Status: DISCONTINUED | OUTPATIENT
Start: 2019-06-06 | End: 2019-06-09

## 2019-06-06 RX ORDER — VALGANCICLOVIR 450 MG/1
450 TABLET, FILM COATED ORAL DAILY
Status: DISCONTINUED | OUTPATIENT
Start: 2019-06-16 | End: 2019-06-11 | Stop reason: HOSPADM

## 2019-06-06 RX ORDER — MAGNESIUM SULFATE HEPTAHYDRATE 40 MG/ML
2 INJECTION, SOLUTION INTRAVENOUS
Status: DISCONTINUED | OUTPATIENT
Start: 2019-06-06 | End: 2019-06-09

## 2019-06-06 RX ORDER — SIMETHICONE 80 MG
1 TABLET,CHEWABLE ORAL 3 TIMES DAILY PRN
Status: DISCONTINUED | OUTPATIENT
Start: 2019-06-06 | End: 2019-06-11 | Stop reason: HOSPADM

## 2019-06-06 RX ORDER — SULFAMETHOXAZOLE AND TRIMETHOPRIM 400; 80 MG/1; MG/1
1 TABLET ORAL EVERY MORNING
Status: DISCONTINUED | OUTPATIENT
Start: 2019-06-06 | End: 2019-06-11 | Stop reason: HOSPADM

## 2019-06-06 RX ORDER — PROPOFOL 10 MG/ML
5 INJECTION, EMULSION INTRAVENOUS CONTINUOUS
Status: DISCONTINUED | OUTPATIENT
Start: 2019-06-06 | End: 2019-06-06

## 2019-06-06 RX ORDER — MAGNESIUM SULFATE HEPTAHYDRATE 40 MG/ML
4 INJECTION, SOLUTION INTRAVENOUS
Status: DISCONTINUED | OUTPATIENT
Start: 2019-06-06 | End: 2019-06-09

## 2019-06-06 RX ORDER — PREDNISONE 10 MG/1
20 TABLET ORAL DAILY
Status: DISCONTINUED | OUTPATIENT
Start: 2019-06-12 | End: 2019-06-11 | Stop reason: HOSPADM

## 2019-06-06 RX ORDER — CIPROFLOXACIN 2 MG/ML
400 INJECTION, SOLUTION INTRAVENOUS ONCE
Status: COMPLETED | OUTPATIENT
Start: 2019-06-06 | End: 2019-06-06

## 2019-06-06 RX ORDER — ONDANSETRON 8 MG/1
8 TABLET, ORALLY DISINTEGRATING ORAL EVERY 8 HOURS PRN
Status: DISCONTINUED | OUTPATIENT
Start: 2019-06-06 | End: 2019-06-11 | Stop reason: HOSPADM

## 2019-06-06 RX ORDER — METHYLPREDNISOLONE SOD SUCC 125 MG
100 VIAL (EA) INJECTION EVERY 12 HOURS
Status: COMPLETED | OUTPATIENT
Start: 2019-06-07 | End: 2019-06-07

## 2019-06-06 RX ORDER — METHYLPREDNISOLONE SOD SUCC 125 MG
80 VIAL (EA) INJECTION EVERY 12 HOURS
Status: COMPLETED | OUTPATIENT
Start: 2019-06-08 | End: 2019-06-08

## 2019-06-06 RX ORDER — HYDROMORPHONE HYDROCHLORIDE 1 MG/ML
0.5 INJECTION, SOLUTION INTRAMUSCULAR; INTRAVENOUS; SUBCUTANEOUS EVERY 4 HOURS PRN
Status: DISCONTINUED | OUTPATIENT
Start: 2019-06-06 | End: 2019-06-08

## 2019-06-06 RX ORDER — OXYCODONE HYDROCHLORIDE 5 MG/1
5 TABLET ORAL EVERY 4 HOURS PRN
Status: DISCONTINUED | OUTPATIENT
Start: 2019-06-06 | End: 2019-06-11 | Stop reason: HOSPADM

## 2019-06-06 RX ORDER — ALBUMIN HUMAN 50 G/1000ML
25 SOLUTION INTRAVENOUS ONCE
Status: COMPLETED | OUTPATIENT
Start: 2019-06-06 | End: 2019-06-06

## 2019-06-06 RX ORDER — ALBUMIN HUMAN 50 G/1000ML
12.5 SOLUTION INTRAVENOUS ONCE
Status: COMPLETED | OUTPATIENT
Start: 2019-06-06 | End: 2019-06-06

## 2019-06-06 RX ORDER — HYDRALAZINE HYDROCHLORIDE 20 MG/ML
10 INJECTION INTRAMUSCULAR; INTRAVENOUS ONCE
Status: DISCONTINUED | OUTPATIENT
Start: 2019-06-06 | End: 2019-06-07

## 2019-06-06 RX ORDER — FAMOTIDINE 10 MG/ML
20 INJECTION INTRAVENOUS EVERY 12 HOURS
Status: DISCONTINUED | OUTPATIENT
Start: 2019-06-06 | End: 2019-06-07

## 2019-06-06 RX ORDER — OXYCODONE HYDROCHLORIDE 10 MG/1
10 TABLET ORAL EVERY 6 HOURS PRN
Status: DISCONTINUED | OUTPATIENT
Start: 2019-06-06 | End: 2019-06-08

## 2019-06-06 RX ORDER — NYSTATIN 100000 [USP'U]/ML
500000 SUSPENSION ORAL
Status: DISCONTINUED | OUTPATIENT
Start: 2019-06-06 | End: 2019-06-06

## 2019-06-06 RX ORDER — DEXTROSE MONOHYDRATE AND SODIUM CHLORIDE 5; .9 G/100ML; G/100ML
INJECTION, SOLUTION INTRAVENOUS CONTINUOUS
Status: DISCONTINUED | OUTPATIENT
Start: 2019-06-06 | End: 2019-06-07

## 2019-06-06 RX ORDER — VANCOMYCIN HCL IN 5 % DEXTROSE 1G/250ML
1000 PLASTIC BAG, INJECTION (ML) INTRAVENOUS ONCE
Status: COMPLETED | OUTPATIENT
Start: 2019-06-06 | End: 2019-06-06

## 2019-06-06 RX ORDER — MYCOPHENOLATE MOFETIL 200 MG/ML
1000 POWDER, FOR SUSPENSION ORAL 2 TIMES DAILY
Status: DISCONTINUED | OUTPATIENT
Start: 2019-06-06 | End: 2019-06-07

## 2019-06-06 RX ORDER — METHYLPREDNISOLONE SOD SUCC 125 MG
60 VIAL (EA) INJECTION EVERY 12 HOURS
Status: COMPLETED | OUTPATIENT
Start: 2019-06-09 | End: 2019-06-09

## 2019-06-06 RX ORDER — POTASSIUM CHLORIDE 29.8 MG/ML
40 INJECTION INTRAVENOUS
Status: DISCONTINUED | OUTPATIENT
Start: 2019-06-06 | End: 2019-06-09

## 2019-06-06 RX ORDER — POTASSIUM CHLORIDE 14.9 MG/ML
60 INJECTION INTRAVENOUS
Status: DISCONTINUED | OUTPATIENT
Start: 2019-06-06 | End: 2019-06-09

## 2019-06-06 RX ORDER — HEPARIN SODIUM 5000 [USP'U]/ML
5000 INJECTION, SOLUTION INTRAVENOUS; SUBCUTANEOUS EVERY 8 HOURS
Status: DISCONTINUED | OUTPATIENT
Start: 2019-06-06 | End: 2019-06-11 | Stop reason: HOSPADM

## 2019-06-06 RX ADMIN — FAMOTIDINE 20 MG: 10 INJECTION, SOLUTION INTRAVENOUS at 08:06

## 2019-06-06 RX ADMIN — PHYTONADIONE 10 MG: 10 INJECTION, EMULSION INTRAMUSCULAR; INTRAVENOUS; SUBCUTANEOUS at 02:06

## 2019-06-06 RX ADMIN — OXYCODONE HYDROCHLORIDE 10 MG: 10 TABLET ORAL at 07:06

## 2019-06-06 RX ADMIN — MICONAZOLE NITRATE: 20 POWDER TOPICAL at 09:06

## 2019-06-06 RX ADMIN — DEXTROSE 300 MG: 5 SOLUTION INTRAVENOUS at 10:06

## 2019-06-06 RX ADMIN — CIPROFLOXACIN 400 MG: 2 INJECTION, SOLUTION INTRAVENOUS at 08:06

## 2019-06-06 RX ADMIN — Medication 1 MG: at 06:06

## 2019-06-06 RX ADMIN — Medication 1 MG: at 08:06

## 2019-06-06 RX ADMIN — Medication 1000 MG: at 09:06

## 2019-06-06 RX ADMIN — ROCURONIUM BROMIDE 10 MG: 10 INJECTION, SOLUTION INTRAVENOUS at 12:06

## 2019-06-06 RX ADMIN — SULFAMETHOXAZOLE AND TRIMETHOPRIM 1 TABLET: 400; 80 TABLET ORAL at 08:06

## 2019-06-06 RX ADMIN — POTASSIUM CHLORIDE 60 MEQ: 200 INJECTION, SOLUTION INTRAVENOUS at 02:06

## 2019-06-06 RX ADMIN — ALBUMIN (HUMAN) 12.5 G: 12.5 SOLUTION INTRAVENOUS at 05:06

## 2019-06-06 RX ADMIN — PHYTONADIONE 10 MG: 10 INJECTION, EMULSION INTRAMUSCULAR; INTRAVENOUS; SUBCUTANEOUS at 05:06

## 2019-06-06 RX ADMIN — ALBUMIN (HUMAN) 25 G: 12.5 SOLUTION INTRAVENOUS at 05:06

## 2019-06-06 RX ADMIN — HEPARIN SODIUM 5000 UNITS: 5000 INJECTION, SOLUTION INTRAVENOUS; SUBCUTANEOUS at 02:06

## 2019-06-06 RX ADMIN — PROPOFOL 25 MCG/KG/MIN: 10 INJECTION, EMULSION INTRAVENOUS at 05:06

## 2019-06-06 RX ADMIN — MICONAZOLE NITRATE: 20 POWDER TOPICAL at 04:06

## 2019-06-06 RX ADMIN — OXYCODONE HYDROCHLORIDE 5 MG: 5 TABLET ORAL at 02:06

## 2019-06-06 RX ADMIN — NYSTATIN 500000 UNITS: 100000 SUSPENSION ORAL at 09:06

## 2019-06-06 RX ADMIN — HEPARIN SODIUM 5000 UNITS: 5000 INJECTION, SOLUTION INTRAVENOUS; SUBCUTANEOUS at 05:06

## 2019-06-06 RX ADMIN — HEPARIN SODIUM 5000 UNITS: 5000 INJECTION, SOLUTION INTRAVENOUS; SUBCUTANEOUS at 09:06

## 2019-06-06 RX ADMIN — PHYTONADIONE 10 MG: 10 INJECTION, EMULSION INTRAMUSCULAR; INTRAVENOUS; SUBCUTANEOUS at 09:06

## 2019-06-06 RX ADMIN — MICONAZOLE NITRATE: 20 POWDER TOPICAL at 08:06

## 2019-06-06 RX ADMIN — DEXTROSE AND SODIUM CHLORIDE: 5; .9 INJECTION, SOLUTION INTRAVENOUS at 05:06

## 2019-06-06 RX ADMIN — HYDROMORPHONE HYDROCHLORIDE 0.5 MG: 1 INJECTION, SOLUTION INTRAMUSCULAR; INTRAVENOUS; SUBCUTANEOUS at 10:06

## 2019-06-06 RX ADMIN — ALBUMIN (HUMAN) 25 G: 12.5 SOLUTION INTRAVENOUS at 04:06

## 2019-06-06 RX ADMIN — ALBUMIN (HUMAN) 25 G: 12.5 SOLUTION INTRAVENOUS at 08:06

## 2019-06-06 RX ADMIN — VANCOMYCIN HYDROCHLORIDE 1000 MG: 1 INJECTION, POWDER, LYOPHILIZED, FOR SOLUTION INTRAVENOUS at 08:06

## 2019-06-06 RX ADMIN — PROPOFOL 35 MCG/KG/MIN: 10 INJECTION, EMULSION INTRAVENOUS at 04:06

## 2019-06-06 RX ADMIN — DEXTROSE 300 MG: 5 SOLUTION INTRAVENOUS at 09:06

## 2019-06-06 RX ADMIN — DEXTROSE AND SODIUM CHLORIDE: 5; .9 INJECTION, SOLUTION INTRAVENOUS at 02:06

## 2019-06-06 RX ADMIN — LEVOTHYROXINE SODIUM 112 MCG: 112 TABLET ORAL at 05:06

## 2019-06-06 RX ADMIN — FENTANYL CITRATE 50 MCG: 50 INJECTION, SOLUTION INTRAMUSCULAR; INTRAVENOUS at 01:06

## 2019-06-06 NOTE — PROGRESS NOTES
TRANSPLANT NOTE:    Admit Date: 2019    ORGAN:   LIVER  Disease Etiology: Cirrhosis: Fatty Liver (Lozoya)  Donor CMV Status: Positive  Donor HCV Status: Negative  Donor HBcAb: Negative  Donor HBV GERTRUDIS: Negative  Donor HCV GERTRUDIS: Negative  Whole or Partial: Whole Liver  Biliary Anastomosis: End to End  Arterial Anatomy: Raheel Zamudio is a 49 y.o. female s/p     - Brain Death liver transplant on 2019 (Liver) for Cirrhosis: Fatty Liver (Lozoya).  This patient will follow the Steroid Induction protocol.  This patients immunosuppression will include a steroid taper over 6 weeks, Cellcept for 3 months and Prograf maintenance.  Opportunistic infection prophylaxis will include Valcyte for 3 months (CMV D+ R+), Bactrim for 6 months, and nystatin.  I have reviewed the pre-op medications and those have been restarted those, as appropriate.

## 2019-06-06 NOTE — PROGRESS NOTES
"Report received from Anesthesia. Pt transported to SICU 12691 with portable telemetryAmbubag" in use. Pt connected to ICU monitor Ventilator.  Propofol gtt infusing. CTS team called and made aware of patient arrival. New orders received and implemented. Labs drawn and sent. Pt assessed, immediate needs met. Family brought to bedside, updated on the patient's current condition and POC for remainder of shift. Family also given ICU Welcome packet and educated on visiting hours. All questions answered, emotional support provided.     Admit Skin Note: No breakdown noted to sacrum and heels. Foam dressing applied to sacrum. Green heelboots applied.    "

## 2019-06-06 NOTE — ANESTHESIA PROCEDURE NOTES
Arterial    Diagnosis: esld    Patient location during procedure: done in OR  Procedure start time: 6/5/2019 8:05 PM  Procedure end time: 6/5/2019 8:05 PM  Staffing  Anesthesiologist: Fei Espinosa MD  Resident/CRNA: Fabio Sharpe, CRNA  Anesthesiologist was present at the time of the procedure.Arterial  Skin Prep: chlorhexidine gluconate  Local Infiltration: none  Orientation: left  Location: radial  Catheter Size: 20 G  Catheter placement by Ultrasound guidance. Heme positive aspiration all ports.  Vessel Caliber: medium, patent, compressibility normal  Vascular Doppler:  not done  Needle advanced into vessel with real time Ultrasound guidance.Insertion Attempts: 1  Assessment  Dressing: secured with tape and tegaderm

## 2019-06-06 NOTE — ANESTHESIA PROCEDURE NOTES
Monitor MAURICE    Diagnosis: ESLD  Patient location during procedure: OR  Procedure start time: 6/5/2019 9:00 PM  Surgery related to: OLT  Exam type: Monitor Only  Staffing  Anesthesiologist: Fei Espinosa MD  Performed: anesthesiologist   Preanesthetic Checklist  Completed: patient identified, surgical consent, pre-op evaluation, timeout performed, risks and benefits discussed, monitors and equipment checked, anesthesia consent given, oxygen available, suction available, hand hygiene performed and patient being monitored  Setup & Induction  Patient preparation: bite block inserted  Probe Insertion: easy  Probe Number: 1      Findings  Baseline Exam:  Normal LV size and systolic function; No WMA  Normal RV size and systolic function  Moderate TR; Mild MR  Impression  Other Findings    Probe Removal

## 2019-06-06 NOTE — PLAN OF CARE
Problem: Adult Inpatient Plan of Care  Goal: Plan of Care Review  Recommendations  Recommendation/Intervention:   1. When able to extubate, ADAT to Regular with texture per SLP.   2. If unable to extubate, RD to provide TF recommendations.   3. TSU RD to provide post-transplant diet education prior to discharge.   RD to monitor.    Goals: Patient to receive nutrition by RD follow-up  Nutrition Goal Status: new    Full assessment completed, see RD Note 6/6/2019.

## 2019-06-06 NOTE — ANESTHESIA PROCEDURE NOTES
Gower Naun Line    Diagnosis: ESLD  Patient location during procedure: done in OR  Procedure start time: 6/5/2019 8:00 PM  Timeout: 6/5/2019 8:00 PM  Procedure end time: 6/5/2019 8:20 PM  Staffing  Anesthesiologist: Fei Espinosa MD  Performed: anesthesiologist   Anesthesiologist was present at the time of the procedure.  Preanesthetic Checklist  Completed: patient identified, site marked, surgical consent, pre-op evaluation, timeout performed, IV checked, risks and benefits discussed, monitors and equipment checked and anesthesia consent given  Gower Naun Line  Skin Prep: chlorhexidine gluconate  Local Infiltration: none  Location: right,  internal jugular vein  Vessel Caliber: medium, patent, compressibility normal  Vascular Doppler:  not done  Introducer: 14 Fr double lumen, manometry used.  Device: CCO/Oximetric Catheter  Catheter Size: 8 Fr  Catheter placement by yes. Heme positive aspiration all ports. PAC floated with balloon up not wedgedSterile sheath used  Locked at: 48 cm.Insertion Attempts: 1  Indication: hemodynamic monitoring  Ultrasound Guidance  Needle advanced into vessel with real time Ultrasound guidance.  Guidewire confirmed in vessel.  Sterile sheath used.  Assessment  Central Line Bundle Protocol followed. Hand hygiene before procedure, surgical cap worn, surgical mask worn, sterile surgical gloves worn, large sterile drape used.  Verification: blood return and ultrasound  Dressing: sutured in place and taped and tegaderm  Patient: Tolerated Well

## 2019-06-06 NOTE — PROGRESS NOTES
4hr 15mins intraoperative CRRT  treatment completed. Blood returned. CVC flushed with saline and secured with cap. Report given to CRNA.

## 2019-06-06 NOTE — SUBJECTIVE & OBJECTIVE
Follow-up For: Procedure(s) (LRB):  TRANSPLANT, LIVER (N/A)    Post-Operative Day: 1 Day Post-Op     Past Medical History:   Diagnosis Date    Cirrhosis     Esophageal varices 2018    Small with no banding     Essential hypertension 2018    Fatty liver 2018    GERD (gastroesophageal reflux disease)     Hx of colonic polyps 2018    On colonoscopy     Hypertension     Hypothyroidism 2018    Kidney stones     Morbid obesity 2018    Lap band with subsequent release    KADEEM (obstructive sleep apnea) 2018    Osteoarthritis 2018    Pulmonary nodule 2018    Vitamin D deficiency 2018       Past Surgical History:   Procedure Laterality Date     SECTION      TIMES 2     CHOLECYSTECTOMY      LAPAROSCOPIC     CYSTOSCOPY W/ STONE MANIPULATION      kidney stone removal    EGD (ESOPHAGOGASTRODUODENOSCOPY) N/A 2019    Performed by Davian Hern[facility]dez MD at [facility] ENDO (2ND FLR)    LAPAROSCOPIC GASTRIC BANDING  2006    removal     LIVER BIOPSY  2017    KESSLER with bridging 17    TRANSPLANT, LIVER N/A 2019    Performed by Clemente Brown Jr., MD at [facility] OR 2ND FLR       Review of patient's allergies indicates:   Allergen Reactions    Metformin Rash    Pcn [penicillins] Other (See Comments)     Unsure of reaction, states it was as a child. Tolerated rocephin at [facility]       Family History     Problem Relation (Age of Onset)    Breast cancer Maternal Grandmother    Cancer Mother (50), Father (65)    Heart disease Mother, Father        Tobacco Use    Smoking status: Never Smoker    Smokeless tobacco: Never Used    Tobacco comment: patient denies   Substance and Sexual Activity    Alcohol use: No     Comment: patient denies    Drug use: No     Comment: patient denies    Sexual activity: Not on file      Review of Systems   Unable to perform ROS: Intubated     Objective:     Vital Signs (Most Recent):  Temp: (see  anesthesia flowsheet for vitals) (06/05/19 2100)  Pulse: 79 (06/06/19 0217)  Resp: (!) 26 (06/06/19 0217)  BP: (!) 121/59 (06/05/19 1619)  SpO2: 100 % (06/06/19 0217) Vital Signs (24h Range):  Temp:  [98.2 °F (36.8 °C)-98.9 °F (37.2 °C)] 98.9 °F (37.2 °C)  Pulse:  [79-96] 79  Resp:  [21-30] 26  SpO2:  [92 %-100 %] 100 %  BP: (117-123)/(59-69) 121/59     Weight: 97.8 kg (215 lb 9.8 oz)  Body mass index is 35.88 kg/m².      Intake/Output Summary (Last 24 hours) at 6/6/2019 0222  Last data filed at 6/6/2019 0145  Gross per 24 hour   Intake 7578 ml   Output 6900 ml   Net 678 ml       Physical Exam   Constitutional: She appears well-developed.   Obese   Eyes: Conjunctivae are normal.   Neck: No JVD present.   Cardiovascular: Normal rate, regular rhythm and intact distal pulses.   No murmur heard.  R Robin, R IJ trialysis, R femoral trialysis   Pulmonary/Chest: Breath sounds normal.   Intubated   Abdominal: Soft. She exhibits no distension.   Abd incision C/D/I  R abd RONAK drain x2 with serosanguinous output.   R pleural drain with serous output.    Musculoskeletal: She exhibits no edema.   Neurological:   Sedated   Skin: Skin is warm. Capillary refill takes more than 3 seconds.   Vitals reviewed.      Vents:  Vent Mode: SIMV (06/06/19 0217)  Ventilator Initiated: Yes (06/06/19 0217)  Set Rate: 16 bmp (06/06/19 0217)  Vt Set: 430 mL (06/06/19 0217)  Pressure Support: 10 cmH20 (06/06/19 0217)  PEEP/CPAP: 8 cmH20 (06/06/19 0217)  Oxygen Concentration (%): 50 (06/06/19 0217)  Peak Airway Pressure: 23 cmH2O (06/06/19 0217)  Plateau Pressure: 0 cmH20 (06/06/19 0217)  Total Ve: 10.5 mL (06/06/19 0217)  F/VT Ratio<105 (RSBI): (!) (P) 58.56 (06/06/19 0217)    Lines/Drains/Airways     Central Venous Catheter Line                 Hemodialysis Catheter 06/05/19 2025 less than 1 day         Percutaneous Central Line Insertion/Assessment - triple lumen  06/05/19 2000 less than 1 day         Pulmonary Artery Catheter Assessment  06/05/19  2000 less than 1 day          Drain                 Chest Tube 06/05/19 2233 1 Right Pleural 8 Fr. less than 1 day         Closed/Suction Drain 06/06/19 0040 Right Abdomen Bulb 19 Fr. less than 1 day         Closed/Suction Drain 06/06/19 0041 Right Abdomen Bulb 19 Fr. less than 1 day         Urethral Catheter 06/05/19 1958 Non-latex;Straight-tip 16 Fr. less than 1 day          Airway                 Airway - Non-Surgical 06/05/19 1952 Endotracheal Tube less than 1 day          Arterial Line                 Arterial Line 06/05/19 2005 Left Radial less than 1 day         Arterial Line 06/05/19 2025 Right Femoral less than 1 day          Peripheral Intravenous Line                 Peripheral IV - Single Lumen 05/26/19 2007 20 G Anterior;Distal;Right Forearm 10 days         Peripheral IV - Single Lumen 06/05/19 2038  Left Antecubital less than 1 day                Significant Labs:    CBC/Anemia Profile:  Recent Labs   Lab 06/04/19 0438 06/05/19 0642 06/05/19 2007 06/05/19 2337 06/06/19  0052   WBC 2.07*  2.07* 1.85*  --   --   --    HGB 8.0*  8.0* 7.7* 7.7* 7.0* 8.5*   HCT 23.6*  23.6* 22.8* 22.7* 20.3* 24.3*   PLT 55*  55* 55* 58* 85* 45*   MCV 90  90 92  --   --   --    RDW 18.9*  18.9* 18.8*  --   --   --         Chemistries:  Recent Labs   Lab 06/04/19 0438 06/04/19 2131 06/05/19 0642 06/05/19  1401 06/05/19 2007 06/05/19 2337 06/06/19  0052   *  130* 131* 132*  132* 132* 132* 137 137   K 3.7  3.7 3.5 3.8  3.8 3.9 4.0 3.6 3.6     100 99 100  100 100  --   --   --    CO2 24  24 25 24  24 23  --   --   --    BUN 12  12 12 13  13 15  --   --   --    CREATININE 0.7  0.7 0.8 0.7  0.7 0.8  --   --   --    CALCIUM 8.8  8.8 8.5* 8.7  8.7 8.4*  --   --   --    ALBUMIN 3.2*  3.2* 3.1* 3.0*  3.0* 3.0* 2.9* 2.2* 2.4*   PROT 5.6*  5.6*  --  5.5*  --   --   --   --    BILITOT 15.0*  15.0*  --  15.0*  --   --   --   --    ALKPHOS 93  93  --  94  --   --   --   --    ALT 30  30  --   30  --   --  138*  --    AST 71*  71*  --  72*  --   --  255*  --    MG 1.8  1.8 1.5* 1.7  1.7 1.6 1.5* 2.0 1.7   PHOS 3.2 3.5 3.8  3.8 3.8  --   --   --        Significant Imaging: I have reviewed all pertinent imaging results/findings within the past 24 hours.     EK2019  Vent. Rate : 092 BPM     Atrial Rate : 092 BPM     P-R Int : 142 ms          QRS Dur : 084 ms      QT Int : 374 ms       P-R-T Axes : 019 -03 028 degrees     QTc Int : 462 ms    Normal sinus rhythm  Anterolateral infarct ,age undetermined    Abnormal ECG  When compared with ECG of 16-MAY-2019 18:57,  Criteria for Anterolateral infarct ,age undetermined New since previous  tracing     2D ECHO:  2019  · Normal left ventricular systolic function. The estimated ejection fraction is 65%  · Normal LV diastolic function.

## 2019-06-06 NOTE — CONSULTS
[facility]  Endocrinology  Diabetes Consult Note    Consult Requested by: Jose E Metzger MD   Reason for admit: S/P liver transplant    HISTORY OF PRESENT ILLNESS:  Reason for Consult: Management of Hyperglycemia     Surgical Procedure and Date: liver transplant 6/6/19        HPI:   Patient is a 49 y.o. 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 No personal history of DM. Patient is now s/p liver transplant. Endocrinology consulted for BG/ DM management.         Lab Results   Component Value Date    HGBA1C 4.1 06/04/2019       Interval HPI:   Received in ICU. Extubated this AM; remains drowsy from anesthesia. BG slightly above goal on insulin infusion at 1 u/hr. Solumedrol 500 mg; standard steroid taper per primary.   Eating:   NPO  Nausea: No  Hypoglycemia and intervention: No  Fever: No  TPN and/or TF: No    PMH, PSH, FH, SH  reviewed     Review of Systems   Unable to obtain due to: Sedation,Intubation,Altered mental status,Critical illness,Reviewed ROS from note dated 6/5/19 per Eugenia Albrecht NP      Current Medications and/or Treatments Impacting Glycemic Control  Immunotherapy:    Immunosuppressants         Stop Route Frequency     mycophenolate mofetil 200 mg/mL suspension 1,000 mg      -- Oral 2 times daily     tacrolimus (PROGRAF) 1 mg/mL oral syringe      -- Oral 2 times daily        Steroids:   Hormones (From admission, onward)    Start     Stop Route Frequency Ordered    06/12/19 0900  predniSONE tablet 20 mg  (methylprednisolone taper panel)      -- Oral Daily 06/06/19 0210    06/11/19 0900  methylPREDNISolone sodium succinate injection 20 mg  (methylprednisolone taper panel)      06/12 0859 IV Every 12 hours 06/06/19 0210    06/10/19 0900  methylPREDNISolone sodium succinate injection 40 mg  (methylprednisolone taper panel)      06/11 0859 IV Every 12 hours 06/06/19 0210    06/09/19 0900  methylPREDNISolone sodium succinate  injection 60 mg  (methylprednisolone taper panel)      06/10 0859 IV Every 12 hours 06/06/19 0210 06/08/19 0900  methylPREDNISolone sodium succinate injection 80 mg  (methylprednisolone taper panel)      06/09 0859 IV Every 12 hours 06/06/19 0210 06/07/19 0900  methylPREDNISolone sodium succinate injection 100 mg  (methylprednisolone taper panel)      06/08 0859 IV Every 12 hours 06/06/19 0210    06/04/19 0515  methylPREDNISolone sodium succinate injection 500 mg  (Med - Immunosuppression Induction Therapy (Methylprednisolone))      06/04 1714 IV Once pre-op 06/04/19 0415        Pressors:    Autonomic Drugs (From admission, onward)    None        Hyperglycemia/Diabetes Medications:   Antihyperglycemics (From admission, onward)    Start     Stop Route Frequency Ordered    06/06/19 0730  insulin regular (Humulin R) 100 Units in sodium chloride 0.9% 100 mL infusion     Question:  Insulin rate changes (DO NOT MODIFY ANSWER)  Answer:  [email or web address]\epic\Images\Pharmacy\InsulinInfusions\CTS INSULIN [id number].pdf    -- IV Continuous 06/06/19 0713             PHYSICAL EXAMINATION:  Vitals:    06/06/19 0730   BP:    Pulse:    Resp: (!) 21   Temp:      Body mass index is 35.88 kg/m².    Physical Exam   Constitutional: Well developed, well nourished, NAD.  ENT: External ears no masses with nose patent; normal hearing.  Neck: Supple; trachea midline.  Cardiovascular: Normal heart sounds, no LE edema. DP +2 bilaterally.  Lungs: Normal effort; lungs anterior bilaterally clear to auscultation.  Abdomen: Soft, no masses, no hernias.   MS: No clubbing or cyanosis of nails noted; unable to assess gait.  Skin: No rashes, lesions, or ulcers; no nodules. Chevron abdominal incision with dressing; RONAK x2.   Psychiatric: PATRICIA  Neurological: PATRICIA  Foot: nails in good condition, no amputations noted.          Labs Reviewed and Include   Recent Labs   Lab 06/06/19 0215 06/06/19 0759   *  --  252*   CALCIUM 8.4*  --  7.9*   ALBUMIN  2.4*  --   --    PROT 4.6*  --   --      --  134*   K 3.6  --  4.1   CO2 24  --  21*     --  105   BUN 8  --  11   CREATININE 0.6  --  0.8   ALKPHOS 93  --   --    *  --   --    *   < > 328*   BILITOT 9.5*  --   --     < > = values in this interval not displayed.     Lab Results   Component Value Date    WBC 2.36 (L) 06/06/2019    HGB 7.7 (L) 06/06/2019    HCT 23.1 (L) 06/06/2019    MCV 93 06/06/2019    PLT 48 (L) 06/06/2019     No results for input(s): TSH, FREET4 in the last 168 hours.  Lab Results   Component Value Date    HGBA1C 4.1 06/04/2019       Nutritional status:   Body mass index is 35.88 kg/m².  Lab Results   Component Value Date    ALBUMIN 2.4 (L) 06/06/2019    ALBUMIN 2.4 (L) 06/06/2019    ALBUMIN 2.2 (L) 06/05/2019     Lab Results   Component Value Date    PREALBUMIN 3 (L) 04/27/2019       Estimated Creatinine Clearance: 96.3 mL/min (based on SCr of 0.8 mg/dL).    Accu-Checks  Recent Labs     06/06/19  0213 06/06/19  0309 06/06/19  0409 06/06/19  0523 06/06/19  0608 06/06/19  0732 06/06/19  0825 06/06/19  0928   POCTGLUCOSE 146* 151* 179* 203* 208* 216* 249* 258*        ASSESSMENT and PLAN    * S/P liver transplant  Managed per LTS.   avoid hypoglycemia  Lab Results   Component Value Date     (H) 06/06/2019     (H) 06/06/2019    GGT 92 (H) 06/06/2019    ALKPHOS 93 06/06/2019    BILITOT 9.5 (H) 06/06/2019           Acute hyperglycemia  BG goal 140 - 180       Continue IV insulin infusion protocol  Requires intensive BG monitoring while on protocol     Notify endocrine when diet advances and will transition.       Adrenal cortical steroids causing adverse effect in therapeutic use  Glucocorticoids markedly increase glucoses. Expect the steroid taper will help glucose control.       Prophylactic immunotherapy  May increase insulin resistance.         Hypothyroidism  Managed per primary  Levothyroxine 112 mcg    Morbid obesity  May increase insulin resistance.    Body mass index is 35.88 kg/m².            Plan discussed with  RN at bedside.     Samra Cross NP  Endocrinology  [facility]

## 2019-06-06 NOTE — SUBJECTIVE & OBJECTIVE
Interval HPI:   Received in ICU. Extubated this AM; remains drowsy from anesthesia. BG slightly above goal on insulin infusion at 1 u/hr. Solumedrol 500 mg; standard steroid taper per primary.   Eating:   NPO  Nausea: No  Hypoglycemia and intervention: No  Fever: No  TPN and/or TF: No    PMH, PSH, FH, SH  reviewed     Review of Systems   Unable to obtain due to: Sedation,Intubation,Altered mental status,Critical illness,Reviewed ROS from note dated 6/5/19 per Eugenia Albrecht NP      Current Medications and/or Treatments Impacting Glycemic Control  Immunotherapy:    Immunosuppressants         Stop Route Frequency     mycophenolate mofetil 200 mg/mL suspension 1,000 mg      -- Oral 2 times daily     tacrolimus (PROGRAF) 1 mg/mL oral syringe      -- Oral 2 times daily        Steroids:   Hormones (From admission, onward)    Start     Stop Route Frequency Ordered    06/12/19 0900  predniSONE tablet 20 mg  (methylprednisolone taper panel)      -- Oral Daily 06/06/19 0210    06/11/19 0900  methylPREDNISolone sodium succinate injection 20 mg  (methylprednisolone taper panel)      06/12 0859 IV Every 12 hours 06/06/19 0210    06/10/19 0900  methylPREDNISolone sodium succinate injection 40 mg  (methylprednisolone taper panel)      06/11 0859 IV Every 12 hours 06/06/19 0210    06/09/19 0900  methylPREDNISolone sodium succinate injection 60 mg  (methylprednisolone taper panel)      06/10 0859 IV Every 12 hours 06/06/19 0210    06/08/19 0900  methylPREDNISolone sodium succinate injection 80 mg  (methylprednisolone taper panel)      06/09 0859 IV Every 12 hours 06/06/19 0210    06/07/19 0900  methylPREDNISolone sodium succinate injection 100 mg  (methylprednisolone taper panel)      06/08 0859 IV Every 12 hours 06/06/19 0210    06/04/19 0515  methylPREDNISolone sodium succinate injection 500 mg  (Med - Immunosuppression Induction Therapy (Methylprednisolone))      06/04 1714 IV Once pre-op 06/04/19 0415        Pressors:     Autonomic Drugs (From admission, onward)    None        Hyperglycemia/Diabetes Medications:   Antihyperglycemics (From admission, onward)    Start     Stop Route Frequency Ordered    06/06/19 0730  insulin regular (Humulin R) 100 Units in sodium chloride 0.9% 100 mL infusion     Question:  Insulin rate changes (DO NOT MODIFY ANSWER)  Answer:  \\[email or web address]\epic\Images\Pharmacy\InsulinInfusions\CTS INSULIN [id number].pdf    -- IV Continuous 06/06/19 0713             PHYSICAL EXAMINATION:  Vitals:    06/06/19 0730   BP:    Pulse:    Resp: (!) 21   Temp:      Body mass index is 35.88 kg/m².    Physical Exam   Constitutional: Well developed, well nourished, NAD.  ENT: External ears no masses with nose patent; normal hearing.  Neck: Supple; trachea midline.  Cardiovascular: Normal heart sounds, no LE edema. DP +2 bilaterally.  Lungs: Normal effort; lungs anterior bilaterally clear to auscultation.  Abdomen: Soft, no masses, no hernias.   MS: No clubbing or cyanosis of nails noted; unable to assess gait.  Skin: No rashes, lesions, or ulcers; no nodules. Chevron abdominal incision with dressing; RONAK x2.   Psychiatric: PATRICIA  Neurological: PATRICIA  Foot: nails in good condition, no amputations noted.

## 2019-06-06 NOTE — ASSESSMENT & PLAN NOTE
Contributing Nutrition Diagnosis  Malnutrition in the context of Chronic Illness/Injury    Related to (etiology):  KESSLER cirrhosis    Signs and Symptoms (as evidenced by):  Energy Intake: <50% of estimated energy requirement for 2 months  Body Fat Depletion: moderate depletion of triceps   Muscle Mass Depletion: mild and moderate depletion of temples, interosseous muscle and lower extremities   Weight Loss: 16.4% x 2 months (some may be fluid related loss)   Fluid Accumulation: mild    Interventions/Recommendations (treatment strategy):  Collaboration and referral of nutrition care    Nutrition Diagnosis Status:  New

## 2019-06-06 NOTE — PROGRESS NOTES
Saw patient in the ICU.     and daughter were at her bedside.  Gave  My New Journey: Living Smart After My Liver Transplant.  Asked that they read the book in preparation for education. Allowed time for questions and answers.

## 2019-06-06 NOTE — ASSESSMENT & PLAN NOTE
Jihan Zamudio is a 49 y.o. female w/ a significant PMHx of KESSLER liver cirrhosis with varices, latent TB, s/p lap band in 2007, HTN and hypothyroidism. She has recurrent R sided pleural effusions s/p thoracentesis x3 (last one on 5/31) with re-accumulation. She is now s/p orthoptic Liver transplant with biliary reconstruction 6/6/2019. Patient arrived to SICU intubated and sedated, HDS without pressors.     Neuro:   - Sedation: propofol ggt  - Wean sedation with plans to extubate.   - PRN oxycodone and dilaudid.      Pulmonary:   - Intubated  Vent Mode: SIMV  Oxygen Concentration (%):  [50] 50  Resp Rate Total:  [25 br/min-26 br/min] 25 br/min  Vt Set:  [430 mL] 430 mL  PEEP/CPAP:  [8 cmH20] 8 cmH20  Pressure Support:  [10 cmH20] 10 cmH20  Mean Airway Pressure:  [13 cmH20] 13 cmH20  - Wean vent as tolerated.  - Can likely extubate in early morning      Cardiac:  - HDS  - Keep MAPs >65  - Hx/o HTN  - Hold home BP meds in acute setting     Renal:   - Trend BUN/Cr  - Monitor UOP  - Received intra-op CRRT     ID:   - Afebrile  - Abx: Unasyn, Bactrim.   - Will continue to monitor for signs of infection   - Continue immunosuppressive meds per transplant.      Hem:  - CBC q4h  - No signs of active bleeding  - Transfuse blood products if necessary  - Good coag profile noted in OR     Endocrine:  - Insulin gtt   - Accuchecks   - Hypothyroidism  - Continue synthroid     FEN/GI:   - NPO  - Replace lytes PRN  - Monitor drain output and character    PPx:  - Famotidine, SubQ heparin.      Dispo:  - Continue ICU Care  - Wean to extubate      Primary:   Transplant

## 2019-06-06 NOTE — TRANSFER OF CARE
"Anesthesia Transfer of Care Note    Patient: Jihan Zamudio    Procedure(s) Performed: Procedure(s) (LRB):  TRANSPLANT, LIVER (N/A)    Patient location: ICU    Anesthesia Type: general    Transport from OR: Transported from OR intubated on 100% O2 by AMBU with assisted ventilation. Continuous ECG monitoring in transport. Continuous SpO2 monitoring in transport. Continuos invasive BP monitoring in transport. Upon arrival to PACU/ICU, patient attached to ventilator and auscultated to confirm bilateral breath sounds and adequate TV    Post pain: adequate analgesia    Post assessment: no apparent anesthetic complications    Post vital signs: stable    Level of consciousness: sedated    Nausea/Vomiting: no nausea/vomiting    Complications: none    Transfer of care protocol was followed      Last vitals:   Visit Vitals  /45   Pulse 80   Temp 37.2 °C (98.9 °F) (Oral)   Resp 16/430/60%/+10   Ht 5' 5" (1.651 m)   Wt 97.8 kg (215 lb 9.8 oz)   SpO2 100   Breastfeeding?    BMI 35.88 kg/m²     "

## 2019-06-06 NOTE — PLAN OF CARE
Problem: Adult Inpatient Plan of Care  Goal: Plan of Care Review  Outcome: Ongoing (interventions implemented as appropriate)    Pt kept free from falls and injuries during shift. VSS on vent SIMV40% 5 PEEP sats >98%. Pt awakens off sedation, follows all commands,nods/gestures appropriately. NSR on monitor. SBP maintained 100-160. CVP 4-8. PA pressure 20s/teens. SVO2 88-90%, CO 10-11, CI 4-5. Propofol gtt currently @ 20. D5NS @ 75 mL/hr. Labs trended as ordered. K replaced. 750 albumin given. Hernandes with 45-60 cc output. RONAK drains with serosang output, see flowsheet for numbers. Chest tube output decreasing, see flowsheet. Acuuchecks performed Q1h, insulin gtt ordered and to be started. Plan for US and to extubated in am. Weight shift assistance provided. Pressure points protected. Will continue to monitor.

## 2019-06-06 NOTE — ASSESSMENT & PLAN NOTE
Managed per LTS.   avoid hypoglycemia  Lab Results   Component Value Date     (H) 06/06/2019     (H) 06/06/2019    GGT 92 (H) 06/06/2019    ALKPHOS 93 06/06/2019    BILITOT 9.5 (H) 06/06/2019

## 2019-06-06 NOTE — ANESTHESIA PROCEDURE NOTES
Central Line    Diagnosis: ESLD  Patient location during procedure: done in OR  Procedure start time: 6/5/2019 8:00 PM  Timeout: 6/5/2019 8:00 PM  Procedure end time: 6/5/2019 8:20 PM  Staffing  Anesthesiologist: Fei Espinosa MD  Performed: anesthesiologist   Anesthesiologist was present at the time of the procedure.  Preanesthetic Checklist  Completed: patient identified, site marked, surgical consent, pre-op evaluation, timeout performed, IV checked, risks and benefits discussed, monitors and equipment checked and anesthesia consent given  Indication   Indication: vascular access, hemodialysis     Anesthesia   general anesthesia    Central Line   Skin Prep: skin prepped with ChloraPrep, skin prep agent completely dried prior to procedure  maximum sterile barriers used during central venous catheter insertion  hand hygiene performed prior to central venous catheter insertion  Location: right, internal jugular.   Catheter type: triple lumen  Catheter Size: 12 Fr  Inserted Catheter Length: 16 cm  Ultrasound: vascular probe with ultrasound  Vessel Caliber: medium, patent  Vascular Doppler:  not done, compressibility normal  Needle advanced into vessel with real time Ultrasound guidance.  Guidewire confirmed in vessel.   Manometry: Venous cannualation confirmed by visual estimation of blood vessel pressure using manometry.  Insertion Attempts: 1   Securement:line sutured, chlorhexidine patch, sterile dressing applied and blood return through all ports    Post-Procedure   Adverse Events:none    Guidewire Guidewire removed intact.

## 2019-06-06 NOTE — ANESTHESIA PROCEDURE NOTES
Arterial    Diagnosis: ESLD    Patient location during procedure: done in OR  Procedure start time: 6/5/2019 8:25 PM  Timeout: 6/5/2019 8:25 PM  Procedure end time: 6/5/2019 8:45 PM  Staffing  Anesthesiologist: Fei Espinosa MD  Anesthesiologist was present at the time of the procedure.  Preanesthetic Checklist  Completed: patient identified, site marked, surgical consent, pre-op evaluation, timeout performed, IV checked, risks and benefits discussed, monitors and equipment checked and anesthesia consent givenArterial  Skin Prep: chlorhexidine gluconate  Local Infiltration: none  Orientation: right  Location: femoral  Catheter Size: 20 G  Catheter placement by Ultrasound guidance. Heme positive aspiration all ports.  Vessel Caliber: medium, patent, compressibility normal  Vascular Doppler:  not done  Needle advanced into vessel with real time Ultrasound guidance.  Guidewire confirmed in vessel.  Sterile sheath used.Insertion Attempts: 1  Assessment  Dressing: sutured in place and taped  Patient: Tolerated well

## 2019-06-06 NOTE — ANESTHESIA PROCEDURE NOTES
Central Line    Diagnosis: ESLD  Patient location during procedure: done in OR  Procedure start time: 6/5/2019 8:25 PM  Timeout: 6/5/2019 8:25 PM  Procedure end time: 6/5/2019 8:45 PM  Staffing  Anesthesiologist: Fei Espinosa MD  Performed: anesthesiologist   Anesthesiologist was present at the time of the procedure.  Preanesthetic Checklist  Completed: patient identified, site marked, surgical consent, pre-op evaluation, timeout performed, IV checked, risks and benefits discussed, monitors and equipment checked and anesthesia consent given  Indication   Indication: hemodialysis     Anesthesia   general anesthesia    Central Line   Skin Prep: skin prepped with ChloraPrep, skin prep agent completely dried prior to procedure  maximum sterile barriers used during central venous catheter insertion  hand hygiene performed prior to central venous catheter insertion  Location: right, femoral.   Catheter type: dialysis  Catheter Size: 12 Fr  Inserted Catheter Length: 20 cm  Rationale: intraop HD  Ultrasound: vascular probe with ultrasound  Vessel Caliber: medium, patent  Vascular Doppler:  not done, compressibility normal  Needle advanced into vessel with real time Ultrasound guidance.  Guidewire confirmed in vessel.  Manometry: none  Insertion Attempts: 1   Securement:blood return through all ports and line sutured    Post-Procedure   Adverse Events:none    Guidewire Guidewire removed intact.

## 2019-06-06 NOTE — PROGRESS NOTES
Pt was recieved intubated from OR and was placed on mechanical ventilation with the documented settings. Alarms are on and functionning. ETT was secured with tube shin. AMBU bag along with mask are at bedside. Pt's mouth was suctioning.  ABG w/ lytes completed. RT will continue to monitor.

## 2019-06-06 NOTE — HPI
Jihan Zamudio is a 49 y.o. female w/ a significant PMHx of KESSLER liver cirrhosis with varices, latent TB, s/p lap band in 2007, HTN and hypothyroidism. She has recurrent R sided pleural effusions s/p thoracentesis x3 (last one on 5/31) with re-accumulation. She is now s/p orthoptic Liver transplant with biliary reconstruction 6/6/2019. EBL 2500, removal of 2000mL of ascites. 3.8L crystalloid, 3u pRBCs, 2u FFP, 3u Cryo, 3u plts were administered with 720cc of cell saver given back.   Patient arrived to SICU intubated and sedated, HDS without pressors.

## 2019-06-06 NOTE — ANESTHESIA POSTPROCEDURE EVALUATION
Anesthesia Post Evaluation    Patient: Jihan Zamudio    Procedure(s) Performed: Procedure(s) (LRB):  TRANSPLANT, LIVER (N/A)    Final Anesthesia Type: general  Patient location during evaluation: ICU  Patient participation: Yes- Able to Participate  Level of consciousness: awake and alert and oriented  Post-procedure vital signs: reviewed and stable  Pain management: adequate  Airway patency: patent  PONV status at discharge: No PONV  Anesthetic complications: no      Cardiovascular status: blood pressure returned to baseline and hemodynamically stable  Respiratory status: unassisted, spontaneous ventilation and nasal cannula  Hydration status: euvolemic  Follow-up not needed.          Vitals Value Taken Time   /58 6/6/2019  1:01 PM   Temp 36.3 °C (97.4 °F) 6/6/2019 12:00 PM   Pulse 72 6/6/2019  1:15 PM   Resp 1 6/6/2019  1:15 PM   SpO2 98 % 6/6/2019  1:15 PM   Vitals shown include unvalidated device data.      No case tracking events are documented in the log.      Pain/Lorin Score: Pain Rating Prior to Med Admin: 8 (6/6/2019 10:28 AM)  Pain Rating Post Med Admin: 3 (6/6/2019 10:53 AM)

## 2019-06-06 NOTE — HPI
Reason for Consult: Management of Hyperglycemia     Surgical Procedure and Date: liver transplant 6/6/19        HPI:   Patient is a 49 y.o. 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 No personal history of DM. Patient is now s/p liver transplant. Endocrinology consulted for BG/ DM management.         Lab Results   Component Value Date    HGBA1C 4.1 06/04/2019

## 2019-06-06 NOTE — CONSULTS
"  [facility]  Adult Nutrition  Consult Note    SUMMARY     Recommendations  Recommendation/Intervention:   1. When able to extubate, ADAT to Regular with texture per SLP.   2. If unable to extubate, RD to provide TF recommendations.   3. TSU RD to provide post-transplant diet education prior to discharge.   RD to monitor.    Goals: Patient to receive nutrition by RD follow-up  Nutrition Goal Status: new  Communication of RD Recs: (POC)    Reason for Assessment  Reason For Assessment: RD follow-up  Diagnosis: transplant/postoperative complications(s/p OLTx )  Relevant Medical History: KESSLER cirrhosis, HTN  Interdisciplinary Rounds: attended  General Information Comments: OR early this morning for OLTx. Intubated, sedated. NFPE completed 6/3, patient with mild wasting. Noted weight loss over the past 2 months as well as poor PO intake. Patient with moderate malnutrition.  Nutrition Discharge Planning: TSU RD to provide post-transplant diet education prior to discharge.    Nutrition Risk Screen  Nutrition Risk Screen: no indicators present    Nutrition/Diet History  Patient Reported Diet/Restrictions/Preferences: other (see comments)(PATRICIA)  Spiritual, Cultural Beliefs, Yarsani Practices, Values that Affect Care: no  Factors Affecting Nutritional Intake: NPO, on mechanical ventilation    Anthropometrics  Temp: 97.4 °F (36.3 °C)  Height Method: Stated  Height: 5' 5" (165.1 cm)  Height (inches): 65 in  Weight Method: Bed Scale  Weight: 97.8 kg (215 lb 9.8 oz)  Weight (lb): 215.61 lb  Ideal Body Weight (IBW), Female: 125 lb  % Ideal Body Weight, Female (lb): 172.24 lb  BMI (Calculated): 35.9  BMI Grade: 35 - 39.9 - obesity - grade II  Usual Body Weight (UBW), k kg(2019 per chart review)  % Usual Body Weight: 83.76  % Weight Change From Usual Weight: -16.41 %    Lab/Procedures/Meds  Pertinent Labs Reviewed: reviewed  Pertinent Labs Comments: Glu 139, Ca 8.4, Alb 2.4, T. bili 9.5  Pertinent " Medications Reviewed: reviewed  Pertinent Medications Comments: famotidine, methylprednisolone, prograf, insulin drip, propofol    Estimated/Assessed Needs  Weight Used For Calorie Calculations: 97.8 kg (215 lb 9.8 oz)  Energy Calorie Requirements (kcal): 1757 kcal/day  Energy Need Method: [facility]  Protein Requirements:  g/day(1.5-2.0 g/kg)  Weight Used For Protein Calculations: 56.8 kg (125 lb 3.5 oz)(IBW)  Fluid Requirements (mL): 1 mL/kcal or per MD  Estimated Fluid Requirement Method: RDA Method  RDA Method (mL): 1757    Nutrition Prescription Ordered  Current Diet Order: NPO    Evaluation of Received Nutrient/Fluid Intake  Other Calories (kcal): 309(propofol)  I/O: -7.1L since admit  Comments: LBM 6/5  % Intake of Estimated Energy Needs: 0 - 25 %  % Meal Intake: NPO    Nutrition Risk  Level of Risk/Frequency of Follow-up: high(2x/week)     Assessment and Plan  Moderate malnutrition  Contributing Nutrition Diagnosis  Malnutrition in the context of Chronic Illness/Injury    Related to (etiology):  KESSLER cirrhosis    Signs and Symptoms (as evidenced by):  Energy Intake: <50% of estimated energy requirement for 2 months  Body Fat Depletion: moderate depletion of triceps   Muscle Mass Depletion: mild and moderate depletion of temples, interosseous muscle and lower extremities   Weight Loss: 16.4% x 2 months (some may be fluid related loss)   Fluid Accumulation: mild    Interventions/Recommendations (treatment strategy):  Collaboration and referral of nutrition care    Nutrition Diagnosis Status:  New    Monitor and Evaluation  Food and Nutrient Intake: energy intake, food and beverage intake  Food and Nutrient Adminstration: diet order  Knowledge/Beliefs/Attitudes: food and nutrition knowledge/skill  Physical Activity and Function: nutrition-related ADLs and IADLs  Anthropometric Measurements: weight, weight change, body mass index  Biochemical Data, Medical Tests and Procedures: electrolyte and renal panel,  gastrointestinal profile, glucose/endocrine profile, inflammatory profile, lipid profile  Nutrition-Focused Physical Findings: overall appearance     Malnutrition Assessment  Orbital Region (Subcutaneous Fat Loss): well nourished  Upper Arm Region (Subcutaneous Fat Loss): moderate depletion   Hindu Region (Muscle Loss): moderate depletion  Clavicle Bone Region (Muscle Loss): well nourished  Clavicle and Acromion Bone Region (Muscle Loss): well nourished  Dorsal Hand (Muscle Loss): mild depletion  Anterior Thigh Region (Muscle Loss): mild depletion  Posterior Calf Region (Muscle Loss): mild depletion   Edema (Fluid Accumulation): 0-->no edema present     Nutrition Follow-Up  RD Follow-up?: Yes

## 2019-06-06 NOTE — ASSESSMENT & PLAN NOTE
BG goal 140 - 180       Continue IV insulin infusion protocol  Requires intensive BG monitoring while on protocol     Notify endocrine when diet advances and will transition.

## 2019-06-06 NOTE — OP NOTE
Operative Report    Date of Procedure: 6/5/2019    Surgeon: Jose E Metzger MD  First Assistant: Kendrick Lee    Pre-operative Diagnosis: Allograft liver for transplantation  Post-operative Diagnosis: Same    Procedure(s) Performed:   1. Back Table Preparation of Liver with needle biopsy  .    Anesthesia: Not applicable  Estimated Blood Loss: Not applicable  Fluids Administered: Not applicable    Findings: Estimated steatosis 0-10%, normal vascular anatomy.  Drains: Not applicable  Specimens: Core biopsy of allograft liver      Preamble  Indications: This report describes only the backbench preparation of the liver prior to transplantation.  The transplant operation itself is described in a separate report.    ABO Confirmation: Immediately following arrival of the donor organ and prior to implantation, a formal ABO confirmation was done according to hospital and UNOS policies.  I confirmed the UNOS ID number of the donor organ and the donor and recipient ABO types, directly verifying these data by comparison with the UNOS Match Run report.  This confirmation was personally done by an attending surgeon and circulating nurse, and is officially documented elsewhere.    Time-Out: A complete time out was carried out prior to the procedure, with confirmation of patient identity, correct procedure, correct operative site, appropriate antibiotic prophylaxis, review of any known allergies, and presence of all needed equipment.    Procedure in Detail  The liver was recovered from the transport cooler and inspected for vascular anatomy and overall suitability for transplantation, with findings as noted above.  The remnant of the diaphragm was dissected away.  The phrenic veins and adrenal vein were identified and ligated or sutured as appropriate.  The portal vein and hepatic artery were mobilized toward the hilum of the liver, and extrahepatic branches were ligated.  No vascular reconstruction was required.  The vessels were  tested for leaks.  A needle biopsy was obtained for permanent section histology using a spring-loaded biopsy device. The liver was kept in ice temperature organ preservation solution until the time of implantation.

## 2019-06-06 NOTE — OP NOTE
Operative Report    Date of Procedure: 06/06/2019    Surgeons:  Surgeon(s) and Role:     * Clemente Brown Jr., MD - Primary     * Jose E Metzger MD - Assisting     * Theodore Woods MD - Assisting    First Assistant Attestation:  The presence of an additional attending surgeon functioning as first assistant was required due to the complexity of the procedure relative to any available residents. I certify that no resident was available who was qualified to serve as first assistant. Duties performed by the assistant included assisting the primary surgeon.    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    Preamble  Indications and Patient Counseling: The patient is a 49 y.o. year-old female with Cirrhosis: Fatty Liver (Lozoya),  (UNOS terminology) who has been evaluated for a liver transplant.  The procedure was thoroughly discussed with the patient, including potential risks, complications, and alternatives. Specific complications mentioned included death, graft non-function, bleeding, infection, rejection, renal failure, respiratory failure, and neurologic deficits, as well as the possibility of other complications not specifically mentioned.    Donor Risk Factors:  Prior to the operation, the patient was advised of any donor-specific risk factors requiring specific disclosure. Factors in this case included nothing that required specific disclosure.       Specific PHS Increased Risk Behavior criteria for the organ donor include:  None    All questions were answered, the patient voiced appropriate understanding, and she agreed to proceed with the planned procedure.    ABO Confirmation: Immediately following arrival of the donor organ and prior to implantation, a formal ABO confirmation was done according to hospital and UNOS policies.  I confirmed the UNOS ID number of the donor organ ([id number]) and the donor and recipient ABO types, directly verifying these data by comparison with the UNOS Match Run report ([id number].  This confirmation was personally done by an attending surgeon and circulating nurse, and is officially documented elsewhere.    Time-Out: A complete time out was carried out prior to incision, with confirmation of patient identity, correct procedure, correct operative site, appropriate antibiotic prophylaxis, review of any known allergies, and presence of all needed equipment.    Procedure in Detail  Following the induction of general endotracheal anesthesia, appropriate arterial and venous lines were placed by the anesthesia team.  Care was taken to pad all pressure points and avoid any potential traction injuries from positioning. The urinary bladder was catheterized.  Sequential compression boots and Brandon Huggers were used. The chest, abdomen, and upper thighs were sterilely prepped and draped.     The abdomen was entered via a wide bilateral subcostal incision. 2,000 mL of ascites was removed. The round ligament was ligated and divided. The falciform, left triangular, and gastrohepatic ligaments were divided using the electocautery, with 2-0 silk ties as needed. The Amin self-retaining retractor was placed to provide exposure. Adhesions between the abdominal viscera and the abdominal wall and the undersurface of the liver were divided as needed using cautery dissection and silk ties or suture ligatures. Hilar dissection was then carried out,  with ligation of the right and left hepatic arteries as well as the cystic duct and the common hepatic duct. The recipient arterial anatomy was found to be: standard. The portal vein was exposed circumferentially from its bifurcation down to the superior border of the pancreas. The portal vein was found to be patent.  Attention was next directed to the right side of the liver where the right triangular ligament was taken down, exposing the bare area of the liver, and the IVC. The infrahepatic IVC was isolated, followed by mobilization of the retrohepatic cava, division of the right adrenal vein, and isolation of the suprahepatic IVC. The patient was given 2000 units of heparin prior to caval cross-clamping. . After assuring adequate stability after cross-clamping, the native liver was excised and the allograft liver was brought to the operative field.  The liver was perfused with cold 5% albumin during implantation to displace the organ preservation solution.  IVC reconstruction technique consisted of end to end ivc using 3-0 and 4-0 polypropylene as appropriate.  The donor portal vein was then anastomosed to the recipient portal inflow site (end portal vein) with 5-0 polypropylene. Once this was completed, anesthesia was notified and the liver was re-perfused. Copious irrigation with warm saline and temporary finger occlusion of portal inflow were used as needed to avoid any problems with hypothermia. Temporary packing, electocautery, and polypropylene suture ligatures were used as appropriate to provide hemostasis. Once this was satisfactory, attention was directed to the arterial reconstruction. This liver did not come from a DCD donor. Arterial inflow was provided to the graft by anastomosing the recipient common hepatic artery to the donor  common hepatic artery using 6-0 polypropylene. The liver was assessed for adequacy of perfusion, which was satisfactory. The gallbladder was then removed from the allograft  liver, and a temporary packing period was carried out to help assure hemostatis. A percutaneous Nikolas drain was placed in the  right space using the Seldinger technique and connected to an appropriate drainage system.  Attention was next directed toward the bile duct. The donor bile duct was prepared and assessed for adequate vascular supply. Biliary reconstruction was then performed by anastomosing the recipient common bile duct to the donor duct using 6-0 PDS sutures.  The biliary stent used was: none.  A final check for hemostasis was made. 2 19f Cody drains were placed through separate incisions below the transverse abdominal incision and placed in the usual positions. The cavity was irrigated with saline. The abdomen was closed in layers using running #1 PDS suture. The incision was irrigated and the skin was closed with staples. At the end of the case all instrument, needle, and sponge counts were correct. The patient was taken to the ICU in good condition.     Fluids Administered:   RBC (Units) 3   FFP (Units) 2   Cryoprecipitate (Units)  3   Crystalloid (mL) 3,800   Platelets (Units) 3   Cell Saver (CCs) 720      Specimens: Explant liver  Drains: 19f Cody drains x 2    Additional Findings:    Estimated Blood Loss: 2,500 mL  Ascites Evacuated: 2,000 mL    Ischemic Times:  Time of portal reperfusion: 6/5/2019 10:58 PM  Time of arterial reperfusion: 6/5/2019 11:22 PM  Anastomosis (warm ischemia) time: 27 minutes  Cold ischemia time: 237 minutes    Surgical Data:  Graft type (whole vs. partial): whole liver  IVC reconstruction: end to end ivc  Portal vein status: patent  Portal graft performed? no  Donor arterial anatomy: standard  Donor arterial inflow: common hepatic artery  Recipient arterial anatomy: standard  Recipient arterial inflow: common hepatic artery  Arterial graft performed? no  Biliary reconstruction: end to end (donor common bile duct to recipient common bile duct)  Biliary stent:  none  Disposition of Vessels from donor of transplanted organ: sent to blood bank  Damage during procurement: no  Procurement damage comments: none    Donor Factors:  UNOS ID: )[id number]  UNOS Match Run: [id number]  Donor type:  - brain death  Donor with reported PHS increased risk behavior: no  Donor CMV serologic status: Positive  Donor with known cancer: no  Donor HCV serologic status: Negative  Donor HBcAb: Negative  Donor HBV GERTRUDIS: Negative  Donor HCV GERTRUDIS: Negative  Liver weight: 1227 grams

## 2019-06-06 NOTE — PROGRESS NOTES
GENERAL SURGERY  PROGRESS NOTE    Admit Date: 5/16/2019  Hospital Day: 21  Procedure: 1 Day Post-Op s/p liver transplant    From OR overnight.  UOP adequate.  Drain output 2233.  CT placed intraoperatively with 370 cc output.  No pressors.  Intubated.  AF, VSS.    Physical Exam:  NAD, intubated, sedated  RRR, distal pulses palpable  Equal/bilateral chest rise, CT w/o air leak  abd soft, non distended, non tender, drains with serosanguinous output, incision with clean dressings    Assessment:  48 yo F w/ PMH of ESLD 2/2 KESSLER s/p liver transplant 6/5    Plan:  Neuro:   - will wean sedation in effort to extubate  -PRN pain medication     Resp:  - wean vent as tolerated  -ABG Q4  -minimal vent settings, ABG adequate  -wean to extubate     CV:  - HDS, no pressors  -MAP goal over 65     Heme:   - H/H stable  -INR 1.2  -DVT ppx     ID:  - Afebrile  -leukopenia  - cipro, miconazole, bactrim, valganciclovir     Immuno: Immunocompromised state  - methylprednisolone taper  -tacro, cellcept  -daily tacro level, currently pending     Renal:  - BUN/Cr 17/.0.7  - intraoperative HD  -UOP 30+/hr  -nephrology following     FEN/GI:   - Abdominal drains to remain in place to bulb suction; monitor drain output and character   - LFTs elevated postoperatively  -Tbili 9, from 15  -drain output primarily serous      Endo:   - Endocrine following, appreciate assistance  - Accuchecks  -insulin gtt     Dispo:  - to remain in ICU      Inpatient Data      Vitals:   Temp:  [96.6 °F (35.9 °C)-98.9 °F (37.2 °C)]   Pulse:  [72-96]   Resp:  [3-30]   BP: ()/(56-69)   SpO2:  [92 %-100 %]   Arterial Line BP: (117-146)/(27-63)     Diet NPO   Intake/Output Summary (Last 24 hours) at 6/6/2019 0758  Last data filed at 6/6/2019 0700  Gross per 24 hour   Intake 8178 ml   Output 7755 ml   Net 423 ml          Recent Labs     06/04/19  0438  06/05/19  0642 06/05/19  1401  06/05/19  2337  06/06/19  0052  06/06/19  0213 06/06/19  0215 06/06/19  0400   WBC  2.07*  2.07*  --  1.85*  --   --   --   --   --   --   --  2.90* 2.74*   HGB 8.0*  8.0*  --  7.7*  --    < > 7.0*  --  8.5*  --   --  8.6* 7.8*   HCT 23.6*  23.6*  --  22.8*  --    < > 20.3*   < > 24.3*   < > 25* 26.2* 23.5*   PLT 55*  55*  --  55*  --    < > 85*  --  45*  --   --  87* 71*   *  130*   < > 132*  132* 132*   < > 137  --  137  --   --  137  --    K 3.7  3.7   < > 3.8  3.8 3.9   < > 3.6  --  3.6  --   --  3.6  --      100   < > 100  100 100  --   --   --   --   --   --  105  --    CO2 24  24   < > 24  24 23  --   --   --   --   --   --  24  --    BUN 12  12   < > 13  13 15  --   --   --   --   --   --  8  --    CREATININE 0.7  0.7   < > 0.7  0.7 0.8  --   --   --   --   --   --  0.6  --    BILITOT 15.0*  15.0*  --  15.0*  --   --   --   --   --   --   --  9.5*  --    AST 71*  71*  --  72*  --   --  255*  --   --   --   --  437* 387*   ALT 30  30  --  30  --   --  138*  --   --   --   --  199*  --    ALKPHOS 93  93  --  94  --   --   --   --   --   --   --  93  --    CALCIUM 8.8  8.8   < > 8.7  8.7 8.4*  --   --   --   --   --   --  8.4*  --    ALBUMIN 3.2*  3.2*   < > 3.0*  3.0* 3.0*   < > 2.2*  --  2.4*  --   --  2.4*  --    PROT 5.6*  5.6*  --  5.5*  --   --   --   --   --   --   --  4.6*  --    MG 1.8  1.8   < > 1.7  1.7 1.6   < > 2.0  --  1.7  --   --  1.9  --    PHOS 3.2   < > 3.8  3.8 3.8  --   --   --   --   --   --  4.1  --     < > = values in this interval not displayed.     Scheduled Meds:   ciprofloxacin 400 mg Once   famotidine (PF) 20 mg Q12H   heparin (porcine) 5,000 Units Q8H   levothyroxine 112 mcg Daily   [START ON 6/7/2019] methylPREDNISolone sodium succinate 100 mg Q12H   Followed by     [START ON 6/8/2019] methylPREDNISolone sodium succinate 80 mg Q12H   Followed by     [START ON 6/9/2019] methylPREDNISolone sodium succinate 60 mg Q12H   Followed by     [START ON 6/10/2019] methylPREDNISolone sodium succinate 40 mg Q12H   Followed by     [START  ON 6/11/2019] methylPREDNISolone sodium succinate 20 mg Q12H   Followed by     [START ON 6/12/2019] predniSONE 20 mg Daily   miconazole NITRATE 2 %  BID   mycophenolate mofetil 1,000 mg BID   nystatin 500,000 Units TID PC   phytonadione ((AQUA-MEPHYTON) IVPB 10 mg Q8H   sulfamethoxazole-trimethoprim 400-80mg 1 tablet Daily   tacrolimus 1 mg BID   [START ON 6/16/2019] valGANciclovir 450 mg Daily   vancomycin (VANCOCIN) IVPB 1,000 mg Once      dextrose 5 % and 0.9 % NaCl 75 mL/hr at 06/06/19 0700    insulin (HUMAN R) infusion (adults) 1 Units/hr (06/06/19 0733)    propofol 20 mcg/kg/min (06/06/19 0700)

## 2019-06-06 NOTE — PROGRESS NOTES
Intraoperative CRRT initiated. Arterial line via right femoral catheter, venous return via RIJ trialysis. Good flows noted. UF rate 200ml/hr, Dialysate bath: 3K 3Ca 140Na 30 Bicarb per anesthesia order. Alarms set and all lines secured. Machine temp set at 38.5 Will continue to monitor.

## 2019-06-06 NOTE — ASSESSMENT & PLAN NOTE
Glucocorticoids markedly increase glucoses. Expect the steroid taper will help glucose control.

## 2019-06-06 NOTE — TELEPHONE ENCOUNTER
Notified Dr. Chavez - Blood Bank notified me/OR charge nurse that the splenic vessel from this donor liver cannot be used due to no preservative.

## 2019-06-06 NOTE — H&P
[facility]  Critical Care - Surgery  History & Physical    Patient Name: Jihan Zamudio  MRN: [id number]  Admission Date: 2019  Code Status: Prior  Attending Physician: Jose E Metzger MD   Primary Care Provider: Primary Doctor No   Principal Problem: Decompensated hepatic cirrhosis    Subjective:     HPI:  Jihan Zamudio is a 49 y.o. female w/ a significant PMHx of KESSLER liver cirrhosis with varices, latent TB, s/p lap band in , HTN and hypothyroidism. She has recurrent R sided pleural effusions s/p thoracentesis x3 (last one on ) with re-accumulation. She is now s/p orthoptic Liver transplant with biliary reconstruction 2019. EBL 2500, removal of 2000mL of ascites. 3.8L crystalloid, 3u pRBCs, 2u FFP, 3u Cryo, 3u plts were administered with 720cc of cell saver given back.   Patient arrived to SICU intubated and sedated, HDS without pressors.        Hospital/ICU Course:  No notes on file    Follow-up For: Procedure(s) (LRB):  TRANSPLANT, LIVER (N/A)    Post-Operative Day: 1 Day Post-Op     Past Medical History:   Diagnosis Date    Cirrhosis     Esophageal varices 2018    Small with no banding     Essential hypertension 2018    Fatty liver 2018    GERD (gastroesophageal reflux disease)     Hx of colonic polyps 2018    On colonoscopy     Hypertension     Hypothyroidism 2018    Kidney stones     Morbid obesity 2018    Lap band with subsequent release    KADEEM (obstructive sleep apnea) 2018    Osteoarthritis 2018    Pulmonary nodule 2018    Vitamin D deficiency 2018       Past Surgical History:   Procedure Laterality Date     SECTION      TIMES 2     CHOLECYSTECTOMY      LAPAROSCOPIC     CYSTOSCOPY W/ STONE MANIPULATION      kidney stone removal    EGD (ESOPHAGOGASTRODUODENOSCOPY) N/A 2019    Performed by Davian Hern[facility]dez MD at [facility] (2ND FLR)    LAPAROSCOPIC GASTRIC BANDING       removal 2009    LIVER BIOPSY  11/29/2017    KESSLER with bridging 11/29/17    TRANSPLANT, LIVER N/A 6/4/2019    Performed by Clemente Brown Jr., MD at [facility] OR [address] [place] [address]       Review of patient's allergies indicates:   Allergen Reactions    Metformin Rash    Pcn [penicillins] Other (See Comments)     Unsure of reaction, states it was as a child. Tolerated rocephin at [facility]       Family History     Problem Relation (Age of Onset)    Breast cancer Maternal Grandmother    Cancer Mother (50), Father (65)    Heart disease Mother, Father        Tobacco Use    Smoking status: Never Smoker    Smokeless tobacco: Never Used    Tobacco comment: patient denies   Substance and Sexual Activity    Alcohol use: No     Comment: patient denies    Drug use: No     Comment: patient denies    Sexual activity: Not on file      Review of Systems   Unable to perform ROS: Intubated     Objective:     Vital Signs (Most Recent):  Temp: (see anesthesia flowsheet for vitals) (06/05/19 2100)  Pulse: 79 (06/06/19 0217)  Resp: (!) 26 (06/06/19 0217)  BP: (!) 121/59 (06/05/19 1619)  SpO2: 100 % (06/06/19 0217) Vital Signs (24h Range):  Temp:  [98.2 °F (36.8 °C)-98.9 °F (37.2 °C)] 98.9 °F (37.2 °C)  Pulse:  [79-96] 79  Resp:  [21-30] 26  SpO2:  [92 %-100 %] 100 %  BP: (117-123)/(59-69) 121/59     Weight: 97.8 kg (215 lb 9.8 oz)  Body mass index is 35.88 kg/m².      Intake/Output Summary (Last 24 hours) at 6/6/2019 0222  Last data filed at 6/6/2019 0145  Gross per 24 hour   Intake 7578 ml   Output 6900 ml   Net 678 ml       Physical Exam   Constitutional: She appears well-developed.   Obese   Eyes: Conjunctivae are normal.   Neck: No JVD present.   Cardiovascular: Normal rate, regular rhythm and intact distal pulses.   No murmur heard.  R Robin, R IJ trialysis, R femoral trialysis   Pulmonary/Chest: Breath sounds normal.   Intubated   Abdominal: Soft. She exhibits no distension.   Abd incision C/D/I  R abd RONAK drain x2 with serosanguinous  output.   R pleural drain with serous output.    Musculoskeletal: She exhibits no edema.   Neurological:   Sedated   Skin: Skin is warm. Capillary refill takes more than 3 seconds.   Vitals reviewed.      Vents:  Vent Mode: SIMV (06/06/19 0217)  Ventilator Initiated: Yes (06/06/19 0217)  Set Rate: 16 bmp (06/06/19 0217)  Vt Set: 430 mL (06/06/19 0217)  Pressure Support: 10 cmH20 (06/06/19 0217)  PEEP/CPAP: 8 cmH20 (06/06/19 0217)  Oxygen Concentration (%): 50 (06/06/19 0217)  Peak Airway Pressure: 23 cmH2O (06/06/19 0217)  Plateau Pressure: 0 cmH20 (06/06/19 0217)  Total Ve: 10.5 mL (06/06/19 0217)  F/VT Ratio<105 (RSBI): (!) (P) 58.56 (06/06/19 0217)    Lines/Drains/Airways     Central Venous Catheter Line                 Hemodialysis Catheter 06/05/19 2025 less than 1 day         Percutaneous Central Line Insertion/Assessment - triple lumen  06/05/19 2000 less than 1 day         Pulmonary Artery Catheter Assessment  06/05/19 2000 less than 1 day          Drain                 Chest Tube 06/05/19 2233 1 Right Pleural 8 Fr. less than 1 day         Closed/Suction Drain 06/06/19 0040 Right Abdomen Bulb 19 Fr. less than 1 day         Closed/Suction Drain 06/06/19 0041 Right Abdomen Bulb 19 Fr. less than 1 day         Urethral Catheter 06/05/19 1958 Non-latex;Straight-tip 16 Fr. less than 1 day          Airway                 Airway - Non-Surgical 06/05/19 1952 Endotracheal Tube less than 1 day          Arterial Line                 Arterial Line 06/05/19 2005 Left Radial less than 1 day         Arterial Line 06/05/19 2025 Right Femoral less than 1 day          Peripheral Intravenous Line                 Peripheral IV - Single Lumen 05/26/19 2007 20 G Anterior;Distal;Right Forearm 10 days         Peripheral IV - Single Lumen 06/05/19 2038  Left Antecubital less than 1 day                Significant Labs:    CBC/Anemia Profile:  Recent Labs   Lab 06/04/19  0438 06/05/19  0642 06/05/19 2007 06/05/19  2337 06/06/19 0052    WBC 2.07*  2.07* 1.85*  --   --   --    HGB 8.0*  8.0* 7.7* 7.7* 7.0* 8.5*   HCT 23.6*  23.6* 22.8* 22.7* 20.3* 24.3*   PLT 55*  55* 55* 58* 85* 45*   MCV 90  90 92  --   --   --    RDW 18.9*  18.9* 18.8*  --   --   --         Chemistries:  Recent Labs   Lab 19  0438 19  2131 19  0642 19  1401 19  0052   *  130* 131* 132*  132* 132* 132* 137 137   K 3.7  3.7 3.5 3.8  3.8 3.9 4.0 3.6 3.6     100 99 100  100 100  --   --   --    CO2 24  24 25 24  24 23  --   --   --    BUN 12  12 12 13  13 15  --   --   --    CREATININE 0.7  0.7 0.8 0.7  0.7 0.8  --   --   --    CALCIUM 8.8  8.8 8.5* 8.7  8.7 8.4*  --   --   --    ALBUMIN 3.2*  3.2* 3.1* 3.0*  3.0* 3.0* 2.9* 2.2* 2.4*   PROT 5.6*  5.6*  --  5.5*  --   --   --   --    BILITOT 15.0*  15.0*  --  15.0*  --   --   --   --    ALKPHOS 93  93  --  94  --   --   --   --    ALT 30  30  --  30  --   --  138*  --    AST 71*  71*  --  72*  --   --  255*  --    MG 1.8  1.8 1.5* 1.7  1.7 1.6 1.5* 2.0 1.7   PHOS 3.2 3.5 3.8  3.8 3.8  --   --   --        Significant Imaging: I have reviewed all pertinent imaging results/findings within the past 24 hours.     EK2019  Vent. Rate : 092 BPM     Atrial Rate : 092 BPM     P-R Int : 142 ms          QRS Dur : 084 ms      QT Int : 374 ms       P-R-T Axes : 019 -03 028 degrees     QTc Int : 462 ms    Normal sinus rhythm  Anterolateral infarct ,age undetermined    Abnormal ECG  When compared with ECG of 16-MAY-2019 18:57,  Criteria for Anterolateral infarct ,age undetermined New since previous  tracing     2D ECHO:  2019  · Normal left ventricular systolic function. The estimated ejection fraction is 65%  · Normal LV diastolic function.    Assessment/Plan:     S/P liver transplant  Jihan Zamudio is a 49 y.o. female w/ a significant PMHx of KESSLER liver cirrhosis with varices, latent TB, s/p lap band in , HTN and  hypothyroidism. She has recurrent R sided pleural effusions s/p thoracentesis x3 (last one on 5/31) with re-accumulation. She is now s/p orthoptic Liver transplant with biliary reconstruction 6/6/2019. Patient arrived to SICU intubated and sedated, HDS without pressors.     Neuro:   - Sedation: propofol ggt  - Wean sedation with plans to extubate.   - PRN oxycodone and dilaudid.      Pulmonary:   - Intubated  Vent Mode: SIMV  Oxygen Concentration (%):  [50] 50  Resp Rate Total:  [25 br/min-26 br/min] 25 br/min  Vt Set:  [430 mL] 430 mL  PEEP/CPAP:  [8 cmH20] 8 cmH20  Pressure Support:  [10 cmH20] 10 cmH20  Mean Airway Pressure:  [13 cmH20] 13 cmH20  - Wean vent as tolerated.  - Can likely extubate in early morning      Cardiac:  - HDS  - Keep MAPs >65  - Hx/o HTN  - Hold home BP meds in acute setting     Renal:   - Trend BUN/Cr  - Monitor UOP  - Received intra-op CRRT     ID:   - Afebrile  - Abx: Unasyn, Bactrim.   - Will continue to monitor for signs of infection   - Continue immunosuppressive meds per transplant.      Hem:  - CBC q4h  - No signs of active bleeding  - Transfuse blood products if necessary  - Good coag profile noted in OR     Endocrine:  - Insulin gtt   - Accuchecks   - Hypothyroidism  - Continue synthroid     FEN/GI:   - NPO  - Replace lytes PRN  - Monitor drain output and character    PPx:  - Famotidine, SubQ heparin.      Dispo:  - Continue ICU Care  - Wean to extubate      Primary:   Transplant        Critical care was time spent personally by me on the following activities: development of treatment plan with patient or surrogate and bedside caregivers, discussions with consultants, evaluation of patient's response to treatment, examination of patient, ordering and performing treatments and interventions, ordering and review of laboratory studies, ordering and review of radiographic studies, pulse oximetry, re-evaluation of patient's condition.  This critical care time did not overlap with that  of any other provider or involve time for any procedures.     Sai Gregory MD  Critical Care - Surgery  [facility]

## 2019-06-06 NOTE — NURSING
Pt extubated this am, weaned to RA. SATs >95%. AAO. Up to edge of bed with PT. SR. SBP parameters WDL. CVP between 7-11. 2 RONAK drains, moderate serosanguineous drainage. Pleural chest tube, minimal drainage, placed to water seal.  Pt UO <30cc/hr for 3 consecutive hours, transplant team notified.  Orders received for albumin.  Pain managed with Dilaudid and Oxy. Passed swallow study, tolerated clear liquids.  CBC stable, liver enzymes trending down, US ordered for tomorrow. Spouse at bedside, no acute events.

## 2019-06-06 NOTE — PLAN OF CARE
Problem: Physical Therapy Goal  Goal: Physical Therapy Goal  Goals to be met by: 19  Patient will increase functional independence with mobility by performin. Supine to sit with SBA- not met  2. Sit to supine with SBA - not met  3. Sit to stand transfer with Supervision.-not met  4. Bed to chair transfer with Supervision using Single-point Cane or other AD.-not met  5. Gait  x 250 feet with Supervision using Single-point Cane or other AD -not met  6. Ascend/Descend 6 inch curb step with Supervision using Single-point Cane.or other AD- not met  7. Lower extremity exercise program x 20 reps  with assistance as needed.-not met    Re-evaluation completed and goals appropriate. Karen Gramajo, PT 2019

## 2019-06-06 NOTE — ASSESSMENT & PLAN NOTE
BG goal 140 - 180       Change to transition insulin infusion at 1 u/hr.   BG monitoring ac/hs/0200 and moderate dose correction scale.

## 2019-06-06 NOTE — PT/OT/SLP RE-EVAL
Physical Therapy Re-evaluation and treatment    Patient Name:  Jihan Zamudio   MRN:  [id number]    Recommendations:     Discharge Recommendations:  home health PT   Discharge Equipment Recommendations: (will determine DME needs closer to discharge)   Barriers to discharge: None    Assessment:     Jihan Zamudio is a 49 y.o. female admitted with a medical diagnosis of S/P liver transplant.  She presents with the following impairments/functional limitations:  impaired functional mobilty, gait instability, impaired endurance, decreased lower extremity function pt laura treatment well but pain and dizziness decreased functional mobility. Pt will benefit from skilled PT 4x/wk to progress physically and return pt to highest functional level possible. Pt should be able to discharge home with HHPT and DME to be determined closer to discharge. Pt was evaluated 5/18 with dx of acute hypoxemic respiratory failure. Pt is s/p liver transplant 6/6/19.    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 The live in 1 story slab. Pt owns SC and RW. She is modified Independent using SC.   will assist after discharge.     Rehab Prognosis:  good; patient would benefit from acute skilled PT services to address these deficits and reach maximum level of function.      Recent Surgery: Procedure(s) (LRB):  TRANSPLANT, LIVER (N/A) 1 Day Post-Op    Plan:     During this hospitalization, patient to be seen 4 x/week to address the above listed problems via gait training, therapeutic activities, therapeutic exercises  · Plan of Care Expires:  07/05/19   Plan of Care Reviewed with: patient, spouse    Subjective     Communicated with nurse prior to session.  Patient found supine with telemetry, arterial line, pulse ox (continuous), blood pressure cuff, chest tube, SCD, RONAK drain(swan-marek catheter, R femoral dialysis catheter, B Heriberto Antoni boots) upon PT entry to room, agreeable to evaluation.      Chief  Complaint: pt c/o dizziness and pain during treatment.   Patient comments/goals:  To get better and go home.   Pain/Comfort:  · Pain Rating 1: 7/10  · Location 1: abdomen  · Pain Rating Post-Intervention 1: 7/10    Patients cultural, spiritual, Episcopal conflicts given the current situation: no      Objective:     Patient found with: telemetry, arterial line, pulse ox (continuous), blood pressure cuff, chest tube, SCD, RONAK drain(swan-marek catheter, R femoral dialysis catheter, B Heriberto Antoni boots)     General Precautions: Standard, fall   Orthopedic Precautions:N/A   Braces:       Exams:  · Cognitive Exam:  Patient is oriented to Person, Place, Time and Situation  · RLE ROM: WFL  · RLE Strength: WFL  · LLE ROM: WFL  · LLE Strength: WFL    Functional Mobility:  · Bed Mobility:   Pt needed verbal cues for hand placement and sequencing for functional mobility.   · Rolling Left:  maximal assistance  · Rolling Right: maximal assistance  · Supine to Sit: maximal assistance  · Sit to Supine: maximal assistance  ·   · Balance: pt sat on EOB x 10 min with CGA.     Transfers: not tested. Pt c/o dizziness sitting EOB and was returned to supine.    AM-PAC 6 CLICK MOBILITY  Total Score:9         Patient left supine with all lines intact, call button in reach and  present.    GOALS:   Multidisciplinary Problems     Physical Therapy Goals        Problem: Physical Therapy Goal    Goal Priority Disciplines Outcome Goal Variances Interventions   Physical Therapy Goal     PT, PT/OT Ongoing (interventions implemented as appropriate)     Description:  Goals to be met by: 19  Patient will increase functional independence with mobility by performin. Supine to sit with SBA- not met  2. Sit to supine with SBA - not met  3. Sit to stand transfer with Supervision.-not met  4. Bed to chair transfer with Supervision using Single-point Cane or other AD.-not met  5. Gait  x 250 feet with Supervision using Single-point Cane or other  AD -not met  6. Ascend/Descend 6 inch curb step with Supervision using Single-point Cane.or other AD- not met  7. Lower extremity exercise program x 20 reps  with assistance as needed.-not met                      History:     Past Medical History:   Diagnosis Date    Cirrhosis     Esophageal varices 2018    Small with no banding     Essential hypertension 2018    Fatty liver 2018    GERD (gastroesophageal reflux disease)     Hx of colonic polyps 2018    On colonoscopy     Hypertension     Hypothyroidism 2018    Kidney stones     Morbid obesity 2018    Lap band with subsequent release    KADEEM (obstructive sleep apnea) 2018    Osteoarthritis 2018    Pulmonary nodule 2018    Vitamin D deficiency 2018       Past Surgical History:   Procedure Laterality Date     SECTION      TIMES 2     CHOLECYSTECTOMY      LAPAROSCOPIC     CYSTOSCOPY W/ STONE MANIPULATION      kidney stone removal    EGD (ESOPHAGOGASTRODUODENOSCOPY) N/A 2019    Per[facility]ian [facility] at [facility] (2ND FLR)    LAPAROSCOPIC GASTRIC BANDING  2006    removal     LIVER BIOPSY  2017    KESSLER with bridging 17    TRANSPLANT, LIVER N/A 2019    Performed by Clemente Brown Jr., MD at [facility] OR 2ND FLR    TRANSPLANT, LIVER N/A 2019    Performed by Clemente Brown Jr., MD at [facility] OR 2ND FLR       Time Tracking:     PT Received On: 19  PT Start Time: 1346     PT Stop Time: 1406  PT Total Time (min): 20 min     Billable Minutes: Re-eval 8 min and Therapeutic Activity 12 min      Karen Gramajo, PT  2019

## 2019-06-07 PROBLEM — K72.90 DECOMPENSATED HEPATIC CIRRHOSIS: Status: RESOLVED | Noted: 2018-02-26 | Resolved: 2019-06-07

## 2019-06-07 PROBLEM — R23.9 ALTERATION IN SKIN INTEGRITY: Status: RESOLVED | Noted: 2019-05-31 | Resolved: 2019-06-07

## 2019-06-07 PROBLEM — E87.6 HYPOKALEMIA: Status: RESOLVED | Noted: 2019-05-16 | Resolved: 2019-06-07

## 2019-06-07 PROBLEM — I85.00 ESOPHAGEAL VARICES: Status: RESOLVED | Noted: 2018-02-26 | Resolved: 2019-06-07

## 2019-06-07 PROBLEM — K74.60 DECOMPENSATED HEPATIC CIRRHOSIS: Status: RESOLVED | Noted: 2018-02-26 | Resolved: 2019-06-07

## 2019-06-07 PROBLEM — K76.82 HEPATIC ENCEPHALOPATHY: Status: RESOLVED | Noted: 2019-04-27 | Resolved: 2019-06-07

## 2019-06-07 PROBLEM — R50.9 FEVER: Status: RESOLVED | Noted: 2019-05-28 | Resolved: 2019-06-07

## 2019-06-07 PROBLEM — J96.01 ACUTE HYPOXEMIC RESPIRATORY FAILURE: Status: RESOLVED | Noted: 2019-05-16 | Resolved: 2019-06-07

## 2019-06-07 LAB
ABO + RH BLD: NORMAL
ALBUMIN SERPL BCP-MCNC: 2.8 G/DL (ref 3.5–5.2)
ALLENS TEST: ABNORMAL
ALP SERPL-CCNC: 55 U/L (ref 55–135)
ALT SERPL W/O P-5'-P-CCNC: 109 U/L (ref 10–44)
ANION GAP SERPL CALC-SCNC: 8 MMOL/L (ref 8–16)
ANISOCYTOSIS BLD QL SMEAR: SLIGHT
APTT BLDCRRT: 33.9 SEC (ref 21–32)
AST SERPL-CCNC: 134 U/L (ref 10–40)
AST SERPL-CCNC: 154 U/L (ref 10–40)
BACTERIA SPEC ANAEROBE CULT: NORMAL
BASO STIPL BLD QL SMEAR: ABNORMAL
BASOPHILS # BLD AUTO: 0 K/UL (ref 0–0.2)
BASOPHILS # BLD AUTO: 0 K/UL (ref 0–0.2)
BASOPHILS # BLD AUTO: ABNORMAL K/UL (ref 0–0.2)
BASOPHILS NFR BLD: 0 % (ref 0–1.9)
BILIRUB DIRECT SERPL-MCNC: 1.4 MG/DL (ref 0.1–0.3)
BILIRUB SERPL-MCNC: 1.8 MG/DL (ref 0.1–1)
BLD GP AB SCN CELLS X3 SERPL QL: NORMAL
BLD PROD TYP BPU: NORMAL
BLOOD UNIT EXPIRATION DATE: NORMAL
BLOOD UNIT TYPE CODE: 9500
BLOOD UNIT TYPE: NORMAL
BUN SERPL-MCNC: 20 MG/DL (ref 6–20)
BURR CELLS BLD QL SMEAR: ABNORMAL
BURR CELLS BLD QL SMEAR: ABNORMAL
CALCIUM SERPL-MCNC: 7.4 MG/DL (ref 8.7–10.5)
CHLORIDE SERPL-SCNC: 105 MMOL/L (ref 95–110)
CO2 SERPL-SCNC: 20 MMOL/L (ref 23–29)
CODING SYSTEM: NORMAL
CREAT SERPL-MCNC: 1.4 MG/DL (ref 0.5–1.4)
DELSYS: ABNORMAL
DIFFERENTIAL METHOD: ABNORMAL
DISPENSE STATUS: NORMAL
EOSINOPHIL # BLD AUTO: 0 K/UL (ref 0–0.5)
EOSINOPHIL # BLD AUTO: 0 K/UL (ref 0–0.5)
EOSINOPHIL # BLD AUTO: ABNORMAL K/UL (ref 0–0.5)
EOSINOPHIL NFR BLD: 0 % (ref 0–8)
ERYTHROCYTE [DISTWIDTH] IN BLOOD BY AUTOMATED COUNT: 17.2 % (ref 11.5–14.5)
ERYTHROCYTE [DISTWIDTH] IN BLOOD BY AUTOMATED COUNT: 17.3 % (ref 11.5–14.5)
ERYTHROCYTE [DISTWIDTH] IN BLOOD BY AUTOMATED COUNT: 17.5 % (ref 11.5–14.5)
EST. GFR  (AFRICAN AMERICAN): 50.2 ML/MIN/1.73 M^2
EST. GFR  (NON AFRICAN AMERICAN): 43.5 ML/MIN/1.73 M^2
GGT SERPL-CCNC: 66 U/L (ref 8–55)
GLUCOSE SERPL-MCNC: 125 MG/DL (ref 70–110)
HCO3 UR-SCNC: 18.1 MMOL/L (ref 24–28)
HCT VFR BLD AUTO: 21.1 % (ref 37–48.5)
HCT VFR BLD AUTO: 21.6 % (ref 37–48.5)
HCT VFR BLD AUTO: 22.4 % (ref 37–48.5)
HGB BLD-MCNC: 7.2 G/DL (ref 12–16)
HGB BLD-MCNC: 7.2 G/DL (ref 12–16)
HGB BLD-MCNC: 7.9 G/DL (ref 12–16)
HYPOCHROMIA BLD QL SMEAR: ABNORMAL
IMM GRANULOCYTES # BLD AUTO: 0.01 K/UL (ref 0–0.04)
IMM GRANULOCYTES # BLD AUTO: 0.02 K/UL (ref 0–0.04)
IMM GRANULOCYTES # BLD AUTO: ABNORMAL K/UL (ref 0–0.04)
IMM GRANULOCYTES NFR BLD AUTO: 0.4 % (ref 0–0.5)
IMM GRANULOCYTES NFR BLD AUTO: 0.5 % (ref 0–0.5)
IMM GRANULOCYTES NFR BLD AUTO: ABNORMAL % (ref 0–0.5)
INR PPP: 1.2 (ref 0.8–1.2)
LYMPHOCYTES # BLD AUTO: 0.3 K/UL (ref 1–4.8)
LYMPHOCYTES # BLD AUTO: 0.4 K/UL (ref 1–4.8)
LYMPHOCYTES # BLD AUTO: ABNORMAL K/UL (ref 1–4.8)
LYMPHOCYTES NFR BLD: 10.8 % (ref 18–48)
LYMPHOCYTES NFR BLD: 4 % (ref 18–48)
LYMPHOCYTES NFR BLD: 8.7 % (ref 18–48)
MAGNESIUM SERPL-MCNC: 1.8 MG/DL (ref 1.6–2.6)
MCH RBC QN AUTO: 30.9 PG (ref 27–31)
MCH RBC QN AUTO: 31.3 PG (ref 27–31)
MCH RBC QN AUTO: 32 PG (ref 27–31)
MCHC RBC AUTO-ENTMCNC: 33.3 G/DL (ref 32–36)
MCHC RBC AUTO-ENTMCNC: 34.1 G/DL (ref 32–36)
MCHC RBC AUTO-ENTMCNC: 35.3 G/DL (ref 32–36)
MCV RBC AUTO: 91 FL (ref 82–98)
MCV RBC AUTO: 92 FL (ref 82–98)
MCV RBC AUTO: 93 FL (ref 82–98)
MONOCYTES # BLD AUTO: 0.1 K/UL (ref 0.3–1)
MONOCYTES # BLD AUTO: 0.2 K/UL (ref 0.3–1)
MONOCYTES # BLD AUTO: ABNORMAL K/UL (ref 0.3–1)
MONOCYTES NFR BLD: 3 % (ref 4–15)
MONOCYTES NFR BLD: 4.5 % (ref 4–15)
MONOCYTES NFR BLD: 4.7 % (ref 4–15)
NEUTROPHILS # BLD AUTO: 2.4 K/UL (ref 1.8–7.7)
NEUTROPHILS # BLD AUTO: 3.5 K/UL (ref 1.8–7.7)
NEUTROPHILS NFR BLD: 84.1 % (ref 38–73)
NEUTROPHILS NFR BLD: 86.3 % (ref 38–73)
NEUTROPHILS NFR BLD: 88 % (ref 38–73)
NEUTS BAND NFR BLD MANUAL: 5 %
NRBC BLD-RTO: 0 /100 WBC
OVALOCYTES BLD QL SMEAR: ABNORMAL
PCO2 BLDA: 28.7 MMHG (ref 35–45)
PH SMN: 7.41 [PH] (ref 7.35–7.45)
PHOSPHATE SERPL-MCNC: 3.8 MG/DL (ref 2.7–4.5)
PLATELET # BLD AUTO: 35 K/UL (ref 150–350)
PLATELET # BLD AUTO: 39 K/UL (ref 150–350)
PLATELET # BLD AUTO: 52 K/UL (ref 150–350)
PLATELET BLD QL SMEAR: ABNORMAL
PMV BLD AUTO: 10.9 FL (ref 9.2–12.9)
PMV BLD AUTO: 11.1 FL (ref 9.2–12.9)
PMV BLD AUTO: 12.1 FL (ref 9.2–12.9)
PO2 BLDA: 40 MMHG (ref 40–60)
POC BE: -7 MMOL/L
POC SATURATED O2: 76 % (ref 95–100)
POC TCO2: 19 MMOL/L (ref 24–29)
POCT GLUCOSE: 103 MG/DL (ref 70–110)
POCT GLUCOSE: 134 MG/DL (ref 70–110)
POCT GLUCOSE: 134 MG/DL (ref 70–110)
POCT GLUCOSE: 137 MG/DL (ref 70–110)
POCT GLUCOSE: 139 MG/DL (ref 70–110)
POCT GLUCOSE: 140 MG/DL (ref 70–110)
POCT GLUCOSE: 143 MG/DL (ref 70–110)
POCT GLUCOSE: 144 MG/DL (ref 70–110)
POCT GLUCOSE: 146 MG/DL (ref 70–110)
POCT GLUCOSE: 149 MG/DL (ref 70–110)
POCT GLUCOSE: 150 MG/DL (ref 70–110)
POCT GLUCOSE: 166 MG/DL (ref 70–110)
POIKILOCYTOSIS BLD QL SMEAR: ABNORMAL
POIKILOCYTOSIS BLD QL SMEAR: SLIGHT
POLYCHROMASIA BLD QL SMEAR: ABNORMAL
POTASSIUM SERPL-SCNC: 4 MMOL/L (ref 3.5–5.1)
PROT SERPL-MCNC: 4.3 G/DL (ref 6–8.4)
PROTHROMBIN TIME: 12 SEC (ref 9–12.5)
RBC # BLD AUTO: 2.3 M/UL (ref 4–5.4)
RBC # BLD AUTO: 2.33 M/UL (ref 4–5.4)
RBC # BLD AUTO: 2.47 M/UL (ref 4–5.4)
SAMPLE: ABNORMAL
SCHISTOCYTES BLD QL SMEAR: ABNORMAL
SCHISTOCYTES BLD QL SMEAR: PRESENT
SITE: ABNORMAL
SODIUM SERPL-SCNC: 133 MMOL/L (ref 136–145)
TACROLIMUS BLD-MCNC: 3.6 NG/ML (ref 5–15)
TOXIC GRANULES BLD QL SMEAR: ABNORMAL
TRANS PLATPHERESIS VOL PATIENT: NORMAL ML
WBC # BLD AUTO: 2.71 K/UL (ref 3.9–12.7)
WBC # BLD AUTO: 2.79 K/UL (ref 3.9–12.7)
WBC # BLD AUTO: 4.02 K/UL (ref 3.9–12.7)

## 2019-06-07 PROCEDURE — 99232 SBSQ HOSP IP/OBS MODERATE 35: CPT | Mod: ,,, | Performed by: NURSE PRACTITIONER

## 2019-06-07 PROCEDURE — 99232 PR SUBSEQUENT HOSPITAL CARE,LEVL II: ICD-10-PCS | Mod: 24,,, | Performed by: SURGERY

## 2019-06-07 PROCEDURE — 63600175 PHARM REV CODE 636 W HCPCS: Performed by: TRANSPLANT SURGERY

## 2019-06-07 PROCEDURE — S0028 INJECTION, FAMOTIDINE, 20 MG: HCPCS | Performed by: TRANSPLANT SURGERY

## 2019-06-07 PROCEDURE — 80053 COMPREHEN METABOLIC PANEL: CPT

## 2019-06-07 PROCEDURE — 85610 PROTHROMBIN TIME: CPT

## 2019-06-07 PROCEDURE — P9035 PLATELET PHERES LEUKOREDUCED: HCPCS

## 2019-06-07 PROCEDURE — 36430 TRANSFUSION BLD/BLD COMPNT: CPT

## 2019-06-07 PROCEDURE — 25000003 PHARM REV CODE 250: Performed by: TRANSPLANT SURGERY

## 2019-06-07 PROCEDURE — 94761 N-INVAS EAR/PLS OXIMETRY MLT: CPT

## 2019-06-07 PROCEDURE — 84100 ASSAY OF PHOSPHORUS: CPT

## 2019-06-07 PROCEDURE — 25000003 PHARM REV CODE 250: Performed by: STUDENT IN AN ORGANIZED HEALTH CARE EDUCATION/TRAINING PROGRAM

## 2019-06-07 PROCEDURE — 86901 BLOOD TYPING SEROLOGIC RH(D): CPT

## 2019-06-07 PROCEDURE — 20600001 HC STEP DOWN PRIVATE ROOM

## 2019-06-07 PROCEDURE — 85730 THROMBOPLASTIN TIME PARTIAL: CPT

## 2019-06-07 PROCEDURE — 25000003 PHARM REV CODE 250: Performed by: NURSE PRACTITIONER

## 2019-06-07 PROCEDURE — 82248 BILIRUBIN DIRECT: CPT

## 2019-06-07 PROCEDURE — 63600175 PHARM REV CODE 636 W HCPCS: Performed by: STUDENT IN AN ORGANIZED HEALTH CARE EDUCATION/TRAINING PROGRAM

## 2019-06-07 PROCEDURE — 36415 COLL VENOUS BLD VENIPUNCTURE: CPT

## 2019-06-07 PROCEDURE — 80197 ASSAY OF TACROLIMUS: CPT

## 2019-06-07 PROCEDURE — 85025 COMPLETE CBC W/AUTO DIFF WBC: CPT

## 2019-06-07 PROCEDURE — 99232 SBSQ HOSP IP/OBS MODERATE 35: CPT | Mod: 24,,, | Performed by: SURGERY

## 2019-06-07 PROCEDURE — 99900035 HC TECH TIME PER 15 MIN (STAT)

## 2019-06-07 PROCEDURE — 82977 ASSAY OF GGT: CPT

## 2019-06-07 PROCEDURE — 83735 ASSAY OF MAGNESIUM: CPT

## 2019-06-07 PROCEDURE — 63600175 PHARM REV CODE 636 W HCPCS: Performed by: NURSE PRACTITIONER

## 2019-06-07 PROCEDURE — 82803 BLOOD GASES ANY COMBINATION: CPT

## 2019-06-07 PROCEDURE — 99232 PR SUBSEQUENT HOSPITAL CARE,LEVL II: ICD-10-PCS | Mod: ,,, | Performed by: NURSE PRACTITIONER

## 2019-06-07 RX ORDER — PREDNISONE 10 MG/1
TABLET ORAL
Qty: 60 TABLET | Refills: 0 | Status: ON HOLD | OUTPATIENT
Start: 2019-06-07 | End: 2019-07-23 | Stop reason: HOSPADM

## 2019-06-07 RX ORDER — LIDOCAINE HYDROCHLORIDE 10 MG/ML
10 INJECTION INFILTRATION; PERINEURAL ONCE
Status: COMPLETED | OUTPATIENT
Start: 2019-06-07 | End: 2019-06-07

## 2019-06-07 RX ORDER — FAMOTIDINE 20 MG/1
20 TABLET, FILM COATED ORAL NIGHTLY
Qty: 30 TABLET | Refills: 0 | Status: ON HOLD | OUTPATIENT
Start: 2019-06-07 | End: 2019-07-23 | Stop reason: SDUPTHER

## 2019-06-07 RX ORDER — FAMOTIDINE 20 MG/1
20 TABLET, FILM COATED ORAL NIGHTLY
Status: DISCONTINUED | OUTPATIENT
Start: 2019-06-07 | End: 2019-06-11 | Stop reason: HOSPADM

## 2019-06-07 RX ORDER — TACROLIMUS 1 MG/1
1 CAPSULE ORAL 2 TIMES DAILY
Status: DISCONTINUED | OUTPATIENT
Start: 2019-06-07 | End: 2019-06-09

## 2019-06-07 RX ORDER — IBUPROFEN 200 MG
16 TABLET ORAL
Status: DISCONTINUED | OUTPATIENT
Start: 2019-06-07 | End: 2019-06-10

## 2019-06-07 RX ORDER — INSULIN ASPART 100 [IU]/ML
0-10 INJECTION, SOLUTION INTRAVENOUS; SUBCUTANEOUS
Status: DISCONTINUED | OUTPATIENT
Start: 2019-06-07 | End: 2019-06-08

## 2019-06-07 RX ORDER — GLUCAGON 1 MG
1 KIT INJECTION
Status: DISCONTINUED | OUTPATIENT
Start: 2019-06-07 | End: 2019-06-10

## 2019-06-07 RX ORDER — IBUPROFEN 200 MG
24 TABLET ORAL
Status: DISCONTINUED | OUTPATIENT
Start: 2019-06-07 | End: 2019-06-10

## 2019-06-07 RX ORDER — VALGANCICLOVIR 450 MG/1
450 TABLET, FILM COATED ORAL DAILY
Qty: 30 TABLET | Refills: 2 | Status: SHIPPED | OUTPATIENT
Start: 2019-06-06 | End: 2021-06-25 | Stop reason: ALTCHOICE

## 2019-06-07 RX ORDER — MYCOPHENOLATE MOFETIL 250 MG/1
1000 CAPSULE ORAL 2 TIMES DAILY
Qty: 240 CAPSULE | Refills: 2 | Status: SHIPPED | OUTPATIENT
Start: 2019-06-07 | End: 2019-06-17

## 2019-06-07 RX ORDER — TACROLIMUS 1 MG/1
6 CAPSULE ORAL EVERY 12 HOURS
Qty: 360 CAPSULE | Refills: 11 | Status: SHIPPED | OUTPATIENT
Start: 2019-06-07 | End: 2019-06-11 | Stop reason: SDUPTHER

## 2019-06-07 RX ORDER — MYCOPHENOLATE MOFETIL 250 MG/1
1000 CAPSULE ORAL 2 TIMES DAILY
Status: DISCONTINUED | OUTPATIENT
Start: 2019-06-07 | End: 2019-06-11 | Stop reason: HOSPADM

## 2019-06-07 RX ORDER — SULFAMETHOXAZOLE AND TRIMETHOPRIM 400; 80 MG/1; MG/1
1 TABLET ORAL DAILY
Qty: 30 TABLET | Refills: 5 | Status: SHIPPED | OUTPATIENT
Start: 2019-06-06 | End: 2019-12-03

## 2019-06-07 RX ORDER — FUROSEMIDE 10 MG/ML
40 INJECTION INTRAMUSCULAR; INTRAVENOUS ONCE
Status: COMPLETED | OUTPATIENT
Start: 2019-06-07 | End: 2019-06-07

## 2019-06-07 RX ORDER — POSACONAZOLE 100 MG/1
300 TABLET, DELAYED RELEASE ORAL DAILY
Status: DISCONTINUED | OUTPATIENT
Start: 2019-06-07 | End: 2019-06-11 | Stop reason: HOSPADM

## 2019-06-07 RX ORDER — HYDROCODONE BITARTRATE AND ACETAMINOPHEN 500; 5 MG/1; MG/1
TABLET ORAL
Status: DISCONTINUED | OUTPATIENT
Start: 2019-06-07 | End: 2019-06-07

## 2019-06-07 RX ORDER — LEVOTHYROXINE SODIUM 112 UG/1
112 TABLET ORAL DAILY
Qty: 30 TABLET | Refills: 2 | Status: SHIPPED | OUTPATIENT
Start: 2019-06-08

## 2019-06-07 RX ADMIN — TACROLIMUS 1 MG: 1 CAPSULE ORAL at 05:06

## 2019-06-07 RX ADMIN — DEXTROSE 300 MG: 50 INJECTION, SOLUTION INTRAVENOUS at 09:06

## 2019-06-07 RX ADMIN — FAMOTIDINE 20 MG: 20 TABLET, FILM COATED ORAL at 09:06

## 2019-06-07 RX ADMIN — HEPARIN SODIUM 5000 UNITS: 5000 INJECTION, SOLUTION INTRAVENOUS; SUBCUTANEOUS at 02:06

## 2019-06-07 RX ADMIN — MICONAZOLE NITRATE: 20 POWDER TOPICAL at 08:06

## 2019-06-07 RX ADMIN — LIDOCAINE HYDROCHLORIDE 10 ML: 10 INJECTION, SOLUTION INFILTRATION; PERINEURAL at 10:06

## 2019-06-07 RX ADMIN — SODIUM CHLORIDE 0.8 UNITS/HR: 9 INJECTION, SOLUTION INTRAVENOUS at 09:06

## 2019-06-07 RX ADMIN — FUROSEMIDE 40 MG: 10 INJECTION, SOLUTION INTRAMUSCULAR; INTRAVENOUS at 10:06

## 2019-06-07 RX ADMIN — MAGNESIUM SULFATE IN WATER 2 G: 40 INJECTION, SOLUTION INTRAVENOUS at 08:06

## 2019-06-07 RX ADMIN — HYDROMORPHONE HYDROCHLORIDE 0.5 MG: 1 INJECTION, SOLUTION INTRAMUSCULAR; INTRAVENOUS; SUBCUTANEOUS at 07:06

## 2019-06-07 RX ADMIN — METHYLPREDNISOLONE SODIUM SUCCINATE 100 MG: 125 INJECTION, POWDER, FOR SOLUTION INTRAMUSCULAR; INTRAVENOUS at 09:06

## 2019-06-07 RX ADMIN — FAMOTIDINE 20 MG: 10 INJECTION, SOLUTION INTRAVENOUS at 08:06

## 2019-06-07 RX ADMIN — Medication 1000 MG: at 09:06

## 2019-06-07 RX ADMIN — MYCOPHENOLATE MOFETIL 1000 MG: 250 CAPSULE ORAL at 09:06

## 2019-06-07 RX ADMIN — METHYLPREDNISOLONE SODIUM SUCCINATE 100 MG: 125 INJECTION, POWDER, FOR SOLUTION INTRAMUSCULAR; INTRAVENOUS at 08:06

## 2019-06-07 RX ADMIN — MICONAZOLE NITRATE: 20 POWDER TOPICAL at 11:06

## 2019-06-07 RX ADMIN — Medication 1 MG: at 08:06

## 2019-06-07 RX ADMIN — DEXTROSE AND SODIUM CHLORIDE: 5; .9 INJECTION, SOLUTION INTRAVENOUS at 06:06

## 2019-06-07 RX ADMIN — HEPARIN SODIUM 5000 UNITS: 5000 INJECTION, SOLUTION INTRAVENOUS; SUBCUTANEOUS at 09:06

## 2019-06-07 RX ADMIN — LEVOTHYROXINE SODIUM 112 MCG: 112 TABLET ORAL at 05:06

## 2019-06-07 RX ADMIN — SULFAMETHOXAZOLE AND TRIMETHOPRIM 1 TABLET: 400; 80 TABLET ORAL at 08:06

## 2019-06-07 RX ADMIN — OXYCODONE HYDROCHLORIDE 10 MG: 10 TABLET ORAL at 09:06

## 2019-06-07 RX ADMIN — HEPARIN SODIUM 5000 UNITS: 5000 INJECTION, SOLUTION INTRAVENOUS; SUBCUTANEOUS at 05:06

## 2019-06-07 NOTE — ASSESSMENT & PLAN NOTE
BG goal 140 - 180       rewrite transition insulin infusion at 0.7 u/hr.   BG monitoring ac/hs/0200 and change to low dose correction scale given kidney function.

## 2019-06-07 NOTE — PROGRESS NOTES
Dr. Sahni notified of pt's urine output being 5-10ml/hr for the last two hours. Another 500 ml of Albumin given. Will continue to monitor urine output.

## 2019-06-07 NOTE — ASSESSMENT & PLAN NOTE
Managed per LTS.   avoid hypoglycemia  Lab Results   Component Value Date     (H) 06/07/2019     (H) 06/07/2019    GGT 66 (H) 06/07/2019    ALKPHOS 55 06/07/2019    BILITOT 1.8 (H) 06/07/2019

## 2019-06-07 NOTE — NURSING
Patient arrived on unit in wheelchair. AAOx4, VSS, chest tube to right flank, dressing is occlusive gauze, no drainage, set to water seal. All connections are taped. Drainage marked on container, no air leaks present. Placed hemostats and petroleum gauze in room. Hernandes to gravity, draining light orange urine, told in report that MD is aware of decreased urine output, it is secured to thigh, no dependent loops. [employer] incision covered with original gauze dressing, drainage areas circled and dated yesterday. RLQ RONAK drain set to bulb suction, emptied 80 ml serosanguinous fluid. Leaking at site, changed dressing, sutures intact. Placed SCDs on, refused TEDs. Placed tele and visi equipment on. Foam dressings to right and left forearms and sacrum are clean, dry and intact.

## 2019-06-07 NOTE — PT/OT/SLP DISCHARGE
Occupational Therapy Discharge Summary    Jihan Zamudio  MRN: [id number]   Principal Problem: S/P liver transplant      Patient Discharged from acute Occupational Therapy on 6/7/19.  Please refer to prior OT note dated 6/3/19 for functional status.    Assessment:      Patient transferred to lower level of care secondary to recieiving Liver transplant    Objective:     GOALS:   Multidisciplinary Problems     Occupational Therapy Goals        Problem: Occupational Therapy Goal    Goal Priority Disciplines Outcome Interventions   Occupational Therapy Goal     OT, PT/OT Ongoing (interventions implemented as appropriate)    Description:  Goals to be met by: 5/26/19    Patient will increase functional independence with ADLs by performing:    Grooming while standing with Stand-by Assistance. MET 5/28  Revised to set up- progressing   Toileting from bedside commode with Stand-by Assistance for hygiene and clothing management.   Supine to sit with Pope.  Toilet transfer to toilet with Stand-by Assistance.                         Reasons for Discontinuation of Therapy Services  Remains in house      Plan:     Patient Discharged to: ICU     Roselyn Lowe, OT  6/7/2019

## 2019-06-07 NOTE — PROGRESS NOTES
[facility]  Critical Care - Surgery  Progress Note    Patient Name: Jihan Zamudio  MRN: [id number]  Admission Date: 2019  Hospital Length of Stay: 22 days  Code Status: Prior  Attending Provider: Jose E Metzger MD  Primary Care Provider: Primary Doctor No   Principal Problem: S/P liver transplant    Subjective:     Hospital/ICU Course:  No notes on file    Interval History:    NAEON. Vitals stable. Mild decrease in UOP responded to albumin bolus. 1 unit platelets this AM. Abd drains with 1600 output. CT with 370 output.       Follow-up For: Procedure(s) (LRB):  TRANSPLANT, LIVER (N/A)    Surgery Date: 19     Past Medical History:   Diagnosis Date    Cirrhosis     Esophageal varices 2018    Small with no banding     Essential hypertension 2018    Fatty liver 2018    GERD (gastroesophageal reflux disease)     Hx of colonic polyps 2018    On colonoscopy     Hypertension     Hypothyroidism 2018    Kidney stones     Morbid obesity 2018    Lap band with subsequent release    KADEEM (obstructive sleep apnea) 2018    Osteoarthritis 2018    Pulmonary nodule 2018    Vitamin D deficiency 2018       Past Surgical History:   Procedure Laterality Date     SECTION      TIMES 2     CHOLECYSTECTOMY      LAPAROSCOPIC     CYSTOSCOPY W/ STONE MANIPULATION      kidney stone removal    EGD (ESOPHAGOGASTRODUODENOSCOPY) N/A 2019    Performed by Davian Hernández MD at [facility] ENDO (2ND FLR)    LAPAROSCOPIC GASTRIC BANDING  2006    removal     LIVER BIOPSY  2017    KESSLER with bridging 17    TRANSPLANT, LIVER N/A 2019    Performed by Clemente Brown Jr., MD at [facility] OR 2ND FLR    TRANSPLANT, LIVER N/A 2019    Performed by Clemente Brown Jr., MD at [facility] OR 2ND FLR       Review of patient's allergies indicates:   Allergen Reactions    Metformin Rash    Pcn [penicillins] Other (See  Comments)     Unsure of reaction, states it was as a child. Tolerated rocephin at [facility]       Family History     Problem Relation (Age of Onset)    Breast cancer Maternal Grandmother    Cancer Mother (50), Father (65)    Heart disease Mother, Father        Tobacco Use    Smoking status: Never Smoker    Smokeless tobacco: Never Used    Tobacco comment: patient denies   Substance and Sexual Activity    Alcohol use: No     Comment: patient denies    Drug use: No     Comment: patient denies    Sexual activity: Not on file        Objective:     Vital Signs (Most Recent):  Temp: 98.4 °F (36.9 °C) (06/07/19 1100)  Pulse: 79 (06/07/19 1230)  Resp: 15 (06/07/19 1230)  BP: 130/61 (06/07/19 1200)  SpO2: 95 % (06/07/19 1230) Vital Signs (24h Range):  Temp:  [97.2 °F (36.2 °C)-98.4 °F (36.9 °C)] 98.4 °F (36.9 °C)  Pulse:  [71-87] 79  Resp:  [15-43] 15  SpO2:  [95 %-99 %] 95 %  BP: (117-156)/(58-86) 130/61  Arterial Line BP: (127-167)/(47-79) 155/71     Weight: 97.8 kg (215 lb 9.8 oz)  Body mass index is 35.88 kg/m².      Intake/Output Summary (Last 24 hours) at 6/7/2019 1253  Last data filed at 6/7/2019 1200  Gross per 24 hour   Intake 3458.67 ml   Output 2257 ml   Net 1201.67 ml       Physical Exam   Constitutional: She is oriented to person, place, and time. She appears well-developed.   Obese   Eyes: Conjunctivae are normal.   Neck: No JVD present.   Cardiovascular: Normal rate and intact distal pulses.   No murmur heard.  Pulmonary/Chest: Breath sounds normal.   On RA   Abdominal: Soft. She exhibits no distension.   Abd incision C/D/I  R abd RONAK drain x2 with serosanguinous output.   R pleural drain with serous output.    Musculoskeletal: She exhibits no edema.   Neurological: She is alert and oriented to person, place, and time.   Skin: Skin is warm.   Vitals reviewed.      Vents:  Extubated    Lines/Drains/Airways     Central Venous Catheter Line                 Trialysis (Dialysis) Catheter 06/05/19 2100 right internal  jugular 1 day          Drain                 Chest Tube 19 1 Right Pleural 8 Fr. 1 day         Closed/Suction Drain 19 Right Abdomen Bulb 19 Fr. 1 day         Closed/Suction Drain 19 Right Abdomen Bulb 19 Fr. 1 day         Urethral Catheter 19 Non-latex;Straight-tip 16 Fr. 1 day                Significant Labs:    CBC/Anemia Profile:  Recent Labs   Lab 19  0333 19   WBC 2.71* 2.79* 4.02   HGB 7.2* 7.2* 7.9*   HCT 21.1* 21.6* 22.4*   PLT 39* 35* 52*   MCV 92 93 91   RDW 17.2* 17.3* 17.5*        Chemistries:  Recent Labs   Lab 19  1401  19  2337 19  0052 19  0215  199  19   *   < > 137 137 137  --  134*  --  133*  --  133*   K 3.9   < > 3.6 3.6 3.6  --  4.1  --  3.9  --  4.0     --   --   --  105  --  105  --  105  --  105   CO2 23  --   --   --  24  --  21*  --  22*  --  20*   BUN 15  --   --   --  8  --  11  --  15  --  20   CREATININE 0.8  --   --   --  0.6  --  0.8  --  1.1  --  1.4   CALCIUM 8.4*  --   --   --  8.4*  --  7.9*  --  8.0*  --  7.4*   ALBUMIN 3.0*   < > 2.2* 2.4* 2.4*  --   --   --   --   --  2.8*   PROT  --   --   --   --  4.6*  --   --   --   --   --  4.3*   BILITOT  --   --   --   --  9.5*  --   --   --   --   --  1.8*   ALKPHOS  --   --   --   --  93  --   --   --   --   --  55   ALT  --   --  138*  --  199*  --   --   --   --   --  109*   AST  --    < > 255*  --  437*   < > 328*   < > 192* 154* 134*   MG 1.6   < > 2.0 1.7 1.9  --   --   --   --   --  1.8   PHOS 3.8  --   --   --  4.1  --   --   --   --   --  3.8    < > = values in this interval not displayed.       Significant Imaging: I have reviewed all pertinent imaging results/findings within the past 24 hours.     EK2019  Vent. Rate : 092 BPM     Atrial Rate : 092 BPM     P-R Int : 142 ms          QRS Dur : 084 ms      QT Int : 374 ms       P-R-T Axes : 019 -03 028  degrees     QTc Int : 462 ms    Normal sinus rhythm  Anterolateral infarct ,age undetermined    Abnormal ECG  When compared with ECG of 16-MAY-2019 18:57,  Criteria for Anterolateral infarct ,age undetermined New since previous  tracing     2D ECHO:  5/24/2019  · Normal left ventricular systolic function. The estimated ejection fraction is 65%  · Normal LV diastolic function.    Assessment/Plan:     * S/P liver transplant  Jihan Zamudio is a 49 y.o. female w/ a significant PMHx of KESSLER liver cirrhosis with varices, latent TB, s/p lap band in 2007, HTN and hypothyroidism. She has recurrent R sided pleural effusions s/p thoracentesis x3 (last one on 5/31) with re-accumulation. She is now s/p orthoptic Liver transplant with biliary reconstruction 6/6/2019. Patient arrived to SICU intubated and sedated, HDS without pressors.     Neuro:    - PRN oxycodone and dilaudid.  - cont current regimen      Pulmonary:   - Satting well on RA  - ABG/CXR prn for distress     Cardiac:  - HDS  - Keep MAPs >65  - Hx/o HTN  - Hold home BP meds in acute setting     Renal:   - Trend BUN/Cr, 20/1.4  - Monitor UOP, 375/24 hrs  - Received intra-op CRRT  - nephrology consulted  - lasix this AM     ID:   - Afebrile  - Valagncyclovir, posaconazole, cipro, bactrim mangement per Transplant  - Will continue to monitor for signs of infection   - Continue immunosuppressive meds per transplant.      Hem:  - CBC qday  - No signs of active bleeding  - Transfuse blood products if necessary  - 1 unit platelets this aM per Transplant     Endocrine:  - Insulin gtt   - Accuchecks   - Hypothyroidism  - Continue synthroid     FEN/GI:   - FLD  - Replace lytes PRN  - Monitor drain output and character, serous today  - LFTs trending down  - Liver US ordered    PPx:  - Famotidine, SubQ heparin.      Dispo:  - Will discuss transfer to floor with transplant after Liver US    Primary:   Transplant          Critical care was time spent personally by me on the  following activities: development of treatment plan with patient or surrogate and bedside caregivers, discussions with consultants, evaluation of patient's response to treatment, examination of patient, ordering and performing treatments and interventions, ordering and review of laboratory studies, ordering and review of radiographic studies, pulse oximetry, re-evaluation of patient's condition.  This critical care time did not overlap with that of any other provider or involve time for any procedures.     Santino Quintero MD  Critical Care - Surgery  [facility]

## 2019-06-07 NOTE — PROGRESS NOTES
"[facility]  Endocrinology  Progress Note    Admit Date: 2019     Reason for Consult: Management of Hyperglycemia     Surgical Procedure and Date: liver transplant 19        HPI:   Patient is a 49 y.o. 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 No personal history of DM. Patient is now s/p liver transplant. Endocrinology consulted for BG/ DM management.         Lab Results   Component Value Date    HGBA1C 4.1 2019       Interval HPI:   Overnight events: Transferred to [facility]. [facility]. Diet advancing. BG well controlled on insulin infusion rates 0.9-1.8 u/hr. Solumedrol 100 mg BID; standard steroid taper per primary.   Eatin% or less  Nausea: No  Hypoglycemia and intervention: No  Fever: No  TPN and/or TF: No      /65 (BP Location: Right arm, Patient Position: Lying)   Pulse 80   Temp 97.9 °F (36.6 °C) (Oral)   Resp (!) 29   Ht 5' 5" (1.651 m)   Wt 97.8 kg (215 lb 9.8 oz)   SpO2 97%   Breastfeeding? No   BMI 35.88 kg/m²      Labs Reviewed and Include    Recent Labs   Lab 19  0333   *   CALCIUM 7.4*   ALBUMIN 2.8*   PROT 4.3*   *   K 4.0   CO2 20*      BUN 20   CREATININE 1.4   ALKPHOS 55   *   *   BILITOT 1.8*     Lab Results   Component Value Date    WBC 4.02 2019    HGB 7.9 (L) 2019    HCT 22.4 (L) 2019    MCV 91 2019    PLT 52 (L) 2019     No results for input(s): TSH, FREET4 in the last 168 hours.  Lab Results   Component Value Date    HGBA1C 4.1 2019       Nutritional status:   Body mass index is 35.88 kg/m².  Lab Results   Component Value Date    ALBUMIN 2.8 (L) 2019    ALBUMIN 2.4 (L) 2019    ALBUMIN 2.4 (L) 2019     Lab Results   Component Value Date    PREALBUMIN 3 (L) 2019       Estimated Creatinine Clearance: 55 mL/min (based on SCr of 1.4 mg/dL).    Accu-Checks  Recent Labs     19  2256 " 06/07/19  0007 06/07/19  0103 06/07/19  0205 06/07/19  0301 06/07/19  0351 06/07/19  0458 06/07/19  0603 06/07/19  0834 06/07/19  0920   POCTGLUCOSE 153* 143* 146* 137* 149* 144* 139* 134* 103 140*       Current Medications and/or Treatments Impacting Glycemic Control  Immunotherapy:    Immunosuppressants         Stop Route Frequency     mycophenolate capsule 1,000 mg      -- Oral 2 times daily     tacrolimus capsule 1 mg      -- Oral 2 times daily        Steroids:   Hormones (From admission, onward)    Start     Stop Route Frequency Ordered    06/12/19 0900  predniSONE tablet 20 mg  (methylprednisolone taper panel)      -- Oral Daily 06/06/19 0210    06/11/19 0900  methylPREDNISolone sodium succinate injection 20 mg  (methylprednisolone taper panel)      06/12 0859 IV Every 12 hours 06/06/19 0210    06/10/19 0900  methylPREDNISolone sodium succinate injection 40 mg  (methylprednisolone taper panel)      06/11 0859 IV Every 12 hours 06/06/19 0210    06/09/19 0900  methylPREDNISolone sodium succinate injection 60 mg  (methylprednisolone taper panel)      06/10 0859 IV Every 12 hours 06/06/19 0210    06/08/19 0900  methylPREDNISolone sodium succinate injection 80 mg  (methylprednisolone taper panel)      06/09 0859 IV Every 12 hours 06/06/19 0210    06/07/19 0900  methylPREDNISolone sodium succinate injection 100 mg  (methylprednisolone taper panel)      06/08 0859 IV Every 12 hours 06/06/19 0210    06/04/19 0515  methylPREDNISolone sodium succinate injection 500 mg  (Med - Immunosuppression Induction Therapy (Methylprednisolone))      06/04 1714 IV Once pre-op 06/04/19 0415        Pressors:    Autonomic Drugs (From admission, onward)    None        Hyperglycemia/Diabetes Medications:   Antihyperglycemics (From admission, onward)    Start     Stop Route Frequency Ordered    06/06/19 0730  insulin regular (Humulin R) 100 Units in sodium chloride 0.9% 100 mL infusion     Question:  Insulin rate changes (DO NOT MODIFY  ANSWER)  Answer:  [email or web address] [email or web address]    -- IV Continuous 06/06/19 0713          ASSESSMENT and PLAN    * S/P liver transplant  Managed per LTS.   avoid hypoglycemia  Lab Results   Component Value Date     (H) 06/06/2019     (H) 06/06/2019    GGT 92 (H) 06/06/2019    ALKPHOS 93 06/06/2019    BILITOT 9.5 (H) 06/06/2019           Acute hyperglycemia  BG goal 140 - 180       Change to transition insulin infusion at 1 u/hr.   BG monitoring ac/hs/0200 and moderate dose correction scale.       Adrenal cortical steroids causing adverse effect in therapeutic use  Glucocorticoids markedly increase glucoses. Expect the steroid taper will help glucose control.       Prophylactic immunotherapy  May increase insulin resistance.         Hypothyroidism  Managed per primary  Levothyroxine 112 mcg    Morbid obesity  May increase insulin resistance.   Body mass index is 35.88 kg/m².            Samra Cross NP  Endocrinology  [facility]

## 2019-06-07 NOTE — PROGRESS NOTES
2nd note:    SW presented to patient's bedside for follow-up and continuity of care. Patient was observed lying in bed and intubated. Caregiver/spouse, Freeman, was not present in the room. YULIYA contacted pt's spouse for follow-up #[phone]. Spouse reported doing well and stated that he had stepped out for a little, but would be returning to patient's bedside. Caregiver reported that he still needs to complete [place] financial profile information. Freeman inquired if he would be able to receive a discount for staying at [facility]. Per Freeman, he is utilizing his  discount, but the price had increased for his stay. YULIYA informed patient that there were no discounts available for [facility]; however, SW verbalized a few of the hotels nearby that offer lower rates (e.g., [place], extended stay). Caregiver expressed gratitude and denied having further questions. No further needs identified at this time. SW remains available.

## 2019-06-07 NOTE — SUBJECTIVE & OBJECTIVE
"Interval HPI:   Overnight events: Transferred to [facility]. [facility]. Diet advancing. BG well controlled on insulin infusion rates 0.9-1.8 u/hr. Solumedrol 100 mg BID; standard steroid taper per primary.   Eatin% or less  Nausea: No  Hypoglycemia and intervention: No  Fever: No  TPN and/or TF: No      /65 (BP Location: Right arm, Patient Position: Lying)   Pulse 80   Temp 97.9 °F (36.6 °C) (Oral)   Resp (!) 29   Ht 5' 5" (1.651 m)   Wt 97.8 kg (215 lb 9.8 oz)   SpO2 97%   Breastfeeding? No   BMI 35.88 kg/m²     Labs Reviewed and Include    Recent Labs   Lab 19  0333   *   CALCIUM 7.4*   ALBUMIN 2.8*   PROT 4.3*   *   K 4.0   CO2 20*      BUN 20   CREATININE 1.4   ALKPHOS 55   *   *   BILITOT 1.8*     Lab Results   Component Value Date    WBC 4.02 2019    HGB 7.9 (L) 2019    HCT 22.4 (L) 2019    MCV 91 2019    PLT 52 (L) 2019     No results for input(s): TSH, FREET4 in the last 168 hours.  Lab Results   Component Value Date    HGBA1C 4.1 2019       Nutritional status:   Body mass index is 35.88 kg/m².  Lab Results   Component Value Date    ALBUMIN 2.8 (L) 2019    ALBUMIN 2.4 (L) 2019    ALBUMIN 2.4 (L) 2019     Lab Results   Component Value Date    PREALBUMIN 3 (L) 2019       Estimated Creatinine Clearance: 55 mL/min (based on SCr of 1.4 mg/dL).    Accu-Checks  Recent Labs     19  2256 19  0007 19  0103 19  0205 19  0301 19  0351 19  0458 19  0603 19  0834 19  0920   POCTGLUCOSE 153* 143* 146* 137* 149* 144* 139* 134* 103 140*       Current Medications and/or Treatments Impacting Glycemic Control  Immunotherapy:    Immunosuppressants         Stop Route Frequency     mycophenolate capsule 1,000 mg      -- Oral 2 times daily     tacrolimus capsule 1 mg      -- Oral 2 times daily        Steroids:   Hormones (From admission, onward)    Start     Stop " Route Frequency Ordered    06/12/19 0900  predniSONE tablet 20 mg  (methylprednisolone taper panel)      -- Oral Daily 06/06/19 0210    06/11/19 0900  methylPREDNISolone sodium succinate injection 20 mg  (methylprednisolone taper panel)      06/12 0859 IV Every 12 hours 06/06/19 0210    06/10/19 0900  methylPREDNISolone sodium succinate injection 40 mg  (methylprednisolone taper panel)      06/11 0859 IV Every 12 hours 06/06/19 0210    06/09/19 0900  methylPREDNISolone sodium succinate injection 60 mg  (methylprednisolone taper panel)      06/10 0859 IV Every 12 hours 06/06/19 0210    06/08/19 0900  methylPREDNISolone sodium succinate injection 80 mg  (methylprednisolone taper panel)      06/09 0859 IV Every 12 hours 06/06/19 0210 06/07/19 0900  methylPREDNISolone sodium succinate injection 100 mg  (methylprednisolone taper panel)      06/08 0859 IV Every 12 hours 06/06/19 0210    06/04/19 0515  methylPREDNISolone sodium succinate injection 500 mg  (Med - Immunosuppression Induction Therapy (Methylprednisolone))      06/04 1714 IV Once pre-op 06/04/19 0415        Pressors:    Autonomic Drugs (From admission, onward)    None        Hyperglycemia/Diabetes Medications:   Antihyperglycemics (From admission, onward)    Start     Stop Route Frequency Ordered    06/06/19 0730  insulin regular (Humulin R) 100 Units in sodium chloride 0.9% 100 mL infusion     Question:  Insulin rate changes (DO NOT MODIFY ANSWER)  Answer:  [email or web address]\InsulinInfusions\CTS INSULIN [id number]    -- IV Continuous 06/06/19 0713

## 2019-06-07 NOTE — SUBJECTIVE & OBJECTIVE
Interval History:    NAEON. Vitals stable. Mild decrease in UOP responded to albumin bolus. 1 unit platelets this AM. Abd drains with 1600 output. CT with 370 output.       Follow-up For: Procedure(s) (LRB):  TRANSPLANT, LIVER (N/A)    Surgery Date: 19     Past Medical History:   Diagnosis Date    Cirrhosis     Esophageal varices 2018    Small with no banding     Essential hypertension 2018    Fatty liver 2018    GERD (gastroesophageal reflux disease)     Hx of colonic polyps 2018    On colonoscopy     Hypertension     Hypothyroidism 2018    Kidney stones     Morbid obesity 2018    Lap band with subsequent release    KADEEM (obstructive sleep apnea) 2018    Osteoarthritis 2018    Pulmonary nodule 2018    Vitamin D deficiency 2018       Past Surgical History:   Procedure Laterality Date     SECTION      TIMES 2     CHOLECYSTECTOMY      LAPAROSCOPIC     CYSTOSCOPY W/ STONE MANIPULATION      kidney stone removal    EGD (ESOPHAGOGASTRODUODENOSCOPY) N/A 2019    [facility] (2ND FLR)    LAPAROSCOPIC GASTRIC BANDING  2006    removal     LIVER BIOPSY  2017    KESSLER with bridging 17    TRANSPLANT, LIVER N/A 2019    Performed by Clemente [facility] Jr[facility] at [facility] OR 2ND FLR    TRANSPLANT, LIVER N/A 2019    Performed by Clemente Brown Jr[facility] MD at [facility] OR 2ND FLR       Review of patient's allergies indicates:   Allergen Reactions    Metformin Rash    Pcn [penicillins] Other (See Comments)     Unsure of reaction, states it was as a child. Tolerated rocephin at [facility]       Family History     Problem Relation (Age of Onset)    Breast cancer Maternal Grandmother    Cancer Mother (50), Father (65)    Heart disease Mother, Father        Tobacco Use    Smoking status: Never Smoker    Smokeless tobacco: Never Used    Tobacco comment: patient denies   Substance and Sexual  Activity    Alcohol use: No     Comment: patient denies    Drug use: No     Comment: patient denies    Sexual activity: Not on file        Objective:     Vital Signs (Most Recent):  Temp: 98.4 °F (36.9 °C) (06/07/19 1100)  Pulse: 79 (06/07/19 1230)  Resp: 15 (06/07/19 1230)  BP: 130/61 (06/07/19 1200)  SpO2: 95 % (06/07/19 1230) Vital Signs (24h Range):  Temp:  [97.2 °F (36.2 °C)-98.4 °F (36.9 °C)] 98.4 °F (36.9 °C)  Pulse:  [71-87] 79  Resp:  [15-43] 15  SpO2:  [95 %-99 %] 95 %  BP: (117-156)/(58-86) 130/61  Arterial Line BP: (127-167)/(47-79) 155/71     Weight: 97.8 kg (215 lb 9.8 oz)  Body mass index is 35.88 kg/m².      Intake/Output Summary (Last 24 hours) at 6/7/2019 1253  Last data filed at 6/7/2019 1200  Gross per 24 hour   Intake 3458.67 ml   Output 2257 ml   Net 1201.67 ml       Physical Exam   Constitutional: She is oriented to person, place, and time. She appears well-developed.   Obese   Eyes: Conjunctivae are normal.   Neck: No JVD present.   Cardiovascular: Normal rate and intact distal pulses.   No murmur heard.  Pulmonary/Chest: Breath sounds normal.   On RA   Abdominal: Soft. She exhibits no distension.   Abd incision C/D/I  R abd RONAK drain x2 with serosanguinous output.   R pleural drain with serous output.    Musculoskeletal: She exhibits no edema.   Neurological: She is alert and oriented to person, place, and time.   Skin: Skin is warm.   Vitals reviewed.      Vents:  Extubated    Lines/Drains/Airways     Central Venous Catheter Line                 Trialysis (Dialysis) Catheter 06/05/19 2100 right internal jugular 1 day          Drain                 Chest Tube 06/05/19 2233 1 Right Pleural 8 Fr. 1 day         Closed/Suction Drain 06/06/19 0040 Right Abdomen Bulb 19 Fr. 1 day         Closed/Suction Drain 06/06/19 0041 Right Abdomen Bulb 19 Fr. 1 day         Urethral Catheter 06/05/19 1958 Non-latex;Straight-tip 16 Fr. 1 day                Significant Labs:    CBC/Anemia Profile:  Recent Labs    Lab 19  0333 19  0904   WBC 2.71* 2.79* 4.02   HGB 7.2* 7.2* 7.9*   HCT 21.1* 21.6* 22.4*   PLT 39* 35* 52*   MCV 92 93 91   RDW 17.2* 17.3* 17.5*        Chemistries:  Recent Labs   Lab 19  1401  19  2337 19  0052 19  0215  19  0759  19  0333   *   < > 137 137 137  --  134*  --  133*  --  133*   K 3.9   < > 3.6 3.6 3.6  --  4.1  --  3.9  --  4.0     --   --   --  105  --  105  --  105  --  105   CO2 23  --   --   --  24  --  21*  --  22*  --  20*   BUN 15  --   --   --  8  --  11  --  15  --  20   CREATININE 0.8  --   --   --  0.6  --  0.8  --  1.1  --  1.4   CALCIUM 8.4*  --   --   --  8.4*  --  7.9*  --  8.0*  --  7.4*   ALBUMIN 3.0*   < > 2.2* 2.4* 2.4*  --   --   --   --   --  2.8*   PROT  --   --   --   --  4.6*  --   --   --   --   --  4.3*   BILITOT  --   --   --   --  9.5*  --   --   --   --   --  1.8*   ALKPHOS  --   --   --   --  93  --   --   --   --   --  55   ALT  --   --  138*  --  199*  --   --   --   --   --  109*   AST  --    < > 255*  --  437*   < > 328*   < > 192* 154* 134*   MG 1.6   < > 2.0 1.7 1.9  --   --   --   --   --  1.8   PHOS 3.8  --   --   --  4.1  --   --   --   --   --  3.8    < > = values in this interval not displayed.       Significant Imaging: I have reviewed all pertinent imaging results/findings within the past 24 hours.     EK2019  Vent. Rate : 092 BPM     Atrial Rate : 092 BPM     P-R Int : 142 ms          QRS Dur : 084 ms      QT Int : 374 ms       P-R-T Axes : 019 -03 028 degrees     QTc Int : 462 ms    Normal sinus rhythm  Anterolateral infarct ,age undetermined    Abnormal ECG  When compared with ECG of 16-MAY-2019 18:57,  Criteria for Anterolateral infarct ,age undetermined New since previous  tracing     2D ECHO:  2019  · Normal left ventricular systolic function. The estimated ejection fraction is 65%  · Normal LV diastolic function.

## 2019-06-07 NOTE — ASSESSMENT & PLAN NOTE
Jihan Zamudio is a 49 y.o. female w/ a significant PMHx of KESSLER liver cirrhosis with varices, latent TB, s/p lap band in 2007, HTN and hypothyroidism. She has recurrent R sided pleural effusions s/p thoracentesis x3 (last one on 5/31) with re-accumulation. She is now s/p orthoptic Liver transplant with biliary reconstruction 6/6/2019. Patient arrived to SICU intubated and sedated, HDS without pressors.     Neuro:    - PRN oxycodone and dilaudid.  - cont current regimen      Pulmonary:   - Satting well on RA  - ABG/CXR prn for distress     Cardiac:  - HDS  - Keep MAPs >65  - Hx/o HTN  - Hold home BP meds in acute setting     Renal:   - Trend BUN/Cr, 20/1.4  - Monitor UOP, 375/24 hrs  - Received intra-op CRRT  - nephrology consulted  - lasix this AM     ID:   - Afebrile  - Valagncyclovir, posaconazole, cipro, bactrim mangement per Transplant  - Will continue to monitor for signs of infection   - Continue immunosuppressive meds per transplant.      Hem:  - CBC qday  - No signs of active bleeding  - Transfuse blood products if necessary  - 1 unit platelets this aM per Transplant     Endocrine:  - Insulin gtt   - Accuchecks   - Hypothyroidism  - Continue synthroid     FEN/GI:   - FLD  - Replace lytes PRN  - Monitor drain output and character, serous today  - LFTs trending down  - Liver US ordered    PPx:  - Famotidine, SubQ heparin.      Dispo:  - Will discuss transfer to floor with transplant after Liver US    Primary:   Transplant

## 2019-06-07 NOTE — PROGRESS NOTES
No significant increase in urine production after 500 ml of Albumin. Dr. Sahni notified and no new orders at this time. CBC and platelets notified to Dr. Sahni. No orders to transfuse platelets at this time. Right femoral Arterial line to remain in place. Will continue to monitor.

## 2019-06-07 NOTE — PROGRESS NOTES
GENERAL SURGERY  PROGRESS NOTE    Admit Date: 5/16/2019  Hospital Day: 22  Procedure: 2 Days Post-Op s/p liver transplant required 2/2 KESSLER    NAEON.  Minimal decrease in UOP, responded appropriately to albumin.  Drains with 1600.  CT with 370.  Platelets administered this AM.  AF, VSS.    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 s/p liver transplant 6/5     Plan:    Neuro:   - PRN pain medication  -frequent orientation     Resp:  - satting well on RA  -VBG satisfactory this AM     CV:  - HDS, no pressors  - MAP goal over 65     Heme:   - H/H stable  -INR 1.2  -DVT ppx  -plts given this AM     ID:  - Afebrile  -leukopenia, mild improvement  - cipro, miconazole, bactrim, valganciclovir     Immuno: Immunocompromised state  - methylprednisolone taper  -tacro, cellcept  -daily tacro level, currently pending     Renal:  - BUN/Cr 20/1.4  - intraoperative HD  -UOP 25+/hr  -nephrology following     FEN/GI:   - Abdominal drains to remain in place to bulb suction; monitor drain output and character   - LFTs elevated postoperatively, trending down  -Tbili-1.8, from  -drain output primarily serous   -liver US this AM     Endo:   - Endocrine following, appreciate assistance  - Accuchecks  -insulin gtt     Dispo:  - pending US, will likely transfer to floor      Inpatient Data      Vitals:   Temp:  [97.2 °F (36.2 °C)-98.2 °F (36.8 °C)]   Pulse:  [70-87]   Resp:  [11-41]   BP: (117-156)/(58-86)   SpO2:  [95 %-100 %]   Arterial Line BP: (127-167)/(47-76)     Diet Clear liquid (no sugar)/Bariatric   Intake/Output Summary (Last 24 hours) at 6/7/2019 0714  Last data filed at 6/7/2019 0632  Gross per 24 hour   Intake 3186.67 ml   Output 2258 ml   Net 928.67 ml          Recent Labs     06/05/19  0642 06/05/19  1401  06/05/19  2053   06/06/19  0052  06/06/19  0215  06/06/19  0759  06/06/19  1952 06/06/19  2353 06/07/19  0333   WBC 1.85*  --   --   --   --   --   --  2.90*   < > 2.36*   < > 3.28* 2.71* 2.79*   HGB 7.7*  --    < > 7.0*  --  8.5*  --  8.6*   < > 7.7*   < > 7.9* 7.2* 7.2*   HCT 22.8*  --    < > 20.3*   < > 24.3*   < > 26.2*   < > 23.1*   < > 22.9* 21.1* 21.6*   PLT 55*  --    < > 85*  --  45*  --  87*   < > 48*   < > 41* 39* 35*   *  132* 132*   < > 137  --  137  --  137  --  134*  --  133*  --  133*   K 3.8  3.8 3.9   < > 3.6  --  3.6  --  3.6  --  4.1  --  3.9  --  4.0     100 100  --   --   --   --   --  105  --  105  --  105  --  105   CO2 24  24 23  --   --   --   --   --  24  --  21*  --  22*  --  20*   BUN 13  13 15  --   --   --   --   --  8  --  11  --  15  --  20   CREATININE 0.7  0.7 0.8  --   --   --   --   --  0.6  --  0.8  --  1.1  --  1.4   BILITOT 15.0*  --   --   --   --   --   --  9.5*  --   --   --   --   --  1.8*   AST 72*  --   --  255*  --   --   --  437*   < > 328*   < > 192* 154* 134*   ALT 30  --   --  138*  --   --   --  199*  --   --   --   --   --  109*   ALKPHOS 94  --   --   --   --   --   --  93  --   --   --   --   --  55   CALCIUM 8.7  8.7 8.4*  --   --   --   --   --  8.4*  --  7.9*  --  8.0*  --  7.4*   ALBUMIN 3.0*  3.0* 3.0*   < > 2.2*  --  2.4*  --  2.4*  --   --   --   --   --  2.8*   PROT 5.5*  --   --   --   --   --   --  4.6*  --   --   --   --   --  4.3*   MG 1.7  1.7 1.6   < > 2.0  --  1.7  --  1.9  --   --   --   --   --  1.8   PHOS 3.8  3.8 3.8  --   --   --   --   --  4.1  --   --   --   --   --  3.8    < > = values in this interval not displayed.     Scheduled Meds:   famotidine (PF) 20 mg Q12H   heparin (porcine) 5,000 Units Q8H   hydrALAZINE 10 mg Once   levothyroxine 112 mcg Daily   methylPREDNISolone sodium succinate 100 mg Q12H   Followed by     [START ON 6/8/2019] methylPREDNISolone sodium succinate 80 mg Q12H   Followed by     [START ON 6/9/2019]  methylPREDNISolone sodium succinate 60 mg Q12H   Followed by     [START ON 6/10/2019] methylPREDNISolone sodium succinate 40 mg Q12H   Followed by     [START ON 6/11/2019] methylPREDNISolone sodium succinate 20 mg Q12H   Followed by     [START ON 6/12/2019] predniSONE 20 mg Daily   miconazole NITRATE 2 %  BID   mycophenolate mofetil 1,000 mg BID   posaconazole (NOXAFIL) 300 mg in dextrose 5% IVPB 300 mg Q24H   sulfamethoxazole-trimethoprim 400-80mg 1 tablet Daily   tacrolimus 1 mg BID   [START ON 6/16/2019] valGANciclovir 450 mg Daily      dextrose 5 % and 0.9 % NaCl 75 mL/hr at 06/07/19 0606    insulin (HUMAN R) infusion (adults) 1.8 Units/hr (06/07/19 0300)

## 2019-06-07 NOTE — PT/OT/SLP DISCHARGE
Physical Therapy Discharge Summary    Name: Jihan Zamudio  MRN: [id number]   Principal Problem: S/P liver transplant     Patient Discharged from acute Physical Therapy on 2019.  Please refer to prior PT noted date on 6/3/2019 for functional status.     Assessment:     Patient has not met goals.    Objective:     GOALS:   Multidisciplinary Problems     Physical Therapy Goals        Problem: Physical Therapy Goal    Goal Priority Disciplines Outcome Goal Variances Interventions   Physical Therapy Goal     PT, PT/OT Ongoing (interventions implemented as appropriate)     Description:  Goals to be met by: 19  Patient will increase functional independence with mobility by performin. Supine to sit with SBA- not met  2. Sit to supine with SBA - not met  3. Sit to stand transfer with Supervision.-not met  4. Bed to chair transfer with Supervision using Single-point Cane or other AD.-not met  5. Gait  x 250 feet with Supervision using Single-point Cane or other AD -not met  6. Ascend/Descend 6 inch curb step with Supervision using Single-point Cane.or other AD- not met  7. Lower extremity exercise program x 20 reps  with assistance as needed.-not met                      Reasons for Discontinuation of Therapy Services  s/p liver transplant 2019      Plan:     Patient Discharged to: SICU. Resume PT orders received..    Theodore Coles, PT  2019

## 2019-06-07 NOTE — NURSING TRANSFER
Nursing Transfer Note      6/7/2019     Transfer To: TSU Rm [id number], Waldemar CARRANZA    Transfer via wheelchair    Transfer with cardiac monitoring    Transported by  Giovanna RN, Ligia RN    Medicines sent: Yes    Chart send with patient: Yes    Notified: spouse    Patient reassessed at:  (6/7/19, 1500 )    Upon arrival to floor: cardiac monitor applied, patient oriented to room, call bell in reach and bed in lowest position    Pt transferred to TSU.  Notified spouse of transfer. Report given to Waldemar CARRANZA.  Call light in reach.

## 2019-06-08 PROBLEM — D68.4 ACQUIRED COAGULATION FACTOR DEFICIENCY: Status: RESOLVED | Noted: 2019-05-29 | Resolved: 2019-06-08

## 2019-06-08 PROBLEM — D62 ACUTE BLOOD LOSS ANEMIA: Status: ACTIVE | Noted: 2019-06-08

## 2019-06-08 PROBLEM — N17.9 AKI (ACUTE KIDNEY INJURY): Status: ACTIVE | Noted: 2019-06-08

## 2019-06-08 PROBLEM — D68.9 COAGULOPATHY: Status: RESOLVED | Noted: 2019-04-27 | Resolved: 2019-06-08

## 2019-06-08 PROBLEM — D63.8 ANEMIA OF CHRONIC DISEASE: Status: ACTIVE | Noted: 2019-05-29

## 2019-06-08 PROBLEM — Z79.60 LONG-TERM USE OF IMMUNOSUPPRESSANT MEDICATION: Status: ACTIVE | Noted: 2019-06-08

## 2019-06-08 LAB
ALBUMIN SERPL BCP-MCNC: 2.9 G/DL (ref 3.5–5.2)
ALP SERPL-CCNC: 86 U/L (ref 55–135)
ALT SERPL W/O P-5'-P-CCNC: 82 U/L (ref 10–44)
ANION GAP SERPL CALC-SCNC: 10 MMOL/L (ref 8–16)
APTT BLDCRRT: 29 SEC (ref 21–32)
AST SERPL-CCNC: 69 U/L (ref 10–40)
BASOPHILS # BLD AUTO: 0 K/UL (ref 0–0.2)
BASOPHILS NFR BLD: 0 % (ref 0–1.9)
BILIRUB DIRECT SERPL-MCNC: 1.3 MG/DL (ref 0.1–0.3)
BILIRUB SERPL-MCNC: 1.6 MG/DL (ref 0.1–1)
BLD PROD TYP BPU: NORMAL
BLOOD UNIT EXPIRATION DATE: NORMAL
BLOOD UNIT TYPE CODE: 5100
BLOOD UNIT TYPE: NORMAL
BUN SERPL-MCNC: 31 MG/DL (ref 6–20)
CALCIUM SERPL-MCNC: 7.5 MG/DL (ref 8.7–10.5)
CHLORIDE SERPL-SCNC: 102 MMOL/L (ref 95–110)
CO2 SERPL-SCNC: 19 MMOL/L (ref 23–29)
CODING SYSTEM: NORMAL
CREAT SERPL-MCNC: 2.2 MG/DL (ref 0.5–1.4)
DIFFERENTIAL METHOD: ABNORMAL
DISPENSE STATUS: NORMAL
EOSINOPHIL # BLD AUTO: 0 K/UL (ref 0–0.5)
EOSINOPHIL NFR BLD: 0 % (ref 0–8)
ERYTHROCYTE [DISTWIDTH] IN BLOOD BY AUTOMATED COUNT: 17.4 % (ref 11.5–14.5)
EST. GFR  (AFRICAN AMERICAN): 29.1 ML/MIN/1.73 M^2
EST. GFR  (NON AFRICAN AMERICAN): 25.2 ML/MIN/1.73 M^2
GGT SERPL-CCNC: 154 U/L (ref 8–55)
GLUCOSE SERPL-MCNC: 117 MG/DL (ref 70–110)
HCT VFR BLD AUTO: 24.5 % (ref 37–48.5)
HGB BLD-MCNC: 8.3 G/DL (ref 12–16)
IMM GRANULOCYTES # BLD AUTO: 0.02 K/UL (ref 0–0.04)
IMM GRANULOCYTES NFR BLD AUTO: 0.5 % (ref 0–0.5)
INR PPP: 1 (ref 0.8–1.2)
LYMPHOCYTES # BLD AUTO: 0.3 K/UL (ref 1–4.8)
LYMPHOCYTES NFR BLD: 8.3 % (ref 18–48)
MAGNESIUM SERPL-MCNC: 2.5 MG/DL (ref 1.6–2.6)
MCH RBC QN AUTO: 31.4 PG (ref 27–31)
MCHC RBC AUTO-ENTMCNC: 33.9 G/DL (ref 32–36)
MCV RBC AUTO: 93 FL (ref 82–98)
MONOCYTES # BLD AUTO: 0.1 K/UL (ref 0.3–1)
MONOCYTES NFR BLD: 3.1 % (ref 4–15)
NEUTROPHILS # BLD AUTO: 3.4 K/UL (ref 1.8–7.7)
NEUTROPHILS NFR BLD: 88.1 % (ref 38–73)
NRBC BLD-RTO: 0 /100 WBC
PLATELET # BLD AUTO: 57 K/UL (ref 150–350)
PMV BLD AUTO: 11.6 FL (ref 9.2–12.9)
POCT GLUCOSE: 121 MG/DL (ref 70–110)
POCT GLUCOSE: 124 MG/DL (ref 70–110)
POCT GLUCOSE: 142 MG/DL (ref 70–110)
POCT GLUCOSE: 143 MG/DL (ref 70–110)
POCT GLUCOSE: 162 MG/DL (ref 70–110)
POTASSIUM SERPL-SCNC: 4.1 MMOL/L (ref 3.5–5.1)
PROT SERPL-MCNC: 4.7 G/DL (ref 6–8.4)
PROTHROMBIN TIME: 10.8 SEC (ref 9–12.5)
RBC # BLD AUTO: 2.64 M/UL (ref 4–5.4)
SODIUM SERPL-SCNC: 131 MMOL/L (ref 136–145)
TACROLIMUS BLD-MCNC: 6.1 NG/ML (ref 5–15)
TRANS ERYTHROCYTES VOL PATIENT: NORMAL ML
WBC # BLD AUTO: 3.86 K/UL (ref 3.9–12.7)

## 2019-06-08 PROCEDURE — 99232 PR SUBSEQUENT HOSPITAL CARE,LEVL II: ICD-10-PCS | Mod: ,,, | Performed by: NURSE PRACTITIONER

## 2019-06-08 PROCEDURE — 83735 ASSAY OF MAGNESIUM: CPT

## 2019-06-08 PROCEDURE — 99233 PR SUBSEQUENT HOSPITAL CARE,LEVL III: ICD-10-PCS | Mod: 24,,, | Performed by: PHYSICIAN ASSISTANT

## 2019-06-08 PROCEDURE — 82248 BILIRUBIN DIRECT: CPT

## 2019-06-08 PROCEDURE — P9047 ALBUMIN (HUMAN), 25%, 50ML: HCPCS | Mod: JG | Performed by: PHYSICIAN ASSISTANT

## 2019-06-08 PROCEDURE — 63600175 PHARM REV CODE 636 W HCPCS: Mod: JG | Performed by: PHYSICIAN ASSISTANT

## 2019-06-08 PROCEDURE — 63600175 PHARM REV CODE 636 W HCPCS: Performed by: TRANSPLANT SURGERY

## 2019-06-08 PROCEDURE — 25000003 PHARM REV CODE 250: Performed by: TRANSPLANT SURGERY

## 2019-06-08 PROCEDURE — 99233 SBSQ HOSP IP/OBS HIGH 50: CPT | Mod: 24,,, | Performed by: PHYSICIAN ASSISTANT

## 2019-06-08 PROCEDURE — 85730 THROMBOPLASTIN TIME PARTIAL: CPT

## 2019-06-08 PROCEDURE — 63600175 PHARM REV CODE 636 W HCPCS: Performed by: STUDENT IN AN ORGANIZED HEALTH CARE EDUCATION/TRAINING PROGRAM

## 2019-06-08 PROCEDURE — 63600175 PHARM REV CODE 636 W HCPCS: Performed by: NURSE PRACTITIONER

## 2019-06-08 PROCEDURE — 25000003 PHARM REV CODE 250: Performed by: NURSE PRACTITIONER

## 2019-06-08 PROCEDURE — 80053 COMPREHEN METABOLIC PANEL: CPT

## 2019-06-08 PROCEDURE — 99900035 HC TECH TIME PER 15 MIN (STAT)

## 2019-06-08 PROCEDURE — 85025 COMPLETE CBC W/AUTO DIFF WBC: CPT

## 2019-06-08 PROCEDURE — 25000003 PHARM REV CODE 250: Performed by: PHYSICIAN ASSISTANT

## 2019-06-08 PROCEDURE — 25000003 PHARM REV CODE 250: Performed by: STUDENT IN AN ORGANIZED HEALTH CARE EDUCATION/TRAINING PROGRAM

## 2019-06-08 PROCEDURE — 20600001 HC STEP DOWN PRIVATE ROOM

## 2019-06-08 PROCEDURE — 94761 N-INVAS EAR/PLS OXIMETRY MLT: CPT

## 2019-06-08 PROCEDURE — 85610 PROTHROMBIN TIME: CPT

## 2019-06-08 PROCEDURE — 82977 ASSAY OF GGT: CPT

## 2019-06-08 PROCEDURE — 80197 ASSAY OF TACROLIMUS: CPT

## 2019-06-08 PROCEDURE — 63600175 PHARM REV CODE 636 W HCPCS: Performed by: PHYSICIAN ASSISTANT

## 2019-06-08 PROCEDURE — 99232 SBSQ HOSP IP/OBS MODERATE 35: CPT | Mod: ,,, | Performed by: NURSE PRACTITIONER

## 2019-06-08 RX ORDER — OXYCODONE HYDROCHLORIDE 10 MG/1
10 TABLET ORAL EVERY 4 HOURS PRN
Status: DISCONTINUED | OUTPATIENT
Start: 2019-06-08 | End: 2019-06-11 | Stop reason: HOSPADM

## 2019-06-08 RX ORDER — INSULIN ASPART 100 [IU]/ML
0-4 INJECTION, SOLUTION INTRAVENOUS; SUBCUTANEOUS
Status: DISCONTINUED | OUTPATIENT
Start: 2019-06-08 | End: 2019-06-10

## 2019-06-08 RX ORDER — FUROSEMIDE 10 MG/ML
100 INJECTION INTRAMUSCULAR; INTRAVENOUS ONCE
Status: COMPLETED | OUTPATIENT
Start: 2019-06-08 | End: 2019-06-08

## 2019-06-08 RX ORDER — ALBUMIN HUMAN 250 G/1000ML
25 SOLUTION INTRAVENOUS ONCE
Status: COMPLETED | OUTPATIENT
Start: 2019-06-08 | End: 2019-06-08

## 2019-06-08 RX ORDER — LIDOCAINE HYDROCHLORIDE 10 MG/ML
10 INJECTION INFILTRATION; PERINEURAL ONCE
Status: DISCONTINUED | OUTPATIENT
Start: 2019-06-08 | End: 2019-06-11 | Stop reason: HOSPADM

## 2019-06-08 RX ORDER — DOCUSATE SODIUM 100 MG/1
100 CAPSULE, LIQUID FILLED ORAL
Status: DISCONTINUED | OUTPATIENT
Start: 2019-06-08 | End: 2019-06-10

## 2019-06-08 RX ADMIN — OXYCODONE HYDROCHLORIDE 5 MG: 5 TABLET ORAL at 11:06

## 2019-06-08 RX ADMIN — METHYLPREDNISOLONE SODIUM SUCCINATE 80 MG: 125 INJECTION, POWDER, FOR SOLUTION INTRAMUSCULAR; INTRAVENOUS at 09:06

## 2019-06-08 RX ADMIN — DOCUSATE SODIUM 100 MG: 100 CAPSULE, LIQUID FILLED ORAL at 02:06

## 2019-06-08 RX ADMIN — FAMOTIDINE 20 MG: 20 TABLET, FILM COATED ORAL at 09:06

## 2019-06-08 RX ADMIN — HEPARIN SODIUM 5000 UNITS: 5000 INJECTION, SOLUTION INTRAVENOUS; SUBCUTANEOUS at 09:06

## 2019-06-08 RX ADMIN — TACROLIMUS 1 MG: 1 CAPSULE ORAL at 05:06

## 2019-06-08 RX ADMIN — MYCOPHENOLATE MOFETIL 1000 MG: 250 CAPSULE ORAL at 09:06

## 2019-06-08 RX ADMIN — DOCUSATE SODIUM 100 MG: 100 CAPSULE, LIQUID FILLED ORAL at 05:06

## 2019-06-08 RX ADMIN — OXYCODONE HYDROCHLORIDE 5 MG: 5 TABLET ORAL at 05:06

## 2019-06-08 RX ADMIN — MICONAZOLE NITRATE: 20 POWDER TOPICAL at 09:06

## 2019-06-08 RX ADMIN — SULFAMETHOXAZOLE AND TRIMETHOPRIM 1 TABLET: 400; 80 TABLET ORAL at 08:06

## 2019-06-08 RX ADMIN — OXYCODONE HYDROCHLORIDE 10 MG: 10 TABLET ORAL at 08:06

## 2019-06-08 RX ADMIN — LEVOTHYROXINE SODIUM 112 MCG: 112 TABLET ORAL at 05:06

## 2019-06-08 RX ADMIN — TACROLIMUS 1 MG: 1 CAPSULE ORAL at 08:06

## 2019-06-08 RX ADMIN — HEPARIN SODIUM 5000 UNITS: 5000 INJECTION, SOLUTION INTRAVENOUS; SUBCUTANEOUS at 02:06

## 2019-06-08 RX ADMIN — FUROSEMIDE 100 MG: 10 INJECTION, SOLUTION INTRAMUSCULAR; INTRAVENOUS at 09:06

## 2019-06-08 RX ADMIN — HEPARIN SODIUM 5000 UNITS: 5000 INJECTION, SOLUTION INTRAVENOUS; SUBCUTANEOUS at 05:06

## 2019-06-08 RX ADMIN — MICONAZOLE NITRATE: 20 POWDER TOPICAL at 02:06

## 2019-06-08 RX ADMIN — SODIUM CHLORIDE 0.7 UNITS/HR: 9 INJECTION, SOLUTION INTRAVENOUS at 09:06

## 2019-06-08 RX ADMIN — DOCUSATE SODIUM 100 MG: 100 CAPSULE, LIQUID FILLED ORAL at 08:06

## 2019-06-08 RX ADMIN — POSACONAZOLE 300 MG: 100 TABLET, COATED ORAL at 08:06

## 2019-06-08 RX ADMIN — ALBUMIN (HUMAN) 25 G: 25 SOLUTION INTRAVENOUS at 12:06

## 2019-06-08 RX ADMIN — METHYLPREDNISOLONE SODIUM SUCCINATE 80 MG: 125 INJECTION, POWDER, FOR SOLUTION INTRAMUSCULAR; INTRAVENOUS at 08:06

## 2019-06-08 NOTE — ASSESSMENT & PLAN NOTE
BG goal 140 - 180       rewrite transition insulin infusion at 0.5 u/hr.   BG monitoring ac/hs/0200 and change to low dose correction scale given kidney function.

## 2019-06-08 NOTE — PROGRESS NOTES
"[facility]  Endocrinology  Progress Note    Admit Date: 2019     Reason for Consult: Management of Hyperglycemia     Surgical Procedure and Date: liver transplant 19        HPI:   Patient is a 49 y.o. 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 No personal history of DM. Patient is now s/p liver transplant. Endocrinology consulted for BG/ DM management.         Lab Results   Component Value Date    HGBA1C 4.1 2019       Interval HPI:   Overnight events: Remains in TSU. NAEON. BG well controlled on insulin infusion; rate decreased to 0.8 u/hr per orders. Solumedrol 80 mg BID; standard steroid taper per primary. Creatinine 2.2.   Eatin%  Or less   Nausea: No  Hypoglycemia and intervention: No  Fever: No  TPN and/or TF: No    /79   Pulse 80   Temp 97.9 °F (36.6 °C) (Oral)   Resp 20   Ht 5' 5" (1.651 m)   Wt 98.5 kg (217 lb 2.5 oz)   SpO2 96%   Breastfeeding? No   BMI 36.14 kg/m²      Labs Reviewed and Include    Recent Labs   Lab 19  0514   *   CALCIUM 7.5*   ALBUMIN 2.9*   PROT 4.7*   *   K 4.1   CO2 19*      BUN 31*   CREATININE 2.2*   ALKPHOS 86   ALT 82*   AST 69*   BILITOT 1.6*     Lab Results   Component Value Date    WBC 3.86 (L) 2019    HGB 8.3 (L) 2019    HCT 24.5 (L) 2019    MCV 93 2019    PLT 57 (L) 2019     No results for input(s): TSH, FREET4 in the last 168 hours.  Lab Results   Component Value Date    HGBA1C 4.1 2019       Nutritional status:   Body mass index is 36.14 kg/m².  Lab Results   Component Value Date    ALBUMIN 2.9 (L) 2019    ALBUMIN 2.8 (L) 2019    ALBUMIN 2.4 (L) 2019     Lab Results   Component Value Date    PREALBUMIN 3 (L) 2019       Estimated Creatinine Clearance: 35.2 mL/min (A) (based on SCr of 2.2 mg/dL (H)).    Accu-Checks  Recent Labs     19  0301 19  0351 19  0458 " 06/07/19  0603 06/07/19  0834 06/07/19  0920 06/07/19  1112 06/07/19  1752 06/07/19  2145 06/08/19  0158   POCTGLUCOSE 149* 144* 139* 134* 103 140* 166* 150* 134* 124*       Current Medications and/or Treatments Impacting Glycemic Control  Immunotherapy:    Immunosuppressants         Stop Route Frequency     mycophenolate capsule 1,000 mg      -- Oral 2 times daily     tacrolimus capsule 1 mg      -- Oral 2 times daily        Steroids:   Hormones (From admission, onward)    Start     Stop Route Frequency Ordered    06/12/19 0900  predniSONE tablet 20 mg  (methylprednisolone taper panel)      -- Oral Daily 06/06/19 0210    06/11/19 0900  methylPREDNISolone sodium succinate injection 20 mg  (methylprednisolone taper panel)      06/12 0859 IV Every 12 hours 06/06/19 0210    06/10/19 0900  methylPREDNISolone sodium succinate injection 40 mg  (methylprednisolone taper panel)      06/11 0859 IV Every 12 hours 06/06/19 0210    06/09/19 0900  methylPREDNISolone sodium succinate injection 60 mg  (methylprednisolone taper panel)      06/10 0859 IV Every 12 hours 06/06/19 0210    06/08/19 0900  methylPREDNISolone sodium succinate injection 80 mg  (methylprednisolone taper panel)      06/09 0859 IV Every 12 hours 06/06/19 0210    06/04/19 0515  methylPREDNISolone sodium succinate injection 500 mg  (Med - Immunosuppression Induction Therapy (Methylprednisolone))      06/04 1714 IV Once pre-op 06/04/19 0415        Pressors:    Autonomic Drugs (From admission, onward)    None        Hyperglycemia/Diabetes Medications:   Antihyperglycemics (From admission, onward)    Start     Stop Route Frequency Ordered    06/07/19 1130  insulin regular (Humulin R) 100 Units in sodium chloride 0.9% 100 mL infusion      -- IV Continuous 06/07/19 1025    06/07/19 1125  insulin aspart U-100 pen 0-10 Units      -- SubQ As needed (PRN) 06/07/19 1025          ASSESSMENT and PLAN    * S/P liver transplant  Managed per LTS.   avoid hypoglycemia  Lab  Results   Component Value Date     (H) 06/07/2019     (H) 06/07/2019    GGT 66 (H) 06/07/2019    ALKPHOS 55 06/07/2019    BILITOT 1.8 (H) 06/07/2019           Acute hyperglycemia  BG goal 140 - 180       rewrite transition insulin infusion at 0.7 u/hr.   BG monitoring ac/hs/0200 and change to low dose correction scale given kidney function.       Adrenal cortical steroids causing adverse effect in therapeutic use  Glucocorticoids markedly increase glucoses. Expect the steroid taper will help glucose control.       ANTONIO (acute kidney injury)  Titrate insulin slowly to avoid hypoglycemia as the risk of hypoglycemia increases with decreased creatinine clearance.    Estimated Creatinine Clearance: 35.2 mL/min (A) (based on SCr of 2.2 mg/dL (H)).        Prophylactic immunotherapy  May increase insulin resistance.         Hypothyroidism  Managed per primary  Levothyroxine 112 mcg    Morbid obesity  May increase insulin resistance.   Body mass index is 35.88 kg/m².            Samra Cross NP  Endocrinology  [facility]

## 2019-06-08 NOTE — PROGRESS NOTES
Right pupil is greater than left, has been that way since birth.  Patient has skin breakdown on buttocks and in skin folds, wound care consult placed.

## 2019-06-08 NOTE — PLAN OF CARE
Problem: Adult Inpatient Plan of Care  Goal: Plan of Care Review  Outcome: Ongoing (interventions implemented as appropriate)  Patient is AAO one person assist with ambulation to the bathroom. Patients is POD#3 liver transplant. Enzymes trending down.  Hernandes dcd this am, oliguric, creatinine elevated, lasix challenge produced very little urine. Appetite fair, passing gas, no bm thus far.  Chevron painted with betadine, educated patient and patients family on how to paint the incision with betadine.  Self med education performed this am with patient, patients daughter, and patients .  CT putting out moderate ss drainage to water seal, patient ambulated to chair this am. R JAXON draining moderate ss fluid, leaking from RONAK site.  Dressing changed multiple times today to RONAK drain, dressing also changed to CT, biopatch applied with transparent dressing.  Antifungal powder applied blow the breast.  Black top barrier cream applied to folds where breakdown is noted, exudry applied to the right fold where moisture collects.BG range 121-162, on transition insulin drip.  Patient is tearful at times, and VERY sensitive to pain.

## 2019-06-08 NOTE — ASSESSMENT & PLAN NOTE
- LFTs improving daily  - PO Liver US with good allograft perfusion  - Passing flatus, continue stool softeners  - RONAK drain x 1 with SS fluid  - Chevron incision clean, dry, intact with staples in place  - Encourage ambulation. Encourage IS

## 2019-06-08 NOTE — ASSESSMENT & PLAN NOTE
- CXR 5/30 w/ large right pleural effusion  - Thoracentesis 5/31, 1.5L removed. Fluid sent for analysis.  - Chest tube placed in OR, to water seal currently  - Chest tube output 230 cc past 24 hours

## 2019-06-08 NOTE — ASSESSMENT & PLAN NOTE
Titrate insulin slowly to avoid hypoglycemia as the risk of hypoglycemia increases with decreased creatinine clearance.    Estimated Creatinine Clearance: 35.2 mL/min (A) (based on SCr of 2.2 mg/dL (H)).

## 2019-06-08 NOTE — ASSESSMENT & PLAN NOTE
Managed per LTS.   avoid hypoglycemia  Lab Results   Component Value Date    ALT 82 (H) 06/08/2019    AST 69 (H) 06/08/2019     (H) 06/08/2019    ALKPHOS 86 06/08/2019    BILITOT 1.6 (H) 06/08/2019

## 2019-06-08 NOTE — ASSESSMENT & PLAN NOTE
- Continue prograf, cellcept, steroids.  - Continue to monitor prograf level daily, monitor for toxic side effects, and adjust for therapeutic dose.

## 2019-06-08 NOTE — SUBJECTIVE & OBJECTIVE
Scheduled Meds:   docusate sodium  100 mg Oral TID PC    famotidine  20 mg Oral QHS    heparin (porcine)  5,000 Units Subcutaneous Q8H    levothyroxine  112 mcg Oral Daily    lidocaine HCL 10 mg/ml (1%)  10 mL Intradermal Once    methylPREDNISolone sodium succinate  80 mg Intravenous Q12H    Followed by    [START ON 6/9/2019] methylPREDNISolone sodium succinate  60 mg Intravenous Q12H    Followed by    [START ON 6/10/2019] methylPREDNISolone sodium succinate  40 mg Intravenous Q12H    Followed by    [START ON 6/11/2019] methylPREDNISolone sodium succinate  20 mg Intravenous Q12H    Followed by    [START ON 6/12/2019] predniSONE  20 mg Oral Daily    miconazole NITRATE 2 %   Topical (Top) BID    mycophenolate  1,000 mg Oral BID    posaconazole  300 mg Oral Daily    sulfamethoxazole-trimethoprim 400-80mg  1 tablet Oral Daily    tacrolimus  1 mg Oral BID    [START ON 6/16/2019] valGANciclovir  450 mg Oral Daily     Continuous Infusions:   insulin (HUMAN R) infusion (adults) 0.7 Units/hr (06/08/19 0746)     PRN Meds:calcium gluconate IVPB, calcium gluconate IVPB, calcium gluconate IVPB, Dextrose 10% Bolus, Dextrose 10% Bolus, glucagon (human recombinant), glucose, glucose, insulin aspart U-100, magnesium sulfate IVPB, magnesium sulfate IVPB, ondansetron, oxyCODONE, oxyCODONE, potassium chloride in water **AND** potassium chloride in water **AND** potassium chloride in water, simethicone, sodium phosphate IVPB, sodium phosphate IVPB, sodium phosphate IVPB    Review of Systems   Constitutional: Positive for activity change and appetite change. Negative for chills and fever.   Respiratory: Negative for cough, shortness of breath and wheezing.    Cardiovascular: Positive for leg swelling.   Gastrointestinal: Positive for abdominal pain and nausea. Negative for diarrhea and vomiting.   Genitourinary: Positive for decreased urine volume. Negative for hematuria.   Skin: Positive for wound.    Allergic/Immunologic: Positive for immunocompromised state.   Neurological: Negative for tremors and speech difficulty.   Psychiatric/Behavioral: Negative for confusion and decreased concentration. The patient is nervous/anxious.      Objective:     Vital Signs (Most Recent):  Temp: 98.1 °F (36.7 °C) (06/08/19 0715)  Pulse: 82 (06/08/19 0715)  Resp: 19 (06/08/19 0715)  BP: 132/85 (06/08/19 0715)  SpO2: 97 % (06/08/19 0715) Vital Signs (24h Range):  Temp:  [97.9 °F (36.6 °C)-98.5 °F (36.9 °C)] 98.1 °F (36.7 °C)  Pulse:  [70-85] 82  Resp:  [14-43] 19  SpO2:  [95 %-98 %] 97 %  BP: (120-142)/(58-89) 132/85  Arterial Line BP: (146-167)/(65-79) 155/71     Weight: 98.5 kg (217 lb 2.5 oz)  Body mass index is 36.14 kg/m².    Intake/Output - Last 3 Shifts       06/06 0700 - 06/07 0659 06/07 0700 - 06/08 0659 06/08 0700 - 06/09 0659    P.O.  720     I.V. (mL/kg) 3034 (31) 447.6 (4.5)     Blood 32.7      Other  0     NG/      Total Intake(mL/kg) 3186.7 (32.6) 1167.6 (11.9)     Urine (mL/kg/hr) 385 (0.2) 389 (0.2)     Emesis/NG output  0     Drains 1665 480     Other  0     Stool  0     Blood  0     Chest Tube 320 260     Total Output 2370 1129     Net +816.7 +38.6            Urine Occurrence  0 x     Stool Occurrence  0 x     Emesis Occurrence  0 x           Physical Exam   Constitutional: She is oriented to person, place, and time. No distress.   Cardiovascular: Normal rate and regular rhythm. Exam reveals no friction rub.   No murmur heard.  Pulmonary/Chest: Effort normal. She has decreased breath sounds in the right lower field. She has no wheezes. She has no rales.   Chest tube in place to water seal   Abdominal: Soft. There is tenderness. There is no rebound and no guarding.       Neurological: She is alert and oriented to person, place, and time.   Skin: She is not diaphoretic.   Psychiatric: She has a normal mood and affect. Her behavior is normal. Judgment and thought content normal.   Nursing note and vitals  reviewed.      Laboratory:  Immunosuppressants         Stop Route Frequency     mycophenolate capsule 1,000 mg      -- Oral 2 times daily     tacrolimus capsule 1 mg      -- Oral 2 times daily        CBC:   Recent Labs   Lab 06/08/19  0514   WBC 3.86*   RBC 2.64*   HGB 8.3*   HCT 24.5*   PLT 57*   MCV 93   MCH 31.4*   MCHC 33.9     CMP:   Recent Labs   Lab 06/08/19  0514   *   CALCIUM 7.5*   ALBUMIN 2.9*   PROT 4.7*   *   K 4.1   CO2 19*      BUN 31*   CREATININE 2.2*   ALKPHOS 86   ALT 82*   AST 69*   BILITOT 1.6*     Labs within the past 24 hours have been reviewed.    Diagnostic Results:  None

## 2019-06-08 NOTE — PROGRESS NOTES
[facility]  Liver Transplant  Progress Note    Patient Name: Jihan Zamudio  MRN: [id number]  Admission Date: 2019  Hospital Length of Stay: 23 days  Code Status: Prior  Primary Care Provider: Primary Doctor No  Post-Operative Day: 3    ORGAN:   LIVER  Disease Etiology: Cirrhosis: Fatty Liver ([place])  Donor Type:    - Brain Death  [facility] High Risk:   No  Donor CMV Status:   Donor CMV Status: Positive  Donor HBcAB:   Negative  Donor HCV Status:   Negative  Donor HBV GERTRUDIS: Negative  Donor HCV GERTRUDIS: Negative  Whole or Partial: Whole Liver  Biliary Anastomosis: End to End  Arterial Anatomy: Standard  Subjective:     History of Present Illness:  This is a 48 yo F with hx of K[place] cirrhosis c/b varices, latent TB, GERD, hypothyroidism, lap band , HLD who was sent to the ED from hepatology clinic for shortness of breath. She follows with Dr. Smallwood. She reports feeling more short of breath the past few days and increased abdominal swelling. She reports occasional confusion as well. She was found to have a low sodium as well. With regards to her lung nodule, we have been awaiting films for an outside facility for comparison. She was found to have a large pleural effusion while here, something she has not had before. Pulmonology has been consulted.      Hospital Course:  Pt approved for liver transplant and transferred to LTS on . Pt w/ SOB. Last Thoracentesis on . Cultures NGTD. Remains on Rocephin x 5 days, per ID recs, to complete on . Ongoing issue with hyponatremia, improving with albumin daily. Na cont to improve with free water restriction and albumin administration.    She is now s/p OLTx on 19. She was transferred from ICU to TSU on POD#2.     Interval Hx. No acute events overnight. Patient transferred to TSU from ICU yesterday. This morning, she is doing well. Reports incisional pain. Abdomen soft on exam. Passing flatus. LFTs trending down nicely. RONAK drain x 1 with SS  fluid. Chest tube in place, output 230 cc past 24 hours. She does have decreased UOP past 24 hours. She received albumin x 1 overnight and lasix 40 yesterday for decreased UOP. She appears hypervolemic on exam. Plan to give 100 IV Lasix x 1 today. Encourage ambulation. Encourage IS.     Scheduled Meds:   docusate sodium  100 mg Oral TID PC    famotidine  20 mg Oral QHS    heparin (porcine)  5,000 Units Subcutaneous Q8H    levothyroxine  112 mcg Oral Daily    lidocaine HCL 10 mg/ml (1%)  10 mL Intradermal Once    methylPREDNISolone sodium succinate  80 mg Intravenous Q12H    Followed by    [START ON 6/9/2019] methylPREDNISolone sodium succinate  60 mg Intravenous Q12H    Followed by    [START ON 6/10/2019] methylPREDNISolone sodium succinate  40 mg Intravenous Q12H    Followed by    [START ON 6/11/2019] methylPREDNISolone sodium succinate  20 mg Intravenous Q12H    Followed by    [START ON 6/12/2019] predniSONE  20 mg Oral Daily    miconazole NITRATE 2 %   Topical (Top) BID    mycophenolate  1,000 mg Oral BID    posaconazole  300 mg Oral Daily    sulfamethoxazole-trimethoprim 400-80mg  1 tablet Oral Daily    tacrolimus  1 mg Oral BID    [START ON 6/16/2019] valGANciclovir  450 mg Oral Daily     Continuous Infusions:   insulin (HUMAN R) infusion (adults) 0.7 Units/hr (06/08/19 0746)     PRN Meds:calcium gluconate IVPB, calcium gluconate IVPB, calcium gluconate IVPB, Dextrose 10% Bolus, Dextrose 10% Bolus, glucagon (human recombinant), glucose, glucose, insulin aspart U-100, magnesium sulfate IVPB, magnesium sulfate IVPB, ondansetron, oxyCODONE, oxyCODONE, potassium chloride in water **AND** potassium chloride in water **AND** potassium chloride in water, simethicone, sodium phosphate IVPB, sodium phosphate IVPB, sodium phosphate IVPB    Review of Systems   Constitutional: Positive for activity change and appetite change. Negative for chills and fever.   Respiratory: Negative for cough, shortness of  breath and wheezing.    Cardiovascular: Positive for leg swelling.   Gastrointestinal: Positive for abdominal pain and nausea. Negative for diarrhea and vomiting.   Genitourinary: Positive for decreased urine volume. Negative for hematuria.   Skin: Positive for wound.   Allergic/Immunologic: Positive for immunocompromised state.   Neurological: Negative for tremors and speech difficulty.   Psychiatric/Behavioral: Negative for confusion and decreased concentration. The patient is nervous/anxious.      Objective:     Vital Signs (Most Recent):  Temp: 98.1 °F (36.7 °C) (06/08/19 0715)  Pulse: 82 (06/08/19 0715)  Resp: 19 (06/08/19 0715)  BP: 132/85 (06/08/19 0715)  SpO2: 97 % (06/08/19 0715) Vital Signs (24h Range):  Temp:  [97.9 °F (36.6 °C)-98.5 °F (36.9 °C)] 98.1 °F (36.7 °C)  Pulse:  [70-85] 82  Resp:  [14-43] 19  SpO2:  [95 %-98 %] 97 %  BP: (120-142)/(58-89) 132/85  Arterial Line BP: (146-167)/(65-79) 155/71     Weight: 98.5 kg (217 lb 2.5 oz)  Body mass index is 36.14 kg/m².    Intake/Output - Last 3 Shifts       06/06 0700 - 06/07 0659 06/07 0700 - 06/08 0659 06/08 0700 - 06/09 0659    P.O.  720     I.V. (mL/kg) 3034 (31) 447.6 (4.5)     Blood 32.7      Other  0     NG/      Total Intake(mL/kg) 3186.7 (32.6) 1167.6 (11.9)     Urine (mL/kg/hr) 385 (0.2) 389 (0.2)     Emesis/NG output  0     Drains 1665 480     Other  0     Stool  0     Blood  0     Chest Tube 320 260     Total Output 2370 1129     Net +816.7 +38.6            Urine Occurrence  0 x     Stool Occurrence  0 x     Emesis Occurrence  0 x           Physical Exam   Constitutional: She is oriented to person, place, and time. No distress.   Cardiovascular: Normal rate and regular rhythm. Exam reveals no friction rub.   No murmur heard.  Pulmonary/Chest: Effort normal. She has decreased breath sounds in the right lower field. She has no wheezes. She has no rales.   Chest tube in place to water seal   Abdominal: Soft. There is tenderness. There is no  rebound and no guarding.       Neurological: She is alert and oriented to person, place, and time.   Skin: She is not diaphoretic.   Psychiatric: She has a normal mood and affect. Her behavior is normal. Judgment and thought content normal.   Nursing note and vitals reviewed.      Laboratory:  Immunosuppressants         Stop Route Frequency     mycophenolate capsule 1,000 mg      -- Oral 2 times daily     tacrolimus capsule 1 mg      -- Oral 2 times daily        CBC:   Recent Labs   Lab 06/08/19  0514   WBC 3.86*   RBC 2.64*   HGB 8.3*   HCT 24.5*   PLT 57*   MCV 93   MCH 31.4*   MCHC 33.9     CMP:   Recent Labs   Lab 06/08/19  0514   *   CALCIUM 7.5*   ALBUMIN 2.9*   PROT 4.7*   *   K 4.1   CO2 19*      BUN 31*   CREATININE 2.2*   ALKPHOS 86   ALT 82*   AST 69*   BILITOT 1.6*     Labs within the past 24 hours have been reviewed.    Diagnostic Results:  None    Assessment/Plan:     * S/P liver transplant  - LFTs improving daily  - PO Liver US with good allograft perfusion  - Passing flatus, continue stool softeners  - RONAK drain x 1 with SS fluid  - Chevron incision clean, dry, intact with staples in place  - Encourage ambulation. Encourage IS      ANTONIO (acute kidney injury)  - Creatinine rising with decreased UOP  - Plan to give 100 IV Lasix x 1 today.   - Continue to monitor      Long-term use of immunosuppressant medication  - Continue prograf, cellcept, steroids.  - Continue to monitor prograf level daily, monitor for toxic side effects, and adjust for therapeutic dose.      Acute blood loss anemia  - Expected post operatively  - See anemia of chronic disease.       Acute hyperglycemia  - Endocrine consulted for help with management. Appreciate their assistance.       Prophylactic immunotherapy  - See long term use of immunosuppressant medication.      Anemia of chronic disease  - H/H stable. Continue to monitor with daily labs.       Thrombocytopenia  - Due to hx of ESLD  - Likely to continue to  improve post transplant  - Cont to monitor.       Pleural effusion, right  - CXR 5/30 w/ large right pleural effusion  - Thoracentesis 5/31, 1.5L removed. Fluid sent for analysis.  - Chest tube placed in OR, to water seal currently  - Chest tube output 230 cc past 24 hours      Weakness  - PT/OT following.       Hyponatremia  - Stable  - Continue to monitor with daily labs. 2/2 chronic liver disease      Moderate malnutrition  - Dietician consulted  - Boost supplements ordered.       GERD (gastroesophageal reflux disease)  - chronic and stable.  Cont Pantoprazole.     - Cont PPI      Hypothyroidism  - Chronic and stable.  - Continue Synthroid 112mcg PO daily.          Debility/Functional status: Patient debilitated by evidence of Weakness. Physical and occupational therapy ordered daily to evaluate and treat. Debility was: present on admission.    VTE Risk Mitigation (From admission, onward)        Ordered     heparin (porcine) injection 5,000 Units  Every 8 hours      06/06/19 0210     Place sequential compression device  Until discontinued      06/06/19 0211     IP VTE HIGH RISK PATIENT  Once      06/06/19 0210          The patients clinical status was discussed at multidisplinary rounds, involving transplant surgery, transplant medicine, pharmacy, nursing, nutrition, and social work    Discharge Planning: Not a candidate for discharge at this time.   No Patient Care Coordination Note on file.      Lisa Andrew PA-C  Liver Transplant  [facility]

## 2019-06-08 NOTE — SUBJECTIVE & OBJECTIVE
"Interval HPI:   Overnight events: Remains in TSU. NAEON. BG well controlled on insulin infusion; rate decreased to 0.8 u/hr per orders. Solumedrol 80 mg BID; standard steroid taper per primary. Creatinine 2.2.   Eatin%  Or less   Nausea: No  Hypoglycemia and intervention: No  Fever: No  TPN and/or TF: No    /79   Pulse 80   Temp 97.9 °F (36.6 °C) (Oral)   Resp 20   Ht 5' 5" (1.651 m)   Wt 98.5 kg (217 lb 2.5 oz)   SpO2 96%   Breastfeeding? No   BMI 36.14 kg/m²     Labs Reviewed and Include    Recent Labs   Lab 19  0514   *   CALCIUM 7.5*   ALBUMIN 2.9*   PROT 4.7*   *   K 4.1   CO2 19*      BUN 31*   CREATININE 2.2*   ALKPHOS 86   ALT 82*   AST 69*   BILITOT 1.6*     Lab Results   Component Value Date    WBC 3.86 (L) 2019    HGB 8.3 (L) 2019    HCT 24.5 (L) 2019    MCV 93 2019    PLT 57 (L) 2019     No results for input(s): TSH, FREET4 in the last 168 hours.  Lab Results   Component Value Date    HGBA1C 4.1 2019       Nutritional status:   Body mass index is 36.14 kg/m².  Lab Results   Component Value Date    ALBUMIN 2.9 (L) 2019    ALBUMIN 2.8 (L) 2019    ALBUMIN 2.4 (L) 2019     Lab Results   Component Value Date    PREALBUMIN 3 (L) 2019       Estimated Creatinine Clearance: 35.2 mL/min (A) (based on SCr of 2.2 mg/dL (H)).    Accu-Checks  Recent Labs     19  0301 19  0351 19  0458 19  0603 19  0834 19  0920 19  1112 19  1752 19  2145 19  0158   POCTGLUCOSE 149* 144* 139* 134* 103 140* 166* 150* 134* 124*       Current Medications and/or Treatments Impacting Glycemic Control  Immunotherapy:    Immunosuppressants         Stop Route Frequency     mycophenolate capsule 1,000 mg      -- Oral 2 times daily     tacrolimus capsule 1 mg      -- Oral 2 times daily        Steroids:   Hormones (From admission, onward)    Start     Stop Route Frequency Ordered    " 06/12/19 0900  predniSONE tablet 20 mg  (methylprednisolone taper panel)      -- Oral Daily 06/06/19 0210 06/11/19 0900  methylPREDNISolone sodium succinate injection 20 mg  (methylprednisolone taper panel)      06/12 0859 IV Every 12 hours 06/06/19 0210    06/10/19 0900  methylPREDNISolone sodium succinate injection 40 mg  (methylprednisolone taper panel)      06/11 0859 IV Every 12 hours 06/06/19 0210 06/09/19 0900  methylPREDNISolone sodium succinate injection 60 mg  (methylprednisolone taper panel)      06/10 0859 IV Every 12 hours 06/06/19 0210 06/08/19 0900  methylPREDNISolone sodium succinate injection 80 mg  (methylprednisolone taper panel)      06/09 0859 IV Every 12 hours 06/06/19 0210 06/04/19 0515  methylPREDNISolone sodium succinate injection 500 mg  (Med - Immunosuppression Induction Therapy (Methylprednisolone))      06/04 1714 IV Once pre-op 06/04/19 0415        Pressors:    Autonomic Drugs (From admission, onward)    None        Hyperglycemia/Diabetes Medications:   Antihyperglycemics (From admission, onward)    Start     Stop Route Frequency Ordered    06/07/19 1130  insulin regular (Humulin R) 100 Units in sodium chloride 0.9% 100 mL infusion      -- IV Continuous 06/07/19 1025    06/07/19 1125  insulin aspart U-100 pen 0-10 Units      -- SubQ As needed (PRN) 06/07/19 1025

## 2019-06-08 NOTE — NURSING
Notified PA Laura of pt's output thus far this shift is 100cc. PA will look into pt's chart. Will continue to monitor.    0020: Albumin ordered. Will continue to monitor output.

## 2019-06-08 NOTE — PLAN OF CARE
Problem: Adult Inpatient Plan of Care  Goal: Plan of Care Review  Outcome: Ongoing (interventions implemented as appropriate)  AAOx3, afebrile, c/o pain. BG monitored ACHS 2am and tx per orders. Insulin gtt @0.8units/hr. Telemetry monitor in place (NSR). Pt on full liquid diet. Albumin given for low urine output overnight. Hernandes in place. Rt pleural Chest tube to water seal. RONAK drain to rt side- ss drainage. Leaking around site. Surgical dressing intact. Self meds to be set up and taught. Foam dressing changed to sacrum.  Pt being repositioned Q2 hrs. Pt in lowest position, side rails up x2, SCD on, non-skid foot wear in place, call light within reach, pt verbalized understanding to call RN when needed. Will continue to monitor.

## 2019-06-08 NOTE — ASSESSMENT & PLAN NOTE
- Creatinine rising with decreased UOP  - Plan to give 100 IV Lasix x 1 today.   - Continue to monitor

## 2019-06-09 LAB
ALBUMIN SERPL BCP-MCNC: 2.8 G/DL (ref 3.5–5.2)
ALP SERPL-CCNC: 130 U/L (ref 55–135)
ALT SERPL W/O P-5'-P-CCNC: 80 U/L (ref 10–44)
ANION GAP SERPL CALC-SCNC: 11 MMOL/L (ref 8–16)
AST SERPL-CCNC: 63 U/L (ref 10–40)
BASOPHILS # BLD AUTO: 0.01 K/UL (ref 0–0.2)
BASOPHILS NFR BLD: 0.2 % (ref 0–1.9)
BILIRUB SERPL-MCNC: 1.5 MG/DL (ref 0.1–1)
BUN SERPL-MCNC: 49 MG/DL (ref 6–20)
CALCIUM SERPL-MCNC: 7.6 MG/DL (ref 8.7–10.5)
CHLORIDE SERPL-SCNC: 99 MMOL/L (ref 95–110)
CO2 SERPL-SCNC: 18 MMOL/L (ref 23–29)
CREAT SERPL-MCNC: 2.6 MG/DL (ref 0.5–1.4)
DIFFERENTIAL METHOD: ABNORMAL
EOSINOPHIL # BLD AUTO: 0 K/UL (ref 0–0.5)
EOSINOPHIL NFR BLD: 0 % (ref 0–8)
ERYTHROCYTE [DISTWIDTH] IN BLOOD BY AUTOMATED COUNT: 17.7 % (ref 11.5–14.5)
EST. GFR  (AFRICAN AMERICAN): 23.7 ML/MIN/1.73 M^2
EST. GFR  (NON AFRICAN AMERICAN): 20.6 ML/MIN/1.73 M^2
GLUCOSE SERPL-MCNC: 124 MG/DL (ref 70–110)
HCT VFR BLD AUTO: 26.3 % (ref 37–48.5)
HGB BLD-MCNC: 8.9 G/DL (ref 12–16)
IMM GRANULOCYTES # BLD AUTO: 0.11 K/UL (ref 0–0.04)
IMM GRANULOCYTES NFR BLD AUTO: 1.7 % (ref 0–0.5)
LYMPHOCYTES # BLD AUTO: 0.6 K/UL (ref 1–4.8)
LYMPHOCYTES NFR BLD: 9.4 % (ref 18–48)
MAGNESIUM SERPL-MCNC: 2.5 MG/DL (ref 1.6–2.6)
MCH RBC QN AUTO: 31.4 PG (ref 27–31)
MCHC RBC AUTO-ENTMCNC: 33.8 G/DL (ref 32–36)
MCV RBC AUTO: 93 FL (ref 82–98)
MONOCYTES # BLD AUTO: 0.2 K/UL (ref 0.3–1)
MONOCYTES NFR BLD: 3.5 % (ref 4–15)
NEUTROPHILS # BLD AUTO: 5.7 K/UL (ref 1.8–7.7)
NEUTROPHILS NFR BLD: 85.2 % (ref 38–73)
NRBC BLD-RTO: 0 /100 WBC
PLATELET # BLD AUTO: 88 K/UL (ref 150–350)
PMV BLD AUTO: 11.7 FL (ref 9.2–12.9)
POCT GLUCOSE: 129 MG/DL (ref 70–110)
POCT GLUCOSE: 133 MG/DL (ref 70–110)
POCT GLUCOSE: 137 MG/DL (ref 70–110)
POCT GLUCOSE: 147 MG/DL (ref 70–110)
POTASSIUM SERPL-SCNC: 4.2 MMOL/L (ref 3.5–5.1)
PROT SERPL-MCNC: 4.8 G/DL (ref 6–8.4)
RBC # BLD AUTO: 2.83 M/UL (ref 4–5.4)
SODIUM SERPL-SCNC: 128 MMOL/L (ref 136–145)
TACROLIMUS BLD-MCNC: 8.6 NG/ML (ref 5–15)
WBC # BLD AUTO: 6.62 K/UL (ref 3.9–12.7)

## 2019-06-09 PROCEDURE — 25000003 PHARM REV CODE 250: Performed by: PHYSICIAN ASSISTANT

## 2019-06-09 PROCEDURE — 80053 COMPREHEN METABOLIC PANEL: CPT

## 2019-06-09 PROCEDURE — 25000003 PHARM REV CODE 250: Performed by: STUDENT IN AN ORGANIZED HEALTH CARE EDUCATION/TRAINING PROGRAM

## 2019-06-09 PROCEDURE — 63600175 PHARM REV CODE 636 W HCPCS: Performed by: NURSE PRACTITIONER

## 2019-06-09 PROCEDURE — 99233 PR SUBSEQUENT HOSPITAL CARE,LEVL III: ICD-10-PCS | Mod: 24,,, | Performed by: PHYSICIAN ASSISTANT

## 2019-06-09 PROCEDURE — 99223 1ST HOSP IP/OBS HIGH 75: CPT | Mod: ,,, | Performed by: INTERNAL MEDICINE

## 2019-06-09 PROCEDURE — 20600001 HC STEP DOWN PRIVATE ROOM

## 2019-06-09 PROCEDURE — 25000003 PHARM REV CODE 250: Performed by: TRANSPLANT SURGERY

## 2019-06-09 PROCEDURE — 25000003 PHARM REV CODE 250: Performed by: NURSE PRACTITIONER

## 2019-06-09 PROCEDURE — 99233 SBSQ HOSP IP/OBS HIGH 50: CPT | Mod: 24,,, | Performed by: PHYSICIAN ASSISTANT

## 2019-06-09 PROCEDURE — 99232 PR SUBSEQUENT HOSPITAL CARE,LEVL II: ICD-10-PCS | Mod: ,,, | Performed by: NURSE PRACTITIONER

## 2019-06-09 PROCEDURE — 63600175 PHARM REV CODE 636 W HCPCS: Performed by: TRANSPLANT SURGERY

## 2019-06-09 PROCEDURE — 85025 COMPLETE CBC W/AUTO DIFF WBC: CPT

## 2019-06-09 PROCEDURE — 99223 PR INITIAL HOSPITAL CARE,LEVL III: ICD-10-PCS | Mod: ,,, | Performed by: INTERNAL MEDICINE

## 2019-06-09 PROCEDURE — 99232 SBSQ HOSP IP/OBS MODERATE 35: CPT | Mod: ,,, | Performed by: NURSE PRACTITIONER

## 2019-06-09 PROCEDURE — 63600175 PHARM REV CODE 636 W HCPCS: Performed by: STUDENT IN AN ORGANIZED HEALTH CARE EDUCATION/TRAINING PROGRAM

## 2019-06-09 PROCEDURE — 83735 ASSAY OF MAGNESIUM: CPT

## 2019-06-09 PROCEDURE — 80197 ASSAY OF TACROLIMUS: CPT

## 2019-06-09 RX ORDER — SODIUM BICARBONATE 650 MG/1
1300 TABLET ORAL 2 TIMES DAILY
Status: DISCONTINUED | OUTPATIENT
Start: 2019-06-09 | End: 2019-06-11 | Stop reason: HOSPADM

## 2019-06-09 RX ADMIN — POSACONAZOLE 300 MG: 100 TABLET, COATED ORAL at 08:06

## 2019-06-09 RX ADMIN — HEPARIN SODIUM 5000 UNITS: 5000 INJECTION, SOLUTION INTRAVENOUS; SUBCUTANEOUS at 05:06

## 2019-06-09 RX ADMIN — SODIUM CHLORIDE 0.4 UNITS/HR: 9 INJECTION, SOLUTION INTRAVENOUS at 09:06

## 2019-06-09 RX ADMIN — MICONAZOLE NITRATE: 20 POWDER TOPICAL at 08:06

## 2019-06-09 RX ADMIN — MYCOPHENOLATE MOFETIL 1000 MG: 250 CAPSULE ORAL at 08:06

## 2019-06-09 RX ADMIN — FAMOTIDINE 20 MG: 20 TABLET, FILM COATED ORAL at 08:06

## 2019-06-09 RX ADMIN — OXYCODONE HYDROCHLORIDE 5 MG: 5 TABLET ORAL at 01:06

## 2019-06-09 RX ADMIN — LEVOTHYROXINE SODIUM 112 MCG: 112 TABLET ORAL at 05:06

## 2019-06-09 RX ADMIN — SODIUM BICARBONATE 650 MG TABLET 1300 MG: at 08:06

## 2019-06-09 RX ADMIN — TACROLIMUS 1 MG: 1 CAPSULE ORAL at 08:06

## 2019-06-09 RX ADMIN — DOCUSATE SODIUM 100 MG: 100 CAPSULE, LIQUID FILLED ORAL at 08:06

## 2019-06-09 RX ADMIN — METHYLPREDNISOLONE SODIUM SUCCINATE 60 MG: 125 INJECTION, POWDER, FOR SOLUTION INTRAMUSCULAR; INTRAVENOUS at 08:06

## 2019-06-09 RX ADMIN — DOCUSATE SODIUM 100 MG: 100 CAPSULE, LIQUID FILLED ORAL at 01:06

## 2019-06-09 RX ADMIN — ONDANSETRON 8 MG: 8 TABLET, ORALLY DISINTEGRATING ORAL at 01:06

## 2019-06-09 RX ADMIN — HEPARIN SODIUM 5000 UNITS: 5000 INJECTION, SOLUTION INTRAVENOUS; SUBCUTANEOUS at 01:06

## 2019-06-09 RX ADMIN — HEPARIN SODIUM 5000 UNITS: 5000 INJECTION, SOLUTION INTRAVENOUS; SUBCUTANEOUS at 08:06

## 2019-06-09 RX ADMIN — OXYCODONE HYDROCHLORIDE 5 MG: 5 TABLET ORAL at 05:06

## 2019-06-09 RX ADMIN — SULFAMETHOXAZOLE AND TRIMETHOPRIM 1 TABLET: 400; 80 TABLET ORAL at 08:06

## 2019-06-09 NOTE — ASSESSMENT & PLAN NOTE
Managed per LTS.   avoid hypoglycemia  Lab Results   Component Value Date    ALT 80 (H) 06/09/2019    AST 63 (H) 06/09/2019     (H) 06/08/2019    ALKPHOS 130 06/09/2019    BILITOT 1.5 (H) 06/09/2019

## 2019-06-09 NOTE — SUBJECTIVE & OBJECTIVE
Scheduled Meds:   docusate sodium  100 mg Oral TID PC    famotidine  20 mg Oral QHS    heparin (porcine)  5,000 Units Subcutaneous Q8H    levothyroxine  112 mcg Oral Daily    lidocaine HCL 10 mg/ml (1%)  10 mL Intradermal Once    methylPREDNISolone sodium succinate  60 mg Intravenous Q12H    Followed by    [START ON 6/10/2019] methylPREDNISolone sodium succinate  40 mg Intravenous Q12H    Followed by    [START ON 6/11/2019] methylPREDNISolone sodium succinate  20 mg Intravenous Q12H    Followed by    [START ON 6/12/2019] predniSONE  20 mg Oral Daily    miconazole NITRATE 2 %   Topical (Top) BID    mycophenolate  1,000 mg Oral BID    posaconazole  300 mg Oral Daily    sodium bicarbonate  1,300 mg Oral BID    sulfamethoxazole-trimethoprim 400-80mg  1 tablet Oral Daily    tacrolimus  1 mg Oral BID    [START ON 6/16/2019] valGANciclovir  450 mg Oral Daily     Continuous Infusions:   insulin (HUMAN R) infusion (adults) 0.5 Units/hr (06/09/19 0734)     PRN Meds:calcium gluconate IVPB, calcium gluconate IVPB, calcium gluconate IVPB, Dextrose 10% Bolus, Dextrose 10% Bolus, glucagon (human recombinant), glucose, glucose, insulin aspart U-100, magnesium sulfate IVPB, magnesium sulfate IVPB, ondansetron, oxyCODONE, oxyCODONE, potassium chloride in water **AND** potassium chloride in water **AND** potassium chloride in water, simethicone, sodium phosphate IVPB, sodium phosphate IVPB, sodium phosphate IVPB    Review of Systems   Constitutional: Positive for activity change and appetite change. Negative for chills and fever.   Respiratory: Negative for cough, shortness of breath and wheezing.    Cardiovascular: Positive for leg swelling.   Gastrointestinal: Positive for abdominal pain and nausea. Negative for diarrhea and vomiting.   Genitourinary: Positive for decreased urine volume. Negative for hematuria.   Skin: Positive for wound.   Allergic/Immunologic: Positive for immunocompromised state.   Neurological:  Negative for tremors and speech difficulty.   Psychiatric/Behavioral: Negative for confusion and decreased concentration. The patient is nervous/anxious.      Objective:     Vital Signs (Most Recent):  Temp: 97.9 °F (36.6 °C) (06/09/19 0443)  Pulse: 74 (06/09/19 0600)  Resp: 15 (06/09/19 0443)  BP: 126/75 (06/09/19 0443)  SpO2: 97 % (06/09/19 0443) Vital Signs (24h Range):  Temp:  [97.6 °F (36.4 °C)-98.3 °F (36.8 °C)] 97.9 °F (36.6 °C)  Pulse:  [69-80] 74  Resp:  [14-21] 15  SpO2:  [96 %-98 %] 97 %  BP: (126-137)/(72-78) 126/75     Weight: 95.5 kg (210 lb 8.6 oz)  Body mass index is 35.04 kg/m².    Intake/Output - Last 3 Shifts       06/07 0700 - 06/08 0659 06/08 0700 - 06/09 0659 06/09 0700 - 06/10 0659    P.O. 720 360     I.V. (mL/kg) 447.6 (4.5) 16.2 (0.2)     Blood       Other 0 0     NG/GT       Total Intake(mL/kg) 1167.6 (11.9) 376.2 (3.9)     Urine (mL/kg/hr) 389 (0.2) 390 (0.2)     Emesis/NG output 0 0     Drains 480 460     Other 0 0     Stool 0 0     Blood 0 0     Chest Tube 260 375     Total Output 1129 1225     Net +38.6 -848.8            Urine Occurrence 0 x 0 x     Stool Occurrence 0 x 0 x     Emesis Occurrence 0 x 0 x           Physical Exam   Constitutional: She is oriented to person, place, and time. No distress.   Cardiovascular: Normal rate and regular rhythm. Exam reveals no friction rub.   No murmur heard.  Pulmonary/Chest: Effort normal. She has decreased breath sounds in the right lower field. She has no wheezes. She has no rales.   Chest tube in place to water seal   Abdominal: Soft. There is tenderness. There is no rebound and no guarding.       Neurological: She is alert and oriented to person, place, and time.   Skin: She is not diaphoretic.   Psychiatric: She has a normal mood and affect. Her behavior is normal. Judgment and thought content normal.   Nursing note and vitals reviewed.      Laboratory:  Immunosuppressants         Stop Route Frequency     mycophenolate capsule 1,000 mg       -- Oral 2 times daily     tacrolimus capsule 1 mg      -- Oral 2 times daily        CBC:   Recent Labs   Lab 06/09/19  0500   WBC 6.62   RBC 2.83*   HGB 8.9*   HCT 26.3*   PLT 88*   MCV 93   MCH 31.4*   MCHC 33.8     CMP:   Recent Labs   Lab 06/09/19  0500   *   CALCIUM 7.6*   ALBUMIN 2.8*   PROT 4.8*   *   K 4.2   CO2 18*   CL 99   BUN 49*   CREATININE 2.6*   ALKPHOS 130   ALT 80*   AST 63*   BILITOT 1.5*     Labs within the past 24 hours have been reviewed.    Diagnostic Results:  None

## 2019-06-09 NOTE — ASSESSMENT & PLAN NOTE
- CXR 5/30 w/ large right pleural effusion  - Thoracentesis 5/31, 1.5L removed. Fluid sent for analysis.  - Chest tube placed in OR, to water seal currently  - Chest tube inadvertently pulled out overnight on 6/9.   - Chest x-ray following with no acute findings. She is breathing comfortable on RA  - Continue to monitor.

## 2019-06-09 NOTE — ASSESSMENT & PLAN NOTE
BG goal 140 - 180       rewrite transition insulin infusion at 0.4 u/hr.   BG monitoring ac/hs/0200 and change to low dose correction scale given kidney function.     ADDENDUM: BG below goal this AM. Insulin infusion discontinued per orders. BG monitoring ac/hs and low dose correction scale.

## 2019-06-09 NOTE — CONSULTS
[facility]  Nephrology  Consult Note    Patient Name: Jihan Zamudio  MRN: [id number]  Admission Date: 2019  Hospital Length of Stay: 24 days  Attending Provider: Jose E Metzger MD   Primary Care Physician: Primary Doctor No  Principal Problem:S/P liver transplant    Inpatient consult to Nephrology  Consult performed by: Krystian Yang MD  Consult ordered by: Lisa Andrew, PAMckennaC  Reason for consult: ANTONIO         Subjective:     HPI: 48 y/o White woman with hx of KESSLER cirrhosis c/b varices, latent TB, GERD, hypothyroidism, lap band , HLD, no history of chronic kidney disease. Patient has been hospitalized for over three weeks admitted since 2019.  She is now s/p orthoptic Liver transplant with biliary reconstruction 2019. EBL 2500, removal of 2000mL of ascites. 3.8L crystalloid, 3u pRBCs, 2u FFP, 3u Cryo, 3u plts were administered with 720cc of cell saver given back. Hospital course complicated by transudative pleural effusion treated with ceftriaxone and hyponatremia. Nephrology consulted for ANTONIO with worsening sCr from baseline.         Past Medical History:   Diagnosis Date    Cirrhosis     Esophageal varices 2018    Small with no banding     Essential hypertension 2018    Fatty liver 2018    GERD (gastroesophageal reflux disease)     Hx of colonic polyps 2018    On colonoscopy     Hypertension     Hypothyroidism 2018    Kidney stones     Morbid obesity 2018    Lap band with subsequent release    KADEEM (obstructive sleep apnea) 2018    Osteoarthritis 2018    Pulmonary nodule 2018    Vitamin D deficiency 2018       Past Surgical History:   Procedure Laterality Date     SECTION      TIMES 2     CHOLECYSTECTOMY      LAPAROSCOPIC     CYSTOSCOPY W/ STONE MANIPULATION      kidney stone removal    EGD (ESOPHAGOGASTRODUODENOSCOPY) N/A 2019    Performed by Davian Hernández MD at  [facility] ENDO (2ND FLR)    LAPAROSCOPIC GASTRIC BANDING  2006    removal 2009    LIVER BIOPSY  11/29/2017    KESSLER with bridging 11/29/17    TRANSPLANT, LIVER N/A 6/5/2019    Performed by Clemente Brown Jr., MD at [facility] OR 2ND FLR    TRANSPLANT, LIVER N/A 6/4/2019    Performed by Clemente Brown Jr., MD at [facility] OR 2ND FLR       Review of patient's allergies indicates:   Allergen Reactions    Metformin Rash    Pcn [penicillins] Other (See Comments)     Unsure of reaction, states it was as a child. Tolerated rocephin at [facility]     Current Facility-Administered Medications   Medication Frequency    calcium gluconate 1g in dextrose 5% 100mL (ready to mix system) PRN    calcium gluconate 1g in dextrose 5% 100mL (ready to mix system) PRN    calcium gluconate 1g in dextrose 5% 100mL (ready to mix system) PRN    dextrose 10% (D10W) Bolus PRN    dextrose 10% (D10W) Bolus PRN    docusate sodium capsule 100 mg TID PC    famotidine tablet 20 mg QHS    glucagon (human recombinant) injection 1 mg PRN    glucose chewable tablet 16 g PRN    glucose chewable tablet 24 g PRN    heparin (porcine) injection 5,000 Units Q8H    insulin aspart U-100 pen 0-4 Units PRN    insulin regular (Humulin R) 100 Units in sodium chloride 0.9% 100 mL infusion Continuous    levothyroxine tablet 112 mcg Daily    lidocaine HCL 10 mg/ml (1%) injection 10 mL Once    magnesium sulfate 2g in water 50mL IVPB (premix) PRN    magnesium sulfate 2g in water 50mL IVPB (premix) PRN    methylPREDNISolone sodium succinate injection 60 mg Q12H    Followed by    [START ON 6/10/2019] methylPREDNISolone sodium succinate injection 40 mg Q12H    Followed by    [START ON 6/11/2019] methylPREDNISolone sodium succinate injection 20 mg Q12H    Followed by    [START ON 6/12/2019] predniSONE tablet 20 mg Daily    miconazole NITRATE 2 % top powder BID    mycophenolate capsule 1,000 mg BID    ondansetron disintegrating tablet 8 mg Q8H PRN    oxyCODONE  immediate release tablet 5 mg Q4H PRN    oxyCODONE immediate release tablet Tab 10 mg Q4H PRN    posaconazole tablet 300 mg Daily    potassium chloride 40 mEq in 100 mL IVPB (FOR CENTRAL LINE ADMINISTRATION ONLY) PRN    And    potassium chloride 20 mEq in 100 mL IVPB (FOR CENTRAL LINE ADMINISTRATION ONLY) PRN    And    potassium chloride 40 mEq in 100 mL IVPB (FOR CENTRAL LINE ADMINISTRATION ONLY) PRN    simethicone chewable tablet 80 mg TID PRN    sodium bicarbonate tablet 1,300 mg BID    sodium phosphate 15 mmol in dextrose 5 % 250 mL IVPB PRN    sodium phosphate 20.01 mmol in dextrose 5 % 250 mL IVPB PRN    sodium phosphate 30 mmol in dextrose 5 % 250 mL IVPB PRN    sulfamethoxazole-trimethoprim 400-80mg per tablet 1 tablet Daily    [START ON 6/16/2019] valGANciclovir tablet 450 mg Daily     Family History     Problem Relation (Age of Onset)    Breast cancer Maternal Grandmother    Cancer Mother (50), Father (65)    Heart disease Mother, Father        Tobacco Use    Smoking status: Never Smoker    Smokeless tobacco: Never Used    Tobacco comment: patient denies   Substance and Sexual Activity    Alcohol use: No     Comment: patient denies    Drug use: No     Comment: patient denies    Sexual activity: Not on file     Review of Systems   Constitutional: Positive for activity change and appetite change. Negative for chills and fever.   Respiratory: Negative for cough, shortness of breath and wheezing.    Cardiovascular: Negative for leg swelling.   Gastrointestinal: Positive for abdominal pain and nausea. Negative for diarrhea and vomiting.   Genitourinary: Positive for decreased urine volume. Negative for hematuria.   Skin: Negative for wound.   Allergic/Immunologic: Positive for immunocompromised state.   Neurological: Negative for tremors and speech difficulty.     Objective:     Vital Signs (Most Recent):  Temp: 98.2 °F (36.8 °C) (06/09/19 1150)  Pulse: 79 (06/09/19 1200)  Resp: 15 (06/09/19  1200)  BP: 123/79 (06/09/19 1200)  SpO2: 99 % (06/09/19 1200)  O2 Device (Oxygen Therapy): room air (06/09/19 0443) Vital Signs (24h Range):  Temp:  [97.7 °F (36.5 °C)-98.3 °F (36.8 °C)] 98.2 °F (36.8 °C)  Pulse:  [69-80] 79  Resp:  [14-19] 15  SpO2:  [97 %-99 %] 99 %  BP: (123-130)/(72-79) 123/79     Weight: 95.5 kg (210 lb 8.6 oz) (06/09/19 0400)  Body mass index is 35.04 kg/m².  Body surface area is 2.09 meters squared.    I/O last 3 completed shifts:  In: 746.7 [P.O.:720; I.V.:26.7]  Out: 1715 [Urine:590; Drains:670; Chest Tube:455]    Physical Exam   Constitutional: She is oriented to person, place, and time. She appears well-developed.   Obese   Eyes: Conjunctivae are normal.   Neck: No JVD present.   Cardiovascular: Normal rate and intact distal pulses.   No murmur heard.  Pulmonary/Chest: Breath sounds normal.   On RA   Abdominal: Soft. She exhibits no distension.   Abd incision C/D/I     Musculoskeletal: She exhibits no edema.   Neurological: She is alert and oriented to person, place, and time.   Skin: Skin is warm.   Vitals reviewed.      Significant Labs:  All labs within the past 24 hours have been reviewed.    Significant Imaging:  Labs: Reviewed      Assessment/Plan:     ANTONIO (acute kidney injury)  48 yo female with history of KESSLER cirrhosis now s/p orthoptic liver transplant on current admission requiring multiple intra-op blood transfusions now with ANTONIO. Nephrology consulted for management of ANTONIO.    Assessment:  -Worsening sCr post transplant with intra-operative hypotension/HD changes likely cause of ANTONIO (ATN) with decreased UOP, metabolic acidosis and hyonatremia. Also consider calcineurin inhibitor induced ANTONIO   -sCr 2.6 from baseline 0.8    Plan:  -Repeat UA, UPC  -Daily prograf level  -Renal US  -Twice daily BMP  -Avoid NSAIDs/Contrast media and other nephrotoxins  -NaHCO3 650 TID, titrate as needed   -Strict in and out  -No HD needs for now     Thank you for your consult. I will follow-up with  patient. Please contact us if you have any additional questions.    Krystian Yang MD  Nephrology  [facility]

## 2019-06-09 NOTE — ASSESSMENT & PLAN NOTE
Titrate insulin slowly to avoid hypoglycemia as the risk of hypoglycemia increases with decreased creatinine clearance.    Estimated Creatinine Clearance: 29.3 mL/min (A) (based on SCr of 2.6 mg/dL (H)).

## 2019-06-09 NOTE — SUBJECTIVE & OBJECTIVE
"Interval HPI:   Overnight events: Remains in TSU. NAEON. BG at goal on insulin infusion at 0.5 u/hr; not requiring correction scale. NAEON. Solumedrol 40 mg BID;standard steroid taper per primary.   Eatin% or less  Nausea: Yes  Hypoglycemia and intervention: No  Fever: No  TPN and/or TF: No    /78   Pulse 77   Temp 98.3 °F (36.8 °C) (Oral)   Resp 18   Ht 5' 5" (1.651 m)   Wt 95.5 kg (210 lb 8.6 oz)   SpO2 98%   Breastfeeding? No   BMI 35.04 kg/m²     Labs Reviewed and Include    Recent Labs   Lab 19  0500   *   CALCIUM 7.6*   ALBUMIN 2.8*   PROT 4.8*   *   K 4.2   CO2 18*   CL 99   BUN 49*   CREATININE 2.6*   ALKPHOS 130   ALT 80*   AST 63*   BILITOT 1.5*     Lab Results   Component Value Date    WBC 6.62 2019    HGB 8.9 (L) 2019    HCT 26.3 (L) 2019    MCV 93 2019    PLT 88 (L) 2019     No results for input(s): TSH, FREET4 in the last 168 hours.  Lab Results   Component Value Date    HGBA1C 4.1 2019       Nutritional status:   Body mass index is 35.04 kg/m².  Lab Results   Component Value Date    ALBUMIN 2.8 (L) 2019    ALBUMIN 2.9 (L) 2019    ALBUMIN 2.8 (L) 2019     Lab Results   Component Value Date    PREALBUMIN 3 (L) 2019       Estimated Creatinine Clearance: 29.3 mL/min (A) (based on SCr of 2.6 mg/dL (H)).    Accu-Checks  Recent Labs     19  1752 19  2145 19  0158 19  0744 19  1117 19  1640 19  2115 19  0203 19  0727 19  1629   POCTGLUCOSE 150* 134* 124* 121* 162* 143* 142* 137* 129* 133*       Current Medications and/or Treatments Impacting Glycemic Control  Immunotherapy:    Immunosuppressants         Stop Route Frequency     mycophenolate capsule 1,000 mg      -- Oral 2 times daily        Steroids:   Hormones (From admission, onward)    Start     Stop Route Frequency Ordered    19 0900  predniSONE tablet 20 mg  (methylprednisolone taper panel) "      -- Oral Daily 06/06/19 0210    06/11/19 0900  methylPREDNISolone sodium succinate injection 20 mg  (methylprednisolone taper panel)      06/12 0859 IV Every 12 hours 06/06/19 0210    06/10/19 0900  methylPREDNISolone sodium succinate injection 40 mg  (methylprednisolone taper panel)      06/11 0859 IV Every 12 hours 06/06/19 0210    06/09/19 0900  methylPREDNISolone sodium succinate injection 60 mg  (methylprednisolone taper panel)      06/10 0859 IV Every 12 hours 06/06/19 0210 06/04/19 0515  methylPREDNISolone sodium succinate injection 500 mg  (Med - Immunosuppression Induction Therapy (Methylprednisolone))      06/04 1714 IV Once pre-op 06/04/19 0415        Pressors:    Autonomic Drugs (From admission, onward)    None        Hyperglycemia/Diabetes Medications:   Antihyperglycemics (From admission, onward)    Start     Stop Route Frequency Ordered    06/08/19 0839  insulin aspart U-100 pen 0-4 Units      -- SubQ As needed (PRN) 06/08/19 0739    06/07/19 1130  insulin regular (Humulin R) 100 Units in sodium chloride 0.9% 100 mL infusion      -- IV Continuous 06/07/19 1025

## 2019-06-09 NOTE — ASSESSMENT & PLAN NOTE
- LFTs improving daily  - PO Liver US with good allograft perfusion  - Passing flatus, continue stool softeners  - RONAK drain x 1 with SS fluid, will remove today.   - Chevron incision clean, dry, intact with staples in place  - Encourage ambulation. Encourage IS

## 2019-06-09 NOTE — PLAN OF CARE
Problem: Adult Inpatient Plan of Care  Goal: Plan of Care Review  Outcome: Ongoing (interventions implemented as appropriate)  AAOx3, afebrile, c/o pain. PRN pain medication given. BG monitored achs 2am and tx per orders. Insulin gtt @0.7units/hr. Telemetry monitor in place (NSR). Chest tube out upon assessment of pt at beginning of shift. MD stated okay to keep out after xray was performed/ reviewed. See previous noted. 100 cc of urine thus far this shift. Incision care taught to pt's daughter. Self meds not pulled due to pt's  not at bedside. Rt sided raji drain- ss drainage. Fills up as soon as emptied.  Pt passing flatus but no BM thus far. Pt able to position with 1 person assist. Pt did not want to be turned tonight. Pt in lowest position, side rails up x2, non-skid foot wear in place, call light within reach, pt verbalized understanding to call RN when needed. Hand hygiene practiced per protocol. Will continue to monitor.

## 2019-06-09 NOTE — PLAN OF CARE
Problem: Adult Inpatient Plan of Care  Goal: Plan of Care Review  Outcome: Ongoing (interventions implemented as appropriate)  Patient AAO today, tearful at times possibly due to the steroids. Liver enzymes trending down. Cr up, possibly due to lasix dose yesterday, although UO is much improved today. Fist bm since surgery noted today. R RONAK dcd today, site leaking, extra suture placed.  Patient complains of pain, prn pain medication given. Patient needs much encouragement with independence although physically strong, stand by assist with ambulation. BG controlled. Barrier cream applied to folds as well as exudry. Antifungal powder placed under breast. Sacral dressing changed, open but healing, barrier applied.  Patient up to the chair for most of the afternoon.

## 2019-06-09 NOTE — ASSESSMENT & PLAN NOTE
50 yo female with history of KESSLER cirrhosis now s/p orthoptic liver transplant on current admission requiring multiple intra-op blood transfusions now with ANTONIO. Nephrology consulted for management of ANTONIO.    Assessment:  -Worsening sCr post transplant with intra-operative hypotension/HD changes likely cause of ANTONIO (ATN) with decreased UOP, metabolic acidosis and hyonatremia. Also consider calcineurin inhibitor induced ANTONIO   -sCr 2.6 from baseline 0.8    Plan:  -Repeat UA, UPC  -Daily prograf level  -Renal US  -Twice daily BMP  -Avoid NSAIDs/Contrast media and other nephrotoxins  -NaHCO3 650 TID, titrate as needed   -Strict in and out  -No HD needs for now

## 2019-06-09 NOTE — ASSESSMENT & PLAN NOTE
- Creatinine continues to rise with low UOP  - Lasix 100 mg IV x 1 6/8 with minimal increase in UOP.  - Will consult Nephrology for further help with management.  - Continue to monitor

## 2019-06-09 NOTE — NURSING
Spoke to Dr. Mena regarding pt's chest xray. MD stated no need for chest tube at this time. Will continue to monitor.

## 2019-06-09 NOTE — PROGRESS NOTES
"[facility]  Endocrinology  Progress Note    Admit Date: 2019     Reason for Consult: Management of Hyperglycemia     Surgical Procedure and Date: liver transplant 19        HPI:   Patient is a 49 y.o. 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 No personal history of DM. Patient is now s/p liver transplant. Endocrinology consulted for BG/ DM management.         Lab Results   Component Value Date    HGBA1C 4.1 2019       Interval HPI:   Overnight events: Remains in TSU. NAEON. BG well controlled on insulin infusion rate decreased from 0.7 to 0.5 u/hr. Solumedrol 60 mg BID; standard steroid taper per primary.   Eatin% -50%  Nausea: yes  Hypoglycemia and intervention: No  Fever: No  TPN and/or TF: No    /75   Pulse 74   Temp 97.9 °F (36.6 °C) (Oral)   Resp 15   Ht 5' 5" (1.651 m)   Wt 95.5 kg (210 lb 8.6 oz)   SpO2 97%   Breastfeeding? No   BMI 35.04 kg/m²      Labs Reviewed and Include    Recent Labs   Lab 19  0500   *   CALCIUM 7.6*   ALBUMIN 2.8*   PROT 4.8*   *   K 4.2   CO2 18*   CL 99   BUN 49*   CREATININE 2.6*   ALKPHOS 130   ALT 80*   AST 63*   BILITOT 1.5*     Lab Results   Component Value Date    WBC 6.62 2019    HGB 8.9 (L) 2019    HCT 26.3 (L) 2019    MCV 93 2019    PLT 88 (L) 2019     No results for input(s): TSH, FREET4 in the last 168 hours.  Lab Results   Component Value Date    HGBA1C 4.1 2019       Nutritional status:   Body mass index is 35.04 kg/m².  Lab Results   Component Value Date    ALBUMIN 2.8 (L) 2019    ALBUMIN 2.9 (L) 2019    ALBUMIN 2.8 (L) 2019     Lab Results   Component Value Date    PREALBUMIN 3 (L) 2019       Estimated Creatinine Clearance: 29.3 mL/min (A) (based on SCr of 2.6 mg/dL (H)).    Accu-Checks  Recent Labs     19  0920 19  1112 19  1752 19  2145 19  0158 " 06/08/19  0744 06/08/19  1117 06/08/19  1640 06/08/19  2115 06/09/19  0203   POCTGLUCOSE 140* 166* 150* 134* 124* 121* 162* 143* 142* 137*       Current Medications and/or Treatments Impacting Glycemic Control  Immunotherapy:    Immunosuppressants         Stop Route Frequency     mycophenolate capsule 1,000 mg      -- Oral 2 times daily     tacrolimus capsule 1 mg      -- Oral 2 times daily        Steroids:   Hormones (From admission, onward)    Start     Stop Route Frequency Ordered    06/12/19 0900  predniSONE tablet 20 mg  (methylprednisolone taper panel)      -- Oral Daily 06/06/19 0210    06/11/19 0900  methylPREDNISolone sodium succinate injection 20 mg  (methylprednisolone taper panel)      06/12 0859 IV Every 12 hours 06/06/19 0210    06/10/19 0900  methylPREDNISolone sodium succinate injection 40 mg  (methylprednisolone taper panel)      06/11 0859 IV Every 12 hours 06/06/19 0210    06/09/19 0900  methylPREDNISolone sodium succinate injection 60 mg  (methylprednisolone taper panel)      06/10 0859 IV Every 12 hours 06/06/19 0210    06/04/19 0515  methylPREDNISolone sodium succinate injection 500 mg  (Med - Immunosuppression Induction Therapy (Methylprednisolone))      06/04 1714 IV Once pre-op 06/04/19 0415        Pressors:    Autonomic Drugs (From admission, onward)    None        Hyperglycemia/Diabetes Medications:   Antihyperglycemics (From admission, onward)    Start     Stop Route Frequency Ordered    06/08/19 0839  insulin aspart U-100 pen 0-4 Units      -- SubQ As needed (PRN) 06/08/19 0739    06/07/19 1130  insulin regular (Humulin R) 100 Units in sodium chloride 0.9% 100 mL infusion      -- IV Continuous 06/07/19 1025          ASSESSMENT and PLAN    * S/P liver transplant  Managed per LTS.   avoid hypoglycemia  Lab Results   Component Value Date    ALT 82 (H) 06/08/2019    AST 69 (H) 06/08/2019     (H) 06/08/2019    ALKPHOS 86 06/08/2019    BILITOT 1.6 (H) 06/08/2019           Acute  hyperglycemia  BG goal 140 - 180       rewrite transition insulin infusion at 0.5 u/hr.   BG monitoring ac/hs/0200 and change to low dose correction scale given kidney function.       Adrenal cortical steroids causing adverse effect in therapeutic use  Glucocorticoids markedly increase glucoses. Expect the steroid taper will help glucose control.       ANTONIO (acute kidney injury)  Titrate insulin slowly to avoid hypoglycemia as the risk of hypoglycemia increases with decreased creatinine clearance.    Estimated Creatinine Clearance: 35.2 mL/min (A) (based on SCr of 2.2 mg/dL (H)).        Prophylactic immunotherapy  May increase insulin resistance.         Hypothyroidism  Managed per primary  Levothyroxine 112 mcg    Morbid obesity  May increase insulin resistance.   Body mass index is 36.14 kg/m².            Samra Cross NP  Endocrinology  [facility]

## 2019-06-09 NOTE — SUBJECTIVE & OBJECTIVE
Past Medical History:   Diagnosis Date    Cirrhosis     Esophageal varices 2018    Small with no banding     Essential hypertension 2018    Fatty liver 2018    GERD (gastroesophageal reflux disease)     Hx of colonic polyps 2018    On colonoscopy     Hypertension     Hypothyroidism 2018    Kidney stones     Morbid obesity 2018    Lap band with subsequent release    KADEEM (obstructive sleep apnea) 2018    Osteoarthritis 2018    Pulmonary nodule 2018    Vitamin D deficiency 2018       Past Surgical History:   Procedure Laterality Date     SECTION      TIMES 2     CHOLECYSTECTOMY      LAPAROSCOPIC     CYSTOSCOPY W/ STONE MANIPULATION      kidney stone removal    EGD (ESOPHAGOGASTRODUODENOSCOPY) N/A 2019    Performed by Davian Hernández MD at [facility] ENDO (2ND FLR)    LAPAROSCOPIC GASTRIC BANDING  2006    removal     LIVER BIOPSY  2017    KESSLER with bridging 17    TRANSPLANT, LIVER N/A 2019    Performed by Clemente Brown Jr., MD at [facility] OR 2ND FLR    TRANSPLANT, LIVER N/A 2019    Performed by Clemente Brown Jr., MD at [facility] OR 2ND FLR       Review of patient's allergies indicates:   Allergen Reactions    Metformin Rash    Pcn [penicillins] Other (See Comments)     Unsure of reaction, states it was as a child. Tolerated rocephin at [facility]     Current Facility-Administered Medications   Medication Frequency    calcium gluconate 1g in dextrose 5% 100mL (ready to mix system) PRN    calcium gluconate 1g in dextrose 5% 100mL (ready to mix system) PRN    calcium gluconate 1g in dextrose 5% 100mL (ready to mix system) PRN    dextrose 10% (D10W) Bolus PRN    dextrose 10% (D10W) Bolus PRN    docusate sodium capsule 100 mg TID PC    famotidine tablet 20 mg QHS    glucagon (human recombinant) injection 1 mg PRN    glucose chewable tablet 16 g PRN    glucose chewable tablet 24 g PRN    heparin  (porcine) injection 5,000 Units Q8H    insulin aspart U-100 pen 0-4 Units PRN    insulin regular (Humulin R) 100 Units in sodium chloride 0.9% 100 mL infusion Continuous    levothyroxine tablet 112 mcg Daily    lidocaine HCL 10 mg/ml (1%) injection 10 mL Once    magnesium sulfate 2g in water 50mL IVPB (premix) PRN    magnesium sulfate 2g in water 50mL IVPB (premix) PRN    methylPREDNISolone sodium succinate injection 60 mg Q12H    Followed by    [START ON 6/10/2019] methylPREDNISolone sodium succinate injection 40 mg Q12H    Followed by    [START ON 6/11/2019] methylPREDNISolone sodium succinate injection 20 mg Q12H    Followed by    [START ON 6/12/2019] predniSONE tablet 20 mg Daily    miconazole NITRATE 2 % top powder BID    mycophenolate capsule 1,000 mg BID    ondansetron disintegrating tablet 8 mg Q8H PRN    oxyCODONE immediate release tablet 5 mg Q4H PRN    oxyCODONE immediate release tablet Tab 10 mg Q4H PRN    posaconazole tablet 300 mg Daily    potassium chloride 40 mEq in 100 mL IVPB (FOR CENTRAL LINE ADMINISTRATION ONLY) PRN    And    potassium chloride 20 mEq in 100 mL IVPB (FOR CENTRAL LINE ADMINISTRATION ONLY) PRN    And    potassium chloride 40 mEq in 100 mL IVPB (FOR CENTRAL LINE ADMINISTRATION ONLY) PRN    simethicone chewable tablet 80 mg TID PRN    sodium bicarbonate tablet 1,300 mg BID    sodium phosphate 15 mmol in dextrose 5 % 250 mL IVPB PRN    sodium phosphate 20.01 mmol in dextrose 5 % 250 mL IVPB PRN    sodium phosphate 30 mmol in dextrose 5 % 250 mL IVPB PRN    sulfamethoxazole-trimethoprim 400-80mg per tablet 1 tablet Daily    [START ON 6/16/2019] valGANciclovir tablet 450 mg Daily     Family History     Problem Relation (Age of Onset)    Breast cancer Maternal Grandmother    Cancer Mother (50), Father (65)    Heart disease Mother, Father        Tobacco Use    Smoking status: Never Smoker    Smokeless tobacco: Never Used    Tobacco comment: patient denies    Substance and Sexual Activity    Alcohol use: No     Comment: patient denies    Drug use: No     Comment: patient denies    Sexual activity: Not on file     Review of Systems   Constitutional: Positive for activity change and appetite change. Negative for chills and fever.   Respiratory: Negative for cough, shortness of breath and wheezing.    Cardiovascular: Negative for leg swelling.   Gastrointestinal: Positive for abdominal pain and nausea. Negative for diarrhea and vomiting.   Genitourinary: Positive for decreased urine volume. Negative for hematuria.   Skin: Negative for wound.   Allergic/Immunologic: Positive for immunocompromised state.   Neurological: Negative for tremors and speech difficulty.     Objective:     Vital Signs (Most Recent):  Temp: 98.2 °F (36.8 °C) (06/09/19 1150)  Pulse: 79 (06/09/19 1200)  Resp: 15 (06/09/19 1200)  BP: 123/79 (06/09/19 1200)  SpO2: 99 % (06/09/19 1200)  O2 Device (Oxygen Therapy): room air (06/09/19 0443) Vital Signs (24h Range):  Temp:  [97.7 °F (36.5 °C)-98.3 °F (36.8 °C)] 98.2 °F (36.8 °C)  Pulse:  [69-80] 79  Resp:  [14-19] 15  SpO2:  [97 %-99 %] 99 %  BP: (123-130)/(72-79) 123/79     Weight: 95.5 kg (210 lb 8.6 oz) (06/09/19 0400)  Body mass index is 35.04 kg/m².  Body surface area is 2.09 meters squared.    I/O last 3 completed shifts:  In: 746.7 [P.O.:720; I.V.:26.7]  Out: 1715 [Urine:590; Drains:670; Chest Tube:455]    Physical Exam   Constitutional: She is oriented to person, place, and time. She appears well-developed.   Obese   Eyes: Conjunctivae are normal.   Neck: No JVD present.   Cardiovascular: Normal rate and intact distal pulses.   No murmur heard.  Pulmonary/Chest: Breath sounds normal.   On RA   Abdominal: Soft. She exhibits no distension.   Abd incision C/D/I     Musculoskeletal: She exhibits no edema.   Neurological: She is alert and oriented to person, place, and time.   Skin: Skin is warm.   Vitals reviewed.      Significant Labs:  All labs  within the past 24 hours have been reviewed.    Significant Imaging:  Labs: Reviewed

## 2019-06-09 NOTE — NURSING
Notified Dr. Mena that pt's chest tube was completely out when this nurse went to assess pt for this shift. Applied Vaseline gauze and gauze on top taped on three sides. Sutures removed. VSS on room air. STAT chest xray will be ordered per MD. Will continue to monitor.

## 2019-06-09 NOTE — PROGRESS NOTES
[facility]  Liver Transplant  Progress Note    Patient Name: Jihan Zamudio  MRN: [id number]  Admission Date: 2019  Hospital Length of Stay: 24 days  Code Status: Prior  Primary Care Provider: Primary Doctor No  Post-Operative Day: 4    ORGAN:   LIVER  Disease Etiology: Cirrhosis: Fatty Liver ([place])  Donor Type:    - Brain Death  [facility] High Risk:   No  Donor CMV Status:   Donor CMV Status: Positive  Donor HBcAB:   Negative  Donor HCV Status:   Negative  Donor HBV GERTRUDIS: Negative  Donor HCV GERTRUDIS: Negative  Whole or Partial: Whole Liver  Biliary Anastomosis: End to End  Arterial Anatomy: Standard  Subjective:     History of Present Illness:  This is a 48 yo F with hx of KESSLER cirrhosis c/b varices, latent TB, GERD, hypothyroidism, lap band , HLD who was sent to the ED from hepatology clinic for shortness of breath. She follows with Dr. Smallwood. She reports feeling more short of breath the past few days and increased abdominal swelling. She reports occasional confusion as well. She was found to have a low sodium as well. With regards to her lung nodule, we have been awaiting films for an outside facility for comparison. She was found to have a large pleural effusion while here, something she has not had before. Pulmonology has been consulted.      Hospital Course:  Pt approved for liver transplant and transferred to LTS on . Pt w/ SOB. Last Thoracentesis on . Cultures NGTD. Remains on Rocephin x 5 days, per ID recs, to complete on . Ongoing issue with hyponatremia, improving with albumin daily. Na cont to improve with free water restriction and albumin administration.    She is now s/p OLTx on 19. She was transferred from ICU to TSU on POD#2.     Interval Hx. Overnight, patients chest tube inadvertently pulled out. Chest x-ray following with no acute findings. She is breathing comfortable on RA. This morning, she is doing fine. +incisional pain. Still with low UOP past 24  hours despite lasix challenge. Will consult Nephrology for further help with management. RONAK drain x 1 with SS fluid, will remove today. LFTs continue to improve. Encourage ambulation. Encourage IS.      Scheduled Meds:   docusate sodium  100 mg Oral TID PC    famotidine  20 mg Oral QHS    heparin (porcine)  5,000 Units Subcutaneous Q8H    levothyroxine  112 mcg Oral Daily    lidocaine HCL 10 mg/ml (1%)  10 mL Intradermal Once    methylPREDNISolone sodium succinate  60 mg Intravenous Q12H    Followed by    [START ON 6/10/2019] methylPREDNISolone sodium succinate  40 mg Intravenous Q12H    Followed by    [START ON 6/11/2019] methylPREDNISolone sodium succinate  20 mg Intravenous Q12H    Followed by    [START ON 6/12/2019] predniSONE  20 mg Oral Daily    miconazole NITRATE 2 %   Topical (Top) BID    mycophenolate  1,000 mg Oral BID    posaconazole  300 mg Oral Daily    sodium bicarbonate  1,300 mg Oral BID    sulfamethoxazole-trimethoprim 400-80mg  1 tablet Oral Daily    tacrolimus  1 mg Oral BID    [START ON 6/16/2019] valGANciclovir  450 mg Oral Daily     Continuous Infusions:   insulin (HUMAN R) infusion (adults) 0.5 Units/hr (06/09/19 0734)     PRN Meds:calcium gluconate IVPB, calcium gluconate IVPB, calcium gluconate IVPB, Dextrose 10% Bolus, Dextrose 10% Bolus, glucagon (human recombinant), glucose, glucose, insulin aspart U-100, magnesium sulfate IVPB, magnesium sulfate IVPB, ondansetron, oxyCODONE, oxyCODONE, potassium chloride in water **AND** potassium chloride in water **AND** potassium chloride in water, simethicone, sodium phosphate IVPB, sodium phosphate IVPB, sodium phosphate IVPB    Review of Systems   Constitutional: Positive for activity change and appetite change. Negative for chills and fever.   Respiratory: Negative for cough, shortness of breath and wheezing.    Cardiovascular: Positive for leg swelling.   Gastrointestinal: Positive for abdominal pain and nausea. Negative for  diarrhea and vomiting.   Genitourinary: Positive for decreased urine volume. Negative for hematuria.   Skin: Positive for wound.   Allergic/Immunologic: Positive for immunocompromised state.   Neurological: Negative for tremors and speech difficulty.   Psychiatric/Behavioral: Negative for confusion and decreased concentration. The patient is nervous/anxious.      Objective:     Vital Signs (Most Recent):  Temp: 97.9 °F (36.6 °C) (06/09/19 0443)  Pulse: 74 (06/09/19 0600)  Resp: 15 (06/09/19 0443)  BP: 126/75 (06/09/19 0443)  SpO2: 97 % (06/09/19 0443) Vital Signs (24h Range):  Temp:  [97.6 °F (36.4 °C)-98.3 °F (36.8 °C)] 97.9 °F (36.6 °C)  Pulse:  [69-80] 74  Resp:  [14-21] 15  SpO2:  [96 %-98 %] 97 %  BP: (126-137)/(72-78) 126/75     Weight: 95.5 kg (210 lb 8.6 oz)  Body mass index is 35.04 kg/m².    Intake/Output - Last 3 Shifts       06/07 0700 - 06/08 0659 06/08 0700 - 06/09 0659 06/09 0700 - 06/10 0659    P.O. 720 360     I.V. (mL/kg) 447.6 (4.5) 16.2 (0.2)     Blood       Other 0 0     NG/GT       Total Intake(mL/kg) 1167.6 (11.9) 376.2 (3.9)     Urine (mL/kg/hr) 389 (0.2) 390 (0.2)     Emesis/NG output 0 0     Drains 480 460     Other 0 0     Stool 0 0     Blood 0 0     Chest Tube 260 375     Total Output 1129 1225     Net +38.6 -848.8            Urine Occurrence 0 x 0 x     Stool Occurrence 0 x 0 x     Emesis Occurrence 0 x 0 x           Physical Exam   Constitutional: She is oriented to person, place, and time. No distress.   Cardiovascular: Normal rate and regular rhythm. Exam reveals no friction rub.   No murmur heard.  Pulmonary/Chest: Effort normal. She has decreased breath sounds in the right lower field. She has no wheezes. She has no rales.   Chest tube in place to water seal   Abdominal: Soft. There is tenderness. There is no rebound and no guarding.       Neurological: She is alert and oriented to person, place, and time.   Skin: She is not diaphoretic.   Psychiatric: She has a normal mood and  affect. Her behavior is normal. Judgment and thought content normal.   Nursing note and vitals reviewed.      Laboratory:  Immunosuppressants         Stop Route Frequency     mycophenolate capsule 1,000 mg      -- Oral 2 times daily     tacrolimus capsule 1 mg      -- Oral 2 times daily        CBC:   Recent Labs   Lab 06/09/19  0500   WBC 6.62   RBC 2.83*   HGB 8.9*   HCT 26.3*   PLT 88*   MCV 93   MCH 31.4*   MCHC 33.8     CMP:   Recent Labs   Lab 06/09/19  0500   *   CALCIUM 7.6*   ALBUMIN 2.8*   PROT 4.8*   *   K 4.2   CO2 18*   CL 99   BUN 49*   CREATININE 2.6*   ALKPHOS 130   ALT 80*   AST 63*   BILITOT 1.5*     Labs within the past 24 hours have been reviewed.    Diagnostic Results:  None    Assessment/Plan:     * S/P liver transplant  - LFTs improving daily  - PO Liver US with good allograft perfusion  - Passing flatus, continue stool softeners  - RONAK drain x 1 with SS fluid, will remove today.   - Chevron incision clean, dry, intact with staples in place  - Encourage ambulation. Encourage IS      ANTONIO (acute kidney injury)  - Creatinine continues to rise with low UOP  - Lasix 100 mg IV x 1 6/8 with minimal increase in UOP.  - Will consult Nephrology for further help with management.  - Continue to monitor      Long-term use of immunosuppressant medication  - Continue prograf, cellcept, steroids.  - Continue to monitor prograf level daily, monitor for toxic side effects, and adjust for therapeutic dose.      Acute blood loss anemia  - Expected post operatively  - See anemia of chronic disease.       Acute hyperglycemia  - Endocrine consulted for help with management. Appreciate their assistance.       Prophylactic immunotherapy  - See long term use of immunosuppressant medication.      Anemia of chronic disease  - H/H stable. Continue to monitor with daily labs.       Thrombocytopenia  - Due to hx of ESLD  - Likely to continue to improve post transplant  - Cont to monitor.       Pleural effusion,  right  - CXR 5/30 w/ large right pleural effusion  - Thoracentesis 5/31, 1.5L removed. Fluid sent for analysis.  - Chest tube placed in OR, to water seal currently  - Chest tube inadvertently pulled out overnight on 6/9.   - Chest x-ray following with no acute findings. She is breathing comfortable on RA  - Continue to monitor.       Weakness  - PT/OT following.       Hyponatremia  - Stable  - Continue to monitor with daily labs. 2/2 chronic liver disease      Moderate malnutrition  - Dietician consulted  - Boost supplements ordered.       GERD (gastroesophageal reflux disease)  - chronic and stable.  Cont Pantoprazole.     - Cont PPI      Hypothyroidism  - Chronic and stable.  - Continue Synthroid 112mcg PO daily.          Debility/Functional status: Patient debilitated by evidence of Weakness. Physical and occupational therapy ordered daily to evaluate and treat. Debility was: present on admission.    VTE Risk Mitigation (From admission, onward)        Ordered     heparin (porcine) injection 5,000 Units  Every 8 hours      06/06/19 0210     Place sequential compression device  Until discontinued      06/06/19 0211     IP VTE HIGH RISK PATIENT  Once      06/06/19 0210          The patients clinical status was discussed at multidisplinary rounds, involving transplant surgery, transplant medicine, pharmacy, nursing, nutrition, and social work    RLQ RONAK Drain removed:  Site cleaned with alcohol, lidocaine 1% used to numb area to place prolene 3.0 suture to site.  Drain intact at time of removal.  Patient tolerated procedure well.  Will continue to monitor for any complications.      Discharge Planning:  No Patient Care Coordination Note on file.      Lisa Andrew PA-C  Liver Transplant  [facility]

## 2019-06-09 NOTE — SUBJECTIVE & OBJECTIVE
"Interval HPI:   Overnight events: Remains in TSU. NAEON. BG well controlled on insulin infusion rate decreased from 0.7 to 0.5 u/hr. Solumedrol 60 mg BID; standard steroid taper per primary.   Eatin% -50%  Nausea: yes  Hypoglycemia and intervention: No  Fever: No  TPN and/or TF: No    /75   Pulse 74   Temp 97.9 °F (36.6 °C) (Oral)   Resp 15   Ht 5' 5" (1.651 m)   Wt 95.5 kg (210 lb 8.6 oz)   SpO2 97%   Breastfeeding? No   BMI 35.04 kg/m²     Labs Reviewed and Include    Recent Labs   Lab 19  0500   *   CALCIUM 7.6*   ALBUMIN 2.8*   PROT 4.8*   *   K 4.2   CO2 18*   CL 99   BUN 49*   CREATININE 2.6*   ALKPHOS 130   ALT 80*   AST 63*   BILITOT 1.5*     Lab Results   Component Value Date    WBC 6.62 2019    HGB 8.9 (L) 2019    HCT 26.3 (L) 2019    MCV 93 2019    PLT 88 (L) 2019     No results for input(s): TSH, FREET4 in the last 168 hours.  Lab Results   Component Value Date    HGBA1C 4.1 2019       Nutritional status:   Body mass index is 35.04 kg/m².  Lab Results   Component Value Date    ALBUMIN 2.8 (L) 2019    ALBUMIN 2.9 (L) 2019    ALBUMIN 2.8 (L) 2019     Lab Results   Component Value Date    PREALBUMIN 3 (L) 2019       Estimated Creatinine Clearance: 29.3 mL/min (A) (based on SCr of 2.6 mg/dL (H)).    Accu-Checks  Recent Labs     19  0920 19  1112 19  1752 19  2145 19  0158 19  0744 19  1117 19  1640 19  2115 19  0203   POCTGLUCOSE 140* 166* 150* 134* 124* 121* 162* 143* 142* 137*       Current Medications and/or Treatments Impacting Glycemic Control  Immunotherapy:    Immunosuppressants         Stop Route Frequency     mycophenolate capsule 1,000 mg      -- Oral 2 times daily     tacrolimus capsule 1 mg      -- Oral 2 times daily        Steroids:   Hormones (From admission, onward)    Start     Stop Route Frequency Ordered    19 0900  predniSONE " tablet 20 mg  (methylprednisolone taper panel)      -- Oral Daily 06/06/19 0210    06/11/19 0900  methylPREDNISolone sodium succinate injection 20 mg  (methylprednisolone taper panel)      06/12 0859 IV Every 12 hours 06/06/19 0210    06/10/19 0900  methylPREDNISolone sodium succinate injection 40 mg  (methylprednisolone taper panel)      06/11 0859 IV Every 12 hours 06/06/19 0210    06/09/19 0900  methylPREDNISolone sodium succinate injection 60 mg  (methylprednisolone taper panel)      06/10 0859 IV Every 12 hours 06/06/19 0210    06/04/19 0515  methylPREDNISolone sodium succinate injection 500 mg  (Med - Immunosuppression Induction Therapy (Methylprednisolone))      06/04 1714 IV Once pre-op 06/04/19 0415        Pressors:    Autonomic Drugs (From admission, onward)    None        Hyperglycemia/Diabetes Medications:   Antihyperglycemics (From admission, onward)    Start     Stop Route Frequency Ordered    06/08/19 0839  insulin aspart U-100 pen 0-4 Units      -- SubQ As needed (PRN) 06/08/19 0739    06/07/19 1130  insulin regular (Humulin R) 100 Units in sodium chloride 0.9% 100 mL infusion      -- IV Continuous 06/07/19 1025

## 2019-06-10 LAB
ALBUMIN SERPL BCP-MCNC: 2.6 G/DL (ref 3.5–5.2)
ALP SERPL-CCNC: 120 U/L (ref 55–135)
ALT SERPL W/O P-5'-P-CCNC: 71 U/L (ref 10–44)
ANION GAP SERPL CALC-SCNC: 10 MMOL/L (ref 8–16)
AST SERPL-CCNC: 46 U/L (ref 10–40)
BACTERIA #/AREA URNS AUTO: ABNORMAL /HPF
BACTERIA SPEC AEROBE CULT: NO GROWTH
BASOPHILS # BLD AUTO: 0 K/UL (ref 0–0.2)
BASOPHILS NFR BLD: 0 % (ref 0–1.9)
BILIRUB SERPL-MCNC: 1.2 MG/DL (ref 0.1–1)
BILIRUB UR QL STRIP: NEGATIVE
BUN SERPL-MCNC: 65 MG/DL (ref 6–20)
CALCIUM SERPL-MCNC: 7.3 MG/DL (ref 8.7–10.5)
CHLORIDE SERPL-SCNC: 99 MMOL/L (ref 95–110)
CLARITY UR REFRACT.AUTO: ABNORMAL
CO2 SERPL-SCNC: 19 MMOL/L (ref 23–29)
COLOR UR AUTO: YELLOW
CREAT SERPL-MCNC: 2.4 MG/DL (ref 0.5–1.4)
CREAT UR-MCNC: 34 MG/DL (ref 15–325)
DIFFERENTIAL METHOD: ABNORMAL
EOSINOPHIL # BLD AUTO: 0 K/UL (ref 0–0.5)
EOSINOPHIL NFR BLD: 0 % (ref 0–8)
ERYTHROCYTE [DISTWIDTH] IN BLOOD BY AUTOMATED COUNT: 17.7 % (ref 11.5–14.5)
EST. GFR  (AFRICAN AMERICAN): 26.2 ML/MIN/1.73 M^2
EST. GFR  (NON AFRICAN AMERICAN): 22.7 ML/MIN/1.73 M^2
GLUCOSE SERPL-MCNC: 127 MG/DL (ref 70–110)
GLUCOSE UR QL STRIP: NEGATIVE
HCT VFR BLD AUTO: 24.2 % (ref 37–48.5)
HGB BLD-MCNC: 8.1 G/DL (ref 12–16)
HGB UR QL STRIP: ABNORMAL
HYALINE CASTS UR QL AUTO: 4 /LPF
IMM GRANULOCYTES # BLD AUTO: 0.07 K/UL (ref 0–0.04)
IMM GRANULOCYTES NFR BLD AUTO: 1.4 % (ref 0–0.5)
KETONES UR QL STRIP: NEGATIVE
LEUKOCYTE ESTERASE UR QL STRIP: ABNORMAL
LYMPHOCYTES # BLD AUTO: 0.5 K/UL (ref 1–4.8)
LYMPHOCYTES NFR BLD: 10.5 % (ref 18–48)
MAGNESIUM SERPL-MCNC: 2.7 MG/DL (ref 1.6–2.6)
MCH RBC QN AUTO: 30.8 PG (ref 27–31)
MCHC RBC AUTO-ENTMCNC: 33.5 G/DL (ref 32–36)
MCV RBC AUTO: 92 FL (ref 82–98)
MICROSCOPIC COMMENT: ABNORMAL
MONOCYTES # BLD AUTO: 0.3 K/UL (ref 0.3–1)
MONOCYTES NFR BLD: 5.4 % (ref 4–15)
NEUTROPHILS # BLD AUTO: 4.3 K/UL (ref 1.8–7.7)
NEUTROPHILS NFR BLD: 82.7 % (ref 38–73)
NITRITE UR QL STRIP: NEGATIVE
NRBC BLD-RTO: 0 /100 WBC
PH UR STRIP: 5 [PH] (ref 5–8)
PHOSPHATE SERPL-MCNC: 6.1 MG/DL (ref 2.7–4.5)
PLATELET # BLD AUTO: 72 K/UL (ref 150–350)
PMV BLD AUTO: 11.4 FL (ref 9.2–12.9)
POCT GLUCOSE: 117 MG/DL (ref 70–110)
POCT GLUCOSE: 131 MG/DL (ref 70–110)
POCT GLUCOSE: 146 MG/DL (ref 70–110)
POCT GLUCOSE: 147 MG/DL (ref 70–110)
POCT GLUCOSE: 150 MG/DL (ref 70–110)
POCT GLUCOSE: 162 MG/DL (ref 70–110)
POTASSIUM SERPL-SCNC: 4.2 MMOL/L (ref 3.5–5.1)
PROT SERPL-MCNC: 4.5 G/DL (ref 6–8.4)
PROT UR QL STRIP: NEGATIVE
PROT UR-MCNC: <7 MG/DL (ref 0–15)
PROT/CREAT UR: NORMAL MG/G{CREAT} (ref 0–0.2)
RBC # BLD AUTO: 2.63 M/UL (ref 4–5.4)
RBC #/AREA URNS AUTO: 2 /HPF (ref 0–4)
SODIUM SERPL-SCNC: 128 MMOL/L (ref 136–145)
SP GR UR STRIP: 1.01 (ref 1–1.03)
SQUAMOUS #/AREA URNS AUTO: 14 /HPF
TACROLIMUS BLD-MCNC: 5.3 NG/ML (ref 5–15)
URN SPEC COLLECT METH UR: ABNORMAL
WBC # BLD AUTO: 5.14 K/UL (ref 3.9–12.7)
WBC #/AREA URNS AUTO: 4 /HPF (ref 0–5)

## 2019-06-10 PROCEDURE — 63600175 PHARM REV CODE 636 W HCPCS: Performed by: STUDENT IN AN ORGANIZED HEALTH CARE EDUCATION/TRAINING PROGRAM

## 2019-06-10 PROCEDURE — 81001 URINALYSIS AUTO W/SCOPE: CPT

## 2019-06-10 PROCEDURE — 20600001 HC STEP DOWN PRIVATE ROOM

## 2019-06-10 PROCEDURE — 25000003 PHARM REV CODE 250: Performed by: STUDENT IN AN ORGANIZED HEALTH CARE EDUCATION/TRAINING PROGRAM

## 2019-06-10 PROCEDURE — 83735 ASSAY OF MAGNESIUM: CPT

## 2019-06-10 PROCEDURE — 80197 ASSAY OF TACROLIMUS: CPT

## 2019-06-10 PROCEDURE — 99233 SBSQ HOSP IP/OBS HIGH 50: CPT | Mod: 24,,, | Performed by: PHYSICIAN ASSISTANT

## 2019-06-10 PROCEDURE — 97116 GAIT TRAINING THERAPY: CPT

## 2019-06-10 PROCEDURE — 25000003 PHARM REV CODE 250: Performed by: TRANSPLANT SURGERY

## 2019-06-10 PROCEDURE — 84100 ASSAY OF PHOSPHORUS: CPT

## 2019-06-10 PROCEDURE — 25000003 PHARM REV CODE 250: Performed by: PHYSICIAN ASSISTANT

## 2019-06-10 PROCEDURE — P9047 ALBUMIN (HUMAN), 25%, 50ML: HCPCS | Mod: JG | Performed by: PHYSICIAN ASSISTANT

## 2019-06-10 PROCEDURE — 85025 COMPLETE CBC W/AUTO DIFF WBC: CPT

## 2019-06-10 PROCEDURE — 63600175 PHARM REV CODE 636 W HCPCS: Performed by: TRANSPLANT SURGERY

## 2019-06-10 PROCEDURE — 80053 COMPREHEN METABOLIC PANEL: CPT

## 2019-06-10 PROCEDURE — 99232 PR SUBSEQUENT HOSPITAL CARE,LEVL II: ICD-10-PCS | Mod: ,,, | Performed by: NURSE PRACTITIONER

## 2019-06-10 PROCEDURE — 63600175 PHARM REV CODE 636 W HCPCS: Performed by: PHYSICIAN ASSISTANT

## 2019-06-10 PROCEDURE — 99233 PR SUBSEQUENT HOSPITAL CARE,LEVL III: ICD-10-PCS | Mod: 24,,, | Performed by: PHYSICIAN ASSISTANT

## 2019-06-10 PROCEDURE — 97530 THERAPEUTIC ACTIVITIES: CPT

## 2019-06-10 PROCEDURE — 99232 SBSQ HOSP IP/OBS MODERATE 35: CPT | Mod: ,,, | Performed by: NURSE PRACTITIONER

## 2019-06-10 PROCEDURE — 82570 ASSAY OF URINE CREATININE: CPT

## 2019-06-10 RX ORDER — ALBUMIN HUMAN 250 G/1000ML
25 SOLUTION INTRAVENOUS ONCE
Status: COMPLETED | OUTPATIENT
Start: 2019-06-10 | End: 2019-06-10

## 2019-06-10 RX ORDER — IBUPROFEN 200 MG
16 TABLET ORAL
Status: DISCONTINUED | OUTPATIENT
Start: 2019-06-10 | End: 2019-06-11 | Stop reason: HOSPADM

## 2019-06-10 RX ORDER — GLUCAGON 1 MG
1 KIT INJECTION
Status: DISCONTINUED | OUTPATIENT
Start: 2019-06-10 | End: 2019-06-11 | Stop reason: HOSPADM

## 2019-06-10 RX ORDER — FUROSEMIDE 10 MG/ML
40 INJECTION INTRAMUSCULAR; INTRAVENOUS ONCE
Status: COMPLETED | OUTPATIENT
Start: 2019-06-10 | End: 2019-06-10

## 2019-06-10 RX ORDER — IBUPROFEN 200 MG
24 TABLET ORAL
Status: DISCONTINUED | OUTPATIENT
Start: 2019-06-10 | End: 2019-06-11 | Stop reason: HOSPADM

## 2019-06-10 RX ORDER — INSULIN ASPART 100 [IU]/ML
0-5 INJECTION, SOLUTION INTRAVENOUS; SUBCUTANEOUS
Status: DISCONTINUED | OUTPATIENT
Start: 2019-06-10 | End: 2019-06-11 | Stop reason: HOSPADM

## 2019-06-10 RX ADMIN — OXYCODONE HYDROCHLORIDE 5 MG: 5 TABLET ORAL at 10:06

## 2019-06-10 RX ADMIN — SODIUM BICARBONATE 650 MG TABLET 1300 MG: at 09:06

## 2019-06-10 RX ADMIN — LEVOTHYROXINE SODIUM 112 MCG: 112 TABLET ORAL at 05:06

## 2019-06-10 RX ADMIN — HEPARIN SODIUM 5000 UNITS: 5000 INJECTION, SOLUTION INTRAVENOUS; SUBCUTANEOUS at 08:06

## 2019-06-10 RX ADMIN — OXYCODONE HYDROCHLORIDE 5 MG: 5 TABLET ORAL at 06:06

## 2019-06-10 RX ADMIN — SODIUM BICARBONATE 650 MG TABLET 1300 MG: at 08:06

## 2019-06-10 RX ADMIN — FUROSEMIDE 40 MG: 10 INJECTION, SOLUTION INTRAMUSCULAR; INTRAVENOUS at 12:06

## 2019-06-10 RX ADMIN — ALBUMIN (HUMAN) 25 G: 25 SOLUTION INTRAVENOUS at 11:06

## 2019-06-10 RX ADMIN — HEPARIN SODIUM 5000 UNITS: 5000 INJECTION, SOLUTION INTRAVENOUS; SUBCUTANEOUS at 02:06

## 2019-06-10 RX ADMIN — OXYCODONE HYDROCHLORIDE 5 MG: 5 TABLET ORAL at 02:06

## 2019-06-10 RX ADMIN — METHYLPREDNISOLONE SODIUM SUCCINATE 40 MG: 40 INJECTION, POWDER, FOR SOLUTION INTRAMUSCULAR; INTRAVENOUS at 09:06

## 2019-06-10 RX ADMIN — MICONAZOLE NITRATE: 20 OINTMENT TOPICAL at 08:06

## 2019-06-10 RX ADMIN — POSACONAZOLE 300 MG: 100 TABLET, COATED ORAL at 09:06

## 2019-06-10 RX ADMIN — METHYLPREDNISOLONE SODIUM SUCCINATE 40 MG: 40 INJECTION, POWDER, FOR SOLUTION INTRAMUSCULAR; INTRAVENOUS at 08:06

## 2019-06-10 RX ADMIN — FAMOTIDINE 20 MG: 20 TABLET, FILM COATED ORAL at 08:06

## 2019-06-10 RX ADMIN — MYCOPHENOLATE MOFETIL 1000 MG: 250 CAPSULE ORAL at 09:06

## 2019-06-10 RX ADMIN — ONDANSETRON 8 MG: 8 TABLET, ORALLY DISINTEGRATING ORAL at 11:06

## 2019-06-10 RX ADMIN — SULFAMETHOXAZOLE AND TRIMETHOPRIM 1 TABLET: 400; 80 TABLET ORAL at 09:06

## 2019-06-10 RX ADMIN — MICONAZOLE NITRATE: 20 POWDER TOPICAL at 10:06

## 2019-06-10 RX ADMIN — MYCOPHENOLATE MOFETIL 1000 MG: 250 CAPSULE ORAL at 08:06

## 2019-06-10 RX ADMIN — HEPARIN SODIUM 5000 UNITS: 5000 INJECTION, SOLUTION INTRAVENOUS; SUBCUTANEOUS at 05:06

## 2019-06-10 NOTE — SUBJECTIVE & OBJECTIVE
Scheduled Meds:   albumin human 25%  25 g Intravenous Once    famotidine  20 mg Oral QHS    furosemide  40 mg Intravenous Once    heparin (porcine)  5,000 Units Subcutaneous Q8H    levothyroxine  112 mcg Oral Daily    lidocaine HCL 10 mg/ml (1%)  10 mL Intradermal Once    methylPREDNISolone sodium succinate  40 mg Intravenous Q12H    Followed by    [START ON 6/11/2019] methylPREDNISolone sodium succinate  20 mg Intravenous Q12H    Followed by    [START ON 6/12/2019] predniSONE  20 mg Oral Daily    miconazole NITRATE 2 %   Topical (Top) BID    mycophenolate  1,000 mg Oral BID    posaconazole  300 mg Oral Daily    sodium bicarbonate  1,300 mg Oral BID    sulfamethoxazole-trimethoprim 400-80mg  1 tablet Oral Daily    [START ON 6/16/2019] valGANciclovir  450 mg Oral Daily     Continuous Infusions:    PRN Meds:Dextrose 10% Bolus, Dextrose 10% Bolus, Dextrose 10% Bolus, Dextrose 10% Bolus, dextrose 50%, glucagon (human recombinant), glucose, glucose, insulin aspart U-100, ondansetron, oxyCODONE, oxyCODONE, simethicone    Review of Systems   Constitutional: Positive for activity change and appetite change. Negative for chills and fever.   Respiratory: Negative for cough, shortness of breath and wheezing.    Cardiovascular: Positive for leg swelling. Negative for chest pain.   Gastrointestinal: Positive for abdominal pain and nausea. Negative for abdominal distention, diarrhea and vomiting.   Genitourinary: Positive for decreased urine volume. Negative for difficulty urinating, dysuria and hematuria.   Musculoskeletal: Positive for back pain and gait problem.   Skin: Positive for wound.   Allergic/Immunologic: Positive for immunocompromised state.   Neurological: Negative for tremors, speech difficulty, light-headedness and headaches.   Psychiatric/Behavioral: Negative for confusion, decreased concentration and hallucinations. The patient is nervous/anxious.      Objective:     Vital Signs (Most  Recent):  Temp: 98.3 °F (36.8 °C) (06/10/19 0800)  Pulse: 80 (06/10/19 1058)  Resp: 14 (06/10/19 0800)  BP: 125/75 (06/10/19 0800)  SpO2: (!) 94 % (06/10/19 0800) Vital Signs (24h Range):  Temp:  [98.2 °F (36.8 °C)-98.8 °F (37.1 °C)] 98.3 °F (36.8 °C)  Pulse:  [72-93] 80  Resp:  [14-20] 14  SpO2:  [94 %-99 %] 94 %  BP: (120-138)/(72-83) 125/75     Weight: 95.9 kg (211 lb 6.7 oz)  Body mass index is 35.18 kg/m².    Intake/Output - Last 3 Shifts       06/08 0700 - 06/09 0659 06/09 0700 - 06/10 0659 06/10 0700 - 06/11 0659    P.O. 360 880 240    I.V. (mL/kg) 16.2 (0.2) 11.5 (0.1)     Other 0 0     Total Intake(mL/kg) 376.2 (3.9) 891.5 (9.3) 240 (2.5)    Urine (mL/kg/hr) 390 (0.2) 650 (0.3)     Emesis/NG output 0 0     Drains 460      Other 0 0     Stool 0 1     Blood 0 0     Chest Tube 375      Total Output 1225 651     Net -848.8 +240.5 +240           Urine Occurrence 0 x 0 x 1 x    Stool Occurrence 0 x 2 x 1 x    Emesis Occurrence 0 x 0 x           Physical Exam   Constitutional: She is oriented to person, place, and time. No distress.   HENT:   Head: Normocephalic and atraumatic.   Eyes: Right eye exhibits no discharge. Left eye exhibits no discharge. No scleral icterus.   Neck: Normal range of motion. Neck supple.   Cardiovascular: Normal rate and regular rhythm. Exam reveals no friction rub.   No murmur heard.  Pulmonary/Chest: Effort normal. She has decreased breath sounds in the right lower field. She has no wheezes. She has no rales.   Abdominal: Soft. There is tenderness (incisional). There is no rebound and no guarding.       Neurological: She is alert and oriented to person, place, and time.   Skin: Skin is warm and dry. She is not diaphoretic.   Psychiatric: She has a normal mood and affect. Her behavior is normal. Judgment and thought content normal.   Nursing note and vitals reviewed.      Laboratory:  Immunosuppressants         Stop Route Frequency     mycophenolate capsule 1,000 mg      -- Oral 2 times  daily        CBC:   Recent Labs   Lab 06/10/19  0500   WBC 5.14   RBC 2.63*   HGB 8.1*   HCT 24.2*   PLT 72*   MCV 92   MCH 30.8   MCHC 33.5     CMP:   Recent Labs   Lab 06/10/19  0500   *   CALCIUM 7.3*   ALBUMIN 2.6*   PROT 4.5*   *   K 4.2   CO2 19*   CL 99   BUN 65*   CREATININE 2.4*   ALKPHOS 120   ALT 71*   AST 46*   BILITOT 1.2*     Labs within the past 24 hours have been reviewed.    Diagnostic Results:  None

## 2019-06-10 NOTE — PROGRESS NOTES
Consult received on patient skin breakdown to buttocks and abdominal folds.  Breakdown to buttocks healed, no open wounds or discoloration noted.   Moisture associated dermatitis/intertrigo noted to patient's groin folds and abdominal folds.   Miconazole powder currently ordered and patient applying beneath breast folds but states burns to apply in groin/abdominal folds.  Recommend d/cing miconazole powder and applying clear barrier cream with miconazole to abdominal,groin, breast folds and preventatively to buttocks BID/prn.  Patient wearing pajama bottoms and panties/briefs. Recommend wearing only pajama bottoms but would refrain from wearing restrictive clothing such as briefs/panties that could retain moisture in creases of folds and cause more friction and irritation. Education provided to patient and , patient to have nurse assist her in changing during next ambulation period.  Patient on waffle overlay, recommend turning every 2hrs while in bed.  Telephone order received from Archana POE with transplant for barrier with miconazole.  Nursing to continue care. Wound care to follow prn.     06/10/19 1554        Wound 06/10/19 1554 Moisture associated dermatitis Groin   Date First Assessed/Time First Assessed: 06/10/19 1554   Primary Wound Type: Moisture associated dermatitis  Location: Groin   Wound WDL ex   Drainage Amount None   Drainage Characteristics/Odor No odor   Appearance Moist;Pink   Tissue loss description Partial thickness   Periwound Area Moist   Care   (recommend clear barrier cream with miconazole BID/prn)   Skin Interventions   Pressure Reduction Devices pressure-redistributing mattress utilized;positioning supports utilized  (waffle overlay)

## 2019-06-10 NOTE — PROGRESS NOTES
"[facility]  Endocrinology  Progress Note    Admit Date: 2019     Reason for Consult: Management of Hyperglycemia     Surgical Procedure and Date: liver transplant 19        HPI:   Patient is a 49 y.o. 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 No personal history of DM. Patient is now s/p liver transplant. Endocrinology consulted for BG/ DM management.         Lab Results   Component Value Date    HGBA1C 4.1 2019       Interval HPI:   Overnight events: Remains in TSU. NAEON. BG at goal on insulin infusion at 0.5 u/hr; not requiring correction scale. NAEON. Solumedrol 40 mg BID;standard steroid taper per primary.   Eatin% or less  Nausea: Yes  Hypoglycemia and intervention: No  Fever: No  TPN and/or TF: No    /78   Pulse 77   Temp 98.3 °F (36.8 °C) (Oral)   Resp 18   Ht 5' 5" (1.651 m)   Wt 95.5 kg (210 lb 8.6 oz)   SpO2 98%   Breastfeeding? No   BMI 35.04 kg/m²      Labs Reviewed and Include    Recent Labs   Lab 19  0500   *   CALCIUM 7.6*   ALBUMIN 2.8*   PROT 4.8*   *   K 4.2   CO2 18*   CL 99   BUN 49*   CREATININE 2.6*   ALKPHOS 130   ALT 80*   AST 63*   BILITOT 1.5*     Lab Results   Component Value Date    WBC 6.62 2019    HGB 8.9 (L) 2019    HCT 26.3 (L) 2019    MCV 93 2019    PLT 88 (L) 2019     No results for input(s): TSH, FREET4 in the last 168 hours.  Lab Results   Component Value Date    HGBA1C 4.1 2019       Nutritional status:   Body mass index is 35.04 kg/m².  Lab Results   Component Value Date    ALBUMIN 2.8 (L) 2019    ALBUMIN 2.9 (L) 2019    ALBUMIN 2.8 (L) 2019     Lab Results   Component Value Date    PREALBUMIN 3 (L) 2019       Estimated Creatinine Clearance: 29.3 mL/min (A) (based on SCr of 2.6 mg/dL (H)).    Accu-Checks  Recent Labs     19  1752 19  2145 19  0158 19  0744 " 06/08/19  1117 06/08/19  1640 06/08/19  2115 06/09/19  0203 06/09/19  0727 06/09/19  1629   POCTGLUCOSE 150* 134* 124* 121* 162* 143* 142* 137* 129* 133*       Current Medications and/or Treatments Impacting Glycemic Control  Immunotherapy:    Immunosuppressants         Stop Route Frequency     mycophenolate capsule 1,000 mg      -- Oral 2 times daily        Steroids:   Hormones (From admission, onward)    Start     Stop Route Frequency Ordered    06/12/19 0900  predniSONE tablet 20 mg  (methylprednisolone taper panel)      -- Oral Daily 06/06/19 0210    06/11/19 0900  methylPREDNISolone sodium succinate injection 20 mg  (methylprednisolone taper panel)      06/12 0859 IV Every 12 hours 06/06/19 0210    06/10/19 0900  methylPREDNISolone sodium succinate injection 40 mg  (methylprednisolone taper panel)      06/11 0859 IV Every 12 hours 06/06/19 0210    06/09/19 0900  methylPREDNISolone sodium succinate injection 60 mg  (methylprednisolone taper panel)      06/10 0859 IV Every 12 hours 06/06/19 0210    06/04/19 0515  methylPREDNISolone sodium succinate injection 500 mg  (Med - Immunosuppression Induction Therapy (Methylprednisolone))      06/04 1714 IV Once pre-op 06/04/19 0415        Pressors:    Autonomic Drugs (From admission, onward)    None        Hyperglycemia/Diabetes Medications:   Antihyperglycemics (From admission, onward)    Start     Stop Route Frequency Ordered    06/08/19 0839  insulin aspart U-100 pen 0-4 Units      -- SubQ As needed (PRN) 06/08/19 0739    06/07/19 1130  insulin regular (Humulin R) 100 Units in sodium chloride 0.9% 100 mL infusion      -- IV Continuous 06/07/19 1025          ASSESSMENT and PLAN    * S/P liver transplant  Managed per LTS.   avoid hypoglycemia  Lab Results   Component Value Date    ALT 80 (H) 06/09/2019    AST 63 (H) 06/09/2019     (H) 06/08/2019    ALKPHOS 130 06/09/2019    BILITOT 1.5 (H) 06/09/2019           Acute hyperglycemia  BG goal 140 - 180       rewrite  transition insulin infusion at 0.4 u/hr.   BG monitoring ac/hs/0200 and change to low dose correction scale given kidney function.     ADDENDUM: BG below goal this AM. Insulin infusion discontinued per orders. BG monitoring ac/hs and low dose correction scale.     Adrenal cortical steroids causing adverse effect in therapeutic use  Glucocorticoids markedly increase glucoses. Expect the steroid taper will help glucose control.       ANTONIO (acute kidney injury)  Titrate insulin slowly to avoid hypoglycemia as the risk of hypoglycemia increases with decreased creatinine clearance.    Estimated Creatinine Clearance: 29.3 mL/min (A) (based on SCr of 2.6 mg/dL (H)).        Prophylactic immunotherapy  May increase insulin resistance.         Hypothyroidism  Managed per primary  Levothyroxine 112 mcg    Morbid obesity  May increase insulin resistance.   Body mass index is 35.04 kg/m².            Samra Cross NP  Endocrinology  [facility]

## 2019-06-10 NOTE — PROGRESS NOTES
(YULIYA) met with the patient (pt) and pt's , Freeman, for follow up and to discuss plans for possible [facility] check in on tomorrow.  Pt was being prepped to be taken down for an ultrasound, complaining of incision pain and asked that SW complete visit with pt's .  Pt's  presented A&Ox4, engaged and communicated appropriately.  YULIYA advised during sitting rounds, the medical team stated the pt may discharge on tomorrow; therefore, YULIYA submitted an apartment request via email for the pt's check in.  Pt's  will be able to present to the apartments on tomorrow at 11am for check in.  SW inquired about the completed [facility] financial profile; however, pt's  had not completed the document.  SW provided pt's  with a new profile and assisted pt's  with completion as he asked for assistance.  Upon review of the document, pt will have a fee of $16.66 due daily to the apartments.  Pt's  denied any financial concerns with the amount.  Pt's  inquired about the pt receiving a w/c and b/s/c prior to discharge.  SW advised she would discuss concerns with pt's advance provider to have orders placed appropriately.   Pt's  verbalized understanding.  No other issues or concerns were identified or addressed.  YULIYA remains available.     YULIYA contacted pt's advance provider Radha to advise of pt's request for stated medical equipment.  Orders were placed; therefore, YULIYA contacted Monica ([facility]) to advise to which Monica advised she received the orders and dme would be delivered to pt's bedside today.  YULIYA remains available.     YULIYA received a phone call from Monica advising the equipment would not be able to be delivered as previously stated due to the pt living in [place], [place].  YULIYA advised the pt would discharge to the [facility]; therefore, DME would be able to be delivered.  Monica stated the pt would be able to receive the equipment; however, would  need to return the walker prior to returning home.  YULIYA stated she would advise the pt of the information during a follow up visit on tomorrow.  SW remains available.

## 2019-06-10 NOTE — PLAN OF CARE
Problem: Adult Inpatient Plan of Care  Goal: Plan of Care Review  Outcome: Ongoing (interventions implemented as appropriate)  Liver tests decreased today.  preparing self-meds correctly. Afebrile. Cr decreased to 2.4 from 2.6. Albumin and Lasix given. UOP adequate. Retroperitoneal U/S completed. Pt ambulating to BR and in caballero with assistance using walker. Pt sat in chair this afternoon. Wound care RN evaluated skin. Miconazole ointment to start tonight. Diarrhea continues. Colace discontinued. Pain decreased with Oxycodone. Reinforced to wear non-skid socks and call for assistance with ambulation to prevent falling. Verbalized understanding. Pt teary all am. Intense emotional support given. Children here this evening. Pt now calm.  Poor appetite. C/O nausea this am. Zofran given. Nausea relieved. Encouraged to eat. Diet changed to Regular and Boost Breeze supplement. Pt eating some pizza for supper while sitting in chair. Family at .

## 2019-06-10 NOTE — PLAN OF CARE
Problem: Adult Inpatient Plan of Care  Goal: Plan of Care Review  Recommendations     Recommendation/Intervention: 1.) Suggest liberalizing current diet to regular. 2.) If renal function does not improve, suggest renal restrictions. 3.) Please add Boost Breeze BID (for lunch and dinner) per pt request.   Goals: 1.) Pt to consume/tolerate >75% EEN and EPN. 2.) Wt maintanence.   Nutrition Goal Status: progressing towards goal  Communication of RD Recs: (POC)    Assessment and Plan  Moderate protein-calorie malnutrition  Contributing Nutrition Diagnosis  Malnutrition in the context of Chronic Illness/Injury     Related to (etiology):  KESSLER cirrhosis     Signs and Symptoms (as evidenced by):  Energy Intake: <50% of estimated energy requirement for 2 months  Body Fat Depletion: moderate depletion of triceps   Muscle Mass Depletion: mild and moderate depletion of temples, interosseous muscle and lower extremities   Weight Loss: 16.4% x 2 months (some may be fluid related loss)   Fluid Accumulation: mild     Interventions/Recommendations (treatment strategy):  Collaboration and referral of nutrition care     Nutrition Diagnosis Status:  Continues

## 2019-06-10 NOTE — PROGRESS NOTES
"[facility]  Adult Nutrition  Progress Note    SUMMARY       Recommendations    Recommendation/Intervention: 1.) Suggest liberalizing current diet to regular. 2.) If renal function does not improve, suggest renal restrictions. 3.) Please add Boost Breeze BID (for lunch and dinner) per pt request.   Goals: 1.) Pt to consume/tolerate >75% EEN and EPN. 2.) Wt maintanence.   Nutrition Goal Status: progressing towards goal  Communication of RD Recs: (POC)    Reason for Assessment    Reason For Assessment: RD follow-up  Diagnosis: transplant/postoperative complications(s/p OLTx 6/6)  Relevant Medical History: KESSLER cirrhosis, HTN  Interdisciplinary Rounds: did not attend  General Information Comments: Pt sitting up in bed reports poor-fair appetite. She c/o diarrhea which is aiding in poor appetite. No other GI distress stated. Pt reports receiving meals from outside. Predict pt is consuming adequate intake. Pt reports she likes Novasource Renal ONS for bkfst, and Boost Breeze BID for lunch and dinner. NFPE completed 6/3 with mild-moderate malnutrition.   Nutrition Discharge Planning: Post transplant nutrition education provided. Food safety/drug interactions emphasized. General healthy/low salt diet recommended. RD name/contact information, education material left.  No other needs identified. Caregiver present.      Nutrition Risk Screen    Nutrition Risk Screen: no indicators present    Nutrition/Diet History    Patient Reported Diet/Restrictions/Preferences: other (see comments)(PATRICIA)  Spiritual, Cultural Beliefs, Anabaptist Practices, Values that Affect Care: no  Factors Affecting Nutritional Intake: NPO, on mechanical ventilation    Anthropometrics    Temp: 98.2 °F (36.8 °C)  Height Method: Stated  Height: 5' 5" (165.1 cm)  Height (inches): 65 in  Weight Method: Bed Scale  Weight: 95.9 kg (211 lb 6.7 oz)  Weight (lb): 211.42 lb  Ideal Body Weight (IBW), Female: 125 lb  % Ideal Body Weight, Female " (lb): 172.24 lb  BMI (Calculated): 35.9  BMI Grade: 35 - 39.9 - obesity - grade II  Usual Body Weight (UBW), k kg(2019 per chart review)  % Usual Body Weight: 83.76  % Weight Change From Usual Weight: -16.41 %       Lab/Procedures/Meds    Pertinent Labs Reviewed: reviewed  Pertinent Labs Comments: Na 128, BUN 65, Cr 2.4, GFR 22.7, Glu 127, Ca 7.3, Phos 6.1, Mg 2.7, AST 46, ALT 71  Pertinent Medications Reviewed: reviewed  Pertinent Medications Comments: docusate, famotidine, methylprednisone, mycophenolate      Estimated/Assessed Needs    Weight Used For Calorie Calculations: 95.9 kg (211 lb 6.7 oz)  Energy Calorie Requirements (kcal):   Energy Need Method: [place]-St Jeor(x1.25 PAL)  Protein Requirements: (g/day)  Weight Used For Protein Calculations: 56.8 kg (125 lb 3.5 oz)(1.5-2.0 g/kg of IBW)  Fluid Requirements (mL): 1 mL/kcal or per MD  Estimated Fluid Requirement Method: RDA Method(or per MD)  RDA Method (mL):          Nutrition Prescription Ordered    Current Diet Order: low K  Nutrition Order Comments: -  Oral Nutrition Supplement: Npvasource Renal TID    Evaluation of Received Nutrient/Fluid Intake    Other Calories (kcal): 0  I/O: -5.5L since   Comments: LBM 6/10  Tolerance: (-)  % Intake of Estimated Energy Needs: 25 - 50 %  % Meal Intake: 0 - 25 %    Nutrition Risk    Level of Risk/Frequency of Follow-up: high     Assessment and Plan  Moderate protein-calorie malnutrition  Contributing Nutrition Diagnosis  Malnutrition in the context of Chronic Illness/Injury     Related to (etiology):  KESSLER cirrhosis     Signs and Symptoms (as evidenced by):  Energy Intake: <50% of estimated energy requirement for 2 months  Body Fat Depletion: moderate depletion of triceps   Muscle Mass Depletion: mild and moderate depletion of temples, interosseous muscle and lower extremities   Weight Loss: 16.4% x 2 months (some may be fluid related loss)   Fluid Accumulation:  mild     Interventions/Recommendations (treatment strategy):  Collaboration and referral of nutrition care     Nutrition Diagnosis Status:  Continues       Monitor and Evaluation    Food and Nutrient Intake: energy intake, food and beverage intake  Food and Nutrient Adminstration: diet order  Knowledge/Beliefs/Attitudes: food and nutrition knowledge/skill  Physical Activity and Function: nutrition-related ADLs and IADLs  Anthropometric Measurements: weight, weight change, body mass index  Biochemical Data, Medical Tests and Procedures: electrolyte and renal panel, gastrointestinal profile, glucose/endocrine profile, inflammatory profile, lipid profile  Nutrition-Focused Physical Findings: overall appearance     Malnutrition Assessment                 Orbital Region (Subcutaneous Fat Loss): well nourished  Upper Arm Region (Subcutaneous Fat Loss): moderate depletion   Confucianism Region (Muscle Loss): moderate depletion  Clavicle Bone Region (Muscle Loss): well nourished  Clavicle and Acromion Bone Region (Muscle Loss): well nourished  Dorsal Hand (Muscle Loss): mild depletion  Anterior Thigh Region (Muscle Loss): mild depletion  Posterior Calf Region (Muscle Loss): mild depletion   Edema (Fluid Accumulation): 0-->no edema present   Subcutaneous Fat Loss (Final Summary): mild protein-calorie malnutrition  Muscle Loss Evaluation (Final Summary): mild protein-calorie malnutrition         Nutrition Follow-Up    RD Follow-up?: Yes

## 2019-06-10 NOTE — ASSESSMENT & PLAN NOTE
- LFTs improving daily  - POD 1 Liver US with good allograft perfusion  - Chevron incision clean, dry, intact with staples in place  - Having BMs. Encourage ambulation. Encourage IS.   - albumin/lasix x 1 today  - LFTs daily

## 2019-06-10 NOTE — PLAN OF CARE
Problem: Physical Therapy Goal  Goal: Physical Therapy Goal  Goals to be met by: 19  Patient will increase functional independence with mobility by performin. Supine to sit with SBA- not met  2. Sit to supine with SBA - not met  3. Sit to stand transfer with Supervision.-not met  4. Bed to chair transfer with Supervision using Single-point Cane or other AD.-not met  5. Gait  x 250 feet with Supervision using Single-point Cane or other AD -not met  6. Ascend/Descend 6 inch curb step with Supervision using Single-point Cane.or other AD- not met  7. Lower extremity exercise program x 20 reps  with assistance as needed.-not met     Goals remain appropriate. Continue with Physical therapy Plan of Care. Sugar Georges, PT 6/10/2019

## 2019-06-10 NOTE — ASSESSMENT & PLAN NOTE
- Creatinine trended up to 2.6 with low UOP  - Lasix 100 mg IV x 1 6/8 with minimal increase in UOP.  - Nephrology consulted, will get UA, UPA, and retroperitoneal US today per recs  - Prograf on hold  - Cr improved to 2.4 today, UOP improving  - albumin/lasix x 1 today  - Continue to monitor

## 2019-06-10 NOTE — PT/OT/SLP PROGRESS
Physical Therapy Treatment    Patient Name:  Jihan Zamudio   MRN:  [id number]    Recommendations:     Discharge Recommendations:  home health PT   Discharge Equipment Recommendations: walker, rolling   Barriers to discharge: None    Assessment:     Jihan Zamudio is a 49 y.o. female admitted with a medical diagnosis of S/P liver transplant.  She presents with the following impairments/functional limitations:  impaired functional mobilty, pain, gait instability, impaired endurance Patient ambulated w/ RW and  feet; transfers w/ RW or grab bars w/ CGA.    Rehab Prognosis: Good; patient would benefit from acute skilled PT services to address these deficits and reach maximum level of function.    Recent Surgery: Procedure(s) (LRB):  TRANSPLANT, LIVER (N/A) 5 Days Post-Op    Plan:     During this hospitalization, patient to be seen 4 x/week to address the identified rehab impairments via gait training, therapeutic activities, therapeutic exercises and progress toward the following goals:    · Plan of Care Expires:  07/05/19    Subjective     Chief Complaint: having loose stools  Patient/Family Comments/goals: I want to walk  Pain/Comfort:  · Pain Rating 1: 5/10  · Location 1: abdomen  · Pain Addressed 1: Distraction  · Pain Rating Post-Intervention 1: 5/10      Objective:     Communicated with nurse prior to session.  Patient found HOB elevated with pulse ox (continuous), telemetry, central line upon PT entry to room.     General Precautions: Standard, fall   Orthopedic Precautions:N/A   Braces: N/A     Functional Mobility:  · Bed Mobility:     · Supine to Sit: contact guard assistance  · Transfers:     · Sit to Stand:  contact guard assistance with rolling walker  · Bed to Chair: contact guard assistance with  rolling walker  using  Step Transfer  · Toilet Transfer: contact guard assistance with  rolling walker and grab bar  using  Step Transfer  · Gait: ambulate to toilet w/ RW and CGA approx 12 feet. after  toileting, ambulate in caballero w/ RW and  feet, slow pace.      AM-PAC 6 CLICK MOBILITY  Turning over in bed (including adjusting bedclothes, sheets and blankets)?: 3  Sitting down on and standing up from a chair with arms (e.g., wheelchair, bedside commode, etc.): 3  Moving from lying on back to sitting on the side of the bed?: 3  Moving to and from a bed to a chair (including a wheelchair)?: 3  Need to walk in hospital room?: 3  Climbing 3-5 steps with a railing?: 2  Basic Mobility Total Score: 17       Therapeutic Activities and Exercises:   patient performs QS, GS, AP, LAQ x15 reps  Patient assisted w/ toileting. PCT notified of urine 500 ml and BM, loose stool. TA for posterior brenna cleanse and CGA for anterior. Assist w/ pants.    Patient left up in chair with all lines intact, call button in reach and nurse notified..    GOALS:   Multidisciplinary Problems     Physical Therapy Goals        Problem: Physical Therapy Goal    Goal Priority Disciplines Outcome Goal Variances Interventions   Physical Therapy Goal     PT, PT/OT Ongoing (interventions implemented as appropriate)     Description:  Goals to be met by: 19  Patient will increase functional independence with mobility by performin. Supine to sit with SBA- not met  2. Sit to supine with SBA - not met  3. Sit to stand transfer with Supervision.-not met  4. Bed to chair transfer with Supervision using Single-point Cane or other AD.-not met  5. Gait  x 250 feet with Supervision using Single-point Cane or other AD -not met  6. Ascend/Descend 6 inch curb step with Supervision using Single-point Cane.or other AD- not met  7. Lower extremity exercise program x 20 reps  with assistance as needed.-not met                      Time Tracking:     PT Received On: 06/10/19  PT Start Time: 1547     PT Stop Time: 1615  PT Total Time (min): 28 min     Billable Minutes: Gait Training 15 and Therapeutic Activity 13             PTA Visit Number: 0     Sugar HERBERT  José Manuel, PT  06/10/2019

## 2019-06-10 NOTE — ASSESSMENT & PLAN NOTE
- Continue cellcept & steroids.  - Prograf held 2/2 ANTONIO. Continue to monitor prograf level daily & monitor for toxic side effects  - Will resume when appropriate

## 2019-06-10 NOTE — ASSESSMENT & PLAN NOTE
- CXR 5/30 w/ large right pleural effusion  - Thoracentesis 5/31, 1.5L removed. Fluid sent for analysis.  - Chest tube placed in OR, to water seal currently  - Chest tube inadvertently pulled out overnight on 6/9.   - Chest x-ray following with no acute findings. She is breathing comfortable on RA  - Albumin/lasix today  - Continue to monitor.

## 2019-06-10 NOTE — PROGRESS NOTES
Met with patient, , daughter and son  for  discharge teaching.  Reviewed My New Journey: Living Smart After My Liver Transplant.  Sections reviewed were: First Steps, Appointments and Prevention.  Medication section will be reviewed by Pharm D.  Allowed time for questions and answers.  Asked patient and family  to complete the review questions in the discharge book.

## 2019-06-10 NOTE — PROGRESS NOTES
[facility]  Liver Transplant  Progress Note    Patient Name: Jihan Zamudio  MRN: [id number]  Admission Date: 2019  Hospital Length of Stay: 25 days  Code Status: Prior  Primary Care Provider: Primary Doctor No  Post-Operative Day: 5    ORGAN:   LIVER  Disease Etiology: Cirrhosis: Fatty Liver ([place])  Donor Type:    - Brain Death  [facility] High Risk:   No  Donor CMV Status:   Donor CMV Status: Positive  Donor HBcAB:   Negative  Donor HCV Status:   Negative  Donor HBV GERTRUDIS: Negative  Donor HCV GERTRUDIS: Negative  Whole or Partial: Whole Liver  Biliary Anastomosis: End to End  Arterial Anatomy: Standard  Subjective:     History of Present Illness:  This is a 50 yo F with hx of KESSLER cirrhosis c/b varices, latent TB, GERD, hypothyroidism, lap band , HLD who was sent to the ED from hepatology clinic for shortness of breath. She follows with Dr. Smallwood. She reports feeling more short of breath the past few days and increased abdominal swelling. She reports occasional confusion as well. She was found to have a low sodium as well. With regards to her lung nodule, we have been awaiting films for an outside facility for comparison. She was found to have a large pleural effusion while here, something she has not had before. Pulmonology has been consulted.      Hospital Course:  Pt approved for liver transplant and transferred to LTS on . Pt w/ SOB. Last Thoracentesis on . Cultures NGTD. Remains on Rocephin x 5 days, per ID recs, completed . Ongoing issue with hyponatremia, improved with albumin daily. Na cont to improve with free water restriction and albumin administration. She is now s/p OLTx on 19. Tolerated surgery well. She was transferred from ICU to TSU on POD#2. VSS. H&H stable. WBC within normal limits. Chest tube inadvertently pulled out POD 3. Chest x-ray following with no acute finding. Breathing comfortable on RA after removal.    Post op with increasing Cr. Lasix trial POD  3 without improvement. Nephrology consulted for further assistance.    Interval Hx.   NANCY. This morning, she is doing fine. +incisional pain. Reports diarrhea - 3-4 episodes yesterday. Will discontinue stool softeners and monitor. Cr improved 2.6 --> 2.4 today. UOP improved over the last 24 hours - 650 cc output. Nephrology following- UPC, UA and retroperitoneal in process per recs.  Prograf held yesterday - will hold today. Volume up on exam, giving albumin x 1 and lasix 40 mg IV x 1. Cont strict I &O. VSS. LFTs continue to downtrend. Will continue to monitor.     Scheduled Meds:   albumin human 25%  25 g Intravenous Once    famotidine  20 mg Oral QHS    furosemide  40 mg Intravenous Once    heparin (porcine)  5,000 Units Subcutaneous Q8H    levothyroxine  112 mcg Oral Daily    lidocaine HCL 10 mg/ml (1%)  10 mL Intradermal Once    methylPREDNISolone sodium succinate  40 mg Intravenous Q12H    Followed by    [START ON 6/11/2019] methylPREDNISolone sodium succinate  20 mg Intravenous Q12H    Followed by    [START ON 6/12/2019] predniSONE  20 mg Oral Daily    miconazole NITRATE 2 %   Topical (Top) BID    mycophenolate  1,000 mg Oral BID    posaconazole  300 mg Oral Daily    sodium bicarbonate  1,300 mg Oral BID    sulfamethoxazole-trimethoprim 400-80mg  1 tablet Oral Daily    [START ON 6/16/2019] valGANciclovir  450 mg Oral Daily     Continuous Infusions:    PRN Meds:Dextrose 10% Bolus, Dextrose 10% Bolus, Dextrose 10% Bolus, Dextrose 10% Bolus, dextrose 50%, glucagon (human recombinant), glucose, glucose, insulin aspart U-100, ondansetron, oxyCODONE, oxyCODONE, simethicone    Review of Systems   Constitutional: Positive for activity change and appetite change. Negative for chills and fever.   Respiratory: Negative for cough, shortness of breath and wheezing.    Cardiovascular: Positive for leg swelling. Negative for chest pain.   Gastrointestinal: Positive for abdominal pain and nausea. Negative for  abdominal distention, diarrhea and vomiting.   Genitourinary: Positive for decreased urine volume. Negative for difficulty urinating, dysuria and hematuria.   Musculoskeletal: Positive for back pain and gait problem.   Skin: Positive for wound.   Allergic/Immunologic: Positive for immunocompromised state.   Neurological: Negative for tremors, speech difficulty, light-headedness and headaches.   Psychiatric/Behavioral: Negative for confusion, decreased concentration and hallucinations. The patient is nervous/anxious.      Objective:     Vital Signs (Most Recent):  Temp: 98.3 °F (36.8 °C) (06/10/19 0800)  Pulse: 80 (06/10/19 1058)  Resp: 14 (06/10/19 0800)  BP: 125/75 (06/10/19 0800)  SpO2: (!) 94 % (06/10/19 0800) Vital Signs (24h Range):  Temp:  [98.2 °F (36.8 °C)-98.8 °F (37.1 °C)] 98.3 °F (36.8 °C)  Pulse:  [72-93] 80  Resp:  [14-20] 14  SpO2:  [94 %-99 %] 94 %  BP: (120-138)/(72-83) 125/75     Weight: 95.9 kg (211 lb 6.7 oz)  Body mass index is 35.18 kg/m².    Intake/Output - Last 3 Shifts       06/08 0700 - 06/09 0659 06/09 0700 - 06/10 0659 06/10 0700 - 06/11 0659    P.O. 360 880 240    I.V. (mL/kg) 16.2 (0.2) 11.5 (0.1)     Other 0 0     Total Intake(mL/kg) 376.2 (3.9) 891.5 (9.3) 240 (2.5)    Urine (mL/kg/hr) 390 (0.2) 650 (0.3)     Emesis/NG output 0 0     Drains 460      Other 0 0     Stool 0 1     Blood 0 0     Chest Tube 375      Total Output 1225 651     Net -848.8 +240.5 +240           Urine Occurrence 0 x 0 x 1 x    Stool Occurrence 0 x 2 x 1 x    Emesis Occurrence 0 x 0 x           Physical Exam   Constitutional: She is oriented to person, place, and time. No distress.   HENT:   Head: Normocephalic and atraumatic.   Eyes: Right eye exhibits no discharge. Left eye exhibits no discharge. No scleral icterus.   Neck: Normal range of motion. Neck supple.   Cardiovascular: Normal rate and regular rhythm. Exam reveals no friction rub.   No murmur heard.  Pulmonary/Chest: Effort normal. She has decreased  breath sounds in the right lower field. She has no wheezes. She has no rales.   Abdominal: Soft. There is tenderness (incisional). There is no rebound and no guarding.       Neurological: She is alert and oriented to person, place, and time.   Skin: Skin is warm and dry. She is not diaphoretic.   Psychiatric: She has a normal mood and affect. Her behavior is normal. Judgment and thought content normal.   Nursing note and vitals reviewed.      Laboratory:  Immunosuppressants         Stop Route Frequency     mycophenolate capsule 1,000 mg      -- Oral 2 times daily        CBC:   Recent Labs   Lab 06/10/19  0500   WBC 5.14   RBC 2.63*   HGB 8.1*   HCT 24.2*   PLT 72*   MCV 92   MCH 30.8   MCHC 33.5     CMP:   Recent Labs   Lab 06/10/19  0500   *   CALCIUM 7.3*   ALBUMIN 2.6*   PROT 4.5*   *   K 4.2   CO2 19*   CL 99   BUN 65*   CREATININE 2.4*   ALKPHOS 120   ALT 71*   AST 46*   BILITOT 1.2*     Labs within the past 24 hours have been reviewed.    Diagnostic Results:  None    Assessment/Plan:     * S/P liver transplant  - LFTs improving daily  - POD 1 Liver US with good allograft perfusion  - Chevron incision clean, dry, intact with staples in place  - Having BMs. Encourage ambulation. Encourage IS.   - albumin/lasix x 1 today  - LFTs daily    Acute on chronic anemia  - expected post operatively  - see anemia of chronic disease      ANTONIO (acute kidney injury)  - Creatinine trended up to 2.6 with low UOP  - Lasix 100 mg IV x 1 6/8 with minimal increase in UOP.  - Nephrology consulted, will get UA, UPA, and retroperitoneal US today per recs  - Prograf on hold  - Cr improved to 2.4 today, UOP improving  - albumin/lasix x 1 today  - Continue to monitor      Long-term use of immunosuppressant medication  - Continue cellcept & steroids.  - Prograf held 2/2 ANTONIO. Continue to monitor prograf level daily & monitor for toxic side effects  - Will resume when appropriate    Acute blood loss anemia  - Expected post  operatively  - See anemia of chronic disease.       Acute hyperglycemia  - Endocrine consulted for help with management. Appreciate their assistance.       Prophylactic immunotherapy  - See long term use of immunosuppressant medication.      Anemia of chronic disease  - H/H stable.   - No overt bleeding  - Continue to monitor with daily labs.       Thrombocytopenia  - Due to hx of ESLD  - Likely to continue to improve post transplant  - Cont to monitor.       Pleural effusion, right  - CXR 5/30 w/ large right pleural effusion  - Thoracentesis 5/31, 1.5L removed. Fluid sent for analysis.  - Chest tube placed in OR, to water seal currently  - Chest tube inadvertently pulled out overnight on 6/9.   - Chest x-ray following with no acute findings. She is breathing comfortable on RA  - Albumin/lasix today  - Continue to monitor.       Weakness  - PT/OT following.       Hyponatremia  - Stable  - Continue to monitor with daily labs. 2/2 chronic liver disease      Moderate malnutrition  - Dietician consulted  - Boost supplements ordered.       GERD (gastroesophageal reflux disease)  - chronic and stable.  Cont Pantoprazole.     - Cont PPI      Hypothyroidism  - Chronic and stable.  - Continue Synthroid 112mcg PO daily.            Debility/Functional status: Patient debilitated by evidence of Muscle wasting and atrophy, Weakness and Limitation of activities due to disability. Physical and occupational therapy ordered to evaluate and treat. Debility was: present on admission.    VTE Risk Mitigation (From admission, onward)        Ordered     heparin (porcine) injection 5,000 Units  Every 8 hours      06/06/19 0210     Place sequential compression device  Until discontinued      06/06/19 0211     IP VTE HIGH RISK PATIENT  Once      06/06/19 0210          The patients clinical status was discussed at multidisplinary rounds, involving transplant surgery, transplant medicine, pharmacy, nursing, nutrition, and social  work    Discharge Planning: Not a candidate for discharge at this time.     Archana Becerra PA-C  Liver Transplant  [facility]

## 2019-06-11 ENCOUNTER — DOCUMENTATION ONLY (OUTPATIENT)
Dept: TRANSPLANT | Facility: HOSPITAL | Age: 49
End: 2019-06-11

## 2019-06-11 VITALS
BODY MASS INDEX: 34.63 KG/M2 | RESPIRATION RATE: 12 BRPM | WEIGHT: 207.88 LBS | OXYGEN SATURATION: 96 % | HEIGHT: 65 IN | SYSTOLIC BLOOD PRESSURE: 118 MMHG | TEMPERATURE: 98 F | DIASTOLIC BLOOD PRESSURE: 68 MMHG | HEART RATE: 77 BPM

## 2019-06-11 DIAGNOSIS — Z94.4 LIVER REPLACED BY TRANSPLANT: Primary | ICD-10-CM

## 2019-06-11 LAB
ABO + RH BLD: NORMAL
ALBUMIN SERPL BCP-MCNC: 2.7 G/DL (ref 3.5–5.2)
ALP SERPL-CCNC: 142 U/L (ref 55–135)
ALT SERPL W/O P-5'-P-CCNC: 61 U/L (ref 10–44)
ANION GAP SERPL CALC-SCNC: 8 MMOL/L (ref 8–16)
AST SERPL-CCNC: 38 U/L (ref 10–40)
BASOPHILS # BLD AUTO: 0 K/UL (ref 0–0.2)
BASOPHILS # BLD AUTO: 0 K/UL (ref 0–0.2)
BASOPHILS NFR BLD: 0 % (ref 0–1.9)
BASOPHILS NFR BLD: 0 % (ref 0–1.9)
BILIRUB SERPL-MCNC: 1.1 MG/DL (ref 0.1–1)
BLD GP AB SCN CELLS X3 SERPL QL: NORMAL
BUN SERPL-MCNC: 62 MG/DL (ref 6–20)
CA-I BLDV-SCNC: 1.08 MMOL/L (ref 1.06–1.42)
CALCIUM SERPL-MCNC: 7.5 MG/DL (ref 8.7–10.5)
CHLORIDE SERPL-SCNC: 101 MMOL/L (ref 95–110)
CO2 SERPL-SCNC: 22 MMOL/L (ref 23–29)
CREAT SERPL-MCNC: 1.6 MG/DL (ref 0.5–1.4)
DIFFERENTIAL METHOD: ABNORMAL
DIFFERENTIAL METHOD: ABNORMAL
EOSINOPHIL # BLD AUTO: 0 K/UL (ref 0–0.5)
EOSINOPHIL # BLD AUTO: 0 K/UL (ref 0–0.5)
EOSINOPHIL NFR BLD: 0 % (ref 0–8)
EOSINOPHIL NFR BLD: 0 % (ref 0–8)
ERYTHROCYTE [DISTWIDTH] IN BLOOD BY AUTOMATED COUNT: 17.7 % (ref 11.5–14.5)
ERYTHROCYTE [DISTWIDTH] IN BLOOD BY AUTOMATED COUNT: 17.8 % (ref 11.5–14.5)
EST. GFR  (AFRICAN AMERICAN): 42.7 ML/MIN/1.73 M^2
EST. GFR  (NON AFRICAN AMERICAN): 37 ML/MIN/1.73 M^2
GLUCOSE SERPL-MCNC: 141 MG/DL (ref 70–110)
HCT VFR BLD AUTO: 20.9 % (ref 37–48.5)
HCT VFR BLD AUTO: 22.5 % (ref 37–48.5)
HGB BLD-MCNC: 7.1 G/DL (ref 12–16)
HGB BLD-MCNC: 7.8 G/DL (ref 12–16)
IMM GRANULOCYTES # BLD AUTO: 0.05 K/UL (ref 0–0.04)
IMM GRANULOCYTES # BLD AUTO: 0.09 K/UL (ref 0–0.04)
IMM GRANULOCYTES NFR BLD AUTO: 1.5 % (ref 0–0.5)
IMM GRANULOCYTES NFR BLD AUTO: 1.7 % (ref 0–0.5)
LYMPHOCYTES # BLD AUTO: 0.5 K/UL (ref 1–4.8)
LYMPHOCYTES # BLD AUTO: 0.7 K/UL (ref 1–4.8)
LYMPHOCYTES NFR BLD: 14.1 % (ref 18–48)
LYMPHOCYTES NFR BLD: 14.6 % (ref 18–48)
MAGNESIUM SERPL-MCNC: 2.8 MG/DL (ref 1.6–2.6)
MCH RBC QN AUTO: 30.6 PG (ref 27–31)
MCH RBC QN AUTO: 31.2 PG (ref 27–31)
MCHC RBC AUTO-ENTMCNC: 34 G/DL (ref 32–36)
MCHC RBC AUTO-ENTMCNC: 34.7 G/DL (ref 32–36)
MCV RBC AUTO: 90 FL (ref 82–98)
MCV RBC AUTO: 90 FL (ref 82–98)
MONOCYTES # BLD AUTO: 0.2 K/UL (ref 0.3–1)
MONOCYTES # BLD AUTO: 0.4 K/UL (ref 0.3–1)
MONOCYTES NFR BLD: 6.8 % (ref 4–15)
MONOCYTES NFR BLD: 7.2 % (ref 4–15)
NEUTROPHILS # BLD AUTO: 2.6 K/UL (ref 1.8–7.7)
NEUTROPHILS # BLD AUTO: 4 K/UL (ref 1.8–7.7)
NEUTROPHILS NFR BLD: 76.7 % (ref 38–73)
NEUTROPHILS NFR BLD: 77.4 % (ref 38–73)
NRBC BLD-RTO: 0 /100 WBC
NRBC BLD-RTO: 0 /100 WBC
PHOSPHATE SERPL-MCNC: 5 MG/DL (ref 2.7–4.5)
PLATELET # BLD AUTO: 53 K/UL (ref 150–350)
PLATELET # BLD AUTO: 76 K/UL (ref 150–350)
PMV BLD AUTO: 11 FL (ref 9.2–12.9)
PMV BLD AUTO: 11.8 FL (ref 9.2–12.9)
POCT GLUCOSE: 123 MG/DL (ref 70–110)
POCT GLUCOSE: 135 MG/DL (ref 70–110)
POTASSIUM SERPL-SCNC: 4.2 MMOL/L (ref 3.5–5.1)
PROT SERPL-MCNC: 4.4 G/DL (ref 6–8.4)
RBC # BLD AUTO: 2.32 M/UL (ref 4–5.4)
RBC # BLD AUTO: 2.5 M/UL (ref 4–5.4)
SODIUM SERPL-SCNC: 131 MMOL/L (ref 136–145)
TACROLIMUS BLD-MCNC: 2.6 NG/ML (ref 5–15)
WBC # BLD AUTO: 3.35 K/UL (ref 3.9–12.7)
WBC # BLD AUTO: 5.18 K/UL (ref 3.9–12.7)

## 2019-06-11 PROCEDURE — 97110 THERAPEUTIC EXERCISES: CPT

## 2019-06-11 PROCEDURE — 63600175 PHARM REV CODE 636 W HCPCS: Performed by: TRANSPLANT SURGERY

## 2019-06-11 PROCEDURE — 84100 ASSAY OF PHOSPHORUS: CPT

## 2019-06-11 PROCEDURE — 63600175 PHARM REV CODE 636 W HCPCS: Performed by: STUDENT IN AN ORGANIZED HEALTH CARE EDUCATION/TRAINING PROGRAM

## 2019-06-11 PROCEDURE — 85025 COMPLETE CBC W/AUTO DIFF WBC: CPT

## 2019-06-11 PROCEDURE — 25000003 PHARM REV CODE 250: Performed by: PHYSICIAN ASSISTANT

## 2019-06-11 PROCEDURE — 97168 OT RE-EVAL EST PLAN CARE: CPT

## 2019-06-11 PROCEDURE — 86850 RBC ANTIBODY SCREEN: CPT

## 2019-06-11 PROCEDURE — 25000003 PHARM REV CODE 250: Performed by: TRANSPLANT SURGERY

## 2019-06-11 PROCEDURE — 97530 THERAPEUTIC ACTIVITIES: CPT

## 2019-06-11 PROCEDURE — 25000003 PHARM REV CODE 250: Performed by: STUDENT IN AN ORGANIZED HEALTH CARE EDUCATION/TRAINING PROGRAM

## 2019-06-11 PROCEDURE — 99239 HOSP IP/OBS DSCHRG MGMT >30: CPT | Mod: 24,,, | Performed by: PHYSICIAN ASSISTANT

## 2019-06-11 PROCEDURE — 82330 ASSAY OF CALCIUM: CPT

## 2019-06-11 PROCEDURE — 63600175 PHARM REV CODE 636 W HCPCS: Performed by: PHYSICIAN ASSISTANT

## 2019-06-11 PROCEDURE — 83735 ASSAY OF MAGNESIUM: CPT

## 2019-06-11 PROCEDURE — 80197 ASSAY OF TACROLIMUS: CPT

## 2019-06-11 PROCEDURE — 99239 PR HOSPITAL DISCHARGE DAY,>30 MIN: ICD-10-PCS | Mod: 24,,, | Performed by: PHYSICIAN ASSISTANT

## 2019-06-11 PROCEDURE — 80053 COMPREHEN METABOLIC PANEL: CPT

## 2019-06-11 RX ORDER — INSULIN PUMP SYRINGE, 3 ML
EACH MISCELLANEOUS
Qty: 1 EACH | Refills: 0 | Status: ON HOLD | OUTPATIENT
Start: 2019-06-11 | End: 2019-07-23 | Stop reason: HOSPADM

## 2019-06-11 RX ORDER — TACROLIMUS 1 MG/1
1 CAPSULE ORAL 2 TIMES DAILY
Status: DISCONTINUED | OUTPATIENT
Start: 2019-06-11 | End: 2019-06-11 | Stop reason: HOSPADM

## 2019-06-11 RX ORDER — FUROSEMIDE 80 MG/1
40 TABLET ORAL DAILY
Qty: 5 TABLET | Refills: 0 | Status: SHIPPED | OUTPATIENT
Start: 2019-06-11 | End: 2019-06-11 | Stop reason: SDUPTHER

## 2019-06-11 RX ORDER — OXYCODONE HYDROCHLORIDE 10 MG/1
10 TABLET ORAL EVERY 4 HOURS PRN
Qty: 40 TABLET | Refills: 0 | Status: CANCELLED | OUTPATIENT
Start: 2019-06-11

## 2019-06-11 RX ORDER — PEN NEEDLE, DIABETIC 30 GX3/16"
1 NEEDLE, DISPOSABLE MISCELLANEOUS 3 TIMES DAILY
Qty: 100 EACH | Refills: 0 | Status: ON HOLD | OUTPATIENT
Start: 2019-06-11 | End: 2019-07-23 | Stop reason: HOSPADM

## 2019-06-11 RX ORDER — SODIUM BICARBONATE 650 MG/1
1300 TABLET ORAL 2 TIMES DAILY
Qty: 120 TABLET | Refills: 11 | Status: SHIPPED | OUTPATIENT
Start: 2019-06-11 | End: 2019-06-18

## 2019-06-11 RX ORDER — LANCETS 28 GAUGE
EACH MISCELLANEOUS 3 TIMES DAILY
Qty: 100 EACH | Refills: 0 | Status: ON HOLD | OUTPATIENT
Start: 2019-06-11 | End: 2019-07-23 | Stop reason: HOSPADM

## 2019-06-11 RX ORDER — OXYCODONE HYDROCHLORIDE 10 MG/1
10 TABLET ORAL EVERY 4 HOURS PRN
Qty: 40 TABLET | Refills: 0 | Status: ON HOLD | OUTPATIENT
Start: 2019-06-11 | End: 2019-07-23 | Stop reason: HOSPADM

## 2019-06-11 RX ORDER — TACROLIMUS 1 MG/1
1 CAPSULE ORAL EVERY 12 HOURS
Qty: 60 CAPSULE | Refills: 11 | Status: SHIPPED | OUTPATIENT
Start: 2019-06-11 | End: 2019-06-12 | Stop reason: SDUPTHER

## 2019-06-11 RX ORDER — FUROSEMIDE 40 MG/1
40 TABLET ORAL DAILY
Qty: 10 TABLET | Refills: 0 | Status: SHIPPED | OUTPATIENT
Start: 2019-06-11 | End: 2019-06-20 | Stop reason: SDUPTHER

## 2019-06-11 RX ORDER — INSULIN ASPART 100 [IU]/ML
INJECTION, SOLUTION INTRAVENOUS; SUBCUTANEOUS
Qty: 15 ML | Refills: 0 | Status: ON HOLD | OUTPATIENT
Start: 2019-06-11 | End: 2019-06-25 | Stop reason: HOSPADM

## 2019-06-11 RX ORDER — FUROSEMIDE 40 MG/1
40 TABLET ORAL DAILY
Status: DISCONTINUED | OUTPATIENT
Start: 2019-06-11 | End: 2019-06-11 | Stop reason: HOSPADM

## 2019-06-11 RX ADMIN — HEPARIN SODIUM 5000 UNITS: 5000 INJECTION, SOLUTION INTRAVENOUS; SUBCUTANEOUS at 05:06

## 2019-06-11 RX ADMIN — TACROLIMUS 1 MG: 1 CAPSULE ORAL at 11:06

## 2019-06-11 RX ADMIN — SULFAMETHOXAZOLE AND TRIMETHOPRIM 1 TABLET: 400; 80 TABLET ORAL at 08:06

## 2019-06-11 RX ADMIN — LEVOTHYROXINE SODIUM 112 MCG: 112 TABLET ORAL at 05:06

## 2019-06-11 RX ADMIN — METHYLPREDNISOLONE SODIUM SUCCINATE 20 MG: 40 INJECTION, POWDER, FOR SOLUTION INTRAMUSCULAR; INTRAVENOUS at 08:06

## 2019-06-11 RX ADMIN — FUROSEMIDE 40 MG: 40 TABLET ORAL at 11:06

## 2019-06-11 RX ADMIN — OXYCODONE HYDROCHLORIDE 10 MG: 10 TABLET ORAL at 09:06

## 2019-06-11 RX ADMIN — SODIUM BICARBONATE 650 MG TABLET 1300 MG: at 08:06

## 2019-06-11 RX ADMIN — POSACONAZOLE 300 MG: 100 TABLET, COATED ORAL at 08:06

## 2019-06-11 RX ADMIN — MYCOPHENOLATE MOFETIL 1000 MG: 250 CAPSULE ORAL at 08:06

## 2019-06-11 NOTE — PT/OT/SLP PROGRESS
Physical Therapy      Patient Name:  Jihan Zamudio   MRN:  [id number]    Patient not seen today secondary to pt being seen by  first attempt, and being seen by nursing/MD second attempt. Pt to be discharged today. Discharge summary to follow.        Marivel Butts, PTA

## 2019-06-11 NOTE — PROGRESS NOTES
Spoke with transplant pharmacist about patient's discharge recs.    Patient has no history of DM and has not required correction scale insulin since IV insulin infusion discontinued.  Since patient has a poor appetite and steroids due to taper tomorrow, unable to determine whether patient has antihyperglycemic needs at this time.    Recommend patient discharge with Novolog correction scale 150/50.  Transplant pharmacist to provide patient with BG monitoring supplies and insulin.  Bedside RN to instruct patient on home insulin use.  Once patient goes 48 hrs while tolerating diet and no insulin needs, she may stop checking BG given no history of DM.

## 2019-06-11 NOTE — PROGRESS NOTES
Met with patient,  and daughter in preparation for discharge today.  Reviewed their answers to the questions in My New Journey.  All questions answered correctly.  These questions cover: post op care, medication management, infection prevention and emergency contacts.  Reviewed outpatient appointments.  Out patient coordinator assigned.  Allowed time for questions and answers.

## 2019-06-11 NOTE — PROGRESS NOTES
Tentative Discharge   (YULIYA) presented to the patient's (pt) room to discuss pt's tentative discharge planned for today.  Pt was being prepped to be taken down for a liver ultrasound and allowed SW to speak with pt's daughter, Anthony, who was present.  Anthony advised [id number] her father, pt's , for the scheduled check in time this morning.  Anthony paid the deposit, received the key and discussed her father's service animal requirement.  As per Anthony, pt's 's service animal will be allowed on the premises to remain with he and the pt while local and pt's  will provide requested documents as proof of need.  Pt's daughter denied any issues or concerns with checking into the apartment.  Pt's  will present today to transport the pt to the apartment upon discharge.  SW provided pt's daughter with a contact card to have her father contact SW should he have any questions or concerns.  YULIYA advised pt's daughter the pt will be discharging with HH needs; therefore, YULIYA contacted Marta merlos/[facility] ([id number]) to advise as pt's daughter stated the pt did not have a preference in agency.  SW advised the pt's wheelchair would be delivered prior to discharge; however, the pt would need to return the device prior to returning to her residence in [place].  Pt's daughter verbalized understanding and denied any other concerns or information.  SW remains available for education, resources, support and discharge planning as appropriate.

## 2019-06-11 NOTE — PROGRESS NOTES
DISCHARGE NOTE:    Jihan Zamudio is a 49 y.o. female s/p   LIVER    - Brain Death transplant on 2019 (Liver) for ESLD secondary to Cirrhosis: Fatty Liver ([place]).      Past Medical History:   Diagnosis Date    Cirrhosis     Esophageal varices 2018    Small with no banding     Essential hypertension 2018    Fatty liver 2018    GERD (gastroesophageal reflux disease)     Hx of colonic polyps 2018    On colonoscopy     Hypertension     Hypothyroidism 2018    Kidney stones     Morbid obesity 2018    Lap band with subsequent release    KADEEM (obstructive sleep apnea) 2018    Osteoarthritis 2018    Pulmonary nodule 2018    Vitamin D deficiency 2018       Hospital Course: She is now s/p OLTx on 19 (DBD, SM induction, CMV +/+). Tolerated surgery well. Post op complicated by ANTONIO that may be due to elevated prograf level. Prograf held and Cr trending down with good urine output on day of discharge. Restarting prograf at 1 mg BID. And stopped posaconazole at discharge (watch prograf level closely).    Allergies:   Review of patient's allergies indicates:   Allergen Reactions    Metformin Rash    Pcn [penicillins] Other (See Comments)     Unsure of reaction, states it was as a child. Tolerated rocephin at [facility]       Patient Pharmacy: [facility]    Discharge Medications:   Jihan Zamudio   Home Medication Instructions LUISA:[id number]    Printed on:19 3331   Medication Information                      blood sugar diagnostic Strp  Test blood glucose 3 (three) times daily.             blood-glucose meter kit  Use as instructed             famotidine (PEPCID) 20 MG tablet  Take 1 tablet (20 mg total) by mouth every evening.             furosemide (LASIX) 40 MG tablet  Take 1 tablet (40 mg total) by mouth once daily. for 10 days             insulin aspart U-100 (NOVOLOG) 100 unit/mL (3 mL) InPn pen  Sliding scale:  150-200: +1  "units  201-250 +2 units  251-300: +3 units  301-350: +4 units  >350: +5 units             lancets 28 gauge Misc  Test blood glucose 3 (three) times daily.             levothyroxine (SYNTHROID) 112 MCG tablet  Take 1 tablet (112 mcg total) by mouth once daily.             mycophenolate (CELLCEPT) 250 mg Cap  Take 4 capsules (1,000 mg total) by mouth 2 (two) times daily.             oxyCODONE (ROXICODONE) 10 mg Tab immediate release tablet  Take 1 tablet (10 mg total) by mouth every 4 (four) hours as needed.             pen needle, diabetic 32 gauge x 5/32" Ndle  Use to inject insulin into the skin 3 (three) times daily.             predniSONE (DELTASONE) 10 MG tablet  Take 20 mg by mouth daily 6/12-6/18; 15 mg daily 6/19-6/25; 10 mg  daily 6/26-7/2; 5 mg  daily 7/3-7/9; stop: 7/10/19             sodium bicarbonate 650 MG tablet  Take 2 tablets (1,300 mg total) by mouth 2 (two) times daily.             sulfamethoxazole-trimethoprim 400-80mg (BACTRIM,SEPTRA) 400-80 mg per tablet  Take 1 tablet by mouth once daily. Stop: 12/3/19             tacrolimus (PROGRAF) 1 MG Cap  Take 1 capsule (1 mg total) by mouth every 12 (twelve) hours.             valGANciclovir (VALCYTE) 450 mg Tab  Take 1 tablet (450 mg total) by mouth once daily. Stop: 9/4/19                 Pharmacy Interventions/Recommendations:  1) Transplant Immunosuppression: Prograf, MMF, prednisone    2) Opportunistic Infection prophylaxis: Bactrim and valcyte    3) Patient Counseling/Education: Demonstrated the use of the BP cuff, thermometer.    4) Follow-Up/Discharge Needs: Patient sent out on sliding scale insulin. If patient does not require sliding scale insulin for 2 days after discharge insulin can be stopped per Endocrine. Posaconazole stopped at discharge (patient had prolonged hospital course pretxp). Monitor tacrolimus levels closely.    5) Patient received prescriptions for:     See above    6) Patient Assistance Information: None.    7) The following " medications have been placed on HOLD and should be restarted in the outpatient setting (when appropriate): None.    Jihan and her caregiver verbalized their understanding and had the opportunity to ask questions.

## 2019-06-11 NOTE — PLAN OF CARE
Problem: Occupational Therapy Goal  Goal: Occupational Therapy Goal  Goals to be met by: 5/26/19  Extended to: 6/19/19    Patient will increase functional independence with ADLs by performing:    Grooming while standing with Stand-by Assistance. MET 5/28  Revised to set up- progressing   Toileting from bedside commode with Stand-by Assistance for hygiene and clothing management.  Revised: Toileting from toilet in restroom with SBA   Supine to sit with [place].  Toilet transfer to toilet with Stand-by Assistance.          Outcome: Ongoing (interventions implemented as appropriate)  Pt seen this date for an OT re-evaluation and treatment session. Pt presented alert and awake for today's session. Pt and family agreeable to OT session and POC. Pt tolerated the session well with no c/o increased pain with activity. Pt reported of ambulating to and from bathroom with RW. Educated pt on the importance of having assistance with mobility at all times; pt verbalized understanding. Pt reported of requiring assistance for BLE dressing due to incisional discomfort. Pt motivated to reach all goals set in therapy. While in house, pt would benefit from skilled OT 2x/wk to prevent debility and return to PLOF. OT recommend pt receive [facility]OT at the [facility].      Comments: Roselyn Lowe OTR/L

## 2019-06-11 NOTE — PT/OT/SLP PROGRESS
Physical Therapy Treatment    Patient Name:  Jihan Zamudio   MRN:  [id number]    Recommendations:     Discharge Recommendations:  home health PT   Discharge Equipment Recommendations: walker, rolling   Barriers to discharge: None    Assessment:     Jihan Zamudio is a 49 y.o. female admitted with a medical diagnosis of S/P liver transplant.  She presents with the following impairments/functional limitations:  impaired functional mobilty, gait instability, impaired endurance .  Pt with limited  Treatment due to pt leaving for testing and with c/o of  Diarrhea.  Pt would continue to benefit from skilled PT to address overall functional mobility and goals. Goals remain appropriate    Rehab Prognosis: Good; patient would benefit from acute skilled PT services to address these deficits and reach maximum level of function.    Recent Surgery: Procedure(s) (LRB):  TRANSPLANT, LIVER (N/A) 6 Days Post-Op    Plan:     During this hospitalization, patient to be seen 4 x/week to address the identified rehab impairments via gait training, therapeutic exercises and progress toward the following goals:    · Plan of Care Expires:  07/05/19    Subjective     Chief Complaint: diarrhea   Patient/Family Comments/goals: Pt not feeling up for ambulation but willing to complete some seated exercise.  Pain/Comfort:  · Pain Rating 1: 0/10  · Pain Rating Post-Intervention 1: 0/10  · Pain Rating 2: 0/10  · Pain Rating Post-Intervention 2: 0/10      Objective:     Communicated with nurse prior to session.  Patient found up in chair with telemetry, PICC line upon PT entry to room.     General Precautions: Standard, fall   Orthopedic Precautions:N/A   Braces: N/A     Functional Mobility:  · Sit to supine with Bill for R LE management onto stretcher  · Transfers:     · Sit to Stand:  contact guard assistance with RW  · Gait: ambulated 15 with RW with CGA from chair to transfer stretcher      AM-PAC 6 CLICK MOBILITY  Turning over in bed  (including adjusting bedclothes, sheets and blankets)?: 3  Sitting down on and standing up from a chair with arms (e.g., wheelchair, bedside commode, etc.): 3  Moving from lying on back to sitting on the side of the bed?: 3  Moving to and from a bed to a chair (including a wheelchair)?: 3  Need to walk in hospital room?: 3  Climbing 3-5 steps with a railing?: 2  Basic Mobility Total Score: 17       Therapeutic Activities and Exercises:   LAQ   15X  AP   15X  Seated Hip Flexion 15X      Patient left supine with HOB elevated  with supervision from transport for scheduled imaging..    GOALS:   Multidisciplinary Problems     Physical Therapy Goals        Problem: Physical Therapy Goal    Goal Priority Disciplines Outcome Goal Variances Interventions   Physical Therapy Goal     PT, PT/OT Ongoing (interventions implemented as appropriate)     Description:  Goals to be met by: 19  Patient will increase functional independence with mobility by performin. Supine to sit with SBA- not met  2. Sit to supine with SBA - not met  3. Sit to stand transfer with Supervision.-not met  4. Bed to chair transfer with Supervision using Single-point Cane or other AD.-not met  5. Gait  x 250 feet with Supervision using Single-point Cane or other AD -not met  6. Ascend/Descend 6 inch curb step with Supervision using Single-point Cane.or other AD- not met  7. Lower extremity exercise program x 20 reps  with assistance as needed.-not met                      Time Tracking:     PT Received On: 19  PT Start Time: 928     PT Stop Time: 951  PT Total Time (min): 23 min     Billable Minutes: Therapeutic Exercise 13 Therapeutic activity 10    Treatment Type: Treatment  PT/PTA: PTA     PTA Visit Number: 3     GENE Luciano  2019   I certify that I was present in the room directing the student in service delivery and guiding them using my skilled judgment. As the co-signing therapist I have reviewed the students  documentation and am responsible for the treatment, assessment, and plan. Jay Choi PTA  6/11/2019

## 2019-06-11 NOTE — PLAN OF CARE
Problem: Adult Inpatient Plan of Care  Goal: Plan of Care Review  Outcome: Ongoing (interventions implemented as appropriate)  AAOx3, afebrile, w/o c/o pain. BG monitored achs and tx per orders. Telemetry monitor in place (NSR). Self meds and incision care 100%. Ointment applied to pt's fold for skin breakdown. Old chest tube site dressing changed. Pt being repositioned Q2 hrs.  X1 person assist with walker OOB. Pt in lowest position, side rails up x2, non-skid foot wear in place, call light within reach, pt verbalized understanding to call RN when needed. Hand hygiene practiced per protocol. Will continue to monitor.

## 2019-06-11 NOTE — CARE UPDATE
BG goal 140-180    Remains in TSUNANCY  Poor appetite noted  Solu medrol 20 mg IV BID, standard steroid taper  BG well controlled without insulin at this time  Noted creatinine of 1.6, trending down  Continue low dose correction scale Novolog with BG monitoring AC/HS    Monitor for prandial excursions when patient's appetite improves    Endocrine to continue to follow for now but will likely sign off once patient tolerating diet with no insulin needs noted x 24 hrs given no history of DM    ** Please call Endocrine for any BG related issues **

## 2019-06-11 NOTE — PT/OT/SLP RE-EVAL
Occupational Therapy   Re-evaluation/ Treatment Session     Name: Jihan Zamudio  MRN: [id number]  Admitting Diagnosis:  S/P liver transplant 6 Days Post-Op    Recommendations:     Discharge Recommendations: home health OT  Discharge Equipment Recommendations:  walker, rolling  Barriers to discharge:  None    Assessment:     Jihan Zamudio is a 49 y.o. female with a medical diagnosis of S/P liver transplant.  Pt seen this date for an OT re-evaluation and treatment session. Pt presented alert and awake for today's session. Pt and family agreeable to OT session and POC. Pt tolerated the session well with no c/o increased pain with activity. Pt reported of ambulating to and from bathroom with RW. Educated pt on the importance of having assistance with mobility at all times; pt verbalized understanding. Pt reported of requiring assistance for BLE dressing due to incisional discomfort. Pt motivated to reach all goals set in therapy. While in house, pt would benefit from skilled OT 2x/wk to prevent debility and return to PLOF. OT recommend pt [facility].  Performance deficits affecting function are weakness, impaired endurance, impaired self care skills, gait instability.      Rehab Prognosis:  Good; patient would benefit from acute skilled OT services to address these deficits and reach maximum level of function.       Plan:     Patient to be seen 2 x/week to address the above listed problems via self-care/home management, therapeutic activities, therapeutic exercises  · Plan of Care Expires: 06/18/19  · Plan of Care Reviewed with: patient    Subjective     Chief Complaint: Ready for discharge   Patient/Family stated goals: Pt and family agreeable to OT Re-evaluation and POC.  Communicated with: RN prior to session.  Pain/Comfort:  · Pain Rating 1: 0/10  · Pain Rating Post-Intervention 1: 0/10    Objective:     Patient found up in chair with: telemetry, pulse ox (continuous) upon OT entry to  room.    General Precautions: Standard, fall   Orthopedic Precautions:N/A   Braces: N/A     Occupational Performance:    Bed Mobility:    · NT this date 2/2 pt UIC upon OT's arrival    Functional Mobility/Transfers:  · Patient completed Sit <> Stand Transfer from bed with stand by assistance  with  rolling walker   · Functional Mobility: Pt ambulated ~400 ft with RW and SBA-CGA to build strength and endurance to complete occupations of choice. No LOB noted. No RBs required. Decrease pace noted      Activities of Daily Living:  · Grooming: modified independence to adjust robe in standing, fine motor intact while tying robe seated in chair  · Lower Body Dressing: postponed due to incision; educated on figure 4 technique for BLE dressing    Cognitive/Visual Perceptual:  Cognitive/Psychosocial Skills:     -       Oriented to: Person, Place, Time and Situation   -       Follows Commands/attention:Follows multistep  commands  -       Communication: clear/fluent  -       Memory: No Deficits noted  -       Safety awareness/insight to disability: intact   -       Mood/Affect/Coping skills/emotional control: Appropriate to situation, Happy and Pleasant  Visual/Perceptual:      -Intact no defs noted; encouraged use of glasses during mobility to assist with balance    Physical Exam:  Balance:    -       SBA RW during static standing; mod I sitting [facility]  Postural examination/scapula alignment:    -       No postural abnormalities identified  Skin integrity: Wound abdominal incision due to liver transplant  Edema:  None noted  Sensation:    -       Intact  Upper Extremity Range of Motion:        -       Right Upper Extremity: WFL  -       Left Upper Extremity: WFL  Upper Extremity Strength:    -       Right Upper Extremity: WFL  -       Left Upper Extremity: WFL   Strength:    -       Right Upper Extremity: WFL  -       Left Upper Extremity: WFL  Fine Motor Coordination:    -       Intact    AMPAC 6 Click:  AMPAC Total Score:      Treatment & Education:Education:    - Role of OT/ OT POC  - Self care safety/ independence  - Functional transfer/ mobility safety: Usage of RW during turns  - Importance of sitting UIC  - Energy conservation techniques such as pacing and taking rest breaks as needed  - Importance of asking for assistance when needed for fall prevention      Patient left up in chair with all lines intact, call button in reach and Endocrine team and family present    GOALS:   Multidisciplinary Problems     Occupational Therapy Goals        Problem: Occupational Therapy Goal    Goal Priority Disciplines Outcome Interventions   Occupational Therapy Goal     OT, PT/OT Ongoing (interventions implemented as appropriate)    Description:  Goals to be met by: 19  Extended to: 19    Patient will increase functional independence with ADLs by performing:    Grooming while standing with Stand-by Assistance. MET   Revised to set up- progressing   Toileting from bedside commode with Stand-by Assistance for hygiene and clothing management.  Revised: Toileting from toilet in restroom with SBA   Supine to sit with [place].  Toilet transfer to toilet with Stand-by Assistance.                            History:     Past Medical History:   Diagnosis Date    Cirrhosis     Esophageal varices 2018    Small with no banding     Essential hypertension 2018    Fatty liver 2018    GERD (gastroesophageal reflux disease)     Hx of colonic polyps 2018    On colonoscopy     Hypertension     Hypothyroidism 2018    Kidney stones     Morbid obesity 2018    Lap band with subsequent release    KADEEM (obstructive sleep apnea) 2018    Osteoarthritis 2018    Pulmonary nodule 2018    Vitamin D deficiency 2018       Past Surgical History:   Procedure Laterality Date     SECTION      TIMES 2     CHOLECYSTECTOMY      LAPAROSCOPIC     CYSTOSCOPY W/ STONE MANIPULATION   2000    kidney stone removal    EGD (ESOPHAGOGASTRODUODENOSCOPY) N/A 4/24/2019    Performed by Davian Hernández MD at [facility] (2ND FLR)    LAPAROSCOPIC GASTRIC BANDING  2006    removal 2009    LIVER BIOPSY  11/29/2017    KESSLER with bridging 11/29/17    TRANSPLANT, LIVER N/A 6/5/2019    Performed by Clemente Brown Jr., MD at [facility] OR 2ND FLR    TRANSPLANT, LIVER N/A 6/4/2019    Performed by Clemente Brown Jr., MD at [facility] OR 2ND FLR       Time Tracking:     OT Date of Treatment: 06/11/19  OT Start Time: 1124  OT Stop Time: 1146  OT Total Time (min): 22 min    Billable Minutes:Re-eval 10  Therapeutic Activity 12    Roselyn Lowe, OT  6/11/2019

## 2019-06-11 NOTE — DISCHARGE SUMMARY
[facility]  Liver Transplant  Discharge Summary      Patient Name: Jihan Zamudio  MRN: [id number]  Admission Date: 2019  Hospital Length of Stay: 26 days  Discharge Date and Time:  2019 11:19 AM  Attending Physician: Jose E Metzger MD   Discharging Provider: Archana Becerra PA-C  Primary Care Provider: Primary Doctor Yelena  Post-Operative Day: 6     ORGAN:   LIVER  Disease Etiology: Cirrhosis: Fatty Liver ([place])  Donor Type:    - Brain Death  CDC High Risk:   No  Donor CMV Status:   Donor CMV Status: Positive  Donor HBcAB:   Negative  Donor HCV Status:   Negative  Whole or Partial: Whole Liver  Biliary Anastomosis: End to End  Arterial Anatomy: Standard    HPI/ Hospital course:   Ms. Zamudio is a 48 yo F with hx of LOZOYA cirrhosis c/b varices, latent TB, GERD, hypothyroidism, lap band 2007, HLD who was sent to the ED from hepatology clinic for shortness of breath. Admitted for management of hyponatremia and volume overload.  . Required frequent thoracentesis 2/2 pleural effusions. Hyponatremia, improved with albumin and fluid restriction. Underwent work up for liver transplant. She was approved for transplant and transferred to LTS on .     She is now s/p OLTx on 19 (DBD, SM induction, CMV +/+). Tolerated surgery well. Chest tube placed in OR for right pleural effusion. She was transferred from ICU to TSU on POD#2. Chest tube inadvertently pulled out POD 3. Chest x-ray following with no acute findings and improved aeration. She has been satting > 94% on RA after removal. LFTs trended down nicely post operatively. POD 1 US with elevated RIs, no evidence of tardus parvus, possibly secondary to post op edema, otherwise satisfactory evidence of allograft. POD 6 ultrasound with similar findings. Reviewed with surgeon. Will get follow up US one week from today.  Ms. Zamudio developed ANTONIO post operatively, Cr trended up to 2.6 on POD 4. Possibly due to elevated prograf  level - prograf held x 2 days. Nephrology consulted. Retroperitoneal US obtained, unremarkable for acute pathology. Cr now trending down, 2.6 --> 2.4 --> 1.6 with good urine output on day of discharge. UOP improved with lasix. Will discharge on lasix 40 mg PO daily.      Ms. Zamudio is tolerating her diet without issues, having regular BMs, and ambulating with a walker. Her abdominal pain improving. She has met with pharmacist, coordinator, and SW and received education. She will follow up with labs tomorrow and in clinic per coordinator. Patient and caregiver expressed understanding of discharge instructions and importance of follow up.     Final Active Diagnoses:    Diagnosis Date Noted POA    PRINCIPAL PROBLEM:  S/P liver transplant [Z94.4] 06/06/2019 Not Applicable    Acute on chronic anemia [D64.9]  Yes    Acute blood loss anemia [D62] 06/08/2019 No    Long-term use of immunosuppressant medication [Z79.899] 06/08/2019 Not Applicable    ANTONIO (acute kidney injury) [N17.9] 06/08/2019 No    Prophylactic immunotherapy [Z29.8] 06/06/2019 Not Applicable    Adrenal cortical steroids causing adverse effect in therapeutic use [T38.0X5A] 06/06/2019 No    Acute hyperglycemia [R73.9] 06/06/2019 No    Anemia of chronic disease [D63.8] 05/29/2019 Yes    Pleural effusion, right [J90] 05/16/2019 Yes    Thrombocytopenia [D69.6] 05/16/2019 Yes    Weakness [R53.1] 05/16/2019 Yes    Hyponatremia [E87.1] 04/27/2019 Yes    Moderate malnutrition [E44.0] 04/26/2019 Yes    Hypothyroidism [E03.9] 02/26/2018 Yes    GERD (gastroesophageal reflux disease) [K21.9] 02/26/2018 Yes    Morbid obesity [E66.01] 02/26/2018 Yes      Problems Resolved During this Admission:    Diagnosis Date Noted Date Resolved POA    Encounter for weaning from ventilator [Z99.11]  06/08/2019 Not Applicable    Alteration in skin integrity [R23.9] 05/31/2019 06/07/2019 Yes    Acquired coagulation factor deficiency [D68.4] 05/29/2019 06/08/2019 Yes     Hypoxia [R09.02]  06/07/2019 Yes    Fever [R50.9] 05/28/2019 06/07/2019 No    Acute hypoxemic respiratory failure [J96.01] 05/16/2019 06/07/2019 Yes    Hypokalemia [E87.6] 05/16/2019 06/07/2019 Yes    Hepatic encephalopathy [K72.90] 04/27/2019 06/07/2019 Yes    Coagulopathy [D68.9] 04/27/2019 06/08/2019 Yes    Decompensated hepatic cirrhosis [K72.90] 02/26/2018 06/07/2019 Yes    Esophageal varices [I85.00] 02/26/2018 06/07/2019 Yes       Consults (From admission, onward)        Status Ordering Provider     Inpatient consult to Critical Care Medicine  Once     Provider:  (Not yet assigned)    Completed MANUEL RODRIGUEZ     Inpatient consult to Endocrinology  Once     Provider:  (Not yet assigned)    Completed SANDY RUBIO JR     Inpatient consult to Hepatology  Once     Provider:  (Not yet assigned)    Completed LIBAN NICOLE     Inpatient consult to Infectious Diseases  Once     Provider:  (Not yet assigned)    Completed ADARSH POZO     Inpatient consult to Interventional Radiology  Once     Provider:  (Not yet assigned)    Completed FAVIAN MARSHALL     Inpatient consult to Midline team  Once     Provider:  (Not yet assigned)    Completed NBA MAHER     Inpatient consult to Nephrology  Once     Provider:  (Not yet assigned)    Completed ADARSH POZO     Inpatient consult to Nephrology  Once     Provider:  (Not yet assigned)    Completed JACOBO ZHU     Inpatient consult to Pulmonology  Once     Provider:  (Not yet assigned)    Completed LIBAN NICOLE     Inpatient consult to Registered Dietitian/Nutritionist  Once     Provider:  (Not yet assigned)    Completed NBA MAHER     Inpatient consult to Registered Dietitian/Nutritionist  Once     Provider:  (Not yet assigned)    Completed ROQUE BOONE     Inpatient consult to Registered Dietitian/Nutritionist  Once     Provider:  (Not yet assigned)    Completed SANDY RUBIO JR          Pending Diagnostic  Studies:     Procedure Component Value Units Date/Time    APTT [[id number]] Collected:  06/06/19 0215    Order Status:  Sent Lab Status:  In process Updated:  06/06/19 0215    Specimen:  Blood     Narrative:       Collection has been rescheduled by CDP at 06/06/2019 02:13 Reason:   Nurse Draw    Amylase [[id number]] Collected:  06/06/19 0215    Order Status:  Sent Lab Status:  In process Updated:  06/06/19 0215    Specimen:  Blood     Narrative:       Collection has been rescheduled by CDP at 06/06/2019 02:13 Reason:   Nurse Draw    Basic metabolic panel [[id number]] Collected:  05/21/19 0432    Order Status:  Sent Lab Status:  In process Updated:  05/21/19 0432    Specimen:  Blood     Comprehensive metabolic panel [[id number]] Collected:  06/06/19 0215    Order Status:  Sent Lab Status:  In process Updated:  06/06/19 0215    Specimen:  Blood     Narrative:       Collection has been rescheduled by CDP at 06/06/2019 02:13 Reason:   Nurse Draw    Freeze and Hold -BB HEP [[id number]] Collected:  06/06/19 0215    Order Status:  Sent Lab Status:  No result     Specimen:  Blood     Freeze and Hold, [facility] [[id number]] Collected:  05/27/19 1011    Order Status:  Sent Lab Status:  No result     Specimen:  Body Fluid from Peritoneal Fluid     Freeze and Hold, [facility] [[id number]] Collected:  05/27/19 0924    Order Status:  Sent Lab Status:  No result     Specimen:  Pleural Fluid from Thoracentesis Fluid     IR Thoracentesis with Imaging [[id number]]     Order Status:  Sent Lab Status:  No result     Protime-INR [[id number]] Collected:  06/06/19 0215    Order Status:  Sent Lab Status:  In process Updated:  06/06/19 0215    Specimen:  Blood     Narrative:       Collection has been rescheduled by CDP at 06/06/2019 02:13 Reason:   Nurse Draw    Vitamin D [[id number]] Collected:  06/06/19 2353    Order Status:  Sent Lab Status:  In process Updated:  06/06/19 2354    Specimen:  Blood         Significant Diagnostic Studies: Labs:   CMP   Recent  "Labs   Lab 06/10/19  0500 06/11/19  0457   * 131*   K 4.2 4.2   CL 99 101   CO2 19* 22*   * 141*   BUN 65* 62*   CREATININE 2.4* 1.6*   CALCIUM 7.3* 7.5*   PROT 4.5* 4.4*   ALBUMIN 2.6* 2.7*   BILITOT 1.2* 1.1*   ALKPHOS 120 142*   AST 46* 38   ALT 71* 61*   ANIONGAP 10 8   ESTGFRAFRICA 26.2* 42.7*   EGFRNONAA 22.7* 37.0*   , CBC   Recent Labs   Lab 06/10/19  0500 06/11/19  0457 06/11/19  0842   WBC 5.14 3.35* 5.18   HGB 8.1* 7.1* 7.8*   HCT 24.2* 20.9* 22.5*   PLT 72* 53* 76*    and All labs within the past 24 hours have been reviewed    The patients clinical status was discussed at multidisplinary rounds, involving transplant surgery, transplant medicine, pharmacy, nursing, nutrition, and social work    Discharged Condition: stable    Disposition: Home or Self Care    Follow Up:    Patient Instructions:      WHEELCHAIR FOR HOME USE     Order Specific Question Answer Comments   Hours in W/C per day: 5    Type of Wheelchair: Standard    Size(Width): 18"(STD adult)    Leg Support: Elevating leg rests    Lap Belt: Velcro    Cushion: Basic    Height: 5' 5" (1.651 m)    Weight: 95.9 kg (211 lb 6.7 oz)    Does patient have medical equipment at home? cane, straight    Does patient have medical equipment at home? walker, rolling    Length of need (1-99 months): 99    Please check all that apply: Caregiver is capable and willing to operate wheelchair safely.    Please check all that apply: The patient requires the use of a w/c for activities of daily living within the Home.    Vendor: [facility]    Expected Date of Delivery: 6/10/2019      3 IN 1 COMMODE FOR HOME USE     Order Specific Question Answer Comments   Type: Heavy duty    Height: 5' 5" (1.651 m)    Weight: 95.9 kg (211 lb 6.7 oz)    Does patient have medical equipment at home? cane, straight    Does patient have medical equipment at home? walker, rolling    Length of need (1-99 months): 99    Vendor: [facility]    Expected Date of Delivery: " "6/11/2019      WALKER FOR HOME USE     Order Specific Question Answer Comments   Type of Walker: Adult (5'4"-6'6")    With wheels? Yes    Height: 5' 5" (1.651 m)    Weight: 94.3 kg (207 lb 14.3 oz)    Length of need (1-99 months): 99    Does patient have medical equipment at home? cane, straight    Does patient have medical equipment at home? walker, rolling    Please check all that apply: Patient's condition impairs ambulation.    Please check all that apply: Patient needs help to get in and out of chair.    Please check all that apply: Patient is unable to safely ambulate without equipment.    Vendor: [facility]    Expected Date of Delivery: 6/11/2019      Referral to Home health   Referral Priority: Routine Referral Type: Home Health Care   Referral Reason: Specialty Services Required   Requested Specialty: Home Health Services   Number of Visits Requested: 1     Lifting restrictions     Notify your health care provider if you experience any of the following:  increased confusion or weakness     Notify your health care provider if you experience any of the following:  persistent dizziness, light-headedness, or visual disturbances     Notify your health care provider if you experience any of the following:  worsening rash     Notify your health care provider if you experience any of the following:  severe persistent headache     Notify your health care provider if you experience any of the following:  difficulty breathing or increased cough     Notify your health care provider if you experience any of the following:  redness, tenderness, or signs of infection (pain, swelling, redness, odor or green/yellow discharge around incision site)     Notify your health care provider if you experience any of the following:  severe uncontrolled pain     Notify your health care provider if you experience any of the following:  persistent nausea and vomiting or diarrhea     Notify your health care provider if you experience " "any of the following:  temperature >100.4     No dressing needed     Activity as tolerated     Medications:   Reconciled Home Medications:      Medication List      START taking these medications    blood sugar diagnostic Strp  Test blood glucose 3 (three) times daily.     blood-glucose meter kit  Use as instructed     famotidine 20 MG tablet  Commonly known as:  PEPCID  Take 1 tablet (20 mg total) by mouth every evening.     furosemide 40 MG tablet  Commonly known as:  LASIX  Take 1 tablet (40 mg total) by mouth once daily. for 10 days     insulin aspart U-100 100 unit/mL (3 mL) Inpn pen  Commonly known as:  NovoLOG  Sliding scale:  150-200: +1 units  201-250 +2 units  251-300: +3 units  301-350: +4 units  >350: +5 units     lancets 28 gauge Misc  Test blood glucose 3 (three) times daily.     mycophenolate 250 mg Cap  Commonly known as:  CELLCEPT  Take 4 capsules (1,000 mg total) by mouth 2 (two) times daily.     oxyCODONE 10 mg Tab immediate release tablet  Commonly known as:  ROXICODONE  Take 1 tablet (10 mg total) by mouth every 4 (four) hours as needed.     pen needle, diabetic 32 gauge x 5/32" Ndle  Use to inject insulin into the skin 3 (three) times daily.     predniSONE 10 MG tablet  Commonly known as:  DELTASONE  Take 20 mg by mouth daily 6/12-6/18; 15 mg daily 6/19-6/25; 10 mg  daily 6/26-7/2; 5 mg  daily 7/3-7/9; stop: 7/10/19     sodium bicarbonate 650 MG tablet  Take 2 tablets (1,300 mg total) by mouth 2 (two) times daily.     sulfamethoxazole-trimethoprim 400-80mg 400-80 mg per tablet  Commonly known as:  BACTRIM,SEPTRA  Take 1 tablet by mouth once daily. Stop: 12/3/19     tacrolimus 1 MG Cap  Commonly known as:  PROGRAF  Take 1 capsule (1 mg total) by mouth every 12 (twelve) hours.     valGANciclovir 450 mg Tab  Commonly known as:  VALCYTE  Take 1 tablet (450 mg total) by mouth once daily. Stop: 9/4/19        CONTINUE taking these medications    levothyroxine 112 MCG tablet  Commonly known as:  " SYNTHROID  Take 1 tablet (112 mcg total) by mouth once daily.        STOP taking these medications    acetaminophen 500 MG tablet  Commonly known as:  TYLENOL     ergocalciferol 50,000 unit Cap  Commonly known as:  ERGOCALCIFEROL     fexofenadine 180 MG tablet  Commonly known as:  ALLEGRA     lactulose 20 gram/30 mL Soln  Commonly known as:  CHRONULAC     lidocaine 5 %  Commonly known as:  LIDODERM     pantoprazole 40 MG tablet  Commonly known as:  PROTONIX     rifAXIMin 550 mg Tab  Commonly known as:  XIFAXAN     spironolactone 100 MG tablet  Commonly known as:  ALDACTONE     VITAMIN D3 2,000 unit Cap  Generic drug:  cholecalciferol (vitamin D3)     zinc sulfate 220 (50) mg capsule  Commonly known as:  ZINCATE          Time spent caring for patient (Greater than 1/2 spent in direct face-to-face contact): > 30 minutes    Archana Becerra PA-C  Liver Transplant  [facility]

## 2019-06-11 NOTE — NURSING
AVS given and explained to pt and . Educated pt, , and daughter on checking BG and administering insulin using insulin pen. Pt and  aware of new medications, upcoming appointments and directions for upcoming labs/appointments. Pt very anxious about taking care of self at home, reassured pt that  is knowledgeable on medications and BG monitoring. Also reassured pt that transplant staff will follow up with patient in clinic tomorrow to assess progress after discharge. Central line dcd, pressure held for 5min, pt laid flat for 30min; pt tolerated well and instructed to keep dressing on for 24 hr. Also educated  how to clean and care for skin folds. Tele dcd, visi dcd, iPad returned to cabinet. All pt belongings sent with pt. All new home equipment sent with pt. Pt left via wheelchair with transport and .

## 2019-06-11 NOTE — PLAN OF CARE
Problem: Physical Therapy Goal  Goal: Physical Therapy Goal  Goals to be met by: 19  Patient will increase functional independence with mobility by performin. Supine to sit with SBA- not met  2. Sit to supine with SBA - not met  3. Sit to stand transfer with Supervision.-not met  4. Bed to chair transfer with Supervision using Single-point Cane or other AD.-not met  5. Gait  x 250 feet with Supervision using Single-point Cane or other AD -not met  6. Ascend/Descend 6 inch curb step with Supervision using Single-point Cane.or other AD- not met  7. Lower extremity exercise program x 20 reps  with assistance as needed.-not met     Patient progressing towards goals.  Continue with POC.    I certify that I was present in the room directing the student in service delivery and guiding them using my skilled judgment. As the co-signing therapist I have reviewed the students documentation and am responsible for the treatment, assessment, and plan. Jay Choi PTA  2019

## 2019-06-11 NOTE — PROGRESS NOTES
EDUCATION NOTE:    Met with Jihan Zamudio and her caregivers to provide teaching re: immunosuppressant medications.  Reviewed medication section of the Liver Transplant Education book that was provided.  Emphasized the importance of compliance, role of the blue medication card, concerns for drug interactions, and process of obtaining refills.  Counseled regarding Prograf, Cellcept , prednisone, including directions for use, monitoring, how to handle missed doses, and side effects.  Patient and caregiver verbalized understanding and had the opportunity to ask questions.

## 2019-06-12 ENCOUNTER — CLINICAL SUPPORT (OUTPATIENT)
Dept: TRANSPLANT | Facility: CLINIC | Age: 49
End: 2019-06-12
Payer: OTHER GOVERNMENT

## 2019-06-12 ENCOUNTER — LAB VISIT (OUTPATIENT)
Dept: LAB | Facility: HOSPITAL | Age: 49
End: 2019-06-12
Attending: SURGERY
Payer: OTHER GOVERNMENT

## 2019-06-12 VITALS
BODY MASS INDEX: 35.31 KG/M2 | WEIGHT: 206.81 LBS | DIASTOLIC BLOOD PRESSURE: 59 MMHG | SYSTOLIC BLOOD PRESSURE: 121 MMHG | BODY MASS INDEX: 35.31 KG/M2 | HEART RATE: 81 BPM | HEIGHT: 64 IN | TEMPERATURE: 97 F | TEMPERATURE: 97 F | SYSTOLIC BLOOD PRESSURE: 121 MMHG | DIASTOLIC BLOOD PRESSURE: 59 MMHG | OXYGEN SATURATION: 99 % | HEART RATE: 81 BPM | OXYGEN SATURATION: 99 % | HEIGHT: 64 IN | WEIGHT: 206.81 LBS

## 2019-06-12 DIAGNOSIS — Z94.4 LIVER REPLACED BY TRANSPLANT: ICD-10-CM

## 2019-06-12 DIAGNOSIS — Z94.4 LIVER REPLACED BY TRANSPLANT: Primary | ICD-10-CM

## 2019-06-12 LAB
ALBUMIN SERPL BCP-MCNC: 3 G/DL (ref 3.5–5.2)
ALP SERPL-CCNC: 158 U/L (ref 55–135)
ALT SERPL W/O P-5'-P-CCNC: 64 U/L (ref 10–44)
ANION GAP SERPL CALC-SCNC: 7 MMOL/L (ref 8–16)
ANISOCYTOSIS BLD QL SMEAR: SLIGHT
AST SERPL-CCNC: 41 U/L (ref 10–40)
BASO STIPL BLD QL SMEAR: ABNORMAL
BASOPHILS NFR BLD: 0 % (ref 0–1.9)
BILIRUB DIRECT SERPL-MCNC: 1 MG/DL (ref 0.1–0.3)
BILIRUB SERPL-MCNC: 1.5 MG/DL (ref 0.1–1)
BUN SERPL-MCNC: 54 MG/DL (ref 6–20)
CALCIUM SERPL-MCNC: 8.3 MG/DL (ref 8.7–10.5)
CHLORIDE SERPL-SCNC: 99 MMOL/L (ref 95–110)
CO2 SERPL-SCNC: 25 MMOL/L (ref 23–29)
CREAT SERPL-MCNC: 1.4 MG/DL (ref 0.5–1.4)
DIFFERENTIAL METHOD: ABNORMAL
DOHLE BOD BLD QL SMEAR: PRESENT
EOSINOPHIL NFR BLD: 0 % (ref 0–8)
ERYTHROCYTE [DISTWIDTH] IN BLOOD BY AUTOMATED COUNT: 18.1 % (ref 11.5–14.5)
EST. GFR  (AFRICAN AMERICAN): 50.2 ML/MIN/1.73 M^2
EST. GFR  (NON AFRICAN AMERICAN): 43.5 ML/MIN/1.73 M^2
GLUCOSE SERPL-MCNC: 96 MG/DL (ref 70–110)
HCT VFR BLD AUTO: 23.4 % (ref 37–48.5)
HGB BLD-MCNC: 8.1 G/DL (ref 12–16)
HYPOCHROMIA BLD QL SMEAR: ABNORMAL
IMM GRANULOCYTES # BLD AUTO: ABNORMAL K/UL (ref 0–0.04)
IMM GRANULOCYTES NFR BLD AUTO: ABNORMAL % (ref 0–0.5)
LYMPHOCYTES NFR BLD: 23 % (ref 18–48)
MCH RBC QN AUTO: 30.8 PG (ref 27–31)
MCHC RBC AUTO-ENTMCNC: 34.6 G/DL (ref 32–36)
MCV RBC AUTO: 89 FL (ref 82–98)
MONOCYTES NFR BLD: 11 % (ref 4–15)
MYELOCYTES NFR BLD MANUAL: 1 %
NEUTROPHILS NFR BLD: 64 % (ref 38–73)
NEUTS BAND NFR BLD MANUAL: 1 %
NRBC BLD-RTO: 1 /100 WBC
OVALOCYTES BLD QL SMEAR: ABNORMAL
PLATELET # BLD AUTO: 99 K/UL (ref 150–350)
PLATELET BLD QL SMEAR: ABNORMAL
PMV BLD AUTO: 9.7 FL (ref 9.2–12.9)
POIKILOCYTOSIS BLD QL SMEAR: SLIGHT
POLYCHROMASIA BLD QL SMEAR: ABNORMAL
POTASSIUM SERPL-SCNC: 3.7 MMOL/L (ref 3.5–5.1)
PROT SERPL-MCNC: 5 G/DL (ref 6–8.4)
RBC # BLD AUTO: 2.63 M/UL (ref 4–5.4)
SCHISTOCYTES BLD QL SMEAR: ABNORMAL
SODIUM SERPL-SCNC: 131 MMOL/L (ref 136–145)
TACROLIMUS BLD-MCNC: 4.1 NG/ML (ref 5–15)
TOXIC GRANULES BLD QL SMEAR: PRESENT
WBC # BLD AUTO: 7.11 K/UL (ref 3.9–12.7)

## 2019-06-12 PROCEDURE — 80197 ASSAY OF TACROLIMUS: CPT

## 2019-06-12 PROCEDURE — 99999 PR PBB SHADOW E&M-EST. PATIENT-LVL III: ICD-10-PCS | Mod: PBBFAC,,,

## 2019-06-12 PROCEDURE — 99213 OFFICE O/P EST LOW 20 MIN: CPT | Mod: PBBFAC,27

## 2019-06-12 PROCEDURE — 80053 COMPREHEN METABOLIC PANEL: CPT

## 2019-06-12 PROCEDURE — 85007 BL SMEAR W/DIFF WBC COUNT: CPT

## 2019-06-12 PROCEDURE — 82248 BILIRUBIN DIRECT: CPT

## 2019-06-12 PROCEDURE — 99999 PR PBB SHADOW E&M-EST. PATIENT-LVL IV: CPT | Mod: PBBFAC,,,

## 2019-06-12 PROCEDURE — 99214 OFFICE O/P EST MOD 30 MIN: CPT | Mod: PBBFAC

## 2019-06-12 PROCEDURE — 99999 PR PBB SHADOW E&M-EST. PATIENT-LVL III: CPT | Mod: PBBFAC,,,

## 2019-06-12 PROCEDURE — 36415 COLL VENOUS BLD VENIPUNCTURE: CPT

## 2019-06-12 PROCEDURE — 85027 COMPLETE CBC AUTOMATED: CPT

## 2019-06-12 PROCEDURE — 99999 PR PBB SHADOW E&M-EST. PATIENT-LVL IV: ICD-10-PCS | Mod: PBBFAC,,,

## 2019-06-12 RX ORDER — ASPIRIN 81 MG/1
81 TABLET ORAL DAILY
Status: ON HOLD | COMMUNITY
End: 2019-06-25 | Stop reason: HOSPADM

## 2019-06-12 RX ORDER — TACROLIMUS 1 MG/1
2 CAPSULE ORAL EVERY 12 HOURS
Qty: 120 CAPSULE | Refills: 11 | Status: SHIPPED | OUTPATIENT
Start: 2019-06-12 | End: 2019-06-27 | Stop reason: DRUGHIGH

## 2019-06-12 NOTE — PROGRESS NOTES
"Clinic Note: First Return to Clinic Post-  Liver Transplant    Jihan Zamudio  is a 49 y.o. female  S/p   LIVER transplant on 6/5/2019 (Liver) for Cirrhosis: Fatty Liver (Lozoya).      Discharge Course (Issues/Concerns): She is now s/p OLTx on 6/5/19 (DBD, SM induction, CMV +/+). Tolerated surgery well. Post op complicated by ANTONIO that may be due to elevated prograf level. Prograf held and Cr trending down with good urine output on day of discharge. Restarting prograf at 1 mg BID. And stopped posaconazole at discharge (watch prograf level closely).    Objective:   Vitals:    06/12/19 0922   BP: (!) 121/59   Pulse: 81   Temp: 97.4 °F (36.3 °C)       Met with patient and her caregiver in the clinic to review current medication list.     Current Outpatient Medications   Medication Sig Dispense Refill    aspirin (ECOTRIN) 81 MG EC tablet Take 81 mg by mouth once daily.      blood sugar diagnostic Strp Test blood glucose 3 (three) times daily. 100 each 0    blood-glucose meter kit Use as instructed 1 each 0    famotidine (PEPCID) 20 MG tablet Take 1 tablet (20 mg total) by mouth every evening. 30 tablet 0    furosemide (LASIX) 40 MG tablet Take 1 tablet (40 mg total) by mouth once daily. for 10 days 10 tablet 0    insulin aspart U-100 (NOVOLOG) 100 unit/mL (3 mL) InPn pen Sliding scale:  150-200: +1 units  201-250 +2 units  251-300: +3 units  301-350: +4 units  >350: +5 units 15 mL 0    lancets 28 gauge Misc Test blood glucose 3 (three) times daily. 100 each 0    levothyroxine (SYNTHROID) 112 MCG tablet Take 1 tablet (112 mcg total) by mouth once daily. 30 tablet 2    mycophenolate (CELLCEPT) 250 mg Cap Take 4 capsules (1,000 mg total) by mouth 2 (two) times daily. 240 capsule 2    oxyCODONE (ROXICODONE) 10 mg Tab immediate release tablet Take 1 tablet (10 mg total) by mouth every 4 (four) hours as needed. 40 tablet 0    pen needle, diabetic 32 gauge x 5/32" Ndle Use to inject insulin into the skin 3 (three) " times daily. 100 each 0    predniSONE (DELTASONE) 10 MG tablet Take 20 mg by mouth daily 6/12-6/18; 15 mg daily 6/19-6/25; 10 mg  daily 6/26-7/2; 5 mg  daily 7/3-7/9; stop: 7/10/19 60 tablet 0    sodium bicarbonate 650 MG tablet Take 2 tablets (1,300 mg total) by mouth 2 (two) times daily. 120 tablet 11    sulfamethoxazole-trimethoprim 400-80mg (BACTRIM,SEPTRA) 400-80 mg per tablet Take 1 tablet by mouth once daily. Stop: 12/3/19 30 tablet 5    tacrolimus (PROGRAF) 1 MG Cap Take 1 capsule (1 mg total) by mouth every 12 (twelve) hours. 60 capsule 11    valGANciclovir (VALCYTE) 450 mg Tab Take 1 tablet (450 mg total) by mouth once daily. Stop: 9/4/19 30 tablet 2     No current facility-administered medications for this visit.        Pharmacy Interventions/Recommendations:     1) Graft Function & Immunosuppression Issues: [place]/MMF/ Prednisone until 7/10/19. Prograf restarted after being held for ANTONIO. Pt is also now off posaconazole, will have to watch prograf levels    2) Opportunistic Infection prophylaxis:   PCP ppx: Bactrim until 12/3/19  CMV ppx: Valcyte until 9/4/19  Fungal ppx: N/A    3) Donor Serologies & Monitoring:     Donor CMV Status: Positive  Donor HCV Status: Negative  Donor HBcAb: Negative  Donor HBV GERTRUDIS: Negative  Donor HCV GERTRUDIS: Negative      4) Pain Management & Bowel Regimen: Controlled on PRN Oxycodone 10 mg Q6, patient not having issues w/ constipation and is aware of OTC constipation options    5) Blood Pressure Management: Controlled - BP today 121/59    6) Blood Sugar Management & Follow-up: FBG 79. Pt has not had to use Novolog SSI. Per Endocrine recs, pt is to have SSI for 48 hrs post discharge, and may d/c it if SSI has not been used within those 48 hours. Anticipate stopping of SSI after tomorrow 6/13/19 if it has not been used. Pt and  understand instructions.    7) Electrolyte Management: On sodium bicarbonate 1300 BID. C02 25, K 4.2, Cr 2.6 -> 2.4 -> 1.6 -> 1.4 today. ANTONIO  improving    8) OTHER medication follow-up (patient assistance, held medications, etc): Started on ASA 81 mg daily of which patient has home supply. Anticipate d/c of SSI after 48 hrs post d/c if pt has not required it per Endocrine.    9) Reinforced medication education conducted in the hospital, including medication indications, dosing, administration, side effects, monitoring-- including timing of immunosuppressant levels.     Patient received their FIRST fill of medications from [facility].  Discussed the process for obtaining refills of medications, including verifying the dose and mailing address to have medications delivered.     Jihan and her caregivers verbalized understanding and had the opportunity to ask questions.

## 2019-06-12 NOTE — TELEPHONE ENCOUNTER
----- Message from Samra Torres MD sent at 6/12/2019  2:09 PM CDT -----  Increase prograf to 2mg bid. Repeat labs Fri

## 2019-06-12 NOTE — PROGRESS NOTES
1ST NURSING VISIT POST DISCHARGE NOTE    1st RN appointment with Jihan Zamudio post discharge 6/11/19 s/p liver transplant 6/5/19.  Patient's spouse accompanied her.  Upon assessment, patient complains of incisional pain and incisional drainage.  Incision intact with staples.  Patient that she is able to explain daily incision care and showering instructions.  Medication list and rationale were reviewed.  Patient states  did bring blue medication card and medication bottles for review.  Self-assessment reviewed with patient and spouse; time allowed for questions.  Patient expressed understanding of daily care including BID VS and medications.  Patient aware that nurse will review today's labs with a transplant physician and call with any does changes indicated. First post-operative transplant team appointment with labs scheduled for 6/18/19.

## 2019-06-12 NOTE — TELEPHONE ENCOUNTER
Informed pt's  labs reviewed, instructed to increase prograf to 2 mg BID. Will repeat labs Friday 6/14. He verbalizes understanding.

## 2019-06-13 ENCOUNTER — TELEPHONE (OUTPATIENT)
Dept: TRANSPLANT | Facility: HOSPITAL | Age: 49
End: 2019-06-13

## 2019-06-13 ENCOUNTER — TELEPHONE (OUTPATIENT)
Dept: TRANSPLANT | Facility: CLINIC | Age: 49
End: 2019-06-13

## 2019-06-13 DIAGNOSIS — Z94.4 LIVER REPLACED BY TRANSPLANT: Primary | ICD-10-CM

## 2019-06-13 LAB — BACTERIA SPEC ANAEROBE CULT: NORMAL

## 2019-06-13 PROCEDURE — G0180 MD CERTIFICATION HHA PATIENT: HCPCS | Mod: ,,, | Performed by: SURGERY

## 2019-06-13 PROCEDURE — G0180 PR HOME HEALTH MD CERTIFICATION: ICD-10-PCS | Mod: ,,, | Performed by: SURGERY

## 2019-06-13 NOTE — TELEPHONE ENCOUNTER
YULIYA received a message to call Jenni [phone] with Home Health.  YULIYA called Jenni who inquired whether SW assist with purchasing shower benches for the patients.  YULIYA provided education.  Unfortunately the pt will need to purchase on her own from a local pharmacy.  All questions were answered here were no needs identified.  YULIYA remains available.

## 2019-06-13 NOTE — TELEPHONE ENCOUNTER
Returned call to Gretta with [facility]; reported patient with leaking at one side of incision, and ascites fluid to abdomen. Patient reported to her that doctors are aware.  She said she will order some gauze and tape for patient to cover leaking area , and noted patient has lost 6 pounds since last wt. And is taking Lasix as prescribed.

## 2019-06-13 NOTE — TELEPHONE ENCOUNTER
----- Message from Liza Green sent at 6/13/2019 11:31 AM CDT -----  Contact: Home Health   Needs Advice    Reason for call: Needing to know who Pts SW is.         Communication Preference:  Jenni [phone]    Additional Information: n/a

## 2019-06-13 NOTE — TELEPHONE ENCOUNTER
----- Message from Brooklynn Del Rosario sent at 6/13/2019  1:02 PM CDT -----  [facility]  Needs Advice    Reason for call: Calling to speak with someone concerning placing wound care orders for PT        Communication Preference: [phone] (Gretta)    Additional Information:

## 2019-06-14 ENCOUNTER — TELEPHONE (OUTPATIENT)
Dept: TRANSPLANT | Facility: CLINIC | Age: 49
End: 2019-06-14

## 2019-06-14 ENCOUNTER — LAB VISIT (OUTPATIENT)
Dept: LAB | Facility: HOSPITAL | Age: 49
End: 2019-06-14
Attending: TRANSPLANT SURGERY
Payer: OTHER GOVERNMENT

## 2019-06-14 DIAGNOSIS — Z94.4 LIVER REPLACED BY TRANSPLANT: Primary | ICD-10-CM

## 2019-06-14 DIAGNOSIS — D62 ACUTE BLOOD LOSS ANEMIA: ICD-10-CM

## 2019-06-14 DIAGNOSIS — Z94.4 LIVER REPLACED BY TRANSPLANT: ICD-10-CM

## 2019-06-14 LAB
ALBUMIN SERPL BCP-MCNC: 2.7 G/DL (ref 3.5–5.2)
ALP SERPL-CCNC: 110 U/L (ref 55–135)
ALT SERPL W/O P-5'-P-CCNC: 42 U/L (ref 10–44)
ANION GAP SERPL CALC-SCNC: 8 MMOL/L (ref 8–16)
ANISOCYTOSIS BLD QL SMEAR: SLIGHT
AST SERPL-CCNC: 27 U/L (ref 10–40)
BASO STIPL BLD QL SMEAR: ABNORMAL
BASOPHILS NFR BLD: 0 % (ref 0–1.9)
BILIRUB DIRECT SERPL-MCNC: 0.8 MG/DL (ref 0.1–0.3)
BILIRUB SERPL-MCNC: 1.3 MG/DL (ref 0.1–1)
BUN SERPL-MCNC: 49 MG/DL (ref 6–20)
CALCIUM SERPL-MCNC: 8.4 MG/DL (ref 8.7–10.5)
CHLORIDE SERPL-SCNC: 99 MMOL/L (ref 95–110)
CO2 SERPL-SCNC: 26 MMOL/L (ref 23–29)
CREAT SERPL-MCNC: 1.5 MG/DL (ref 0.5–1.4)
DIFFERENTIAL METHOD: ABNORMAL
DOHLE BOD BLD QL SMEAR: PRESENT
EOSINOPHIL NFR BLD: 0 % (ref 0–8)
ERYTHROCYTE [DISTWIDTH] IN BLOOD BY AUTOMATED COUNT: 19 % (ref 11.5–14.5)
EST. GFR  (AFRICAN AMERICAN): 46.2 ML/MIN/1.73 M^2
EST. GFR  (NON AFRICAN AMERICAN): 40.1 ML/MIN/1.73 M^2
GIANT PLATELETS BLD QL SMEAR: PRESENT
GLUCOSE SERPL-MCNC: 89 MG/DL (ref 70–110)
HCT VFR BLD AUTO: 21.2 % (ref 37–48.5)
HGB BLD-MCNC: 7.1 G/DL (ref 12–16)
HYPOCHROMIA BLD QL SMEAR: ABNORMAL
IMM GRANULOCYTES # BLD AUTO: ABNORMAL K/UL (ref 0–0.04)
IMM GRANULOCYTES NFR BLD AUTO: ABNORMAL % (ref 0–0.5)
LYMPHOCYTES NFR BLD: 5 % (ref 18–48)
MCH RBC QN AUTO: 30.6 PG (ref 27–31)
MCHC RBC AUTO-ENTMCNC: 33.5 G/DL (ref 32–36)
MCV RBC AUTO: 91 FL (ref 82–98)
METAMYELOCYTES NFR BLD MANUAL: 1 %
MONOCYTES NFR BLD: 5 % (ref 4–15)
NEUTROPHILS NFR BLD: 87 % (ref 38–73)
NEUTS BAND NFR BLD MANUAL: 2 %
NRBC BLD-RTO: 0 /100 WBC
OVALOCYTES BLD QL SMEAR: ABNORMAL
PLATELET # BLD AUTO: 123 K/UL (ref 150–350)
PLATELET BLD QL SMEAR: ABNORMAL
PMV BLD AUTO: 10.1 FL (ref 9.2–12.9)
POIKILOCYTOSIS BLD QL SMEAR: SLIGHT
POLYCHROMASIA BLD QL SMEAR: ABNORMAL
POTASSIUM SERPL-SCNC: 3.8 MMOL/L (ref 3.5–5.1)
PROT SERPL-MCNC: 4.7 G/DL (ref 6–8.4)
RBC # BLD AUTO: 2.32 M/UL (ref 4–5.4)
SCHISTOCYTES BLD QL SMEAR: PRESENT
SMUDGE CELLS BLD QL SMEAR: PRESENT
SODIUM SERPL-SCNC: 133 MMOL/L (ref 136–145)
STOMATOCYTES BLD QL SMEAR: PRESENT
TACROLIMUS BLD-MCNC: 7.1 NG/ML (ref 5–15)
TARGETS BLD QL SMEAR: ABNORMAL
TOXIC GRANULES BLD QL SMEAR: PRESENT
WBC # BLD AUTO: 5.58 K/UL (ref 3.9–12.7)

## 2019-06-14 PROCEDURE — 36415 COLL VENOUS BLD VENIPUNCTURE: CPT

## 2019-06-14 PROCEDURE — 82248 BILIRUBIN DIRECT: CPT

## 2019-06-14 PROCEDURE — 85007 BL SMEAR W/DIFF WBC COUNT: CPT

## 2019-06-14 PROCEDURE — 85027 COMPLETE CBC AUTOMATED: CPT

## 2019-06-14 PROCEDURE — 80053 COMPREHEN METABOLIC PANEL: CPT

## 2019-06-14 PROCEDURE — 80197 ASSAY OF TACROLIMUS: CPT

## 2019-06-14 NOTE — TELEPHONE ENCOUNTER
Patient/ notified and instructed: labs reviewed by ; no medicine changes made; repeat labs on Monday 6/17/19; he was able to repeat instructions and voiced understanding.

## 2019-06-17 ENCOUNTER — TELEPHONE (OUTPATIENT)
Dept: TRANSPLANT | Facility: CLINIC | Age: 49
End: 2019-06-17

## 2019-06-17 ENCOUNTER — HOSPITAL ENCOUNTER (OUTPATIENT)
Dept: RADIOLOGY | Facility: HOSPITAL | Age: 49
Discharge: HOME | End: 2019-06-17
Attending: SURGERY
Payer: OTHER GOVERNMENT

## 2019-06-17 DIAGNOSIS — Z94.4 LIVER REPLACED BY TRANSPLANT: ICD-10-CM

## 2019-06-17 DIAGNOSIS — D62 ACUTE BLOOD LOSS ANEMIA: Primary | ICD-10-CM

## 2019-06-17 PROCEDURE — 76705 ECHO EXAM OF ABDOMEN: CPT | Mod: 26,59,, | Performed by: RADIOLOGY

## 2019-06-17 PROCEDURE — 76705 US LIVER TRANSPLANT POST: ICD-10-PCS | Mod: 26,59,, | Performed by: RADIOLOGY

## 2019-06-17 PROCEDURE — 93975 VASCULAR STUDY: CPT | Mod: 26,,, | Performed by: RADIOLOGY

## 2019-06-17 PROCEDURE — 76705 ECHO EXAM OF ABDOMEN: CPT | Mod: TC

## 2019-06-17 PROCEDURE — 93975 US LIVER TRANSPLANT POST: ICD-10-PCS | Mod: 26,,, | Performed by: RADIOLOGY

## 2019-06-17 PROCEDURE — 93975 VASCULAR STUDY: CPT | Mod: TC

## 2019-06-17 NOTE — TELEPHONE ENCOUNTER
SW attempted to call pt at phone number listed on pt's demographic information ([phone]) as well as at phone number provided below ([phone]) due to possible number mixup.  Both phone numbers called were not answered and SW was unable to leave voicemail message for either number.  SW remains available for further psychosocial support and will f/u as needed.    ----- Message from Kristin Fragoso sent at 6/17/2019  1:32 PM CDT -----  Contact: Patient      ----- Message -----  From: Izzy Hare  Sent: 6/17/2019  12:18 PM  To: Kristin Fragoso        Reason for call: Calling about housing. The place where she is currently living is starting to charge her.                                                                                                                                                                                                                                                         Communication Preference: [phone]    Additional Information:

## 2019-06-17 NOTE — TELEPHONE ENCOUNTER
Labs of today reviewed by ; noted H&H lower today from previous.  Iron studies reviewed by .  VORB ; will reduce Cellcept dose to 500mg bid, and repeat CBC tomorrow.  Will need 1 unit of blood transfused.  RTC tomorrow as scheduled.  Patient and  notified and above instructions given.  Type and Screen added to labs tomorrow.   Patient and  able to repeat instructions and voiced understanding.

## 2019-06-17 NOTE — PROGRESS NOTES
Physical Therapy Discharge Summary    Name: Jihan Zamudio  MRN: [id number]   Principal Problem: S/P liver transplant     Patient Discharged from acute Physical Therapy on 19.  Please refer to prior PT noted date on 19 for functional status.     Assessment:     Patient appropriate for care in another setting.    Objective:     GOALS:   Multidisciplinary Problems     Physical Therapy Goals        Problem: Physical Therapy Goal    Goal Priority Disciplines Outcome Goal Variances Interventions   Physical Therapy Goal     PT, PT/OT Ongoing (interventions implemented as appropriate)     Description:  Goals to be met by: 19  Patient will increase functional independence with mobility by performin. Supine to sit with SBA- not met  2. Sit to supine with SBA - not met  3. Sit to stand transfer with Supervision.-not met  4. Bed to chair transfer with Supervision using Single-point Cane or other AD.-not met  5. Gait  x 250 feet with Supervision using Single-point Cane or other AD -not met  6. Ascend/Descend 6 inch curb step with Supervision using Single-point Cane.or other AD- not met  7. Lower extremity exercise program x 20 reps  with assistance as needed.-not met                      Reasons for Discontinuation of Therapy Services  Transfer to alternate level of care.      Plan:     Patient Discharged to: home with family assistance.    Sugar Georges, PT  2019

## 2019-06-18 ENCOUNTER — OFFICE VISIT (OUTPATIENT)
Dept: TRANSPLANT | Facility: CLINIC | Age: 49
End: 2019-06-18
Payer: OTHER GOVERNMENT

## 2019-06-18 ENCOUNTER — LAB VISIT (OUTPATIENT)
Dept: LAB | Facility: HOSPITAL | Age: 49
End: 2019-06-18
Attending: TRANSPLANT SURGERY
Payer: OTHER GOVERNMENT

## 2019-06-18 VITALS
HEART RATE: 88 BPM | DIASTOLIC BLOOD PRESSURE: 69 MMHG | OXYGEN SATURATION: 99 % | WEIGHT: 196.63 LBS | TEMPERATURE: 97 F | BODY MASS INDEX: 33.57 KG/M2 | SYSTOLIC BLOOD PRESSURE: 130 MMHG | RESPIRATION RATE: 18 BRPM | HEIGHT: 64 IN

## 2019-06-18 DIAGNOSIS — Z94.4 LIVER REPLACED BY TRANSPLANT: Primary | ICD-10-CM

## 2019-06-18 DIAGNOSIS — D62 ACUTE BLOOD LOSS ANEMIA: Primary | ICD-10-CM

## 2019-06-18 DIAGNOSIS — D62 ACUTE BLOOD LOSS ANEMIA: ICD-10-CM

## 2019-06-18 DIAGNOSIS — Z51.81 ENCOUNTER FOR THERAPEUTIC DRUG MONITORING: Primary | ICD-10-CM

## 2019-06-18 DIAGNOSIS — Z94.4 LIVER REPLACED BY TRANSPLANT: ICD-10-CM

## 2019-06-18 LAB
ABO + RH BLD: NORMAL
ANISOCYTOSIS BLD QL SMEAR: SLIGHT
BASOPHILS # BLD AUTO: 0.01 K/UL (ref 0–0.2)
BASOPHILS NFR BLD: 0.2 % (ref 0–1.9)
BLD GP AB SCN CELLS X3 SERPL QL: NORMAL
DIFFERENTIAL METHOD: ABNORMAL
EOSINOPHIL # BLD AUTO: 0 K/UL (ref 0–0.5)
EOSINOPHIL NFR BLD: 0.8 % (ref 0–8)
ERYTHROCYTE [DISTWIDTH] IN BLOOD BY AUTOMATED COUNT: 20.6 % (ref 11.5–14.5)
HCT VFR BLD AUTO: 20.4 % (ref 37–48.5)
HGB BLD-MCNC: 6.7 G/DL (ref 12–16)
HYPOCHROMIA BLD QL SMEAR: ABNORMAL
IMM GRANULOCYTES # BLD AUTO: 0.07 K/UL (ref 0–0.04)
IMM GRANULOCYTES NFR BLD AUTO: 1.5 % (ref 0–0.5)
LYMPHOCYTES # BLD AUTO: 0.7 K/UL (ref 1–4.8)
LYMPHOCYTES NFR BLD: 14.7 % (ref 18–48)
MCH RBC QN AUTO: 31.3 PG (ref 27–31)
MCHC RBC AUTO-ENTMCNC: 32.8 G/DL (ref 32–36)
MCV RBC AUTO: 95 FL (ref 82–98)
MONOCYTES # BLD AUTO: 0.3 K/UL (ref 0.3–1)
MONOCYTES NFR BLD: 6.4 % (ref 4–15)
NEUTROPHILS # BLD AUTO: 3.7 K/UL (ref 1.8–7.7)
NEUTROPHILS NFR BLD: 76.4 % (ref 38–73)
NRBC BLD-RTO: 0 /100 WBC
OVALOCYTES BLD QL SMEAR: ABNORMAL
PLATELET # BLD AUTO: 195 K/UL (ref 150–350)
PMV BLD AUTO: 9.6 FL (ref 9.2–12.9)
POIKILOCYTOSIS BLD QL SMEAR: SLIGHT
POLYCHROMASIA BLD QL SMEAR: ABNORMAL
RBC # BLD AUTO: 2.14 M/UL (ref 4–5.4)
WBC # BLD AUTO: 4.82 K/UL (ref 3.9–12.7)

## 2019-06-18 PROCEDURE — 99999 PR PBB SHADOW E&M-EST. PATIENT-LVL III: CPT | Mod: PBBFAC,,,

## 2019-06-18 PROCEDURE — 99999 PR PBB SHADOW E&M-EST. PATIENT-LVL III: ICD-10-PCS | Mod: PBBFAC,,,

## 2019-06-18 PROCEDURE — 85025 COMPLETE CBC W/AUTO DIFF WBC: CPT

## 2019-06-18 PROCEDURE — 99213 PR OFFICE/OUTPT VISIT, EST, LEVL III, 20-29 MIN: ICD-10-PCS | Mod: 24,S$PBB,, | Performed by: TRANSPLANT SURGERY

## 2019-06-18 PROCEDURE — 99213 OFFICE O/P EST LOW 20 MIN: CPT | Mod: 24,S$PBB,, | Performed by: TRANSPLANT SURGERY

## 2019-06-18 PROCEDURE — 86901 BLOOD TYPING SEROLOGIC RH(D): CPT

## 2019-06-18 PROCEDURE — 99213 OFFICE O/P EST LOW 20 MIN: CPT | Mod: PBBFAC,25

## 2019-06-18 PROCEDURE — 36415 COLL VENOUS BLD VENIPUNCTURE: CPT

## 2019-06-18 RX ORDER — HYDROCODONE BITARTRATE AND ACETAMINOPHEN 500; 5 MG/1; MG/1
TABLET ORAL ONCE
Status: CANCELLED | OUTPATIENT
Start: 2019-06-18 | End: 2019-06-18

## 2019-06-18 RX ORDER — MYCOPHENOLATE MOFETIL 250 MG/1
500 CAPSULE ORAL 2 TIMES DAILY
Qty: 120 CAPSULE | Refills: 2 | Status: ON HOLD
Start: 2019-06-18 | End: 2019-07-23 | Stop reason: HOSPADM

## 2019-06-18 NOTE — TELEPHONE ENCOUNTER
Patient seen in clinic today, U/S report reviewed.  JAROD Massey; repeat U/S in one week.  Appointment card given to .

## 2019-06-18 NOTE — PROGRESS NOTES
Transplant Surgery  Liver Transplant Recipient Follow-up    Original Referring Physician: Janes Retana  Current Corresponding Physician: Janes Retana    Chief Complaint: Jihan is here for follow up of her liver transplant performed 2019 for the primary diagnosis (UNOS) of Cirrhosis: Fatty Liver (Lozoya)    ORGAN: LIVER  Whole or Partial: whole liver  Donor Type:  - brain death  PHS Increased Risk: no  Donor CMV Status: Positive  Donor HCV Status: Negative  Donor HBcAb: Negative  Donor HBV GERTRUDIS: Negative  Donor HCV GERTRUDIS: Negative    Biliary Anastomosis: end to end  Arterial Anatomy: standard  IVC reconstruction: end to end ivc  Portal vein status: patent    Subjective:     History of Present Illness: She has had the following complications since transplant: renal insufficiency.  The noted complications needs to be addressed more thoroughly today.    Interval History: Currently, she is doing adequately.  Current complaints include edema and wound drainage.  Jihan is here for management of her immunosuppression medication.    Review of Systems   Respiratory: Negative.  Negative for shortness of breath.    Cardiovascular: Positive for leg swelling.   Gastrointestinal: Negative.  Negative for abdominal distention.   Skin: Positive for wound.   All other systems reviewed and are negative.      Objective:     Physical Exam   Constitutional: She appears well-developed and well-nourished.   Anasarca   Cardiovascular: Normal rate.   Pulmonary/Chest: Effort normal.   Abdominal: Soft. Bowel sounds are normal.       Minimal leak of clear fluid in the left side of the bilateral subcostal incision and drain sites   Vitals reviewed.    Lab Results   Component Value Date    BILITOT 1.2 (H) 2019    AST 22 2019    ALT 28 2019    ALKPHOS 94 2019    CREATININE 1.7 (H) 2019    ALBUMIN 2.7 (L) 2019     Lab Results   Component Value Date    WBC 4.82 2019    HGB 6.7 (L) 2019     HCT 20.4 (L) 06/18/2019    HCT 25 (L) 06/06/2019     06/18/2019     Lab Results   Component Value Date    TACROLIMUS 7.8 06/17/2019       Assessment/Plan:          · S/P liver transplant. Good allograft function   · Chronic immunosuppressive medications for rejection prophylaxis at target.  Plan: no adjustment needed.  · Continue monitoring symptoms, labs and drug levels for drug-related toxicity and side effects.  · Incision: staples in place; wound clean, dry, and intact  · Femoral arterial line site: no complications evident   · Patient has significant edema but moderate renal dysfunction, continue same dose of lasix.     Freddy Soler MD       [facility] Patient Status  Functional Status: 40% - Disabled: requires special care and assistance  Physical Capacity: Limited Mobility

## 2019-06-19 RX ORDER — SODIUM BICARBONATE 650 MG/1
650 TABLET ORAL 2 TIMES DAILY
Qty: 60 TABLET | Refills: 11 | Status: ON HOLD | OUTPATIENT
Start: 2019-06-19 | End: 2019-07-23 | Stop reason: SDUPTHER

## 2019-06-19 NOTE — PHYSICIAN QUERY
PT Name: Jihan Zamudio  MR #: [id number]    Physician Query Form - Relationship to Procedure Clarification     CDS/: Mary Haley RN CCDS              Contact information: [email or web address]    This form is a permanent document in the medical record.     Query Date: June 19, 2019      By submitting this query, we are merely seeking further clarification of documentation. Please utilize your independent clinical judgment when addressing the question(s) below.    The Medical record contains the following:  Supporting Clinical Findings Location in Medical Record     Worsening sCr post transplant with intra-operative hypotension/HD changes likely cause of ANTONIO (ATN) with decreased UOP, metabolic acidosis and hyonatremia.    B/P=104/57, 98/57, 106/51  Charli-synephrine, Pitressin given intra op   Nephrology CN 6/9          Anesthesia note 6/6       Please clarify if ___intra-operative hypotension_________ is      [  ] Inherent/Integral to procedure   [  ] Present, but not a complication of the procedure   [  ] Complication of procedure   [  ] Other (please specify): __________________   [ x ] Clinically Undetermined

## 2019-06-19 NOTE — PROGRESS NOTES
Liver Transplant Clinic  Follow-Up  SW met with pt and pt's spouse Freeman Zamudio in clinic to f/u for continuity of care.  Pt presented to clinic appointment in wheelchair.  Pt and Freeman report adequate coping.  Pt and Freeman expressed disappointment regarding fact that pt is being charged daily fee of $16.66 for [place] apartment.  Pt reported that this SW, when providing pt with financial profile just prior to transplant, led her to believe that only pt's financial information would be applicable for completion of financial profile and would render $0 apartment fee.  SW apologized to pt and spouse about being misled about completion of financial profile.  SW provided further clarification and confirmed with pt that manner in which pt's financial profile was completed (including spouse/household financial information) with liver transplant SW was accurate and appropriate.  Pt/spouse expressed understanding regarding same and thanked SW for clarification and confirmation.  Pt and spouse deny having barriers to paying for apartment fee at this time.  Pt and spouse deny having any questions or additional psychosocial concerns at this time.  Pt and spouse aware of how to contact liver transplant SW team if psychosocial concerns arise.  SW remains available for further psychosocial support and will f/u as needed.

## 2019-06-20 ENCOUNTER — INFUSION (OUTPATIENT)
Dept: INFUSION THERAPY | Facility: HOSPITAL | Age: 49
End: 2019-06-20
Attending: INTERNAL MEDICINE
Payer: OTHER GOVERNMENT

## 2019-06-20 ENCOUNTER — LAB VISIT (OUTPATIENT)
Dept: LAB | Facility: HOSPITAL | Age: 49
End: 2019-06-20
Attending: TRANSPLANT SURGERY
Payer: OTHER GOVERNMENT

## 2019-06-20 VITALS
HEART RATE: 76 BPM | RESPIRATION RATE: 18 BRPM | DIASTOLIC BLOOD PRESSURE: 68 MMHG | SYSTOLIC BLOOD PRESSURE: 159 MMHG | TEMPERATURE: 99 F

## 2019-06-20 DIAGNOSIS — D62 ACUTE BLOOD LOSS ANEMIA: ICD-10-CM

## 2019-06-20 DIAGNOSIS — Z94.4 LIVER REPLACED BY TRANSPLANT: ICD-10-CM

## 2019-06-20 DIAGNOSIS — Z94.4 LIVER REPLACED BY TRANSPLANT: Primary | ICD-10-CM

## 2019-06-20 LAB
ALBUMIN SERPL BCP-MCNC: 2.6 G/DL (ref 3.5–5.2)
ALP SERPL-CCNC: 90 U/L (ref 55–135)
ALT SERPL W/O P-5'-P-CCNC: 20 U/L (ref 10–44)
ANION GAP SERPL CALC-SCNC: 8 MMOL/L (ref 8–16)
AST SERPL-CCNC: 19 U/L (ref 10–40)
BASOPHILS # BLD AUTO: 0.02 K/UL (ref 0–0.2)
BASOPHILS NFR BLD: 0.5 % (ref 0–1.9)
BILIRUB SERPL-MCNC: 1.2 MG/DL (ref 0.1–1)
BLD PROD TYP BPU: NORMAL
BLOOD UNIT EXPIRATION DATE: NORMAL
BLOOD UNIT TYPE CODE: 5100
BLOOD UNIT TYPE: NORMAL
BUN SERPL-MCNC: 27 MG/DL (ref 6–20)
CALCIUM SERPL-MCNC: 8.4 MG/DL (ref 8.7–10.5)
CHLORIDE SERPL-SCNC: 102 MMOL/L (ref 95–110)
CO2 SERPL-SCNC: 27 MMOL/L (ref 23–29)
CODING SYSTEM: NORMAL
CREAT SERPL-MCNC: 1.1 MG/DL (ref 0.5–1.4)
DIFFERENTIAL METHOD: ABNORMAL
DISPENSE STATUS: NORMAL
EOSINOPHIL # BLD AUTO: 0.1 K/UL (ref 0–0.5)
EOSINOPHIL NFR BLD: 1.2 % (ref 0–8)
ERYTHROCYTE [DISTWIDTH] IN BLOOD BY AUTOMATED COUNT: 21.2 % (ref 11.5–14.5)
EST. GFR  (AFRICAN AMERICAN): >60 ML/MIN/1.73 M^2
EST. GFR  (NON AFRICAN AMERICAN): 58.3 ML/MIN/1.73 M^2
GLUCOSE SERPL-MCNC: 82 MG/DL (ref 70–110)
HCT VFR BLD AUTO: 20.3 % (ref 37–48.5)
HGB BLD-MCNC: 6.6 G/DL (ref 12–16)
IMM GRANULOCYTES # BLD AUTO: 0.05 K/UL (ref 0–0.04)
IMM GRANULOCYTES NFR BLD AUTO: 1.2 % (ref 0–0.5)
LYMPHOCYTES # BLD AUTO: 0.7 K/UL (ref 1–4.8)
LYMPHOCYTES NFR BLD: 16.9 % (ref 18–48)
MCH RBC QN AUTO: 30.8 PG (ref 27–31)
MCHC RBC AUTO-ENTMCNC: 32.5 G/DL (ref 32–36)
MCV RBC AUTO: 95 FL (ref 82–98)
MONOCYTES # BLD AUTO: 0.3 K/UL (ref 0.3–1)
MONOCYTES NFR BLD: 6 % (ref 4–15)
NEUTROPHILS # BLD AUTO: 3.1 K/UL (ref 1.8–7.7)
NEUTROPHILS NFR BLD: 74.2 % (ref 38–73)
NRBC BLD-RTO: 0 /100 WBC
PLATELET # BLD AUTO: 177 K/UL (ref 150–350)
PMV BLD AUTO: 8.9 FL (ref 9.2–12.9)
POTASSIUM SERPL-SCNC: 3.3 MMOL/L (ref 3.5–5.1)
PROT SERPL-MCNC: 4.8 G/DL (ref 6–8.4)
RBC # BLD AUTO: 2.14 M/UL (ref 4–5.4)
SODIUM SERPL-SCNC: 137 MMOL/L (ref 136–145)
TACROLIMUS BLD-MCNC: 5.5 NG/ML (ref 5–15)
TRANS ERYTHROCYTES VOL PATIENT: NORMAL ML
WBC # BLD AUTO: 4.19 K/UL (ref 3.9–12.7)

## 2019-06-20 PROCEDURE — 80197 ASSAY OF TACROLIMUS: CPT

## 2019-06-20 PROCEDURE — 86920 COMPATIBILITY TEST SPIN: CPT

## 2019-06-20 PROCEDURE — P9021 RED BLOOD CELLS UNIT: HCPCS

## 2019-06-20 PROCEDURE — 36430 TRANSFUSION BLD/BLD COMPNT: CPT

## 2019-06-20 PROCEDURE — 36415 COLL VENOUS BLD VENIPUNCTURE: CPT

## 2019-06-20 PROCEDURE — 80053 COMPREHEN METABOLIC PANEL: CPT

## 2019-06-20 PROCEDURE — 25000003 PHARM REV CODE 250: Performed by: SURGERY

## 2019-06-20 PROCEDURE — 85025 COMPLETE CBC W/AUTO DIFF WBC: CPT

## 2019-06-20 RX ORDER — HYDROCODONE BITARTRATE AND ACETAMINOPHEN 500; 5 MG/1; MG/1
TABLET ORAL ONCE
Status: COMPLETED | OUTPATIENT
Start: 2019-06-20 | End: 2019-06-20

## 2019-06-20 RX ADMIN — SODIUM CHLORIDE: 9 INJECTION, SOLUTION INTRAVENOUS at 10:06

## 2019-06-20 NOTE — PLAN OF CARE
Problem: Adult Inpatient Plan of Care  Goal: Plan of Care Review  Outcome: Ongoing (interventions implemented as appropriate)  Pt tolerated 1u PRBC without complications. VSS. No s/s of reaction. Instructed to contact MD with any questions. PIV removed and AVS given to patient.

## 2019-06-20 NOTE — TELEPHONE ENCOUNTER
Labs of today reviewed by ; no medicine changes made. VORB : repeat labs tomorrow .    Patient/ notified and instructions given; repeat labs tomorrow; he was able to voice understanding.

## 2019-06-21 ENCOUNTER — LAB VISIT (OUTPATIENT)
Dept: LAB | Facility: HOSPITAL | Age: 49
End: 2019-06-21
Attending: TRANSPLANT SURGERY
Payer: OTHER GOVERNMENT

## 2019-06-21 DIAGNOSIS — Z94.4 LIVER REPLACED BY TRANSPLANT: Primary | ICD-10-CM

## 2019-06-21 DIAGNOSIS — Z94.4 LIVER REPLACED BY TRANSPLANT: ICD-10-CM

## 2019-06-21 LAB
ALBUMIN SERPL BCP-MCNC: 2.6 G/DL (ref 3.5–5.2)
ALP SERPL-CCNC: 101 U/L (ref 55–135)
ALT SERPL W/O P-5'-P-CCNC: 17 U/L (ref 10–44)
ANION GAP SERPL CALC-SCNC: 9 MMOL/L (ref 8–16)
AST SERPL-CCNC: 20 U/L (ref 10–40)
BASOPHILS # BLD AUTO: 0.02 K/UL (ref 0–0.2)
BASOPHILS NFR BLD: 0.5 % (ref 0–1.9)
BILIRUB DIRECT SERPL-MCNC: 0.8 MG/DL (ref 0.1–0.3)
BILIRUB SERPL-MCNC: 1.3 MG/DL (ref 0.1–1)
BUN SERPL-MCNC: 22 MG/DL (ref 6–20)
CALCIUM SERPL-MCNC: 8.5 MG/DL (ref 8.7–10.5)
CHLORIDE SERPL-SCNC: 101 MMOL/L (ref 95–110)
CO2 SERPL-SCNC: 27 MMOL/L (ref 23–29)
CREAT SERPL-MCNC: 1 MG/DL (ref 0.5–1.4)
DIFFERENTIAL METHOD: ABNORMAL
EOSINOPHIL # BLD AUTO: 0.1 K/UL (ref 0–0.5)
EOSINOPHIL NFR BLD: 1.6 % (ref 0–8)
ERYTHROCYTE [DISTWIDTH] IN BLOOD BY AUTOMATED COUNT: 20.5 % (ref 11.5–14.5)
EST. GFR  (AFRICAN AMERICAN): >60 ML/MIN/1.73 M^2
EST. GFR  (NON AFRICAN AMERICAN): >60 ML/MIN/1.73 M^2
GLUCOSE SERPL-MCNC: 79 MG/DL (ref 70–110)
HCT VFR BLD AUTO: 24 % (ref 37–48.5)
HGB BLD-MCNC: 7.7 G/DL (ref 12–16)
IMM GRANULOCYTES # BLD AUTO: 0.03 K/UL (ref 0–0.04)
IMM GRANULOCYTES NFR BLD AUTO: 0.8 % (ref 0–0.5)
LYMPHOCYTES # BLD AUTO: 0.5 K/UL (ref 1–4.8)
LYMPHOCYTES NFR BLD: 13.9 % (ref 18–48)
MCH RBC QN AUTO: 30.7 PG (ref 27–31)
MCHC RBC AUTO-ENTMCNC: 32.1 G/DL (ref 32–36)
MCV RBC AUTO: 96 FL (ref 82–98)
MONOCYTES # BLD AUTO: 0.2 K/UL (ref 0.3–1)
MONOCYTES NFR BLD: 4.3 % (ref 4–15)
NEUTROPHILS # BLD AUTO: 2.9 K/UL (ref 1.8–7.7)
NEUTROPHILS NFR BLD: 78.9 % (ref 38–73)
NRBC BLD-RTO: 0 /100 WBC
PLATELET # BLD AUTO: 170 K/UL (ref 150–350)
PMV BLD AUTO: 9.2 FL (ref 9.2–12.9)
POTASSIUM SERPL-SCNC: 3.2 MMOL/L (ref 3.5–5.1)
PROT SERPL-MCNC: 4.9 G/DL (ref 6–8.4)
RBC # BLD AUTO: 2.51 M/UL (ref 4–5.4)
SODIUM SERPL-SCNC: 137 MMOL/L (ref 136–145)
TACROLIMUS BLD-MCNC: 4.3 NG/ML (ref 5–15)
WBC # BLD AUTO: 3.68 K/UL (ref 3.9–12.7)

## 2019-06-21 PROCEDURE — 36415 COLL VENOUS BLD VENIPUNCTURE: CPT

## 2019-06-21 PROCEDURE — 82248 BILIRUBIN DIRECT: CPT

## 2019-06-21 PROCEDURE — 85025 COMPLETE CBC W/AUTO DIFF WBC: CPT

## 2019-06-21 PROCEDURE — 80053 COMPREHEN METABOLIC PANEL: CPT

## 2019-06-21 PROCEDURE — 80197 ASSAY OF TACROLIMUS: CPT

## 2019-06-21 RX ORDER — FUROSEMIDE 40 MG/1
40 TABLET ORAL DAILY
Qty: 10 TABLET | Refills: 0 | Status: CANCELLED | OUTPATIENT
Start: 2019-06-21 | End: 2019-07-01

## 2019-06-21 NOTE — TELEPHONE ENCOUNTER
----- Message from Nuvia eFrrara sent at 6/21/2019  8:50 AM CDT -----  Contact: Patient  Needs Advice    Reason for call: Mr. Zamudio stated the wrong medication was refilled. Patient needs her LASIX refilled. He is currently there now. Please call Mr. Zamudio if he has to wait longer than 30 minutes.         Communication Preference: [phone]    Additional Information: n/a

## 2019-06-21 NOTE — TELEPHONE ENCOUNTER
Labs of today reviewed by ; H&H improved. No medicine changes made. Repeat labs due Monday 6/24/19.  Patient notified and instructed, also reviewed appointment time for ultrasound on Monday and patient to fast x 4 hrs. Before ultrasound.  Patient reported writing all instructions down and repeated back.

## 2019-06-24 ENCOUNTER — HOSPITAL ENCOUNTER (OUTPATIENT)
Dept: RADIOLOGY | Facility: HOSPITAL | Age: 49
Discharge: HOME | DRG: 085 | End: 2019-06-24
Attending: TRANSPLANT SURGERY
Payer: OTHER GOVERNMENT

## 2019-06-24 ENCOUNTER — TELEPHONE (OUTPATIENT)
Dept: TRANSPLANT | Facility: CLINIC | Age: 49
End: 2019-06-24

## 2019-06-24 ENCOUNTER — HOSPITAL ENCOUNTER (INPATIENT)
Facility: HOSPITAL | Age: 49
LOS: 1 days | Discharge: HOME HEALTH | DRG: 085 | End: 2019-06-25
Attending: EMERGENCY MEDICINE | Admitting: TRANSPLANT SURGERY
Payer: OTHER GOVERNMENT

## 2019-06-24 DIAGNOSIS — R73.9 HYPERGLYCEMIA: ICD-10-CM

## 2019-06-24 DIAGNOSIS — Z94.4 S/P LIVER TRANSPLANT: ICD-10-CM

## 2019-06-24 DIAGNOSIS — E66.01 MORBID OBESITY: ICD-10-CM

## 2019-06-24 DIAGNOSIS — T38.0X5A ADRENAL CORTICAL STEROIDS CAUSING ADVERSE EFFECT IN THERAPEUTIC USE: ICD-10-CM

## 2019-06-24 DIAGNOSIS — S06.300A TRAUMATIC INTRACRANIAL HEMORRHAGE WITHOUT LOSS OF CONSCIOUSNESS, INITIAL ENCOUNTER: ICD-10-CM

## 2019-06-24 DIAGNOSIS — Z94.4 LIVER REPLACED BY TRANSPLANT: ICD-10-CM

## 2019-06-24 DIAGNOSIS — Z91.89 AT RISK FOR OPPORTUNISTIC INFECTIONS: ICD-10-CM

## 2019-06-24 DIAGNOSIS — Z94.4 STATUS POST LIVER TRANSPLANT: ICD-10-CM

## 2019-06-24 DIAGNOSIS — S06.360A TRAUMATIC HEMORRHAGE OF CEREBRUM WITHOUT LOSS OF CONSCIOUSNESS, UNSPECIFIED LATERALITY, INITIAL ENCOUNTER: ICD-10-CM

## 2019-06-24 DIAGNOSIS — Z79.60 LONG-TERM USE OF IMMUNOSUPPRESSANT MEDICATION: ICD-10-CM

## 2019-06-24 DIAGNOSIS — E03.8 OTHER SPECIFIED HYPOTHYROIDISM: ICD-10-CM

## 2019-06-24 DIAGNOSIS — D63.8 ANEMIA OF CHRONIC DISEASE: ICD-10-CM

## 2019-06-24 DIAGNOSIS — S09.90XA CLOSED HEAD INJURY, INITIAL ENCOUNTER: Primary | ICD-10-CM

## 2019-06-24 DIAGNOSIS — Z29.89 PROPHYLACTIC IMMUNOTHERAPY: ICD-10-CM

## 2019-06-24 PROBLEM — I61.9 INTRACEREBRAL HEMORRHAGE: Status: ACTIVE | Noted: 2019-06-24

## 2019-06-24 LAB
ALBUMIN SERPL BCP-MCNC: 2.7 G/DL (ref 3.5–5.2)
ALP SERPL-CCNC: 203 U/L (ref 55–135)
ALT SERPL W/O P-5'-P-CCNC: 25 U/L (ref 10–44)
ANION GAP SERPL CALC-SCNC: 11 MMOL/L (ref 8–16)
AST SERPL-CCNC: 30 U/L (ref 10–40)
BASOPHILS # BLD AUTO: 0.03 K/UL (ref 0–0.2)
BASOPHILS NFR BLD: 0.9 % (ref 0–1.9)
BILIRUB SERPL-MCNC: 1.2 MG/DL (ref 0.1–1)
BUN SERPL-MCNC: 13 MG/DL (ref 6–20)
CALCIUM SERPL-MCNC: 8.9 MG/DL (ref 8.7–10.5)
CHLORIDE SERPL-SCNC: 102 MMOL/L (ref 95–110)
CO2 SERPL-SCNC: 24 MMOL/L (ref 23–29)
CREAT SERPL-MCNC: 0.8 MG/DL (ref 0.5–1.4)
DIFFERENTIAL METHOD: ABNORMAL
EOSINOPHIL # BLD AUTO: 0.1 K/UL (ref 0–0.5)
EOSINOPHIL NFR BLD: 1.7 % (ref 0–8)
ERYTHROCYTE [DISTWIDTH] IN BLOOD BY AUTOMATED COUNT: 19.8 % (ref 11.5–14.5)
EST. GFR  (AFRICAN AMERICAN): >60 ML/MIN/1.73 M^2
EST. GFR  (NON AFRICAN AMERICAN): >60 ML/MIN/1.73 M^2
GLUCOSE SERPL-MCNC: 84 MG/DL (ref 70–110)
HCT VFR BLD AUTO: 24.1 % (ref 37–48.5)
HGB BLD-MCNC: 7.9 G/DL (ref 12–16)
IMM GRANULOCYTES # BLD AUTO: 0.01 K/UL (ref 0–0.04)
IMM GRANULOCYTES NFR BLD AUTO: 0.3 % (ref 0–0.5)
INR PPP: 1 (ref 0.8–1.2)
LYMPHOCYTES # BLD AUTO: 0.5 K/UL (ref 1–4.8)
LYMPHOCYTES NFR BLD: 13.3 % (ref 18–48)
MCH RBC QN AUTO: 31.5 PG (ref 27–31)
MCHC RBC AUTO-ENTMCNC: 32.8 G/DL (ref 32–36)
MCV RBC AUTO: 96 FL (ref 82–98)
MONOCYTES # BLD AUTO: 0.1 K/UL (ref 0.3–1)
MONOCYTES NFR BLD: 3.8 % (ref 4–15)
NEUTROPHILS # BLD AUTO: 2.8 K/UL (ref 1.8–7.7)
NEUTROPHILS NFR BLD: 80 % (ref 38–73)
NRBC BLD-RTO: 0 /100 WBC
PLATELET # BLD AUTO: 119 K/UL (ref 150–350)
PMV BLD AUTO: 9.5 FL (ref 9.2–12.9)
POTASSIUM SERPL-SCNC: 3.4 MMOL/L (ref 3.5–5.1)
PROT SERPL-MCNC: 5.4 G/DL (ref 6–8.4)
PROTHROMBIN TIME: 10.1 SEC (ref 9–12.5)
RBC # BLD AUTO: 2.51 M/UL (ref 4–5.4)
SODIUM SERPL-SCNC: 137 MMOL/L (ref 136–145)
WBC # BLD AUTO: 3.45 K/UL (ref 3.9–12.7)

## 2019-06-24 PROCEDURE — 99285 EMERGENCY DEPT VISIT HI MDM: CPT | Mod: ,,, | Performed by: EMERGENCY MEDICINE

## 2019-06-24 PROCEDURE — 85610 PROTHROMBIN TIME: CPT

## 2019-06-24 PROCEDURE — 76705 ECHO EXAM OF ABDOMEN: CPT | Mod: 26,59,, | Performed by: INTERNAL MEDICINE

## 2019-06-24 PROCEDURE — 63600175 PHARM REV CODE 636 W HCPCS: Performed by: EMERGENCY MEDICINE

## 2019-06-24 PROCEDURE — 99223 1ST HOSP IP/OBS HIGH 75: CPT | Mod: 24,,, | Performed by: PHYSICIAN ASSISTANT

## 2019-06-24 PROCEDURE — 99285 EMERGENCY DEPT VISIT HI MDM: CPT | Mod: 25

## 2019-06-24 PROCEDURE — 99223 PR INITIAL HOSPITAL CARE,LEVL III: ICD-10-PCS | Mod: 24,,, | Performed by: PHYSICIAN ASSISTANT

## 2019-06-24 PROCEDURE — 76705 US LIVER TRANSPLANT POST: ICD-10-PCS | Mod: 26,59,, | Performed by: INTERNAL MEDICINE

## 2019-06-24 PROCEDURE — 12000002 HC ACUTE/MED SURGE SEMI-PRIVATE ROOM

## 2019-06-24 PROCEDURE — 85025 COMPLETE CBC W/AUTO DIFF WBC: CPT | Mod: 91

## 2019-06-24 PROCEDURE — 96365 THER/PROPH/DIAG IV INF INIT: CPT

## 2019-06-24 PROCEDURE — 80053 COMPREHEN METABOLIC PANEL: CPT | Mod: 91

## 2019-06-24 PROCEDURE — 99285 PR EMERGENCY DEPT VISIT,LEVEL V: ICD-10-PCS | Mod: ,,, | Performed by: EMERGENCY MEDICINE

## 2019-06-24 PROCEDURE — 96366 THER/PROPH/DIAG IV INF ADDON: CPT

## 2019-06-24 PROCEDURE — 93975 VASCULAR STUDY: CPT | Mod: TC

## 2019-06-24 PROCEDURE — 93975 VASCULAR STUDY: CPT | Mod: 26,,, | Performed by: INTERNAL MEDICINE

## 2019-06-24 PROCEDURE — 76705 ECHO EXAM OF ABDOMEN: CPT | Mod: TC

## 2019-06-24 PROCEDURE — 93975 US LIVER TRANSPLANT POST: ICD-10-PCS | Mod: 26,,, | Performed by: INTERNAL MEDICINE

## 2019-06-24 RX ORDER — IBUPROFEN 200 MG
24 TABLET ORAL
Status: DISCONTINUED | OUTPATIENT
Start: 2019-06-24 | End: 2019-06-25 | Stop reason: HOSPADM

## 2019-06-24 RX ORDER — SODIUM BICARBONATE 650 MG/1
650 TABLET ORAL 2 TIMES DAILY
Status: DISCONTINUED | OUTPATIENT
Start: 2019-06-25 | End: 2019-06-25 | Stop reason: HOSPADM

## 2019-06-24 RX ORDER — ONDANSETRON 8 MG/1
8 TABLET, ORALLY DISINTEGRATING ORAL EVERY 8 HOURS PRN
Status: DISCONTINUED | OUTPATIENT
Start: 2019-06-24 | End: 2019-06-25 | Stop reason: HOSPADM

## 2019-06-24 RX ORDER — MYCOPHENOLATE MOFETIL 250 MG/1
500 CAPSULE ORAL 2 TIMES DAILY
Status: DISCONTINUED | OUTPATIENT
Start: 2019-06-25 | End: 2019-06-25 | Stop reason: HOSPADM

## 2019-06-24 RX ORDER — SODIUM CHLORIDE 0.9 % (FLUSH) 0.9 %
10 SYRINGE (ML) INJECTION
Status: DISCONTINUED | OUTPATIENT
Start: 2019-06-24 | End: 2019-06-25 | Stop reason: HOSPADM

## 2019-06-24 RX ORDER — GLUCAGON 1 MG
1 KIT INJECTION
Status: DISCONTINUED | OUTPATIENT
Start: 2019-06-24 | End: 2019-06-25 | Stop reason: HOSPADM

## 2019-06-24 RX ORDER — HYDRALAZINE HYDROCHLORIDE 20 MG/ML
10 INJECTION INTRAMUSCULAR; INTRAVENOUS EVERY 4 HOURS PRN
Status: DISCONTINUED | OUTPATIENT
Start: 2019-06-24 | End: 2019-06-25 | Stop reason: HOSPADM

## 2019-06-24 RX ORDER — FUROSEMIDE 40 MG/1
40 TABLET ORAL DAILY
Qty: 10 TABLET | Refills: 0 | Status: SHIPPED | OUTPATIENT
Start: 2019-06-24 | End: 2019-07-05 | Stop reason: SDUPTHER

## 2019-06-24 RX ORDER — INSULIN ASPART 100 [IU]/ML
0-5 INJECTION, SOLUTION INTRAVENOUS; SUBCUTANEOUS
Status: DISCONTINUED | OUTPATIENT
Start: 2019-06-24 | End: 2019-06-25 | Stop reason: HOSPADM

## 2019-06-24 RX ORDER — OXYCODONE HYDROCHLORIDE 10 MG/1
10 TABLET ORAL EVERY 4 HOURS PRN
Status: DISCONTINUED | OUTPATIENT
Start: 2019-06-24 | End: 2019-06-25 | Stop reason: HOSPADM

## 2019-06-24 RX ORDER — LEVOTHYROXINE SODIUM 112 UG/1
112 TABLET ORAL
Status: DISCONTINUED | OUTPATIENT
Start: 2019-06-25 | End: 2019-06-25 | Stop reason: HOSPADM

## 2019-06-24 RX ORDER — LEVETIRACETAM 5 MG/ML
500 INJECTION INTRAVASCULAR
Status: COMPLETED | OUTPATIENT
Start: 2019-06-24 | End: 2019-06-25

## 2019-06-24 RX ORDER — DIPHENHYDRAMINE HYDROCHLORIDE 50 MG/ML
25 INJECTION INTRAMUSCULAR; INTRAVENOUS EVERY 4 HOURS PRN
Status: DISCONTINUED | OUTPATIENT
Start: 2019-06-24 | End: 2019-06-25 | Stop reason: HOSPADM

## 2019-06-24 RX ORDER — SULFAMETHOXAZOLE AND TRIMETHOPRIM 400; 80 MG/1; MG/1
1 TABLET ORAL DAILY
Status: DISCONTINUED | OUTPATIENT
Start: 2019-06-25 | End: 2019-06-25 | Stop reason: HOSPADM

## 2019-06-24 RX ORDER — IBUPROFEN 200 MG
16 TABLET ORAL
Status: DISCONTINUED | OUTPATIENT
Start: 2019-06-24 | End: 2019-06-25 | Stop reason: HOSPADM

## 2019-06-24 RX ORDER — FUROSEMIDE 40 MG/1
40 TABLET ORAL DAILY
Status: DISCONTINUED | OUTPATIENT
Start: 2019-06-25 | End: 2019-06-25 | Stop reason: HOSPADM

## 2019-06-24 RX ORDER — ACETAMINOPHEN 325 MG/1
650 TABLET ORAL EVERY 8 HOURS PRN
Status: DISCONTINUED | OUTPATIENT
Start: 2019-06-24 | End: 2019-06-25 | Stop reason: HOSPADM

## 2019-06-24 RX ORDER — TACROLIMUS 1 MG/1
2 CAPSULE ORAL 2 TIMES DAILY
Status: DISCONTINUED | OUTPATIENT
Start: 2019-06-25 | End: 2019-06-25 | Stop reason: HOSPADM

## 2019-06-24 RX ORDER — VALGANCICLOVIR 450 MG/1
450 TABLET, FILM COATED ORAL DAILY
Status: DISCONTINUED | OUTPATIENT
Start: 2019-06-25 | End: 2019-06-25 | Stop reason: HOSPADM

## 2019-06-24 RX ORDER — FAMOTIDINE 20 MG/1
20 TABLET, FILM COATED ORAL NIGHTLY
Status: DISCONTINUED | OUTPATIENT
Start: 2019-06-25 | End: 2019-06-25 | Stop reason: HOSPADM

## 2019-06-24 RX ADMIN — LEVETIRACETAM 500 MG: 5 INJECTION INTRAVENOUS at 10:06

## 2019-06-24 NOTE — TELEPHONE ENCOUNTER
was informed of patient report of a fall yesterday and patient advised to go to ER if any pain/ brusing, bleeding, headaches. No new orders received.

## 2019-06-24 NOTE — TELEPHONE ENCOUNTER
Attempted to notify patient , labs reviewed, no changes made; results ok.  No answer.  Patient scheduled to be seen in clinic tomorrow, and will inform her.

## 2019-06-24 NOTE — TELEPHONE ENCOUNTER
"Received a call from OT for patient, who is with  Patient today , wanted our office to know that patient reported that she had a fall yesterday described as ( was walking without her walker, dog got excited and jumped up on her and she fell backwards, hitting her head).  OT reported patient stated she did have a headache afterwards but no longer has headache.  Instructed that patient to report to ER for evaluation of any injury such as pain, bruising/ bleeding, headache.  She reported that she did advise the patient of the same thing.  She stated she just "had to report this."  Will review with MD.  "

## 2019-06-24 NOTE — ED PROVIDER NOTES
Encounter Date: 2019    SCRIBE #1 NOTE: I, Elizabeth Duffy, am scribing for, and in the presence of,  Dr. Cabrera. I have scribed the entire note.       History     Chief Complaint   Patient presents with    Head Injury     no loc, tripped and fell yest     49 y.o W with PMHx of KESSLER cirrhosis s/p liver transplant 19, GERD, hypothyroidism, IDDM, presents with a chief complaint of fall.  Yesterday afternoon when patient was walking, her dog got excited and jumped up, causing her to fall backwards onto concrete floor.  Patient has been undergoing physical therapy since her surgery. She walked for the first time independently yesterday. She fell onto her head and upper neck on concrete floor. No LOC, vomiting, or confusion. Patient presents at the advice of her PCP to be evaluated. Patient denies any trauma elsewhere. She does endorses pain in right tricep.  She reports feeling significant improvement status post liver transplant.  She denies any fevers or abdominal pain      The history is provided by the patient and medical records.     Review of patient's allergies indicates:   Allergen Reactions    Metformin Rash    Pcn [penicillins] Other (See Comments)     Unsure of reaction, states it was as a child. Tolerated rocephin at [facility]     Past Medical History:   Diagnosis Date    Cirrhosis     Esophageal varices 2018    Small with no banding     Essential hypertension 2018    Fatty liver 2018    GERD (gastroesophageal reflux disease)     Hx of colonic polyps 2018    On colonoscopy     Hypertension     Hypothyroidism 2018    Kidney stones     Morbid obesity 2018    Lap band with subsequent release    KADEEM (obstructive sleep apnea) 2018    Osteoarthritis 2018    Pulmonary nodule 2018    Vitamin D deficiency 2018     Past Surgical History:   Procedure Laterality Date     SECTION      TIMES 2     CHOLECYSTECTOMY      LAPAROSCOPIC      CYSTOSCOPY W/ STONE MANIPULATION  2000    kidney stone removal    EGD (ESOPHAGOGASTRODUODENOSCOPY) N/A 4/24/2019    Performed by [facility]ian [facility] at [facility] ENDO (2ND FLR)    LAPAROSCOPIC GASTRIC BANDING  2006    removal 2009    LIVER BIOPSY  11/29/2017    KESSLER with bridging 11/29/17    TRANSPLANT, LIVER N/A 6/5/2019    Performed by Clemente Brown Jr., MD at [facility] OR 2ND FLR    TRANSPLANT, LIVER N/A 6/4/2019    Performed by Clemente Brown Jr., MD at [facility] OR 2ND FLR     Family History   Problem Relation Age of Onset    Heart disease Mother     Cancer Mother 50        colon cancer    Heart disease Father     Cancer Father 65        liver cancer    Breast cancer Maternal Grandmother      Social History     Tobacco Use    Smoking status: Never Smoker    Smokeless tobacco: Never Used    Tobacco comment: patient denies   Substance Use Topics    Alcohol use: No     Comment: patient denies    Drug use: No     Comment: patient denies     Review of Systems   Constitutional: Negative for fever.   HENT: Negative for congestion.    Eyes: Negative for visual disturbance.   Respiratory: Negative for shortness of breath.    Cardiovascular: Negative for chest pain.   Gastrointestinal: Negative for abdominal pain and vomiting.   Genitourinary: Negative for dysuria.   Musculoskeletal:        Right tricep pain   Skin:        + Drainage to incision site   Allergic/Immunologic: Positive for immunocompromised state.   Neurological: Negative for syncope.   Psychiatric/Behavioral: Negative for confusion.       Physical Exam     Initial Vitals [06/24/19 1620]   BP Pulse Resp Temp SpO2   (!) 168/79 80 18 98.8 °F (37.1 °C) 99 %      MAP       --         Physical Exam    Nursing note and vitals reviewed.  Constitutional: She appears well-developed and well-nourished. She is not diaphoretic. No distress.   HENT:   Head: Normocephalic and atraumatic.   Mouth/Throat: Oropharynx is clear and moist.   No scalp tenderness or  step offs   Eyes: EOM are normal. Right eye exhibits no discharge. Left eye exhibits no discharge.   Neck: Normal range of motion. Neck supple.   No midline cervical tenderness or stepoffs   Cardiovascular: Normal rate, regular rhythm, normal heart sounds and intact distal pulses. Exam reveals no gallop and no friction rub.    No murmur heard.  Pulmonary/Chest: Breath sounds normal. No respiratory distress.   Abdominal: Soft. Bowel sounds are normal. She exhibits no distension. There is no tenderness. There is no rebound and no guarding.   Dressings on abdomen without shadowing.    Musculoskeletal: Normal range of motion. She exhibits tenderness.   Full range of motion of the right shoulder and elbow, there is some tenderness to palpation to right tricep, no bony tenderness.   Neurological: She is alert and oriented to person, place, and time. She has normal strength.   Skin: Skin is warm and dry. Capillary refill takes less than 2 seconds.         ED Course   Procedures  Labs Reviewed   CBC W/ AUTO DIFFERENTIAL   COMPREHENSIVE METABOLIC PANEL   PROTIME-INR   PROTIME-INR          Imaging Results           CT Head Without Contrast (Final result)  Result time 06/24/19 20:52:07    Final result by Kendrick Milner MD (06/24/19 20:52:07)                 Impression:      Acute intraparenchymal hemorrhage within the right occipital lobe with mild surrounding vasogenic edema.  No significant mass effect.  No midline shift.    Minimal intraventricular hemorrhage in the occipital horns.  No hydrocephalus.    This report was flagged in Epic as abnormal.    COMMUNICATION  This critical result was discovered/received at 2023. The critical information above was relayed directly by me by telephone to Dr. Arnold on 06/24/2019 at 2026 by Dr. Corea on behalf of Dr. Milner.    Electronically signed by resident: Moise Corea  Date:    06/24/2019  Time:    20:23    Electronically signed by: Kendrick Milner  MD  Date:    06/24/2019  Time:    20:52             Narrative:    EXAMINATION:  CT HEAD WITHOUT CONTRAST    CLINICAL HISTORY:  Head trauma, headache;    TECHNIQUE:  Low dose axial CT images obtained throughout the head without intravenous contrast. Sagittal and coronal reconstructions were performed.    COMPARISON:  None.    FINDINGS:  Intracranial compartment:    There is an acute parenchymal hemorrhage within the right occipital lobe near the parieto-occipital junction measuring approximately 1.8 x 1.3 x 1.5 cm with mild surrounding vasogenic edema and localized mass effect.  No midline shift.  There is a small amount of intraventricular extension of blood products in tips of the occipital horns.    No major vascular distribution infarct identified.    The ventricles and sulci are normal in size for age without evidence of hydrocephalus.    Skull/extracranial contents (limited evaluation): No fracture. Mastoid air cells and paranasal sinuses are essentially clear.  Orbits and intraorbital contents demonstrate no acute abnormality.                                 Medical Decision Making:   History:   Old Medical Records: I decided to obtain old medical records.  Initial Assessment:   49 y.o W with PMHx of KESSLER cirrhosis s/p liver transplant 6/5/19, GERD, hypothyroidism, IDDM, presents with a chief complaint of fall.  Differential Diagnosis:   My differential diagnoses include, but are not limited to: concussion, ICH, muscular strains, and tricep strain.    Clinical Tests:   Radiological Study: Ordered and Reviewed  ED Management:  No midline tenderness to cervical spine to suggest cervical fracture. Overall, low suspicion for ICH but will obtain CT head. She does have tricep tenderness but no bony tenderness in upper extremity to suggest bony fracture.              Scribe Attestation:   Scribe #1: I performed the above scribed service and the documentation accurately describes the services I performed. I attest to  the accuracy of the note.    Attending Attestation:           Physician Attestation for Scribe:      Comments: I, Dr. Jeyson Cabrera, personally performed the services described in this documentation. All medical record entries made by the scribe were at my direction and in my presence.  I have reviewed the chart and agree that the record reflects my personal performance and is accurate and complete. Jeyson Cabrera MD.  5:47 PM 06/24/2019                 Clinical Impression:       ICD-10-CM ICD-9-CM   1. Closed head injury, initial encounter S09.90XA 959.01   2. Traumatic intracranial hemorrhage without loss of consciousness, initial encounter S06.300A 853.01   3. Status post liver transplant Z94.4 V42.7         Disposition:   Disposition: Admitted  Condition: Fair  Patient's CT scan the head came back positive for an intraparenchymal hemorrhage.  Patient still has a normal neuro exam, however will need to be admitted to Neurosurgery.  I spoke with Neurosurgery who asked to have the patient admitted to Neuro Critical Care.  I have spoken with both and they are deciding best place for treatment.  Patient is not taking blood thinners, but of note she is status post hepatic transplant June 5, 2019.                        Delgado Arnold III, MD  06/24/19 [id number]

## 2019-06-24 NOTE — ED TRIAGE NOTES
Patient presents to ED after a fall yesterday on concrete fall; states hit her head. Denies loss of consciousness. Told by PCP to present to ED to be evaluated.

## 2019-06-24 NOTE — TELEPHONE ENCOUNTER
----- Message from Gui Chavez MD sent at 6/24/2019  3:15 PM CDT -----  Results ok. No action needed

## 2019-06-24 NOTE — ED NOTES
Patient identifiers for Jihan Zamudio 49 y.o. female checked and correct.  Chief Complaint   Patient presents with    Head Injury     no loc, tripped and fell yest     Past Medical History:   Diagnosis Date    Cirrhosis     Esophageal varices 2/26/2018    Small with no banding 07/17    Essential hypertension 2/26/2018    Fatty liver 2/26/2018    GERD (gastroesophageal reflux disease)     Hx of colonic polyps 2/26/2018    On colonoscopy 07/17    Hypertension     Hypothyroidism 2/26/2018    Kidney stones     Morbid obesity 2/26/2018    Lap band with subsequent release    KADEEM (obstructive sleep apnea) 2/26/2018    Osteoarthritis 2/26/2018    Pulmonary nodule 2/26/2018    Vitamin D deficiency 2/26/2018     Allergies reported:   Review of patient's allergies indicates:   Allergen Reactions    Metformin Rash    Pcn [penicillins] Other (See Comments)     Unsure of reaction, states it was as a child. Tolerated rocephin at [facility]         LOC: Patient is awake, alert, and aware of environment with an appropriate affect. Patient is oriented x 3 and speaking appropriately.  APPEARANCE: Patient resting comfortably and in no acute distress. Patient is clean and well groomed, patient's clothing is properly fastened.  SKIN: The skin is warm and dry. Patient has normal skin turgor and moist mucus membranes. Patient reports incision to abdomen from liver transplant. Dressing placed over incision.  MUSKULOSKELETAL: Patient is moving all extremities well, no obvious deformities noted. Pulses intact. Patient reports a fall at home yesterday where she tripped and hit the back of her head on the concrete floor. Patient denies loss of consciousness. Patient normally uses a wheelchair to ambulate. Reports soreness to right arm.  RESPIRATORY: Airway is open and patent. Respirations are spontaneous and non-labored with normal effort and rate.  CARDIAC: Patient has a normal rate and rhythm. No peripheral edema noted.    ABDOMEN: No distention noted. Soft and non-tender upon palpation. Denies nausea and vomiting.  NEUROLOGICAL: PERRL. Facial expression is symmetrical. Hand grasps are equal bilaterally. Normal sensation in all extremities when touched with finger. Patient reports mild headache; alleviated since taking a tylenol this morning. States headache was worse. Denies numbness or tingling in extremities.

## 2019-06-25 VITALS
OXYGEN SATURATION: 97 % | DIASTOLIC BLOOD PRESSURE: 57 MMHG | SYSTOLIC BLOOD PRESSURE: 119 MMHG | HEIGHT: 64 IN | RESPIRATION RATE: 18 BRPM | HEART RATE: 79 BPM | BODY MASS INDEX: 35.34 KG/M2 | TEMPERATURE: 99 F | WEIGHT: 207 LBS

## 2019-06-25 PROBLEM — R73.9 HYPERGLYCEMIA: Status: RESOLVED | Noted: 2019-06-25 | Resolved: 2019-06-25

## 2019-06-25 PROBLEM — R73.9 HYPERGLYCEMIA: Status: ACTIVE | Noted: 2019-06-25

## 2019-06-25 PROBLEM — I61.0 SUBCORTICAL HEMORRHAGE OF CEREBRAL HEMISPHERE: Status: ACTIVE | Noted: 2019-06-24

## 2019-06-25 LAB
ABO + RH BLD: NORMAL
ALBUMIN SERPL BCP-MCNC: 2.4 G/DL (ref 3.5–5.2)
ALP SERPL-CCNC: 176 U/L (ref 55–135)
ALT SERPL W/O P-5'-P-CCNC: 21 U/L (ref 10–44)
ANION GAP SERPL CALC-SCNC: 8 MMOL/L (ref 8–16)
ANISOCYTOSIS BLD QL SMEAR: SLIGHT
AST SERPL-CCNC: 24 U/L (ref 10–40)
BASOPHILS # BLD AUTO: 0.02 K/UL (ref 0–0.2)
BASOPHILS NFR BLD: 0.7 % (ref 0–1.9)
BILIRUB SERPL-MCNC: 1.2 MG/DL (ref 0.1–1)
BLD GP AB SCN CELLS X3 SERPL QL: NORMAL
BUN SERPL-MCNC: 12 MG/DL (ref 6–20)
CALCIUM SERPL-MCNC: 8 MG/DL (ref 8.7–10.5)
CHLORIDE SERPL-SCNC: 103 MMOL/L (ref 95–110)
CO2 SERPL-SCNC: 27 MMOL/L (ref 23–29)
CREAT SERPL-MCNC: 0.8 MG/DL (ref 0.5–1.4)
DACRYOCYTES BLD QL SMEAR: ABNORMAL
DIFFERENTIAL METHOD: ABNORMAL
EOSINOPHIL # BLD AUTO: 0.1 K/UL (ref 0–0.5)
EOSINOPHIL NFR BLD: 3.1 % (ref 0–8)
ERYTHROCYTE [DISTWIDTH] IN BLOOD BY AUTOMATED COUNT: 19.8 % (ref 11.5–14.5)
EST. GFR  (AFRICAN AMERICAN): >60 ML/MIN/1.73 M^2
EST. GFR  (NON AFRICAN AMERICAN): >60 ML/MIN/1.73 M^2
GLUCOSE SERPL-MCNC: 80 MG/DL (ref 70–110)
HCT VFR BLD AUTO: 21.7 % (ref 37–48.5)
HGB BLD-MCNC: 6.9 G/DL (ref 12–16)
HYPOCHROMIA BLD QL SMEAR: ABNORMAL
IMM GRANULOCYTES # BLD AUTO: 0.02 K/UL (ref 0–0.04)
IMM GRANULOCYTES NFR BLD AUTO: 0.7 % (ref 0–0.5)
INR PPP: 1 (ref 0.8–1.2)
INR PPP: 1 (ref 0.8–1.2)
LYMPHOCYTES # BLD AUTO: 0.4 K/UL (ref 1–4.8)
LYMPHOCYTES NFR BLD: 15 % (ref 18–48)
MAGNESIUM SERPL-MCNC: 1.6 MG/DL (ref 1.6–2.6)
MCH RBC QN AUTO: 30.3 PG (ref 27–31)
MCHC RBC AUTO-ENTMCNC: 31.8 G/DL (ref 32–36)
MCV RBC AUTO: 95 FL (ref 82–98)
MONOCYTES # BLD AUTO: 0.1 K/UL (ref 0.3–1)
MONOCYTES NFR BLD: 4.9 % (ref 4–15)
NEUTROPHILS # BLD AUTO: 2.2 K/UL (ref 1.8–7.7)
NEUTROPHILS NFR BLD: 75.6 % (ref 38–73)
NRBC BLD-RTO: 0 /100 WBC
PHOSPHATE SERPL-MCNC: 3.7 MG/DL (ref 2.7–4.5)
PLATELET # BLD AUTO: 98 K/UL (ref 150–350)
PLATELET BLD QL SMEAR: ABNORMAL
PMV BLD AUTO: 8.9 FL (ref 9.2–12.9)
POCT GLUCOSE: 105 MG/DL (ref 70–110)
POCT GLUCOSE: 87 MG/DL (ref 70–110)
POIKILOCYTOSIS BLD QL SMEAR: SLIGHT
POLYCHROMASIA BLD QL SMEAR: ABNORMAL
POTASSIUM SERPL-SCNC: 3.7 MMOL/L (ref 3.5–5.1)
PROT SERPL-MCNC: 4.5 G/DL (ref 6–8.4)
PROTHROMBIN TIME: 10.7 SEC (ref 9–12.5)
PROTHROMBIN TIME: 10.7 SEC (ref 9–12.5)
RBC # BLD AUTO: 2.28 M/UL (ref 4–5.4)
SODIUM SERPL-SCNC: 138 MMOL/L (ref 136–145)
TACROLIMUS BLD-MCNC: 4.4 NG/ML (ref 5–15)
WBC # BLD AUTO: 2.86 K/UL (ref 3.9–12.7)

## 2019-06-25 PROCEDURE — 25500020 PHARM REV CODE 255: Performed by: TRANSPLANT SURGERY

## 2019-06-25 PROCEDURE — 85025 COMPLETE CBC W/AUTO DIFF WBC: CPT

## 2019-06-25 PROCEDURE — 25500020 PHARM REV CODE 255: Performed by: EMERGENCY MEDICINE

## 2019-06-25 PROCEDURE — 36415 COLL VENOUS BLD VENIPUNCTURE: CPT

## 2019-06-25 PROCEDURE — 99222 1ST HOSP IP/OBS MODERATE 55: CPT | Mod: ,,, | Performed by: NURSE PRACTITIONER

## 2019-06-25 PROCEDURE — 99223 PR INITIAL HOSPITAL CARE,LEVL III: ICD-10-PCS | Mod: ,,, | Performed by: PSYCHIATRY & NEUROLOGY

## 2019-06-25 PROCEDURE — 80197 ASSAY OF TACROLIMUS: CPT

## 2019-06-25 PROCEDURE — 25000003 PHARM REV CODE 250: Performed by: PHYSICIAN ASSISTANT

## 2019-06-25 PROCEDURE — 84100 ASSAY OF PHOSPHORUS: CPT

## 2019-06-25 PROCEDURE — 99223 1ST HOSP IP/OBS HIGH 75: CPT | Mod: ,,, | Performed by: NEUROLOGICAL SURGERY

## 2019-06-25 PROCEDURE — 99239 HOSP IP/OBS DSCHRG MGMT >30: CPT | Mod: 24,,, | Performed by: PHYSICIAN ASSISTANT

## 2019-06-25 PROCEDURE — A9585 GADOBUTROL INJECTION: HCPCS | Performed by: TRANSPLANT SURGERY

## 2019-06-25 PROCEDURE — 99223 PR INITIAL HOSPITAL CARE,LEVL III: ICD-10-PCS | Mod: ,,, | Performed by: NEUROLOGICAL SURGERY

## 2019-06-25 PROCEDURE — 86850 RBC ANTIBODY SCREEN: CPT

## 2019-06-25 PROCEDURE — 83735 ASSAY OF MAGNESIUM: CPT

## 2019-06-25 PROCEDURE — 99223 1ST HOSP IP/OBS HIGH 75: CPT | Mod: ,,, | Performed by: PSYCHIATRY & NEUROLOGY

## 2019-06-25 PROCEDURE — 85610 PROTHROMBIN TIME: CPT

## 2019-06-25 PROCEDURE — 99239 PR HOSPITAL DISCHARGE DAY,>30 MIN: ICD-10-PCS | Mod: 24,,, | Performed by: PHYSICIAN ASSISTANT

## 2019-06-25 PROCEDURE — 80053 COMPREHEN METABOLIC PANEL: CPT

## 2019-06-25 PROCEDURE — 99222 PR INITIAL HOSPITAL CARE,LEVL II: ICD-10-PCS | Mod: ,,, | Performed by: NURSE PRACTITIONER

## 2019-06-25 PROCEDURE — 63600175 PHARM REV CODE 636 W HCPCS: Performed by: PHYSICIAN ASSISTANT

## 2019-06-25 RX ORDER — LEVETIRACETAM 500 MG/1
500 TABLET ORAL 2 TIMES DAILY
Qty: 14 TABLET | Refills: 0 | Status: ON HOLD | OUTPATIENT
Start: 2019-06-25 | End: 2019-07-23 | Stop reason: HOSPADM

## 2019-06-25 RX ORDER — PREDNISONE 5 MG/1
5 TABLET ORAL DAILY
Status: DISCONTINUED | OUTPATIENT
Start: 2019-07-03 | End: 2019-06-25 | Stop reason: HOSPADM

## 2019-06-25 RX ORDER — PREDNISONE 10 MG/1
10 TABLET ORAL DAILY
Status: DISCONTINUED | OUTPATIENT
Start: 2019-06-26 | End: 2019-06-25 | Stop reason: HOSPADM

## 2019-06-25 RX ORDER — GADOBUTROL 604.72 MG/ML
10 INJECTION INTRAVENOUS
Status: COMPLETED | OUTPATIENT
Start: 2019-06-25 | End: 2019-06-25

## 2019-06-25 RX ORDER — ALPRAZOLAM 0.5 MG/1
0.5 TABLET ORAL ONCE
Status: COMPLETED | OUTPATIENT
Start: 2019-06-25 | End: 2019-06-25

## 2019-06-25 RX ORDER — LEVETIRACETAM 500 MG/1
500 TABLET ORAL 2 TIMES DAILY
Status: DISCONTINUED | OUTPATIENT
Start: 2019-06-25 | End: 2019-06-25 | Stop reason: HOSPADM

## 2019-06-25 RX ORDER — LEVETIRACETAM 500 MG/1
500 TABLET ORAL 2 TIMES DAILY
Status: CANCELLED | OUTPATIENT
Start: 2019-06-25

## 2019-06-25 RX ADMIN — ALPRAZOLAM 0.5 MG: 0.5 TABLET ORAL at 08:06

## 2019-06-25 RX ADMIN — SODIUM BICARBONATE 650 MG TABLET 650 MG: at 08:06

## 2019-06-25 RX ADMIN — OXYCODONE HYDROCHLORIDE 10 MG: 10 TABLET ORAL at 02:06

## 2019-06-25 RX ADMIN — LEVOTHYROXINE SODIUM 112 MCG: 112 TABLET ORAL at 06:06

## 2019-06-25 RX ADMIN — TACROLIMUS 2 MG: 1 CAPSULE ORAL at 08:06

## 2019-06-25 RX ADMIN — FUROSEMIDE 40 MG: 40 TABLET ORAL at 08:06

## 2019-06-25 RX ADMIN — GADOBUTROL 10 ML: 604.72 INJECTION INTRAVENOUS at 10:06

## 2019-06-25 RX ADMIN — MYCOPHENOLATE MOFETIL 500 MG: 250 CAPSULE ORAL at 08:06

## 2019-06-25 RX ADMIN — PREDNISONE 15 MG: 5 TABLET ORAL at 08:06

## 2019-06-25 RX ADMIN — LEVETIRACETAM 500 MG: 500 TABLET ORAL at 02:06

## 2019-06-25 RX ADMIN — VALGANCICLOVIR 450 MG: 450 TABLET, FILM COATED ORAL at 08:06

## 2019-06-25 RX ADMIN — SULFAMETHOXAZOLE AND TRIMETHOPRIM 1 TABLET: 400; 80 TABLET ORAL at 08:06

## 2019-06-25 RX ADMIN — IOHEXOL 100 ML: 350 INJECTION, SOLUTION INTRAVENOUS at 01:06

## 2019-06-25 NOTE — HPI
49F w/ PMH KESSLER cirrhosis s/p liver transplant 6/5/19, GERD, hypothyroidism, IDDM, who presents s/p mechanical fall w/ possible R occitoparietl hemorrhage. ICH (ICHS 0). Patient states that she was walking her dog yesterday, which jumped up on her causing her to fall backwards onto concrete and hit her head. Patient has been undergoing PT since her transplant and unable to walk until yesterday. She denies LOC/AMS/Sz, weakness/numbness/tingling, bowel/bladder incontinence, visual changes. She denies trauma elsewhere.    Discussed with NSGY, and Transplant team. Transplant Team to admit as primary given recent Liver transplant. NCC to consult and follow.

## 2019-06-25 NOTE — SUBJECTIVE & OBJECTIVE
(Not in a hospital admission)    Review of patient's allergies indicates:   Allergen Reactions    Metformin Rash    Pcn [penicillins] Other (See Comments)     Unsure of reaction, states it was as a child. Tolerated rocephin at [facility]sh       Past Medical History:   Diagnosis Date    Cirrhosis     Esophageal varices 2018    Small with no banding     Essential hypertension 2018    Fatty liver 2018    GERD (gastroesophageal reflux disease)     Hx of colonic polyps 2018    On colonoscopy     Hypertension     Hypothyroidism 2018    Kidney stones     Morbid obesity 2018    Lap band with subsequent release    KADEEM (obstructive sleep apnea) 2018    Osteoarthritis 2018    Pulmonary nodule 2018    Vitamin D deficiency 2018     Past Surgical History:   Procedure Laterality Date     SECTION      TIMES 2     CHOLECYSTECTOMY      LAPAROSCOPIC     CYSTOSCOPY W/ STONE MANIPULATION      kidney stone removal    EGD (ESOPHAGOGASTRODUODENOSCOPY) N/A 2019    Performed by [facility]ian [facility]dez MD at [facility] ENDO (2ND FLR)    LAPAROSCOPIC GASTRIC BANDING  2006    removal     LIVER BIOPSY  2017    KESSLER with bridging 17    TRANSPLANT, LIVER N/A 2019    Performed by Clement[facility] Jr[facility] MD at [facility] OR 2ND FLR    TRANSPLANT, LIVER N/A 2019    Performed by Clemente Brown Jr[facility] MD at [facility] OR 2ND FLR     Family History     Problem Relation (Age of Onset)    Breast cancer Maternal Grandmother    Cancer Mother (50), Father (65)    Heart disease Mother, Father        Tobacco Use    Smoking status: Never Smoker    Smokeless tobacco: Never Used    Tobacco comment: patient denies   Substance and Sexual Activity    Alcohol use: No     Comment: patient denies    Drug use: No     Comment: patient denies    Sexual activity: Not on file     Review of Systems   Constitutional: Negative for activity change, chills, fatigue, fever and  unexpected weight change.   HENT: Negative for tinnitus and voice change.    Eyes: Negative for photophobia and visual disturbance.   Respiratory: Negative for cough, choking, chest tightness, shortness of breath, wheezing and stridor.    Cardiovascular: Negative for chest pain and palpitations.   Gastrointestinal: Negative for abdominal distention, abdominal pain, constipation, diarrhea, nausea and vomiting.   Endocrine: Negative for polydipsia, polyphagia and polyuria.   Genitourinary: Negative for difficulty urinating, dysuria, frequency, hematuria and urgency.   Musculoskeletal: Positive for back pain (Shoulder pain). Negative for gait problem and neck stiffness.   Skin: Negative for pallor, rash and wound.   Neurological: Positive for headaches. Negative for dizziness, tremors, seizures, syncope, facial asymmetry, speech difficulty, weakness, light-headedness and numbness.   Psychiatric/Behavioral: Negative for agitation, behavioral problems and confusion.     Objective:     Weight: 88.5 kg (195 lb)  Body mass index is 33.47 kg/m².  Vital Signs (Most Recent):  Temp: 98.8 °F (37.1 °C) (06/24/19 1620)  Pulse: 75 (06/24/19 2202)  Resp: 19 (06/24/19 2116)  BP: (!) 153/72 (06/24/19 2202)  SpO2: 99 % (06/24/19 2202) Vital Signs (24h Range):  Temp:  [98.8 °F (37.1 °C)] 98.8 °F (37.1 °C)  Pulse:  [75-80] 75  Resp:  [16-19] 19  SpO2:  [97 %-100 %] 99 %  BP: (136-168)/(63-79) 153/72                 Neurosurgery Physical Exam  General: AOx3, GCS [id number]  CNII-XII: Intact on fine exam, PERRL, visual fields intact, EOMi, facial sensation preserved, no facial assymetry, tongue/uvula/palate midline, shoulder shrug equal, no pronator drift  Extremities: 5/5 motor throughout, sensorium intact throughout, coordination intact throughout, DTRs 2+, no pathological reflexes, no sensory level present    Patient with no significant neck pain on forward flexion/extension, lateral bending or rotation    Significant Labs:  Recent Labs    Lab 06/24/19 0737 06/24/19 2142   GLU 82 84    137   K 3.4* 3.4*    102   CO2 27 24   BUN 15 13   CREATININE 0.8 0.8   CALCIUM 8.4* 8.9     Recent Labs   Lab 06/24/19 0737 06/24/19 2142   WBC 3.42* 3.45*   HGB 7.9* 7.9*   HCT 24.2* 24.1*   * 119*     Recent Labs   Lab 06/24/19 2142   INR 1.0     Microbiology Results (last 7 days)     ** No results found for the last 168 hours. **        ABGs: No results for input(s): PH, PCO2, PO2, HCO3, POCSATURATED, BE in the last 48 hours.  Cardiac markers: No results for input(s): CKMB, CPKMB, TROPONINT, TROPONINI, MYOGLOBIN in the last 48 hours.  CMP:   Recent Labs   Lab 06/24/19 0737 06/24/19 2142   GLU 82 84   CALCIUM 8.4* 8.9   ALBUMIN 2.6* 2.7*   PROT 4.9* 5.4*    137   K 3.4* 3.4*   CO2 27 24    102   BUN 15 13   CREATININE 0.8 0.8   ALKPHOS 180* 203*   ALT 23 25   AST 26 30   BILITOT 1.4* 1.2*     CRP: No results for input(s): CRP in the last 48 hours.  ESR: No results for input(s): POCESR, ERYTHROCYTES in the last 48 hours.  LFTs:   Recent Labs   Lab 06/24/19 0737 06/24/19 2142   ALT 23 25   AST 26 30   ALKPHOS 180* 203*   BILITOT 1.4* 1.2*   PROT 4.9* 5.4*   ALBUMIN 2.6* 2.7*     Procalcitonin: No results for input(s): PROCAL in the last 48 hours.    Significant Diagnostics:  I have reviewed all pertinent imaging results/findings within the past 24 hours.

## 2019-06-25 NOTE — CONSULTS
[facility]  Vascular Neurology  Comprehensive Stroke Center  Consult Note    Inpatient consult to Vascular (Stroke) Neurology  Consult performed by: Cami Irvin MD  Consult ordered by: Mitchell Barba NP  Reason for consult: ICH        Assessment/Plan:     Patient is a 49 y.o. year old female with:    * Subcortical hemorrhage of cerebral hemisphere  48 y/o white F with liver transplant, p/w headache after trauma to the head (6/23 afternoon) and found to have IPH in subcortical area of R occipital lobe with IV extention. Agree that location is not typical for traumatic ICH, neither for hypertensive hemorrhage. Would rule out underlying vascular malformation vs mass with vascular imaging vs MRI with contrast.     - NCC following for close neuro monitoring (admitted to transplant team)  - Maintain euglycemia, target glucose ~140 to 180   - BP parameters: ICH: SBP <160  - Antithrombotics for secondary stroke prevention: Antiplatelets: None: Intracerebral Hemorrhage  - Statins for secondary stroke prevention and hyperlipidemia, if present:   Statins: None: Reason: not indicated in ICH  - Rehab efforts: The patient has been evaluated by a stroke team provider and the therapy needs have been fully considered based off the presenting complaints and exam findings. The following therapy evaluations are needed:    PT evaluate and treat,    OT evaluate and treat,    SLP evaluate and treat  - Diagnostics ordered/pending:    Agree with CTA Head to assess vasculature (possible underlying AVM)    MRI brain W/WO contrast to assess brain parenchyma and possible underlying mass   Urine toxicology screen   Lipid Profile to assess cholesterol levels  - VTE prophylaxis: None: Reason for No Pharmacological VTE Prophylaxis: Mechanical prophylaxis: Place SCDs, can consider pharmacological DVT ppx after 24 hr    STROKE DOCUMENTATION        NIH Scale:  1a. Level of Consciousness: 1-->Not alert, but arousable by minor  stimulation to obey, answer, or respond(patient is sleeping)  1b. LOC Questions: 0-->Answers both questions correctly  1c. LOC Commands: 0-->Performs both tasks correctly  2. Best Gaze: 0-->Normal  3. Visual: 0-->No visual loss  4. Facial Palsy: 0-->Normal symmetrical movements  5a. Motor Arm, Left: 0-->No drift, limb holds 90 (or 45) degrees for full 10 secs  5b. Motor Arm, Right: 0-->No drift, limb holds 90 (or 45) degrees for full 10 secs  6a. Motor Leg, Left: 0-->No drift, leg holds 30 degree position for full 5 secs  6b. Motor Leg, Right: 0-->No drift, leg holds 30 degree position for full 5 secs  7. Limb Ataxia: 0-->Absent  8. Sensory: 0-->Normal, no sensory loss  9. Best Language: 0-->No aphasia, normal  10. Dysarthria: 0-->Normal  11. Extinction and Inattention (formerly Neglect): 0-->No abnormality  Total (NIH Stroke Scale): 1    Modified Raleigh Score: 0  Monisha Coma Scale:    ABCD2 Score:    [id number] Score:   HAS -BLED Score:   ICH Score:1  Hunt & Vasquez Classification:     Thrombolysis Candidate? No, CT findings (ICH, SAH, etc)     Delays to Thrombolysis?  No    Interventional Revascularization Candidate?   Is the patient eligible for mechanical endovascular reperfusion (TANJA)?  No; ICH/ SAH     Hemorrhagic change of an Ischemic Stroke: Does this patient have an ischemic stroke with hemorrhagic changes? 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    Patient is currently sleeping and not very cooperative when woken up. Most of the history is taken from the charts. Patient was walking her dog on Sunday afternoon (6/23), when she sustained a mechanical fall with subsequent head trauma. She denies LOC or any focal neurological deficits and only complains of headache. She presented to the ED per her PCP's advice and was found to  have an IPH in subcortical R occipital lobe with possible IV extention. Patient was evaluated by both transplant and NCC team and decided to get admitted to transplant team. Vascular neurology is consulted due to atypical location of hemorrhage for trauma.         Past Medical History:   Diagnosis Date    Cirrhosis     Esophageal varices 2018    Small with no banding     Essential hypertension 2018    Fatty liver 2018    GERD (gastroesophageal reflux disease)     Hx of colonic polyps 2018    On colonoscopy     Hypertension     Hypothyroidism 2018    Kidney stones     Morbid obesity 2018    Lap band with subsequent release    KADEEM (obstructive sleep apnea) 2018    Osteoarthritis 2018    Pulmonary nodule 2018    Vitamin D deficiency 2018     Past Surgical History:   Procedure Laterality Date     SECTION      TIMES 2     CHOLECYSTECTOMY      LAPAROSCOPIC     CYSTOSCOPY W/ STONE MANIPULATION      kidney stone removal    EGD (ESOPHAGOGASTRODUODENOSCOPY) N/A 2019    Performed by [facility] at [facility]O (2ND FLR)    LAPAROSCOPIC GASTRIC BANDING  2006    removal     LIVER BIOPSY  2017    KESSLER with bridging 17    TRANSPLANT, LIVER N/A 2019    [facility]formed by [facility]e [facility] Jr[facility] at [facility] OR 2ND FLR    TRANSPLANT, LIVER N/A 2019    Performed by [facility] Jr[facility] at [facility] OR 2ND FLR     Family History   Problem Relation Age of Onset    Heart disease Mother     Cancer Mother 50        colon cancer    Heart disease Father     Cancer Father 65        liver cancer    Breast cancer Maternal Grandmother      Social History     Tobacco Use    Smoking status: Never Smoker    Smokeless tobacco: Never Used    Tobacco comment: patient denies   Substance Use Topics    Alcohol use: No     Comment: patient denies    Drug use: No     Comment: patient denies     Review of patient's allergies  indicates:   Allergen Reactions    Metformin Rash    Pcn [penicillins] Other (See Comments)     Unsure of reaction, states it was as a child. Tolerated rocephin at [facility]       Medications: I have reviewed the current medication administration record.      (Not in a hospital admission)    Review of Systems   Unable to perform ROS: Other (patient is sleeping and not cooperative)   Constitutional: Negative for diaphoresis and fever.   HENT: Negative for drooling and trouble swallowing.    Eyes: Negative for visual disturbance.   Respiratory: Negative for shortness of breath.    Gastrointestinal: Negative for vomiting.   Allergic/Immunologic: Positive for immunocompromised state.   Neurological: Positive for headaches. Negative for facial asymmetry, speech difficulty, weakness and numbness.   Psychiatric/Behavioral: Negative for agitation and confusion. The patient is not nervous/anxious.      Objective:     Vital Signs (Most Recent):  Temp: 98.9 °F (37.2 °C) (06/25/19 0037)  Pulse: 80 (06/25/19 0037)  Resp: 18 (06/25/19 0037)  BP: (!) 140/65 (06/25/19 0037)  SpO2: 98 % (06/25/19 0037)    Vital Signs Range (Last 24H):  Temp:  [98.8 °F (37.1 °C)-98.9 °F (37.2 °C)]   Pulse:  [75-80]   Resp:  [16-19]   BP: (136-168)/(63-79)   SpO2:  [97 %-100 %]     Physical Exam   Constitutional: She appears well-developed and well-nourished. She is sleeping and uncooperative. She does not appear ill. No distress.   HENT:   Head: Normocephalic.   Eyes: Pupils are equal, round, and reactive to light.   Cardiovascular: Normal rate.   Pulmonary/Chest: Effort normal.   Musculoskeletal: Normal range of motion. She exhibits no edema or tenderness.   Skin: She is not diaphoretic.   Nursing note and vitals reviewed.      Neurological Exam:   LOC: sleeping, not really cooperative upon waking up  Attention Span: poor  Language: No aphasia  Articulation: No dysarthria  Orientation: uncooperative  Visual Fields: Full  EOM (CN III, IV, VI):  Full/intact  Pupils (CN II, III): PERRL  Facial Movement (CN VII): Symmetric facial expression    Motor: 5/5 throughout  Sensation: Intact to light touch, temperature and vibration      Laboratory:  CMP:   Recent Labs   Lab 06/24/19 2142   CALCIUM 8.9   ALBUMIN 2.7*   PROT 5.4*      K 3.4*   CO2 24      BUN 13   CREATININE 0.8   ALKPHOS 203*   ALT 25   AST 30   BILITOT 1.2*     CBC:   Recent Labs   Lab 06/24/19 2142   WBC 3.45*   RBC 2.51*   HGB 7.9*   HCT 24.1*   *   MCV 96   MCH 31.5*   MCHC 32.8     Lipid Panel: No results for input(s): CHOL, LDLCALC, HDL, TRIG in the last 168 hours.  Coagulation:   Recent Labs   Lab 06/24/19 2142   INR 1.0     Hgb A1C: No results for input(s): HGBA1C in the last 168 hours.  TSH: No results for input(s): TSH in the last 168 hours.    Diagnostic Results:      Brain imaging:    CTH non-contrast (6/24/19):  Acute IPH in R occipital lobe with mild vasogenic edema.   No localized mass effect. Possible minimal amount of IVH        Vessel Imaging:  None    Cardiac Evaluation:     ECG (6/4/2019):  Normal sinus rhythm and rate  Normal axis  No TWI  QTc 462    TTE (5/24/19):  EF 65%, no wall motion abnormality  Normal size atria  mild annular calcification of the aortic valve        Cami Irvin MD  [facility] Stroke [facility]  Department of Vascular Neurology   [facility]

## 2019-06-25 NOTE — PROGRESS NOTES
Pt discharged per MD's order. PIV and tele removed. Discharge instructions reviewed with pt, verbalized understanding. Family at the BS to assist with DC. All personal belongings with pt. Pt discharged via home  wheelchair, with family, no transport needed.

## 2019-06-25 NOTE — PROGRESS NOTES
War Room called and pt removed from Tele and placed to MRI monitor and Sat / pt AAO but slightly sedated from medications

## 2019-06-25 NOTE — ASSESSMENT & PLAN NOTE
50 y/o white F with liver transplant, p/w headache after trauma to the head (6/23 afternoon) and found to have IPH in subcortical area of R occipital lobe with IV extention. Agree that location is not typical for traumatic ICH, neither for hypertensive hemorrhage. Would rule out underlying vascular malformation vs mass with vascular imaging vs MRI with contrast.     - NCC following for close neuro monitoring (admitted to transplant team)  - Maintain euglycemia, target glucose ~140 to 180   - BP parameters: ICH: SBP <160  - Antithrombotics for secondary stroke prevention: Antiplatelets: None: Intracerebral Hemorrhage  - Statins for secondary stroke prevention and hyperlipidemia, if present:   Statins: None: Reason: not indicated in ICH  - Rehab efforts: The patient has been evaluated by a stroke team provider and the therapy needs have been fully considered based off the presenting complaints and exam findings. The following therapy evaluations are needed:    PT evaluate and treat,    OT evaluate and treat,    SLP evaluate and treat  - Diagnostics ordered/pending:    Agree with CTA Head to assess vasculature (possible underlying AVM)    MRI brain W/WO contrast to assess brain parenchyma and possible underlying mass   Urine toxicology screen   Lipid Profile to assess cholesterol levels  - VTE prophylaxis: None: Reason for No Pharmacological VTE Prophylaxis: Mechanical prophylaxis: Place SCDs, can consider pharmacological DVT ppx after 24 hr

## 2019-06-25 NOTE — ASSESSMENT & PLAN NOTE
49F w/ PMH KESSLER cirrhosis s/p liver transplant 6/5/19, GERD, hypothyroidism, IDDM, who presents s/p mechanical fall w/ possible R periventricular ICH (ICHS 0):    --Patient admitted to ICU on telemetry; Transplant team primary      -q1h neurochecks in ICU  --CTH reviewed with hyperdense lesion along medial wall of R lateral ventricle      -CTA recommended for repeat scan at 6h      -MRI recommended if CTA unrevealing  --All labs and diagnostics reviewed, coags appropriate   -Recc plt >100k in acute setting; INR <1.4  --SBP <140 (cardene ggt; hydralazine & labetalol PRN; transition to home meds when appropriate)  --Na >135  --Keppra 500 BID  --HOB >30  --NPO at this time for possible operative intervention  --Continue to monitor clinically, notify NSGY immediately with any changes in neuro status    Dispo: ICU in acute setting

## 2019-06-25 NOTE — ASSESSMENT & PLAN NOTE
-H/H low but stable, VSS  -pt admitted with intracerebral hemorrhage, ASA and heparin on hold  -continue to monitor

## 2019-06-25 NOTE — H&P
"[facility]  Liver Transplant  History & Physical    Patient Name: Jihan Zamudio  MRN: [id number]  Admission Date: 2019  Code Status: Full Code  Primary Care Provider: Primary Doctor No  Post-Operative Day: 19     ORGAN:   LIVER  Disease Etiology: Cirrhosis: Fatty Liver (Kessler)  Donor Type:    - Brain Death  CDC High Risk:   No  Donor CMV Status:   Donor CMV Status: Positive  Donor HBcAB:   Negative  Donor HCV Status:   Negative  Donor HBV GERTRUDIS: Negative  Donor HCV GERTRUDIS: Negative  Whole or Partial: Whole Liver  Biliary Anastomosis: End to End  Arterial Anatomy: Standard    Subjective:     History of Present Illness:  50 y/o F s/p DBD OLTx (SM induction, CMV D+R+) for KESSLER cirrhosis on 19, post op course unremarkable. Pt presents to the ED on  for fall with head trauma that occurred yesterday. Pt reports she was walking outside of the [employer] apt yesterday when her dog got excited and jumped up, causing her to fall backwards onto concrete floor. Pt reports she lost her balance, fell back onto her buttocks, and then subsequently hit her head and upper neck on the concrete floor. She admits to mild HA after the fall. She denies dizziness, LOC, N/V, vision changes, SOB, CP, or trauma to any other body part. She reports to the ED at the advice of her PCP. VSS and neuro exam unremarkable. H/H low but stable, LFTs WNL. CT head revealed "acute parenchymal hemorrhage within the right occipital lobe near the parieto-occipital junction measuring approximately 1.8 x 1.3 x 1.5 cm with mild surrounding vasogenic edema and localized mass effect.  No midline shift.  There is a small amount of intraventricular extension of blood products in tips of the occipital horns". Neuro critical care consulted who recommend CTA head and possible MRI in AM depending on CTA results. Plan to admit to TSU and proceed with CTA. Prophylactic keppra dose given as directed by neuro. Also plan to keep BP < 140 " mmHg per neuro recs.     Past Medical History:   Diagnosis Date    Cirrhosis     Esophageal varices 2018    Small with no banding     Essential hypertension 2018    Fatty liver 2018    GERD (gastroesophageal reflux disease)     Hx of colonic polyps 2018    On colonoscopy     Hypertension     Hypothyroidism 2018    Kidney stones     Morbid obesity 2018    Lap band with subsequent release    KADEEM (obstructive sleep apnea) 2018    Osteoarthritis 2018    Pulmonary nodule 2018    Vitamin D deficiency 2018       Past Surgical History:   Procedure Laterality Date     SECTION      TIMES 2     CHOLECYSTECTOMY      LAPAROSCOPIC     CYSTOSCOPY W/ STONE MANIPULATION      kidney stone removal    EGD (ESOPHAGOGASTRODUODENOSCOPY) N/A 2019    Performed by [facility]ian [facility]ández MD at [facility] ENDO (2ND FLR)    LAPAROSCOPIC GASTRIC BANDING  2006    removal     LIVER BIOPSY  2017    KESSLER with bridging 17    TRANSPLANT, LIVER N/A 2019    Performed by Clemente Brown Jr., MD at [facility] OR 2ND FLR    TRANSPLANT, LIVER N/A 2019    Performed by Clemente Brown Jr., MD at [facility] OR 2ND FLR       Review of patient's allergies indicates:   Allergen Reactions    Metformin Rash    Pcn [penicillins] Other (See Comments)     Unsure of reaction, states it was as a child. Tolerated rocephin at [facility]       Family History     Problem Relation (Age of Onset)    Breast cancer Maternal Grandmother    Cancer Mother (50), Father (65)    Heart disease Mother, Father        Tobacco Use    Smoking status: Never Smoker    Smokeless tobacco: Never Used    Tobacco comment: patient denies   Substance and Sexual Activity    Alcohol use: No     Comment: patient denies    Drug use: No     Comment: patient denies    Sexual activity: Not on file         (Not in a hospital admission)    Review of Systems   Constitutional: Negative for activity  change, appetite change, chills, fatigue and fever.   Eyes: Negative for visual disturbance.   Respiratory: Negative for cough, shortness of breath and wheezing.    Cardiovascular: Negative for chest pain, palpitations and leg swelling.   Gastrointestinal: Negative for abdominal distention, abdominal pain, constipation, diarrhea, nausea and vomiting.   Genitourinary: Negative for decreased urine volume, difficulty urinating, dysuria and hematuria.   Musculoskeletal: Negative for neck pain.   Allergic/Immunologic: Positive for immunocompromised state.   Neurological: Positive for headaches. Negative for dizziness, seizures, syncope, speech difficulty, weakness, light-headedness and numbness.   Psychiatric/Behavioral: Negative for confusion.     Objective:     Vital Signs (Most Recent):  Temp: 98.8 °F (37.1 °C) (06/24/19 1620)  Pulse: 75 (06/24/19 2202)  Resp: 19 (06/24/19 2116)  BP: (!) 153/72 (06/24/19 2202)  SpO2: 99 % (06/24/19 2202) Vital Signs (24h Range):  Temp:  [98.8 °F (37.1 °C)] 98.8 °F (37.1 °C)  Pulse:  [75-80] 75  Resp:  [16-19] 19  SpO2:  [97 %-100 %] 99 %  BP: (136-168)/(63-79) 153/72     Weight: 88.5 kg (195 lb)  Body mass index is 33.47 kg/m².    No intake or output data in the 24 hours ending 06/24/19 2331    Physical Exam   Constitutional: She is oriented to person, place, and time. No distress.   HENT:   Head: Normocephalic and atraumatic.   Eyes: Pupils are equal, round, and reactive to light. Conjunctivae and EOM are normal. No scleral icterus.   Neck: Normal range of motion. Neck supple.   Cardiovascular: Normal rate, regular rhythm and normal heart sounds. Exam reveals no gallop and no friction rub.   No murmur heard.  Pulmonary/Chest: Effort normal and breath sounds normal. No respiratory distress. She has no wheezes. She has no rales.   Abdominal: Soft. Bowel sounds are normal. She exhibits no distension. There is no tenderness.   Chevron incision with staple in tact no ssi  "  Musculoskeletal: Normal range of motion. She exhibits edema (+1-2 BLE).   Neurological: She is alert and oriented to person, place, and time. She is not disoriented. No cranial nerve deficit. GCS eye subscore is 4. GCS verbal subscore is 5. GCS motor subscore is 6.   Skin: She is not diaphoretic.   Psychiatric: She has a normal mood and affect. Her behavior is normal. Judgment and thought content normal.       Laboratory:  CBC:   Recent Labs   Lab 06/24/19  2142   WBC 3.45*   RBC 2.51*   HGB 7.9*   HCT 24.1*   *   MCV 96   MCH 31.5*   MCHC 32.8     CMP:   Recent Labs   Lab 06/24/19  2142   GLU 84   CALCIUM 8.9   ALBUMIN 2.7*   PROT 5.4*      K 3.4*   CO2 24      BUN 13   CREATININE 0.8   ALKPHOS 203*   ALT 25   AST 30   BILITOT 1.2*     Labs within the past 24 hours have been reviewed.    Diagnostic Results:  CT head    Assessment/Plan:     * Intracerebral hemorrhage  -pt with fall with head trauma on 6/23  -no LOC, dizziness, N/V, vision changes, or confusion  -VSS, neuro exam WNL  -CT head revealed "acute parenchymal hemorrhage within the right occipital lobe near the parieto-occipital junction measuring approximately 1.8 x 1.3 x 1.5 cm with mild surrounding vasogenic edema and localized mass effect.  No midline shift.  There is a small amount of intraventricular extension of blood products in tips of the occipital horns".  -Neuro critical care consulted who recommend CTA head and possible MRI in AM depending on CTA results.   -Prophylactic keppra dose given as directed by neuro  -plan to keep BP < 140 mmHg per neuro recs  -ASA and subq heparin on hold  -continue to monitor        At risk for opportunistic infections  -continue bactrim  -continue valcyte      Closed head injury  -see intracerebral hemorrhage      Long-term use of immunosuppressant medication  -see prophylactic immuno      Adrenal cortical steroids causing adverse effect in therapeutic use  -endocrine consulted, appreciate " recs      Prophylactic immunotherapy  -continue prograf  -continue cellcept  -continue to monitor for side effects and daily levels. Will adjust for therapeutic dose      S/P liver transplant  -50 y/o F s/p DBD OLTx (SM induction, CMV D+R+) for KESSLER cirrhosis on 6/5/19, post op course unremarkable.  -LFTs WNL  -liver US on 6/24 with mildly elevated RIs, otherwise satisfactory doppler appearance, complex fluid collection, slightly smaller compared to prior exam  -continue to monitor    Anemia of chronic disease  -H/H low but stable, VSS  -pt admitted with intracerebral hemorrhage, ASA and heparin on hold  -continue to monitor          Discharge Planning:  No Patient Care Coordination Note on file.      Amy Saunders PA-C  Liver Transplant  [facility]

## 2019-06-25 NOTE — SUBJECTIVE & OBJECTIVE
Past Medical History:   Diagnosis Date    Cirrhosis     Esophageal varices 2018    Small with no banding     Essential hypertension 2018    Fatty liver 2018    GERD (gastroesophageal reflux disease)     Hx of colonic polyps 2018    On colonoscopy     Hypertension     Hypothyroidism 2018    Kidney stones     Morbid obesity 2018    Lap band with subsequent release    KADEEM (obstructive sleep apnea) 2018    Osteoarthritis 2018    Pulmonary nodule 2018    Vitamin D deficiency 2018     Past Surgical History:   Procedure Laterality Date     SECTION      TIMES 2     CHOLECYSTECTOMY      LAPAROSCOPIC     CYSTOSCOPY W/ STONE MANIPULATION      kidney stone removal    EGD (ESOPHAGOGASTRODUODENOSCOPY) N/A 2019    Performed by [facility]ian [facility]dez [facility] at [facility] (2ND FLR)    LAPAROSCOPIC GASTRIC BANDING      removal     LIVER BIOPSY  2017    KESSLER with bridging 17    TRANSPLANT, LIVER N/A 2019    Performed by Clemente Brown Jr., [facility] at [facility] OR 2ND FLR    TRANSPLANT, LIVER N/A 2019    Performed by Clemente Brown Jr., MD at [facility] OR 2ND FLR     Family History   Problem Relation Age of Onset    Heart disease Mother     Cancer Mother 50        colon cancer    Heart disease Father     Cancer Father 65        liver cancer    Breast cancer Maternal Grandmother      Social History     Tobacco Use    Smoking status: Never Smoker    Smokeless tobacco: Never Used    Tobacco comment: patient denies   Substance Use Topics    Alcohol use: No     Comment: patient denies    Drug use: No     Comment: patient denies     Review of patient's allergies indicates:   Allergen Reactions    Metformin Rash    Pcn [penicillins] Other (See Comments)     Unsure of reaction, states it was as a child. Tolerated rocephin at [facility]       Medications: I have reviewed the current medication administration record.      (Not  in a hospital admission)    Review of Systems   Unable to perform ROS: Other (patient is sleeping and not cooperative)   Constitutional: Negative for diaphoresis and fever.   HENT: Negative for drooling and trouble swallowing.    Eyes: Negative for visual disturbance.   Respiratory: Negative for shortness of breath.    Gastrointestinal: Negative for vomiting.   Allergic/Immunologic: Positive for immunocompromised state.   Neurological: Positive for headaches. Negative for facial asymmetry, speech difficulty, weakness and numbness.   Psychiatric/Behavioral: Negative for agitation and confusion. The patient is not nervous/anxious.      Objective:     Vital Signs (Most Recent):  Temp: 98.9 °F (37.2 °C) (06/25/19 0037)  Pulse: 80 (06/25/19 0037)  Resp: 18 (06/25/19 0037)  BP: (!) 140/65 (06/25/19 0037)  SpO2: 98 % (06/25/19 0037)    Vital Signs Range (Last 24H):  Temp:  [98.8 °F (37.1 °C)-98.9 °F (37.2 °C)]   Pulse:  [75-80]   Resp:  [16-19]   BP: (136-168)/(63-79)   SpO2:  [97 %-100 %]     Physical Exam   Constitutional: She appears well-developed and well-nourished. She is sleeping and uncooperative. She does not appear ill. No distress.   HENT:   Head: Normocephalic.   Eyes: Pupils are equal, round, and reactive to light.   Cardiovascular: Normal rate.   Pulmonary/Chest: Effort normal.   Musculoskeletal: Normal range of motion. She exhibits no edema or tenderness.   Skin: She is not diaphoretic.   Nursing note and vitals reviewed.      Neurological Exam:   LOC: sleeping, not really cooperative upon waking up  Attention Span: poor  Language: No aphasia  Articulation: No dysarthria  Orientation: uncooperative  Visual Fields: Full  EOM (CN III, IV, VI): Full/intact  Pupils (CN II, III): PERRL  Facial Movement (CN VII): Symmetric facial expression    Motor: 5/5 throughout  Sensation: Intact to light touch, temperature and vibration      Laboratory:  CMP:   Recent Labs   Lab 06/24/19  2142   CALCIUM 8.9   ALBUMIN 2.7*    PROT 5.4*      K 3.4*   CO2 24      BUN 13   CREATININE 0.8   ALKPHOS 203*   ALT 25   AST 30   BILITOT 1.2*     CBC:   Recent Labs   Lab 06/24/19  2142   WBC 3.45*   RBC 2.51*   HGB 7.9*   HCT 24.1*   *   MCV 96   MCH 31.5*   MCHC 32.8     Lipid Panel: No results for input(s): CHOL, LDLCALC, HDL, TRIG in the last 168 hours.  Coagulation:   Recent Labs   Lab 06/24/19  2142   INR 1.0     Hgb A1C: No results for input(s): HGBA1C in the last 168 hours.  TSH: No results for input(s): TSH in the last 168 hours.    Diagnostic Results:      Brain imaging:    CTH non-contrast (6/24/19):  Acute IPH in R occipital lobe with mild vasogenic edema.   No localized mass effect. Possible minimal amount of IVH        Vessel Imaging:  None    Cardiac Evaluation:     ECG (6/4/2019):  Normal sinus rhythm and rate  Normal axis  No TWI  QTc 462    TTE (5/24/19):  EF 65%, no wall motion abnormality  Normal size atria  mild annular calcification of the aortic valve

## 2019-06-25 NOTE — ASSESSMENT & PLAN NOTE
-48 y/o F s/p DBD OLTx (SM induction, CMV D+R+) for KESSLER cirrhosis on 6/5/19, post op course unremarkable.  -LFTs WNL  -liver US on 6/24 with mildly elevated RIs, otherwise satisfactory doppler appearance, complex fluid collection, slightly smaller compared to prior exam  -continue to monitor

## 2019-06-25 NOTE — CONSULTS
[facility]  Neurosurgery  Consult Note    Consults  Subjective:     Chief Complaint/Reason for Admission: ICH    History of Present Illness: 49F w/ PMH KESSLER cirrhosis s/p liver transplant 19, GERD, hypothyroidism, IDDM, who presents s/p mechanical fall w/ possible R periventricular ICH (ICHS 0). Patient states that she was walking her dog yesterday, which jumped up on her causing her to fall backwards onto concrete and hit her head. Patient has been undergoing PT since her transplant and unable to walk until yesterday. She denies LOC/AMS/Sz, weakness/numbness/tingling, bowel/bladder incontinence, visual changes. She denies trauma elsewhere. She does not take anti-plt/coag medications.      (Not in a hospital admission)    Review of patient's allergies indicates:   Allergen Reactions    Metformin Rash    Pcn [penicillins] Other (See Comments)     Unsure of reaction, states it was as a child. Tolerated rocephin at [facility]       Past Medical History:   Diagnosis Date    Cirrhosis     Esophageal varices 2018    Small with no banding     Essential hypertension 2018    Fatty liver 2018    GERD (gastroesophageal reflux disease)     Hx of colonic polyps 2018    On colonoscopy     Hypertension     Hypothyroidism 2018    Kidney stones     Morbid obesity 2018    Lap band with subsequent release    KADEEM (obstructive sleep apnea) 2018    Osteoarthritis 2018    Pulmonary nodule 2018    Vitamin D deficiency 2018     Past Surgical History:   Procedure Laterality Date     SECTION      TIMES 2     CHOLECYSTECTOMY      LAPAROSCOPIC     CYSTOSCOPY W/ STONE MANIPULATION      kidney stone removal    EGD (ESOPHAGOGASTRODUODENOSCOPY) N/A 2019    Per[facility] by [facility] at [facility] (2ND FLR)    LAPAROSCOPIC GASTRIC BANDING  2006    removal 2009    LIVER BIOPSY  2017    KESSLER with bridging 17     TRANSPLANT, LIVER N/A 6/5/2019    Performed by Clemente [facility] Jr., MD at [facility] OR 2ND FLR    TRANSPLANT, LIVER N/A 6/4/2019    Performed by Clemente Brown Jr., MD at [facility] OR 2ND FLR     Family History     Problem Relation (Age of Onset)    Breast cancer Maternal Grandmother    Cancer Mother (50), Father (65)    Heart disease Mother, Father        Tobacco Use    Smoking status: Never Smoker    Smokeless tobacco: Never Used    Tobacco comment: patient denies   Substance and Sexual Activity    Alcohol use: No     Comment: patient denies    Drug use: No     Comment: patient denies    Sexual activity: Not on file     Review of Systems   Constitutional: Negative for activity change, chills, fatigue, fever and unexpected weight change.   HENT: Negative for tinnitus and voice change.    Eyes: Negative for photophobia and visual disturbance.   Respiratory: Negative for cough, choking, chest tightness, shortness of breath, wheezing and stridor.    Cardiovascular: Negative for chest pain and palpitations.   Gastrointestinal: Negative for abdominal distention, abdominal pain, constipation, diarrhea, nausea and vomiting.   Endocrine: Negative for polydipsia, polyphagia and polyuria.   Genitourinary: Negative for difficulty urinating, dysuria, frequency, hematuria and urgency.   Musculoskeletal: Positive for back pain (Shoulder pain). Negative for gait problem and neck stiffness.   Skin: Negative for pallor, rash and wound.   Neurological: Positive for headaches. Negative for dizziness, tremors, seizures, syncope, facial asymmetry, speech difficulty, weakness, light-headedness and numbness.   Psychiatric/Behavioral: Negative for agitation, behavioral problems and confusion.     Objective:     Weight: 88.5 kg (195 lb)  Body mass index is 33.47 kg/m².  Vital Signs (Most Recent):  Temp: 98.8 °F (37.1 °C) (06/24/19 1620)  Pulse: 75 (06/24/19 2202)  Resp: 19 (06/24/19 2116)  BP: (!) 153/72 (06/24/19 2202)  SpO2: 99 %  (06/24/19 2202) Vital Signs (24h Range):  Temp:  [98.8 °F (37.1 °C)] 98.8 °F (37.1 °C)  Pulse:  [75-80] 75  Resp:  [16-19] 19  SpO2:  [97 %-100 %] 99 %  BP: (136-168)/(63-79) 153/72                 Neurosurgery Physical Exam  General: AOx3, GCS [id number]  CNII-XII: Intact on fine exam, PERRL, visual fields intact, EOMi, facial sensation preserved, no facial assymetry, tongue/uvula/palate midline, shoulder shrug equal, no pronator drift  Extremities: 5/5 motor throughout, sensorium intact throughout, coordination intact throughout, DTRs 2+, no pathological reflexes, no sensory level present    Patient with no significant neck pain on forward flexion/extension, lateral bending or rotation    Significant Labs:  Recent Labs   Lab 06/24/19  0737 06/24/19 2142   GLU 82 84    137   K 3.4* 3.4*    102   CO2 27 24   BUN 15 13   CREATININE 0.8 0.8   CALCIUM 8.4* 8.9     Recent Labs   Lab 06/24/19  0737 06/24/19 2142   WBC 3.42* 3.45*   HGB 7.9* 7.9*   HCT 24.2* 24.1*   * 119*     Recent Labs   Lab 06/24/19 2142   INR 1.0     Microbiology Results (last 7 days)     ** No results found for the last 168 hours. **        ABGs: No results for input(s): PH, PCO2, PO2, HCO3, POCSATURATED, BE in the last 48 hours.  Cardiac markers: No results for input(s): CKMB, CPKMB, TROPONINT, TROPONINI, MYOGLOBIN in the last 48 hours.  CMP:   Recent Labs   Lab 06/24/19  0737 06/24/19 2142   GLU 82 84   CALCIUM 8.4* 8.9   ALBUMIN 2.6* 2.7*   PROT 4.9* 5.4*    137   K 3.4* 3.4*   CO2 27 24    102   BUN 15 13   CREATININE 0.8 0.8   ALKPHOS 180* 203*   ALT 23 25   AST 26 30   BILITOT 1.4* 1.2*     CRP: No results for input(s): CRP in the last 48 hours.  ESR: No results for input(s): POCESR, ERYTHROCYTES in the last 48 hours.  LFTs:   Recent Labs   Lab 06/24/19  0737 06/24/19  2142   ALT 23 25   AST 26 30   ALKPHOS 180* 203*   BILITOT 1.4* 1.2*   PROT 4.9* 5.4*   ALBUMIN 2.6* 2.7*     Procalcitonin: No results for  input(s): PROCAL in the last 48 hours.    Significant Diagnostics:  I have reviewed all pertinent imaging results/findings within the past 24 hours.    Assessment/Plan:     * Intracerebral hemorrhage  49F w/ PMH KESSLER cirrhosis s/p liver transplant 6/5/19, GERD, hypothyroidism, IDDM, who presents s/p mechanical fall w/ possible R periventricular ICH (ICHS 0):    --Patient admitted to ICU on telemetry; Transplant team primary      -q1h neurochecks in ICU  --CTH reviewed with hyperdense lesion along medial wall of R lateral ventricle      -CTA recommended for repeat scan at 6h      -MRI recommended if CTA unrevealing  --All labs and diagnostics reviewed, coags appropriate   -Recc plt >100k in acute setting; INR <1.4  --SBP <140 (cardene ggt; hydralazine & labetalol PRN; transition to home meds when appropriate)  --Na >135  --Keppra 500 BID  --HOB >30  --NPO at this time for possible operative intervention  --Continue to monitor clinically, notify NSGY immediately with any changes in neuro status    Dispo: ICU in acute setting        Thank you for your consult. I will follow-up with patient. Please contact us if you have any additional questions.    Brennen Pardo MD  Neurosurgery  [facility]

## 2019-06-25 NOTE — PROGRESS NOTES
Admit/Discharge Note     Met with patient to assess needs. Patient is a 49 y.o.  female, admitted for subcortical hemorrhage of cerebral hemisphere.; post txp 6/5/2019      Patient admitted from emergency room on 6/24/2019 .  At this time, patient presents as alert and oriented x 4, pleasant, good eye contact, recall good, communicative, cooperative and asking and answering questions appropriately.  At this time, patients caregiver was alert and oriented x 4, pleasant, good eye contact, well groomed, recall good, concentration/judgement good, average intelligence, calm, communicative, cooperative and asking and answering questions appropriately.    Household/Family Systems     Patient resides with patient's spouse and daughter, at [address] [facility] [place] [address].  Support system includes Daughter and  Freeman Zamudio.  Patient does have dependents that are in need of being cared for. Patient's 16 y.o. daughter are being cared for by her  Freeman .     Patients primary caregiver is Freeman Zamudio, patients , phone number [phone].  Confirmed patients contact information is [phone] (home);   Telephone Information:   Mobile [phone]       During admission, patient's caregiver plans to stay in patient's room.  Confirmed patient and patients caregivers do have access to reliable transportation.    Cognitive Status/Learning     Patient reports reading ability as 12th grade and states patient does not have difficulty with N/A.  Patient reports patient learns best by demonstration and reading material.   Needed: No.   Highest education level: High School (9-12) or GED    Vocation/Disability   Working for Income: No  If no, reason not working: Patient Choice - Homemaker  Patient is a homemaker .    Adherence     Patient reports a high level of adherence to patients health care regimen.  Adherence counseling and education provided. Patient verbalizes  understanding.    Substance Use    Patient reports the following substance usage.    Tobacco: none, patient denies any use.  Alcohol: none, patient denies any use.  Illicit Drugs/Non-prescribed Medications: none, patient denies any use.  Patient states clear understanding of the potential impact of substance use.  Substance abstinence/cessation counseling, education and resources provided and reviewed.     Services Utilizing/ADLS    Infusion Service: Prior to admission, patient utilizing? no  Home Health: Prior to admission, patient utilizing? yes; [facility] PT[facility]OT  DME: Prior to admission, yes cane and walker  Pulmonary/Cardiac Rehab: Prior to admission, no  Dialysis:  Prior to admission, no  Transplant Specialty Pharmacy:  Prior to admission, no.    Prior to admission, patient reports patient was independent with ADLS and was not driving.  Patient reports patient is not able to care for self at this time due to compromised medical condition (as documented in medical record) and physical weakness. Patient indicates a willingness to care for self once medically cleared to do so.    Insurance/Medications    Insured by   Payor/Plan Subscr  Sex Relation Sub. Ins. ID Effective Group Num   1.  - TRI* JANAE MCDANIEL 1971 Male Self [id number]                                     [id number] [id number]      Primary Insurance (for UNOS reporting): Private Insurance-   Secondary Insurance (for UNOS reporting): None    Patient reports patient is able to obtain and afford medications at this time and at time of discharge.    Living Will/Healthcare Power of     Patient states patient does not have a LW and/or HCPA.   provided education regarding LW and HCPA and the completion of forms.    Coping/Mental Health    Patient is coping adequately with the aid of  family members and friends. Pt does endorse she wishes to be transplanted very soon.  Patient denies mental health  difficulties.     Discharge Planning    At time of discharge, patient plans to return to [address] under the care of Freeman and Catie Zamudio.  Patients  will transport patient.  Per rounds today, expected discharge date is today. Patient and patients caregiver  verbalize understanding and are involved in treatment planning and discharge process.    Additional Concerns    Patient's caretaker denies additional needs and/or concerns at this time. Patient is being followed for needs, education, resources, information, emotional support, supportive counseling, and for supportive and skilled discharge plan of care.  providing ongoing psychosocial support, education, resources and d/c planning as needed.  SW remains available.  provided resource list, patient choice, psychosocial and supportive counseling, resources, education, assistance and discharge planning with patient and caregiver involvement, ongoing SW availability and services as appropriate.  remains available. Patient's caregiver verbalizes understanding and agreement with information reviewed,  availability and how to access available resources as needed. Patient denies additional needs and/or concerns at this time. Patient verbalizes understanding and agreement with information reviewed, social work availability, and how to access available resources as needed.

## 2019-06-25 NOTE — DISCHARGE SUMMARY
"[facility]  Liver Transplant  Discharge Summary      Patient Name: Jihan Zamudio  MRN: [id number]  Admission Date: 2019  Hospital Length of Stay: 1 days  Discharge Date and Time:  2019 4:00 PM  Attending Physician: Theodore Woods MD   Discharging Provider: Laura Benitez PA-C  Primary Care Provider: Primary Doctor No  Post-Operative Day: 20     ORGAN:   LIVER  Disease Etiology: Cirrhosis: Fatty Liver (Lozoya)  Donor Type:    - Brain Death  CDC High Risk:   No  Donor CMV Status:   Donor CMV Status: Positive  Donor HBcAB:   Negative  Donor HCV Status:   Negative  Whole or Partial: Whole Liver  Biliary Anastomosis: End to End  Arterial Anatomy: Standard    HPI:   50 y/o F s/p DBD OLTx (SM induction, CMV D+R+) for LOZOYA cirrhosis on 19, post op course unremarkable. Pt presents to the ED on  for fall with head trauma that occurred yesterday. Pt reports she was walking outside of the [employer] apt yesterday when her dog got excited and jumped up, causing her to fall backwards onto concrete floor. Pt reports she lost her balance, fell back onto her buttocks, and then subsequently hit her head and upper neck on the concrete floor. She admits to mild HA after the fall. She denies dizziness, LOC, N/V, vision changes, SOB, CP, or trauma to any other body part. She reports to the ED at the advice of her PCP. VSS and neuro exam unremarkable. H/H low but stable, LFTs WNL. CT head revealed "acute parenchymal hemorrhage within the right occipital lobe near the parieto-occipital junction measuring approximately 1.8 x 1.3 x 1.5 cm with mild surrounding vasogenic edema and localized mass effect.  No midline shift.  There is a small amount of intraventricular extension of blood products in tips of the occipital horns". Neuro critical care consulted who recommend CTA head and possible MRI in AM depending on CTA results. Plan to admit to TSU and proceed with CTA. Prophylactic keppra dose " given as directed by neuro. Also plan to keep BP < 140 mmHg per neuro recs.     * No surgery found *     Hospital Course:    NCC consulted on admit with recs for additional imaging. CTA/MRI head with stable right occipital parenchymal hemorrhage. Images reviewed by Neurosurgery, remain stable. Neuological exam improved. Recommend keppra prophylaxis x 7 days (started 6/25). Patient stable and ready for discharge. Spoke with NCC who have cleared patient for d/c. Of note, ASA held 2/2 bleed on admit, to be restarted in transplant clinic when appropriate. She is to follow-up with labs on Thursday 6/27 to assure stable H/H levels, and with Neurosurgery in 1 month.     Final Active Diagnoses:    Diagnosis Date Noted POA    PRINCIPAL PROBLEM:  Subcortical hemorrhage of cerebral hemisphere [I61.0] 06/24/2019 Yes    Closed head injury [S09.90XA] 06/24/2019 Yes    At risk for opportunistic infections [Z91.89] 06/24/2019 Yes    Long-term use of immunosuppressant medication [Z79.899] 06/08/2019 Not Applicable    S/P liver transplant [Z94.4] 06/06/2019 Not Applicable    Prophylactic immunotherapy [Z29.8] 06/06/2019 Not Applicable    Adrenal cortical steroids causing adverse effect in therapeutic use [T38.0X5A] 06/06/2019 Yes    Anemia of chronic disease [D63.8] 05/29/2019 Yes    Morbid obesity [E66.01] 02/26/2018 Yes    Hypothyroidism [E03.9] 02/26/2018 Yes      Problems Resolved During this Admission:    Diagnosis Date Noted Date Resolved POA    Hyperglycemia [R73.9] 06/25/2019 06/25/2019 Yes       Consults (From admission, onward)        Status Ordering Provider     Consult to Endocrinology  Once     Provider:  (Not yet assigned)    Completed SAVITA PAVON     Inpatient consult to Neuro Critical Care  Once     Provider:  (Not yet assigned)    Acknowledged SAVITA PAVON     Inpatient consult to Vascular (Stroke) Neurology  Once     Provider:  (Not yet assigned)    Completed VINAYAK SINGH          Pending  Diagnostic Studies:     Procedure Component Value Units Date/Time    Protime-INR [[id number]] Collected:  06/24/19 2142    Order Status:  Sent Lab Status:  In process Updated:  06/24/19 2143    Specimen:  Blood         Significant Diagnostic Studies: Labs:   CMP   Recent Labs   Lab 06/24/19  0737 06/24/19 2142 06/25/19  0629    137 138   K 3.4* 3.4* 3.7    102 103   CO2 27 24 27   GLU 82 84 80   BUN 15 13 12   CREATININE 0.8 0.8 0.8   CALCIUM 8.4* 8.9 8.0*   PROT 4.9* 5.4* 4.5*   ALBUMIN 2.6* 2.7* 2.4*   BILITOT 1.4* 1.2* 1.2*   ALKPHOS 180* 203* 176*   AST 26 30 24   ALT 23 25 21   ANIONGAP 8 11 8   ESTGFRAFRICA >60.0 >60.0 >60.0   EGFRNONAA >60.0 >60.0 >60.0   , CBC   Recent Labs   Lab 06/24/19  0737 06/24/19 2142 06/25/19  0629   WBC 3.42* 3.45* 2.86*   HGB 7.9* 7.9* 6.9*   HCT 24.2* 24.1* 21.7*   * 119* 98*    and All labs within the past 24 hours have been reviewed    The patients clinical status was discussed at multidisplinary rounds, involving transplant surgery, transplant medicine, pharmacy, nursing, nutrition, and social work    Discharged Condition: good    Disposition: Home or Self Care    Follow Up:  Follow-up Information     [facility].    Specialty:  Emergency Medicine  Why:  As needed, If symptoms worsen  Contact information:  [facility] [address] [phone]               Patient Instructions:      Referral to Home health   Referral Priority: Routine Referral Type: Home Health Care   Referral Reason: Specialty Services Required   Requested Specialty: Home Health Services   Number of Visits Requested: 1     Diet Adult Regular     Notify your health care provider if you experience any of the following:  temperature >100.4     Notify your health care provider if you experience any of the following:  persistent nausea and vomiting or diarrhea     Notify your health care provider if you experience any of the following:  severe  uncontrolled pain     Notify your health care provider if you experience any of the following:  redness, tenderness, or signs of infection (pain, swelling, redness, odor or green/yellow discharge around incision site)     Notify your health care provider if you experience any of the following:  difficulty breathing or increased cough     Notify your health care provider if you experience any of the following:  severe persistent headache     Notify your health care provider if you experience any of the following:  persistent dizziness, light-headedness, or visual disturbances     Notify your health care provider if you experience any of the following:  increased confusion or weakness     Activity as tolerated     Medications:  Reconciled Home Medications:      Medication List      START taking these medications    levETIRAcetam 500 MG Tab  Commonly known as:  KEPPRA  Take 1 tablet (500 mg total) by mouth 2 (two) times daily. for 7 days        CHANGE how you take these medications    furosemide 40 MG tablet  Commonly known as:  LASIX  Take 1 tablet (40 mg total) by mouth once daily. for 10 days  What changed:  Another medication with the same name was removed. Continue taking this medication, and follow the directions you see here.     mycophenolate 250 mg Cap  Commonly known as:  CELLCEPT  Take 2 capsules (500 mg total) by mouth 2 (two) times daily.  What changed:  how much to take        CONTINUE taking these medications    famotidine 20 MG tablet  Commonly known as:  PEPCID  Take 1 tablet (20 mg total) by mouth every evening.     FREESTYLE LANCETS 28 gauge Misc  Generic drug:  lancets  Test blood glucose 3 (three) times daily.     FREESTYLE LITE METER kit  Generic drug:  blood-glucose meter  Use as instructed     FREESTYLE LITE STRIPS Strp  Generic drug:  blood sugar diagnostic  Test blood glucose 3 (three) times daily.     levothyroxine 112 MCG tablet  Commonly known as:  SYNTHROID  Take 1 tablet (112 mcg total)  "by mouth once daily.     oxyCODONE 10 mg Tab immediate release tablet  Commonly known as:  ROXICODONE  Take 1 tablet (10 mg total) by mouth every 4 (four) hours as needed.     pen needle, diabetic 32 gauge x 5/32" Ndle  Use to inject insulin into the skin 3 (three) times daily.     predniSONE 10 MG tablet  Commonly known as:  DELTASONE  Take 20 mg by mouth daily 6/12-6/18; 15 mg daily 6/19-6/25; 10 mg  daily 6/26-7/2; 5 mg  daily 7/3-7/9; stop: 7/10/19     sodium bicarbonate 650 MG tablet  Take 1 tablet (650 mg total) by mouth 2 (two) times daily.     sulfamethoxazole-trimethoprim 400-80mg 400-80 mg per tablet  Commonly known as:  BACTRIM,SEPTRA  Take 1 tablet by mouth once daily. Stop: 12/3/19     tacrolimus 1 MG Cap  Commonly known as:  PROGRAF  Take 2 capsules (2 mg total) by mouth every 12 (twelve) hours.     valGANciclovir 450 mg Tab  Commonly known as:  VALCYTE  Take 1 tablet (450 mg total) by mouth once daily. Stop: 9/4/19        STOP taking these medications    insulin aspart U-100 100 unit/mL (3 mL) Inpn pen  Commonly known as:  NovoLOG        ASK your doctor about these medications    aspirin 81 MG EC tablet  Commonly known as:  ECOTRIN  Take 81 mg by mouth once daily.          Time spent caring for patient (Greater than 1/2 spent in direct face-to-face contact): > 30 minutes    Laura Benitez PA-C  Liver Transplant  [facility]  "

## 2019-06-25 NOTE — HPI
49F w/ PMH KESSLER cirrhosis s/p liver transplant 6/5/19, GERD, hypothyroidism, IDDM, who presents s/p mechanical fall w/ possible R periventricular ICH (ICHS 0). Patient states that she was walking her dog yesterday, which jumped up on her causing her to fall backwards onto concrete and hit her head. Patient has been undergoing PT since her transplant and unable to walk until yesterday. She denies LOC/AMS/Sz, weakness/numbness/tingling, bowel/bladder incontinence, visual changes. She denies trauma elsewhere. She does not take anti-plt/coag medications.

## 2019-06-25 NOTE — PROGRESS NOTES
"Discharge Medication Note:    Hospital Course:  Admitted for head bleed after falling down due to her dog jumping on her. Patient found to have stable right occipital parenchymal hemorrhage. Neurology cleared for discharge with no intervention necessary. Patient discharged on Keppra 500 mg BID x 7 days for seizure ppx. Patient should hold aspirin until clinic follow up.    Met with Jihan Zamudio at discharge to review discharge medications and to update the blue medication card.       Jihan Zamudio   Home Medication Instructions LUISA:[id number]    Printed on:06/25/19 0651   Medication Information                      blood sugar diagnostic Strp  Test blood glucose 3 (three) times daily.             blood-glucose meter kit  Use as instructed             famotidine (PEPCID) 20 MG tablet  Take 1 tablet (20 mg total) by mouth every evening.             furosemide (LASIX) 40 MG tablet  Take 1 tablet (40 mg total) by mouth once daily. for 10 days             lancets 28 gauge Misc  Test blood glucose 3 (three) times daily.             levETIRAcetam (KEPPRA) 500 MG Tab  Take 1 tablet (500 mg total) by mouth 2 (two) times daily. for 7 days             levothyroxine (SYNTHROID) 112 MCG tablet  Take 1 tablet (112 mcg total) by mouth once daily.             mycophenolate (CELLCEPT) 250 mg Cap  Take 2 capsules (500 mg total) by mouth 2 (two) times daily.             oxyCODONE (ROXICODONE) 10 mg Tab immediate release tablet  Take 1 tablet (10 mg total) by mouth every 4 (four) hours as needed.             pen needle, diabetic 32 gauge x 5/32" Ndle  Use to inject insulin into the skin 3 (three) times daily.             predniSONE (DELTASONE) 10 MG tablet  Take 20 mg by mouth daily 6/12-6/18; 15 mg daily 6/19-6/25; 10 mg  daily 6/26-7/2; 5 mg  daily 7/3-7/9; stop: 7/10/19             sodium bicarbonate 650 MG tablet  Take 1 tablet (650 mg total) by mouth 2 (two) times daily.             sulfamethoxazole-trimethoprim " 400-80mg (BACTRIM,SEPTRA) 400-80 mg per tablet  Take 1 tablet by mouth once daily. Stop: 12/3/19             tacrolimus (PROGRAF) 1 MG Cap  Take 2 capsules (2 mg total) by mouth every 12 (twelve) hours.             valGANciclovir (VALCYTE) 450 mg Tab  Take 1 tablet (450 mg total) by mouth once daily. Stop: 9/4/19                 Pt was provided with prescriptions for the following meds:  Keppra    The following medications have been placed on HOLD and should be restarted in the outpatient setting (when appropriate): Aspirin (hold due to stable right occipital parenchymal hemorrhage) - decide in clinic follow when to restart.    Jihan Zamudio verbalized understanding and had the opportunity to ask questions.

## 2019-06-25 NOTE — HPI
"Reason for Consult: Management of Hyperglycemia     Lab Results   Component Value Date    HGBA1C 4.1 06/04/2019     Surgical Procedure and Date: liver transplant 6/619    Diabetes diagnosis year: post transplant hyperglycemia     Home Diabetes Medications:  Novolog CS (150-200/ +1) - Pt reports she never used CS at home    How often checking glucose at home? 1-3 x day   BG readings on regimen: low 100s  Hypoglycemia on the regimen?  No  Missed doses on regimen?  No    Diabetes Complications include:     None    Complicating diabetes co morbidities:   Glucocorticoid use       HPI:   Patient is a 49 y.o. female with a diagnosis of KESSLER cirrhosis s/p liver transplant 6/5/19, GERD, and hypothyroidism. She [place] [facility] [place] for fall with head trauma. Head CT revealed "acute parenchymal hemorrhage within the right occipital lobe near the parieto-occipital junction. Patient admitted to TSU. Endocrinology consulted for management of hyperglycemia.      "

## 2019-06-25 NOTE — ED NOTES
Adult Physical Assessment  LOC: Jihan Zamudio, 49 y.o. female verified via two identifiers.  The patient is awake, alert, oriented and speaking appropriately at this time.  APPEARANCE: Patient resting comfortably and appears to be in no acute distress at this time. Patient is clean and well groomed, patient's clothing is properly fastened.  SKIN:The skin is warm and dry, color consistent with ethnicity, patient has normal skin turgor and moist mucus membranes, skin intact, no breakdown or brusing noted. Abdominal scaring and bandage from transplant.  MUSCULOSKELETAL: Patient moving all extremities well, no obvious swelling or deformities noted.  RESPIRATORY: Airway is open and patent, respirations are spontaneous, patient has a normal effort and rate, no accessory muscle use noted.  CARDIAC: Patient has a normal rate and rhythm, no periphreal edema noted in any extremity, capillary refill < 3 seconds in all extremities  ABDOMEN: Soft and non tender to palpation, no abdominal distention noted. Bowel sounds present in all four quadrants.  NEUROLOGIC: Eyes open spontaneously, behavior appropriate to situation, follows commands, facial expression symmetrical, bilateral hand grasp equal and even, purposeful motor response noted, normal sensation in all extremities when touched with a finger.

## 2019-06-25 NOTE — ASSESSMENT & PLAN NOTE
"-pt with fall with head trauma on 6/23  -no LOC, dizziness, N/V, vision changes, or confusion  -VSS, neuro exam WNL  -CT head revealed "acute parenchymal hemorrhage within the right occipital lobe near the parieto-occipital junction measuring approximately 1.8 x 1.3 x 1.5 cm with mild surrounding vasogenic edema and localized mass effect.  No midline shift.  There is a small amount of intraventricular extension of blood products in tips of the occipital horns".  -Neuro critical care consulted who recommend CTA head and possible MRI in AM depending on CTA results.   -Prophylactic keppra dose given as directed by neuro  -plan to keep BP < 140 mmHg per neuro recs  -ASA and subq heparin on hold  -continue to monitor      "

## 2019-06-25 NOTE — SUBJECTIVE & OBJECTIVE
Past Medical History:   Diagnosis Date    Cirrhosis     Esophageal varices 2018    Small with no banding     Essential hypertension 2018    Fatty liver 2018    GERD (gastroesophageal reflux disease)     Hx of colonic polyps 2018    On colonoscopy     Hypertension     Hypothyroidism 2018    Kidney stones     Morbid obesity 2018    Lap band with subsequent release    KADEEM (obstructive sleep apnea) 2018    Osteoarthritis 2018    Pulmonary nodule 2018    Vitamin D deficiency 2018       Past Surgical History:   Procedure Laterality Date     SECTION      TIMES 2     CHOLECYSTECTOMY      LAPAROSCOPIC     CYSTOSCOPY W/ STONE MANIPULATION      kidney stone removal    EGD (ESOPHAGOGASTRODUODENOSCOPY) N/A 2019    Performed by Davian Hern[facility]dez MD at [facility] ENDO (2ND FLR)    LAPAROSCOPIC GASTRIC BANDING  2006    removal     LIVER BIOPSY  2017    KESSLER with bridging 17    TRANSPLANT, LIVER N/A 2019    Performed by Clemente Brown Jr., MD at [facility] OR 2ND FLR    TRANSPLANT, LIVER N/A 2019    Performed by Clemente Brown Jr., MD at [facility] OR 2ND FLR       Review of patient's allergies indicates:   Allergen Reactions    Metformin Rash    Pcn [penicillins] Other (See Comments)     Unsure of reaction, states it was as a child. Tolerated rocephin at [facility]       Family History     Problem Relation (Age of Onset)    Breast cancer Maternal Grandmother    Cancer Mother (50), Father (65)    Heart disease Mother, Father        Tobacco Use    Smoking status: Never Smoker    Smokeless tobacco: Never Used    Tobacco comment: patient denies   Substance and Sexual Activity    Alcohol use: No     Comment: patient denies    Drug use: No     Comment: patient denies    Sexual activity: Not on file         (Not in a hospital admission)    Review of Systems   Constitutional: Negative for activity change, appetite change,  chills, fatigue and fever.   Eyes: Negative for visual disturbance.   Respiratory: Negative for cough, shortness of breath and wheezing.    Cardiovascular: Negative for chest pain, palpitations and leg swelling.   Gastrointestinal: Negative for abdominal distention, abdominal pain, constipation, diarrhea, nausea and vomiting.   Genitourinary: Negative for decreased urine volume, difficulty urinating, dysuria and hematuria.   Musculoskeletal: Negative for neck pain.   Allergic/Immunologic: Positive for immunocompromised state.   Neurological: Positive for headaches. Negative for dizziness, seizures, syncope, speech difficulty, weakness, light-headedness and numbness.   Psychiatric/Behavioral: Negative for confusion.     Objective:     Vital Signs (Most Recent):  Temp: 98.8 °F (37.1 °C) (06/24/19 1620)  Pulse: 75 (06/24/19 2202)  Resp: 19 (06/24/19 2116)  BP: (!) 153/72 (06/24/19 2202)  SpO2: 99 % (06/24/19 2202) Vital Signs (24h Range):  Temp:  [98.8 °F (37.1 °C)] 98.8 °F (37.1 °C)  Pulse:  [75-80] 75  Resp:  [16-19] 19  SpO2:  [97 %-100 %] 99 %  BP: (136-168)/(63-79) 153/72     Weight: 88.5 kg (195 lb)  Body mass index is 33.47 kg/m².    No intake or output data in the 24 hours ending 06/24/19 2331    Physical Exam   Constitutional: She is oriented to person, place, and time. No distress.   HENT:   Head: Normocephalic and atraumatic.   Eyes: Pupils are equal, round, and reactive to light. Conjunctivae and EOM are normal. No scleral icterus.   Neck: Normal range of motion. Neck supple.   Cardiovascular: Normal rate, regular rhythm and normal heart sounds. Exam reveals no gallop and no friction rub.   No murmur heard.  Pulmonary/Chest: Effort normal and breath sounds normal. No respiratory distress. She has no wheezes. She has no rales.   Abdominal: Soft. Bowel sounds are normal. She exhibits no distension. There is no tenderness.   Chevron incision with staple in tact no ssi   Musculoskeletal: Normal range of motion.  She exhibits edema (+1-2 BLE).   Neurological: She is alert and oriented to person, place, and time. She is not disoriented. No cranial nerve deficit. GCS eye subscore is 4. GCS verbal subscore is 5. GCS motor subscore is 6.   Skin: She is not diaphoretic.   Psychiatric: She has a normal mood and affect. Her behavior is normal. Judgment and thought content normal.       Laboratory:  CBC:   Recent Labs   Lab 06/24/19  2142   WBC 3.45*   RBC 2.51*   HGB 7.9*   HCT 24.1*   *   MCV 96   MCH 31.5*   MCHC 32.8     CMP:   Recent Labs   Lab 06/24/19  2142   GLU 84   CALCIUM 8.9   ALBUMIN 2.7*   PROT 5.4*      K 3.4*   CO2 24      BUN 13   CREATININE 0.8   ALKPHOS 203*   ALT 25   AST 30   BILITOT 1.2*     Labs within the past 24 hours have been reviewed.    Diagnostic Results:  CT head

## 2019-06-25 NOTE — ED NOTES
Bed: [facility]  Expected date:   Expected time:   Means of arrival:   Comments:

## 2019-06-25 NOTE — PROGRESS NOTES
Pt admitted to TSU via stretcher from ED.Oriented to call light, MD notified of pt's arrival.  Pt aao x 4. VSS. Pt c/o headache. Telemetry in place. Bed in low locked pos, call light within reach.

## 2019-06-25 NOTE — SUBJECTIVE & OBJECTIVE
Past Medical History:   Diagnosis Date    Cirrhosis     Esophageal varices 2018    Small with no banding     Essential hypertension 2018    Fatty liver 2018    GERD (gastroesophageal reflux disease)     Hx of colonic polyps 2018    On colonoscopy     Hypertension     Hypothyroidism 2018    Kidney stones     Morbid obesity 2018    Lap band with subsequent release    KADEEM (obstructive sleep apnea) 2018    Osteoarthritis 2018    Pulmonary nodule 2018    Vitamin D deficiency 2018     Past Surgical History:   Procedure Laterality Date     SECTION      TIMES 2     CHOLECYSTECTOMY      LAPAROSCOPIC     CYSTOSCOPY W/ STONE MANIPULATION      kidney stone removal    EGD (ESOPHAGOGASTRODUODENOSCOPY) N/A 2019    [facility]    LAPAROSCOPIC GASTRIC BANDING      removal     LIVER BIOPSY  2017    KESSLER with bridging 17    TRANSPLANT, LIVER N/A 2019    Performed by Clemente Brown Jr., MD at [facility] OR [address] [place] [address]    TRANSPLANT, LIVER N/A 2019    [facility] Clement[facility]      No current facility-administered medications on file prior to encounter.      Current Outpatient Medications on File Prior to Encounter   Medication Sig Dispense Refill    aspirin (ECOTRIN) 81 MG EC tablet Take 81 mg by mouth once daily.      blood sugar diagnostic Strp Test blood glucose 3 (three) times daily. 100 each 0    blood-glucose meter kit Use as instructed 1 each 0    famotidine (PEPCID) 20 MG tablet Take 1 tablet (20 mg total) by mouth every evening. 30 tablet 0    furosemide (LASIX) 40 MG tablet Take 1 tablet (40 mg total) by mouth once daily. for 10 days 10 tablet 0    furosemide (LASIX) 40 MG tablet take 1 tablet by mouth daily for 10 days 10 tablet 0    insulin aspart U-100 (NOVOLOG) 100 unit/mL (3 mL) InPn pen Sliding scale:  150-200: +1  "units  201-250 +2 units  251-300: +3 units  301-350: +4 units  >350: +5 units 15 mL 0    lancets 28 gauge Misc Test blood glucose 3 (three) times daily. 100 each 0    levothyroxine (SYNTHROID) 112 MCG tablet Take 1 tablet (112 mcg total) by mouth once daily. 30 tablet 2    mycophenolate (CELLCEPT) 250 mg Cap Take 2 capsules (500 mg total) by mouth 2 (two) times daily. 120 capsule 2    oxyCODONE (ROXICODONE) 10 mg Tab immediate release tablet Take 1 tablet (10 mg total) by mouth every 4 (four) hours as needed. 40 tablet 0    pen needle, diabetic 32 gauge x 5/32" Ndle Use to inject insulin into the skin 3 (three) times daily. 100 each 0    predniSONE (DELTASONE) 10 MG tablet Take 20 mg by mouth daily 6/12-6/18; 15 mg daily 6/19-6/25; 10 mg  daily 6/26-7/2; 5 mg  daily 7/3-7/9; stop: 7/10/19 60 tablet 0    sodium bicarbonate 650 MG tablet Take 1 tablet (650 mg total) by mouth 2 (two) times daily. 60 tablet 11    sulfamethoxazole-trimethoprim 400-80mg (BACTRIM,SEPTRA) 400-80 mg per tablet Take 1 tablet by mouth once daily. Stop: 12/3/19 30 tablet 5    tacrolimus (PROGRAF) 1 MG Cap Take 2 capsules (2 mg total) by mouth every 12 (twelve) hours. 120 capsule 11    valGANciclovir (VALCYTE) 450 mg Tab Take 1 tablet (450 mg total) by mouth once daily. Stop: 9/4/19 30 tablet 2      Allergies: Metformin and Pcn [penicillins]    Family History   Problem Relation Age of Onset    Heart disease Mother     Cancer Mother 50        colon cancer    Heart disease Father     Cancer Father 65        liver cancer    Breast cancer Maternal Grandmother      Social History     Tobacco Use    Smoking status: Never Smoker    Smokeless tobacco: Never Used    Tobacco comment: patient denies   Substance Use Topics    Alcohol use: No     Comment: patient denies    Drug use: No     Comment: patient denies     Review of Systems   Constitutional: Negative for fatigue and fever.   HENT: Negative for tinnitus.    Eyes: Negative for " visual disturbance.   Respiratory: Negative for apnea and shortness of breath.    Cardiovascular: Negative for chest pain.   Gastrointestinal: Negative for nausea and vomiting.   Genitourinary: Negative for dysuria.   Musculoskeletal: Negative for neck stiffness.   Neurological: Positive for headaches.   Psychiatric/Behavioral: Negative for agitation.     Objective:     Vitals:    Temp: 98.8 °F (37.1 °C)  Pulse: 75  BP: (!) 153/72  MAP (mmHg): 107  Resp: 19  ETCO2 (mmHg): 1 mmHg  SpO2: 99 %  O2 Device (Oxygen Therapy): room air    Temp  Min: 98.8 °F (37.1 °C)  Max: 98.8 °F (37.1 °C)  Pulse  Min: 75  Max: 80  BP  Min: 136/63  Max: 168/79  MAP (mmHg)  Min: 98  Max: 110  Resp  Min: 16  Max: 19  ETCO2 (mmHg)  Min: 1 mmHg  Max: 1 mmHg  SpO2  Min: 97 %  Max: 100 %    06/24 0701 - 06/25 0700  In: 100   Out: -            Physical Exam   Constitutional: She appears well-developed.   Cardiovascular: Normal rate, regular rhythm, normal heart sounds and intact distal pulses.   Pulmonary/Chest: Effort normal and breath sounds normal.   Abdominal: Soft. Bowel sounds are normal.   Neurological:   E4 V5 M6  AAO x 4,   PERRLA, EOMI  BUSTAMANTE, follows commands  RUE 5/5  LUE 5/5  RLE 5/5  LLE 5/5  Sensation intact     Skin: Skin is warm. Capillary refill takes less than 2 seconds.   Vitals reviewed.        Today I personally reviewed pertinent medications, lines/drains/airways, imaging, cardiology results, laboratory results, microbiology results,

## 2019-06-25 NOTE — CONSULTS
"[facility]  Endocrinology  Diabetes Consult Note    Consult Requested by: Theodore Woods MD   Reason for admit: Subcortical hemorrhage of cerebral hemisphere    HISTORY OF PRESENT ILLNESS:  Reason for Consult: Management of Hyperglycemia     Lab Results   Component Value Date    HGBA1C 4.1 2019     Surgical Procedure and Date: liver transplant     Diabetes diagnosis year: post transplant hyperglycemia     Home Diabetes Medications:  Novolog CS (150-200/ +1) - Pt reports she never used CS at home    How often checking glucose at home? 1-3 x day   BG readings on regimen: low 100s  Hypoglycemia on the regimen?  No  Missed doses on regimen?  No    Diabetes Complications include:     None    Complicating diabetes co morbidities:   Glucocorticoid use       HPI:   Patient is a 49 y.o. 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 She presented to the ED at [facility] [place]  for fall with head trauma. Head CT revealed "acute parenchymal hemorrhage within the right occipital lobe near the parieto-occipital junction. Patient admitted to TSU. Endocrinology consulted for management of hyperglycemia.        Interval HPI:   Overnight events: Remains in TSU. BG below goal ranges not requiring insulin. Receiving Prednisone 15 mg daily per primary team.   Eatin%  Nausea: No  Hypoglycemia and intervention: No  Fever: No  TPN and/or TF: No  If yes, type of TF/TPN and rate: none    PMH, PSH, FH, SH reviewed     ROS:  Constitutional: Negative for weight changes.  Eyes: Negative for visual disturbance.  Respiratory: Negative for cough.   Cardiovascular: Negative for chest pain.  Gastrointestinal: Negative for nausea.  Endocrine: Negative for polyuria, polydipsia.  Musculoskeletal: Negative for back pain.  Skin: Negative for rash.  Neurological: Negative for syncope. Positive for headaches.  Psychiatric/Behavioral: Negative for depression.    Review of " Systems    Current Medications and/or Treatments Impacting Glycemic Control  Immunotherapy:    Immunosuppressants         Stop Route Frequency     mycophenolate capsule 500 mg      -- Oral 2 times daily     tacrolimus capsule 2 mg      -- Oral 2 times daily        Steroids:   Hormones (From admission, onward)    Start     Stop Route Frequency Ordered    07/03/19 0900  predniSONE tablet 5 mg      07/10 0859 Oral Daily 06/25/19 0950    06/26/19 0900  predniSONE tablet 10 mg      07/03 0859 Oral Daily 06/25/19 0950        Pressors:    Autonomic Drugs (From admission, onward)    None        Hyperglycemia/Diabetes Medications:   Antihyperglycemics (From admission, onward)    Start     Stop Route Frequency Ordered    06/24/19 2358  insulin aspart U-100 pen 0-5 Units      -- SubQ Before meals & nightly PRN 06/24/19 7328             PHYSICAL EXAMINATION:  Vitals:    06/25/19 1139   BP: 131/64   Pulse: 77   Resp: 18   Temp: 98.8 °F (37.1 °C)     Body mass index is 35.53 kg/m².    Physical Exam   Constitutional: Well developed, well nourished, obese, NAD.  ENT: External ears no masses with nose patent; normal hearing.  Neck: Supple; trachea midline.  Cardiovascular: Normal heart sounds, no LE edema. DP +2 bilaterally.  Lungs: Normal effort; lungs anterior bilaterally clear to auscultation.  Abdomen: Soft, no masses, no hernias.  MS: No clubbing or cyanosis of nails noted; unable to assess gait.  Skin: No rashes, lesions, or ulcers; no nodules.  Psychiatric: Good judgement and insight; normal mood and affect.  Neurological: Cranial nerves are grossly intact.  Foot: Nails in good condition, no amputations noted.          Labs Reviewed and Include   Recent Labs   Lab 06/25/19  0629   GLU 80   CALCIUM 8.0*   ALBUMIN 2.4*   PROT 4.5*      K 3.7   CO2 27      BUN 12   CREATININE 0.8   ALKPHOS 176*   ALT 21   AST 24   BILITOT 1.2*     Lab Results   Component Value Date    WBC 2.86 (L) 06/25/2019    HGB 6.9 (L) 06/25/2019     HCT 21.7 (L) 06/25/2019    MCV 95 06/25/2019    PLT 98 (L) 06/25/2019     No results for input(s): TSH, FREET4 in the last 168 hours.  Lab Results   Component Value Date    HGBA1C 4.1 06/04/2019       Nutritional status:   Body mass index is 35.53 kg/m².  Lab Results   Component Value Date    ALBUMIN 2.4 (L) 06/25/2019    ALBUMIN 2.7 (L) 06/24/2019    ALBUMIN 2.6 (L) 06/24/2019     Lab Results   Component Value Date    PREALBUMIN 3 (L) 04/27/2019       Estimated Creatinine Clearance: 92.5 mL/min (based on SCr of 0.8 mg/dL).    Accu-Checks  Recent Labs     06/25/19  0804 06/25/19  1149   POCTGLUCOSE 87 105        ASSESSMENT and PLAN    * Subcortical hemorrhage of cerebral hemisphere  Managed per primary team  Optimize BG control        Hyperglycemia  BG goal 140-180    Low dose correction scale prn for blood glucose excursions  Blood glucose monitoring AC/HS    ** Please call Endocrine for any BG related issues   ** Please notify Endocrine for any change and/or advance in diet**    Discharge plans-  Likely resume Novolog CS (150-200/+1).       S/P liver transplant  Managed per primary team  Optimize BG control      Adrenal cortical steroids causing adverse effect in therapeutic use  Glucocorticoids markedly increase glucoses.   May increase insulin requirements.      Long-term use of immunosuppressant medication  May increase insulin resistance.         Morbid obesity  Body mass index is 35.53 kg/m².  May increase insulin resistance.         Hypothyroidism  Recommend continue home Synthroid           Plan discussed with patient, family, and RN at bedside.     Claudia Kaufman NP  Endocrinology  [facility]

## 2019-06-25 NOTE — ASSESSMENT & PLAN NOTE
NSGY Following in event of expansion  Vascular Neurology consulted, IPH not typical of trauma  Q1hr NEuro Checks, SBP <160  Coags WNL, ASA should be held in setting of acute hemorrhage. No need to reverse given recent transplant, and normal platelet level.  CTA Head to eval cause of hemorrhage, r/o avm, eval for increase in bleed  If CTA negative, MRI with/without recommended tomorrow.   Discussed with Transplant Service

## 2019-06-25 NOTE — SUBJECTIVE & OBJECTIVE
Interval HPI:   Overnight events: Remains in TSU. BG below goal ranges not requiring insulin. Receiving Prednisone 15 mg daily per primary team.   Eatin%  Nausea: No  Hypoglycemia and intervention: No  Fever: No  TPN and/or TF: No  If yes, type of TF/TPN and rate: none    PMH, PSH, FH, SH reviewed     ROS:  Constitutional: Negative for weight changes.  Eyes: Negative for visual disturbance.  Respiratory: Negative for cough.   Cardiovascular: Negative for chest pain.  Gastrointestinal: Negative for nausea.  Endocrine: Negative for polyuria, polydipsia.  Musculoskeletal: Negative for back pain.  Skin: Negative for rash.  Neurological: Negative for syncope. Positive for headaches.  Psychiatric/Behavioral: Negative for depression.    Review of Systems    Current Medications and/or Treatments Impacting Glycemic Control  Immunotherapy:    Immunosuppressants         Stop Route Frequency     mycophenolate capsule 500 mg      -- Oral 2 times daily     tacrolimus capsule 2 mg      -- Oral 2 times daily        Steroids:   Hormones (From admission, onward)    Start     Stop Route Frequency Ordered    19 0900  predniSONE tablet 5 mg      07/10 0859 Oral Daily 19 0950    19 0900  predniSONE tablet 10 mg       0859 Oral Daily 19 0950        Pressors:    Autonomic Drugs (From admission, onward)    None        Hyperglycemia/Diabetes Medications:   Antihyperglycemics (From admission, onward)    Start     Stop Route Frequency Ordered    19 2358  insulin aspart U-100 pen 0-5 Units      -- SubQ Before meals & nightly PRN 19 2259             PHYSICAL EXAMINATION:  Vitals:    19 1139   BP: 131/64   Pulse: 77   Resp: 18   Temp: 98.8 °F (37.1 °C)     Body mass index is 35.53 kg/m².    Physical Exam   Constitutional: Well developed, well nourished, obese, NAD.  ENT: External ears no masses with nose patent; normal hearing.  Neck: Supple; trachea midline.  Cardiovascular: Normal heart  sounds, no LE edema. DP +2 bilaterally.  Lungs: Normal effort; lungs anterior bilaterally clear to auscultation.  Abdomen: Soft, no masses, no hernias.  MS: No clubbing or cyanosis of nails noted; unable to assess gait.  Skin: No rashes, lesions, or ulcers; no nodules.  Psychiatric: Good judgement and insight; normal mood and affect.  Neurological: Cranial nerves are grossly intact.  Foot: Nails in good condition, no amputations noted.

## 2019-06-25 NOTE — HPI
The patient is a 48 y/o white F with DM II, hypothyroidism and Hx of KESSLER cirrhosis s/p liver transplant 6/5/19, who is consulted to vascular neurology for evaluation of ICH.    Patient is currently sleeping and not very cooperative when woken up. Most of the history is taken from the charts. Patient was walking her dog on Sunday afternoon (6/23), when she sustained a mechanical fall with subsequent head trauma. She denies LOC or any focal neurological deficits and only complains of headache. She presented to the ED per her PCP's advice and was found to have an IPH in subcortical R occipital lobe with possible IV extention. Patient was evaluated by both transplant and NCC team and decided to get admitted to transplant team. Vascular neurology is consulted due to atypical location of hemorrhage for trauma.

## 2019-06-25 NOTE — ASSESSMENT & PLAN NOTE
-continue prograf  -continue cellcept  -continue to monitor for side effects and daily levels. Will adjust for therapeutic dose

## 2019-06-25 NOTE — H&P
[facility]  Neurocritical Care  History & Physical    Admit Date: 2019  Service Date: 2019  Length of Stay: 1    Subjective:     Chief Complaint: Intracerebral hemorrhage    History of Present Illness: 49F w/ PMH KESSLER cirrhosis s/p liver transplant 19, GERD, hypothyroidism, IDDM, who presents s/p mechanical fall w/ possible R occitoparietl hemorrhage. ICH (ICHS 0). Patient states that she was walking her dog yesterday, which jumped up on her causing her to fall backwards onto concrete and hit her head. Patient has been undergoing PT since her transplant and unable to walk until yesterday. She denies LOC/AMS/Sz, weakness/numbness/tingling, bowel/bladder incontinence, visual changes. She denies trauma elsewhere.    Discussed with NSGY, and Transplant team. Transplant Team to admit as primary given recent Liver transplant. NCC to consult and follow.     Past Medical History:   Diagnosis Date    Cirrhosis     Esophageal varices 2018    Small with no banding     Essential hypertension 2018    Fatty liver 2018    GERD (gastroesophageal reflux disease)     Hx of colonic polyps 2018    On colonoscopy     Hypertension     Hypothyroidism 2018    Kidney stones     Morbid obesity 2018    Lap band with subsequent release    KADEEM (obstructive sleep apnea) 2018    Osteoarthritis 2018    Pulmonary nodule 2018    Vitamin D deficiency 2018     Past Surgical History:   Procedure Laterality Date     SECTION      TIMES 2     CHOLECYSTECTOMY      LAPAROSCOPIC     CYSTOSCOPY W/ STONE MANIPULATION      kidney stone removal    EGD (ESOPHAGOGASTRODUODENOSCOPY) N/A 2019    Performed by Davian Hern[facility]dez MD at [facility] ENDO (2ND [facility])    LAPAROSCOPIC GASTRIC BANDING  2006    removal     LIVER BIOPSY  2017    KESSLER with bridging 17    TRANSPLANT, LIVER N/A 2019    Performed by Clemente Brown  "MD Jen at [facility] OR [address] [place] [address]    TRANSPLANT, LIVER N/A 6/4/2019    Performed by Clemente Brown Jr., MD at [facility] OR [address] [place] [address]      No current facility-administered medications on file prior to encounter.      Current Outpatient Medications on File Prior to Encounter   Medication Sig Dispense Refill    aspirin (ECOTRIN) 81 MG EC tablet Take 81 mg by mouth once daily.      blood sugar diagnostic Strp Test blood glucose 3 (three) times daily. 100 each 0    blood-glucose meter kit Use as instructed 1 each 0    famotidine (PEPCID) 20 MG tablet Take 1 tablet (20 mg total) by mouth every evening. 30 tablet 0    furosemide (LASIX) 40 MG tablet Take 1 tablet (40 mg total) by mouth once daily. for 10 days 10 tablet 0    furosemide (LASIX) 40 MG tablet take 1 tablet by mouth daily for 10 days 10 tablet 0    insulin aspart U-100 (NOVOLOG) 100 unit/mL (3 mL) InPn pen Sliding scale:  150-200: +1 units  201-250 +2 units  251-300: +3 units  301-350: +4 units  >350: +5 units 15 mL 0    lancets 28 gauge Misc Test blood glucose 3 (three) times daily. 100 each 0    levothyroxine (SYNTHROID) 112 MCG tablet Take 1 tablet (112 mcg total) by mouth once daily. 30 tablet 2    mycophenolate (CELLCEPT) 250 mg Cap Take 2 capsules (500 mg total) by mouth 2 (two) times daily. 120 capsule 2    oxyCODONE (ROXICODONE) 10 mg Tab immediate release tablet Take 1 tablet (10 mg total) by mouth every 4 (four) hours as needed. 40 tablet 0    pen needle, diabetic 32 gauge x 5/32" Ndle Use to inject insulin into the skin 3 (three) times daily. 100 each 0    predniSONE (DELTASONE) 10 MG tablet Take 20 mg by mouth daily 6/12-6/18; 15 mg daily 6/19-6/25; 10 mg  daily 6/26-7/2; 5 mg  daily 7/3-7/9; stop: 7/10/19 60 tablet 0    sodium bicarbonate 650 MG tablet Take 1 tablet (650 mg total) by mouth 2 (two) times daily. 60 tablet 11    sulfamethoxazole-trimethoprim 400-80mg (BACTRIM,SEPTRA) 400-80 mg per tablet Take 1 tablet by mouth once daily. Stop: " 12/3/19 30 tablet 5    tacrolimus (PROGRAF) 1 MG Cap Take 2 capsules (2 mg total) by mouth every 12 (twelve) hours. 120 capsule 11    valGANciclovir (VALCYTE) 450 mg Tab Take 1 tablet (450 mg total) by mouth once daily. Stop: 9/4/19 30 tablet 2      Allergies: Metformin and Pcn [penicillins]    Family History   Problem Relation Age of Onset    Heart disease Mother     Cancer Mother 50        colon cancer    Heart disease Father     Cancer Father 65        liver cancer    Breast cancer Maternal Grandmother      Social History     Tobacco Use    Smoking status: Never Smoker    Smokeless tobacco: Never Used    Tobacco comment: patient denies   Substance Use Topics    Alcohol use: No     Comment: patient denies    Drug use: No     Comment: patient denies     Review of Systems   Constitutional: Negative for fatigue and fever.   HENT: Negative for tinnitus.    Eyes: Negative for visual disturbance.   Respiratory: Negative for apnea and shortness of breath.    Cardiovascular: Negative for chest pain.   Gastrointestinal: Negative for nausea and vomiting.   Genitourinary: Negative for dysuria.   Musculoskeletal: Negative for neck stiffness.   Neurological: Positive for headaches.   Psychiatric/Behavioral: Negative for agitation.     Objective:     Vitals:    Temp: 98.8 °F (37.1 °C)  Pulse: 75  BP: (!) 153/72  MAP (mmHg): 107  Resp: 19  ETCO2 (mmHg): 1 mmHg  SpO2: 99 %  O2 Device (Oxygen Therapy): room air    Temp  Min: 98.8 °F (37.1 °C)  Max: 98.8 °F (37.1 °C)  Pulse  Min: 75  Max: 80  BP  Min: 136/63  Max: 168/79  MAP (mmHg)  Min: 98  Max: 110  Resp  Min: 16  Max: 19  ETCO2 (mmHg)  Min: 1 mmHg  Max: 1 mmHg  SpO2  Min: 97 %  Max: 100 %    06/24 0701 - 06/25 0700  In: 100   Out: -            Physical Exam   Constitutional: She appears well-developed.   Cardiovascular: Normal rate, regular rhythm, normal heart sounds and intact distal pulses.   Pulmonary/Chest: Effort normal and breath sounds normal.   Abdominal:  Soft. Bowel sounds are normal.   Neurological:   E4 V5 M6  AAO x 4,   PERRLA, EOMI  BUSTAMANTE, follows commands  RUE 5/5  LUE 5/5  RLE 5/5  LLE 5/5  Sensation intact     Skin: Skin is warm. Capillary refill takes less than 2 seconds.   Vitals reviewed.        Today I personally reviewed pertinent medications, lines/drains/airways, imaging, cardiology results, laboratory results, microbiology results,       Assessment/Plan:     Neuro  * Intracerebral hemorrhage  NSGY Following in event of expansion  Vascular Neurology consulted, IPH not typical of trauma  Q1hr NEuro Checks, SBP <160  Coags WNL, ASA should be held in setting of acute hemorrhage. No need to reverse given recent transplant, and normal platelet level.  CTA Head to eval cause of hemorrhage, r/o avm, eval for increase in bleed  If CTA negative, MRI with/without recommended tomorrow.   Discussed with Transplant Service            The patient is being Prophylaxed for:  Venous Thromboembolism with: Mechanical  Stress Ulcer with: None  Ventilator Pneumonia with: not applicable    Activity Orders          Diet diabetic [facility]; 2000 Calorie: Diabetic starting at 06/24 2342        Full Code  Level 3  I spent >35 minutes reviewing patient records, examining, and counseling the patient with greater than 50% of the time spent with direct patient care and coordination.     Mitchell Barba NP  Neurocritical Care  [facility]

## 2019-06-25 NOTE — HPI
"50 y/o F s/p DBD OLTx (SM induction, CMV D+R+) for KESSLER cirrhosis on 6/5/19, post op course unremarkable. Pt presents to the ED on 6/24 for fall with head trauma that occurred yesterday. Pt reports she was walking outside of the [employer] apt yesterday when her dog got excited and jumped up, causing her to fall backwards onto concrete floor. Pt reports she lost her balance, fell back onto her buttocks, and then subsequently hit her head and upper neck on the concrete floor. She admits to mild HA after the fall. She denies dizziness, LOC, N/V, vision changes, SOB, CP, or trauma to any other body part. She reports to the ED at the advice of her PCP. VSS and neuro exam unremarkable. H/H low but stable, LFTs WNL. CT head revealed "acute parenchymal hemorrhage within the right occipital lobe near the parieto-occipital junction measuring approximately 1.8 x 1.3 x 1.5 cm with mild surrounding vasogenic edema and localized mass effect.  No midline shift.  There is a small amount of intraventricular extension of blood products in tips of the occipital horns". Neuro critical care consulted who recommend CTA head and possible MRI in AM depending on CTA results. Plan to admit to TSU and proceed with CTA. Prophylactic keppra dose given as directed by neuro. Also plan to keep BP < 140 mmHg per neuro recs.   "

## 2019-06-25 NOTE — ASSESSMENT & PLAN NOTE
BG goal 140-180    Low dose correction scale prn for blood glucose excursions  Blood glucose monitoring AC/HS    ** Please call Endocrine for any BG related issues   ** Please notify Endocrine for any change and/or advance in diet**    Discharge plans-  Likely resume Novolog CS (150-200/+1).

## 2019-06-26 ENCOUNTER — EXTERNAL HOME HEALTH (OUTPATIENT)
Dept: HOME HEALTH SERVICES | Facility: HOSPITAL | Age: 49
End: 2019-06-26
Payer: OTHER GOVERNMENT

## 2019-06-26 ENCOUNTER — TELEPHONE (OUTPATIENT)
Dept: TRANSPLANT | Facility: CLINIC | Age: 49
End: 2019-06-26

## 2019-06-26 DIAGNOSIS — Z94.4 LIVER REPLACED BY TRANSPLANT: Primary | ICD-10-CM

## 2019-06-26 NOTE — TELEPHONE ENCOUNTER
Patient notified and instructed to have repeat labs tomorrow as per inpatient APPs report .  Also instructed patient she will return to transplant surgery clinic on Tuesday 7/2/19.  She was able to repeat instructions and voiced understanding.  Informed patient at request of Reyes Roy: she should hold aspirin until clinic follow up.  (noted neurology appt. On 7/29/19). Patient voiced understanding.

## 2019-06-27 ENCOUNTER — LAB VISIT (OUTPATIENT)
Dept: LAB | Facility: HOSPITAL | Age: 49
End: 2019-06-27
Attending: SURGERY
Payer: OTHER GOVERNMENT

## 2019-06-27 ENCOUNTER — TELEPHONE (OUTPATIENT)
Dept: TRANSPLANT | Facility: CLINIC | Age: 49
End: 2019-06-27

## 2019-06-27 DIAGNOSIS — D64.9 ANEMIA, UNSPECIFIED TYPE: Primary | ICD-10-CM

## 2019-06-27 DIAGNOSIS — Z94.4 LIVER REPLACED BY TRANSPLANT: Primary | ICD-10-CM

## 2019-06-27 DIAGNOSIS — Z94.4 LIVER REPLACED BY TRANSPLANT: ICD-10-CM

## 2019-06-27 LAB
ALBUMIN SERPL BCP-MCNC: 2.4 G/DL (ref 3.5–5.2)
ALP SERPL-CCNC: 216 U/L (ref 55–135)
ALT SERPL W/O P-5'-P-CCNC: 21 U/L (ref 10–44)
ANION GAP SERPL CALC-SCNC: 8 MMOL/L (ref 8–16)
AST SERPL-CCNC: 28 U/L (ref 10–40)
BASOPHILS # BLD AUTO: 0.02 K/UL (ref 0–0.2)
BASOPHILS NFR BLD: 0.5 % (ref 0–1.9)
BILIRUB DIRECT SERPL-MCNC: 0.9 MG/DL (ref 0.1–0.3)
BILIRUB SERPL-MCNC: 1.5 MG/DL (ref 0.1–1)
BUN SERPL-MCNC: 11 MG/DL (ref 6–20)
CALCIUM SERPL-MCNC: 8 MG/DL (ref 8.7–10.5)
CHLORIDE SERPL-SCNC: 99 MMOL/L (ref 95–110)
CO2 SERPL-SCNC: 27 MMOL/L (ref 23–29)
CREAT SERPL-MCNC: 0.8 MG/DL (ref 0.5–1.4)
DIFFERENTIAL METHOD: ABNORMAL
EOSINOPHIL # BLD AUTO: 0.2 K/UL (ref 0–0.5)
EOSINOPHIL NFR BLD: 4.9 % (ref 0–8)
ERYTHROCYTE [DISTWIDTH] IN BLOOD BY AUTOMATED COUNT: 18.8 % (ref 11.5–14.5)
EST. GFR  (AFRICAN AMERICAN): >60 ML/MIN/1.73 M^2
EST. GFR  (NON AFRICAN AMERICAN): >60 ML/MIN/1.73 M^2
GLUCOSE SERPL-MCNC: 93 MG/DL (ref 70–110)
HCT VFR BLD AUTO: 22 % (ref 37–48.5)
HGB BLD-MCNC: 7.2 G/DL (ref 12–16)
IMM GRANULOCYTES # BLD AUTO: 0.01 K/UL (ref 0–0.04)
IMM GRANULOCYTES NFR BLD AUTO: 0.3 % (ref 0–0.5)
LYMPHOCYTES # BLD AUTO: 0.4 K/UL (ref 1–4.8)
LYMPHOCYTES NFR BLD: 9.7 % (ref 18–48)
MCH RBC QN AUTO: 30.4 PG (ref 27–31)
MCHC RBC AUTO-ENTMCNC: 32.7 G/DL (ref 32–36)
MCV RBC AUTO: 93 FL (ref 82–98)
MONOCYTES # BLD AUTO: 0.2 K/UL (ref 0.3–1)
MONOCYTES NFR BLD: 4.9 % (ref 4–15)
NEUTROPHILS # BLD AUTO: 3 K/UL (ref 1.8–7.7)
NEUTROPHILS NFR BLD: 79.7 % (ref 38–73)
NRBC BLD-RTO: 0 /100 WBC
PLATELET # BLD AUTO: 126 K/UL (ref 150–350)
PMV BLD AUTO: 9.9 FL (ref 9.2–12.9)
POTASSIUM SERPL-SCNC: 4 MMOL/L (ref 3.5–5.1)
PROT SERPL-MCNC: 5 G/DL (ref 6–8.4)
RBC # BLD AUTO: 2.37 M/UL (ref 4–5.4)
SODIUM SERPL-SCNC: 134 MMOL/L (ref 136–145)
TACROLIMUS BLD-MCNC: 2.9 NG/ML (ref 5–15)
WBC # BLD AUTO: 3.71 K/UL (ref 3.9–12.7)

## 2019-06-27 PROCEDURE — 36415 COLL VENOUS BLD VENIPUNCTURE: CPT

## 2019-06-27 PROCEDURE — 85025 COMPLETE CBC W/AUTO DIFF WBC: CPT

## 2019-06-27 PROCEDURE — 80053 COMPREHEN METABOLIC PANEL: CPT

## 2019-06-27 PROCEDURE — 80197 ASSAY OF TACROLIMUS: CPT

## 2019-06-27 PROCEDURE — 82248 BILIRUBIN DIRECT: CPT

## 2019-06-27 RX ORDER — TACROLIMUS 1 MG/1
3 CAPSULE ORAL EVERY 12 HOURS
Qty: 180 CAPSULE | Refills: 11 | Status: ON HOLD | OUTPATIENT
Start: 2019-06-27 | End: 2019-07-23 | Stop reason: SDUPTHER

## 2019-06-27 NOTE — TELEPHONE ENCOUNTER
Patient notified and instructed to increase Prograf dose to 3mg twice daily; repeat labs due Monday 7/1/19.  Informed patient she will need one unit of blood and she will be notified once this is scheduled. She was able to repeat instructions and voiced understanding.

## 2019-06-27 NOTE — TELEPHONE ENCOUNTER
Labs of today reviewed by ; Plan per VORB : increase Prograf dose to 3mg bid;  Patient to be transfused 1 unit PRBC's .

## 2019-06-28 ENCOUNTER — TELEPHONE (OUTPATIENT)
Dept: TRANSPLANT | Facility: CLINIC | Age: 49
End: 2019-06-28

## 2019-06-28 DIAGNOSIS — D64.9 ANEMIA, UNSPECIFIED TYPE: Primary | ICD-10-CM

## 2019-06-28 PROCEDURE — 86920 COMPATIBILITY TEST SPIN: CPT

## 2019-06-28 RX ORDER — HYDROCODONE BITARTRATE AND ACETAMINOPHEN 500; 5 MG/1; MG/1
TABLET ORAL ONCE
Status: CANCELLED | OUTPATIENT
Start: 2019-06-28 | End: 2019-06-28

## 2019-06-28 NOTE — TELEPHONE ENCOUNTER
Spoke to [facility] Infusion Center: Blood Transfusion (1 unit PRBC) scheduled on 7/2/19 11 am.  Orders entered and patient to have Type and screen on 7/1/19.  Patient  And  notified and instructed ; she will come to the clinic before Tuesday 11 am to sign Blood Transfusion Consent form.   Patient c/o itching, wondered if from increased dose of Prograf, but reported no itching since on Prograf , instructed her if itching continues or if she develops swelling around mouth/throat to to directly to ER.  Understanding voiced.

## 2019-07-01 ENCOUNTER — TELEPHONE (OUTPATIENT)
Dept: TRANSPLANT | Facility: CLINIC | Age: 49
End: 2019-07-01

## 2019-07-01 ENCOUNTER — LAB VISIT (OUTPATIENT)
Dept: LAB | Facility: HOSPITAL | Age: 49
End: 2019-07-01
Attending: SURGERY
Payer: OTHER GOVERNMENT

## 2019-07-01 DIAGNOSIS — D64.9 ANEMIA, UNSPECIFIED TYPE: ICD-10-CM

## 2019-07-01 DIAGNOSIS — Z94.4 LIVER REPLACED BY TRANSPLANT: ICD-10-CM

## 2019-07-01 LAB
ABO + RH BLD: NORMAL
ALBUMIN SERPL BCP-MCNC: 2.3 G/DL (ref 3.5–5.2)
ALP SERPL-CCNC: 227 U/L (ref 55–135)
ALT SERPL W/O P-5'-P-CCNC: 14 U/L (ref 10–44)
ANION GAP SERPL CALC-SCNC: 9 MMOL/L (ref 8–16)
AST SERPL-CCNC: 15 U/L (ref 10–40)
BASOPHILS # BLD AUTO: 0.02 K/UL (ref 0–0.2)
BASOPHILS NFR BLD: 0.7 % (ref 0–1.9)
BILIRUB DIRECT SERPL-MCNC: 0.7 MG/DL (ref 0.1–0.3)
BILIRUB SERPL-MCNC: 1 MG/DL (ref 0.1–1)
BLD GP AB SCN CELLS X3 SERPL QL: NORMAL
BUN SERPL-MCNC: 17 MG/DL (ref 6–20)
CALCIUM SERPL-MCNC: 8.1 MG/DL (ref 8.7–10.5)
CHLORIDE SERPL-SCNC: 99 MMOL/L (ref 95–110)
CO2 SERPL-SCNC: 24 MMOL/L (ref 23–29)
CREAT SERPL-MCNC: 1.3 MG/DL (ref 0.5–1.4)
DIFFERENTIAL METHOD: ABNORMAL
EOSINOPHIL # BLD AUTO: 0.1 K/UL (ref 0–0.5)
EOSINOPHIL NFR BLD: 3 % (ref 0–8)
ERYTHROCYTE [DISTWIDTH] IN BLOOD BY AUTOMATED COUNT: 17.3 % (ref 11.5–14.5)
EST. GFR  (AFRICAN AMERICAN): 54.9 ML/MIN/1.73 M^2
EST. GFR  (NON AFRICAN AMERICAN): 47.6 ML/MIN/1.73 M^2
GLUCOSE SERPL-MCNC: 81 MG/DL (ref 70–110)
HCT VFR BLD AUTO: 21.5 % (ref 37–48.5)
HGB BLD-MCNC: 7.2 G/DL (ref 12–16)
IMM GRANULOCYTES # BLD AUTO: 0.03 K/UL (ref 0–0.04)
IMM GRANULOCYTES NFR BLD AUTO: 1 % (ref 0–0.5)
LYMPHOCYTES # BLD AUTO: 0.3 K/UL (ref 1–4.8)
LYMPHOCYTES NFR BLD: 8.6 % (ref 18–48)
MCH RBC QN AUTO: 29.9 PG (ref 27–31)
MCHC RBC AUTO-ENTMCNC: 33.5 G/DL (ref 32–36)
MCV RBC AUTO: 89 FL (ref 82–98)
MONOCYTES # BLD AUTO: 0.3 K/UL (ref 0.3–1)
MONOCYTES NFR BLD: 10.5 % (ref 4–15)
NEUTROPHILS # BLD AUTO: 2.3 K/UL (ref 1.8–7.7)
NEUTROPHILS NFR BLD: 76.2 % (ref 38–73)
NRBC BLD-RTO: 0 /100 WBC
PLATELET # BLD AUTO: 234 K/UL (ref 150–350)
PMV BLD AUTO: 9.9 FL (ref 9.2–12.9)
POTASSIUM SERPL-SCNC: 3.8 MMOL/L (ref 3.5–5.1)
PROT SERPL-MCNC: 5 G/DL (ref 6–8.4)
RBC # BLD AUTO: 2.41 M/UL (ref 4–5.4)
SODIUM SERPL-SCNC: 132 MMOL/L (ref 136–145)
TACROLIMUS BLD-MCNC: 8.7 NG/ML (ref 5–15)
WBC # BLD AUTO: 3.04 K/UL (ref 3.9–12.7)

## 2019-07-01 PROCEDURE — 36430 TRANSFUSION BLD/BLD COMPNT: CPT

## 2019-07-01 PROCEDURE — 86901 BLOOD TYPING SEROLOGIC RH(D): CPT

## 2019-07-01 PROCEDURE — 86920 COMPATIBILITY TEST SPIN: CPT

## 2019-07-01 PROCEDURE — 85025 COMPLETE CBC W/AUTO DIFF WBC: CPT

## 2019-07-01 PROCEDURE — 82248 BILIRUBIN DIRECT: CPT

## 2019-07-01 PROCEDURE — P9021 RED BLOOD CELLS UNIT: HCPCS

## 2019-07-01 PROCEDURE — 80053 COMPREHEN METABOLIC PANEL: CPT

## 2019-07-01 PROCEDURE — 80197 ASSAY OF TACROLIMUS: CPT

## 2019-07-01 PROCEDURE — 36415 COLL VENOUS BLD VENIPUNCTURE: CPT

## 2019-07-01 NOTE — TELEPHONE ENCOUNTER
----- Message from Gui Chavez MD sent at 7/1/2019  4:06 PM CDT -----  Results ok. No action needed

## 2019-07-01 NOTE — TELEPHONE ENCOUNTER
"Patient notified and instructed: labs reviewed, results ok, no medicine changes made.  Follow up as planned for 1 unit PRBC tomorrow followed by clinic appointment . Patient voiced understanding. She reported some swelling and "not urinating much, but trying to drink enough water , she said, she said she drinks "around  3 small water bottles and a protein drink daily" , encouraged her to try to reach 2 liters daily of water. Patient said she thought someone told her to stop taking Lasix but she did not remember who.  Noted on her recent hospital discharge note saying she is to take Lasix 40 mg daily, patient informed. Patient not seen by any other practitioners since hospital discharge.    "

## 2019-07-02 ENCOUNTER — TELEPHONE (OUTPATIENT)
Dept: TRANSPLANT | Facility: CLINIC | Age: 49
End: 2019-07-02

## 2019-07-02 ENCOUNTER — OFFICE VISIT (OUTPATIENT)
Dept: TRANSPLANT | Facility: CLINIC | Age: 49
End: 2019-07-02
Payer: OTHER GOVERNMENT

## 2019-07-02 ENCOUNTER — INFUSION (OUTPATIENT)
Dept: INFUSION THERAPY | Facility: HOSPITAL | Age: 49
End: 2019-07-02
Attending: INTERNAL MEDICINE
Payer: OTHER GOVERNMENT

## 2019-07-02 VITALS
HEART RATE: 81 BPM | TEMPERATURE: 100 F | DIASTOLIC BLOOD PRESSURE: 62 MMHG | SYSTOLIC BLOOD PRESSURE: 134 MMHG | RESPIRATION RATE: 18 BRPM

## 2019-07-02 VITALS
HEIGHT: 64 IN | SYSTOLIC BLOOD PRESSURE: 132 MMHG | WEIGHT: 199.06 LBS | RESPIRATION RATE: 17 BRPM | OXYGEN SATURATION: 95 % | DIASTOLIC BLOOD PRESSURE: 65 MMHG | BODY MASS INDEX: 33.98 KG/M2 | HEART RATE: 89 BPM | TEMPERATURE: 99 F

## 2019-07-02 DIAGNOSIS — Z94.4 S/P LIVER TRANSPLANT: ICD-10-CM

## 2019-07-02 DIAGNOSIS — D64.9 ANEMIA, UNSPECIFIED TYPE: ICD-10-CM

## 2019-07-02 DIAGNOSIS — Z29.89 PROPHYLACTIC IMMUNOTHERAPY: Primary | ICD-10-CM

## 2019-07-02 DIAGNOSIS — Z94.4 LIVER REPLACED BY TRANSPLANT: Primary | ICD-10-CM

## 2019-07-02 DIAGNOSIS — Z79.60 LONG-TERM USE OF IMMUNOSUPPRESSANT MEDICATION: ICD-10-CM

## 2019-07-02 PROBLEM — Z01.818 PRE-TRANSPLANT EVALUATION FOR LIVER TRANSPLANT: Status: RESOLVED | Noted: 2018-04-06 | Resolved: 2019-07-02

## 2019-07-02 PROBLEM — K76.0 FATTY LIVER: Status: RESOLVED | Noted: 2018-02-26 | Resolved: 2019-07-02

## 2019-07-02 LAB
BLD PROD TYP BPU: NORMAL
BLD PROD TYP BPU: NORMAL
BLOOD UNIT EXPIRATION DATE: NORMAL
BLOOD UNIT EXPIRATION DATE: NORMAL
BLOOD UNIT TYPE CODE: 5100
BLOOD UNIT TYPE CODE: 5100
BLOOD UNIT TYPE: NORMAL
BLOOD UNIT TYPE: NORMAL
CODING SYSTEM: NORMAL
CODING SYSTEM: NORMAL
DISPENSE STATUS: NORMAL
DISPENSE STATUS: NORMAL
TRANS ERYTHROCYTES VOL PATIENT: NORMAL ML
TRANS ERYTHROCYTES VOL PATIENT: NORMAL ML

## 2019-07-02 PROCEDURE — 99213 OFFICE O/P EST LOW 20 MIN: CPT | Mod: 24,S$PBB,, | Performed by: TRANSPLANT SURGERY

## 2019-07-02 PROCEDURE — 99999 PR PBB SHADOW E&M-EST. PATIENT-LVL IV: ICD-10-PCS | Mod: PBBFAC,,, | Performed by: TRANSPLANT SURGERY

## 2019-07-02 PROCEDURE — 36430 TRANSFUSION BLD/BLD COMPNT: CPT

## 2019-07-02 PROCEDURE — 99214 OFFICE O/P EST MOD 30 MIN: CPT | Mod: PBBFAC,25 | Performed by: TRANSPLANT SURGERY

## 2019-07-02 PROCEDURE — 25000003 PHARM REV CODE 250: Performed by: SURGERY

## 2019-07-02 PROCEDURE — 99999 PR PBB SHADOW E&M-EST. PATIENT-LVL IV: CPT | Mod: PBBFAC,,, | Performed by: TRANSPLANT SURGERY

## 2019-07-02 PROCEDURE — 99213 PR OFFICE/OUTPT VISIT, EST, LEVL III, 20-29 MIN: ICD-10-PCS | Mod: 24,S$PBB,, | Performed by: TRANSPLANT SURGERY

## 2019-07-02 RX ORDER — HYDROCODONE BITARTRATE AND ACETAMINOPHEN 500; 5 MG/1; MG/1
TABLET ORAL ONCE
Status: COMPLETED | OUTPATIENT
Start: 2019-07-02 | End: 2019-07-02

## 2019-07-02 RX ADMIN — SODIUM CHLORIDE: 0.9 INJECTION, SOLUTION INTRAVENOUS at 11:07

## 2019-07-02 NOTE — PLAN OF CARE
Problem: Adult Inpatient Plan of Care  Goal: Plan of Care Review  Outcome: Outcome(s) achieved Date Met: 07/02/19  Patient tolerated 1 unit of PRBCs without complications. VS stable. Labs reviewed. PIV removed with catheter tip intact prior to discharge. Patient c/o new back pain starting yesterday - has an appt today with provider. AVS provided. Discharged home.

## 2019-07-02 NOTE — LETTER
July 2, 2019        Janes Retana  [address] [facility]  [place] [address]  Phone: [phone]  Fax: [phone]             [facility] Trans[facility]  [address]  [facility] [address]  Phone: [phone]   Patient: Jihan Zamudio   MR Number: [id number]   YOB: 1970   Date of Visit: 7/2/2019       Dear Dr. Janes Retana    Thank you for referring Jihan Zamudio to me for evaluation. Attached you will find relevant portions of my assessment and plan of care.    If you have questions, please do not hesitate to call me. I look forward to following Jihan Zamudio along with you.    Sincerely,    Theodore Woods MD    Enclosure    If you would like to receive this communication electronically, please contact [email or web address] or [phone] to request [employer] Link access.    [employer] Link is a tool which provides read-only access to select patient information with whom you have a relationship. Its easy to use and provides real time access to review your patients record including encounter summaries, notes, results, and demographic information.    If you feel you have received this communication in error or would no longer like to receive these types of communications, please e-mail [email or web address]

## 2019-07-02 NOTE — PROGRESS NOTES
Transplant Surgery  Liver Transplant Recipient Follow-up    Original Referring Physician: Janes Retana  Current Corresponding Physician: Janes Retana    Chief Complaint: Jihan is here for follow up of her liver transplant performed 2019 for the primary diagnosis (UNOS) of Cirrhosis: Fatty Liver ([place])    ORGAN: LIVER  Whole or Partial: whole liver  Donor Type:  - brain death  PHS Increased Risk: no  Donor CMV Status: Positive  Donor HCV Status: Negative  Donor HBcAb: Negative  Donor HBV GERTRUDIS: Negative  Donor HCV GERTRUDIS: Negative    Biliary Anastomosis: end to end  Arterial Anatomy: standard  IVC reconstruction: end to end ivc  Portal vein status: patent    Subjective:     History of Present Illness: She has had the following complications since transplant: fall with intracerebral hemorrhage.  The noted complications are well controlled.    Interval History: Currently, she is doing adequately.  Current complaints include abdominal distention.  Jihan is here for management of her immunosuppression medication.    Review of Systems    Objective:     Physical Exam  Lab Results   Component Value Date    BILITOT 1.0 2019    AST 15 2019    ALT 14 2019    ALKPHOS 227 (H) 2019    CREATININE 1.3 2019    ALBUMIN 2.3 (L) 2019     Lab Results   Component Value Date    WBC 3.04 (L) 2019    HGB 7.2 (L) 2019    HCT 21.5 (L) 2019    HCT 25 (L) 2019     2019     Lab Results   Component Value Date    TACROLIMUS 8.7 2019       Assessment/Plan:          · S/P liver transplant.  · Chronic immunosuppressive medications for rejection prophylaxis at target.  Plan: no adjustment needed.  · Continue monitoring symptoms, labs and drug levels for drug-related toxicity and side effects.  · Incision: staples in place; wound clean, dry, and intact. Will remove staples  · Femoral arterial line site: no complications evident   · Per neurosurgery, no  evidence of any neurological issue after recent fall with small ICH.    Theodore Woods MD       UNOS Patient Status  Functional Status: 60% - Requires occasional assistance but is able to care for needs  Physical Capacity: No Limitations

## 2019-07-02 NOTE — PROGRESS NOTES
STAPLE REMOVAL NOTE    Staples removed from liver transplant incision, steri-strips applied with Benzoin per Dr. Woods's order.  Patient tolerated procedure well.  Skin dry and intact.  Patient instructed to shower with back to water spray and to pat dry incision and to let the steri-strips wear off on their own.  Patient instructed to report any redness, warmth, or drainage from incision to transplant coordinators.  All questions answered.

## 2019-07-03 LAB — FUNGUS SPEC CULT: NORMAL

## 2019-07-05 ENCOUNTER — TELEPHONE (OUTPATIENT)
Dept: TRANSPLANT | Facility: CLINIC | Age: 49
End: 2019-07-05

## 2019-07-05 ENCOUNTER — HOSPITAL ENCOUNTER (INPATIENT)
Facility: HOSPITAL | Age: 49
LOS: 18 days | Discharge: HOME HEALTH | DRG: 871 | End: 2019-07-23
Attending: TRANSPLANT SURGERY | Admitting: TRANSPLANT SURGERY
Payer: OTHER GOVERNMENT

## 2019-07-05 DIAGNOSIS — E87.6 HYPOKALEMIA: ICD-10-CM

## 2019-07-05 DIAGNOSIS — Z94.4 S/P LIVER TRANSPLANT: Primary | ICD-10-CM

## 2019-07-05 DIAGNOSIS — N39.0 UTI (URINARY TRACT INFECTION) DUE TO ENTEROCOCCUS: ICD-10-CM

## 2019-07-05 DIAGNOSIS — Z94.4 LIVER REPLACED BY TRANSPLANT: Primary | ICD-10-CM

## 2019-07-05 DIAGNOSIS — S09.90XD CLOSED HEAD INJURY, SUBSEQUENT ENCOUNTER: ICD-10-CM

## 2019-07-05 DIAGNOSIS — A41.9 SEPSIS: ICD-10-CM

## 2019-07-05 DIAGNOSIS — K21.9 GASTROESOPHAGEAL REFLUX DISEASE, ESOPHAGITIS PRESENCE NOT SPECIFIED: ICD-10-CM

## 2019-07-05 DIAGNOSIS — B95.2 UTI (URINARY TRACT INFECTION) DUE TO ENTEROCOCCUS: ICD-10-CM

## 2019-07-05 DIAGNOSIS — R41.0 DELIRIUM: ICD-10-CM

## 2019-07-05 DIAGNOSIS — R50.9 FEVER, UNSPECIFIED FEVER CAUSE: ICD-10-CM

## 2019-07-05 DIAGNOSIS — S06.300D TRAUMATIC INTRACRANIAL HEMORRHAGE WITHOUT LOSS OF CONSCIOUSNESS, SUBSEQUENT ENCOUNTER: ICD-10-CM

## 2019-07-05 DIAGNOSIS — R41.82 ALTERED MENTAL STATUS, UNSPECIFIED ALTERED MENTAL STATUS TYPE: ICD-10-CM

## 2019-07-05 DIAGNOSIS — Z91.89 AT RISK FOR OPPORTUNISTIC INFECTIONS: ICD-10-CM

## 2019-07-05 DIAGNOSIS — R53.1 WEAKNESS: ICD-10-CM

## 2019-07-05 DIAGNOSIS — J90 PLEURAL EFFUSION, RIGHT: ICD-10-CM

## 2019-07-05 DIAGNOSIS — Z29.89 PROPHYLACTIC IMMUNOTHERAPY: ICD-10-CM

## 2019-07-05 DIAGNOSIS — D63.8 ANEMIA OF CHRONIC DISEASE: ICD-10-CM

## 2019-07-05 DIAGNOSIS — D64.9 ACUTE ON CHRONIC ANEMIA: ICD-10-CM

## 2019-07-05 DIAGNOSIS — Z74.09 IMPAIRED FUNCTIONAL MOBILITY AND ENDURANCE: ICD-10-CM

## 2019-07-05 DIAGNOSIS — Z94.4 STATUS POST LIVER TRANSPLANT: ICD-10-CM

## 2019-07-05 DIAGNOSIS — R50.9 FEVER: ICD-10-CM

## 2019-07-05 DIAGNOSIS — Z79.60 LONG-TERM USE OF IMMUNOSUPPRESSANT MEDICATION: ICD-10-CM

## 2019-07-05 DIAGNOSIS — E44.0 MODERATE MALNUTRITION: ICD-10-CM

## 2019-07-05 DIAGNOSIS — G93.40 ENCEPHALOPATHY: ICD-10-CM

## 2019-07-05 DIAGNOSIS — L89.301 PRESSURE INJURY OF BUTTOCK, STAGE 1, UNSPECIFIED LATERALITY: ICD-10-CM

## 2019-07-05 DIAGNOSIS — R56.9 SEIZURE-LIKE ACTIVITY: ICD-10-CM

## 2019-07-05 DIAGNOSIS — R19.7 DIARRHEA, UNSPECIFIED TYPE: ICD-10-CM

## 2019-07-05 PROBLEM — N17.9 AKI (ACUTE KIDNEY INJURY): Status: RESOLVED | Noted: 2019-06-08 | Resolved: 2019-07-05

## 2019-07-05 PROBLEM — D62 ACUTE BLOOD LOSS ANEMIA: Status: RESOLVED | Noted: 2019-06-08 | Resolved: 2019-07-05

## 2019-07-05 LAB
ALBUMIN SERPL BCP-MCNC: 2.3 G/DL (ref 3.5–5.2)
ALP SERPL-CCNC: 378 U/L (ref 55–135)
ALT SERPL W/O P-5'-P-CCNC: 12 U/L (ref 10–44)
ANION GAP SERPL CALC-SCNC: 11 MMOL/L (ref 8–16)
AST SERPL-CCNC: 13 U/L (ref 10–40)
BACTERIA #/AREA URNS AUTO: NORMAL /HPF
BASOPHILS # BLD AUTO: 0.01 K/UL (ref 0–0.2)
BASOPHILS NFR BLD: 0.2 % (ref 0–1.9)
BILIRUB SERPL-MCNC: 0.8 MG/DL (ref 0.1–1)
BILIRUB UR QL STRIP: NEGATIVE
BUN SERPL-MCNC: 15 MG/DL (ref 6–20)
CALCIUM SERPL-MCNC: 8.1 MG/DL (ref 8.7–10.5)
CHLORIDE SERPL-SCNC: 97 MMOL/L (ref 95–110)
CLARITY UR REFRACT.AUTO: ABNORMAL
CO2 SERPL-SCNC: 23 MMOL/L (ref 23–29)
COLOR UR AUTO: ABNORMAL
CREAT SERPL-MCNC: 0.9 MG/DL (ref 0.5–1.4)
DIFFERENTIAL METHOD: ABNORMAL
EOSINOPHIL # BLD AUTO: 0 K/UL (ref 0–0.5)
EOSINOPHIL NFR BLD: 0 % (ref 0–8)
ERYTHROCYTE [DISTWIDTH] IN BLOOD BY AUTOMATED COUNT: 16.1 % (ref 11.5–14.5)
EST. GFR  (AFRICAN AMERICAN): >60 ML/MIN/1.73 M^2
EST. GFR  (NON AFRICAN AMERICAN): >60 ML/MIN/1.73 M^2
GLUCOSE SERPL-MCNC: 107 MG/DL (ref 70–110)
GLUCOSE UR QL STRIP: NEGATIVE
HCT VFR BLD AUTO: 24.1 % (ref 37–48.5)
HGB BLD-MCNC: 8.1 G/DL (ref 12–16)
HGB UR QL STRIP: NEGATIVE
HYALINE CASTS UR QL AUTO: 1 /LPF
IMM GRANULOCYTES # BLD AUTO: 0.07 K/UL (ref 0–0.04)
IMM GRANULOCYTES NFR BLD AUTO: 1.5 % (ref 0–0.5)
KETONES UR QL STRIP: NEGATIVE
LACTATE SERPL-SCNC: 0.6 MMOL/L (ref 0.5–2.2)
LEUKOCYTE ESTERASE UR QL STRIP: NEGATIVE
LYMPHOCYTES # BLD AUTO: 0.2 K/UL (ref 1–4.8)
LYMPHOCYTES NFR BLD: 4 % (ref 18–48)
MAGNESIUM SERPL-MCNC: 1.3 MG/DL (ref 1.6–2.6)
MCH RBC QN AUTO: 29.1 PG (ref 27–31)
MCHC RBC AUTO-ENTMCNC: 33.6 G/DL (ref 32–36)
MCV RBC AUTO: 87 FL (ref 82–98)
MICROSCOPIC COMMENT: NORMAL
MONOCYTES # BLD AUTO: 0.2 K/UL (ref 0.3–1)
MONOCYTES NFR BLD: 3.6 % (ref 4–15)
NEUTROPHILS # BLD AUTO: 4.3 K/UL (ref 1.8–7.7)
NEUTROPHILS NFR BLD: 90.7 % (ref 38–73)
NITRITE UR QL STRIP: NEGATIVE
NRBC BLD-RTO: 0 /100 WBC
PH UR STRIP: 5 [PH] (ref 5–8)
PLATELET # BLD AUTO: 240 K/UL (ref 150–350)
PMV BLD AUTO: 9.4 FL (ref 9.2–12.9)
POTASSIUM SERPL-SCNC: 3.9 MMOL/L (ref 3.5–5.1)
PROT SERPL-MCNC: 5.1 G/DL (ref 6–8.4)
PROT UR QL STRIP: ABNORMAL
RBC # BLD AUTO: 2.78 M/UL (ref 4–5.4)
RBC #/AREA URNS AUTO: 1 /HPF (ref 0–4)
SODIUM SERPL-SCNC: 131 MMOL/L (ref 136–145)
SP GR UR STRIP: 1.02 (ref 1–1.03)
SQUAMOUS #/AREA URNS AUTO: 5 /HPF
URN SPEC COLLECT METH UR: ABNORMAL
WBC # BLD AUTO: 4.73 K/UL (ref 3.9–12.7)
WBC #/AREA URNS AUTO: 3 /HPF (ref 0–5)

## 2019-07-05 PROCEDURE — 99223 1ST HOSP IP/OBS HIGH 75: CPT | Mod: ,,, | Performed by: PHYSICIAN ASSISTANT

## 2019-07-05 PROCEDURE — 83605 ASSAY OF LACTIC ACID: CPT

## 2019-07-05 PROCEDURE — 83735 ASSAY OF MAGNESIUM: CPT

## 2019-07-05 PROCEDURE — 25500020 PHARM REV CODE 255: Performed by: STUDENT IN AN ORGANIZED HEALTH CARE EDUCATION/TRAINING PROGRAM

## 2019-07-05 PROCEDURE — 81001 URINALYSIS AUTO W/SCOPE: CPT

## 2019-07-05 PROCEDURE — 63600175 PHARM REV CODE 636 W HCPCS: Performed by: PHYSICIAN ASSISTANT

## 2019-07-05 PROCEDURE — 87040 BLOOD CULTURE FOR BACTERIA: CPT

## 2019-07-05 PROCEDURE — 25000003 PHARM REV CODE 250: Performed by: PHYSICIAN ASSISTANT

## 2019-07-05 PROCEDURE — 93010 EKG 12-LEAD: ICD-10-PCS | Mod: ,,, | Performed by: INTERNAL MEDICINE

## 2019-07-05 PROCEDURE — 99285 EMERGENCY DEPT VISIT HI MDM: CPT

## 2019-07-05 PROCEDURE — 85025 COMPLETE CBC W/AUTO DIFF WBC: CPT | Mod: 91

## 2019-07-05 PROCEDURE — 99285 EMERGENCY DEPT VISIT HI MDM: CPT | Mod: ,,, | Performed by: PHYSICIAN ASSISTANT

## 2019-07-05 PROCEDURE — 20600001 HC STEP DOWN PRIVATE ROOM

## 2019-07-05 PROCEDURE — 80053 COMPREHEN METABOLIC PANEL: CPT | Mod: 91

## 2019-07-05 PROCEDURE — 36415 COLL VENOUS BLD VENIPUNCTURE: CPT

## 2019-07-05 PROCEDURE — 99285 PR EMERGENCY DEPT VISIT,LEVEL V: ICD-10-PCS | Mod: ,,, | Performed by: PHYSICIAN ASSISTANT

## 2019-07-05 PROCEDURE — 99223 PR INITIAL HOSPITAL CARE,LEVL III: ICD-10-PCS | Mod: ,,, | Performed by: PHYSICIAN ASSISTANT

## 2019-07-05 PROCEDURE — 93010 ELECTROCARDIOGRAM REPORT: CPT | Mod: ,,, | Performed by: INTERNAL MEDICINE

## 2019-07-05 PROCEDURE — 93005 ELECTROCARDIOGRAM TRACING: CPT

## 2019-07-05 RX ORDER — FAMOTIDINE 20 MG/1
20 TABLET, FILM COATED ORAL NIGHTLY
Status: DISCONTINUED | OUTPATIENT
Start: 2019-07-05 | End: 2019-07-13

## 2019-07-05 RX ORDER — MAGNESIUM SULFATE HEPTAHYDRATE 40 MG/ML
2 INJECTION, SOLUTION INTRAVENOUS
Status: COMPLETED | OUTPATIENT
Start: 2019-07-05 | End: 2019-07-06

## 2019-07-05 RX ORDER — CEFEPIME HYDROCHLORIDE 1 G/1
1 INJECTION, POWDER, FOR SOLUTION INTRAMUSCULAR; INTRAVENOUS
Status: DISCONTINUED | OUTPATIENT
Start: 2019-07-06 | End: 2019-07-08

## 2019-07-05 RX ORDER — ONDANSETRON 8 MG/1
8 TABLET, ORALLY DISINTEGRATING ORAL EVERY 8 HOURS PRN
Status: DISCONTINUED | OUTPATIENT
Start: 2019-07-05 | End: 2019-07-23 | Stop reason: HOSPADM

## 2019-07-05 RX ORDER — VANCOMYCIN 2 GRAM/500 ML IN 0.9 % SODIUM CHLORIDE INTRAVENOUS
2000
Status: COMPLETED | OUTPATIENT
Start: 2019-07-05 | End: 2019-07-05

## 2019-07-05 RX ORDER — VALGANCICLOVIR 450 MG/1
450 TABLET, FILM COATED ORAL DAILY
Status: DISCONTINUED | OUTPATIENT
Start: 2019-07-06 | End: 2019-07-14

## 2019-07-05 RX ORDER — CEFEPIME HYDROCHLORIDE 2 G/1
2 INJECTION, POWDER, FOR SOLUTION INTRAVENOUS
Status: COMPLETED | OUTPATIENT
Start: 2019-07-05 | End: 2019-07-05

## 2019-07-05 RX ORDER — FUROSEMIDE 40 MG/1
40 TABLET ORAL DAILY
Qty: 30 TABLET | Refills: 1 | Status: ON HOLD | OUTPATIENT
Start: 2019-07-05 | End: 2019-07-23

## 2019-07-05 RX ORDER — SULFAMETHOXAZOLE AND TRIMETHOPRIM 400; 80 MG/1; MG/1
1 TABLET ORAL DAILY
Status: DISCONTINUED | OUTPATIENT
Start: 2019-07-06 | End: 2019-07-23 | Stop reason: HOSPADM

## 2019-07-05 RX ORDER — TACROLIMUS 1 MG/1
3 CAPSULE ORAL 2 TIMES DAILY
Status: DISCONTINUED | OUTPATIENT
Start: 2019-07-05 | End: 2019-07-06

## 2019-07-05 RX ORDER — HYDRALAZINE HYDROCHLORIDE 20 MG/ML
10 INJECTION INTRAMUSCULAR; INTRAVENOUS EVERY 6 HOURS PRN
Status: DISCONTINUED | OUTPATIENT
Start: 2019-07-05 | End: 2019-07-23 | Stop reason: HOSPADM

## 2019-07-05 RX ORDER — ACETAMINOPHEN 325 MG/1
650 TABLET ORAL EVERY 8 HOURS PRN
Status: DISCONTINUED | OUTPATIENT
Start: 2019-07-05 | End: 2019-07-23 | Stop reason: HOSPADM

## 2019-07-05 RX ORDER — SODIUM CHLORIDE 0.9 % (FLUSH) 0.9 %
10 SYRINGE (ML) INJECTION
Status: DISCONTINUED | OUTPATIENT
Start: 2019-07-05 | End: 2019-07-23 | Stop reason: HOSPADM

## 2019-07-05 RX ORDER — LIDOCAINE HYDROCHLORIDE 10 MG/ML
1 INJECTION, SOLUTION EPIDURAL; INFILTRATION; INTRACAUDAL; PERINEURAL ONCE
Status: DISCONTINUED | OUTPATIENT
Start: 2019-07-05 | End: 2019-07-12

## 2019-07-05 RX ORDER — LEVOTHYROXINE SODIUM 112 UG/1
112 TABLET ORAL
Status: DISCONTINUED | OUTPATIENT
Start: 2019-07-06 | End: 2019-07-23 | Stop reason: HOSPADM

## 2019-07-05 RX ORDER — VANCOMYCIN HCL IN 5 % DEXTROSE 1G/250ML
1000 PLASTIC BAG, INJECTION (ML) INTRAVENOUS
Status: DISCONTINUED | OUTPATIENT
Start: 2019-07-06 | End: 2019-07-07

## 2019-07-05 RX ORDER — SODIUM BICARBONATE 650 MG/1
650 TABLET ORAL 2 TIMES DAILY
Status: DISCONTINUED | OUTPATIENT
Start: 2019-07-05 | End: 2019-07-19

## 2019-07-05 RX ORDER — PREDNISONE 5 MG/1
5 TABLET ORAL DAILY
Status: DISCONTINUED | OUTPATIENT
Start: 2019-07-06 | End: 2019-07-14

## 2019-07-05 RX ADMIN — ONDANSETRON 8 MG: 8 TABLET, ORALLY DISINTEGRATING ORAL at 07:07

## 2019-07-05 RX ADMIN — IOHEXOL: 350 INJECTION, SOLUTION INTRAVENOUS at 07:07

## 2019-07-05 RX ADMIN — FAMOTIDINE 20 MG: 20 TABLET, FILM COATED ORAL at 08:07

## 2019-07-05 RX ADMIN — VANCOMYCIN HYDROCHLORIDE 2000 MG: 100 INJECTION, POWDER, LYOPHILIZED, FOR SOLUTION INTRAVENOUS at 06:07

## 2019-07-05 RX ADMIN — SODIUM BICARBONATE 650 MG TABLET 650 MG: at 08:07

## 2019-07-05 RX ADMIN — TACROLIMUS 3 MG: 1 CAPSULE ORAL at 06:07

## 2019-07-05 RX ADMIN — CEFEPIME 2 G: 2 INJECTION, POWDER, FOR SOLUTION INTRAVENOUS at 05:07

## 2019-07-05 RX ADMIN — MAGNESIUM SULFATE IN WATER 2 G: 40 INJECTION, SOLUTION INTRAVENOUS at 11:07

## 2019-07-05 RX ADMIN — ACETAMINOPHEN 650 MG: 325 TABLET ORAL at 08:07

## 2019-07-05 RX ADMIN — MAGNESIUM SULFATE IN WATER 2 G: 40 INJECTION, SOLUTION INTRAVENOUS at 09:07

## 2019-07-05 NOTE — SUBJECTIVE & OBJECTIVE
Past Medical History:   Diagnosis Date    Cirrhosis     Esophageal varices 2018    Small with no banding     Essential hypertension 2018    Fatty liver 2018    GERD (gastroesophageal reflux disease)     Hx of colonic polyps 2018    On colonoscopy     Hypertension     Hypothyroidism 2018    Kidney stones     Morbid obesity 2018    Lap band with subsequent release    KADEEM (obstructive sleep apnea) 2018    Osteoarthritis 2018    Pulmonary nodule 2018    Vitamin D deficiency 2018       Past Surgical History:   Procedure Laterality Date     SECTION      TIMES 2     CHOLECYSTECTOMY      LAPAROSCOPIC     CYSTOSCOPY W/ STONE MANIPULATION      kidney stone removal    EGD (ESOPHAGOGASTRODUODENOSCOPY) N/A 2019    Performed by Davian Hern[facility]dez [facility] at [facility] ENDO (2ND FLR)    LAPAROSCOPIC GASTRIC BANDING  2006    removal     LIVER BIOPSY  2017    KESSLER with bridging 17    TRANSPLANT, LIVER N/A 2019    Performed by Clemente Brown Jr., MD at [facility] OR 2ND FLR    TRANSPLANT, LIVER N/A 2019    Performed by Clemente Brown Jr., MD at [facility] OR 2ND FLR       Review of patient's allergies indicates:   Allergen Reactions    Metformin Rash    Pcn [penicillins] Other (See Comments)     Unsure of reaction, states it was as a child. Tolerated rocephin at [facility]       Family History     Problem Relation (Age of Onset)    Breast cancer Maternal Grandmother    Cancer Mother (50), Father (65)    Heart disease Mother, Father        Tobacco Use    Smoking status: Never Smoker    Smokeless tobacco: Never Used    Tobacco comment: patient denies   Substance and Sexual Activity    Alcohol use: No     Comment: patient denies    Drug use: No     Comment: patient denies    Sexual activity: Not on file         (Not in a hospital admission)    Review of Systems   Constitutional: Positive for fever. Negative for activity change,  appetite change and chills.   Respiratory: Negative for cough, shortness of breath and wheezing.    Cardiovascular: Positive for leg swelling.   Gastrointestinal: Negative for abdominal distention, abdominal pain, diarrhea, nausea and vomiting.   Genitourinary: Negative for decreased urine volume, difficulty urinating and dysuria.   Allergic/Immunologic: Positive for immunocompromised state.   Neurological: Negative for weakness and light-headedness.   Psychiatric/Behavioral: Negative for agitation, confusion and decreased concentration. The patient is not nervous/anxious.      Objective:     Vital Signs (Most Recent):  Temp: (!) 102.3 °F (39.1 °C) (07/05/19 1452)  Pulse: 103 (07/05/19 1612)  Resp: 18 (07/05/19 1612)  BP: (!) 166/77 (07/05/19 1612)  SpO2: 96 % (07/05/19 1612) Vital Signs (24h Range):  Temp:  [102.3 °F (39.1 °C)] 102.3 °F (39.1 °C)  Pulse:  [103] 103  Resp:  [18] 18  SpO2:  [96 %-97 %] 96 %  BP: (166-172)/(75-77) 166/77     Weight: 78 kg (172 lb)  Body mass index is 29.52 kg/m².    No intake or output data in the 24 hours ending 07/05/19 1711    Physical Exam   Constitutional: She is oriented to person, place, and time. No distress.   Cardiovascular: Regular rhythm. Tachycardia present. Exam reveals no friction rub.   No murmur heard.  Pulmonary/Chest: Effort normal and breath sounds normal. She has no wheezes. She has no rales.   Abdominal: Soft. She exhibits no distension. There is no tenderness. There is no rebound and no guarding.   Healed chevron incision   Neurological: She is alert and oriented to person, place, and time.   Skin: She is not diaphoretic.   Psychiatric: She has a normal mood and affect. Her behavior is normal. Judgment and thought content normal.   Nursing note and vitals reviewed.      Laboratory:  CBC:   Recent Labs   Lab 07/05/19  1653   WBC 4.73   RBC 2.78*   HGB 8.1*   HCT 24.1*      MCV 87   MCH 29.1   MCHC 33.6     CMP:   Recent Labs   Lab 07/05/19  0910       CALCIUM 8.2*   ALBUMIN 2.4*   PROT 5.4*   *   K 3.6   CO2 24   CL 96   BUN 15   CREATININE 1.0   ALKPHOS 435*   ALT 14   AST 18   BILITOT 1.0     Labs within the past 24 hours have been reviewed.    Diagnostic Results:  None

## 2019-07-05 NOTE — ASSESSMENT & PLAN NOTE
Continue prograf. Hold cellcept. Monitor prograf level daily, monitor for toxic side effects, and adjust for therapeutic dose.

## 2019-07-05 NOTE — H&P
"[facility]  Liver Transplant  History & Physical    Patient Name: Jihan Zamudio  MRN: [id number]  Admission Date: 2019  Code Status: Full Code  Primary Care Provider: Primary Doctor No  Post-Operative Day: 30     ORGAN:   LIVER  Disease Etiology: Cirrhosis: Fatty Liver ([place])  Donor Type:    - Brain Death  [facility] High Risk:   No  Donor CMV Status:   Donor CMV Status: Positive  Donor HBcAB:   Negative  Donor HCV Status:   Negative  Donor HBV GERTRUDIS: Negative  Donor HCV GERTRUDIS: Negative  Whole or Partial: Whole Liver  Biliary Anastomosis: End to End  Arterial Anatomy: Standard    Subjective:     History of Present Illness:  Ms. Zamudio is a 48 y/o F s/p DBD OLTx (SM induction, CMV D+R+) for KESSLER cirrhosis on 19. Post transplant course significant for acute parenchymal hemorrhage within the right occipital lobe near the parieto-occipital junction measuring approximately 1.8 x 1.3 x 1.5 cm with mild surrounding vasogenic edema and localized mass effect after fall on . She was discharged home on 7 days of Keppra but nuerosurgery, completed on . She now presents to the ED with 1 day history of fever. Homehealth nurse checked patient's temperature and was noted to be elevated. Home health nurse also reported patient acting "off" with mental status change. Currently in the ED, tmax is 102.3. Patient feels well with no true complaints expect rhinorrhea. She denies chills, diarrhea, nausea/vomiting, abdominal pain. Denies sick contacts. Will admit for infectious work up (blood cx, UA, urine cx, CMV PCR, chest x-ray, CT A/P). Patient currently with no altered mental status. In light of recent hx of occipital hemorrhage, will obtain CT head without contrast.      Past Medical History:   Diagnosis Date    Cirrhosis     Esophageal varices 2018    Small with no banding     Essential hypertension 2018    Fatty liver 2018    GERD (gastroesophageal reflux disease)     Hx " of colonic polyps 2018    On colonoscopy     Hypertension     Hypothyroidism 2018    Kidney stones     Morbid obesity 2018    Lap band with subsequent release    KADEEM (obstructive sleep apnea) 2018    Osteoarthritis 2018    Pulmonary nodule 2018    Vitamin D deficiency 2018       Past Surgical History:   Procedure Laterality Date     SECTION      TIMES 2     CHOLECYSTECTOMY      LAPAROSCOPIC     CYSTOSCOPY W/ STONE MANIPULATION      kidney stone removal    EGD (ESOPHAGOGASTRODUODENOSCOPY) N/A 2019    Performed by [facility] at [facility] ENDO (2ND FLR)    LAPAROSCOPIC GASTRIC BANDING  2006    removal     LIVER BIOPSY  2017    KESSLER with bridging 17    TRANSPLANT, LIVER N/A 2019    Performed by Clemente Brown Jr., MD at [facility] OR 2ND FLR    TRANSPLANT, LIVER N/A 2019    Performed by Clemente Brown Jr., MD at [facility] OR 2ND FLR       Review of patient's allergies indicates:   Allergen Reactions    Metformin Rash    Pcn [penicillins] Other (See Comments)     Unsure of reaction, states it was as a child. Tolerated rocephin at [facility]       Family History     Problem Relation (Age of Onset)    Breast cancer Maternal Grandmother    Cancer Mother (50), Father (65)    Heart disease Mother, Father        Tobacco Use    Smoking status: Never Smoker    Smokeless tobacco: Never Used    Tobacco comment: patient denies   Substance and Sexual Activity    Alcohol use: No     Comment: patient denies    Drug use: No     Comment: patient denies    Sexual activity: Not on file         (Not in a hospital admission)    Review of Systems   Constitutional: Positive for fever. Negative for activity change, appetite change and chills.   Respiratory: Negative for cough, shortness of breath and wheezing.    Cardiovascular: Positive for leg swelling.   Gastrointestinal: Negative for abdominal distention, abdominal pain, diarrhea, nausea  and vomiting.   Genitourinary: Negative for decreased urine volume, difficulty urinating and dysuria.   Allergic/Immunologic: Positive for immunocompromised state.   Neurological: Negative for weakness and light-headedness.   Psychiatric/Behavioral: Negative for agitation, confusion and decreased concentration. The patient is not nervous/anxious.      Objective:     Vital Signs (Most Recent):  Temp: (!) 102.3 °F (39.1 °C) (07/05/19 1452)  Pulse: 103 (07/05/19 1612)  Resp: 18 (07/05/19 1612)  BP: (!) 166/77 (07/05/19 1612)  SpO2: 96 % (07/05/19 1612) Vital Signs (24h Range):  Temp:  [102.3 °F (39.1 °C)] 102.3 °F (39.1 °C)  Pulse:  [103] 103  Resp:  [18] 18  SpO2:  [96 %-97 %] 96 %  BP: (166-172)/(75-77) 166/77     Weight: 78 kg (172 lb)  Body mass index is 29.52 kg/m².    No intake or output data in the 24 hours ending 07/05/19 1711    Physical Exam   Constitutional: She is oriented to person, place, and time. No distress.   Cardiovascular: Regular rhythm. Tachycardia present. Exam reveals no friction rub.   No murmur heard.  Pulmonary/Chest: Effort normal and breath sounds normal. She has no wheezes. She has no rales.   Abdominal: Soft. She exhibits no distension. There is no tenderness. There is no rebound and no guarding.   Healed chevron incision   Neurological: She is alert and oriented to person, place, and time.   Skin: She is not diaphoretic.   Psychiatric: She has a normal mood and affect. Her behavior is normal. Judgment and thought content normal.   Nursing note and vitals reviewed.      Laboratory:  CBC:   Recent Labs   Lab 07/05/19  1653   WBC 4.73   RBC 2.78*   HGB 8.1*   HCT 24.1*      MCV 87   MCH 29.1   MCHC 33.6     CMP:   Recent Labs   Lab 07/05/19  0910      CALCIUM 8.2*   ALBUMIN 2.4*   PROT 5.4*   *   K 3.6   CO2 24   CL 96   BUN 15   CREATININE 1.0   ALKPHOS 435*   ALT 14   AST 18   BILITOT 1.0     Labs within the past 24 hours have been reviewed.    Diagnostic  Results:  None    Assessment/Plan:     Fever  Infectious work up ordered (blood cx, urine cx, CMV PCR, chest x-ray, CT A/P without contrast)  Start broad spectrum antibiotics  Plan for ID consult in AM  Continue to monitor.       Traumatic intracranial hemorrhage without loss of consciousness  Will get repeat CT head without contrast      Long-term use of immunosuppressant medication  Continue prograf. Hold cellcept. Monitor prograf level daily, monitor for toxic side effects, and adjust for therapeutic dose.       Prophylactic immunotherapy  See long term use of immunosuppression.       Status post liver transplant  LFTs stable on AM labs. Will get repeat CMP  Monitor.       Anemia of chronic disease  H/H stable on AM labs.  Will get repeat cbc.               Discharge Planning: Not a candidate for discharge at this time   No Patient Care Coordination Note on file.      Lisa Andrew, PA-C  Liver Transplant  [facility]

## 2019-07-05 NOTE — TELEPHONE ENCOUNTER
"Received call from [facility] nurse Magaña. Reported patient with temperature of 101.6, and is "out of it". Incision looks fine she said. Instructed her patient should go to ER if with fever and mental status changes.  She reported she will arrange.   "

## 2019-07-05 NOTE — ED PROVIDER NOTES
Encounter Date: 2019    SCRIBE #1 NOTE: I, Lisa Swartz, am scribing for, and in the presence of,  Dr. Lockwood. I have scribed the following portions of the note - the EKG reading.       History     Chief Complaint   Patient presents with    Liver Transplant     ,     Fever     This is a 49 year old female with a PMH of HTN, KESSLER s/p liver transplant 19, GERD hypothyroid who presents to the ED with fever. She is immunosuppressed with prograf and cellcept. Pt with admission for traumatic ICH on 19. Discharged with 1 week of Keppra, which she has completed. States her pain is well controlled and she is tolerating a normal diet. She was seen by home health this morning and noted to be febrile with concerns for confusion, referred to the ED for further evaluation. She endorses a nonproductive cough and one episode of diarrhea this morning. Denies chills, headache, URI symptoms, chest pain, SOB, vomiting, abdominal pain, dysuria or additional complaints. She is compliant with medications.         Review of patient's allergies indicates:   Allergen Reactions    Metformin Rash    Pcn [penicillins] Other (See Comments)     Unsure of reaction, states it was as a child. Tolerated rocephin at [facility]     Past Medical History:   Diagnosis Date    Cirrhosis     Esophageal varices 2018    Small with no banding     Essential hypertension 2018    Fatty liver 2018    GERD (gastroesophageal reflux disease)     Hx of colonic polyps 2018    On colonoscopy     Hypertension     Hypothyroidism 2018    Kidney stones     Morbid obesity 2018    Lap band with subsequent release    KADEEM (obstructive sleep apnea) 2018    Osteoarthritis 2018    Pulmonary nodule 2018    Vitamin D deficiency 2018     Past Surgical History:   Procedure Laterality Date     SECTION      TIMES 2     CHOLECYSTECTOMY      LAPAROSCOPIC     CYSTOSCOPY W/ STONE  MANIPULATION  2000    kidney stone removal    EGD (ESOPHAGOGASTRODUODENOSCOPY) N/A 4/24/2019    Performed by Davian Hern[facility]dez MD at [facility] ENDO (2ND FLR)    LAPAROSCOPIC GASTRIC BANDING  2006    removal 2009    LIVER BIOPSY  11/29/2017    KESSLER with bridging 11/29/17    TRANSPLANT, LIVER N/A 6/5/2019    Performed by Celmente Brown Jr., MD at [facility] OR 2ND FLR    TRANSPLANT, LIVER N/A 6/4/2019    Performed by Clemente Brown Jr., MD at [facility] OR 2ND FLR     Family History   Problem Relation Age of Onset    Heart disease Mother     Cancer Mother 50        colon cancer    Heart disease Father     Cancer Father 65        liver cancer    Breast cancer Maternal Grandmother      Social History     Tobacco Use    Smoking status: Never Smoker    Smokeless tobacco: Never Used    Tobacco comment: patient denies   Substance Use Topics    Alcohol use: No     Comment: patient denies    Drug use: No     Comment: patient denies     Review of Systems   Constitutional: Positive for fever. Negative for chills.   HENT: Negative for sore throat.    Respiratory: Positive for cough. Negative for shortness of breath.    Cardiovascular: Negative for chest pain.   Gastrointestinal: Positive for diarrhea and nausea. Negative for abdominal pain and vomiting.   Genitourinary: Negative for dysuria and frequency.   Musculoskeletal: Negative for back pain.   Skin: Negative for rash.   Neurological: Negative for weakness and headaches.   Hematological: Does not bruise/bleed easily.       Physical Exam     Initial Vitals [07/05/19 1452]   BP Pulse Resp Temp SpO2   (!) 172/75 103 18 (!) 102.3 °F (39.1 °C) 97 %      MAP       --         Physical Exam    Constitutional: She appears well-developed and well-nourished. No distress.   HENT:   Head: Atraumatic.   Eyes: Conjunctivae and EOM are normal. Pupils are equal, round, and reactive to light.   Cardiovascular: Regular rhythm and normal heart sounds. Tachycardia present.    3+ pitting  edema   Pulmonary/Chest: No respiratory distress. She has decreased breath sounds in the right lower field. She has no wheezes. She has no rhonchi. She has no rales.   Abdominal: Soft. Bowel sounds are normal. There is no tenderness. There is no rigidity, no rebound, no guarding and no CVA tenderness.   Surgical incision with steri strips clean, dry, intact.   Neurological: She is alert and oriented to person, place, and time.   Skin: Skin is warm and dry. No rash noted.         ED Course   Procedures  Labs Reviewed   CBC W/ AUTO DIFFERENTIAL - Abnormal; Notable for the following components:       Result Value    RBC 2.78 (*)     Hemoglobin 8.1 (*)     Hematocrit 24.1 (*)     RDW 16.1 (*)     Immature Granulocytes 1.5 (*)     Immature Grans (Abs) 0.07 (*)     Lymph # 0.2 (*)     Mono # 0.2 (*)     Gran% 90.7 (*)     Lymph% 4.0 (*)     Mono% 3.6 (*)     All other components within normal limits   COMPREHENSIVE METABOLIC PANEL - Abnormal; Notable for the following components:    Sodium 131 (*)     Calcium 8.1 (*)     Total Protein 5.1 (*)     Albumin 2.3 (*)     Alkaline Phosphatase 378 (*)     All other components within normal limits   URINALYSIS, REFLEX TO URINE CULTURE - Abnormal; Notable for the following components:    Appearance, UA Hazy (*)     Protein, UA 1+ (*)     All other components within normal limits    Narrative:     Preferred Collection Type->Urine, Clean Catch   MAGNESIUM - Abnormal; Notable for the following components:    Magnesium 1.3 (*)     All other components within normal limits   CULTURE, BLOOD   CULTURE, BLOOD   LACTIC ACID, PLASMA   URINALYSIS MICROSCOPIC    Narrative:     Preferred Collection Type->Urine, Clean Catch   LACTIC ACID, PLASMA   CMV DNA, QUANTITATIVE, PCR     EKG Readings: (Independently Interpreted)   16:00 - Sinus tachycardia at a rate of 104. Normal axis. Normal intervals. Motion artifact effecting leads I, II, and III.        Imaging Results          CT Abdomen Pelvis   Without Contrast (Final result)  Result time 07/05/19 20:31:47    Final result by Jay Martinez MD (07/05/19 20:31:47)                 Impression:      Status post liver transplant with moderate volume ascites.  Splenomegaly.    Two 3-4 mm right renal nonobstructing stones.    Moderate right pleural effusion with associated compressive atelectasis of the right lower lobe, increased since prior examination.    Stable 4 mm left lower lobe nodule.    Other findings as above.    Electronically signed by resident: Stephanie Nielsen MD  Date:    07/05/2019  Time:    19:47    Electronically signed by: Jay Martinez MD  Date:    07/05/2019  Time:    20:31             Narrative:    EXAMINATION:  CT ABDOMEN PELVIS WITHOUT CONTRAST    CLINICAL HISTORY:  s/p liver transplant admitted with fever;    TECHNIQUE:  Low dose axial images, sagittal and coronal reformations were obtained from the lung bases to the pubic symphysis, 30 mL of oral Omnipaque 350 was administered..    COMPARISON:  Ultrasound liver transplant 06/24/2019.  CT previous abdomen pelvis 04/22/2019.    FINDINGS:  Heart: Normal in size. No pericardial effusion.    Lung Bases: Moderate right pleural effusion with associated compressive atelectasis of the right lower lobe, increased since prior examination dated 04/22/2019.  The left lung is clear.  4 mm left lower lobe nodule stable since prior examination dated 04/05/2018.    Esophagus and stomach: There is a gastric band.  Small hiatal hernia.    Liver: Status post liver transplant.  Moderate volume ascites.    Gallbladder: Status post cholecystectomy.    Bile Ducts: No evidence of dilated ducts.    Pancreas: No mass or peripancreatic fat stranding.    Spleen: Mildly enlarged in size measuring 13 cm.  Splenic collaterals better evaluated on prior contrast study dated 04/22/2019.    Adrenals: Unremarkable.    Kidneys/ Ureters: There are two 3-4 mm right renal nonobstructing stones.  No evidence of hydronephrosis or  hydroureter.    Bladder: Incompletely distended with no significant abnormality.    Reproductive organs: Unremarkable.    GI Tract/Mesentery: Bowel loops opacified with enteric contrast with no significant abnormality.  No evidence of bowel obstruction or inflammation.    Peritoneal Space: Moderate volume ascites.  No free air.    Retroperitoneum: Borderline enlarged aortocaval lymph node measuring 10 mm, previously subcentimeter, nonspecific.    Abdominal wall: Diffuse body wall edema.    Vasculature: Mild calcific atherosclerosis of the abdominal aorta.  No aneurysmal dilatation.    Bones: No acute fracture.                               CT Head Without Contrast (Final result)  Result time 07/05/19 19:56:24    Final result by Akin Moore MD (07/05/19 19:56:24)                 Impression:      #1.  Interval reduction in size of the patient's known right occipital lobe parenchymal hemorrhage.  No definitive new findings on this exam.  MRI may increase sensitivity.      Electronically signed by: Akin Moore MD  Date:    07/05/2019  Time:    19:56             Narrative:    EXAMINATION:  CT HEAD WITHOUT CONTRAST    CLINICAL HISTORY:  altered mental status hx of occipital bleed;    TECHNIQUE:  Low dose axial images were obtained through the head.  Coronal and sagittal reformations were also performed. Contrast was not administered.    COMPARISON:  CT head 06/24/2019, MRI brain 06/25/2019    FINDINGS:  Interval reduction in size of the patient's known right occipital lobe hemorrhage.  Grossly stable region of encephalomalacia involving the right temporal lobe may reflect a remote infarct.    There is chronic white matter ischemic change.  The brain parenchyma demonstrates no definitive evidence of new hemorrhage, infarct, or mass.  The ventricular system is grossly stable without evidence of mass effect or new hemorrhage.    No extra-axial mass or fluid.  The osseous structures are unremarkable.  The  "extracranial soft tissues are unremarkable.                               X-Ray Chest AP Portable (Final result)  Result time 07/05/19 17:07:59    Final result by Elle Mancini MD (07/05/19 17:07:59)                 Impression:      Please see above.      Electronically signed by: Elle Mancini MD  Date:    07/05/2019  Time:    17:07             Narrative:    EXAMINATION:  XR CHEST AP PORTABLE    CLINICAL HISTORY:  Sepsis;    TECHNIQUE:  Single frontal view of the chest was performed.    COMPARISON:  06/08/2019.    FINDINGS:  Mediastinal structures are midline.  There is no indwelling device; large-bore venous catheter present on 06/08/2019 has been discontinued.    There is increased attenuation at the base of the right hemithorax.  There is "displaced dome sign" reflecting right pleural fluid is largely subpulmonic.  I suspect some of the right pleural fluid also inserts into 1 or both of the fissures of the right lung.  These findings are new since 06/08/2019.    Right pleural fluid obscures some detail of the right mid and lower lung zones.  I cannot exclude underlying disease on this single AP semi upright examination.  Such disease could include pneumonia or aspiration in light of the absence of right lung disease on 06/08/2019.  Right upper lung zone remains clear.    Left lung is clear.  I detect no left pleural fluid.    Cardiac silhouette is mildly enlarged but stable.  I detect no pulmonary edema.    I detect no pneumothorax, pneumomediastinum or pneumoperitoneum.    Right humeral head projects slightly cephalad to the glenoid fossa perhaps a function of patient positioning.  If the patient has evidence of true abnormality at the level of the right shoulder or pain, dedicated views might provide further information.                                 Medical Decision Making:   History:   Old Medical Records: I decided to obtain old medical records.  Independently Interpreted Test(s):   I have ordered " and independently interpreted EKG Reading(s) - see prior notes  Clinical Tests:   Lab Tests: Ordered and Reviewed  Radiological Study: Ordered and Reviewed  Medical Tests: Reviewed and Ordered  Other:   I have discussed this case with another health care provider.       APC / Resident Notes:   49 year old female liver transplant patient presenting with fever.  Sepsis protocol initiated in the ED - broad spectrum antibiotics given. Discussed with liver transplant team, pt will be admitted. I discussed the care of this patient with my supervising MD.        Scribe Attestation:   Scribe #1: I performed the above scribed service and the documentation accurately describes the services I performed. I attest to the accuracy of the note.    Attending Attestation:     Physician Attestation Statement for NP/PA:   I reviewed the chart but I did not personally examine the patient. The face to face encounter was performed by the NP/PA.                     Clinical Impression:       ICD-10-CM ICD-9-CM   1. Fever R50.9 780.60   2. Anemia of chronic disease D63.8 285.29   3. Fever, unspecified fever cause R50.9 780.60   4. Long-term use of immunosuppressant medication Z79.899 V58.69   5. Prophylactic immunotherapy Z29.8 V07.2   6. Status post liver transplant Z94.4 V42.7         Disposition:   Disposition: Admitted  Condition: Stable                        Lily Sykes PA-C  07/05/19 0157       Sharlene Lockwood MD  07/06/19 0253

## 2019-07-05 NOTE — HPI
"Ms. 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 Post transplant course significant for acute parenchymal hemorrhage within the right occipital lobe near the parieto-occipital junction measuring approximately 1.8 x 1.3 x 1.5 cm with mild surrounding vasogenic edema and localized mass effect after fall on 6/24. She was discharged home on 7 days of Keppra but nuerosurgery, completed on 7/2. She now presents to the ED with 1 day history of fever. Homehealth nurse checked patient's temperature and was noted to be elevated. Home health nurse also reported patient acting "off" with mental status change. Currently in the ED, tmax is 102.3. Patient feels well with no true complaints expect rhinorrhea. She denies chills, diarrhea, nausea/vomiting, abdominal pain. Denies sick contacts. Will admit for infectious work up (blood cx, UA, urine cx, CMV PCR, chest x-ray, CT A/P). Patient currently with no altered mental status. In light of recent hx of occipital hemorrhage, will obtain CT head without contrast.    "

## 2019-07-05 NOTE — ASSESSMENT & PLAN NOTE
Infectious work up ordered (blood cx, urine cx, CMV PCR, chest x-ray, CT A/P without contrast)  Start broad spectrum antibiotics  Plan for ID consult in AM  Continue to monitor.

## 2019-07-05 NOTE — TELEPHONE ENCOUNTER
Patient / notified and instructed: labs reviewed by ; results stable. No medicine changes made. Repeat labs due on Monday 7/8/19. He was able to repeat instructions and voiced understanding.

## 2019-07-05 NOTE — TELEPHONE ENCOUNTER
----- Message from Gui Chavez MD sent at 7/5/2019  1:50 PM CDT -----  Results ok. No action needed

## 2019-07-06 LAB
ALBUMIN SERPL BCP-MCNC: 2 G/DL (ref 3.5–5.2)
ALP SERPL-CCNC: 281 U/L (ref 55–135)
ALT SERPL W/O P-5'-P-CCNC: 9 U/L (ref 10–44)
ANION GAP SERPL CALC-SCNC: 8 MMOL/L (ref 8–16)
AST SERPL-CCNC: 11 U/L (ref 10–40)
BASOPHILS # BLD AUTO: 0.03 K/UL (ref 0–0.2)
BASOPHILS NFR BLD: 0.8 % (ref 0–1.9)
BILIRUB SERPL-MCNC: 0.6 MG/DL (ref 0.1–1)
BUN SERPL-MCNC: 17 MG/DL (ref 6–20)
CALCIUM SERPL-MCNC: 7.9 MG/DL (ref 8.7–10.5)
CHLORIDE SERPL-SCNC: 95 MMOL/L (ref 95–110)
CO2 SERPL-SCNC: 23 MMOL/L (ref 23–29)
CREAT SERPL-MCNC: 0.8 MG/DL (ref 0.5–1.4)
DIFFERENTIAL METHOD: ABNORMAL
EOSINOPHIL # BLD AUTO: 0 K/UL (ref 0–0.5)
EOSINOPHIL NFR BLD: 0.8 % (ref 0–8)
ERYTHROCYTE [DISTWIDTH] IN BLOOD BY AUTOMATED COUNT: 15.8 % (ref 11.5–14.5)
EST. GFR  (AFRICAN AMERICAN): >60 ML/MIN/1.73 M^2
EST. GFR  (NON AFRICAN AMERICAN): >60 ML/MIN/1.73 M^2
GLUCOSE SERPL-MCNC: 101 MG/DL (ref 70–110)
HCT VFR BLD AUTO: 22.1 % (ref 37–48.5)
HGB BLD-MCNC: 7.4 G/DL (ref 12–16)
IMM GRANULOCYTES # BLD AUTO: 0.05 K/UL (ref 0–0.04)
IMM GRANULOCYTES NFR BLD AUTO: 1.3 % (ref 0–0.5)
LACTATE SERPL-SCNC: 0.5 MMOL/L (ref 0.5–2.2)
LYMPHOCYTES # BLD AUTO: 0.3 K/UL (ref 1–4.8)
LYMPHOCYTES NFR BLD: 6.3 % (ref 18–48)
MAGNESIUM SERPL-MCNC: 2.4 MG/DL (ref 1.6–2.6)
MCH RBC QN AUTO: 29.6 PG (ref 27–31)
MCHC RBC AUTO-ENTMCNC: 33.5 G/DL (ref 32–36)
MCV RBC AUTO: 88 FL (ref 82–98)
MONOCYTES # BLD AUTO: 0.2 K/UL (ref 0.3–1)
MONOCYTES NFR BLD: 6 % (ref 4–15)
NEUTROPHILS # BLD AUTO: 3.4 K/UL (ref 1.8–7.7)
NEUTROPHILS NFR BLD: 84.8 % (ref 38–73)
NRBC BLD-RTO: 0 /100 WBC
PLATELET # BLD AUTO: 222 K/UL (ref 150–350)
PMV BLD AUTO: 9.6 FL (ref 9.2–12.9)
POTASSIUM SERPL-SCNC: 3.4 MMOL/L (ref 3.5–5.1)
PROT SERPL-MCNC: 4.6 G/DL (ref 6–8.4)
RBC # BLD AUTO: 2.5 M/UL (ref 4–5.4)
SODIUM SERPL-SCNC: 126 MMOL/L (ref 136–145)
TACROLIMUS BLD-MCNC: 8 NG/ML (ref 5–15)
WBC # BLD AUTO: 3.99 K/UL (ref 3.9–12.7)

## 2019-07-06 PROCEDURE — 63600175 PHARM REV CODE 636 W HCPCS: Performed by: PHYSICIAN ASSISTANT

## 2019-07-06 PROCEDURE — 80197 ASSAY OF TACROLIMUS: CPT

## 2019-07-06 PROCEDURE — 20600001 HC STEP DOWN PRIVATE ROOM

## 2019-07-06 PROCEDURE — 25000003 PHARM REV CODE 250: Performed by: PHYSICIAN ASSISTANT

## 2019-07-06 PROCEDURE — 83735 ASSAY OF MAGNESIUM: CPT

## 2019-07-06 PROCEDURE — 85025 COMPLETE CBC W/AUTO DIFF WBC: CPT

## 2019-07-06 PROCEDURE — 36415 COLL VENOUS BLD VENIPUNCTURE: CPT

## 2019-07-06 PROCEDURE — 63600175 PHARM REV CODE 636 W HCPCS: Performed by: NURSE PRACTITIONER

## 2019-07-06 PROCEDURE — 99233 PR SUBSEQUENT HOSPITAL CARE,LEVL III: ICD-10-PCS | Mod: 24,,, | Performed by: NURSE PRACTITIONER

## 2019-07-06 PROCEDURE — 25000003 PHARM REV CODE 250: Performed by: NURSE PRACTITIONER

## 2019-07-06 PROCEDURE — 99900035 HC TECH TIME PER 15 MIN (STAT)

## 2019-07-06 PROCEDURE — 63600175 PHARM REV CODE 636 W HCPCS: Performed by: TRANSPLANT SURGERY

## 2019-07-06 PROCEDURE — 94761 N-INVAS EAR/PLS OXIMETRY MLT: CPT

## 2019-07-06 PROCEDURE — 94799 UNLISTED PULMONARY SVC/PX: CPT

## 2019-07-06 PROCEDURE — 80053 COMPREHEN METABOLIC PANEL: CPT

## 2019-07-06 PROCEDURE — 99233 SBSQ HOSP IP/OBS HIGH 50: CPT | Mod: 24,,, | Performed by: NURSE PRACTITIONER

## 2019-07-06 RX ORDER — POTASSIUM CHLORIDE 20 MEQ/1
40 TABLET, EXTENDED RELEASE ORAL ONCE
Status: COMPLETED | OUTPATIENT
Start: 2019-07-06 | End: 2019-07-06

## 2019-07-06 RX ORDER — TACROLIMUS 1 MG/1
2 CAPSULE ORAL 2 TIMES DAILY
Status: DISCONTINUED | OUTPATIENT
Start: 2019-07-06 | End: 2019-07-09

## 2019-07-06 RX ORDER — FUROSEMIDE 10 MG/ML
40 INJECTION INTRAMUSCULAR; INTRAVENOUS DAILY
Status: DISCONTINUED | OUTPATIENT
Start: 2019-07-06 | End: 2019-07-12

## 2019-07-06 RX ADMIN — SODIUM BICARBONATE 650 MG TABLET 650 MG: at 09:07

## 2019-07-06 RX ADMIN — TACROLIMUS 2 MG: 1 CAPSULE ORAL at 05:07

## 2019-07-06 RX ADMIN — SULFAMETHOXAZOLE AND TRIMETHOPRIM 1 TABLET: 400; 80 TABLET ORAL at 09:07

## 2019-07-06 RX ADMIN — FAMOTIDINE 20 MG: 20 TABLET, FILM COATED ORAL at 07:07

## 2019-07-06 RX ADMIN — LEVOTHYROXINE SODIUM 112 MCG: 50 TABLET ORAL at 05:07

## 2019-07-06 RX ADMIN — CEFEPIME 1 G: 1 INJECTION, POWDER, FOR SOLUTION INTRAMUSCULAR; INTRAVENOUS at 12:07

## 2019-07-06 RX ADMIN — VALGANCICLOVIR 450 MG: 450 TABLET, FILM COATED ORAL at 09:07

## 2019-07-06 RX ADMIN — CEFEPIME 1 G: 1 INJECTION, POWDER, FOR SOLUTION INTRAMUSCULAR; INTRAVENOUS at 09:07

## 2019-07-06 RX ADMIN — PREDNISONE 5 MG: 5 TABLET ORAL at 09:07

## 2019-07-06 RX ADMIN — CEFEPIME 1 G: 1 INJECTION, POWDER, FOR SOLUTION INTRAMUSCULAR; INTRAVENOUS at 05:07

## 2019-07-06 RX ADMIN — POTASSIUM CHLORIDE 40 MEQ: 1500 TABLET, EXTENDED RELEASE ORAL at 10:07

## 2019-07-06 RX ADMIN — VANCOMYCIN HYDROCHLORIDE 1000 MG: 1 INJECTION, POWDER, LYOPHILIZED, FOR SOLUTION INTRAVENOUS at 05:07

## 2019-07-06 RX ADMIN — TACROLIMUS 3 MG: 1 CAPSULE ORAL at 09:07

## 2019-07-06 RX ADMIN — VANCOMYCIN HYDROCHLORIDE 1000 MG: 1 INJECTION, POWDER, LYOPHILIZED, FOR SOLUTION INTRAVENOUS at 06:07

## 2019-07-06 RX ADMIN — FUROSEMIDE 40 MG: 10 INJECTION, SOLUTION INTRAMUSCULAR; INTRAVENOUS at 01:07

## 2019-07-06 RX ADMIN — SODIUM BICARBONATE 650 MG TABLET 650 MG: at 07:07

## 2019-07-06 RX ADMIN — ACETAMINOPHEN 650 MG: 325 TABLET ORAL at 04:07

## 2019-07-06 NOTE — ASSESSMENT & PLAN NOTE
Infectious work up ordered (blood cx, urine cx, CMV PCR, chest x-ray, CT A/P without contrast)  Start broad spectrum antibiotics started on admit.   Blood cxs NGTD, UA negative.   Abdominal and head CTs reviewed and no source of fevers noted.   CMV PCR pending.   Consider ID consult for further evaluation.   Continue to monitor.

## 2019-07-06 NOTE — SUBJECTIVE & OBJECTIVE
Past Medical History:   Diagnosis Date    Cirrhosis     Esophageal varices 2018    Small with no banding     Essential hypertension 2018    Fatty liver 2018    GERD (gastroesophageal reflux disease)     Hx of colonic polyps 2018    On colonoscopy     Hypertension     Hypothyroidism 2018    Kidney stones     Morbid obesity 2018    Lap band with subsequent release    KADEEM (obstructive sleep apnea) 2018    Osteoarthritis 2018    Pulmonary nodule 2018    Vitamin D deficiency 2018       Past Surgical History:   Procedure Laterality Date     SECTION      TIMES 2     CHOLECYSTECTOMY      LAPAROSCOPIC     CYSTOSCOPY W/ STONE MANIPULATION      kidney stone removal    EGD (ESOPHAGOGASTRODUODENOSCOPY) N/A 2019    Performed by [facility] at [facility] END[facility] (2ND FLR)    LAPAROSCOPIC GASTRIC BANDING      removal     LIVER BIOPSY  2017    KESSLER with bridging 17    TRANSPLANT, LIVER N/A 2019    Performed by Clemente Brown Jr., MD at [facility] OR 2ND FLR    TRANSPLANT, LIVER N/A 2019    Performed by Clemente Brown Jr., MD at [facility] OR 2ND FLR       Review of patient's allergies indicates:   Allergen Reactions    Metformin Rash    Pcn [penicillins] Other (See Comments)     Unsure of reaction, states it was as a child. Tolerated rocephin at [facility]       Family History     Problem Relation (Age of Onset)    Breast cancer Maternal Grandmother    Cancer Mother (50), Father (65)    Heart disease Mother, Father        Tobacco Use    Smoking status: Never Smoker    Smokeless tobacco: Never Used    Tobacco comment: patient denies   Substance and Sexual Activity    Alcohol use: No     Comment: patient denies    Drug use: No     Comment: patient denies    Sexual activity: Not on file       PTA Medications   Medication Sig    famotidine (PEPCID) 20 MG tablet Take 1 tablet (20 mg total) by mouth every evening.  "   furosemide (LASIX) 40 MG tablet Take 1 tablet (40 mg total) by mouth once daily. for 10 days    lancets 28 gauge Misc Test blood glucose 3 (three) times daily.    levothyroxine (SYNTHROID) 112 MCG tablet Take 1 tablet (112 mcg total) by mouth once daily.    mycophenolate (CELLCEPT) 250 mg Cap Take 2 capsules (500 mg total) by mouth 2 (two) times daily. (Patient taking differently: Take 1,000 mg by mouth 2 (two) times daily. )    pen needle, diabetic 32 gauge x 5/32" Ndle Use to inject insulin into the skin 3 (three) times daily.    predniSONE (DELTASONE) 10 MG tablet Take 20 mg by mouth daily 6/12-6/18; 15 mg daily 6/19-6/25; 10 mg  daily 6/26-7/2; 5 mg  daily 7/3-7/9; stop: 7/10/19    sodium bicarbonate 650 MG tablet Take 1 tablet (650 mg total) by mouth 2 (two) times daily.    sulfamethoxazole-trimethoprim 400-80mg (BACTRIM,SEPTRA) 400-80 mg per tablet Take 1 tablet by mouth once daily. Stop: 12/3/19    tacrolimus (PROGRAF) 1 MG Cap Take 3 capsules (3 mg total) by mouth every 12 (twelve) hours.    valGANciclovir (VALCYTE) 450 mg Tab Take 1 tablet (450 mg total) by mouth once daily. Stop: 9/4/19    blood sugar diagnostic Strp Test blood glucose 3 (three) times daily.    blood-glucose meter kit Use as instructed    levETIRAcetam (KEPPRA) 500 MG Tab Take 1 tablet (500 mg total) by mouth 2 (two) times daily. for 7 days    oxyCODONE (ROXICODONE) 10 mg Tab immediate release tablet Take 1 tablet (10 mg total) by mouth every 4 (four) hours as needed.       Review of Systems   Constitutional: Positive for fever. Negative for activity change, appetite change and chills.   Respiratory: Negative for cough, shortness of breath and wheezing.    Cardiovascular: Positive for leg swelling.   Gastrointestinal: Negative for abdominal distention, abdominal pain, diarrhea, nausea and vomiting.   Genitourinary: Negative for decreased urine volume, difficulty urinating and dysuria.   Allergic/Immunologic: Positive for " immunocompromised state.   Neurological: Negative for weakness and light-headedness.   Psychiatric/Behavioral: Negative for agitation, confusion and decreased concentration. The patient is nervous/anxious.      Objective:     Vital Signs (Most Recent):  Temp: 99 °F (37.2 °C) (07/06/19 0753)  Pulse: 85 (07/06/19 0753)  Resp: (!) 24 (07/06/19 0753)  BP: (!) 151/85 (07/06/19 0808)  SpO2: 95 % (07/06/19 0753) Vital Signs (24h Range):  Temp:  [98.9 °F (37.2 °C)-102.3 °F (39.1 °C)] 99 °F (37.2 °C)  Pulse:  [] 85  Resp:  [18-24] 24  SpO2:  [93 %-97 %] 95 %  BP: (143-172)/(67-91) 151/85     Weight: 78 kg (172 lb)  Body mass index is 29.52 kg/m².      Intake/Output Summary (Last 24 hours) at 7/6/2019 0927  Last data filed at 7/6/2019 0400  Gross per 24 hour   Intake 200 ml   Output 0 ml   Net 200 ml       Physical Exam   Constitutional: She is oriented to person, place, and time. She appears well-developed. No distress.   Cardiovascular: Regular rhythm. Tachycardia present. Exam reveals no friction rub.   No murmur heard.  Pulmonary/Chest: Effort normal. She has decreased breath sounds in the right middle field, the right lower field and the left lower field. She has no wheezes. She has no rales.   Abdominal: Soft. She exhibits no distension. There is no tenderness. There is no rebound and no guarding.   Healed chevron incision   Neurological: She is alert and oriented to person, place, and time.   Skin: She is not diaphoretic.   Psychiatric: She has a normal mood and affect. Her behavior is normal. Judgment and thought content normal.   Nursing note and vitals reviewed.      Laboratory:  CBC:   Recent Labs   Lab 07/06/19 0712   WBC 3.99   RBC 2.50*   HGB 7.4*   HCT 22.1*      MCV 88   MCH 29.6   MCHC 33.5     CMP:   Recent Labs   Lab 07/06/19 0712      CALCIUM 7.9*   ALBUMIN 2.0*   PROT 4.6*   *   K 3.4*   CO2 23   CL 95   BUN 17   CREATININE 0.8   ALKPHOS 281*   ALT 9*   AST 11   BILITOT 0.6      Labs within the past 24 hours have been reviewed.    Diagnostic Results:  None

## 2019-07-06 NOTE — ASSESSMENT & PLAN NOTE
Head CT reviewed and noted with interval reduction in size of the patient's known right occipital lobe parenchymal hemorrhage

## 2019-07-06 NOTE — HOSPITAL COURSE
50 y/o F s/p OLTx for KESSLER cirrhosis on 6/5 who was admitted on 7/5 for fevers. Pt febrile on admission, started on bx abx. ID consulted. Blood cultures NGTD, UA negative. CMV PCR drawn on admit pending. Pt having loose stools, C diff EIA negative, CMV undetected. CT head showed reduction in size of hemorrhage. Abdominal CT reviewed and with moderate right pleural effusion. CXR with increased pleural effusion. Thora and para done 7/9; amount of fluid removed not recorded and pleural fluid labs were not completed as ordered. Abdominal gram stain reveals no WBC and no organisms; cell count not completed as ordered, cultures pending. TTE done 7/9 WNL, no vegetations. Pt tachy and tachypneic on 7/9; 1 L bolus given and pt responded well. 1 u pRBC transfused 7/9 for low H/H. Pt continued to have diarrhea, additional stool studies pending. Mentation continued to wax and wane, MRI w/ and w/o contrast done on 7/11 which revealed stable findings compared to prior. Pt transitioned to IV abx to PO cefpodoxime and flagyl on 7/11 per ID recs. Urine culture from 7/11 resulted as positive for VRE, thus cefpodoxime and flagyl stopped and pt started on zyvox on 7/12. Pt with worsening MS on 7/13 and claimed she had an unwitnessed seizure. STAT CT head without acute change. Keppra 500 mg bid started. Prograf and lovenox dc'ed. Stopped zyvox due to potential to lower seizure threshold and started IV daptomycin. IV cefepime restarted. Zyprexa qhs started for agitation and anxiety. Pt continued to have fevers despite therapeutic IV abx for VRE UTI. Viral and fungal labs pending per ID recs. 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  cc q8h started. Pt with persistent AMS, thus psych consulted who believe she is suffering from multifactorial delirium. PO thiamine and delirium precautions started. Unable to perform LP as unsafe due to ICH. CT a/p repeated on 7/15, revealed R pleural effusion, moderate ascites,  and fluid in adam hepatis; no walled off fluid collections suggestive of abscess. Para repeated on 7/16 as labs not collected during para done on 7/9. Para labs on 7/16 negative for infection. Empiric cefepime and acyclovir stopped on 7/17. Prograf restarted 7/17. LFTs mildly elevated while pt off of prograf for supposed seizures, so liver US obtained on 7/18 which revealed persistently elevated RIs, normal waveforms, stable L hepatic lobe transplant fluid collection. US reviewed by surgeon, no interventions indicated at this time. Hydrocortisone weaned off and keppra stopped 7/19. Mentation greatly improved by 7/16. Zyprexa weaned off per psych recs, and mental status remained stable. Started procardia for HTN on 7/19.    Interval history: No acute events overnight. Patient AOx4 this am.  She notes mild stomach ache.  Stool sent for c.diff 7/21 negative.  Per patient BMs less frequent today.  She reports decreased appetite but is tolerating diet.  Of note, patient weight is up 9 pounds.  H/H stable/low.  Plan to transfuse 1u PRBC followed by Lasix 60 mg IV x1.  Patient has completed tx for VRE UTI. Last dose of Daptomycin 7/18.  She was on Keppra for possible seizure, medicine has since been discontinued.  Patient has had no seizure like behavior. Remains afebrile. PT following patient, ok to d/c home w [facility], patient ambulating >75 ft w/o AD. Continue to monitor.

## 2019-07-06 NOTE — PROGRESS NOTES
"[facility]  Liver Transplant  Progress Note    Patient Name: Jihan Zamudio  MRN: [id number]  Admission Date: 2019  Hospital Length of Stay: 1 days  Code Status: Full Code  Primary Care Provider: Primary Doctor No  Post-Operative Day: 31    ORGAN:   LIVER  Disease Etiology: Cirrhosis: Fatty Liver (Kessler)  Donor Type:    - Brain Death  CDC High Risk:   No  Donor CMV Status:   Donor CMV Status: Positive  Donor HBcAB:   Negative  Donor HCV Status:   Negative  Donor HBV GERTRUDIS: Negative  Donor HCV GERTRUDIS: Negative  Whole or Partial: Whole Liver  Biliary Anastomosis: End to End  Arterial Anatomy: Standard  Subjective:     History of Present Illness:  Ms. 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 Post transplant course significant for acute parenchymal hemorrhage within the right occipital lobe near the parieto-occipital junction measuring approximately 1.8 x 1.3 x 1.5 cm with mild surrounding vasogenic edema and localized mass effect after fall on . She was discharged home on 7 days of Keppra but nuerosurgery, completed on . She now presents to the ED with 1 day history of fever. Homehealth nurse checked patient's temperature and was noted to be elevated. Home health nurse also reported patient acting "off" with mental status change. Currently in the ED, tmax is 102.3. Patient feels well with no true complaints expect rhinorrhea. She denies chills, diarrhea, nausea/vomiting, abdominal pain. Denies sick contacts. Will admit for infectious work up (blood cx, UA, urine cx, CMV PCR, chest x-ray, CT A/P). Patient currently with no altered mental status. In light of recent hx of occipital hemorrhage, will obtain CT head without contrast.      Hospital Course:  Interval history: no acute events overnight. Pt reports feeling well this am. T max of 102.3 noted on admission. Pt started on BS antibxs empirically and infectious workup initiated. Blood " cxs NGTD and UA negative. Abdominal CT reviewed and no obvious source of infection noted. Head CT also reviewed without abnormalities. Pt remains on room air with appropriate sats but noted with large right pleural effusion on recent xray. CMV PCR also pending. H&H slightly lower today. Monitor.     Past Medical History:   Diagnosis Date    Cirrhosis     Esophageal varices 2018    Small with no banding     Essential hypertension 2018    Fatty liver 2018    GERD (gastroesophageal reflux disease)     Hx of colonic polyps 2018    On colonoscopy     Hypertension     Hypothyroidism 2018    Kidney stones     Morbid obesity 2018    Lap band with subsequent release    KADEEM (obstructive sleep apnea) 2018    Osteoarthritis 2018    Pulmonary nodule 2018    Vitamin D deficiency 2018       Past Surgical History:   Procedure Laterality Date     SECTION      TIMES 2     CHOLECYSTECTOMY      LAPAROSCOPIC     CYSTOSCOPY W/ STONE MANIPULATION      kidney stone removal    EGD (ESOPHAGOGASTRODUODENOSCOPY) N/A 2019    Performed by Davian Hernández MD at [facility] (2ND FLR)    LAPAROSCOPIC GASTRIC BANDING  2006    removal     LIVER BIOPSY  2017    KESSLER with bridging 17    TRANSPLANT, LIVER N/A 2019    Performed by Clemente Brown Jr., MD at [facility] OR 2ND FLR    TRANSPLANT, LIVER N/A 2019    Performed by Clemente Brown Jr., MD at [facility] OR 2ND FLR       Review of patient's allergies indicates:   Allergen Reactions    Metformin Rash    Pcn [penicillins] Other (See Comments)     Unsure of reaction, states it was as a child. Tolerated rocephin at [facility]       Family History     Problem Relation (Age of Onset)    Breast cancer Maternal Grandmother    Cancer Mother (50), Father (65)    Heart disease Mother, Father        Tobacco Use    Smoking status: Never Smoker    Smokeless tobacco: Never Used    Tobacco comment:  "patient denies   Substance and Sexual Activity    Alcohol use: No     Comment: patient denies    Drug use: No     Comment: patient denies    Sexual activity: Not on file       PTA Medications   Medication Sig    famotidine (PEPCID) 20 MG tablet Take 1 tablet (20 mg total) by mouth every evening.    furosemide (LASIX) 40 MG tablet Take 1 tablet (40 mg total) by mouth once daily. for 10 days    lancets 28 gauge Misc Test blood glucose 3 (three) times daily.    levothyroxine (SYNTHROID) 112 MCG tablet Take 1 tablet (112 mcg total) by mouth once daily.    mycophenolate (CELLCEPT) 250 mg Cap Take 2 capsules (500 mg total) by mouth 2 (two) times daily. (Patient taking differently: Take 1,000 mg by mouth 2 (two) times daily. )    pen needle, diabetic 32 gauge x 5/32" Ndle Use to inject insulin into the skin 3 (three) times daily.    predniSONE (DELTASONE) 10 MG tablet Take 20 mg by mouth daily 6/12-6/18; 15 mg daily 6/19-6/25; 10 mg  daily 6/26-7/2; 5 mg  daily 7/3-7/9; stop: 7/10/19    sodium bicarbonate 650 MG tablet Take 1 tablet (650 mg total) by mouth 2 (two) times daily.    sulfamethoxazole-trimethoprim 400-80mg (BACTRIM,SEPTRA) 400-80 mg per tablet Take 1 tablet by mouth once daily. Stop: 12/3/19    tacrolimus (PROGRAF) 1 MG Cap Take 3 capsules (3 mg total) by mouth every 12 (twelve) hours.    valGANciclovir (VALCYTE) 450 mg Tab Take 1 tablet (450 mg total) by mouth once daily. Stop: 9/4/19    blood sugar diagnostic Strp Test blood glucose 3 (three) times daily.    blood-glucose meter kit Use as instructed    levETIRAcetam (KEPPRA) 500 MG Tab Take 1 tablet (500 mg total) by mouth 2 (two) times daily. for 7 days    oxyCODONE (ROXICODONE) 10 mg Tab immediate release tablet Take 1 tablet (10 mg total) by mouth every 4 (four) hours as needed.       Review of Systems   Constitutional: Positive for fever. Negative for activity change, appetite change and chills.   Respiratory: Negative for cough, " shortness of breath and wheezing.    Cardiovascular: Positive for leg swelling.   Gastrointestinal: Negative for abdominal distention, abdominal pain, diarrhea, nausea and vomiting.   Genitourinary: Negative for decreased urine volume, difficulty urinating and dysuria.   Allergic/Immunologic: Positive for immunocompromised state.   Neurological: Negative for weakness and light-headedness.   Psychiatric/Behavioral: Negative for agitation, confusion and decreased concentration. The patient is nervous/anxious.      Objective:     Vital Signs (Most Recent):  Temp: 99 °F (37.2 °C) (07/06/19 0753)  Pulse: 85 (07/06/19 0753)  Resp: (!) 24 (07/06/19 0753)  BP: (!) 151/85 (07/06/19 0808)  SpO2: 95 % (07/06/19 0753) Vital Signs (24h Range):  Temp:  [98.9 °F (37.2 °C)-102.3 °F (39.1 °C)] 99 °F (37.2 °C)  Pulse:  [] 85  Resp:  [18-24] 24  SpO2:  [93 %-97 %] 95 %  BP: (143-172)/(67-91) 151/85     Weight: 78 kg (172 lb)  Body mass index is 29.52 kg/m².      Intake/Output Summary (Last 24 hours) at 7/6/2019 0927  Last data filed at 7/6/2019 0400  Gross per 24 hour   Intake 200 ml   Output 0 ml   Net 200 ml       Physical Exam   Constitutional: She is oriented to person, place, and time. She appears well-developed. No distress.   Cardiovascular: Regular rhythm. Tachycardia present. Exam reveals no friction rub.   No murmur heard.  Pulmonary/Chest: Effort normal. She has decreased breath sounds in the right middle field, the right lower field and the left lower field. She has no wheezes. She has no rales.   Abdominal: Soft. She exhibits no distension. There is no tenderness. There is no rebound and no guarding.   Healed chevron incision   Neurological: She is alert and oriented to person, place, and time.   Skin: She is not diaphoretic.   Psychiatric: She has a normal mood and affect. Her behavior is normal. Judgment and thought content normal.   Nursing note and vitals reviewed.      Laboratory:  CBC:   Recent Labs   Lab  07/06/19  0712   WBC 3.99   RBC 2.50*   HGB 7.4*   HCT 22.1*      MCV 88   MCH 29.6   MCHC 33.5     CMP:   Recent Labs   Lab 07/06/19 0712      CALCIUM 7.9*   ALBUMIN 2.0*   PROT 4.6*   *   K 3.4*   CO2 23   CL 95   BUN 17   CREATININE 0.8   ALKPHOS 281*   ALT 9*   AST 11   BILITOT 0.6     Labs within the past 24 hours have been reviewed.    Diagnostic Results:  None    Assessment/Plan:     * Fever  Infectious work up ordered (blood cx, urine cx, CMV PCR, chest x-ray, CT A/P without contrast)  Start broad spectrum antibiotics started on admit.   Blood cxs NGTD, UA negative.   Abdominal and head CTs reviewed and no source of fevers noted.   CMV PCR pending.   Consider ID consult for further evaluation.   Continue to monitor.       Traumatic intracranial hemorrhage without loss of consciousness  Head CT reviewed and noted with interval reduction in size of the patient's known right occipital lobe parenchymal hemorrhage      Long-term use of immunosuppressant medication  Continue prograf. Hold cellcept. Monitor prograf level daily, monitor for toxic side effects, and adjust for therapeutic dose.       Prophylactic immunotherapy  See long term use of immunosuppression.       Status post liver transplant  LFTs stable  Pt with good hepatic allograft function.   Monitor.       Anemia of chronic disease  H/H slightly lower this am.   Will plan for one unit of PRBCs for replacement.             VTE Risk Mitigation (From admission, onward)        Ordered     Place sequential compression device  Until discontinued      07/05/19 1659     IP VTE HIGH RISK PATIENT  Once      07/05/19 1659          The patients clinical status was discussed at multidisplinary rounds, involving transplant surgery, transplant medicine, pharmacy, nursing, nutrition, and social work    Discharge Planning:  No Patient Care Coordination Note on file.      Chino Mathew NP  Liver Transplant  [facility]

## 2019-07-06 NOTE — PROGRESS NOTES
Pharmacokinetic Initial Assessment: IV Vancomycin    Assessment/Plan:    Vancomycin 2000 mg IVPB x 1 given in ED.  Continue with vancomycin 1000 mg IVPB every 12 hours.  Desired empiric serum trough concentration is 10 to 20 mcg/mL.  Draw vancomycin trough level prior to 4th dose on 07/07/2019 at 0600.  Pharmacy will continue to follow and monitor vancomycin.      Please contact pharmacy at extension [phone] with any questions regarding this assessment.     Thank you for the consult,   Marily Nix     Patient brief summary:  Jihan Zamudio is a 49 y.o. female initiated on antimicrobial therapy with IV Vancomycin for treatment of suspected intraabdominal infection.    Drug Allergies:   Review of patient's allergies indicates:   Allergen Reactions    Metformin Rash    Pcn [penicillins] Other (See Comments)     Unsure of reaction, states it was as a child. Tolerated rocephin at [facility]       Actual Body Weight:   78 kg    Renal Function:   Estimated Creatinine Clearance: 74.7 mL/min (based on SCr of 0.9 mg/dL).,     CBC (last 72 hours):  Recent Labs   Lab Result Units 07/05/19  0910 07/05/19  1653   WBC K/uL 5.34 4.73   Hemoglobin g/dL 8.6* 8.1*   Hematocrit % 25.7* 24.1*   Platelets K/uL 267 240   Gran% % 87.9* 90.7*   Lymph% % 3.9* 4.0*   Mono% % 5.4 3.6*   Eosinophil% % 1.3 0.0   Basophil% % 0.4 0.2   Differential Method  Automated Automated       Metabolic Panel (last 72 hours):  Recent Labs   Lab Result Units 07/05/19  0910 07/05/19  1653 07/05/19  1746   Sodium mmol/L 130* 131*  --    Potassium mmol/L 3.6 3.9  --    Chloride mmol/L 96 97  --    CO2 mmol/L 24 23  --    Glucose mg/dL 104 107  --    Glucose, UA   --   --  Negative   BUN, Bld mg/dL 15 15  --    Creatinine mg/dL 1.0 0.9  --    Albumin g/dL 2.4* 2.3*  --    Total Bilirubin mg/dL 1.0 0.8  --    Alkaline Phosphatase U/L 435* 378*  --    AST U/L 18 13  --    ALT U/L 14 12  --    Magnesium mg/dL  --  1.3*  --        Drug levels (last 3 results):  No  results for input(s): VANCOMYCINRA, VANCOMYCINPE, VANCOMYCINTR in the last 72 hours.    Microbiologic Results:  Microbiology Results (last 7 days)     Procedure Component Value Units Date/Time    Blood culture x two cultures. Draw prior to antibiotics. [[id number]] Collected:  07/05/19 1705    Order Status:  Sent Specimen:  Blood from Peripheral, Forearm, Right Updated:  07/05/19 1711    Blood culture x two cultures. Draw prior to antibiotics. [[id number]] Collected:  07/05/19 1653    Order Status:  Sent Specimen:  Blood from Peripheral, Antecubital, Left Updated:  07/05/19 170

## 2019-07-06 NOTE — ED NOTES
Telemetry Verification   Patient placed on Telemetry Box  Verified on ED monitor  Box [id number]   Monitor Tech Tracy   Rate 98   Rhythm sinus

## 2019-07-06 NOTE — PLAN OF CARE
"Problem: Adult Inpatient Plan of Care  Goal: Plan of Care Review  Outcome: Ongoing (interventions implemented as appropriate)  AOx4, TMAX 100.1 - reduced to 99.1 w/ tylenol - rechecked 98.9ORAL. ABX admin maintained. Rec'vd x2 Mg riders (Mg 1.3 on admit). Chev remains ADDI w/ SStrips. Remains on tele - NS (80s-90s). BC NGTD, lactate 0.5. Pt non compliant w/ calling for assistance w/ OOB activities. Pt was very weak upon arrival w/ unsteady gait. RN instructed pt to call for assistance. Pt refuse each time. Bed alarm activated. Pt continued to remove TELE monitoring and VISI. Demanding to take a shower @ 0400. Pt walked backwards w/ BSCC to shower, all while connected to an IV infusion. RN and PCT at bedside repeatedly asked pt to wait for us to assist her and disconnect IV. Pt refuse and went into bathroom and shut bathroom door. Pt continued to be non compliant, insisting on shower. Shower performed by PCT x2. ROJAS Walker to bedside to talk to pt about medical compliance. Pt removed PIV d/t "not liking it" and 2nd PIV started. Pt educated on importance of safety w/in hospital setting. Pt endorsed understanding but continued education preferred. Bed remains locked and lowered. Personal items and call light w/in reach. NAD, WCTM. Bed alarm continues to remain activated.        "

## 2019-07-07 LAB
ABO + RH BLD: NORMAL
ALBUMIN SERPL BCP-MCNC: 2.2 G/DL (ref 3.5–5.2)
ALP SERPL-CCNC: 245 U/L (ref 55–135)
ALT SERPL W/O P-5'-P-CCNC: 7 U/L (ref 10–44)
ANION GAP SERPL CALC-SCNC: 10 MMOL/L (ref 8–16)
AST SERPL-CCNC: 10 U/L (ref 10–40)
BASOPHILS # BLD AUTO: 0.04 K/UL (ref 0–0.2)
BASOPHILS NFR BLD: 0.7 % (ref 0–1.9)
BILIRUB SERPL-MCNC: 0.9 MG/DL (ref 0.1–1)
BLD GP AB SCN CELLS X3 SERPL QL: NORMAL
BUN SERPL-MCNC: 16 MG/DL (ref 6–20)
CALCIUM SERPL-MCNC: 8 MG/DL (ref 8.7–10.5)
CHLORIDE SERPL-SCNC: 95 MMOL/L (ref 95–110)
CO2 SERPL-SCNC: 21 MMOL/L (ref 23–29)
CREAT SERPL-MCNC: 0.8 MG/DL (ref 0.5–1.4)
DIFFERENTIAL METHOD: ABNORMAL
EOSINOPHIL # BLD AUTO: 0.1 K/UL (ref 0–0.5)
EOSINOPHIL NFR BLD: 1.1 % (ref 0–8)
ERYTHROCYTE [DISTWIDTH] IN BLOOD BY AUTOMATED COUNT: 15.6 % (ref 11.5–14.5)
EST. GFR  (AFRICAN AMERICAN): >60 ML/MIN/1.73 M^2
EST. GFR  (NON AFRICAN AMERICAN): >60 ML/MIN/1.73 M^2
GLUCOSE SERPL-MCNC: 75 MG/DL (ref 70–110)
HCT VFR BLD AUTO: 24.2 % (ref 37–48.5)
HGB BLD-MCNC: 8.3 G/DL (ref 12–16)
IMM GRANULOCYTES # BLD AUTO: 0.06 K/UL (ref 0–0.04)
IMM GRANULOCYTES NFR BLD AUTO: 1.1 % (ref 0–0.5)
LYMPHOCYTES # BLD AUTO: 0.3 K/UL (ref 1–4.8)
LYMPHOCYTES NFR BLD: 5.3 % (ref 18–48)
MAGNESIUM SERPL-MCNC: 2.2 MG/DL (ref 1.6–2.6)
MCH RBC QN AUTO: 29 PG (ref 27–31)
MCHC RBC AUTO-ENTMCNC: 34.3 G/DL (ref 32–36)
MCV RBC AUTO: 85 FL (ref 82–98)
MONOCYTES # BLD AUTO: 0.4 K/UL (ref 0.3–1)
MONOCYTES NFR BLD: 6.8 % (ref 4–15)
NEUTROPHILS # BLD AUTO: 4.6 K/UL (ref 1.8–7.7)
NEUTROPHILS NFR BLD: 85 % (ref 38–73)
NRBC BLD-RTO: 0 /100 WBC
PLATELET # BLD AUTO: 259 K/UL (ref 150–350)
PMV BLD AUTO: 9 FL (ref 9.2–12.9)
POTASSIUM SERPL-SCNC: 3.7 MMOL/L (ref 3.5–5.1)
PROT SERPL-MCNC: 5 G/DL (ref 6–8.4)
RBC # BLD AUTO: 2.86 M/UL (ref 4–5.4)
SODIUM SERPL-SCNC: 126 MMOL/L (ref 136–145)
TACROLIMUS BLD-MCNC: 6.2 NG/ML (ref 5–15)
VANCOMYCIN TROUGH SERPL-MCNC: 22 UG/ML (ref 10–22)
WBC # BLD AUTO: 5.45 K/UL (ref 3.9–12.7)

## 2019-07-07 PROCEDURE — 99233 SBSQ HOSP IP/OBS HIGH 50: CPT | Mod: 24,,, | Performed by: NURSE PRACTITIONER

## 2019-07-07 PROCEDURE — 63600175 PHARM REV CODE 636 W HCPCS: Performed by: PHYSICIAN ASSISTANT

## 2019-07-07 PROCEDURE — 94761 N-INVAS EAR/PLS OXIMETRY MLT: CPT

## 2019-07-07 PROCEDURE — 80053 COMPREHEN METABOLIC PANEL: CPT

## 2019-07-07 PROCEDURE — 85025 COMPLETE CBC W/AUTO DIFF WBC: CPT

## 2019-07-07 PROCEDURE — 94799 UNLISTED PULMONARY SVC/PX: CPT

## 2019-07-07 PROCEDURE — 25000003 PHARM REV CODE 250: Performed by: PHYSICIAN ASSISTANT

## 2019-07-07 PROCEDURE — 63600175 PHARM REV CODE 636 W HCPCS: Performed by: TRANSPLANT SURGERY

## 2019-07-07 PROCEDURE — 20600001 HC STEP DOWN PRIVATE ROOM

## 2019-07-07 PROCEDURE — 80197 ASSAY OF TACROLIMUS: CPT

## 2019-07-07 PROCEDURE — 99233 PR SUBSEQUENT HOSPITAL CARE,LEVL III: ICD-10-PCS | Mod: 24,,, | Performed by: NURSE PRACTITIONER

## 2019-07-07 PROCEDURE — 25000003 PHARM REV CODE 250: Performed by: TRANSPLANT SURGERY

## 2019-07-07 PROCEDURE — 86850 RBC ANTIBODY SCREEN: CPT

## 2019-07-07 PROCEDURE — 63600175 PHARM REV CODE 636 W HCPCS: Performed by: NURSE PRACTITIONER

## 2019-07-07 PROCEDURE — 80202 ASSAY OF VANCOMYCIN: CPT

## 2019-07-07 PROCEDURE — 86920 COMPATIBILITY TEST SPIN: CPT

## 2019-07-07 PROCEDURE — 83735 ASSAY OF MAGNESIUM: CPT

## 2019-07-07 RX ORDER — CALCIUM CARBONATE 200(500)MG
500 TABLET,CHEWABLE ORAL DAILY PRN
Status: DISCONTINUED | OUTPATIENT
Start: 2019-07-07 | End: 2019-07-23 | Stop reason: HOSPADM

## 2019-07-07 RX ADMIN — TACROLIMUS 2 MG: 1 CAPSULE ORAL at 08:07

## 2019-07-07 RX ADMIN — PREDNISONE 5 MG: 5 TABLET ORAL at 08:07

## 2019-07-07 RX ADMIN — CEFEPIME 1 G: 1 INJECTION, POWDER, FOR SOLUTION INTRAMUSCULAR; INTRAVENOUS at 01:07

## 2019-07-07 RX ADMIN — FUROSEMIDE 40 MG: 10 INJECTION, SOLUTION INTRAMUSCULAR; INTRAVENOUS at 08:07

## 2019-07-07 RX ADMIN — CALCIUM CARBONATE (ANTACID) CHEW TAB 500 MG 500 MG: 500 CHEW TAB at 06:07

## 2019-07-07 RX ADMIN — ACETAMINOPHEN 650 MG: 325 TABLET ORAL at 11:07

## 2019-07-07 RX ADMIN — SODIUM BICARBONATE 650 MG TABLET 650 MG: at 08:07

## 2019-07-07 RX ADMIN — CEFEPIME 1 G: 1 INJECTION, POWDER, FOR SOLUTION INTRAMUSCULAR; INTRAVENOUS at 05:07

## 2019-07-07 RX ADMIN — VALGANCICLOVIR 450 MG: 450 TABLET, FILM COATED ORAL at 08:07

## 2019-07-07 RX ADMIN — DEXTROSE 750 MG: 5 SOLUTION INTRAVENOUS at 08:07

## 2019-07-07 RX ADMIN — FAMOTIDINE 20 MG: 20 TABLET, FILM COATED ORAL at 08:07

## 2019-07-07 RX ADMIN — TACROLIMUS 2 MG: 1 CAPSULE ORAL at 05:07

## 2019-07-07 RX ADMIN — SULFAMETHOXAZOLE AND TRIMETHOPRIM 1 TABLET: 400; 80 TABLET ORAL at 08:07

## 2019-07-07 RX ADMIN — CEFEPIME 1 G: 1 INJECTION, POWDER, FOR SOLUTION INTRAMUSCULAR; INTRAVENOUS at 08:07

## 2019-07-07 RX ADMIN — LEVOTHYROXINE SODIUM 112 MCG: 50 TABLET ORAL at 06:07

## 2019-07-07 NOTE — PROGRESS NOTES
"[facility]  Liver Transplant  Progress Note    Patient Name: Jihan Zamudio  MRN: [id number]  Admission Date: 2019  Hospital Length of Stay: 2 days  Code Status: Full Code  Primary Care Provider: Primary Doctor No  Post-Operative Day: 32    ORGAN:   LIVER  Disease Etiology: Cirrhosis: Fatty Liver ([place])  Donor Type:    - Brain Death  [facility] High Risk:   No  Donor CMV Status:   Donor CMV Status: Positive  Donor HBcAB:   Negative  Donor HCV Status:   Negative  Donor HBV GERTRUDIS: Negative  Donor HCV GERTRUDIS: Negative  Whole or Partial: Whole Liver  Biliary Anastomosis: End to End  Arterial Anatomy: Standard  Subjective:     History of Present Illness:  Ms. Zamudio is a 48 y/o F s/p DBD OLTx (SM induction, CMV D+R+) for KESSLER cirrhosis on 19. Post transplant course significant for acute parenchymal hemorrhage within the right occipital lobe near the parieto-occipital junction measuring approximately 1.8 x 1.3 x 1.5 cm with mild surrounding vasogenic edema and localized mass effect after fall on . She was discharged home on 7 days of Keppra but nuerosurgery, completed on . She now presents to the ED with 1 day history of fever. Homehealth nurse checked patient's temperature and was noted to be elevated. Home health nurse also reported patient acting "off" with mental status change. Currently in the ED, tmax is 102.3. Patient feels well with no true complaints expect rhinorrhea. She denies chills, diarrhea, nausea/vomiting, abdominal pain. Denies sick contacts. Will admit for infectious work up (blood cx, UA, urine cx, CMV PCR, chest x-ray, CT A/P). Patient currently with no altered mental status. In light of recent hx of occipital hemorrhage, will obtain CT head without contrast.      Hospital Course:  Interval history: no acute events overnight. Pt reports feeling well this am but noted with increased confusion. Pt unaware of place and time. Only oriented to person. Recent head CT " stable. T max of 102.3 noted on admission but has since been afebrile since start of antibxs. Pt started on BS antibxs empirically and infectious workup initiated. Blood cxs NGTD and UA negative. Abdominal CT reviewed and no obvious source of infection noted. Head CT also reviewed without abnormalities. Pt remains on room air with appropriate sats but noted with large right pleural effusion on recent xray. CMV PCR pending. Will discuss possible thoracentesis to evaluate fluid. Monitor.     Past Medical History:   Diagnosis Date    Cirrhosis     Esophageal varices 2018    Small with no banding     Essential hypertension 2018    Fatty liver 2018    GERD (gastroesophageal reflux disease)     Hx of colonic polyps 2018    On colonoscopy     Hypertension     Hypothyroidism 2018    Kidney stones     Morbid obesity 2018    Lap band with subsequent release    KADEEM (obstructive sleep apnea) 2018    Osteoarthritis 2018    Pulmonary nodule 2018    Vitamin D deficiency 2018       Past Surgical History:   Procedure Laterality Date     SECTION      TIMES 2     CHOLECYSTECTOMY      LAPAROSCOPIC     CYSTOSCOPY W/ STONE MANIPULATION      kidney stone removal    EGD (ESOPHAGOGASTRODUODENOSCOPY) N/A 2019    Performed by [facility]ian Hern[facility]dez [facility] at [facility] ENDO (2ND FLR)    LAPAROSCOPIC GASTRIC BANDING  2006    removal     LIVER BIOPSY  2017    KESSLER with bridging 17    TRANSPLANT, LIVER N/A 2019    Performed by Clemente Brown Jr., MD at [facility] OR 2ND FLR    TRANSPLANT, LIVER N/A 2019    Performed by Clemente Brown Jr., MD at [facility] OR 2ND FLR       Review of patient's allergies indicates:   Allergen Reactions    Metformin Rash    Pcn [penicillins] Other (See Comments)     Unsure of reaction, states it was as a child. Tolerated rocephin at [facility]       Family History     Problem Relation (Age of Onset)    Breast cancer  "Maternal Grandmother    Cancer Mother (50), Father (65)    Heart disease Mother, Father        Tobacco Use    Smoking status: Never Smoker    Smokeless tobacco: Never Used    Tobacco comment: patient denies   Substance and Sexual Activity    Alcohol use: No     Comment: patient denies    Drug use: No     Comment: patient denies    Sexual activity: Not on file       PTA Medications   Medication Sig    famotidine (PEPCID) 20 MG tablet Take 1 tablet (20 mg total) by mouth every evening.    furosemide (LASIX) 40 MG tablet Take 1 tablet (40 mg total) by mouth once daily. for 10 days    lancets 28 gauge Misc Test blood glucose 3 (three) times daily.    levothyroxine (SYNTHROID) 112 MCG tablet Take 1 tablet (112 mcg total) by mouth once daily.    mycophenolate (CELLCEPT) 250 mg Cap Take 2 capsules (500 mg total) by mouth 2 (two) times daily. (Patient taking differently: Take 1,000 mg by mouth 2 (two) times daily. )    pen needle, diabetic 32 gauge x 5/32" Ndle Use to inject insulin into the skin 3 (three) times daily.    predniSONE (DELTASONE) 10 MG tablet Take 20 mg by mouth daily 6/12-6/18; 15 mg daily 6/19-6/25; 10 mg  daily 6/26-7/2; 5 mg  daily 7/3-7/9; stop: 7/10/19    sodium bicarbonate 650 MG tablet Take 1 tablet (650 mg total) by mouth 2 (two) times daily.    sulfamethoxazole-trimethoprim 400-80mg (BACTRIM,SEPTRA) 400-80 mg per tablet Take 1 tablet by mouth once daily. Stop: 12/3/19    tacrolimus (PROGRAF) 1 MG Cap Take 3 capsules (3 mg total) by mouth every 12 (twelve) hours.    valGANciclovir (VALCYTE) 450 mg Tab Take 1 tablet (450 mg total) by mouth once daily. Stop: 9/4/19    blood sugar diagnostic Strp Test blood glucose 3 (three) times daily.    blood-glucose meter kit Use as instructed    levETIRAcetam (KEPPRA) 500 MG Tab Take 1 tablet (500 mg total) by mouth 2 (two) times daily. for 7 days    oxyCODONE (ROXICODONE) 10 mg Tab immediate release tablet Take 1 tablet (10 mg total) by mouth " every 4 (four) hours as needed.       Review of Systems   Constitutional: Positive for activity change, appetite change and fever. Negative for chills.   Respiratory: Negative for cough, shortness of breath and wheezing.    Cardiovascular: Positive for leg swelling.   Gastrointestinal: Negative for abdominal distention, abdominal pain, diarrhea, nausea and vomiting.   Genitourinary: Negative for decreased urine volume, difficulty urinating and dysuria.   Allergic/Immunologic: Positive for immunocompromised state.   Neurological: Negative for weakness and light-headedness.   Psychiatric/Behavioral: Positive for confusion and decreased concentration. Negative for agitation. The patient is not nervous/anxious.      Objective:     Vital Signs (Most Recent):  Temp: 98.5 °F (36.9 °C) (07/07/19 0741)  Pulse: 89 (07/07/19 0741)  Resp: 13 (07/07/19 0741)  BP: 139/76 (07/07/19 0741)  SpO2: 96 % (07/07/19 0741) Vital Signs (24h Range):  Temp:  [98.5 °F (36.9 °C)-100.3 °F (37.9 °C)] 98.5 °F (36.9 °C)  Pulse:  [] 89  Resp:  [13-27] 13  SpO2:  [91 %-98 %] 96 %  BP: (120-142)/(64-76) 139/76     Weight: 78 kg (172 lb)  Body mass index is 29.52 kg/m².      Intake/Output Summary (Last 24 hours) at 7/7/2019 0858  Last data filed at 7/7/2019 0400  Gross per 24 hour   Intake 890 ml   Output 0 ml   Net 890 ml       Physical Exam   Constitutional: She is oriented to person, place, and time. She appears well-developed. No distress.   Cardiovascular: Regular rhythm. Tachycardia present. Exam reveals no friction rub.   No murmur heard.  Pulmonary/Chest: Effort normal. She has decreased breath sounds in the right middle field, the right lower field and the left lower field. She has no wheezes. She has no rales.   Abdominal: Soft. She exhibits no distension. There is no tenderness. There is no rebound and no guarding.   Healed chevron incision   Neurological: She is alert and oriented to person, place, and time.   Skin: She is not  diaphoretic.   Psychiatric: She has a normal mood and affect. Her behavior is normal. Judgment and thought content normal.   Nursing note and vitals reviewed.      Laboratory:  CBC:   Recent Labs   Lab 07/07/19  0726   WBC 5.45   RBC 2.86*   HGB 8.3*   HCT 24.2*      MCV 85   MCH 29.0   MCHC 34.3     CMP:   Recent Labs   Lab 07/07/19  0726   GLU 75   CALCIUM 8.0*   ALBUMIN 2.2*   PROT 5.0*   *   K 3.7   CO2 21*   CL 95   BUN 16   CREATININE 0.8   ALKPHOS 245*   ALT 7*   AST 10   BILITOT 0.9     Labs within the past 24 hours have been reviewed.    Diagnostic Results:  None    Assessment/Plan:     * Fever  Infectious work up ordered (blood cx, urine cx, CMV PCR, chest x-ray, CT A/P without contrast)  Start broad spectrum antibiotics started on admit.   Blood cxs NGTD, UA negative.   Abdominal and head CTs reviewed and no source of fevers noted.   CMV PCR pending.   Consider ID consult for further evaluation.   Pt with confusion this am. Recent head CT on admit reviewed and stable.   Continue to monitor.       Traumatic intracranial hemorrhage without loss of consciousness  Head CT reviewed and noted with interval reduction in size of the patient's known right occipital lobe parenchymal hemorrhage      Long-term use of immunosuppressant medication  Continue prograf. Hold cellcept. Monitor prograf level daily, monitor for toxic side effects, and adjust for therapeutic dose.       Prophylactic immunotherapy  See long term use of immunosuppression.       S/P liver transplant  LFTs stable  Pt with good hepatic allograft function.   Monitor.       Anemia of chronic disease  H/H improving.   Monitor.             VTE Risk Mitigation (From admission, onward)        Ordered     Place sequential compression device  Until discontinued      07/05/19 1659     IP VTE HIGH RISK PATIENT  Once      07/05/19 1659          The patients clinical status was discussed at multidisplinary rounds, involving transplant surgery,  transplant medicine, pharmacy, nursing, nutrition, and social work    Discharge Planning:  No Patient Care Coordination Note on file.      Chino Mathew NP  Liver Transplant  [facility]

## 2019-07-07 NOTE — PROGRESS NOTES
Pharmacokinetic Assessment Follow Up: IV Vancomycin    Vancomycin serum concentration assessment(s):    The random level was drawned correctly and can be used to guide therapy at this time.    Vancomycin Regimen Plan:    Change regimen to Vancomycin 750 mg IV every 12hour with next serum trough concentration measured at 7/9 prior to 4th dose on 0830    Pharmacy will continue to follow and monitor vancomycin.    Please contact pharmacy at extension [phone] for questions regarding this assessment.    Thank you for the consult,   Natasha Duckworth     Patient brief summary:  Jihan Zamudio is a 49 y.o. female initiated on antimicrobial therapy with IV Vancomycin for treatment of suspected intra-abdominal        Drug Allergies:   Review of patient's allergies indicates:   Allergen Reactions    Metformin Rash    Pcn [penicillins] Other (See Comments)     Unsure of reaction, states it was as a child. Tolerated rocephin at [facility]       Actual Body Weight:   78    Renal Function:   Estimated Creatinine Clearance: 84.1 mL/min (based on SCr of 0.8 mg/dL).,     Dialysis Method (if applicable):  none    CBC (last 72 hours):  Recent Labs   Lab Result Units 07/05/19  0910 07/05/19  1653 07/06/19  0712 07/07/19  0726   WBC K/uL 5.34 4.73 3.99 5.45   Hemoglobin g/dL 8.6* 8.1* 7.4* 8.3*   Hematocrit % 25.7* 24.1* 22.1* 24.2*   Platelets K/uL 267 240 222 259   Gran% % 87.9* 90.7* 84.8* 85.0*   Lymph% % 3.9* 4.0* 6.3* 5.3*   Mono% % 5.4 3.6* 6.0 6.8   Eosinophil% % 1.3 0.0 0.8 1.1   Basophil% % 0.4 0.2 0.8 0.7   Differential Method  Automated Automated Automated Automated       Metabolic Panel (last 72 hours):  Recent Labs   Lab Result Units 07/05/19  0910 07/05/19  1653 07/05/19  1746 07/06/19  0712 07/07/19  0726   Sodium mmol/L 130* 131*  --  126* 126*   Potassium mmol/L 3.6 3.9  --  3.4* 3.7   Chloride mmol/L 96 97  --  95 95   CO2 mmol/L 24 23  --  23 21*   Glucose mg/dL 104 107  --  101 75   Glucose, UA   --   --  Negative  --    --    BUN, Bld mg/dL 15 15  --  17 16   Creatinine mg/dL 1.0 0.9  --  0.8 0.8   Albumin g/dL 2.4* 2.3*  --  2.0* 2.2*   Total Bilirubin mg/dL 1.0 0.8  --  0.6 0.9   Alkaline Phosphatase U/L 435* 378*  --  281* 245*   AST U/L 18 13  --  11 10   ALT U/L 14 12  --  9* 7*   Magnesium mg/dL  --  1.3*  --  2.4 2.2       Vancomycin Administrations:  vancomycin given in the last 96 hours                   vancomycin in dextrose 5 % 1 gram/250 mL IVPB 1,000 mg (mg) 1,000 mg New Bag 07/06/19 1804     1,000 mg New Bag  0548    vancomycin 2 g in 0.9% sodium chloride 500 mL IVPB (mg) 2,000 mg New Bag 07/05/19 1842                Drug levels (last 3 results):  Recent Labs   Lab Result Units 07/07/19  0726   Vancomycin-Trough ug/mL 22.0       Microbiologic Results:  Microbiology Results (last 7 days)     Procedure Component Value Units Date/Time    Blood culture x two cultures. Draw prior to antibiotics. [[id number]] Collected:  07/05/19 1705    Order Status:  Completed Specimen:  Blood from Peripheral, Forearm, Right Updated:  07/06/19 2012     Blood Culture, Routine No Growth to date      No Growth to date    Narrative:       Aerobic and anaerobic    Blood culture x two cultures. Draw prior to antibiotics. [[id number]] Collected:  07/05/19 1653    Order Status:  Completed Specimen:  Blood from Peripheral, Antecubital, Left Updated:  07/06/19 2012     Blood Culture, Routine No Growth to date      No Growth to date    Narrative:       Aerobic and anaerobic

## 2019-07-07 NOTE — PLAN OF CARE
Problem: Adult Inpatient Plan of Care  Goal: Plan of Care Review  Outcome: Ongoing (interventions implemented as appropriate)  AOx4, TMAX 99.0. ABX admin maintained. Chev remains ADDI w/ SStrips. Remains on tele - NS (80s-90s). Pt more compliant w/ calling and medical compliance. VANC trough due prior to 0630 dose. Pt educated on importance of safety w/in hospital setting. Pt endorsed understanding but continued education preferred. Bed remains locked and lowered. Personal items and call light w/in reach. NAD, WCTM. Bed alarm continues to remain activated.

## 2019-07-07 NOTE — ASSESSMENT & PLAN NOTE
Infectious work up ordered (blood cx, urine cx, CMV PCR, chest x-ray, CT A/P without contrast)  Start broad spectrum antibiotics started on admit.   Blood cxs NGTD, UA negative.   Abdominal and head CTs reviewed and no source of fevers noted.   CMV PCR pending.   Consider ID consult for further evaluation.   Pt with confusion this am. Recent head CT on admit reviewed and stable.   Continue to monitor.

## 2019-07-07 NOTE — SUBJECTIVE & OBJECTIVE
Past Medical History:   Diagnosis Date    Cirrhosis     Esophageal varices 2018    Small with no banding     Essential hypertension 2018    Fatty liver 2018    GERD (gastroesophageal reflux disease)     Hx of colonic polyps 2018    On colonoscopy     Hypertension     Hypothyroidism 2018    Kidney stones     Morbid obesity 2018    Lap band with subsequent release    KADEEM (obstructive sleep apnea) 2018    Osteoarthritis 2018    Pulmonary nodule 2018    Vitamin D deficiency 2018       Past Surgical History:   Procedure Laterality Date     SECTION      TIMES 2     CHOLECYSTECTOMY      LAPAROSCOPIC     CYSTOSCOPY W/ STONE MANIPULATION      kidney stone removal    EGD (ESOPHAGOGASTRODUODENOSCOPY) N/A 2019    Performed by [facility]ian [facility] at [facility] ENDO (2ND FLR)    LAPAROSCOPIC GASTRIC BANDING      removal     LIVER BIOPSY  2017    KESSLER with bridging 17    TRANSPLANT, LIVER N/A 2019    Performed by Clemente Brown Jr., MD at [facility] OR 2ND FLR    TRANSPLANT, LIVER N/A 2019    Performed by Clemente Brown Jr., MD at [facility] OR 2ND FLR       Review of patient's allergies indicates:   Allergen Reactions    Metformin Rash    Pcn [penicillins] Other (See Comments)     Unsure of reaction, states it was as a child. Tolerated rocephin at [facility]       Family History     Problem Relation (Age of Onset)    Breast cancer Maternal Grandmother    Cancer Mother (50), Father (65)    Heart disease Mother, Father        Tobacco Use    Smoking status: Never Smoker    Smokeless tobacco: Never Used    Tobacco comment: patient denies   Substance and Sexual Activity    Alcohol use: No     Comment: patient denies    Drug use: No     Comment: patient denies    Sexual activity: Not on file       PTA Medications   Medication Sig    famotidine (PEPCID) 20 MG tablet Take 1 tablet (20 mg total) by mouth every evening.  "   furosemide (LASIX) 40 MG tablet Take 1 tablet (40 mg total) by mouth once daily. for 10 days    lancets 28 gauge Misc Test blood glucose 3 (three) times daily.    levothyroxine (SYNTHROID) 112 MCG tablet Take 1 tablet (112 mcg total) by mouth once daily.    mycophenolate (CELLCEPT) 250 mg Cap Take 2 capsules (500 mg total) by mouth 2 (two) times daily. (Patient taking differently: Take 1,000 mg by mouth 2 (two) times daily. )    pen needle, diabetic 32 gauge x 5/32" Ndle Use to inject insulin into the skin 3 (three) times daily.    predniSONE (DELTASONE) 10 MG tablet Take 20 mg by mouth daily 6/12-6/18; 15 mg daily 6/19-6/25; 10 mg  daily 6/26-7/2; 5 mg  daily 7/3-7/9; stop: 7/10/19    sodium bicarbonate 650 MG tablet Take 1 tablet (650 mg total) by mouth 2 (two) times daily.    sulfamethoxazole-trimethoprim 400-80mg (BACTRIM,SEPTRA) 400-80 mg per tablet Take 1 tablet by mouth once daily. Stop: 12/3/19    tacrolimus (PROGRAF) 1 MG Cap Take 3 capsules (3 mg total) by mouth every 12 (twelve) hours.    valGANciclovir (VALCYTE) 450 mg Tab Take 1 tablet (450 mg total) by mouth once daily. Stop: 9/4/19    blood sugar diagnostic Strp Test blood glucose 3 (three) times daily.    blood-glucose meter kit Use as instructed    levETIRAcetam (KEPPRA) 500 MG Tab Take 1 tablet (500 mg total) by mouth 2 (two) times daily. for 7 days    oxyCODONE (ROXICODONE) 10 mg Tab immediate release tablet Take 1 tablet (10 mg total) by mouth every 4 (four) hours as needed.       Review of Systems   Constitutional: Positive for activity change, appetite change and fever. Negative for chills.   Respiratory: Negative for cough, shortness of breath and wheezing.    Cardiovascular: Positive for leg swelling.   Gastrointestinal: Negative for abdominal distention, abdominal pain, diarrhea, nausea and vomiting.   Genitourinary: Negative for decreased urine volume, difficulty urinating and dysuria.   Allergic/Immunologic: Positive for " immunocompromised state.   Neurological: Negative for weakness and light-headedness.   Psychiatric/Behavioral: Positive for confusion and decreased concentration. Negative for agitation. The patient is not nervous/anxious.      Objective:     Vital Signs (Most Recent):  Temp: 98.5 °F (36.9 °C) (07/07/19 0741)  Pulse: 89 (07/07/19 0741)  Resp: 13 (07/07/19 0741)  BP: 139/76 (07/07/19 0741)  SpO2: 96 % (07/07/19 0741) Vital Signs (24h Range):  Temp:  [98.5 °F (36.9 °C)-100.3 °F (37.9 °C)] 98.5 °F (36.9 °C)  Pulse:  [] 89  Resp:  [13-27] 13  SpO2:  [91 %-98 %] 96 %  BP: (120-142)/(64-76) 139/76     Weight: 78 kg (172 lb)  Body mass index is 29.52 kg/m².      Intake/Output Summary (Last 24 hours) at 7/7/2019 0858  Last data filed at 7/7/2019 0400  Gross per 24 hour   Intake 890 ml   Output 0 ml   Net 890 ml       Physical Exam   Constitutional: She is oriented to person, place, and time. She appears well-developed. No distress.   Cardiovascular: Regular rhythm. Tachycardia present. Exam reveals no friction rub.   No murmur heard.  Pulmonary/Chest: Effort normal. She has decreased breath sounds in the right middle field, the right lower field and the left lower field. She has no wheezes. She has no rales.   Abdominal: Soft. She exhibits no distension. There is no tenderness. There is no rebound and no guarding.   Healed chevron incision   Neurological: She is alert and oriented to person, place, and time.   Skin: She is not diaphoretic.   Psychiatric: She has a normal mood and affect. Her behavior is normal. Judgment and thought content normal.   Nursing note and vitals reviewed.      Laboratory:  CBC:   Recent Labs   Lab 07/07/19 0726   WBC 5.45   RBC 2.86*   HGB 8.3*   HCT 24.2*      MCV 85   MCH 29.0   MCHC 34.3     CMP:   Recent Labs   Lab 07/07/19 0726   GLU 75   CALCIUM 8.0*   ALBUMIN 2.2*   PROT 5.0*   *   K 3.7   CO2 21*   CL 95   BUN 16   CREATININE 0.8   ALKPHOS 245*   ALT 7*   AST 10    BILITOT 0.9     Labs within the past 24 hours have been reviewed.    Diagnostic Results:  None

## 2019-07-07 NOTE — PLAN OF CARE
Problem: Adult Inpatient Plan of Care  Goal: Plan of Care Review  Outcome: Ongoing (interventions implemented as appropriate)    - AAOx4. VSS. Afebrile.  - Slightly disoriented this am. Easily redirected.  - Tele monitor ongoing-- NSR.  - Tylenol given for headache this am.  - R FA 22g, CDI.  - [employer] ADDI w steri strips.  - L arm skin tear with foam dressing. CDI.  - Per note plan for possible thora for pleural effusion.  - Blood cx NGTD. Abx continued per orders.  - 40 mg lasix given IV today.  - Up with assistance. Bed alarm set. Family @ bedside.  - Bed low and locked, call bell within reach, side rails x2.  - In NAD. Will continue to monitor.

## 2019-07-08 PROBLEM — R60.1 ANASARCA: Status: RESOLVED | Noted: 2019-04-27 | Resolved: 2019-07-08

## 2019-07-08 LAB
ALBUMIN SERPL BCP-MCNC: 2.1 G/DL (ref 3.5–5.2)
ALP SERPL-CCNC: 202 U/L (ref 55–135)
ALT SERPL W/O P-5'-P-CCNC: 7 U/L (ref 10–44)
ANION GAP SERPL CALC-SCNC: 9 MMOL/L (ref 8–16)
AST SERPL-CCNC: 10 U/L (ref 10–40)
BASOPHILS # BLD AUTO: 0.04 K/UL (ref 0–0.2)
BASOPHILS NFR BLD: 0.8 % (ref 0–1.9)
BILIRUB SERPL-MCNC: 0.7 MG/DL (ref 0.1–1)
BUN SERPL-MCNC: 14 MG/DL (ref 6–20)
CALCIUM SERPL-MCNC: 8.2 MG/DL (ref 8.7–10.5)
CHLORIDE SERPL-SCNC: 95 MMOL/L (ref 95–110)
CO2 SERPL-SCNC: 23 MMOL/L (ref 23–29)
CREAT SERPL-MCNC: 0.8 MG/DL (ref 0.5–1.4)
DIFFERENTIAL METHOD: ABNORMAL
EOSINOPHIL # BLD AUTO: 0.1 K/UL (ref 0–0.5)
EOSINOPHIL NFR BLD: 1.7 % (ref 0–8)
ERYTHROCYTE [DISTWIDTH] IN BLOOD BY AUTOMATED COUNT: 15.8 % (ref 11.5–14.5)
EST. GFR  (AFRICAN AMERICAN): >60 ML/MIN/1.73 M^2
EST. GFR  (NON AFRICAN AMERICAN): >60 ML/MIN/1.73 M^2
GLUCOSE SERPL-MCNC: 82 MG/DL (ref 70–110)
HCT VFR BLD AUTO: 22.2 % (ref 37–48.5)
HGB BLD-MCNC: 7.5 G/DL (ref 12–16)
IMM GRANULOCYTES # BLD AUTO: 0.06 K/UL (ref 0–0.04)
IMM GRANULOCYTES NFR BLD AUTO: 1.3 % (ref 0–0.5)
LYMPHOCYTES # BLD AUTO: 0.3 K/UL (ref 1–4.8)
LYMPHOCYTES NFR BLD: 5.8 % (ref 18–48)
MAGNESIUM SERPL-MCNC: 1.8 MG/DL (ref 1.6–2.6)
MCH RBC QN AUTO: 28.6 PG (ref 27–31)
MCHC RBC AUTO-ENTMCNC: 33.8 G/DL (ref 32–36)
MCV RBC AUTO: 85 FL (ref 82–98)
MONOCYTES # BLD AUTO: 0.4 K/UL (ref 0.3–1)
MONOCYTES NFR BLD: 7.5 % (ref 4–15)
NEUTROPHILS # BLD AUTO: 4 K/UL (ref 1.8–7.7)
NEUTROPHILS NFR BLD: 82.9 % (ref 38–73)
NRBC BLD-RTO: 0 /100 WBC
PLATELET # BLD AUTO: 186 K/UL (ref 150–350)
PMV BLD AUTO: 9.7 FL (ref 9.2–12.9)
POTASSIUM SERPL-SCNC: 3.2 MMOL/L (ref 3.5–5.1)
PROT SERPL-MCNC: 4.8 G/DL (ref 6–8.4)
RBC # BLD AUTO: 2.62 M/UL (ref 4–5.4)
SODIUM SERPL-SCNC: 127 MMOL/L (ref 136–145)
TACROLIMUS BLD-MCNC: 5 NG/ML (ref 5–15)
WBC # BLD AUTO: 4.79 K/UL (ref 3.9–12.7)

## 2019-07-08 PROCEDURE — 63600175 PHARM REV CODE 636 W HCPCS: Performed by: INTERNAL MEDICINE

## 2019-07-08 PROCEDURE — 83735 ASSAY OF MAGNESIUM: CPT

## 2019-07-08 PROCEDURE — 80197 ASSAY OF TACROLIMUS: CPT

## 2019-07-08 PROCEDURE — 99233 SBSQ HOSP IP/OBS HIGH 50: CPT | Mod: ,,, | Performed by: INTERNAL MEDICINE

## 2019-07-08 PROCEDURE — 63600175 PHARM REV CODE 636 W HCPCS: Performed by: PHYSICIAN ASSISTANT

## 2019-07-08 PROCEDURE — 25000003 PHARM REV CODE 250: Performed by: TRANSPLANT SURGERY

## 2019-07-08 PROCEDURE — 99233 PR SUBSEQUENT HOSPITAL CARE,LEVL III: ICD-10-PCS | Mod: ,,, | Performed by: INTERNAL MEDICINE

## 2019-07-08 PROCEDURE — 25000003 PHARM REV CODE 250: Performed by: PHYSICIAN ASSISTANT

## 2019-07-08 PROCEDURE — 36415 COLL VENOUS BLD VENIPUNCTURE: CPT

## 2019-07-08 PROCEDURE — 85025 COMPLETE CBC W/AUTO DIFF WBC: CPT

## 2019-07-08 PROCEDURE — 99233 SBSQ HOSP IP/OBS HIGH 50: CPT | Mod: 24,,, | Performed by: NURSE PRACTITIONER

## 2019-07-08 PROCEDURE — 25000003 PHARM REV CODE 250: Performed by: INTERNAL MEDICINE

## 2019-07-08 PROCEDURE — 99233 PR SUBSEQUENT HOSPITAL CARE,LEVL III: ICD-10-PCS | Mod: 24,,, | Performed by: NURSE PRACTITIONER

## 2019-07-08 PROCEDURE — 63600175 PHARM REV CODE 636 W HCPCS: Performed by: TRANSPLANT SURGERY

## 2019-07-08 PROCEDURE — 20600001 HC STEP DOWN PRIVATE ROOM

## 2019-07-08 PROCEDURE — 63600175 PHARM REV CODE 636 W HCPCS: Performed by: NURSE PRACTITIONER

## 2019-07-08 PROCEDURE — 80053 COMPREHEN METABOLIC PANEL: CPT

## 2019-07-08 RX ORDER — CEFEPIME HYDROCHLORIDE 2 G/50ML
2 INJECTION, SOLUTION INTRAVENOUS
Status: DISCONTINUED | OUTPATIENT
Start: 2019-07-08 | End: 2019-07-08

## 2019-07-08 RX ORDER — METRONIDAZOLE 500 MG/1
500 TABLET ORAL EVERY 8 HOURS
Status: DISCONTINUED | OUTPATIENT
Start: 2019-07-08 | End: 2019-07-11

## 2019-07-08 RX ORDER — CEFEPIME HYDROCHLORIDE 2 G/50ML
2 INJECTION, SOLUTION INTRAVENOUS
Status: DISCONTINUED | OUTPATIENT
Start: 2019-07-08 | End: 2019-07-11

## 2019-07-08 RX ADMIN — ACETAMINOPHEN 650 MG: 325 TABLET ORAL at 02:07

## 2019-07-08 RX ADMIN — TACROLIMUS 2 MG: 1 CAPSULE ORAL at 05:07

## 2019-07-08 RX ADMIN — LEVOTHYROXINE SODIUM 112 MCG: 50 TABLET ORAL at 05:07

## 2019-07-08 RX ADMIN — CEFEPIME HYDROCHLORIDE 2 G: 2 INJECTION, SOLUTION INTRAVENOUS at 10:07

## 2019-07-08 RX ADMIN — VALGANCICLOVIR 450 MG: 450 TABLET, FILM COATED ORAL at 09:07

## 2019-07-08 RX ADMIN — DEXTROSE 750 MG: 5 SOLUTION INTRAVENOUS at 10:07

## 2019-07-08 RX ADMIN — CEFEPIME 1 G: 1 INJECTION, POWDER, FOR SOLUTION INTRAMUSCULAR; INTRAVENOUS at 01:07

## 2019-07-08 RX ADMIN — FUROSEMIDE 40 MG: 10 INJECTION, SOLUTION INTRAMUSCULAR; INTRAVENOUS at 10:07

## 2019-07-08 RX ADMIN — METRONIDAZOLE 500 MG: 500 TABLET ORAL at 02:07

## 2019-07-08 RX ADMIN — DEXTROSE 750 MG: 5 SOLUTION INTRAVENOUS at 09:07

## 2019-07-08 RX ADMIN — FAMOTIDINE 20 MG: 20 TABLET, FILM COATED ORAL at 10:07

## 2019-07-08 RX ADMIN — METRONIDAZOLE 500 MG: 500 TABLET ORAL at 10:07

## 2019-07-08 RX ADMIN — ACETAMINOPHEN 650 MG: 325 TABLET ORAL at 11:07

## 2019-07-08 RX ADMIN — CEFEPIME HYDROCHLORIDE 2 G: 2 INJECTION, SOLUTION INTRAVENOUS at 02:07

## 2019-07-08 RX ADMIN — PREDNISONE 5 MG: 5 TABLET ORAL at 09:07

## 2019-07-08 RX ADMIN — SODIUM BICARBONATE 650 MG TABLET 650 MG: at 10:07

## 2019-07-08 RX ADMIN — SODIUM BICARBONATE 650 MG TABLET 650 MG: at 09:07

## 2019-07-08 RX ADMIN — TACROLIMUS 2 MG: 1 CAPSULE ORAL at 09:07

## 2019-07-08 RX ADMIN — CEFEPIME 1 G: 1 INJECTION, POWDER, FOR SOLUTION INTRAMUSCULAR; INTRAVENOUS at 09:07

## 2019-07-08 RX ADMIN — SULFAMETHOXAZOLE AND TRIMETHOPRIM 1 TABLET: 400; 80 TABLET ORAL at 09:07

## 2019-07-08 NOTE — PROGRESS NOTES
"[facility]  Liver Transplant  Progress Note    Patient Name: Jihan Zamudio  MRN: [id number]  Admission Date: 2019  Hospital Length of Stay: 3 days  Code Status: Full Code  Primary Care Provider: Primary Doctor No  Post-Operative Day: 33    ORGAN:   LIVER  Disease Etiology: Cirrhosis: Fatty Liver ([place])  Donor Type:    - Brain Death  [facility] High Risk:   No  Donor CMV Status:   Donor CMV Status: Positive  Donor HBcAB:   Negative  Donor HCV Status:   Negative  Donor HBV GERTRUDIS: Negative  Donor HCV GERTRUDIS: Negative  Whole or Partial: Whole Liver  Biliary Anastomosis: End to End  Arterial Anatomy: Standard  Subjective:     History of Present Illness:  Ms. 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 Post transplant course significant for acute parenchymal hemorrhage within the right occipital lobe near the parieto-occipital junction measuring approximately 1.8 x 1.3 x 1.5 cm with mild surrounding vasogenic edema and localized mass effect after fall on . She was discharged home on 7 days of Keppra but nuerosurgery, completed on . She now presents to the ED with 1 day history of fever. Homehealth nurse checked patient's temperature and was noted to be elevated. Home health nurse also reported patient acting "off" with mental status change. Currently in the ED, tmax is 102.3. Patient feels well with no true complaints expect rhinorrhea. She denies chills, diarrhea, nausea/vomiting, abdominal pain. Denies sick contacts. Will admit for infectious work up (blood cx, UA, urine cx, CMV PCR, chest x-ray, CT A/P). Patient currently with no altered mental status. In light of recent hx of occipital hemorrhage, will obtain CT head without contrast.      Hospital Course:  Interval history: no acute events overnight. Mentation slow.  Alert to person.  Pt unaware of place and time. Easily re oriented. Recent CT head showed reduction in size of hemorrhage.  T " max 101.8 overnight and 101.1 today.  ID consulted.  Cont broad spectrum abx.  Abdominal CT reviewed and with moderate right pleural effusion.  CXR w increased pleural effusion. Thora ordered, will culture fluid.  Plan for para tomorrow. Blood cx w NGTD.  UA unremarkable.  Patient reportedly having loose stools, stool for c.diff ordered.  Oxygen sat 92-96% on RA.  CMV PCR  pending.  Monitor.     Past Medical History:   Diagnosis Date    Cirrhosis     Esophageal varices 2018    Small with no banding     Essential hypertension 2018    Fatty liver 2018    GERD (gastroesophageal reflux disease)     Hx of colonic polyps 2018    On colonoscopy     Hypertension     Hypothyroidism 2018    Kidney stones     Morbid obesity 2018    Lap band with subsequent release    KADEEM (obstructive sleep apnea) 2018    Osteoarthritis 2018    Pulmonary nodule 2018    Vitamin D deficiency 2018       Past Surgical History:   Procedure Laterality Date     SECTION      TIMES 2     CHOLECYSTECTOMY      LAPAROSCOPIC     CYSTOSCOPY W/ STONE MANIPULATION      kidney stone removal    EGD (ESOPHAGOGASTRODUODENOSCOPY) N/A 2019    Performed by Davian Hernández MD at [facility] (2ND FLR)    LAPAROSCOPIC GASTRIC BANDING  2006    removal     LIVER BIOPSY  2017    KESSLER with bridging 17    TRANSPLANT, LIVER N/A 2019    Performed by lCemente Brown Jr., MD at [facility] OR 2ND FLR    TRANSPLANT, LIVER N/A 2019    Performed by Clemente Brown Jr., MD at [facility] OR 2ND FLR       Review of patient's allergies indicates:   Allergen Reactions    Metformin Rash    Pcn [penicillins] Other (See Comments)     Unsure of reaction, states it was as a child. Tolerated rocephin at [facility]       Family History     Problem Relation (Age of Onset)    Breast cancer Maternal Grandmother    Cancer Mother (50), Father (65)    Heart disease Mother, Father     "    Tobacco Use    Smoking status: Never Smoker    Smokeless tobacco: Never Used    Tobacco comment: patient denies   Substance and Sexual Activity    Alcohol use: No     Comment: patient denies    Drug use: No     Comment: patient denies    Sexual activity: Not on file       PTA Medications   Medication Sig    famotidine (PEPCID) 20 MG tablet Take 1 tablet (20 mg total) by mouth every evening.    furosemide (LASIX) 40 MG tablet Take 1 tablet (40 mg total) by mouth once daily. for 10 days    lancets 28 gauge Misc Test blood glucose 3 (three) times daily.    levothyroxine (SYNTHROID) 112 MCG tablet Take 1 tablet (112 mcg total) by mouth once daily.    mycophenolate (CELLCEPT) 250 mg Cap Take 2 capsules (500 mg total) by mouth 2 (two) times daily. (Patient taking differently: Take 1,000 mg by mouth 2 (two) times daily. )    pen needle, diabetic 32 gauge x 5/32" Ndle Use to inject insulin into the skin 3 (three) times daily.    predniSONE (DELTASONE) 10 MG tablet Take 20 mg by mouth daily 6/12-6/18; 15 mg daily 6/19-6/25; 10 mg  daily 6/26-7/2; 5 mg  daily 7/3-7/9; stop: 7/10/19    sodium bicarbonate 650 MG tablet Take 1 tablet (650 mg total) by mouth 2 (two) times daily.    sulfamethoxazole-trimethoprim 400-80mg (BACTRIM,SEPTRA) 400-80 mg per tablet Take 1 tablet by mouth once daily. Stop: 12/3/19    tacrolimus (PROGRAF) 1 MG Cap Take 3 capsules (3 mg total) by mouth every 12 (twelve) hours.    valGANciclovir (VALCYTE) 450 mg Tab Take 1 tablet (450 mg total) by mouth once daily. Stop: 9/4/19    blood sugar diagnostic Strp Test blood glucose 3 (three) times daily.    blood-glucose meter kit Use as instructed    levETIRAcetam (KEPPRA) 500 MG Tab Take 1 tablet (500 mg total) by mouth 2 (two) times daily. for 7 days    oxyCODONE (ROXICODONE) 10 mg Tab immediate release tablet Take 1 tablet (10 mg total) by mouth every 4 (four) hours as needed.       Review of Systems   Constitutional: Positive for " activity change, appetite change and fever. Negative for chills.   Respiratory: Negative for cough, shortness of breath and wheezing.    Cardiovascular: Negative for leg swelling.   Gastrointestinal: Positive for diarrhea. Negative for abdominal distention, abdominal pain, nausea and vomiting.   Genitourinary: Negative for decreased urine volume, difficulty urinating and dysuria.   Skin: Positive for wound (chevron well healed).   Allergic/Immunologic: Positive for immunocompromised state.   Neurological: Positive for weakness. Negative for light-headedness.   Psychiatric/Behavioral: Positive for confusion and decreased concentration. Negative for agitation. The patient is not nervous/anxious.      Objective:     Vital Signs (Most Recent):  Temp: (!) 101.1 °F (38.4 °C) (07/08/19 1139)  Pulse: 106 (07/08/19 1124)  Resp: (!) 23 (07/08/19 1133)  BP: (!) 151/84 (07/08/19 1133)  SpO2: 95 % (07/08/19 1133) Vital Signs (24h Range):  Temp:  [98.3 °F (36.8 °C)-101.8 °F (38.8 °C)] 101.1 °F (38.4 °C)  Pulse:  [] 106  Resp:  [16-23] 23  SpO2:  [92 %-95 %] 95 %  BP: (121-151)/(65-84) 151/84     Weight: 78 kg (172 lb)  Body mass index is 29.52 kg/m².      Intake/Output Summary (Last 24 hours) at 7/8/2019 1455  Last data filed at 7/8/2019 1017  Gross per 24 hour   Intake 605 ml   Output 0 ml   Net 605 ml       Physical Exam   Constitutional: She appears well-developed. No distress.   HENT:   Head: Normocephalic and atraumatic.   Eyes: Pupils are equal, round, and reactive to light. No scleral icterus.   Neck: Normal range of motion. Neck supple. No thyromegaly present.   Cardiovascular: Regular rhythm. Tachycardia present. Exam reveals no friction rub.   No murmur heard.  Pulmonary/Chest: Effort normal. She has decreased breath sounds in the right middle field, the right lower field and the left lower field. She has no wheezes. She has no rales.   Diminished RLL   Abdominal: Soft. She exhibits no distension. There is no  tenderness. There is no rebound and no guarding.   Healed chevron incision   Musculoskeletal: Normal range of motion.   Neurological: She is alert.   Oriented to person, easily re oriented   Skin: Skin is warm and dry. She is not diaphoretic.   Psychiatric: She has a normal mood and affect. Her behavior is normal.   Nursing note and vitals reviewed.      Laboratory:  CBC:   Recent Labs   Lab 07/08/19  0640   WBC 4.79   RBC 2.62*   HGB 7.5*   HCT 22.2*      MCV 85   MCH 28.6   MCHC 33.8     CMP:   Recent Labs   Lab 07/08/19  0640   GLU 82   CALCIUM 8.2*   ALBUMIN 2.1*   PROT 4.8*   *   K 3.2*   CO2 23   CL 95   BUN 14   CREATININE 0.8   ALKPHOS 202*   ALT 7*   AST 10   BILITOT 0.7     Labs within the past 24 hours have been reviewed.    Diagnostic Results:  None    Assessment/Plan:     * Fever  - Infectious work up ordered on admit - blood cx NGTD, UA unremarkable, CMV PCR 7/5 pending, chest x-ray w right pleural effusion, CT A/P reviewed.  - Started broad spectrum antibiotics on admit.   - Abdominal and head CT reviewed and no clear source for fever.   - ID consulted.  Apprec recs.   - Pt with intermittent confusion.  Easily re oriented.  Continue to monitor.   - Still have temp >101.  Plan for thora today, para tomorrow. Will cx fluid.       Traumatic intracranial hemorrhage without loss of consciousness  - Head CT reviewed and noted with interval reduction in size of the patient's known right occipital lobe parenchymal hemorrhage.      At risk for opportunistic infections  - cont bactrim for PCP prophylaxis  - cont Valcyte for CMV prophylaxis (CMV D+/R+).      Closed head injury  - admitted recently following fall diagnosed w acute parenchymal hemorrhage within right occipital live near parieto-occipital junction 1.8x1.3x1.5 w mild surrounding vasogenic edema and localized mass.  Repeat CT head showed reduction in size of hemorrhage.  - pt denies HA      Long-term use of immunosuppressant  medication  - Continue prograf.   - Holding MMF. Monitor prograf level daily, monitor for toxic side effects, and adjust for therapeutic dose.       Prophylactic immunotherapy  - See long term use of immunosuppression.       S/P liver transplant  - LFTs stable  - Pt with good hepatic allograft function.   - Last Liver US 6/24 showed Mildly elevated hepatic arterial resistive indices, although improved from prior exam.  Otherwise, satisfactory vascular appearance of the liver, complex fluid collection adjacent to the left hepatic lobe, similar to slightly smaller compared to prior exam, small volume of ascites and partially visualized right pleural effusion. Monitor.       Anemia of chronic disease  - no overt bleeding.    - keep type and screen current  - monitor w daily labs.      Pleural effusion, right  - CXR reviewed.  Thora ordered, will cx fluid.       Weakness  - PT/OT consulted.        Moderate malnutrition  - supplements ordered.      GERD (gastroesophageal reflux disease)  - cont Pepcid.          VTE Risk Mitigation (From admission, onward)        Ordered     Place sequential compression device  Until discontinued      07/05/19 1659     IP VTE HIGH RISK PATIENT  Once      07/05/19 1659          The patients clinical status was discussed at multidisplinary rounds, involving transplant surgery, transplant medicine, pharmacy, nursing, nutrition, and social work    Discharge Planning:  No plan for discharge.    Eugenia Albrecht, NP  Liver Transplant  [facility]

## 2019-07-08 NOTE — MEDICAL/APP STUDENT
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 Pt with recent admission to hospital due to R occipital lobe ICH from fall. Pt also reported non-productive cough [id number], diarrhea every other day for 1wk, fatigue. In ED Tmax noted to be 102.3F, infectious work-up started, patient currently on IV Vanc and Cefepime for empiric coverage. Pt also on Bactrim, valganciclovir. CXR during this admission showing increased pleural fluid on R - in addition to h/o fever and cough, concern for PNA. Patient is also ~1mth post-transplant, concern for intra-abdominal process also high.    PLAN:  - Continue Bactrim and Valganciclovir  - Continue IV Vancomycin, Cefepime (increased dose to 2g q8h)  - Add Metronidazole for anaerobic coverage   - If patient not responding, will plan to add atypical coverage  - Patient scheduled for IR thoracentesis and paracentesis, plan to send fluid for culture    ID will continue to follow.     Pura Greenfield, MS4  Infectious Diseases    SUBJECTIVE:     History of Present Illness:  Patient is a 49 y.o. 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 Pt with recent admission on 6/24 to hospital due to R occipital lobe ICH from fall. On 7/6, patient was seen by home health nurse who noted fever, confusion. Patient reports that she remembers feeling feverish, but does not remember being confused. She endorses a 10day h/o nonproductive cough,  fatigue, and diarrhea about every other day for 1 week. In ED Tmax noted to be 102.3F, patient was admitted for infectious work up, was started on IV Vanc and Cefepime. Patient also on Bactrim and valganciclovir, reports being compliant with medication.     Patient denies sick contact, recent travel, changes in diet. She does have 2 dogs at home, but has very limited contact with dogs. No other animal exposure.    Past Medical History:  Past Medical History:   Diagnosis Date    Cirrhosis     Esophageal varices 2018    Small with no banding     Essential hypertension 2018    Fatty liver 2018    GERD (gastroesophageal reflux disease)     Hx of colonic polyps 2018    On colonoscopy     Hypertension     Hypothyroidism 2018    Kidney stones     Morbid obesity 2018    Lap band with subsequent release    KADEEM (obstructive sleep apnea) 2018    Osteoarthritis 2018    Pulmonary nodule 2018    Vitamin D deficiency 2018       Past Surgical History:  Past Surgical History:   Procedure Laterality Date     SECTION      TIMES 2     CHOLECYSTECTOMY      LAPAROSCOPIC     CYSTOSCOPY W/ STONE MANIPULATION      kidney stone removal    EGD (ESOPHAGOGASTRODUODENOSCOPY) N/A 2019    Performed by Davian Hernández MD at [facility] ENDO (2ND FLR)    LAPAROSCOPIC GASTRIC BANDING  2006    removal     LIVER BIOPSY  2017    KESSLER with bridging 17    TRANSPLANT, LIVER N/A 2019    Performed by Clemente Brown Jr., MD at [facility] OR 2ND FLR    TRANSPLANT, LIVER N/A 2019    Performed by Clemente Brown Jr., MD at [facility] OR 2ND FLR       Family History:  Family History   Problem Relation Age of Onset    Heart disease Mother     Cancer Mother 50        colon cancer    Heart disease Father     Cancer Father 65        liver cancer    Breast cancer Maternal Grandmother        Social History:  Social History     Tobacco Use    Smoking  status: Never Smoker    Smokeless tobacco: Never Used    Tobacco comment: patient denies   Substance Use Topics    Alcohol use: No     Comment: patient denies    Drug use: No     Comment: patient denies       Allergies:  Review of patient's allergies indicates:   Allergen Reactions    Metformin Rash    Pcn [penicillins] Other (See Comments)     Unsure of reaction, states it was as a child. Tolerated rocephin at [facility]        Pertinent Medications:  Antibiotics (From admission, onward)    Start     Stop Route Frequency Ordered    07/07/19 2100  vancomycin 750 mg in dextrose 5 % 250 mL IVPB (ready to mix system)  (vancomycin IVPB)      -- IV Every 12 hours (non-standard times) 07/07/19 0832    07/06/19 0900  sulfamethoxazole-trimethoprim 400-80mg per tablet 1 tablet      -- Oral Daily 07/05/19 1658    07/06/19 0130  ceFEPIme injection 1 g      -- IV Every 8 hours (non-standard times) 07/05/19 2025            Review of Symptoms:  Review of Systems   Constitutional: Positive for fever and malaise/fatigue. Negative for chills and diaphoresis.   HENT: Negative for congestion, sinus pain and sore throat.    Respiratory: Positive for cough. Negative for sputum production and shortness of breath.    Cardiovascular: Negative for chest pain and palpitations.   Gastrointestinal: Positive for diarrhea and nausea (started yesterday). Negative for abdominal pain.   Genitourinary: Negative for dysuria, frequency and urgency.   Musculoskeletal: Negative for joint pain and myalgias.   Skin: Negative for rash.   Neurological: Negative for dizziness, weakness and headaches.         OBJECTIVE:     Vital Signs (Most Recent)  Temp: 98.4 °F (36.9 °C) (07/08/19 0743)  Pulse: 98 (07/08/19 1057)  Resp: (!) 22 (07/08/19 0720)  BP: (!) 141/76 (07/08/19 0720)  SpO2: (!) 94 % (07/08/19 0720)    Temperature Range Min/Max (Last 24H):  Temp:  [98.3 °F (36.8 °C)-101.8 °F (38.8 °C)]     Physical Exam:  Physical Exam   Constitutional: She is  oriented to person, place, and time.   Appears ill, weak   HENT:   Nose: Right sinus exhibits no maxillary sinus tenderness and no frontal sinus tenderness. Left sinus exhibits no maxillary sinus tenderness and no frontal sinus tenderness.   Mouth/Throat: Oropharynx is clear and moist.   Neck: No neck rigidity. Normal range of motion present.   Cardiovascular: Normal rate, regular rhythm and S1 normal.   Pulmonary/Chest: Effort normal. No accessory muscle usage. No respiratory distress. She has decreased breath sounds in the right lower field and the left lower field. She has no wheezes. She has no rales.   Abdominal: Bowel sounds are normal. She exhibits no distension. There is no tenderness. There is no rigidity and no guarding.   Surgical scar, clean, no drainage   Neurological: She is oriented to person, place, and time.   Skin: No rash noted. She is not diaphoretic.         Laboratory:  CBC:   Lab Results   Component Value Date    WBC 4.79 07/08/2019    HGB 7.5 (L) 07/08/2019    HCT 22.2 (L) 07/08/2019    MCV 85 07/08/2019     07/08/2019       BMP:   Recent Labs   Lab 07/08/19  0640   GLU 82   *   K 3.2*   CL 95   CO2 23   BUN 14   CREATININE 0.8   CALCIUM 8.2*   MG 1.8       LFT:   Lab Results   Component Value Date    ALT 7 (L) 07/08/2019    AST 10 07/08/2019     (H) 06/08/2019    ALKPHOS 202 (H) 07/08/2019    BILITOT 0.7 07/08/2019       Microbiology:    Microbiology Results (last 7 days)     Procedure Component Value Units Date/Time    Clostridium difficile EIA [[id number]]     Order Status:  No result Specimen:  Stool     Culture, Anaerobe [[id number]]     Order Status:  No result Specimen:  Body Fluid from Pleural Fluid     Aerobic culture [[id number]]     Order Status:  No result Specimen:  Body Fluid from Pleural Fluid     Gram stain [[id number]]     Order Status:  No result Specimen:  Body Fluid from Peritoneal Fluid     Gram stain [[id number]]     Order Status:  No result Specimen:   Body Fluid from Ascites     Culture, Anaerobe [[id number]]     Order Status:  No result Specimen:  Body Fluid from Ascites     Aerobic culture [[id number]]     Order Status:  No result Specimen:  Body Fluid from Ascites     Fungus culture [[id number]]     Order Status:  No result Specimen:  Body Fluid from Abdomen     Blood culture x two cultures. Draw prior to antibiotics. [[id number]] Collected:  07/05/19 1705    Order Status:  Completed Specimen:  Blood from Peripheral, Forearm, Right Updated:  07/07/19 2012     Blood Culture, Routine No Growth to date      No Growth to date      No Growth to date    Narrative:       Aerobic and anaerobic    Blood culture x two cultures. Draw prior to antibiotics. [[id number]] Collected:  07/05/19 1653    Order Status:  Completed Specimen:  Blood from Peripheral, Antecubital, Left Updated:  07/07/19 2012     Blood Culture, Routine No Growth to date      No Growth to date      No Growth to date    Narrative:       Aerobic and anaerobic          Diagnostic Results:  Imaging Results          CT Abdomen Pelvis  Without Contrast (Final result)  Result time 07/05/19 20:31:47    Final result by Jay Martinez MD (07/05/19 20:31:47)                 Impression:      Status post liver transplant with moderate volume ascites.  Splenomegaly.    Two 3-4 mm right renal nonobstructing stones.    Moderate right pleural effusion with associated compressive atelectasis of the right lower lobe, increased since prior examination.    Stable 4 mm left lower lobe nodule.    Other findings as above.    Electronically signed by resident: Stephanie Nielsen MD  Date:    07/05/2019  Time:    19:47    Electronically signed by: Jay Martinez MD  Date:    07/05/2019  Time:    20:31             Narrative:    EXAMINATION:  CT ABDOMEN PELVIS WITHOUT CONTRAST    CLINICAL HISTORY:  s/p liver transplant admitted with fever;    TECHNIQUE:  Low dose axial images, sagittal and coronal reformations were obtained from the lung  bases to the pubic symphysis, 30 mL of oral Omnipaque 350 was administered..    COMPARISON:  Ultrasound liver transplant 06/24/2019.  CT previous abdomen pelvis 04/22/2019.    FINDINGS:  Heart: Normal in size. No pericardial effusion.    Lung Bases: Moderate right pleural effusion with associated compressive atelectasis of the right lower lobe, increased since prior examination dated 04/22/2019.  The left lung is clear.  4 mm left lower lobe nodule stable since prior examination dated 04/05/2018.    Esophagus and stomach: There is a gastric band.  Small hiatal hernia.    Liver: Status post liver transplant.  Moderate volume ascites.    Gallbladder: Status post cholecystectomy.    Bile Ducts: No evidence of dilated ducts.    Pancreas: No mass or peripancreatic fat stranding.    Spleen: Mildly enlarged in size measuring 13 cm.  Splenic collaterals better evaluated on prior contrast study dated 04/22/2019.    Adrenals: Unremarkable.    Kidneys/ Ureters: There are two 3-4 mm right renal nonobstructing stones.  No evidence of hydronephrosis or hydroureter.    Bladder: Incompletely distended with no significant abnormality.    Reproductive organs: Unremarkable.    GI Tract/Mesentery: Bowel loops opacified with enteric contrast with no significant abnormality.  No evidence of bowel obstruction or inflammation.    Peritoneal Space: Moderate volume ascites.  No free air.    Retroperitoneum: Borderline enlarged aortocaval lymph node measuring 10 mm, previously subcentimeter, nonspecific.    Abdominal wall: Diffuse body wall edema.    Vasculature: Mild calcific atherosclerosis of the abdominal aorta.  No aneurysmal dilatation.    Bones: No acute fracture.                               CT Head Without Contrast (Final result)  Result time 07/05/19 19:56:24    Final result by Akin Moore MD (07/05/19 19:56:24)                 Impression:      #1.  Interval reduction in size of the patient's known right occipital lobe  "parenchymal hemorrhage.  No definitive new findings on this exam.  MRI may increase sensitivity.      Electronically signed by: Akin Moore MD  Date:    07/05/2019  Time:    19:56             Narrative:    EXAMINATION:  CT HEAD WITHOUT CONTRAST    CLINICAL HISTORY:  altered mental status hx of occipital bleed;    TECHNIQUE:  Low dose axial images were obtained through the head.  Coronal and sagittal reformations were also performed. Contrast was not administered.    COMPARISON:  CT head 06/24/2019, MRI brain 06/25/2019    FINDINGS:  Interval reduction in size of the patient's known right occipital lobe hemorrhage.  Grossly stable region of encephalomalacia involving the right temporal lobe may reflect a remote infarct.    There is chronic white matter ischemic change.  The brain parenchyma demonstrates no definitive evidence of new hemorrhage, infarct, or mass.  The ventricular system is grossly stable without evidence of mass effect or new hemorrhage.    No extra-axial mass or fluid.  The osseous structures are unremarkable.  The extracranial soft tissues are unremarkable.                               X-Ray Chest AP Portable (Final result)  Result time 07/05/19 17:07:59    Final result by Elle Mancini MD (07/05/19 17:07:59)                 Impression:      Please see above.      Electronically signed by: Elle Mancini MD  Date:    07/05/2019  Time:    17:07             Narrative:    EXAMINATION:  XR CHEST AP PORTABLE    CLINICAL HISTORY:  Sepsis;    TECHNIQUE:  Single frontal view of the chest was performed.    COMPARISON:  06/08/2019.    FINDINGS:  Mediastinal structures are midline.  There is no indwelling device; large-bore venous catheter present on 06/08/2019 has been discontinued.    There is increased attenuation at the base of the right hemithorax.  There is "displaced dome sign" reflecting right pleural fluid is largely subpulmonic.  I suspect some of the right pleural fluid also inserts into 1 " or both of the fissures of the right lung.  These findings are new since 06/08/2019.    Right pleural fluid obscures some detail of the right mid and lower lung zones.  I cannot exclude underlying disease on this single AP semi upright examination.  Such disease could include pneumonia or aspiration in light of the absence of right lung disease on 06/08/2019.  Right upper lung zone remains clear.    Left lung is clear.  I detect no left pleural fluid.    Cardiac silhouette is mildly enlarged but stable.  I detect no pulmonary edema.    I detect no pneumothorax, pneumomediastinum or pneumoperitoneum.    Right humeral head projects slightly cephalad to the glenoid fossa perhaps a function of patient positioning.  If the patient has evidence of true abnormality at the level of the right shoulder or pain, dedicated views might provide further information.                                ASSESSMENT/PLAN:     Active Hospital Problems    Diagnosis  POA    *Fever [R50.9]  Yes    Traumatic intracranial hemorrhage without loss of consciousness [S06.300A]  Yes    Long-term use of immunosuppressant medication [Z79.899]  Not Applicable    Prophylactic immunotherapy [Z29.8]  Not Applicable    S/P liver transplant [Z94.4]  Not Applicable    Anemia of chronic disease [D63.8]  Yes      Resolved Hospital Problems   No resolved problems to display.       Plan: Please see top of page

## 2019-07-08 NOTE — PLAN OF CARE
Problem: Adult Inpatient Plan of Care  Goal: Plan of Care Review  Outcome: Ongoing (interventions implemented as appropriate)  Pt is AAOx4-hallucinating, stand by assist, and VSS. Bed alarm set and pt instructed to call prior to getting out of bed-call bell within reach.  Pt denies pain. Telemetry and VISI monitoring in place. Thoracentesis and paracentesis ordered. Tmax of 101.1- PRN tylenol given. Pt's bed in lowest position and nonskid socks on. Will continue to monitor.

## 2019-07-08 NOTE — ASSESSMENT & PLAN NOTE
- admitted recently following fall diagnosed w acute parenchymal hemorrhage within right occipital live near parieto-occipital junction 1.8x1.3x1.5 w mild surrounding vasogenic edema and localized mass.  Repeat CT head showed reduction in size of hemorrhage.  - pt denies HA

## 2019-07-08 NOTE — ASSESSMENT & PLAN NOTE
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 She was started on empiric vanc/cefepime without resolution and ID has been consulted to comment on etiology/workup. Differential most notable for: pneumonia with complicating pleural effusion, possible IA infection (with ascites on imaging), or C.diff colitis (given loose stools). Given lack of instrumentation following recent ICH and no meningismus, no suspicion of bacterial meningitis. She remains tenuous.    - would continue vanc, cefepime (dose adjusted for pneumonia), and have added flagyl  - agree with thoracentesis and paracentesis to assess for infection  - remainder of prophylaxis per protocol  - await pending cultures and C.diff testing

## 2019-07-08 NOTE — ASSESSMENT & PLAN NOTE
- Continue prograf.   - Holding MMF. Monitor prograf level daily, monitor for toxic side effects, and adjust for therapeutic dose.

## 2019-07-08 NOTE — PLAN OF CARE
Problem: Adult Inpatient Plan of Care  Goal: Plan of Care Review  Outcome: Ongoing (interventions implemented as appropriate)  AOx4, TMAX 101.8, PRN tylenol. ABX admin maintained. Chev remains ADDI w/ SStrips. Remains on tele - NS (80s-90s). Pt more compliant w/ calling and medical compliance. Plan to obtain stool sample for infectious R/O. CXR scheduled for AM. Pt educated on importance of safety w/in hospital setting. Pt endorsed understanding but continued education preferred. Bed remains locked and lowered. Personal items and call light w/in reach. NAD, WCTM. Bed alarm continues to remain activated.

## 2019-07-08 NOTE — NURSING
Notified ROJAS Walker of increased temperature of 101.8. PRN tylenol administered to pt. Pt w/ frequent diarrhea episodes. Plan to collect stool. Pt has been voiding each time she has a BM so have been unsuccessful thus far. Will alert pt on need to collect stool and importance of trying not to urinate.

## 2019-07-08 NOTE — PROGRESS NOTES
Admit Note     Met with patient to assess needs. Patient is a 49 y.o.  female, admitted for post liver transplantation on 6/05/19; admit with fever..      Patient admitted from ED on 7/5/2019 .  At this time, patient presents as Patient oriented to person and place, but confused with time and occurrances this morning per bedside nurse; patient engaged with good eye contact and communicative.  At this time, patients caregiver presents as not present, at local lodging still.    Household/Family Systems     Patient resides with patient's  and daughter, at [address] [place] [address] [place] [address].  Support system includes  Freeman Zamudio and minor daughter Savannah (16)..  Patient does have dependents that are in need of being cared for. Patient's daughter Savannah are being cared for by patient's .  She is currently staying with patient and  at local lodging.     Patients primary caregiver is Freeman Zamudio, patients , phone number [phone].  Confirmed patients contact information is [phone] (home);   Telephone Information:   Mobile [phone]   .    During admission, patient's caregiver plans to stay at local lodging.  Confirmed patient and patients caregivers do have access to reliable transportation.    Cognitive Status/Learning     Patient reports reading ability as 12th grade and states patient does not have difficulty with N/A; however patient presenting with some confusion this morning.  Patient reports patient learns best by demonstration and reading material.   Needed: No.   Highest education level: High School (9-12) or GED    Vocation/Disability   .  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    Adherence     Patient reports a high level of adherence to patients health care regimen.  Adherence counseling and education provided. Patient verbalizes understanding.    Substance Use    Patient  reports the following substance usage.    Tobacco: none, patient denies any use.  Alcohol: none, patient denies any use.  Illicit Drugs/Non-prescribed Medications: none, patient denies any use.  Patient states clear understanding of the potential impact of substance use.  Substance abstinence/cessation counseling, education and resources provided and reviewed.     Services Utilizing/ADLS    Infusion Service: Prior to admission, patient utilizing? no  Home Health: Prior to admission, patient utilizing? yes , [facility] PT/OT  DME: Prior to admission, yes ; cane/Walker/WC/[facility]SC  Pulmonary/Cardiac Rehab: Prior to admission, no  Dialysis:  Prior to admission, no  Transplant Specialty Pharmacy:  Prior to admission, yes; [facility].    Prior to admission, patient reports patient was independent with ADLS and was not driving.  Patient reports patient is not able to care for self at this time due to compromised medical condition (as documented in medical record) and physical weakness..  Patient indicates a willingness to care for self once medically cleared to do so.    Insurance/Medications    Insured by   Payor/Plan Subscr  Sex Relation Sub. Ins. ID Effective Group Num   1.  - TRI* JANAE MCDANIEL 1971 Male Self [id number]                                     [id number], [facility] [id number]      Primary Insurance (for UNOS reporting): Public Insurance - Other Government  Secondary Insurance (for UNOS reporting): None    Patient reports patient is able to obtain and afford medications at this time and at time of discharge.    Living Will/Healthcare Power of     Patient states patient does not have a LW and/or HCPA.   provided education regarding LW and HCPA and the completion of forms.    Coping/Mental Health    Patient is coping well with the aid of  family members. Patient reports not fully understanding testing blood sugars and sliding scale insuline, but per  beside nurse, patient no longer needing to test BS or take insulin, part of patient's confusion today.  Patient with no noted history of mental health concerns..  Patient denies mental health difficulties.     Discharge Planning    At time of discharge, patient plans to return to [facility] apartments under the care of  Freeman.  Patients  will transport patient.  Per rounds today, expected discharge date has not been medically determined at this time. Patient and patients caregiver  verbalize understanding and are involved in treatment planning and discharge process.    Additional Concerns    Patient is being followed for needs, education, resources, information, emotional support, supportive counseling, and for supportive and skilled discharge plan of care.  providing ongoing psychosocial support, education, resources and d/c planning as needed.  SW remains available. Patient denies additional needs and/or concerns at this time. Patient verbalizes understanding and agreement with information reviewed, social work availability, and how to access available resources as needed.

## 2019-07-08 NOTE — ASSESSMENT & PLAN NOTE
- Head CT reviewed and noted with interval reduction in size of the patient's known right occipital lobe parenchymal hemorrhage.

## 2019-07-08 NOTE — CONSULTS
[facility]  Infectious Disease  Consult Note    Patient Name: Jihan Zamudio  MRN: [id number]  Admission Date: 7/5/2019  Hospital Length of Stay: 3 days  Attending Physician: Freddy Soler MD  Primary Care Provider: Primary Doctor No     Isolation Status: Special Contact    Patient information was obtained from patient, spouse/SO and past medical records.      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 She was started on empiric vanc/cefepime without resolution and ID has been consulted to comment on etiology/workup. Differential most notable for: pneumonia with complicating pleural effusion, possible IA infection (with ascites on imaging), or C.diff colitis (given loose stools). Given lack of instrumentation following recent ICH and no meningismus, no suspicion of bacterial meningitis. She remains tenuous.    - would continue vanc, cefepime (dose adjusted for pneumonia), and have added flagyl  - agree with thoracentesis and paracentesis to assess for infection  - remainder of prophylaxis per protocol  - await pending cultures and C.diff testing        Thank you for your consult. I will follow-up with patient. Please contact us if you have any additional questions.     Halle Marc MD  Transplant ID Attending  [id number]    Halle Marc MD  Infectious Disease  [facility]    Subjective:     Principal Problem: Fever    HPI: Mrs. Zamudio is a 48yo woman w/a history of KESSLER  cirrhosis (s/p DBDLT 2019, CMV D+/R+, steroid induction, on maintenance tacro/MMF/pred; c/b small traumatic R occipital lobe ICH following mechanical fall managed conservatively) who was admitted on 2019 with acute onset fevers, chills, headache (stable since ICH), dry cough (x10 days), and loose stools. She was started on empiric vanc/cefepime without resolution and ID has been consulted to comment on etiology/workup. Workup to date is notable for negative blood cultures, UA/urine cultures to date and large R pleural effusion with underlying infiltrate and moderate ascites on CT CAP. CMV quant and C.diff are pending given ongoing loose stools.    Past Medical History:   Diagnosis Date    Cirrhosis     Esophageal varices 2018    Small with no banding     Essential hypertension 2018    Fatty liver 2018    GERD (gastroesophageal reflux disease)     Hx of colonic polyps 2018    On colonoscopy     Hypertension     Hypothyroidism 2018    Kidney stones     Morbid obesity 2018    Lap band with subsequent release    KADEEM (obstructive sleep apnea) 2018    Osteoarthritis 2018    Pulmonary nodule 2018    Vitamin D deficiency 2018       Past Surgical History:   Procedure Laterality Date     SECTION      TIMES 2     CHOLECYSTECTOMY      LAPAROSCOPIC     CYSTOSCOPY W/ STONE MANIPULATION      kidney stone removal    EGD (ESOPHAGOGASTRODUODENOSCOPY) N/A 2019    Performed by Davian Hernández MD at [facility] (2ND FLR)    LAPAROSCOPIC GASTRIC BANDING  2006    removal     LIVER BIOPSY  2017    KESSLER with bridging 17    TRANSPLANT, LIVER N/A 2019    Performed by Clemente Brown Jr., MD at [facility] 2ND FLR    TRANSPLANT, LIVER N/A 2019    Performed by Clemente Brown Jr., MD at [facility] 2ND FLR       Review of patient's allergies indicates:   Allergen Reactions    Metformin Rash    Pcn [penicillins]  "Other (See Comments)     Unsure of reaction, states it was as a child. Tolerated rocephin at [facility]       Medications:  Medications Prior to Admission   Medication Sig    famotidine (PEPCID) 20 MG tablet Take 1 tablet (20 mg total) by mouth every evening.    furosemide (LASIX) 40 MG tablet Take 1 tablet (40 mg total) by mouth once daily. for 10 days    lancets 28 gauge Misc Test blood glucose 3 (three) times daily.    levothyroxine (SYNTHROID) 112 MCG tablet Take 1 tablet (112 mcg total) by mouth once daily.    mycophenolate (CELLCEPT) 250 mg Cap Take 2 capsules (500 mg total) by mouth 2 (two) times daily. (Patient taking differently: Take 1,000 mg by mouth 2 (two) times daily. )    pen needle, diabetic 32 gauge x 5/32" Ndle Use to inject insulin into the skin 3 (three) times daily.    predniSONE (DELTASONE) 10 MG tablet Take 20 mg by mouth daily 6/12-6/18; 15 mg daily 6/19-6/25; 10 mg  daily 6/26-7/2; 5 mg  daily 7/3-7/9; stop: 7/10/19    sodium bicarbonate 650 MG tablet Take 1 tablet (650 mg total) by mouth 2 (two) times daily.    sulfamethoxazole-trimethoprim 400-80mg (BACTRIM,SEPTRA) 400-80 mg per tablet Take 1 tablet by mouth once daily. Stop: 12/3/19    tacrolimus (PROGRAF) 1 MG Cap Take 3 capsules (3 mg total) by mouth every 12 (twelve) hours.    valGANciclovir (VALCYTE) 450 mg Tab Take 1 tablet (450 mg total) by mouth once daily. Stop: 9/4/19    blood sugar diagnostic Strp Test blood glucose 3 (three) times daily.    blood-glucose meter kit Use as instructed    levETIRAcetam (KEPPRA) 500 MG Tab Take 1 tablet (500 mg total) by mouth 2 (two) times daily. for 7 days    oxyCODONE (ROXICODONE) 10 mg Tab immediate release tablet Take 1 tablet (10 mg total) by mouth every 4 (four) hours as needed.     Antibiotics (From admission, onward)    Start     Stop Route Frequency Ordered    07/08/19 1500  metroNIDAZOLE tablet 500 mg      -- Oral Every 8 hours 07/08/19 1346    07/08/19 1500  ceFEPIme in dextrose " 5% 2 gram/50 mL IVPB 2 g      -- IV Every 8 hours (non-standard times) 07/08/19 1347    07/07/19 2100  vancomycin 750 mg in dextrose 5 % 250 mL IVPB (ready to mix system)  (vancomycin IVPB)      -- IV Every 12 hours (non-standard times) 07/07/19 0832    07/06/19 0900  sulfamethoxazole-trimethoprim 400-80mg per tablet 1 tablet      -- Oral Daily 07/05/19 1658        Antifungals (From admission, onward)    None        Antivirals (From admission, onward)        Stop Route Frequency     valGANciclovir      -- Oral Daily             There is no immunization history on file for this patient.    Family History     Problem Relation (Age of Onset)    Breast cancer Maternal Grandmother    Cancer Mother (50), Father (65)    Heart disease Mother, Father        Social History     Socioeconomic History    Marital status:      Spouse name: Not on file    Number of children: Not on file    Years of education: Not on file    Highest education level: Not on file   Occupational History    Occupation: homemaker   Social Needs    Financial resource strain: Not on file    Food insecurity:     Worry: Not on file     Inability: Not on file    Transportation needs:     Medical: Not on file     Non-medical: Not on file   Tobacco Use    Smoking status: Never Smoker    Smokeless tobacco: Never Used    Tobacco comment: patient denies   Substance and Sexual Activity    Alcohol use: No     Comment: patient denies    Drug use: No     Comment: patient denies    Sexual activity: Not on file   Lifestyle    Physical activity:     Days per week: Not on file     Minutes per session: Not on file    Stress: Not on file   Relationships    Social connections:     Talks on phone: Not on file     Gets together: Not on file     Attends Yazidism service: Not on file     Active member of club or organization: Not on file     Attends meetings of clubs or organizations: Not on file     Relationship status: Not on file   Other Topics  Concern    Not on file   Social History Narrative    Not on file     Review of Systems   Constitutional: Positive for activity change, appetite change and fever. Negative for chills and diaphoresis.   HENT: Negative for ear pain, mouth sores, sinus pressure and sore throat.    Eyes: Negative for photophobia, pain and redness.   Respiratory: Positive for cough. Negative for shortness of breath and wheezing.    Cardiovascular: Negative for chest pain and leg swelling.   Gastrointestinal: Positive for diarrhea. Negative for abdominal distention, abdominal pain, nausea and vomiting.   Genitourinary: Negative for decreased urine volume, difficulty urinating, dysuria, flank pain, frequency and urgency.   Musculoskeletal: Negative for arthralgias, back pain, gait problem and myalgias.   Skin: Positive for wound (chevron well healed). Negative for pallor and rash.   Allergic/Immunologic: Positive for immunocompromised state.   Neurological: Positive for weakness. Negative for dizziness, tremors, seizures, light-headedness and headaches.   Psychiatric/Behavioral: Positive for confusion and decreased concentration. Negative for agitation. The patient is not nervous/anxious.      Objective:     Vital Signs (Most Recent):  Temp: 99 °F (37.2 °C) (07/08/19 1550)  Pulse: 92 (07/08/19 1508)  Resp: (!) 23 (07/08/19 1133)  BP: (!) 151/84 (07/08/19 1133)  SpO2: 95 % (07/08/19 1133) Vital Signs (24h Range):  Temp:  [98.3 °F (36.8 °C)-101.8 °F (38.8 °C)] 99 °F (37.2 °C)  Pulse:  [] 92  Resp:  [16-23] 23  SpO2:  [92 %-95 %] 95 %  BP: (121-151)/(65-84) 151/84     Weight: 78 kg (172 lb)  Body mass index is 29.52 kg/m².    Estimated Creatinine Clearance: 84.1 mL/min (based on SCr of 0.8 mg/dL).    Physical Exam   Constitutional: She appears well-developed. No distress.   HENT:   Head: Normocephalic and atraumatic.   Eyes: Pupils are equal, round, and reactive to light. No scleral icterus.   Neck: Normal range of motion. Neck supple.  No thyromegaly present.   Cardiovascular: Regular rhythm. Tachycardia present. Exam reveals no friction rub.   No murmur heard.  Pulmonary/Chest: Effort normal. She has decreased breath sounds in the right middle field, the right lower field and the left lower field. She has no wheezes. She has no rales.   Diminished RLL   Abdominal: Soft. She exhibits no distension. There is no tenderness. There is no rebound and no guarding.   Healed chevron incision   Musculoskeletal: Normal range of motion.   Neurological: She is alert.   Oriented to person, easily re oriented   Skin: Skin is warm and dry. She is not diaphoretic.   Psychiatric: She has a normal mood and affect. Her behavior is normal.   Nursing note and vitals reviewed.      Significant Labs:   CBC:   Recent Labs   Lab 07/07/19  0726 07/08/19  0640   WBC 5.45 4.79   HGB 8.3* 7.5*   HCT 24.2* 22.2*    186     CMP:   Recent Labs   Lab 07/07/19  0726 07/08/19  0640   * 127*   K 3.7 3.2*   CL 95 95   CO2 21* 23   GLU 75 82   BUN 16 14   CREATININE 0.8 0.8   CALCIUM 8.0* 8.2*   PROT 5.0* 4.8*   ALBUMIN 2.2* 2.1*   BILITOT 0.9 0.7   ALKPHOS 245* 202*   AST 10 10   ALT 7* 7*   ANIONGAP 10 9   EGFRNONAA >60.0 >60.0       Significant Imaging: I have reviewed all pertinent imaging results/findings within the past 24 hours.

## 2019-07-08 NOTE — SUBJECTIVE & OBJECTIVE
Past Medical History:   Diagnosis Date    Cirrhosis     Esophageal varices 2018    Small with no banding     Essential hypertension 2018    Fatty liver 2018    GERD (gastroesophageal reflux disease)     Hx of colonic polyps 2018    On colonoscopy     Hypertension     Hypothyroidism 2018    Kidney stones     Morbid obesity 2018    Lap band with subsequent release    KADEEM (obstructive sleep apnea) 2018    Osteoarthritis 2018    Pulmonary nodule 2018    Vitamin D deficiency 2018       Past Surgical History:   Procedure Laterality Date     SECTION      TIMES 2     CHOLECYSTECTOMY      LAPAROSCOPIC     CYSTOSCOPY W/ STONE MANIPULATION      kidney stone removal    EGD (ESOPHAGOGASTRODUODENOSCOPY) N/A 2019    Performed by Davian Hernández MD at [facility] (2ND FLR)    LAPAROSCOPIC GASTRIC BANDING      removal     LIVER BIOPSY  2017    KESSLER with bridging 17    TRANSPLANT, LIVER N/A 2019    Performed by Clemente Brown Jr., MD at [facility] 2ND FLR    TRANSPLANT, LIVER N/A 2019    Performed by Clemente Brown Jr., MD at [facility] 2ND FLR       Review of patient's allergies indicates:   Allergen Reactions    Metformin Rash    Pcn [penicillins] Other (See Comments)     Unsure of reaction, states it was as a child. Tolerated [facility]phin at [facility]       Medications:  Medications Prior to Admission   Medication Sig    famotidine (PEPCID) 20 MG tablet Take 1 tablet (20 mg total) by mouth every evening.    furosemide (LASIX) 40 MG tablet Take 1 tablet (40 mg total) by mouth once daily. for 10 days    lancets 28 gauge Misc Test blood glucose 3 (three) times daily.    levothyroxine (SYNTHROID) 112 MCG tablet Take 1 tablet (112 mcg total) by mouth once daily.    mycophenolate (CELLCEPT) 250 mg Cap Take 2 capsules (500 mg total) by mouth 2 (two) times daily. (Patient taking differently: Take 1,000 mg by  "mouth 2 (two) times daily. )    pen needle, diabetic 32 gauge x 5/32" Ndle Use to inject insulin into the skin 3 (three) times daily.    predniSONE (DELTASONE) 10 MG tablet Take 20 mg by mouth daily 6/12-6/18; 15 mg daily 6/19-6/25; 10 mg  daily 6/26-7/2; 5 mg  daily 7/3-7/9; stop: 7/10/19    sodium bicarbonate 650 MG tablet Take 1 tablet (650 mg total) by mouth 2 (two) times daily.    sulfamethoxazole-trimethoprim 400-80mg (BACTRIM,SEPTRA) 400-80 mg per tablet Take 1 tablet by mouth once daily. Stop: 12/3/19    tacrolimus (PROGRAF) 1 MG Cap Take 3 capsules (3 mg total) by mouth every 12 (twelve) hours.    valGANciclovir (VALCYTE) 450 mg Tab Take 1 tablet (450 mg total) by mouth once daily. Stop: 9/4/19    blood sugar diagnostic Strp Test blood glucose 3 (three) times daily.    blood-glucose meter kit Use as instructed    levETIRAcetam (KEPPRA) 500 MG Tab Take 1 tablet (500 mg total) by mouth 2 (two) times daily. for 7 days    oxyCODONE (ROXICODONE) 10 mg Tab immediate release tablet Take 1 tablet (10 mg total) by mouth every 4 (four) hours as needed.     Antibiotics (From admission, onward)    Start     Stop Route Frequency Ordered    07/08/19 1500  metroNIDAZOLE tablet 500 mg      -- Oral Every 8 hours 07/08/19 1346    07/08/19 1500  ceFEPIme in dextrose 5% 2 gram/50 mL IVPB 2 g      -- IV Every 8 hours (non-standard times) 07/08/19 1347    07/07/19 2100  vancomycin 750 mg in dextrose 5 % 250 mL IVPB (ready to mix system)  (vancomycin IVPB)      -- IV Every 12 hours (non-standard times) 07/07/19 0832    07/06/19 0900  sulfamethoxazole-trimethoprim 400-80mg per tablet 1 tablet      -- Oral Daily 07/05/19 1658        Antifungals (From admission, onward)    None        Antivirals (From admission, onward)        Stop Route Frequency     valGANciclovir      -- Oral Daily             There is no immunization history on file for this patient.    Family History     Problem Relation (Age of Onset)    Breast " cancer Maternal Grandmother    Cancer Mother (50), Father (65)    Heart disease Mother, Father        Social History     Socioeconomic History    Marital status:      Spouse name: Not on file    Number of children: Not on file    Years of education: Not on file    Highest education level: Not on file   Occupational History    Occupation: homemaker   Social Needs    Financial resource strain: Not on file    Food insecurity:     Worry: Not on file     Inability: Not on file    Transportation needs:     Medical: Not on file     Non-medical: Not on file   Tobacco Use    Smoking status: Never Smoker    Smokeless tobacco: Never Used    Tobacco comment: patient denies   Substance and Sexual Activity    Alcohol use: No     Comment: patient denies    Drug use: No     Comment: patient denies    Sexual activity: Not on file   Lifestyle    Physical activity:     Days per week: Not on file     Minutes per session: Not on file    Stress: Not on file   Relationships    Social connections:     Talks on phone: Not on file     Gets together: Not on file     Attends Uatsdin service: Not on file     Active member of club or organization: Not on file     Attends meetings of clubs or organizations: Not on file     Relationship status: Not on file   Other Topics Concern    Not on file   Social History Narrative    Not on file     Review of Systems   Constitutional: Positive for activity change, appetite change and fever. Negative for chills and diaphoresis.   HENT: Negative for ear pain, mouth sores, sinus pressure and sore throat.    Eyes: Negative for photophobia, pain and redness.   Respiratory: Positive for cough. Negative for shortness of breath and wheezing.    Cardiovascular: Negative for chest pain and leg swelling.   Gastrointestinal: Positive for diarrhea. Negative for abdominal distention, abdominal pain, nausea and vomiting.   Genitourinary: Negative for decreased urine volume, difficulty urinating,  dysuria, flank pain, frequency and urgency.   Musculoskeletal: Negative for arthralgias, back pain, gait problem and myalgias.   Skin: Positive for wound (chevron well healed). Negative for pallor and rash.   Allergic/Immunologic: Positive for immunocompromised state.   Neurological: Positive for weakness. Negative for dizziness, tremors, seizures, light-headedness and headaches.   Psychiatric/Behavioral: Positive for confusion and decreased concentration. Negative for agitation. The patient is not nervous/anxious.      Objective:     Vital Signs (Most Recent):  Temp: 99 °F (37.2 °C) (07/08/19 1550)  Pulse: 92 (07/08/19 1508)  Resp: (!) 23 (07/08/19 1133)  BP: (!) 151/84 (07/08/19 1133)  SpO2: 95 % (07/08/19 1133) Vital Signs (24h Range):  Temp:  [98.3 °F (36.8 °C)-101.8 °F (38.8 °C)] 99 °F (37.2 °C)  Pulse:  [] 92  Resp:  [16-23] 23  SpO2:  [92 %-95 %] 95 %  BP: (121-151)/(65-84) 151/84     Weight: 78 kg (172 lb)  Body mass index is 29.52 kg/m².    Estimated Creatinine Clearance: 84.1 mL/min (based on SCr of 0.8 mg/dL).    Physical Exam   Constitutional: She appears well-developed. No distress.   HENT:   Head: Normocephalic and atraumatic.   Eyes: Pupils are equal, round, and reactive to light. No scleral icterus.   Neck: Normal range of motion. Neck supple. No thyromegaly present.   Cardiovascular: Regular rhythm. Tachycardia present. Exam reveals no friction rub.   No murmur heard.  Pulmonary/Chest: Effort normal. She has decreased breath sounds in the right middle field, the right lower field and the left lower field. She has no wheezes. She has no rales.   Diminished RLL   Abdominal: Soft. She exhibits no distension. There is no tenderness. There is no rebound and no guarding.   Healed chevron incision   Musculoskeletal: Normal range of motion.   Neurological: She is alert.   Oriented to person, easily re oriented   Skin: Skin is warm and dry. She is not diaphoretic.   Psychiatric: She has a normal mood  and affect. Her behavior is normal.   Nursing note and vitals reviewed.      Significant Labs:   CBC:   Recent Labs   Lab 07/07/19  0726 07/08/19  0640   WBC 5.45 4.79   HGB 8.3* 7.5*   HCT 24.2* 22.2*    186     CMP:   Recent Labs   Lab 07/07/19  0726 07/08/19  0640   * 127*   K 3.7 3.2*   CL 95 95   CO2 21* 23   GLU 75 82   BUN 16 14   CREATININE 0.8 0.8   CALCIUM 8.0* 8.2*   PROT 5.0* 4.8*   ALBUMIN 2.2* 2.1*   BILITOT 0.9 0.7   ALKPHOS 245* 202*   AST 10 10   ALT 7* 7*   ANIONGAP 10 9   EGFRNONAA >60.0 >60.0       Significant Imaging: I have reviewed all pertinent imaging results/findings within the past 24 hours.

## 2019-07-08 NOTE — ASSESSMENT & PLAN NOTE
- LFTs stable  - Pt with good hepatic allograft function.   - Last Liver US 6/24 showed Mildly elevated hepatic arterial resistive indices, although improved from prior exam.  Otherwise, satisfactory vascular appearance of the liver, complex fluid collection adjacent to the left hepatic lobe, similar to slightly smaller compared to prior exam, small volume of ascites and partially visualized right pleural effusion. Monitor.

## 2019-07-08 NOTE — CARE UPDATE
Rapid Response Nurse Chart Check     Chart check completed, abnormal VS noted, bedside RN, Siria contacted.  RN notified MD. Patient presented to ED 7/5 with fever x1 day, and change in mental status. Work up with no clear source of infection to date. Currently on Cefepime. Patient is having frequent diarrhea. RN to collect sample for C. Diff. MD to place consult to ID.   No other concerns verbalized at this time, instructed to call [phone] for further concerns or assistance.

## 2019-07-08 NOTE — ASSESSMENT & PLAN NOTE
- Infectious work up ordered on admit - blood cx NGTD, UA unremarkable, CMV PCR 7/5 pending, chest x-ray w right pleural effusion, CT A/P reviewed.  - Started broad spectrum antibiotics on admit.   - Abdominal and head CT reviewed and no clear source for fever.   - ID consulted.  Apprec recs.   - Pt with intermittent confusion.  Easily re oriented.  Continue to monitor.   - plan for thora today, para tomorrow. Will cx fluid.

## 2019-07-08 NOTE — SUBJECTIVE & OBJECTIVE
Past Medical History:   Diagnosis Date    Cirrhosis     Esophageal varices 2018    Small with no banding     Essential hypertension 2018    Fatty liver 2018    GERD (gastroesophageal reflux disease)     Hx of colonic polyps 2018    On colonoscopy     Hypertension     Hypothyroidism 2018    Kidney stones     Morbid obesity 2018    Lap band with subsequent release    KADEEM (obstructive sleep apnea) 2018    Osteoarthritis 2018    Pulmonary nodule 2018    Vitamin D deficiency 2018       Past Surgical History:   Procedure Laterality Date     SECTION      TIMES 2     CHOLECYSTECTOMY      LAPAROSCOPIC     CYSTOSCOPY W/ STONE MANIPULATION      kidney stone removal    EGD (ESOPHAGOGASTRODUODENOSCOPY) N/A 2019    Performed by Davian Hern[facility]dez MD at [facility] (2ND FLR)    LAPAROSCOPIC GASTRIC BANDING      removal     LIVER BIOPSY  2017    KESSLER with bridging 17    TRANSPLANT, LIVER N/A 2019    Performed by Clemente Brown Jr., MD at [facility] OR 2ND FLR    TRANSPLANT, LIVER N/A 2019    Performed by Clemente Brown Jr., MD at [facility] OR 2ND FLR       Review of patient's allergies indicates:   Allergen Reactions    Metformin Rash    Pcn [penicillins] Other (See Comments)     Unsure of reaction, states it was as a child. Tolerated rocephin at [facility]       Family History     Problem Relation (Age of Onset)    Breast cancer Maternal Grandmother    Cancer Mother (50), Father (65)    Heart disease Mother, Father        Tobacco Use    Smoking status: Never Smoker    Smokeless tobacco: Never Used    Tobacco comment: patient denies   Substance and Sexual Activity    Alcohol use: No     Comment: patient denies    Drug use: No     Comment: patient denies    Sexual activity: Not on file       PTA Medications   Medication Sig    famotidine (PEPCID) 20 MG tablet Take 1 tablet (20 mg total) by mouth every evening.  "   furosemide (LASIX) 40 MG tablet Take 1 tablet (40 mg total) by mouth once daily. for 10 days    lancets 28 gauge Misc Test blood glucose 3 (three) times daily.    levothyroxine (SYNTHROID) 112 MCG tablet Take 1 tablet (112 mcg total) by mouth once daily.    mycophenolate (CELLCEPT) 250 mg Cap Take 2 capsules (500 mg total) by mouth 2 (two) times daily. (Patient taking differently: Take 1,000 mg by mouth 2 (two) times daily. )    pen needle, diabetic 32 gauge x 5/32" Ndle Use to inject insulin into the skin 3 (three) times daily.    predniSONE (DELTASONE) 10 MG tablet Take 20 mg by mouth daily 6/12-6/18; 15 mg daily 6/19-6/25; 10 mg  daily 6/26-7/2; 5 mg  daily 7/3-7/9; stop: 7/10/19    sodium bicarbonate 650 MG tablet Take 1 tablet (650 mg total) by mouth 2 (two) times daily.    sulfamethoxazole-trimethoprim 400-80mg (BACTRIM,SEPTRA) 400-80 mg per tablet Take 1 tablet by mouth once daily. Stop: 12/3/19    tacrolimus (PROGRAF) 1 MG Cap Take 3 capsules (3 mg total) by mouth every 12 (twelve) hours.    valGANciclovir (VALCYTE) 450 mg Tab Take 1 tablet (450 mg total) by mouth once daily. Stop: 9/4/19    blood sugar diagnostic Strp Test blood glucose 3 (three) times daily.    blood-glucose meter kit Use as instructed    levETIRAcetam (KEPPRA) 500 MG Tab Take 1 tablet (500 mg total) by mouth 2 (two) times daily. for 7 days    oxyCODONE (ROXICODONE) 10 mg Tab immediate release tablet Take 1 tablet (10 mg total) by mouth every 4 (four) hours as needed.       Review of Systems   Constitutional: Positive for activity change, appetite change and fever. Negative for chills.   Respiratory: Negative for cough, shortness of breath and wheezing.    Cardiovascular: Negative for leg swelling.   Gastrointestinal: Positive for diarrhea. Negative for abdominal distention, abdominal pain, nausea and vomiting.   Genitourinary: Negative for decreased urine volume, difficulty urinating and dysuria.   Skin: Positive for " wound (chevron well healed).   Allergic/Immunologic: Positive for immunocompromised state.   Neurological: Positive for weakness. Negative for light-headedness.   Psychiatric/Behavioral: Positive for confusion and decreased concentration. Negative for agitation. The patient is not nervous/anxious.      Objective:     Vital Signs (Most Recent):  Temp: (!) 101.1 °F (38.4 °C) (07/08/19 1139)  Pulse: 106 (07/08/19 1124)  Resp: (!) 23 (07/08/19 1133)  BP: (!) 151/84 (07/08/19 1133)  SpO2: 95 % (07/08/19 1133) Vital Signs (24h Range):  Temp:  [98.3 °F (36.8 °C)-101.8 °F (38.8 °C)] 101.1 °F (38.4 °C)  Pulse:  [] 106  Resp:  [16-23] 23  SpO2:  [92 %-95 %] 95 %  BP: (121-151)/(65-84) 151/84     Weight: 78 kg (172 lb)  Body mass index is 29.52 kg/m².      Intake/Output Summary (Last 24 hours) at 7/8/2019 1455  Last data filed at 7/8/2019 1017  Gross per 24 hour   Intake 605 ml   Output 0 ml   Net 605 ml       Physical Exam   Constitutional: She appears well-developed. No distress.   HENT:   Head: Normocephalic and atraumatic.   Eyes: Pupils are equal, round, and reactive to light. No scleral icterus.   Neck: Normal range of motion. Neck supple. No thyromegaly present.   Cardiovascular: Regular rhythm. Tachycardia present. Exam reveals no friction rub.   No murmur heard.  Pulmonary/Chest: Effort normal. She has decreased breath sounds in the right middle field, the right lower field and the left lower field. She has no wheezes. She has no rales.   Diminished RLL   Abdominal: Soft. She exhibits no distension. There is no tenderness. There is no rebound and no guarding.   Healed chevron incision   Musculoskeletal: Normal range of motion.   Neurological: She is alert.   Oriented to person, easily re oriented   Skin: Skin is warm and dry. She is not diaphoretic.   Psychiatric: She has a normal mood and affect. Her behavior is normal.   Nursing note and vitals reviewed.      Laboratory:  CBC:   Recent Labs   Lab  07/08/19  0640   WBC 4.79   RBC 2.62*   HGB 7.5*   HCT 22.2*      MCV 85   MCH 28.6   MCHC 33.8     CMP:   Recent Labs   Lab 07/08/19  0640   GLU 82   CALCIUM 8.2*   ALBUMIN 2.1*   PROT 4.8*   *   K 3.2*   CO2 23   CL 95   BUN 14   CREATININE 0.8   ALKPHOS 202*   ALT 7*   AST 10   BILITOT 0.7     Labs within the past 24 hours have been reviewed.    Diagnostic Results:  None

## 2019-07-09 LAB
ALBUMIN SERPL BCP-MCNC: 2 G/DL (ref 3.5–5.2)
ALP SERPL-CCNC: 191 U/L (ref 55–135)
ALT SERPL W/O P-5'-P-CCNC: <5 U/L (ref 10–44)
ANION GAP SERPL CALC-SCNC: 11 MMOL/L (ref 8–16)
AST SERPL-CCNC: 11 U/L (ref 10–40)
BASOPHILS # BLD AUTO: 0.04 K/UL (ref 0–0.2)
BASOPHILS NFR BLD: 0.7 % (ref 0–1.9)
BILIRUB SERPL-MCNC: 0.6 MG/DL (ref 0.1–1)
BLD PROD TYP BPU: NORMAL
BLOOD UNIT EXPIRATION DATE: NORMAL
BLOOD UNIT TYPE CODE: 5100
BLOOD UNIT TYPE: NORMAL
BUN SERPL-MCNC: 13 MG/DL (ref 6–20)
CALCIUM SERPL-MCNC: 8.1 MG/DL (ref 8.7–10.5)
CHLORIDE SERPL-SCNC: 96 MMOL/L (ref 95–110)
CO2 SERPL-SCNC: 21 MMOL/L (ref 23–29)
CODING SYSTEM: NORMAL
CREAT SERPL-MCNC: 0.7 MG/DL (ref 0.5–1.4)
DIFFERENTIAL METHOD: ABNORMAL
DISPENSE STATUS: NORMAL
EOSINOPHIL # BLD AUTO: 0.1 K/UL (ref 0–0.5)
EOSINOPHIL NFR BLD: 1.1 % (ref 0–8)
ERYTHROCYTE [DISTWIDTH] IN BLOOD BY AUTOMATED COUNT: 15.9 % (ref 11.5–14.5)
EST. GFR  (AFRICAN AMERICAN): >60 ML/MIN/1.73 M^2
EST. GFR  (NON AFRICAN AMERICAN): >60 ML/MIN/1.73 M^2
FUNGUS SPEC CULT: NORMAL
GLUCOSE SERPL-MCNC: 77 MG/DL (ref 70–110)
GRAM STN SPEC: NORMAL
GRAM STN SPEC: NORMAL
HCT VFR BLD AUTO: 23.1 % (ref 37–48.5)
HGB BLD-MCNC: 7.5 G/DL (ref 12–16)
IMM GRANULOCYTES # BLD AUTO: 0.07 K/UL (ref 0–0.04)
IMM GRANULOCYTES NFR BLD AUTO: 1.2 % (ref 0–0.5)
LYMPHOCYTES # BLD AUTO: 0.2 K/UL (ref 1–4.8)
LYMPHOCYTES NFR BLD: 4.1 % (ref 18–48)
MAGNESIUM SERPL-MCNC: 1.7 MG/DL (ref 1.6–2.6)
MCH RBC QN AUTO: 28.4 PG (ref 27–31)
MCHC RBC AUTO-ENTMCNC: 32.5 G/DL (ref 32–36)
MCV RBC AUTO: 88 FL (ref 82–98)
MONOCYTES # BLD AUTO: 0.4 K/UL (ref 0.3–1)
MONOCYTES NFR BLD: 6.4 % (ref 4–15)
NEUTROPHILS # BLD AUTO: 4.9 K/UL (ref 1.8–7.7)
NEUTROPHILS NFR BLD: 86.5 % (ref 38–73)
NRBC BLD-RTO: 0 /100 WBC
PLATELET # BLD AUTO: 176 K/UL (ref 150–350)
PMV BLD AUTO: 10 FL (ref 9.2–12.9)
POTASSIUM SERPL-SCNC: 2.9 MMOL/L (ref 3.5–5.1)
PROT SERPL-MCNC: 5 G/DL (ref 6–8.4)
RBC # BLD AUTO: 2.64 M/UL (ref 4–5.4)
SODIUM SERPL-SCNC: 128 MMOL/L (ref 136–145)
TACROLIMUS BLD-MCNC: 5.6 NG/ML (ref 5–15)
TRANS ERYTHROCYTES VOL PATIENT: NORMAL ML
VANCOMYCIN TROUGH SERPL-MCNC: 18.1 UG/ML (ref 10–22)
WBC # BLD AUTO: 5.62 K/UL (ref 3.9–12.7)

## 2019-07-09 PROCEDURE — 97161 PT EVAL LOW COMPLEX 20 MIN: CPT

## 2019-07-09 PROCEDURE — 87102 FUNGUS ISOLATION CULTURE: CPT

## 2019-07-09 PROCEDURE — 63600175 PHARM REV CODE 636 W HCPCS: Performed by: INTERNAL MEDICINE

## 2019-07-09 PROCEDURE — 25000003 PHARM REV CODE 250: Performed by: NURSE PRACTITIONER

## 2019-07-09 PROCEDURE — 85025 COMPLETE CBC W/AUTO DIFF WBC: CPT

## 2019-07-09 PROCEDURE — 87075 CULTR BACTERIA EXCEPT BLOOD: CPT

## 2019-07-09 PROCEDURE — 27000221 HC OXYGEN, UP TO 24 HOURS

## 2019-07-09 PROCEDURE — 99233 PR SUBSEQUENT HOSPITAL CARE,LEVL III: ICD-10-PCS | Mod: 24,,, | Performed by: NURSE PRACTITIONER

## 2019-07-09 PROCEDURE — 25000003 PHARM REV CODE 250: Performed by: PHYSICIAN ASSISTANT

## 2019-07-09 PROCEDURE — 99233 SBSQ HOSP IP/OBS HIGH 50: CPT | Mod: ,,, | Performed by: INTERNAL MEDICINE

## 2019-07-09 PROCEDURE — 20600001 HC STEP DOWN PRIVATE ROOM

## 2019-07-09 PROCEDURE — 25000003 PHARM REV CODE 250: Performed by: TRANSPLANT SURGERY

## 2019-07-09 PROCEDURE — 63600175 PHARM REV CODE 636 W HCPCS: Performed by: TRANSPLANT SURGERY

## 2019-07-09 PROCEDURE — 94761 N-INVAS EAR/PLS OXIMETRY MLT: CPT

## 2019-07-09 PROCEDURE — 87205 SMEAR GRAM STAIN: CPT

## 2019-07-09 PROCEDURE — 80202 ASSAY OF VANCOMYCIN: CPT

## 2019-07-09 PROCEDURE — P9021 RED BLOOD CELLS UNIT: HCPCS

## 2019-07-09 PROCEDURE — 83735 ASSAY OF MAGNESIUM: CPT

## 2019-07-09 PROCEDURE — 97165 OT EVAL LOW COMPLEX 30 MIN: CPT

## 2019-07-09 PROCEDURE — 87449 NOS EACH ORGANISM AG IA: CPT

## 2019-07-09 PROCEDURE — 80197 ASSAY OF TACROLIMUS: CPT

## 2019-07-09 PROCEDURE — 63600175 PHARM REV CODE 636 W HCPCS: Performed by: NURSE PRACTITIONER

## 2019-07-09 PROCEDURE — 36415 COLL VENOUS BLD VENIPUNCTURE: CPT

## 2019-07-09 PROCEDURE — 63600175 PHARM REV CODE 636 W HCPCS: Performed by: PHYSICIAN ASSISTANT

## 2019-07-09 PROCEDURE — 97530 THERAPEUTIC ACTIVITIES: CPT

## 2019-07-09 PROCEDURE — 80053 COMPREHEN METABOLIC PANEL: CPT

## 2019-07-09 PROCEDURE — 87070 CULTURE OTHR SPECIMN AEROBIC: CPT

## 2019-07-09 PROCEDURE — 25000003 PHARM REV CODE 250: Performed by: INTERNAL MEDICINE

## 2019-07-09 PROCEDURE — 99233 SBSQ HOSP IP/OBS HIGH 50: CPT | Mod: 24,,, | Performed by: NURSE PRACTITIONER

## 2019-07-09 PROCEDURE — 99233 PR SUBSEQUENT HOSPITAL CARE,LEVL III: ICD-10-PCS | Mod: ,,, | Performed by: INTERNAL MEDICINE

## 2019-07-09 PROCEDURE — 36430 TRANSFUSION BLD/BLD COMPNT: CPT

## 2019-07-09 RX ORDER — POTASSIUM CHLORIDE 20 MEQ/15ML
40 SOLUTION ORAL EVERY 6 HOURS
Status: DISCONTINUED | OUTPATIENT
Start: 2019-07-09 | End: 2019-07-09

## 2019-07-09 RX ORDER — HYDROCODONE BITARTRATE AND ACETAMINOPHEN 500; 5 MG/1; MG/1
TABLET ORAL
Status: DISCONTINUED | OUTPATIENT
Start: 2019-07-09 | End: 2019-07-12

## 2019-07-09 RX ORDER — SODIUM CHLORIDE 9 MG/ML
INJECTION, SOLUTION INTRAVENOUS CONTINUOUS
Status: DISCONTINUED | OUTPATIENT
Start: 2019-07-09 | End: 2019-07-11

## 2019-07-09 RX ORDER — POTASSIUM CHLORIDE 750 MG/1
40 CAPSULE, EXTENDED RELEASE ORAL EVERY 4 HOURS
Status: COMPLETED | OUTPATIENT
Start: 2019-07-09 | End: 2019-07-09

## 2019-07-09 RX ORDER — POTASSIUM CHLORIDE 20 MEQ/15ML
40 SOLUTION ORAL EVERY 4 HOURS
Status: DISCONTINUED | OUTPATIENT
Start: 2019-07-09 | End: 2019-07-09

## 2019-07-09 RX ORDER — TACROLIMUS 1 MG/1
1 CAPSULE ORAL 2 TIMES DAILY
Status: DISCONTINUED | OUTPATIENT
Start: 2019-07-09 | End: 2019-07-11

## 2019-07-09 RX ORDER — POTASSIUM CHLORIDE 20 MEQ/1
40 TABLET, EXTENDED RELEASE ORAL ONCE
Status: COMPLETED | OUTPATIENT
Start: 2019-07-09 | End: 2019-07-09

## 2019-07-09 RX ADMIN — TACROLIMUS 2 MG: 1 CAPSULE ORAL at 09:07

## 2019-07-09 RX ADMIN — SODIUM BICARBONATE 650 MG TABLET 650 MG: at 09:07

## 2019-07-09 RX ADMIN — METRONIDAZOLE 500 MG: 500 TABLET ORAL at 06:07

## 2019-07-09 RX ADMIN — DEXTROSE 750 MG: 5 SOLUTION INTRAVENOUS at 10:07

## 2019-07-09 RX ADMIN — TACROLIMUS 1 MG: 1 CAPSULE ORAL at 05:07

## 2019-07-09 RX ADMIN — CEFEPIME HYDROCHLORIDE 2 G: 2 INJECTION, SOLUTION INTRAVENOUS at 09:07

## 2019-07-09 RX ADMIN — VALGANCICLOVIR 450 MG: 450 TABLET, FILM COATED ORAL at 09:07

## 2019-07-09 RX ADMIN — SODIUM BICARBONATE 650 MG TABLET 650 MG: at 08:07

## 2019-07-09 RX ADMIN — POTASSIUM CHLORIDE 40 MEQ: 750 CAPSULE, EXTENDED RELEASE ORAL at 04:07

## 2019-07-09 RX ADMIN — SULFAMETHOXAZOLE AND TRIMETHOPRIM 1 TABLET: 400; 80 TABLET ORAL at 09:07

## 2019-07-09 RX ADMIN — METRONIDAZOLE 500 MG: 500 TABLET ORAL at 08:07

## 2019-07-09 RX ADMIN — ACETAMINOPHEN 650 MG: 325 TABLET ORAL at 01:07

## 2019-07-09 RX ADMIN — LEVOTHYROXINE SODIUM 112 MCG: 50 TABLET ORAL at 06:07

## 2019-07-09 RX ADMIN — FUROSEMIDE 40 MG: 10 INJECTION, SOLUTION INTRAMUSCULAR; INTRAVENOUS at 10:07

## 2019-07-09 RX ADMIN — SODIUM CHLORIDE: 0.9 INJECTION, SOLUTION INTRAVENOUS at 04:07

## 2019-07-09 RX ADMIN — FAMOTIDINE 20 MG: 20 TABLET, FILM COATED ORAL at 08:07

## 2019-07-09 RX ADMIN — PREDNISONE 5 MG: 5 TABLET ORAL at 09:07

## 2019-07-09 RX ADMIN — METRONIDAZOLE 500 MG: 500 TABLET ORAL at 01:07

## 2019-07-09 RX ADMIN — CEFEPIME HYDROCHLORIDE 2 G: 2 INJECTION, SOLUTION INTRAVENOUS at 04:07

## 2019-07-09 RX ADMIN — POTASSIUM CHLORIDE 40 MEQ: 750 CAPSULE, EXTENDED RELEASE ORAL at 05:07

## 2019-07-09 RX ADMIN — POTASSIUM CHLORIDE 40 MEQ: 1500 TABLET, EXTENDED RELEASE ORAL at 11:07

## 2019-07-09 NOTE — PLAN OF CARE
Problem: Occupational Therapy Goal  Goal: Occupational Therapy Goal  Goals to be met by: 7/19/19    Patient will increase functional independence with ADLs by performing:    LE Dressing with Minimal Assistance.  Grooming while standing at sink with Contact Guard Assistance.  Toileting from toilet with Contact Guard Assistance for hygiene and clothing management.   Supine to sit with Stand-by Assistance to increase bed mobility independence.  Step transfer with Stand-by Assistance and AD as needed to prepare for household mobility to complete ADLs of choice  Toilet transfer to toilet with Contact Guard Assistance.  Upper extremity exercise program x15 reps per handout, with independence to build strength and endurance for ADLs/IADLs.  Pt will tolerate dynamic standing task for ~5 minutes in preparation for standing ADLs with CGA.   Outcome: Ongoing (interventions implemented as appropriate)  OT evaluation completed this date. Pt presented to OT with overall weakness and pain affecting her pace and quality of movement during functional activities. Pt's head pain and lethargic state affected her activity tolerance during today's session. Pt with impaired cognition requiring increased verbal cues for alertness to attend to task at hand. Pt previously receiving HHOT/PT at the [facility]. At this time, OT is recommending inpatient rehab to further maximize pt's functional independence. Pt is not functioning at her baseline. Pt will benefit from skilled OT to build strength and endurance for ADLs/IADLs.    Comments: Roselyn LONG/BENITO

## 2019-07-09 NOTE — SUBJECTIVE & OBJECTIVE
Interval History: Patient remains confused (somewhat moreso today). Afebrile most of day but with spike later after I saw her. Stools semi-formed now. No growth from cx. Thora/para unfortunately delayed, to occur this afternoon.    Review of Systems   Constitutional: Positive for activity change, appetite change and fever. Negative for chills and diaphoresis.   HENT: Negative for ear pain, mouth sores, sinus pressure and sore throat.    Eyes: Negative for photophobia, pain and redness.   Respiratory: Positive for cough. Negative for shortness of breath and wheezing.    Cardiovascular: Negative for chest pain and leg swelling.   Gastrointestinal: Positive for diarrhea. Negative for abdominal distention, abdominal pain, nausea and vomiting.   Genitourinary: Negative for decreased urine volume, difficulty urinating, dysuria, flank pain, frequency and urgency.   Musculoskeletal: Negative for arthralgias, back pain, gait problem and myalgias.   Skin: Positive for wound (chevron well healed). Negative for pallor and rash.   Allergic/Immunologic: Positive for immunocompromised state.   Neurological: Positive for weakness. Negative for dizziness, tremors, seizures, light-headedness and headaches.   Psychiatric/Behavioral: Positive for confusion and decreased concentration. Negative for agitation. The patient is not nervous/anxious.      Objective:     Vital Signs (Most Recent):  Temp: 98.3 °F (36.8 °C) (07/09/19 1815)  Pulse: 78 (07/09/19 1815)  Resp: (!) 28 (07/09/19 1815)  BP: 112/64 (07/09/19 1815)  SpO2: 95 % (07/09/19 1815) Vital Signs (24h Range):  Temp:  [98.3 °F (36.8 °C)-101.8 °F (38.8 °C)] 98.3 °F (36.8 °C)  Pulse:  [] 78  Resp:  [13-31] 28  SpO2:  [91 %-98 %] 95 %  BP: (112-148)/(64-81) 112/64     Weight: 78.5 kg (173 lb)  Body mass index is 29.7 kg/m².    Estimated Creatinine Clearance: 96.4 mL/min (based on SCr of 0.7 mg/dL).    Physical Exam   Constitutional: She appears well-developed. No distress.    HENT:   Head: Normocephalic and atraumatic.   Eyes: Pupils are equal, round, and reactive to light. No scleral icterus.   Neck: Normal range of motion. Neck supple. No thyromegaly present.   Cardiovascular: Regular rhythm. Tachycardia present. Exam reveals no friction rub.   No murmur heard.  Pulmonary/Chest: Effort normal. She has decreased breath sounds in the right middle field, the right lower field and the left lower field. She has no wheezes. She has no rales.   Diminished RLL   Abdominal: Soft. She exhibits no distension. There is no tenderness. There is no rebound and no guarding.   Healed chevron incision   Musculoskeletal: Normal range of motion.   Neurological: She is alert.   Oriented to person, easily re oriented   Skin: Skin is warm and dry. She is not diaphoretic.   Psychiatric: She has a normal mood and affect. Her behavior is normal.   Nursing note and vitals reviewed.      Significant Labs:   CBC:   Recent Labs   Lab 07/08/19  0640 07/09/19  0644   WBC 4.79 5.62   HGB 7.5* 7.5*   HCT 22.2* 23.1*    176     CMP:   Recent Labs   Lab 07/08/19  0640 07/09/19  0644   * 128*   K 3.2* 2.9*   CL 95 96   CO2 23 21*   GLU 82 77   BUN 14 13   CREATININE 0.8 0.7   CALCIUM 8.2* 8.1*   PROT 4.8* 5.0*   ALBUMIN 2.1* 2.0*   BILITOT 0.7 0.6   ALKPHOS 202* 191*   AST 10 11   ALT 7* <5*   ANIONGAP 9 11   EGFRNONAA >60.0 >60.0       Significant Imaging: I have reviewed all pertinent imaging results/findings within the past 24 hours.

## 2019-07-09 NOTE — PROGRESS NOTES
Pharmacokinetic Assessment Follow Up: IV Vancomycin    Vancomycin serum concentration assessment(s):    The trough level was drawned correctly and can be used to guide therapy at this time.    Vancomycin Regimen Plan:    Continue regimen to Vancomycin 750 mg IV every 12hour with next serum trough concentration measured at n/a prior to n/a dose on n/a    Pharmacy will continue to follow and monitor vancomycin.    Please contact pharmacy at extension [phone] for questions regarding this assessment.    Thank you for the consult,   Natasha Duckworth     Patient brief summary:  Jihan Zamudio is a 49 y.o. female initiated on antimicrobial therapy with IV Vancomycin for treatment of suspected intra-abdominal      Drug Allergies:   Review of patient's allergies indicates:   Allergen Reactions    Metformin Rash    Pcn [penicillins] Other (See Comments)     Unsure of reaction, states it was as a child. Tolerated rocephin at [facility]       Actual Body Weight:   78.5    Renal Function:   Estimated Creatinine Clearance: 96.4 mL/min (based on SCr of 0.7 mg/dL).,     Dialysis Method (if applicable):  none    CBC (last 72 hours):  Recent Labs   Lab Result Units 07/07/19  0726 07/08/19 0640 07/09/19  0644   WBC K/uL 5.45 4.79 5.62   Hemoglobin g/dL 8.3* 7.5* 7.5*   Hematocrit % 24.2* 22.2* 23.1*   Platelets K/uL 259 186 176   Gran% % 85.0* 82.9* 86.5*   Lymph% % 5.3* 5.8* 4.1*   Mono% % 6.8 7.5 6.4   Eosinophil% % 1.1 1.7 1.1   Basophil% % 0.7 0.8 0.7   Differential Method  Automated Automated Automated       Metabolic Panel (last 72 hours):  Recent Labs   Lab Result Units 07/07/19  0726 07/08/19  0640 07/09/19  0644   Sodium mmol/L 126* 127* 128*   Potassium mmol/L 3.7 3.2* 2.9*   Chloride mmol/L 95 95 96   CO2 mmol/L 21* 23 21*   Glucose mg/dL 75 82 77   BUN, Bld mg/dL 16 14 13   Creatinine mg/dL 0.8 0.8 0.7   Albumin g/dL 2.2* 2.1* 2.0*   Total Bilirubin mg/dL 0.9 0.7 0.6   Alkaline Phosphatase U/L 245* 202* 191*   AST U/L 10 10  11   ALT U/L 7* 7* <5*   Magnesium mg/dL 2.2 1.8 1.7       Vancomycin Administrations:  vancomycin given in the last 96 hours                   vancomycin 750 mg in dextrose 5 % 250 mL IVPB (ready to mix system) (mg) 750 mg New Bag 07/08/19 2103     750 mg New Bag  1017     750 mg New Bag 07/07/19 2008                Drug levels (last 3 results):  Recent Labs   Lab Result Units 07/07/19  0726 07/09/19  1035   Vancomycin-Trough ug/mL 22.0 18.1       Microbiologic Results:  Microbiology Results (last 7 days)     Procedure Component Value Units Date/Time    Gram stain [[id number]]     Order Status:  Sent Specimen:  Body Fluid from Abdomen     Fungus culture [[id number]]     Order Status:  Sent Specimen:  Body Fluid from Abdomen     Culture, Anaerobe [[id number]]     Order Status:  Sent Specimen:  Body Fluid from Abdomen     Aerobic culture [[id number]]     Order Status:  Sent Specimen:  Body Fluid from Abdomen     Blood culture x two cultures. Draw prior to antibiotics. [[id number]] Collected:  07/05/19 1653    Order Status:  Completed Specimen:  Blood from Peripheral, Antecubital, Left Updated:  07/08/19 2012     Blood Culture, Routine No Growth to date      No Growth to date      No Growth to date      No Growth to date    Narrative:       Aerobic and anaerobic    Blood culture x two cultures. Draw prior to antibiotics. [[id number]] Collected:  07/05/19 1705    Order Status:  Completed Specimen:  Blood from Peripheral, Forearm, Right Updated:  07/08/19 2012     Blood Culture, Routine No Growth to date      No Growth to date      No Growth to date      No Growth to date    Narrative:       Aerobic and anaerobic    Clostridium difficile EIA [[id number]]     Order Status:  Canceled Specimen:  Stool     Culture, Anaerobe [[id number]]     Order Status:  No result Specimen:  Body Fluid from Pleural Fluid     Aerobic culture [[id number]]     Order Status:  No result Specimen:  Body Fluid from Pleural Fluid     Gram stain [[id number]]      Order Status:  No result Specimen:  Body Fluid from Peritoneal Fluid     Gram stain [[id number]]     Order Status:  No result Specimen:  Body Fluid from Ascites     Culture, Anaerobe [[id number]]     Order Status:  No result Specimen:  Body Fluid from Ascites     Aerobic culture [[id number]]     Order Status:  No result Specimen:  Body Fluid from Ascites     Fungus culture [[id number]]     Order Status:  No result Specimen:  Body Fluid from Abdomen

## 2019-07-09 NOTE — ASSESSMENT & PLAN NOTE
- LFTs stable  - Pt with good hepatic allograft function.   - Last Liver US 6/24 showed Mildly elevated hepatic arterial resistive indices, although improved from prior exam.  Otherwise, satisfactory vascular appearance of the liver, complex fluid collection adjacent to the left hepatic lobe, similar to slightly smaller compared to prior exam, small volume of ascites and partially visualized right pleural effusion. Monitor.   - plan for para today, will cx fluid.

## 2019-07-09 NOTE — ASSESSMENT & PLAN NOTE
- no overt bleeding.    - keep type and screen current  - will transfuse 1u prbc today.  - monitor w daily labs.

## 2019-07-09 NOTE — PT/OT/SLP EVAL
"Physical Therapy Evaluation     Patient Name:  Jihan Zamudio  MRN: [id number]    Recommendations:     Discharge Recommendations: Inpatient Rehab (pending pt progress)   Equipment recommendations: TBD  Barriers to discharge: increased level of assistance required; fall risk    Assessment:     Jihan Zamudio is a 49 y.o. female admitted to [facility] [place] [facility] on 7/5/2019; pt with OLTx for KESSLER cirrhosis on 6/5/19. Post transplant course significant for acute parenchymal hemorrhage within the right occipital lobe after fall on 6/24. Pt alert, and able to follow simple commands but with delayed response time and processing of commands noted.  Jhian Zamudio would benefit from acute PT intervention to address listed functional deficits, reduce fall risk, and maximize (I) and safety with functional mobility. Recommending pt discharge to inpatient rehabilitation facility for continued therapy, pending further pt progress and follow-up treatment.     Problem List: weakness, decreased endurance, impaired self-care skills, impaired mobility, decreased sitting or standing balance, gait instability, decreased coordination and pain    Rehab Prognosis: good; patient would benefit from acute skilled PT services to address these deficits and reach maximum level of function.    Plan:     Patient to be seen 4 x/week to address the above listed problems via  gait training, therapeutic activities, therapeutic exercise, neuromuscular re-education     Plan of Care Expires: 08/08/19  Plan of Care reviewed with: patient    Subjective     Communicated with RN prior to session.  Patient agreeable to participate. Pt's spouse at bedside.     Patient comments/goals: "I feel like I had a seizure"     Pain/Comfort:  · Pain Rating: pt did not rate pain on numeric scale; reported headache that increased with activity and reduced following rest in supine.     Patients cultural, spiritual, Scientologist conflicts given the current situation: No known " conflicts     Patient History: information obtained from pt and spouse      Living Environment: Pt from [place], but most recently staying at [facility] s/p liver transplant.   Prior Level of Function: modified (I) with mobility and ADLs, using wheelchair for long distances and no AD for household mobility (pt stated she does not like using RW, but uses furniture to hold onto)   DME owned: wheelchair, rolling walker  Caregiver Assistance: assist from spouse as needed    Objective:     Patient found with: telemetry, bed alarm    General Precautions: Standard, fall    Orthopedic Precautions: N/A  Braces: N/A     Exams:     Cognition:  o Pt is alert and oriented x person, place  o Pt follows single-step commands throughout session; pt with delayed processing of commands noted and delayed response time      Sensation:   o intact light touch sensation BLEs     Gross Motor Coordination: WFL     Edema: none noted BLEs      Lower Extremity Range of Motion:  o Right Lower Extremity: WFL  o Left Lower Extremity: WFL     Lower Extremity Strength:  o Right Lower Extremity: 4-/5  o Left Lower Extremity: 4-/5    Functional Mobility:    Bed Mobility:  · Supine > Sit: minimum assistance   · Sit > Supine: minimum assistance     Transfers:   · Sit <> Stand Transfer: contact guard assistance from EOB with no AD     Standing Balance: grossly impaired; fill further assess upon follow-up                  Gait:  · Distance: pt performed stepping in place x 4 trials-- pt with difficulty following commands/coordinating LE advancement, and stepping forward instead of stepping in place   · Assistance level: minimum assistance  · Assistive Device: no AD, HHA provided  *distance limited 2/2 pt with reports of increased headache with mobility and generalized instability. Will further assess as appropriate.     AM-PAC 6 CLICK MOBILITY  Total Score:16     Patient/Caregiver Education and Therapeutic Activities:     Therapist educated  pt/caregiver regarding:    PT POC and goals for therapy    Safety with mobility and fall risk     Patient/caregiver able to verbalize understanding; will follow-up with pt/caregiver during current admit for additional questions/concerns within scope of practice.     White board updated.     Patient left HOB elevated with all lines intact, call button in reach, bed alarm on, RN notified and spouse present.    GOALS:   Multidisciplinary Problems     Physical Therapy Goals        Problem: Physical Therapy Goal    Goal Priority Disciplines Outcome Goal Variances Interventions   Physical Therapy Goal     PT, PT/OT Ongoing (interventions implemented as appropriate)     Description:  Goals to be met by: 2019    Patient will increase functional independence with mobility by performin. Supine <> sit with supervision   2. Sit to stand transfer with Supervision  3. Gait  x 150 feet with Stand-by Assistance with or without using least restrictive AD.   4. Stand for 3 minutes with Stand-by Assistance while performing dynamic balance activities   5. Lower extremity exercise program x20 reps per handout, with supervision                        History:     Past Medical History:   Diagnosis Date    Cirrhosis     Esophageal varices 2018    Small with no banding     Essential hypertension 2018    Fatty liver 2018    GERD (gastroesophageal reflux disease)     Hx of colonic polyps 2018    On colonoscopy     Hypertension     Hypothyroidism 2018    Kidney stones     Morbid obesity 2018    Lap band with subsequent release    KADEEM (obstructive sleep apnea) 2018    Osteoarthritis 2018    Pulmonary nodule 2018    Vitamin D deficiency 2018       Past Surgical History:   Procedure Laterality Date     SECTION      TIMES 2     CHOLECYSTECTOMY      LAPAROSCOPIC     CYSTOSCOPY W/ STONE MANIPULATION      kidney stone removal    EGD  (ESOPHAGOGASTRODUODENOSCOPY) N/A 4/24/2019    Performed by Davian Hernández MD at [facility] ENDO (2ND FLR)    LAPAROSCOPIC GASTRIC BANDING  2006    removal 2009    LIVER BIOPSY  11/29/2017    KESSLER with bridging 11/29/17    TRANSPLANT, LIVER N/A 6/5/2019    Performed by Clemente Brown Jr., MD at [facility] OR 2ND FLR    TRANSPLANT, LIVER N/A 6/4/2019    Performed by Clemente Brown Jr., MD at [facility] OR 2ND FLR       Time Tracking:     PT Received On: 07/09/19   PT Start Time: 0933   PT Stop Time: 0957   PT Total Time (min): 24 min     Billable Minutes: Evaluation 14 min and Therapeutic Activity 10 min    Josette Rocha, PT, DPT   7/9/2019  Pager: [phone]

## 2019-07-09 NOTE — PT/OT/SLP EVAL
"Occupational Therapy   Evaluation    Name: Jihan Zamudio  MRN: [id number]  Admitting Diagnosis:  Fever      Recommendations:     Discharge Recommendations: rehabilitation facility  Discharge Equipment Recommendations:  (TBD)  Barriers to discharge:  None    Assessment:     Jihan Zamudio is a 49 y.o. female with a medical diagnosis of Fever.  OT evaluation completed this date. Pt presented to OT with overall weakness and pain affecting her pace and quality of movement during functional activities. Pt's head pain and lethargic state affected her activity tolerance during today's session. Pt with impaired cognition requiring increased verbal cues for alertness to attend to task at hand. Pt previously receiving HHOT/PT at the [facility]. At this time, OT is recommending inpatient rehab to further maximize pt's functional independence. Pt is not functioning at her baseline. Pt will benefit from skilled OT to build strength and endurance for ADLs/IADLs. Performance deficits affecting function: impaired endurance, weakness, impaired self care skills, decreased safety awareness, impaired balance, impaired functional mobilty, impaired cognition.      Rehab Prognosis: Good; patient would benefit from acute skilled OT services to address these deficits and reach maximum level of function.       Plan:     Patient to be seen 4 x/week to address the above listed problems via self-care/home management, therapeutic activities, neuromuscular re-education, therapeutic exercises  · Plan of Care Expires: 08/07/19  · Plan of Care Reviewed with: patient    Subjective     Chief Complaint: Head "I feel like I'm having seizures." MD in room when statement made: RN aware  Patient/Family Comments/goals: Pt agreeable to OT POC.    Occupational Profile:  Living Environment: Pt living with spouse at the [facility] where she is receiving HHOT/PT services.  Previous level of function: I in all ADLs; mod I with functional mobility " with use of wheelchair  Roles and Routines: Homemaker, spouse  Equipment Used at Home:  cane, straight, wheelchair, walker, rolling  Assistance upon Discharge: Pt will have assistance from spouse upon discharge.     Pain/Comfort:  · Pain Rating 1: (pt did not rate)  · Location 1: head  · Pain Rating Post-Intervention 1: (pt did not rate)  · Location 2: head  · Pain Addressed 2: Reposition, Cessation of Activity, Nurse notified    Patients cultural, spiritual, Spiritism conflicts given the current situation: no    Objective:     Communicated with: RN prior to session.  Patient found HOB elevated with bed alarm, telemetry, peripheral IV upon OT entry to room.    General Precautions: Standard, fall   Orthopedic Precautions:N/A   Braces: N/A     Occupational Performance:    Bed Mobility:    · Patient completed Rolling/Turning to Left with  stand by assistance  · Patient completed Rolling/Turning to Right with stand by assistance  · Patient completed Scooting/Bridging with contact guard assistance  · Patient completed Supine to Sit with minimum assistance for trunk control to reach upright sitting   · Patient completed Sit to Supine with moderate assistance for BLE placement into the bed    Functional Mobility/Transfers:  · Patient completed Sit <> Stand Transfer from bed with contact guard assistance  with  BUE hand-held assist   · Functional Mobility: Pt ambulated 2-3 steps forward/backward to the bed with CGA and BUE HHA. No LOB noted. No RBs required.     Activities of Daily Living:  · Feeding:  minimum assistance for medication administration with assistance for picking and manipulation of small pills; decreased fine motor skills noted in LUE    Cognitive/Visual Perceptual:  Cognitive/Psychosocial Skills:     -       Oriented to: Person, Place and Time   -       Follows Commands/attention:Inattentive and Follows one-step commands with redirection   -       Communication: clear/fluent  -       Memory: Not formally  assessed  -       Safety awareness/insight to disability: impaired   -       Mood/Affect/Coping skills/emotional control: Flat affect and Lethargic  Visual/Perceptual:      -Intact no defs noted    Physical Exam:  Balance:    -       Sitting: SBA   Standing: CGA  Postural examination/scapula alignment:    -       Rounded shoulders  -       Forward head  Skin integrity: Visible skin intact  Edema:  None noted  Sensation:    -       Intact  Upper Extremity Range of Motion:     -       Right Upper Extremity: WFL  -       Left Upper Extremity: WFL  Upper Extremity Strength:    -       Right Upper Extremity: WFL  3+/5  -       Left Upper Extremity: WFL  3+/5   Strength:    -       Right Upper Extremity: WFL  -       Left Upper Extremity: WFL  Fine Motor Coordination:    -       Intact RUE: Impaired LUE    AMPAC 6 Click ADL:  AMPAC Total Score: 19    Treatment & Education:  - Role of OT/ OT POC  - Self care safety/ independence  - Functional transfer/ mobility safety  - Bed mobility safety  - Pursed lip breathing  - Importance of sitting UIC  - Increased time provided due to fatigue  - Pt tolerated sitting EOB with SBA during functional activities with no LOBs.   Education:    Patient left HOB elevated with all lines intact, call button in reach, bed alarm on, RN notified and Spouse present    GOALS:   Multidisciplinary Problems     Occupational Therapy Goals        Problem: Occupational Therapy Goal    Goal Priority Disciplines Outcome Interventions   Occupational Therapy Goal     OT, PT/OT Ongoing (interventions implemented as appropriate)    Description:  Goals to be met by: 7/19/19    Patient will increase functional independence with ADLs by performing:    LE Dressing with Minimal Assistance.  Grooming while standing at sink with Contact Guard Assistance.  Toileting from toilet with Contact Guard Assistance for hygiene and clothing management.   Supine to sit with Stand-by Assistance to increase bed mobility  independence.  Step transfer with Stand-by Assistance and AD as needed to prepare for household mobility to complete ADLs of choice  Toilet transfer to toilet with Contact Guard Assistance.  Upper extremity exercise program x15 reps per handout, with independence to build strength and endurance for ADLs/IADLs.  Pt will tolerate dynamic standing task for ~5 minutes in preparation for standing ADLs with CGA.                     History:     Past Medical History:   Diagnosis Date    Cirrhosis     Esophageal varices 2018    Small with no banding     Essential hypertension 2018    Fatty liver 2018    GERD (gastroesophageal reflux disease)     Hx of colonic polyps 2018    On colonoscopy     Hypertension     Hypothyroidism 2018    Kidney stones     Morbid obesity 2018    Lap band with subsequent release    KADEEM (obstructive sleep apnea) 2018    Osteoarthritis 2018    Pulmonary nodule 2018    Vitamin D deficiency 2018       Past Surgical History:   Procedure Laterality Date     SECTION      TIMES 2     CHOLECYSTECTOMY      LAPAROSCOPIC     CYSTOSCOPY W/ STONE MANIPULATION      kidney stone removal    EGD (ESOPHAGOGASTRODUODENOSCOPY) N/A 2019    Performed by Davian Hern[facility]dez MD at [facility] (2ND FLR)    LAPAROSCOPIC GASTRIC BANDING  2006    removal     LIVER BIOPSY  2017    KESSLER with bridging 17    TRANSPLANT, LIVER N/A 2019    Performed by Clemente Brown Jr., MD at [facility] OR 2ND FLR    TRANSPLANT, LIVER N/A 2019    Performed by Clemente Brown Jr., MD at [facility] OR 2ND FLR       Time Tracking:     OT Date of Treatment: 19  OT Start Time: 933  OT Stop Time: 954  OT Total Time (min): 21 min    Billable Minutes:Evaluation 21    Roselyn Lowe OT  2019

## 2019-07-09 NOTE — NURSING
Pt tolerates paracentesis and thoracentesis without distress.  Ascites fluid specimen sent to lab as ordered.  Report to TERE Nuñez.

## 2019-07-09 NOTE — ASSESSMENT & PLAN NOTE
- Infectious work up ordered on admit - blood cx NGTD, UA unremarkable, CMV PCR 7/5 pending, chest x-ray w right pleural effusion, CT A/P reviewed.  - Started broad spectrum antibiotics on admit.   - Abdominal and head CT reviewed and no clear source for fever.   - ID consulted.  Apprec recs.   - Pt with intermittent confusion.  Easily re oriented.  Continue to monitor.   - plan for thora and para today, not done yesterday.  Will cx fluid.   - likely sepsis- tachycardia, increased RR and fever.  Poor PO intake past 24 hours.  IV fluids at 50 ml/hr [id number] ordered.

## 2019-07-09 NOTE — PLAN OF CARE
Problem: Physical Therapy Goal  Goal: Physical Therapy Goal  Goals to be met by: 2019    Patient will increase functional independence with mobility by performin. Supine <> sit with supervision   2. Sit to stand transfer with Supervision  3. Gait  x 150 feet with Stand-by Assistance with or without using least restrictive AD.   4. Stand for 3 minutes with Stand-by Assistance while performing dynamic balance activities   5. Lower extremity exercise program x20 reps per handout, with supervision    Outcome: Ongoing (interventions implemented as appropriate)  PT evaluation completed- see note for details. Goals established and POC initiated.     Josette Rocha, PT, DPT   2019  Pager: [phone]

## 2019-07-09 NOTE — SUBJECTIVE & OBJECTIVE
Past Medical History:   Diagnosis Date    Cirrhosis     Esophageal varices 2018    Small with no banding     Essential hypertension 2018    Fatty liver 2018    GERD (gastroesophageal reflux disease)     Hx of colonic polyps 2018    On colonoscopy     Hypertension     Hypothyroidism 2018    Kidney stones     Morbid obesity 2018    Lap band with subsequent release    KADEEM (obstructive sleep apnea) 2018    Osteoarthritis 2018    Pulmonary nodule 2018    Vitamin D deficiency 2018       Past Surgical History:   Procedure Laterality Date     SECTION      TIMES 2     CHOLECYSTECTOMY      LAPAROSCOPIC     CYSTOSCOPY W/ STONE MANIPULATION      kidney stone removal    EGD (ESOPHAGOGASTRODUODENOSCOPY) N/A 2019    Performed by Davian Hernández MD at [facility] ENDO (2ND FLR)    LAPAROSCOPIC GASTRIC BANDING      removal     LIVER BIOPSY  2017    KESSLER with bridging 17    TRANSPLANT, LIVER N/A 2019    Performed by Clemente Brown Jr., MD at [facility] OR 2ND FLR    TRANSPLANT, LIVER N/A 2019    Performed by Clemente Brown Jr., MD at [facility] OR 2ND FLR       Review of patient's allergies indicates:   Allergen Reactions    Metformin Rash    Pcn [penicillins] Other (See Comments)     Unsure of reaction, states it was as a child. Tolerated rocephin at [facility]       Family History     Problem Relation (Age of Onset)    Breast cancer Maternal Grandmother    Cancer Mother (50), Father (65)    Heart disease Mother, Father        Tobacco Use    Smoking status: Never Smoker    Smokeless tobacco: Never Used    Tobacco comment: patient denies   Substance and Sexual Activity    Alcohol use: No     Comment: patient denies    Drug use: No     Comment: patient denies    Sexual activity: Not on file       PTA Medications   Medication Sig    famotidine (PEPCID) 20 MG tablet Take 1 tablet (20 mg total) by mouth every evening.  "   furosemide (LASIX) 40 MG tablet Take 1 tablet (40 mg total) by mouth once daily. for 10 days    lancets 28 gauge Misc Test blood glucose 3 (three) times daily.    levothyroxine (SYNTHROID) 112 MCG tablet Take 1 tablet (112 mcg total) by mouth once daily.    mycophenolate (CELLCEPT) 250 mg Cap Take 2 capsules (500 mg total) by mouth 2 (two) times daily. (Patient taking differently: Take 1,000 mg by mouth 2 (two) times daily. )    pen needle, diabetic 32 gauge x 5/32" Ndle Use to inject insulin into the skin 3 (three) times daily.    predniSONE (DELTASONE) 10 MG tablet Take 20 mg by mouth daily 6/12-6/18; 15 mg daily 6/19-6/25; 10 mg  daily 6/26-7/2; 5 mg  daily 7/3-7/9; stop: 7/10/19    sodium bicarbonate 650 MG tablet Take 1 tablet (650 mg total) by mouth 2 (two) times daily.    sulfamethoxazole-trimethoprim 400-80mg (BACTRIM,SEPTRA) 400-80 mg per tablet Take 1 tablet by mouth once daily. Stop: 12/3/19    tacrolimus (PROGRAF) 1 MG Cap Take 3 capsules (3 mg total) by mouth every 12 (twelve) hours.    valGANciclovir (VALCYTE) 450 mg Tab Take 1 tablet (450 mg total) by mouth once daily. Stop: 9/4/19    blood sugar diagnostic Strp Test blood glucose 3 (three) times daily.    blood-glucose meter kit Use as instructed    levETIRAcetam (KEPPRA) 500 MG Tab Take 1 tablet (500 mg total) by mouth 2 (two) times daily. for 7 days    oxyCODONE (ROXICODONE) 10 mg Tab immediate release tablet Take 1 tablet (10 mg total) by mouth every 4 (four) hours as needed.       Review of Systems   Constitutional: Positive for activity change, appetite change and fever. Negative for chills.   Respiratory: Negative for cough, shortness of breath and wheezing.    Cardiovascular: Negative for leg swelling.   Gastrointestinal: Positive for diarrhea. Negative for abdominal distention, abdominal pain, nausea and vomiting.   Genitourinary: Negative for decreased urine volume, difficulty urinating and dysuria.   Skin: Positive for " wound (chevron well healed).   Allergic/Immunologic: Positive for immunocompromised state.   Neurological: Positive for weakness. Negative for light-headedness.   Psychiatric/Behavioral: Positive for confusion and decreased concentration. Negative for agitation. The patient is not nervous/anxious.      Objective:     Vital Signs (Most Recent):  Temp: 98.9 °F (37.2 °C) (07/09/19 1603)  Pulse: 79 (07/09/19 1559)  Resp: (!) 26 (07/09/19 1559)  BP: 123/70 (07/09/19 1559)  SpO2: 98 % (07/09/19 1559) Vital Signs (24h Range):  Temp:  [98.9 °F (37.2 °C)-101.8 °F (38.8 °C)] 98.9 °F (37.2 °C)  Pulse:  [] 79  Resp:  [13-31] 26  SpO2:  [91 %-98 %] 98 %  BP: (123-148)/(70-81) 123/70     Weight: 78.5 kg (173 lb)  Body mass index is 29.7 kg/m².      Intake/Output Summary (Last 24 hours) at 7/9/2019 1718  Last data filed at 7/9/2019 1513  Gross per 24 hour   Intake 2660 ml   Output --   Net 2660 ml       Physical Exam   Constitutional: She appears well-developed. No distress.   HENT:   Head: Normocephalic and atraumatic.   Eyes: Pupils are equal, round, and reactive to light. No scleral icterus.   Neck: Normal range of motion. Neck supple. No thyromegaly present.   Cardiovascular: Regular rhythm. Tachycardia present. Exam reveals no friction rub.   No murmur heard.  Pulmonary/Chest: Effort normal. She has decreased breath sounds in the right middle field, the right lower field and the left lower field. She has no wheezes. She has no rales.   Diminished RLL   Abdominal: Soft. She exhibits no distension. There is no tenderness. There is no rebound and no guarding.   Healed chevron incision   Musculoskeletal: Normal range of motion.   Neurological: She is alert.   Oriented to person, easily re oriented   Skin: Skin is warm and dry. She is not diaphoretic.   Psychiatric: She has a normal mood and affect. Her behavior is normal.   Nursing note and vitals reviewed.      Laboratory:  CBC:   Recent Labs   Lab 07/09/19  0644   WBC  5.62   RBC 2.64*   HGB 7.5*   HCT 23.1*      MCV 88   MCH 28.4   MCHC 32.5     CMP:   Recent Labs   Lab 07/09/19  0644   GLU 77   CALCIUM 8.1*   ALBUMIN 2.0*   PROT 5.0*   *   K 2.9*   CO2 21*   CL 96   BUN 13   CREATININE 0.7   ALKPHOS 191*   ALT <5*   AST 11   BILITOT 0.6     Labs within the past 24 hours have been reviewed.    Diagnostic Results:  None

## 2019-07-09 NOTE — PROGRESS NOTES
"[facility]  Liver Transplant  Progress Note    Patient Name: Jihan Zamudio  MRN: [id number]  Admission Date: 2019  Hospital Length of Stay: 4 days  Code Status: Full Code  Primary Care Provider: Primary Doctor No  Post-Operative Day: 34    ORGAN:   LIVER  Disease Etiology: Cirrhosis: Fatty Liver ([place])  Donor Type:    - Brain Death  [facility] High Risk:   No  Donor CMV Status:   Donor CMV Status: Positive  Donor HBcAB:   Negative  Donor HCV Status:   Negative  Donor HBV GERTRUDIS: Negative  Donor HCV GERTRUDIS: Negative  Whole or Partial: Whole Liver  Biliary Anastomosis: End to End  Arterial Anatomy: Standard  Subjective:     History of Present Illness:  Ms. Zamudio is a 48 y/o F s/p DBD OLTx (SM induction, CMV D+R+) for KESSLER cirrhosis on 19. Post transplant course significant for acute parenchymal hemorrhage within the right occipital lobe near the parieto-occipital junction measuring approximately 1.8 x 1.3 x 1.5 cm with mild surrounding vasogenic edema and localized mass effect after fall on . She was discharged home on 7 days of Keppra but nuerosurgery, completed on . She now presents to the ED with 1 day history of fever. Homehealth nurse checked patient's temperature and was noted to be elevated. Home health nurse also reported patient acting "off" with mental status change. Currently in the ED, tmax is 102.3. Patient feels well with no true complaints expect rhinorrhea. She denies chills, diarrhea, nausea/vomiting, abdominal pain. Denies sick contacts. Will admit for infectious work up (blood cx, UA, urine cx, CMV PCR, chest x-ray, CT A/P). Patient currently with no altered mental status. In light of recent hx of occipital hemorrhage, will obtain CT head without contrast.      Hospital Course:  Interval history: no acute events overnight. Mentation slow/lethargic.  Alert to person.  Easily re oriented. -120, respirations >24.  T max 101.8 this afternoon on vanc, cefepime " and Flagyl.  ID following.  Recent CT head showed reduction in size of hemorrhage.  Abdominal CT reviewed and with moderate right pleural effusion.  CXR w increased pleural effusion.  Thora & Para ordered yesterday, done today.  Culture fluid.  Blood cx w NGTD.  UA unremarkable.  Patient reportedly having loose stools, stool for c.diff ordered- still waiting on specimen.  Oxygen sat 92-96% on RA.  CMV PCR  pending.  Monitor.     Past Medical History:   Diagnosis Date    Cirrhosis     Esophageal varices 2018    Small with no banding     Essential hypertension 2018    Fatty liver 2018    GERD (gastroesophageal reflux disease)     Hx of colonic polyps 2018    On colonoscopy     Hypertension     Hypothyroidism 2018    Kidney stones     Morbid obesity 2018    Lap band with subsequent release    KADEEM (obstructive sleep apnea) 2018    Osteoarthritis 2018    Pulmonary nodule 2018    Vitamin D deficiency 2018       Past Surgical History:   Procedure Laterality Date     SECTION      TIMES 2     CHOLECYSTECTOMY      LAPAROSCOPIC     CYSTOSCOPY W/ STONE MANIPULATION      kidney stone removal    EGD (ESOPHAGOGASTRODUODENOSCOPY) N/A 2019    Performed by [facility]ian [facility]dez MD at [facility] ENDO (2ND FLR)    LAPAROSCOPIC GASTRIC BANDING  2006    removal     LIVER BIOPSY  2017    KESSLER with bridging 17    TRANSPLANT, LIVER N/A 2019    Performed by Clemente Brown Jr., [facility] at [facility] OR 2ND FLR    TRANSPLANT, LIVER N/A 2019    Performed by Clemente Brown Jr., MD at [facility] OR 2ND FLR       Review of patient's allergies indicates:   Allergen Reactions    Metformin Rash    Pcn [penicillins] Other (See Comments)     Unsure of reaction, states it was as a child. Tolerated rocephin at [facility]       Family History     Problem Relation (Age of Onset)    Breast cancer Maternal Grandmother    Cancer Mother (50), Father (65)     "Heart disease Mother, Father        Tobacco Use    Smoking status: Never Smoker    Smokeless tobacco: Never Used    Tobacco comment: patient denies   Substance and Sexual Activity    Alcohol use: No     Comment: patient denies    Drug use: No     Comment: patient denies    Sexual activity: Not on file       PTA Medications   Medication Sig    famotidine (PEPCID) 20 MG tablet Take 1 tablet (20 mg total) by mouth every evening.    furosemide (LASIX) 40 MG tablet Take 1 tablet (40 mg total) by mouth once daily. for 10 days    lancets 28 gauge Misc Test blood glucose 3 (three) times daily.    levothyroxine (SYNTHROID) 112 MCG tablet Take 1 tablet (112 mcg total) by mouth once daily.    mycophenolate (CELLCEPT) 250 mg Cap Take 2 capsules (500 mg total) by mouth 2 (two) times daily. (Patient taking differently: Take 1,000 mg by mouth 2 (two) times daily. )    pen needle, diabetic 32 gauge x 5/32" Ndle Use to inject insulin into the skin 3 (three) times daily.    predniSONE (DELTASONE) 10 MG tablet Take 20 mg by mouth daily 6/12-6/18; 15 mg daily 6/19-6/25; 10 mg  daily 6/26-7/2; 5 mg  daily 7/3-7/9; stop: 7/10/19    sodium bicarbonate 650 MG tablet Take 1 tablet (650 mg total) by mouth 2 (two) times daily.    sulfamethoxazole-trimethoprim 400-80mg (BACTRIM,SEPTRA) 400-80 mg per tablet Take 1 tablet by mouth once daily. Stop: 12/3/19    tacrolimus (PROGRAF) 1 MG Cap Take 3 capsules (3 mg total) by mouth every 12 (twelve) hours.    valGANciclovir (VALCYTE) 450 mg Tab Take 1 tablet (450 mg total) by mouth once daily. Stop: 9/4/19    blood sugar diagnostic Strp Test blood glucose 3 (three) times daily.    blood-glucose meter kit Use as instructed    levETIRAcetam (KEPPRA) 500 MG Tab Take 1 tablet (500 mg total) by mouth 2 (two) times daily. for 7 days    oxyCODONE (ROXICODONE) 10 mg Tab immediate release tablet Take 1 tablet (10 mg total) by mouth every 4 (four) hours as needed.       Review of Systems "   Constitutional: Positive for activity change, appetite change and fever. Negative for chills.   Respiratory: Negative for cough, shortness of breath and wheezing.    Cardiovascular: Negative for leg swelling.   Gastrointestinal: Positive for diarrhea. Negative for abdominal distention, abdominal pain, nausea and vomiting.   Genitourinary: Negative for decreased urine volume, difficulty urinating and dysuria.   Skin: Positive for wound (chevron well healed).   Allergic/Immunologic: Positive for immunocompromised state.   Neurological: Positive for weakness. Negative for light-headedness.   Psychiatric/Behavioral: Positive for confusion and decreased concentration. Negative for agitation. The patient is not nervous/anxious.      Objective:     Vital Signs (Most Recent):  Temp: 98.9 °F (37.2 °C) (07/09/19 1603)  Pulse: 79 (07/09/19 1559)  Resp: (!) 26 (07/09/19 1559)  BP: 123/70 (07/09/19 1559)  SpO2: 98 % (07/09/19 1559) Vital Signs (24h Range):  Temp:  [98.9 °F (37.2 °C)-101.8 °F (38.8 °C)] 98.9 °F (37.2 °C)  Pulse:  [] 79  Resp:  [13-31] 26  SpO2:  [91 %-98 %] 98 %  BP: (123-148)/(70-81) 123/70     Weight: 78.5 kg (173 lb)  Body mass index is 29.7 kg/m².      Intake/Output Summary (Last 24 hours) at 7/9/2019 1718  Last data filed at 7/9/2019 1513  Gross per 24 hour   Intake 2660 ml   Output --   Net 2660 ml       Physical Exam   Constitutional: She appears well-developed. No distress.   HENT:   Head: Normocephalic and atraumatic.   Eyes: Pupils are equal, round, and reactive to light. No scleral icterus.   Neck: Normal range of motion. Neck supple. No thyromegaly present.   Cardiovascular: Regular rhythm. Tachycardia present. Exam reveals no friction rub.   No murmur heard.  Pulmonary/Chest: Effort normal. She has decreased breath sounds in the right middle field, the right lower field and the left lower field. She has no wheezes. She has no rales.   Diminished RLL   Abdominal: Soft. She exhibits no  distension. There is no tenderness. There is no rebound and no guarding.   Healed chevron incision   Musculoskeletal: Normal range of motion.   Neurological: She is alert.   Oriented to person, easily re oriented   Skin: Skin is warm and dry. She is not diaphoretic.   Psychiatric: She has a normal mood and affect. Her behavior is normal.   Nursing note and vitals reviewed.      Laboratory:  CBC:   Recent Labs   Lab 07/09/19  0644   WBC 5.62   RBC 2.64*   HGB 7.5*   HCT 23.1*      MCV 88   MCH 28.4   MCHC 32.5     CMP:   Recent Labs   Lab 07/09/19  0644   GLU 77   CALCIUM 8.1*   ALBUMIN 2.0*   PROT 5.0*   *   K 2.9*   CO2 21*   CL 96   BUN 13   CREATININE 0.7   ALKPHOS 191*   ALT <5*   AST 11   BILITOT 0.6     Labs within the past 24 hours have been reviewed.    Diagnostic Results:  None    Assessment/Plan:     * Fever  - Infectious work up ordered on admit - blood cx NGTD, UA unremarkable, CMV PCR 7/5 pending, chest x-ray w right pleural effusion, CT A/P reviewed.  - Started broad spectrum antibiotics on admit.   - Abdominal and head CT reviewed and no clear source for fever.   - ID consulted.  Apprec recs.   - Pt with intermittent confusion.  Easily re oriented.  Continue to monitor.   - plan for thora and para today, not done yesterday.  Will cx fluid.   - likely sepsis- tachycardia, increased RR and fever.  Poor PO intake past 24 hours.  IV fluids at 50 ml/hr h1Rffry ordered.        Traumatic intracranial hemorrhage without loss of consciousness  - Head CT reviewed and noted with interval reduction in size of the patient's known right occipital lobe parenchymal hemorrhage.      At risk for opportunistic infections  - cont bactrim for PCP prophylaxis  - cont Valcyte for CMV prophylaxis (CMV D+/R+).      Closed head injury  - admitted recently following fall diagnosed w acute parenchymal hemorrhage within right occipital live near parieto-occipital junction 1.8x1.3x1.5 w mild surrounding vasogenic  edema and localized mass.  Repeat CT head showed reduction in size of hemorrhage.  - pt denies HA      Long-term use of immunosuppressant medication  - Continue prograf.   - Holding MMF. Monitor prograf level daily, monitor for toxic side effects, and adjust for therapeutic dose.       Prophylactic immunotherapy  - See long term use of immunosuppression.  Decreased as likely w sepsis.    S/P liver transplant  - LFTs stable  - Pt with good hepatic allograft function.   - Last Liver US 6/24 showed Mildly elevated hepatic arterial resistive indices, although improved from prior exam.  Otherwise, satisfactory vascular appearance of the liver, complex fluid collection adjacent to the left hepatic lobe, similar to slightly smaller compared to prior exam, small volume of ascites and partially visualized right pleural effusion. Monitor.   - plan for para today, will cx fluid.     Anemia of chronic disease  - no overt bleeding.    - keep type and screen current  - will transfuse 1u prbc today.  - monitor w daily labs.      Pleural effusion, right  - CXR reviewed.  Thora ordered for today, will cx fluid.       Weakness  - PT/OT consulted.        Moderate malnutrition  - supplements ordered.      GERD (gastroesophageal reflux disease)  - cont Pepcid.          VTE Risk Mitigation (From admission, onward)        Ordered     Place sequential compression device  Until discontinued      07/05/19 1659     IP VTE HIGH RISK PATIENT  Once      07/05/19 1659          The patients clinical status was discussed at multidisplinary rounds, involving transplant surgery, transplant medicine, pharmacy, nursing, nutrition, and social work    Discharge Planning:  No plan for discharge.    Eugenia Albrecht NP  Liver Transplant  [facility]

## 2019-07-09 NOTE — NURSING
Received to CT [address] via stretcher from INPT room 21415O.  Pt is awake and alert, identified via two identifiers.  Allergies reviewed, Pt verbalizes understanding of plan of care, risks and goals of therapy and wishes to proceed.

## 2019-07-09 NOTE — MEDICAL/APP STUDENT
[facility]  Infectious Diseases  Progress Note    Patient Name: Jihan Zamudio  MRN: [id number]  Admission Date: 7/5/2019  Length of Stay: 4  Attending Physician: Freddy Soler MD  Primary Care Provider: Primary Doctor No    Isolation Status: Special Contact    ASSESSMENT/PLAN:     Reason for Consult: Fever    49 yoF with h/o HTN, hypothyroid, KESSLER cirrhosis s/p liver transplant 6/5/19 who presented to ED with fever and change in mental status x1day. Pt with recent admission to hospital due to R occipital lobe ICH from fall. Pt also reported non-productive cough [id number], diarrhea every other day for 1wk, fatigue. In ED Tmax noted to be 102.3F, infectious work-up started, patient currently on IV Vanc and Cefepime for empiric coverage. Pt also on Bactrim, valganciclovir. CXR during this admission showing increased pleural fluid on R - in addition to h/o fever and cough, concern for PNA. Patient is also ~1mth post-transplant, concern for intra-abdominal process also high. Today pt breath sounds worse on R, scheduled for thoracentesis and paracentesis this PM.    PLAN:  - Continue Bactrim and Valganciclovir  - Continue IV Vanc,Cefepime, and PO Flagyl  - Patient scheduled for IR thoracentesis and paracentesis to assess for infection  - Blood cultures NGTD, will follow    ID will continue to follow.     Pura Greenfield, MS4  Infectious Diseases    SUBJECTIVE:     History of Present Illness:  Patient is a 49 y.o. 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 Pt with recent admission on 6/24 to hospital due to R occipital lobe ICH from fall. On 7/6, patient was seen by home health nurse who noted fever, confusion. Patient reports that she remembers feeling feverish, but does not remember being confused. She endorses a 10day h/o nonproductive cough, fatigue, and  diarrhea about every other day for 1 week. In ED Tmax noted to be 102.3F, patient was admitted for infectious work up, was started on IV Vanc and Cefepime. Patient also on Bactrim and valganciclovir, reports being compliant with medication.      Patient denies sick contact, recent travel, changes in diet. She does have 2 dogs at home, but has very limited contact with dogs. No other animal exposure.    Interval history:  Patient had fever yesterday at 11AM up to 101.1F, has not had additional fevers since Cefepime dose was increased and PO Flagyl added. Pt with 4 loose BMs yesterday. Still having multiple loose stools today. Nurse also reports two episodes of confusion where patient believed her  was in the bathroom while he was not there, however patient was AAOx3 at the time.    Past Medical History:  Past Medical History:   Diagnosis Date    Cirrhosis     Esophageal varices 2018    Small with no banding     Essential hypertension 2018    Fatty liver 2018    GERD (gastroesophageal reflux disease)     Hx of colonic polyps 2018    On colonoscopy     Hypertension     Hypothyroidism 2018    Kidney stones     Morbid obesity 2018    Lap band with subsequent release    KADEEM (obstructive sleep apnea) 2018    Osteoarthritis 2018    Pulmonary nodule 2018    Vitamin D deficiency 2018       Past Surgical History:  Past Surgical History:   Procedure Laterality Date     SECTION      TIMES 2     CHOLECYSTECTOMY      LAPAROSCOPIC     CYSTOSCOPY W/ STONE MANIPULATION      kidney stone removal    EGD (ESOPHAGOGASTRODUODENOSCOPY) N/A 2019    Performed by [facility] at [facility] ENDO (2ND FLR)    LAPAROSCOPIC GASTRIC BANDING  2006    removal     LIVER BIOPSY  2017    KESSLER with bridging 17    TRANSPLANT, LIVER N/A 2019    Performed by Clemente [facility] Jr., MD at [facility] OR 2ND FLR    TRANSPLANT, LIVER N/A  6/4/2019    Performed by [facility] at [facility] OR [address] [place] [address]       Family History:  Family History   Problem Relation Age of Onset    Heart disease Mother     Cancer Mother 50        colon cancer    Heart disease Father     Cancer Father 65        liver cancer    Breast cancer Maternal Grandmother        Social History:  Social History     Tobacco Use    Smoking status: Never Smoker    Smokeless tobacco: Never Used    Tobacco comment: patient denies   Substance Use Topics    Alcohol use: No     Comment: patient denies    Drug use: No     Comment: patient denies       Allergies:  Review of patient's allergies indicates:   Allergen Reactions    Metformin Rash    Pcn [penicillins] Other (See Comments)     Unsure of reaction, states it was as a child. Tolerated rocephin at [facility]        Pertinent Medications:  Antibiotics (From admission, onward)    Start     Stop Route Frequency Ordered    07/08/19 1500  metroNIDAZOLE tablet 500 mg      -- Oral Every 8 hours 07/08/19 1346    07/08/19 1500  ceFEPIme in dextrose 5% 2 gram/50 mL IVPB 2 g      -- IV Every 8 hours (non-standard times) 07/08/19 1347    07/07/19 2100  vancomycin 750 mg in dextrose 5 % 250 mL IVPB (ready to mix system)  (vancomycin IVPB)      -- IV Every 12 hours (non-standard times) 07/07/19 0832    07/06/19 0900  sulfamethoxazole-trimethoprim 400-80mg per tablet 1 tablet      -- Oral Daily 07/05/19 1658            Review of Symptoms:  Review of Systems   Constitutional: Negative for chills and fever.   HENT: Negative for congestion, sinus pain and sore throat.    Respiratory: Positive for cough (nonproductive). Negative for shortness of breath.    Cardiovascular: Negative for chest pain and palpitations.   Gastrointestinal: Positive for diarrhea. Negative for abdominal pain, blood in stool, constipation, melena, nausea and vomiting.   Genitourinary: Negative for dysuria, frequency and urgency.   Skin: Negative for rash.   Neurological:  Negative for dizziness and headaches.         OBJECTIVE:     Vital Signs (Most Recent)  Temp: 99.8 °F (37.7 °C) (07/09/19 0919)  Pulse: 101 (07/09/19 1115)  Resp: 20 (07/09/19 1115)  BP: (!) 142/81 (07/09/19 0839)  SpO2: 96 % (07/09/19 1115)    Temperature Range Min/Max (Last 24H):  Temp:  [99 °F (37.2 °C)-99.8 °F (37.7 °C)]     Physical Exam:  Physical Exam   Constitutional: She is oriented to person, place, and time and well-developed, well-nourished, and in no distress.   HENT:   Head: Normocephalic and atraumatic.   Neck: Normal range of motion. Neck supple.   Cardiovascular: Normal rate, regular rhythm and normal heart sounds.   No murmur heard.  Pulmonary/Chest: Tachypnea noted. She has decreased breath sounds in the right middle field, the right lower field and the left lower field. She has no wheezes. She has no rales.   Dullness to percussion on RML, RLL   Abdominal: Soft. Bowel sounds are normal. She exhibits no distension. There is no tenderness.   Abdominal surgical scar clean, dry, no drainage   Neurological: She is alert and oriented to person, place, and time.   Skin: Warm and dry.No jaundice, rashes, or visible lesions.      Laboratory:  CBC:   Lab Results   Component Value Date    WBC 5.62 07/09/2019    HGB 7.5 (L) 07/09/2019    HCT 23.1 (L) 07/09/2019    MCV 88 07/09/2019     07/09/2019       BMP:   Recent Labs   Lab 07/09/19  0644   GLU 77   *   K 2.9*   CL 96   CO2 21*   BUN 13   CREATININE 0.7   CALCIUM 8.1*   MG 1.7       LFT:   Lab Results   Component Value Date    ALT <5 (L) 07/09/2019    AST 11 07/09/2019     (H) 06/08/2019    ALKPHOS 191 (H) 07/09/2019    BILITOT 0.6 07/09/2019       Microbiology:    Microbiology Results (last 7 days)     Procedure Component Value Units Date/Time    Gram stain [[id number]]     Order Status:  Sent Specimen:  Body Fluid from Abdomen     Fungus culture [[id number]]     Order Status:  Sent Specimen:  Body Fluid from Abdomen     Culture,  Anaerobe [[id number]]     Order Status:  Sent Specimen:  Body Fluid from Abdomen     Aerobic culture [[id number]]     Order Status:  Sent Specimen:  Body Fluid from Abdomen     Blood culture x two cultures. Draw prior to antibiotics. [[id number]] Collected:  07/05/19 1653    Order Status:  Completed Specimen:  Blood from Peripheral, Antecubital, Left Updated:  07/08/19 2012     Blood Culture, Routine No Growth to date      No Growth to date      No Growth to date      No Growth to date    Narrative:       Aerobic and anaerobic    Blood culture x two cultures. Draw prior to antibiotics. [[id number]] Collected:  07/05/19 1705    Order Status:  Completed Specimen:  Blood from Peripheral, Forearm, Right Updated:  07/08/19 2012     Blood Culture, Routine No Growth to date      No Growth to date      No Growth to date      No Growth to date    Narrative:       Aerobic and anaerobic    Clostridium difficile EIA [[id number]]     Order Status:  Canceled Specimen:  Stool     Culture, Anaerobe [[id number]]     Order Status:  No result Specimen:  Body Fluid from Pleural Fluid     Aerobic culture [[id number]]     Order Status:  No result Specimen:  Body Fluid from Pleural Fluid     Gram stain [[id number]]     Order Status:  No result Specimen:  Body Fluid from Peritoneal Fluid     Gram stain [[id number]]     Order Status:  No result Specimen:  Body Fluid from Ascites     Culture, Anaerobe [[id number]]     Order Status:  No result Specimen:  Body Fluid from Ascites     Aerobic culture [[id number]]     Order Status:  No result Specimen:  Body Fluid from Ascites     Fungus culture [[id number]]     Order Status:  No result Specimen:  Body Fluid from Abdomen           Diagnostic Results:      ASSESSMENT/PLAN:     Active Hospital Problems    Diagnosis  POA    *Fever [R50.9]  Yes    Traumatic intracranial hemorrhage without loss of consciousness [S06.300A]  Yes    At risk for opportunistic infections [Z91.89]  Yes    Closed head injury  [S09.90XA]  Yes    Long-term use of immunosuppressant medication [Z79.899]  Not Applicable    Prophylactic immunotherapy [Z29.8]  Not Applicable    S/P liver transplant [Z94.4]  Not Applicable    Anemia of chronic disease [D63.8]  Yes    Pleural effusion, right [J90]  Yes    Weakness [R53.1]  Yes     Encouraged to walk with supervision to bed-side chair.      Moderate malnutrition [E44.0]  Yes    GERD (gastroesophageal reflux disease) [K21.9]  Yes      Resolved Hospital Problems    Diagnosis Date Resolved POA    Acute on chronic anemia [D64.9] 07/08/2019 Yes       Plan: Please see top of page

## 2019-07-09 NOTE — ASSESSMENT & PLAN NOTE
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 She was started on empiric vanc/cefepime without resolution and ID has been consulted to comment on etiology/workup. Differential most notable for: pneumonia with complicating pleural effusion, possible IA infection (with ascites on imaging), or C.diff colitis (given loose stools). Given lack of instrumentation following recent ICH and no meningismus, no suspicion of bacterial meningitis. She remains tenuous.    - would continue vanc, cefepime (dose adjusted for pneumonia), and flagyl  - agree with thoracentesis and paracentesis to assess for infection  - remainder of prophylaxis per protocol  - await pending cultures and C.diff testing

## 2019-07-09 NOTE — CARE UPDATE
Rapid Response Respiratory Therapy Proactive Rounding Note      Time of visit: 1141    Code Status: Full Code   : 1970  Age: 49 y.o.  Weight:   Wt Readings from Last 1 Encounters:   19 78.5 kg (173 lb)     Sex: female  Race: White   Bed: 54876/46682 A:   MRN: [id number]    SITUATION     Evaluated patient for: MEWS Score    BACKGROUND     Patient has a past medical history of Cirrhosis, Esophageal varices, Essential hypertension, Fatty liver, GERD (gastroesophageal reflux disease), colonic polyps, Hypertension, Hypothyroidism, Kidney stones, Morbid obesity, KADEEM (obstructive sleep apnea), Osteoarthritis, Pulmonary nodule, and Vitamin D deficiency.      ASSESSMENT     Pulse: Pulse: 101 Respiratory rate: Resp: 20 Temperature: Temp: 99.8 °F (37.7 °C) BP: BP: (!) 142/81 SpO2:SpO2: 96 %   Level of Consciousness: Level of Consciousness (AVPU): alert  Respiratory Effort: Respiratory Effort: Normal, Unlabored  Expansion/Accessory Muscle Usage: Expansion/Accessory Muscles/Retractions: expansion symmetric  All Lung Field Breath Sounds: All Lung Fields Breath Sounds: Anterior:, Lateral:, diminished  QUENTIN Breath Sounds: clear  LLL Breath Sounds: clear  RUL Breath Sounds: clear  RML Breath Sounds: diminished  RLL Breath Sounds: diminished  Cough Type: Cough Type: no productive sputum  Mobility at time of assessment: General Mobility: generalized weakness  O2 Device/Concentration: O2 Device (Oxygen Therapy): nasal cannula   Flow (L/min): 2    Most recent blood gas: No results for input(s): PH, PCO2, PO2, HCO3, POCSATURATED, BE in the last 72 hours.  Current Respiratory Care Orders: Oxygen PRN and pulse oximetry Q4  NIPPV: No  Surgical airway: No      INTERVENTIONS/RECOMMENDATIONS   ?  Assessed pt due to MEWS Score. Upon entering the room, pt was on room air with an SpO2 of 91%, pulse of 100, and diminished breath sounds. Pt was then placed on 2L nasal cannula and is now maintaining an SpO2 of 96% and pulse of 101.  RNSavannah notified of this change. Will continue to monitor.    Discussed plan of care with primary RTPatricio.     FOLLOW-UP     Please call back the Rapid Response RT, An Witt, RRT at x [phone] for any questions or concerns.

## 2019-07-09 NOTE — CARE UPDATE
Rapid Response Nurse Chart Check     Chart check completed, abnormal VS noted, pt tachypnic in the 30s and tachycardic in 110s. Bedside RNAminah contacted, no concerns verbalized at this time, pt working with pt this morning and in/out of bed. Instructed to call [phone] for further concerns or assistance.

## 2019-07-09 NOTE — H&P
Inpatient Radiology Pre-procedure Note    History of Present Illness:  Jihan Zamudio is a 49 y.o. female with KESSLER cirrhosis s/p liver transplant who is admitted for  presents for infectious workup. IR was consulted for paracentesis and thoracentesis.    Admission H&P reviewed.  Past Medical History:   Diagnosis Date    Cirrhosis     Esophageal varices 2018    Small with no banding     Essential hypertension 2018    Fatty liver 2018    GERD (gastroesophageal reflux disease)     Hx of colonic polyps 2018    On colonoscopy     Hypertension     Hypothyroidism 2018    Kidney stones     Morbid obesity 2018    Lap band with subsequent release    KADEEM (obstructive sleep apnea) 2018    Osteoarthritis 2018    Pulmonary nodule 2018    Vitamin D deficiency 2018     Past Surgical History:   Procedure Laterality Date     SECTION      TIMES 2     CHOLECYSTECTOMY      LAPAROSCOPIC     CYSTOSCOPY W/ STONE MANIPULATION      kidney stone removal    EGD (ESOPHAGOGASTRODUODENOSCOPY) N/A 2019    Performed by Davian Hernández MD at [facility] ENDO (2ND FLR)    LAPAROSCOPIC GASTRIC BANDING  2006    removal     LIVER BIOPSY  2017    KESSLER with bridging 17    TRANSPLANT, LIVER N/A 2019    Performed by Clemente Brown Jr., MD at [facility] OR 2ND FLR    TRANSPLANT, LIVER N/A 2019    Performed by Clemente rBown Jr., MD at [facility] OR 2ND FLR       Review of Systems:   As documented in primary team H&P    Home Meds:   Prior to Admission medications    Medication Sig Start Date End Date Taking? Authorizing Provider   famotidine (PEPCID) 20 MG tablet Take 1 tablet (20 mg total) by mouth every evening. 19  Yes Gui Chavez MD   furosemide (LASIX) 40 MG tablet Take 1 tablet (40 mg total) by mouth once daily. for 10 days 19 Yes Gui Chavez MD   lancets 28 gauge Misc Test blood glucose 3 (three) times daily.  "6/11/19  Yes Clemente Brown Jr., MD   levothyroxine (SYNTHROID) 112 MCG tablet Take 1 tablet (112 mcg total) by mouth once daily. 6/8/19  Yes Gui Chavez MD   mycophenolate (CELLCEPT) 250 mg Cap Take 2 capsules (500 mg total) by mouth 2 (two) times daily.  Patient taking differently: Take 1,000 mg by mouth 2 (two) times daily.  6/18/19  Yes Freddy Soler MD   pen needle, diabetic 32 gauge x 5/32" Ndle Use to inject insulin into the skin 3 (three) times daily. 6/11/19  Yes Clemente Brown Jr., MD   predniSONE (DELTASONE) 10 MG tablet Take 20 mg by mouth daily 6/12-6/18; 15 mg daily 6/19-6/25; 10 mg  daily 6/26-7/2; 5 mg  daily 7/3-7/9; stop: 7/10/19 6/7/19  Yes Gui Chavez MD   sodium bicarbonate 650 MG tablet Take 1 tablet (650 mg total) by mouth 2 (two) times daily. 6/19/19 6/18/20 Yes Freddy Soler MD   sulfamethoxazole-trimethoprim 400-80mg (BACTRIM,SEPTRA) 400-80 mg per tablet Take 1 tablet by mouth once daily. Stop: 12/3/19 6/6/19 12/3/19 Yes Gui Chavez MD   tacrolimus (PROGRAF) 1 MG Cap Take 3 capsules (3 mg total) by mouth every 12 (twelve) hours. 6/27/19  Yes Jose E Metzger MD   valGANciclovir (VALCYTE) 450 mg Tab Take 1 tablet (450 mg total) by mouth once daily. Stop: 9/4/19 6/6/19 9/4/19 Yes Gui Chavez MD   blood sugar diagnostic Strp Test blood glucose 3 (three) times daily. 6/11/19   Clemente Brown Jr., MD   blood-glucose meter kit Use as instructed 6/11/19 6/10/20  Clemente Brown Jr., MD   levETIRAcetam (KEPPRA) 500 MG Tab Take 1 tablet (500 mg total) by mouth 2 (two) times daily. for 7 days 6/25/19 7/2/19  Theodore Woods MD   oxyCODONE (ROXICODONE) 10 mg Tab immediate release tablet Take 1 tablet (10 mg total) by mouth every 4 (four) hours as needed. 6/11/19   Eugenia Albrecht NP     Scheduled Meds:    ceFEPIme in dextrose 5%  2 g Intravenous Q8H    famotidine  20 mg Oral QHS    furosemide  40 mg Intravenous Daily    iohexol  15 mL Oral Once    " levothyroxine  112 mcg Oral Before breakfast    lidocaine (PF) 10 mg/ml (1%)  1 mL Other Once    metroNIDAZOLE  500 mg Oral Q8H    potassium chloride  40 mEq Oral Q4H    predniSONE  5 mg Oral Daily    sodium bicarbonate  650 mg Oral BID    sulfamethoxazole-trimethoprim 400-80mg  1 tablet Oral Daily    tacrolimus  2 mg Oral BID    valGANciclovir  450 mg Oral Daily    vancomycin (VANCOCIN) IVPB  750 mg Intravenous Q12H     Continuous Infusions:   PRN Meds:sodium chloride, acetaminophen, calcium carbonate, hydrALAZINE, iohexol, ondansetron, sodium chloride 0.9%  Anticoagulants/Antiplatelets: no anticoagulation    Allergies:   Review of patient's allergies indicates:   Allergen Reactions    Metformin Rash    Pcn [penicillins] Other (See Comments)     Unsure of reaction, states it was as a child. Tolerated rocephin at [facility]     Sedation Hx: have not been any systemic reactions    Labs:  No results for input(s): INR in the last 168 hours.    Invalid input(s):  PT,  PTT    Recent Labs   Lab 07/09/19  0644   WBC 5.62   HGB 7.5*   HCT 23.1*   MCV 88         Recent Labs   Lab 07/09/19  0644   GLU 77   *   K 2.9*   CL 96   CO2 21*   BUN 13   CREATININE 0.7   CALCIUM 8.1*   MG 1.7   ALT <5*   AST 11   ALBUMIN 2.0*   BILITOT 0.6         Vitals:  Temp: (!) 101.8 °F (38.8 °C) (07/09/19 1252)  Pulse: 96 (07/09/19 1242)  Resp: (!) 21 (07/09/19 1242)  BP: 124/71 (07/09/19 1242)  SpO2: 97 % (07/09/19 1242)     Physical Exam:  ASA: 3  Mallampati: 2    General: no acute distress  Mental Status: alert and oriented to person, place and time  HEENT: normocephalic, atraumatic  Chest: unlabored breathing  Heart: regular heart rate  Abdomen: distended  Extremity: moves all extremities    Plan: Paracentesis and thoracentesis     Sedation Plan: none    Tamia Munguia MD  PGY-2  Pager: [phone]

## 2019-07-09 NOTE — PROGRESS NOTES
[facility]  Infectious Disease  Progress Note    Patient Name: Jihan Zamudio  MRN: [id number]  Admission Date: 7/5/2019  Length of Stay: 4 days  Attending Physician: Freddy Soler MD  Primary Care Provider: Primary Doctor No    Isolation Status: No active isolations  Assessment/Plan:      * Fever  50yo woman w/a history of KESSLER cirrhosis (s/p DBDLT 6/5/2019, CMV D+/R+, steroid induction, on maintenance tacro/MMF/pred; c/b small traumatic R occipital lobe ICH following mechanical fall managed conservatively) who was admitted on 7/5/2019 with acute onset fevers, chills, headache (stable since ICH), dry cough (x10 days), and loose stools. She was started on empiric vanc/cefepime without resolution and ID has been consulted to comment on etiology/workup. Differential most notable for: pneumonia with complicating pleural effusion, possible IA infection (with ascites on imaging), or C.diff colitis (given loose stools). Given lack of instrumentation following recent ICH and no meningismus, no suspicion of bacterial meningitis. She remains tenuous.    - would continue vanc, cefepime (dose adjusted for pneumonia), and flagyl  - agree with thoracentesis and paracentesis to assess for infection  - remainder of prophylaxis per protocol  - await pending cultures and C.diff testing        Anticipated Disposition: pending improvement    Thank you for your consult. I will follow-up with patient. Please contact us if you have any additional questions.     Halle Marc MD  Transplant ID Attending  [id number]    Halle Marc MD  Infectious Disease  [facility]    Subjective:     Principal Problem:Fever    HPI: No notes on file  Interval History: Patient remains confused (somewhat moreso today). Afebrile most of day but with spike later after I saw her. Stools semi-formed now. No growth from cx. Thora/para unfortunately delayed, to occur this afternoon.    Review of Systems    Constitutional: Positive for activity change, appetite change and fever. Negative for chills and diaphoresis.   HENT: Negative for ear pain, mouth sores, sinus pressure and sore throat.    Eyes: Negative for photophobia, pain and redness.   Respiratory: Positive for cough. Negative for shortness of breath and wheezing.    Cardiovascular: Negative for chest pain and leg swelling.   Gastrointestinal: Positive for diarrhea. Negative for abdominal distention, abdominal pain, nausea and vomiting.   Genitourinary: Negative for decreased urine volume, difficulty urinating, dysuria, flank pain, frequency and urgency.   Musculoskeletal: Negative for arthralgias, back pain, gait problem and myalgias.   Skin: Positive for wound (chevron well healed). Negative for pallor and rash.   Allergic/Immunologic: Positive for immunocompromised state.   Neurological: Positive for weakness. Negative for dizziness, tremors, seizures, light-headedness and headaches.   Psychiatric/Behavioral: Positive for confusion and decreased concentration. Negative for agitation. The patient is not nervous/anxious.      Objective:     Vital Signs (Most Recent):  Temp: 98.3 °F (36.8 °C) (07/09/19 1815)  Pulse: 78 (07/09/19 1815)  Resp: (!) 28 (07/09/19 1815)  BP: 112/64 (07/09/19 1815)  SpO2: 95 % (07/09/19 1815) Vital Signs (24h Range):  Temp:  [98.3 °F (36.8 °C)-101.8 °F (38.8 °C)] 98.3 °F (36.8 °C)  Pulse:  [] 78  Resp:  [13-31] 28  SpO2:  [91 %-98 %] 95 %  BP: (112-148)/(64-81) 112/64     Weight: 78.5 kg (173 lb)  Body mass index is 29.7 kg/m².    Estimated Creatinine Clearance: 96.4 mL/min (based on SCr of 0.7 mg/dL).    Physical Exam   Constitutional: She appears well-developed. No distress.   HENT:   Head: Normocephalic and atraumatic.   Eyes: Pupils are equal, round, and reactive to light. No scleral icterus.   Neck: Normal range of motion. Neck supple. No thyromegaly present.   Cardiovascular: Regular rhythm. Tachycardia present. Exam  reveals no friction rub.   No murmur heard.  Pulmonary/Chest: Effort normal. She has decreased breath sounds in the right middle field, the right lower field and the left lower field. She has no wheezes. She has no rales.   Diminished RLL   Abdominal: Soft. She exhibits no distension. There is no tenderness. There is no rebound and no guarding.   Healed chevron incision   Musculoskeletal: Normal range of motion.   Neurological: She is alert.   Oriented to person, easily re oriented   Skin: Skin is warm and dry. She is not diaphoretic.   Psychiatric: She has a normal mood and affect. Her behavior is normal.   Nursing note and vitals reviewed.      Significant Labs:   CBC:   Recent Labs   Lab 07/08/19  0640 07/09/19  0644   WBC 4.79 5.62   HGB 7.5* 7.5*   HCT 22.2* 23.1*    176     CMP:   Recent Labs   Lab 07/08/19  0640 07/09/19  0644   * 128*   K 3.2* 2.9*   CL 95 96   CO2 23 21*   GLU 82 77   BUN 14 13   CREATININE 0.8 0.7   CALCIUM 8.2* 8.1*   PROT 4.8* 5.0*   ALBUMIN 2.1* 2.0*   BILITOT 0.7 0.6   ALKPHOS 202* 191*   AST 10 11   ALT 7* <5*   ANIONGAP 9 11   EGFRNONAA >60.0 >60.0       Significant Imaging: I have reviewed all pertinent imaging results/findings within the past 24 hours.

## 2019-07-10 PROBLEM — E87.6 HYPOKALEMIA: Status: ACTIVE | Noted: 2019-07-10

## 2019-07-10 LAB
ALBUMIN SERPL BCP-MCNC: 2 G/DL (ref 3.5–5.2)
ALP SERPL-CCNC: 174 U/L (ref 55–135)
ALT SERPL W/O P-5'-P-CCNC: 5 U/L (ref 10–44)
ANION GAP SERPL CALC-SCNC: 8 MMOL/L (ref 8–16)
ANISOCYTOSIS BLD QL SMEAR: SLIGHT
APPEARANCE FLD: CLEAR
ASCENDING AORTA: 2.78 CM
AST SERPL-CCNC: 11 U/L (ref 10–40)
AV INDEX (PROSTH): 0.6
AV MEAN GRADIENT: 14 MMHG
AV PEAK GRADIENT: 25 MMHG
AV VALVE AREA: 1.81 CM2
AV VELOCITY RATIO: 0.62
BACTERIA BLD CULT: NORMAL
BACTERIA BLD CULT: NORMAL
BASOPHILS # BLD AUTO: 0.04 K/UL (ref 0–0.2)
BASOPHILS NFR BLD: 0.7 % (ref 0–1.9)
BILIRUB SERPL-MCNC: 0.7 MG/DL (ref 0.1–1)
BODY FLD TYPE: NORMAL
BSA FOR ECHO PROCEDURE: 1.88 M2
BUN SERPL-MCNC: 12 MG/DL (ref 6–20)
C DIFF GDH STL QL: NEGATIVE
C DIFF TOX A+B STL QL IA: NEGATIVE
CALCIUM SERPL-MCNC: 7.9 MG/DL (ref 8.7–10.5)
CHLORIDE SERPL-SCNC: 99 MMOL/L (ref 95–110)
CMV DNA SERPL NAA+PROBE-ACNC: NORMAL IU/ML
CO2 SERPL-SCNC: 20 MMOL/L (ref 23–29)
COLOR FLD: YELLOW
CREAT SERPL-MCNC: 0.7 MG/DL (ref 0.5–1.4)
CV ECHO LV RWT: 0.41 CM
DIFFERENTIAL METHOD: ABNORMAL
DOP CALC AO PEAK VEL: 2.49 M/S
DOP CALC AO VTI: 53.89 CM
DOP CALC LVOT AREA: 3 CM2
DOP CALC LVOT DIAMETER: 1.96 CM
DOP CALC LVOT PEAK VEL: 1.55 M/S
DOP CALC LVOT STROKE VOLUME: 97.65 CM3
DOP CALCLVOT PEAK VEL VTI: 32.38 CM
E WAVE DECELERATION TIME: 200.31 MSEC
E/A RATIO: 0.97
E/E' RATIO: 13.71 M/S
ECHO LV POSTERIOR WALL: 0.99 CM (ref 0.6–1.1)
EOSINOPHIL # BLD AUTO: 0.1 K/UL (ref 0–0.5)
EOSINOPHIL NFR BLD: 0.9 % (ref 0–8)
EOSINOPHIL NFR FLD MANUAL: 1 %
ERYTHROCYTE [DISTWIDTH] IN BLOOD BY AUTOMATED COUNT: 15.4 % (ref 11.5–14.5)
EST. GFR  (AFRICAN AMERICAN): >60 ML/MIN/1.73 M^2
EST. GFR  (NON AFRICAN AMERICAN): >60 ML/MIN/1.73 M^2
FRACTIONAL SHORTENING: 32 % (ref 28–44)
GIANT PLATELETS BLD QL SMEAR: PRESENT
GLUCOSE SERPL-MCNC: 95 MG/DL (ref 70–110)
HCT VFR BLD AUTO: 26.6 % (ref 37–48.5)
HGB BLD-MCNC: 8.9 G/DL (ref 12–16)
IMM GRANULOCYTES # BLD AUTO: 0.1 K/UL (ref 0–0.04)
IMM GRANULOCYTES NFR BLD AUTO: 1.8 % (ref 0–0.5)
INTERVENTRICULAR SEPTUM: 1.02 CM (ref 0.6–1.1)
LA MAJOR: 4.54 CM
LA MINOR: 4.59 CM
LA WIDTH: 3.33 CM
LEFT ATRIUM SIZE: 3.68 CM
LEFT ATRIUM VOLUME INDEX: 25.9 ML/M2
LEFT ATRIUM VOLUME: 47.55 CM3
LEFT INTERNAL DIMENSION IN SYSTOLE: 3.25 CM (ref 2.1–4)
LEFT VENTRICLE DIASTOLIC VOLUME INDEX: 58.61 ML/M2
LEFT VENTRICLE DIASTOLIC VOLUME: 107.79 ML
LEFT VENTRICLE MASS INDEX: 93 G/M2
LEFT VENTRICLE SYSTOLIC VOLUME INDEX: 23.1 ML/M2
LEFT VENTRICLE SYSTOLIC VOLUME: 42.44 ML
LEFT VENTRICULAR INTERNAL DIMENSION IN DIASTOLE: 4.81 CM (ref 3.5–6)
LEFT VENTRICULAR MASS: 171.93 G
LV LATERAL E/E' RATIO: 12 M/S
LV SEPTAL E/E' RATIO: 16 M/S
LYMPHOCYTES # BLD AUTO: 0.2 K/UL (ref 1–4.8)
LYMPHOCYTES NFR BLD: 4.3 % (ref 18–48)
LYMPHOCYTES NFR FLD MANUAL: 66 %
MAGNESIUM SERPL-MCNC: 1.6 MG/DL (ref 1.6–2.6)
MCH RBC QN AUTO: 28.8 PG (ref 27–31)
MCHC RBC AUTO-ENTMCNC: 33.5 G/DL (ref 32–36)
MCV RBC AUTO: 86 FL (ref 82–98)
MESOTHL CELL NFR FLD MANUAL: 2 %
MONOCYTES # BLD AUTO: 0.3 K/UL (ref 0.3–1)
MONOCYTES NFR BLD: 5.5 % (ref 4–15)
MONOS+MACROS NFR FLD MANUAL: 7 %
MV PEAK A VEL: 0.99 M/S
MV PEAK E VEL: 0.96 M/S
NEUTROPHILS # BLD AUTO: 4.9 K/UL (ref 1.8–7.7)
NEUTROPHILS NFR BLD: 86.8 % (ref 38–73)
NEUTROPHILS NFR FLD MANUAL: 24 %
NRBC BLD-RTO: 0 /100 WBC
OVALOCYTES BLD QL SMEAR: ABNORMAL
PISA TR MAX VEL: 2.14 M/S
PLATELET # BLD AUTO: 171 K/UL (ref 150–350)
PLATELET BLD QL SMEAR: ABNORMAL
PMV BLD AUTO: 10.1 FL (ref 9.2–12.9)
POIKILOCYTOSIS BLD QL SMEAR: SLIGHT
POLYCHROMASIA BLD QL SMEAR: ABNORMAL
POTASSIUM SERPL-SCNC: 3.4 MMOL/L (ref 3.5–5.1)
PROT SERPL-MCNC: 4.8 G/DL (ref 6–8.4)
PULM VEIN S/D RATIO: 1.39
PV PEAK D VEL: 0.28 M/S
PV PEAK S VEL: 0.39 M/S
RA MAJOR: 4.03 CM
RA PRESSURE: 8 MMHG
RA WIDTH: 3.89 CM
RBC # BLD AUTO: 3.09 M/UL (ref 4–5.4)
RIGHT VENTRICULAR END-DIASTOLIC DIMENSION: 4.25 CM
RV TISSUE DOPPLER FREE WALL SYSTOLIC VELOCITY 1 (APICAL 4 CHAMBER VIEW): 18.58 CM/S
SINUS: 2.9 CM
SODIUM SERPL-SCNC: 127 MMOL/L (ref 136–145)
STJ: 2.31 CM
TACROLIMUS BLD-MCNC: 6.9 NG/ML (ref 5–15)
TDI LATERAL: 0.08 M/S
TDI SEPTAL: 0.06 M/S
TDI: 0.07 M/S
TOXIC GRANULES BLD QL SMEAR: PRESENT
TR MAX PG: 18 MMHG
TRICUSPID ANNULAR PLANE SYSTOLIC EXCURSION: 3.37 CM
TV REST PULMONARY ARTERY PRESSURE: 26 MMHG
WBC # BLD AUTO: 5.62 K/UL (ref 3.9–12.7)
WBC # FLD: 82 /CU MM

## 2019-07-10 PROCEDURE — 25000003 PHARM REV CODE 250: Performed by: INTERNAL MEDICINE

## 2019-07-10 PROCEDURE — 63600175 PHARM REV CODE 636 W HCPCS: Performed by: INTERNAL MEDICINE

## 2019-07-10 PROCEDURE — 99233 SBSQ HOSP IP/OBS HIGH 50: CPT | Mod: ,,, | Performed by: INTERNAL MEDICINE

## 2019-07-10 PROCEDURE — 63600175 PHARM REV CODE 636 W HCPCS: Performed by: TRANSPLANT SURGERY

## 2019-07-10 PROCEDURE — 20600001 HC STEP DOWN PRIVATE ROOM

## 2019-07-10 PROCEDURE — 25000003 PHARM REV CODE 250: Performed by: PHYSICIAN ASSISTANT

## 2019-07-10 PROCEDURE — 36415 COLL VENOUS BLD VENIPUNCTURE: CPT

## 2019-07-10 PROCEDURE — 85025 COMPLETE CBC W/AUTO DIFF WBC: CPT

## 2019-07-10 PROCEDURE — 63600175 PHARM REV CODE 636 W HCPCS: Performed by: NURSE PRACTITIONER

## 2019-07-10 PROCEDURE — 80053 COMPREHEN METABOLIC PANEL: CPT

## 2019-07-10 PROCEDURE — 87040 BLOOD CULTURE FOR BACTERIA: CPT

## 2019-07-10 PROCEDURE — 25000003 PHARM REV CODE 250: Performed by: TRANSPLANT SURGERY

## 2019-07-10 PROCEDURE — 89051 BODY FLUID CELL COUNT: CPT

## 2019-07-10 PROCEDURE — 99233 PR SUBSEQUENT HOSPITAL CARE,LEVL III: ICD-10-PCS | Mod: 24,,, | Performed by: PHYSICIAN ASSISTANT

## 2019-07-10 PROCEDURE — 99233 SBSQ HOSP IP/OBS HIGH 50: CPT | Mod: 24,,, | Performed by: PHYSICIAN ASSISTANT

## 2019-07-10 PROCEDURE — 99233 PR SUBSEQUENT HOSPITAL CARE,LEVL III: ICD-10-PCS | Mod: ,,, | Performed by: INTERNAL MEDICINE

## 2019-07-10 PROCEDURE — 63600175 PHARM REV CODE 636 W HCPCS: Performed by: PHYSICIAN ASSISTANT

## 2019-07-10 PROCEDURE — 83735 ASSAY OF MAGNESIUM: CPT

## 2019-07-10 PROCEDURE — 80197 ASSAY OF TACROLIMUS: CPT

## 2019-07-10 RX ORDER — POTASSIUM CHLORIDE 750 MG/1
40 CAPSULE, EXTENDED RELEASE ORAL EVERY 4 HOURS
Status: COMPLETED | OUTPATIENT
Start: 2019-07-10 | End: 2019-07-10

## 2019-07-10 RX ADMIN — LEVOTHYROXINE SODIUM 112 MCG: 50 TABLET ORAL at 06:07

## 2019-07-10 RX ADMIN — CEFEPIME HYDROCHLORIDE 2 G: 2 INJECTION, SOLUTION INTRAVENOUS at 10:07

## 2019-07-10 RX ADMIN — SULFAMETHOXAZOLE AND TRIMETHOPRIM 1 TABLET: 400; 80 TABLET ORAL at 08:07

## 2019-07-10 RX ADMIN — POTASSIUM CHLORIDE 40 MEQ: 750 CAPSULE, EXTENDED RELEASE ORAL at 12:07

## 2019-07-10 RX ADMIN — POTASSIUM CHLORIDE 40 MEQ: 750 CAPSULE, EXTENDED RELEASE ORAL at 02:07

## 2019-07-10 RX ADMIN — FAMOTIDINE 20 MG: 20 TABLET, FILM COATED ORAL at 08:07

## 2019-07-10 RX ADMIN — METRONIDAZOLE 500 MG: 500 TABLET ORAL at 08:07

## 2019-07-10 RX ADMIN — TACROLIMUS 1 MG: 1 CAPSULE ORAL at 05:07

## 2019-07-10 RX ADMIN — SODIUM BICARBONATE 650 MG TABLET 650 MG: at 08:07

## 2019-07-10 RX ADMIN — METRONIDAZOLE 500 MG: 500 TABLET ORAL at 06:07

## 2019-07-10 RX ADMIN — CEFEPIME HYDROCHLORIDE 2 G: 2 INJECTION, SOLUTION INTRAVENOUS at 01:07

## 2019-07-10 RX ADMIN — PREDNISONE 5 MG: 5 TABLET ORAL at 08:07

## 2019-07-10 RX ADMIN — DEXTROSE 750 MG: 5 SOLUTION INTRAVENOUS at 01:07

## 2019-07-10 RX ADMIN — METRONIDAZOLE 500 MG: 500 TABLET ORAL at 01:07

## 2019-07-10 RX ADMIN — VALGANCICLOVIR 450 MG: 450 TABLET, FILM COATED ORAL at 10:07

## 2019-07-10 RX ADMIN — TACROLIMUS 1 MG: 1 CAPSULE ORAL at 08:07

## 2019-07-10 RX ADMIN — FUROSEMIDE 40 MG: 10 INJECTION, SOLUTION INTRAMUSCULAR; INTRAVENOUS at 08:07

## 2019-07-10 RX ADMIN — CEFEPIME HYDROCHLORIDE 2 G: 2 INJECTION, SOLUTION INTRAVENOUS at 06:07

## 2019-07-10 NOTE — ASSESSMENT & PLAN NOTE
- Infectious work up ordered on admit - blood cx NGTD, UA unremarkable, CMV PCR 7/5 pending, chest x-ray w right pleural effusion, CT A/P reviewed.  - Started broad spectrum antibiotics on admit.   - Abdominal and head CT reviewed and no clear source for fever.   - ID consulted.  Apprec recs.   - Pt with intermittent confusion.  Easily re oriented.  Continue to monitor.   - Thora and para done yesterday; amount of fluid removed not recorded and pleural fluid labs were not completed as ordered.   - Abdominal gram stain reveals no WBC and no organisms; cell count not completed as ordered.  - Pt still reports diarrhea- C diff negative. Stool culture, WBC, ova cysts parasites pending.   - Afebrile since noon yesterday, Tmax 99.9 this AM. Pt still mildly tachy at times but BP stable and satting well on RA. Mentation improved to baseline  - Await ID recs regarding abx- continue vanc, cefepime and flagyl for now.

## 2019-07-10 NOTE — ASSESSMENT & PLAN NOTE
- LFTs stable  - Pt with good hepatic allograft function.   - Last Liver US 6/24 showed Mildly elevated hepatic arterial resistive indices, although improved from prior exam.  Otherwise, satisfactory vascular appearance of the liver, complex fluid collection adjacent to the left hepatic lobe, similar to slightly smaller compared to prior exam, small volume of ascites and partially visualized right pleural effusion. Monitor.   - para completed 7/9, cultures pending. Cell count not collected as ordered.

## 2019-07-10 NOTE — PROGRESS NOTES
"Patient noted to have BM in bed. Was getting up to BSC throughout shift. Stated she "knew she had to go but didn't want to tell anyone." Patient awake and Oriented x 4. Responses delayed. VSS. Patient cleaned and linen changed. LUI Pearl notified. Ordered blood cx and urine cx at this time. Stool cx still trying to be obtained. Continued on Cefepime and Vanc. Will continue to monitor.   "

## 2019-07-10 NOTE — ASSESSMENT & PLAN NOTE
- no overt bleeding.    - keep type and screen current  - transfused 1u prbc 7/9  - monitor w daily labs.

## 2019-07-10 NOTE — SUBJECTIVE & OBJECTIVE
Scheduled Meds:   ceFEPIme in dextrose 5%  2 g Intravenous Q8H    famotidine  20 mg Oral QHS    furosemide  40 mg Intravenous Daily    iohexol  15 mL Oral Once    levothyroxine  112 mcg Oral Before breakfast    lidocaine (PF) 10 mg/ml (1%)  1 mL Other Once    metroNIDAZOLE  500 mg Oral Q8H    potassium chloride  40 mEq Oral Q4H    predniSONE  5 mg Oral Daily    sodium bicarbonate  650 mg Oral BID    sulfamethoxazole-trimethoprim 400-80mg  1 tablet Oral Daily    tacrolimus  1 mg Oral BID    valGANciclovir  450 mg Oral Daily    vancomycin (VANCOCIN) IVPB  750 mg Intravenous Q12H     Continuous Infusions:   sodium chloride 0.9% 50 mL/hr at 07/09/19 1604     PRN Meds:sodium chloride, acetaminophen, calcium carbonate, hydrALAZINE, iohexol, ondansetron, sodium chloride 0.9%    Review of Systems   Constitutional: Positive for activity change and appetite change. Negative for chills and fever.   Respiratory: Negative for cough, shortness of breath and wheezing.    Cardiovascular: Negative for leg swelling.   Gastrointestinal: Positive for diarrhea. Negative for abdominal distention, abdominal pain, nausea and vomiting.   Genitourinary: Negative for decreased urine volume, difficulty urinating and dysuria.   Skin: Positive for wound (chevron well healed).   Allergic/Immunologic: Positive for immunocompromised state.   Neurological: Positive for weakness. Negative for light-headedness.   Psychiatric/Behavioral: Negative for agitation, confusion and decreased concentration. The patient is not nervous/anxious.      Objective:     Vital Signs (Most Recent):  Temp: 99.4 °F (37.4 °C) (07/10/19 1227)  Pulse: 92 (07/10/19 1218)  Resp: 17 (07/10/19 1218)  BP: 127/80 (07/10/19 1218)  SpO2: 96 % (07/10/19 1218) Vital Signs (24h Range):  Temp:  [98.2 °F (36.8 °C)-99.9 °F (37.7 °C)] 99.4 °F (37.4 °C)  Pulse:  [] 92  Resp:  [17-30] 17  SpO2:  [93 %-98 %] 96 %  BP: (112-142)/(64-83) 127/80     Weight: 78.5 kg (173  lb)  Body mass index is 29.7 kg/m².    Intake/Output - Last 3 Shifts       07/08 0700 - 07/09 0659 07/09 0700 - 07/10 0659 07/10 0700 - 07/11 0659    P.O. 375 360     I.V. (mL/kg)  317.5 (4)     Blood  171.3     Other  2000     IV Piggyback 300 300     Total Intake(mL/kg) 675 (8.6) 3148.8 (40.1)     Urine (mL/kg/hr)  200 (0.1) 400 (0.8)    Emesis/NG output       Other       Stool  1 0    Blood       Total Output  201 400    Net +675 +2947.8 -400           Urine Occurrence 4 x 3 x 2 x    Stool Occurrence 4 x 3 x 3 x          Physical Exam   Constitutional: She is oriented to person, place, and time. She appears well-developed. No distress.   HENT:   Head: Normocephalic and atraumatic.   Eyes: Pupils are equal, round, and reactive to light. No scleral icterus.   Neck: Normal range of motion. Neck supple. No thyromegaly present.   Cardiovascular: Regular rhythm. Tachycardia present. Exam reveals no friction rub.   No murmur heard.  Pulmonary/Chest: Effort normal. She has decreased breath sounds in the right middle field, the right lower field and the left lower field. She has no wheezes. She has no rales.   Diminished RLL   Abdominal: Soft. She exhibits no distension. There is no tenderness. There is no rebound and no guarding.   Healed chevron incision   Musculoskeletal: Normal range of motion.   Neurological: She is alert and oriented to person, place, and time. She is not disoriented.   Oriented to person, easily re oriented   Skin: Skin is warm and dry. She is not diaphoretic.   Psychiatric: She has a normal mood and affect. Her behavior is normal. Judgment and thought content normal.   Nursing note and vitals reviewed.      Laboratory:  Immunosuppressants         Stop Route Frequency     tacrolimus capsule 1 mg      -- Oral 2 times daily        CBC:   Recent Labs   Lab 07/10/19  0622   WBC 5.62   RBC 3.09*   HGB 8.9*   HCT 26.6*      MCV 86   MCH 28.8   MCHC 33.5     CMP:   Recent Labs   Lab 07/10/19  0622    GLU 95   CALCIUM 7.9*   ALBUMIN 2.0*   PROT 4.8*   *   K 3.4*   CO2 20*   CL 99   BUN 12   CREATININE 0.7   ALKPHOS 174*   ALT 5*   AST 11   BILITOT 0.7     Labs within the past 24 hours have been reviewed.    Diagnostic Results:  None    Debility/Functional status: Patient debilitated by evidence of Muscle wasting and atrophy, Weakness, Chronic fatigue, unspecified and Limitation of activities due to disability. Physical and occupational therapy ordered daily to evaluate and treat. Debility was: present on admission.

## 2019-07-10 NOTE — PROGRESS NOTES
"[facility]  Liver Transplant  Progress Note    Patient Name: Jihan Zamudio  MRN: [id number]  Admission Date: 2019  Hospital Length of Stay: 5 days  Code Status: Full Code  Primary Care Provider: Primary Doctor No  Post-Operative Day: 35    ORGAN:   LIVER  Disease Etiology: Cirrhosis: Fatty Liver ([place])  Donor Type:    - Brain Death  [facility] High Risk:   No  Donor CMV Status:   Donor CMV Status: Positive  Donor HBcAB:   Negative  Donor HCV Status:   Negative  Donor HBV GERTRUDIS: Negative  Donor HCV GERTRUDIS: Negative  Whole or Partial: Whole Liver  Biliary Anastomosis: End to End  Arterial Anatomy: Standard  Subjective:     History of Present Illness:  Ms. 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 Post transplant course significant for acute parenchymal hemorrhage within the right occipital lobe near the parieto-occipital junction measuring approximately 1.8 x 1.3 x 1.5 cm with mild surrounding vasogenic edema and localized mass effect after fall on . She was discharged home on 7 days of Keppra but nuerosurgery, completed on . She now presents to the ED with 1 day history of fever. Homehealth nurse checked patient's temperature and was noted to be elevated. Home health nurse also reported patient acting "off" with mental status change. Currently in the ED, tmax is 102.3. Patient feels well with no true complaints expect rhinorrhea. She denies chills, diarrhea, nausea/vomiting, abdominal pain. Denies sick contacts. Will admit for infectious work up (blood cx, UA, urine cx, CMV PCR, chest x-ray, CT A/P). Patient currently with no altered mental status. In light of recent hx of occipital hemorrhage, will obtain CT head without contrast.      Hospital Course:  50 y/o F s/p OLTx for KESSLER cirrhosis on  who was admitted on  for fevers. Pt febrile on admission, started on bx abx. ID consulted. Blood cultures NGTD, UA negative. CMV PCR drawn on " admit pending. Pt having loose stools, C diff EIA negative. CT head showed reduction in size of hemorrhage. Abdominal CT reviewed and with moderate right pleural effusion. CXR with increased pleural effusion. Thoracentesis and paracentesis done on 7/9, pleural labs not collected or sent off as ordered. Abdominal fluid labs pending; gram stain negative for WBCs and organisms. TTE done 7/9 WNL, no vegetations. Pt tachy and tachypneic on 7/9; 1 L bolus given and pt responded well. 1 u pRBC transfused 7/9 for low H/H.     Interval history: No acute events overnight. Thora and para done yesterday; amount of fluid removed not recorded and pleural fluid labs were not completed as ordered. Abdominal gram stain reveals no WBC and no organisms, cultures pending; cell count not completed as ordered. Pt reports feeling much better today. Mentation slow/lethargic but AAOx 4. Pt slept all day today and reports poor appetite. Pt still reports diarrhea- C diff negative. CMV PCR from 7/5 pending. Stool culture, WBC, ova cysts parasites pending. Afebrile since noon yesterday, Tmax 99.9 this AM. Pt still mildly tachy at times but BP stable and satting well on RA.  Await ID recs regarding abx- continue vanc, cefepime and flagyl for now. Continue to monitor.    Scheduled Meds:   ceFEPIme in dextrose 5%  2 g Intravenous Q8H    famotidine  20 mg Oral QHS    furosemide  40 mg Intravenous Daily    iohexol  15 mL Oral Once    levothyroxine  112 mcg Oral Before breakfast    lidocaine (PF) 10 mg/ml (1%)  1 mL Other Once    metroNIDAZOLE  500 mg Oral Q8H    potassium chloride  40 mEq Oral Q4H    predniSONE  5 mg Oral Daily    sodium bicarbonate  650 mg Oral BID    sulfamethoxazole-trimethoprim 400-80mg  1 tablet Oral Daily    tacrolimus  1 mg Oral BID    valGANciclovir  450 mg Oral Daily    vancomycin (VANCOCIN) IVPB  750 mg Intravenous Q12H     Continuous Infusions:   sodium chloride 0.9% 50 mL/hr at 07/09/19 1604     PRN  Meds:sodium chloride, acetaminophen, calcium carbonate, hydrALAZINE, iohexol, ondansetron, sodium chloride 0.9%    Review of Systems   Constitutional: Positive for activity change and appetite change. Negative for chills and fever.   Respiratory: Negative for cough, shortness of breath and wheezing.    Cardiovascular: Negative for leg swelling.   Gastrointestinal: Positive for diarrhea. Negative for abdominal distention, abdominal pain, nausea and vomiting.   Genitourinary: Negative for decreased urine volume, difficulty urinating and dysuria.   Skin: Positive for wound (chevron well healed).   Allergic/Immunologic: Positive for immunocompromised state.   Neurological: Positive for weakness. Negative for light-headedness.   Psychiatric/Behavioral: Negative for agitation, confusion and decreased concentration. The patient is not nervous/anxious.      Objective:     Vital Signs (Most Recent):  Temp: 99.4 °F (37.4 °C) (07/10/19 1227)  Pulse: 92 (07/10/19 1218)  Resp: 17 (07/10/19 1218)  BP: 127/80 (07/10/19 1218)  SpO2: 96 % (07/10/19 1218) Vital Signs (24h Range):  Temp:  [98.2 °F (36.8 °C)-99.9 °F (37.7 °C)] 99.4 °F (37.4 °C)  Pulse:  [] 92  Resp:  [17-30] 17  SpO2:  [93 %-98 %] 96 %  BP: (112-142)/(64-83) 127/80     Weight: 78.5 kg (173 lb)  Body mass index is 29.7 kg/m².    Intake/Output - Last 3 Shifts       07/08 0700 - 07/09 0659 07/09 0700 - 07/10 0659 07/10 0700 - 07/11 0659    P.O. 375 360     I.V. (mL/kg)  317.5 (4)     Blood  171.3     Other  2000     IV Piggyback 300 300     Total Intake(mL/kg) 675 (8.6) 3148.8 (40.1)     Urine (mL/kg/hr)  200 (0.1) 400 (0.8)    Emesis/NG output       Other       Stool  1 0    Blood       Total Output  201 400    Net +675 +2947.8 -400           Urine Occurrence 4 x 3 x 2 x    Stool Occurrence 4 x 3 x 3 x          Physical Exam   Constitutional: She is oriented to person, place, and time. She appears well-developed. No distress.   HENT:   Head: Normocephalic and  atraumatic.   Eyes: Pupils are equal, round, and reactive to light. No scleral icterus.   Neck: Normal range of motion. Neck supple. No thyromegaly present.   Cardiovascular: Regular rhythm. Tachycardia present. Exam reveals no friction rub.   No murmur heard.  Pulmonary/Chest: Effort normal. She has decreased breath sounds in the right middle field, the right lower field and the left lower field. She has no wheezes. She has no rales.   Diminished RLL   Abdominal: Soft. She exhibits no distension. There is no tenderness. There is no rebound and no guarding.   Healed chevron incision   Musculoskeletal: Normal range of motion.   Neurological: She is alert and oriented to person, place, and time. She is not disoriented.   Oriented to person, easily re oriented   Skin: Skin is warm and dry. She is not diaphoretic.   Psychiatric: She has a normal mood and affect. Her behavior is normal. Judgment and thought content normal.   Nursing note and vitals reviewed.      Laboratory:  Immunosuppressants         Stop Route Frequency     tacrolimus capsule 1 mg      -- Oral 2 times daily        CBC:   Recent Labs   Lab 07/10/19  0622   WBC 5.62   RBC 3.09*   HGB 8.9*   HCT 26.6*      MCV 86   MCH 28.8   MCHC 33.5     CMP:   Recent Labs   Lab 07/10/19  0622   GLU 95   CALCIUM 7.9*   ALBUMIN 2.0*   PROT 4.8*   *   K 3.4*   CO2 20*   CL 99   BUN 12   CREATININE 0.7   ALKPHOS 174*   ALT 5*   AST 11   BILITOT 0.7     Labs within the past 24 hours have been reviewed.    Diagnostic Results:  None    Debility/Functional status: Patient debilitated by evidence of Muscle wasting and atrophy, Weakness, Chronic fatigue, unspecified and Limitation of activities due to disability. Physical and occupational therapy ordered daily to evaluate and treat. Debility was: present on admission.    Assessment/Plan:     * Fever  - Infectious work up ordered on admit - blood cx NGTD, UA unremarkable, CMV PCR 7/5 pending, chest x-ray w right  pleural effusion, CT A/P reviewed.  - Started broad spectrum antibiotics on admit.   - Abdominal and head CT reviewed and no clear source for fever.   - ID consulted.  Apprec recs.   - Pt with intermittent confusion.  Easily re oriented.  Continue to monitor.   - Thora and para done yesterday; amount of fluid removed not recorded and pleural fluid labs were not completed as ordered.   - Abdominal gram stain reveals no WBC and no organisms; cell count not completed as ordered.  - Pt still reports diarrhea- C diff negative. Stool culture, WBC, ova cysts parasites pending.   - Afebrile since noon yesterday, Tmax 99.9 this AM. Pt still mildly tachy at times but BP stable and satting well on RA. Mentation improved to baseline  - Await ID recs regarding abx- continue vanc, cefepime and flagyl for now.      Hypokalemia  - replace as needed      Traumatic intracranial hemorrhage without loss of consciousness  - Head CT reviewed and noted with interval reduction in size of the patient's known right occipital lobe parenchymal hemorrhage.      At risk for opportunistic infections  - cont bactrim for PCP prophylaxis  - cont Valcyte for CMV prophylaxis (CMV D+/R+).      Closed head injury  - admitted recently following fall diagnosed w acute parenchymal hemorrhage within right occipital live near parieto-occipital junction 1.8x1.3x1.5 w mild surrounding vasogenic edema and localized mass.  Repeat CT head showed reduction in size of hemorrhage.  - pt denies HA      Long-term use of immunosuppressant medication  - Continue prograf.   - Holding MMF. Monitor prograf level daily, monitor for toxic side effects, and adjust for therapeutic dose.       Prophylactic immunotherapy  - See long term use of immunosuppression.  Decreased as likely w sepsis.    S/P liver transplant  - LFTs stable  - Pt with good hepatic allograft function.   - Last Liver US 6/24 showed Mildly elevated hepatic arterial resistive indices, although improved from  prior exam.  Otherwise, satisfactory vascular appearance of the liver, complex fluid collection adjacent to the left hepatic lobe, similar to slightly smaller compared to prior exam, small volume of ascites and partially visualized right pleural effusion. Monitor.   - para completed 7/9, cultures pending. Cell count not collected as ordered.     Anemia of chronic disease  - no overt bleeding.    - keep type and screen current  - transfused 1u prbc 7/9  - monitor w daily labs.      Pleural effusion, right  - CXR reviewed.    - Thora done 7/9  - pleural fluid labs not completed as ordered- will try to get labs repeated if specimen is still available      Weakness  - PT/OT consulted.        Moderate malnutrition  - supplements ordered.      GERD (gastroesophageal reflux disease)  - cont Pepcid.          VTE Risk Mitigation (From admission, onward)        Ordered     Place sequential compression device  Until discontinued      07/05/19 1659     IP VTE HIGH RISK PATIENT  Once      07/05/19 1659          The patients clinical status was discussed at multidisplinary rounds, involving transplant surgery, transplant medicine, pharmacy, nursing, nutrition, and social work    Discharge Planning:  No Patient Care Coordination Note on file.      Amy Saunders PA-C  Liver Transplant  [facility]

## 2019-07-10 NOTE — ASSESSMENT & PLAN NOTE
- CXR reviewed.    - Thora done 7/9  - pleural fluid labs not completed as ordered- will try to get labs repeated if specimen is still available

## 2019-07-10 NOTE — PROCEDURES
Radiology Post Procedure Note:     Procedure: IR Paracentesis and right thoracentesis    (s): Alix    Blood Loss: Minimal    Specimen: Clear yellow ascites and clear yellow pleural fluid    Findings:   Patient came to IR and under imaging guidance had a 5 F Yueh placed into the peritoneal cavity.     Patient then had a 5 F yueh placed into the right pleural cavity.     Pascual DOMINGUEZ M.D. personally reviewed and agree with the above dictated note.

## 2019-07-10 NOTE — PLAN OF CARE
Problem: Adult Inpatient Plan of Care  Goal: Plan of Care Review  Outcome: Ongoing (interventions implemented as appropriate)  Patient is AAOx4 with delayed responses. VSS. Cardiac monitoring in progress- NSR to ST. HR range between 80s-low 100s. Tmax 99.9. Cefepime, Vanc and Flagyl continued. Proper hand hygiene performed before and after patient care activities. Patient up to bedside commode with SBA throughout shift. Experienced 4 BMs this shift. Bed locked and in lowest position, call light within reach, non skid socks on, daughter at bedside, instructed to call staff for mobility. Will continue to monitor.

## 2019-07-10 NOTE — PLAN OF CARE
Problem: Adult Inpatient Plan of Care  Goal: Plan of Care Review  Pt oriented x4 with vswnl and no c/o pain. Daughter at bedside. Bed in low position and callbell within reach. Bedside commode in room. Diarrhea continues and stool sent for c-diff. 1 unit blood given for h&h of  7.5 and 23.1. 1 bolus 1 liter ns infusing. TTE done today. Oaxc=905.8 on dayshift. Chevron incision radha with steri strips. Telemetry maintained NSR. Pt ambulates with standby assist.

## 2019-07-11 LAB
ALBUMIN SERPL BCP-MCNC: 1.9 G/DL (ref 3.5–5.2)
ALP SERPL-CCNC: 156 U/L (ref 55–135)
ALT SERPL W/O P-5'-P-CCNC: <5 U/L (ref 10–44)
ANION GAP SERPL CALC-SCNC: 8 MMOL/L (ref 8–16)
AST SERPL-CCNC: 11 U/L (ref 10–40)
BASOPHILS # BLD AUTO: 0.04 K/UL (ref 0–0.2)
BASOPHILS NFR BLD: 0.7 % (ref 0–1.9)
BILIRUB SERPL-MCNC: 0.6 MG/DL (ref 0.1–1)
BUN SERPL-MCNC: 11 MG/DL (ref 6–20)
CALCIUM SERPL-MCNC: 8 MG/DL (ref 8.7–10.5)
CHLORIDE SERPL-SCNC: 102 MMOL/L (ref 95–110)
CO2 SERPL-SCNC: 19 MMOL/L (ref 23–29)
CREAT SERPL-MCNC: 0.7 MG/DL (ref 0.5–1.4)
DIFFERENTIAL METHOD: ABNORMAL
EOSINOPHIL # BLD AUTO: 0.1 K/UL (ref 0–0.5)
EOSINOPHIL NFR BLD: 0.9 % (ref 0–8)
ERYTHROCYTE [DISTWIDTH] IN BLOOD BY AUTOMATED COUNT: 15.7 % (ref 11.5–14.5)
EST. GFR  (AFRICAN AMERICAN): >60 ML/MIN/1.73 M^2
EST. GFR  (NON AFRICAN AMERICAN): >60 ML/MIN/1.73 M^2
GLUCOSE SERPL-MCNC: 85 MG/DL (ref 70–110)
HCT VFR BLD AUTO: 25.5 % (ref 37–48.5)
HGB BLD-MCNC: 8.4 G/DL (ref 12–16)
IMM GRANULOCYTES # BLD AUTO: 0.2 K/UL (ref 0–0.04)
IMM GRANULOCYTES NFR BLD AUTO: 3.4 % (ref 0–0.5)
LYMPHOCYTES # BLD AUTO: 0.3 K/UL (ref 1–4.8)
LYMPHOCYTES NFR BLD: 4.5 % (ref 18–48)
MAGNESIUM SERPL-MCNC: 1.6 MG/DL (ref 1.6–2.6)
MCH RBC QN AUTO: 28.9 PG (ref 27–31)
MCHC RBC AUTO-ENTMCNC: 32.9 G/DL (ref 32–36)
MCV RBC AUTO: 88 FL (ref 82–98)
MONOCYTES # BLD AUTO: 0.3 K/UL (ref 0.3–1)
MONOCYTES NFR BLD: 5.8 % (ref 4–15)
NEUTROPHILS # BLD AUTO: 5 K/UL (ref 1.8–7.7)
NEUTROPHILS NFR BLD: 84.7 % (ref 38–73)
NRBC BLD-RTO: 0 /100 WBC
PLATELET # BLD AUTO: 140 K/UL (ref 150–350)
PMV BLD AUTO: 10.9 FL (ref 9.2–12.9)
POTASSIUM SERPL-SCNC: 3.7 MMOL/L (ref 3.5–5.1)
PROT SERPL-MCNC: 4.7 G/DL (ref 6–8.4)
RBC # BLD AUTO: 2.91 M/UL (ref 4–5.4)
SODIUM SERPL-SCNC: 129 MMOL/L (ref 136–145)
TACROLIMUS BLD-MCNC: 4 NG/ML (ref 5–15)
WBC # BLD AUTO: 5.84 K/UL (ref 3.9–12.7)

## 2019-07-11 PROCEDURE — 87507 IADNA-DNA/RNA PROBE TQ 12-25: CPT

## 2019-07-11 PROCEDURE — 89055 LEUKOCYTE ASSESSMENT FECAL: CPT

## 2019-07-11 PROCEDURE — 25500020 PHARM REV CODE 255: Performed by: TRANSPLANT SURGERY

## 2019-07-11 PROCEDURE — 25000003 PHARM REV CODE 250: Performed by: PHYSICIAN ASSISTANT

## 2019-07-11 PROCEDURE — 63600175 PHARM REV CODE 636 W HCPCS: Performed by: NURSE PRACTITIONER

## 2019-07-11 PROCEDURE — 94761 N-INVAS EAR/PLS OXIMETRY MLT: CPT

## 2019-07-11 PROCEDURE — 87427 SHIGA-LIKE TOXIN AG IA: CPT | Mod: 59

## 2019-07-11 PROCEDURE — 87186 SC STD MICRODIL/AGAR DIL: CPT

## 2019-07-11 PROCEDURE — 80053 COMPREHEN METABOLIC PANEL: CPT

## 2019-07-11 PROCEDURE — 87449 NOS EACH ORGANISM AG IA: CPT

## 2019-07-11 PROCEDURE — 85025 COMPLETE CBC W/AUTO DIFF WBC: CPT

## 2019-07-11 PROCEDURE — 36415 COLL VENOUS BLD VENIPUNCTURE: CPT

## 2019-07-11 PROCEDURE — 87086 URINE CULTURE/COLONY COUNT: CPT

## 2019-07-11 PROCEDURE — 99233 PR SUBSEQUENT HOSPITAL CARE,LEVL III: ICD-10-PCS | Mod: 24,,, | Performed by: PHYSICIAN ASSISTANT

## 2019-07-11 PROCEDURE — 87077 CULTURE AEROBIC IDENTIFY: CPT

## 2019-07-11 PROCEDURE — 87209 SMEAR COMPLEX STAIN: CPT

## 2019-07-11 PROCEDURE — 63600175 PHARM REV CODE 636 W HCPCS: Performed by: TRANSPLANT SURGERY

## 2019-07-11 PROCEDURE — 99233 SBSQ HOSP IP/OBS HIGH 50: CPT | Mod: 24,,, | Performed by: PHYSICIAN ASSISTANT

## 2019-07-11 PROCEDURE — 25000003 PHARM REV CODE 250: Performed by: TRANSPLANT SURGERY

## 2019-07-11 PROCEDURE — 80197 ASSAY OF TACROLIMUS: CPT

## 2019-07-11 PROCEDURE — 99233 SBSQ HOSP IP/OBS HIGH 50: CPT | Mod: ,,, | Performed by: INTERNAL MEDICINE

## 2019-07-11 PROCEDURE — A9585 GADOBUTROL INJECTION: HCPCS | Performed by: TRANSPLANT SURGERY

## 2019-07-11 PROCEDURE — 63600175 PHARM REV CODE 636 W HCPCS: Performed by: PHYSICIAN ASSISTANT

## 2019-07-11 PROCEDURE — 87088 URINE BACTERIA CULTURE: CPT

## 2019-07-11 PROCEDURE — 25000003 PHARM REV CODE 250: Performed by: INTERNAL MEDICINE

## 2019-07-11 PROCEDURE — 83735 ASSAY OF MAGNESIUM: CPT

## 2019-07-11 PROCEDURE — 20600001 HC STEP DOWN PRIVATE ROOM

## 2019-07-11 PROCEDURE — 97530 THERAPEUTIC ACTIVITIES: CPT

## 2019-07-11 PROCEDURE — 63600175 PHARM REV CODE 636 W HCPCS: Performed by: INTERNAL MEDICINE

## 2019-07-11 PROCEDURE — 87045 FECES CULTURE AEROBIC BACT: CPT

## 2019-07-11 PROCEDURE — 99233 PR SUBSEQUENT HOSPITAL CARE,LEVL III: ICD-10-PCS | Mod: ,,, | Performed by: INTERNAL MEDICINE

## 2019-07-11 PROCEDURE — 97116 GAIT TRAINING THERAPY: CPT

## 2019-07-11 PROCEDURE — 87046 STOOL CULTR AEROBIC BACT EA: CPT | Mod: 59

## 2019-07-11 RX ORDER — METRONIDAZOLE 500 MG/1
500 TABLET ORAL EVERY 8 HOURS
Status: DISCONTINUED | OUTPATIENT
Start: 2019-07-11 | End: 2019-07-12

## 2019-07-11 RX ORDER — SODIUM CHLORIDE 9 MG/ML
INJECTION, SOLUTION INTRAVENOUS CONTINUOUS
Status: ACTIVE | OUTPATIENT
Start: 2019-07-11 | End: 2019-07-11

## 2019-07-11 RX ORDER — GADOBUTROL 604.72 MG/ML
8 INJECTION INTRAVENOUS
Status: COMPLETED | OUTPATIENT
Start: 2019-07-11 | End: 2019-07-11

## 2019-07-11 RX ORDER — SODIUM CHLORIDE 9 MG/ML
INJECTION, SOLUTION INTRAVENOUS CONTINUOUS
Status: DISCONTINUED | OUTPATIENT
Start: 2019-07-11 | End: 2019-07-11

## 2019-07-11 RX ORDER — CEFPODOXIME PROXETIL 200 MG/1
200 TABLET, FILM COATED ORAL EVERY 12 HOURS
Status: DISCONTINUED | OUTPATIENT
Start: 2019-07-11 | End: 2019-07-12

## 2019-07-11 RX ADMIN — SODIUM BICARBONATE 650 MG TABLET 650 MG: at 08:07

## 2019-07-11 RX ADMIN — SULFAMETHOXAZOLE AND TRIMETHOPRIM 1 TABLET: 400; 80 TABLET ORAL at 08:07

## 2019-07-11 RX ADMIN — LEVOTHYROXINE SODIUM 112 MCG: 50 TABLET ORAL at 06:07

## 2019-07-11 RX ADMIN — VALGANCICLOVIR 450 MG: 450 TABLET, FILM COATED ORAL at 08:07

## 2019-07-11 RX ADMIN — CEFEPIME HYDROCHLORIDE 2 G: 2 INJECTION, SOLUTION INTRAVENOUS at 03:07

## 2019-07-11 RX ADMIN — PREDNISONE 5 MG: 5 TABLET ORAL at 08:07

## 2019-07-11 RX ADMIN — SODIUM CHLORIDE: 0.9 INJECTION, SOLUTION INTRAVENOUS at 01:07

## 2019-07-11 RX ADMIN — METRONIDAZOLE 500 MG: 500 TABLET ORAL at 06:07

## 2019-07-11 RX ADMIN — CEFPODOXIME PROXETIL 200 MG: 200 TABLET, FILM COATED ORAL at 11:07

## 2019-07-11 RX ADMIN — DEXTROSE 750 MG: 5 SOLUTION INTRAVENOUS at 01:07

## 2019-07-11 RX ADMIN — CEFEPIME HYDROCHLORIDE 2 G: 2 INJECTION, SOLUTION INTRAVENOUS at 09:07

## 2019-07-11 RX ADMIN — METRONIDAZOLE 500 MG: 500 TABLET ORAL at 10:07

## 2019-07-11 RX ADMIN — CEFPODOXIME PROXETIL 200 MG: 200 TABLET, FILM COATED ORAL at 09:07

## 2019-07-11 RX ADMIN — FUROSEMIDE 40 MG: 10 INJECTION, SOLUTION INTRAMUSCULAR; INTRAVENOUS at 08:07

## 2019-07-11 RX ADMIN — ACETAMINOPHEN 650 MG: 325 TABLET ORAL at 08:07

## 2019-07-11 RX ADMIN — FAMOTIDINE 20 MG: 20 TABLET, FILM COATED ORAL at 09:07

## 2019-07-11 RX ADMIN — TACROLIMUS 1 MG: 1 CAPSULE ORAL at 08:07

## 2019-07-11 RX ADMIN — GADOBUTROL 8 ML: 604.72 INJECTION INTRAVENOUS at 04:07

## 2019-07-11 RX ADMIN — METRONIDAZOLE 500 MG: 500 TABLET ORAL at 01:07

## 2019-07-11 RX ADMIN — SODIUM BICARBONATE 650 MG TABLET 650 MG: at 09:07

## 2019-07-11 NOTE — CONSULTS
[facility]  Infectious Disease  Consult Note    Patient Name: Jihan Zamudio  MRN: [id number]  Admission Date: 7/5/2019  Hospital Length of Stay: 6 days  Attending Physician: Freddy Soler MD  Primary Care Provider: Primary Doctor No     Isolation Status: No active isolations    Patient information was obtained from patient, past medical records and ER records.      Consults  Assessment/Plan:     * Fever  48yo woman w/a history of KESSLER cirrhosis (s/p DBDLT 6/5/2019, CMV D+/R+, steroid induction, on maintenance tacro/MMF/pred; c/b small traumatic R occipital lobe ICH following mechanical fall managed conservatively) who was admitted on 7/5/2019 with acute onset fevers, chills, headache (stable since ICH), dry cough (x10 days), and loose stools that failed to initially improve on empiric vanc/cefepime. She underwent paracentesis/thoracentesis that were paucicellular with some clinical improvement (afebrile, AMS improving) but with ongoing FTT/diarrhea. She remains tenuous and I am uncertain we have a definitive diagnosis for her fevers that have improved since the addition of anaerobic coverage.     - would proceed with transition to oral cefpodoxime/flagyl for now and observe fever curve/clinical status  - remainder of prophylaxis per protocol  - would obtain MRI brain given ongoing delirium; consideration of LP pending additional review of fever curve/AMS  - Gable stool pathogens PCR panel pending        Thank you for your consult. I will follow-up with patient. Please contact us if you have any additional questions.     Halle Marc MD  Transplant ID Attending  [id number]    Halle Marc MD  Infectious Disease  [facility]    Subjective:     Principal Problem: Fever    HPI: No notes on file    Interval History: Patient remains delirious at times but was more alert for me than primary team. Afebrile still. Loose stools persist with negative C.diff. Counts  reassuring from para/thora (done late).     Review of Systems   Constitutional: Positive for activity change and appetite change. Negative for chills, diaphoresis and fever.   HENT: Negative for ear pain, mouth sores, sinus pressure and sore throat.    Eyes: Negative for photophobia, pain and redness.   Respiratory: Negative for cough, shortness of breath and wheezing.    Cardiovascular: Negative for chest pain and leg swelling.   Gastrointestinal: Positive for diarrhea. Negative for abdominal distention, abdominal pain, nausea and vomiting.   Genitourinary: Negative for decreased urine volume, difficulty urinating, dysuria, flank pain, frequency and urgency.   Musculoskeletal: Negative for arthralgias, back pain, gait problem and myalgias.   Skin: Positive for wound (chevron well healed). Negative for pallor and rash.   Allergic/Immunologic: Positive for immunocompromised state.   Neurological: Positive for weakness. Negative for dizziness, tremors, seizures, light-headedness and headaches.   Psychiatric/Behavioral: Negative for agitation, confusion and decreased concentration. The patient is not nervous/anxious.      Objective:     Vital Signs (Most Recent):  Temp: 98.6 °F (37 °C) (07/11/19 1452)  Pulse: 83 (07/11/19 1607)  Resp: (!) 30 (07/11/19 1452)  BP: 137/76 (07/11/19 1452)  SpO2: 97 % (07/11/19 1452) Vital Signs (24h Range):  Temp:  [98.6 °F (37 °C)-100.4 °F (38 °C)] 98.6 °F (37 °C)  Pulse:  [] 83  Resp:  [20-31] 30  SpO2:  [94 %-97 %] 97 %  BP: (133-152)/(74-85) 137/76     Weight: 78.5 kg (173 lb)  Body mass index is 29.7 kg/m².    Estimated Creatinine Clearance: 96.4 mL/min (based on SCr of 0.7 mg/dL).    Physical Exam   Constitutional: She appears well-developed. No distress.   HENT:   Head: Normocephalic and atraumatic.   Eyes: Pupils are equal, round, and reactive to light. No scleral icterus.   Neck: Normal range of motion. Neck supple. No thyromegaly present.   Cardiovascular: Regular rhythm.  Tachycardia present. Exam reveals no friction rub.   No murmur heard.  Pulmonary/Chest: Effort normal. She has decreased breath sounds in the right middle field, the right lower field and the left lower field. She has no wheezes. She has no rales.   Diminished RLL   Abdominal: Soft. She exhibits no distension. There is no tenderness. There is no rebound and no guarding.   Healed chevron incision   Musculoskeletal: Normal range of motion.   Neurological: She is alert.   Oriented to person, easily re oriented   Skin: Skin is warm and dry. She is not diaphoretic.   Psychiatric: She has a normal mood and affect. Her behavior is normal.   Nursing note and vitals reviewed.      Significant Labs:   CBC:   Recent Labs   Lab 07/10/19  0622 07/11/19  0646   WBC 5.62 5.84   HGB 8.9* 8.4*   HCT 26.6* 25.5*    140*     CMP:   Recent Labs   Lab 07/10/19  0622 07/11/19  0646   * 129*   K 3.4* 3.7   CL 99 102   CO2 20* 19*   GLU 95 85   BUN 12 11   CREATININE 0.7 0.7   CALCIUM 7.9* 8.0*   PROT 4.8* 4.7*   ALBUMIN 2.0* 1.9*   BILITOT 0.7 0.6   ALKPHOS 174* 156*   AST 11 11   ALT 5* <5*   ANIONGAP 8 8   EGFRNONAA >60.0 >60.0       Significant Imaging: I have reviewed all pertinent imaging results/findings within the past 24 hours.

## 2019-07-11 NOTE — PROGRESS NOTES
"[facility]  Liver Transplant  Progress Note    Patient Name: Jihan Zamudio  MRN: [id number]  Admission Date: 2019  Hospital Length of Stay: 6 days  Code Status: Full Code  Primary Care Provider: Primary Doctor No  Post-Operative Day: 36    ORGAN:   LIVER  Disease Etiology: Cirrhosis: Fatty Liver ([place])  Donor Type:    - Brain Death  [facility] High Risk:   No  Donor CMV Status:   Donor CMV Status: Positive  Donor HBcAB:   Negative  Donor HCV Status:   Negative  Donor HBV GERTRUDIS: Negative  Donor HCV GERTRUDSI: Negative  Whole or Partial: Whole Liver  Biliary Anastomosis: End to End  Arterial Anatomy: Standard  Subjective:     History of Present Illness:  Ms. 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 Post transplant course significant for acute parenchymal hemorrhage within the right occipital lobe near the parieto-occipital junction measuring approximately 1.8 x 1.3 x 1.5 cm with mild surrounding vasogenic edema and localized mass effect after fall on . She was discharged home on 7 days of Keppra but nuerosurgery, completed on . She now presents to the ED with 1 day history of fever. Homehealth nurse checked patient's temperature and was noted to be elevated. Home health nurse also reported patient acting "off" with mental status change. Currently in the ED, tmax is 102.3. Patient feels well with no true complaints expect rhinorrhea. She denies chills, diarrhea, nausea/vomiting, abdominal pain. Denies sick contacts. Will admit for infectious work up (blood cx, UA, urine cx, CMV PCR, chest x-ray, CT A/P). Patient currently with no altered mental status. In light of recent hx of occipital hemorrhage, will obtain CT head without contrast.      Hospital Course:  50 y/o F s/p OLTx for KESSLER cirrhosis on  who was admitted on  for fevers. Pt febrile on admission, started on bx abx. ID consulted. Blood cultures NGTD, UA negative. CMV PCR drawn on " admit pending. Pt having loose stools, C diff EIA negative, CMV undetected. CT head showed reduction in size of hemorrhage. Abdominal CT reviewed and with moderate right pleural effusion. CXR with increased pleural effusion. Thora and para done 7/9; amount of fluid removed not recorded and pleural fluid labs were not completed as ordered. Abdominal gram stain reveals no WBC and no organisms; cell count not completed as ordered, cultures pending. TTE done 7/9 WNL, no vegetations. Pt tachy and tachypneic on 7/9; 1 L bolus given and pt responded well. 1 u pRBC transfused 7/9 for low H/H. Pt continued to have diarrhea, additional stool studies pending.    Interval history: Pt with confusion and diarrhea with stool incontinence overnight. She denies confusion this AM and reports she feels fine. Delayed responses and slurring noted. MRI head with and without contrast pending. She continues to have poor PO intake. IVF started. Pt with low grade temp, 100.4 noted this AM. Afebrile since receiving tylenol. Pt still mildly tachy and tachypneic at times but BP stable and satting well on RA. Additional stool studies ordered yesterday still pending. Blood and urine cultures repeated yesterday NGTD. CMV drawn 7/5 undetected. Plan to deescalate abx to PO cefpodoxime and flagyl per ID recs. Continue to monitor.    Scheduled Meds:   cefpodoxime  200 mg Oral Q12H    famotidine  20 mg Oral QHS    furosemide  40 mg Intravenous Daily    iohexol  15 mL Oral Once    levothyroxine  112 mcg Oral Before breakfast    lidocaine (PF) 10 mg/ml (1%)  1 mL Other Once    metroNIDAZOLE  500 mg Oral Q8H    predniSONE  5 mg Oral Daily    sodium bicarbonate  650 mg Oral BID    sulfamethoxazole-trimethoprim 400-80mg  1 tablet Oral Daily    tacrolimus  1 mg Oral BID    valGANciclovir  450 mg Oral Daily     Continuous Infusions:   sodium chloride 0.9% 100 mL/hr at 07/11/19 1346     PRN Meds:sodium chloride, acetaminophen, calcium carbonate,  hydrALAZINE, iohexol, ondansetron, sodium chloride 0.9%    Review of Systems   Constitutional: Positive for activity change and appetite change. Negative for chills and fever.   Respiratory: Negative for cough, shortness of breath and wheezing.    Cardiovascular: Negative for leg swelling.   Gastrointestinal: Positive for diarrhea. Negative for abdominal distention, abdominal pain, nausea and vomiting.   Genitourinary: Negative for decreased urine volume, difficulty urinating and dysuria.   Skin: Positive for wound (chevron well healed).   Allergic/Immunologic: Positive for immunocompromised state.   Neurological: Positive for weakness. Negative for light-headedness.   Psychiatric/Behavioral: Negative for agitation, confusion and decreased concentration. The patient is not nervous/anxious.      Objective:     Vital Signs (Most Recent):  Temp: 99.3 °F (37.4 °C) (07/11/19 1126)  Pulse: 83 (07/11/19 1158)  Resp: (!) 24 (07/11/19 1158)  BP: 133/74 (07/11/19 1158)  SpO2: (!) 94 % (07/11/19 1158) Vital Signs (24h Range):  Temp:  [99.2 °F (37.3 °C)-100.4 °F (38 °C)] 99.3 °F (37.4 °C)  Pulse:  [] 83  Resp:  [20-31] 24  SpO2:  [94 %-96 %] 94 %  BP: (108-152)/(69-85) 133/74     Weight: 78.5 kg (173 lb)  Body mass index is 29.7 kg/m².    Intake/Output - Last 3 Shifts       07/09 0700 - 07/10 0659 07/10 0700 - 07/11 0659 07/11 0700 - 07/12 0659    P.O. 360 1560 460    I.V. (mL/kg) 317.5 (4)  0 (0)    Blood 171.3      Other 2000  0    IV Piggyback 300 700 50    Total Intake(mL/kg) 3148.8 (40.1) 2260 (28.8) 510 (6.5)    Urine (mL/kg/hr) 200 (0.1) 400 (0.2) 600 (1)    Emesis/NG output  0 0    Other  0 0    Stool 1 0 0    Blood  0 0    Total Output 201 400 600    Net +2947.8 +1860 -90           Urine Occurrence 3 x 7 x     Stool Occurrence 3 x 8 x           Physical Exam   Constitutional: She is oriented to person, place, and time. She appears well-developed. No distress.   HENT:   Head: Normocephalic and atraumatic.   Eyes:  Pupils are equal, round, and reactive to light. No scleral icterus.   Neck: Normal range of motion. Neck supple. No thyromegaly present.   Cardiovascular: Regular rhythm. Tachycardia present. Exam reveals no friction rub.   No murmur heard.  Pulmonary/Chest: Effort normal. She has decreased breath sounds in the right middle field, the right lower field and the left lower field. She has no wheezes. She has no rales.   Diminished RLL   Abdominal: Soft. She exhibits no distension. There is no tenderness. There is no rebound and no guarding.   Healed chevron incision   Musculoskeletal: Normal range of motion.   Neurological: She is alert and oriented to person, place, and time. She is not disoriented.   Oriented to person, easily re oriented   Skin: Skin is warm and dry. She is not diaphoretic.   Psychiatric: She has a normal mood and affect. Her behavior is normal. Judgment and thought content normal. Her speech is delayed and slurred.   Nursing note and vitals reviewed.      Laboratory:  Immunosuppressants         Stop Route Frequency     tacrolimus capsule 1 mg      -- Oral 2 times daily        CBC:   Recent Labs   Lab 07/11/19  0646   WBC 5.84   RBC 2.91*   HGB 8.4*   HCT 25.5*   *   MCV 88   MCH 28.9   MCHC 32.9     CMP:   Recent Labs   Lab 07/11/19  0646   GLU 85   CALCIUM 8.0*   ALBUMIN 1.9*   PROT 4.7*   *   K 3.7   CO2 19*      BUN 11   CREATININE 0.7   ALKPHOS 156*   ALT <5*   AST 11   BILITOT 0.6     Labs within the past 24 hours have been reviewed.    Diagnostic Results:  None    Debility/Functional status: Patient debilitated by evidence of Muscle wasting and atrophy, Weakness, Chronic fatigue, unspecified and Other reduced mobility. Physical and occupational therapy ordered daily to evaluate and treat. Debility was: present on admission.    Assessment/Plan:     * Fever  - Infectious work up ordered on admit - blood cx NGTD, UA unremarkable, CMV PCR 7/5 undetected, chest x-ray w right  pleural effusion, CT A/P reviewed.  - Started broad spectrum antibiotics on admit.   - Abdominal and head CT reviewed and no clear source for fever.   - ID consulted.  Apprec recs.   - Pt with intermittent confusion.  Easily re oriented.  Continue to monitor.   - Thora and para done 7/9; amount of fluid removed not recorded and pleural fluid labs were not completed as ordered.   - Abdominal gram stain reveals no WBC and no organisms; cell count not completed as ordered; pleural fluid cell count 84 WBCs, 24% segs  - Pt still reports diarrhea- C diff negative, CMV undetected. Stool culture, WBC, ova cysts parasites pending.   - low grade fever this AM- 100.4 F  - deescalated abx to PO cefpodoxime and flagyl  - MRI head w/ and w/o contrast pending as pt with waxing and waning mental status       Hypokalemia  - replace as needed      Traumatic intracranial hemorrhage without loss of consciousness  - Head CT reviewed and noted with interval reduction in size of the patient's known right occipital lobe parenchymal hemorrhage.      At risk for opportunistic infections  - cont bactrim for PCP prophylaxis  - cont Valcyte for CMV prophylaxis (CMV D+/R+).      Closed head injury  - admitted recently following fall diagnosed w acute parenchymal hemorrhage within right occipital live near parieto-occipital junction 1.8x1.3x1.5 w mild surrounding vasogenic edema and localized mass.  Repeat CT head showed reduction in size of hemorrhage.  - pt denies HA      Long-term use of immunosuppressant medication  - Continue prograf.   - Holding MMF. Monitor prograf level daily, monitor for toxic side effects, and adjust for therapeutic dose.       Prophylactic immunotherapy  - See long term use of immunosuppression.  Decreased as likely w sepsis.    S/P liver transplant  - LFTs stable  - Pt with good hepatic allograft function.   - Last Liver US 6/24 showed Mildly elevated hepatic arterial resistive indices, although improved from prior  exam.  Otherwise, satisfactory vascular appearance of the liver, complex fluid collection adjacent to the left hepatic lobe, similar to slightly smaller compared to prior exam, small volume of ascites and partially visualized right pleural effusion. Monitor.   - para completed 7/9, cultures pending. Cell count not collected as ordered.     Anemia of chronic disease  - no overt bleeding.    - keep type and screen current  - transfused 1u prbc 7/9  - monitor w daily labs.      Pleural effusion, right  - CXR reviewed.    - Thora done 7/9  - pleural fluid labs not completed as ordered- will try to get labs repeated if specimen is still available  - cell count: 84 WBCs, 24% segs      Weakness  - PT/OT consulted.        Moderate malnutrition  - supplements ordered.      GERD (gastroesophageal reflux disease)  - cont Pepcid.          VTE Risk Mitigation (From admission, onward)        Ordered     Place sequential compression device  Until discontinued      07/05/19 1659     IP VTE HIGH RISK PATIENT  Once      07/05/19 1659          The patients clinical status was discussed at multidisplinary rounds, involving transplant surgery, transplant medicine, pharmacy, nursing, nutrition, and social work    Discharge Planning:  No Patient Care Coordination Note on file.      Amy Saunders PA-C  Liver Transplant  [facility]

## 2019-07-11 NOTE — PROGRESS NOTES
War Room called and informed pt off Tele and to MRI  Monitor / pt required max assistance to moved to table / gown changed due to copper issue and MRI gown to pt

## 2019-07-11 NOTE — SUBJECTIVE & OBJECTIVE
Scheduled Meds:   cefpodoxime  200 mg Oral Q12H    famotidine  20 mg Oral QHS    furosemide  40 mg Intravenous Daily    iohexol  15 mL Oral Once    levothyroxine  112 mcg Oral Before breakfast    lidocaine (PF) 10 mg/ml (1%)  1 mL Other Once    metroNIDAZOLE  500 mg Oral Q8H    predniSONE  5 mg Oral Daily    sodium bicarbonate  650 mg Oral BID    sulfamethoxazole-trimethoprim 400-80mg  1 tablet Oral Daily    tacrolimus  1 mg Oral BID    valGANciclovir  450 mg Oral Daily     Continuous Infusions:   sodium chloride 0.9% 100 mL/hr at 07/11/19 1346     PRN Meds:sodium chloride, acetaminophen, calcium carbonate, hydrALAZINE, iohexol, ondansetron, sodium chloride 0.9%    Review of Systems   Constitutional: Positive for activity change and appetite change. Negative for chills and fever.   Respiratory: Negative for cough, shortness of breath and wheezing.    Cardiovascular: Negative for leg swelling.   Gastrointestinal: Positive for diarrhea. Negative for abdominal distention, abdominal pain, nausea and vomiting.   Genitourinary: Negative for decreased urine volume, difficulty urinating and dysuria.   Skin: Positive for wound (chevron well healed).   Allergic/Immunologic: Positive for immunocompromised state.   Neurological: Positive for weakness. Negative for light-headedness.   Psychiatric/Behavioral: Negative for agitation, confusion and decreased concentration. The patient is not nervous/anxious.      Objective:     Vital Signs (Most Recent):  Temp: 99.3 °F (37.4 °C) (07/11/19 1126)  Pulse: 83 (07/11/19 1158)  Resp: (!) 24 (07/11/19 1158)  BP: 133/74 (07/11/19 1158)  SpO2: (!) 94 % (07/11/19 1158) Vital Signs (24h Range):  Temp:  [99.2 °F (37.3 °C)-100.4 °F (38 °C)] 99.3 °F (37.4 °C)  Pulse:  [] 83  Resp:  [20-31] 24  SpO2:  [94 %-96 %] 94 %  BP: (108-152)/(69-85) 133/74     Weight: 78.5 kg (173 lb)  Body mass index is 29.7 kg/m².    Intake/Output - Last 3 Shifts       07/09 0700 - 07/10 0659 07/10  0700 - 07/11 0659 07/11 0700 - 07/12 0659    P.O. 360 1560 460    I.V. (mL/kg) 317.5 (4)  0 (0)    Blood 171.3      Other 2000  0    IV Piggyback 300 700 50    Total Intake(mL/kg) 3148.8 (40.1) 2260 (28.8) 510 (6.5)    Urine (mL/kg/hr) 200 (0.1) 400 (0.2) 600 (1)    Emesis/NG output  0 0    Other  0 0    Stool 1 0 0    Blood  0 0    Total Output 201 400 600    Net +2947.8 +1860 -90           Urine Occurrence 3 x 7 x     Stool Occurrence 3 x 8 x           Physical Exam   Constitutional: She is oriented to person, place, and time. She appears well-developed. No distress.   HENT:   Head: Normocephalic and atraumatic.   Eyes: Pupils are equal, round, and reactive to light. No scleral icterus.   Neck: Normal range of motion. Neck supple. No thyromegaly present.   Cardiovascular: Regular rhythm. Tachycardia present. Exam reveals no friction rub.   No murmur heard.  Pulmonary/Chest: Effort normal. She has decreased breath sounds in the right middle field, the right lower field and the left lower field. She has no wheezes. She has no rales.   Diminished RLL   Abdominal: Soft. She exhibits no distension. There is no tenderness. There is no rebound and no guarding.   Healed chevron incision   Musculoskeletal: Normal range of motion.   Neurological: She is alert and oriented to person, place, and time. She is not disoriented.   Oriented to person, easily re oriented   Skin: Skin is warm and dry. She is not diaphoretic.   Psychiatric: She has a normal mood and affect. Her behavior is normal. Judgment and thought content normal. Her speech is delayed and slurred.   Nursing note and vitals reviewed.      Laboratory:  Immunosuppressants         Stop Route Frequency     tacrolimus capsule 1 mg      -- Oral 2 times daily        CBC:   Recent Labs   Lab 07/11/19  0646   WBC 5.84   RBC 2.91*   HGB 8.4*   HCT 25.5*   *   MCV 88   MCH 28.9   MCHC 32.9     CMP:   Recent Labs   Lab 07/11/19  0646   GLU 85   CALCIUM 8.0*   ALBUMIN  1.9*   PROT 4.7*   *   K 3.7   CO2 19*      BUN 11   CREATININE 0.7   ALKPHOS 156*   ALT <5*   AST 11   BILITOT 0.6     Labs within the past 24 hours have been reviewed.    Diagnostic Results:  None    Debility/Functional status: Patient debilitated by evidence of Muscle wasting and atrophy, Weakness, Chronic fatigue, unspecified and Other reduced mobility. Physical and occupational therapy ordered daily to evaluate and treat. Debility was: present on admission.

## 2019-07-11 NOTE — PROGRESS NOTES
Pt answers questions appropriately and easily redirected but confused and hallucinating this shift. Pt had x3 bms and most likely urinated with each despite this Rn and pct rounding and asking pt if she needed to use the bathroom. Pt also became aggressive at times demanding to have her iv taken out and refusing care at times ie changing pads on bed and repositioning. Family left early in the shift and not certain when they will return.

## 2019-07-11 NOTE — ASSESSMENT & PLAN NOTE
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 She was started on empiric vanc/cefepime without resolution and ID has been consulted to comment on etiology/workup. She is improved after paracentesis/thoracentesis (latter bland; former without cell counts, obscuring data). Given improvement of fevers/AMS with fluid drainage, would transition to oral agents targeting IA antonieta as most likely culprit.    - would stop IV vanc, cefepime (dose adjusted for pneumonia), and flagyl  - would transition to oral cefpodoxime/flagyl and complete 5 days from tap  - remainder of prophylaxis per protocol  - will follow-up pending stool studies and fever curve

## 2019-07-11 NOTE — ASSESSMENT & PLAN NOTE
48yo woman w/a history of KESSLER cirrhosis (s/p DBDLT 6/5/2019, CMV D+/R+, steroid induction, on maintenance tacro/MMF/pred; c/b small traumatic R occipital lobe ICH following mechanical fall managed conservatively) who was admitted on 7/5/2019 with acute onset fevers, chills, headache (stable since ICH), dry cough (x10 days), and loose stools that failed to initially improve on empiric vanc/cefepime. She underwent paracentesis/thoracentesis that were paucicellular with some clinical improvement (afebrile, AMS improving) but with ongoing FTT/diarrhea. She remains tenuous and I am uncertain we have a definitive diagnosis for her fevers that have improved since the addition of anaerobic coverage.     - would proceed with transition to oral cefpodoxime/flagyl for now and observe fever curve/clinical status  - remainder of prophylaxis per protocol  - would obtain MRI brain given ongoing delirium; consideration of LP pending additional review of fever curve/AMS  - [place] stool pathogens PCR panel pending

## 2019-07-11 NOTE — SUBJECTIVE & OBJECTIVE
Interval History: Patient much improved today -- oriented, afebrile. Thora counts bland. Para counts not sent. Still with diarrhea but negative C.diff.    Review of Systems   Constitutional: Positive for activity change and appetite change. Negative for chills, diaphoresis and fever.   HENT: Negative for ear pain, mouth sores, sinus pressure and sore throat.    Eyes: Negative for photophobia, pain and redness.   Respiratory: Negative for cough, shortness of breath and wheezing.    Cardiovascular: Negative for chest pain and leg swelling.   Gastrointestinal: Positive for diarrhea. Negative for abdominal distention, abdominal pain, nausea and vomiting.   Genitourinary: Negative for decreased urine volume, difficulty urinating, dysuria, flank pain, frequency and urgency.   Musculoskeletal: Negative for arthralgias, back pain, gait problem and myalgias.   Skin: Positive for wound (chevron well healed). Negative for pallor and rash.   Allergic/Immunologic: Positive for immunocompromised state.   Neurological: Positive for weakness. Negative for dizziness, tremors, seizures, light-headedness and headaches.   Psychiatric/Behavioral: Negative for agitation, confusion and decreased concentration. The patient is not nervous/anxious.      Objective:     Vital Signs (Most Recent):  Temp: 99.2 °F (37.3 °C) (07/10/19 1611)  Pulse: 90 (07/10/19 1900)  Resp: (!) 29 (07/10/19 1611)  BP: 108/69 (07/10/19 1611)  SpO2: 95 % (07/10/19 1611) Vital Signs (24h Range):  Temp:  [98.2 °F (36.8 °C)-99.9 °F (37.7 °C)] 99.2 °F (37.3 °C)  Pulse:  [] 90  Resp:  [17-30] 29  SpO2:  [95 %-96 %] 95 %  BP: (108-140)/(69-83) 108/69     Weight: 78.5 kg (173 lb)  Body mass index is 29.7 kg/m².    Estimated Creatinine Clearance: 96.4 mL/min (based on SCr of 0.7 mg/dL).    Physical Exam   Constitutional: She appears well-developed. No distress.   HENT:   Head: Normocephalic and atraumatic.   Eyes: Pupils are equal, round, and reactive to light. No  scleral icterus.   Neck: Normal range of motion. Neck supple. No thyromegaly present.   Cardiovascular: Regular rhythm. Tachycardia present. Exam reveals no friction rub.   No murmur heard.  Pulmonary/Chest: Effort normal. She has decreased breath sounds in the right middle field, the right lower field and the left lower field. She has no wheezes. She has no rales.   Diminished RLL   Abdominal: Soft. She exhibits no distension. There is no tenderness. There is no rebound and no guarding.   Healed chevron incision   Musculoskeletal: Normal range of motion.   Neurological: She is alert.   Oriented to person, easily re oriented   Skin: Skin is warm and dry. She is not diaphoretic.   Psychiatric: She has a normal mood and affect. Her behavior is normal.   Nursing note and vitals reviewed.      Significant Labs:   CBC:   Recent Labs   Lab 07/09/19  0644 07/10/19  0622   WBC 5.62 5.62   HGB 7.5* 8.9*   HCT 23.1* 26.6*    171     CMP:   Recent Labs   Lab 07/09/19 0644 07/10/19  0622   * 127*   K 2.9* 3.4*   CL 96 99   CO2 21* 20*   GLU 77 95   BUN 13 12   CREATININE 0.7 0.7   CALCIUM 8.1* 7.9*   PROT 5.0* 4.8*   ALBUMIN 2.0* 2.0*   BILITOT 0.6 0.7   ALKPHOS 191* 174*   AST 11 11   ALT <5* 5*   ANIONGAP 11 8   EGFRNONAA >60.0 >60.0       Significant Imaging: I have reviewed all pertinent imaging results/findings within the past 24 hours.

## 2019-07-11 NOTE — PROGRESS NOTES
MRI done pt moved back to stretcher and placed back to Tele / transport to move pt to room / NAD noted

## 2019-07-11 NOTE — ASSESSMENT & PLAN NOTE
- CXR reviewed.    - Thora done 7/9  - pleural fluid labs not completed as ordered- will try to get labs repeated if specimen is still available  - cell count: 84 WBCs, 24% segs

## 2019-07-11 NOTE — ASSESSMENT & PLAN NOTE
- Infectious work up ordered on admit - blood cx NGTD, UA unremarkable, CMV PCR 7/5 undetected, chest x-ray w right pleural effusion, CT A/P reviewed.  - Started broad spectrum antibiotics on admit.   - Abdominal and head CT reviewed and no clear source for fever.   - ID consulted.  Apprec recs.   - Pt with intermittent confusion.  Easily re oriented.  Continue to monitor.   - Thora and para done 7/9; amount of fluid removed not recorded and pleural fluid labs were not completed as ordered.   - Abdominal gram stain reveals no WBC and no organisms; cell count not completed as ordered; pleural fluid cell count 84 WBCs, 24% segs  - Pt still reports diarrhea- C diff negative, CMV undetected. Stool culture, WBC, ova cysts parasites pending.   - low grade fever this AM- 100.4 F  - deescalated abx to PO cefpodoxime and flagyl  - MRI head w/ and w/o contrast pending as pt with waxing and waning mental status

## 2019-07-11 NOTE — PLAN OF CARE
Problem: Adult Inpatient Plan of Care  Goal: Plan of Care Review  Outcome: Ongoing (interventions implemented as appropriate)  Pt remains lethargic, oriented x 4; mental status change noted overnight. VSS.   TMax 100.4; x 2 Tylenol administered; afebrile at this time  Pt AAO x 4 during initial assessment; pt able to ambulate with PT this morning; using bedpan and bedside commode  Pt began changing tone of voice during conversation with care providers  Incontinent x 2; worsening conditions throughout the day; external catheter placed  NS @ 100 ml/hr ordered for 1 L; infusing to right PIV  MRI without contrast for change in mental status; Prograf discontinued  Lasix 40 mg administered; I/O documented in flow sheets  Chevron remains open to air with steri strips  Urine culture collected; NGTD  Visi and tele monitor remain in place  Bed alarm set for worsening mental status  Pt remains free from falls and injuries  Will continue to monitor

## 2019-07-11 NOTE — PT/OT/SLP PROGRESS
Physical Therapy Treatment    Patient Name:  Jihan Zamudio   MRN:  [id number]    Recommendations:     Discharge Recommendations:    Inpatient Rehab (pending pt progress)   Discharge Equipment Recommendations: (TBD)   Barriers to discharge: increased level of assistance required; fall risk       Assessment:     Jihan Zamudio is a 49 y.o. female admitted with a medical diagnosis of Fever.  She presents with the following impairments/functional limitations:  weakness, impaired endurance, impaired self care skills, gait instability, impaired functional mobilty, impaired balance, impaired cognition . Pt Progressing with PT Intervention. Pt Progressing with improving gait distance. Pt would continue to benefit from skilled PT to address overall functional mobility and goals. Goals remain appropriate      Rehab Prognosis: Good; patient would benefit from acute skilled PT services to address these deficits and reach maximum level of function.    Recent Surgery: * No surgery found *      Plan:     During this hospitalization, patient to be seen 4 x/week to address the identified rehab impairments via gait training, therapeutic activities, therapeutic exercises and progress toward the following goals:    · Plan of Care Expires:  08/08/19    Subjective     Chief Complaint: I need to get up and use the bathroom before I go in the bed    Pain/Comfort:  · Pain Rating 1: (did not rate)      Objective:     Communicated with RN prior to session.  Patient found supine with bed alarm, telemetry upon PT entry to room.     General Precautions: Standard, fall   Orthopedic Precautions:N/A   Braces: N/A     Functional Mobility:  · Bed Mobility:     · Rolling Right: contact guard assistance  · Supine to Sit: minimum assistance  · Sit to Supine: contact guard assistance  · Transfers:     · Sit to Stand:  contact guard assistance with no AD  · Toilet Transfer: contact guard assistance with  no AD  using  Stand Pivot  · Gait: pt  "ambulated with holding onto back of  w/c x 150 ft with CGA for safety, pt declined use of RW for mobility, reporting "I will not use the walker, I will use the w/c to walk with."      AM-PAC 6 CLICK MOBILITY  Turning over in bed (including adjusting bedclothes, sheets and blankets)?: 3  Sitting down on and standing up from a chair with arms (e.g., wheelchair, bedside commode, etc.): 3  Moving from lying on back to sitting on the side of the bed?: 3  Moving to and from a bed to a chair (including a wheelchair)?: 3  Need to walk in hospital room?: 3  Climbing 3-5 steps with a railing?: 1  Basic Mobility Total Score: 16       Therapeutic Activities and Exercises:   educated patient on progress, safety,d/c,PT POC, on the effects of prolonged immobility and the importance of performing OOB activity and exercises to promote healing and reduce recovery time   Updated white board with appropriate PT mobility information for medical team notification  Donned an extra gown and gripping socks  Pt encouraged to ambulate in hallways 3x/day with nursing or family assistance to improve endurance.   Pt safe to ambulate in hallway with RN or PCT assistance   Bedside table in front of patient and area set up for function, convenience, and safety. RN aware of patient's mobility needs and status. Questions/concerns addressed within PTA scope of practice; patient with no further questions. Time was provided for active listening, discussion of health disposition, and discussion of safe discharge. Pt?verbalized?agreement .      Patient left supine with all lines intact, call button in reach, bed alarm on and nsg and family present..    GOALS:   Multidisciplinary Problems     Physical Therapy Goals        Problem: Physical Therapy Goal    Goal Priority Disciplines Outcome Goal Variances Interventions   Physical Therapy Goal     PT, PT/OT Ongoing (interventions implemented as appropriate)     Description:  Goals to be met by: " 2019    Patient will increase functional independence with mobility by performin. Supine <> sit with supervision   2. Sit to stand transfer with Supervision  3. Gait  x 150 feet with Stand-by Assistance with or without using least restrictive AD.   4. Stand for 3 minutes with Stand-by Assistance while performing dynamic balance activities   5. Lower extremity exercise program x20 reps per handout, with supervision                      Time Tracking:     PT Received On: 19  PT Start Time: 858     PT Stop Time: 924  PT Total Time (min): 26 min     Billable Minutes: Gait Training 15 and Therapeutic Activity 11    Treatment Type: Treatment  PT/PTA: PTA     PTA Visit Number: 1     Jay Choi, PTA  2019

## 2019-07-11 NOTE — PROGRESS NOTES
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 She was started on empiric vanc/cefepime without resolution and ID has been consulted to comment on etiology/workup. She is improved after paracentesis/thoracentesis (latter bland; former without cell counts, obscuring data). Given improvement of fevers/AMS with fluid drainage, would transition to oral agents targeting IA antonieta as most likely culprit.    - would stop IV vanc, cefepime (dose adjusted for pneumonia), and flagyl  - would transition to oral cefpodoxime/flagyl and complete 5 days from tap  - remainder of prophylaxis per protocol  - will follow-up pending stool studies and fever curve         Anticipated Disposition: pending improvement    Thank you for your consult. I will follow-up with patient. Please contact us if you have any additional questions.     Halle Marc MD  Transplant ID Attending  [phone]    Halle Marc MD  Infectious Disease  [facility]    Subjective:     Principal Problem:Fever    HPI: No notes on file  Interval History: Patient much improved today -- oriented, afebrile. Thora counts bland. Para counts not sent. Still with diarrhea but negative C.diff.    Review of Systems   Constitutional: Positive for activity change and appetite change. Negative for chills, diaphoresis and fever.   HENT:  Negative for ear pain, mouth sores, sinus pressure and sore throat.    Eyes: Negative for photophobia, pain and redness.   Respiratory: Negative for cough, shortness of breath and wheezing.    Cardiovascular: Negative for chest pain and leg swelling.   Gastrointestinal: Positive for diarrhea. Negative for abdominal distention, abdominal pain, nausea and vomiting.   Genitourinary: Negative for decreased urine volume, difficulty urinating, dysuria, flank pain, frequency and urgency.   Musculoskeletal: Negative for arthralgias, back pain, gait problem and myalgias.   Skin: Positive for wound (chevron well healed). Negative for pallor and rash.   Allergic/Immunologic: Positive for immunocompromised state.   Neurological: Positive for weakness. Negative for dizziness, tremors, seizures, light-headedness and headaches.   Psychiatric/Behavioral: Negative for agitation, confusion and decreased concentration. The patient is not nervous/anxious.      Objective:     Vital Signs (Most Recent):  Temp: 99.2 °F (37.3 °C) (07/10/19 1611)  Pulse: 90 (07/10/19 1900)  Resp: (!) 29 (07/10/19 1611)  BP: 108/69 (07/10/19 1611)  SpO2: 95 % (07/10/19 1611) Vital Signs (24h Range):  Temp:  [98.2 °F (36.8 °C)-99.9 °F (37.7 °C)] 99.2 °F (37.3 °C)  Pulse:  [] 90  Resp:  [17-30] 29  SpO2:  [95 %-96 %] 95 %  BP: (108-140)/(69-83) 108/69     Weight: 78.5 kg (173 lb)  Body mass index is 29.7 kg/m².    Estimated Creatinine Clearance: 96.4 mL/min (based on SCr of 0.7 mg/dL).    Physical Exam   Constitutional: She appears well-developed. No distress.   HENT:   Head: Normocephalic and atraumatic.   Eyes: Pupils are equal, round, and reactive to light. No scleral icterus.   Neck: Normal range of motion. Neck supple. No thyromegaly present.   Cardiovascular: Regular rhythm. Tachycardia present. Exam reveals no friction rub.   No murmur heard.  Pulmonary/Chest: Effort normal. She has decreased breath sounds in the right middle field, the right  lower field and the left lower field. She has no wheezes. She has no rales.   Diminished RLL   Abdominal: Soft. She exhibits no distension. There is no tenderness. There is no rebound and no guarding.   Healed chevron incision   Musculoskeletal: Normal range of motion.   Neurological: She is alert.   Oriented to person, easily re oriented   Skin: Skin is warm and dry. She is not diaphoretic.   Psychiatric: She has a normal mood and affect. Her behavior is normal.   Nursing note and vitals reviewed.      Significant Labs:   CBC:   Recent Labs   Lab 07/09/19 0644 07/10/19  0622   WBC 5.62 5.62   HGB 7.5* 8.9*   HCT 23.1* 26.6*    171     CMP:   Recent Labs   Lab 07/09/19  0644 07/10/19  0622   * 127*   K 2.9* 3.4*   CL 96 99   CO2 21* 20*   GLU 77 95   BUN 13 12   CREATININE 0.7 0.7   CALCIUM 8.1* 7.9*   PROT 5.0* 4.8*   ALBUMIN 2.0* 2.0*   BILITOT 0.6 0.7   ALKPHOS 191* 174*   AST 11 11   ALT <5* 5*   ANIONGAP 11 8   EGFRNONAA >60.0 >60.0       Significant Imaging: I have reviewed all pertinent imaging results/findings within the past 24 hours.

## 2019-07-11 NOTE — PT/OT/SLP PROGRESS
Occupational Therapy      Patient Name:  Jihan Zamudio   MRN:  [id number]    Patient not seen today secondary to difficulty arousing due to fatigue on 1st attempt. Pt's spouse reported that pt ambulated down the caballero with PT and had frequent visitors prior to OT's arrival. OT unable to make 2nd attempt in the PM. Will follow-up on 7/12/19.    Roselyn Lowe OT  7/11/2019

## 2019-07-11 NOTE — PLAN OF CARE
Problem: Physical Therapy Goal  Goal: Physical Therapy Goal  Goals to be met by: 2019    Patient will increase functional independence with mobility by performin. Supine <> sit with supervision   2. Sit to stand transfer with Supervision  3. Gait  x 150 feet with Stand-by Assistance with or without using least restrictive AD.   4. Stand for 3 minutes with Stand-by Assistance while performing dynamic balance activities   5. Lower extremity exercise program x20 reps per handout, with supervision     Pt progressing towards goals. continue with PT POC.Goals remain appropriate.  Jay Choi PTA  2019

## 2019-07-11 NOTE — SUBJECTIVE & OBJECTIVE
Interval History: Patient remains delirious at times but was more alert for me than primary team. Afebrile still. Loose stools persist with negative C.diff. Counts reassuring from para/thora (done late).     Review of Systems   Constitutional: Positive for activity change and appetite change. Negative for chills, diaphoresis and fever.   HENT: Negative for ear pain, mouth sores, sinus pressure and sore throat.    Eyes: Negative for photophobia, pain and redness.   Respiratory: Negative for cough, shortness of breath and wheezing.    Cardiovascular: Negative for chest pain and leg swelling.   Gastrointestinal: Positive for diarrhea. Negative for abdominal distention, abdominal pain, nausea and vomiting.   Genitourinary: Negative for decreased urine volume, difficulty urinating, dysuria, flank pain, frequency and urgency.   Musculoskeletal: Negative for arthralgias, back pain, gait problem and myalgias.   Skin: Positive for wound (chevron well healed). Negative for pallor and rash.   Allergic/Immunologic: Positive for immunocompromised state.   Neurological: Positive for weakness. Negative for dizziness, tremors, seizures, light-headedness and headaches.   Psychiatric/Behavioral: Negative for agitation, confusion and decreased concentration. The patient is not nervous/anxious.      Objective:     Vital Signs (Most Recent):  Temp: 98.6 °F (37 °C) (07/11/19 1452)  Pulse: 83 (07/11/19 1607)  Resp: (!) 30 (07/11/19 1452)  BP: 137/76 (07/11/19 1452)  SpO2: 97 % (07/11/19 1452) Vital Signs (24h Range):  Temp:  [98.6 °F (37 °C)-100.4 °F (38 °C)] 98.6 °F (37 °C)  Pulse:  [] 83  Resp:  [20-31] 30  SpO2:  [94 %-97 %] 97 %  BP: (133-152)/(74-85) 137/76     Weight: 78.5 kg (173 lb)  Body mass index is 29.7 kg/m².    Estimated Creatinine Clearance: 96.4 mL/min (based on SCr of 0.7 mg/dL).    Physical Exam   Constitutional: She appears well-developed. No distress.   HENT:   Head: Normocephalic and atraumatic.   Eyes: Pupils  are equal, round, and reactive to light. No scleral icterus.   Neck: Normal range of motion. Neck supple. No thyromegaly present.   Cardiovascular: Regular rhythm. Tachycardia present. Exam reveals no friction rub.   No murmur heard.  Pulmonary/Chest: Effort normal. She has decreased breath sounds in the right middle field, the right lower field and the left lower field. She has no wheezes. She has no rales.   Diminished RLL   Abdominal: Soft. She exhibits no distension. There is no tenderness. There is no rebound and no guarding.   Healed chevron incision   Musculoskeletal: Normal range of motion.   Neurological: She is alert.   Oriented to person, easily re oriented   Skin: Skin is warm and dry. She is not diaphoretic.   Psychiatric: She has a normal mood and affect. Her behavior is normal.   Nursing note and vitals reviewed.      Significant Labs:   CBC:   Recent Labs   Lab 07/10/19  0622 07/11/19  0646   WBC 5.62 5.84   HGB 8.9* 8.4*   HCT 26.6* 25.5*    140*     CMP:   Recent Labs   Lab 07/10/19  0622 07/11/19  0646   * 129*   K 3.4* 3.7   CL 99 102   CO2 20* 19*   GLU 95 85   BUN 12 11   CREATININE 0.7 0.7   CALCIUM 7.9* 8.0*   PROT 4.8* 4.7*   ALBUMIN 2.0* 1.9*   BILITOT 0.7 0.6   ALKPHOS 174* 156*   AST 11 11   ALT 5* <5*   ANIONGAP 8 8   EGFRNONAA >60.0 >60.0       Significant Imaging: I have reviewed all pertinent imaging results/findings within the past 24 hours.
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
## 2019-07-12 PROBLEM — B95.2 UTI (URINARY TRACT INFECTION) DUE TO ENTEROCOCCUS: Status: ACTIVE | Noted: 2019-07-12

## 2019-07-12 PROBLEM — R19.7 DIARRHEA: Status: ACTIVE | Noted: 2019-07-12

## 2019-07-12 PROBLEM — N39.0 UTI (URINARY TRACT INFECTION) DUE TO ENTEROCOCCUS: Status: ACTIVE | Noted: 2019-07-12

## 2019-07-12 LAB
ALBUMIN SERPL BCP-MCNC: 1.8 G/DL (ref 3.5–5.2)
ALP SERPL-CCNC: 152 U/L (ref 55–135)
ALT SERPL W/O P-5'-P-CCNC: <5 U/L (ref 10–44)
ANION GAP SERPL CALC-SCNC: 10 MMOL/L (ref 8–16)
AST SERPL-CCNC: 11 U/L (ref 10–40)
BACTERIA SPEC AEROBE CULT: NO GROWTH
BASOPHILS # BLD AUTO: 0.06 K/UL (ref 0–0.2)
BASOPHILS NFR BLD: 0.9 % (ref 0–1.9)
BILIRUB SERPL-MCNC: 0.6 MG/DL (ref 0.1–1)
BUN SERPL-MCNC: 11 MG/DL (ref 6–20)
CALCIUM SERPL-MCNC: 8 MG/DL (ref 8.7–10.5)
CHLORIDE SERPL-SCNC: 108 MMOL/L (ref 95–110)
CO2 SERPL-SCNC: 17 MMOL/L (ref 23–29)
CREAT SERPL-MCNC: 0.7 MG/DL (ref 0.5–1.4)
CRYPTOC AG SER QL LA: NEGATIVE
DIFFERENTIAL METHOD: ABNORMAL
EOSINOPHIL # BLD AUTO: 0.1 K/UL (ref 0–0.5)
EOSINOPHIL NFR BLD: 1 % (ref 0–8)
ERYTHROCYTE [DISTWIDTH] IN BLOOD BY AUTOMATED COUNT: 15.8 % (ref 11.5–14.5)
EST. GFR  (AFRICAN AMERICAN): >60 ML/MIN/1.73 M^2
EST. GFR  (NON AFRICAN AMERICAN): >60 ML/MIN/1.73 M^2
GLUCOSE SERPL-MCNC: 76 MG/DL (ref 70–110)
HCT VFR BLD AUTO: 25.8 % (ref 37–48.5)
HGB BLD-MCNC: 8.6 G/DL (ref 12–16)
IMM GRANULOCYTES # BLD AUTO: 0.13 K/UL (ref 0–0.04)
IMM GRANULOCYTES NFR BLD AUTO: 1.9 % (ref 0–0.5)
LYMPHOCYTES # BLD AUTO: 0.2 K/UL (ref 1–4.8)
LYMPHOCYTES NFR BLD: 3.5 % (ref 18–48)
MAGNESIUM SERPL-MCNC: 1.7 MG/DL (ref 1.6–2.6)
MCH RBC QN AUTO: 28.8 PG (ref 27–31)
MCHC RBC AUTO-ENTMCNC: 33.3 G/DL (ref 32–36)
MCV RBC AUTO: 86 FL (ref 82–98)
MONOCYTES # BLD AUTO: 0.5 K/UL (ref 0.3–1)
MONOCYTES NFR BLD: 6.8 % (ref 4–15)
NEUTROPHILS # BLD AUTO: 5.9 K/UL (ref 1.8–7.7)
NEUTROPHILS NFR BLD: 85.9 % (ref 38–73)
NRBC BLD-RTO: 0 /100 WBC
O+P STL TRI STN: NORMAL
PLATELET # BLD AUTO: 137 K/UL (ref 150–350)
PMV BLD AUTO: 10.1 FL (ref 9.2–12.9)
POTASSIUM SERPL-SCNC: 3.7 MMOL/L (ref 3.5–5.1)
PROT SERPL-MCNC: 4.7 G/DL (ref 6–8.4)
RBC # BLD AUTO: 2.99 M/UL (ref 4–5.4)
SODIUM SERPL-SCNC: 135 MMOL/L (ref 136–145)
TACROLIMUS BLD-MCNC: 2.5 NG/ML (ref 5–15)
WBC # BLD AUTO: 6.81 K/UL (ref 3.9–12.7)
WBC #/AREA STL HPF: NORMAL /[HPF]

## 2019-07-12 PROCEDURE — 80053 COMPREHEN METABOLIC PANEL: CPT

## 2019-07-12 PROCEDURE — 80197 ASSAY OF TACROLIMUS: CPT

## 2019-07-12 PROCEDURE — 97530 THERAPEUTIC ACTIVITIES: CPT

## 2019-07-12 PROCEDURE — 86403 PARTICLE AGGLUT ANTBDY SCRN: CPT

## 2019-07-12 PROCEDURE — 63600175 PHARM REV CODE 636 W HCPCS: Performed by: TRANSPLANT SURGERY

## 2019-07-12 PROCEDURE — 99233 PR SUBSEQUENT HOSPITAL CARE,LEVL III: ICD-10-PCS | Mod: ,,, | Performed by: INTERNAL MEDICINE

## 2019-07-12 PROCEDURE — 85025 COMPLETE CBC W/AUTO DIFF WBC: CPT

## 2019-07-12 PROCEDURE — 25000003 PHARM REV CODE 250: Performed by: PHYSICIAN ASSISTANT

## 2019-07-12 PROCEDURE — 63600175 PHARM REV CODE 636 W HCPCS: Performed by: PHYSICIAN ASSISTANT

## 2019-07-12 PROCEDURE — 99233 SBSQ HOSP IP/OBS HIGH 50: CPT | Mod: ,,, | Performed by: INTERNAL MEDICINE

## 2019-07-12 PROCEDURE — 99233 PR SUBSEQUENT HOSPITAL CARE,LEVL III: ICD-10-PCS | Mod: 24,,, | Performed by: PHYSICIAN ASSISTANT

## 2019-07-12 PROCEDURE — 97535 SELF CARE MNGMENT TRAINING: CPT

## 2019-07-12 PROCEDURE — 25000003 PHARM REV CODE 250: Performed by: INTERNAL MEDICINE

## 2019-07-12 PROCEDURE — 99233 SBSQ HOSP IP/OBS HIGH 50: CPT | Mod: 24,,, | Performed by: PHYSICIAN ASSISTANT

## 2019-07-12 PROCEDURE — 83735 ASSAY OF MAGNESIUM: CPT

## 2019-07-12 PROCEDURE — 20600001 HC STEP DOWN PRIVATE ROOM

## 2019-07-12 RX ORDER — TACROLIMUS 1 MG/1
1 CAPSULE ORAL 2 TIMES DAILY
Status: DISCONTINUED | OUTPATIENT
Start: 2019-07-12 | End: 2019-07-13

## 2019-07-12 RX ORDER — FUROSEMIDE 40 MG/1
40 TABLET ORAL DAILY
Status: DISCONTINUED | OUTPATIENT
Start: 2019-07-13 | End: 2019-07-14

## 2019-07-12 RX ORDER — ENOXAPARIN SODIUM 100 MG/ML
40 INJECTION SUBCUTANEOUS EVERY 24 HOURS
Status: DISCONTINUED | OUTPATIENT
Start: 2019-07-12 | End: 2019-07-13

## 2019-07-12 RX ORDER — LINEZOLID 600 MG/1
600 TABLET, FILM COATED ORAL EVERY 12 HOURS
Status: DISCONTINUED | OUTPATIENT
Start: 2019-07-12 | End: 2019-07-13

## 2019-07-12 RX ADMIN — ACETAMINOPHEN 650 MG: 325 TABLET ORAL at 11:07

## 2019-07-12 RX ADMIN — PREDNISONE 5 MG: 5 TABLET ORAL at 08:07

## 2019-07-12 RX ADMIN — LINEZOLID 600 MG: 600 TABLET, FILM COATED ORAL at 08:07

## 2019-07-12 RX ADMIN — CEFPODOXIME PROXETIL 200 MG: 200 TABLET, FILM COATED ORAL at 08:07

## 2019-07-12 RX ADMIN — LEVOTHYROXINE SODIUM 112 MCG: 50 TABLET ORAL at 05:07

## 2019-07-12 RX ADMIN — FAMOTIDINE 20 MG: 20 TABLET, FILM COATED ORAL at 08:07

## 2019-07-12 RX ADMIN — VALGANCICLOVIR 450 MG: 450 TABLET, FILM COATED ORAL at 08:07

## 2019-07-12 RX ADMIN — ENOXAPARIN SODIUM 40 MG: 100 INJECTION SUBCUTANEOUS at 05:07

## 2019-07-12 RX ADMIN — METRONIDAZOLE 500 MG: 500 TABLET ORAL at 05:07

## 2019-07-12 RX ADMIN — FUROSEMIDE 40 MG: 10 INJECTION, SOLUTION INTRAMUSCULAR; INTRAVENOUS at 08:07

## 2019-07-12 RX ADMIN — TACROLIMUS 1 MG: 1 CAPSULE ORAL at 05:07

## 2019-07-12 RX ADMIN — SODIUM BICARBONATE 650 MG TABLET 650 MG: at 08:07

## 2019-07-12 RX ADMIN — METRONIDAZOLE 500 MG: 500 TABLET ORAL at 02:07

## 2019-07-12 RX ADMIN — SULFAMETHOXAZOLE AND TRIMETHOPRIM 1 TABLET: 400; 80 TABLET ORAL at 08:07

## 2019-07-12 NOTE — PLAN OF CARE
Problem: Adult Inpatient Plan of Care  Goal: Plan of Care Review    Assessment and Plan    Nutrition Problem  Inadequate oral intake    Related to (etiology):   Loss of appetite    Signs and Symptoms (as evidenced by):   Pt has diarrhea that is affecting her appetite.    Interventions/Recommendations (treatment strategy):  Collaboration of care to providers.    Nutrition Diagnosis Status:   New    Recommendations     1. When feasible, continue regular diet, fluid restriction per MD. 2. If fluid restriction is relaxed, provide Novasource Renal daily.  Goals: 1. Pt's intake >75% meals by RD follow up.  Nutrition Goal Status: new  Communication of RD Recs: discussed on rounds

## 2019-07-12 NOTE — PROGRESS NOTES
"[facility]  Liver Transplant  Progress Note    Patient Name: Jihan Zamudio  MRN: [id number]  Admission Date: 2019  Hospital Length of Stay: 7 days  Code Status: Full Code  Primary Care Provider: Primary Doctor No  Post-Operative Day: 37    ORGAN:   LIVER  Disease Etiology: Cirrhosis: Fatty Liver ([place])  Donor Type:    - Brain Death  [facility] High Risk:   No  Donor CMV Status:   Donor CMV Status: Positive  Donor HBcAB:   Negative  Donor HCV Status:   Negative  Donor HBV GERTRUDIS: Negative  Donor HCV GERTRUDIS: Negative  Whole or Partial: Whole Liver  Biliary Anastomosis: End to End  Arterial Anatomy: Standard  Subjective:     History of Present Illness:  Ms. 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 Post transplant course significant for acute parenchymal hemorrhage within the right occipital lobe near the parieto-occipital junction measuring approximately 1.8 x 1.3 x 1.5 cm with mild surrounding vasogenic edema and localized mass effect after fall on . She was [facility] home on [facility] She now presents to the ED with 1 day history of fever. Homehealth nurse checked patient's temperature and was noted to be elevated. Home health nurse also reported patient acting "off" with mental status change. Currently in the ED, tmax is 102.3. Patient feels well with no true complaints expect rhinorrhea. She denies chills, diarrhea, nausea/vomiting, abdominal pain. Denies sick contacts. Will admit for infectious work up (blood cx, UA, urine cx, CMV PCR, chest x-ray, CT A/P). Patient currently with no altered mental status. In light of recent hx of occipital hemorrhage, will obtain CT head without contrast.      Hospital Course:  50 y/o F s/p OLTx for KESSLER cirrhosis on  who was admitted on  for fevers. Pt febrile on admission, started on bx abx. ID consulted. Blood cultures NGTD, UA negative. CMV PCR drawn on " "admit pending. Pt having loose stools, C diff EIA negative, CMV undetected. CT head showed reduction in size of hemorrhage. Abdominal CT reviewed and with moderate right pleural effusion. CXR with increased pleural effusion. Thora and para done 7/9; amount of fluid removed not recorded and pleural fluid labs were not completed as ordered. Abdominal gram stain reveals no WBC and no organisms; cell count not completed as ordered, cultures pending. TTE done 7/9 WNL, no vegetations. Pt tachy and tachypneic on 7/9; 1 L bolus given and pt responded well. 1 u pRBC transfused 7/9 for low H/H. Pt continued to have diarrhea, additional stool studies pending. Mentation continued to wax and wane, MRI w/ and w/o contrast done on 7/11 which revealed stable findings compared to prior. Pt transitioned to IV abx to PO abx on 7/11 per ID recs.    Interval history: Mentation somewhat better overnight, no episodes of bowel or urine incontinence. She denies confusion this AM and reports she feels fine. MRI head w/ and w/o contrast yesterday revealed stable findings compared to prior. Still admits to "explosive diarrhea". Stool pathogen panel sent off yesterday per ID. She continues to have poor PO intake. Pt with low grade temp, 100.8 noted this afternoon. Pt still mildly tachy and tachypneic at times but BP stable and satting well on RA. Abx deescalated to PO cefpodoxime and flagyl yesterday. Urine culture from 7/11 resulted as positive for enterococcus faecium. Will stop PO cepodoxime and flagyl and start PO linezolid 600 mg bid. Continue to monitor.    Scheduled Meds:   cefpodoxime  200 mg Oral Q12H    enoxaparin  40 mg Subcutaneous Daily    famotidine  20 mg Oral QHS    [START ON 7/13/2019] furosemide  40 mg Oral Daily    levothyroxine  112 mcg Oral Before breakfast    metroNIDAZOLE  500 mg Oral Q8H    predniSONE  5 mg Oral Daily    sodium bicarbonate  650 mg Oral BID    sulfamethoxazole-trimethoprim 400-80mg  1 tablet Oral " Daily    tacrolimus  1 mg Oral BID    valGANciclovir  450 mg Oral Daily     Continuous Infusions:  PRN Meds:acetaminophen, calcium carbonate, hydrALAZINE, ondansetron, sodium chloride 0.9%    Review of Systems   Constitutional: Positive for activity change and appetite change. Negative for chills and fever.   Respiratory: Negative for cough, shortness of breath and wheezing.    Cardiovascular: Negative for leg swelling.   Gastrointestinal: Positive for diarrhea. Negative for abdominal distention, abdominal pain, nausea and vomiting.   Genitourinary: Negative for decreased urine volume, difficulty urinating and dysuria.   Skin: Positive for wound (chevron well healed).   Allergic/Immunologic: Positive for immunocompromised state.   Neurological: Positive for weakness. Negative for light-headedness.   Psychiatric/Behavioral: Negative for agitation, confusion and decreased concentration. The patient is not nervous/anxious.      Objective:     Vital Signs (Most Recent):  Temp: (!) 100.8 °F (38.2 °C) (07/12/19 1125)  Pulse: 108 (07/12/19 1138)  Resp: (!) 29 (07/12/19 1138)  BP: 137/71 (07/12/19 1138)  SpO2: 97 % (07/12/19 1138) Vital Signs (24h Range):  Temp:  [98.3 °F (36.8 °C)-100.8 °F (38.2 °C)] 100.8 °F (38.2 °C)  Pulse:  [] 108  Resp:  [24-30] 29  SpO2:  [96 %-99 %] 97 %  BP: (127-158)/(60-80) 137/71     Weight: 78.5 kg (173 lb)  Body mass index is 29.7 kg/m².    Intake/Output - Last 3 Shifts       07/10 0700 - 07/11 0659 07/11 0700 - 07/12 0659 07/12 0700 - 07/13 0659    P.O. 1560 460     I.V. (mL/kg)  623.3 (7.9)     Blood       Other  0     IV Piggyback 700 50     Total Intake(mL/kg) 2260 (28.8) 1133.3 (14.4)     Urine (mL/kg/hr) 400 (0.2) 700 (0.4) 0 (0)    Emesis/NG output 0 0 0    Other 0 0 0    Stool 0 0 0    Blood 0 0 0    Total Output 400 700 0    Net +1860 +433.3 0           Urine Occurrence 7 x 3 x 2 x    Stool Occurrence 8 x 4 x 2 x    Emesis Occurrence  0 x 0 x          Physical Exam    Constitutional: She is oriented to person, place, and time. She appears well-developed. No distress.   HENT:   Head: Normocephalic and atraumatic.   Eyes: Pupils are equal, round, and reactive to light. No scleral icterus.   Neck: Normal range of motion. Neck supple. No thyromegaly present.   Cardiovascular: Regular rhythm. Tachycardia present. Exam reveals no friction rub.   No murmur heard.  Pulmonary/Chest: Effort normal. She has decreased breath sounds in the right middle field, the right lower field and the left lower field. She has no wheezes. She has no rales.   Diminished RLL   Abdominal: Soft. She exhibits no distension. There is no tenderness. There is no rebound and no guarding.   Healed chevron incision   Musculoskeletal: Normal range of motion.   Neurological: She is alert and oriented to person, place, and time. She is not disoriented.   Oriented to person, easily re oriented   Skin: Skin is warm and dry. She is not diaphoretic.   Psychiatric: She has a normal mood and affect. Her behavior is normal. Judgment and thought content normal. Her speech is delayed.   Nursing note and vitals reviewed.      Laboratory:  Immunosuppressants         Stop Route Frequency     tacrolimus capsule 1 mg      -- Oral 2 times daily        CBC:   Recent Labs   Lab 07/12/19  0636   WBC 6.81   RBC 2.99*   HGB 8.6*   HCT 25.8*   *   MCV 86   MCH 28.8   MCHC 33.3     CMP:   Recent Labs   Lab 07/12/19  0636   GLU 76   CALCIUM 8.0*   ALBUMIN 1.8*   PROT 4.7*   *   K 3.7   CO2 17*      BUN 11   CREATININE 0.7   ALKPHOS 152*   ALT <5*   AST 11   BILITOT 0.6     Labs within the past 24 hours have been reviewed.    Diagnostic Results:  None    Debility/Functional status: Patient debilitated by evidence of Muscle wasting and atrophy, Weakness, Chronic fatigue, unspecified and Other reduced mobility. Physical and occupational therapy ordered daily to evaluate and treat. Debility was: present on  admission.    Assessment/Plan:     * Fever  - Infectious work up ordered on admit - blood cx NGTD, UA unremarkable, CMV PCR 7/5 undetected, chest x-ray w right pleural effusion, CT A/P reviewed.  - Started broad spectrum antibiotics on admit.   - Abdominal and head CT reviewed and no clear source for fever.   - ID consulted.  Apprec recs.   - Pt with intermittent confusion.  Easily re oriented.  Continue to monitor.   - Thora and para done 7/9; amount of fluid removed not recorded and pleural fluid labs were not completed as ordered.   - Abdominal gram stain reveals no WBC and no organisms; cell count not completed as ordered; pleural fluid cell count 84 WBCs, 24% segs  - Pt still reports diarrhea- C diff negative, CMV undetected. Stool culture, WBC, ova cysts parasites, and GI pathogen panel pending.   - low grade fever today- 100.8 F  - deescalated abx to PO cefpodoxime and flagyl on 7/12  - MRI head w/ and w/o contrast without acute changes  - Urine culture positive for enterococcus faecium- awaiting ID recs      UTI (urinary tract infection) due to Enterococcus  -UA 7/5 unremarkable  -urine culture 7/11 positive for enterococcus faecium- susceptibilities pending  -pt currently taking PO cefpodoxime and flagyl  -will switch to per ID recs  -continue to monitor    Diarrhea  -pt with diarrhea on admission  -C diff negative, CMV undetected  -Stool culture, WBC, ova cysts parasites, and GI pathogen panel pending  -cellcept on hold  -continue to monitor      Hypokalemia  - replace as needed      Traumatic intracranial hemorrhage without loss of consciousness  - Head CT reviewed and noted with interval reduction in size of the patient's known right occipital lobe parenchymal hemorrhage.      At risk for opportunistic infections  - cont bactrim for PCP prophylaxis  - cont Valcyte for CMV prophylaxis (CMV D+/R+).      Closed head injury  - admitted recently following fall diagnosed w acute parenchymal hemorrhage within  right occipital live near parieto-occipital junction 1.8x1.3x1.5 w mild surrounding vasogenic edema and localized mass.  Repeat CT head showed reduction in size of hemorrhage.  - pt denies HA      Long-term use of immunosuppressant medication  - Continue prograf.   - Holding MMF. Monitor prograf level daily, monitor for toxic side effects, and adjust for therapeutic dose.       Prophylactic immunotherapy  - See long term use of immunosuppression.  Decreased as likely w sepsis.    S/P liver transplant  - LFTs stable  - Pt with good hepatic allograft function.   - Last Liver US 6/24 showed Mildly elevated hepatic arterial resistive indices, although improved from prior exam.  Otherwise, satisfactory vascular appearance of the liver, complex fluid collection adjacent to the left hepatic lobe, similar to slightly smaller compared to prior exam, small volume of ascites and partially visualized right pleural effusion. Monitor.   - para completed 7/9, cultures pending. Cell count not collected as ordered.     Anemia of chronic disease  - no overt bleeding.    - keep type and screen current  - transfused 1u prbc 7/9  - monitor w daily labs.      Pleural effusion, right  - CXR reviewed.    - Thora done 7/9  - pleural fluid labs not completed as ordered- will try to get labs repeated if specimen is still available  - cell count: 84 WBCs, 24% segs      Weakness  - PT/OT consulted.        Moderate malnutrition  - supplements ordered.      GERD (gastroesophageal reflux disease)  - cont Pepcid.          VTE Risk Mitigation (From admission, onward)        Ordered     enoxaparin injection 40 mg  Daily      07/12/19 1128     Place sequential compression device  Until discontinued      07/05/19 1659     IP VTE HIGH RISK PATIENT  Once      07/05/19 1659          The patients clinical status was discussed at multidisplinary rounds, involving transplant surgery, transplant medicine, pharmacy, nursing, nutrition, and social  work    Discharge Planning:  No Patient Care Coordination Note on file.      Amy Saunders PA-C  Liver Transplant  [facility]

## 2019-07-12 NOTE — SUBJECTIVE & OBJECTIVE
Scheduled Meds:   cefpodoxime  200 mg Oral Q12H    enoxaparin  40 mg Subcutaneous Daily    famotidine  20 mg Oral QHS    [START ON 7/13/2019] furosemide  40 mg Oral Daily    levothyroxine  112 mcg Oral Before breakfast    metroNIDAZOLE  500 mg Oral Q8H    predniSONE  5 mg Oral Daily    sodium bicarbonate  650 mg Oral BID    sulfamethoxazole-trimethoprim 400-80mg  1 tablet Oral Daily    tacrolimus  1 mg Oral BID    valGANciclovir  450 mg Oral Daily     Continuous Infusions:  PRN Meds:acetaminophen, calcium carbonate, hydrALAZINE, ondansetron, sodium chloride 0.9%    Review of Systems   Constitutional: Positive for activity change and appetite change. Negative for chills and fever.   Respiratory: Negative for cough, shortness of breath and wheezing.    Cardiovascular: Negative for leg swelling.   Gastrointestinal: Positive for diarrhea. Negative for abdominal distention, abdominal pain, nausea and vomiting.   Genitourinary: Negative for decreased urine volume, difficulty urinating and dysuria.   Skin: Positive for wound (chevron well healed).   Allergic/Immunologic: Positive for immunocompromised state.   Neurological: Positive for weakness. Negative for light-headedness.   Psychiatric/Behavioral: Negative for agitation, confusion and decreased concentration. The patient is not nervous/anxious.      Objective:     Vital Signs (Most Recent):  Temp: (!) 100.8 °F (38.2 °C) (07/12/19 1125)  Pulse: 108 (07/12/19 1138)  Resp: (!) 29 (07/12/19 1138)  BP: 137/71 (07/12/19 1138)  SpO2: 97 % (07/12/19 1138) Vital Signs (24h Range):  Temp:  [98.3 °F (36.8 °C)-100.8 °F (38.2 °C)] 100.8 °F (38.2 °C)  Pulse:  [] 108  Resp:  [24-30] 29  SpO2:  [96 %-99 %] 97 %  BP: (127-158)/(60-80) 137/71     Weight: 78.5 kg (173 lb)  Body mass index is 29.7 kg/m².    Intake/Output - Last 3 Shifts       07/10 0700 - 07/11 0659 07/11 0700 - 07/12 0659 07/12 0700 - 07/13 0659    P.O. 1560 460     I.V. (mL/kg)  623.3 (7.9)     Blood        Other  0     IV Piggyback 700 50     Total Intake(mL/kg) 2260 (28.8) 1133.3 (14.4)     Urine (mL/kg/hr) 400 (0.2) 700 (0.4) 0 (0)    Emesis/NG output 0 0 0    Other 0 0 0    Stool 0 0 0    Blood 0 0 0    Total Output 400 700 0    Net +1860 +433.3 0           Urine Occurrence 7 x 3 x 2 x    Stool Occurrence 8 x 4 x 2 x    Emesis Occurrence  0 x 0 x          Physical Exam   Constitutional: She is oriented to person, place, and time. She appears well-developed. No distress.   HENT:   Head: Normocephalic and atraumatic.   Eyes: Pupils are equal, round, and reactive to light. No scleral icterus.   Neck: Normal range of motion. Neck supple. No thyromegaly present.   Cardiovascular: Regular rhythm. Tachycardia present. Exam reveals no friction rub.   No murmur heard.  Pulmonary/Chest: Effort normal. She has decreased breath sounds in the right middle field, the right lower field and the left lower field. She has no wheezes. She has no rales.   Diminished RLL   Abdominal: Soft. She exhibits no distension. There is no tenderness. There is no rebound and no guarding.   Healed chevron incision   Musculoskeletal: Normal range of motion.   Neurological: She is alert and oriented to person, place, and time. She is not disoriented.   Oriented to person, easily re oriented   Skin: Skin is warm and dry. She is not diaphoretic.   Psychiatric: She has a normal mood and affect. Her behavior is normal. Judgment and thought content normal. Her speech is delayed.   Nursing note and vitals reviewed.      Laboratory:  Immunosuppressants         Stop Route Frequency     tacrolimus capsule 1 mg      -- Oral 2 times daily        CBC:   Recent Labs   Lab 07/12/19  0636   WBC 6.81   RBC 2.99*   HGB 8.6*   HCT 25.8*   *   MCV 86   MCH 28.8   MCHC 33.3     CMP:   Recent Labs   Lab 07/12/19  0636   GLU 76   CALCIUM 8.0*   ALBUMIN 1.8*   PROT 4.7*   *   K 3.7   CO2 17*      BUN 11   CREATININE 0.7   ALKPHOS 152*   ALT <5*    AST 11   BILITOT 0.6     Labs within the past 24 hours have been reviewed.    Diagnostic Results:  None    Debility/Functional status: Patient debilitated by evidence of Muscle wasting and atrophy, Weakness, Chronic fatigue, unspecified and Other reduced mobility. Physical and occupational therapy ordered daily to evaluate and treat. Debility was: present on admission.

## 2019-07-12 NOTE — PT/OT/SLP PROGRESS
"Physical Therapy Treatment    Patient Name:  Jihan Zamudio   MRN:  [id number]    Recommendations:     Discharge Recommendations:  rehabilitation facility   Discharge Equipment Recommendations: (TBD)   Barriers to discharge: fall risk    Assessment:     Jihan Zamudio is a 49 y.o. female admitted with a medical diagnosis of Fever.  She presents with the following impairments/functional limitations:  weakness, impaired endurance, impaired self care skills, gait instability, impaired functional mobilty, impaired balance, impaired cognition .Pt tolerated treatment fairly well and is progressing slowly with mobility. Pt declined ambulation in hallway this date due to pt wanting to wait for her .  Pt would continue to benefit from skilled PT to address overall functional mobility and goals. Goals remain appropriate      Rehab Prognosis: Good; patient would benefit from acute skilled PT services to address these deficits and reach maximum level of function.    Recent Surgery: * No surgery found *      Plan:     During this hospitalization, patient to be seen 4 x/week to address the identified rehab impairments via gait training, therapeutic activities, therapeutic exercises and progress toward the following goals:    · Plan of Care Expires:  08/08/19    Subjective     Chief Complaint: I need to use the bathroom  Patient/Family Comments/goals: I don't want to walk much more because I am waiting for the doctor and my  to get back here"  Pain/Comfort:  · Pain Rating 1: (did not rate)      Objective:     Communicated with RN prior to session.  Patient found up in chair with telemetry upon PT entry to room.     General Precautions: Standard, fall   Orthopedic Precautions:N/A   Braces: N/A     Functional Mobility:  · Transfers:     · Sit to Stand:  contact guard assistance with no AD  · Toilet Transfer: contact guard assistance with  no AD  using  Stand PivotGait: pt ambulated HHA x 12 ft x 2 (chair <> " bathroom) with CGA for safety    AM-PAC 6 CLICK MOBILITY  Turning over in bed (including adjusting bedclothes, sheets and blankets)?: 3  Sitting down on and standing up from a chair with arms (e.g., wheelchair, bedside commode, etc.): 3  Moving from lying on back to sitting on the side of the bed?: 3  Moving to and from a bed to a chair (including a wheelchair)?: 3  Need to walk in hospital room?: 3  Climbing 3-5 steps with a railing?: 1  Basic Mobility Total Score: 16       Therapeutic Activities and Exercises:   educated patient on progress, safety,d/c,PT POC, on the effects of prolonged immobility and the importance of performing OOB activity and exercises to promote healing and reduce recovery time   Updated white board with appropriate PT mobility information for medical team notification    Donned an extra gown   Bedside table in front of patient and area set up for function, convenience, and safety. RN aware of patient's mobility needs and status. Questions/concerns addressed within PTA scope of practice; patient with no further questions. Time was provided for active listening, discussion of health disposition, and discussion of safe discharge. Pt?verbalized?agreement .       Patient left up in chair with all lines intact, call button in reach and nsg notified..    GOALS:   Multidisciplinary Problems     Physical Therapy Goals        Problem: Physical Therapy Goal    Goal Priority Disciplines Outcome Goal Variances Interventions   Physical Therapy Goal     PT, PT/OT Ongoing (interventions implemented as appropriate)     Description:  Goals to be met by: 2019    Patient will increase functional independence with mobility by performin. Supine <> sit with supervision   2. Sit to stand transfer with Supervision  3. Gait  x 150 feet with Stand-by Assistance with or without using least restrictive AD.   4. Stand for 3 minutes with Stand-by Assistance while performing dynamic balance activities   5.  Lower extremity exercise program x20 reps per handout, with supervision                      Time Tracking:     PT Received On: 07/12/19  PT Total Time (min): 23 min     Billable Minutes: Therapeutic Activity 23    Treatment Type: Treatment  PT/PTA: PTA     PTA Visit Number: 2     Jay Choi PTA  07/12/2019

## 2019-07-12 NOTE — ASSESSMENT & PLAN NOTE
- Infectious work up ordered on admit - blood cx NGTD, UA unremarkable, CMV PCR 7/5 undetected, chest x-ray w right pleural effusion, CT A/P reviewed.  - Started broad spectrum antibiotics on admit.   - Abdominal and head CT reviewed and no clear source for fever.   - ID consulted.  Apprec recs.   - Pt with intermittent confusion.  Easily re oriented.  Continue to monitor.   - Thora and para done 7/9; amount of fluid removed not recorded and pleural fluid labs were not completed as ordered.   - Abdominal gram stain reveals no WBC and no organisms; cell count not completed as ordered; pleural fluid cell count 84 WBCs, 24% segs  - Pt still reports diarrhea- C diff negative, CMV undetected. Stool culture, WBC, ova cysts parasites, and GI pathogen panel pending.   - low grade fever today- 100.8 F  - deescalated abx to PO cefpodoxime and flagyl on 7/12  - MRI head w/ and w/o contrast without acute changes  - Urine culture positive for enterococcus faecium- awaiting ID recs

## 2019-07-12 NOTE — PLAN OF CARE
Problem: Adult Inpatient Plan of Care  Goal: Plan of Care Review  Outcome: Ongoing (interventions implemented as appropriate)  Pt remains lethargic, oriented x 4.VSS.  TMax 100.8; x 2 Tylenol administered; afebrile at this time  Pt able to ambulate with PT this morning; pt ambulating x 1 assist to toilet  Pt NPO for possible lumbar puncture  Oral abx administered for fevers  Lasix 40 mg administered; I/O documented in flow sheets  Chevron remains open to air with steri strips  Visi and tele monitor remain in place  Pt up in chair for most of shift   to remain at bedside.  Pt remains free from falls and injuries  Will continue to monitor

## 2019-07-12 NOTE — ASSESSMENT & PLAN NOTE
-UA 7/5 unremarkable  -urine culture 7/11 positive for enterococcus faecium- susceptibilities pending  -pt currently taking PO cefpodoxime and flagyl  -will switch to per ID recs  -continue to monitor

## 2019-07-12 NOTE — ASSESSMENT & PLAN NOTE
-pt with diarrhea on admission  -C diff negative, CMV undetected  -Stool culture, WBC, ova cysts parasites, and GI pathogen panel pending  -cellcept on hold  -continue to monitor

## 2019-07-12 NOTE — CONSULTS
"  [facility]  Adult Nutrition  Consult Note    SUMMARY     Recommendations    1. When feasible, continue regular diet, fluid restriction per MD. 2. If fluid restriction is relaxed, provide Novasource Renal daily.  Goals: 1. Pt's intake >75% meals by RD follow up.  Nutrition Goal Status: new  Communication of RD Recs: discussed on rounds    Reason for Assessment    Reason For Assessment: consult  Diagnosis: transplant/postoperative complications([facility] 6/6/19)  Relevant Medical History: KESSLER cirrhosis, esphageal varices, HTN, GERD, KADEEM, morbid obesity s/p band (released)  Interdisciplinary Rounds: attended  General Information Comments:Pt diagnosed with moderate malnutrition during last admission in May 2019. Per pt, weight and po intake was stable over the last month until diarrhea started. Pt c/o diarrhea affecting appetite over the last week. Completed NFPE: pt has mild muscle wasting but does not meet criteria for moderate malnutrition at this time. Reviewed post transplant diet  and food safety education (with pamphlet) with patient, as she has not yet returned home and may go home soon.  .  Nutrition Discharge Planning: Pt with adequate po intake to meet nutrition needs. Pt to follow low salt diet with food safety precautions at home.    Nutrition Risk Screen    Nutrition Risk Screen: no indicators present    Nutrition/Diet History    Patient Reported Diet/Restrictions/Preferences: general  Spiritual, Cultural Beliefs, Islam Practices, Values that Affect Care: no    Anthropometrics    Temp: 98.8 °F (37.1 °C)  Height Method: Stated  Height: 5' 4" (162.6 cm)  Height (inches): 64 in  Weight Method: Bed Scale  Weight: 78.5 kg (173 lb)  Weight (lb): 173 lb  Ideal Body Weight (IBW), Female: 120 lb  % Ideal Body Weight, Female (lb): 144.17 lb  BMI (Calculated): 29.8       Lab/Procedures/Meds    Pertinent Labs Reviewed: reviewed  Pertinent Labs Comments: Na 135, CO2 17, Alk phos 152, Alb " 1.8  Pertinent Medications Reviewed: reviewed  Pertinent Medications Comments: famotidine, lasix, levothyroxine, prednisone, prograf, sodium bicarb      Estimated/Assessed Needs    Weight Used For Calorie Calculations: 78.5 kg (173 lb 1 oz)  Energy Calorie Requirements (kcal): 1731 kcal  Energy Need Method: [place]-St Jeor(PAL 1.25)  Protein Requirements: 78-94g  Weight Used For Protein Calculations: 78.5 kg (173 lb 1 oz)        RDA Method (mL): 1731         Nutrition Prescription Ordered    Current Diet Order: NPO    Evaluation of Received Nutrient/Fluid Intake    IV Fluid (mL): 673  I/O: +8.4L since admission  Comments: LBM 7/12  % Intake of Estimated Energy Needs: 50 - 75 %  % Meal Intake: 50 - 75 %    Nutrition Risk    Level of Risk/Frequency of Follow-up: low     Assessment and Plan    Nutrition Problem  Inadequate oral intake    Related to (etiology):   Loss of appetite    Signs and Symptoms (as evidenced by):   Pt has diarrhea that is affecting her appetite.    Interventions/Recommendations (treatment strategy):  Collaboration of care to providers.    Nutrition Diagnosis Status:   New         Monitor and Evaluation    Food and Nutrient Intake: energy intake, food and beverage intake  Food and Nutrient Adminstration: diet order  Knowledge/Beliefs/Attitudes: food and nutrition knowledge/skill  Anthropometric Measurements: weight, weight change, body mass index  Biochemical Data, Medical Tests and Procedures: electrolyte and renal panel, glucose/endocrine profile, gastrointestinal profile, inflammatory profile, lipid profile  Nutrition-Focused Physical Findings: overall appearance, extremities, muscles and bones, head and eyes, skin     Malnutrition Assessment                 Orbital Region (Subcutaneous Fat Loss): well nourished  Upper Arm Region (Subcutaneous Fat Loss): mild depletion   [place] Region (Muscle Loss): mild depletion  Clavicle Bone Region (Muscle Loss): mild depletion  Clavicle and Acromion Bone  Region (Muscle Loss): well nourished  Dorsal Hand (Muscle Loss): moderate depletion  Patellar Region (Muscle Loss): well nourished  Anterior Thigh Region (Muscle Loss): well nourished  Posterior Calf Region (Muscle Loss): well nourished                 Nutrition Follow-Up    RD Follow-up?: Yes

## 2019-07-12 NOTE — PLAN OF CARE
"Problem: Occupational Therapy Goal  Goal: Occupational Therapy Goal  Goals to be met by: 7/19/19    Patient will increase functional independence with ADLs by performing:    LE Dressing with Minimal Assistance.  Grooming while standing at sink with Contact Guard Assistance.- met on 7/12  Toileting from toilet with Contact Guard Assistance for hygiene and clothing management.- met on 7/12   Supine to sit with Stand-by Assistance to increase bed mobility independence.- progressing   Step transfer with Stand-by Assistance and AD as needed to prepare for household mobility to complete ADLs of choice. - progressing   Toilet transfer to toilet with Contact Guard Assistance.- met on 7/12  Upper extremity exercise program x15 reps per handout, with independence to build strength and endurance for ADLs/IADLs.  Pt will tolerate dynamic standing task for ~5 minutes in preparation for standing ADLs with CGA.    Outcome: Ongoing (interventions implemented as appropriate)  Pt with noted improvements in ADL performance, transfers, and mobility this date. Pt presented with mild confusion noted with her statements of "Ask my daughter where my special toilet paper is." Daughter was not present during the session. Pt alert, however, required verbal cueing for sustained attention during tasks. Pt's impaired cognition affects her ability to initiate and terminate activities. Pt improving functionally, however continues to be affected cognitively. Pt tolerated the session well despite increased anxiety after conversation with MD; anxiety relieved with stretches and soft meditation music. Pt will continue to benefit from skilled OT for strength building, ADL training, and cognitive re- training.     Comments: Roselyn Lowe OTR/L    "

## 2019-07-12 NOTE — PT/OT/SLP PROGRESS
"Occupational Therapy   Treatment    Name: Jihan Zamudio  MRN: [id number]  Admitting Diagnosis:  Fever       Recommendations:     Discharge Recommendations: rehabilitation facility  Discharge Equipment Recommendations:  (TBD)  Barriers to discharge:  None    Assessment:     Jihan Zamudio is a 49 y.o. female with a medical diagnosis of Fever.  Pt with noted improvements in ADL performance, transfers, and mobility this date. Pt presented with mild confusion noted with her statements of "Ask my daughter where my special toilet paper is." Daughter was not present during the session. Pt alert, however, required verbal cueing for sustained attention during tasks. Pt's impaired cognition affects her ability to initiate and terminate activities. Pt improving functionally, however continues to be affected cognitively. Pt tolerated the session well despite increased anxiety after conversation with MD; anxiety relieved with stretches and soft meditation music. Pt will continue to benefit from skilled OT for strength building, ADL training, and cognitive re- training.  Performance deficits affecting function are weakness, impaired endurance, impaired self care skills, impaired functional mobilty, impaired balance, impaired cognition.     Rehab Prognosis:  Good; patient would benefit from acute skilled OT services to address these deficits and reach maximum level of function.       Plan:     Patient to be seen 4 x/week to address the above listed problems via therapeutic activities, therapeutic exercises, self-care/home management, cognitive retraining  · Plan of Care Expires: 08/07/19  · Plan of Care Reviewed with: patient    Subjective     Pain/Comfort:  · Pain Rating 1: 0/10  · Pain Rating Post-Intervention 1: 0/10    Objective:     Communicated with: RN prior to session.  Patient found HOB elevated with telemetry, peripheral IV upon OT entry to room.    General Precautions: Standard, fall   Orthopedic Precautions:N/A "   Braces: N/A     Occupational Performance:     Bed Mobility:    · Patient completed Rolling/Turning to Left with  stand by assistance  · Patient completed Rolling/Turning to Right with stand by assistance  · Patient completed Scooting/Bridging with stand by assistance  · Patient completed Supine to Sit with minimum assistance for trunk control to reach upright sitting      Functional Mobility/Transfers:  · Patient completed Sit <> Stand Transfer from bed and chair with contact guard assistance  with  no assistive device and hand-held assist   · Patient completed Bed <> Chair Transfer using Step Transfer technique with contact guard assistance with no assistive device  · Patient completed Toilet Transfer Step Transfer technique with contact guard assistance with  hand-held assist, grab bars using toilet in the restroom  · Functional Mobility: Pt ambulated to and from the restroom with CGA and no AD. No LOB noted. No RBs required. Decreased pace noted.    Activities of Daily Living:  · Grooming: stand by assistance Standing at sink to complete hand hygiene  · Toileting: contact guard assistance for wiping and clothing management in standing; Assistance provided for balance      [facility] [address] 6 Click ADL: 20    Treatment & Education:  - Role of OT/ OT POC  - Self care safety/ independence  - Functional transfer/ mobility safety  - Bed mobility safety  - Pursed lip breathing  - Importance of sitting UIC  - Pt with coordination and BUE strength to wipe down tray table seated in chair with verbal cueing to terminate task.   - Pt responded well to gentle BUE stretches to improve anxiety in preparation for improved participation in functional activities: Meditation soft music provided       Patient left up in chair with all lines intact, call button in reach, RN notifiedEducation:      GOALS:   Multidisciplinary Problems     Occupational Therapy Goals        Problem: Occupational Therapy Goal    Goal Priority Disciplines Outcome  Interventions   Occupational Therapy Goal     OT, PT/OT Ongoing (interventions implemented as appropriate)    Description:  Goals to be met by: 7/19/19    Patient will increase functional independence with ADLs by performing:    LE Dressing with Minimal Assistance.  Grooming while standing at sink with Contact Guard Assistance.- met on 7/12  Toileting from toilet with Contact Guard Assistance for hygiene and clothing management.- met on 7/12   Supine to sit with Stand-by Assistance to increase bed mobility independence.- progressing   Step transfer with Stand-by Assistance and AD as needed to prepare for household mobility to complete ADLs of choice. - progressing   Toilet transfer to toilet with Contact Guard Assistance.- met on 7/12  Upper extremity exercise program x15 reps per handout, with independence to build strength and endurance for ADLs/IADLs.  Pt will tolerate dynamic standing task for ~5 minutes in preparation for standing ADLs with CGA.                      Time Tracking:     OT Date of Treatment: 07/12/19  OT Start Time: 0854  OT Stop Time: 0922  OT Total Time (min): 28 min    Billable Minutes:Self Care/Home Management 14  Therapeutic Activity 14    Roselyn Lowe OT  7/12/2019

## 2019-07-13 PROBLEM — R56.9 SEIZURE-LIKE ACTIVITY: Status: ACTIVE | Noted: 2019-07-13

## 2019-07-13 LAB
ALBUMIN SERPL BCP-MCNC: 1.9 G/DL (ref 3.5–5.2)
ALP SERPL-CCNC: 155 U/L (ref 55–135)
ALT SERPL W/O P-5'-P-CCNC: 5 U/L (ref 10–44)
AMMONIA PLAS-SCNC: 19 UMOL/L (ref 10–50)
ANION GAP SERPL CALC-SCNC: 10 MMOL/L (ref 8–16)
AST SERPL-CCNC: 11 U/L (ref 10–40)
BACTERIA UR CULT: ABNORMAL
BASOPHILS # BLD AUTO: 0.04 K/UL (ref 0–0.2)
BASOPHILS NFR BLD: 0.7 % (ref 0–1.9)
BILIRUB SERPL-MCNC: 0.6 MG/DL (ref 0.1–1)
BUN SERPL-MCNC: 15 MG/DL (ref 6–20)
CALCIUM SERPL-MCNC: 8.1 MG/DL (ref 8.7–10.5)
CHLORIDE SERPL-SCNC: 105 MMOL/L (ref 95–110)
CO2 SERPL-SCNC: 18 MMOL/L (ref 23–29)
CREAT SERPL-MCNC: 0.7 MG/DL (ref 0.5–1.4)
DIFFERENTIAL METHOD: ABNORMAL
EOSINOPHIL # BLD AUTO: 0 K/UL (ref 0–0.5)
EOSINOPHIL NFR BLD: 0.7 % (ref 0–8)
ERYTHROCYTE [DISTWIDTH] IN BLOOD BY AUTOMATED COUNT: 15.9 % (ref 11.5–14.5)
EST. GFR  (AFRICAN AMERICAN): >60 ML/MIN/1.73 M^2
EST. GFR  (NON AFRICAN AMERICAN): >60 ML/MIN/1.73 M^2
GLUCOSE SERPL-MCNC: 104 MG/DL (ref 70–110)
HCT VFR BLD AUTO: 26 % (ref 37–48.5)
HGB BLD-MCNC: 8.4 G/DL (ref 12–16)
IMM GRANULOCYTES # BLD AUTO: 0.17 K/UL (ref 0–0.04)
IMM GRANULOCYTES NFR BLD AUTO: 2.8 % (ref 0–0.5)
LACTATE SERPL-SCNC: 0.9 MMOL/L (ref 0.5–2.2)
LYMPHOCYTES # BLD AUTO: 0.2 K/UL (ref 1–4.8)
LYMPHOCYTES NFR BLD: 3.6 % (ref 18–48)
MAGNESIUM SERPL-MCNC: 1.6 MG/DL (ref 1.6–2.6)
MCH RBC QN AUTO: 28.7 PG (ref 27–31)
MCHC RBC AUTO-ENTMCNC: 32.3 G/DL (ref 32–36)
MCV RBC AUTO: 89 FL (ref 82–98)
MONOCYTES # BLD AUTO: 0.4 K/UL (ref 0.3–1)
MONOCYTES NFR BLD: 6.1 % (ref 4–15)
NEUTROPHILS # BLD AUTO: 5.2 K/UL (ref 1.8–7.7)
NEUTROPHILS NFR BLD: 86.1 % (ref 38–73)
NRBC BLD-RTO: 0 /100 WBC
PLATELET # BLD AUTO: 160 K/UL (ref 150–350)
PMV BLD AUTO: 10.3 FL (ref 9.2–12.9)
POTASSIUM SERPL-SCNC: 3.3 MMOL/L (ref 3.5–5.1)
PROCALCITONIN SERPL IA-MCNC: 0.23 NG/ML
PROT SERPL-MCNC: 4.9 G/DL (ref 6–8.4)
RBC # BLD AUTO: 2.93 M/UL (ref 4–5.4)
SODIUM SERPL-SCNC: 133 MMOL/L (ref 136–145)
TACROLIMUS BLD-MCNC: 2.8 NG/ML (ref 5–15)
WBC # BLD AUTO: 6.07 K/UL (ref 3.9–12.7)

## 2019-07-13 PROCEDURE — 99233 SBSQ HOSP IP/OBS HIGH 50: CPT | Mod: ,,, | Performed by: INTERNAL MEDICINE

## 2019-07-13 PROCEDURE — 99233 PR SUBSEQUENT HOSPITAL CARE,LEVL III: ICD-10-PCS | Mod: 24,,, | Performed by: NURSE PRACTITIONER

## 2019-07-13 PROCEDURE — 84145 PROCALCITONIN (PCT): CPT

## 2019-07-13 PROCEDURE — 25000003 PHARM REV CODE 250: Performed by: INTERNAL MEDICINE

## 2019-07-13 PROCEDURE — 99254 IP/OBS CNSLTJ NEW/EST MOD 60: CPT | Mod: ,,, | Performed by: PSYCHIATRY & NEUROLOGY

## 2019-07-13 PROCEDURE — 86592 SYPHILIS TEST NON-TREP QUAL: CPT

## 2019-07-13 PROCEDURE — 25000003 PHARM REV CODE 250: Performed by: PHYSICIAN ASSISTANT

## 2019-07-13 PROCEDURE — 25000003 PHARM REV CODE 250: Performed by: NURSE PRACTITIONER

## 2019-07-13 PROCEDURE — 63600175 PHARM REV CODE 636 W HCPCS: Performed by: INTERNAL MEDICINE

## 2019-07-13 PROCEDURE — 99254 PR INITIAL INPATIENT CONSULT,LEVL IV: ICD-10-PCS | Mod: ,,, | Performed by: PSYCHIATRY & NEUROLOGY

## 2019-07-13 PROCEDURE — 36415 COLL VENOUS BLD VENIPUNCTURE: CPT

## 2019-07-13 PROCEDURE — 87040 BLOOD CULTURE FOR BACTERIA: CPT | Mod: 59

## 2019-07-13 PROCEDURE — 85025 COMPLETE CBC W/AUTO DIFF WBC: CPT

## 2019-07-13 PROCEDURE — 82140 ASSAY OF AMMONIA: CPT

## 2019-07-13 PROCEDURE — 20600001 HC STEP DOWN PRIVATE ROOM

## 2019-07-13 PROCEDURE — 80197 ASSAY OF TACROLIMUS: CPT

## 2019-07-13 PROCEDURE — 83735 ASSAY OF MAGNESIUM: CPT

## 2019-07-13 PROCEDURE — 63600175 PHARM REV CODE 636 W HCPCS: Performed by: PHYSICIAN ASSISTANT

## 2019-07-13 PROCEDURE — 83605 ASSAY OF LACTIC ACID: CPT

## 2019-07-13 PROCEDURE — 87449 NOS EACH ORGANISM AG IA: CPT

## 2019-07-13 PROCEDURE — 99233 PR SUBSEQUENT HOSPITAL CARE,LEVL III: ICD-10-PCS | Mod: ,,, | Performed by: INTERNAL MEDICINE

## 2019-07-13 PROCEDURE — 87799 DETECT AGENT NOS DNA QUANT: CPT

## 2019-07-13 PROCEDURE — 99233 SBSQ HOSP IP/OBS HIGH 50: CPT | Mod: 24,,, | Performed by: NURSE PRACTITIONER

## 2019-07-13 PROCEDURE — 80053 COMPREHEN METABOLIC PANEL: CPT

## 2019-07-13 RX ORDER — OLANZAPINE 2.5 MG/1
5 TABLET ORAL NIGHTLY
Status: DISCONTINUED | OUTPATIENT
Start: 2019-07-13 | End: 2019-07-15

## 2019-07-13 RX ORDER — LEVETIRACETAM 500 MG/1
500 TABLET ORAL 2 TIMES DAILY
Status: DISCONTINUED | OUTPATIENT
Start: 2019-07-13 | End: 2019-07-19

## 2019-07-13 RX ORDER — OLANZAPINE 2.5 MG/1
10 TABLET ORAL ONCE
Status: COMPLETED | OUTPATIENT
Start: 2019-07-13 | End: 2019-07-13

## 2019-07-13 RX ORDER — CEFEPIME HYDROCHLORIDE 2 G/1
2 INJECTION, POWDER, FOR SOLUTION INTRAVENOUS
Status: DISCONTINUED | OUTPATIENT
Start: 2019-07-13 | End: 2019-07-17

## 2019-07-13 RX ADMIN — LEVETIRACETAM 500 MG: 500 TABLET ORAL at 08:07

## 2019-07-13 RX ADMIN — ONDANSETRON 8 MG: 8 TABLET, ORALLY DISINTEGRATING ORAL at 11:07

## 2019-07-13 RX ADMIN — LINEZOLID 600 MG: 600 TABLET, FILM COATED ORAL at 09:07

## 2019-07-13 RX ADMIN — CEFEPIME 2 G: 2 INJECTION, POWDER, FOR SOLUTION INTRAVENOUS at 08:07

## 2019-07-13 RX ADMIN — CEFEPIME 2 G: 2 INJECTION, POWDER, FOR SOLUTION INTRAVENOUS at 01:07

## 2019-07-13 RX ADMIN — VALGANCICLOVIR 450 MG: 450 TABLET, FILM COATED ORAL at 09:07

## 2019-07-13 RX ADMIN — PREDNISONE 5 MG: 5 TABLET ORAL at 09:07

## 2019-07-13 RX ADMIN — CALCIUM CARBONATE (ANTACID) CHEW TAB 500 MG 500 MG: 500 CHEW TAB at 10:07

## 2019-07-13 RX ADMIN — LEVOTHYROXINE SODIUM 112 MCG: 50 TABLET ORAL at 05:07

## 2019-07-13 RX ADMIN — DAPTOMYCIN 510 MG: 350 INJECTION, POWDER, LYOPHILIZED, FOR SOLUTION INTRAVENOUS at 10:07

## 2019-07-13 RX ADMIN — SODIUM BICARBONATE 650 MG TABLET 650 MG: at 08:07

## 2019-07-13 RX ADMIN — SODIUM BICARBONATE 650 MG TABLET 650 MG: at 09:07

## 2019-07-13 RX ADMIN — OLANZAPINE 10 MG: 2.5 TABLET, FILM COATED ORAL at 01:07

## 2019-07-13 RX ADMIN — ACETAMINOPHEN 650 MG: 325 TABLET ORAL at 11:07

## 2019-07-13 RX ADMIN — SULFAMETHOXAZOLE AND TRIMETHOPRIM 1 TABLET: 400; 80 TABLET ORAL at 09:07

## 2019-07-13 RX ADMIN — OLANZAPINE 5 MG: 2.5 TABLET, FILM COATED ORAL at 08:07

## 2019-07-13 RX ADMIN — LEVETIRACETAM 500 MG: 500 TABLET ORAL at 09:07

## 2019-07-13 RX ADMIN — FUROSEMIDE 40 MG: 40 TABLET ORAL at 09:07

## 2019-07-13 NOTE — CONSULTS
"[facility]  Neurology  Consult Note    Patient Name: Jihan Zamudio  MRN: [id number]  Admission Date: 7/5/2019  Hospital Length of Stay: 8 days  Code Status: Full Code   Attending Provider: Jay Herbert MD   Consulting Provider: Lele Montiel MD  Primary Care Physician: Primary Doctor No  Principal Problem:Seizure-like activity    Inpatient consult to Neurology  Consult performed by: Lele Montiel MD  Consult ordered by: Debbie Wilkes NP         Subjective:     Chief Complaint:  Seizure-Like Activity      HPI:   Jihan Zamudio is a 49 year old female with a medical history significant for DBD OLTx (SM induction, CMV D+R+) for KESSLER cirrhosis on 6/5/19 with a post operative course complicated by traumatic acute parenchymal hemorrhage within the right occipital lobe near the parieto-occipital junction measuring approximately 1.8 x 1.3 x 1.5 cm with mild surrounding vasogenic edema and localized mass effect after a mechanical fall on 6/24. She was discharged home on 7 days of Keppra per neurosurgical consult which was completed on 7/2. She represented to [facility] on 7/5/19 with a fever if 102.3 found to be secondary to a enterococcus UTI for which was was started on Linezolid. Neurology was consulted on hospital day 9 for evaluation of possible seizure. Pt states that she had an approximately 4 minute long episode of "head banging" and "light flashes". Pt states that she remembers the event and remembers the immediate aftermath stating that she woke up quickly and the nurse was helping her. She denies any confusion post-event. Pt denies any recent changes or possible inciting factors other then UTI symptoms. Pt denies any history of seizures.   Primary team ordered a repeat CT head and loaded the pt with Keppra prior to neurology consult. On initial evaluation, pt in apprehensive to answer questions about event, asking why we need to know this information. Pt initially notes " decreased vision in L eye with diagonal visual defect. 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 Exam shows generalized weakness however there is concern of volitional weakness and exam dependant on effort.        Past Medical History:   Diagnosis Date    Cirrhosis     Esophageal varices 2018    Small with no banding     Essential hypertension 2018    Fatty liver 2018    GERD (gastroesophageal reflux disease)     Hx of colonic polyps 2018    On colonoscopy     Hypertension     Hypothyroidism 2018    Kidney stones     Morbid obesity 2018    Lap band with subsequent release    KADEEM (obstructive sleep apnea) 2018    Osteoarthritis 2018    Pulmonary nodule 2018    Vitamin D deficiency 2018       Past Surgical History:   Procedure Laterality Date     SECTION      TIMES 2     CHOLECYSTECTOMY      LAPAROSCOPIC     CYSTOSCOPY W/ STONE MANIPULATION      kidney stone removal    EGD (ESOPHAGOGASTRODUODENOSCOPY) N/A 2019    Performed by Davian Hernández MD at [facility] ENDO (2ND FLR)    LAPAROSCOPIC GASTRIC BANDING  2006    removal     LIVER BIOPSY  2017    KESSLER with bridging 17    TRANSPLANT, LIVER N/A 2019    Performed by Clemente Brown Jr., MD at [facility] OR 2ND FLR    TRANSPLANT, LIVER N/A 2019    Performed by Clemente Brown Jr., MD at [facility] OR 2ND FLR       Review of patient's allergies indicates:   Allergen Reactions    Metformin Rash    Pcn [penicillins] Other (See Comments)     Unsure of reaction, states it was as a child. Tolerated rocephin at [facility]       No current facility-administered medications on file prior to encounter.      Current Outpatient Medications on File Prior to Encounter   Medication Sig    famotidine (PEPCID) 20 MG tablet Take 1 tablet (20 mg total) by mouth every evening.     "furosemide (LASIX) 40 MG tablet Take 1 tablet (40 mg total) by mouth once daily. for 10 days    lancets 28 gauge Misc Test blood glucose 3 (three) times daily.    levothyroxine (SYNTHROID) 112 MCG tablet Take 1 tablet (112 mcg total) by mouth once daily.    mycophenolate (CELLCEPT) 250 mg Cap Take 2 capsules (500 mg total) by mouth 2 (two) times daily. (Patient taking differently: Take 1,000 mg by mouth 2 (two) times daily. )    pen needle, diabetic 32 gauge x 5/32" Ndle Use to inject insulin into the skin 3 (three) times daily.    predniSONE (DELTASONE) 10 MG tablet Take 20 mg by mouth daily 6/12-6/18; 15 mg daily 6/19-6/25; 10 mg  daily 6/26-7/2; 5 mg  daily 7/3-7/9; stop: 7/10/19    sodium bicarbonate 650 MG tablet Take 1 tablet (650 mg total) by mouth 2 (two) times daily.    sulfamethoxazole-trimethoprim 400-80mg (BACTRIM,SEPTRA) 400-80 mg per tablet Take 1 tablet by mouth once daily. Stop: 12/3/19    tacrolimus (PROGRAF) 1 MG Cap Take 3 capsules (3 mg total) by mouth every 12 (twelve) hours.    valGANciclovir (VALCYTE) 450 mg Tab Take 1 tablet (450 mg total) by mouth once daily. Stop: 9/4/19    blood sugar diagnostic Strp Test blood glucose 3 (three) times daily.    blood-glucose meter kit Use as instructed    levETIRAcetam (KEPPRA) 500 MG Tab Take 1 tablet (500 mg total) by mouth 2 (two) times daily. for 7 days    oxyCODONE (ROXICODONE) 10 mg Tab immediate release tablet Take 1 tablet (10 mg total) by mouth every 4 (four) hours as needed.     Family History     Problem Relation (Age of Onset)    Breast cancer Maternal Grandmother    Cancer Mother (50), Father (65)    Heart disease Mother, Father        Tobacco Use    Smoking status: Never Smoker    Smokeless tobacco: Never Used    Tobacco comment: patient denies   Substance and Sexual Activity    Alcohol use: No     Comment: patient denies    Drug use: No     Comment: patient denies    Sexual activity: Not on file     Review of Systems "   Constitutional: Positive for activity change, appetite change, chills, fatigue and fever.   HENT: Positive for rhinorrhea. Negative for trouble swallowing and voice change.    Eyes: Negative for photophobia and visual disturbance.   Respiratory: Negative for chest tightness and shortness of breath.    Cardiovascular: Negative for chest pain and palpitations.   Gastrointestinal: Positive for abdominal pain, diarrhea and nausea. Negative for constipation and vomiting.   Endocrine: Negative for polydipsia and polyuria.   Genitourinary: Positive for dysuria.   Musculoskeletal: Negative for back pain, neck pain and neck stiffness.   Neurological: Positive for dizziness, speech difficulty, weakness and light-headedness. Negative for facial asymmetry.   Psychiatric/Behavioral: Positive for confusion. Negative for agitation.     Objective:     Vital Signs (Most Recent):  Temp: 98.7 °F (37.1 °C) (07/13/19 0740)  Pulse: 100 (07/13/19 0850)  Resp: 20 (07/13/19 0850)  BP: 126/74 (07/13/19 0850)  SpO2: 97 % (07/13/19 0850) Vital Signs (24h Range):  Temp:  [98.5 °F (36.9 °C)-100.8 °F (38.2 °C)] 98.7 °F (37.1 °C)  Pulse:  [] 100  Resp:  [20-30] 20  SpO2:  [95 %-99 %] 97 %  BP: (126-155)/(65-85) 126/74     Weight: 84.6 kg (186 lb 8.2 oz)  Body mass index is 32.01 kg/m².    Physical Exam   Constitutional: She is oriented to person, place, and time. She appears well-developed and well-nourished. No distress.   Eyes: Conjunctivae and EOM are normal. No scleral icterus.   Pupils unequal with R>L,  at bedside states that this has been lifelong   Neurological: She is alert and oriented to person, place, and time. No cranial nerve deficit.   Neurologic exam varies throughout and changes on reassessment of each aspect. Pt is alert and oriented with full memory of event and hospital course. Pt does express some confusion about time. Pt initially states visual defect in Left eye projecting a horizontal line across her visual  field, this line is not present on right. 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 Pt expresses pain in shoulder preventing some aspects of cranial nerve exam but facial movement and sensation appear to be intact. Strength globally diminished on exam, however concern for volitional weakness on exam as she is able to lift arms fully and maintain position as well as hold food and feed herself but is unable to push/pull against examiner.  also states that patient has been able to walk across room to use toilet.    Skin: She is not diaphoretic.   Nursing note and vitals reviewed.      NEUROLOGICAL EXAMINATION:     MENTAL STATUS   Oriented to person, place, and time.     CRANIAL NERVES     CN III, IV, VI   Extraocular motions are normal.       Significant Labs:   BMP:   Recent Labs   Lab 07/12/19  0636 07/13/19  0655   GLU 76 104   * 133*   K 3.7 3.3*    105   CO2 17* 18*   BUN 11 15   CREATININE 0.7 0.7   CALCIUM 8.0* 8.1*   MG 1.7 1.6     CMP:   Recent Labs   Lab 07/12/19  0636 07/13/19  0655   GLU 76 104   * 133*   K 3.7 3.3*    105   CO2 17* 18*   BUN 11 15   CREATININE 0.7 0.7   CALCIUM 8.0* 8.1*   MG 1.7 1.6   PROT 4.7* 4.9*   ALBUMIN 1.8* 1.9*   BILITOT 0.6 0.6   ALKPHOS 152* 155*   AST 11 11   ALT <5* 5*   ANIONGAP 10 10   EGFRNONAA >60.0 >60.0     Urine Studies: No results for input(s): COLORU, APPEARANCEUA, PHUR, SPECGRAV, PROTEINUA, GLUCUA, KETONESU, BILIRUBINUA, OCCULTUA, NITRITE, UROBILINOGEN, LEUKOCYTESUR, RBCUA, WBCUA, BACTERIA, SQUAMEPITHEL, HYALINECASTS in the last 48 hours.    Invalid input(s): WRIGHTSUR  All pertinent lab results from the past 24 hours have been reviewed.    Significant Imaging: I have reviewed all pertinent imaging results/findings within the past 24 hours.     MRI Brain 7/11/19  1.Evolving small right occipital parenchymal hemorrhage with minimal  mass effect on the occipital horn of right lateral ventricle, improved compared to prior MRI 06/25/2019.  No significant midline shift.    2.  Small area of encephalomalacia involving the right temporal lobe, stable from prior MRI.        CT Head 7/5/19  Interval reduction in size of the patient's known right occipital lobe parenchymal hemorrhage.  No definitive new findings on this exam.  MRI may increase sensitivity.    MRI Brain W WO 6/25/19  Small rounded focus of acute parenchymal hemorrhage within the right parasagittal occipital lobe corresponding to abnormality seen on CT.  Localized mass effect with subtle associated edema, without midline shift.    Trace component of intraventricular extension of hemorrhage.    No evidence for hydrocephalus or definite abnormal parenchymal enhancement to suggest mass.        CTA Head 6/25/19  1. Stable right occipital parenchymal hemorrhage with mild associated vasogenic edema resulting in localized mass effect without midline shift.  2. Minimal intraventricular hemorrhage layering in the occipital horns, unchanged.  No hydrocephalus.  3. No arteriovenous malformation or aneurysm identified.    CT Head WI 6/24/19  Acute intraparenchymal hemorrhage within the right occipital lobe with mild surrounding vasogenic edema.  No significant mass effect.  No midline shift.    Minimal intraventricular hemorrhage in the occipital horns.  No hydrocephalus.        Assessment and Plan:     * Seizure-like activity  49 year old female with history of DBD OLTx (SM induction, CMV D+R+) for KESSLER cirrhosis on 6/5/19 with post transplant course complicated by traumatic acute parenchymal hemorrhage within the right occipital lobe near the parieto-occipital junction measuring approximately 1.8 x 1.3 x 1.5 cm with mild surrounding vasogenic edema and localized mass effect after a mechanical fall on 6/24. Discharged home on 7 days of [facility] per neurosurgical consult which was completed on 7/2.      Presented to ED on 7/5/19 with Tmax 102.3 --> Enterococcus UTI --> Linezolid per ID recs. Neurology was consulted on hospital day 9 for evaluation of seizure like activity.     - Primary team ordered a repeat CT head and loaded the pt with Keppra prior to neurology consult  - Unreliable exam with inconsistent descriptions of deficiencies and exam findings not correlating with described symptoms.    Recommendations  - Unlikely to be seizure due to full memory of event, no post-ictal period, no tongue biting and no incontinence  - If clinical concern for seizure remains of further episodes occur, can order 30 minutes spot EEG  - Repeat CT head in process, will follow, does have prior bleed which can be focus for seizure activity    - Reevaluate need for Linezolid, lowers seizure threshold  - Treatment of UTI and other metabolic/toxic derangements per primary team  - Do not feel strongly in favor of Keppra, ok to hold AED unless clinically indicated    UTI (urinary tract infection) due to Enterococcus  - Management per primary team  - If continued clinical concern for seizures, Linzeolid lowers seizure threshold    Traumatic intracranial hemorrhage without loss of consciousness  - Has been stable and improving on repeat imaging  - Repeat CT head pending    S/P liver transplant  - Management per primary team  - No documented Ammonia level      VTE Risk Mitigation (From admission, onward)        Ordered     Place sequential compression device  Until discontinued      07/05/19 1659     IP VTE HIGH RISK PATIENT  Once      07/05/19 1659          Thank you for your consult.     Lele Montiel MD  Neurology  [facility]

## 2019-07-13 NOTE — ASSESSMENT & PLAN NOTE
- Management per primary team  - If continued clinical concern for seizures, Linzeolid lowers seizure threshold

## 2019-07-13 NOTE — SUBJECTIVE & OBJECTIVE
Scheduled Meds:   ceFEPime (MAXIPIME) IVPB  2 g Intravenous Q8H    furosemide  40 mg Oral Daily    levETIRAcetam  500 mg Oral BID    levothyroxine  112 mcg Oral Before breakfast    linezolid  600 mg Oral Q12H    OLANZapine  5 mg Oral QHS    predniSONE  5 mg Oral Daily    sodium bicarbonate  650 mg Oral BID    sulfamethoxazole-trimethoprim 400-80mg  1 tablet Oral Daily    valGANciclovir  450 mg Oral Daily     Continuous Infusions:  PRN Meds:acetaminophen, calcium carbonate, hydrALAZINE, ondansetron, sodium chloride 0.9%    Review of Systems   Constitutional: Positive for activity change, appetite change and fever. Negative for chills.   HENT: Negative for congestion and facial swelling.    Eyes: Negative for pain, discharge and visual disturbance.   Respiratory: Negative for cough, chest tightness, shortness of breath and wheezing.    Cardiovascular: Negative for chest pain, palpitations and leg swelling.   Gastrointestinal: Positive for diarrhea. Negative for abdominal distention, abdominal pain, nausea and vomiting.   Endocrine: Negative.    Genitourinary: Negative for decreased urine volume, difficulty urinating and dysuria.   Skin: Positive for wound (chevron well healed).   Allergic/Immunologic: Positive for immunocompromised state.   Neurological: Positive for weakness. Negative for light-headedness.   Psychiatric/Behavioral: Negative for agitation, confusion and decreased concentration. The patient is not nervous/anxious.      Objective:     Vital Signs (Most Recent):  Temp: (!) 100.4 °F (38 °C) (07/13/19 1105)  Pulse: 97 (07/13/19 1111)  Resp: (!) 22 (07/13/19 1105)  BP: 126/74 (07/13/19 1105)  SpO2: 96 % (07/13/19 1105) Vital Signs (24h Range):  Temp:  [98.5 °F (36.9 °C)-100.4 °F (38 °C)] 100.4 °F (38 °C)  Pulse:  [] 97  Resp:  [20-30] 22  SpO2:  [95 %-97 %] 96 %  BP: (126-155)/(74-85) 126/74     Weight: 84.6 kg (186 lb 8.2 oz)  Body mass index is 32.01 kg/m².    Intake/Output - Last 3 Shifts        07/11 0700 - 07/12 0659 07/12 0700 - 07/13 0659 07/13 0700 - 07/14 0659    P.O. 460 180 360    I.V. (mL/kg) 623.3 (7.9) 0 (0)     Other 0 0     IV Piggyback 50      Total Intake(mL/kg) 1133.3 (14.4) 180 (2.1) 360 (4.3)    Urine (mL/kg/hr) 700 (0.4) 0 (0)     Emesis/NG output 0 0     Other 0 0     Stool 0 0     Blood 0 0     Total Output 700 0     Net +433.3 +180 +360           Urine Occurrence 3 x 6 x 2 x    Stool Occurrence 4 x 3 x 1 x    Emesis Occurrence 0 x 0 x           Physical Exam   Constitutional: She is oriented to person, place, and time. She appears well-developed. No distress.   HENT:   Head: Normocephalic and atraumatic.   Eyes: Pupils are equal, round, and reactive to light. No scleral icterus.   Neck: Normal range of motion. Neck supple. No thyromegaly present.   Cardiovascular: Regular rhythm. Tachycardia present. Exam reveals no friction rub.   No murmur heard.  Pulmonary/Chest: Effort normal. She has decreased breath sounds in the right middle field, the right lower field and the left lower field. She has no wheezes. She has no rales.   Diminished RLL   Abdominal: Soft. She exhibits no distension. There is no tenderness. There is no rebound and no guarding.   Healed chevron incision   Musculoskeletal: Normal range of motion.   Neurological: She is alert and oriented to person, place, and time. She is not disoriented.   Oriented to person, easily re oriented   Skin: Skin is warm and dry. She is not diaphoretic.   Psychiatric: She has a normal mood and affect. Her behavior is normal. Judgment and thought content normal. Her speech is delayed.   Nursing note and vitals reviewed.      Laboratory:  Immunosuppressants     None        CBC:   Recent Labs   Lab 07/13/19  0655   WBC 6.07   RBC 2.93*   HGB 8.4*   HCT 26.0*      MCV 89   MCH 28.7   MCHC 32.3     CMP:   Recent Labs   Lab 07/13/19  0655      CALCIUM 8.1*   ALBUMIN 1.9*   PROT 4.9*   *   K 3.3*   CO2 18*      BUN  15   CREATININE 0.7   ALKPHOS 155*   ALT 5*   AST 11   BILITOT 0.6     Labs within the past 24 hours have been reviewed.    Diagnostic Results:  I have personally reviewed all pertinent imaging studies.    Debility/Functional status: Patient debilitated by evidence of Weakness. Physical and occupational therapy ordered daily to evaluate and treat. Debility was: present on admission.

## 2019-07-13 NOTE — SUBJECTIVE & OBJECTIVE
Interval History:   Spiked to 100.8  Mental status improved  Continues to have loose stools, 3 times yesterday  Denied any abdominal pain  No dysuria, hematuria      Review of Systems   Constitutional: Positive for activity change and appetite change. Negative for chills, diaphoresis and fever.   HENT: Negative for ear pain, mouth sores, sinus pressure and sore throat.    Eyes: Negative for photophobia, pain and redness.   Respiratory: Negative for cough, shortness of breath and wheezing.    Cardiovascular: Negative for chest pain and leg swelling.   Gastrointestinal: Positive for diarrhea. Negative for abdominal distention, abdominal pain, nausea and vomiting.   Genitourinary: Negative for decreased urine volume, difficulty urinating, dysuria, flank pain, frequency and urgency.   Musculoskeletal: Negative for arthralgias, back pain, gait problem and myalgias.   Skin: Positive for wound (chevron well healed). Negative for pallor and rash.   Allergic/Immunologic: Positive for immunocompromised state.   Neurological: Positive for weakness. Negative for dizziness, tremors, seizures, light-headedness and headaches.   Psychiatric/Behavioral: Negative for agitation, confusion and decreased concentration. The patient is not nervous/anxious.      Objective:     Vital Signs (Most Recent):  Temp: 98.5 °F (36.9 °C) (07/12/19 1930)  Pulse: 86 (07/12/19 1930)  Resp: 20 (07/12/19 1930)  BP: (!) 155/79 (07/12/19 1930)  SpO2: 97 % (07/12/19 1930) Vital Signs (24h Range):  Temp:  [98.3 °F (36.8 °C)-100.8 °F (38.2 °C)] 98.5 °F (36.9 °C)  Pulse:  [] 86  Resp:  [20-30] 20  SpO2:  [96 %-99 %] 97 %  BP: (127-158)/(60-80) 155/79     Weight: 78.5 kg (173 lb)  Body mass index is 29.7 kg/m².    Estimated Creatinine Clearance: 96.4 mL/min (based on SCr of 0.7 mg/dL).    Physical Exam   Constitutional: She appears well-developed. No distress.   HENT:   Head: Normocephalic and atraumatic.   Eyes: Pupils are equal, round, and reactive to  light. No scleral icterus.   Neck: Normal range of motion. Neck supple. No thyromegaly present.   Cardiovascular: Tachycardia present.   Pulmonary/Chest: Effort normal. No respiratory distress. She has decreased breath sounds in the right middle field, the right lower field and the left lower field.   Diminished RLL   Abdominal: She exhibits no distension. There is no guarding.   Healed chevron incision   Musculoskeletal: Normal range of motion.   Neurological: She is alert.   Oriented to person, easily re oriented   Skin: Skin is warm and dry. She is not diaphoretic.   Psychiatric: She has a normal mood and affect. Her behavior is normal.   Nursing note and vitals reviewed.      Significant Labs:   CBC:   Recent Labs   Lab 07/11/19  0646 07/12/19  0636   WBC 5.84 6.81   HGB 8.4* 8.6*   HCT 25.5* 25.8*   * 137*     CMP:   Recent Labs   Lab 07/11/19  0646 07/12/19  0636   * 135*   K 3.7 3.7    108   CO2 19* 17*   GLU 85 76   BUN 11 11   CREATININE 0.7 0.7   CALCIUM 8.0* 8.0*   PROT 4.7* 4.7*   ALBUMIN 1.9* 1.8*   BILITOT 0.6 0.6   ALKPHOS 156* 152*   AST 11 11   ALT <5* <5*   ANIONGAP 8 10   EGFRNONAA >60.0 >60.0       Significant Imaging: I have reviewed all pertinent imaging results/findings within the past 24 hours.

## 2019-07-13 NOTE — ASSESSMENT & PLAN NOTE
- CXR reviewed.    - Thora done 7/9  - pleural fluid labs not completed as ordered- will try to get labs repeated if specimen is still available.  - cell count: 84 WBCs, 24% segs

## 2019-07-13 NOTE — ASSESSMENT & PLAN NOTE
- pt with diarrhea on admission.  - C diff negative, CMV undetected.  - Stool culture, WBC, ova cysts parasites, and GI pathogen panel pending.  - cellcept on hold.  - continue to monitor.

## 2019-07-13 NOTE — ASSESSMENT & PLAN NOTE
49 year old female with history of DBD OLTx (SM induction, CMV D+R+) for KESSLER cirrhosis on 6/5/19 with post transplant course complicated by traumatic acute parenchymal hemorrhage within the right occipital lobe near the parieto-occipital junction measuring approximately 1.8 x 1.3 x 1.5 cm with mild surrounding vasogenic edema and localized mass effect after a mechanical fall on 6/24. Discharged home on 7 days of Keppra per neurosurgical consult which was completed on 7/2.     Presented to ED on 7/5/19 with Tmax 102.3 --> Enterococcus UTI --> Linezolid per ID recs. Neurology was consulted on hospital day 9 for evaluation of seizure like activity.     - Primary team ordered a repeat CT head and loaded the pt with Keppra prior to neurology consult  - Unreliable exam with inconsistent descriptions of deficiencies and exam findings not correlating with described symptoms.    Recommendations  - Unlikely to be seizure due to full memory of event, no post-ictal period, no tongue biting and no incontinence  - If clinical concern for seizure remains of further episodes occur, can order 30 minutes spot EEG  - Repeat CT head in process, will follow, does have prior bleed which can be focus for seizure activity    - Reevaluate need for Linezolid, lowers seizure threshold  - Treatment of UTI and other metabolic/toxic derangements per primary team  - Do not feel strongly in favor of Keppra, ok to hold AED unless clinically indicated

## 2019-07-13 NOTE — ASSESSMENT & PLAN NOTE
- UA 7/5 unremarkable.  - urine culture 7/11 positive for enterococcus faecium- susceptibilities pending.  - dc'd cefpodoxime and flagyl on 7/12.  - started Zyvox on 7/12.  - continue to monitor.

## 2019-07-13 NOTE — ASSESSMENT & PLAN NOTE
50yo woman w/a history of KESSLER cirrhosis (s/p DBDLT 6/5/2019, CMV D+/R+, steroid induction, on maintenance tacro/MMF/pred; c/b small traumatic R occipital lobe ICH following mechanical fall managed conservatively) who was admitted on 7/5/2019 with acute onset fevers, chills, headache (stable since ICH), dry cough (x10 days), and loose stools that failed to initially improve on empiric vanc/cefepime. She underwent paracentesis/thoracentesis that were paucicellular with some clinical improvement (afebrile, AMS improving) but with ongoing FTT/diarrhea.  Urine cultures with E. faecium.     - Discontinue oral cefpodoxime/flagyl   - Start linezolid  - remainder of prophylaxis per protocol  - [place] stool pathogens PCR panel pending  - If persistent fevers and diarrhea, would consider colonoscopy for further evaluation

## 2019-07-13 NOTE — ASSESSMENT & PLAN NOTE
- no overt bleeding.    - keep type and screen current.  - transfused 1u prbc 7/9.  - monitor w daily labs.

## 2019-07-13 NOTE — SUBJECTIVE & OBJECTIVE
Past Medical History:   Diagnosis Date    Cirrhosis     Esophageal varices 2018    Small with no banding     Essential hypertension 2018    Fatty liver 2018    GERD (gastroesophageal reflux disease)     Hx of colonic polyps 2018    On colonoscopy     Hypertension     Hypothyroidism 2018    Kidney stones     Morbid obesity 2018    Lap band with subsequent release    KADEEM (obstructive sleep apnea) 2018    Osteoarthritis 2018    Pulmonary nodule 2018    Vitamin D deficiency 2018       Past Surgical History:   Procedure Laterality Date     SECTION      TIMES 2     CHOLECYSTECTOMY      LAPAROSCOPIC     CYSTOSCOPY W/ STONE MANIPULATION      kidney stone removal    EGD (ESOPHAGOGASTRODUODENOSCOPY) N/A 2019    Performed by Davian Hern[facility]dez MD at [facility] ENDO (2ND FLR)    LAPAROSCOPIC GASTRIC BANDING      removal     LIVER BIOPSY  2017    KESSLER with bridging 17    TRANSPLANT, LIVER N/A 2019    Performed by Clemente Brown Jr., MD at [facility] OR 2ND FLR    TRANSPLANT, LIVER N/A 2019    Performed by Clemente Brown Jr., MD at [facility] OR 2ND FLR       Review of patient's allergies indicates:   Allergen Reactions    Metformin Rash    Pcn [penicillins] Other (See Comments)     Unsure of reaction, states it was as a child. Tolerated rocephin at [facility]       No current facility-administered medications on file prior to encounter.      Current Outpatient Medications on File Prior to Encounter   Medication Sig    famotidine (PEPCID) 20 MG tablet Take 1 tablet (20 mg total) by mouth every evening.    furosemide (LASIX) 40 MG tablet Take 1 tablet (40 mg total) by mouth once daily. for 10 days    lancets 28 gauge Misc Test blood glucose 3 (three) times daily.    levothyroxine (SYNTHROID) 112 MCG tablet Take 1 tablet (112 mcg total) by mouth once daily.    mycophenolate (CELLCEPT) 250 mg Cap Take 2 capsules (500  "mg total) by mouth 2 (two) times daily. (Patient taking differently: Take 1,000 mg by mouth 2 (two) times daily. )    pen needle, diabetic 32 gauge x 5/32" Ndle Use to inject insulin into the skin 3 (three) times daily.    predniSONE (DELTASONE) 10 MG tablet Take 20 mg by mouth daily 6/12-6/18; 15 mg daily 6/19-6/25; 10 mg  daily 6/26-7/2; 5 mg  daily 7/3-7/9; stop: 7/10/19    sodium bicarbonate 650 MG tablet Take 1 tablet (650 mg total) by mouth 2 (two) times daily.    sulfamethoxazole-trimethoprim 400-80mg (BACTRIM,SEPTRA) 400-80 mg per tablet Take 1 tablet by mouth once daily. Stop: 12/3/19    tacrolimus (PROGRAF) 1 MG Cap Take 3 capsules (3 mg total) by mouth every 12 (twelve) hours.    valGANciclovir (VALCYTE) 450 mg Tab Take 1 tablet (450 mg total) by mouth once daily. Stop: 9/4/19    blood sugar diagnostic Strp Test blood glucose 3 (three) times daily.    blood-glucose meter kit Use as instructed    levETIRAcetam (KEPPRA) 500 MG Tab Take 1 tablet (500 mg total) by mouth 2 (two) times daily. for 7 days    oxyCODONE (ROXICODONE) 10 mg Tab immediate release tablet Take 1 tablet (10 mg total) by mouth every 4 (four) hours as needed.     Family History     Problem Relation (Age of Onset)    Breast cancer Maternal Grandmother    Cancer Mother (50), Father (65)    Heart disease Mother, Father        Tobacco Use    Smoking status: Never Smoker    Smokeless tobacco: Never Used    Tobacco comment: patient denies   Substance and Sexual Activity    Alcohol use: No     Comment: patient denies    Drug use: No     Comment: patient denies    Sexual activity: Not on file     Review of Systems   Constitutional: Positive for activity change, appetite change, chills, fatigue and fever.   HENT: Positive for rhinorrhea. Negative for trouble swallowing and voice change.    Eyes: Negative for photophobia and visual disturbance.   Respiratory: Negative for chest tightness and shortness of breath.    Cardiovascular: " Negative for chest pain and palpitations.   Gastrointestinal: Positive for abdominal pain, diarrhea and nausea. Negative for constipation and vomiting.   Endocrine: Negative for polydipsia and polyuria.   Genitourinary: Positive for dysuria.   Musculoskeletal: Negative for back pain, neck pain and neck stiffness.   Neurological: Positive for dizziness, speech difficulty, weakness and light-headedness. Negative for facial asymmetry.   Psychiatric/Behavioral: Positive for confusion. Negative for agitation.     Objective:     Vital Signs (Most Recent):  Temp: 98.7 °F (37.1 °C) (07/13/19 0740)  Pulse: 100 (07/13/19 0850)  Resp: 20 (07/13/19 0850)  BP: 126/74 (07/13/19 0850)  SpO2: 97 % (07/13/19 0850) Vital Signs (24h Range):  Temp:  [98.5 °F (36.9 °C)-100.8 °F (38.2 °C)] 98.7 °F (37.1 °C)  Pulse:  [] 100  Resp:  [20-30] 20  SpO2:  [95 %-99 %] 97 %  BP: (126-155)/(65-85) 126/74     Weight: 84.6 kg (186 lb 8.2 oz)  Body mass index is 32.01 kg/m².    Physical Exam   Constitutional: She is oriented to person, place, and time. She appears well-developed and well-nourished. No distress.   Eyes: Conjunctivae and EOM are normal. No scleral icterus.   Pupils unequal with R>L,  at bedside states that this has been lifelong   Neurological: She is alert and oriented to person, place, and time. No cranial nerve deficit.   Neurologic exam varies throughout and changes on reassessment of each aspect. Pt is alert and oriented with full memory of event and hospital course. Pt does express some confusion about time. Pt initially states visual defect in Left eye projecting a horizontal line across her visual field, this line is not present on right. 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 Pt expresses pain in shoulder preventing some aspects of cranial nerve exam but facial movement and sensation appear to be intact.  Strength globally diminished on exam, however concern for volitional weakness on exam as she is able to lift arms fully and maintain position as well as hold food and feed herself but is unable to push/pull against examiner.  also states that patient has been able to walk across room to use toilet.    Skin: She is not diaphoretic.   Nursing note and vitals reviewed.      NEUROLOGICAL EXAMINATION:     MENTAL STATUS   Oriented to person, place, and time.     CRANIAL NERVES     CN III, IV, VI   Extraocular motions are normal.       Significant Labs:   BMP:   Recent Labs   Lab 07/12/19  0636 07/13/19  0655   GLU 76 104   * 133*   K 3.7 3.3*    105   CO2 17* 18*   BUN 11 15   CREATININE 0.7 0.7   CALCIUM 8.0* 8.1*   MG 1.7 1.6     CMP:   Recent Labs   Lab 07/12/19 0636 07/13/19  0655   GLU 76 104   * 133*   K 3.7 3.3*    105   CO2 17* 18*   BUN 11 15   CREATININE 0.7 0.7   CALCIUM 8.0* 8.1*   MG 1.7 1.6   PROT 4.7* 4.9*   ALBUMIN 1.8* 1.9*   BILITOT 0.6 0.6   ALKPHOS 152* 155*   AST 11 11   ALT <5* 5*   ANIONGAP 10 10   EGFRNONAA >60.0 >60.0     Urine Studies: No results for input(s): COLORU, APPEARANCEUA, PHUR, SPECGRAV, PROTEINUA, GLUCUA, KETONESU, BILIRUBINUA, OCCULTUA, NITRITE, UROBILINOGEN, LEUKOCYTESUR, RBCUA, WBCUA, BACTERIA, SQUAMEPITHEL, HYALINECASTS in the last 48 hours.    Invalid input(s): WRIGHTSUR  All pertinent lab results from the past 24 hours have been reviewed.    Significant Imaging: I have reviewed all pertinent imaging results/findings within the past 24 hours.     MRI Brain 7/11/19  1.Evolving small right occipital parenchymal hemorrhage with minimal mass effect on the occipital horn of right lateral ventricle, improved compared to prior MRI 06/25/2019.  No significant midline shift.    2.  Small area of encephalomalacia involving the right temporal lobe, stable from prior MRI.        CT Head 7/5/19  Interval reduction in size of the patient's known right occipital  lobe parenchymal hemorrhage.  No definitive new findings on this exam.  MRI may increase sensitivity.    MRI Brain W WO 6/25/19  Small rounded focus of acute parenchymal hemorrhage within the right parasagittal occipital lobe corresponding to abnormality seen on CT.  Localized mass effect with subtle associated edema, without midline shift.    Trace component of intraventricular extension of hemorrhage.    No evidence for hydrocephalus or definite abnormal parenchymal enhancement to suggest mass.        CTA Head 6/25/19  1. Stable right occipital parenchymal hemorrhage with mild associated vasogenic edema resulting in localized mass effect without midline shift.  2. Minimal intraventricular hemorrhage layering in the occipital horns, unchanged.  No hydrocephalus.  3. No arteriovenous malformation or aneurysm identified.    CT Head WI 6/24/19  Acute intraparenchymal hemorrhage within the right occipital lobe with mild surrounding vasogenic edema.  No significant mass effect.  No midline shift.    Minimal intraventricular hemorrhage in the occipital horns.  No hydrocephalus.

## 2019-07-13 NOTE — HPI
"Jihan Zamudio is a 49 year old female with a medical history significant for DBD OLTx (SM induction, CMV D+R+) for KESSLER cirrhosis on 6/5/19 with a post operative course complicated by traumatic acute parenchymal hemorrhage within the right occipital lobe near the parieto-occipital junction measuring approximately 1.8 x 1.3 x 1.5 cm with mild surrounding vasogenic edema and localized mass effect after a mechanical fall on 6/24. She was discharged home on 7 days of Keppra per neurosurgical consult which was completed on 7/2. She [place] [facility] [place]5/19 with a fever if 102.3 found to be secondary to a enterococcus UTI for which was was started on Linezolid. Neurology was consulted on hospital day 9 for evaluation of possible seizure. Pt states that she had an approximately 4 minute long episode of "head banging" and "light flashes". Pt states that she remembers the event and remembers the immediate aftermath stating that she woke up quickly and the nurse was helping her. She denies any confusion post-event. Pt denies any recent changes or possible inciting factors other then UTI symptoms. Pt denies any history of seizures.     Primary team ordered a repeat CT head and loaded the pt with Keppra prior to neurology consult. On initial evaluation, pt in apprehensive to answer questions about event, asking why we need to know this information. Pt initially notes decreased vision in L eye with diagonal visual defect. 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 Exam shows generalized weakness however there is concern of volitional weakness and exam dependant on effort.   "

## 2019-07-13 NOTE — PROGRESS NOTES
[facility]  Infectious Disease  Progress Note    Patient Name: Jihan Zamudio  MRN: [id number]  Admission Date: 7/5/2019  Length of Stay: 7 days  Attending Physician: Jay Herbert MD  Primary Care Provider: Primary Doctor No    Isolation Status: No active isolations  Assessment/Plan:      * Fever  50yo woman w/a history of KESSLER cirrhosis (s/p DBDLT 6/5/2019, CMV D+/R+, steroid induction, on maintenance tacro/MMF/pred; c/b small traumatic R occipital lobe ICH following mechanical fall managed conservatively) who was admitted on 7/5/2019 with acute onset fevers, chills, headache (stable since ICH), dry cough (x10 days), and loose stools that failed to initially improve on empiric vanc/cefepime. She underwent paracentesis/thoracentesis that were paucicellular with some clinical improvement (afebrile, AMS improving) but with ongoing FTT/diarrhea.  Urine cultures with E. faecium.     - Discontinue oral cefpodoxime/flagyl   - Start linezolid  - remainder of prophylaxis per protocol  - [place] stool pathogens PCR panel pending  - If persistent fevers and diarrhea, would consider colonoscopy for further evaluation          Thank you for your consult. I will follow-up with patient. Please contact us if you have any additional questions.    Roselyn Cabrera MD  Infectious Disease  [facility]    Subjective:     Principal Problem:Fever    HPI: No notes on file  Interval History:   Spiked to 100.8  Mental status improved  Continues to have loose stools, 3 times yesterday  Denied any abdominal pain  No dysuria, hematuria      Review of Systems   Constitutional: Positive for activity change and appetite change. Negative for chills, diaphoresis and fever.   HENT: Negative for ear pain, mouth sores, sinus pressure and sore throat.    Eyes: Negative for photophobia, pain and redness.   Respiratory: Negative for cough, shortness of breath and wheezing.    Cardiovascular: Negative for chest pain  and leg swelling.   Gastrointestinal: Positive for diarrhea. Negative for abdominal distention, abdominal pain, nausea and vomiting.   Genitourinary: Negative for decreased urine volume, difficulty urinating, dysuria, flank pain, frequency and urgency.   Musculoskeletal: Negative for arthralgias, back pain, gait problem and myalgias.   Skin: Positive for wound (chevron well healed). Negative for pallor and rash.   Allergic/Immunologic: Positive for immunocompromised state.   Neurological: Positive for weakness. Negative for dizziness, tremors, seizures, light-headedness and headaches.   Psychiatric/Behavioral: Negative for agitation, confusion and decreased concentration. The patient is not nervous/anxious.      Objective:     Vital Signs (Most Recent):  Temp: 98.5 °F (36.9 °C) (07/12/19 1930)  Pulse: 86 (07/12/19 1930)  Resp: 20 (07/12/19 1930)  BP: (!) 155/79 (07/12/19 1930)  SpO2: 97 % (07/12/19 1930) Vital Signs (24h Range):  Temp:  [98.3 °F (36.8 °C)-100.8 °F (38.2 °C)] 98.5 °F (36.9 °C)  Pulse:  [] 86  Resp:  [20-30] 20  SpO2:  [96 %-99 %] 97 %  BP: (127-158)/(60-80) 155/79     Weight: 78.5 kg (173 lb)  Body mass index is 29.7 kg/m².    Estimated Creatinine Clearance: 96.4 mL/min (based on SCr of 0.7 mg/dL).    Physical Exam   Constitutional: She appears well-developed. No distress.   HENT:   Head: Normocephalic and atraumatic.   Eyes: Pupils are equal, round, and reactive to light. No scleral icterus.   Neck: Normal range of motion. Neck supple. No thyromegaly present.   Cardiovascular: Tachycardia present.   Pulmonary/Chest: Effort normal. No respiratory distress. She has decreased breath sounds in the right middle field, the right lower field and the left lower field.   Diminished RLL   Abdominal: She exhibits no distension. There is no guarding.   Healed chevron incision   Musculoskeletal: Normal range of motion.   Neurological: She is alert.   Oriented to person, easily re oriented   Skin: Skin is  warm and dry. She is not diaphoretic.   Psychiatric: She has a normal mood and affect. Her behavior is normal.   Nursing note and vitals reviewed.      Significant Labs:   CBC:   Recent Labs   Lab 07/11/19  0646 07/12/19  0636   WBC 5.84 6.81   HGB 8.4* 8.6*   HCT 25.5* 25.8*   * 137*     CMP:   Recent Labs   Lab 07/11/19  0646 07/12/19  0636   * 135*   K 3.7 3.7    108   CO2 19* 17*   GLU 85 76   BUN 11 11   CREATININE 0.7 0.7   CALCIUM 8.0* 8.0*   PROT 4.7* 4.7*   ALBUMIN 1.9* 1.8*   BILITOT 0.6 0.6   ALKPHOS 156* 152*   AST 11 11   ALT <5* <5*   ANIONGAP 8 10   EGFRNONAA >60.0 >60.0       Significant Imaging: I have reviewed all pertinent imaging results/findings within the past 24 hours.

## 2019-07-13 NOTE — ASSESSMENT & PLAN NOTE
- holding prograf 7/13.  - Holding MMF. Monitor prograf level daily, monitor for toxic side effects, and adjust for therapeutic dose.

## 2019-07-13 NOTE — ASSESSMENT & PLAN NOTE
- admitted recently following fall diagnosed w acute parenchymal hemorrhage within right occipital live near parieto-occipital junction 1.8x1.3x1.5 w mild surrounding vasogenic edema and localized mass.  Repeat CT head showed reduction in size of hemorrhage.  - pt denies HA.

## 2019-07-13 NOTE — ASSESSMENT & PLAN NOTE
"- pt seen sitting in chair this AM stating "I just had a seizure, I just finished banging my head on the chair."  - pt couldn't recall events surrounding seizure like activity.  - given recent hx of fall with ICH on 6/24, Neuro consulted in which pt had a very inconsistent neuro exam.   - Keppra 500 mg bid restarted and a STAT CT head which did not show a new infarct or bleed.  - dc'd Lovenox.   - Monitor.     "

## 2019-07-13 NOTE — ASSESSMENT & PLAN NOTE
- Infectious work up ordered on admit - blood cx NGTD, UA unremarkable, CMV PCR 7/5 undetected, chest x-ray w right pleural effusion, CT A/P reviewed.  - Started broad spectrum antibiotics on admit.   - Abdominal and head CT reviewed and no clear source for fever.   - ID consulted.  Apprec recs.   - Pt with intermittent confusion.  Easily re oriented.  Continue to monitor.   - Thora and para done 7/9; amount of fluid removed not recorded and pleural fluid labs were not completed as ordered.   - Abdominal gram stain reveals no WBC and no organisms; cell count not completed as ordered; pleural fluid cell count 84 WBCs, 24% segs.  - Pt still reports diarrhea- C diff negative, CMV undetected. Stool culture, WBC, ova cysts parasites, and GI pathogen panel pending.   - low grade fever 7/12, 100.8.  - deescalated abx to PO cefpodoxime and flagyl on 7/12.  - MRI head w/ and w/o contrast 7/12 without acute changes.  - Urine culture positive for enterococcus faecium- started Zyvox and dc'd cefpodoxime and flagyl 7/12.  - temp 100.4 today.   - repeat blood/urine cx pending.   - ammonia level wnl.   - pt also with confusion. D/w ID - restart Cefepime.  - procalcitonin, lactate, EBV, RPR and CMV PCR pending.   - monitor.

## 2019-07-13 NOTE — PROGRESS NOTES
"[facility]  Liver Transplant  Progress Note    Patient Name: Jihan Zamudio  MRN: [id number]  Admission Date: 2019  Hospital Length of Stay: 8 days  Code Status: Full Code  Primary Care Provider: Primary Doctor No  Post-Operative Day: 38    ORGAN:   LIVER  Disease Etiology: Cirrhosis: Fatty Liver ([place])  Donor Type:    - Brain Death  [facility] High Risk:   No  Donor CMV Status:   Donor CMV Status: Positive  Donor HBcAB:   Negative  Donor HCV Status:   Negative  Donor HBV GERTRUDIS: Negative  Donor HCV GERTRUDIS: Negative  Whole or Partial: Whole Liver  Biliary Anastomosis: End to End  Arterial Anatomy: Standard  Subjective:     History of Present Illness:  Ms. 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 Post transplant course significant for acute parenchymal hemorrhage within the right occipital lobe near the parieto-occipital junction measuring approximately 1.8 x 1.3 x 1.5 cm with mild surrounding vasogenic edema and localized mass effect after fall on . She was discharged home on 7 days of Keppra but nuerosurgery, completed on . She now presents to the ED with 1 day history of fever. Homehealth nurse checked patient's temperature and was noted to be elevated. Home health nurse also reported patient acting "off" with mental status change. Currently in the ED, tmax is 102.3. Patient feels well with no true complaints expect rhinorrhea. She denies chills, diarrhea, nausea/vomiting, abdominal pain. Denies sick contacts. Will admit for infectious work up (blood cx, UA, urine cx, CMV PCR, chest x-ray, CT A/P). Patient currently with no altered mental status. In light of recent hx of occipital hemorrhage, will obtain CT head without contrast.      Hospital Course:  50 y/o F s/p OLTx for KESSLER cirrhosis on  who was admitted on  for fevers. Pt febrile on admission, started on bx abx. ID consulted. Blood cultures NGTD, UA negative. CMV PCR drawn on " "admit pending. Pt having loose stools, C diff EIA negative, CMV undetected. CT head showed reduction in size of hemorrhage. Abdominal CT reviewed and with moderate right pleural effusion. CXR with increased pleural effusion. Thora and para done 7/9; amount of fluid removed not recorded and pleural fluid labs were not completed as ordered. Abdominal gram stain reveals no WBC and no organisms; cell count not completed as ordered, cultures pending. TTE done 7/9 WNL, no vegetations. Pt tachy and tachypneic on 7/9; 1 L bolus given and pt responded well. 1 u pRBC transfused 7/9 for low H/H. Pt continued to have diarrhea, additional stool studies pending. Mentation continued to wax and wane, MRI w/ and w/o contrast done on 7/11 which revealed stable findings compared to prior. Pt transitioned to IV abx to PO abx on 7/11 per ID recs.    Interval history: No acute events overnight. Pt appropriate early AM but MS worsened during rounds. Upon entering room, patient seen sitting in chair with a blank stare. Pt stated "I just had a seizure, I just finished banging my head on the chair." She could not recall how long it lasted or when the seizure took place. VSS and no injuries noted. Given her recent hx of fall and ICH on 6/24, a STAT CT head, keppra 500 mg bid and neurology consult was ordered. CT head without a new infarct or bleed identified. Lovenox and prograf dc'd. MS continued to deteriorate throughout the day and pt now more confused and altered. Ammonia level wnl. Tmax 100.4 today. Blood/urine cultures obtained. D/w ID. Restarted Cefepime. Will continue Linezolid for enterococcus UTI; awaiting sensitivities. Will start Zyprexa. Procalcitonin, lactate, RPR, EBV and CMV PCR pending. Monitor closely.    Scheduled Meds:   ceFEPime (MAXIPIME) IVPB  2 g Intravenous Q8H    furosemide  40 mg Oral Daily    levETIRAcetam  500 mg Oral BID    levothyroxine  112 mcg Oral Before breakfast    linezolid  600 mg Oral Q12H    " OLANZapine  5 mg Oral QHS    predniSONE  5 mg Oral Daily    sodium bicarbonate  650 mg Oral BID    sulfamethoxazole-trimethoprim 400-80mg  1 tablet Oral Daily    valGANciclovir  450 mg Oral Daily     Continuous Infusions:  PRN Meds:acetaminophen, calcium carbonate, hydrALAZINE, ondansetron, sodium chloride 0.9%    Review of Systems   Constitutional: Positive for activity change, appetite change and fever. Negative for chills.   HENT: Negative for congestion and facial swelling.    Eyes: Negative for pain, discharge and visual disturbance.   Respiratory: Negative for cough, chest tightness, shortness of breath and wheezing.    Cardiovascular: Negative for chest pain, palpitations and leg swelling.   Gastrointestinal: Positive for diarrhea. Negative for abdominal distention, abdominal pain, nausea and vomiting.   Endocrine: Negative.    Genitourinary: Negative for decreased urine volume, difficulty urinating and dysuria.   Skin: Positive for wound (chevron well healed).   Allergic/Immunologic: Positive for immunocompromised state.   Neurological: Positive for weakness. Negative for light-headedness.   Psychiatric/Behavioral: Negative for agitation, confusion and decreased concentration. The patient is not nervous/anxious.      Objective:     Vital Signs (Most Recent):  Temp: (!) 100.4 °F (38 °C) (07/13/19 1105)  Pulse: 97 (07/13/19 1111)  Resp: (!) 22 (07/13/19 1105)  BP: 126/74 (07/13/19 1105)  SpO2: 96 % (07/13/19 1105) Vital Signs (24h Range):  Temp:  [98.5 °F (36.9 °C)-100.4 °F (38 °C)] 100.4 °F (38 °C)  Pulse:  [] 97  Resp:  [20-30] 22  SpO2:  [95 %-97 %] 96 %  BP: (126-155)/(74-85) 126/74     Weight: 84.6 kg (186 lb 8.2 oz)  Body mass index is 32.01 kg/m².    Intake/Output - Last 3 Shifts       07/11 0700 - 07/12 0659 07/12 0700 - 07/13 0659 07/13 0700 - 07/14 0659    P.O. 460 180 360    I.V. (mL/kg) 623.3 (7.9) 0 (0)     Other 0 0     IV Piggyback 50      Total Intake(mL/kg) 1133.3 (14.4) 180 (2.1)  360 (4.3)    Urine (mL/kg/hr) 700 (0.4) 0 (0)     Emesis/NG output 0 0     Other 0 0     Stool 0 0     Blood 0 0     Total Output 700 0     Net +433.3 +180 +360           Urine Occurrence 3 x 6 x 2 x    Stool Occurrence 4 x 3 x 1 x    Emesis Occurrence 0 x 0 x           Physical Exam   Constitutional: She is oriented to person, place, and time. She appears well-developed. No distress.   HENT:   Head: Normocephalic and atraumatic.   Eyes: Pupils are equal, round, and reactive to light. No scleral icterus.   Neck: Normal range of motion. Neck supple. No thyromegaly present.   Cardiovascular: Regular rhythm. Tachycardia present. Exam reveals no friction rub.   No murmur heard.  Pulmonary/Chest: Effort normal. She has decreased breath sounds in the right middle field, the right lower field and the left lower field. She has no wheezes. She has no rales.   Diminished RLL   Abdominal: Soft. She exhibits no distension. There is no tenderness. There is no rebound and no guarding.   Healed chevron incision   Musculoskeletal: Normal range of motion.   Neurological: She is alert and oriented to person, place, and time. She is not disoriented.   Oriented to person, easily re oriented   Skin: Skin is warm and dry. She is not diaphoretic.   Psychiatric: She has a normal mood and affect. Her behavior is normal. Judgment and thought content normal. Her speech is delayed.   Nursing note and vitals reviewed.      Laboratory:  Immunosuppressants     None        CBC:   Recent Labs   Lab 07/13/19  0655   WBC 6.07   RBC 2.93*   HGB 8.4*   HCT 26.0*      MCV 89   MCH 28.7   MCHC 32.3     CMP:   Recent Labs   Lab 07/13/19  0655      CALCIUM 8.1*   ALBUMIN 1.9*   PROT 4.9*   *   K 3.3*   CO2 18*      BUN 15   CREATININE 0.7   ALKPHOS 155*   ALT 5*   AST 11   BILITOT 0.6     Labs within the past 24 hours have been reviewed.    Diagnostic Results:  I have personally reviewed all pertinent imaging  "studies.    Debility/Functional status: Patient debilitated by evidence of Weakness. Physical and occupational therapy ordered daily to evaluate and treat. Debility was: present on admission.    Assessment/Plan:     * Seizure-like activity  - pt seen sitting in chair this AM stating "I just had a seizure, I just finished banging my head on the chair."  - pt couldn't recall events surrounding seizure like activity.  - given recent hx of fall with ICH on 6/24, Neuro consulted in which pt had a very inconsistent neuro exam.   - Keppra 500 mg bid restarted and a STAT CT head which did not show a new infarct or bleed.  - dc'd Lovenox.   - Monitor.       Diarrhea  - pt with diarrhea on admission.  - C diff negative, CMV undetected.  - Stool culture, WBC, ova cysts parasites, and GI pathogen panel pending.  - cellcept on hold.  - continue to monitor.      UTI (urinary tract infection) due to Enterococcus  - UA 7/5 unremarkable.  - urine culture 7/11 positive for enterococcus faecium- susceptibilities pending.  - dc'd cefpodoxime and flagyl on 7/12.  - started Zyvox on 7/12.  - continue to monitor.    Hypokalemia  - replace as needed.      Fever  - Infectious work up ordered on admit - blood cx NGTD, UA unremarkable, CMV PCR 7/5 undetected, chest x-ray w right pleural effusion, CT A/P reviewed.  - Started broad spectrum antibiotics on admit.   - Abdominal and head CT reviewed and no clear source for fever.   - ID consulted.  Apprec recs.   - Pt with intermittent confusion.  Easily re oriented.  Continue to monitor.   - Thora and para done 7/9; amount of fluid removed not recorded and pleural fluid labs were not completed as ordered.   - Abdominal gram stain reveals no WBC and no organisms; cell count not completed as ordered; pleural fluid cell count 84 WBCs, 24% segs.  - Pt still reports diarrhea- C diff negative, CMV undetected. Stool culture, WBC, ova cysts parasites, and GI pathogen panel pending.   - low grade fever " 7/12, 100.8.  - deescalated abx to PO cefpodoxime and flagyl on 7/12.  - MRI head w/ and w/o contrast 7/12 without acute changes.  - Urine culture positive for enterococcus faecium- started Zyvox and dc'd cefpodoxime and flagyl 7/12.  - temp 100.4 today.   - repeat blood/urine cx pending.   - ammonia level wnl.   - pt also with confusion. D/w ID - restart Cefepime.  - procalcitonin, lactate, EBV, RPR and CMV PCR pending.   - monitor.      Traumatic intracranial hemorrhage without loss of consciousness  - Head CT reviewed and noted with interval reduction in size of the patient's known right occipital lobe parenchymal hemorrhage.      At risk for opportunistic infections  - cont bactrim for PCP prophylaxis.  - cont Valcyte for CMV prophylaxis (CMV D+/R+).      Closed head injury  - admitted recently following fall diagnosed w acute parenchymal hemorrhage within right occipital live near parieto-occipital junction 1.8x1.3x1.5 w mild surrounding vasogenic edema and localized mass.  Repeat CT head showed reduction in size of hemorrhage.  - pt denies HA.      Long-term use of immunosuppressant medication  - holding prograf 7/13.  - Holding MMF. Monitor prograf level daily, monitor for toxic side effects, and adjust for therapeutic dose.       Prophylactic immunotherapy  - See long term use of immunosuppression.  Decreased as likely w sepsis.    S/P liver transplant  - LFTs stable.  - Pt with good hepatic allograft function.   - Last Liver US 6/24 showed Mildly elevated hepatic arterial resistive indices, although improved from prior exam.  Otherwise, satisfactory vascular appearance of the liver, complex fluid collection adjacent to the left hepatic lobe, similar to slightly smaller compared to prior exam, small volume of ascites and partially visualized right pleural effusion.   - para completed 7/9, cultures pending. Cell count not collected as ordered.     Anemia of chronic disease  - no overt bleeding.    - keep type  and screen current.  - transfused 1u prbc 7/9.  - monitor w daily labs.      Pleural effusion, right  - CXR reviewed.    - Thora done 7/9  - pleural fluid labs not completed as ordered- will try to get labs repeated if specimen is still available.  - cell count: 84 WBCs, 24% segs      Weakness  - PT/OT consulted.        Moderate malnutrition  - supplements ordered.      GERD (gastroesophageal reflux disease)  - cont Pepcid.          VTE Risk Mitigation (From admission, onward)        Ordered     Place sequential compression device  Until discontinued      07/05/19 1659     IP VTE HIGH RISK PATIENT  Once      07/05/19 1659          The patients clinical status was discussed at multidisplinary rounds, involving transplant surgery, transplant medicine, pharmacy, nursing, nutrition, and social work    Discharge Planning: not a candidate for dc at this time.       Debbie Wilkes NP  Liver Transplant  [facility]

## 2019-07-13 NOTE — ASSESSMENT & PLAN NOTE
- LFTs stable.  - Pt with good hepatic allograft function.   - Last Liver US 6/24 showed Mildly elevated hepatic arterial resistive indices, although improved from prior exam.  Otherwise, satisfactory vascular appearance of the liver, complex fluid collection adjacent to the left hepatic lobe, similar to slightly smaller compared to prior exam, small volume of ascites and partially visualized right pleural effusion.   - para completed 7/9, cultures pending. Cell count not collected as ordered.

## 2019-07-13 NOTE — PLAN OF CARE
Problem: Adult Inpatient Plan of Care  Goal: Plan of Care Review  Outcome: Ongoing (interventions implemented as appropriate)  Pt has been disoriented to situation throughout shift. She has moments of clarity but her mind wanders off to multiple random things. She is oriented to self and time. Pt stated she has seizure disorder that makes her head bang and hurt. Neuro was reconsulted (see note) and ct scan of head done. She has been yelling out, pulling her telemetry and gown off throughout shift. Bed alarm on for safety. She has not tried to get up oob. She has also been cursing a lot (her family states she doesn't curse at home). Transplant team aware. Labs sent and she was restarted on iv antibiotics. Will continue to monitor.

## 2019-07-14 LAB
ALBUMIN SERPL BCP-MCNC: 2 G/DL (ref 3.5–5.2)
ALP SERPL-CCNC: 159 U/L (ref 55–135)
ALT SERPL W/O P-5'-P-CCNC: 5 U/L (ref 10–44)
ANION GAP SERPL CALC-SCNC: 12 MMOL/L (ref 8–16)
AST SERPL-CCNC: 12 U/L (ref 10–40)
BACTERIA #/AREA URNS AUTO: ABNORMAL /HPF
BASOPHILS # BLD AUTO: 0.04 K/UL (ref 0–0.2)
BASOPHILS NFR BLD: 0.7 % (ref 0–1.9)
BILIRUB SERPL-MCNC: 0.6 MG/DL (ref 0.1–1)
BILIRUB UR QL STRIP: NEGATIVE
BUN SERPL-MCNC: 14 MG/DL (ref 6–20)
CALCIUM SERPL-MCNC: 8.3 MG/DL (ref 8.7–10.5)
CHLORIDE SERPL-SCNC: 106 MMOL/L (ref 95–110)
CK SERPL-CCNC: 7 U/L (ref 20–180)
CLARITY UR REFRACT.AUTO: ABNORMAL
CO2 SERPL-SCNC: 20 MMOL/L (ref 23–29)
COLOR UR AUTO: ABNORMAL
CREAT SERPL-MCNC: 0.7 MG/DL (ref 0.5–1.4)
DIFFERENTIAL METHOD: ABNORMAL
EOSINOPHIL # BLD AUTO: 0 K/UL (ref 0–0.5)
EOSINOPHIL NFR BLD: 0.7 % (ref 0–8)
ERYTHROCYTE [DISTWIDTH] IN BLOOD BY AUTOMATED COUNT: 16.1 % (ref 11.5–14.5)
EST. GFR  (AFRICAN AMERICAN): >60 ML/MIN/1.73 M^2
EST. GFR  (NON AFRICAN AMERICAN): >60 ML/MIN/1.73 M^2
GLUCOSE SERPL-MCNC: 91 MG/DL (ref 70–110)
GLUCOSE UR QL STRIP: NEGATIVE
HCT VFR BLD AUTO: 27.1 % (ref 37–48.5)
HGB BLD-MCNC: 8.7 G/DL (ref 12–16)
HGB UR QL STRIP: ABNORMAL
HYALINE CASTS UR QL AUTO: 1 /LPF
IMM GRANULOCYTES # BLD AUTO: 0.12 K/UL (ref 0–0.04)
IMM GRANULOCYTES NFR BLD AUTO: 2.1 % (ref 0–0.5)
KETONES UR QL STRIP: ABNORMAL
LEUKOCYTE ESTERASE UR QL STRIP: ABNORMAL
LYMPHOCYTES # BLD AUTO: 0.3 K/UL (ref 1–4.8)
LYMPHOCYTES NFR BLD: 4.5 % (ref 18–48)
MAGNESIUM SERPL-MCNC: 1.6 MG/DL (ref 1.6–2.6)
MCH RBC QN AUTO: 28.2 PG (ref 27–31)
MCHC RBC AUTO-ENTMCNC: 32.1 G/DL (ref 32–36)
MCV RBC AUTO: 88 FL (ref 82–98)
MICROSCOPIC COMMENT: ABNORMAL
MONOCYTES # BLD AUTO: 0.3 K/UL (ref 0.3–1)
MONOCYTES NFR BLD: 5.7 % (ref 4–15)
NEUTROPHILS # BLD AUTO: 5 K/UL (ref 1.8–7.7)
NEUTROPHILS NFR BLD: 86.3 % (ref 38–73)
NITRITE UR QL STRIP: NEGATIVE
NRBC BLD-RTO: 0 /100 WBC
PH UR STRIP: 6 [PH] (ref 5–8)
PLATELET # BLD AUTO: 175 K/UL (ref 150–350)
PMV BLD AUTO: 10.4 FL (ref 9.2–12.9)
POTASSIUM SERPL-SCNC: 3.2 MMOL/L (ref 3.5–5.1)
PROT SERPL-MCNC: 5.2 G/DL (ref 6–8.4)
PROT UR QL STRIP: ABNORMAL
RBC # BLD AUTO: 3.08 M/UL (ref 4–5.4)
RBC #/AREA URNS AUTO: 5 /HPF (ref 0–4)
SODIUM SERPL-SCNC: 138 MMOL/L (ref 136–145)
SP GR UR STRIP: 1.03 (ref 1–1.03)
SQUAMOUS #/AREA URNS AUTO: 9 /HPF
TACROLIMUS BLD-MCNC: <1.5 NG/ML (ref 5–15)
URN SPEC COLLECT METH UR: ABNORMAL
WBC # BLD AUTO: 5.81 K/UL (ref 3.9–12.7)
WBC #/AREA URNS AUTO: 5 /HPF (ref 0–5)

## 2019-07-14 PROCEDURE — 99233 PR SUBSEQUENT HOSPITAL CARE,LEVL III: ICD-10-PCS | Mod: 24,,, | Performed by: NURSE PRACTITIONER

## 2019-07-14 PROCEDURE — 80197 ASSAY OF TACROLIMUS: CPT

## 2019-07-14 PROCEDURE — 63600175 PHARM REV CODE 636 W HCPCS: Performed by: INTERNAL MEDICINE

## 2019-07-14 PROCEDURE — 25000003 PHARM REV CODE 250: Performed by: PHYSICIAN ASSISTANT

## 2019-07-14 PROCEDURE — 63600175 PHARM REV CODE 636 W HCPCS: Performed by: PHYSICIAN ASSISTANT

## 2019-07-14 PROCEDURE — 20600001 HC STEP DOWN PRIVATE ROOM

## 2019-07-14 PROCEDURE — 87305 ASPERGILLUS AG IA: CPT

## 2019-07-14 PROCEDURE — 99233 SBSQ HOSP IP/OBS HIGH 50: CPT | Mod: 24,,, | Performed by: NURSE PRACTITIONER

## 2019-07-14 PROCEDURE — 86703 HIV-1/HIV-2 1 RESULT ANTBDY: CPT

## 2019-07-14 PROCEDURE — 87040 BLOOD CULTURE FOR BACTERIA: CPT | Mod: 59

## 2019-07-14 PROCEDURE — 25000003 PHARM REV CODE 250: Performed by: NURSE PRACTITIONER

## 2019-07-14 PROCEDURE — 86635 COCCIDIOIDES ANTIBODY: CPT

## 2019-07-14 PROCEDURE — 25000003 PHARM REV CODE 250: Performed by: INTERNAL MEDICINE

## 2019-07-14 PROCEDURE — 87086 URINE CULTURE/COLONY COUNT: CPT

## 2019-07-14 PROCEDURE — 63600175 PHARM REV CODE 636 W HCPCS: Performed by: NURSE PRACTITIONER

## 2019-07-14 PROCEDURE — 81001 URINALYSIS AUTO W/SCOPE: CPT

## 2019-07-14 PROCEDURE — 83735 ASSAY OF MAGNESIUM: CPT

## 2019-07-14 PROCEDURE — 85025 COMPLETE CBC W/AUTO DIFF WBC: CPT

## 2019-07-14 PROCEDURE — 87385 HISTOPLASMA CAPSUL AG IA: CPT

## 2019-07-14 PROCEDURE — 82550 ASSAY OF CK (CPK): CPT

## 2019-07-14 PROCEDURE — 80053 COMPREHEN METABOLIC PANEL: CPT

## 2019-07-14 RX ORDER — IBUPROFEN 200 MG
16 TABLET ORAL
Status: DISCONTINUED | OUTPATIENT
Start: 2019-07-14 | End: 2019-07-23 | Stop reason: HOSPADM

## 2019-07-14 RX ORDER — POTASSIUM CHLORIDE 20 MEQ/1
40 TABLET, EXTENDED RELEASE ORAL ONCE
Status: COMPLETED | OUTPATIENT
Start: 2019-07-14 | End: 2019-07-14

## 2019-07-14 RX ORDER — INSULIN ASPART 100 [IU]/ML
0-5 INJECTION, SOLUTION INTRAVENOUS; SUBCUTANEOUS
Status: DISCONTINUED | OUTPATIENT
Start: 2019-07-14 | End: 2019-07-23 | Stop reason: HOSPADM

## 2019-07-14 RX ORDER — IBUPROFEN 200 MG
24 TABLET ORAL
Status: DISCONTINUED | OUTPATIENT
Start: 2019-07-14 | End: 2019-07-23 | Stop reason: HOSPADM

## 2019-07-14 RX ORDER — GLUCAGON 1 MG
1 KIT INJECTION
Status: DISCONTINUED | OUTPATIENT
Start: 2019-07-14 | End: 2019-07-23 | Stop reason: HOSPADM

## 2019-07-14 RX ORDER — LANOLIN ALCOHOL/MO/W.PET/CERES
400 CREAM (GRAM) TOPICAL DAILY
Status: DISCONTINUED | OUTPATIENT
Start: 2019-07-14 | End: 2019-07-21

## 2019-07-14 RX ADMIN — ACYCLOVIR SODIUM 810 MG: 1000 INJECTION, SOLUTION INTRAVENOUS at 08:07

## 2019-07-14 RX ADMIN — OLANZAPINE 5 MG: 2.5 TABLET, FILM COATED ORAL at 08:07

## 2019-07-14 RX ADMIN — PREDNISONE 5 MG: 5 TABLET ORAL at 09:07

## 2019-07-14 RX ADMIN — SODIUM CHLORIDE 500 ML: 0.9 INJECTION, SOLUTION INTRAVENOUS at 08:07

## 2019-07-14 RX ADMIN — HYDROCORTISONE SODIUM SUCCINATE 50 MG: 100 INJECTION, POWDER, FOR SOLUTION INTRAMUSCULAR; INTRAVENOUS at 08:07

## 2019-07-14 RX ADMIN — MAGNESIUM OXIDE TAB 400 MG (241.3 MG ELEMENTAL MG) 400 MG: 400 (241.3 MG) TAB at 09:07

## 2019-07-14 RX ADMIN — CEFEPIME 2 G: 2 INJECTION, POWDER, FOR SOLUTION INTRAVENOUS at 05:07

## 2019-07-14 RX ADMIN — CEFEPIME 2 G: 2 INJECTION, POWDER, FOR SOLUTION INTRAVENOUS at 08:07

## 2019-07-14 RX ADMIN — DAPTOMYCIN 510 MG: 350 INJECTION, POWDER, LYOPHILIZED, FOR SOLUTION INTRAVENOUS at 09:07

## 2019-07-14 RX ADMIN — VALGANCICLOVIR 450 MG: 450 TABLET, FILM COATED ORAL at 09:07

## 2019-07-14 RX ADMIN — SULFAMETHOXAZOLE AND TRIMETHOPRIM 1 TABLET: 400; 80 TABLET ORAL at 09:07

## 2019-07-14 RX ADMIN — HYDROCORTISONE SODIUM SUCCINATE 50 MG: 100 INJECTION, POWDER, FOR SOLUTION INTRAMUSCULAR; INTRAVENOUS at 05:07

## 2019-07-14 RX ADMIN — LEVETIRACETAM 500 MG: 500 TABLET ORAL at 08:07

## 2019-07-14 RX ADMIN — LEVETIRACETAM 500 MG: 500 TABLET ORAL at 09:07

## 2019-07-14 RX ADMIN — SODIUM BICARBONATE 650 MG TABLET 650 MG: at 08:07

## 2019-07-14 RX ADMIN — ACETAMINOPHEN 650 MG: 325 TABLET ORAL at 09:07

## 2019-07-14 RX ADMIN — LEVOTHYROXINE SODIUM 112 MCG: 50 TABLET ORAL at 05:07

## 2019-07-14 RX ADMIN — SODIUM BICARBONATE 650 MG TABLET 650 MG: at 09:07

## 2019-07-14 RX ADMIN — POTASSIUM CHLORIDE 40 MEQ: 1500 TABLET, EXTENDED RELEASE ORAL at 09:07

## 2019-07-14 NOTE — PROGRESS NOTES
[facility]  Infectious Disease  Progress Note    Patient Name: Jihan Zamudio  MRN: [id number]  Admission Date: 7/5/2019  Length of Stay: 8 days  Attending Physician: Jay Herbert MD  Primary Care Provider: Primary Doctor No    Isolation Status: No active isolations  Assessment/Plan:      Fever  48yo woman w/a history of KESSLER cirrhosis (s/p DBDLT 6/5/2019, CMV D+/R+, steroid induction, on maintenance tacro/MMF/pred; c/b small traumatic R occipital lobe ICH following mechanical fall managed conservatively) who was admitted on 7/5/2019 with acute onset fevers, chills, headache (stable since ICH), dry cough (x10 days), and loose stools. She underwent paracentesis/thoracentesis that were paucicellular. Patient's AMS and fevers initially improved with vanc / cefepime, transitioned to cefpodoxime / metronidazole with recurrence of fever.  Urine culture with VRE, started on linezolid, but now with recurrent AMS. Patient with waxing and waning mental status, suspect delirium.    Recommendations  - Discontinue linezolid per neuro recommendations as can lower seizure threshold  - Start daptomycin 6 mg/kg q24 hours x5 days, restart empiric cefepime  - remainder of prophylaxis per protocol  - [place] stool pathogens PCR panel pending  - HIV, histo/blasto/cocci, aspergillus Ag ordered  - If persistent fevers and diarrhea, would consider colonoscopy for further evaluation  - If persistent fevers and AMS, would consider LP for further evaluation        Thank you for your consult. I will follow-up with patient. Please contact us if you have any additional questions.    Roselyn Cabrera MD  Infectious Disease  [facility]    Subjective:     Principal Problem:Seizure-like activity    HPI: No notes on file  Interval History:   Spiked to 100.4  Per primary team, patient with altered mental status this AM, removed clothes, yelling  On exam, patient responds appropriately to questions, denied having  any pain  Able to state year and president  Patient requesting to be left alone    Review of Systems   Constitutional: Positive for activity change and appetite change. Negative for chills, diaphoresis and fever.   HENT: Negative for ear pain, mouth sores, sinus pressure and sore throat.    Eyes: Negative for photophobia, pain and redness.   Respiratory: Negative for cough, shortness of breath and wheezing.    Cardiovascular: Negative for chest pain and leg swelling.   Gastrointestinal: Positive for diarrhea. Negative for abdominal distention, abdominal pain, nausea and vomiting.   Genitourinary: Negative for decreased urine volume, difficulty urinating, dysuria, flank pain, frequency and urgency.   Musculoskeletal: Negative for arthralgias, back pain, gait problem and myalgias.   Skin: Positive for wound (chevron well healed). Negative for pallor and rash.   Allergic/Immunologic: Positive for immunocompromised state.   Neurological: Positive for weakness. Negative for dizziness, tremors, seizures, light-headedness and headaches.   Psychiatric/Behavioral: Negative for agitation, confusion and decreased concentration. The patient is not nervous/anxious.      Objective:     Vital Signs (Most Recent):  Temp: 98.4 °F (36.9 °C) (07/13/19 1555)  Pulse: 78 (07/13/19 1555)  Resp: 18 (07/13/19 1555)  BP: 126/60 (07/13/19 1555)  SpO2: 98 % (07/13/19 1555) Vital Signs (24h Range):  Temp:  [98.4 °F (36.9 °C)-100.4 °F (38 °C)] 98.4 °F (36.9 °C)  Pulse:  [] 78  Resp:  [18-22] 18  SpO2:  [95 %-98 %] 98 %  BP: (126-147)/(60-85) 126/60     Weight: 84.6 kg (186 lb 8.2 oz)  Body mass index is 32.01 kg/m².    Estimated Creatinine Clearance: 100.1 mL/min (based on SCr of 0.7 mg/dL).    Physical Exam   Constitutional: She appears well-developed and well-nourished. No distress.   HENT:   Head: Normocephalic and atraumatic.   Eyes: Conjunctivae and EOM are normal.   Neck: Normal range of motion. Neck supple.   Pulmonary/Chest: Effort  normal. No respiratory distress.   Abdominal: Soft. She exhibits no distension. There is no tenderness.   Musculoskeletal: Normal range of motion. She exhibits no edema.   Neurological: She is alert.   Responds appropriately to questions, oriented to year   Skin: Skin is warm and dry. No rash noted. She is not diaphoretic. No erythema.   Vitals reviewed.      Significant Labs:   CBC:   Recent Labs   Lab 07/12/19  0636 07/13/19  0655   WBC 6.81 6.07   HGB 8.6* 8.4*   HCT 25.8* 26.0*   * 160     CMP:   Recent Labs   Lab 07/12/19  0636 07/13/19  0655   * 133*   K 3.7 3.3*    105   CO2 17* 18*   GLU 76 104   BUN 11 15   CREATININE 0.7 0.7   CALCIUM 8.0* 8.1*   PROT 4.7* 4.9*   ALBUMIN 1.8* 1.9*   BILITOT 0.6 0.6   ALKPHOS 152* 155*   AST 11 11   ALT <5* 5*   ANIONGAP 10 10   EGFRNONAA >60.0 >60.0       Significant Imaging: I have reviewed all pertinent imaging results/findings within the past 24 hours.

## 2019-07-14 NOTE — ASSESSMENT & PLAN NOTE
- UA 7/5 unremarkable.  - urine culture 7/11 positive for enterococcus faecium- susceptibilities pending.  - dc'd cefpodoxime and flagyl on 7/12.  - started Zyvox on 7/12.  - ID transitioned Zyvox to Dapto on 7/13 as Zyvox can lower the seizure threshold.  - continue to monitor.

## 2019-07-14 NOTE — PROGRESS NOTES
"[facility]  Liver Transplant  Progress Note    Patient Name: Jihan Zamudio  MRN: [id number]  Admission Date: 2019  Hospital Length of Stay: 9 days  Code Status: Full Code  Primary Care Provider: Primary Doctor No  Post-Operative Day: 39    ORGAN:   LIVER  Disease Etiology: Cirrhosis: Fatty Liver ([place])  Donor Type:    - Brain Death  [facility] High Risk:   No  Donor CMV Status:   Donor CMV Status: Positive  Donor HBcAB:   Negative  Donor HCV Status:   Negative  Donor HBV GERTRUDIS: Negative  Donor HCV GERTRUDIS: Negative  Whole or Partial: Whole Liver  Biliary Anastomosis: End to End  Arterial Anatomy: Standard  Subjective:     History of Present Illness:  Ms. 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 Post transplant course significant for acute parenchymal hemorrhage within the right occipital lobe near the parieto-occipital junction measuring approximately 1.8 x 1.3 x 1.5 cm with mild surrounding vasogenic edema and localized mass effect after fall on . She was discharged home on 7 days of Keppra but nuerosurgery, completed on . She now presents to the ED with 1 day history of fever. Homehealth nurse checked patient's temperature and was noted to be elevated. Home health nurse also reported patient acting "off" with mental status change. Currently in the ED, tmax is 102.3. Patient feels well with no true complaints expect rhinorrhea. She denies chills, diarrhea, nausea/vomiting, abdominal pain. Denies sick contacts. Will admit for infectious work up (blood cx, UA, urine cx, CMV PCR, chest x-ray, CT A/P). Patient currently with no altered mental status. In light of recent hx of occipital hemorrhage, will obtain CT head without contrast.      Hospital Course:  50 y/o F s/p OLTx for KESSLER cirrhosis on  who was admitted on  for fevers. Pt febrile on admission, started on bx abx. ID consulted. Blood cultures NGTD, UA negative. CMV PCR drawn on " admit pending. Pt having loose stools, C diff EIA negative, CMV undetected. CT head showed reduction in size of hemorrhage. Abdominal CT reviewed and with moderate right pleural effusion. CXR with increased pleural effusion. Thora and para done 7/9; amount of fluid removed not recorded and pleural fluid labs were not completed as ordered. Abdominal gram stain reveals no WBC and no organisms; cell count not completed as ordered, cultures pending. TTE done 7/9 WNL, no vegetations. Pt tachy and tachypneic on 7/9; 1 L bolus given and pt responded well. 1 u pRBC transfused 7/9 for low H/H. Pt continued to have diarrhea, additional stool studies pending. Mentation continued to wax and wane, MRI w/ and w/o contrast done on 7/11 which revealed stable findings compared to prior. Pt transitioned to IV abx to PO abx on 7/11 per ID recs.    Interval history: Pt more oriented and appropriate this AM. Anxiety and agitation improved. No reported seizure like activity overnight. Neurology following. Will continue Keppra for now. Prograf held and hydrocortisone started. CT head 7/13 without a new infarct or bleed identified. Blood cultures ngtd from 7/13. ID following. Zyvox transitioned to Dapto for Enterococcus UTI. Zyvox will lower seizure threshold. Today, pt with tmax of 102. Repeat blood cultures pending. Viral and fungal labs pending. Start treatment dose Acyclovir. Will plan for LP tomorrow. Monitor closely.     Scheduled Meds:   acyclovir (ZOVIRAX) IVPB (wt > 70 kg)  10 mg/kg Intravenous Q8H    ceFEPime (MAXIPIME) IVPB  2 g Intravenous Q8H    DAPTOmycin (CUBICIN)  IV  6 mg/kg Intravenous Q24H    hydrocortisone sodium succinate  50 mg Intravenous Q8H    levETIRAcetam  500 mg Oral BID    levothyroxine  112 mcg Oral Before breakfast    magnesium oxide  400 mg Oral Daily    OLANZapine  5 mg Oral QHS    sodium bicarbonate  650 mg Oral BID    sodium chloride 0.9%  500 mL Intravenous Q8H    sulfamethoxazole-trimethoprim  400-80mg  1 tablet Oral Daily     Continuous Infusions:  PRN Meds:acetaminophen, calcium carbonate, dextrose 50%, dextrose 50%, glucagon (human recombinant), glucose, glucose, hydrALAZINE, insulin aspart U-100, ondansetron, sodium chloride 0.9%    Review of Systems   Constitutional: Positive for activity change, appetite change and fever. Negative for chills.   HENT: Negative for congestion and facial swelling.    Eyes: Negative for pain, discharge and visual disturbance.   Respiratory: Negative for cough, chest tightness, shortness of breath and wheezing.    Cardiovascular: Negative for chest pain, palpitations and leg swelling.   Gastrointestinal: Positive for diarrhea. Negative for abdominal distention, abdominal pain, nausea and vomiting.   Endocrine: Negative.    Genitourinary: Negative for decreased urine volume, difficulty urinating and dysuria.   Skin: Positive for wound (chevron well healed).   Allergic/Immunologic: Positive for immunocompromised state.   Neurological: Positive for weakness. Negative for light-headedness.   Psychiatric/Behavioral: Positive for confusion, decreased concentration and dysphoric mood. Negative for agitation. The patient is not nervous/anxious.      Objective:     Vital Signs (Most Recent):  Temp: 98.7 °F (37.1 °C) (07/14/19 1144)  Pulse: (!) 111 (07/14/19 1144)  Resp: 16 (07/14/19 1144)  BP: 125/66 (07/14/19 1144)  SpO2: 96 % (07/14/19 1144) Vital Signs (24h Range):  Temp:  [98 °F (36.7 °C)-102.3 °F (39.1 °C)] 98.7 °F (37.1 °C)  Pulse:  [] 111  Resp:  [16-18] 16  SpO2:  [96 %-99 %] 96 %  BP: (125-157)/(60-74) 125/66     Weight: 81.4 kg (179 lb 7.3 oz)  Body mass index is 30.8 kg/m².    Intake/Output - Last 3 Shifts       07/12 0700 - 07/13 0659 07/13 0700 - 07/14 0659 07/14 0700 - 07/15 0659    P.O. 180 720     I.V. (mL/kg) 0 (0)      Other 0      IV Piggyback  50     Total Intake(mL/kg) 180 (2.1) 770 (9.5)     Urine (mL/kg/hr) 0 (0)      Emesis/NG output 0      Other 0       Stool 0      Blood 0      Total Output 0      Net +180 +770            Urine Occurrence 6 x 4 x     Stool Occurrence 3 x 1 x 0 x    Emesis Occurrence 0 x 0 x           Physical Exam   Constitutional: She is oriented to person, place, and time. She appears well-developed. No distress.   HENT:   Head: Normocephalic and atraumatic.   Eyes: Pupils are equal, round, and reactive to light. No scleral icterus.   Neck: Normal range of motion. Neck supple. No thyromegaly present.   Cardiovascular: Regular rhythm. Tachycardia present. Exam reveals no friction rub.   No murmur heard.  Pulmonary/Chest: Effort normal. She has decreased breath sounds in the right middle field, the right lower field and the left lower field. She has no wheezes. She has no rales.   Diminished RLL   Abdominal: Soft. She exhibits no distension. There is no tenderness. There is no rebound and no guarding.   Healed chevron incision   Musculoskeletal: Normal range of motion.   Neurological: She is alert and oriented to person, place, and time. She is not disoriented.   Oriented to person, easily re oriented   Skin: Skin is warm and dry. She is not diaphoretic.   Psychiatric: She has a normal mood and affect. Her behavior is normal. Judgment and thought content normal. Her speech is delayed.   Nursing note and vitals reviewed.      Laboratory:  Immunosuppressants     None        CBC:   Recent Labs   Lab 07/14/19  0632   WBC 5.81   RBC 3.08*   HGB 8.7*   HCT 27.1*      MCV 88   MCH 28.2   MCHC 32.1     CMP:   Recent Labs   Lab 07/14/19  0632   GLU 91   CALCIUM 8.3*   ALBUMIN 2.0*   PROT 5.2*      K 3.2*   CO2 20*      BUN 14   CREATININE 0.7   ALKPHOS 159*   ALT 5*   AST 12   BILITOT 0.6     Labs within the past 24 hours have been reviewed.    Diagnostic Results:  I have personally reviewed all pertinent imaging studies.    Debility/Functional status: Patient debilitated by evidence of Weakness. Physical and occupational therapy  "ordered daily to evaluate and treat. Debility was: present on admission.    Assessment/Plan:     * Seizure-like activity  - pt seen sitting in chair AM of 7/13 stating "I just had a seizure, I just finished banging my head on the chair."  - pt couldn't recall events surrounding seizure like activity.  - given recent hx of fall with ICH on 6/24, Neuro consulted in which pt had a very inconsistent neuro exam.   - Keppra 500 mg bid restarted and a STAT CT head performed which did not show a new infarct or bleed.  - dc'd Lovenox.   - prograf held for now. Started hydrocortisone.   - Monitor.       Diarrhea  - pt with diarrhea on admission.  - C diff negative, CMV undetected.  - Stool culture, WBC, ova cysts parasites, and GI pathogen panel pending.  - cellcept on hold.  - continue to monitor.      UTI (urinary tract infection) due to Enterococcus  - UA 7/5 unremarkable.  - urine culture 7/11 positive for enterococcus faecium- susceptibilities pending.  - dc'd cefpodoxime and flagyl on 7/12.  - started Zyvox on 7/12.  - ID transitioned Zyvox to Dapto on 7/13 as Zyvox can lower the seizure threshold.  - continue to monitor.    Hypokalemia  - replace as needed.  - Kdur 40 meq po x 1 dose.   - monitor.       Fever  - Infectious work up ordered on admit - blood cx NGTD, UA unremarkable, CMV PCR 7/5 undetected, chest x-ray w right pleural effusion, CT A/P reviewed.  - Started broad spectrum antibiotics on admit.   - Abdominal and head CT reviewed and no clear source for fever.   - ID consulted.  Apprec recs.   - Pt with intermittent confusion.  Easily re oriented.  Continue to monitor.   - Thora and para done 7/9; amount of fluid removed not recorded and pleural fluid labs were not completed as ordered.   - Abdominal gram stain reveals no WBC and no organisms; cell count not completed as ordered; pleural fluid cell count 84 WBCs, 24% segs.  - Pt still reports diarrhea- C diff negative, CMV undetected. Stool culture, WBC, ova " cysts parasites, and GI pathogen panel pending.   - low grade fever 7/12, 100.8.  - deescalated abx to PO cefpodoxime and flagyl on 7/12.  - MRI head w/ and w/o contrast 7/12 without acute changes.  - Urine culture positive for enterococcus faecium- started Zyvox and dc'd cefpodoxime and flagyl 7/12.  - temp 100.4 on 7/13.  - Zyvox changed to Dapto as pt with seizure like activity earlier in morning and zyvox can lower seizure threshold.  - repeat blood cultures ngtd.  - ammonia level wnl.   - pt also with confusion, AMS. D/w ID - restarted Cefepime.  - procalcitonin, lactate, EBV, RPR, fungitell, aspergillus, HIV, Saadia Delacruz, histoplasma antigen and blastomyces antigen and CMV PCR pending.  - Tmax 102 today. Repeat blood cultures pending.   - Start Acyclovir treatment dose for possible viral meningitis.   - Monitor.        Traumatic intracranial hemorrhage without loss of consciousness  - Head CT reviewed and noted with interval reduction in size of the patient's known right occipital lobe parenchymal hemorrhage.      At risk for opportunistic infections  - cont bactrim for PCP prophylaxis.  - (CMV D+/R+) hold Valcyte.   - Acyclovir started 7/14.      Closed head injury  - admitted recently following fall diagnosed w acute parenchymal hemorrhage within right occipital live near parieto-occipital junction 1.8x1.3x1.5 w mild surrounding vasogenic edema and localized mass.  Repeat CT head showed reduction in size of hemorrhage.  - pt denies HA.      Long-term use of immunosuppressant medication  - holding prograf 7/13 due to seizure like activity.  - Holding MMF. Monitor prograf level daily, monitor for toxic side effects, and adjust for therapeutic dose.   - hydrocortisone 7/14.     Prophylactic immunotherapy  - See long term use of immunosuppression.  Decreased as likely w sepsis.    Status post liver transplant  - LFTs stable.  - Pt with good hepatic allograft function.   - Last Liver US 6/24 showed Mildly  elevated hepatic arterial resistive indices, although improved from prior exam.  Otherwise, satisfactory vascular appearance of the liver, complex fluid collection adjacent to the left hepatic lobe, similar to slightly smaller compared to prior exam, small volume of ascites and partially visualized right pleural effusion.   - para completed 7/9, cultures pending. Cell count not collected as ordered.     Anemia of chronic disease  - no overt bleeding.    - keep type and screen current.  - transfused 1u prbc 7/9.  - monitor with daily labs.      Pleural effusion, right  - CXR reviewed.    - Thora done 7/9  - pleural fluid labs not completed as ordered- will try to get labs repeated if specimen is still available.  - cell count: 84 WBCs, 24% segs      Weakness  - PT/OT consulted.        Moderate malnutrition  - supplements ordered.      GERD (gastroesophageal reflux disease)  - cont Pepcid.          VTE Risk Mitigation (From admission, onward)        Ordered     Place sequential compression device  Until discontinued      07/05/19 1659     IP VTE HIGH RISK PATIENT  Once      07/05/19 1659          The patients clinical status was discussed at multidisplinary rounds, involving transplant surgery, transplant medicine, pharmacy, nursing, nutrition, and social work    Discharge Planning: not a candidate for dc at this time.       Debbie Wilkes, TYRA  Liver Transplant  [facility]

## 2019-07-14 NOTE — PLAN OF CARE
Attempted to see patient on two occasions but patient was not in the room.    Patient continues to spike fevers, today to 102.3 at 9 AM.    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 She underwent paracentesis/thoracentesis that were paucicellular. Patient's AMS and fevers initially improved with vanc / cefepime, transitioned to cefpodoxime / metronidazole with recurrence of fever.  Urine culture with VRE, started on linezolid, but now with recurrent AMS and fevers.    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

## 2019-07-14 NOTE — ASSESSMENT & PLAN NOTE
- Infectious work up ordered on admit - blood cx NGTD, UA unremarkable, CMV PCR 7/5 undetected, chest x-ray w right pleural effusion, CT A/P reviewed.  - Started broad spectrum antibiotics on admit.   - Abdominal and head CT reviewed and no clear source for fever.   - ID consulted.  Apprec recs.   - Pt with intermittent confusion.  Easily re oriented.  Continue to monitor.   - Thora and para done 7/9; amount of fluid removed not recorded and pleural fluid labs were not completed as ordered.   - Abdominal gram stain reveals no WBC and no organisms; cell count not completed as ordered; pleural fluid cell count 84 WBCs, 24% segs.  - Pt still reports diarrhea- C diff negative, CMV undetected. Stool culture, WBC, ova cysts parasites, and GI pathogen panel pending.   - low grade fever 7/12, 100.8.  - deescalated abx to PO cefpodoxime and flagyl on 7/12.  - MRI head w/ and w/o contrast 7/12 without acute changes.  - Urine culture positive for enterococcus faecium- started Zyvox and dc'd cefpodoxime and flagyl 7/12.  - temp 100.4 on 7/13.  - Zyvox changed to Dapto as pt with seizure like activity earlier in morning and zyvox can lower seizure threshold.  - repeat blood cultures ngtd.  - ammonia level wnl.   - pt also with confusion, AMS. D/w ID - restarted Cefepime.  - procalcitonin, lactate, EBV, RPR, fungitell, aspergillus, HIV, Saadia Delacruz, histoplasma antigen and blastomyces antigen and CMV PCR pending.  - Tmax 102 today. Repeat blood cultures pending.   - Start Acyclovir treatment dose for possible viral meningitis.   - Monitor.

## 2019-07-14 NOTE — ASSESSMENT & PLAN NOTE
"- pt seen sitting in chair AM of 7/13 stating "I just had a seizure, I just finished banging my head on the chair."  - pt couldn't recall events surrounding seizure like activity.  - given recent hx of fall with ICH on 6/24, Neuro consulted in which pt had a very inconsistent neuro exam.   - Keppra 500 mg bid restarted and a STAT CT head performed which did not show a new infarct or bleed.  - dc'd Lovenox.   - prograf held for now. Started hydrocortisone.   - Monitor.     "

## 2019-07-14 NOTE — SUBJECTIVE & OBJECTIVE
Scheduled Meds:   acyclovir (ZOVIRAX) IVPB (wt > 70 kg)  10 mg/kg Intravenous Q8H    ceFEPime (MAXIPIME) IVPB  2 g Intravenous Q8H    DAPTOmycin (CUBICIN)  IV  6 mg/kg Intravenous Q24H    hydrocortisone sodium succinate  50 mg Intravenous Q8H    levETIRAcetam  500 mg Oral BID    levothyroxine  112 mcg Oral Before breakfast    magnesium oxide  400 mg Oral Daily    OLANZapine  5 mg Oral QHS    sodium bicarbonate  650 mg Oral BID    sodium chloride 0.9%  500 mL Intravenous Q8H    sulfamethoxazole-trimethoprim 400-80mg  1 tablet Oral Daily     Continuous Infusions:  PRN Meds:acetaminophen, calcium carbonate, dextrose 50%, dextrose 50%, glucagon (human recombinant), glucose, glucose, hydrALAZINE, insulin aspart U-100, ondansetron, sodium chloride 0.9%    Review of Systems   Constitutional: Positive for activity change, appetite change and fever. Negative for chills.   HENT: Negative for congestion and facial swelling.    Eyes: Negative for pain, discharge and visual disturbance.   Respiratory: Negative for cough, chest tightness, shortness of breath and wheezing.    Cardiovascular: Negative for chest pain, palpitations and leg swelling.   Gastrointestinal: Positive for diarrhea. Negative for abdominal distention, abdominal pain, nausea and vomiting.   Endocrine: Negative.    Genitourinary: Negative for decreased urine volume, difficulty urinating and dysuria.   Skin: Positive for wound (chevron well healed).   Allergic/Immunologic: Positive for immunocompromised state.   Neurological: Positive for weakness. Negative for light-headedness.   Psychiatric/Behavioral: Positive for confusion, decreased concentration and dysphoric mood. Negative for agitation. The patient is not nervous/anxious.      Objective:     Vital Signs (Most Recent):  Temp: 98.7 °F (37.1 °C) (07/14/19 1144)  Pulse: (!) 111 (07/14/19 1144)  Resp: 16 (07/14/19 1144)  BP: 125/66 (07/14/19 1144)  SpO2: 96 % (07/14/19 1144) Vital Signs (24h  Range):  Temp:  [98 °F (36.7 °C)-102.3 °F (39.1 °C)] 98.7 °F (37.1 °C)  Pulse:  [] 111  Resp:  [16-18] 16  SpO2:  [96 %-99 %] 96 %  BP: (125-157)/(60-74) 125/66     Weight: 81.4 kg (179 lb 7.3 oz)  Body mass index is 30.8 kg/m².    Intake/Output - Last 3 Shifts       07/12 0700 - 07/13 0659 07/13 0700 - 07/14 0659 07/14 0700 - 07/15 0659    P.O. 180 720     I.V. (mL/kg) 0 (0)      Other 0      IV Piggyback  50     Total Intake(mL/kg) 180 (2.1) 770 (9.5)     Urine (mL/kg/hr) 0 (0)      Emesis/NG output 0      Other 0      Stool 0      Blood 0      Total Output 0      Net +180 +770            Urine Occurrence 6 x 4 x     Stool Occurrence 3 x 1 x 0 x    Emesis Occurrence 0 x 0 x           Physical Exam   Constitutional: She is oriented to person, place, and time. She appears well-developed. No distress.   HENT:   Head: Normocephalic and atraumatic.   Eyes: Pupils are equal, round, and reactive to light. No scleral icterus.   Neck: Normal range of motion. Neck supple. No thyromegaly present.   Cardiovascular: Regular rhythm. Tachycardia present. Exam reveals no friction rub.   No murmur heard.  Pulmonary/Chest: Effort normal. She has decreased breath sounds in the right middle field, the right lower field and the left lower field. She has no wheezes. She has no rales.   Diminished RLL   Abdominal: Soft. She exhibits no distension. There is no tenderness. There is no rebound and no guarding.   Healed chevron incision   Musculoskeletal: Normal range of motion.   Neurological: She is alert and oriented to person, place, and time. She is not disoriented.   Oriented to person, easily re oriented   Skin: Skin is warm and dry. She is not diaphoretic.   Psychiatric: She has a normal mood and affect. Her behavior is normal. Judgment and thought content normal. Her speech is delayed.   Nursing note and vitals reviewed.      Laboratory:  Immunosuppressants     None        CBC:   Recent Labs   Lab 07/14/19  0632   WBC 5.81    RBC 3.08*   HGB 8.7*   HCT 27.1*      MCV 88   MCH 28.2   MCHC 32.1     CMP:   Recent Labs   Lab 07/14/19  0632   GLU 91   CALCIUM 8.3*   ALBUMIN 2.0*   PROT 5.2*      K 3.2*   CO2 20*      BUN 14   CREATININE 0.7   ALKPHOS 159*   ALT 5*   AST 12   BILITOT 0.6     Labs within the past 24 hours have been reviewed.    Diagnostic Results:  I have personally reviewed all pertinent imaging studies.    Debility/Functional status: Patient debilitated by evidence of Weakness. Physical and occupational therapy ordered daily to evaluate and treat. Debility was: present on admission.

## 2019-07-14 NOTE — ASSESSMENT & PLAN NOTE
- holding prograf 7/13 due to seizure like activity.  - Holding MMF. Monitor prograf level daily, monitor for toxic side effects, and adjust for therapeutic dose.   - hydrocortisone 7/14.

## 2019-07-14 NOTE — PLAN OF CARE
Pt disoriented to situation. VSS, afebrile, SpO2>92% on RA. Pt refusing tele. Shelbie FONTANA (pt removed steristrips during dayshift) IV cefepime and daptomycin continued. Outstanding urine studies. Blanchable pinkness to buttock with barrier cream. Pt repositions self. See flowsheet for assessment findings. Will continue to monitor.

## 2019-07-14 NOTE — ASSESSMENT & PLAN NOTE
- no overt bleeding.    - keep type and screen current.  - transfused 1u prbc 7/9.  - monitor with daily labs.

## 2019-07-14 NOTE — SUBJECTIVE & OBJECTIVE
Interval History:   Spiked to 100.4  Per primary team, patient with altered mental status this AM, removed clothes, yelling  On exam, patient responds appropriately to questions, denied having any pain  Able to state year and president  Patient requesting to be left alone    Review of Systems   Constitutional: Positive for activity change and appetite change. Negative for chills, diaphoresis and fever.   HENT: Negative for ear pain, mouth sores, sinus pressure and sore throat.    Eyes: Negative for photophobia, pain and redness.   Respiratory: Negative for cough, shortness of breath and wheezing.    Cardiovascular: Negative for chest pain and leg swelling.   Gastrointestinal: Positive for diarrhea. Negative for abdominal distention, abdominal pain, nausea and vomiting.   Genitourinary: Negative for decreased urine volume, difficulty urinating, dysuria, flank pain, frequency and urgency.   Musculoskeletal: Negative for arthralgias, back pain, gait problem and myalgias.   Skin: Positive for wound (chevron well healed). Negative for pallor and rash.   Allergic/Immunologic: Positive for immunocompromised state.   Neurological: Positive for weakness. Negative for dizziness, tremors, seizures, light-headedness and headaches.   Psychiatric/Behavioral: Negative for agitation, confusion and decreased concentration. The patient is not nervous/anxious.      Objective:     Vital Signs (Most Recent):  Temp: 98.4 °F (36.9 °C) (07/13/19 1555)  Pulse: 78 (07/13/19 1555)  Resp: 18 (07/13/19 1555)  BP: 126/60 (07/13/19 1555)  SpO2: 98 % (07/13/19 1555) Vital Signs (24h Range):  Temp:  [98.4 °F (36.9 °C)-100.4 °F (38 °C)] 98.4 °F (36.9 °C)  Pulse:  [] 78  Resp:  [18-22] 18  SpO2:  [95 %-98 %] 98 %  BP: (126-147)/(60-85) 126/60     Weight: 84.6 kg (186 lb 8.2 oz)  Body mass index is 32.01 kg/m².    Estimated Creatinine Clearance: 100.1 mL/min (based on SCr of 0.7 mg/dL).    Physical Exam   Constitutional: She appears  well-developed and well-nourished. No distress.   HENT:   Head: Normocephalic and atraumatic.   Eyes: Conjunctivae and EOM are normal.   Neck: Normal range of motion. Neck supple.   Pulmonary/Chest: Effort normal. No respiratory distress.   Abdominal: Soft. She exhibits no distension. There is no tenderness.   Musculoskeletal: Normal range of motion. She exhibits no edema.   Neurological: She is alert.   Responds appropriately to questions, oriented to year   Skin: Skin is warm and dry. No rash noted. She is not diaphoretic. No erythema.   Vitals reviewed.      Significant Labs:   CBC:   Recent Labs   Lab 07/12/19  0636 07/13/19  0655   WBC 6.81 6.07   HGB 8.6* 8.4*   HCT 25.8* 26.0*   * 160     CMP:   Recent Labs   Lab 07/12/19  0636 07/13/19  0655   * 133*   K 3.7 3.3*    105   CO2 17* 18*   GLU 76 104   BUN 11 15   CREATININE 0.7 0.7   CALCIUM 8.0* 8.1*   PROT 4.7* 4.9*   ALBUMIN 1.8* 1.9*   BILITOT 0.6 0.6   ALKPHOS 152* 155*   AST 11 11   ALT <5* 5*   ANIONGAP 10 10   EGFRNONAA >60.0 >60.0       Significant Imaging: I have reviewed all pertinent imaging results/findings within the past 24 hours.

## 2019-07-14 NOTE — ASSESSMENT & PLAN NOTE
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 She underwent paracentesis/thoracentesis that were paucicellular. Patient's AMS and fevers initially improved with vanc / cefepime, transitioned to cefpodoxime / metronidazole with recurrence of fever.  Urine culture with VRE, started on linezolid, but now with recurrent AMS. Patient with waxing and waning mental status, suspect delirium.    Recommendations  - Discontinue linezolid per neuro recommendations as can lower seizure threshold  - Start daptomycin 6 mg/kg q24 hours x5 days, restart empiric cefepime  - remainder of prophylaxis per protocol  - [place] stool pathogens PCR panel pending  - HIV, histo/blasto/cocci, aspergillus Ag ordered  - If persistent fevers and diarrhea, would consider colonoscopy for further evaluation  - If persistent fevers and AMS, would consider LP for further evaluation

## 2019-07-14 NOTE — PROGRESS NOTES
Temp increased to 102.3 after Tylenol administration. Debbie Wilkes NP notified. Orders placed and acknowledged. Will continue to monitor.

## 2019-07-14 NOTE — PLAN OF CARE
Problem: Adult Inpatient Plan of Care  Goal: Plan of Care Review  Outcome: Ongoing (interventions implemented as appropriate)  Patient is AAOx4 this shift.VSS. Pt. continuing to refuse telemetry monitoring. Tachycardic. Heart rate in 100s. O2 sats maintained >95% on RA. Tmax 102.3. PRN tylenol administered. Cefepime and Dapto continued. Acyclovir started. Proper hand hygiene performed before and after patient care activities. Chevron incision ADDI. Patient up in chair and ambulated to bathroom throughout shift. Voiding in bedside commode. Patient remained free from falls and injury so far this shift. Bed locked and in lowest position, call light within reach, non skid socks on, family to remain at bedside. Will continue to monitor.

## 2019-07-15 ENCOUNTER — ANESTHESIA (OUTPATIENT)
Dept: TRANSPLANT | Facility: HOSPITAL | Age: 49
End: 2019-07-15

## 2019-07-15 ENCOUNTER — ANESTHESIA EVENT (OUTPATIENT)
Dept: TRANSPLANT | Facility: HOSPITAL | Age: 49
End: 2019-07-15

## 2019-07-15 PROBLEM — R41.82 ALTERED MENTAL STATUS, UNSPECIFIED: Status: ACTIVE | Noted: 2019-07-15

## 2019-07-15 LAB
ALBUMIN SERPL BCP-MCNC: 1.8 G/DL (ref 3.5–5.2)
ALP SERPL-CCNC: 147 U/L (ref 55–135)
ALT SERPL W/O P-5'-P-CCNC: 6 U/L (ref 10–44)
ANION GAP SERPL CALC-SCNC: 9 MMOL/L (ref 8–16)
ANISOCYTOSIS BLD QL SMEAR: SLIGHT
AST SERPL-CCNC: 11 U/L (ref 10–40)
BACTERIA BLD CULT: NORMAL
BACTERIA STL CULT: NORMAL
BACTERIA STL CULT: NORMAL
BACTERIA UR CULT: NO GROWTH
BASOPHILS # BLD AUTO: 0.02 K/UL (ref 0–0.2)
BASOPHILS NFR BLD: 0.5 % (ref 0–1.9)
BILIRUB SERPL-MCNC: 0.4 MG/DL (ref 0.1–1)
BUN SERPL-MCNC: 17 MG/DL (ref 6–20)
CALCIUM SERPL-MCNC: 8 MG/DL (ref 8.7–10.5)
CHLORIDE SERPL-SCNC: 110 MMOL/L (ref 95–110)
CO2 SERPL-SCNC: 18 MMOL/L (ref 23–29)
CREAT SERPL-MCNC: 0.6 MG/DL (ref 0.5–1.4)
DIFFERENTIAL METHOD: ABNORMAL
EOSINOPHIL # BLD AUTO: 0 K/UL (ref 0–0.5)
EOSINOPHIL NFR BLD: 0 % (ref 0–8)
ERYTHROCYTE [DISTWIDTH] IN BLOOD BY AUTOMATED COUNT: 15.8 % (ref 11.5–14.5)
EST. GFR  (AFRICAN AMERICAN): >60 ML/MIN/1.73 M^2
EST. GFR  (NON AFRICAN AMERICAN): >60 ML/MIN/1.73 M^2
GLUCOSE SERPL-MCNC: 153 MG/DL (ref 70–110)
HCT VFR BLD AUTO: 22.4 % (ref 37–48.5)
HGB BLD-MCNC: 7.1 G/DL (ref 12–16)
HIV 1+2 AB+HIV1 P24 AG SERPL QL IA: NEGATIVE
IMM GRANULOCYTES # BLD AUTO: 0.11 K/UL (ref 0–0.04)
IMM GRANULOCYTES NFR BLD AUTO: 2.8 % (ref 0–0.5)
L PNEUMO AG UR QL IA: NOT DETECTED
LYMPHOCYTES # BLD AUTO: 0.2 K/UL (ref 1–4.8)
LYMPHOCYTES NFR BLD: 5.1 % (ref 18–48)
MAGNESIUM SERPL-MCNC: 1.7 MG/DL (ref 1.6–2.6)
MCH RBC QN AUTO: 28 PG (ref 27–31)
MCHC RBC AUTO-ENTMCNC: 31.7 G/DL (ref 32–36)
MCV RBC AUTO: 88 FL (ref 82–98)
MONOCYTES # BLD AUTO: 0.2 K/UL (ref 0.3–1)
MONOCYTES NFR BLD: 4.9 % (ref 4–15)
NEUTROPHILS # BLD AUTO: 3.4 K/UL (ref 1.8–7.7)
NEUTROPHILS NFR BLD: 86.7 % (ref 38–73)
NRBC BLD-RTO: 0 /100 WBC
OVALOCYTES BLD QL SMEAR: ABNORMAL
PLATELET # BLD AUTO: 134 K/UL (ref 150–350)
PLATELET BLD QL SMEAR: ABNORMAL
PMV BLD AUTO: 10.7 FL (ref 9.2–12.9)
POCT GLUCOSE: 171 MG/DL (ref 70–110)
POIKILOCYTOSIS BLD QL SMEAR: SLIGHT
POTASSIUM SERPL-SCNC: 3.7 MMOL/L (ref 3.5–5.1)
PROT SERPL-MCNC: 4.9 G/DL (ref 6–8.4)
RBC # BLD AUTO: 2.54 M/UL (ref 4–5.4)
RPR SER QL: NORMAL
SODIUM SERPL-SCNC: 137 MMOL/L (ref 136–145)
SPHEROCYTES BLD QL SMEAR: ABNORMAL
TACROLIMUS BLD-MCNC: <1.5 NG/ML (ref 5–15)
WBC # BLD AUTO: 3.9 K/UL (ref 3.9–12.7)

## 2019-07-15 PROCEDURE — 99233 SBSQ HOSP IP/OBS HIGH 50: CPT | Mod: 24,,, | Performed by: PHYSICIAN ASSISTANT

## 2019-07-15 PROCEDURE — 80053 COMPREHEN METABOLIC PANEL: CPT

## 2019-07-15 PROCEDURE — 25500020 PHARM REV CODE 255: Performed by: STUDENT IN AN ORGANIZED HEALTH CARE EDUCATION/TRAINING PROGRAM

## 2019-07-15 PROCEDURE — 97535 SELF CARE MNGMENT TRAINING: CPT

## 2019-07-15 PROCEDURE — 63600175 PHARM REV CODE 636 W HCPCS: Performed by: NURSE PRACTITIONER

## 2019-07-15 PROCEDURE — 25000003 PHARM REV CODE 250: Performed by: PHYSICIAN ASSISTANT

## 2019-07-15 PROCEDURE — 25000003 PHARM REV CODE 250: Performed by: NURSE PRACTITIONER

## 2019-07-15 PROCEDURE — 99233 SBSQ HOSP IP/OBS HIGH 50: CPT | Mod: ,,, | Performed by: INTERNAL MEDICINE

## 2019-07-15 PROCEDURE — 99233 PR SUBSEQUENT HOSPITAL CARE,LEVL III: ICD-10-PCS | Mod: 24,,, | Performed by: PHYSICIAN ASSISTANT

## 2019-07-15 PROCEDURE — 99222 PR INITIAL HOSPITAL CARE,LEVL II: ICD-10-PCS | Mod: ,,, | Performed by: PSYCHIATRY & NEUROLOGY

## 2019-07-15 PROCEDURE — 36415 COLL VENOUS BLD VENIPUNCTURE: CPT

## 2019-07-15 PROCEDURE — 25500020 PHARM REV CODE 255: Performed by: TRANSPLANT SURGERY

## 2019-07-15 PROCEDURE — 85025 COMPLETE CBC W/AUTO DIFF WBC: CPT

## 2019-07-15 PROCEDURE — 99233 PR SUBSEQUENT HOSPITAL CARE,LEVL III: ICD-10-PCS | Mod: ,,, | Performed by: INTERNAL MEDICINE

## 2019-07-15 PROCEDURE — 63600175 PHARM REV CODE 636 W HCPCS: Mod: JG | Performed by: INTERNAL MEDICINE

## 2019-07-15 PROCEDURE — 80197 ASSAY OF TACROLIMUS: CPT

## 2019-07-15 PROCEDURE — 99222 1ST HOSP IP/OBS MODERATE 55: CPT | Mod: ,,, | Performed by: PSYCHIATRY & NEUROLOGY

## 2019-07-15 PROCEDURE — 20600001 HC STEP DOWN PRIVATE ROOM

## 2019-07-15 PROCEDURE — 83735 ASSAY OF MAGNESIUM: CPT

## 2019-07-15 PROCEDURE — 25000003 PHARM REV CODE 250: Performed by: INTERNAL MEDICINE

## 2019-07-15 RX ORDER — OLANZAPINE 2.5 MG/1
7.5 TABLET ORAL NIGHTLY
Status: DISCONTINUED | OUTPATIENT
Start: 2019-07-15 | End: 2019-07-18

## 2019-07-15 RX ADMIN — CEFEPIME 2 G: 2 INJECTION, POWDER, FOR SOLUTION INTRAVENOUS at 01:07

## 2019-07-15 RX ADMIN — HYDROCORTISONE SODIUM SUCCINATE 50 MG: 100 INJECTION, POWDER, FOR SOLUTION INTRAMUSCULAR; INTRAVENOUS at 01:07

## 2019-07-15 RX ADMIN — IOHEXOL 75 ML: 350 INJECTION, SOLUTION INTRAVENOUS at 04:07

## 2019-07-15 RX ADMIN — LEVETIRACETAM 500 MG: 500 TABLET ORAL at 09:07

## 2019-07-15 RX ADMIN — SODIUM CHLORIDE 500 ML: 0.9 INJECTION, SOLUTION INTRAVENOUS at 03:07

## 2019-07-15 RX ADMIN — DAPTOMYCIN 510 MG: 350 INJECTION, POWDER, LYOPHILIZED, FOR SOLUTION INTRAVENOUS at 08:07

## 2019-07-15 RX ADMIN — ACYCLOVIR SODIUM 810 MG: 1000 INJECTION, SOLUTION INTRAVENOUS at 06:07

## 2019-07-15 RX ADMIN — MAGNESIUM OXIDE TAB 400 MG (241.3 MG ELEMENTAL MG) 400 MG: 400 (241.3 MG) TAB at 09:07

## 2019-07-15 RX ADMIN — ACYCLOVIR SODIUM 810 MG: 1000 INJECTION, SOLUTION INTRAVENOUS at 03:07

## 2019-07-15 RX ADMIN — SODIUM BICARBONATE 650 MG TABLET 650 MG: at 08:07

## 2019-07-15 RX ADMIN — CEFEPIME 2 G: 2 INJECTION, POWDER, FOR SOLUTION INTRAVENOUS at 05:07

## 2019-07-15 RX ADMIN — SODIUM CHLORIDE 500 ML: 0.9 INJECTION, SOLUTION INTRAVENOUS at 12:07

## 2019-07-15 RX ADMIN — SULFAMETHOXAZOLE AND TRIMETHOPRIM 1 TABLET: 400; 80 TABLET ORAL at 09:07

## 2019-07-15 RX ADMIN — SODIUM BICARBONATE 650 MG TABLET 650 MG: at 09:07

## 2019-07-15 RX ADMIN — CEFEPIME 2 G: 2 INJECTION, POWDER, FOR SOLUTION INTRAVENOUS at 08:07

## 2019-07-15 RX ADMIN — LEVETIRACETAM 500 MG: 500 TABLET ORAL at 08:07

## 2019-07-15 RX ADMIN — SODIUM CHLORIDE 500 ML: 0.9 INJECTION, SOLUTION INTRAVENOUS at 06:07

## 2019-07-15 RX ADMIN — HYDROCORTISONE SODIUM SUCCINATE 50 MG: 100 INJECTION, POWDER, FOR SOLUTION INTRAMUSCULAR; INTRAVENOUS at 05:07

## 2019-07-15 RX ADMIN — IOHEXOL 15 ML: 350 INJECTION, SOLUTION INTRAVENOUS at 01:07

## 2019-07-15 RX ADMIN — HYDROCORTISONE SODIUM SUCCINATE 50 MG: 100 INJECTION, POWDER, FOR SOLUTION INTRAMUSCULAR; INTRAVENOUS at 08:07

## 2019-07-15 RX ADMIN — ACYCLOVIR SODIUM 810 MG: 1000 INJECTION, SOLUTION INTRAVENOUS at 11:07

## 2019-07-15 RX ADMIN — OLANZAPINE 7.5 MG: 2.5 TABLET, FILM COATED ORAL at 08:07

## 2019-07-15 RX ADMIN — LEVOTHYROXINE SODIUM 112 MCG: 50 TABLET ORAL at 05:07

## 2019-07-15 NOTE — ASSESSMENT & PLAN NOTE
- Infectious work up ordered on admit - blood cx NGTD, UA unremarkable, CMV PCR 7/5 undetected, chest x-ray w right pleural effusion, CT A/P reviewed.  - Started broad spectrum antibiotics on admit.   - Abdominal and head CT reviewed and no clear source for fever.   - ID consulted.  Apprec recs.   - Pt with intermittent confusion.  Easily re oriented.  Continue to monitor.   - Thora and para done 7/9; amount of fluid removed not recorded and pleural fluid labs were not completed as ordered.   - Abdominal gram stain reveals no WBC and no organisms; cell count not completed as ordered; pleural fluid cell count 84 WBCs, 24% segs.  - Pt still reports diarrhea- C diff negative, CMV undetected. Stool culture, WBC, ova cysts parasites, and GI pathogen panel pending.   - low grade fever 7/12, 100.8.  - deescalated abx to PO cefpodoxime and flagyl on 7/12.  - MRI head w/ and w/o contrast 7/12 without acute changes.  - Urine culture positive for enterococcus faecium- started Zyvox and dc'd cefpodoxime and flagyl 7/12.  - temp 100.4 on 7/13.  - Zyvox changed to Dapto as pt with seizure like activity earlier in morning and zyvox can lower seizure threshold.  - repeat blood cultures ngtd.  - ammonia level wnl.   - pt also with confusion, AMS. D/w ID - restarted Cefepime.  - procalcitonin, lactate, EBV, RPR, fungitell, aspergillus, HIV, Saadia Delacruz, histoplasma antigen and blastomyces antigen and CMV PCR pending.  - Start Acyclovir treatment dose for possible viral meningitis.   - repeat blood cx on 7/13 and 7/14 remain NGTD. Last fever 102.3 F on 7/14 at 9 AM  - plan to repeat CT a/p with IV and GI contrast today  - Monitor.

## 2019-07-15 NOTE — HOSPITAL COURSE
7/15/2019 Patient was seen in her room today. Reportedly hallucinating and paranoid towards staff, believes that she is being recorded and that people are spreading lies and rumors about her. Admitted to throwing a water bottle at staff this morning. Did not feel regretful about this. Not her baseline per . At baseline, she is pleasant and cooperative, does not curse. Denies SI/HI. Sees shadows throwing things at her head and a little girl in the room.     7/16/2019 Patient seen in hospital room with daughter at bedside. Still on contact precautions. She had generalized body shaking but was able to be calmed once daughter held her. She was more oriented today, to person, place and situation but not to time totally (believed the year is 3017 but knew it was July 16th).     7/17/2019 Patient seen in hospital room. No family at bedside currently. Got a paracentesis for further work up of possible infection. LP contraindicated currently due to ICH. Encephalopathy has improved significantly. More oriented. Was able to name the year as 2017, whereas yesterday she thought it was 3017.     7/18/2019 Patient seen in the hospital room. Daughter sleeping on couch nearby in the room. Continued to improve with orientation. Knew the year and the month but not the date. No elicited delusions or hallucinations. Transitioning off of IV antibiotics right now. Now on PO Bactrim and Valganciclovir.  Has been afebrile since 7/14.     7/19/2019 Patient seen in the hospital room, sitting on recliner. She was pleasant and very cooperative today. Orientation has remained stable. Oriented to person, place and situation but not to time completely. Thought the year was either 3018 or 2018. Zyprexa is being weaned off due to improvement in delirium. No acute concerns. Stated that she felt anxious at times, especially after the transplant, in crowded areas. Was instructed to follow up with outpatient psychiatry on discharge to help with  these concerns.

## 2019-07-15 NOTE — PROGRESS NOTES
[facility]  Infectious Disease  Progress Note    Patient Name: Jihan Zamudio  MRN: [id number]  Admission Date: 7/5/2019  Length of Stay: 10 days  Attending Physician: Jay Herbert MD  Primary Care Provider: Primary Doctor No    Isolation Status: Contact  Assessment/Plan:      Fever  48yo woman w/a history of KESSLER cirrhosis (s/p DBDLT 6/5/2019, CMV D+/R+, steroid induction, on maintenance tacro/MMF/pred; c/b small traumatic R occipital lobe ICH following mechanical fall managed conservatively) who was admitted on 7/5/2019 with acute onset fevers, chills, headache (stable since ICH), dry cough (x10 days), and loose stools. She underwent paracentesis/thoracentesis that were paucicellular. Patient's AMS and fevers initially improved with vanc / cefepime, transitioned to cefpodoxime / metronidazole with recurrence of fever.  Urine culture with VRE, started on linezolid, but now with recurrent AMS. Patient with waxing and waning mental status, suspect delirium.    07/15Diarrhea revolved.  HIV- neg    Recommendations    - [place] daptomycin 6 mg/kg q24 hours x5 days.  - Continue empiric cefepime  - remainder of prophylaxis per protocol  - [place] stool pathogens PCR panel pending  - histo/blasto/cocci, aspergillus Ag pending  - If persistent fevers and AMS, would consider LP for further evaluation          Thank you for your consult. I will follow-up with patient. Please contact us if you have any additional questions.    Anita Nolasco MD  Infectious Disease  [facility]    Subjective:     Principal Problem:Seizure-like activity    HPI: No notes on file  Interval History: A&O x4 this morning- seemed to be confused in the afternoon- her mentation wax and wean.    Review of Systems   Constitutional: Positive for activity change and appetite change. Negative for chills, diaphoresis and fever.   HENT: Negative for ear pain, mouth sores, sinus pressure and sore throat.    Eyes:  Negative for photophobia, pain and redness.   Respiratory: Positive for shortness of breath. Negative for cough and wheezing.    Cardiovascular: Negative for chest pain and leg swelling.   Gastrointestinal: Negative for abdominal distention, abdominal pain, diarrhea, nausea and vomiting.   Genitourinary: Negative for decreased urine volume, difficulty urinating, dysuria, flank pain, frequency and urgency.   Musculoskeletal: Negative for arthralgias, back pain, gait problem and myalgias.   Skin: Positive for wound (chevron well healed). Negative for pallor and rash.   Allergic/Immunologic: Positive for immunocompromised state.   Neurological: Positive for weakness. Negative for dizziness, tremors, seizures, light-headedness and headaches.   Psychiatric/Behavioral: Negative for agitation, confusion and decreased concentration. The patient is not nervous/anxious.      Objective:     Vital Signs (Most Recent):  Temp: 97.9 °F (36.6 °C) (07/15/19 1213)  Pulse: 99 (07/15/19 1213)  Resp: 16 (07/15/19 1213)  BP: (!) 140/73 (07/15/19 1213)  SpO2: 98 % (07/15/19 1213) Vital Signs (24h Range):  Temp:  [97.8 °F (36.6 °C)-99.8 °F (37.7 °C)] 97.9 °F (36.6 °C)  Pulse:  [] 99  Resp:  [16-18] 16  SpO2:  [95 %-99 %] 98 %  BP: (123-144)/(58-80) 140/73     Weight: 81.6 kg (179 lb 14.3 oz)  Body mass index is 30.88 kg/m².    Estimated Creatinine Clearance: 114.7 mL/min (based on SCr of 0.6 mg/dL).    Physical Exam   Constitutional: She appears well-developed and well-nourished. No distress.   HENT:   Head: Normocephalic and atraumatic.   Eyes: Conjunctivae and EOM are normal.   Neck: Normal range of motion. Neck supple.   Pulmonary/Chest: Effort normal. No respiratory distress.   Abdominal: Soft. She exhibits no distension. There is no tenderness.   Musculoskeletal: Normal range of motion. She exhibits no edema.   Neurological: She is alert.   Responds appropriately to questions, oriented to year   Skin: Skin is warm and dry. No  rash noted. She is not diaphoretic. No erythema.   Vitals reviewed.      Significant Labs:   CBC:   Recent Labs   Lab 07/14/19  0632 07/15/19  0650   WBC 5.81 3.90   HGB 8.7* 7.1*   HCT 27.1* 22.4*    134*     CMP:   Recent Labs   Lab 07/14/19  0632 07/15/19  0650    137   K 3.2* 3.7    110   CO2 20* 18*   GLU 91 153*   BUN 14 17   CREATININE 0.7 0.6   CALCIUM 8.3* 8.0*   PROT 5.2* 4.9*   ALBUMIN 2.0* 1.8*   BILITOT 0.6 0.4   ALKPHOS 159* 147*   AST 12 11   ALT 5* 6*   ANIONGAP 12 9   EGFRNONAA >60.0 >60.0       Significant Imaging: I have reviewed all pertinent imaging results/findings within the past 24 hours.

## 2019-07-15 NOTE — PLAN OF CARE
Problem: Adult Inpatient Plan of Care  Goal: Plan of Care Review  Outcome: Ongoing (interventions implemented as appropriate)  Pt remains awake, alert, disorientated to time and situation at times.VSS. Afebrile  Pt violent with RN this morning. Pt threw bottle and told nurse to get out. Nurse returned and pt did not recognize that it was the same nurse.  Psych consulted; Zyprexa dose changed; Keppra continued for seizures; Delirium precautions initiated  Contact precautions initiated for VRE in urine  CT of abdomen performed with contrast; results pending  Pt able to ambulate with PT this morning; pt ambulating x 1 assist to toilet  Acyclovir continued for tx of possible meningitis.  I/O documented in flow sheets; x 2 BM noted  500 cc bolus continued Q8   BG monitored and controlled  Chevron remains open to air  Pt refuses visi and tele monitor  Pt up in chair for most of shift   to remain at bedside.  Pt remains free from falls and injuries  Will continue to monitor

## 2019-07-15 NOTE — ASSESSMENT & PLAN NOTE
- pt with diarrhea on admission.  - C diff negative, CMV undetected.  - Stool culture, WBC, ova cysts parasites, and GI pathogen panel pending.  - cellcept on hold.  - diarrhea now improved as of 7/15  - continue to monitor.

## 2019-07-15 NOTE — PLAN OF CARE
Pt oriented x 4. VSS, Tmax 99.8, SpO2>95% on RA. Pt refusing tele. BG monitoring-no coverage indicated. IV cefepime, daptomycin, and acyclovir continued. 500 mL NS bolus Q8H. Plan for LP in AM. Blanchable pinkness to buttock with barrier cream. Pt repositions self. See flowsheet for assessment findings. Will continue to monitor.

## 2019-07-15 NOTE — PT/OT/SLP PROGRESS
Physical Therapy      Patient Name:  Jihan Zamudio   MRN:  [id number]    Patient not seen today secondary to (pt. away for CT scan). Will follow-up tomorrow.    Theodore Coles, PT   7/15/2019

## 2019-07-15 NOTE — PT/OT/SLP PROGRESS
"Occupational Therapy   Treatment    Name: Jihan Zamudio  MRN: [id number]  Admitting Diagnosis:  Seizure-like activity       Recommendations:     Discharge Recommendations: rehabilitation facility  Discharge Equipment Recommendations:  none  Barriers to discharge:  None    Assessment:     Jihan Zamudio is a 49 y.o. female with a medical diagnosis of Seizure-like activity.  She presents with the following performance deficits affecting function: weakness, impaired functional mobilty, gait instability, impaired balance, impaired self care skills, decreased lower extremity function, impaired endurance, impaired cognition. Pt required some coaxing from spouse and writing therapist to participate in therapy initially, but once she agreed pt was cooperative and tolerated OT session well. Pt was in the bathroom with spouse outside of the door upon OT arrival. Pt's  stated he is very concerned about her mental health regarding her recent personality changes, hallucinations, and delusions. Pt required increased time and cueing to redirect and attend to SC tasks until completion. Pt with confusion, tangential thinking, and paranoid thoughts/delusions. Pt stated that she knew who was "good" or "bad" within the hospital and the "bad" people are spreading lies trying to make her seem crazy or worse than she actually is. Pt is progress toward goals and requires supervision and cueing to complete most SC tasks and mobility safely. Pt displays global deconditioning requiring increased assistance to complete ADLs and mobility at this time. Pt would continue to benefit from acute OT services to improve indep and overall occupational functioning.     Rehab Prognosis:  Good; patient would benefit from acute skilled OT services to address these deficits and reach maximum level of function.       Plan:     Patient to be seen 4 x/week to address the above listed problems via self-care/home management, therapeutic activities, " therapeutic exercises  · Plan of Care Expires: 08/07/19  · Plan of Care Reviewed with: patient, spouse    Subjective     Pain/Comfort:  Pain Rating 1: 0/10    Chief Complaint:  Generalized weakness especially in LE and upper back.       Objective:     Communicated with: RN prior to session.  Patient found in the bathroom with spouse supervising with (N/A) upon OT entry to room.    General Precautions: Standard, fall(Isolation: VRE)   Orthopedic Precautions:N/A   Braces: N/A     Occupational Performance:       Functional Mobility/Transfers:  · Functional Mobility: Pt ambulated within bed/bathroom and to chair with CG/HHA and no AD. Pt's spouse stated that she did not like the RW. Pt demo no LOB.    Activities of Daily Living:  · Grooming: supervision to complete hand hygiene and oral hygiene. Pt required increased time to complete oral hygeine (she insisted on brushing teeth twice) and V/Cs to redirect to task and to initiate hand hygiene after toileting.    · Toileting: minimum assistance to steady and to clean rear after BM.       [facility] 6 Click ADL: 22    Treatment & Education:  Pt performed LE exercises seated edge of chair including dorsi flexion, hip flexion, and knee extension X 15 each and UE exercises including scapula retraction , shldr elev/depression , and lateral neck stretches X 10 each. Pt verbalized that UE exercises helped relieve some tension in her neck/shoulders.     Pt/spuse edu on POC and role of OT  Pt edu on safe mobility with either spouse or RN in the room for assistance.  Pt encouraged to perform U/LE exercises UIC at least twice a day to prevent further weakness and increased strength.  Pt edu on diaphragmatic and purse lip breathing with activity and to help with anxiety and paranoid thoughts     Patient left up in chair with call button in reach and  presentEducation:      GOALS:   Multidisciplinary Problems     Occupational Therapy Goals        Problem: Occupational Therapy Goal     Goal Priority Disciplines Outcome Interventions   Occupational Therapy Goal     OT, PT/OT Ongoing (interventions implemented as appropriate)    Description:  Goals to be met by: 7/19/19    Patient will increase functional independence with ADLs by performing:    LE Dressing with Minimal Assistance.  Grooming while standing at sink with Contact Guard Assistance.- met on 7/12  Toileting from toilet with Contact Guard Assistance for hygiene and clothing management.- met on 7/12   Supine to sit with Stand-by Assistance to increase bed mobility independence.- progressing   Step transfer with Stand-by Assistance and AD as needed to prepare for household mobility to complete ADLs of choice. - progressing   Toilet transfer to toilet with Contact Guard Assistance.- met on 7/12  Upper extremity exercise program x15 reps per handout, with independence to build strength and endurance for ADLs/IADLs.  Pt will tolerate dynamic standing task for ~5 minutes in preparation for standing ADLs with CGA.                      Time Tracking:     OT Date of Treatment: 07/15/19  OT Start Time: 1122  OT Stop Time: 1153  OT Total Time (min): 31 min    Billable Minutes:Self Care/Home Management 31    ROSA Garnica  7/15/2019

## 2019-07-15 NOTE — SUBJECTIVE & OBJECTIVE
Interval History: A&O x4 this morning- seemed to be confused in the afternoon- her mentation wax and wean.    Review of Systems   Constitutional: Positive for activity change and appetite change. Negative for chills, diaphoresis and fever.   HENT: Negative for ear pain, mouth sores, sinus pressure and sore throat.    Eyes: Negative for photophobia, pain and redness.   Respiratory: Positive for shortness of breath. Negative for cough and wheezing.    Cardiovascular: Negative for chest pain and leg swelling.   Gastrointestinal: Negative for abdominal distention, abdominal pain, diarrhea, nausea and vomiting.   Genitourinary: Negative for decreased urine volume, difficulty urinating, dysuria, flank pain, frequency and urgency.   Musculoskeletal: Negative for arthralgias, back pain, gait problem and myalgias.   Skin: Positive for wound (chevron well healed). Negative for pallor and rash.   Allergic/Immunologic: Positive for immunocompromised state.   Neurological: Positive for weakness. Negative for dizziness, tremors, seizures, light-headedness and headaches.   Psychiatric/Behavioral: Negative for agitation, confusion and decreased concentration. The patient is not nervous/anxious.      Objective:     Vital Signs (Most Recent):  Temp: 97.9 °F (36.6 °C) (07/15/19 1213)  Pulse: 99 (07/15/19 1213)  Resp: 16 (07/15/19 1213)  BP: (!) 140/73 (07/15/19 1213)  SpO2: 98 % (07/15/19 1213) Vital Signs (24h Range):  Temp:  [97.8 °F (36.6 °C)-99.8 °F (37.7 °C)] 97.9 °F (36.6 °C)  Pulse:  [] 99  Resp:  [16-18] 16  SpO2:  [95 %-99 %] 98 %  BP: (123-144)/(58-80) 140/73     Weight: 81.6 kg (179 lb 14.3 oz)  Body mass index is 30.88 kg/m².    Estimated Creatinine Clearance: 114.7 mL/min (based on SCr of 0.6 mg/dL).    Physical Exam   Constitutional: She appears well-developed and well-nourished. No distress.   HENT:   Head: Normocephalic and atraumatic.   Eyes: Conjunctivae and EOM are normal.   Neck: Normal range of motion. Neck  supple.   Pulmonary/Chest: Effort normal. No respiratory distress.   Abdominal: Soft. She exhibits no distension. There is no tenderness.   Musculoskeletal: Normal range of motion. She exhibits no edema.   Neurological: She is alert.   Responds appropriately to questions, oriented to year   Skin: Skin is warm and dry. No rash noted. She is not diaphoretic. No erythema.   Vitals reviewed.      Significant Labs:   CBC:   Recent Labs   Lab 07/14/19  0632 07/15/19  0650   WBC 5.81 3.90   HGB 8.7* 7.1*   HCT 27.1* 22.4*    134*     CMP:   Recent Labs   Lab 07/14/19  0632 07/15/19  0650    137   K 3.2* 3.7    110   CO2 20* 18*   GLU 91 153*   BUN 14 17   CREATININE 0.7 0.6   CALCIUM 8.3* 8.0*   PROT 5.2* 4.9*   ALBUMIN 2.0* 1.8*   BILITOT 0.6 0.4   ALKPHOS 159* 147*   AST 12 11   ALT 5* 6*   ANIONGAP 12 9   EGFRNONAA >60.0 >60.0       Significant Imaging: I have reviewed all pertinent imaging results/findings within the past 24 hours.

## 2019-07-15 NOTE — ASSESSMENT & PLAN NOTE
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 She underwent paracentesis/thoracentesis that were paucicellular. Patient's AMS and fevers initially improved with vanc / cefepime, transitioned to cefpodoxime / metronidazole with recurrence of fever.  Urine culture with VRE, started on linezolid, but now with recurrent AMS. Patient with waxing and waning mental status, suspect delirium.    07/15Diarrhea revolved.  HIV- neg    Recommendations    - Contine daptomycin 6 mg/kg q24 hours x5 days.  - Continue empiric cefepime  - remainder of prophylaxis per protocol  - [facility] stool pathogens PCR panel pending  - histo/blasto/cocci, aspergillus Ag pending  - If persistent fevers and AMS, would consider LP for further evaluation
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
## 2019-07-15 NOTE — SUBJECTIVE & OBJECTIVE
Patient History           Medical as of 7/15/2019     Past Medical History     Diagnosis Date Comments Source    Cirrhosis -- -- Provider    Esophageal varices 2018 Small with no banding  Provider    Essential hypertension 2018 -- Provider    Fatty liver 2018 -- Provider    GERD (gastroesophageal reflux disease) -- -- Provider    Hx of colonic polyps 2018 On colonoscopy  Provider    Hypertension -- -- Provider    Hypothyroidism 2018 -- Provider    Kidney stones -- -- Provider    Morbid obesity 2018 Lap band with subsequent release Provider    KADEEM (obstructive sleep apnea) 2018 -- Provider    Osteoarthritis 2018 -- Provider    Pulmonary nodule 2018 -- Provider    Vitamin D deficiency 2018 -- Provider                  Surgical as of 7/15/2019     Past Surgical History     Procedure Laterality Date Comments Source    LAPAROSCOPIC GASTRIC BANDING --  removal  Provider    CHOLECYSTECTOMY --  LAPAROSCOPIC  Provider     SECTION -- -- TIMES 2  Provider    LIVER BIOPSY -- 2017 KESSLER with bridging 17 Provider    CYSTOSCOPY W/ STONE MANIPULATION --  kidney stone removal Provider    ESOPHAGOGASTRODUODENOSCOPY N/A 2019 Procedure: EGD (ESOPHAGOGASTRODUODENOSCOPY);  Surgeon: Davian Hernández MD;  Location: [facility] ([address] [place] [address]);  Service: Endoscopy;  Laterality: N/A; Provider    LIVER TRANSPLANT N/A 2019 Procedure: TRANSPLANT, LIVER;  Surgeon: Clemente Brown Jr., MD;  Location: [facility] OR [address] [place] [address];  Service: Transplant;  Laterality: N/A; Provider                  Family as of 7/15/2019     Problem Relation Name Age of Onset Comments Source    Heart disease Mother -- -- -- Provider    Cancer Mother -- 50 colon cancer Provider    Heart disease Father -- -- -- Provider    Cancer Father -- 65 liver cancer Provider    Breast cancer Maternal Grandmother -- -- -- Provider            Tobacco Use as of 7/15/2019     Smoking Status  Smoking Start Date Smoking Quit Date Packs/Day Years Used    Never Smoker -- -- -- --    Types Comments Smokeless Tobacco Status Smokeless Tobacco Quit Date Source    -- patient denies Never Used -- Provider            Alcohol Use as of 7/15/2019     Alcohol Use Drinks/Week Alcohol/Week Comments Source    No -- -- patient denies Provider    Frequency Standard Drinks Binge Drinking        -- -- --              Drug Use as of 7/15/2019     Drug Use Types Frequency Comments Source    No -- -- patient denies Provider            Sexual Activity as of 7/15/2019     Sexually Active Birth Control Partners Comments Source    -- -- -- -- Provider            Activities of Daily Living as of 7/15/2019    None           Social Documentation as of 7/15/2019    None           Occupational as of 7/15/2019     Occupation Employer Comments Source    homemaker -- -- Provider            Socioeconomic as of 7/15/2019     Marital Status Spouse Name Number of Children Years Education Education Level Preferred Language Ethnicity Race Source     -- -- -- -- English /White White Provider    Financial Resource Strain Food Insecurity: Worry Food Insecurity: Inability Transportation Needs: Medical Transportation Needs: Non-medical    -- -- -- -- --            Pertinent History     Question Response Comments    Lives with -- --    Place in Birth Order -- --    Lives in -- --    Number of Siblings -- --    Raised by -- --    Legal Involvement -- --    Childhood Trauma -- --    Criminal History of -- --    Financial Status -- --    Highest Level of Education -- --    Does patient have access to a firearm? -- --     Service -- --    Primary Leisure Activity -- --    Spirituality -- --        Past Medical History:   Diagnosis Date    Cirrhosis     Esophageal varices 2/26/2018    Small with no banding 07/17    Essential hypertension 2/26/2018    Fatty liver 2/26/2018    GERD (gastroesophageal reflux disease)     Hx of  colonic polyps 2018    On colonoscopy     Hypertension     Hypothyroidism 2018    Kidney stones     Morbid obesity 2018    Lap band with subsequent release    KADEEM (obstructive sleep apnea) 2018    Osteoarthritis 2018    Pulmonary nodule 2018    Vitamin D deficiency 2018     Past Surgical History:   Procedure Laterality Date     SECTION      TIMES 2     CHOLECYSTECTOMY      LAPAROSCOPIC     CYSTOSCOPY W/ STONE MANIPULATION      kidney stone removal    EGD (ESOPHAGOGASTRODUODENOSCOPY) N/A 2019    Performed by [facility]ian [facility] at [facility] ENDO (2ND FLR)    LAPAROSCOPIC GASTRIC BANDING  2006    removal     LIVER BIOPSY  2017    KESSLER with bridging 17    TRANSPLANT, LIVER N/A 2019    Performed by Clemente Brown Jr., MD at [facility] OR 2ND FLR    TRANSPLANT, LIVER N/A 2019    Performed by Clemente Brown Jr., MD at [facility] OR 2ND FLR     Family History     Problem Relation (Age of Onset)    Breast cancer Maternal Grandmother    Cancer Mother (50), Father (65)    Heart disease Mother, Father        Tobacco Use    Smoking status: Never Smoker    Smokeless tobacco: Never Used    Tobacco comment: patient denies   Substance and Sexual Activity    Alcohol use: No     Comment: patient denies    Drug use: No     Comment: patient denies    Sexual activity: Not on file     Review of patient's allergies indicates:   Allergen Reactions    Metformin Rash    Pcn [penicillins] Other (See Comments)     Unsure of reaction, states it was as a child. Tolerated rocephin at [facility]       No current facility-administered medications on file prior to encounter.      Current Outpatient Medications on File Prior to Encounter   Medication Sig    famotidine (PEPCID) 20 MG tablet Take 1 tablet (20 mg total) by mouth every evening.    furosemide (LASIX) 40 MG tablet Take 1 tablet (40 mg total) by mouth once daily. for 10 days    lancets 28 gauge  "Misc Test blood glucose 3 (three) times daily.    levothyroxine (SYNTHROID) 112 MCG tablet Take 1 tablet (112 mcg total) by mouth once daily.    mycophenolate (CELLCEPT) 250 mg Cap Take 2 capsules (500 mg total) by mouth 2 (two) times daily. (Patient taking differently: Take 1,000 mg by mouth 2 (two) times daily. )    pen needle, diabetic 32 gauge x 5/32" Ndle Use to inject insulin into the skin 3 (three) times daily.    predniSONE (DELTASONE) 10 MG tablet Take 20 mg by mouth daily 6/12-6/18; 15 mg daily 6/19-6/25; 10 mg  daily 6/26-7/2; 5 mg  daily 7/3-7/9; stop: 7/10/19    sodium bicarbonate 650 MG tablet Take 1 tablet (650 mg total) by mouth 2 (two) times daily.    sulfamethoxazole-trimethoprim 400-80mg (BACTRIM,SEPTRA) 400-80 mg per tablet Take 1 tablet by mouth once daily. Stop: 12/3/19    tacrolimus (PROGRAF) 1 MG Cap Take 3 capsules (3 mg total) by mouth every 12 (twelve) hours.    valGANciclovir (VALCYTE) 450 mg Tab Take 1 tablet (450 mg total) by mouth once daily. Stop: 9/4/19    blood sugar diagnostic Strp Test blood glucose 3 (three) times daily.    blood-glucose meter kit Use as instructed    levETIRAcetam (KEPPRA) 500 MG Tab Take 1 tablet (500 mg total) by mouth 2 (two) times daily. for 7 days    oxyCODONE (ROXICODONE) 10 mg Tab immediate release tablet Take 1 tablet (10 mg total) by mouth every 4 (four) hours as needed.     Psychotherapeutics (From admission, onward)    Start     Stop Route Frequency Ordered    07/13/19 2100  OLANZapine tablet 5 mg      -- Oral Nightly 07/13/19 1300        Review of Systems  Strengths and Liabilities: Liability: Patient is impulsive., Liability: Patient has poor health.    Objective:     Vital Signs (Most Recent):  Temp: 97.9 °F (36.6 °C) (07/15/19 1213)  Pulse: 99 (07/15/19 1213)  Resp: 16 (07/15/19 1213)  BP: (!) 140/73 (07/15/19 1213)  SpO2: 98 % (07/15/19 1213) Vital Signs (24h Range):  Temp:  [97.8 °F (36.6 °C)-99.8 °F (37.7 °C)] 97.9 °F (36.6 " "°C)  Pulse:  [] 99  Resp:  [16-18] 16  SpO2:  [95 %-99 %] 98 %  BP: (119-144)/(58-80) 140/73     Height: 5' 4" (162.6 cm)  Weight: 81.6 kg (179 lb 14.3 oz)  Body mass index is 30.88 kg/m².      Intake/Output Summary (Last 24 hours) at 7/15/2019 1455  Last data filed at 7/15/2019 1117  Gross per 24 hour   Intake 2810 ml   Output 600 ml   Net 2210 ml       Physical Exam   Psychiatric:   Mental Status Exam:  Appearance: lying in bed  Behavior/Cooperation: hostile  Speech: normal tone, normal rate, dysarthia  Mood: anxious and depressed  Affect: normal, anxious and dysphoric  Thought Process: illogical  Thought Content: delusions: yes, hallucinations: (auditory: yes),visual yes.  suicidal thoughts: (passive-no), homicidal thoughts: (passive-no), paranoid   Orientation: person, place  Memory: Impaired to some degree  Attention Span/Concentration: Impaired to some degree  Insight: limited  Judgment: limited        Significant Labs: All pertinent labs within the past 24 hours have been reviewed.    Significant Imaging: I have reviewed all pertinent imaging results/findings within the past 24 hours.  "

## 2019-07-15 NOTE — SUBJECTIVE & OBJECTIVE
Scheduled Meds:   acyclovir (ZOVIRAX) IVPB (wt > 70 kg)  10 mg/kg Intravenous Q8H    ceFEPime (MAXIPIME) IVPB  2 g Intravenous Q8H    DAPTOmycin (CUBICIN)  IV  6 mg/kg Intravenous Q24H    hydrocortisone sodium succinate  50 mg Intravenous Q8H    levETIRAcetam  500 mg Oral BID    levothyroxine  112 mcg Oral Before breakfast    magnesium oxide  400 mg Oral Daily    OLANZapine  5 mg Oral QHS    sodium bicarbonate  650 mg Oral BID    sodium chloride 0.9%  500 mL Intravenous Q8H    sulfamethoxazole-trimethoprim 400-80mg  1 tablet Oral Daily     Continuous Infusions:  PRN Meds:acetaminophen, calcium carbonate, dextrose 50%, dextrose 50%, glucagon (human recombinant), glucose, glucose, hydrALAZINE, insulin aspart U-100, iohexol, ondansetron, sodium chloride 0.9%    Review of Systems   Constitutional: Positive for activity change, appetite change and fever. Negative for chills.   HENT: Negative for congestion and facial swelling.    Eyes: Negative for pain, discharge and visual disturbance.   Respiratory: Negative for cough, chest tightness, shortness of breath and wheezing.    Cardiovascular: Negative for chest pain, palpitations and leg swelling.   Gastrointestinal: Negative for abdominal distention, abdominal pain, diarrhea, nausea and vomiting.   Endocrine: Negative.    Genitourinary: Negative for decreased urine volume, difficulty urinating and dysuria.   Skin: Positive for wound (chevron well healed).   Allergic/Immunologic: Positive for immunocompromised state.   Neurological: Positive for weakness. Negative for light-headedness.   Psychiatric/Behavioral: Positive for confusion, decreased concentration and dysphoric mood. Negative for agitation. The patient is not nervous/anxious.      Objective:     Vital Signs (Most Recent):  Temp: 97.9 °F (36.6 °C) (07/15/19 1213)  Pulse: 99 (07/15/19 1213)  Resp: 16 (07/15/19 1213)  BP: (!) 140/73 (07/15/19 1213)  SpO2: 98 % (07/15/19 1213) Vital Signs (24h  Range):  Temp:  [97.8 °F (36.6 °C)-99.8 °F (37.7 °C)] 97.9 °F (36.6 °C)  Pulse:  [] 99  Resp:  [16-18] 16  SpO2:  [95 %-99 %] 98 %  BP: (119-144)/(58-80) 140/73     Weight: 81.6 kg (179 lb 14.3 oz)  Body mass index is 30.88 kg/m².    Intake/Output - Last 3 Shifts       07/13 0700 - 07/14 0659 07/14 0700 - 07/15 0659 07/15 0700 - 07/16 0659    P.O. 720 660 350    I.V. (mL/kg)   0 (0)    Other       IV Piggyback 50 1550 250    Total Intake(mL/kg) 770 (9.5) 2210 (27.1) 600 (7.4)    Urine (mL/kg/hr)  600 (0.3)     Emesis/NG output  0     Other  0     Stool  0     Blood  0     Total Output  600     Net +770 +1610 +600           Urine Occurrence 4 x 2 x 2 x    Stool Occurrence 1 x 2 x 2 x    Emesis Occurrence 0 x 0 x 0 x          Physical Exam   Constitutional: She is oriented to person, place, and time. She appears well-developed. No distress.   HENT:   Head: Normocephalic and atraumatic.   Eyes: Pupils are equal, round, and reactive to light. No scleral icterus.   Neck: Normal range of motion. Neck supple. No thyromegaly present.   Cardiovascular: Regular rhythm. Tachycardia present. Exam reveals no friction rub.   No murmur heard.  Pulmonary/Chest: Effort normal. She has decreased breath sounds in the right middle field, the right lower field and the left lower field. She has no wheezes. She has no rales.   Diminished RLL   Abdominal: Soft. She exhibits no distension. There is no tenderness. There is no rebound and no guarding.   Healed chevron incision   Musculoskeletal: Normal range of motion.   Neurological: She is alert and oriented to person, place, and time. She is not disoriented.   Oriented to person, easily re oriented   Skin: Skin is warm and dry. She is not diaphoretic.   Psychiatric: Judgment and thought content normal. Her mood appears anxious. Her affect is angry. Her speech is delayed. She is agitated and combative.   Nursing note and vitals reviewed.      Laboratory:  Immunosuppressants     None         CBC:   Recent Labs   Lab 07/15/19  0650   WBC 3.90   RBC 2.54*   HGB 7.1*   HCT 22.4*   *   MCV 88   MCH 28.0   MCHC 31.7*     CMP:   Recent Labs   Lab 07/15/19  0650   *   CALCIUM 8.0*   ALBUMIN 1.8*   PROT 4.9*      K 3.7   CO2 18*      BUN 17   CREATININE 0.6   ALKPHOS 147*   ALT 6*   AST 11   BILITOT 0.4     Labs within the past 24 hours have been reviewed.    Diagnostic Results:  None    Debility/Functional status: Patient debilitated by evidence of Muscle wasting and atrophy, Weakness, Chronic fatigue, unspecified and Other reduced mobility. Physical and occupational therapy ordered daily to evaluate and treat. Debility was: present on admission.
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
## 2019-07-15 NOTE — PROGRESS NOTES
"[facility]  Liver Transplant  Progress Note    Patient Name: Jihan Zamudio  MRN: [id number]  Admission Date: 2019  Hospital Length of Stay: 10 days  Code Status: Full Code  Primary Care Provider: Primary Doctor No  Post-Operative Day: 40    ORGAN:   LIVER  Disease Etiology: Cirrhosis: Fatty Liver ([place])  Donor Type:    - Brain Death  [facility] High Risk:   No  Donor CMV Status:   Donor CMV Status: Positive  Donor HBcAB:   Negative  Donor HCV Status:   Negative  Donor HBV GERTRUDIS: Negative  Donor HCV GERTRUDIS: Negative  Whole or Partial: Whole Liver  Biliary Anastomosis: End to End  Arterial Anatomy: Standard  Subjective:     History of Present Illness:  Ms. Zamudio is a 48 y/o F s/p DBD OLTx (SM induction, CMV D+R+) for KESS[place]ER cirrhosis on 19. Post transplant course significant for acute parenchymal hemorrhage within the right occipital lobe near the parieto-occipital junction measuring approximately 1.8 x 1.3 x 1.5 cm with mild surrounding vasogenic edema and localized mass effect after fall on . She was discharged home on 7 days of Keppra but nuerosurgery, completed on . She now presents to the ED with 1 day history of fever. Homehealth nurse checked patient's temperature and was noted to be elevated. Home health nurse also reported patient acting "off" with mental status change. Currently in the ED, tmax is 102.3. Patient feels well with no true complaints expect rhinorrhea. She denies chills, diarrhea, nausea/vomiting, abdominal pain. Denies sick contacts. Will admit for infectious work up (blood cx, UA, urine cx, CMV PCR, chest x-ray, CT A/P). Patient currently with no altered mental status. In light of recent hx of occipital hemorrhage, will obtain CT head without contrast.      Hospital Course:  48 y/o F s/p OLTx for KESSLER cirrhosis on  who was admitted on  for fevers. Pt febrile on admission, started on bx abx. ID consulted. Blood cultures NGTD, UA negative. CMV PCR drawn " "on admit pending. Pt having loose stools, C diff EIA negative, CMV undetected. CT head showed reduction in size of hemorrhage. Abdominal CT reviewed and with moderate right pleural effusion. CXR with increased pleural effusion. Thora and para done 7/9; amount of fluid removed not recorded and pleural fluid labs were not completed as ordered. Abdominal gram stain reveals no WBC and no organisms; cell count not completed as ordered, cultures pending. TTE done 7/9 WNL, no vegetations. Pt tachy and tachypneic on 7/9; 1 L bolus given and pt responded well. 1 u pRBC transfused 7/9 for low H/H. Pt continued to have diarrhea, additional stool studies pending. Mentation continued to wax and wane, MRI w/ and w/o contrast done on 7/11 which revealed stable findings compared to prior. Pt transitioned to IV abx to PO cefpodoxime and flagyl on 7/11 per ID recs. Urine culture from 7/11 resulted as positive for VRE, thus cefpodoxime and flagyl stopped and pt started on zyvox on 7/12. Pt with worsening MS on 7/13 and claimed she had an unwitnessed seizure. STAT CT head without acute change. Keppra 500 mg bid started. Prograf and lovenox dc'ed. Stopped zyvox due to potential to lower seizure threshold and started IV daptomycin. IV cefepime restarted. Zyprexa started for agitation and anxiety. Pt continued to have fevers despite therapeutic IV abx for VRE UTI. Viral and fungal labs pending per ID recs. 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  cc q8h started.     Interval history: No acute events overnight. Pt mildly agitated this AM, threw water bottle at RN because "she was talking too much". She answers all orientation questions appropriately and reports no complaints. Pt behaving uncharacteristically per family. Psych consulted, appreciate recs. Continue zyprexa qhs. No reported seizure like activity overnight. Neurology following, "description and character of her seizure event are questionable for " "true epileptic activity" per note. Will continue Keppra for now. Prograf held and hydrocortisone started. Last fever yesterday AM, 102.3 F. Blood cultures NGTD from 7/13 and 7/14. ID following. Zyvox transitioned to Dapto for Enterococcus UTI and cefepime restarted on 7/14. 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 Viral and fungal labs pending. Anesthesia unwilling to do LP due to history of ICH. Awaiting neuro recs regarding if able to safely do LP in setting of ICH that has decreased in size on imaging. Will obtain CT a/p today to assess for infectious process per ID recs. Continue to monitor.    Scheduled Meds:   acyclovir (ZOVIRAX) IVPB (wt > 70 kg)  10 mg/kg Intravenous Q8H    ceFEPime (MAXIPIME) IVPB  2 g Intravenous Q8H    DAPTOmycin (CUBICIN)  IV  6 mg/kg Intravenous Q24H    hydrocortisone sodium succinate  50 mg Intravenous Q8H    levETIRAcetam  500 mg Oral BID    levothyroxine  112 mcg Oral Before breakfast    magnesium oxide  400 mg Oral Daily    OLANZapine  5 mg Oral QHS    sodium bicarbonate  650 mg Oral BID    sodium chloride 0.9%  500 mL Intravenous Q8H    sulfamethoxazole-trimethoprim 400-80mg  1 tablet Oral Daily     Continuous Infusions:  PRN Meds:acetaminophen, calcium carbonate, dextrose 50%, dextrose 50%, glucagon (human recombinant), glucose, glucose, hydrALAZINE, insulin aspart U-100, iohexol, ondansetron, sodium chloride 0.9%    Review of Systems   Constitutional: Positive for activity change, appetite change and fever. Negative for chills.   HENT: Negative for congestion and facial swelling.    Eyes: Negative for pain, discharge and visual disturbance.   Respiratory: Negative for cough, chest tightness, shortness of breath and wheezing.    Cardiovascular: Negative for chest pain, palpitations and leg swelling.   Gastrointestinal: Negative for abdominal distention, abdominal pain, diarrhea, nausea and vomiting.   Endocrine: Negative.    Genitourinary: " Negative for decreased urine volume, difficulty urinating and dysuria.   Skin: Positive for wound (chevron well healed).   Allergic/Immunologic: Positive for immunocompromised state.   Neurological: Positive for weakness. Negative for light-headedness.   Psychiatric/Behavioral: Positive for confusion, decreased concentration and dysphoric mood. Negative for agitation. The patient is not nervous/anxious.      Objective:     Vital Signs (Most Recent):  Temp: 97.9 °F (36.6 °C) (07/15/19 1213)  Pulse: 99 (07/15/19 1213)  Resp: 16 (07/15/19 1213)  BP: (!) 140/73 (07/15/19 1213)  SpO2: 98 % (07/15/19 1213) Vital Signs (24h Range):  Temp:  [97.8 °F (36.6 °C)-99.8 °F (37.7 °C)] 97.9 °F (36.6 °C)  Pulse:  [] 99  Resp:  [16-18] 16  SpO2:  [95 %-99 %] 98 %  BP: (119-144)/(58-80) 140/73     Weight: 81.6 kg (179 lb 14.3 oz)  Body mass index is 30.88 kg/m².    Intake/Output - Last 3 Shifts       07/13 0700 - 07/14 0659 07/14 0700 - 07/15 0659 07/15 0700 - 07/16 0659    P.O. 720 660 350    I.V. (mL/kg)   0 (0)    Other       IV Piggyback 50 1550 250    Total Intake(mL/kg) 770 (9.5) 2210 (27.1) 600 (7.4)    Urine (mL/kg/hr)  600 (0.3)     Emesis/NG output  0     Other  0     Stool  0     Blood  0     Total Output  600     Net +770 +1610 +600           Urine Occurrence 4 x 2 x 2 x    Stool Occurrence 1 x 2 x 2 x    Emesis Occurrence 0 x 0 x 0 x          Physical Exam   Constitutional: She is oriented to person, place, and time. She appears well-developed. No distress.   HENT:   Head: Normocephalic and atraumatic.   Eyes: Pupils are equal, round, and reactive to light. No scleral icterus.   Neck: Normal range of motion. Neck supple. No thyromegaly present.   Cardiovascular: Regular rhythm. Tachycardia present. Exam reveals no friction rub.   No murmur heard.  Pulmonary/Chest: Effort normal. She has decreased breath sounds in the right middle field, the right lower field and the left lower field. She has no wheezes. She has no  "rales.   Diminished RLL   Abdominal: Soft. She exhibits no distension. There is no tenderness. There is no rebound and no guarding.   Healed chevron incision   Musculoskeletal: Normal range of motion.   Neurological: She is alert and oriented to person, place, and time. She is not disoriented.   Oriented to person, easily re oriented   Skin: Skin is warm and dry. She is not diaphoretic.   Psychiatric: Judgment and thought content normal. Her mood appears anxious. Her affect is angry. Her speech is delayed. She is agitated and combative.   Nursing note and vitals reviewed.      Laboratory:  Immunosuppressants     None        CBC:   Recent Labs   Lab 07/15/19  0650   WBC 3.90   RBC 2.54*   HGB 7.1*   HCT 22.4*   *   MCV 88   MCH 28.0   MCHC 31.7*     CMP:   Recent Labs   Lab 07/15/19  0650   *   CALCIUM 8.0*   ALBUMIN 1.8*   PROT 4.9*      K 3.7   CO2 18*      BUN 17   CREATININE 0.6   ALKPHOS 147*   ALT 6*   AST 11   BILITOT 0.4     Labs within the past 24 hours have been reviewed.    Diagnostic Results:  None    Debility/Functional status: Patient debilitated by evidence of Muscle wasting and atrophy, Weakness, Chronic fatigue, unspecified and Other reduced mobility. Physical and occupational therapy ordered daily to evaluate and treat. Debility was: present on admission.    Assessment/Plan:     * Seizure-like activity  - pt seen sitting in chair AM of 7/13 stating "I just had a seizure, I just finished banging my head on the chair."  - pt couldn't recall events surrounding seizure like activity.  - given recent hx of fall with ICH on 6/24, Neuro consulted in which pt had a very inconsistent neuro exam.   - Keppra 500 mg bid restarted and a STAT CT head performed which did not show a new infarct or bleed.  - dc'd Lovenox.   - prograf held for now. Started hydrocortisone.   - Monitor.       Fever  - Infectious work up ordered on admit - blood cx NGTD, UA unremarkable, CMV PCR 7/5 undetected, " chest x-ray w right pleural effusion, CT A/P reviewed.  - Started broad spectrum antibiotics on admit.   - Abdominal and head CT reviewed and no clear source for fever.   - ID consulted.  Apprec recs.   - Pt with intermittent confusion.  Easily re oriented.  Continue to monitor.   - Thora and para done 7/9; amount of fluid removed not recorded and pleural fluid labs were not completed as ordered.   - Abdominal gram stain reveals no WBC and no organisms; cell count not completed as ordered; pleural fluid cell count 84 WBCs, 24% segs.  - Pt still reports diarrhea- C diff negative, CMV undetected. Stool culture, WBC, ova cysts parasites, and GI pathogen panel pending.   - low grade fever 7/12, 100.8.  - deescalated abx to PO cefpodoxime and flagyl on 7/12.  - MRI head w/ and w/o contrast 7/12 without acute changes.  - Urine culture positive for enterococcus faecium- started Zyvox and dc'd cefpodoxime and flagyl 7/12.  - temp 100.4 on 7/13.  - Zyvox changed to Dapto as pt with seizure like activity earlier in morning and zyvox can lower seizure threshold.  - repeat blood cultures ngtd.  - ammonia level wnl.   - pt also with confusion, AMS. D/w ID - restarted Cefepime.  - procalcitonin, lactate, EBV, RPR, fungitell, aspergillus, HIV, Saadia Delacruz, histoplasma antigen and blastomyces antigen and CMV PCR pending.  - Start Acyclovir treatment dose for possible viral meningitis.   - repeat blood cx on 7/13 and 7/14 remain NGTD. Last fever 102.3 F on 7/14 at 9 AM  - plan to repeat CT a/p with IV and GI contrast today  - Monitor.        UTI (urinary tract infection) due to Enterococcus  - UA 7/5 unremarkable.  - urine culture 7/11 positive for enterococcus faecium- susceptibilities pending.  - dc'd cefpodoxime and flagyl on 7/12.  - started Zyvox on 7/12.  - ID transitioned Zyvox to Dapto on 7/13 as Zyvox can lower the seizure threshold.  - continue to monitor.    Diarrhea  - pt with diarrhea on admission.  - C diff negative,  CMV undetected.  - Stool culture, WBC, ova cysts parasites, and GI pathogen panel pending.  - cellcept on hold.  - diarrhea now improved as of 7/15  - continue to monitor.      Altered mental status, unspecified  - pt with intermittent disorientation to situation throughout stay  - CT head on 7/5 and 7/13 with no acute change; MRI head w/ contrast on 7/11 with no acute change  - pt with acute personality change on 7/13- throwing items at nurses and agitated  - zyprexa qhs started 7/13  - psych consulted 7/15  - will try to obtain LP- anesthesia unwilling to do so at this time due to risk for bleeding given hx of ICH; awaiting neuro recs regarding LP  - continue to monitor      Hypokalemia  - replace as needed.  - Kdur 40 meq po x 1 dose.   - monitor.       Traumatic intracranial hemorrhage without loss of consciousness  - Head CT reviewed and noted with interval reduction in size of the patient's known right occipital lobe parenchymal hemorrhage.      At risk for opportunistic infections  - cont bactrim for PCP prophylaxis.  - (CMV D+/R+) hold Valcyte.   - Acyclovir started 7/14.      Closed head injury  - admitted recently following fall diagnosed w acute parenchymal hemorrhage within right occipital live near parieto-occipital junction 1.8x1.3x1.5 w mild surrounding vasogenic edema and localized mass.  Repeat CT head showed reduction in size of hemorrhage.  - pt denies HA.      Long-term use of immunosuppressant medication  - holding prograf 7/13 due to seizure like activity.  - Holding MMF. Monitor prograf level daily, monitor for toxic side effects, and adjust for therapeutic dose.   - hydrocortisone 7/14.     Prophylactic immunotherapy  - See long term use of immunosuppression.  Decreased as likely w sepsis.    S/P liver transplant  - LFTs stable.  - Pt with good hepatic allograft function.   - Last Liver US 6/24 showed Mildly elevated hepatic arterial resistive indices, although improved from prior exam.   Otherwise, satisfactory vascular appearance of the liver, complex fluid collection adjacent to the left hepatic lobe, similar to slightly smaller compared to prior exam, small volume of ascites and partially visualized right pleural effusion.   - para completed 7/9, cultures pending. Cell count not collected as ordered.     Anemia of chronic disease  - no overt bleeding.    - keep type and screen current.  - transfused 1u prbc 7/9.  - monitor with daily labs.      Pleural effusion, right  - CXR reviewed.    - Thora done 7/9  - pleural fluid labs not completed as ordered- will try to get labs repeated if specimen is still available.  - cell count: 84 WBCs, 24% segs      Weakness  - PT/OT consulted.        Moderate malnutrition  - supplements ordered.      GERD (gastroesophageal reflux disease)  - cont Pepcid.          VTE Risk Mitigation (From admission, onward)        Ordered     Place sequential compression device  Until discontinued      07/05/19 1659     IP VTE HIGH RISK PATIENT  Once      07/05/19 1659          The patients clinical status was discussed at multidisplinary rounds, involving transplant surgery, transplant medicine, pharmacy, nursing, nutrition, and social work    Discharge Planning:  No Patient Care Coordination Note on file.      Amy Saunders PA-C  Liver Transplant  [facility]

## 2019-07-15 NOTE — PLAN OF CARE
Problem: Occupational Therapy Goal  Goal: Occupational Therapy Goal  Goals to be met by: 7/19/19    Patient will increase functional independence with ADLs by performing:    LE Dressing with Minimal Assistance.  Grooming while standing at sink with Contact Guard Assistance.- met on 7/12  Toileting from toilet with Contact Guard Assistance for hygiene and clothing management.- met on 7/12   Supine to sit with Stand-by Assistance to increase bed mobility independence.- progressing   Step transfer with Stand-by Assistance and AD as needed to prepare for household mobility to complete ADLs of choice. - progressing   Toilet transfer to toilet with Contact Guard Assistance.- met on 7/12  Upper extremity exercise program x15 reps per handout, with independence to build strength and endurance for ADLs/IADLs.  Pt will tolerate dynamic standing task for ~5 minutes in preparation for standing ADLs with CGA.     Outcome: Ongoing (interventions implemented as appropriate)  ROSA Garnica

## 2019-07-16 PROBLEM — R19.7 DIARRHEA: Status: RESOLVED | Noted: 2019-07-12 | Resolved: 2019-07-16

## 2019-07-16 PROBLEM — G93.40 ENCEPHALOPATHY: Status: ACTIVE | Noted: 2019-07-15

## 2019-07-16 LAB
ALBUMIN SERPL BCP-MCNC: 2 G/DL (ref 3.5–5.2)
ALP SERPL-CCNC: 153 U/L (ref 55–135)
ALT SERPL W/O P-5'-P-CCNC: 11 U/L (ref 10–44)
ANION GAP SERPL CALC-SCNC: 10 MMOL/L (ref 8–16)
ANISOCYTOSIS BLD QL SMEAR: SLIGHT
APPEARANCE FLD: CLEAR
AST SERPL-CCNC: 22 U/L (ref 10–40)
BACTERIA SPEC ANAEROBE CULT: NORMAL
BASOPHILS # BLD AUTO: ABNORMAL K/UL (ref 0–0.2)
BASOPHILS NFR BLD: 0 % (ref 0–1.9)
BILIRUB FLD-MCNC: 0.3 MG/DL
BILIRUB SERPL-MCNC: 0.5 MG/DL (ref 0.1–1)
BODY FLD TYPE: NORMAL
BODY FLUID SOURCE, BILIRUBIN: NORMAL
BUN SERPL-MCNC: 13 MG/DL (ref 6–20)
BURR CELLS BLD QL SMEAR: ABNORMAL
CALCIUM SERPL-MCNC: 8.2 MG/DL (ref 8.7–10.5)
CHLORIDE SERPL-SCNC: 110 MMOL/L (ref 95–110)
CMV DNA SERPL NAA+PROBE-ACNC: NORMAL IU/ML
CO2 SERPL-SCNC: 19 MMOL/L (ref 23–29)
COLOR FLD: YELLOW
CREAT SERPL-MCNC: 0.6 MG/DL (ref 0.5–1.4)
DIFFERENTIAL METHOD: ABNORMAL
EOSINOPHIL # BLD AUTO: ABNORMAL K/UL (ref 0–0.5)
EOSINOPHIL NFR BLD: 0 % (ref 0–8)
ERYTHROCYTE [DISTWIDTH] IN BLOOD BY AUTOMATED COUNT: 15.8 % (ref 11.5–14.5)
EST. GFR  (AFRICAN AMERICAN): >60 ML/MIN/1.73 M^2
EST. GFR  (NON AFRICAN AMERICAN): >60 ML/MIN/1.73 M^2
GALACTOMANNAN AG SERPL IA-ACNC: <0.5 INDEX
GLUCOSE SERPL-MCNC: 112 MG/DL (ref 70–110)
HCT VFR BLD AUTO: 25.6 % (ref 37–48.5)
HGB BLD-MCNC: 8 G/DL (ref 12–16)
HYPOCHROMIA BLD QL SMEAR: ABNORMAL
IMM GRANULOCYTES # BLD AUTO: ABNORMAL K/UL (ref 0–0.04)
IMM GRANULOCYTES NFR BLD AUTO: ABNORMAL % (ref 0–0.5)
LYMPHOCYTES # BLD AUTO: ABNORMAL K/UL (ref 1–4.8)
LYMPHOCYTES NFR BLD: 1 % (ref 18–48)
LYMPHOCYTES NFR FLD MANUAL: 48 %
MAGNESIUM SERPL-MCNC: 1.8 MG/DL (ref 1.6–2.6)
MCH RBC QN AUTO: 28.2 PG (ref 27–31)
MCHC RBC AUTO-ENTMCNC: 31.3 G/DL (ref 32–36)
MCV RBC AUTO: 90 FL (ref 82–98)
MONOCYTES # BLD AUTO: ABNORMAL K/UL (ref 0.3–1)
MONOCYTES NFR BLD: 2 % (ref 4–15)
MONOS+MACROS NFR FLD MANUAL: 27 %
NEUTROPHILS NFR BLD: 97 % (ref 38–73)
NEUTROPHILS NFR FLD MANUAL: 25 %
NRBC BLD-RTO: 0 /100 WBC
PLATELET # BLD AUTO: 199 K/UL (ref 150–350)
PLATELET BLD QL SMEAR: ABNORMAL
PMV BLD AUTO: 10.2 FL (ref 9.2–12.9)
POCT GLUCOSE: 121 MG/DL (ref 70–110)
POCT GLUCOSE: 129 MG/DL (ref 70–110)
POCT GLUCOSE: 149 MG/DL (ref 70–110)
POCT GLUCOSE: 177 MG/DL (ref 70–110)
POCT GLUCOSE: 184 MG/DL (ref 70–110)
POCT GLUCOSE: 187 MG/DL (ref 70–110)
POCT GLUCOSE: 213 MG/DL (ref 70–110)
POIKILOCYTOSIS BLD QL SMEAR: SLIGHT
POLYCHROMASIA BLD QL SMEAR: ABNORMAL
POTASSIUM SERPL-SCNC: 3.1 MMOL/L (ref 3.5–5.1)
PROT SERPL-MCNC: 5.4 G/DL (ref 6–8.4)
RBC # BLD AUTO: 2.84 M/UL (ref 4–5.4)
SODIUM SERPL-SCNC: 139 MMOL/L (ref 136–145)
WBC # BLD AUTO: 6.26 K/UL (ref 3.9–12.7)
WBC # FLD: 213 /CU MM

## 2019-07-16 PROCEDURE — 97535 SELF CARE MNGMENT TRAINING: CPT

## 2019-07-16 PROCEDURE — 20600001 HC STEP DOWN PRIVATE ROOM

## 2019-07-16 PROCEDURE — 99233 SBSQ HOSP IP/OBS HIGH 50: CPT | Mod: ,,, | Performed by: INTERNAL MEDICINE

## 2019-07-16 PROCEDURE — 63600175 PHARM REV CODE 636 W HCPCS: Performed by: INTERNAL MEDICINE

## 2019-07-16 PROCEDURE — 87205 SMEAR GRAM STAIN: CPT

## 2019-07-16 PROCEDURE — 25000003 PHARM REV CODE 250: Performed by: INTERNAL MEDICINE

## 2019-07-16 PROCEDURE — 99232 SBSQ HOSP IP/OBS MODERATE 35: CPT | Mod: ,,, | Performed by: PSYCHIATRY & NEUROLOGY

## 2019-07-16 PROCEDURE — 25000003 PHARM REV CODE 250: Performed by: NURSE PRACTITIONER

## 2019-07-16 PROCEDURE — 99233 PR SUBSEQUENT HOSPITAL CARE,LEVL III: ICD-10-PCS | Mod: 24,GC,, | Performed by: PHYSICIAN ASSISTANT

## 2019-07-16 PROCEDURE — 63600175 PHARM REV CODE 636 W HCPCS: Performed by: NURSE PRACTITIONER

## 2019-07-16 PROCEDURE — 85007 BL SMEAR W/DIFF WBC COUNT: CPT

## 2019-07-16 PROCEDURE — 80053 COMPREHEN METABOLIC PANEL: CPT

## 2019-07-16 PROCEDURE — 87070 CULTURE OTHR SPECIMN AEROBIC: CPT

## 2019-07-16 PROCEDURE — 25000003 PHARM REV CODE 250: Performed by: PHYSICIAN ASSISTANT

## 2019-07-16 PROCEDURE — 87102 FUNGUS ISOLATION CULTURE: CPT

## 2019-07-16 PROCEDURE — 85027 COMPLETE CBC AUTOMATED: CPT

## 2019-07-16 PROCEDURE — 83735 ASSAY OF MAGNESIUM: CPT

## 2019-07-16 PROCEDURE — 36415 COLL VENOUS BLD VENIPUNCTURE: CPT

## 2019-07-16 PROCEDURE — 97116 GAIT TRAINING THERAPY: CPT

## 2019-07-16 PROCEDURE — 99233 PR SUBSEQUENT HOSPITAL CARE,LEVL III: ICD-10-PCS | Mod: ,,, | Performed by: INTERNAL MEDICINE

## 2019-07-16 PROCEDURE — 87075 CULTR BACTERIA EXCEPT BLOOD: CPT

## 2019-07-16 PROCEDURE — 99232 PR SUBSEQUENT HOSPITAL CARE,LEVL II: ICD-10-PCS | Mod: ,,, | Performed by: PSYCHIATRY & NEUROLOGY

## 2019-07-16 PROCEDURE — 99233 SBSQ HOSP IP/OBS HIGH 50: CPT | Mod: 24,GC,, | Performed by: PHYSICIAN ASSISTANT

## 2019-07-16 PROCEDURE — 82247 BILIRUBIN TOTAL: CPT

## 2019-07-16 PROCEDURE — 89051 BODY FLUID CELL COUNT: CPT

## 2019-07-16 RX ORDER — OLANZAPINE 10 MG/2ML
2.5 INJECTION, POWDER, FOR SOLUTION INTRAMUSCULAR EVERY 8 HOURS PRN
Status: DISCONTINUED | OUTPATIENT
Start: 2019-07-16 | End: 2019-07-18

## 2019-07-16 RX ORDER — VALGANCICLOVIR 450 MG/1
900 TABLET, FILM COATED ORAL DAILY
Status: DISCONTINUED | OUTPATIENT
Start: 2019-07-17 | End: 2019-07-16

## 2019-07-16 RX ORDER — POTASSIUM CHLORIDE 750 MG/1
30 CAPSULE, EXTENDED RELEASE ORAL EVERY 4 HOURS
Status: COMPLETED | OUTPATIENT
Start: 2019-07-16 | End: 2019-07-16

## 2019-07-16 RX ORDER — THIAMINE HCL 100 MG
100 TABLET ORAL DAILY
Status: DISCONTINUED | OUTPATIENT
Start: 2019-07-16 | End: 2019-07-23 | Stop reason: HOSPADM

## 2019-07-16 RX ORDER — VALGANCICLOVIR 450 MG/1
450 TABLET, FILM COATED ORAL DAILY
Status: DISCONTINUED | OUTPATIENT
Start: 2019-07-17 | End: 2019-07-23 | Stop reason: HOSPADM

## 2019-07-16 RX ADMIN — Medication 100 MG: at 01:07

## 2019-07-16 RX ADMIN — POTASSIUM CHLORIDE 30 MEQ: 750 CAPSULE, EXTENDED RELEASE ORAL at 10:07

## 2019-07-16 RX ADMIN — MAGNESIUM OXIDE TAB 400 MG (241.3 MG ELEMENTAL MG) 400 MG: 400 (241.3 MG) TAB at 07:07

## 2019-07-16 RX ADMIN — CEFEPIME 2 G: 2 INJECTION, POWDER, FOR SOLUTION INTRAVENOUS at 05:07

## 2019-07-16 RX ADMIN — LEVOTHYROXINE SODIUM 112 MCG: 50 TABLET ORAL at 05:07

## 2019-07-16 RX ADMIN — SODIUM BICARBONATE 650 MG TABLET 650 MG: at 07:07

## 2019-07-16 RX ADMIN — CEFEPIME 2 G: 2 INJECTION, POWDER, FOR SOLUTION INTRAVENOUS at 09:07

## 2019-07-16 RX ADMIN — SODIUM CHLORIDE 500 ML: 0.9 INJECTION, SOLUTION INTRAVENOUS at 04:07

## 2019-07-16 RX ADMIN — DAPTOMYCIN 510 MG: 350 INJECTION, POWDER, LYOPHILIZED, FOR SOLUTION INTRAVENOUS at 09:07

## 2019-07-16 RX ADMIN — LEVETIRACETAM 500 MG: 500 TABLET ORAL at 08:07

## 2019-07-16 RX ADMIN — OLANZAPINE 7.5 MG: 2.5 TABLET, FILM COATED ORAL at 08:07

## 2019-07-16 RX ADMIN — ACYCLOVIR SODIUM 810 MG: 1000 INJECTION, SOLUTION INTRAVENOUS at 04:07

## 2019-07-16 RX ADMIN — ACETAMINOPHEN 650 MG: 325 TABLET ORAL at 02:07

## 2019-07-16 RX ADMIN — SODIUM CHLORIDE 500 ML: 0.9 INJECTION, SOLUTION INTRAVENOUS at 05:07

## 2019-07-16 RX ADMIN — HYDROCORTISONE SODIUM SUCCINATE 50 MG: 100 INJECTION, POWDER, FOR SOLUTION INTRAMUSCULAR; INTRAVENOUS at 05:07

## 2019-07-16 RX ADMIN — ACYCLOVIR SODIUM 810 MG: 1000 INJECTION, SOLUTION INTRAVENOUS at 05:07

## 2019-07-16 RX ADMIN — LEVETIRACETAM 500 MG: 500 TABLET ORAL at 07:07

## 2019-07-16 RX ADMIN — SODIUM BICARBONATE 650 MG TABLET 650 MG: at 08:07

## 2019-07-16 RX ADMIN — ACYCLOVIR SODIUM 810 MG: 1000 INJECTION, SOLUTION INTRAVENOUS at 10:07

## 2019-07-16 RX ADMIN — SODIUM CHLORIDE 500 ML: 0.9 INJECTION, SOLUTION INTRAVENOUS at 10:07

## 2019-07-16 RX ADMIN — POTASSIUM CHLORIDE 30 MEQ: 750 CAPSULE, EXTENDED RELEASE ORAL at 01:07

## 2019-07-16 RX ADMIN — HYDROCORTISONE SODIUM SUCCINATE 50 MG: 100 INJECTION, POWDER, FOR SOLUTION INTRAMUSCULAR; INTRAVENOUS at 01:07

## 2019-07-16 RX ADMIN — SULFAMETHOXAZOLE AND TRIMETHOPRIM 1 TABLET: 400; 80 TABLET ORAL at 07:07

## 2019-07-16 RX ADMIN — HYDROCORTISONE SODIUM SUCCINATE 50 MG: 100 INJECTION, POWDER, FOR SOLUTION INTRAMUSCULAR; INTRAVENOUS at 08:07

## 2019-07-16 RX ADMIN — CEFEPIME 2 G: 2 INJECTION, POWDER, FOR SOLUTION INTRAVENOUS at 01:07

## 2019-07-16 NOTE — SUBJECTIVE & OBJECTIVE
Scheduled Meds:   acyclovir (ZOVIRAX) IVPB (wt > 70 kg)  10 mg/kg Intravenous Q8H    ceFEPime (MAXIPIME) IVPB  2 g Intravenous Q8H    DAPTOmycin (CUBICIN)  IV  6 mg/kg Intravenous Q24H    hydrocortisone sodium succinate  50 mg Intravenous Q8H    levETIRAcetam  500 mg Oral BID    levothyroxine  112 mcg Oral Before breakfast    magnesium oxide  400 mg Oral Daily    OLANZapine  7.5 mg Oral QHS    potassium chloride  30 mEq Oral Q4H    sodium bicarbonate  650 mg Oral BID    sodium chloride 0.9%  500 mL Intravenous Q8H    sulfamethoxazole-trimethoprim 400-80mg  1 tablet Oral Daily    thiamine  100 mg Oral Daily     Continuous Infusions:  PRN Meds:acetaminophen, calcium carbonate, dextrose 50%, dextrose 50%, glucagon (human recombinant), glucose, glucose, hydrALAZINE, insulin aspart U-100, iohexol, OLANZapine, ondansetron, sodium chloride 0.9%    Review of Systems   Constitutional: Positive for activity change, appetite change and fever. Negative for chills.   HENT: Negative for congestion and facial swelling.    Eyes: Negative for pain, discharge and visual disturbance.   Respiratory: Negative for cough, chest tightness, shortness of breath and wheezing.    Cardiovascular: Negative for chest pain, palpitations and leg swelling.   Gastrointestinal: Negative for abdominal distention, abdominal pain, diarrhea, nausea and vomiting.   Endocrine: Negative.    Genitourinary: Negative for decreased urine volume, difficulty urinating and dysuria.   Skin: Positive for wound (chevron well healed).   Allergic/Immunologic: Positive for immunocompromised state.   Neurological: Positive for weakness. Negative for light-headedness.   Psychiatric/Behavioral: Positive for confusion, decreased concentration and dysphoric mood. Negative for agitation. The patient is not nervous/anxious.      Objective:     Vital Signs (Most Recent):  Temp: 97.7 °F (36.5 °C) (07/16/19 0745)  Pulse: 80 (07/16/19 0745)  Resp: 16 (07/16/19  0745)  BP: 124/60 (07/16/19 0745)  SpO2: 98 % (07/16/19 0745) Vital Signs (24h Range):  Temp:  [97.7 °F (36.5 °C)-98.3 °F (36.8 °C)] 97.7 °F (36.5 °C)  Pulse:  [80-98] 80  Resp:  [16-18] 16  SpO2:  [97 %-98 %] 98 %  BP: (124-153)/(60-67) 124/60     Weight: 81.6 kg (179 lb 14.3 oz)  Body mass index is 30.88 kg/m².    Intake/Output - Last 3 Shifts       07/14 0700 - 07/15 0659 07/15 0700 - 07/16 0659 07/16 0700 - 07/17 0659    P.O. 660 350     I.V. (mL/kg)  0 (0)     IV Piggyback 1550 250     Total Intake(mL/kg) 2210 (27.1) 600 (7.4)     Urine (mL/kg/hr) 600 (0.3) 500 (0.3)     Emesis/NG output 0      Other 0      Stool 0 0     Blood 0      Total Output 600 500     Net +1610 +100            Urine Occurrence 2 x 4 x 1 x    Stool Occurrence 2 x 3 x 1 x    Emesis Occurrence 0 x 0 x           Physical Exam   Constitutional: She is oriented to person, place, and time. She appears well-developed. No distress.   HENT:   Head: Normocephalic and atraumatic.   Eyes: No scleral icterus.   R pupil > L pupil; this is lifelong per    Neck: Normal range of motion. Neck supple. No thyromegaly present.   Cardiovascular: Regular rhythm. Tachycardia present. Exam reveals no friction rub.   No murmur heard.  Pulmonary/Chest: Effort normal. She has decreased breath sounds in the right middle field, the right lower field and the left lower field. She has no wheezes. She has no rales.   Diminished RLL   Abdominal: Soft. She exhibits no distension. There is no tenderness. There is no rebound and no guarding.   Healed chevron incision   Musculoskeletal: Normal range of motion.   Neurological: She is alert and oriented to person, place, and time. She is not disoriented.   Oriented to person, easily re oriented   Skin: Skin is warm and dry. She is not diaphoretic.   Psychiatric: Judgment and thought content normal. Her mood appears anxious. Her affect is angry. Her speech is delayed. She is agitated and combative.   Nursing note and  vitals reviewed.      Laboratory:  Immunosuppressants     None        CBC:   Recent Labs   Lab 07/16/19  0749   WBC 6.26   RBC 2.84*   HGB 8.0*   HCT 25.6*      MCV 90   MCH 28.2   MCHC 31.3*     CMP:   Recent Labs   Lab 07/16/19  0749   *   CALCIUM 8.2*   ALBUMIN 2.0*   PROT 5.4*      K 3.1*   CO2 19*      BUN 13   CREATININE 0.6   ALKPHOS 153*   ALT 11   AST 22   BILITOT 0.5     Labs within the past 24 hours have been reviewed.    Diagnostic Results:  CT Abd/Pelvis:   Results for orders placed during the hospital encounter of 07/05/19   CT Abdomen Pelvis  Without Contrast    Narrative EXAMINATION:  CT ABDOMEN PELVIS WITHOUT CONTRAST    CLINICAL HISTORY:  s/p liver transplant admitted with fever;    TECHNIQUE:  Low dose axial images, sagittal and coronal reformations were obtained from the lung bases to the pubic symphysis, 30 mL of oral Omnipaque 350 was administered..    COMPARISON:  Ultrasound liver transplant 06/24/2019.  CT previous abdomen pelvis 04/22/2019.    FINDINGS:  Heart: Normal in size. No pericardial effusion.    Lung Bases: Moderate right pleural effusion with associated compressive atelectasis of the right lower lobe, increased since prior examination dated 04/22/2019.  The left lung is clear.  4 mm left lower lobe nodule stable since prior examination dated 04/05/2018.    Esophagus and stomach: There is a gastric band.  Small hiatal hernia.    Liver: Status post liver transplant.  Moderate volume ascites.    Gallbladder: Status post cholecystectomy.    Bile Ducts: No evidence of dilated ducts.    Pancreas: No mass or peripancreatic fat stranding.    Spleen: Mildly enlarged in size measuring 13 cm.  Splenic collaterals better evaluated on prior contrast study dated 04/22/2019.    Adrenals: Unremarkable.    Kidneys/ Ureters: There are two 3-4 mm right renal nonobstructing stones.  No evidence of hydronephrosis or hydroureter.    Bladder: Incompletely distended with no  significant abnormality.    Reproductive organs: Unremarkable.    GI Tract/Mesentery: Bowel loops opacified with enteric contrast with no significant abnormality.  No evidence of bowel obstruction or inflammation.    Peritoneal Space: Moderate volume ascites.  No free air.    Retroperitoneum: Borderline enlarged aortocaval lymph node measuring 10 mm, previously subcentimeter, nonspecific.    Abdominal wall: Diffuse body wall edema.    Vasculature: Mild calcific atherosclerosis of the abdominal aorta.  No aneurysmal dilatation.    Bones: No acute fracture.      Impression Status post liver transplant with moderate volume ascites.  Splenomegaly.    Two 3-4 mm right renal nonobstructing stones.    Moderate right pleural effusion with associated compressive atelectasis of the right lower lobe, increased since prior examination.    Stable 4 mm left lower lobe nodule.    Other findings as above.    Electronically signed by resident: Stephanie Nielsen MD  Date:    07/05/2019  Time:    19:47    Electronically signed by: Jay Martinez MD  Date:    07/05/2019  Time:    20:31       Debility/Functional status: Patient debilitated by evidence of Muscle wasting and atrophy, Weakness, Chronic fatigue, unspecified and Other reduced mobility. Physical and occupational therapy ordered daily to evaluate and treat. Debility was: present on admission.

## 2019-07-16 NOTE — ASSESSMENT & PLAN NOTE
This patient is a 49 year old lady with history of KESSLER s/p liver transplant on 6/5/19 complicated by ICH and fevers. She became febrile this past week and was admitted to the hospital on 7/9 due to fever of unknown origin in the setting of recent organ transplant. This is not her baseline per family. She is currently oriented to person, place and situation. Orientation has improved from yesterday. Still not oriented to time completely. Believed the year was 3017 but knew it was mid July.  Mental status exam has also improved, with less dysarthric speech at times and no elicited paranoid delusions. No elicited visual hallucinations today. She is on Zyprexa 7.5 mg qhs currently but PRN option has not been added yet. She is still suffering from delirium, multifactorial in etiology (infection, recent hemorrhage being biggest possible culprits). We continue to expect improvement in mental status with resolution of medical issues. We will continue to follow her during this hospital stay.       Recommendations:   - Continue Zyprexa 7.5 mg qhs   - Add Zyprexa PO/IM 2.5 mg q6h PRN for episodic agitation

## 2019-07-16 NOTE — PLAN OF CARE
Problem: Occupational Therapy Goal  Goal: Occupational Therapy Goal  Goals to be met by: 7/25/19 ( Goals updated 7/16)    Patient will increase functional independence with ADLs by performing:    LE Dressing with Minimal Assistance.  Grooming while standing at sink with Contact Guard Assistance.  Toileting from toilet with SBA for hygiene and clothing management.  Supine to sit with supervision to increase bed mobility independence.  Step transfer with Stand-by Assistance and AD as needed to prepare for household mobility to complete ADLs of choice.  Toilet transfer to toilet with SBA.  Upper extremity exercise program x15 reps per handout, with independence to build strength and endurance for ADLs/IADLs.  Pt will tolerate dynamic standing task for ~5 minutes in preparation for standing ADLs with CGA.     Outcome: Ongoing (interventions implemented as appropriate)  Continue with POC.   Georgie Ramirez OT  7/16/2019
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
## 2019-07-16 NOTE — PROGRESS NOTES
"[facility]  Liver Transplant  Progress Note    Patient Name: Jihan Zamudio  MRN: [id number]  Admission Date: 2019  Hospital Length of Stay: 11 days  Code Status: Full Code  Primary Care Provider: Primary Doctor No  Post-Operative Day: 41    ORGAN:   LIVER  Disease Etiology: Cirrhosis: Fatty Liver ([place])  Donor Type:    - Brain Death  [facility] High Risk:   No  Donor CMV Status:   Donor CMV Status: Positive  Donor HBcAB:   Negative  Donor HCV Status:   Negative  Donor HBV GERTRUDIS: Negative  Donor HCV GERTRUDIS: Negative  Whole or Partial: Whole Liver  Biliary Anastomosis: End to End  Arterial Anatomy: Standard  Subjective:     History of Present Illness:  Ms. Zamudio is a 48 y/o F s/p DBD OLTx (SM induction, CMV D+R+) for KESSLER cirrhosis on 19. Post transplant course significant for acute parenchymal hemorrhage within the right occipital lobe near the parieto-occipital junction measuring approximately 1.8 x 1.3 x 1.5 cm with mild surrounding vasogenic edema and localized mass effect after fall on . She was discharged home on 7 days of Keppra but nuerosurgery, completed on . She now presents to the ED with 1 day history of fever. Homehealth nurse checked patient's temperature and was noted to be elevated. Home health nurse also reported patient acting "off" with mental status change. Currently in the ED, tmax is 102.3. Patient feels well with no true complaints expect rhinorrhea. She denies chills, diarrhea, nausea/vomiting, abdominal pain. Denies sick contacts. Will admit for infectious work up (blood cx, UA, urine cx, CMV PCR, chest x-ray, CT A/P). Patient currently with no altered mental status. In light of recent hx of occipital hemorrhage, will obtain CT head without contrast.      Hospital Course:  48 y/o F s/p OLTx for KESSLER cirrhosis on  who was admitted on  for fevers. Pt febrile on admission, started on bx abx. ID consulted. Blood cultures NGTD, UA negative. CMV PCR drawn " on admit pending. Pt having loose stools, C diff EIA negative, CMV undetected. CT head showed reduction in size of hemorrhage. Abdominal CT reviewed and with moderate right pleural effusion. CXR with increased pleural effusion. Thora and para done 7/9; amount of fluid removed not recorded and pleural fluid labs were not completed as ordered. Abdominal gram stain reveals no WBC and no organisms; cell count not completed as ordered, cultures pending. TTE done 7/9 WNL, no vegetations. Pt tachy and tachypneic on 7/9; 1 L bolus given and pt responded well. 1 u pRBC transfused 7/9 for low H/H. Pt continued to have diarrhea, additional stool studies pending. Mentation continued to wax and wane, MRI w/ and w/o contrast done on 7/11 which revealed stable findings compared to prior. Pt transitioned to IV abx to PO cefpodoxime and flagyl on 7/11 per ID recs. Urine culture from 7/11 resulted as positive for VRE, thus cefpodoxime and flagyl stopped and pt started on zyvox on 7/12. Pt with worsening MS on 7/13 and claimed she had an unwitnessed seizure. STAT CT head without acute change. Keppra 500 mg bid started. Prograf and lovenox dc'ed. Stopped zyvox due to potential to lower seizure threshold and started IV daptomycin. IV cefepime restarted. Zyprexa qhs started for agitation and anxiety. Pt continued to have fevers despite therapeutic IV abx for VRE UTI. Viral and fungal labs pending per ID recs. 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  cc q8h started. Pt with persistent AMS, thus psych consulted who believe she is suffering from multifactorial delirium.     Interval history: No acute events overnight. Pt afebrile since AM of 7/14 but continues to have persistent AMS. She is oriented to self and location but does not know what year it is and believes she has been in the hospital since April. She answers some questions appropriately but confabulates; reports she is 28 years old. Pt also reports  she had a seizure last night and while PA in room this AM. Both of pt's hands shaking; no LOC. Pt able to stop shaking in order to answer questions. Continue keppra. Psych consulted yesterday, believe she is suffering from multifactorial delirium 2/2 infection and ICH. Continue zyprexa qhs. Zyprexa PRN added. PO thiamine started. Delirium precautions started. Continue to hold prograf. Unable to do LP as unsafe 2/2 ICH per neuro recs. Blood cultures NGTD from 7/13 and 7/14. ID following. Continue dapto for VRE UTI and empiric cefepime. 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 Viral and fungal labs pending. CT a/p yesterday continued to redemonstrate R pleural effusion, moderate ascites, and fluid in adam hepatis; no walled off fluid collections suggestive of abscess. Plan to do paracentesis today to reassess for infection as cell count and other labs not sent off when pt underwent para on 7/9. Abdominal fluid cell count, gram statin, bili, aerobic/anaerobic/fungal cultures ordered. Continue to monitor.    Scheduled Meds:   acyclovir (ZOVIRAX) IVPB (wt > 70 kg)  10 mg/kg Intravenous Q8H    ceFEPime (MAXIPIME) IVPB  2 g Intravenous Q8H    DAPTOmycin (CUBICIN)  IV  6 mg/kg Intravenous Q24H    hydrocortisone sodium succinate  50 mg Intravenous Q8H    levETIRAcetam  500 mg Oral BID    levothyroxine  112 mcg Oral Before breakfast    magnesium oxide  400 mg Oral Daily    OLANZapine  7.5 mg Oral QHS    potassium chloride  30 mEq Oral Q4H    sodium bicarbonate  650 mg Oral BID    sodium chloride 0.9%  500 mL Intravenous Q8H    sulfamethoxazole-trimethoprim 400-80mg  1 tablet Oral Daily    thiamine  100 mg Oral Daily     Continuous Infusions:  PRN Meds:acetaminophen, calcium carbonate, dextrose 50%, dextrose 50%, glucagon (human recombinant), glucose, glucose, hydrALAZINE, insulin aspart U-100, iohexol, OLANZapine, ondansetron, sodium chloride 0.9%    Review of Systems   Constitutional:  Positive for activity change, appetite change and fever. Negative for chills.   HENT: Negative for congestion and facial swelling.    Eyes: Negative for pain, discharge and visual disturbance.   Respiratory: Negative for cough, chest tightness, shortness of breath and wheezing.    Cardiovascular: Negative for chest pain, palpitations and leg swelling.   Gastrointestinal: Negative for abdominal distention, abdominal pain, diarrhea, nausea and vomiting.   Endocrine: Negative.    Genitourinary: Negative for decreased urine volume, difficulty urinating and dysuria.   Skin: Positive for wound (chevron well healed).   Allergic/Immunologic: Positive for immunocompromised state.   Neurological: Positive for weakness. Negative for light-headedness.   Psychiatric/Behavioral: Positive for confusion, decreased concentration and dysphoric mood. Negative for agitation. The patient is not nervous/anxious.      Objective:     Vital Signs (Most Recent):  Temp: 97.7 °F (36.5 °C) (07/16/19 0745)  Pulse: 80 (07/16/19 0745)  Resp: 16 (07/16/19 0745)  BP: 124/60 (07/16/19 0745)  SpO2: 98 % (07/16/19 0745) Vital Signs (24h Range):  Temp:  [97.7 °F (36.5 °C)-98.3 °F (36.8 °C)] 97.7 °F (36.5 °C)  Pulse:  [80-98] 80  Resp:  [16-18] 16  SpO2:  [97 %-98 %] 98 %  BP: (124-153)/(60-67) 124/60     Weight: 81.6 kg (179 lb 14.3 oz)  Body mass index is 30.88 kg/m².    Intake/Output - Last 3 Shifts       07/14 0700 - 07/15 0659 07/15 0700 - 07/16 0659 07/16 0700 - 07/17 0659    P.O. 660 350     I.V. (mL/kg)  0 (0)     IV Piggyback 1550 250     Total Intake(mL/kg) 2210 (27.1) 600 (7.4)     Urine (mL/kg/hr) 600 (0.3) 500 (0.3)     Emesis/NG output 0      Other 0      Stool 0 0     Blood 0      Total Output 600 500     Net +1610 +100            Urine Occurrence 2 x 4 x 1 x    Stool Occurrence 2 x 3 x 1 x    Emesis Occurrence 0 x 0 x           Physical Exam   Constitutional: She is oriented to person, place, and time. She appears well-developed. No  distress.   HENT:   Head: Normocephalic and atraumatic.   Eyes: No scleral icterus.   R pupil > L pupil; this is lifelong per    Neck: Normal range of motion. Neck supple. No thyromegaly present.   Cardiovascular: Regular rhythm. Tachycardia present. Exam reveals no friction rub.   No murmur heard.  Pulmonary/Chest: Effort normal. She has decreased breath sounds in the right middle field, the right lower field and the left lower field. She has no wheezes. She has no rales.   Diminished RLL   Abdominal: Soft. She exhibits no distension. There is no tenderness. There is no rebound and no guarding.   Healed chevron incision   Musculoskeletal: Normal range of motion.   Neurological: She is alert and oriented to person, place, and time. She is not disoriented.   Oriented to person, easily re oriented   Skin: Skin is warm and dry. She is not diaphoretic.   Psychiatric: Judgment and thought content normal. Her mood appears anxious. Her affect is angry. Her speech is delayed. She is agitated and combative.   Nursing note and vitals reviewed.      Laboratory:  Immunosuppressants     None        CBC:   Recent Labs   Lab 07/16/19  0749   WBC 6.26   RBC 2.84*   HGB 8.0*   HCT 25.6*      MCV 90   MCH 28.2   MCHC 31.3*     CMP:   Recent Labs   Lab 07/16/19  0749   *   CALCIUM 8.2*   ALBUMIN 2.0*   PROT 5.4*      K 3.1*   CO2 19*      BUN 13   CREATININE 0.6   ALKPHOS 153*   ALT 11   AST 22   BILITOT 0.5     Labs within the past 24 hours have been reviewed.    Diagnostic Results:  CT Abd/Pelvis:   Results for orders placed during the hospital encounter of 07/05/19   CT Abdomen Pelvis  Without Contrast    Narrative EXAMINATION:  CT ABDOMEN PELVIS WITHOUT CONTRAST    CLINICAL HISTORY:  s/p liver transplant admitted with fever;    TECHNIQUE:  Low dose axial images, sagittal and coronal reformations were obtained from the lung bases to the pubic symphysis, 30 mL of oral Omnipaque 350 was  administered..    COMPARISON:  Ultrasound liver transplant 06/24/2019.  CT previous abdomen pelvis 04/22/2019.    FINDINGS:  Heart: Normal in size. No pericardial effusion.    Lung Bases: Moderate right pleural effusion with associated compressive atelectasis of the right lower lobe, increased since prior examination dated 04/22/2019.  The left lung is clear.  4 mm left lower lobe nodule stable since prior examination dated 04/05/2018.    Esophagus and stomach: There is a gastric band.  Small hiatal hernia.    Liver: Status post liver transplant.  Moderate volume ascites.    Gallbladder: Status post cholecystectomy.    Bile Ducts: No evidence of dilated ducts.    Pancreas: No mass or peripancreatic fat stranding.    Spleen: Mildly enlarged in size measuring 13 cm.  Splenic collaterals better evaluated on prior contrast study dated 04/22/2019.    Adrenals: Unremarkable.    Kidneys/ Ureters: There are two 3-4 mm right renal nonobstructing stones.  No evidence of hydronephrosis or hydroureter.    Bladder: Incompletely distended with no significant abnormality.    Reproductive organs: Unremarkable.    GI Tract/Mesentery: Bowel loops opacified with enteric contrast with no significant abnormality.  No evidence of bowel obstruction or inflammation.    Peritoneal Space: Moderate volume ascites.  No free air.    Retroperitoneum: Borderline enlarged aortocaval lymph node measuring 10 mm, previously subcentimeter, nonspecific.    Abdominal wall: Diffuse body wall edema.    Vasculature: Mild calcific atherosclerosis of the abdominal aorta.  No aneurysmal dilatation.    Bones: No acute fracture.      Impression Status post liver transplant with moderate volume ascites.  Splenomegaly.    Two 3-4 mm right renal nonobstructing stones.    Moderate right pleural effusion with associated compressive atelectasis of the right lower lobe, increased since prior examination.    Stable 4 mm left lower lobe nodule.    Other findings as  "above.    Electronically signed by resident: Stephanie Nielesn MD  Date:    07/05/2019  Time:    19:47    Electronically signed by: Jay Martinez MD  Date:    07/05/2019  Time:    20:31       Debility/Functional status: Patient debilitated by evidence of Muscle wasting and atrophy, Weakness, Chronic fatigue, unspecified and Other reduced mobility. Physical and occupational therapy ordered daily to evaluate and treat. Debility was: present on admission.    Assessment/Plan:     * Seizure-like activity  - pt seen sitting in chair AM of 7/13 stating "I just had a seizure, I just finished banging my head on the chair."  - pt couldn't recall events surrounding seizure like activity.  - given recent hx of fall with ICH on 6/24, Neuro consulted in which pt had a very inconsistent neuro exam.   - Keppra 500 mg bid restarted and a STAT CT head performed which did not show a new infarct or bleed.  - dc'd Lovenox.   - prograf held for now. Started hydrocortisone.   - Monitor.       Fever  - Infectious work up ordered on admit - blood cx NGTD, UA unremarkable, CMV PCR 7/5 undetected, chest x-ray w right pleural effusion, CT A/P reviewed.  - Started broad spectrum antibiotics on admit.   - Abdominal and head CT reviewed and no clear source for fever.   - ID consulted.  Apprec recs.   - Pt with intermittent confusion.  Easily re oriented.  Continue to monitor.   - Thora and para done 7/9; amount of fluid removed not recorded and pleural fluid labs were not completed as ordered.   - Abdominal gram stain reveals no WBC and no organisms; cell count not completed as ordered; pleural fluid cell count 84 WBCs, 24% segs.  - Pt still reports diarrhea- C diff negative, CMV undetected. Stool culture, WBC, ova cysts parasites, and GI pathogen panel pending.   - low grade fever 7/12, 100.8.  - deescalated abx to PO cefpodoxime and flagyl on 7/12.  - MRI head w/ and w/o contrast 7/12 without acute changes.  - Urine culture positive for enterococcus " faecium- started Zyvox and dc'd cefpodoxime and flagyl 7/12.  - temp 100.4 on 7/13.  - Zyvox changed to Dapto as pt with seizure like activity earlier in morning and zyvox can lower seizure threshold.  - repeat blood cultures ngtd.  - ammonia level wnl.   - pt also with confusion, AMS. D/w ID - restarted Cefepime.  - procalcitonin, lactate, EBV, RPR, fungitell, aspergillus, HIV, Saadia Delacruz, histoplasma antigen and blastomyces antigen and CMV PCR pending.  - Start Acyclovir treatment dose for possible viral meningitis.   - repeat blood cx on 7/13 and 7/14 remain NGTD. Last fever 102.3 F on 7/14 at 9 AM  - CT a/p yesterday continued to redemonstrate R pleural effusion, moderate ascites, and fluid in adam hepatis; no walled off fluid collections suggestive of abscess.   - Plan to do paracentesis today to reassess for infection as cell count and other labs not sent off when pt underwent para on 7/9. Abdominal fluid cell count, gram statin, bili, aerobic/anaerobic/fungal cultures ordered.   - Monitor.        UTI (urinary tract infection) due to Enterococcus  - UA 7/5 unremarkable.  - urine culture 7/11 positive for enterococcus faecium- susceptibilities pending.  - dc'd cefpodoxime and flagyl on 7/12.  - started Zyvox on 7/12.  - ID transitioned Zyvox to Dapto on 7/13 as Zyvox can lower the seizure threshold.  - continue to monitor.    Encephalopathy  - pt with intermittent disorientation to situation throughout stay  - CT head on 7/5 and 7/13 with no acute change; MRI head w/ contrast on 7/11 with no acute change  - ammonia WNL  - pt with acute personality change on 7/13- throwing items at nurses and agitated  - zyprexa qhs started 7/13; PRN zyprexa added 7/16  - psych consulted 7/15; believe she is suffering from multifactorial delirium 2/2 infection & ICH  - unable to do LP as unsafe at the moment due to recent ICH  - PO thiamine started.   - Delirium precautions started.   - continue to monitor      Hypokalemia  -  replace as needed.  - Kdur 40 meq po x 1 dose.   - monitor.       Traumatic intracranial hemorrhage without loss of consciousness  - Head CT reviewed and noted with interval reduction in size of the patient's known right occipital lobe parenchymal hemorrhage.      At risk for opportunistic infections  - cont bactrim for PCP prophylaxis.  - (CMV D+/R+) hold Valcyte.   - Acyclovir started 7/14.      Closed head injury  - admitted recently following fall diagnosed w acute parenchymal hemorrhage within right occipital live near parieto-occipital junction 1.8x1.3x1.5 w mild surrounding vasogenic edema and localized mass.  Repeat CT head showed reduction in size of hemorrhage.  - pt denies HA.      Long-term use of immunosuppressant medication  - holding prograf 7/13 due to seizure like activity.  - Holding MMF. Monitor prograf level daily, monitor for toxic side effects, and adjust for therapeutic dose.   - hydrocortisone 7/14.     Prophylactic immunotherapy  - See long term use of immunosuppression.  Decreased as likely w sepsis.    S/P liver transplant  - LFTs stable.  - Pt with good hepatic allograft function.   - Last Liver US 6/24 showed Mildly elevated hepatic arterial resistive indices, although improved from prior exam.  Otherwise, satisfactory vascular appearance of the liver, complex fluid collection adjacent to the left hepatic lobe, similar to slightly smaller compared to prior exam, small volume of ascites and partially visualized right pleural effusion.   - para completed 7/9, cultures pending. Cell count not collected as ordered.   - will repeat para today    Anemia of chronic disease  - no overt bleeding.    - keep type and screen current.  - transfused 1u prbc 7/9.  - monitor with daily labs.      Pleural effusion, right  - CXR reviewed.    - Thora done 7/9  - pleural fluid labs not completed as ordered- will try to get labs repeated if specimen is still available.  - cell count: 84 WBCs, 24%  segs      Weakness  - PT/OT consulted.        Moderate malnutrition  - supplements ordered.      GERD (gastroesophageal reflux disease)  - cont Pepcid.          VTE Risk Mitigation (From admission, onward)        Ordered     Place sequential compression device  Until discontinued      07/05/19 1659     IP VTE HIGH RISK PATIENT  Once      07/05/19 1659          The patients clinical status was discussed at multidisplinary rounds, involving transplant surgery, transplant medicine, pharmacy, nursing, nutrition, and social work    Discharge Planning:  No Patient Care Coordination Note on file.      Amy Saunders PA-C  Liver Transplant  [facility]

## 2019-07-16 NOTE — PT/OT/SLP PROGRESS
"Physical Therapy Treatment    Patient Name:  Jihan Zamudio   MRN:  [id number]    Recommendations:     Discharge Recommendations:  rehabilitation facility   Discharge Equipment Recommendations: shower chair   Barriers to discharge: None    Assessment:     Jihan Zamudio is a 49 y.o. female admitted with a medical diagnosis of Seizure-like activity.  She presents with the following impairments/functional limitations:  impaired endurance, weakness, impaired functional mobilty, decreased safety awareness, impaired cognition, impaired balance, gait instability. Pt agreeable to PT session. Pt increased ambulation distance however required seated rest break and Min A at times due to posterior lean. Pt presents with confusion and reports "woman talking in her room" and "dancing chickens". Pt would continue to benefit from skilled acute PT services 4x/week to optimize independence with functional mobility. Recommend further therapy services at rehabilitation facility upon discharge when medically stable.     Rehab Prognosis: Good; patient would benefit from acute skilled PT services to address these deficits and reach maximum level of function.    Recent Surgery: * No surgery found *      Plan:     During this hospitalization, patient to be seen 4 x/week to address the identified rehab impairments via gait training, therapeutic activities, therapeutic exercises, neuromuscular re-education and progress toward the following goals:    · Plan of Care Expires:  08/08/19    Subjective     Chief Complaint: "Get that woman out of my room"  Patient/Family Comments/goals: To go home   Pain/Comfort:  · Pain Rating 1: 0/10      Objective:     Communicated with RN prior to session.  Patient found HOB elevated with telemetry upon PT entry to room.     General Precautions: Standard, fall(Isolation: VRE)   Orthopedic Precautions:N/A   Braces: N/A     Functional Mobility:  · Bed Mobility:     · Rolling Right: stand by " assistance  · Scooting: stand by assistance  · Supine to Sit: stand by assistance    · Transfers:    · Sit to Stand:  contact guard assistance with no AD  · Bed to Chair: minimum assistance with  hand-held assist  using  Step Transfer    · Gait: Ambulated x70' and x25' (x95' total) with CGA and hand-held assistance requiring Min A at times due to posterior lean. Pt required seated rest break between trials and reported dizziness and weakness. Pt ambulates with upright posture, reciprocal gait, decreased gait speed, decreased step length, and decreased endurance.    · Balance: Static Sitting EOB: SBA Static standing: CGA/HHA      AM-PAC 6 CLICK MOBILITY  Turning over in bed (including adjusting bedclothes, sheets and blankets)?: 3  Sitting down on and standing up from a chair with arms (e.g., wheelchair, bedside commode, etc.): 3  Moving from lying on back to sitting on the side of the bed?: 3  Moving to and from a bed to a chair (including a wheelchair)?: 3  Need to walk in hospital room?: 3  Climbing 3-5 steps with a railing?: 2  Basic Mobility Total Score: 17       Therapeutic Activities and Exercises:   - Pt educated on PT role, POC, goals, benefits of continued mobility and current fall risk   - Pt instructed to use nurse call light for assistance with all transfers     Patient left up in chair with all lines intact, call button in reach and RN notified..    GOALS:   Multidisciplinary Problems     Physical Therapy Goals        Problem: Physical Therapy Goal    Goal Priority Disciplines Outcome Goal Variances Interventions   Physical Therapy Goal     PT, PT/OT Ongoing (interventions implemented as appropriate)     Description:  Goals to be met by: 2019    Patient will increase functional independence with mobility by performin. Supine <> sit with supervision   2. Sit to stand transfer with Supervision  3. Gait  x 150 feet with Stand-by Assistance with or without using least restrictive AD.   4. Stand  for 3 minutes with Stand-by Assistance while performing dynamic balance activities   5. Lower extremity exercise program x20 reps per handout, with supervision                      Time Tracking:     PT Received On: 07/16/19  PT Start Time: 1438     PT Stop Time: 1503  PT Total Time (min): 25 min     Billable Minutes: Gait Training 25    Treatment Type: Treatment  PT/PTA: PT     PTA Visit Number: 0     Florecita Marino, [facility]  07/16/2019

## 2019-07-16 NOTE — ASSESSMENT & PLAN NOTE
- pt with intermittent disorientation to situation throughout stay  - CT head on 7/5 and 7/13 with no acute change; MRI head w/ contrast on 7/11 with no acute change  - ammonia WNL  - pt with acute personality change on 7/13- throwing items at nurses and agitated  - zyprexa qhs started 7/13; PRN zyprexa added 7/16  - psych consulted 7/15; believe she is suffering from multifactorial delirium 2/2 infection & ICH  - unable to do LP as unsafe at the moment due to recent ICH  - PO thiamine started.   - Delirium precautions started.   - continue to monitor

## 2019-07-16 NOTE — ASSESSMENT & PLAN NOTE
- Infectious work up ordered on admit - blood cx NGTD, UA unremarkable, CMV PCR 7/5 undetected, chest x-ray w right pleural effusion, CT A/P reviewed.  - Started broad spectrum antibiotics on admit.   - Abdominal and head CT reviewed and no clear source for fever.   - ID consulted.  Apprec recs.   - Pt with intermittent confusion.  Easily re oriented.  Continue to monitor.   - Thora and para done 7/9; amount of fluid removed not recorded and pleural fluid labs were not completed as ordered.   - Abdominal gram stain reveals no WBC and no organisms; cell count not completed as ordered; pleural fluid cell count 84 WBCs, 24% segs.  - Pt still reports diarrhea- C diff negative, CMV undetected. Stool culture, WBC, ova cysts parasites, and GI pathogen panel pending.   - low grade fever 7/12, 100.8.  - deescalated abx to PO cefpodoxime and flagyl on 7/12.  - MRI head w/ and w/o contrast 7/12 without acute changes.  - Urine culture positive for enterococcus faecium- started Zyvox and dc'd cefpodoxime and flagyl 7/12.  - temp 100.4 on 7/13.  - Zyvox changed to Dapto as pt with seizure like activity earlier in morning and zyvox can lower seizure threshold.  - repeat blood cultures ngtd.  - ammonia level wnl.   - pt also with confusion, AMS. D/w ID - restarted Cefepime.  - procalcitonin, lactate, EBV, RPR, fungitell, aspergillus, HIV, Saadia Delacruz, histoplasma antigen and blastomyces antigen and CMV PCR pending.  - Start Acyclovir treatment dose for possible viral meningitis.   - repeat blood cx on 7/13 and 7/14 remain NGTD. Last fever 102.3 F on 7/14 at 9 AM  - CT a/p yesterday continued to redemonstrate R pleural effusion, moderate ascites, and fluid in adam hepatis; no walled off fluid collections suggestive of abscess.   - Plan to do paracentesis today to reassess for infection as cell count and other labs not sent off when pt underwent para on 7/9. Abdominal fluid cell count, gram statin, bili, aerobic/anaerobic/fungal  cultures ordered.   - Monitor.

## 2019-07-16 NOTE — PT/OT/SLP PROGRESS
"Occupational Therapy   Treatment    Name: Jihan Zamudio  MRN: [id number]  Admitting Diagnosis:  Seizure-like activity       Recommendations:     Discharge Recommendations: rehabilitation facility  Discharge Equipment Recommendations:  shower chair  Barriers to discharge:  None    Assessment:     Jihan Zamudio is a 49 y.o. female with a medical diagnosis of Seizure-like activity.  She presents alert and requiring encouragement for participation in therapy session. Pt chief complaint stating, " I can't feel or move my legs". Pt required frequent verbal/tactile cues for overall safety.  Performance deficits affecting function are weakness, impaired endurance, impaired self care skills, impaired functional mobilty, gait instability, impaired balance, impaired cognition, decreased safety awareness. Pt tolerated treatment session well however demo's fair progression with goals 2/2 cog status. She would benefit from rehab following d/c to continue to progress towards goals and improve quality of life.     Rehab Prognosis:  Good; patient would benefit from acute skilled OT services to address these deficits and reach maximum level of function.       Plan:     Patient to be seen 4 x/week to address the above listed problems via self-care/home management, therapeutic activities, therapeutic exercises, neuromuscular re-education  · Plan of Care Expires: 08/07/19  · Plan of Care Reviewed with: patient    Subjective     Pain/Comfort:  · Pain Rating 1: 0/10  · Pain Rating Post-Intervention 1: 0/10    Objective:     Communicated with: RN prior to session.  Patient found supine with bed alarm, telemetry upon OT entry to room.    General Precautions: Standard, fall(Isolation: VRE)   Orthopedic Precautions:N/A   Braces: N/A     Occupational Performance:     Bed Mobility:    · Patient completed Rolling/Turning to Left with  stand by assistance  · Patient completed Supine to Sit with minimum assistance  · Patient completed Sit " to Supine with minimum assistance     Functional Mobility/Transfers:  · Patient completed Sit <> Stand Transfer with contact guard assistance  with  no assistive device   · Patient completed Toilet Transfer Stand Pivot technique with contact guard assistance with  no AD  · Functional Mobility: Pt completed functional mobility from EOB <> bedside commode with CGA and RW; she tolerated well with no LOB or SOB.   * pt required frequent cues for overall safety and attending to task    Activities of Daily Living:  · Feeding:  independence to drink from boost drink/take medications without provided set-up  · Grooming: stand by assistance with set-up assistance provided to apply deodorant   · Upper Body Dressing: minimum assistance to paola gown like jacket while seated EOB  · Toileting: contact guard assistance with set-up to completet toileting while standing from bedside commode    [facility] 6 Click ADL: 19    Treatment & Education:  -Pt edu on OT role/POC  -Importance of OOB activity with staff assistance ( UIC throughout the day)  -Safety during functional t/f and mobility  -White board updated  - Multiple self care tasks completed--as noted above  - All questions/concerns answered within OT scope of practice  - Multiple self care tasks completed--as noted above    Patient left supine with all lines intact, call button in reach and RN notifiedEducation:      GOALS:   Multidisciplinary Problems     Occupational Therapy Goals        Problem: Occupational Therapy Goal    Goal Priority Disciplines Outcome Interventions   Occupational Therapy Goal     OT, PT/OT Ongoing (interventions implemented as appropriate)    Description:  Goals to be met by: 7/25/19 ( Goals updated 7/16)    Patient will increase functional independence with ADLs by performing:    LE Dressing with Minimal Assistance.  Grooming while standing at sink with Contact Guard Assistance.  Toileting from toilet with SBA for hygiene and clothing management.  Supine  to sit with supervision to increase bed mobility independence.  Step transfer with Stand-by Assistance and AD as needed to prepare for household mobility to complete ADLs of choice.  Toilet transfer to toilet with SBA.  Upper extremity exercise program x15 reps per handout, with independence to build strength and endurance for ADLs/IADLs.  Pt will tolerate dynamic standing task for ~5 minutes in preparation for standing ADLs with CGA.                       Time Tracking:     OT Date of Treatment: 07/16/19  OT Start Time: 1353  OT Stop Time: 1429  OT Total Time (min): 36 min    Billable Minutes:Self Care/Home Management 36    Georgie Ramirez OT  7/16/2019

## 2019-07-16 NOTE — ASSESSMENT & PLAN NOTE
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 She underwent paracentesis/thoracentesis that were paucicellular. Patient's AMS and fevers initially improved with vanc / cefepime, transitioned to cefpodoxime / metronidazole with recurrence of fever.  Urine culture with VRE, started on linezolid, but now with recurrent AMS. Patient with waxing and waning mental status, suspect delirium.      Recommendations    - would consider stopping acyclovir and restart ppx valganciclovir.   - Contine daptomycin 6 mg/kg q24 hours x5 days.  - Continue empiric cefepime ( will follow up paracentesis labs)  - remainder of prophylaxis per protocol

## 2019-07-16 NOTE — PROGRESS NOTES
Paracentesis complete, 1500 mls removed. Specimen sent to lab. No acute events. Pt resting comfortably, denies pain/discomfort. Pt awaiting transport. Report called to TERE Ovalle.

## 2019-07-16 NOTE — PROGRESS NOTES
[facility]  Infectious Disease  Progress Note    Patient Name: Jihan Zamudio  MRN: [id number]  Admission Date: 7/5/2019  Length of Stay: 11 days  Attending Physician: Jay Herbert MD  Primary Care Provider: Primary Doctor No    Isolation Status: Contact  Assessment/Plan:      Fever  50yo woman w/a history of KESSLER cirrhosis (s/p DBDLT 6/5/2019, CMV D+/R+, steroid induction, on maintenance tacro/MMF/pred; c/b small traumatic R occipital lobe ICH following mechanical fall managed conservatively) who was admitted on 7/5/2019 with acute onset fevers, chills, headache (stable since ICH), dry cough (x10 days), and loose stools. She underwent paracentesis/thoracentesis that were paucicellular. Patient's AMS and fevers initially improved with vanc / cefepime, transitioned to cefpodoxime / metronidazole with recurrence of fever.  Urine culture with VRE, started on linezolid, but now with recurrent AMS. Patient with waxing and waning mental status, suspect delirium.      Recommendations    - would consider stopping acyclovir and restart ppx valganciclovir.   - Contine daptomycin 6 mg/kg q24 hours x5 days.  - Continue empiric cefepime ( will follow up paracentesis labs)  - remainder of prophylaxis per protocol                Thank you for your consult. I will follow-up with patient. Please contact us if you have any additional questions.    Anita Nolasco MD  Infectious Disease  [facility]    Subjective:     Principal Problem:Seizure-like activity    HPI: No notes on file  Interval History: been afebrile- mental status wax and wean.    Review of Systems   Constitutional: Positive for activity change and appetite change. Negative for chills, diaphoresis and fever.   HENT: Negative for ear pain, mouth sores, sinus pressure and sore throat.    Eyes: Negative for photophobia, pain and redness.   Respiratory: Negative for cough, shortness of breath and wheezing.    Cardiovascular:  Negative for chest pain and leg swelling.   Gastrointestinal: Negative for abdominal distention, abdominal pain, diarrhea, nausea and vomiting.   Genitourinary: Negative for decreased urine volume, difficulty urinating, dysuria, flank pain, frequency and urgency.   Musculoskeletal: Negative for arthralgias, back pain, gait problem and myalgias.   Skin: Positive for wound (chevron well healed). Negative for pallor and rash.   Allergic/Immunologic: Positive for immunocompromised state.   Neurological: Positive for weakness. Negative for dizziness, tremors, seizures, light-headedness and headaches.   Psychiatric/Behavioral: Negative for agitation, confusion and decreased concentration. The patient is not nervous/anxious.      Objective:     Vital Signs (Most Recent):  Temp: 98.3 °F (36.8 °C) (07/16/19 1200)  Pulse: 86 (07/16/19 1200)  Resp: 18 (07/16/19 1200)  BP: (!) 149/80 (07/16/19 1200)  SpO2: 97 % (07/16/19 1200) Vital Signs (24h Range):  Temp:  [97.7 °F (36.5 °C)-98.3 °F (36.8 °C)] 98.3 °F (36.8 °C)  Pulse:  [80-98] 86  Resp:  [16-18] 18  SpO2:  [97 %-98 %] 97 %  BP: (124-153)/(60-80) 149/80     Weight: 81.6 kg (179 lb 14.3 oz)  Body mass index is 30.88 kg/m².    Estimated Creatinine Clearance: 114.7 mL/min (based on SCr of 0.6 mg/dL).    Physical Exam   Constitutional: She is oriented to person, place, and time. She appears well-developed and well-nourished. No distress.   HENT:   Head: Normocephalic and atraumatic.   Eyes: Conjunctivae and EOM are normal.   Neck: Normal range of motion. Neck supple.   Pulmonary/Chest: Effort normal. No respiratory distress.   Abdominal: Soft. She exhibits no distension. There is no tenderness.   Musculoskeletal: Normal range of motion. She exhibits no edema.   Neurological: She is alert and oriented to person, place, and time.   Skin: Skin is warm and dry. No rash noted. She is not diaphoretic. No erythema.   Vitals reviewed.      Significant Labs:   CBC:   Recent Labs   Lab  07/15/19  0650 07/16/19  0749   WBC 3.90 6.26   HGB 7.1* 8.0*   HCT 22.4* 25.6*   * 199     CMP:   Recent Labs   Lab 07/15/19  0650 07/16/19  0749    139   K 3.7 3.1*    110   CO2 18* 19*   * 112*   BUN 17 13   CREATININE 0.6 0.6   CALCIUM 8.0* 8.2*   PROT 4.9* 5.4*   ALBUMIN 1.8* 2.0*   BILITOT 0.4 0.5   ALKPHOS 147* 153*   AST 11 22   ALT 6* 11   ANIONGAP 9 10   EGFRNONAA >60.0 >60.0       Significant Imaging: I have reviewed all pertinent imaging results/findings within the past 24 hours.

## 2019-07-16 NOTE — PLAN OF CARE
Pt disoriented to time and sometimes to situation; is having occasional visual hallucinations. Mentation seems to wax and wane. She has periods of severe anxiety, followed by tremors. She also felt like she was unable to move or feel legs, but has been able to ambulate with one assist. She does become anxious and then start leaning backwards after walking for a distance; therefore only nursing and therapy staff should be assisting her. Vitals are stable and she is on room air. She went for a paracentesis this afternoon; 1.5L removed. BG ranged from 129-213 (although 213 was after she drank a Sprite).  Fall precautions in place. Bed alarm on, commode at bedside, bed in low position, call bell in reach, non-skid socks on when pt not in bed.

## 2019-07-16 NOTE — SUBJECTIVE & OBJECTIVE
Interval History: been afebrile- mental status wax and wean.    Review of Systems   Constitutional: Positive for activity change and appetite change. Negative for chills, diaphoresis and fever.   HENT: Negative for ear pain, mouth sores, sinus pressure and sore throat.    Eyes: Negative for photophobia, pain and redness.   Respiratory: Negative for cough, shortness of breath and wheezing.    Cardiovascular: Negative for chest pain and leg swelling.   Gastrointestinal: Negative for abdominal distention, abdominal pain, diarrhea, nausea and vomiting.   Genitourinary: Negative for decreased urine volume, difficulty urinating, dysuria, flank pain, frequency and urgency.   Musculoskeletal: Negative for arthralgias, back pain, gait problem and myalgias.   Skin: Positive for wound (chevron well healed). Negative for pallor and rash.   Allergic/Immunologic: Positive for immunocompromised state.   Neurological: Positive for weakness. Negative for dizziness, tremors, seizures, light-headedness and headaches.   Psychiatric/Behavioral: Negative for agitation, confusion and decreased concentration. The patient is not nervous/anxious.      Objective:     Vital Signs (Most Recent):  Temp: 98.3 °F (36.8 °C) (07/16/19 1200)  Pulse: 86 (07/16/19 1200)  Resp: 18 (07/16/19 1200)  BP: (!) 149/80 (07/16/19 1200)  SpO2: 97 % (07/16/19 1200) Vital Signs (24h Range):  Temp:  [97.7 °F (36.5 °C)-98.3 °F (36.8 °C)] 98.3 °F (36.8 °C)  Pulse:  [80-98] 86  Resp:  [16-18] 18  SpO2:  [97 %-98 %] 97 %  BP: (124-153)/(60-80) 149/80     Weight: 81.6 kg (179 lb 14.3 oz)  Body mass index is 30.88 kg/m².    Estimated Creatinine Clearance: 114.7 mL/min (based on SCr of 0.6 mg/dL).    Physical Exam   Constitutional: She is oriented to person, place, and time. She appears well-developed and well-nourished. No distress.   HENT:   Head: Normocephalic and atraumatic.   Eyes: Conjunctivae and EOM are normal.   Neck: Normal range of motion. Neck supple.    Pulmonary/Chest: Effort normal. No respiratory distress.   Abdominal: Soft. She exhibits no distension. There is no tenderness.   Musculoskeletal: Normal range of motion. She exhibits no edema.   Neurological: She is alert and oriented to person, place, and time.   Skin: Skin is warm and dry. No rash noted. She is not diaphoretic. No erythema.   Vitals reviewed.      Significant Labs:   CBC:   Recent Labs   Lab 07/15/19  0650 07/16/19  0749   WBC 3.90 6.26   HGB 7.1* 8.0*   HCT 22.4* 25.6*   * 199     CMP:   Recent Labs   Lab 07/15/19  0650 07/16/19  0749    139   K 3.7 3.1*    110   CO2 18* 19*   * 112*   BUN 17 13   CREATININE 0.6 0.6   CALCIUM 8.0* 8.2*   PROT 4.9* 5.4*   ALBUMIN 1.8* 2.0*   BILITOT 0.4 0.5   ALKPHOS 147* 153*   AST 11 22   ALT 6* 11   ANIONGAP 9 10   EGFRNONAA >60.0 >60.0       Significant Imaging: I have reviewed all pertinent imaging results/findings within the past 24 hours.

## 2019-07-16 NOTE — PROCEDURES
Radiology Post-Procedure Note    Pre Op Diagnosis: Ascites  Post Op Diagnosis: Same    Procedure: Paracentesis    Procedure performed by: Augustine Leahy MD    Written Informed Consent Obtained: Yes  Specimen Removed: YES 1.5 L serous fluid  Estimated Blood Loss: Minimal    Findings:   Successful paracentesis.      Patient tolerated procedure well.    Augustine Leahy MD  Department of Radiology  Pager: [phone]

## 2019-07-16 NOTE — PLAN OF CARE
Problem: Physical Therapy Goal  Goal: Physical Therapy Goal  Goals to be met by: 2019    Patient will increase functional independence with mobility by performin. Supine <> sit with supervision   2. Sit to stand transfer with Supervision  3. Gait  x 150 feet with Stand-by Assistance with or without using least restrictive AD.   4. Stand for 3 minutes with Stand-by Assistance while performing dynamic balance activities   5. Lower extremity exercise program x20 reps per handout, with supervision     Outcome: Ongoing (interventions implemented as appropriate)  Goals remain appropriate. Continue progressing towards goals.

## 2019-07-16 NOTE — H&P
Inpatient Radiology Pre-procedure Note    History of Present Illness:  Jihan Zamudio is a 49 y.o. female who presents for paracentesis.  Admission H&P reviewed.  Past Medical History:   Diagnosis Date    Cirrhosis     Esophageal varices 2018    Small with no banding     Essential hypertension 2018    Fatty liver 2018    GERD (gastroesophageal reflux disease)     Hx of colonic polyps 2018    On colonoscopy     Hypertension     Hypothyroidism 2018    Kidney stones     Morbid obesity 2018    Lap band with subsequent release    KADEEM (obstructive sleep apnea) 2018    Osteoarthritis 2018    Pulmonary nodule 2018    Vitamin D deficiency 2018     Past Surgical History:   Procedure Laterality Date     SECTION      TIMES 2     CHOLECYSTECTOMY      LAPAROSCOPIC     CYSTOSCOPY W/ STONE MANIPULATION      kidney stone removal    EGD (ESOPHAGOGASTRODUODENOSCOPY) N/A 2019    Performed by Davian Hernández MD at [facility] (2ND FLR)    LAPAROSCOPIC GASTRIC BANDING  2006    removal     LIVER BIOPSY  2017    KESSLER with bridging 17    TRANSPLANT, LIVER N/A 2019    Performed by Clemente Brown Jr., MD at [facility] OR 2ND FLR    TRANSPLANT, LIVER N/A 2019    Performed by Clemente Brown Jr., MD at [facility] OR 2ND FLR       Review of Systems:   As documented in primary team H&P    Home Meds:   Prior to Admission medications    Medication Sig Start Date End Date Taking? Authorizing Provider   famotidine (PEPCID) 20 MG tablet Take 1 tablet (20 mg total) by mouth every evening. 19  Yes Gui Chavez MD   furosemide (LASIX) 40 MG tablet Take 1 tablet (40 mg total) by mouth once daily. for 10 days 7/5/19 7/15/19 Yes Gui Chavez MD   lancets 28 gauge Misc Test blood glucose 3 (three) times daily. 19  Yes Clemente Brown Jr., MD   levothyroxine (SYNTHROID) 112 MCG tablet Take 1 tablet (112 mcg total) by mouth  "once daily. 6/8/19  Yes Gui Chavez MD   mycophenolate (CELLCEPT) 250 mg Cap Take 2 capsules (500 mg total) by mouth 2 (two) times daily.  Patient taking differently: Take 1,000 mg by mouth 2 (two) times daily.  6/18/19  Yes Freddy Soler MD   pen needle, diabetic 32 gauge x 5/32" Ndle Use to inject insulin into the skin 3 (three) times daily. 6/11/19  Yes Clemente Brown Jr., MD   predniSONE (DELTASONE) 10 MG tablet Take 20 mg by mouth daily 6/12-6/18; 15 mg daily 6/19-6/25; 10 mg  daily 6/26-7/2; 5 mg  daily 7/3-7/9; stop: 7/10/19 6/7/19  Yes Gui Chavez MD   sodium bicarbonate 650 MG tablet Take 1 tablet (650 mg total) by mouth 2 (two) times daily. 6/19/19 6/18/20 Yes Freddy Soler MD   sulfamethoxazole-trimethoprim 400-80mg (BACTRIM,SEPTRA) 400-80 mg per tablet Take 1 tablet by mouth once daily. Stop: 12/3/19 6/6/19 12/3/19 Yes Gui Chavez MD   tacrolimus (PROGRAF) 1 MG Cap Take 3 capsules (3 mg total) by mouth every 12 (twelve) hours. 6/27/19  Yes Jose E Metzger MD   valGANciclovir (VALCYTE) 450 mg Tab Take 1 tablet (450 mg total) by mouth once daily. Stop: 9/4/19 6/6/19 9/4/19 Yes Gui Chavez MD   blood sugar diagnostic Strp Test blood glucose 3 (three) times daily. 6/11/19   Clemente Brown Jr., MD   blood-glucose meter kit Use as instructed 6/11/19 6/10/20  Clemente Brown Jr., MD   levETIRAcetam (KEPPRA) 500 MG Tab Take 1 tablet (500 mg total) by mouth 2 (two) times daily. for 7 days 6/25/19 7/2/19  Theodore Woods MD   oxyCODONE (ROXICODONE) 10 mg Tab immediate release tablet Take 1 tablet (10 mg total) by mouth every 4 (four) hours as needed. 6/11/19   Eugenia Albrecht NP     Scheduled Meds:    acyclovir (ZOVIRAX) IVPB (wt > 70 kg)  10 mg/kg Intravenous Q8H    ceFEPime (MAXIPIME) IVPB  2 g Intravenous Q8H    DAPTOmycin (CUBICIN)  IV  6 mg/kg Intravenous Q24H    hydrocortisone sodium succinate  50 mg Intravenous Q8H    levETIRAcetam  500 mg Oral BID    levothyroxine  " 112 mcg Oral Before breakfast    magnesium oxide  400 mg Oral Daily    OLANZapine  7.5 mg Oral QHS    sodium bicarbonate  650 mg Oral BID    sodium chloride 0.9%  500 mL Intravenous Q8H    sulfamethoxazole-trimethoprim 400-80mg  1 tablet Oral Daily    thiamine  100 mg Oral Daily     Continuous Infusions:   PRN Meds:acetaminophen, calcium carbonate, dextrose 50%, dextrose 50%, glucagon (human recombinant), glucose, glucose, hydrALAZINE, insulin aspart U-100, iohexol, OLANZapine, ondansetron, sodium chloride 0.9%  Anticoagulants/Antiplatelets: no anticoagulation    Allergies:   Review of patient's allergies indicates:   Allergen Reactions    Metformin Rash    Pcn [penicillins] Other (See Comments)     Unsure of reaction, states it was as a child. Tolerated rocephin at [facility]     Sedation Hx: have not been any systemic reactions    Labs:  No results for input(s): INR in the last 168 hours.    Invalid input(s):  PT,  PTT    Recent Labs   Lab 07/16/19  0749   WBC 6.26   HGB 8.0*   HCT 25.6*   MCV 90         Recent Labs   Lab 07/16/19  0749   *      K 3.1*      CO2 19*   BUN 13   CREATININE 0.6   CALCIUM 8.2*   MG 1.8   ALT 11   AST 22   ALBUMIN 2.0*   BILITOT 0.5         Vitals:  Temp: 98.3 °F (36.8 °C) (07/16/19 1200)  Pulse: 86 (07/16/19 1200)  Resp: 18 (07/16/19 1200)  BP: (!) 149/80 (07/16/19 1200)  SpO2: 97 % (07/16/19 1200)     Physical Exam:      General: no acute distress  Mental Status: alert and oriented to person, place and time  HEENT: normocephalic, atraumatic  Chest: unlabored breathing  Heart: regular heart rate  Abdomen: mildly distended  Extremity: moves all extremities    Plan: US guided paracentesis. Informed consent obtained  Sedation Plan: Local    Augustine Leahy MD  Department of Radiology  Pager: [phone]

## 2019-07-16 NOTE — SUBJECTIVE & OBJECTIVE
"Interval History: See as above     Family History     Problem Relation (Age of Onset)    Breast cancer Maternal Grandmother    Cancer Mother (50), Father (65)    Heart disease Mother, Father        Tobacco Use    Smoking status: Never Smoker    Smokeless tobacco: Never Used    Tobacco comment: patient denies   Substance and Sexual Activity    Alcohol use: No     Comment: patient denies    Drug use: No     Comment: patient denies    Sexual activity: Not on file     Psychotherapeutics (From admission, onward)    Start     Stop Route Frequency Ordered    07/15/19 2100  OLANZapine tablet 7.5 mg      -- Oral Nightly 07/15/19 1515           Review of Systems  Objective:     Vital Signs (Most Recent):  Temp: 97.7 °F (36.5 °C) (07/16/19 0745)  Pulse: 80 (07/16/19 0745)  Resp: 16 (07/16/19 0745)  BP: 124/60 (07/16/19 0745)  SpO2: 98 % (07/16/19 0745) Vital Signs (24h Range):  Temp:  [97.7 °F (36.5 °C)-98.3 °F (36.8 °C)] 97.7 °F (36.5 °C)  Pulse:  [80-99] 80  Resp:  [16-18] 16  SpO2:  [97 %-98 %] 98 %  BP: (124-153)/(60-73) 124/60     Height: 5' 4" (162.6 cm)  Weight: 81.6 kg (179 lb 14.3 oz)  Body mass index is 30.88 kg/m².      Intake/Output Summary (Last 24 hours) at 7/16/2019 1107  Last data filed at 7/16/2019 0600  Gross per 24 hour   Intake 600 ml   Output 500 ml   Net 100 ml       Physical Exam   Psychiatric:   Mental Status Exam:  Appearance: confused at times, lying in bed   Behavior/Cooperation: psychomotor agitation, restless and fidgety   Speech: loud  Mood: anxious  Affect: anxious  Thought Process: circumstantial, flight of ideas  Thought Content: delusions: no, hallucinations: (auditory: no, visual: no), obsessions: no, suicidal thoughts: (passive-no), homicidal thoughts: (passive-no)   Orientation: person, place, situation  Memory: Impaired to some degree  Attention Span/Concentration: Easily distracted  Insight: limited  Judgment: limited        Significant Labs: All pertinent labs within the past 24 " hours have been reviewed.    Significant Imaging: I have reviewed all pertinent imaging results/findings within the past 24 hours.

## 2019-07-17 PROBLEM — L89.301 PRESSURE INJURY OF BUTTOCK, STAGE 1: Status: ACTIVE | Noted: 2019-07-17

## 2019-07-17 PROBLEM — R41.0 DELIRIUM: Status: ACTIVE | Noted: 2019-07-15

## 2019-07-17 LAB
1,3 BETA GLUCAN SPEC-MCNC: <31 PG/ML
ALBUMIN SERPL BCP-MCNC: 2.1 G/DL (ref 3.5–5.2)
ALP SERPL-CCNC: 163 U/L (ref 55–135)
ALT SERPL W/O P-5'-P-CCNC: 18 U/L (ref 10–44)
ANION GAP SERPL CALC-SCNC: 6 MMOL/L (ref 8–16)
ANISOCYTOSIS BLD QL SMEAR: SLIGHT
AST SERPL-CCNC: 32 U/L (ref 10–40)
BASOPHILS # BLD AUTO: ABNORMAL K/UL (ref 0–0.2)
BASOPHILS NFR BLD: 0 % (ref 0–1.9)
BILIRUB SERPL-MCNC: 0.4 MG/DL (ref 0.1–1)
BUN SERPL-MCNC: 15 MG/DL (ref 6–20)
CALCIUM SERPL-MCNC: 8.1 MG/DL (ref 8.7–10.5)
CHLORIDE SERPL-SCNC: 115 MMOL/L (ref 95–110)
CO2 SERPL-SCNC: 18 MMOL/L (ref 23–29)
CREAT SERPL-MCNC: 0.6 MG/DL (ref 0.5–1.4)
DIFFERENTIAL METHOD: ABNORMAL
EBV DNA BY PCR: NORMAL IU/ML
EOSINOPHIL # BLD AUTO: ABNORMAL K/UL (ref 0–0.5)
EOSINOPHIL NFR BLD: 0 % (ref 0–8)
ERYTHROCYTE [DISTWIDTH] IN BLOOD BY AUTOMATED COUNT: 15.8 % (ref 11.5–14.5)
EST. GFR  (AFRICAN AMERICAN): >60 ML/MIN/1.73 M^2
EST. GFR  (NON AFRICAN AMERICAN): >60 ML/MIN/1.73 M^2
GLUCOSE SERPL-MCNC: 111 MG/DL (ref 70–110)
GPP - ADENOVIRUS 40/41: NOT DETECTED
GPP - CAMPYLOBACTER: NOT DETECTED
GPP - CLOSTRIDIUM DIFFICILE TOXIN A/B: NOT DETECTED
GPP - CRYPTOSPORIDIUM: NOT DETECTED
GPP - E COLI O157: NOT DETECTED
GPP - ENTAMOEBA HISTOLYTICA: NOT DETECTED
GPP - ENTEROTOXIGENIC E COLI (ETEC): NOT DETECTED
GPP - GIARDIA LAMBLIA: NOT DETECTED
GPP - NOROVIRUS GI/GII: NOT DETECTED
GPP - ROTAVIRUS A: NOT DETECTED
GPP - SALMONELLA: NOT DETECTED
GPP - SHIGELLA: NOT DETECTED
GPP - VIBRIO CHOLERA: NOT DETECTED
GPP - YERSINIA ENTEROCOLITICA: NOT DETECTED
HCT VFR BLD AUTO: 26 % (ref 37–48.5)
HGB BLD-MCNC: 8.1 G/DL (ref 12–16)
HISTOPLASMA AG VALUE: 0 NG/ML
HISTOPLASMA ANTIGEN URINE: NEGATIVE
IMM GRANULOCYTES # BLD AUTO: ABNORMAL K/UL (ref 0–0.04)
IMM GRANULOCYTES NFR BLD AUTO: ABNORMAL % (ref 0–0.5)
LACTATE PLASV-SCNC: NOT DETECTED MMOL/L
LYMPHOCYTES # BLD AUTO: ABNORMAL K/UL (ref 1–4.8)
LYMPHOCYTES NFR BLD: 6 % (ref 18–48)
MAGNESIUM SERPL-MCNC: 1.8 MG/DL (ref 1.6–2.6)
MCH RBC QN AUTO: 27.8 PG (ref 27–31)
MCHC RBC AUTO-ENTMCNC: 31.2 G/DL (ref 32–36)
MCV RBC AUTO: 89 FL (ref 82–98)
METAMYELOCYTES NFR BLD MANUAL: 1 %
MONOCYTES # BLD AUTO: ABNORMAL K/UL (ref 0.3–1)
MONOCYTES NFR BLD: 2 % (ref 4–15)
NEUTROPHILS NFR BLD: 88 % (ref 38–73)
NEUTS BAND NFR BLD MANUAL: 3 %
NRBC BLD-RTO: 0 /100 WBC
PLATELET # BLD AUTO: 191 K/UL (ref 150–350)
PLATELET BLD QL SMEAR: ABNORMAL
PMV BLD AUTO: 9.4 FL (ref 9.2–12.9)
POCT GLUCOSE: 120 MG/DL (ref 70–110)
POCT GLUCOSE: 137 MG/DL (ref 70–110)
POCT GLUCOSE: 141 MG/DL (ref 70–110)
POCT GLUCOSE: 179 MG/DL (ref 70–110)
POLYCHROMASIA BLD QL SMEAR: ABNORMAL
POTASSIUM SERPL-SCNC: 3.8 MMOL/L (ref 3.5–5.1)
PROT SERPL-MCNC: 5.3 G/DL (ref 6–8.4)
RBC # BLD AUTO: 2.91 M/UL (ref 4–5.4)
SODIUM SERPL-SCNC: 139 MMOL/L (ref 136–145)
WBC # BLD AUTO: 6.09 K/UL (ref 3.9–12.7)

## 2019-07-17 PROCEDURE — 99252 IP/OBS CONSLTJ NEW/EST SF 35: CPT | Mod: ,,, | Performed by: NURSE PRACTITIONER

## 2019-07-17 PROCEDURE — 36415 COLL VENOUS BLD VENIPUNCTURE: CPT

## 2019-07-17 PROCEDURE — 99233 SBSQ HOSP IP/OBS HIGH 50: CPT | Mod: 24,,, | Performed by: PHYSICIAN ASSISTANT

## 2019-07-17 PROCEDURE — 85007 BL SMEAR W/DIFF WBC COUNT: CPT

## 2019-07-17 PROCEDURE — 99233 PR SUBSEQUENT HOSPITAL CARE,LEVL III: ICD-10-PCS | Mod: 24,,, | Performed by: PHYSICIAN ASSISTANT

## 2019-07-17 PROCEDURE — 97110 THERAPEUTIC EXERCISES: CPT

## 2019-07-17 PROCEDURE — 99252 PR INITIAL INPATIENT CONSULT,LEVL II: ICD-10-PCS | Mod: ,,, | Performed by: NURSE PRACTITIONER

## 2019-07-17 PROCEDURE — 20600001 HC STEP DOWN PRIVATE ROOM

## 2019-07-17 PROCEDURE — 99233 PR SUBSEQUENT HOSPITAL CARE,LEVL III: ICD-10-PCS | Mod: ,,, | Performed by: INTERNAL MEDICINE

## 2019-07-17 PROCEDURE — 25000003 PHARM REV CODE 250: Performed by: PHYSICIAN ASSISTANT

## 2019-07-17 PROCEDURE — 25000003 PHARM REV CODE 250: Performed by: INTERNAL MEDICINE

## 2019-07-17 PROCEDURE — 99232 SBSQ HOSP IP/OBS MODERATE 35: CPT | Mod: ,,, | Performed by: PSYCHIATRY & NEUROLOGY

## 2019-07-17 PROCEDURE — 99233 SBSQ HOSP IP/OBS HIGH 50: CPT | Mod: ,,, | Performed by: INTERNAL MEDICINE

## 2019-07-17 PROCEDURE — 25000003 PHARM REV CODE 250: Performed by: NURSE PRACTITIONER

## 2019-07-17 PROCEDURE — 63600175 PHARM REV CODE 636 W HCPCS: Performed by: INTERNAL MEDICINE

## 2019-07-17 PROCEDURE — 83735 ASSAY OF MAGNESIUM: CPT

## 2019-07-17 PROCEDURE — 80053 COMPREHEN METABOLIC PANEL: CPT

## 2019-07-17 PROCEDURE — 97116 GAIT TRAINING THERAPY: CPT

## 2019-07-17 PROCEDURE — 63600175 PHARM REV CODE 636 W HCPCS: Performed by: PHYSICIAN ASSISTANT

## 2019-07-17 PROCEDURE — 63600175 PHARM REV CODE 636 W HCPCS: Performed by: NURSE PRACTITIONER

## 2019-07-17 PROCEDURE — 99232 PR SUBSEQUENT HOSPITAL CARE,LEVL II: ICD-10-PCS | Mod: ,,, | Performed by: PSYCHIATRY & NEUROLOGY

## 2019-07-17 PROCEDURE — 85027 COMPLETE CBC AUTOMATED: CPT

## 2019-07-17 RX ORDER — TACROLIMUS 1 MG/1
1 CAPSULE ORAL 2 TIMES DAILY
Status: DISCONTINUED | OUTPATIENT
Start: 2019-07-17 | End: 2019-07-18

## 2019-07-17 RX ADMIN — TACROLIMUS 1 MG: 1 CAPSULE ORAL at 05:07

## 2019-07-17 RX ADMIN — ACETAMINOPHEN 650 MG: 325 TABLET ORAL at 12:07

## 2019-07-17 RX ADMIN — SODIUM BICARBONATE 650 MG TABLET 650 MG: at 08:07

## 2019-07-17 RX ADMIN — OLANZAPINE 7.5 MG: 2.5 TABLET, FILM COATED ORAL at 08:07

## 2019-07-17 RX ADMIN — SULFAMETHOXAZOLE AND TRIMETHOPRIM 1 TABLET: 400; 80 TABLET ORAL at 08:07

## 2019-07-17 RX ADMIN — ACETAMINOPHEN 650 MG: 325 TABLET ORAL at 08:07

## 2019-07-17 RX ADMIN — MAGNESIUM OXIDE TAB 400 MG (241.3 MG ELEMENTAL MG) 400 MG: 400 (241.3 MG) TAB at 08:07

## 2019-07-17 RX ADMIN — DAPTOMYCIN 510 MG: 350 INJECTION, POWDER, LYOPHILIZED, FOR SOLUTION INTRAVENOUS at 08:07

## 2019-07-17 RX ADMIN — VALGANCICLOVIR 450 MG: 450 TABLET, FILM COATED ORAL at 08:07

## 2019-07-17 RX ADMIN — HYDROCORTISONE SODIUM SUCCINATE 50 MG: 100 INJECTION, POWDER, FOR SOLUTION INTRAMUSCULAR; INTRAVENOUS at 05:07

## 2019-07-17 RX ADMIN — Medication 100 MG: at 08:07

## 2019-07-17 RX ADMIN — LEVETIRACETAM 500 MG: 500 TABLET ORAL at 08:07

## 2019-07-17 RX ADMIN — SODIUM CHLORIDE 500 ML: 0.9 INJECTION, SOLUTION INTRAVENOUS at 02:07

## 2019-07-17 RX ADMIN — HYDROCORTISONE SODIUM SUCCINATE 50 MG: 100 INJECTION, POWDER, FOR SOLUTION INTRAMUSCULAR; INTRAVENOUS at 08:07

## 2019-07-17 RX ADMIN — CEFEPIME 2 G: 2 INJECTION, POWDER, FOR SOLUTION INTRAVENOUS at 05:07

## 2019-07-17 RX ADMIN — HYDROCORTISONE SODIUM SUCCINATE 50 MG: 100 INJECTION, POWDER, FOR SOLUTION INTRAMUSCULAR; INTRAVENOUS at 02:07

## 2019-07-17 RX ADMIN — LEVOTHYROXINE SODIUM 112 MCG: 50 TABLET ORAL at 05:07

## 2019-07-17 NOTE — ASSESSMENT & PLAN NOTE
- UA 7/5 unremarkable.  - urine culture 7/11 positive for enterococcus faecium- susceptibilities pending.  - dc'd cefpodoxime and flagyl on 7/12.  - started Zyvox on 7/12.  - ID transitioned Zyvox to Dapto on 7/13 as Zyvox can lower the seizure threshold.  - stop dapto 7/18  - continue to monitor.

## 2019-07-17 NOTE — ASSESSMENT & PLAN NOTE
This patient is a 49 year old lady with history of KESSLER s/p liver transplant on 6/5/19 complicated by ICH and fevers. She became febrile this past week and was admitted to the hospital on 7/9 due to fever of unknown origin in the setting of recent organ transplant. This is not her baseline per family. She is currently oriented to person, place and situation. Orientation continues to improve.  Still not oriented to time completely. Believed the year was 2017 but knew it was mid July.  Mental status exam has also improved, with no more dysarthric speech and no elicited paranoid delusions. No elicited visual hallucinations today. She is on Zyprexa 7.5 mg qhs currently and Zyprexa 2.5 mg IM q8 PRN. Her delirium appears to be resolving. Etiology still thought to be multifactorial (infection, recent hemorrhage being biggest possible culprits). We continue to expect improvement in mental status with resolution of medical issues. We will continue to follow her during this hospital stay.     Recommendations:   - Continue Zyprexa 7.5 mg qhs   - Continue Zyprexa PO/IM 2.5 mg q6h PRN for episodic agitation

## 2019-07-17 NOTE — PLAN OF CARE
Problem: Adult Inpatient Plan of Care  Goal: Plan of Care Review  Recommendations     Recommendation/Intervention: 1.) Continue regular diet with Boost Breeze TID. 2.) Daily weights. 3.) MD to manage fluid. 4.) If diarrhea persists, may suggest Metamucil BID. 5.) Update hand  strength.   Goals: 1.) Pt to consume/tolerate >75% of meals by follow up. 2.) Pt to maintain wt (+/-10% of 84.8kg) during admit.   Nutrition Goal Status: progressing towards goal, goal not met  Communication of RD Recs: discussed on rounds(POC)     Assessment and Plan  Nutrition Problem  Inadequate oral intake     Related to (etiology):   Loss of appetite     Signs and Symptoms (as evidenced by):   Pt has diarrhea that is affecting her appetite.     Interventions/Recommendations (treatment strategy):  Collaboration of care to providers.     Nutrition Diagnosis Status:   Continues        Moderate protein-calorie malnutrition  Malnutrition in the context of Acute Illness        Related to (etiology):  Varied intake     Signs and Symptoms (as evidenced by):  Energy Intake: varied intake (per documentation <50% of meals consumed upon admit) of estimated energy requirement for ~5 days or more  Body Fat Depletion: mild depletion of orbitals, triceps and thoracic and lumbar region   Muscle Mass Depletion: mild/moderate depletion of temples, clavicle region, scapular region, interosseous muscle and lower extremities   Weight Loss: 16.9% x 2 months   Fluid Accumulation: moderate to abdomen     Interventions/Recommendations (treatment strategy):  Collaboration with providers; Boost Breeze TID; daily weights     Nutrition Diagnosis Status:  New

## 2019-07-17 NOTE — PROGRESS NOTES
"[facility]  Adult Nutrition  Progress Note    SUMMARY       Recommendations    Recommendation/Intervention: 1.) Continue regular diet with Boost Breeze TID. 2.) Daily weights. 3.) MD to manage fluid. 4.) If diarrhea persists, may suggest Metamucil BID. 5.) Update hand  strength.   Goals: 1.) Pt to consume/tolerate >75% of meals by follow up. 2.) Pt to maintain wt (+/-10% of 84.8kg) during admit.   Nutrition Goal Status: progressing towards goal, goal not met  Communication of RD Recs: discussed on rounds(POC)    Reason for Assessment    Reason For Assessment: RD follow-up  Diagnosis: transplant/postoperative complications(OLTx 6/6/19)  Relevant Medical History: KESSELR cirrhosis, esphageal varices, HTN, GERD, KADEEM, morbid obesity s/p band (released)  Interdisciplinary Rounds: attended  General Information Comments: Pt sitting up in bed consuming bkfst. Pt reports appetite improving. She reports consuming ~75% of meals. C/o chronic diarrhea, but no other GI distress.  No other GI distress stated. Pt not drinking ONS as dietary sending wrong supplements, will provide correct ONS. NFPE completed  7/12 and continues with mild wasting, now meets acute malnutrition. Noted 13# wt gain since last nutrition note. Since 5/27/19, pt with noted 16.9% wt loss; clinically severe.   Nutrition Discharge Planning: Adequate intake to meet nutritional needs.     Nutrition Risk Screen    Nutrition Risk Screen: no indicators present    Nutrition/Diet History    Patient Reported Diet/Restrictions/Preferences: general  Spiritual, Cultural Beliefs, Sabianist Practices, Values that Affect Care: no    Anthropometrics    Temp: 97.7 °F (36.5 °C)  Height Method: Stated  Height: 5' 4" (162.6 cm)  Height (inches): 64 in  Weight Method: Bed Scale  Weight: 84.8 kg (186 lb 15.2 oz)  Weight (lb): 186.95 lb  Ideal Body Weight (IBW), Female: 120 lb  % Ideal Body Weight, Female (lb): 144.17 lb  BMI (Calculated): 29.8   "     Lab/Procedures/Meds    Pertinent Labs Reviewed: reviewed  Pertinent Labs Comments: Chl 115, Glu 111, Ca 8.1,   Pertinent Medications Reviewed: reviewed  Pertinent Medications Comments: daptomycin, magnesium oxide, thiamine    Estimated/Assessed Needs    Weight Used For Calorie Calculations: 78.5 kg (173 lb 1 oz)  Energy Calorie Requirements (kcal): 1731 kcal  Energy Need Method: [place]-St Jeor(PAL 1.25)  Protein Requirements: 78-94g  Weight Used For Protein Calculations: 78.5 kg (173 lb 1 oz)        RDA Method (mL): 1731       Nutrition Prescription Ordered    Current Diet Order: regular  Nutrition Order Comments: 1500mL FR  Oral Nutrition Supplement: Boost Breeze TID    Evaluation of Received Nutrient/Fluid Intake    IV Fluid (mL): 0  I/O: +14.4L since admit  Comments: LBM 7/16  % Intake of Estimated Energy Needs: 50 - 75 %  % Meal Intake: 50 - 75 %    Nutrition Risk    Level of Risk/Frequency of Follow-up: low     Assessment and Plan  Nutrition Problem  Inadequate oral intake     Related to (etiology):   Loss of appetite     Signs and Symptoms (as evidenced by):   Pt has diarrhea that is affecting her appetite.     Interventions/Recommendations (treatment strategy):  Collaboration of care to providers.     Nutrition Diagnosis Status:   Continues      Moderate protein-calorie malnutrition  Malnutrition in the context of Acute Illness      Related to (etiology):  Varied intake    Signs and Symptoms (as evidenced by):  Energy Intake: varied intake (per documentation <50% of meals consumed upon admit) of estimated energy requirement for ~5 days or more  Body Fat Depletion: mild depletion of orbitals, triceps and thoracic and lumbar region   Muscle Mass Depletion: mild/moderate depletion of temples, clavicle region, scapular region, interosseous muscle and lower extremities   Weight Loss: 16.9% x 2 months   Fluid Accumulation: moderate to abdomen    Interventions/Recommendations (treatment  strategy):  Collaboration with providers; Boost Breeze TID; daily weights    Nutrition Diagnosis Status:  New         Monitor and Evaluation    Food and Nutrient Intake: energy intake, food and beverage intake  Food and Nutrient Adminstration: diet order  Knowledge/Beliefs/Attitudes: food and nutrition knowledge/skill  Physical Activity and Function: nutrition-related ADLs and IADLs  Anthropometric Measurements: weight, weight change, body mass index  Biochemical Data, Medical Tests and Procedures: electrolyte and renal panel, gastrointestinal profile, glucose/endocrine profile, lipid profile  Nutrition-Focused Physical Findings: overall appearance     Malnutrition Assessment                 Orbital Region (Subcutaneous Fat Loss): well nourished  Upper Arm Region (Subcutaneous Fat Loss): mild depletion   Latter day Region (Muscle Loss): mild depletion  Clavicle Bone Region (Muscle Loss): mild depletion  Clavicle and Acromion Bone Region (Muscle Loss): well nourished  Dorsal Hand (Muscle Loss): moderate depletion  Patellar Region (Muscle Loss): well nourished  Anterior Thigh Region (Muscle Loss): well nourished  Posterior Calf Region (Muscle Loss): well nourished                 Nutrition Follow-Up    RD Follow-up?: Yes

## 2019-07-17 NOTE — ASSESSMENT & PLAN NOTE
- LFTs stable.  - Pt with good hepatic allograft function.   - Last Liver US 6/24 showed Mildly elevated hepatic arterial resistive indices, although improved from prior exam.  Otherwise, satisfactory vascular appearance of the liver, complex fluid collection adjacent to the left hepatic lobe, similar to slightly smaller compared to prior exam, small volume of ascites and partially visualized right pleural effusion.   - para completed 7/9, cultures pending. Cell count not collected as ordered.   - para repeated 7/16- abdominal fluid labs negative for infection

## 2019-07-17 NOTE — ASSESSMENT & PLAN NOTE
- Infectious work up ordered on admit - blood cx NGTD, UA unremarkable, CMV PCR 7/5 undetected, chest x-ray w right pleural effusion, CT A/P reviewed.  - Started broad spectrum antibiotics on admit.   - Abdominal and head CT reviewed and no clear source for fever.   - ID consulted.  Apprec recs.   - Pt with intermittent confusion.  Easily re oriented.  Continue to monitor.   - Thora and para done 7/9; amount of fluid removed not recorded and pleural fluid labs were not completed as ordered.   - Abdominal gram stain reveals no WBC and no organisms; cell count not completed as ordered; pleural fluid cell count 84 WBCs, 24% segs.  - Pt still reports diarrhea- C diff negative, CMV undetected. Stool culture, WBC, ova cysts parasites, and GI pathogen panel pending.   - low grade fever 7/12, 100.8.  - deescalated abx to PO cefpodoxime and flagyl on 7/12.  - MRI head w/ and w/o contrast 7/12 without acute changes.  - Urine culture positive for enterococcus faecium- started Zyvox and dc'd cefpodoxime and flagyl 7/12.  - temp 100.4 on 7/13.  - Zyvox changed to Dapto as pt with seizure like activity earlier in morning and zyvox can lower seizure threshold.  - repeat blood cultures ngtd.  - ammonia level wnl.   - pt also with confusion, AMS. D/w ID - restarted Cefepime.  - procalcitonin, lactate, EBV, RPR, fungitell, aspergillus, HIV, Saadia Delacruz, histoplasma antigen and blastomyces antigen and CMV PCR pending.  - Start Acyclovir treatment dose for possible viral meningitis.   - repeat blood cx on 7/13 and 7/14 remain NGTD. Last fever 102.3 F on 7/14 at 9 AM  - CT a/p 7/15 continued to redemonstrate R pleural effusion, moderate ascites, and fluid in adam hepatis; no walled off fluid collections suggestive of abscess.   - paracentesis done 7/16 to reassess for infection as cell count and other labs not sent off when pt underwent para on 7/9. Abdominal fluid labs negative for infection  - Stop empiric cefepime.   -Will stop  daptomycin for VRE UTI after last dose tomorrow.   -Stopped acyclovir, restarted prophylactic valcyte.   -continue to monitor

## 2019-07-17 NOTE — ASSESSMENT & PLAN NOTE
- holding prograf 7/13 due to seizure like activity.  - Holding MMF. Monitor prograf level daily, monitor for toxic side effects, and adjust for therapeutic dose.   - hydrocortisone 7/14.   - restarted prograf 7/17. Continue hydrocortisone for now

## 2019-07-17 NOTE — SUBJECTIVE & OBJECTIVE
Scheduled Meds:   DAPTOmycin (CUBICIN)  IV  6 mg/kg Intravenous Q24H    hydrocortisone sodium succinate  50 mg Intravenous Q8H    levETIRAcetam  500 mg Oral BID    levothyroxine  112 mcg Oral Before breakfast    magnesium oxide  400 mg Oral Daily    OLANZapine  7.5 mg Oral QHS    sodium bicarbonate  650 mg Oral BID    sulfamethoxazole-trimethoprim 400-80mg  1 tablet Oral Daily    tacrolimus  1 mg Oral BID    thiamine  100 mg Oral Daily    valGANciclovir  450 mg Oral Daily     Continuous Infusions:  PRN Meds:acetaminophen, calcium carbonate, dextrose 50%, dextrose 50%, glucagon (human recombinant), glucose, glucose, hydrALAZINE, insulin aspart U-100, iohexol, OLANZapine, ondansetron, sodium chloride 0.9%    Review of Systems   Constitutional: Positive for activity change and appetite change. Negative for chills and fever.   HENT: Negative for congestion and facial swelling.    Eyes: Negative for pain, discharge and visual disturbance.   Respiratory: Negative for cough, chest tightness, shortness of breath and wheezing.    Cardiovascular: Negative for chest pain, palpitations and leg swelling.   Gastrointestinal: Negative for abdominal distention, abdominal pain, diarrhea, nausea and vomiting.   Endocrine: Negative.    Genitourinary: Negative for decreased urine volume, difficulty urinating and dysuria.   Skin: Positive for wound (chevron well healed).   Allergic/Immunologic: Positive for immunocompromised state.   Neurological: Positive for weakness. Negative for tremors, seizures, speech difficulty and light-headedness.   Psychiatric/Behavioral: Positive for decreased concentration and dysphoric mood. Negative for agitation and confusion. The patient is not nervous/anxious.      Objective:     Vital Signs (Most Recent):  Temp: 97.7 °F (36.5 °C) (07/17/19 1220)  Pulse: 80 (07/17/19 1220)  Resp: 14 (07/17/19 1220)  BP: (!) 149/70 (07/17/19 1220)  SpO2: 98 % (07/17/19 1220) Vital Signs (24h Range):  Temp:   [97.7 °F (36.5 °C)-98.4 °F (36.9 °C)] 97.7 °F (36.5 °C)  Pulse:  [74-94] 80  Resp:  [14-18] 14  SpO2:  [97 %-99 %] 98 %  BP: (144-160)/(70-88) 149/70     Weight: 84.8 kg (186 lb 15.2 oz)  Body mass index is 32.09 kg/m².    Intake/Output - Last 3 Shifts       07/15 0700 - 07/16 0659 07/16 0700 - 07/17 0659 07/17 0700 - 07/18 0659    P.O. 350 1200 360    I.V. (mL/kg) 0 (0) 0 (0)     Other  0     IV Piggyback 1750 2050     Total Intake(mL/kg) 2100 (25.7) 3250 (38.3) 360 (4.2)    Urine (mL/kg/hr) 500 (0.3) 1300 (0.6)     Emesis/NG output  0     Other  0     Stool 0 0     Blood  0     Total Output 500 1300     Net +1600 +1950 +360           Urine Occurrence 4 x 2 x     Stool Occurrence 3 x 2 x     Emesis Occurrence 0 x 0 x           Physical Exam   Constitutional: She is oriented to person, place, and time. She appears well-developed. No distress.   HENT:   Head: Normocephalic and atraumatic.   Eyes: No scleral icterus.   R pupil > L pupil; this is lifelong per    Neck: Normal range of motion. Neck supple. No thyromegaly present.   Cardiovascular: Normal rate and regular rhythm. Exam reveals no friction rub.   No murmur heard.  Pulmonary/Chest: Effort normal. She has decreased breath sounds in the right middle field, the right lower field and the left lower field. She has no wheezes. She has no rales.   Diminished RLL   Abdominal: Soft. She exhibits no distension. There is no tenderness. There is no rebound and no guarding.   Healed chevron incision   Musculoskeletal: Normal range of motion.   Neurological: She is alert and oriented to person, place, and time. She is not disoriented.   AAO x 4   Skin: Skin is warm and dry. She is not diaphoretic.   Psychiatric: Her behavior is normal. Judgment and thought content normal. Her mood appears anxious. Her speech is delayed. Cognition and memory are normal.   Nursing note and vitals reviewed.      Laboratory:  Immunosuppressants         Stop Route Frequency      tacrolimus capsule 1 mg      -- Oral 2 times daily        CBC:   Recent Labs   Lab 07/17/19  0655   WBC 6.09   RBC 2.91*   HGB 8.1*   HCT 26.0*      MCV 89   MCH 27.8   MCHC 31.2*     CMP:   Recent Labs   Lab 07/17/19  0655   *   CALCIUM 8.1*   ALBUMIN 2.1*   PROT 5.3*      K 3.8   CO2 18*   *   BUN 15   CREATININE 0.6   ALKPHOS 163*   ALT 18   AST 32   BILITOT 0.4     Labs within the past 24 hours have been reviewed.    Diagnostic Results:  None    Debility/Functional status: Patient debilitated by evidence of Muscle wasting and atrophy, Weakness, Chronic fatigue, unspecified, Limitation of activities due to disability and Other reduced mobility. Physical and occupational therapy ordered daily to evaluate and treat. Debility was: present on admission.

## 2019-07-17 NOTE — ASSESSMENT & PLAN NOTE
"- pt seen sitting in chair AM of 7/13 stating "I just had a seizure, I just finished banging my head on the chair."  - pt couldn't recall events surrounding seizure like activity.  - given recent hx of fall with ICH on 6/24, Neuro consulted in which pt had a very inconsistent neuro exam.   - Keppra 500 mg bid restarted and a STAT CT head performed which did not show a new infarct or bleed.  - dc'd Lovenox.   - prograf held for now. Started hydrocortisone.   - seizures likely psychogenic in nature as pt able to stop shaking to answer questions  - restarted prograf on 7/17; continue keppra for now  - Monitor.     "

## 2019-07-17 NOTE — ASSESSMENT & PLAN NOTE
- pt with intermittent disorientation to situation throughout stay  - CT head on 7/5 and 7/13 with no acute change; MRI head w/ contrast on 7/11 with no acute change  - ammonia WNL  - pt with acute personality change on 7/13- throwing items at nurses and agitated  - zyprexa qhs started 7/13; PRN zyprexa added 7/16  - psych consulted 7/15; believe she is suffering from multifactorial delirium 2/2 infection & ICH  - unable to do LP as unsafe at the moment due to recent ICH  - PO thiamine started.   - Delirium precautions started.   - mentation greatly improved today 7/17  - continue to monitor

## 2019-07-17 NOTE — CONSULTS
[facility]  Physical Medicine & Rehab  Consult Note    Patient Name: Jihan Zamudio  MRN: [id number]  Admission Date: 7/5/2019  Hospital Length of Stay: 12 days  Attending Physician: Jay Herbert MD     Inpatient consult to Physical Medicine & Rehabilitation  Consult performed by: Libby Rich NP  Consult requested by:  Jay Herbert MD    Reason for Consult:  assess rehabilitation needs  Consults  Subjective:     Principal Problem: Seizure-like activity    HPI: Jihan Zamudio is a 49-year-old female with PMHx of ascites (requiring paracentesis), pulmonology nodule and liver transplant 6/5/19 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     [place] [facility] [place] fever and AMS noted by home health nurse. Hospital course further complicated by seizure activity on 7/13, s/p  Thoracentesis and paracentesis (Abd gram stain revealed no WBC or organisms; pleural fluid cell count 84 WBCs, 24% segs), diarrhea (C.diff negative), agitation and encephalopathy (Psych consulted and state multifactorial delirium 2/2 infection & ICH, unable to do LP as unsafe at the moment due to recent ICH per LTS), VRE in urine (on Linezoid), anemia, ascites (last paracentesis 7/16), stage 1 pressure injury to L buttocks, & R pleural effusion. Currently receiving IV daptomycin IVP.     Functional History: Patient lives in [place], [place] with .  Currently living at [facility].  Prior to admission, Mod (I) with mobility and ADLs, using wheelchair for long distances and no AD for household mobility (pt stated she does not like using RW, but uses furniture to hold onto).     Hospital Course: 07/16/2019:  Bed mobility SBA-Rica.  Sit to stand CGA and transfers Min-CGA.  Ambulated 70ft + 25 ft  CGA-Rica  with  required seated rest break between trials and reported dizziness and weakness.   UBD Abdirizak and Grooming SBA.  Toileting CGA. Feeding (I).     Past Medical History:   Diagnosis Date    Cirrhosis     Esophageal varices 2018    Small with no banding     Essential hypertension 2018    Fatty liver 2018    GERD (gastroesophageal reflux disease)     Hx of colonic polyps 2018    On colonoscopy     Hypertension     Hypothyroidism 2018    Kidney stones     Morbid obesity 2018    Lap band with subsequent release    KADEEM (obstructive sleep apnea) 2018    Osteoarthritis 2018    Pulmonary nodule 2018    Vitamin D deficiency 2018     Past Surgical History:   Procedure Laterality Date     SECTION      TIMES 2     CHOLECYSTECTOMY      LAPAROSCOPIC     CYSTOSCOPY W/ STONE MANIPULATION      kidney stone removal    EGD (ESOPHAGOGASTRODUODENOSCOPY) N/A 2019    Performed by Davian Hernández MD at [facility] ENDO (2ND FLR)    LAPAROSCOPIC GASTRIC BANDING  2006    removal     LIVER BIOPSY  2017    KESSLER with bridging 17    TRANSPLANT, LIVER N/A 2019    Performed by Clemente Brown Jr., MD at [facility] OR 2ND FLR    TRANSPLANT, LIVER N/A 2019    Performed by Clemente Brown Jr., MD at [facility] OR 2ND FLR     Review of patient's allergies indicates:   Allergen Reactions    Metformin Rash    Pcn [penicillins] Other (See Comments)     Unsure of reaction, states it was as a child. Tolerated rocephin at [facility]       Scheduled Medications:    DAPTOmycin (CUBICIN)  IV  6 mg/kg Intravenous Q24H    hydrocortisone sodium succinate  50 mg Intravenous Q8H    levETIRAcetam  500 mg Oral BID    levothyroxine  112 mcg Oral Before breakfast    magnesium oxide  400 mg Oral Daily    OLANZapine  7.5 mg Oral QHS    sodium bicarbonate  650 mg Oral BID    sulfamethoxazole-trimethoprim 400-80mg  1 tablet Oral Daily    tacrolimus  1 mg  Oral BID    thiamine  100 mg Oral Daily    valGANciclovir  450 mg Oral Daily       PRN Medications: acetaminophen, calcium carbonate, dextrose 50%, dextrose 50%, glucagon (human recombinant), glucose, glucose, hydrALAZINE, insulin aspart U-100, iohexol, OLANZapine, ondansetron, sodium chloride 0.9%    Family History     Problem Relation (Age of Onset)    Breast cancer Maternal Grandmother    Cancer Mother (50), Father (65)    Heart disease Mother, Father        Tobacco Use    Smoking status: Never Smoker    Smokeless tobacco: Never Used    Tobacco comment: patient denies   Substance and Sexual Activity    Alcohol use: No     Comment: patient denies    Drug use: No     Comment: patient denies    Sexual activity: Not on file     Review of Systems   Constitutional: Positive for activity change. Negative for fatigue and fever.   HENT: Negative for trouble swallowing and voice change.    Eyes: Negative for photophobia and visual disturbance.   Respiratory: Negative for cough and shortness of breath.    Cardiovascular: Negative for chest pain and palpitations.   Gastrointestinal: Positive for abdominal distention (decreased). Negative for nausea and vomiting.   Genitourinary: Negative for difficulty urinating and flank pain.   Musculoskeletal: Positive for gait problem. Negative for arthralgias.   Skin: Negative for color change and rash.   Neurological: Positive for weakness. Negative for facial asymmetry, speech difficulty, numbness and headaches.   Psychiatric/Behavioral: Positive for confusion (improved). Negative for agitation.     Objective:     Vital Signs (Most Recent):  Temp: 97.9 °F (36.6 °C) (07/17/19 0500)  Pulse: 84 (07/17/19 0730)  Resp: 16 (07/17/19 0730)  BP: (!) 144/88 (07/17/19 0730)  SpO2: 97 % (07/17/19 0730)    Vital Signs (24h Range):  Temp:  [97.9 °F (36.6 °C)-98.4 °F (36.9 °C)] 97.9 °F (36.6 °C)  Pulse:  [74-94] 84  Resp:  [16-18] 16  SpO2:  [97 %-99 %] 97 %  BP: (144-160)/(70-88) 144/88      Body mass index is 32.09 kg/m².    Physical Exam   Constitutional: She is oriented to person, place, and time. She appears well-developed and well-nourished.   HENT:   Head: Normocephalic and atraumatic.   Eyes: Right eye exhibits no discharge. Left eye exhibits no discharge.   Neck: Neck supple.   Cardiovascular: Intact distal pulses.   BLE edema   Pulmonary/Chest: Effort normal. No respiratory distress.   Abdominal: Soft. She exhibits distension. There is no tenderness.   Musculoskeletal: She exhibits no edema or deformity.   4/5 LLE   Generalized deconditioning    Neurological: She is alert and oriented to person, place, and time. She exhibits normal muscle tone.   Skin: Skin is warm and dry.   Psychiatric: She is mildly agitated.   Vitals reviewed.    Diagnostic Results:   Labs: Reviewed  ECG: Reviewed  X-Ray: Reviewed  US: Reviewed  CT: Reviewed    Assessment/Plan:     Pressure injury of buttock, stage 1  -wound care consulted     Encephalopathy  -Psych consulted and state multifactorial delirium 2/2 infection & ICH  -on zyprexa prn   -unable to do LP as unsafe at the moment due to recent ICH per LTS    UTI (urinary tract infection) due to Enterococcus  -on IV daptomycin     Traumatic intracranial hemorrhage without loss of consciousness  See hospital course for functional, cognitive/speech/language, and nutrition/swallow status.      Recommendations  -  Monitor sleep disturbances and establish consistent sleep-wake cycle  -  Environmental modifications to limit agitation/confusion   -  Reorient patient to person, place, time, and situation on each encounter  -  Avoid restraints  -  May benefit from 24/7 supervision  -  Avoid/limit medications that can worsen delirium (benzodiazepines, antihistamines, anticholinergics, hypnotics, opiates)  -  Encourage mobility, OOB in chair, and early ambulation as appropriate  -  PT/OT evaluate and treat  -  SLP speech and cognitive evaluate and treat  -  Monitor for  bowel and bladder dysfunction  -  Monitor for and prevent skin breakdown and pressure ulcers  · Early mobility, repositioning/weight shifting every 20-30 minutes when sitting, turn patient every 2 hours, proper mattress/overlay and chair cushioning, pressure relief/heel protector boots  -  DVT prophylaxis (if appropriate)    S/P liver transplant  -s/o liver trx 6/5/19 2/2 KESSLER     Weakness  -PT/OT following     Participating with therapy. No PAC recommendation at this time. Patient adamant that she does not want IRF and wants to go back to apartment with HH. Will follow progress and discuss with rehab team for post acute care/rehab recommendation.      Thank you for your consult.     Libby Rich NP  Department of Physical Medicine & Rehab  [facility]

## 2019-07-17 NOTE — PLAN OF CARE
"Problem: Adult Inpatient Plan of Care  Goal: Plan of Care Review  Outcome: Ongoing (interventions implemented as appropriate)  Pt awake, alert. Disoriented to time. Oriented to person, place, and situation. Pt continues to hallucinate. Pt stating "Someone is in the bathroom." Showed pt that no one was in the bathroom. Bg monitored achs- no coverage needed. Tele d/c due to pt continuing to refuse box.  IV cefepime and daptomycin continued. Acyclovir d/c- Valcyte will be started in the morning. 500 boluses continued. Pt able to position self independently in bed. Stand by assist OOB. Pt ambulated in the hallways. Pt in lowest position, bed alarm on, side rails up x2, non-skid foot wear in place, call light within reach, pt verbalized understanding to call RN when needed. Hand hygiene practiced per protocol. Will continue to monitor.           "

## 2019-07-17 NOTE — HOSPITAL COURSE
07/16/2019:  Bed mobility SBA-Rica.  Sit to stand CGA and transfers Min-CGA.  Ambulated 70ft + 25 ft  CGA-Rica with  required seated rest break between trials and reported dizziness and weakness.   UBD [place] and Grooming SBA.  Toileting CGA. Feeding (I).   07/17/2019:  Sit to stand CGA.  Ambulated 80 FT + 100 FT +55 FT CGA-Rica using w/c for mobility and required seated rest break between trials. pt reported fatigue and weakness.   07/18/2019: Bed mobility SBA.  Sit to stand SBA from bed to WC.  Ambulated 75 ft + 110 ft SBA-CGA using WC for mobility, 50 ft CGA-Rica  required seated rest break between trials and standing rest breaks prn with fatigue and  weakness Politely declined ADLs.

## 2019-07-17 NOTE — MEDICAL/APP STUDENT
"7/17/2019 12:36 PM   Jihan Zamudio   1970   [id number]        PSYCHIATRY CONSULT PROGRESS NOTE       BRIEF HPI   HPI:   Jihan Zamudio is a 49 y.o. female with no past psychiatric history who [place] [facility] Se[place]ure-like activity. Psychiatry was consulted for "personality change."      Per Primary Team:  Ms. 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 Post transplant course significant for acute parenchymal hemorrhage within the right occipital lobe near the parieto-occipital junction measuring approximately 1.8 x 1.3 x 1.5 cm with mild surrounding vasogenic edema and localized mass effect after fall on 6/24. She was discharged home on 7 days of Keppra, completed on 7/2. She now presented to the ED with 1 day history of fever. Home health nurse checked patient's temperature and was noted to be elevated. Home health nurse also reported patient acting "off" with mental status change. In the ED, tmax was 102.3. Patient felt well with no true complaints expect rhinorrhea. She denies chills, diarrhea, nausea/vomiting, abdominal pain. Denied sick contacts. Will admit for infectious work up (blood cx, UA, urine cx, CMV PCR, chest x-ray, CT A/P).  In light of recent hx of occipital hemorrhage, obtained CT head without contrast.        ASSESSMENT   Altered Mental Status due to encephalopathy      RECOMMENDATIONS      · PSYCH Meds-  · Legal status-  · The above will be discussed with staff psychiatrist and update any changes after rounds in the afternoon.  · Thank you for this consult will continue to follow      ----------------------------------------------------------------------------------------------------------------------  SUBJECTIVE:     Nursing note (from [id number])  Pt awake, alert. Disoriented to time. Oriented to person, place, and situation. Pt continues to hallucinate. Pt stating "Someone is in the bathroom." Showed pt that no one was in the " bathroom. Bg monitored achs- no coverage needed. Tele d/c due to pt continuing to refuse box.  IV cefepime and daptomycin continued. Acyclovir d/c- Valcyte will be started in the morning. 500 boluses continued. Pt able to position self independently in bed. Stand by assist OOB. Pt ambulated in the hallways. Pt in lowest position, bed alarm on, side rails up x2, non-skid foot wear in place, call light within reach, pt verbalized understanding to call RN when needed. Hand hygiene practiced per protocol. Will continue to monitor.         Today,  Pt reports feeling better with improved mood and interest in activities. She is oriented to person, place, and situation but not time. She is able to carry on a more lucid conversation with good attention, reasonable comprehension of my comments, and logical responses; however, the fluidity of her speech is still slightly impaired (some demonstration of regression early in the conversation with improvement in articulation later in the conversation). She denies any SI/HI, delusions, or hallucinations. She is aware that she was previously confused and agitated and shows concern but does not endorse any confusion at the moment despite deprivation in cognition (diminished short-term memory and mathematical function). Insight has improved and her judgment is improved as well. Overall, her mental state has improved.    CAM-ICU negative.    500mg Kepra 2x daily  Olanzapine 7.5mg nightly  Thiamine 100mg morning       Current Medications:   Scheduled Meds:    DAPTOmycin (CUBICIN)  IV  6 mg/kg Intravenous Q24H    hydrocortisone sodium succinate  50 mg Intravenous Q8H    levETIRAcetam  500 mg Oral BID    levothyroxine  112 mcg Oral Before breakfast    magnesium oxide  400 mg Oral Daily    OLANZapine  7.5 mg Oral QHS    sodium bicarbonate  650 mg Oral BID    sulfamethoxazole-trimethoprim 400-80mg  1 tablet Oral Daily    tacrolimus  1 mg Oral BID    thiamine  100 mg Oral Daily     valGANciclovir  450 mg Oral Daily      PRN Meds: acetaminophen, calcium carbonate, dextrose 50%, dextrose 50%, glucagon (human recombinant), glucose, glucose, hydrALAZINE, insulin aspart U-100, iohexol, OLANZapine, ondansetron, sodium chloride 0.9%   Psychotherapeutics (From admission, onward)    Start     Stop Route Frequency Ordered    07/16/19 1357  OLANZapine injection 2.5 mg      -- IM Every 8 hours PRN 07/16/19 1257    07/15/19 2100  OLANZapine tablet 7.5 mg      -- Oral Nightly 07/15/19 1515          Allergies:   Review of patient's allergies indicates:   Allergen Reactions    Metformin Rash    Pcn [penicillins] Other (See Comments)     Unsure of reaction, states it was as a child. Tolerated rocephin at osh        Past Medical History:   Diagnosis Date    Cirrhosis     Esophageal varices 2/26/2018    Small with no banding 07/17    Essential hypertension 2/26/2018    Fatty liver 2/26/2018    GERD (gastroesophageal reflux disease)     Hx of colonic polyps 2/26/2018    On colonoscopy 07/17    Hypertension     Hypothyroidism 2/26/2018    Kidney stones     Morbid obesity 2/26/2018    Lap band with subsequent release    KADEEM (obstructive sleep apnea) 2/26/2018    Osteoarthritis 2/26/2018    Pulmonary nodule 2/26/2018    Vitamin D deficiency 2/26/2018       OBJECTIVE:   Vitals   Vitals:    07/17/19 1220   BP: (!) 149/70   Pulse: 80   Resp: 14   Temp: 97.7 °F (36.5 °C)        Labs/Imaging/Studies:   Recent Results (from the past 36 hour(s))   POCT glucose    Collection Time: 07/16/19  7:47 AM   Result Value Ref Range    POCT Glucose 129 (H) 70 - 110 mg/dL   CBC auto differential    Collection Time: 07/16/19  7:49 AM   Result Value Ref Range    WBC 6.26 3.90 - 12.70 K/uL    RBC 2.84 (L) 4.00 - 5.40 M/uL    Hemoglobin 8.0 (L) 12.0 - 16.0 g/dL    Hematocrit 25.6 (L) 37.0 - 48.5 %    Mean Corpuscular Volume 90 82 - 98 fL    Mean Corpuscular Hemoglobin 28.2 27.0 - 31.0 pg    Mean Corpuscular Hemoglobin Conc  31.3 (L) 32.0 - 36.0 g/dL    RDW 15.8 (H) 11.5 - 14.5 %    Platelets 199 150 - 350 K/uL    MPV 10.2 9.2 - 12.9 fL    Immature Granulocytes CANCELED 0.0 - 0.5 %    Immature Grans (Abs) CANCELED 0.00 - 0.04 K/uL    Lymph # CANCELED 1.0 - 4.8 K/uL    Mono # CANCELED 0.3 - 1.0 K/uL    Eos # CANCELED 0.0 - 0.5 K/uL    Baso # CANCELED 0.00 - 0.20 K/uL    nRBC 0 0 /100 WBC    Gran% 97.0 (H) 38.0 - 73.0 %    Lymph% 1.0 (L) 18.0 - 48.0 %    Mono% 2.0 (L) 4.0 - 15.0 %    Eosinophil% 0.0 0.0 - 8.0 %    Basophil% 0.0 0.0 - 1.9 %    Platelet Estimate Appears normal     [place]iso Slight     Poik Slight     Poly Occasional     Hypo Occasional     [place] Cells Occasional     Differential Method Manual    Comprehensive metabolic panel    Collection Time: 07/16/19  7:49 AM   Result Value Ref Range    Sodium 139 136 - 145 mmol/L    Potassium 3.1 (L) 3.5 - 5.1 mmol/L    Chloride 110 95 - 110 mmol/L    CO2 19 (L) 23 - 29 mmol/L    Glucose 112 (H) 70 - 110 mg/dL    BUN, Bld 13 6 - 20 mg/dL    Creatinine 0.6 0.5 - 1.4 mg/dL    Calcium 8.2 (L) 8.7 - 10.5 mg/dL    Total Protein 5.4 (L) 6.0 - 8.4 g/dL    Albumin 2.0 (L) 3.5 - 5.2 g/dL    Total Bilirubin 0.5 0.1 - 1.0 mg/dL    Alkaline Phosphatase 153 (H) 55 - 135 U/L    AST 22 10 - 40 U/L    ALT 11 10 - 44 U/L    Anion Gap 10 8 - 16 mmol/L    eGFR if African American >60.0 >60 mL/min/1.73 m^2    eGFR if non African American >60.0 >60 mL/min/1.73 m^2   Magnesium    Collection Time: 07/16/19  7:49 AM   Result Value Ref Range    Magnesium 1.8 1.6 - 2.6 mg/dL   POCT glucose    Collection Time: 07/16/19 11:32 AM   Result Value Ref Range    POCT Glucose 213 (H) 70 - 110 mg/dL   WBC & Diff,Body Fluid Peritoneal Fluid    Collection Time: 07/16/19  4:31 PM   Result Value Ref Range    Body Fluid Type Peritoneal Fluid     Fluid Appearance Clear     Fluid Color Yellow     WBC, Body Fluid 213 /cu mm    Segs, Fluid 25 %    Lymphs, Fluid 48 %    Monocytes/Macrophages, Fluid 27 %   Bilirubin, Peritoneal, Pleural  Fluid or RONAK Drainage, In-House Peritoneal Fluid    Collection Time: 07/16/19  4:31 PM   Result Value Ref Range    Body Fluid Source, Bilirubin Peritoneal Fluid     Bilirubin, BF 0.3 Not established mg/dL   Culture, Body Fluid (Aerobic) w/ GS    Collection Time: 07/16/19  4:32 PM   Result Value Ref Range    AEROBIC CULTURE - FLUID No growth     Gram Stain Result Rare WBC's     Gram Stain Result No organisms seen    Culture, Anaerobic    Collection Time: 07/16/19  4:32 PM   Result Value Ref Range    Anaerobic Culture Culture in progress    POCT glucose    Collection Time: 07/16/19  5:10 PM   Result Value Ref Range    POCT Glucose 184 (H) 70 - 110 mg/dL   POCT glucose    Collection Time: 07/16/19  9:33 PM   Result Value Ref Range    POCT Glucose 177 (H) 70 - 110 mg/dL   CBC auto differential    Collection Time: 07/17/19  6:55 AM   Result Value Ref Range    WBC 6.09 3.90 - 12.70 K/uL    RBC 2.91 (L) 4.00 - 5.40 M/uL    Hemoglobin 8.1 (L) 12.0 - 16.0 g/dL    Hematocrit 26.0 (L) 37.0 - 48.5 %    Mean Corpuscular Volume 89 82 - 98 fL    Mean Corpuscular Hemoglobin 27.8 27.0 - 31.0 pg    Mean Corpuscular Hemoglobin Conc 31.2 (L) 32.0 - 36.0 g/dL    RDW 15.8 (H) 11.5 - 14.5 %    Platelets 191 150 - 350 K/uL    MPV 9.4 9.2 - 12.9 fL    Immature Granulocytes CANCELED 0.0 - 0.5 %    Immature Grans (Abs) CANCELED 0.00 - 0.04 K/uL    Lymph # CANCELED 1.0 - 4.8 K/uL    Mono # CANCELED 0.3 - 1.0 K/uL    Eos # CANCELED 0.0 - 0.5 K/uL    Baso # CANCELED 0.00 - 0.20 K/uL    nRBC 0 0 /100 WBC    Gran% 88.0 (H) 38.0 - 73.0 %    Lymph% 6.0 (L) 18.0 - 48.0 %    Mono% 2.0 (L) 4.0 - 15.0 %    Eosinophil% 0.0 0.0 - 8.0 %    Basophil% 0.0 0.0 - 1.9 %    Bands 3.0 %    Metamyelocytes 1.0 %    Platelet Estimate Appears normal     [place]iso Slight     Poly Occasional     Differential Method Manual    Comprehensive metabolic panel    Collection Time: 07/17/19  6:55 AM   Result Value Ref Range    Sodium 139 136 - 145 mmol/L    Potassium 3.8 3.5 - 5.1  mmol/L    Chloride 115 (H) 95 - 110 mmol/L    CO2 18 (L) 23 - 29 mmol/L    Glucose 111 (H) 70 - 110 mg/dL    BUN, Bld 15 6 - 20 mg/dL    Creatinine 0.6 0.5 - 1.4 mg/dL    Calcium 8.1 (L) 8.7 - 10.5 mg/dL    Total Protein 5.3 (L) 6.0 - 8.4 g/dL    Albumin 2.1 (L) 3.5 - 5.2 g/dL    Total Bilirubin 0.4 0.1 - 1.0 mg/dL    Alkaline Phosphatase 163 (H) 55 - 135 U/L    AST 32 10 - 40 U/L    ALT 18 10 - 44 U/L    Anion Gap 6 (L) 8 - 16 mmol/L    eGFR if African American >60.0 >60 mL/min/1.73 m^2    eGFR if non African American >60.0 >60 mL/min/1.73 m^2   Magnesium    Collection Time: 07/17/19  6:55 AM   Result Value Ref Range    Magnesium 1.8 1.6 - 2.6 mg/dL   POCT glucose    Collection Time: 07/17/19 12:02 PM   Result Value Ref Range    POCT Glucose 120 (H) 70 - 110 mg/dL          Medical ROS  General ROS: negative for - chills, fatigue or fever  Respiratory ROS: no cough, shortness of breath, or wheezing  Cardiovascular ROS: no chest pain or dyspnea on exertion  Gastrointestinal ROS: no abdominal pain, change in bowel habits, or black or bloody stools  Musculoskeletal ROS: negative for - gait disturbance, joint stiffness, muscle pain or muscular weakness  Neurological ROS: negative for - confusion, dizziness, gait disturbance, headaches, impaired coordination/balance, seizures or visual changes      Mental Status Exam:  Appearance: unremarkable, age appropriate, well nourished  Behavior/Cooperation: limited/ appopriate normal, cooperative, friendly and cooperative, eye contact normal  Speech: appropriate rate, volume and tone normal tone, normal pitch, normal volume, slowed  Language: grossly intact with spontaneous speech  Mood: steady, happy  Affect:  congruent with mood and appropriate to situation/content Normal  Thought Process: normal and logical  Thought Content: normal, no suicidality, no homicidality, delusions, or paranoia  Sensorium:  Awake/Delirium/Somnolence  Alert and Oriented:  person, situation  Memory:  3/3 immediate, 2/3 at 5 minutes    Recent:  Impaired/  Limited/ Intact; able to report recent events   Remote:  Impaired/  Limited/ Intact; Named 4/4 past presidents   Attention/concentration: appropriate for age/education. w-o-r-l-d; d-l-r-o-w (One mistake on backwards spelling)  Similarities:  Not assessed  Abstract reasoning:  Not assessed  Insight:  Improved  Judgment: Improved      CAM ICU- Negative      CAROLYN Rubio, MS3   [facility]   7/17/2019 12:36 PM

## 2019-07-17 NOTE — SUBJECTIVE & OBJECTIVE
Past Medical History:   Diagnosis Date    Cirrhosis     Esophageal varices 2018    Small with no banding     Essential hypertension 2018    Fatty liver 2018    GERD (gastroesophageal reflux disease)     Hx of colonic polyps 2018    On colonoscopy     Hypertension     Hypothyroidism 2018    Kidney stones     Morbid obesity 2018    Lap band with subsequent release    KADEEM (obstructive sleep apnea) 2018    Osteoarthritis 2018    Pulmonary nodule 2018    Vitamin D deficiency 2018     Past Surgical History:   Procedure Laterality Date     SECTION      TIMES 2     CHOLECYSTECTOMY      LAPAROSCOPIC     CYSTOSCOPY W/ STONE MANIPULATION      kidney stone removal    EGD (ESOPHAGOGASTRODUODENOSCOPY) N/A 2019    Performed by Davian Hernández MD at [facility] ENDO (2ND FLR)    LAPAROSCOPIC GASTRIC BANDING      removal     LIVER BIOPSY  2017    KESSLER with bridging 17    TRANSPLANT, LIVER N/A 2019    Performed by Clemente Brown Jr., MD at [facility] OR 2ND FLR    TRANSPLANT, LIVER N/A 2019    Performed by Clemente Borwn Jr., MD at [facility] OR 2ND FLR     Review of patient's allergies indicates:   Allergen Reactions    Metformin Rash    Pcn [penicillins] Other (See Comments)     Unsure of reaction, states it was as a child. Tolerated rocephin at [facility]       Scheduled Medications:    DAPTOmycin (CUBICIN)  IV  6 mg/kg Intravenous Q24H    hydrocortisone sodium succinate  50 mg Intravenous Q8H    levETIRAcetam  500 mg Oral BID    levothyroxine  112 mcg Oral Before breakfast    magnesium oxide  400 mg Oral Daily    OLANZapine  7.5 mg Oral QHS    sodium bicarbonate  650 mg Oral BID    sulfamethoxazole-trimethoprim 400-80mg  1 tablet Oral Daily    tacrolimus  1 mg Oral BID    thiamine  100 mg Oral Daily    valGANciclovir  450 mg Oral Daily       PRN Medications: acetaminophen, calcium carbonate, dextrose 50%,  dextrose 50%, glucagon (human recombinant), glucose, glucose, hydrALAZINE, insulin aspart U-100, iohexol, OLANZapine, ondansetron, sodium chloride 0.9%    Family History     Problem Relation (Age of Onset)    Breast cancer Maternal Grandmother    Cancer Mother (50), Father (65)    Heart disease Mother, Father        Tobacco Use    Smoking status: Never Smoker    Smokeless tobacco: Never Used    Tobacco comment: patient denies   Substance and Sexual Activity    Alcohol use: No     Comment: patient denies    Drug use: No     Comment: patient denies    Sexual activity: Not on file     Review of Systems   Constitutional: Positive for activity change. Negative for fatigue and fever.   HENT: Negative for trouble swallowing and voice change.    Eyes: Negative for photophobia and visual disturbance.   Respiratory: Negative for cough and shortness of breath.    Cardiovascular: Negative for chest pain and palpitations.   Gastrointestinal: Positive for abdominal distention (decreased). Negative for nausea and vomiting.   Genitourinary: Negative for difficulty urinating and flank pain.   Musculoskeletal: Positive for gait problem. Negative for arthralgias.   Skin: Negative for color change and rash.   Neurological: Positive for weakness. Negative for facial asymmetry, speech difficulty, numbness and headaches.   Psychiatric/Behavioral: Positive for confusion (improved). Negative for agitation.     Objective:     Vital Signs (Most Recent):  Temp: 97.9 °F (36.6 °C) (07/17/19 0500)  Pulse: 84 (07/17/19 0730)  Resp: 16 (07/17/19 0730)  BP: (!) 144/88 (07/17/19 0730)  SpO2: 97 % (07/17/19 0730)    Vital Signs (24h Range):  Temp:  [97.9 °F (36.6 °C)-98.4 °F (36.9 °C)] 97.9 °F (36.6 °C)  Pulse:  [74-94] 84  Resp:  [16-18] 16  SpO2:  [97 %-99 %] 97 %  BP: (144-160)/(70-88) 144/88     Body mass index is 32.09 kg/m².    Physical Exam   Constitutional: She is oriented to person, place, and time. She appears well-developed and  well-nourished.   HENT:   Head: Normocephalic and atraumatic.   Eyes: Right eye exhibits no discharge. Left eye exhibits no discharge.   Neck: Neck supple.   Cardiovascular: Intact distal pulses.   BLE edema   Pulmonary/Chest: Effort normal. No respiratory distress.   Abdominal: Soft. She exhibits distension. There is no tenderness.   Musculoskeletal: She exhibits no edema or deformity.   4/5 LLE   Generalized deconditioning    Neurological: She is alert and oriented to person, place, and time. She exhibits normal muscle tone.   Skin: Skin is warm and dry.   Psychiatric: She has a normal mood and affect. Her behavior is normal.   calm   Vitals reviewed.    NEUROLOGICAL EXAMINATION:     MENTAL STATUS   Oriented to person, place, and time.       Diagnostic Results:   Labs: Reviewed  ECG: Reviewed  X-Ray: Reviewed  US: Reviewed  CT: Reviewed

## 2019-07-17 NOTE — ASSESSMENT & PLAN NOTE
See hospital course for functional, cognitive/speech/language, and nutrition/swallow status.      Recommendations  -  Monitor sleep disturbances and establish consistent sleep-wake cycle  -  Environmental modifications to limit agitation/confusion   -  Reorient patient to person, place, time, and situation on each encounter  -  Avoid restraints  -  May benefit from 24/7 supervision  -  Avoid/limit medications that can worsen delirium (benzodiazepines, antihistamines, anticholinergics, hypnotics, opiates)  -  Encourage mobility, OOB in chair, and early ambulation as appropriate  -  PT/OT evaluate and treat  -  SLP speech and cognitive evaluate and treat  -  Monitor for bowel and bladder dysfunction  -  Monitor for and prevent skin breakdown and pressure ulcers  · Early mobility, repositioning/weight shifting every 20-30 minutes when sitting, turn patient every 2 hours, proper mattress/overlay and chair cushioning, pressure relief/heel protector boots  -  DVT prophylaxis (if appropriate)

## 2019-07-17 NOTE — PT/OT/SLP PROGRESS
Physical Therapy Treatment    Patient Name:  Jihan Zamudio   MRN:  [id number]    Recommendations:     Discharge Recommendations:  rehabilitation facility   Discharge Equipment Recommendations: shower chair   Barriers to discharge: None    Assessment:     Jihan Zamudio is a 49 y.o. female admitted with a medical diagnosis of Seizure-like activity.  She presents with the following impairments/functional limitations:  weakness, impaired endurance .Pt  motivated and cooperative with treatment session. Pt Progressing with PT Intervention.  Pt would continue to benefit from skilled PT to address overall functional mobility and goals. Goals remain appropriate      Rehab Prognosis: Good; patient would benefit from acute skilled PT services to address these deficits and reach maximum level of function.    Recent Surgery: * No surgery found *      Plan:     During this hospitalization, patient to be seen 4 x/week to address the identified rehab impairments via gait training, therapeutic activities, therapeutic exercises, neuromuscular re-education and progress toward the following goals:    · Plan of Care Expires:  08/08/19    Subjective     Chief Complaint: fatigue  Patient/Family Comments/goals: I am ready to walk  Pain/Comfort:  · Pain Rating 1: not rated  · C/o of back pain from procedure yesterday  · Pain Rating Post-Intervention 1: 0/10      Objective:     Communicated with RN prior to session.  Patient found seated at EOB with telemetry upon PT entry to room.     General Precautions: Standard, fall   Orthopedic Precautions:N/A   Braces: N/A     Functional Mobility:  · Transfers:    · Sit to Stand:  contact guard assistance with no AD    · Gait: Ambulated 80ft, 100 ft and 55 ft   with CGA and using w/c for mobility requiring CGA/Min A. Pt required seated rest break between trials. pt reported fatigue and  weakness. Pt ambulates with upright posture, reciprocal gait, decreased gait speed, decreased step length,  and decreased endurance.       AM-PAC 6 CLICK MOBILITY  Turning over in bed (including adjusting bedclothes, sheets and blankets)?: 3  Sitting down on and standing up from a chair with arms (e.g., wheelchair, bedside commode, etc.): 3  Moving from lying on back to sitting on the side of the bed?: 3  Moving to and from a bed to a chair (including a wheelchair)?: 3  Need to walk in hospital room?: 3  Climbing 3-5 steps with a railing?: 2  Basic Mobility Total Score: 17       Therapeutic Activities and Exercises:   educated patient on progress, safety,d/c,PT POC, on the effects of prolonged immobility and the importance of performing OOB activity and exercises to promote healing and reduce recovery time   Patient performed therex X 15 reps seated in bedside chair B LE AROM AP, LAQ, Hip Flexion, Hip Abd/Add   Updated white board with appropriate PT mobility information for medical team notification  Donned an extra gown  Bedside table in front of patient and area set up for function, convenience, and safety. RN aware of patient's mobility needs and status. Questions/concerns addressed within PTA scope of practice; patient with no further questions. Time was provided for active listening, discussion of health disposition, and discussion of safe discharge. Pt?verbalized?agreement .      Patient left up in chair with all lines intact, call button in reach, chair alarm on and nsg notified..    GOALS:   Multidisciplinary Problems     Physical Therapy Goals        Problem: Physical Therapy Goal    Goal Priority Disciplines Outcome Goal Variances Interventions   Physical Therapy Goal     PT, PT/OT Ongoing (interventions implemented as appropriate)     Description:  Goals to be met by: 2019    Patient will increase functional independence with mobility by performin. Supine <> sit with supervision   2. Sit to stand transfer with Supervision  3. Gait  x 150 feet with Stand-by Assistance with or without using least  restrictive AD.   4. Stand for 3 minutes with Stand-by Assistance while performing dynamic balance activities   5. Lower extremity exercise program x20 reps per handout, with supervision                      Time Tracking:     PT Received On: 07/17/19  PT Start Time: 0934     PT Stop Time: 1012  PT Total Time (min): 38 min     Billable Minutes: Gait Training 25 and Therapeutic Exercise 13    Treatment Type: Treatment  PT/PTA: PTA     PTA Visit Number: 1     Jay Choi, CRICKET  07/17/2019

## 2019-07-17 NOTE — SUBJECTIVE & OBJECTIVE
Interval History: afebrile, acyclovir was changed to valg and cefepime was discontinued 07/16.    Review of Systems   Constitutional: Negative for activity change, appetite change, chills, diaphoresis and fever.   HENT: Negative for ear pain, mouth sores, sinus pressure and sore throat.    Eyes: Negative for photophobia, pain and redness.   Respiratory: Negative for cough, shortness of breath and wheezing.    Cardiovascular: Negative for chest pain and leg swelling.   Gastrointestinal: Negative for abdominal distention, abdominal pain, diarrhea, nausea and vomiting.   Genitourinary: Negative for decreased urine volume, difficulty urinating, dysuria, flank pain, frequency and urgency.   Musculoskeletal: Negative for arthralgias, back pain, gait problem and myalgias.   Skin: Positive for wound (chevron well healed). Negative for pallor and rash.   Allergic/Immunologic: Positive for immunocompromised state.   Neurological: Negative for dizziness, tremors, seizures, weakness, light-headedness and headaches.   Psychiatric/Behavioral: Negative for agitation, confusion and decreased concentration. The patient is not nervous/anxious.      Objective:     Vital Signs (Most Recent):  Temp: 97.7 °F (36.5 °C) (07/17/19 1220)  Pulse: 80 (07/17/19 1220)  Resp: 14 (07/17/19 1220)  BP: (!) 149/70 (07/17/19 1220)  SpO2: 98 % (07/17/19 1220) Vital Signs (24h Range):  Temp:  [97.7 °F (36.5 °C)-98.4 °F (36.9 °C)] 97.7 °F (36.5 °C)  Pulse:  [74-94] 80  Resp:  [14-18] 14  SpO2:  [97 %-99 %] 98 %  BP: (144-160)/(70-88) 149/70     Weight: 84.8 kg (186 lb 15.2 oz)  Body mass index is 32.09 kg/m².    Estimated Creatinine Clearance: 116.8 mL/min (based on SCr of 0.6 mg/dL).    Physical Exam   Constitutional: She is oriented to person, place, and time. She appears well-developed and well-nourished. No distress.   HENT:   Head: Normocephalic and atraumatic.   Eyes: Conjunctivae and EOM are normal.   Neck: Normal range of motion. Neck supple.    Pulmonary/Chest: Effort normal. No respiratory distress.   Abdominal: Soft. She exhibits no distension. There is no tenderness.   Musculoskeletal: Normal range of motion. She exhibits no edema.   Neurological: She is alert and oriented to person, place, and time.   Skin: Skin is warm and dry. No rash noted. She is not diaphoretic. No erythema.   Vitals reviewed.      Significant Labs:   CBC:   Recent Labs   Lab 07/16/19  0749 07/17/19  0655   WBC 6.26 6.09   HGB 8.0* 8.1*   HCT 25.6* 26.0*    191     CMP:   Recent Labs   Lab 07/16/19  0749 07/17/19  0655    139   K 3.1* 3.8    115*   CO2 19* 18*   * 111*   BUN 13 15   CREATININE 0.6 0.6   CALCIUM 8.2* 8.1*   PROT 5.4* 5.3*   ALBUMIN 2.0* 2.1*   BILITOT 0.5 0.4   ALKPHOS 153* 163*   AST 22 32   ALT 11 18   ANIONGAP 10 6*   EGFRNONAA >60.0 >60.0       Significant Imaging: I have reviewed all pertinent imaging results/findings within the past 24 hours.

## 2019-07-17 NOTE — PLAN OF CARE
Pt only disoriented to time. She has no had any tremors, hallucinations, or anxiety episodes today. BG ranged from 120-141. Appetite is good. Wound care examined stage 1 on sacrum; advised critic aid to sacrum BID. Waffle mattress and chair cushion in use. She frequently and independently shifts weight.  She had 2 soft BMs today, one with bowel incontinance. Fall precautions in place. Bed and chair alarm used, bed in low position, call bell in reach, non-skid socks on when pt not in bed.

## 2019-07-17 NOTE — ASSESSMENT & PLAN NOTE
-Psych consulted and state multifactorial delirium 2/2 infection & ICH  -on zyprexa prn   -unable to do LP as unsafe at the moment due to recent ICH per LTS

## 2019-07-17 NOTE — PROGRESS NOTES
Wound care consult received for skin breakdown to buttocks.  Pt s/p OLTx for KESSLER cirrhosis on 6/5 who was admitted on 7/5 for fevers.  Upon assessment, noted non-blanchable redness Stage 1 to R/L buttocks with one small darker red area 0.5x0.5cm to L buttock.  Pt reports she has not been walking much lately due to her illness. Encouraged pt to repositioning from side to side independently.  Offered waffle overlay but she refused stating she has 2 of them at home and doesn't want anymore.  Explained that she would not need to bring it home and she said she would think about it.  Pt also reports she does sit up in the chair and verbalized acceptance of chair cushion.   Recommendations:  - Pressure injury prevention interventions to include waffle overlay and chair cushion  - Clear Criticaid to buttocks BID  - Wound care team to follow prn  [id number]     07/17/19 1018        Pressure Injury 07/17/19 Left Buttocks Stage 1   Date First Assessed: 07/17/19   Pressure Injury Present on Admission: suspected hospital acquired  Side: (c) Left  Location: Buttocks  Staging: Stage 1   Wound Image    Staging Stage 1   Dressing Appearance Open to air   Drainage Amount None   Appearance Red  (non blanchable redness with small darker red area .5x.5cm)   Red (%), Wound Tissue Color 100 %   Wound Length (cm) 5 cm   Wound Width (cm) 10 cm   Wound Depth (cm) 0.1 cm   Wound Volume (cm^3) 5 cm^3   Wound Surface Area (cm^2) 50 cm^2   Care   (ordered moisture barrier ointment)

## 2019-07-17 NOTE — CONSULTS
Inpatient consult to Physical Medicine Rehab  Consult performed by: Libby Rich NP  Consult ordered by: Amy Saunders PA-C  Reason for consult: assess rehab needs        Reviewed patient history and current admission.  Rehab team following.  Full consult to follow.    WIN Nielsen, FNP-C  Physical Medicine & Rehabilitation   07/17/2019

## 2019-07-17 NOTE — ASSESSMENT & PLAN NOTE
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 She underwent paracentesis/thoracentesis that were paucicellular. Patient's AMS and fevers initially improved with vanc / cefepime, transitioned to cefpodoxime / metronidazole with recurrence of fever.  Urine culture with VRE, started on linezolid, but now with recurrent AMS. Patient with waxing and waning mental status, suspect delirium.      Recommendations    - Contine daptomycin 6 mg/kg q24 hours x5 days.  - Continue remainder of prophylaxis per protocol.

## 2019-07-17 NOTE — HPI
Jihan Zamudio is a 49-year-old female with PMHx of ascites (requiring paracentesis), pulmonology nodule and liver transplant 6/5/19 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     Patient presented to [facility] on 7/5 with fever and AMS noted by home health nurse. Hospital course further complicated by agitation and encephalopathy (Psych consulted and state multifactorial delirium 2/2 infection & ICH, unable to do LP as unsafe at the moment due to recent ICH per LTS), VRE in urine (on Linezoid), anemia, ascites (last paracentesis 7/16), stage 1 pressure injury to L buttocks, & R pleural effusion. Currently receiving IV daptomycin IVP.     Functional History: Patient lives in [place], [place] with .  Currently living at [facility].  Prior to admission, Mod (I) with mobility and ADLs, using wheelchair for long distances and no AD for household mobility (pt stated she does not like using RW, but uses furniture to hold onto).

## 2019-07-17 NOTE — SUBJECTIVE & OBJECTIVE
"Interval History: See as above     Family History     Problem Relation (Age of Onset)    Breast cancer Maternal Grandmother    Cancer Mother (50), Father (65)    Heart disease Mother, Father        Tobacco Use    Smoking status: Never Smoker    Smokeless tobacco: Never Used    Tobacco comment: patient denies   Substance and Sexual Activity    Alcohol use: No     Comment: patient denies    Drug use: No     Comment: patient denies    Sexual activity: Not on file     Psychotherapeutics (From admission, onward)    Start     Stop Route Frequency Ordered    07/16/19 1357  OLANZapine injection 2.5 mg      -- IM Every 8 hours PRN 07/16/19 1257    07/15/19 2100  OLANZapine tablet 7.5 mg      -- Oral Nightly 07/15/19 1515           Review of Systems  Objective:     Vital Signs (Most Recent):  Temp: 97.9 °F (36.6 °C) (07/17/19 0500)  Pulse: 84 (07/17/19 0730)  Resp: 16 (07/17/19 0730)  BP: (!) 144/88 (07/17/19 0730)  SpO2: 97 % (07/17/19 0730) Vital Signs (24h Range):  Temp:  [97.9 °F (36.6 °C)-98.4 °F (36.9 °C)] 97.9 °F (36.6 °C)  Pulse:  [74-94] 84  Resp:  [16-18] 16  SpO2:  [97 %-99 %] 97 %  BP: (144-160)/(70-88) 144/88     Height: 5' 4" (162.6 cm)  Weight: 84.8 kg (186 lb 15.2 oz)  Body mass index is 32.09 kg/m².      Intake/Output Summary (Last 24 hours) at 7/17/2019 1155  Last data filed at 7/17/2019 1000  Gross per 24 hour   Intake 2620 ml   Output 1300 ml   Net 1320 ml       Physical Exam   Psychiatric:   Mental Status Exam:  Appearance: age appropriate, well nourished, lying in bed  Behavior/Cooperation: cooperative  Speech: normal tone, normal rate, normal pitch, normal volume  Mood: fine  Affect: anxious  Thought Process: circumstantial  Thought Content: normal, no suicidality, no homicidality, delusions, or paranoia   Orientation: person, place, situation, month of year  Memory: Impaired to some degree  Attention Span/Concentration: Easily distracted  Insight: fair  Judgment: fair        Significant Labs: All " pertinent labs within the past 24 hours have been reviewed.    Significant Imaging: I have reviewed all pertinent imaging results/findings within the past 24 hours.
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
## 2019-07-18 PROBLEM — Z74.09 IMPAIRED FUNCTIONAL MOBILITY AND ENDURANCE: Status: ACTIVE | Noted: 2019-07-18

## 2019-07-18 LAB
ALBUMIN SERPL BCP-MCNC: 1.9 G/DL (ref 3.5–5.2)
ALP SERPL-CCNC: 189 U/L (ref 55–135)
ALT SERPL W/O P-5'-P-CCNC: 28 U/L (ref 10–44)
ANION GAP SERPL CALC-SCNC: 9 MMOL/L (ref 8–16)
ANISOCYTOSIS BLD QL SMEAR: SLIGHT
ANISOCYTOSIS BLD QL SMEAR: SLIGHT
AST SERPL-CCNC: 44 U/L (ref 10–40)
BACTERIA BLD CULT: NORMAL
BACTERIA BLD CULT: NORMAL
BASOPHILS NFR BLD: 0 % (ref 0–1.9)
BASOPHILS NFR BLD: 0 % (ref 0–1.9)
BILIRUB SERPL-MCNC: 0.3 MG/DL (ref 0.1–1)
BUN SERPL-MCNC: 13 MG/DL (ref 6–20)
CALCIUM SERPL-MCNC: 7.8 MG/DL (ref 8.7–10.5)
CHLORIDE SERPL-SCNC: 117 MMOL/L (ref 95–110)
CO2 SERPL-SCNC: 17 MMOL/L (ref 23–29)
CREAT SERPL-MCNC: 0.6 MG/DL (ref 0.5–1.4)
DIFFERENTIAL METHOD: ABNORMAL
DIFFERENTIAL METHOD: ABNORMAL
DOHLE BOD BLD QL SMEAR: PRESENT
DOHLE BOD BLD QL SMEAR: PRESENT
EOSINOPHIL NFR BLD: 0 % (ref 0–8)
EOSINOPHIL NFR BLD: 0 % (ref 0–8)
ERYTHROCYTE [DISTWIDTH] IN BLOOD BY AUTOMATED COUNT: 15.7 % (ref 11.5–14.5)
ERYTHROCYTE [DISTWIDTH] IN BLOOD BY AUTOMATED COUNT: 15.8 % (ref 11.5–14.5)
EST. GFR  (AFRICAN AMERICAN): >60 ML/MIN/1.73 M^2
EST. GFR  (NON AFRICAN AMERICAN): >60 ML/MIN/1.73 M^2
GLUCOSE SERPL-MCNC: 126 MG/DL (ref 70–110)
HCT VFR BLD AUTO: 22.5 % (ref 37–48.5)
HCT VFR BLD AUTO: 24 % (ref 37–48.5)
HCT VFR BLD AUTO: 24.5 % (ref 37–48.5)
HGB BLD-MCNC: 7 G/DL (ref 12–16)
HGB BLD-MCNC: 7.3 G/DL (ref 12–16)
HGB BLD-MCNC: 7.9 G/DL (ref 12–16)
HYPOCHROMIA BLD QL SMEAR: ABNORMAL
HYPOCHROMIA BLD QL SMEAR: ABNORMAL
IMM GRANULOCYTES # BLD AUTO: ABNORMAL K/UL (ref 0–0.04)
IMM GRANULOCYTES # BLD AUTO: ABNORMAL K/UL (ref 0–0.04)
IMM GRANULOCYTES NFR BLD AUTO: ABNORMAL % (ref 0–0.5)
IMM GRANULOCYTES NFR BLD AUTO: ABNORMAL % (ref 0–0.5)
LYMPHOCYTES NFR BLD: 4 % (ref 18–48)
LYMPHOCYTES NFR BLD: 5 % (ref 18–48)
MAGNESIUM SERPL-MCNC: 1.7 MG/DL (ref 1.6–2.6)
MCH RBC QN AUTO: 27.5 PG (ref 27–31)
MCH RBC QN AUTO: 28.2 PG (ref 27–31)
MCHC RBC AUTO-ENTMCNC: 30.4 G/DL (ref 32–36)
MCHC RBC AUTO-ENTMCNC: 31.1 G/DL (ref 32–36)
MCV RBC AUTO: 91 FL (ref 82–98)
MCV RBC AUTO: 91 FL (ref 82–98)
MONOCYTES NFR BLD: 2 % (ref 4–15)
MONOCYTES NFR BLD: 3 % (ref 4–15)
NEUTROPHILS NFR BLD: 92 % (ref 38–73)
NEUTROPHILS NFR BLD: 94 % (ref 38–73)
NRBC BLD-RTO: 1 /100 WBC
NRBC BLD-RTO: 1 /100 WBC
OVALOCYTES BLD QL SMEAR: ABNORMAL
OVALOCYTES BLD QL SMEAR: ABNORMAL
PLATELET # BLD AUTO: 136 K/UL (ref 150–350)
PLATELET # BLD AUTO: 141 K/UL (ref 150–350)
PLATELET BLD QL SMEAR: ABNORMAL
PLATELET BLD QL SMEAR: ABNORMAL
PMV BLD AUTO: 10.3 FL (ref 9.2–12.9)
PMV BLD AUTO: 9.5 FL (ref 9.2–12.9)
POCT GLUCOSE: 115 MG/DL (ref 70–110)
POCT GLUCOSE: 124 MG/DL (ref 70–110)
POCT GLUCOSE: 138 MG/DL (ref 70–110)
POCT GLUCOSE: 179 MG/DL (ref 70–110)
POIKILOCYTOSIS BLD QL SMEAR: SLIGHT
POIKILOCYTOSIS BLD QL SMEAR: SLIGHT
POLYCHROMASIA BLD QL SMEAR: ABNORMAL
POLYCHROMASIA BLD QL SMEAR: ABNORMAL
POTASSIUM SERPL-SCNC: 3.5 MMOL/L (ref 3.5–5.1)
PROT SERPL-MCNC: 4.6 G/DL (ref 6–8.4)
RBC # BLD AUTO: 2.48 M/UL (ref 4–5.4)
RBC # BLD AUTO: 2.65 M/UL (ref 4–5.4)
SCHISTOCYTES BLD QL SMEAR: ABNORMAL
SMUDGE CELLS BLD QL SMEAR: PRESENT
SODIUM SERPL-SCNC: 143 MMOL/L (ref 136–145)
TACROLIMUS BLD-MCNC: <1.5 NG/ML (ref 5–15)
TOXIC GRANULES BLD QL SMEAR: PRESENT
TOXIC GRANULES BLD QL SMEAR: PRESENT
WBC # BLD AUTO: 3.72 K/UL (ref 3.9–12.7)
WBC # BLD AUTO: 4.32 K/UL (ref 3.9–12.7)

## 2019-07-18 PROCEDURE — 85018 HEMOGLOBIN: CPT

## 2019-07-18 PROCEDURE — 97110 THERAPEUTIC EXERCISES: CPT

## 2019-07-18 PROCEDURE — 80197 ASSAY OF TACROLIMUS: CPT

## 2019-07-18 PROCEDURE — 80053 COMPREHEN METABOLIC PANEL: CPT

## 2019-07-18 PROCEDURE — 25000003 PHARM REV CODE 250: Performed by: PHYSICIAN ASSISTANT

## 2019-07-18 PROCEDURE — 99233 PR SUBSEQUENT HOSPITAL CARE,LEVL III: ICD-10-PCS | Mod: 24,,, | Performed by: PHYSICIAN ASSISTANT

## 2019-07-18 PROCEDURE — 85027 COMPLETE CBC AUTOMATED: CPT | Mod: 91

## 2019-07-18 PROCEDURE — 83735 ASSAY OF MAGNESIUM: CPT

## 2019-07-18 PROCEDURE — 97530 THERAPEUTIC ACTIVITIES: CPT

## 2019-07-18 PROCEDURE — 99232 PR SUBSEQUENT HOSPITAL CARE,LEVL II: ICD-10-PCS | Mod: ,,, | Performed by: NURSE PRACTITIONER

## 2019-07-18 PROCEDURE — 97116 GAIT TRAINING THERAPY: CPT

## 2019-07-18 PROCEDURE — 99233 SBSQ HOSP IP/OBS HIGH 50: CPT | Mod: 24,,, | Performed by: PHYSICIAN ASSISTANT

## 2019-07-18 PROCEDURE — 63600175 PHARM REV CODE 636 W HCPCS: Performed by: PHYSICIAN ASSISTANT

## 2019-07-18 PROCEDURE — 20600001 HC STEP DOWN PRIVATE ROOM

## 2019-07-18 PROCEDURE — 85007 BL SMEAR W/DIFF WBC COUNT: CPT | Mod: 91

## 2019-07-18 PROCEDURE — 99232 SBSQ HOSP IP/OBS MODERATE 35: CPT | Mod: ,,, | Performed by: NURSE PRACTITIONER

## 2019-07-18 PROCEDURE — 85014 HEMATOCRIT: CPT

## 2019-07-18 PROCEDURE — 63600175 PHARM REV CODE 636 W HCPCS: Performed by: NURSE PRACTITIONER

## 2019-07-18 PROCEDURE — 25000003 PHARM REV CODE 250: Performed by: INTERNAL MEDICINE

## 2019-07-18 PROCEDURE — 36415 COLL VENOUS BLD VENIPUNCTURE: CPT

## 2019-07-18 PROCEDURE — 25000003 PHARM REV CODE 250: Performed by: NURSE PRACTITIONER

## 2019-07-18 RX ORDER — LIDOCAINE 50 MG/G
1 PATCH TOPICAL
Status: DISCONTINUED | OUTPATIENT
Start: 2019-07-18 | End: 2019-07-23 | Stop reason: HOSPADM

## 2019-07-18 RX ORDER — TACROLIMUS 1 MG/1
2 CAPSULE ORAL 2 TIMES DAILY
Status: DISCONTINUED | OUTPATIENT
Start: 2019-07-18 | End: 2019-07-19

## 2019-07-18 RX ORDER — OLANZAPINE 2.5 MG/1
5 TABLET ORAL NIGHTLY
Status: DISCONTINUED | OUTPATIENT
Start: 2019-07-18 | End: 2019-07-19

## 2019-07-18 RX ORDER — TACROLIMUS 1 MG/1
1 CAPSULE ORAL ONCE
Status: COMPLETED | OUTPATIENT
Start: 2019-07-18 | End: 2019-07-18

## 2019-07-18 RX ADMIN — Medication 100 MG: at 09:07

## 2019-07-18 RX ADMIN — TACROLIMUS 2 MG: 1 CAPSULE ORAL at 05:07

## 2019-07-18 RX ADMIN — MAGNESIUM OXIDE TAB 400 MG (241.3 MG ELEMENTAL MG) 400 MG: 400 (241.3 MG) TAB at 09:07

## 2019-07-18 RX ADMIN — LEVETIRACETAM 500 MG: 500 TABLET ORAL at 09:07

## 2019-07-18 RX ADMIN — SODIUM BICARBONATE 650 MG TABLET 650 MG: at 08:07

## 2019-07-18 RX ADMIN — HYDROCORTISONE SODIUM SUCCINATE 50 MG: 100 INJECTION, POWDER, FOR SOLUTION INTRAMUSCULAR; INTRAVENOUS at 05:07

## 2019-07-18 RX ADMIN — ACETAMINOPHEN 650 MG: 325 TABLET ORAL at 06:07

## 2019-07-18 RX ADMIN — VALGANCICLOVIR 450 MG: 450 TABLET, FILM COATED ORAL at 09:07

## 2019-07-18 RX ADMIN — TACROLIMUS 1 MG: 1 CAPSULE ORAL at 09:07

## 2019-07-18 RX ADMIN — OLANZAPINE 5 MG: 2.5 TABLET, FILM COATED ORAL at 08:07

## 2019-07-18 RX ADMIN — LIDOCAINE 1 PATCH: 50 PATCH TOPICAL at 12:07

## 2019-07-18 RX ADMIN — HYDROCORTISONE SODIUM SUCCINATE 50 MG: 100 INJECTION, POWDER, FOR SOLUTION INTRAMUSCULAR; INTRAVENOUS at 08:07

## 2019-07-18 RX ADMIN — SULFAMETHOXAZOLE AND TRIMETHOPRIM 1 TABLET: 400; 80 TABLET ORAL at 09:07

## 2019-07-18 RX ADMIN — TACROLIMUS 1 MG: 1 CAPSULE ORAL at 12:07

## 2019-07-18 RX ADMIN — LEVETIRACETAM 500 MG: 500 TABLET ORAL at 08:07

## 2019-07-18 RX ADMIN — HYDROCORTISONE SODIUM SUCCINATE 50 MG: 100 INJECTION, POWDER, FOR SOLUTION INTRAMUSCULAR; INTRAVENOUS at 04:07

## 2019-07-18 RX ADMIN — LEVOTHYROXINE SODIUM 112 MCG: 50 TABLET ORAL at 05:07

## 2019-07-18 RX ADMIN — SODIUM BICARBONATE 650 MG TABLET 650 MG: at 09:07

## 2019-07-18 NOTE — PLAN OF CARE
Problem: Adult Inpatient Plan of Care  Goal: Plan of Care Review  Outcome: Ongoing (interventions implemented as appropriate)  Pt. AAO x 4, afebrile, at the bedside- interacting with the patient and participating in care.  H/H 7.3/24.0 on AM labs, repeat labs show 7.9/24.5  AMANDA performed today  Hydrocortisone continued today, last dose of daptomycin to be given today, Prograf dose increased  , 179, 124--no sliding scale indicated per orders  LFT elevated  C/o lower back pain--lidocaine patch applied and provided relief  Pt. Up with standby assist   Safety precautions maintained throughout the shift, call light within reach, and nonslip socks when out of bed.

## 2019-07-18 NOTE — SUBJECTIVE & OBJECTIVE
"Interval History: See as above     Family History     Problem Relation (Age of Onset)    Breast cancer Maternal Grandmother    Cancer Mother (50), Father (65)    Heart disease Mother, Father        Tobacco Use    Smoking status: Never Smoker    Smokeless tobacco: Never Used    Tobacco comment: patient denies   Substance and Sexual Activity    Alcohol use: No     Comment: patient denies    Drug use: No     Comment: patient denies    Sexual activity: Not on file     Psychotherapeutics (From admission, onward)    Start     Stop Route Frequency Ordered    07/16/19 1357  OLANZapine injection 2.5 mg      -- IM Every 8 hours PRN 07/16/19 1257    07/15/19 2100  OLANZapine tablet 7.5 mg      -- Oral Nightly 07/15/19 1515           Review of Systems  Objective:     Vital Signs (Most Recent):  Temp: 98.3 °F (36.8 °C) (07/18/19 0736)  Pulse: 70 (07/18/19 0736)  Resp: 19 (07/18/19 0736)  BP: (!) 143/67 (07/18/19 0736)  SpO2: 97 % (07/18/19 0506) Vital Signs (24h Range):  Temp:  [97.7 °F (36.5 °C)-99 °F (37.2 °C)] 98.3 °F (36.8 °C)  Pulse:  [70-88] 70  Resp:  [14-20] 19  SpO2:  [97 %-99 %] 97 %  BP: (143-162)/(67-79) 143/67     Height: 5' 4" (162.6 cm)  Weight: 84.8 kg (186 lb 15.2 oz)  Body mass index is 32.09 kg/m².      Intake/Output Summary (Last 24 hours) at 7/18/2019 1028  Last data filed at 7/18/2019 0300  Gross per 24 hour   Intake 1010 ml   Output 340 ml   Net 670 ml       Physical Exam   Psychiatric:   Mental Status Exam:  Appearance: unremarkable, age appropriate  Behavior/Cooperation: friendly and cooperative  Speech: normal tone, normal rate, normal pitch, normal volume  Mood: fine  Affect: normal and euphoric  Thought Process: normal and logical  Thought Content: normal, no suicidality, no homicidality, delusions, or paranoia   Orientation: grossly intact, except for the date. Knew year and month.   Memory: Grossly intact  Attention Span/Concentration: Normal  Insight: fair  Judgment: fair        Significant " Labs: All pertinent labs within the past 24 hours have been reviewed.    Significant Imaging: I have reviewed all pertinent imaging results/findings within the past 24 hours.

## 2019-07-18 NOTE — PROGRESS NOTES
"[facility]  Liver Transplant  Progress Note    Patient Name: Jihan Zamudio  MRN: [id number]  Admission Date: 2019  Hospital Length of Stay: 13 days  Code Status: Full Code  Primary Care Provider: Primary Doctor No  Post-Operative Day: 43    ORGAN:   LIVER  Disease Etiology: Cirrhosis: Fatty Liver ([place])  Donor Type:    - Brain Death  [facility] High Risk:   No  Donor CMV Status:   Donor CMV Status: Positive  Donor HBcAB:   Negative  Donor HCV Status:   Negative  Donor HBV GERTRUDIS: Negative  Donor HCV GERTRUDIS: Negative  Whole or Partial: Whole Liver  Biliary Anastomosis: End to End  Arterial Anatomy: Standard  Subjective:     History of Present Illness:  Ms. 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 Post transplant course significant for acute parenchymal hemorrhage within the right occipital lobe near the parieto-occipital junction measuring approximately 1.8 x 1.3 x 1.5 cm with mild surrounding vasogenic edema and localized mass effect after fall on . She was discharged home on 7 days of Keppra but nuerosurgery, completed on . She now presents to the ED with 1 day history of fever. Homehealth nurse checked patient's temperature and was noted to be elevated. Home health nurse also reported patient acting "off" with mental status change. Currently in the ED, tmax is 102.3. Patient feels well with no true complaints expect rhinorrhea. She denies chills, diarrhea, nausea/vomiting, abdominal pain. Denies sick contacts. Will admit for infectious work up (blood cx, UA, urine cx, CMV PCR, chest x-ray, CT A/P). Patient currently with no altered mental status. In light of recent hx of occipital hemorrhage, will obtain CT head without contrast.      Hospital Course:  50 y/o F s/p OLTx for KESSLER cirrhosis on  who was admitted on  for fevers. Pt febrile on admission, started on bx abx. ID consulted. Blood cultures NGTD, UA negative. CMV PCR drawn " on admit pending. Pt having loose stools, C diff EIA negative, CMV undetected. CT head showed reduction in size of hemorrhage. Abdominal CT reviewed and with moderate right pleural effusion. CXR with increased pleural effusion. Thora and para done 7/9; amount of fluid removed not recorded and pleural fluid labs were not completed as ordered. Abdominal gram stain reveals no WBC and no organisms; cell count not completed as ordered, cultures pending. TTE done 7/9 WNL, no vegetations. Pt tachy and tachypneic on 7/9; 1 L bolus given and pt responded well. 1 u pRBC transfused 7/9 for low H/H. Pt continued to have diarrhea, additional stool studies pending. Mentation continued to wax and wane, MRI w/ and w/o contrast done on 7/11 which revealed stable findings compared to prior. Pt transitioned to IV abx to PO cefpodoxime and flagyl on 7/11 per ID recs. Urine culture from 7/11 resulted as positive for VRE, thus cefpodoxime and flagyl stopped and pt started on zyvox on 7/12. Pt with worsening MS on 7/13 and claimed she had an unwitnessed seizure. STAT CT head without acute change. Keppra 500 mg bid started. Prograf and lovenox dc'ed. Stopped zyvox due to potential to lower seizure threshold and started IV daptomycin. IV cefepime restarted. Zyprexa qhs started for agitation and anxiety. Pt continued to have fevers despite therapeutic IV abx for VRE UTI. Viral and fungal labs pending per ID recs. 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  cc q8h started. Pt with persistent AMS, thus psych consulted who believe she is suffering from multifactorial delirium. PO thiamine and delirium precautions started. Unable to perform LP as unsafe due to ICH. CT a/p repeated on 7/15, revealed R pleural effusion, moderate ascites, and fluid in adam hepatis; no walled off fluid collections suggestive of abscess. Para repeated on 7/16 as labs not collected during para done on 7/9. Para labs on 7/16 negative for  infection. Empiric cefepime and acyclovir stopped on 7/17. Prograf restarted 7/17.     Interval history: No acute events overnight. Mentation remains stable this AM. She AAO x 4 this AM and denies both hallucinations and seizures/tremors. Zyprexa 7.5 mg qhs decreased to 5 mg qhs per psych recs. Plan to decrease dose to 2.5 mg tomorrow. PRN zyprexa discontinued. Continue delirium precautions. Pt afebrile since AM of 7/14 and blood cultures drawn on 713 and 7/14 remain NGTD. Several viral and fungal labs still pending. Pt to receive last dose of daptomycin for VRE UTI today. LFTs and AlkP slightly increasing over pats 4 days. Prograf previously on hold for possible seizures, restarted prograf yesterday. Continue hydrocortisone and keppra bid while up-titrating prograf dose. Will obtain liver US. H/H low today, no overt signs of bleed and pt denies melena, hematochezia, hematemesis, and hematuria. Will repeat H/H at 14:30. PT/OT recommending inpatient rehab upon discharge, PMR consulted. Continue to monitor.    Scheduled Meds:   hydrocortisone sodium succinate  50 mg Intravenous Q8H    levETIRAcetam  500 mg Oral BID    levothyroxine  112 mcg Oral Before breakfast    lidocaine  1 patch Transdermal Q24H    magnesium oxide  400 mg Oral Daily    OLANZapine  5 mg Oral QHS    sodium bicarbonate  650 mg Oral BID    sulfamethoxazole-trimethoprim 400-80mg  1 tablet Oral Daily    tacrolimus  2 mg Oral BID    thiamine  100 mg Oral Daily    valGANciclovir  450 mg Oral Daily     Continuous Infusions:  PRN Meds:acetaminophen, calcium carbonate, dextrose 50%, dextrose 50%, glucagon (human recombinant), glucose, glucose, hydrALAZINE, insulin aspart U-100, iohexol, ondansetron, sodium chloride 0.9%    Review of Systems   Constitutional: Positive for activity change and appetite change. Negative for chills and fever.   HENT: Negative for congestion and facial swelling.    Eyes: Negative for pain, discharge and visual  disturbance.   Respiratory: Negative for cough, chest tightness, shortness of breath and wheezing.    Cardiovascular: Negative for chest pain, palpitations and leg swelling.   Gastrointestinal: Negative for abdominal distention, abdominal pain, diarrhea, nausea and vomiting.   Endocrine: Negative.    Genitourinary: Negative for decreased urine volume, difficulty urinating and dysuria.   Skin: Positive for wound (chevron well healed).   Allergic/Immunologic: Positive for immunocompromised state.   Neurological: Positive for weakness. Negative for tremors, seizures, speech difficulty and light-headedness.   Psychiatric/Behavioral: Positive for decreased concentration and dysphoric mood. Negative for agitation and confusion. The patient is not nervous/anxious.      Objective:     Vital Signs (Most Recent):  Temp: 97.9 °F (36.6 °C) (07/18/19 1145)  Pulse: 103(after exercise) (07/18/19 1145)  Resp: 20 (07/18/19 1145)  BP: (!) 143/67 (07/18/19 0736)  SpO2: 99 % (07/18/19 1145) Vital Signs (24h Range):  Temp:  [97.9 °F (36.6 °C)-99 °F (37.2 °C)] 97.9 °F (36.6 °C)  Pulse:  [] 103  Resp:  [18-20] 20  SpO2:  [97 %-99 %] 99 %  BP: (143-162)/(67-79) 143/67     Weight: 84.8 kg (186 lb 15.2 oz)  Body mass index is 32.09 kg/m².    Intake/Output - Last 3 Shifts       07/16 0700 - 07/17 0659 07/17 0700 - 07/18 0659 07/18 0700 - 07/19 0659    P.O. 1200 1560 600    I.V. (mL/kg) 0 (0)      Other 0      IV Piggyback 2050 50     Total Intake(mL/kg) 3250 (38.3) 1610 (19) 600 (7.1)    Urine (mL/kg/hr) 1300 (0.6) 640 (0.3) 100 (0.2)    Emesis/NG output 0      Other 0      Stool 0 0 0    Blood 0      Total Output 1300 640 100    Net +1950 +970 +500           Urine Occurrence 2 x 4 x 1 x    Stool Occurrence 2 x 2 x 1 x    Emesis Occurrence 0 x            Physical Exam   Constitutional: She is oriented to person, place, and time. She appears well-developed. No distress.   HENT:   Head: Normocephalic and atraumatic.   Eyes: No scleral  "icterus.   R pupil > L pupil; this is lifelong per    Neck: Normal range of motion. Neck supple. No thyromegaly present.   Cardiovascular: Normal rate and regular rhythm. Exam reveals no friction rub.   No murmur heard.  Pulmonary/Chest: Effort normal. She has decreased breath sounds in the right middle field, the right lower field and the left lower field. She has no wheezes. She has no rales.   Diminished RLL   Abdominal: Soft. She exhibits no distension. There is no tenderness. There is no rebound and no guarding.   Healed chevron incision   Musculoskeletal: Normal range of motion.   Neurological: She is alert and oriented to person, place, and time. She is not disoriented.   AAO x 4   Skin: Skin is warm and dry. She is not diaphoretic.   Psychiatric: Her behavior is normal. Judgment and thought content normal. Her mood appears anxious. Her speech is delayed. Cognition and memory are normal.   Nursing note and vitals reviewed.      Laboratory:  Immunosuppressants         Stop Route Frequency     tacrolimus capsule 2 mg      -- Oral 2 times daily        CBC:   Recent Labs   Lab 07/18/19  0901   WBC 4.32   RBC 2.65*   HGB 7.3*   HCT 24.0*   *   MCV 91   MCH 27.5   MCHC 30.4*     CMP:   Recent Labs   Lab 07/18/19  0624   *   CALCIUM 7.8*   ALBUMIN 1.9*   PROT 4.6*      K 3.5   CO2 17*   *   BUN 13   CREATININE 0.6   ALKPHOS 189*   ALT 28   AST 44*   BILITOT 0.3     Labs within the past 24 hours have been reviewed.    Diagnostic Results:  None    Debility/Functional status: Patient debilitated by evidence of Muscle wasting and atrophy, Weakness, Chronic fatigue, unspecified, Other malaise and Limitation of activities due to disability. Physical and occupational therapy ordered daily to evaluate and treat. Debility was: present on admission.    Assessment/Plan:     * Seizure-like activity  - pt seen sitting in chair AM of 7/13 stating "I just had a seizure, I just finished banging my " "head on the chair."  - pt couldn't recall events surrounding seizure like activity.  - given recent hx of fall with ICH on 6/24, Neuro consulted in which pt had a very inconsistent neuro exam.   - Keppra 500 mg bid restarted and a STAT CT head performed which did not show a new infarct or bleed.  - dc'd Lovenox.   - prograf held for now. Started hydrocortisone.   - seizures likely psychogenic in nature as pt able to stop shaking to answer questions  - restarted prograf on 7/17; continue keppra for now  - Monitor.       Fever  - Infectious work up ordered on admit - blood cx NGTD, UA unremarkable, CMV PCR 7/5 undetected, chest x-ray w right pleural effusion, CT A/P reviewed.  - Started broad spectrum antibiotics on admit.   - Abdominal and head CT reviewed and no clear source for fever.   - ID consulted.  Apprec recs.   - Pt with intermittent confusion.  Easily re oriented.  Continue to monitor.   - Thora and para done 7/9; amount of fluid removed not recorded and pleural fluid labs were not completed as ordered.   - Abdominal gram stain reveals no WBC and no organisms; cell count not completed as ordered; pleural fluid cell count 84 WBCs, 24% segs.  - Pt still reports diarrhea- C diff negative, CMV undetected. Stool culture, WBC, ova cysts parasites, and GI pathogen panel pending.   - low grade fever 7/12, 100.8.  - deescalated abx to PO cefpodoxime and flagyl on 7/12.  - MRI head w/ and w/o contrast 7/12 without acute changes.  - Urine culture positive for enterococcus faecium- started Zyvox and dc'd cefpodoxime and flagyl 7/12.  - temp 100.4 on 7/13.  - Zyvox changed to Dapto as pt with seizure like activity earlier in morning and zyvox can lower seizure threshold.  - repeat blood cultures ngtd.  - ammonia level wnl.   - pt also with confusion, AMS. D/w ID - restarted Cefepime.  - procalcitonin, lactate, EBV, RPR, fungitell, aspergillus, HIV, Saadia Delacruz, histoplasma antigen and blastomyces antigen and CMV PCR " pending.  - Start Acyclovir treatment dose for possible viral meningitis.   - repeat blood cx on 7/13 and 7/14 remain NGTD. Last fever 102.3 F on 7/14 at 9 AM  - CT a/p 7/15 continued to redemonstrate R pleural effusion, moderate ascites, and fluid in adam hepatis; no walled off fluid collections suggestive of abscess.   - paracentesis done 7/16 to reassess for infection as cell count and other labs not sent off when pt underwent para on 7/9. Abdominal fluid labs negative for infection  - Stop empiric cefepime 7/17  -Will stop daptomycin for VRE UTI after last dose today.   -Stopped acyclovir, restarted prophylactic valcyte.   -continue to monitor      UTI (urinary tract infection) due to Enterococcus  - UA 7/5 unremarkable.  - urine culture 7/11 positive for enterococcus faecium- susceptibilities pending.  - dc'd cefpodoxime and flagyl on 7/12.  - started Zyvox on 7/12.  - ID transitioned Zyvox to Dapto on 7/13 as Zyvox can lower the seizure threshold.  - stop dapto 7/18  - continue to monitor.    Delirium  - pt with intermittent disorientation to situation throughout stay  - CT head on 7/5 and 7/13 with no acute change; MRI head w/ contrast on 7/11 with no acute change  - ammonia WNL  - pt with acute personality change on 7/13- throwing items at nurses and agitated  - zyprexa qhs started 7/13; PRN zyprexa added 7/16  - psych consulted 7/15; believe she is suffering from multifactorial delirium 2/2 infection & ICH  - unable to do LP as unsafe at the moment due to recent ICH  - PO thiamine started.   - Delirium precautions started.   - mentation greatly improved since 7/17  - decreased zyprexa qhs from 7.5 mg to 5 mg per psych recs  - continue to monitor      Impaired functional mobility and endurance        Altered mental status        Pressure injury of buttock, stage 1  - wound care following, appreciate recs      Hypokalemia  - replace as needed.  - Kdur 40 meq po x 1 dose.   - monitor.       Traumatic  intracranial hemorrhage without loss of consciousness  - Head CT reviewed and noted with interval reduction in size of the patient's known right occipital lobe parenchymal hemorrhage.      At risk for opportunistic infections  - cont bactrim for PCP prophylaxis.  - (CMV D+/R+) hold Valcyte.   - Acyclovir started 7/14  - stopped acyclovir and restarted valcyte on 7/17      Closed head injury  - admitted recently following fall diagnosed w acute parenchymal hemorrhage within right occipital live near parieto-occipital junction 1.8x1.3x1.5 w mild surrounding vasogenic edema and localized mass.  Repeat CT head showed reduction in size of hemorrhage.  - pt denies HA.      Long-term use of immunosuppressant medication  - holding prograf 7/13 due to seizure like activity.  - Holding MMF. Monitor prograf level daily, monitor for toxic side effects, and adjust for therapeutic dose.   - hydrocortisone 7/14.   - restarted prograf 7/17. Continue hydrocortisone for now    Prophylactic immunotherapy  - See long term use of immunosuppression.  Decreased as likely w sepsis.    S/P liver transplant  - LFTs stable.  - Pt with good hepatic allograft function.   - Last Liver US 6/24 showed Mildly elevated hepatic arterial resistive indices, although improved from prior exam.  Otherwise, satisfactory vascular appearance of the liver, complex fluid collection adjacent to the left hepatic lobe, similar to slightly smaller compared to prior exam, small volume of ascites and partially visualized right pleural effusion.   - para completed 7/9, cultures pending. Cell count not collected as ordered.   - para repeated 7/16- abdominal fluid labs negative for infection  - LFTs and AlkP creeping up over past 4 days- likely due to holding prograf for seizures. Liver US pending.    Anemia of chronic disease  - no overt bleeding.    - keep type and screen current.  - transfused 1u prbc 7/9.  - monitor with daily labs.      Pleural effusion, right  -  CXR reviewed.    - Thora done 7/9  - pleural fluid labs not completed as ordered- will try to get labs repeated if specimen is still available.  - cell count: 84 WBCs, 24% segs      Weakness  - PT/OT consulted.        Moderate malnutrition  - supplements ordered.      GERD (gastroesophageal reflux disease)  - cont Pepcid.          VTE Risk Mitigation (From admission, onward)        Ordered     Place sequential compression device  Until discontinued      07/05/19 1659     IP VTE HIGH RISK PATIENT  Once      07/05/19 1659          The patients clinical status was discussed at multidisplinary rounds, involving transplant surgery, transplant medicine, pharmacy, nursing, nutrition, and social work    Discharge Planning:  No Patient Care Coordination Note on file.      Amy Saunders PA-C  Liver Transplant  [facility]

## 2019-07-18 NOTE — PLAN OF CARE
Problem: Adult Inpatient Plan of Care  Goal: Plan of Care Review  Outcome: Ongoing (interventions implemented as appropriate)  Pt AAO, VSS, Bed in low locked pos, call light within reach. Pt calls for assistance when needed. Bed alarm in place. Bs monitored ac/hs, sliding scale insulin given as needed. Family member at bedside. Dapto cont as ordered. Tacro restarted.  Am labs ordered.

## 2019-07-18 NOTE — ASSESSMENT & PLAN NOTE
- Infectious work up ordered on admit - blood cx NGTD, UA unremarkable, CMV PCR 7/5 undetected, chest x-ray w right pleural effusion, CT A/P reviewed.  - Started broad spectrum antibiotics on admit.   - Abdominal and head CT reviewed and no clear source for fever.   - ID consulted.  Apprec recs.   - Pt with intermittent confusion.  Easily re oriented.  Continue to monitor.   - Thora and para done 7/9; amount of fluid removed not recorded and pleural fluid labs were not completed as ordered.   - Abdominal gram stain reveals no WBC and no organisms; cell count not completed as ordered; pleural fluid cell count 84 WBCs, 24% segs.  - Pt still reports diarrhea- C diff negative, CMV undetected. Stool culture, WBC, ova cysts parasites, and GI pathogen panel pending.   - low grade fever 7/12, 100.8.  - deescalated abx to PO cefpodoxime and flagyl on 7/12.  - MRI head w/ and w/o contrast 7/12 without acute changes.  - Urine culture positive for enterococcus faecium- started Zyvox and dc'd cefpodoxime and flagyl 7/12.  - temp 100.4 on 7/13.  - Zyvox changed to Dapto as pt with seizure like activity earlier in morning and zyvox can lower seizure threshold.  - repeat blood cultures ngtd.  - ammonia level wnl.   - pt also with confusion, AMS. D/w ID - restarted Cefepime.  - procalcitonin, lactate, EBV, RPR, fungitell, aspergillus, HIV, Saadia Delacruz, histoplasma antigen and blastomyces antigen and CMV PCR pending.  - Start Acyclovir treatment dose for possible viral meningitis.   - repeat blood cx on 7/13 and 7/14 remain NGTD. Last fever 102.3 F on 7/14 at 9 AM  - CT a/p 7/15 continued to redemonstrate R pleural effusion, moderate ascites, and fluid in adam hepatis; no walled off fluid collections suggestive of abscess.   - paracentesis done 7/16 to reassess for infection as cell count and other labs not sent off when pt underwent para on 7/9. Abdominal fluid labs negative for infection  - Stop empiric cefepime 7/17  -Will stop  daptomycin for VRE UTI after last dose today.   -Stopped acyclovir, restarted prophylactic valcyte.   -continue to monitor

## 2019-07-18 NOTE — ASSESSMENT & PLAN NOTE
This patient is a 49 year old lady with history of KESSLER s/p liver transplant on 6/5/19 complicated by ICH and fevers. She became febrile this past week and was admitted to the hospital on 7/9 due to fever of unknown origin in the setting of recent organ transplant. Psychiatry was consulted due to concern for delirium in the setting of infection, ICH. Orientation continues to improve. Now knows the year and month but not the date. Mental status has also improved, with no more dysarthric speech and no elicited paranoid delusions. Currently on Zyprexa 7.5 mg qhs for delirium. We continue to expect improvement in mental status with resolution of medical issues. We will continue to follow her during this hospital stay. We would recommend complete wean off of Zyprexa before she is discharged from the hospital.     Recommendations:   - Continue Zyprexa but reduce to 5 mg qhs today, then reduce to 2.5 mg qhs tomorrow night, then please have patient completely off Zyprexa before discharge.   - Can discontinue Zyprexa PO/IM 2.5 mg q6h PRN for episodic agitation, as patient is not requiring this.

## 2019-07-18 NOTE — PT/OT/SLP PROGRESS
Occupational Therapy   Treatment    Name: Jihan Zamudio  MRN: [id number]  Admitting Diagnosis:  Seizure-like activity       Recommendations:     Discharge Recommendations: rehabilitation facility  Discharge Equipment Recommendations:  shower chair  Barriers to discharge:  None    Assessment:     Jihan Zamudio is a 49 y.o. female with a medical diagnosis of Seizure-like activity.  She presents with improved mentation with the ability to engage in conversation without losing track. Pt's fluid and logical thinking enabled her to verbalize safe methods for transfers this date. Pt recalled previously learned information from PT session to use this date for safety. Pt responded well to OT intervention requiring decreased assistance for transfers and mobility. Pt reported compliance with self care completion in which she reported of not requiring any assistance. Pt will continue to benefit from skilled OT to build strength and endurance to return to PLOF. Performance deficits affecting function are weakness.     Rehab Prognosis:  Good; patient would benefit from acute skilled OT services to address these deficits and reach maximum level of function.       Plan:     Patient to be seen 4 x/week to address the above listed problems via self-care/home management, therapeutic activities, therapeutic exercises, neuromuscular re-education  · Plan of Care Expires: 08/07/19  · Plan of Care Reviewed with: patient    Subjective     Pain/Comfort:  · Pain Rating 1: 0/10  · Pain Rating Post-Intervention 1: 0/10    Objective:     Communicated with: RN prior to session.  Patient found up in chair with peripheral IV(chair alarm) upon OT entry to room.    General Precautions: Standard, fall   Orthopedic Precautions:N/A   Braces: N/A     Occupational Performance:       Functional Mobility/Transfers:  · Patient completed Sit <> Stand Transfer from chair with stand by assistance  with  no assistive device   · Functional Mobility: Pt  ambulated ~150 ft while pushing wheelchair for support with a standing RB required; then ambulated ~150 ft back to room demonstrating improved endurance to tolerate HHD. No LOB noted.      Activities of Daily Living:  · Pt politely declined all ADLs this date due to completing them prior to the session      [facility] 6 Click ADL: 21    Treatment & Education:  - Role of OT/ OT POC  - Self care safety/ independence  - Functional transfer/ mobility safety  - Importance of sitting UIC  - Energy conservation techniques such as pacing and taking rest breaks as needed  - Benefits of functional mobility with use of rollator   - Importance of ambulating with nsg  - Increased time provided for active listening  - DME needs discussed with pt and spouse  - Pt tolerated ~15 minutes of standing while engaging in a conversation with therapist requiring SBA with BUE placement on wheelchair. No LOB noted. Prolonged standing completed this date to build pt's BLE strength to tolerate standing ADLs/IADLs upon returning home.   - Pt completed x10 reps of standing marches in place with BUE placement on wheelchair with CGA.       Patient left up in chair with all lines intact, call button in reach, chair alarm on, RN notified and Spouse presentEducation:      GOALS:   Multidisciplinary Problems     Occupational Therapy Goals        Problem: Occupational Therapy Goal    Goal Priority Disciplines Outcome Interventions   Occupational Therapy Goal     OT, PT/OT Ongoing (interventions implemented as appropriate)    Description:  Goals to be met by: 7/25/19 ( Goals updated 7/16)    Patient will increase functional independence with ADLs by performing:    LE Dressing with Minimal Assistance.  Grooming while standing at sink with Contact Guard Assistance.  Toileting from toilet with SBA for hygiene and clothing management.  Supine to sit with supervision to increase bed mobility independence.  Step transfer with Stand-by Assistance and AD as needed  to prepare for household mobility to complete ADLs of choice.- met on 7/18  Toilet transfer to toilet with SBA.  Upper extremity exercise program x15 reps per handout, with independence to build strength and endurance for ADLs/IADLs.  Pt will tolerate dynamic standing task for ~5 minutes in preparation for standing ADLs with CGA.                        Time Tracking:     OT Date of Treatment: 07/18/19  OT Start Time: 1104  OT Stop Time: 1135  OT Total Time (min): 31 min    Billable Minutes:Therapeutic Activity 31    Roselyn Lowe OT  7/18/2019

## 2019-07-18 NOTE — ASSESSMENT & PLAN NOTE
- cont bactrim for PCP prophylaxis.  - (CMV D+/R+) hold Valcyte.   - Acyclovir started 7/14  - stopped acyclovir and restarted valcyte on 7/17

## 2019-07-18 NOTE — PLAN OF CARE
Problem: Occupational Therapy Goal  Goal: Occupational Therapy Goal  Goals to be met by: 7/25/19 ( Goals updated 7/16)    Patient will increase functional independence with ADLs by performing:    LE Dressing with Minimal Assistance.  Grooming while standing at sink with Contact Guard Assistance.  Toileting from toilet with SBA for hygiene and clothing management.  Supine to sit with supervision to increase bed mobility independence.  Step transfer with Stand-by Assistance and AD as needed to prepare for household mobility to complete ADLs of choice.- met on 7/18  Toilet transfer to toilet with SBA.  Upper extremity exercise program x15 reps per handout, with independence to build strength and endurance for ADLs/IADLs.  Pt will tolerate dynamic standing task for ~5 minutes in preparation for standing ADLs with CGA.      Outcome: Ongoing (interventions implemented as appropriate)  OT POC remains appropriate for patient on this date. Pt will continue to benefit from skilled OT to address deficits affecting ADL performance.       Comments: Roselyn Lowe OTR/L

## 2019-07-18 NOTE — ASSESSMENT & PLAN NOTE
-s/p fall on 6/24     See hospital course for functional, cognitive/speech/language, and nutrition/swallow status.      Recommendations  -  Monitor sleep disturbances and establish consistent sleep-wake cycle  -  Environmental modifications to limit agitation/confusion   -  Reorient patient to person, place, time, and situation on each encounter  -  Avoid restraints  -  May benefit from 24/7 supervision  -  Avoid/limit medications that can worsen delirium (benzodiazepines, antihistamines, anticholinergics, hypnotics, opiates)  -  Encourage mobility, OOB in chair, and early ambulation as appropriate  -  PT/OT evaluate and treat  -  SLP speech and cognitive evaluate and treat  -  Monitor for bowel and bladder dysfunction  -  Monitor for and prevent skin breakdown and pressure ulcers  · Early mobility, repositioning/weight shifting every 20-30 minutes when sitting, turn patient every 2 hours, proper mattress/overlay and chair cushioning, pressure relief/heel protector boots  -  DVT prophylaxis (if appropriate)

## 2019-07-18 NOTE — PT/OT/SLP PROGRESS
Physical Therapy Treatment    Patient Name:  Jihan Zamudio   MRN:  [id number]    Recommendations:     Discharge Recommendations:  rehabilitation facility   Discharge Equipment Recommendations: shower chair   Barriers to discharge: None    Assessment:     Jihan Zamudio is a 49 y.o. female admitted with a medical diagnosis of Seizure-like activity.  She presents with the following impairments/functional limitations:  weakness, impaired functional mobilty, gait instability, impaired balance, decreased safety awareness .Pt presents with improved overall functional mobility requiring decreased assistance and increased activity tolerance to perform functional mobility. Pt requires vcs for safety with mobility.Pt would continue to benefit from skilled PT to address overall functional mobility and goals. Goals remain appropriate      Rehab Prognosis: Good; patient would benefit from acute skilled PT services to address these deficits and reach maximum level of function.    Recent Surgery: * No surgery found *      Plan:     During this hospitalization, patient to be seen 4 x/week to address the identified rehab impairments via gait training, therapeutic activities, therapeutic exercises, neuromuscular re-education and progress toward the following goals:    · Plan of Care Expires:  08/08/19    Subjective     Chief Complaint: fatigue  Patient/Family Comments/goals: I am getting stronger  Pain/Comfort:  · Pain Rating 1: 0/10  · Pain Rating Post-Intervention 1: 0/10      Objective:     Communicated with RN prior to session.  Patient found supine with bed alarm upon PT entry to room.     General Precautions: Standard, fall   Orthopedic Precautions:N/A   Braces: N/A     Functional Mobility:  · Bed Mobility:     · Rolling Right: stand by assistance  · Scooting: stand by assistance  · Supine to Sit: stand by assistance  · Transfers:    · Sit to Stand:  SBA with no AD from bed and w/c     · Gait: Ambulated 75 ft and 110 ft    with SBA/CGA and using w/c for mobility pt ambulated with no AD x 50 ft with  CGA/Min A for balance. Pt required seated rest break between trials and standing rest breaks prn. pt reported fatigue and  weakness. Pt ambulates with upright posture, reciprocal gait, decreased gait speed, decreased step length, and decreased endurance    AM-PAC 6 CLICK MOBILITY  Turning over in bed (including adjusting bedclothes, sheets and blankets)?: 3  Sitting down on and standing up from a chair with arms (e.g., wheelchair, bedside commode, etc.): 3  Moving from lying on back to sitting on the side of the bed?: 3  Moving to and from a bed to a chair (including a wheelchair)?: 3  Need to walk in hospital room?: 3  Climbing 3-5 steps with a railing?: 2  Basic Mobility Total Score: 17       Therapeutic Activities and Exercises:   educated patient on progress, safety,d/c,PT POC, on the effects of prolonged immobility and the importance of performing OOB activity and exercises to promote healing and reduce recovery time   Patient performed therex X 15 reps seated in bedside chair B LE AROM AP, LAQ, Hip Flexion, Hip Abd/Add   Updated white board with appropriate PT mobility information for medical team notification  Donned an extra gown  Bedside table in front of patient and area set up for function, convenience, and safety. RN aware of patient's mobility needs and status. Questions/concerns addressed within PTA scope of practice; patient with no further questions. Time was provided for active listening, discussion of health disposition, and discussion of safe discharge. Pt?verbalized?agreement .        Patient left up in chair with all lines intact, call button in reach, chair alarm on and nsg notif    GOALS:   Multidisciplinary Problems     Physical Therapy Goals        Problem: Physical Therapy Goal    Goal Priority Disciplines Outcome Goal Variances Interventions   Physical Therapy Goal     PT, PT/OT Ongoing (interventions  implemented as appropriate)     Description:  Goals to be met by: 2019    Patient will increase functional independence with mobility by performin. Supine <> sit with supervision   2. Sit to stand transfer with Supervision  3. Gait  x 150 feet with Stand-by Assistance with or without using least restrictive AD.   4. Stand for 3 minutes with Stand-by Assistance while performing dynamic balance activities   5. Lower extremity exercise program x20 reps per handout, with supervision                      Time Tracking:     PT Received On: 19  PT Start Time: 929     PT Stop Time:   PT Total Time (min): 41 min     Billable Minutes: Gait Training 30 and Therapeutic Exercise 11    Treatment Type: Treatment  PT/PTA: PTA     PTA Visit Number: 2     Jay Choi PTA  2019

## 2019-07-18 NOTE — ASSESSMENT & PLAN NOTE
-seizures likely psychogenic in nature as pt able to stop shaking to answer questions per LTS  - CTH negative for acute path

## 2019-07-18 NOTE — PROGRESS NOTES
[facility]  Physical Medicine & Rehab  Progress Note    Patient Name: Jihan Zamudio  MRN: [id number]  Admission Date: 2019  Length of Stay: 13 days  Attending Physician: Jay Herbert MD    Subjective:     Principal Problem:Seizure-like activity     (2019): Patient is seen for follow-up rehab evaluation and recommendations.  Participating with therapy.     Hospital Course:   2019:  Bed mobility SBA-Rica.  Sit to stand CGA and transfers Min-CGA.  Ambulated 70ft + 25 ft  CGA-Rica with  required seated rest break between trials and reported dizziness and weakness.   UBD Rica and Grooming SBA.  Toileting CGA. Feeding (I).   2019:  Sit to stand CGA.  Ambulated 80 FT + 100 FT +55 FT CGA-Rica using w/c for mobility and required seated rest break between trials. pt reported fatigue and weakness.     Past Medical History:   Diagnosis Date    Cirrhosis     Esophageal varices 2018    Small with no banding     Essential hypertension 2018    Fatty liver 2018    GERD (gastroesophageal reflux disease)     Hx of colonic polyps 2018    On colonoscopy     Hypertension     Hypothyroidism 2018    Kidney stones     Morbid obesity 2018    Lap band with subsequent release    KADEEM (obstructive sleep apnea) 2018    Osteoarthritis 2018    Pulmonary nodule 2018    Vitamin D deficiency 2018     Past Surgical History:   Procedure Laterality Date     SECTION      TIMES 2     CHOLECYSTECTOMY      LAPAROSCOPIC     CYSTOSCOPY W/ STONE MANIPULATION      kidney stone removal    EGD (ESOPHAGOGASTRODUODENOSCOPY) N/A 2019    Performed by Davian Hern[facility]dez MD at [facility] ENDO (2ND FLR)    LAPAROSCOPIC GASTRIC BANDING  2006    removal     LIVER BIOPSY  2017    KESSLER with bridging 17    TRANSPLANT, LIVER N/A 2019    Performed by Clemente Brown Jr., MD at [facility] OR 2ND FLR    TRANSPLANT, LIVER N/A  6/4/2019    Performed by Clemente Brown Jr., MD at [facility] OR [address] [place] [address]     Review of patient's allergies indicates:   Allergen Reactions    Metformin Rash    Pcn [penicillins] Other (See Comments)     Unsure of reaction, states it was as a child. Tolerated rocephin at [facility]       Scheduled Medications:    hydrocortisone sodium succinate  50 mg Intravenous Q8H    levETIRAcetam  500 mg Oral BID    levothyroxine  112 mcg Oral Before breakfast    lidocaine  1 patch Transdermal Q24H    magnesium oxide  400 mg Oral Daily    OLANZapine  7.5 mg Oral QHS    sodium bicarbonate  650 mg Oral BID    sulfamethoxazole-trimethoprim 400-80mg  1 tablet Oral Daily    tacrolimus  1 mg Oral Once    tacrolimus  2 mg Oral BID    thiamine  100 mg Oral Daily    valGANciclovir  450 mg Oral Daily       PRN Medications: acetaminophen, calcium carbonate, dextrose 50%, dextrose 50%, glucagon (human recombinant), glucose, glucose, hydrALAZINE, insulin aspart U-100, iohexol, OLANZapine, ondansetron, sodium chloride 0.9%    Family History     Problem Relation (Age of Onset)    Breast cancer Maternal Grandmother    Cancer Mother (50), Father (65)    Heart disease Mother, Father        Tobacco Use    Smoking status: Never Smoker    Smokeless tobacco: Never Used    Tobacco comment: patient denies   Substance and Sexual Activity    Alcohol use: No     Comment: patient denies    Drug use: No     Comment: patient denies    Sexual activity: Not on file     Review of Systems   Constitutional: Positive for activity change. Negative for fever.   HENT: Negative for trouble swallowing.    Respiratory: Negative for shortness of breath and wheezing.    Cardiovascular: Negative for chest pain and palpitations.   Musculoskeletal: Positive for gait problem.   Neurological: Positive for weakness. Negative for dizziness and speech difficulty.   Psychiatric/Behavioral: Positive for confusion.     Objective:     Vital Signs (Most Recent):  Temp: 98.3  °F (36.8 °C) (07/18/19 0736)  Pulse: 70 (07/18/19 0736)  Resp: 19 (07/18/19 0736)  BP: (!) 143/67 (07/18/19 0736)  SpO2: 97 % (07/18/19 0506)    Vital Signs (24h Range):  Temp:  [97.7 °F (36.5 °C)-99 °F (37.2 °C)] 98.3 °F (36.8 °C)  Pulse:  [70-88] 70  Resp:  [14-20] 19  SpO2:  [97 %-99 %] 97 %  BP: (143-162)/(67-79) 143/67     Body mass index is 32.09 kg/m².    Physical Exam   Constitutional: She is oriented to person, place, and time. She appears well-developed and well-nourished.   Working with PT    HENT:   Head: Normocephalic and atraumatic.   Eyes: Right eye exhibits no discharge. Left eye exhibits no discharge.   Neck: Neck supple.   Cardiovascular: Intact distal pulses.   BLE edema   Pulmonary/Chest: Effort normal. No respiratory distress.   Abdominal: Soft. She exhibits distension. There is no tenderness.   Musculoskeletal: She exhibits no edema or deformity.   4/5 LLE   Generalized deconditioning    Neurological: She is alert and oriented to person, place, and time. She exhibits normal muscle tone.   Skin: Skin is warm and dry.   Psychiatric: Her behavior is normal. Pleasant on exam.    Diagnostic Results: Labs: Reviewed      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 as pt able to stop shaking to answer questions per LTS  - CTH negative for acute path     Impaired functional mobility and endurance  Recommendations  -  Encourage mobility, OOB in chair at least 3 hours per day, and early ambulation as appropriate  -  PT/OT evaluate and treat  -  Pain management  -  Monitor for and prevent skin breakdown and pressure ulcers  · Early mobility, repositioning/weight shifting every 20-30 minutes when sitting, turn patient every 2 hours, proper mattress/overlay and chair cushioning, pressure relief/heel protector boots  -  DVT prophylaxis    -  Reviewed discharge options (IP rehab, SNF, HH therapy, and OP therapy)    Pressure injury of buttock, stage 1  -wound care  consulted     Delirium  -Psych consulted and state multifactorial delirium 2/2 infection & ICH  -on zyprexa prn   -unable to do LP as unsafe at the moment due to recent ICH per LTS    UTI (urinary tract infection) due to Enterococcus  -on Daptomycin IV x 5 days (end date not specified)    Traumatic intracranial hemorrhage without loss of consciousness  -s/p fall on 6/24     See hospital course for functional, cognitive/speech/language, and nutrition/swallow status.      Recommendations  -  Monitor sleep disturbances and establish consistent sleep-wake cycle  -  Environmental modifications to limit agitation/confusion   -  Reorient patient to person, place, time, and situation on each encounter  -  Avoid restraints  -  May benefit from 24/7 supervision  -  Avoid/limit medications that can worsen delirium (benzodiazepines, antihistamines, anticholinergics, hypnotics, opiates)  -  Encourage mobility, OOB in chair, and early ambulation as appropriate  -  PT/OT evaluate and treat  -  SLP speech and cognitive evaluate and treat  -  Monitor for bowel and bladder dysfunction  -  Monitor for and prevent skin breakdown and pressure ulcers  · Early mobility, repositioning/weight shifting every 20-30 minutes when sitting, turn patient every 2 hours, proper mattress/overlay and chair cushioning, pressure relief/heel protector boots  -  DVT prophylaxis (if appropriate)    S/P liver transplant  -s/o liver trx 6/5/19 2/2 KESSLER     Weakness  -PT/OT following     Recommend Inpatient Rehab pending improvement in anemia and leukopenia.       Libby Rich NP  Department of Physical Medicine & Rehab   [facility]
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
## 2019-07-18 NOTE — ASSESSMENT & PLAN NOTE
- pt with intermittent disorientation to situation throughout stay  - CT head on 7/5 and 7/13 with no acute change; MRI head w/ contrast on 7/11 with no acute change  - ammonia WNL  - pt with acute personality change on 7/13- throwing items at nurses and agitated  - zyprexa qhs started 7/13; PRN zyprexa added 7/16  - psych consulted 7/15; believe she is suffering from multifactorial delirium 2/2 infection & ICH  - unable to do LP as unsafe at the moment due to recent ICH  - PO thiamine started.   - Delirium precautions started.   - mentation greatly improved since 7/17  - decreased zyprexa qhs from 7.5 mg to 5 mg per psych recs  - continue to monitor

## 2019-07-18 NOTE — SUBJECTIVE & OBJECTIVE
Scheduled Meds:   hydrocortisone sodium succinate  50 mg Intravenous Q8H    levETIRAcetam  500 mg Oral BID    levothyroxine  112 mcg Oral Before breakfast    lidocaine  1 patch Transdermal Q24H    magnesium oxide  400 mg Oral Daily    OLANZapine  5 mg Oral QHS    sodium bicarbonate  650 mg Oral BID    sulfamethoxazole-trimethoprim 400-80mg  1 tablet Oral Daily    tacrolimus  2 mg Oral BID    thiamine  100 mg Oral Daily    valGANciclovir  450 mg Oral Daily     Continuous Infusions:  PRN Meds:acetaminophen, calcium carbonate, dextrose 50%, dextrose 50%, glucagon (human recombinant), glucose, glucose, hydrALAZINE, insulin aspart U-100, iohexol, ondansetron, sodium chloride 0.9%    Review of Systems   Constitutional: Positive for activity change and appetite change. Negative for chills and fever.   HENT: Negative for congestion and facial swelling.    Eyes: Negative for pain, discharge and visual disturbance.   Respiratory: Negative for cough, chest tightness, shortness of breath and wheezing.    Cardiovascular: Negative for chest pain, palpitations and leg swelling.   Gastrointestinal: Negative for abdominal distention, abdominal pain, diarrhea, nausea and vomiting.   Endocrine: Negative.    Genitourinary: Negative for decreased urine volume, difficulty urinating and dysuria.   Skin: Positive for wound (chevron well healed).   Allergic/Immunologic: Positive for immunocompromised state.   Neurological: Positive for weakness. Negative for tremors, seizures, speech difficulty and light-headedness.   Psychiatric/Behavioral: Positive for decreased concentration and dysphoric mood. Negative for agitation and confusion. The patient is not nervous/anxious.      Objective:     Vital Signs (Most Recent):  Temp: 97.9 °F (36.6 °C) (07/18/19 1145)  Pulse: 103(after exercise) (07/18/19 1145)  Resp: 20 (07/18/19 1145)  BP: (!) 143/67 (07/18/19 0736)  SpO2: 99 % (07/18/19 1145) Vital Signs (24h Range):  Temp:  [97.9 °F  (36.6 °C)-99 °F (37.2 °C)] 97.9 °F (36.6 °C)  Pulse:  [] 103  Resp:  [18-20] 20  SpO2:  [97 %-99 %] 99 %  BP: (143-162)/(67-79) 143/67     Weight: 84.8 kg (186 lb 15.2 oz)  Body mass index is 32.09 kg/m².    Intake/Output - Last 3 Shifts       07/16 0700 - 07/17 0659 07/17 0700 - 07/18 0659 07/18 0700 - 07/19 0659    P.O. 1200 1560 600    I.V. (mL/kg) 0 (0)      Other 0      IV Piggyback 2050 50     Total Intake(mL/kg) 3250 (38.3) 1610 (19) 600 (7.1)    Urine (mL/kg/hr) 1300 (0.6) 640 (0.3) 100 (0.2)    Emesis/NG output 0      Other 0      Stool 0 0 0    Blood 0      Total Output 1300 640 100    Net +1950 +970 +500           Urine Occurrence 2 x 4 x 1 x    Stool Occurrence 2 x 2 x 1 x    Emesis Occurrence 0 x            Physical Exam   Constitutional: She is oriented to person, place, and time. She appears well-developed. No distress.   HENT:   Head: Normocephalic and atraumatic.   Eyes: No scleral icterus.   R pupil > L pupil; this is lifelong per    Neck: Normal range of motion. Neck supple. No thyromegaly present.   Cardiovascular: Normal rate and regular rhythm. Exam reveals no friction rub.   No murmur heard.  Pulmonary/Chest: Effort normal. She has decreased breath sounds in the right middle field, the right lower field and the left lower field. She has no wheezes. She has no rales.   Diminished RLL   Abdominal: Soft. She exhibits no distension. There is no tenderness. There is no rebound and no guarding.   Healed chevron incision   Musculoskeletal: Normal range of motion.   Neurological: She is alert and oriented to person, place, and time. She is not disoriented.   AAO x 4   Skin: Skin is warm and dry. She is not diaphoretic.   Psychiatric: Her behavior is normal. Judgment and thought content normal. Her mood appears anxious. Her speech is delayed. Cognition and memory are normal.   Nursing note and vitals reviewed.      Laboratory:  Immunosuppressants         Stop Route Frequency     tacrolimus  capsule 2 mg      -- Oral 2 times daily        CBC:   Recent Labs   Lab 07/18/19  0901   WBC 4.32   RBC 2.65*   HGB 7.3*   HCT 24.0*   *   MCV 91   MCH 27.5   MCHC 30.4*     CMP:   Recent Labs   Lab 07/18/19  0624   *   CALCIUM 7.8*   ALBUMIN 1.9*   PROT 4.6*      K 3.5   CO2 17*   *   BUN 13   CREATININE 0.6   ALKPHOS 189*   ALT 28   AST 44*   BILITOT 0.3     Labs within the past 24 hours have been reviewed.    Diagnostic Results:  None    Debility/Functional status: Patient debilitated by evidence of Muscle wasting and atrophy, Weakness, Chronic fatigue, unspecified, Other malaise and Limitation of activities due to disability. Physical and occupational therapy ordered daily to evaluate and treat. Debility was: present on admission.
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
## 2019-07-18 NOTE — ASSESSMENT & PLAN NOTE
- LFTs stable.  - Pt with good hepatic allograft function.   - Last Liver US 6/24 showed Mildly elevated hepatic arterial resistive indices, although improved from prior exam.  Otherwise, satisfactory vascular appearance of the liver, complex fluid collection adjacent to the left hepatic lobe, similar to slightly smaller compared to prior exam, small volume of ascites and partially visualized right pleural effusion.   - para completed 7/9, cultures pending. Cell count not collected as ordered.   - para repeated 7/16- abdominal fluid labs negative for infection  - LFTs and AlkP creeping up over past 4 days- likely due to holding prograf for seizures. Liver US pending.

## 2019-07-18 NOTE — SUBJECTIVE & OBJECTIVE
Past Medical History:   Diagnosis Date    Cirrhosis     Esophageal varices 2018    Small with no banding     Essential hypertension 2018    Fatty liver 2018    GERD (gastroesophageal reflux disease)     Hx of colonic polyps 2018    On colonoscopy     Hypertension     Hypothyroidism 2018    Kidney stones     Morbid obesity 2018    Lap band with subsequent release    KADEEM (obstructive sleep apnea) 2018    Osteoarthritis 2018    Pulmonary nodule 2018    Vitamin D deficiency 2018     Past Surgical History:   Procedure Laterality Date     SECTION      TIMES 2     CHOLECYSTECTOMY      LAPAROSCOPIC     CYSTOSCOPY W/ STONE MANIPULATION      kidney stone removal    EGD (ESOPHAGOGASTRODUODENOSCOPY) N/A 2019    Performed by [facility] at [facility] END[facility] (2ND FLR)    LAPAROSCOPIC GASTRIC BANDING      removal     LIVER BIOPSY  2017    KESSLER with bridging 17    TRANSPLANT, LIVER N/A 2019    Performed by Clemente Brown Jr., [facility] at [facility] OR 2ND FLR    TRANSPLANT, LIVER N/A 2019    Performed by Clemente Brown Jr., [facility] at [facility] OR 2ND FLR     Review of patient's allergies indicates:   Allergen Reactions    Metformin Rash    Pcn [penicillins] Other (See Comments)     Unsure of reaction, states it was as a child. Tolerated rocephin at [facility]       Scheduled Medications:    hydrocortisone sodium succinate  50 mg Intravenous Q8H    levETIRAcetam  500 mg Oral BID    levothyroxine  112 mcg Oral Before breakfast    lidocaine  1 patch Transdermal Q24H    magnesium oxide  400 mg Oral Daily    OLANZapine  7.5 mg Oral QHS    sodium bicarbonate  650 mg Oral BID    sulfamethoxazole-trimethoprim 400-80mg  1 tablet Oral Daily    tacrolimus  1 mg Oral Once    tacrolimus  2 mg Oral BID    thiamine  100 mg Oral Daily    valGANciclovir  450 mg Oral Daily       PRN Medications: acetaminophen, calcium  carbonate, dextrose 50%, dextrose 50%, glucagon (human recombinant), glucose, glucose, hydrALAZINE, insulin aspart U-100, iohexol, OLANZapine, ondansetron, sodium chloride 0.9%    Family History     Problem Relation (Age of Onset)    Breast cancer Maternal Grandmother    Cancer Mother (50), Father (65)    Heart disease Mother, Father        Tobacco Use    Smoking status: Never Smoker    Smokeless tobacco: Never Used    Tobacco comment: patient denies   Substance and Sexual Activity    Alcohol use: No     Comment: patient denies    Drug use: No     Comment: patient denies    Sexual activity: Not on file     Review of Systems   Constitutional: Positive for activity change. Negative for fever.   HENT: Negative for trouble swallowing.    Respiratory: Negative for shortness of breath and wheezing.    Cardiovascular: Negative for chest pain and palpitations.   Musculoskeletal: Positive for gait problem.   Neurological: Positive for weakness. Negative for dizziness and speech difficulty.   Psychiatric/Behavioral: Positive for confusion.     Objective:     Vital Signs (Most Recent):  Temp: 98.3 °F (36.8 °C) (07/18/19 0736)  Pulse: 70 (07/18/19 0736)  Resp: 19 (07/18/19 0736)  BP: (!) 143/67 (07/18/19 0736)  SpO2: 97 % (07/18/19 0506)    Vital Signs (24h Range):  Temp:  [97.7 °F (36.5 °C)-99 °F (37.2 °C)] 98.3 °F (36.8 °C)  Pulse:  [70-88] 70  Resp:  [14-20] 19  SpO2:  [97 %-99 %] 97 %  BP: (143-162)/(67-79) 143/67     Body mass index is 32.09 kg/m².    Physical Exam   Constitutional: She is oriented to person, place, and time. She appears well-developed and well-nourished.   Working with PT    HENT:   Head: Normocephalic and atraumatic.   Eyes: Right eye exhibits no discharge. Left eye exhibits no discharge.   Neck: Neck supple.   Cardiovascular: Intact distal pulses.   BLE edema   Pulmonary/Chest: Effort normal. No respiratory distress.   Abdominal: Soft. She exhibits distension. There is no tenderness.    Musculoskeletal: She exhibits no edema or deformity.   4/5 LLE   Generalized deconditioning    Neurological: She is alert and oriented to person, place, and time. She exhibits normal muscle tone.   Skin: Skin is warm and dry.   Psychiatric: She has a normal mood and affect. Her behavior is normal.   calm   Vitals reviewed.    NEUROLOGICAL EXAMINATION:     MENTAL STATUS   Oriented to person, place, and time.       Diagnostic Results: Labs: Reviewed

## 2019-07-19 LAB
ALBUMIN SERPL BCP-MCNC: 2 G/DL (ref 3.5–5.2)
ALP SERPL-CCNC: 242 U/L (ref 55–135)
ALT SERPL W/O P-5'-P-CCNC: 35 U/L (ref 10–44)
ANION GAP SERPL CALC-SCNC: 9 MMOL/L (ref 8–16)
ANISOCYTOSIS BLD QL SMEAR: ABNORMAL
ASPERGILLUS AB SER QL ID: NORMAL
AST SERPL-CCNC: 43 U/L (ref 10–40)
B DERMAT AB SER QL ID: NORMAL
BACTERIA BLD CULT: NORMAL
BACTERIA BLD CULT: NORMAL
BASOPHILS # BLD AUTO: ABNORMAL K/UL (ref 0–0.2)
BASOPHILS NFR BLD: 0 % (ref 0–1.9)
BILIRUB SERPL-MCNC: 0.4 MG/DL (ref 0.1–1)
BUN SERPL-MCNC: 15 MG/DL (ref 6–20)
C IMMITIS AB SER QL ID: NORMAL
CALCIUM SERPL-MCNC: 8.5 MG/DL (ref 8.7–10.5)
CHLORIDE SERPL-SCNC: 115 MMOL/L (ref 95–110)
CO2 SERPL-SCNC: 18 MMOL/L (ref 23–29)
CREAT SERPL-MCNC: 0.6 MG/DL (ref 0.5–1.4)
DACRYOCYTES BLD QL SMEAR: ABNORMAL
DIFFERENTIAL METHOD: ABNORMAL
EOSINOPHIL # BLD AUTO: ABNORMAL K/UL (ref 0–0.5)
EOSINOPHIL NFR BLD: 0 % (ref 0–8)
ERYTHROCYTE [DISTWIDTH] IN BLOOD BY AUTOMATED COUNT: 15.8 % (ref 11.5–14.5)
EST. GFR  (AFRICAN AMERICAN): >60 ML/MIN/1.73 M^2
EST. GFR  (NON AFRICAN AMERICAN): >60 ML/MIN/1.73 M^2
GLUCOSE SERPL-MCNC: 96 MG/DL (ref 70–110)
H CAPSUL AB SER QL ID: NORMAL
HCT VFR BLD AUTO: 24 % (ref 37–48.5)
HGB BLD-MCNC: 7.5 G/DL (ref 12–16)
IMM GRANULOCYTES # BLD AUTO: ABNORMAL K/UL (ref 0–0.04)
IMM GRANULOCYTES NFR BLD AUTO: ABNORMAL % (ref 0–0.5)
LYMPHOCYTES # BLD AUTO: ABNORMAL K/UL (ref 1–4.8)
LYMPHOCYTES NFR BLD: 2 % (ref 18–48)
MAGNESIUM SERPL-MCNC: 1.9 MG/DL (ref 1.6–2.6)
MCH RBC QN AUTO: 28.3 PG (ref 27–31)
MCHC RBC AUTO-ENTMCNC: 31.3 G/DL (ref 32–36)
MCV RBC AUTO: 91 FL (ref 82–98)
MONOCYTES # BLD AUTO: ABNORMAL K/UL (ref 0.3–1)
MONOCYTES NFR BLD: 2 % (ref 4–15)
MYELOCYTES NFR BLD MANUAL: 1 %
NEUTROPHILS NFR BLD: 94 % (ref 38–73)
NEUTS BAND NFR BLD MANUAL: 1 %
NRBC BLD-RTO: 1 /100 WBC
OVALOCYTES BLD QL SMEAR: ABNORMAL
PLATELET # BLD AUTO: 160 K/UL (ref 150–350)
PLATELET BLD QL SMEAR: ABNORMAL
PMV BLD AUTO: 10.4 FL (ref 9.2–12.9)
POCT GLUCOSE: 109 MG/DL (ref 70–110)
POCT GLUCOSE: 144 MG/DL (ref 70–110)
POCT GLUCOSE: 98 MG/DL (ref 70–110)
POIKILOCYTOSIS BLD QL SMEAR: SLIGHT
POLYCHROMASIA BLD QL SMEAR: ABNORMAL
POTASSIUM SERPL-SCNC: 3.3 MMOL/L (ref 3.5–5.1)
PROT SERPL-MCNC: 5.1 G/DL (ref 6–8.4)
RBC # BLD AUTO: 2.65 M/UL (ref 4–5.4)
SODIUM SERPL-SCNC: 142 MMOL/L (ref 136–145)
TACROLIMUS BLD-MCNC: 2 NG/ML (ref 5–15)
WBC # BLD AUTO: 5.28 K/UL (ref 3.9–12.7)

## 2019-07-19 PROCEDURE — 99233 SBSQ HOSP IP/OBS HIGH 50: CPT | Mod: 24,,, | Performed by: PHYSICIAN ASSISTANT

## 2019-07-19 PROCEDURE — 85007 BL SMEAR W/DIFF WBC COUNT: CPT

## 2019-07-19 PROCEDURE — 63600175 PHARM REV CODE 636 W HCPCS: Performed by: NURSE PRACTITIONER

## 2019-07-19 PROCEDURE — 25000003 PHARM REV CODE 250: Performed by: PHYSICIAN ASSISTANT

## 2019-07-19 PROCEDURE — 63600175 PHARM REV CODE 636 W HCPCS: Performed by: PHYSICIAN ASSISTANT

## 2019-07-19 PROCEDURE — 99232 PR SUBSEQUENT HOSPITAL CARE,LEVL II: ICD-10-PCS | Mod: ,,, | Performed by: PSYCHIATRY & NEUROLOGY

## 2019-07-19 PROCEDURE — 99232 PR SUBSEQUENT HOSPITAL CARE,LEVL II: ICD-10-PCS | Mod: ,,, | Performed by: NURSE PRACTITIONER

## 2019-07-19 PROCEDURE — 36415 COLL VENOUS BLD VENIPUNCTURE: CPT

## 2019-07-19 PROCEDURE — 80053 COMPREHEN METABOLIC PANEL: CPT

## 2019-07-19 PROCEDURE — 85027 COMPLETE CBC AUTOMATED: CPT

## 2019-07-19 PROCEDURE — 99233 PR SUBSEQUENT HOSPITAL CARE,LEVL III: ICD-10-PCS | Mod: 24,,, | Performed by: PHYSICIAN ASSISTANT

## 2019-07-19 PROCEDURE — 80197 ASSAY OF TACROLIMUS: CPT

## 2019-07-19 PROCEDURE — 99232 SBSQ HOSP IP/OBS MODERATE 35: CPT | Mod: ,,, | Performed by: NURSE PRACTITIONER

## 2019-07-19 PROCEDURE — 25000003 PHARM REV CODE 250: Performed by: INTERNAL MEDICINE

## 2019-07-19 PROCEDURE — 83735 ASSAY OF MAGNESIUM: CPT

## 2019-07-19 PROCEDURE — 99232 SBSQ HOSP IP/OBS MODERATE 35: CPT | Mod: ,,, | Performed by: PSYCHIATRY & NEUROLOGY

## 2019-07-19 PROCEDURE — 25000003 PHARM REV CODE 250: Performed by: NURSE PRACTITIONER

## 2019-07-19 PROCEDURE — 20600001 HC STEP DOWN PRIVATE ROOM

## 2019-07-19 RX ORDER — POTASSIUM CHLORIDE 20 MEQ/1
40 TABLET, EXTENDED RELEASE ORAL ONCE
Status: COMPLETED | OUTPATIENT
Start: 2019-07-19 | End: 2019-07-19

## 2019-07-19 RX ORDER — SODIUM BICARBONATE 650 MG/1
1300 TABLET ORAL 2 TIMES DAILY
Status: DISCONTINUED | OUTPATIENT
Start: 2019-07-19 | End: 2019-07-20

## 2019-07-19 RX ORDER — TACROLIMUS 1 MG/1
3 CAPSULE ORAL 2 TIMES DAILY
Status: DISCONTINUED | OUTPATIENT
Start: 2019-07-19 | End: 2019-07-20

## 2019-07-19 RX ORDER — TACROLIMUS 1 MG/1
1 CAPSULE ORAL ONCE
Status: COMPLETED | OUTPATIENT
Start: 2019-07-19 | End: 2019-07-19

## 2019-07-19 RX ORDER — NIFEDIPINE 30 MG/1
30 TABLET, EXTENDED RELEASE ORAL DAILY
Status: DISCONTINUED | OUTPATIENT
Start: 2019-07-19 | End: 2019-07-23 | Stop reason: HOSPADM

## 2019-07-19 RX ORDER — OLANZAPINE 2.5 MG/1
2.5 TABLET ORAL NIGHTLY
Status: COMPLETED | OUTPATIENT
Start: 2019-07-19 | End: 2019-07-19

## 2019-07-19 RX ADMIN — MAGNESIUM OXIDE TAB 400 MG (241.3 MG ELEMENTAL MG) 400 MG: 400 (241.3 MG) TAB at 09:07

## 2019-07-19 RX ADMIN — LEVOTHYROXINE SODIUM 112 MCG: 50 TABLET ORAL at 05:07

## 2019-07-19 RX ADMIN — HYDROCORTISONE SODIUM SUCCINATE 50 MG: 100 INJECTION, POWDER, FOR SOLUTION INTRAMUSCULAR; INTRAVENOUS at 05:07

## 2019-07-19 RX ADMIN — TACROLIMUS 3 MG: 1 CAPSULE ORAL at 05:07

## 2019-07-19 RX ADMIN — HYDROCORTISONE SODIUM SUCCINATE 25 MG: 100 INJECTION, POWDER, FOR SOLUTION INTRAMUSCULAR; INTRAVENOUS at 08:07

## 2019-07-19 RX ADMIN — OLANZAPINE 2.5 MG: 2.5 TABLET, FILM COATED ORAL at 08:07

## 2019-07-19 RX ADMIN — LEVETIRACETAM 500 MG: 500 TABLET ORAL at 09:07

## 2019-07-19 RX ADMIN — POTASSIUM CHLORIDE 40 MEQ: 20 TABLET, EXTENDED RELEASE ORAL at 11:07

## 2019-07-19 RX ADMIN — NIFEDIPINE 30 MG: 30 TABLET, FILM COATED, EXTENDED RELEASE ORAL at 11:07

## 2019-07-19 RX ADMIN — HYDROCORTISONE SODIUM SUCCINATE 25 MG: 100 INJECTION, POWDER, FOR SOLUTION INTRAMUSCULAR; INTRAVENOUS at 03:07

## 2019-07-19 RX ADMIN — TACROLIMUS 1 MG: 1 CAPSULE ORAL at 11:07

## 2019-07-19 RX ADMIN — VALGANCICLOVIR 450 MG: 450 TABLET, FILM COATED ORAL at 09:07

## 2019-07-19 RX ADMIN — SODIUM BICARBONATE 650 MG TABLET 1300 MG: at 08:07

## 2019-07-19 RX ADMIN — LIDOCAINE 1 PATCH: 50 PATCH TOPICAL at 11:07

## 2019-07-19 RX ADMIN — SULFAMETHOXAZOLE AND TRIMETHOPRIM 1 TABLET: 400; 80 TABLET ORAL at 09:07

## 2019-07-19 RX ADMIN — Medication 100 MG: at 09:07

## 2019-07-19 RX ADMIN — SODIUM BICARBONATE 650 MG TABLET 650 MG: at 09:07

## 2019-07-19 RX ADMIN — TACROLIMUS 2 MG: 1 CAPSULE ORAL at 09:07

## 2019-07-19 NOTE — SUBJECTIVE & OBJECTIVE
Interval History 7/19/2019:  Patient is seen for follow-up rehab evaluation and recommendations: Participating with therapy.    HPI, Past Medical, Family, and Social History remains the same as documented in the initial encounter.    Scheduled Medications:    hydrocortisone sodium succinate  25 mg Intravenous Q8H    levothyroxine  112 mcg Oral Before breakfast    lidocaine  1 patch Transdermal Q24H    magnesium oxide  400 mg Oral Daily    NIFEdipine  30 mg Oral Daily    OLANZapine  2.5 mg Oral QHS    potassium chloride  40 mEq Oral Once    sodium bicarbonate  1,300 mg Oral BID    sulfamethoxazole-trimethoprim 400-80mg  1 tablet Oral Daily    tacrolimus  1 mg Oral Once    tacrolimus  3 mg Oral BID    thiamine  100 mg Oral Daily    valGANciclovir  450 mg Oral Daily       Diagnostic Results: Labs: Reviewed    PRN Medications: acetaminophen, calcium carbonate, dextrose 50%, dextrose 50%, glucagon (human recombinant), glucose, glucose, hydrALAZINE, insulin aspart U-100, iohexol, ondansetron, sodium chloride 0.9%    Review of Systems   Constitutional: Positive for activity change. Negative for fever.   HENT: Negative for trouble swallowing.    Respiratory: Negative for shortness of breath and wheezing.    Cardiovascular: Negative for chest pain and palpitations.   Musculoskeletal: Positive for gait problem.   Neurological: Positive for weakness. Negative for dizziness and speech difficulty.   Psychiatric/Behavioral: Positive for confusion.     Objective:     Vital Signs (Most Recent):  Temp: 98 °F (36.7 °C) (07/19/19 0802)  Pulse: 75 (07/19/19 0802)  Resp: 19 (07/19/19 0802)  BP: (!) 158/88 (07/19/19 0802)  SpO2: 99 % (07/19/19 0802)    Vital Signs (24h Range):  Temp:  [97.9 °F (36.6 °C)-98.8 °F (37.1 °C)] 98 °F (36.7 °C)  Pulse:  [] 75  Resp:  [16-20] 19  SpO2:  [97 %-99 %] 99 %  BP: (154-169)/(76-88) 158/88     Physical Exam   Constitutional: She is oriented to person, place, and time. She appears  well-developed and well-nourished.   Sitting [facility] eating with  at bedisde    HENT:   Head: Normocephalic and atraumatic.   Eyes: Right eye exhibits no discharge. Left eye exhibits no discharge.   Neck: Neck supple.   Cardiovascular: Intact distal pulses.   BLE edema   Pulmonary/Chest: Effort normal. No respiratory distress.   Abdominal: Soft. She exhibits distension. There is no tenderness.   Musculoskeletal: She exhibits no edema or deformity.   4/5 LLE   Generalized deconditioning    Neurological: She is alert and oriented to person, place, and time. She exhibits normal muscle tone.   Skin: Skin is warm and dry.   Psychiatric: She has a normal mood and affect. Her behavior is normal.   calm   Vitals reviewed.    NEUROLOGICAL EXAMINATION:     MENTAL STATUS   Oriented to person, place, and time.

## 2019-07-19 NOTE — PLAN OF CARE
Problem: Adult Inpatient Plan of Care  Goal: Plan of Care Review  Outcome: Ongoing (interventions implemented as appropriate)  Pt AAO, VSS, Bed in low locked pos, call light within reach. Pt calls for assistance when needed. Bed alarm in place. Bs monitored ac/hs, sliding scale insulin given as needed. Family member at bedside. Tacro restarted.  Am labs ordered.

## 2019-07-19 NOTE — SUBJECTIVE & OBJECTIVE
Scheduled Meds:   hydrocortisone sodium succinate  25 mg Intravenous Q8H    levothyroxine  112 mcg Oral Before breakfast    lidocaine  1 patch Transdermal Q24H    magnesium oxide  400 mg Oral Daily    NIFEdipine  30 mg Oral Daily    OLANZapine  2.5 mg Oral QHS    sodium bicarbonate  1,300 mg Oral BID    sulfamethoxazole-trimethoprim 400-80mg  1 tablet Oral Daily    tacrolimus  3 mg Oral BID    thiamine  100 mg Oral Daily    valGANciclovir  450 mg Oral Daily     Continuous Infusions:  PRN Meds:acetaminophen, calcium carbonate, dextrose 50%, dextrose 50%, glucagon (human recombinant), glucose, glucose, hydrALAZINE, insulin aspart U-100, iohexol, ondansetron, sodium chloride 0.9%    Review of Systems   Constitutional: Positive for activity change and appetite change. Negative for chills and fever.   HENT: Negative for congestion and facial swelling.    Eyes: Negative for pain, discharge and visual disturbance.   Respiratory: Negative for cough, chest tightness, shortness of breath and wheezing.    Cardiovascular: Negative for chest pain, palpitations and leg swelling.   Gastrointestinal: Negative for abdominal distention, abdominal pain, diarrhea, nausea and vomiting.   Endocrine: Negative.    Genitourinary: Negative for decreased urine volume, difficulty urinating and dysuria.   Skin: Positive for wound (chevron well healed).   Allergic/Immunologic: Positive for immunocompromised state.   Neurological: Positive for weakness. Negative for tremors, seizures, speech difficulty and light-headedness.   Psychiatric/Behavioral: Positive for decreased concentration and dysphoric mood. Negative for agitation and confusion. The patient is not nervous/anxious.      Objective:     Vital Signs (Most Recent):  Temp: 98.3 °F (36.8 °C) (07/19/19 1144)  Pulse: 97 (07/19/19 1144)  Resp: 19 (07/19/19 1144)  BP: (!) 159/73 (07/19/19 1144)  SpO2: 99 % (07/19/19 1144) Vital Signs (24h Range):  Temp:  [98 °F (36.7 °C)-98.8 °F  (37.1 °C)] 98.3 °F (36.8 °C)  Pulse:  [75-98] 97  Resp:  [16-19] 19  SpO2:  [97 %-99 %] 99 %  BP: (157-169)/(73-88) 159/73     Weight: 84.8 kg (186 lb 15.2 oz)  Body mass index is 32.09 kg/m².    Intake/Output - Last 3 Shifts       07/17 0700 - 07/18 0659 07/18 0700 - 07/19 0659 07/19 0700 - 07/20 0659    P.O. 1560 800 250    I.V. (mL/kg)       Other       IV Piggyback 50      Total Intake(mL/kg) 1610 (19) 800 (9.4) 250 (2.9)    Urine (mL/kg/hr) 640 (0.3) 550 (0.3)     Emesis/NG output       Other       Stool 0 0     Blood       Total Output 640 550     Net +970 +250 +250           Urine Occurrence 4 x 2 x 1 x    Stool Occurrence 2 x 2 x 1 x          Physical Exam    Laboratory:  Immunosuppressants         Stop Route Frequency     tacrolimus capsule 3 mg      -- Oral 2 times daily        CBC:   Recent Labs   Lab 07/19/19  0634   WBC 5.28   RBC 2.65*   HGB 7.5*   HCT 24.0*      MCV 91   MCH 28.3   MCHC 31.3*     CMP:   Recent Labs   Lab 07/19/19  0634   GLU 96   CALCIUM 8.5*   ALBUMIN 2.0*   PROT 5.1*      K 3.3*   CO2 18*   *   BUN 15   CREATININE 0.6   ALKPHOS 242*   ALT 35   AST 43*   BILITOT 0.4     Labs within the past 24 hours have been reviewed.    Diagnostic Results:  US Liver Transplant Post:    Results for orders placed during the hospital encounter of 07/05/19   US Liver Transplant Post    Narrative EXAMINATION:  US LIVER TRANSPLANT POST    CLINICAL HISTORY:  s/p OLTx with elevated AlkP and LFTs;    TECHNIQUE:  Limited abdominal ultrasound of the transplant liver with Doppler evaluation.  Color and spectral Doppler were performed.    COMPARISON:  Ultrasound liver transplant 06/24/2019.    FINDINGS:  Patient is status post orthotopic liver transplant 6 weeks.  Liver allograft is stable in size, measuring 21.0 cm.  The liver demonstrates homogeneous echotexture.  No focal hepatic lesions are seen.  There is a left hepatic lobe peritransplant fluid collection measuring 3.6 x 2.6 x 4.2 cm  (previously 4.1 x 3.9 x 4.8 cm).    The common duct is not dilated, measuring 4 mm.  No dilated intrahepatic radicles are seen.    Color flow and spectral waveform analysis was performed.  The main portal vein, right portal vein, left portal vein, middle hepatic vein, right hepatic vein, left hepatic vein, and IVC are patent with proper directional flow.    Anastomosis site of the main hepatic artery demonstrates a peak systolic velocity 191 cm/sec.    Main hepatic artery demonstrates resistive index 0.89 with normal waveform.    Left hepatic artery shows resistive index 0.91 with normal waveform.    Anterior branch of the right hepatic artery demonstrates resistive index 0.81 with normal waveform.    Posterior branch of the right hepatic artery demonstrates resistive index 0.75 with normal waveform.      Impression Persistent elevation of the hepatic arterial resistive indices with preservation of waveforms and normal main hepatic arterial peak systolic velocity.  Otherwise satisfactory Doppler evaluation of the liver allograft.  Continued follow up is recommended.    Minimally improved left hepatic lobe peritransplant fluid collection.    Electronically signed by resident: Yang Romero  Date:    07/18/2019  Time:    14:49    Electronically signed by: Edd Swanson MD  Date:    07/18/2019  Time:    15:15       Debility/Functional status: Patient debilitated by evidence of Muscle wasting and atrophy, Weakness, Chronic fatigue, unspecified, Limitation of activities due to disability and Other reduced mobility. Physical and occupational therapy ordered daily to evaluate and treat. Debility was: present on admission.

## 2019-07-19 NOTE — PROGRESS NOTES
"[facility]  Liver Transplant  Progress Note    Patient Name: Jihan Zamudio  MRN: [id number]  Admission Date: 2019  Hospital Length of Stay: 14 days  Code Status: Full Code  Primary Care Provider: Primary Doctor No  Post-Operative Day: 44    ORGAN:   LIVER  Disease Etiology: Cirrhosis: Fatty Liver ([place])  Donor Type:    - Brain Death  [facility] High Risk:   No  Donor CMV Status:   Donor CMV Status: Positive  Donor HBcAB:   Negative  Donor HCV Status:   Negative  Donor HBV GERTRUDIS: Negative  Donor HCV GERTRUDIS: Negative  Whole or Partial: Whole Liver  Biliary Anastomosis: End to End  Arterial Anatomy: Standard  Subjective:     History of Present Illness:  Ms. 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 Post transplant course significant for acute parenchymal hemorrhage within the right occipital lobe near the parieto-occipital junction measuring approximately 1.8 x 1.3 x 1.5 cm with mild surrounding vasogenic edema and localized mass effect after fall on . She was discharged home on 7 days of Keppra but nuerosurgery, completed on . She now presents to the ED with 1 day history of fever. Homehealth nurse checked patient's temperature and was noted to be elevated. Home health nurse also reported patient acting "off" with mental status change. Currently in the ED, tmax is 102.3. Patient feels well with no true complaints expect rhinorrhea. She denies chills, diarrhea, nausea/vomiting, abdominal pain. Denies sick contacts. Will admit for infectious work up (blood cx, UA, urine cx, CMV PCR, chest x-ray, CT A/P). Patient currently with no altered mental status. In light of recent hx of occipital hemorrhage, will obtain CT head without contrast.      Hospital Course:  50 y/o F s/p OLTx for KESSLER cirrhosis on  who was admitted on  for fevers. Pt febrile on admission, started on bx abx. ID consulted. Blood cultures NGTD, UA negative. CMV PCR drawn " on admit pending. Pt having loose stools, C diff EIA negative, CMV undetected. CT head showed reduction in size of hemorrhage. Abdominal CT reviewed and with moderate right pleural effusion. CXR with increased pleural effusion. Thora and para done 7/9; amount of fluid removed not recorded and pleural fluid labs were not completed as ordered. Abdominal gram stain reveals no WBC and no organisms; cell count not completed as ordered, cultures pending. TTE done 7/9 WNL, no vegetations. Pt tachy and tachypneic on 7/9; 1 L bolus given and pt responded well. 1 u pRBC transfused 7/9 for low H/H. Pt continued to have diarrhea, additional stool studies pending. Mentation continued to wax and wane, MRI w/ and w/o contrast done on 7/11 which revealed stable findings compared to prior. Pt transitioned to IV abx to PO cefpodoxime and flagyl on 7/11 per ID recs. Urine culture from 7/11 resulted as positive for VRE, thus cefpodoxime and flagyl stopped and pt started on zyvox on 7/12. Pt with worsening MS on 7/13 and claimed she had an unwitnessed seizure. STAT CT head without acute change. Keppra 500 mg bid started. Prograf and lovenox dc'ed. Stopped zyvox due to potential to lower seizure threshold and started IV daptomycin. IV cefepime restarted. Zyprexa qhs started for agitation and anxiety. Pt continued to have fevers despite therapeutic IV abx for VRE UTI. Viral and fungal labs pending per ID recs. 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  cc q8h started. Pt with persistent AMS, thus psych consulted who believe she is suffering from multifactorial delirium. PO thiamine and delirium precautions started. Unable to perform LP as unsafe due to ICH. CT a/p repeated on 7/15, revealed R pleural effusion, moderate ascites, and fluid in adam hepatis; no walled off fluid collections suggestive of abscess. Para repeated on 7/16 as labs not collected during para done on 7/9. Para labs on 7/16 negative for  infection. Empiric cefepime and acyclovir stopped on 7/17. Prograf restarted 7/17. LFTs mildly elevated while pt off of prograf for supposed seizures, so liver US obtained on 7/18 which revealed persistently elevated RIs, normal waveforms, stable L hepatic lobe transplant fluid collection. US reviewed by surgeon, no interventions indicated at this time. Hydrocortisone weaned off and keppra stopped 7/19. Mentation greatly improved by 7/16. Zyprexa weaned off per psych recs, and mental status remained stable. Started procardia for HTN on 7/19.    Interval history: No acute events overnight. Mentation remains stable this AM. She AAO x 4 this AM and denies both hallucinations and seizures/tremors. Zyprexa weaned to 2.5 mg qhs per psych recs. Will stop zyprexa after last dose tonight. Continue delirium precautions. Pt afebrile since AM of 7/14 and blood cultures drawn on 7/13 and 7/14 remain NGTD. Several viral and fungal labs still pending. Pt received last dose of daptomycin yesterday. Liver US obtained as LFTs mildly elevated while off of prograf, revealed persistently elevated RIs, normal waveforms, stable L hepatic lobe transplant fluid collection. US reviewed by surgeon, no interventions indicated at this time. Wean hydrocortisone to 25 mg qd. Stopped keppra. H/H low today, no overt signs of bleed and pt denies melena, hematochezia, hematemesis, and hematuria. No need to transfuse at this time. -160s over past few days; started procardia 30 mg qd today. PT/OT recommending inpatient rehab upon discharge, PMR consulted. Awaiting insurance approval for rehab. Continue to monitor.    Scheduled Meds:   hydrocortisone sodium succinate  25 mg Intravenous Q8H    levothyroxine  112 mcg Oral Before breakfast    lidocaine  1 patch Transdermal Q24H    magnesium oxide  400 mg Oral Daily    NIFEdipine  30 mg Oral Daily    OLANZapine  2.5 mg Oral QHS    sodium bicarbonate  1,300 mg Oral BID     sulfamethoxazole-trimethoprim 400-80mg  1 tablet Oral Daily    tacrolimus  3 mg Oral BID    thiamine  100 mg Oral Daily    valGANciclovir  450 mg Oral Daily     Continuous Infusions:  PRN Meds:acetaminophen, calcium carbonate, dextrose 50%, dextrose 50%, glucagon (human recombinant), glucose, glucose, hydrALAZINE, insulin aspart U-100, iohexol, ondansetron, sodium chloride 0.9%    Review of Systems   Constitutional: Positive for activity change and appetite change. Negative for chills and fever.   HENT: Negative for congestion and facial swelling.    Eyes: Negative for pain, discharge and visual disturbance.   Respiratory: Negative for cough, chest tightness, shortness of breath and wheezing.    Cardiovascular: Negative for chest pain, palpitations and leg swelling.   Gastrointestinal: Negative for abdominal distention, abdominal pain, diarrhea, nausea and vomiting.   Endocrine: Negative.    Genitourinary: Negative for decreased urine volume, difficulty urinating and dysuria.   Skin: Positive for wound (chevron well healed).   Allergic/Immunologic: Positive for immunocompromised state.   Neurological: Positive for weakness. Negative for tremors, seizures, speech difficulty and light-headedness.   Psychiatric/Behavioral: Positive for decreased concentration and dysphoric mood. Negative for agitation and confusion. The patient is not nervous/anxious.      Objective:     Vital Signs (Most Recent):  Temp: 98.3 °F (36.8 °C) (07/19/19 1144)  Pulse: 97 (07/19/19 1144)  Resp: 19 (07/19/19 1144)  BP: (!) 159/73 (07/19/19 1144)  SpO2: 99 % (07/19/19 1144) Vital Signs (24h Range):  Temp:  [98 °F (36.7 °C)-98.8 °F (37.1 °C)] 98.3 °F (36.8 °C)  Pulse:  [75-98] 97  Resp:  [16-19] 19  SpO2:  [97 %-99 %] 99 %  BP: (157-169)/(73-88) 159/73     Weight: 84.8 kg (186 lb 15.2 oz)  Body mass index is 32.09 kg/m².    Intake/Output - Last 3 Shifts       07/17 0700 - 07/18 0659 07/18 0700 - 07/19 0659 07/19 0700 - 07/20 0659    P.O. 1850  800 250    I.V. (mL/kg)       Other       IV Piggyback 50      Total Intake(mL/kg) 1610 (19) 800 (9.4) 250 (2.9)    Urine (mL/kg/hr) 640 (0.3) 550 (0.3)     Emesis/NG output       Other       Stool 0 0     Blood       Total Output 640 550     Net +970 +250 +250           Urine Occurrence 4 x 2 x 1 x    Stool Occurrence 2 x 2 x 1 x          Physical Exam    Laboratory:  Immunosuppressants         Stop Route Frequency     tacrolimus capsule 3 mg      -- Oral 2 times daily        CBC:   Recent Labs   Lab 07/19/19  0634   WBC 5.28   RBC 2.65*   HGB 7.5*   HCT 24.0*      MCV 91   MCH 28.3   MCHC 31.3*     CMP:   Recent Labs   Lab 07/19/19  0634   GLU 96   CALCIUM 8.5*   ALBUMIN 2.0*   PROT 5.1*      K 3.3*   CO2 18*   *   BUN 15   CREATININE 0.6   ALKPHOS 242*   ALT 35   AST 43*   BILITOT 0.4     Labs within the past 24 hours have been reviewed.    Diagnostic Results:  US Liver Transplant Post:    Results for orders placed during the hospital encounter of 07/05/19   US Liver Transplant Post    Narrative EXAMINATION:  US LIVER TRANSPLANT POST    CLINICAL HISTORY:  s/p OLTx with elevated AlkP and LFTs;    TECHNIQUE:  Limited abdominal ultrasound of the transplant liver with Doppler evaluation.  Color and spectral Doppler were performed.    COMPARISON:  Ultrasound liver transplant 06/24/2019.    FINDINGS:  Patient is status post orthotopic liver transplant 6 weeks.  Liver allograft is stable in size, measuring 21.0 cm.  The liver demonstrates homogeneous echotexture.  No focal hepatic lesions are seen.  There is a left hepatic lobe peritransplant fluid collection measuring 3.6 x 2.6 x 4.2 cm (previously 4.1 x 3.9 x 4.8 cm).    The common duct is not dilated, measuring 4 mm.  No dilated intrahepatic radicles are seen.    Color flow and spectral waveform analysis was performed.  The main portal vein, right portal vein, left portal vein, middle hepatic vein, right hepatic vein, left hepatic vein, and IVC are  "patent with proper directional flow.    Anastomosis site of the main hepatic artery demonstrates a peak systolic velocity 191 cm/sec.    Main hepatic artery demonstrates resistive index 0.89 with normal waveform.    Left hepatic artery shows resistive index 0.91 with normal waveform.    Anterior branch of the right hepatic artery demonstrates resistive index 0.81 with normal waveform.    Posterior branch of the right hepatic artery demonstrates resistive index 0.75 with normal waveform.      Impression Persistent elevation of the hepatic arterial resistive indices with preservation of waveforms and normal main hepatic arterial peak systolic velocity.  Otherwise satisfactory Doppler evaluation of the liver allograft.  Continued follow up is recommended.    Minimally improved left hepatic lobe peritransplant fluid collection.    Electronically signed by resident: Yang Romero  Date:    07/18/2019  Time:    14:49    Electronically signed by: Edd Swanson MD  Date:    07/18/2019  Time:    15:15       Debility/Functional status: Patient debilitated by evidence of Muscle wasting and atrophy, Weakness, Chronic fatigue, unspecified, Limitation of activities due to disability and Other reduced mobility. Physical and occupational therapy ordered daily to evaluate and treat. Debility was: present on admission.    Assessment/Plan:     * Seizure-like activity  - pt seen sitting in chair AM of 7/13 stating "I just had a seizure, I just finished banging my head on the chair."  - pt couldn't recall events surrounding seizure like activity.  - given recent hx of fall with ICH on 6/24, Neuro consulted in which pt had a very inconsistent neuro exam.   - Keppra 500 mg bid restarted and a STAT CT head performed which did not show a new infarct or bleed.  - dc'd Lovenox.   - prograf held for now. Started hydrocortisone.   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       Fever  - Infectious work up ordered on admit - blood cx NGTD, UA unremarkable, CMV PCR 7/5 undetected, chest x-ray w right pleural effusion, CT A/P reviewed.  - Started broad spectrum antibiotics on admit.   - Abdominal and head CT reviewed and no clear source for fever.   - ID consulted.  Apprec recs.   - Pt with intermittent confusion.  Easily re oriented.  Continue to monitor.   - Thora and para done 7/9; amount of fluid removed not recorded and pleural fluid labs were not completed as ordered.   - Abdominal gram stain reveals no WBC and no organisms; cell count not completed as ordered; pleural fluid cell count 84 WBCs, 24% segs.  - Pt still reports diarrhea- C diff negative, CMV undetected. Stool culture, WBC, ova cysts parasites, and GI pathogen panel pending.   - low grade fever 7/12, 100.8.  - deescalated abx to PO cefpodoxime and flagyl on 7/12.  - MRI head w/ and w/o contrast 7/12 without acute changes.  - Urine culture positive for enterococcus faecium- started Zyvox and dc'd cefpodoxime and flagyl 7/12.  - temp 100.4 on 7/13.  - Zyvox changed to Dapto as pt with seizure like activity earlier in morning and zyvox can lower seizure threshold.  - repeat blood cultures ngtd.  - ammonia level wnl.   - pt also with confusion, AMS. D/w ID - restarted Cefepime.  - procalcitonin, lactate, EBV, RPR, fungitell, aspergillus, HIV, Saadia Delacruz, histoplasma antigen and blastomyces antigen and CMV PCR pending.  - Start Acyclovir treatment dose for possible viral meningitis.   - repeat blood cx on 7/13 and 7/14 remain NGTD. Last fever 102.3 F on 7/14 at 9 AM  - CT a/p 7/15 continued to redemonstrate R pleural effusion, moderate ascites, and fluid in adam hepatis; no walled off fluid collections suggestive of abscess.   - paracentesis done 7/16 to reassess for infection as cell count and other labs not sent off when pt underwent para on 7/9. Abdominal fluid labs negative for  infection  - Stop empiric cefepime 7/17  -daptomycin x 5 days for VRE UTI, ended on 7/18  -Stopped acyclovir, restarted prophylactic valcyte.   -continue to monitor      UTI (urinary tract infection) due to Enterococcus  - UA 7/5 unremarkable.  - urine culture 7/11 positive for enterococcus faecium- susceptibilities pending.  - dc'd cefpodoxime and flagyl on 7/12.  - started Zyvox on 7/12.  - ID transitioned Zyvox to Dapto on 7/13 as Zyvox can lower the seizure threshold.  - stop dapto 7/18  - continue to monitor.    Delirium  - pt with intermittent disorientation to situation throughout stay  - CT head on 7/5 and 7/13 with no acute change; MRI head w/ contrast on 7/11 with no acute change  - ammonia WNL  - pt with acute personality change on 7/13- throwing items at nurses and agitated  - zyprexa qhs started 7/13; PRN zyprexa added 7/16  - psych consulted 7/15; believe she is suffering from multifactorial delirium 2/2 infection & ICH  - unable to do LP as unsafe at the moment due to recent ICH  - PO thiamine started.   - Delirium precautions started.   - mentation greatly improved since 7/17  - decreased zyprexa qhs from 5 mg to 2.5 mg per psych recs  - will stop zyprexa tomorrow   - continue to monitor      Impaired functional mobility and endurance        Altered mental status        Pressure injury of buttock, stage 1  - wound care following, appreciate recs      Hypokalemia  - replace as needed.  - Kdur 40 meq po x 1 dose.   - monitor.       Traumatic intracranial hemorrhage without loss of consciousness  - Head CT reviewed and noted with interval reduction in size of the patient's known right occipital lobe parenchymal hemorrhage.      At risk for opportunistic infections  - cont bactrim for PCP prophylaxis.  - (CMV D+/R+) hold Valcyte.   - Acyclovir started 7/14  - stopped acyclovir and restarted valcyte on 7/17      Closed head injury  - admitted recently following fall diagnosed w acute parenchymal  hemorrhage within right occipital live near parieto-occipital junction 1.8x1.3x1.5 w mild surrounding vasogenic edema and localized mass.  Repeat CT head showed reduction in size of hemorrhage.  - pt denies HA.      Long-term use of immunosuppressant medication  - holding prograf 7/13 due to seizure like activity.  - Holding MMF. Monitor prograf level daily, monitor for toxic side effects, and adjust for therapeutic dose.   - hydrocortisone 7/14.   - restarted prograf 7/17  - wean hydrocortisone to 25 mg qd. Will stop hydrocort and start pred taper tomorrow    Prophylactic immunotherapy  - See long term use of immunosuppression.  Decreased as likely w sepsis.    S/P liver transplant  - LFTs stable.  - Pt with good hepatic allograft function.   - Last Liver US 6/24 showed Mildly elevated hepatic arterial resistive indices, although improved from prior exam.  Otherwise, satisfactory vascular appearance of the liver, complex fluid collection adjacent to the left hepatic lobe, similar to slightly smaller compared to prior exam, small volume of ascites and partially visualized right pleural effusion.   - para completed 7/9, cultures pending. Cell count not collected as ordered.   - para repeated 7/16- abdominal fluid labs negative for infection  - LFTs and AlkP creeping up over past 4 days- likely due to holding prograf for seizures  - liver US on 7/18 revealed persistently elevated RIs, normal waveforms, stable L hepatic lobe transplant fluid collection. US reviewed by surgeon, no interventions indicated at this time.    Anemia of chronic disease  - no overt bleeding.    - keep type and screen current.  - transfused 1u prbc 7/9.  - monitor with daily labs.      Pleural effusion, right  - CXR reviewed.    - Thora done 7/9  - pleural fluid labs not completed as ordered- will try to get labs repeated if specimen is still available.  - cell count: 84 WBCs, 24% segs      Weakness  - PT/OT consulted.    - recommending  inpatient rehab upon discharge  - PMR consulted  - awaiting insurance approval to go to inpatient rehab    Moderate malnutrition  - supplements ordered.      GERD (gastroesophageal reflux disease)  - cont Pepcid.          VTE Risk Mitigation (From admission, onward)        Ordered     Place sequential compression device  Until discontinued      07/05/19 1659     IP VTE HIGH RISK PATIENT  Once      07/05/19 1659          The patients clinical status was discussed at multidisplinary rounds, involving transplant surgery, transplant medicine, pharmacy, nursing, nutrition, and social work    Discharge Planning:  No Patient Care Coordination Note on file.      Amy Saunders PA-C  Liver Transplant  [facility]

## 2019-07-19 NOTE — ASSESSMENT & PLAN NOTE
- pt with intermittent disorientation to situation throughout stay  - CT head on 7/5 and 7/13 with no acute change; MRI head w/ contrast on 7/11 with no acute change  - ammonia WNL  - pt with acute personality change on 7/13- throwing items at nurses and agitated  - zyprexa qhs started 7/13; PRN zyprexa added 7/16  - psych consulted 7/15; believe she is suffering from multifactorial delirium 2/2 infection & ICH  - unable to do LP as unsafe at the moment due to recent ICH  - PO thiamine started.   - Delirium precautions started.   - mentation greatly improved since 7/17  - decreased zyprexa qhs from 5 mg to 2.5 mg per psych recs  - will stop zyprexa tomorrow   - continue to monitor

## 2019-07-19 NOTE — ASSESSMENT & PLAN NOTE
- PT/OT consulted.    - recommending inpatient rehab upon discharge  - PMR consulted  - awaiting insurance approval to go to inpatient rehab

## 2019-07-19 NOTE — ASSESSMENT & PLAN NOTE
This patient is a 49 year old lady with history of KESSLER s/p liver transplant on 6/5/19 complicated by ICH and fevers. She became febrile and was admitted to the hospital on 7/9 due to fever of unknown origin in the setting of recent organ transplant. Psychiatry was consulted due to concern for delirium in the setting of infection, ICH. Orientation continues to remain stable even while weaning Zyprexa off. Received Zyprexa 5 mg last night with plan to wean to 2.5 mg qhs tonight and completely off tomorrow night. We continue to expect improvement in mental status with resolution of medical issues.     Recommendations:   - Continue Zyprexa but reduce to 2.5 mg qhs tonight, then please have patient completely off tomorrow night and with no antipsychotics on discharge.   - Please instruct patient to follow up with outpatient psychiatry in [place] near her hometown of [place]. This is included in Patient instructions under Discharge.

## 2019-07-19 NOTE — ASSESSMENT & PLAN NOTE
"- pt seen sitting in chair AM of 7/13 stating "I just had a seizure, I just finished banging my head on the chair."  - pt couldn't recall events surrounding seizure like activity.  - given recent hx of fall with ICH on 6/24, Neuro consulted in which pt had a very inconsistent neuro exam.   - Keppra 500 mg bid restarted and a STAT CT head performed which did not show a new infarct or bleed.  - dc'd Lovenox.   - prograf held for now. Started hydrocortisone.   - seizures likely psychogenic in nature as pt able to stop shaking to answer questions  - restarted prograf on 7/17  - stop keppra 7/19  - Monitor.     "

## 2019-07-19 NOTE — ASSESSMENT & PLAN NOTE
- Infectious work up ordered on admit - blood cx NGTD, UA unremarkable, CMV PCR 7/5 undetected, chest x-ray w right pleural effusion, CT A/P reviewed.  - Started broad spectrum antibiotics on admit.   - Abdominal and head CT reviewed and no clear source for fever.   - ID consulted.  Apprec recs.   - Pt with intermittent confusion.  Easily re oriented.  Continue to monitor.   - Thora and para done 7/9; amount of fluid removed not recorded and pleural fluid labs were not completed as ordered.   - Abdominal gram stain reveals no WBC and no organisms; cell count not completed as ordered; pleural fluid cell count 84 WBCs, 24% segs.  - Pt still reports diarrhea- C diff negative, CMV undetected. Stool culture, WBC, ova cysts parasites, and GI pathogen panel pending.   - low grade fever 7/12, 100.8.  - deescalated abx to PO cefpodoxime and flagyl on 7/12.  - MRI head w/ and w/o contrast 7/12 without acute changes.  - Urine culture positive for enterococcus faecium- started Zyvox and dc'd cefpodoxime and flagyl 7/12.  - temp 100.4 on 7/13.  - Zyvox changed to Dapto as pt with seizure like activity earlier in morning and zyvox can lower seizure threshold.  - repeat blood cultures ngtd.  - ammonia level wnl.   - pt also with confusion, AMS. D/w ID - restarted Cefepime.  - procalcitonin, lactate, EBV, RPR, fungitell, aspergillus, HIV, Asadia Delacruz, histoplasma antigen and blastomyces antigen and CMV PCR pending.  - Start Acyclovir treatment dose for possible viral meningitis.   - repeat blood cx on 7/13 and 7/14 remain NGTD. Last fever 102.3 F on 7/14 at 9 AM  - CT a/p 7/15 continued to redemonstrate R pleural effusion, moderate ascites, and fluid in adam hepatis; no walled off fluid collections suggestive of abscess.   - paracentesis done 7/16 to reassess for infection as cell count and other labs not sent off when pt underwent para on 7/9. Abdominal fluid labs negative for infection  - Stop empiric cefepime 7/17  -daptomycin  x 5 days for VRE UTI, ended on 7/18  -Stopped acyclovir, restarted prophylactic valcyte.   -continue to monitor

## 2019-07-19 NOTE — PLAN OF CARE
Problem: Adult Inpatient Plan of Care  Goal: Plan of Care Review  Outcome: Ongoing (interventions implemented as appropriate)  Pt. AAO x 4, afebrile, at the bedside- interacting with the patient and participating in care.\  BP remained in 150s today--Procardia 30 mg started  H/H 7.5/24.0 and K 3.3 on AM labs-- K supplement given  Hydrocortisone tapered today, Prograf dose increased, [facility]  , 144, 109--no sliding scale indicated per orders  LFT remain elevated  C/o lower back pain--lidocaine patch applied and provided relief  Pt. Up with minimal assist--patient ambulated the hallway today and made several laps around the unit  Speech therapy consulted but unable to assess patient today  Safety precautions maintained throughout the shift, call light within reach, and nonslip socks when out of bed.

## 2019-07-19 NOTE — SUBJECTIVE & OBJECTIVE
"Interval History: See as above     Family History     Problem Relation (Age of Onset)    Breast cancer Maternal Grandmother    Cancer Mother (50), Father (65)    Heart disease Mother, Father        Tobacco Use    Smoking status: Never Smoker    Smokeless tobacco: Never Used    Tobacco comment: patient denies   Substance and Sexual Activity    Alcohol use: No     Comment: patient denies    Drug use: No     Comment: patient denies    Sexual activity: Not on file     Psychotherapeutics (From admission, onward)    Start     Stop Route Frequency Ordered    07/19/19 2100  OLANZapine tablet 2.5 mg      07/20 2059 Oral Nightly 07/19/19 1035           Review of Systems  Objective:     Vital Signs (Most Recent):  Temp: 98.3 °F (36.8 °C) (07/19/19 1144)  Pulse: 97 (07/19/19 1144)  Resp: 19 (07/19/19 1144)  BP: (!) 159/73 (07/19/19 1144)  SpO2: 99 % (07/19/19 1144) Vital Signs (24h Range):  Temp:  [98 °F (36.7 °C)-98.8 °F (37.1 °C)] 98.3 °F (36.8 °C)  Pulse:  [75-98] 97  Resp:  [16-19] 19  SpO2:  [97 %-99 %] 99 %  BP: (157-169)/(73-88) 159/73     Height: 5' 4" (162.6 cm)  Weight: 84.8 kg (186 lb 15.2 oz)  Body mass index is 32.09 kg/m².      Intake/Output Summary (Last 24 hours) at 7/19/2019 1540  Last data filed at 7/19/2019 0802  Gross per 24 hour   Intake 450 ml   Output 450 ml   Net 0 ml       Physical Exam   Psychiatric:   Mental Status Exam:  Appearance: unremarkable, age appropriate  Behavior/Cooperation: normal, cooperative  Speech: normal tone, normal rate, normal pitch  Mood: fine  Affect: normal  Thought Process: normal and logical  Thought Content: normal, no suicidality, no homicidality, delusions, or paranoia   Orientation: person, place, situation, month of year  Memory: Impaired to some degree  Attention Span/Concentration: Normal  Insight: fair  Judgment: fair        Significant Labs: All pertinent labs within the past 24 hours have been reviewed.    Significant Imaging: I have reviewed all pertinent " imaging results/findings within the past 24 hours.

## 2019-07-19 NOTE — PROGRESS NOTES
[facility]  Physical Medicine & Rehab  Progress Note    Patient Name: Jihan Zamudio  MRN: [id number]  Admission Date: 7/5/2019  Length of Stay: 14 days  Attending Physician: Jay Herbert MD    Subjective:     Principal Problem:Seizure-like activity    Hospital Course:   07/16/2019:  Bed mobility SBA-Rica.  Sit to stand CGA and transfers Min-CGA.  Ambulated 70ft + 25 ft  CGA-Rica with  required seated rest break between trials and reported dizziness and weakness.   UBD Rica and Grooming SBA.  Toileting CGA. Feeding (I).   07/17/2019:  Sit to stand CGA.  Ambulated 80 FT + 100 FT +55 FT CGA-iRca using w/c for mobility and required seated rest break between trials. pt reported fatigue and weakness.   07/18/2019: Bed mobility SBA.  Sit to stand SBA from bed to WC.  Ambulated 75 ft + 110 ft SBA-CGA using WC for mobility, 50 ft CGA-Rica  required seated rest break between trials and standing rest breaks prn with fatigue and  weakness Politely declined ADLs.    Interval History 7/19/2019:  Patient is seen for follow-up rehab evaluation and recommendations: Participating with therapy.    HPI, Past Medical, Family, and Social History remains the same as documented in the initial encounter.    Scheduled Medications:    hydrocortisone sodium succinate  25 mg Intravenous Q8H    levothyroxine  112 mcg Oral Before breakfast    lidocaine  1 patch Transdermal Q24H    magnesium oxide  400 mg Oral Daily    NIFEdipine  30 mg Oral Daily    OLANZapine  2.5 mg Oral QHS    potassium chloride  40 mEq Oral Once    sodium bicarbonate  1,300 mg Oral BID    sulfamethoxazole-trimethoprim 400-80mg  1 tablet Oral Daily    tacrolimus  1 mg Oral Once    tacrolimus  3 mg Oral BID    thiamine  100 mg Oral Daily    valGANciclovir  450 mg Oral Daily       Diagnostic Results: Labs: Reviewed    PRN Medications: acetaminophen, calcium carbonate, dextrose 50%, dextrose 50%, glucagon (human recombinant), glucose, glucose,  hydrALAZINE, insulin aspart U-100, iohexol, ondansetron, sodium chloride 0.9%    Review of Systems   Constitutional: Positive for activity change. Negative for fever.   HENT: Negative for trouble swallowing.    Respiratory: Negative for shortness of breath and wheezing.    Cardiovascular: Negative for chest pain and palpitations.   Musculoskeletal: Positive for gait problem.   Neurological: Positive for weakness. Negative for dizziness and speech difficulty.   Psychiatric/Behavioral: Positive for confusion.     Objective:     Vital Signs (Most Recent):  Temp: 98 °F (36.7 °C) (07/19/19 0802)  Pulse: 75 (07/19/19 0802)  Resp: 19 (07/19/19 0802)  BP: (!) 158/88 (07/19/19 0802)  SpO2: 99 % (07/19/19 0802)    Vital Signs (24h Range):  Temp:  [97.9 °F (36.6 °C)-98.8 °F (37.1 °C)] 98 °F (36.7 °C)  Pulse:  [] 75  Resp:  [16-20] 19  SpO2:  [97 %-99 %] 99 %  BP: (154-169)/(76-88) 158/88     Physical Exam   Constitutional: She is oriented to person, place, and time. She appears well-developed and well-nourished.   Sitting UIC eating with  at bedisde    HENT:   Head: Normocephalic and atraumatic.   Eyes: Right eye exhibits no discharge. Left eye exhibits no discharge.   Neck: Neck supple.   Cardiovascular: Intact distal pulses.   BLE edema   Pulmonary/Chest: Effort normal. No respiratory distress.   Abdominal: Soft. She exhibits distension. There is no tenderness.   Musculoskeletal: She exhibits no edema or deformity.   4/5 LLE   Generalized deconditioning    Neurological: She is alert and oriented to person, place, and time. She exhibits normal muscle tone.   Skin: Skin is warm and dry.   Psychiatric: She has a normal mood and affect. Her behavior is normal.   calm   Vitals reviewed.    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 as pt able to stop shaking to answer questions per LTS  - CTH negative for acute path     Impaired functional mobility and  endurance  Recommendations  -  Encourage mobility, OOB in chair at least 3 hours per day, and early ambulation as appropriate  -  PT/OT evaluate and treat  -  Pain management  -  Monitor for and prevent skin breakdown and pressure ulcers  · Early mobility, repositioning/weight shifting every 20-30 minutes when sitting, turn patient every 2 hours, proper mattress/overlay and chair cushioning, pressure relief/heel protector boots  -  DVT prophylaxis    -  Reviewed discharge options (IP rehab, SNF, HH therapy, and OP therapy)    Pressure injury of buttock, stage 1  -wound care consulted     Delirium  -Psych consulted and state multifactorial delirium 2/2 infection & ICH  -on zyprexa prn   -SLP cog eval pending 2/2 history of CHI     UTI (urinary tract infection) due to Enterococcus  - Daptomycin d/c'd on 7/18    Traumatic intracranial hemorrhage without loss of consciousness  -s/p fall on 6/24     See hospital course for functional, cognitive/speech/language, and nutrition/swallow status.      Recommendations  -  Monitor sleep disturbances and establish consistent sleep-wake cycle  -  Environmental modifications to limit agitation/confusion   -  Reorient patient to person, place, time, and situation on each encounter  -  Avoid restraints  -  May benefit from 24/7 supervision  -  Avoid/limit medications that can worsen delirium (benzodiazepines, antihistamines, anticholinergics, hypnotics, opiates)  -  Encourage mobility, OOB in chair, and early ambulation as appropriate  -  PT/OT evaluate and treat  -  SLP speech and cognitive evaluate and treat  -  Monitor for bowel and bladder dysfunction  -  Monitor for and prevent skin breakdown and pressure ulcers  · Early mobility, repositioning/weight shifting every 20-30 minutes when sitting, turn patient every 2 hours, proper mattress/overlay and chair cushioning, pressure relief/heel protector boots  -  DVT prophylaxis (if appropriate)    S/P liver transplant  -s/p liver trx  6/5/19 2/2 KESSLER     Weakness  -PT/OT following     Recommend Inpatient Rehab.       Libby Rich NP  Department of Physical Medicine & Rehab   [facility]

## 2019-07-19 NOTE — ASSESSMENT & PLAN NOTE
- LFTs stable.  - Pt with good hepatic allograft function.   - Last Liver US 6/24 showed Mildly elevated hepatic arterial resistive indices, although improved from prior exam.  Otherwise, satisfactory vascular appearance of the liver, complex fluid collection adjacent to the left hepatic lobe, similar to slightly smaller compared to prior exam, small volume of ascites and partially visualized right pleural effusion.   - para completed 7/9, cultures pending. Cell count not collected as ordered.   - para repeated 7/16- abdominal fluid labs negative for infection  - LFTs and AlkP creeping up over past 4 days- likely due to holding prograf for seizures  - liver US on 7/18 revealed persistently elevated RIs, normal waveforms, stable L hepatic lobe transplant fluid collection. US reviewed by surgeon, no interventions indicated at this time.

## 2019-07-19 NOTE — PROGRESS NOTES
(YULIYA) met with patient (pt) and pt's  for follow up regarding re-assessment of pt's daily [employer] fee.  Pt was observed to be sitting up right in the recliner, A&Ox4, engaged and communicative.  Pt's  also presented A&Ox4 and denied any issues or concerns.  SW presented pt's completed [employer] profile which had been completed by the pt's  dated 6/10/19.  Upon reviewing the document, pt noted the pt's  had not included their monthly credit card payments.  Pt's  was able to view the bills on line via cell phone and discussed payments with SW.  After review, the pt and pt's  are responsible for an additional $1500 per month for credit card payments. Therefore, SW completed a re-assessment of the pt's fee due to [employer] which has been recalculated from $16.66 to $1.66 per day.  Pt and pt's  verbalized being prayerful and thankful for SW's attention with the matter.  SW advised she would notify the [employer]'s manager via email on today asking that the rate become effective today.  Pt and pt's  verbalized understanding.  SW inquired if the pt had secured a secondary caregiver and/or care for her daughter who will be resuming high school in August.  Pt and pt's  advised they are still working on a secured plan of care.  SW encouraged pt that the SW team would follow up regarding the plan on next week as pt may discharge to an inpatient rehabilitation facility as discussed during sitting rounds on today (pt aware of plan as per TYRA Pearl).  No other issues or concerns were identified/addressed.  SW remains available for education, resources, support and discharge planning as appropriate.

## 2019-07-19 NOTE — ASSESSMENT & PLAN NOTE
- holding prograf 7/13 due to seizure like activity.  - Holding MMF. Monitor prograf level daily, monitor for toxic side effects, and adjust for therapeutic dose.   - hydrocortisone 7/14.   - restarted prograf 7/17  - wean hydrocortisone to 25 mg qd. Will stop hydrocort and start pred taper tomorrow
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
## 2019-07-20 LAB
ACID FAST MOD KINY STN SPEC: NORMAL
ALBUMIN SERPL BCP-MCNC: 2 G/DL (ref 3.5–5.2)
ALP SERPL-CCNC: 256 U/L (ref 55–135)
ALT SERPL W/O P-5'-P-CCNC: 34 U/L (ref 10–44)
ANION GAP SERPL CALC-SCNC: 9 MMOL/L (ref 8–16)
AST SERPL-CCNC: 39 U/L (ref 10–40)
BACTERIA FLD AEROBE CULT: NO GROWTH
BASOPHILS # BLD AUTO: ABNORMAL K/UL (ref 0–0.2)
BASOPHILS NFR BLD: 0 % (ref 0–1.9)
BILIRUB SERPL-MCNC: 0.4 MG/DL (ref 0.1–1)
BUN SERPL-MCNC: 19 MG/DL (ref 6–20)
CALCIUM SERPL-MCNC: 7.9 MG/DL (ref 8.7–10.5)
CHLORIDE SERPL-SCNC: 115 MMOL/L (ref 95–110)
CO2 SERPL-SCNC: 16 MMOL/L (ref 23–29)
CREAT SERPL-MCNC: 0.5 MG/DL (ref 0.5–1.4)
DIFFERENTIAL METHOD: ABNORMAL
EOSINOPHIL # BLD AUTO: ABNORMAL K/UL (ref 0–0.5)
EOSINOPHIL NFR BLD: 1 % (ref 0–8)
ERYTHROCYTE [DISTWIDTH] IN BLOOD BY AUTOMATED COUNT: 16 % (ref 11.5–14.5)
EST. GFR  (AFRICAN AMERICAN): >60 ML/MIN/1.73 M^2
EST. GFR  (NON AFRICAN AMERICAN): >60 ML/MIN/1.73 M^2
GLUCOSE SERPL-MCNC: 75 MG/DL (ref 70–110)
GRAM STN SPEC: NORMAL
GRAM STN SPEC: NORMAL
HCT VFR BLD AUTO: 23.9 % (ref 37–48.5)
HGB BLD-MCNC: 7.7 G/DL (ref 12–16)
IMM GRANULOCYTES # BLD AUTO: ABNORMAL K/UL (ref 0–0.04)
IMM GRANULOCYTES NFR BLD AUTO: ABNORMAL % (ref 0–0.5)
LYMPHOCYTES # BLD AUTO: ABNORMAL K/UL (ref 1–4.8)
LYMPHOCYTES NFR BLD: 2 % (ref 18–48)
MAGNESIUM SERPL-MCNC: 1.7 MG/DL (ref 1.6–2.6)
MCH RBC QN AUTO: 29.1 PG (ref 27–31)
MCHC RBC AUTO-ENTMCNC: 32.2 G/DL (ref 32–36)
MCV RBC AUTO: 90 FL (ref 82–98)
METAMYELOCYTES NFR BLD MANUAL: 2 %
MONOCYTES # BLD AUTO: ABNORMAL K/UL (ref 0.3–1)
MONOCYTES NFR BLD: 9 % (ref 4–15)
MYCOBACTERIUM SPEC QL CULT: NORMAL
NEUTROPHILS NFR BLD: 83 % (ref 38–73)
NEUTS BAND NFR BLD MANUAL: 3 %
NRBC BLD-RTO: 1 /100 WBC
OVALOCYTES BLD QL SMEAR: ABNORMAL
PLATELET # BLD AUTO: 155 K/UL (ref 150–350)
PLATELET BLD QL SMEAR: ABNORMAL
PMV BLD AUTO: 9.3 FL (ref 9.2–12.9)
POCT GLUCOSE: 178 MG/DL (ref 70–110)
POCT GLUCOSE: 84 MG/DL (ref 70–110)
POCT GLUCOSE: 98 MG/DL (ref 70–110)
POIKILOCYTOSIS BLD QL SMEAR: SLIGHT
POLYCHROMASIA BLD QL SMEAR: ABNORMAL
POTASSIUM SERPL-SCNC: 3.2 MMOL/L (ref 3.5–5.1)
PROT SERPL-MCNC: 4.7 G/DL (ref 6–8.4)
RBC # BLD AUTO: 2.65 M/UL (ref 4–5.4)
SODIUM SERPL-SCNC: 140 MMOL/L (ref 136–145)
TACROLIMUS BLD-MCNC: 3.3 NG/ML (ref 5–15)
WBC # BLD AUTO: 5.19 K/UL (ref 3.9–12.7)

## 2019-07-20 PROCEDURE — 99233 PR SUBSEQUENT HOSPITAL CARE,LEVL III: ICD-10-PCS | Mod: 24,,, | Performed by: PHYSICIAN ASSISTANT

## 2019-07-20 PROCEDURE — 25000003 PHARM REV CODE 250: Performed by: INTERNAL MEDICINE

## 2019-07-20 PROCEDURE — 63600175 PHARM REV CODE 636 W HCPCS: Performed by: PHYSICIAN ASSISTANT

## 2019-07-20 PROCEDURE — 25000003 PHARM REV CODE 250: Performed by: NURSE PRACTITIONER

## 2019-07-20 PROCEDURE — 80053 COMPREHEN METABOLIC PANEL: CPT

## 2019-07-20 PROCEDURE — 99233 SBSQ HOSP IP/OBS HIGH 50: CPT | Mod: 24,,, | Performed by: PHYSICIAN ASSISTANT

## 2019-07-20 PROCEDURE — 85027 COMPLETE CBC AUTOMATED: CPT

## 2019-07-20 PROCEDURE — 92523 SPEECH SOUND LANG COMPREHEN: CPT

## 2019-07-20 PROCEDURE — 20600001 HC STEP DOWN PRIVATE ROOM

## 2019-07-20 PROCEDURE — 83735 ASSAY OF MAGNESIUM: CPT

## 2019-07-20 PROCEDURE — 36415 COLL VENOUS BLD VENIPUNCTURE: CPT

## 2019-07-20 PROCEDURE — 80197 ASSAY OF TACROLIMUS: CPT

## 2019-07-20 PROCEDURE — 25000003 PHARM REV CODE 250: Performed by: PHYSICIAN ASSISTANT

## 2019-07-20 PROCEDURE — 85007 BL SMEAR W/DIFF WBC COUNT: CPT

## 2019-07-20 RX ORDER — PREDNISONE 5 MG/1
5 TABLET ORAL DAILY
Status: DISCONTINUED | OUTPATIENT
Start: 2019-08-03 | End: 2019-07-23 | Stop reason: HOSPADM

## 2019-07-20 RX ORDER — TACROLIMUS 1 MG/1
4 CAPSULE ORAL 2 TIMES DAILY
Status: DISCONTINUED | OUTPATIENT
Start: 2019-07-20 | End: 2019-07-23

## 2019-07-20 RX ORDER — SODIUM BICARBONATE 650 MG/1
1300 TABLET ORAL 3 TIMES DAILY
Status: DISCONTINUED | OUTPATIENT
Start: 2019-07-20 | End: 2019-07-23 | Stop reason: HOSPADM

## 2019-07-20 RX ORDER — POTASSIUM CHLORIDE 750 MG/1
30 CAPSULE, EXTENDED RELEASE ORAL
Status: COMPLETED | OUTPATIENT
Start: 2019-07-20 | End: 2019-07-20

## 2019-07-20 RX ORDER — PREDNISONE 10 MG/1
10 TABLET ORAL DAILY
Status: DISCONTINUED | OUTPATIENT
Start: 2019-07-27 | End: 2019-07-23 | Stop reason: HOSPADM

## 2019-07-20 RX ADMIN — MAGNESIUM OXIDE TAB 400 MG (241.3 MG ELEMENTAL MG) 400 MG: 400 (241.3 MG) TAB at 09:07

## 2019-07-20 RX ADMIN — SODIUM BICARBONATE 650 MG TABLET 1300 MG: at 09:07

## 2019-07-20 RX ADMIN — LEVOTHYROXINE SODIUM 112 MCG: 50 TABLET ORAL at 05:07

## 2019-07-20 RX ADMIN — HYDROCORTISONE SODIUM SUCCINATE 25 MG: 100 INJECTION, POWDER, FOR SOLUTION INTRAMUSCULAR; INTRAVENOUS at 05:07

## 2019-07-20 RX ADMIN — TACROLIMUS 4 MG: 1 CAPSULE ORAL at 06:07

## 2019-07-20 RX ADMIN — NIFEDIPINE 30 MG: 30 TABLET, FILM COATED, EXTENDED RELEASE ORAL at 09:07

## 2019-07-20 RX ADMIN — Medication 100 MG: at 09:07

## 2019-07-20 RX ADMIN — SULFAMETHOXAZOLE AND TRIMETHOPRIM 1 TABLET: 400; 80 TABLET ORAL at 09:07

## 2019-07-20 RX ADMIN — POTASSIUM CHLORIDE 30 MEQ: 750 CAPSULE, EXTENDED RELEASE ORAL at 10:07

## 2019-07-20 RX ADMIN — PREDNISONE 15 MG: 5 TABLET ORAL at 10:07

## 2019-07-20 RX ADMIN — SODIUM BICARBONATE 650 MG TABLET 1300 MG: at 02:07

## 2019-07-20 RX ADMIN — LIDOCAINE 1 PATCH: 50 PATCH TOPICAL at 11:07

## 2019-07-20 RX ADMIN — TACROLIMUS 3 MG: 1 CAPSULE ORAL at 09:07

## 2019-07-20 RX ADMIN — VALGANCICLOVIR 450 MG: 450 TABLET, FILM COATED ORAL at 09:07

## 2019-07-20 RX ADMIN — POTASSIUM CHLORIDE 30 MEQ: 750 CAPSULE, EXTENDED RELEASE ORAL at 11:07

## 2019-07-20 RX ADMIN — ACETAMINOPHEN 650 MG: 325 TABLET ORAL at 04:07

## 2019-07-20 RX ADMIN — SODIUM BICARBONATE 650 MG TABLET 1300 MG: at 08:07

## 2019-07-20 NOTE — PROGRESS NOTES
"[facility]  Liver Transplant  Progress Note    Patient Name: Jihan Zamudio  MRN: [id number]  Admission Date: 2019  Hospital Length of Stay: 15 days  Code Status: Full Code  Primary Care Provider: Primary Doctor No  Post-Operative Day: 45    ORGAN:   LIVER  Disease Etiology: Cirrhosis: Fatty Liver ([place])  Donor Type:    - Brain Death  [facility] High Risk:   No  Donor CMV Status:   Donor CMV Status: Positive  Donor HBcAB:   Negative  Donor HCV Status:   Negative  Donor HBV GERTRUDIS: Negative  Donor HCV GERTRUDIS: Negative  Whole or Partial: Whole Liver  Biliary Anastomosis: End to End  Arterial Anatomy: Standard  Subjective:     History of Present Illness:  Ms. Zamudio is a 48 y/o F s/p DBD OLTx (SM induction, CMV D+R+) for KESSLER cirrhosis on 19. Post transplant course significant for acute parenchymal hemorrhage within the right occipital lobe near the parieto-occipital junction measuring approximately 1.8 x 1.3 x 1.5 cm with mild surrounding vasogenic edema and localized mass effect after fall on . She was discharged home on 7 days of Keppra but nuerosurgery, completed on . She now presents to the ED with 1 day history of fever. Homehealth nurse checked patient's temperature and was noted to be elevated. Home health nurse also reported patient acting "off" with mental status change. Currently in the ED, tmax is 102.3. Patient feels well with no true complaints expect rhinorrhea. She denies chills, diarrhea, nausea/vomiting, abdominal pain. Denies sick contacts. Will admit for infectious work up (blood cx, UA, urine cx, CMV PCR, chest x-ray, CT A/P). Patient currently with no altered mental status. In light of recent hx of occipital hemorrhage, will obtain CT head without contrast.      Hospital Course:  48 y/o F s/p OLTx for KESSLER cirrhosis on  who was admitted on  for fevers. Pt febrile on admission, started on bx abx. ID consulted. Blood cultures NGTD, UA negative. CMV PCR drawn " on admit pending. Pt having loose stools, C diff EIA negative, CMV undetected. CT head showed reduction in size of hemorrhage. Abdominal CT reviewed and with moderate right pleural effusion. CXR with increased pleural effusion. Thora and para done 7/9; amount of fluid removed not recorded and pleural fluid labs were not completed as ordered. Abdominal gram stain reveals no WBC and no organisms; cell count not completed as ordered, cultures pending. TTE done 7/9 WNL, no vegetations. Pt tachy and tachypneic on 7/9; 1 L bolus given and pt responded well. 1 u pRBC transfused 7/9 for low H/H. Pt continued to have diarrhea, additional stool studies pending. Mentation continued to wax and wane, MRI w/ and w/o contrast done on 7/11 which revealed stable findings compared to prior. Pt transitioned to IV abx to PO cefpodoxime and flagyl on 7/11 per ID recs. Urine culture from 7/11 resulted as positive for VRE, thus cefpodoxime and flagyl stopped and pt started on zyvox on 7/12. Pt with worsening MS on 7/13 and claimed she had an unwitnessed seizure. STAT CT head without acute change. Keppra 500 mg bid started. Prograf and lovenox dc'ed. Stopped zyvox due to potential to lower seizure threshold and started IV daptomycin. IV cefepime restarted. Zyprexa qhs started for agitation and anxiety. Pt continued to have fevers despite therapeutic IV abx for VRE UTI. Viral and fungal labs pending per ID recs. 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  cc q8h started. Pt with persistent AMS, thus psych consulted who believe she is suffering from multifactorial delirium. PO thiamine and delirium precautions started. Unable to perform LP as unsafe due to ICH. CT a/p repeated on 7/15, revealed R pleural effusion, moderate ascites, and fluid in adam hepatis; no walled off fluid collections suggestive of abscess. Para repeated on 7/16 as labs not collected during para done on 7/9. Para labs on 7/16 negative for  infection. Empiric cefepime and acyclovir stopped on 7/17. Prograf restarted 7/17. LFTs mildly elevated while pt off of prograf for supposed seizures, so liver US obtained on 7/18 which revealed persistently elevated RIs, normal waveforms, stable L hepatic lobe transplant fluid collection. US reviewed by surgeon, no interventions indicated at this time. Hydrocortisone weaned off and keppra stopped 7/19. Mentation greatly improved by 7/16. Zyprexa weaned off per psych recs, and mental status remained stable. Started procardia for HTN on 7/19.    Interval history: No acute events overnight. Patient feeling fine this morning. Reports increased BMs past 24 hours but denies watery diarrhea. Mentation remains improved. Remains afebrile.  Awaiting insurance approval for rehab. Continue to monitor.    Scheduled Meds:   levothyroxine  112 mcg Oral Before breakfast    lidocaine  1 patch Transdermal Q24H    magnesium oxide  400 mg Oral Daily    NIFEdipine  30 mg Oral Daily    predniSONE  15 mg Oral Daily    Followed by    [START ON 7/27/2019] predniSONE  10 mg Oral Daily    Followed by    [START ON 8/3/2019] predniSONE  5 mg Oral Daily    sodium bicarbonate  1,300 mg Oral TID    sulfamethoxazole-trimethoprim 400-80mg  1 tablet Oral Daily    tacrolimus  4 mg Oral BID    thiamine  100 mg Oral Daily    valGANciclovir  450 mg Oral Daily     Continuous Infusions:  PRN Meds:acetaminophen, calcium carbonate, dextrose 50%, dextrose 50%, glucagon (human recombinant), glucose, glucose, hydrALAZINE, insulin aspart U-100, iohexol, ondansetron, sodium chloride 0.9%    Review of Systems   Constitutional: Positive for activity change and appetite change. Negative for chills and fever.   HENT: Negative for congestion and facial swelling.    Eyes: Negative for pain, discharge and visual disturbance.   Respiratory: Negative for cough, chest tightness, shortness of breath and wheezing.    Cardiovascular: Negative for chest pain,  palpitations and leg swelling.   Gastrointestinal: Negative for abdominal distention, abdominal pain, diarrhea, nausea and vomiting.   Endocrine: Negative.    Genitourinary: Negative for decreased urine volume, difficulty urinating and dysuria.   Skin: Positive for wound (chevron well healed).   Allergic/Immunologic: Positive for immunocompromised state.   Neurological: Positive for weakness. Negative for tremors, seizures, speech difficulty and light-headedness.   Psychiatric/Behavioral: Positive for decreased concentration and dysphoric mood. Negative for agitation and confusion. The patient is not nervous/anxious.      Objective:     Vital Signs (Most Recent):  Temp: 97.8 °F (36.6 °C) (07/20/19 1149)  Pulse: 91 (07/20/19 1149)  Resp: 18 (07/20/19 1149)  BP: (!) 161/76 (07/20/19 1149)  SpO2: 98 % (07/20/19 1149) Vital Signs (24h Range):  Temp:  [97.8 °F (36.6 °C)-98.8 °F (37.1 °C)] 97.8 °F (36.6 °C)  Pulse:  [] 91  Resp:  [16-20] 18  SpO2:  [95 %-98 %] 98 %  BP: (146-163)/(76-78) 161/76     Weight: 91.4 kg (201 lb 8 oz)  Body mass index is 34.59 kg/m².    Intake/Output - Last 3 Shifts       07/18 0700 - 07/19 0659 07/19 0700 - 07/20 0659 07/20 0700 - 07/21 0659    P.O. 800 1280 180    IV Piggyback       Total Intake(mL/kg) 800 (9.4) 1280 (14) 180 (2)    Urine (mL/kg/hr) 550 (0.3) 350 (0.2) 200 (0.3)    Stool 0 0 0    Total Output 550 350 200    Net +250 +930 -20           Urine Occurrence 2 x 4 x     Stool Occurrence 2 x 4 x 1 x          Physical Exam      Laboratory:  Immunosuppressants         Stop Route Frequency     tacrolimus capsule 4 mg      -- Oral 2 times daily        CBC:   Recent Labs   Lab 07/20/19  0655   WBC 5.19   RBC 2.65*   HGB 7.7*   HCT 23.9*      MCV 90   MCH 29.1   MCHC 32.2     CMP:   Recent Labs   Lab 07/20/19  0655   GLU 75   CALCIUM 7.9*   ALBUMIN 2.0*   PROT 4.7*      K 3.2*   CO2 16*   *   BUN 19   CREATININE 0.5   ALKPHOS 256*   ALT 34   AST 39   BILITOT 0.4      Labs within the past 24 hours have been reviewed.    Diagnostic Results:  US Liver Transplant Post:    Results for orders placed during the hospital encounter of 07/05/19   US Liver Transplant Post    Narrative EXAMINATION:  US LIVER TRANSPLANT POST    CLINICAL HISTORY:  s/p OLTx with elevated AlkP and LFTs;    TECHNIQUE:  Limited abdominal ultrasound of the transplant liver with Doppler evaluation.  Color and spectral Doppler were performed.    COMPARISON:  Ultrasound liver transplant 06/24/2019.    FINDINGS:  Patient is status post orthotopic liver transplant 6 weeks.  Liver allograft is stable in size, measuring 21.0 cm.  The liver demonstrates homogeneous echotexture.  No focal hepatic lesions are seen.  There is a left hepatic lobe peritransplant fluid collection measuring 3.6 x 2.6 x 4.2 cm (previously 4.1 x 3.9 x 4.8 cm).    The common duct is not dilated, measuring 4 mm.  No dilated intrahepatic radicles are seen.    Color flow and spectral waveform analysis was performed.  The main portal vein, right portal vein, left portal vein, middle hepatic vein, right hepatic vein, left hepatic vein, and IVC are patent with proper directional flow.    Anastomosis site of the main hepatic artery demonstrates a peak systolic velocity 191 cm/sec.    Main hepatic artery demonstrates resistive index 0.89 with normal waveform.    Left hepatic artery shows resistive index 0.91 with normal waveform.    Anterior branch of the right hepatic artery demonstrates resistive index 0.81 with normal waveform.    Posterior branch of the right hepatic artery demonstrates resistive index 0.75 with normal waveform.      Impression Persistent elevation of the hepatic arterial resistive indices with preservation of waveforms and normal main hepatic arterial peak systolic velocity.  Otherwise satisfactory Doppler evaluation of the liver allograft.  Continued follow up is recommended.    Minimally improved left hepatic lobe peritransplant  "fluid collection.    Electronically signed by resident: Yang Romero  Date:    07/18/2019  Time:    14:49    Electronically signed by: Edd Swanson MD  Date:    07/18/2019  Time:    15:15       Debility/Functional status: Patient debilitated by evidence of Muscle wasting and atrophy, Weakness, Chronic fatigue, unspecified, Limitation of activities due to disability and Other reduced mobility. Physical and occupational therapy ordered daily to evaluate and treat. Debility was: present on admission.    Assessment/Plan:     * Seizure-like activity  - pt seen sitting in chair AM of 7/13 stating "I just had a seizure, I just finished banging my head on the chair."  - pt couldn't recall events surrounding seizure like activity.  - given recent hx of fall with ICH on 6/24, Neuro consulted in which pt had a very inconsistent neuro exam.   - Keppra 500 mg bid restarted and a STAT CT head performed which did not show a new infarct or bleed.  - dc'd Lovenox.   - prograf held for now. Started hydrocortisone.   - seizures likely psychogenic in nature as pt able to stop shaking to answer questions  - restarted prograf on 7/17  - stop keppra 7/19  - Monitor.       Impaired functional mobility and endurance        Altered mental status        Pressure injury of buttock, stage 1  - wound care following, appreciate recs      Delirium  - pt with intermittent disorientation to situation throughout stay  - CT head on 7/5 and 7/13 with no acute change; MRI head w/ contrast on 7/11 with no acute change  - ammonia WNL  - pt with acute personality change on 7/13- throwing items at nurses and agitated  - zyprexa qhs started 7/13; PRN zyprexa added 7/16  - psych consulted 7/15; believe she is suffering from multifactorial delirium 2/2 infection & ICH  - unable to do LP as unsafe at the moment due to recent ICH  - PO thiamine started.   - Delirium precautions started.   - mentation greatly improved since 7/17  - decreased zyprexa qhs from 5 " mg to 2.5 mg per psych recs  - will stop zyprexa 7/20  - continue to monitor      UTI (urinary tract infection) due to Enterococcus  - UA 7/5 unremarkable.  - urine culture 7/11 positive for enterococcus faecium- susceptibilities pending.  - dc'd cefpodoxime and flagyl on 7/12.  - started Zyvox on 7/12.  - ID transitioned Zyvox to Dapto on 7/13 as Zyvox can lower the seizure threshold.  - stop dapto 7/18  - continue to monitor.    Hypokalemia  - replace as needed.  - monitor.       Fever  - Infectious work up ordered on admit - blood cx NGTD, UA unremarkable, CMV PCR 7/5 undetected, chest x-ray w right pleural effusion, CT A/P reviewed.  - Started broad spectrum antibiotics on admit.   - Abdominal and head CT reviewed and no clear source for fever.   - ID consulted.  Apprec recs.   - Pt with intermittent confusion.  Easily re oriented.  Continue to monitor.   - Thora and para done 7/9; amount of fluid removed not recorded and pleural fluid labs were not completed as ordered.   - Abdominal gram stain reveals no WBC and no organisms; cell count not completed as ordered; pleural fluid cell count 84 WBCs, 24% segs.  - Pt still reports diarrhea- C diff negative, CMV undetected. Stool culture, WBC, ova cysts parasites, and GI pathogen panel pending.   - low grade fever 7/12, 100.8.  - deescalated abx to PO cefpodoxime and flagyl on 7/12.  - MRI head w/ and w/o contrast 7/12 without acute changes.  - Urine culture positive for enterococcus faecium- started Zyvox and dc'd cefpodoxime and flagyl 7/12.  - temp 100.4 on 7/13.  - Zyvox changed to Dapto as pt with seizure like activity earlier in morning and zyvox can lower seizure threshold.  - repeat blood cultures ngtd.  - ammonia level wnl.   - pt also with confusion, AMS. D/w ID - restarted Cefepime.  - procalcitonin, lactate, EBV, RPR, fungitell, aspergillus, HIV, Saadia Delacruz, histoplasma antigen and blastomyces antigen and CMV PCR pending.  - Start Acyclovir treatment  dose for possible viral meningitis.   - repeat blood cx on 7/13 and 7/14 remain NGTD. Last fever 102.3 F on 7/14 at 9 AM  - CT a/p 7/15 continued to redemonstrate R pleural effusion, moderate ascites, and fluid in adam hepatis; no walled off fluid collections suggestive of abscess.   - paracentesis done 7/16 to reassess for infection as cell count and other labs not sent off when pt underwent para on 7/9. Abdominal fluid labs negative for infection  - Stop empiric cefepime 7/17  -daptomycin x 5 days for VRE UTI, ended on 7/18  -Stopped acyclovir, restarted prophylactic valcyte.   -continue to monitor      Traumatic intracranial hemorrhage without loss of consciousness  - Head CT reviewed and noted with interval reduction in size of the patient's known right occipital lobe parenchymal hemorrhage.      At risk for opportunistic infections  - cont bactrim for PCP prophylaxis.  - (CMV D+/R+) hold Valcyte.   - Acyclovir started 7/14  - stopped acyclovir and restarted valcyte on 7/17      Closed head injury  - admitted recently following fall diagnosed w acute parenchymal hemorrhage within right occipital live near parieto-occipital junction 1.8x1.3x1.5 w mild surrounding vasogenic edema and localized mass.  Repeat CT head showed reduction in size of hemorrhage.  - pt denies HA.      Long-term use of immunosuppressant medication  - holding prograf 7/13 due to seizure like activity.  - Holding MMF. Monitor prograf level daily, monitor for toxic side effects, and adjust for therapeutic dose.   - hydrocortisone 7/14.   - restarted prograf 7/17  - wean hydrocortisone to 25 mg qd. Stop hydrocort and start pred taper 7/20    Prophylactic immunotherapy  - See long term use of immunosuppression.  Decreased as likely w sepsis.    S/P liver transplant  - LFTs stable.  - Pt with good hepatic allograft function.   - Last Liver US 6/24 showed Mildly elevated hepatic arterial resistive indices, although improved from prior exam.   Otherwise, satisfactory vascular appearance of the liver, complex fluid collection adjacent to the left hepatic lobe, similar to slightly smaller compared to prior exam, small volume of ascites and partially visualized right pleural effusion.   - para completed 7/9, cultures pending. Cell count not collected as ordered.   - para repeated 7/16- abdominal fluid labs negative for infection  - LFTs and AlkP creeping up over past 4 days- likely due to holding prograf for seizures  - liver US on 7/18 revealed persistently elevated RIs, normal waveforms, stable L hepatic lobe transplant fluid collection. US reviewed by surgeon, no interventions indicated at this time.    Anemia of chronic disease  - no overt bleeding.    - keep type and screen current.  - transfused 1u prbc 7/9.  - monitor with daily labs.      Pleural effusion, right  - CXR reviewed.    - Thora done 7/9  - pleural fluid labs not completed as ordered- will try to get labs repeated if specimen is still available.  - cell count: 84 WBCs, 24% segs      Weakness  - PT/OT consulted.    - recommending inpatient rehab upon discharge  - PMR consulted  - awaiting insurance approval to go to inpatient rehab    Moderate malnutrition  - supplements ordered.      GERD (gastroesophageal reflux disease)  - cont Pepcid.          VTE Risk Mitigation (From admission, onward)        Ordered     Place sequential compression device  Until discontinued      07/05/19 1659     IP VTE HIGH RISK PATIENT  Once      07/05/19 1659          The patients clinical status was discussed at multidisplinary rounds, involving transplant surgery, transplant medicine, pharmacy, nursing, nutrition, and social work    Discharge Planning: Not a candidate for discharge at this time.   No Patient Care Coordination Note on file.      Lisa Andrew PA-C  Liver Transplant  [facility]

## 2019-07-20 NOTE — PT/OT/SLP EVAL
Speech Language Pathology Evaluation  Cognitive Communication    Patient Name:  Jihan Zamudio   MRN:  [id number]  Admitting Diagnosis: Seizure-like activity    Recommendations:     Recommendations:                General Recommendations:  Cognitive-linguistic therapy    General Precautions: Standard, fall  Communication strategies:  none    History:     Past Medical History:   Diagnosis Date    Cirrhosis     Esophageal varices 2018    Small with no banding     Essential hypertension 2018    Fatty liver 2018    GERD (gastroesophageal reflux disease)     Hx of colonic polyps 2018    On colonoscopy     Hypertension     Hypothyroidism 2018    Kidney stones     Morbid obesity 2018    Lap band with subsequent release    KADEEM (obstructive sleep apnea) 2018    Osteoarthritis 2018    Pulmonary nodule 2018    Vitamin D deficiency 2018       Past Surgical History:   Procedure Laterality Date     SECTION      TIMES 2     CHOLECYSTECTOMY      LAPAROSCOPIC     CYSTOSCOPY W/ STONE MANIPULATION      kidney stone removal    EGD (ESOPHAGOGASTRODUODENOSCOPY) N/A 2019    Performed by [facility]ian [facility]dez [facility] at [facility] (2ND FLR)    LAPAROSCOPIC GASTRIC BANDING  2006    removal     LIVER BIOPSY  2017    KESSLER with bridging 17    TRANSPLANT, LIVER N/A 2019    Performed by Clemente Brown Jr., MD at [facility] OR 2ND FLR    TRANSPLANT, LIVER N/A 2019    Performed by Clemente Brown Jr., MD at [facility] OR 2ND FLR       Social History: Patient lives with spouse.      Prior diet: Regular/thin.      Subjective     Awake/alert      Pain/Comfort:  · Pain Rating 1: 0/10  · Pain Rating Post-Intervention 1: 0/10    Objective:   Cognitive Status:    Orientation Oriented x4  Memory Immediate Recall 5 numbers/4 words  Problem Solving 100% reasoning, Sequencing 4/4 word set and Compare/contrast 100%      Receptive Language:  "  Comprehension:      Questions Complex yes/no 100%  Commands  complex/abstract commands 100%    Pragmatics:    WFL    Expressive Language:  Verbal:    Verbal language skills were wfl with no evidence of aphasia.  Pt. Expressed their thoughts coherently in conversation with no evidence of confusion or word finding deficits  Pt's spouse raised concerns for pt sometimes speaking "in baby voice." Not noted during evaluation. SLP will follow up for ongoing assessment.   Nonverbal:   WFL      Motor Speech:  WFL    Voice:   WFL    Visual-Spatial:  WFL    Reading:   WFL     Written Expression:   WFL        Assessment:   Jihan Zamudio is a 49 y.o. female with an SLP diagnosis of Cognitive-Linguistic Impairment.      Goals:   Multidisciplinary Problems     SLP Goals     Not on file                Plan:   · Patient to be seen:  2 x/week   · Plan of Care reviewed with:  patient, spouse   · SLP Follow-Up:  Yes       Discharge recommendations:   rehab      Time Tracking:   SLP Treatment Date:   07/20/19  Speech Start Time:  1115  Speech Stop Time:  1130     Speech Total Time (min):  15 min    Billable Minutes: Curly 15     Yamile Costa CCC-SLP  07/20/2019         "

## 2019-07-20 NOTE — ASSESSMENT & PLAN NOTE
- holding prograf 7/13 due to seizure like activity.  - Holding MMF. Monitor prograf level daily, monitor for toxic side effects, and adjust for therapeutic dose.   - hydrocortisone 7/14.   - restarted prograf 7/17  - wean hydrocortisone to 25 mg qd. Stop hydrocort and start pred taper 7/20

## 2019-07-20 NOTE — PLAN OF CARE
Problem: Adult Inpatient Plan of Care  Goal: Plan of Care Review  Outcome: Ongoing (interventions implemented as appropriate)  Pt is AAOX4. Ambulates in room and to BR with SBA.  at bedside and assist with ADL's.    -CBGs AC/HS, appetite good.  -2 BMs so far this shift, soft and brown  -voids spontaneously, contact iso for VRE in urine, abx therapy completed  -incision to abdomen healed  -lidocaine placed on lower back, barrier cream applied to sacrum, pt spends most of day OOB in recliner  -encouraged pt to elevate BLE, +2 edema noted  -replaced K  -transitioned to pred taper  -LS clear, VSS, pt sats adequate on room air  -d/c to rehab pending insurance approval  -denies pain   -fall precautions in place, call bell in reach

## 2019-07-20 NOTE — ASSESSMENT & PLAN NOTE
- pt with intermittent disorientation to situation throughout stay  - CT head on 7/5 and 7/13 with no acute change; MRI head w/ contrast on 7/11 with no acute change  - ammonia WNL  - pt with acute personality change on 7/13- throwing items at nurses and agitated  - zyprexa qhs started 7/13; PRN zyprexa added 7/16  - psych consulted 7/15; believe she is suffering from multifactorial delirium 2/2 infection & ICH  - unable to do LP as unsafe at the moment due to recent ICH  - PO thiamine started.   - Delirium precautions started.   - mentation greatly improved since 7/17  - decreased zyprexa qhs from 5 mg to 2.5 mg per psych recs  - will stop zyprexa 7/20  - continue to monitor

## 2019-07-20 NOTE — PLAN OF CARE
Problem: Adult Inpatient Plan of Care  Goal: Plan of Care Review  Outcome: Ongoing (interventions implemented as appropriate)  Pt is AAOx4 in bed wearing non-skid footwear, bed in low/locked position and with call bell within reach. Pt reminded to use call bell to call for assistance, pt verbalizes understanding. Patient ambulated around unit twice earlier in shift. Pt is afebrile at this time. Proper hand hygiene performed before and after pt care activities. Lidocaine patch removed from lower back per orders. Denies any pain or discomfort at this time.

## 2019-07-20 NOTE — SUBJECTIVE & OBJECTIVE
Scheduled Meds:   levothyroxine  112 mcg Oral Before breakfast    lidocaine  1 patch Transdermal Q24H    magnesium oxide  400 mg Oral Daily    NIFEdipine  30 mg Oral Daily    predniSONE  15 mg Oral Daily    Followed by    [START ON 7/27/2019] predniSONE  10 mg Oral Daily    Followed by    [START ON 8/3/2019] predniSONE  5 mg Oral Daily    sodium bicarbonate  1,300 mg Oral TID    sulfamethoxazole-trimethoprim 400-80mg  1 tablet Oral Daily    tacrolimus  4 mg Oral BID    thiamine  100 mg Oral Daily    valGANciclovir  450 mg Oral Daily     Continuous Infusions:  PRN Meds:acetaminophen, calcium carbonate, dextrose 50%, dextrose 50%, glucagon (human recombinant), glucose, glucose, hydrALAZINE, insulin aspart U-100, iohexol, ondansetron, sodium chloride 0.9%    Review of Systems   Constitutional: Positive for activity change and appetite change. Negative for chills and fever.   HENT: Negative for congestion and facial swelling.    Eyes: Negative for pain, discharge and visual disturbance.   Respiratory: Negative for cough, chest tightness, shortness of breath and wheezing.    Cardiovascular: Negative for chest pain, palpitations and leg swelling.   Gastrointestinal: Negative for abdominal distention, abdominal pain, diarrhea, nausea and vomiting.   Endocrine: Negative.    Genitourinary: Negative for decreased urine volume, difficulty urinating and dysuria.   Skin: Positive for wound (chevron well healed).   Allergic/Immunologic: Positive for immunocompromised state.   Neurological: Positive for weakness. Negative for tremors, seizures, speech difficulty and light-headedness.   Psychiatric/Behavioral: Positive for decreased concentration and dysphoric mood. Negative for agitation and confusion. The patient is not nervous/anxious.      Objective:     Vital Signs (Most Recent):  Temp: 97.8 °F (36.6 °C) (07/20/19 1149)  Pulse: 91 (07/20/19 1149)  Resp: 18 (07/20/19 1149)  BP: (!) 161/76 (07/20/19 1149)  SpO2: 98 %  (07/20/19 1149) Vital Signs (24h Range):  Temp:  [97.8 °F (36.6 °C)-98.8 °F (37.1 °C)] 97.8 °F (36.6 °C)  Pulse:  [] 91  Resp:  [16-20] 18  SpO2:  [95 %-98 %] 98 %  BP: (146-163)/(76-78) 161/76     Weight: 91.4 kg (201 lb 8 oz)  Body mass index is 34.59 kg/m².    Intake/Output - Last 3 Shifts       07/18 0700 - 07/19 0659 07/19 0700 - 07/20 0659 07/20 0700 - 07/21 0659    P.O. 800 1280 180    IV Piggyback       Total Intake(mL/kg) 800 (9.4) 1280 (14) 180 (2)    Urine (mL/kg/hr) 550 (0.3) 350 (0.2) 200 (0.3)    Stool 0 0 0    Total Output 550 350 200    Net +250 +930 -20           Urine Occurrence 2 x 4 x     Stool Occurrence 2 x 4 x 1 x          Physical Exam      Laboratory:  Immunosuppressants         Stop Route Frequency     tacrolimus capsule 4 mg      -- Oral 2 times daily        CBC:   Recent Labs   Lab 07/20/19  0655   WBC 5.19   RBC 2.65*   HGB 7.7*   HCT 23.9*      MCV 90   MCH 29.1   MCHC 32.2     CMP:   Recent Labs   Lab 07/20/19  0655   GLU 75   CALCIUM 7.9*   ALBUMIN 2.0*   PROT 4.7*      K 3.2*   CO2 16*   *   BUN 19   CREATININE 0.5   ALKPHOS 256*   ALT 34   AST 39   BILITOT 0.4     Labs within the past 24 hours have been reviewed.    Diagnostic Results:  US Liver Transplant Post:    Results for orders placed during the hospital encounter of 07/05/19   US Liver Transplant Post    Narrative EXAMINATION:  US LIVER TRANSPLANT POST    CLINICAL HISTORY:  s/p OLTx with elevated AlkP and LFTs;    TECHNIQUE:  Limited abdominal ultrasound of the transplant liver with Doppler evaluation.  Color and spectral Doppler were performed.    COMPARISON:  Ultrasound liver transplant 06/24/2019.    FINDINGS:  Patient is status post orthotopic liver transplant 6 weeks.  Liver allograft is stable in size, measuring 21.0 cm.  The liver demonstrates homogeneous echotexture.  No focal hepatic lesions are seen.  There is a left hepatic lobe peritransplant fluid collection measuring 3.6 x 2.6 x 4.2 cm  (previously 4.1 x 3.9 x 4.8 cm).    The common duct is not dilated, measuring 4 mm.  No dilated intrahepatic radicles are seen.    Color flow and spectral waveform analysis was performed.  The main portal vein, right portal vein, left portal vein, middle hepatic vein, right hepatic vein, left hepatic vein, and IVC are patent with proper directional flow.    Anastomosis site of the main hepatic artery demonstrates a peak systolic velocity 191 cm/sec.    Main hepatic artery demonstrates resistive index 0.89 with normal waveform.    Left hepatic artery shows resistive index 0.91 with normal waveform.    Anterior branch of the right hepatic artery demonstrates resistive index 0.81 with normal waveform.    Posterior branch of the right hepatic artery demonstrates resistive index 0.75 with normal waveform.      Impression Persistent elevation of the hepatic arterial resistive indices with preservation of waveforms and normal main hepatic arterial peak systolic velocity.  Otherwise satisfactory Doppler evaluation of the liver allograft.  Continued follow up is recommended.    Minimally improved left hepatic lobe peritransplant fluid collection.    Electronically signed by resident: Yang Romero  Date:    07/18/2019  Time:    14:49    Electronically signed by: Edd Swanson MD  Date:    07/18/2019  Time:    15:15       Debility/Functional status: Patient debilitated by evidence of Muscle wasting and atrophy, Weakness, Chronic fatigue, unspecified, Limitation of activities due to disability and Other reduced mobility. Physical and occupational therapy ordered daily to evaluate and treat. Debility was: present on admission.

## 2019-07-21 LAB
ALBUMIN SERPL BCP-MCNC: 2.1 G/DL (ref 3.5–5.2)
ALP SERPL-CCNC: 227 U/L (ref 55–135)
ALT SERPL W/O P-5'-P-CCNC: 29 U/L (ref 10–44)
ANION GAP SERPL CALC-SCNC: 9 MMOL/L (ref 8–16)
ANISOCYTOSIS BLD QL SMEAR: SLIGHT
AST SERPL-CCNC: 27 U/L (ref 10–40)
BASOPHILS # BLD AUTO: ABNORMAL K/UL (ref 0–0.2)
BASOPHILS NFR BLD: 0 % (ref 0–1.9)
BILIRUB SERPL-MCNC: 0.5 MG/DL (ref 0.1–1)
BUN SERPL-MCNC: 18 MG/DL (ref 6–20)
C DIFF GDH STL QL: NEGATIVE
C DIFF TOX A+B STL QL IA: NEGATIVE
CALCIUM SERPL-MCNC: 8.1 MG/DL (ref 8.7–10.5)
CHLORIDE SERPL-SCNC: 115 MMOL/L (ref 95–110)
CO2 SERPL-SCNC: 16 MMOL/L (ref 23–29)
CREAT SERPL-MCNC: 0.5 MG/DL (ref 0.5–1.4)
DACRYOCYTES BLD QL SMEAR: ABNORMAL
DIFFERENTIAL METHOD: ABNORMAL
EOSINOPHIL # BLD AUTO: ABNORMAL K/UL (ref 0–0.5)
EOSINOPHIL NFR BLD: 0 % (ref 0–8)
ERYTHROCYTE [DISTWIDTH] IN BLOOD BY AUTOMATED COUNT: 17.4 % (ref 11.5–14.5)
EST. GFR  (AFRICAN AMERICAN): >60 ML/MIN/1.73 M^2
EST. GFR  (NON AFRICAN AMERICAN): >60 ML/MIN/1.73 M^2
GLUCOSE SERPL-MCNC: 73 MG/DL (ref 70–110)
HCT VFR BLD AUTO: 24.7 % (ref 37–48.5)
HGB BLD-MCNC: 7.8 G/DL (ref 12–16)
HYPOCHROMIA BLD QL SMEAR: ABNORMAL
IMM GRANULOCYTES # BLD AUTO: ABNORMAL K/UL (ref 0–0.04)
IMM GRANULOCYTES NFR BLD AUTO: ABNORMAL % (ref 0–0.5)
LYMPHOCYTES # BLD AUTO: ABNORMAL K/UL (ref 1–4.8)
LYMPHOCYTES NFR BLD: 4 % (ref 18–48)
MAGNESIUM SERPL-MCNC: 1.6 MG/DL (ref 1.6–2.6)
MCH RBC QN AUTO: 28.8 PG (ref 27–31)
MCHC RBC AUTO-ENTMCNC: 31.6 G/DL (ref 32–36)
MCV RBC AUTO: 91 FL (ref 82–98)
MONOCYTES # BLD AUTO: ABNORMAL K/UL (ref 0.3–1)
MONOCYTES NFR BLD: 2 % (ref 4–15)
MYELOCYTES NFR BLD MANUAL: 1 %
NEUTROPHILS NFR BLD: 93 % (ref 38–73)
NRBC BLD-RTO: 1 /100 WBC
OVALOCYTES BLD QL SMEAR: ABNORMAL
PLATELET # BLD AUTO: 148 K/UL (ref 150–350)
PLATELET BLD QL SMEAR: ABNORMAL
PMV BLD AUTO: 9.8 FL (ref 9.2–12.9)
POCT GLUCOSE: 105 MG/DL (ref 70–110)
POCT GLUCOSE: 129 MG/DL (ref 70–110)
POCT GLUCOSE: 142 MG/DL (ref 70–110)
POCT GLUCOSE: 71 MG/DL (ref 70–110)
POIKILOCYTOSIS BLD QL SMEAR: SLIGHT
POLYCHROMASIA BLD QL SMEAR: ABNORMAL
POTASSIUM SERPL-SCNC: 3.4 MMOL/L (ref 3.5–5.1)
PROT SERPL-MCNC: 4.9 G/DL (ref 6–8.4)
RBC # BLD AUTO: 2.71 M/UL (ref 4–5.4)
SODIUM SERPL-SCNC: 140 MMOL/L (ref 136–145)
TACROLIMUS BLD-MCNC: 5.4 NG/ML (ref 5–15)
WBC # BLD AUTO: 4.92 K/UL (ref 3.9–12.7)

## 2019-07-21 PROCEDURE — 63600175 PHARM REV CODE 636 W HCPCS: Performed by: PHYSICIAN ASSISTANT

## 2019-07-21 PROCEDURE — 80053 COMPREHEN METABOLIC PANEL: CPT

## 2019-07-21 PROCEDURE — 25000003 PHARM REV CODE 250: Performed by: PHYSICIAN ASSISTANT

## 2019-07-21 PROCEDURE — 99233 SBSQ HOSP IP/OBS HIGH 50: CPT | Mod: 24,,, | Performed by: PHYSICIAN ASSISTANT

## 2019-07-21 PROCEDURE — 25000003 PHARM REV CODE 250: Performed by: NURSE PRACTITIONER

## 2019-07-21 PROCEDURE — 85027 COMPLETE CBC AUTOMATED: CPT

## 2019-07-21 PROCEDURE — 83735 ASSAY OF MAGNESIUM: CPT

## 2019-07-21 PROCEDURE — 36415 COLL VENOUS BLD VENIPUNCTURE: CPT

## 2019-07-21 PROCEDURE — 80197 ASSAY OF TACROLIMUS: CPT

## 2019-07-21 PROCEDURE — 25000003 PHARM REV CODE 250: Performed by: INTERNAL MEDICINE

## 2019-07-21 PROCEDURE — 20600001 HC STEP DOWN PRIVATE ROOM

## 2019-07-21 PROCEDURE — 99233 PR SUBSEQUENT HOSPITAL CARE,LEVL III: ICD-10-PCS | Mod: 24,,, | Performed by: PHYSICIAN ASSISTANT

## 2019-07-21 PROCEDURE — 87449 NOS EACH ORGANISM AG IA: CPT

## 2019-07-21 PROCEDURE — 85007 BL SMEAR W/DIFF WBC COUNT: CPT

## 2019-07-21 RX ORDER — POTASSIUM CHLORIDE 750 MG/1
30 CAPSULE, EXTENDED RELEASE ORAL EVERY 4 HOURS
Status: CANCELLED | OUTPATIENT
Start: 2019-07-21 | End: 2019-07-21

## 2019-07-21 RX ORDER — POTASSIUM CHLORIDE 750 MG/1
30 CAPSULE, EXTENDED RELEASE ORAL
Status: COMPLETED | OUTPATIENT
Start: 2019-07-21 | End: 2019-07-21

## 2019-07-21 RX ADMIN — LIDOCAINE 1 PATCH: 50 PATCH TOPICAL at 12:07

## 2019-07-21 RX ADMIN — SULFAMETHOXAZOLE AND TRIMETHOPRIM 1 TABLET: 400; 80 TABLET ORAL at 08:07

## 2019-07-21 RX ADMIN — TACROLIMUS 4 MG: 1 CAPSULE ORAL at 05:07

## 2019-07-21 RX ADMIN — MAGNESIUM OXIDE TAB 400 MG (241.3 MG ELEMENTAL MG) 400 MG: 400 (241.3 MG) TAB at 08:07

## 2019-07-21 RX ADMIN — POTASSIUM CHLORIDE 30 MEQ: 750 CAPSULE, EXTENDED RELEASE ORAL at 12:07

## 2019-07-21 RX ADMIN — SODIUM BICARBONATE 650 MG TABLET 1300 MG: at 08:07

## 2019-07-21 RX ADMIN — SODIUM BICARBONATE 650 MG TABLET 1300 MG: at 03:07

## 2019-07-21 RX ADMIN — LEVOTHYROXINE SODIUM 112 MCG: 50 TABLET ORAL at 05:07

## 2019-07-21 RX ADMIN — Medication 100 MG: at 08:07

## 2019-07-21 RX ADMIN — PREDNISONE 15 MG: 5 TABLET ORAL at 08:07

## 2019-07-21 RX ADMIN — TACROLIMUS 4 MG: 1 CAPSULE ORAL at 08:07

## 2019-07-21 RX ADMIN — POTASSIUM CHLORIDE 30 MEQ: 750 CAPSULE, EXTENDED RELEASE ORAL at 10:07

## 2019-07-21 RX ADMIN — NIFEDIPINE 30 MG: 30 TABLET, FILM COATED, EXTENDED RELEASE ORAL at 08:07

## 2019-07-21 RX ADMIN — VALGANCICLOVIR 450 MG: 450 TABLET, FILM COATED ORAL at 08:07

## 2019-07-21 RX ADMIN — SODIUM BICARBONATE: 84 INJECTION, SOLUTION INTRAVENOUS at 12:07

## 2019-07-21 NOTE — PLAN OF CARE
Problem: Adult Inpatient Plan of Care  Goal: Plan of Care Review  Outcome: Ongoing (interventions implemented as appropriate)  Pt is AAOx4 in bed wearing non-skid footwear, bed in low/locked position and with call bell within reach. Pt reminded to use call bell to call for assistance, pt verbalizes understanding. Pt is afebrile at this time. Proper hand hygiene performed before and after pt care activities. Denies any pain or discomfort at this time.

## 2019-07-21 NOTE — PLAN OF CARE
Problem: Adult Inpatient Plan of Care  Goal: Plan of Care Review  Outcome: Ongoing (interventions implemented as appropriate)  Pt is AAOX4. Ambulates in room and to BR with SBA. Pt performed ADL's independently this shift     -CBGs AC/HS, appetite good. Education taught on eating small snacks between meals so CBG didn't get to low  -2 BMs so far this shift, soft and brown, collected and sent for c-diff testing: negative  -voids spontaneously, contact iso for VRE in urine, abx therapy completed  -incision to abdomen healed  -lidocaine placed on lower back, barrier cream applied to sacrum, pt spends most of day OOB in recliner  -encouraged pt to elevate BLE, +2 edema noted  -replaced K  -started on sodium bicarb gtt  -transitioned to pred taper  -LS clear, VSS, pt sats adequate on room air  -d/c to rehab pending insurance approval  -denies pain   -fall precautions in place, call bell in reach

## 2019-07-21 NOTE — PROGRESS NOTES
"[facility]  Liver Transplant  Progress Note    Patient Name: Jihan Zamudio  MRN: [id number]  Admission Date: 2019  Hospital Length of Stay: 16 days  Code Status: Full Code  Primary Care Provider: Primary Doctor No  Post-Operative Day: 46    ORGAN:   LIVER  Disease Etiology: Cirrhosis: Fatty Liver ([place])  Donor Type:    - Brain Death  [facility] High Risk:   No  Donor CMV Status:   Donor CMV Status: Positive  Donor HBcAB:   Negative  Donor HCV Status:   Negative  Donor HBV GERTRUDIS: Negative  Donor HCV GERTRUDIS: Negative  Whole or Partial: Whole Liver  Biliary Anastomosis: End to End  Arterial Anatomy: Standard  Subjective:     History of Present Illness:  Ms. 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 Post transplant course significant for acute parenchymal hemorrhage within the right occipital lobe near the parieto-occipital junction measuring approximately 1.8 x 1.3 x 1.5 cm with mild surrounding vasogenic edema and localized mass effect after fall on . She was discharged home on 7 days of Keppra but nuerosurgery, completed on . She now presents to the ED with 1 day history of fever. Homehealth nurse checked patient's temperature and was noted to be elevated. Home health nurse also reported patient acting "off" with mental status change. Currently in the ED, tmax is 102.3. Patient feels well with no true complaints expect rhinorrhea. She denies chills, diarrhea, nausea/vomiting, abdominal pain. Denies sick contacts. Will admit for infectious work up (blood cx, UA, urine cx, CMV PCR, chest x-ray, CT A/P). Patient currently with no altered mental status. In light of recent hx of occipital hemorrhage, will obtain CT head without contrast.      Hospital Course:  50 y/o F s/p OLTx for KESSLER cirrhosis on  who was admitted on  for fevers. Pt febrile on admission, started on bx abx. ID consulted. Blood cultures NGTD, UA negative. CMV PCR drawn " on admit pending. Pt having loose stools, C diff EIA negative, CMV undetected. CT head showed reduction in size of hemorrhage. Abdominal CT reviewed and with moderate right pleural effusion. CXR with increased pleural effusion. Thora and para done 7/9; amount of fluid removed not recorded and pleural fluid labs were not completed as ordered. Abdominal gram stain reveals no WBC and no organisms; cell count not completed as ordered, cultures pending. TTE done 7/9 WNL, no vegetations. Pt tachy and tachypneic on 7/9; 1 L bolus given and pt responded well. 1 u pRBC transfused 7/9 for low H/H. Pt continued to have diarrhea, additional stool studies pending. Mentation continued to wax and wane, MRI w/ and w/o contrast done on 7/11 which revealed stable findings compared to prior. Pt transitioned to IV abx to PO cefpodoxime and flagyl on 7/11 per ID recs. Urine culture from 7/11 resulted as positive for VRE, thus cefpodoxime and flagyl stopped and pt started on zyvox on 7/12. Pt with worsening MS on 7/13 and claimed she had an unwitnessed seizure. STAT CT head without acute change. Keppra 500 mg bid started. Prograf and lovenox dc'ed. Stopped zyvox due to potential to lower seizure threshold and started IV daptomycin. IV cefepime restarted. Zyprexa qhs started for agitation and anxiety. Pt continued to have fevers despite therapeutic IV abx for VRE UTI. Viral and fungal labs pending per ID recs. 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  cc q8h started. Pt with persistent AMS, thus psych consulted who believe she is suffering from multifactorial delirium. PO thiamine and delirium precautions started. Unable to perform LP as unsafe due to ICH. CT a/p repeated on 7/15, revealed R pleural effusion, moderate ascites, and fluid in adam hepatis; no walled off fluid collections suggestive of abscess. Para repeated on 7/16 as labs not collected during para done on 7/9. Para labs on 7/16 negative for  infection. Empiric cefepime and acyclovir stopped on 7/17. Prograf restarted 7/17. LFTs mildly elevated while pt off of prograf for supposed seizures, so liver US obtained on 7/18 which revealed persistently elevated RIs, normal waveforms, stable L hepatic lobe transplant fluid collection. US reviewed by surgeon, no interventions indicated at this time. Hydrocortisone weaned off and keppra stopped 7/19. Mentation greatly improved by 7/16. Zyprexa weaned off per psych recs, and mental status remained stable. Started procardia for HTN on 7/19.    Interval history: No acute events overnight. Patient feeling fine this morning. With continued BMs. More watery today. Will monitor for watery diarrhea today and if occurs, will check for c diff. Will start IVF for hydration  Remains afebrile.  Awaiting insurance approval for rehab. Continue to monitor.    Scheduled Meds:   levothyroxine  112 mcg Oral Before breakfast    lidocaine  1 patch Transdermal Q24H    magnesium oxide  400 mg Oral Daily    NIFEdipine  30 mg Oral Daily    potassium chloride  30 mEq Oral Q2H    predniSONE  15 mg Oral Daily    Followed by    [START ON 7/27/2019] predniSONE  10 mg Oral Daily    Followed by    [START ON 8/3/2019] predniSONE  5 mg Oral Daily    sodium bicarbonate  1,300 mg Oral TID    sulfamethoxazole-trimethoprim 400-80mg  1 tablet Oral Daily    tacrolimus  4 mg Oral BID    thiamine  100 mg Oral Daily    valGANciclovir  450 mg Oral Daily     Continuous Infusions:  PRN Meds:acetaminophen, calcium carbonate, dextrose 50%, dextrose 50%, glucagon (human recombinant), glucose, glucose, hydrALAZINE, insulin aspart U-100, ondansetron, sodium chloride 0.9%    Review of Systems   Constitutional: Positive for activity change and appetite change. Negative for chills and fever.   HENT: Negative for congestion and facial swelling.    Eyes: Negative for pain, discharge and visual disturbance.   Respiratory: Negative for cough, chest  tightness, shortness of breath and wheezing.    Cardiovascular: Negative for chest pain, palpitations and leg swelling.   Gastrointestinal: Positive for diarrhea. Negative for abdominal distention, abdominal pain, nausea and vomiting.   Endocrine: Negative.    Genitourinary: Negative for decreased urine volume, difficulty urinating and dysuria.   Skin: Positive for wound (chevron well healed).   Allergic/Immunologic: Positive for immunocompromised state.   Neurological: Positive for weakness. Negative for tremors, seizures, speech difficulty and light-headedness.   Psychiatric/Behavioral: Positive for decreased concentration and dysphoric mood. Negative for agitation and confusion. The patient is not nervous/anxious.      Objective:     Vital Signs (Most Recent):  Temp: 98.6 °F (37 °C) (07/21/19 0721)  Pulse: 99 (07/21/19 0721)  Resp: 14 (07/21/19 0721)  BP: (!) 144/71 (07/21/19 0721)  SpO2: 99 % (07/21/19 0721) Vital Signs (24h Range):  Temp:  [97.8 °F (36.6 °C)-98.6 °F (37 °C)] 98.6 °F (37 °C)  Pulse:  [] 99  Resp:  [14-18] 14  SpO2:  [93 %-99 %] 99 %  BP: (131-161)/(69-77) 144/71     Weight: 91.4 kg (201 lb 8 oz)  Body mass index is 34.59 kg/m².    Intake/Output - Last 3 Shifts       07/19 0700 - 07/20 0659 07/20 0700 - 07/21 0659 07/21 0700 - 07/22 0659    P.O. 1280 860 480    Total Intake(mL/kg) 1280 (14) 860 (9.4) 480 (5.3)    Urine (mL/kg/hr) 350 (0.2) 650 (0.3)     Stool 0 0     Total Output 350 650     Net +930 +210 +480           Urine Occurrence 4 x 2 x     Stool Occurrence 4 x 2 x 1 x          Physical Exam      Laboratory:  Immunosuppressants         Stop Route Frequency     tacrolimus capsule 4 mg      -- Oral 2 times daily        CBC:   Recent Labs   Lab 07/21/19 0708   WBC 4.92   RBC 2.71*   HGB 7.8*   HCT 24.7*   *   MCV 91   MCH 28.8   MCHC 31.6*     CMP:   Recent Labs   Lab 07/21/19  0708   GLU 73   CALCIUM 8.1*   ALBUMIN 2.1*   PROT 4.9*      K 3.4*   CO2 16*   *   BUN  18   CREATININE 0.5   ALKPHOS 227*   ALT 29   AST 27   BILITOT 0.5     Labs within the past 24 hours have been reviewed.    Diagnostic Results:  US Liver Transplant Post:    Results for orders placed during the hospital encounter of 07/05/19   US Liver Transplant Post    Narrative EXAMINATION:  US LIVER TRANSPLANT POST    CLINICAL HISTORY:  s/p OLTx with elevated AlkP and LFTs;    TECHNIQUE:  Limited abdominal ultrasound of the transplant liver with Doppler evaluation.  Color and spectral Doppler were performed.    COMPARISON:  Ultrasound liver transplant 06/24/2019.    FINDINGS:  Patient is status post orthotopic liver transplant 6 weeks.  Liver allograft is stable in size, measuring 21.0 cm.  The liver demonstrates homogeneous echotexture.  No focal hepatic lesions are seen.  There is a left hepatic lobe peritransplant fluid collection measuring 3.6 x 2.6 x 4.2 cm (previously 4.1 x 3.9 x 4.8 cm).    The common duct is not dilated, measuring 4 mm.  No dilated intrahepatic radicles are seen.    Color flow and spectral waveform analysis was performed.  The main portal vein, right portal vein, left portal vein, middle hepatic vein, right hepatic vein, left hepatic vein, and IVC are patent with proper directional flow.    Anastomosis site of the main hepatic artery demonstrates a peak systolic velocity 191 cm/sec.    Main hepatic artery demonstrates resistive index 0.89 with normal waveform.    Left hepatic artery shows resistive index 0.91 with normal waveform.    Anterior branch of the right hepatic artery demonstrates resistive index 0.81 with normal waveform.    Posterior branch of the right hepatic artery demonstrates resistive index 0.75 with normal waveform.      Impression Persistent elevation of the hepatic arterial resistive indices with preservation of waveforms and normal main hepatic arterial peak systolic velocity.  Otherwise satisfactory Doppler evaluation of the liver allograft.  Continued follow up is  "recommended.    Minimally improved left hepatic lobe peritransplant fluid collection.    Electronically signed by resident: Yang Romero  Date:    07/18/2019  Time:    14:49    Electronically signed by: Edd Swanson MD  Date:    07/18/2019  Time:    15:15       Debility/Functional status: Patient debilitated by evidence of Muscle wasting and atrophy, Weakness, Chronic fatigue, unspecified, Limitation of activities due to disability and Other reduced mobility. Physical and occupational therapy ordered daily to evaluate and treat. Debility was: present on admission.    Assessment/Plan:     * Seizure-like activity  - pt seen sitting in chair AM of 7/13 stating "I just had a seizure, I just finished banging my head on the chair."  - pt couldn't recall events surrounding seizure like activity.  - given recent hx of fall with ICH on 6/24, Neuro consulted in which pt had a very inconsistent neuro exam.   - Keppra 500 mg bid restarted and a STAT CT head performed which did not show a new infarct or bleed.  - dc'd Lovenox.   - prograf held for now. Started hydrocortisone.   - seizures likely psychogenic in nature as pt able to stop shaking to answer questions  - restarted prograf on 7/17  - stop keppra 7/19  - Monitor.       Impaired functional mobility and endurance        Altered mental status        Pressure injury of buttock, stage 1  - wound care following, appreciate recs      Delirium  - pt with intermittent disorientation to situation throughout stay  - CT head on 7/5 and 7/13 with no acute change; MRI head w/ contrast on 7/11 with no acute change  - ammonia WNL  - pt with acute personality change on 7/13- throwing items at nurses and agitated  - zyprexa qhs started 7/13; PRN zyprexa added 7/16  - psych consulted 7/15; believe she is suffering from multifactorial delirium 2/2 infection & ICH  - unable to do LP as unsafe at the moment due to recent ICH  - PO thiamine started.   - Delirium precautions started.   - " mentation greatly improved since 7/17  - decreased zyprexa qhs from 5 mg to 2.5 mg per psych recs  - will stop zyprexa 7/20  - continue to monitor      UTI (urinary tract infection) due to Enterococcus  - UA 7/5 unremarkable.  - urine culture 7/11 positive for enterococcus faecium- susceptibilities pending.  - dc'd cefpodoxime and flagyl on 7/12.  - started Zyvox on 7/12.  - ID transitioned Zyvox to Dapto on 7/13 as Zyvox can lower the seizure threshold.  - stop dapto 7/18  - continue to monitor.    Hypokalemia  - replace as needed.  - monitor.       Fever  - Infectious work up ordered on admit - blood cx NGTD, UA unremarkable, CMV PCR 7/5 undetected, chest x-ray w right pleural effusion, CT A/P reviewed.  - Started broad spectrum antibiotics on admit.   - Abdominal and head CT reviewed and no clear source for fever.   - ID consulted.  Apprec recs.   - Pt with intermittent confusion.  Easily re oriented.  Continue to monitor.   - Thora and para done 7/9; amount of fluid removed not recorded and pleural fluid labs were not completed as ordered.   - Abdominal gram stain reveals no WBC and no organisms; cell count not completed as ordered; pleural fluid cell count 84 WBCs, 24% segs.  - Pt still reports diarrhea- C diff negative, CMV undetected. Stool culture, WBC, ova cysts parasites, and GI pathogen panel pending.   - low grade fever 7/12, 100.8.  - deescalated abx to PO cefpodoxime and flagyl on 7/12.  - MRI head w/ and w/o contrast 7/12 without acute changes.  - Urine culture positive for enterococcus faecium- started Zyvox and dc'd cefpodoxime and flagyl 7/12.  - temp 100.4 on 7/13.  - Zyvox changed to Dapto as pt with seizure like activity earlier in morning and zyvox can lower seizure threshold.  - repeat blood cultures ngtd.  - ammonia level wnl.   - pt also with confusion, AMS. D/w ID - restarted Cefepime.  - procalcitonin, lactate, EBV, RPR, fungitell, aspergillus, HIV, Saadia Delacruz, histoplasma antigen and  blastomyces antigen and CMV PCR pending.  - Start Acyclovir treatment dose for possible viral meningitis.   - repeat blood cx on 7/13 and 7/14 remain NGTD. Last fever 102.3 F on 7/14 at 9 AM  - CT a/p 7/15 continued to redemonstrate R pleural effusion, moderate ascites, and fluid in adam hepatis; no walled off fluid collections suggestive of abscess.   - paracentesis done 7/16 to reassess for infection as cell count and other labs not sent off when pt underwent para on 7/9. Abdominal fluid labs negative for infection  - Stop empiric cefepime 7/17  -daptomycin x 5 days for VRE UTI, ended on 7/18  -Stopped acyclovir, restarted prophylactic valcyte.   -continue to monitor      Traumatic intracranial hemorrhage without loss of consciousness  - Head CT reviewed and noted with interval reduction in size of the patient's known right occipital lobe parenchymal hemorrhage.      At risk for opportunistic infections  - cont bactrim for PCP prophylaxis.  - (CMV D+/R+) hold Valcyte.   - Acyclovir started 7/14  - stopped acyclovir and restarted valcyte on 7/17      Closed head injury  - admitted recently following fall diagnosed w acute parenchymal hemorrhage within right occipital live near parieto-occipital junction 1.8x1.3x1.5 w mild surrounding vasogenic edema and localized mass.  Repeat CT head showed reduction in size of hemorrhage.  - pt denies HA.      Long-term use of immunosuppressant medication  - holding prograf 7/13 due to seizure like activity.  - Holding MMF. Monitor prograf level daily, monitor for toxic side effects, and adjust for therapeutic dose.   - hydrocortisone 7/14.   - restarted prograf 7/17  - wean hydrocortisone to 25 mg qd. Stop hydrocort and start pred taper 7/20    Prophylactic immunotherapy  - See long term use of immunosuppression.  Decreased as likely w sepsis.    S/P liver transplant  - LFTs stable.  - Pt with good hepatic allograft function.   - Last Liver US 6/24 showed Mildly elevated  hepatic arterial resistive indices, although improved from prior exam.  Otherwise, satisfactory vascular appearance of the liver, complex fluid collection adjacent to the left hepatic lobe, similar to slightly smaller compared to prior exam, small volume of ascites and partially visualized right pleural effusion.   - para completed 7/9, cultures pending. Cell count not collected as ordered.   - para repeated 7/16- abdominal fluid labs negative for infection  - LFTs and AlkP creeping up over past 4 days- likely due to holding prograf for seizures  - liver US on 7/18 revealed persistently elevated RIs, normal waveforms, stable L hepatic lobe transplant fluid collection. US reviewed by surgeon, no interventions indicated at this time.    Anemia of chronic disease  - no overt bleeding.    - keep type and screen current.  - transfused 1u prbc 7/9.  - monitor with daily labs.      Pleural effusion, right  - CXR reviewed.    - Thora done 7/9  - pleural fluid labs not completed as ordered- will try to get labs repeated if specimen is still available.  - cell count: 84 WBCs, 24% segs      Weakness  - PT/OT consulted.    - recommending inpatient rehab upon discharge  - PMR consulted  - awaiting insurance approval to go to inpatient rehab    Moderate malnutrition  - supplements ordered.      GERD (gastroesophageal reflux disease)  - cont Pepcid.          VTE Risk Mitigation (From admission, onward)        Ordered     Place sequential compression device  Until discontinued      07/05/19 1659     IP VTE HIGH RISK PATIENT  Once      07/05/19 1659          The patients clinical status was discussed at multidisplinary rounds, involving transplant surgery, transplant medicine, pharmacy, nursing, nutrition, and social work    Discharge Planning: Not a candidate for discharge at this time.   No Patient Care Coordination Note on file.      Lisa Andrew PA-C  Liver Transplant  [facility]

## 2019-07-21 NOTE — PROGRESS NOTES
CBG was 71 at 0725. Pt sitting up in wc with bedside table in front of her. AAOX4. Denies dizziness or lethargy. Gave pt OJ and pt is drinking that until breakfast comes.

## 2019-07-21 NOTE — SUBJECTIVE & OBJECTIVE
Scheduled Meds:   levothyroxine  112 mcg Oral Before breakfast    lidocaine  1 patch Transdermal Q24H    magnesium oxide  400 mg Oral Daily    NIFEdipine  30 mg Oral Daily    potassium chloride  30 mEq Oral Q2H    predniSONE  15 mg Oral Daily    Followed by    [START ON 7/27/2019] predniSONE  10 mg Oral Daily    Followed by    [START ON 8/3/2019] predniSONE  5 mg Oral Daily    sodium bicarbonate  1,300 mg Oral TID    sulfamethoxazole-trimethoprim 400-80mg  1 tablet Oral Daily    tacrolimus  4 mg Oral BID    thiamine  100 mg Oral Daily    valGANciclovir  450 mg Oral Daily     Continuous Infusions:  PRN Meds:acetaminophen, calcium carbonate, dextrose 50%, dextrose 50%, glucagon (human recombinant), glucose, glucose, hydrALAZINE, insulin aspart U-100, ondansetron, sodium chloride 0.9%    Review of Systems   Constitutional: Positive for activity change and appetite change. Negative for chills and fever.   HENT: Negative for congestion and facial swelling.    Eyes: Negative for pain, discharge and visual disturbance.   Respiratory: Negative for cough, chest tightness, shortness of breath and wheezing.    Cardiovascular: Negative for chest pain, palpitations and leg swelling.   Gastrointestinal: Positive for diarrhea. Negative for abdominal distention, abdominal pain, nausea and vomiting.   Endocrine: Negative.    Genitourinary: Negative for decreased urine volume, difficulty urinating and dysuria.   Skin: Positive for wound (chevron well healed).   Allergic/Immunologic: Positive for immunocompromised state.   Neurological: Positive for weakness. Negative for tremors, seizures, speech difficulty and light-headedness.   Psychiatric/Behavioral: Positive for decreased concentration and dysphoric mood. Negative for agitation and confusion. The patient is not nervous/anxious.      Objective:     Vital Signs (Most Recent):  Temp: 98.6 °F (37 °C) (07/21/19 0721)  Pulse: 99 (07/21/19 0721)  Resp: 14 (07/21/19  0721)  BP: (!) 144/71 (07/21/19 0721)  SpO2: 99 % (07/21/19 0721) Vital Signs (24h Range):  Temp:  [97.8 °F (36.6 °C)-98.6 °F (37 °C)] 98.6 °F (37 °C)  Pulse:  [] 99  Resp:  [14-18] 14  SpO2:  [93 %-99 %] 99 %  BP: (131-161)/(69-77) 144/71     Weight: 91.4 kg (201 lb 8 oz)  Body mass index is 34.59 kg/m².    Intake/Output - Last 3 Shifts       07/19 0700 - 07/20 0659 07/20 0700 - 07/21 0659 07/21 0700 - 07/22 0659    P.O. 1280 860 480    Total Intake(mL/kg) 1280 (14) 860 (9.4) 480 (5.3)    Urine (mL/kg/hr) 350 (0.2) 650 (0.3)     Stool 0 0     Total Output 350 650     Net +930 +210 +480           Urine Occurrence 4 x 2 x     Stool Occurrence 4 x 2 x 1 x          Physical Exam      Laboratory:  Immunosuppressants         Stop Route Frequency     tacrolimus capsule 4 mg      -- Oral 2 times daily        CBC:   Recent Labs   Lab 07/21/19  0708   WBC 4.92   RBC 2.71*   HGB 7.8*   HCT 24.7*   *   MCV 91   MCH 28.8   MCHC 31.6*     CMP:   Recent Labs   Lab 07/21/19  0708   GLU 73   CALCIUM 8.1*   ALBUMIN 2.1*   PROT 4.9*      K 3.4*   CO2 16*   *   BUN 18   CREATININE 0.5   ALKPHOS 227*   ALT 29   AST 27   BILITOT 0.5     Labs within the past 24 hours have been reviewed.    Diagnostic Results:  US Liver Transplant Post:    Results for orders placed during the hospital encounter of 07/05/19   US Liver Transplant Post    Narrative EXAMINATION:  US LIVER TRANSPLANT POST    CLINICAL HISTORY:  s/p OLTx with elevated AlkP and LFTs;    TECHNIQUE:  Limited abdominal ultrasound of the transplant liver with Doppler evaluation.  Color and spectral Doppler were performed.    COMPARISON:  Ultrasound liver transplant 06/24/2019.    FINDINGS:  Patient is status post orthotopic liver transplant 6 weeks.  Liver allograft is stable in size, measuring 21.0 cm.  The liver demonstrates homogeneous echotexture.  No focal hepatic lesions are seen.  There is a left hepatic lobe peritransplant fluid collection measuring  3.6 x 2.6 x 4.2 cm (previously 4.1 x 3.9 x 4.8 cm).    The common duct is not dilated, measuring 4 mm.  No dilated intrahepatic radicles are seen.    Color flow and spectral waveform analysis was performed.  The main portal vein, right portal vein, left portal vein, middle hepatic vein, right hepatic vein, left hepatic vein, and IVC are patent with proper directional flow.    Anastomosis site of the main hepatic artery demonstrates a peak systolic velocity 191 cm/sec.    Main hepatic artery demonstrates resistive index 0.89 with normal waveform.    Left hepatic artery shows resistive index 0.91 with normal waveform.    Anterior branch of the right hepatic artery demonstrates resistive index 0.81 with normal waveform.    Posterior branch of the right hepatic artery demonstrates resistive index 0.75 with normal waveform.      Impression Persistent elevation of the hepatic arterial resistive indices with preservation of waveforms and normal main hepatic arterial peak systolic velocity.  Otherwise satisfactory Doppler evaluation of the liver allograft.  Continued follow up is recommended.    Minimally improved left hepatic lobe peritransplant fluid collection.    Electronically signed by resident: Yang Romero  Date:    07/18/2019  Time:    14:49    Electronically signed by: Edd Swanson MD  Date:    07/18/2019  Time:    15:15       Debility/Functional status: Patient debilitated by evidence of Muscle wasting and atrophy, Weakness, Chronic fatigue, unspecified, Limitation of activities due to disability and Other reduced mobility. Physical and occupational therapy ordered daily to evaluate and treat. Debility was: present on admission.

## 2019-07-22 PROBLEM — D69.6 THROMBOCYTOPENIA: Status: RESOLVED | Noted: 2019-05-16 | Resolved: 2019-07-22

## 2019-07-22 PROBLEM — T38.0X5A ADRENAL CORTICAL STEROIDS CAUSING ADVERSE EFFECT IN THERAPEUTIC USE: Status: RESOLVED | Noted: 2019-06-06 | Resolved: 2019-07-22

## 2019-07-22 LAB
ABO + RH BLD: NORMAL
ALBUMIN SERPL BCP-MCNC: 1.9 G/DL (ref 3.5–5.2)
ALP SERPL-CCNC: 180 U/L (ref 55–135)
ALT SERPL W/O P-5'-P-CCNC: 23 U/L (ref 10–44)
ANION GAP SERPL CALC-SCNC: 8 MMOL/L (ref 8–16)
ANISOCYTOSIS BLD QL SMEAR: SLIGHT
AST SERPL-CCNC: 20 U/L (ref 10–40)
BASOPHILS NFR BLD: 0 % (ref 0–1.9)
BILIRUB SERPL-MCNC: 0.5 MG/DL (ref 0.1–1)
BLD GP AB SCN CELLS X3 SERPL QL: NORMAL
BLD PROD TYP BPU: NORMAL
BLOOD UNIT EXPIRATION DATE: NORMAL
BLOOD UNIT TYPE CODE: 5100
BLOOD UNIT TYPE: NORMAL
BUN SERPL-MCNC: 16 MG/DL (ref 6–20)
CALCIUM SERPL-MCNC: 7.9 MG/DL (ref 8.7–10.5)
CHLORIDE SERPL-SCNC: 110 MMOL/L (ref 95–110)
CO2 SERPL-SCNC: 23 MMOL/L (ref 23–29)
CODING SYSTEM: NORMAL
CREAT SERPL-MCNC: 0.5 MG/DL (ref 0.5–1.4)
DIFFERENTIAL METHOD: ABNORMAL
DISPENSE STATUS: NORMAL
EOSINOPHIL NFR BLD: 1 % (ref 0–8)
ERYTHROCYTE [DISTWIDTH] IN BLOOD BY AUTOMATED COUNT: 17.7 % (ref 11.5–14.5)
EST. GFR  (AFRICAN AMERICAN): >60 ML/MIN/1.73 M^2
EST. GFR  (NON AFRICAN AMERICAN): >60 ML/MIN/1.73 M^2
GIANT PLATELETS BLD QL SMEAR: PRESENT
GLUCOSE SERPL-MCNC: 75 MG/DL (ref 70–110)
HCT VFR BLD AUTO: 23.3 % (ref 37–48.5)
HGB BLD-MCNC: 7.4 G/DL (ref 12–16)
IMM GRANULOCYTES # BLD AUTO: ABNORMAL K/UL (ref 0–0.04)
IMM GRANULOCYTES NFR BLD AUTO: ABNORMAL % (ref 0–0.5)
LYMPHOCYTES NFR BLD: 5 % (ref 18–48)
MAGNESIUM SERPL-MCNC: 1.6 MG/DL (ref 1.6–2.6)
MCH RBC QN AUTO: 28.4 PG (ref 27–31)
MCHC RBC AUTO-ENTMCNC: 31.8 G/DL (ref 32–36)
MCV RBC AUTO: 89 FL (ref 82–98)
MONOCYTES NFR BLD: 4 % (ref 4–15)
MYELOCYTES NFR BLD MANUAL: 2 %
NEUTROPHILS NFR BLD: 88 % (ref 38–73)
NRBC BLD-RTO: 1 /100 WBC
PLATELET # BLD AUTO: 140 K/UL (ref 150–350)
PLATELET BLD QL SMEAR: ABNORMAL
PMV BLD AUTO: 9.9 FL (ref 9.2–12.9)
POCT GLUCOSE: 128 MG/DL (ref 70–110)
POCT GLUCOSE: 130 MG/DL (ref 70–110)
POCT GLUCOSE: 130 MG/DL (ref 70–110)
POCT GLUCOSE: 79 MG/DL (ref 70–110)
POIKILOCYTOSIS BLD QL SMEAR: SLIGHT
POTASSIUM SERPL-SCNC: 3.8 MMOL/L (ref 3.5–5.1)
PROT SERPL-MCNC: 4.6 G/DL (ref 6–8.4)
RBC # BLD AUTO: 2.61 M/UL (ref 4–5.4)
SMUDGE CELLS BLD QL SMEAR: PRESENT
SODIUM SERPL-SCNC: 141 MMOL/L (ref 136–145)
TACROLIMUS BLD-MCNC: 4.7 NG/ML (ref 5–15)
TOXIC GRANULES BLD QL SMEAR: PRESENT
TRANS ERYTHROCYTES VOL PATIENT: NORMAL ML
WBC # BLD AUTO: 3.75 K/UL (ref 3.9–12.7)

## 2019-07-22 PROCEDURE — 36415 COLL VENOUS BLD VENIPUNCTURE: CPT

## 2019-07-22 PROCEDURE — P9021 RED BLOOD CELLS UNIT: HCPCS

## 2019-07-22 PROCEDURE — 85027 COMPLETE CBC AUTOMATED: CPT

## 2019-07-22 PROCEDURE — 20600001 HC STEP DOWN PRIVATE ROOM

## 2019-07-22 PROCEDURE — 86850 RBC ANTIBODY SCREEN: CPT

## 2019-07-22 PROCEDURE — 97535 SELF CARE MNGMENT TRAINING: CPT

## 2019-07-22 PROCEDURE — 97530 THERAPEUTIC ACTIVITIES: CPT

## 2019-07-22 PROCEDURE — 80053 COMPREHEN METABOLIC PANEL: CPT

## 2019-07-22 PROCEDURE — 63600175 PHARM REV CODE 636 W HCPCS: Performed by: PHYSICIAN ASSISTANT

## 2019-07-22 PROCEDURE — 25000003 PHARM REV CODE 250: Performed by: PHYSICIAN ASSISTANT

## 2019-07-22 PROCEDURE — 63600175 PHARM REV CODE 636 W HCPCS: Performed by: NURSE PRACTITIONER

## 2019-07-22 PROCEDURE — 99233 SBSQ HOSP IP/OBS HIGH 50: CPT | Mod: 24,,, | Performed by: NURSE PRACTITIONER

## 2019-07-22 PROCEDURE — 85007 BL SMEAR W/DIFF WBC COUNT: CPT

## 2019-07-22 PROCEDURE — 86920 COMPATIBILITY TEST SPIN: CPT

## 2019-07-22 PROCEDURE — 97116 GAIT TRAINING THERAPY: CPT

## 2019-07-22 PROCEDURE — 99233 PR SUBSEQUENT HOSPITAL CARE,LEVL III: ICD-10-PCS | Mod: 24,,, | Performed by: NURSE PRACTITIONER

## 2019-07-22 PROCEDURE — 80197 ASSAY OF TACROLIMUS: CPT

## 2019-07-22 PROCEDURE — 97165 OT EVAL LOW COMPLEX 30 MIN: CPT

## 2019-07-22 PROCEDURE — 83735 ASSAY OF MAGNESIUM: CPT

## 2019-07-22 PROCEDURE — 36430 TRANSFUSION BLD/BLD COMPNT: CPT

## 2019-07-22 PROCEDURE — 97110 THERAPEUTIC EXERCISES: CPT

## 2019-07-22 PROCEDURE — 25000003 PHARM REV CODE 250: Performed by: INTERNAL MEDICINE

## 2019-07-22 RX ORDER — FUROSEMIDE 10 MG/ML
60 INJECTION INTRAMUSCULAR; INTRAVENOUS ONCE
Status: COMPLETED | OUTPATIENT
Start: 2019-07-22 | End: 2019-07-22

## 2019-07-22 RX ORDER — HYDROCODONE BITARTRATE AND ACETAMINOPHEN 500; 5 MG/1; MG/1
TABLET ORAL
Status: DISCONTINUED | OUTPATIENT
Start: 2019-07-22 | End: 2019-07-23 | Stop reason: HOSPADM

## 2019-07-22 RX ADMIN — Medication 100 MG: at 08:07

## 2019-07-22 RX ADMIN — VALGANCICLOVIR 450 MG: 450 TABLET, FILM COATED ORAL at 08:07

## 2019-07-22 RX ADMIN — SODIUM BICARBONATE 650 MG TABLET 1300 MG: at 08:07

## 2019-07-22 RX ADMIN — LEVOTHYROXINE SODIUM 112 MCG: 50 TABLET ORAL at 05:07

## 2019-07-22 RX ADMIN — TACROLIMUS 4 MG: 1 CAPSULE ORAL at 08:07

## 2019-07-22 RX ADMIN — NIFEDIPINE 30 MG: 30 TABLET, FILM COATED, EXTENDED RELEASE ORAL at 08:07

## 2019-07-22 RX ADMIN — LIDOCAINE 1 PATCH: 50 PATCH TOPICAL at 12:07

## 2019-07-22 RX ADMIN — SODIUM BICARBONATE 650 MG TABLET 1300 MG: at 07:07

## 2019-07-22 RX ADMIN — SULFAMETHOXAZOLE AND TRIMETHOPRIM 1 TABLET: 400; 80 TABLET ORAL at 08:07

## 2019-07-22 RX ADMIN — PREDNISONE 15 MG: 5 TABLET ORAL at 08:07

## 2019-07-22 RX ADMIN — ACETAMINOPHEN 650 MG: 325 TABLET ORAL at 12:07

## 2019-07-22 RX ADMIN — TACROLIMUS 4 MG: 1 CAPSULE ORAL at 06:07

## 2019-07-22 RX ADMIN — FUROSEMIDE 60 MG: 10 INJECTION, SOLUTION INTRAMUSCULAR; INTRAVENOUS at 02:07

## 2019-07-22 RX ADMIN — SODIUM BICARBONATE 650 MG TABLET 1300 MG: at 02:07

## 2019-07-22 NOTE — ASSESSMENT & PLAN NOTE
- cont bactrim for PCP prophylaxis.  - (CMV D+/R+) hold Valcyte.   - Acyclovir started 7/14  - stopped acyclovir and restarted valcyte on 7/17  - pt c/o loose stools past 24 hours, stool for c.diff neg.  Will send CMV PCR in am.  Last CMv PCR 7/13 undetected.

## 2019-07-22 NOTE — ASSESSMENT & PLAN NOTE
- Infectious work up ordered on admit - blood cx NGTD, UA unremarkable, CMV PCR 7/5 undetected, chest x-ray w right pleural effusion, CT A/P reviewed.  - Started broad spectrum antibiotics on admit.   - Abdominal and head CT reviewed and no clear source for fever.   - ID consulted.  Apprec recs.   - Pt with intermittent confusion.  Easily re oriented.  Continue to monitor.   - Thora and para done 7/9; amount of fluid removed not recorded and pleural fluid labs were not completed as ordered.   - Abdominal gram stain reveals no WBC and no organisms; cell count not completed as ordered; pleural fluid cell count 84 WBCs, 24% segs.  - Pt still reports diarrhea- C diff negative, CMV undetected. Stool culture, WBC, ova cysts parasites, and GI pathogen panel pending.   - low grade fever 7/12, 100.8.  - deescalated abx to PO cefpodoxime and flagyl on 7/12.  - MRI head w/ and w/o contrast 7/12 without acute changes.  - Urine culture positive for enterococcus faecium- started Zyvox and dc'd cefpodoxime and flagyl 7/12.  - temp 100.4 on 7/13. Transitioned to Daptomycin 7/13-7/17 per ID.     - Zyvox changed to Dapto as pt with seizure like activity earlier in morning and zyvox can lower seizure threshold.  - repeat blood cultures ngtd.  - ammonia level wnl.   - pt also with confusion, AMS. D/w ID - restarted Cefepime.  - procalcitonin, lactate, EBV, RPR, fungitell, aspergillus, HIV, Saadia Delacruz, histoplasma antigen and blastomyces antigen and CMV PCR pending.  - Start Acyclovir treatment dose for possible viral meningitis.   - repeat blood cx on 7/13 and 7/14 remain NGTD. Last fever 102.3 F on 7/14 at 9 AM  - CT a/p 7/15 continued to redemonstrate R pleural effusion, moderate ascites, and fluid in adam hepatis; no walled off fluid collections suggestive of abscess.   - paracentesis done 7/16 to reassess for infection as cell count and other labs not sent off when pt underwent para on 7/9. Abdominal fluid labs negative for  infection  - Stop empiric cefepime 7/17  -daptomycin x 5 days for VRE UTI, ended on 7/18  -Stopped acyclovir, restarted prophylactic valcyte.   -continue to monitor

## 2019-07-22 NOTE — PLAN OF CARE
Problem: Adult Inpatient Plan of Care  Goal: Plan of Care Review  Outcome: Ongoing (interventions implemented as appropriate)  Pt is AAOX4. Ambulates in room and to BR with SBA. Pt performed ADL's independently this shift     -CBGs AC/HS, appetite good. Education taught on eating small snacks between meals so CBG didn't get to low  -4 BMs so far this shift, soft and brown, collected and sent for c-diff testing 7/21/19: negative  -voids spontaneously, contact iso for VRE in urine, abx therapy completed  -incision to abdomen healed  -lidocaine placed on lower back, barrier cream applied to sacrum, pt spends most of day OOB in recliner, new rolling walker delivered to bedside  -encouraged pt to elevate BLE, +2 edema noted  -LS clear, VSS, pt sats adequate on room air  -HH referral placed  -denies pain   -fall precautions in place, call bell in reach  -received one unit of PRBCs followed by 60mg one time dose of lasix this shift. Pt tolerated transfusion without any adverse reactions

## 2019-07-22 NOTE — PT/OT/SLP PROGRESS
Physical Therapy Treatment    Patient Name:  Jihan Zamudio   MRN:  [id number]    Recommendations:     Discharge Recommendations: rehabilitation facility   (pt progrssing towards HHPT)   Discharge Equipment Recommendations: shower chair   Barriers to discharge: None    Assessment:     Jihan Zamudio is a 49 y.o. female admitted with a medical diagnosis of Seizure-like activity.  She presents with the following impairments/functional limitations:  weakness, impaired endurance, impaired self care skills, gait instability, impaired functional mobilty, impaired balance, decreased lower extremity function .Pt continues to remain motivated and cooperative with treatment session. Pt Progressing with PT Intervention. Pt would continue to benefit from skilled PT to address overall functional mobility and goals. Goals remain appropriate      Rehab Prognosis: Good; patient would benefit from acute skilled PT services to address these deficits and reach maximum level of function.    Recent Surgery: * No surgery found *      Plan:     During this hospitalization, patient to be seen 4 x/week to address the identified rehab impairments via gait training, therapeutic activities, therapeutic exercises, neuromuscular re-education and progress toward the following goals:    · Plan of Care Expires:  08/08/19    Subjective     Patient/Family Comments/goals: I am just waking up, I just don't feel steady yet  Pain/Comfort:  · Pain Rating 1: 0/10  · Pain Rating Post-Intervention 1: 0/10      Objective:     Communicated with RN prior to session.  Patient found up in chair with bed alarm upon PT entry to room.     General Precautions: Standard, fall   Orthopedic Precautions:N/A   Braces: N/A     Functional Mobility:  · Transfers:    · Sit to Stand:  SBA with no AD from chair   · Gait: Ambulated 185 ft   with SBA/CGA and using w/c for mobility Pt required   standing rest breaks prn. pt reported fatigue and  weakness. Pt ambulates with  upright posture, reciprocal gait, decreased gait speed, decreased step length, and decreased endurance      AM-PAC 6 CLICK MOBILITY  Turning over in bed (including adjusting bedclothes, sheets and blankets)?: 3  Sitting down on and standing up from a chair with arms (e.g., wheelchair, bedside commode, etc.): 3  Moving from lying on back to sitting on the side of the bed?: 3  Moving to and from a bed to a chair (including a wheelchair)?: 3  Need to walk in hospital room?: 3  Climbing 3-5 steps with a railing?: 2  Basic Mobility Total Score: 17       Therapeutic Activities and Exercises:   educated patient on progress, safety,d/c,PT POC, on the effects of prolonged immobility and the importance of performing OOB activity and exercises to promote healing and reduce recovery time   Patient performed therex X 15-20 reps seated in bedside chair B LE AROM AP, LAQ, Hip Flexion, Hip Abd/Add   Updated white board with appropriate PT mobility information for medical team notification  Bedside table in front of patient and area set up for function, convenience, and safety. RN aware of patient's mobility needs and status. Questions/concerns addressed within PTA scope of practice; patient with no further questions. Time was provided for active listening, discussion of health disposition, and discussion of safe discharge. Pt?verbalized?agreement .        Patient left up in chair with all lines intact, call button in reach, chair alarm on and nsg notif    GOALS:   Multidisciplinary Problems     Physical Therapy Goals        Problem: Physical Therapy Goal    Goal Priority Disciplines Outcome Goal Variances Interventions   Physical Therapy Goal     PT, PT/OT Ongoing (interventions implemented as appropriate)     Description:  Goals to be met by: 2019    Patient will increase functional independence with mobility by performin. Supine <> sit with supervision   2. Sit to stand transfer with Supervision-met  3. Gait  x 150  feet with Stand-by Assistance with or without using least restrictive AD. -met  4. Stand for 3 minutes with Stand-by Assistance while performing dynamic balance activities   5. Lower extremity exercise program x20 reps per handout, with supervision                       Time Tracking:     PT Received On: 07/22/19  PT Total Time (min): 39 min     Billable Minutes: Gait Training 15, Therapeutic Activity 9 and Therapeutic Exercise 15    Treatment Type: Treatment  PT/PTA: PTA     PTA Visit Number: 3     Jay Choi, PTA  07/22/2019

## 2019-07-22 NOTE — SUBJECTIVE & OBJECTIVE
Scheduled Meds:   furosemide  60 mg Intravenous Once    levothyroxine  112 mcg Oral Before breakfast    lidocaine  1 patch Transdermal Q24H    NIFEdipine  30 mg Oral Daily    predniSONE  15 mg Oral Daily    Followed by    [START ON 7/27/2019] predniSONE  10 mg Oral Daily    Followed by    [START ON 8/3/2019] predniSONE  5 mg Oral Daily    sodium bicarbonate  1,300 mg Oral TID    sulfamethoxazole-trimethoprim 400-80mg  1 tablet Oral Daily    tacrolimus  4 mg Oral BID    thiamine  100 mg Oral Daily    valGANciclovir  450 mg Oral Daily     Continuous Infusions:  PRN Meds:sodium chloride, acetaminophen, calcium carbonate, dextrose 50%, dextrose 50%, glucagon (human recombinant), glucose, glucose, hydrALAZINE, insulin aspart U-100, ondansetron, sodium chloride 0.9%    Review of Systems   Constitutional: Positive for activity change and appetite change. Negative for chills and fever.   HENT: Negative for congestion and facial swelling.    Eyes: Negative for pain, discharge and visual disturbance.   Respiratory: Negative for cough, chest tightness, shortness of breath and wheezing.    Cardiovascular: Negative for chest pain, palpitations and leg swelling.   Gastrointestinal: Negative for abdominal distention, abdominal pain, diarrhea, nausea and vomiting.   Endocrine: Negative.    Genitourinary: Negative for decreased urine volume, difficulty urinating and dysuria.   Skin: Positive for wound (chevron well healed).   Allergic/Immunologic: Positive for immunocompromised state.   Neurological: Positive for weakness. Negative for tremors, seizures, speech difficulty and light-headedness.   Psychiatric/Behavioral: Negative for agitation, confusion, decreased concentration and dysphoric mood. The patient is not nervous/anxious.      Objective:     Vital Signs (Most Recent):  Temp: 98.6 °F (37 °C) (07/22/19 1234)  Pulse: 100 (07/22/19 1234)  Resp: 18 (07/22/19 1234)  BP: 136/69 (07/22/19 1234)  SpO2: 97 % (07/22/19  1234) Vital Signs (24h Range):  Temp:  [98.1 °F (36.7 °C)-99.2 °F (37.3 °C)] 98.6 °F (37 °C)  Pulse:  [] 100  Resp:  [16-18] 18  SpO2:  [94 %-97 %] 97 %  BP: (131-156)/(64-81) 136/69     Weight: 92.3 kg (203 lb 7.8 oz)  Body mass index is 34.93 kg/m².    Intake/Output - Last 3 Shifts       07/20 0700 - 07/21 0659 07/21 0700 - 07/22 0659 07/22 0700 - 07/23 0659    P.O. 860 1280     I.V. (mL/kg)  765 (8.3)     Total Intake(mL/kg) 860 (9.4) 2045 (22.2)     Urine (mL/kg/hr) 650 (0.3) 700 (0.3)     Stool 0 0     Total Output 650 700     Net +210 +1345            Urine Occurrence 2 x 3 x     Stool Occurrence 3 x 5 x           Physical Exam   Constitutional: She appears well-developed. No distress.   HENT:   Head: Normocephalic and atraumatic.   Eyes: Pupils are equal, round, and reactive to light. EOM are normal. No scleral icterus.   Neck: Normal range of motion. Neck supple.   Cardiovascular: Normal rate, regular rhythm, normal heart sounds and intact distal pulses.   Pulmonary/Chest: Effort normal and breath sounds normal. No respiratory distress.   Abdominal: Soft. Bowel sounds are normal. She exhibits no distension. There is no tenderness.   chevron incision well healed   Musculoskeletal: Normal range of motion. She exhibits edema (1+ BLE edema).   Neurological: She is alert.   Skin: Skin is warm and dry. Capillary refill takes 2 to 3 seconds. She is not diaphoretic.   Psychiatric: She has a normal mood and affect. Her behavior is normal. Judgment normal.   Nursing note and vitals reviewed.      Laboratory:  Immunosuppressants         Stop Route Frequency     tacrolimus capsule 4 mg      -- Oral 2 times daily        CBC:   Recent Labs   Lab 07/22/19  0613   WBC 3.75*   RBC 2.61*   HGB 7.4*   HCT 23.3*   *   MCV 89   MCH 28.4   MCHC 31.8*     CMP:   Recent Labs   Lab 07/22/19  0613   GLU 75   CALCIUM 7.9*   ALBUMIN 1.9*   PROT 4.6*      K 3.8   CO2 23      BUN 16   CREATININE 0.5   ALKPHOS  180*   ALT 23   AST 20   BILITOT 0.5     Labs within the past 24 hours have been reviewed.    Diagnostic Results:  US Liver Transplant Post:    Results for orders placed during the hospital encounter of 07/05/19   US Liver Transplant Post    Narrative EXAMINATION:  US LIVER TRANSPLANT POST    CLINICAL HISTORY:  s/p OLTx with elevated AlkP and LFTs;    TECHNIQUE:  Limited abdominal ultrasound of the transplant liver with Doppler evaluation.  Color and spectral Doppler were performed.    COMPARISON:  Ultrasound liver transplant 06/24/2019.    FINDINGS:  Patient is status post orthotopic liver transplant 6 weeks.  Liver allograft is stable in size, measuring 21.0 cm.  The liver demonstrates homogeneous echotexture.  No focal hepatic lesions are seen.  There is a left hepatic lobe peritransplant fluid collection measuring 3.6 x 2.6 x 4.2 cm (previously 4.1 x 3.9 x 4.8 cm).    The common duct is not dilated, measuring 4 mm.  No dilated intrahepatic radicles are seen.    Color flow and spectral waveform analysis was performed.  The main portal vein, right portal vein, left portal vein, middle hepatic vein, right hepatic vein, left hepatic vein, and IVC are patent with proper directional flow.    Anastomosis site of the main hepatic artery demonstrates a peak systolic velocity 191 cm/sec.    Main hepatic artery demonstrates resistive index 0.89 with normal waveform.    Left hepatic artery shows resistive index 0.91 with normal waveform.    Anterior branch of the right hepatic artery demonstrates resistive index 0.81 with normal waveform.    Posterior branch of the right hepatic artery demonstrates resistive index 0.75 with normal waveform.      Impression Persistent elevation of the hepatic arterial resistive indices with preservation of waveforms and normal main hepatic arterial peak systolic velocity.  Otherwise satisfactory Doppler evaluation of the liver allograft.  Continued follow up is recommended.    Minimally  improved left hepatic lobe peritransplant fluid collection.    Electronically signed by resident: Yang Romero  Date:    07/18/2019  Time:    14:49    Electronically signed by: Edd Swanson MD  Date:    07/18/2019  Time:    15:15       Debility/Functional status: Patient debilitated by evidence of Muscle wasting and atrophy, Weakness, Chronic fatigue, unspecified, Limitation of activities due to disability and Other reduced mobility. Physical and occupational therapy ordered daily to evaluate and treat. Debility was: present on admission.

## 2019-07-22 NOTE — ASSESSMENT & PLAN NOTE
- PT/OT consulted.    - recommending inpatient rehab upon discharge  - PMR consulted  - patient no longer qualifies for rehab.  Plan to discharge to home w [facility].

## 2019-07-22 NOTE — PLAN OF CARE
Problem: Physical Therapy Goal  Goal: Physical Therapy Goal  Goals to be met by: 2019    Patient will increase functional independence with mobility by performin. Supine <> sit with supervision   2. Sit to stand transfer with Supervision-met  3. Gait  x 150 feet with Stand-by Assistance with or without using least restrictive AD. -met  4. Stand for 3 minutes with Stand-by Assistance while performing dynamic balance activities   5. Lower extremity exercise program x20 reps per handout, with supervision     Pt progressing towards goals. continue with PT POC.Goals remain appropriate.  Jay Choi PTA  2019

## 2019-07-22 NOTE — PLAN OF CARE
Problem: Occupational Therapy Goal  Goal: Occupational Therapy Goal  Goals to be met by: 7/25/19 ( Goals updated 7/16)    Patient will increase functional independence with ADLs by performing:    LE Dressing with Minimal Assistance.  Grooming while standing at sink with Contact Guard Assistance. - Met 7/22  Toileting from toilet with SBA for hygiene and clothing management.  Supine to sit with supervision to increase bed mobility independence.  Step transfer with Stand-by Assistance and AD as needed to prepare for household mobility to complete ADLs of choice.- met on 7/18  Toilet transfer to toilet with SBA. - Met 7/22  Upper extremity exercise program x15 reps per handout, with independence to build strength and endurance for ADLs/IADLs.  Pt will tolerate dynamic standing task for ~5 minutes in preparation for standing ADLs with CGA.       Outcome: Ongoing (interventions implemented as appropriate)  ROSA Garnica

## 2019-07-22 NOTE — PT/OT/SLP PROGRESS
"Occupational Therapy   Treatment    Name: Jihan Zamudio  MRN: [id number]  Admitting Diagnosis:  Seizure-like activity       Recommendations:     Discharge Recommendations: home health OT  Discharge Equipment Recommendations:  shower chair and rollator. Pt plans to return w/c and would like to get a rollator to assist with mobility.   Barriers to discharge:  None    Assessment:     Jihan Zamudio is a 49 y.o. female with a medical diagnosis of Seizure-like activity.  She presents with the performance deficits affecting function: weakness, impaired endurance, impaired balance, gait instability, impaired functional mobilty, decreased lower extremity function. Pt presents with improved spirits and mental status as she able to attend to conversation appropriately and perform tasks until completion. Pt able to remember writing therapists name and recall learned UE exercises and safety techniques from previous OT session. Pt participated well during session requiring decreased assistance for transfers, mobility, Sc task performance. Due to pt progressing well and requiring decreased assistance, HHOT is recommended to further improve indep and safety with functional mobility and ADLs within the home.  Pt will continue to benefit from skilled OT to build strength and endurance to improve ADLs and mobility to return to [facility].     Rehab Prognosis:  Good; patient would benefit from acute skilled OT services to address these deficits and reach maximum level of function.       Plan:     Patient to be seen 3 x/week to address the above listed problems via self-care/home management, therapeutic activities, therapeutic exercises  · Plan of Care Expires: 08/07/19  · Plan of Care Reviewed with: patient    Subjective     Pain/Comfort:  · Pain Rating 1: 0/10     Pt expressed, "I feel good to go home and I would like to return my w/c in for a rollator." Pt stated she would like a rollator to assist with functional mobility. The " seat is appropriate for energy conservation with rest breaks.       Objective:     Communicated with: RN prior to session.  Patient found sitting EOB eating breaksfast with (N/A) upon OT entry to room.    General Precautions: Standard, fall   Orthopedic Precautions:N/A   Braces: N/A     Occupational Performance:     Functional Mobility/Transfers:  · Patient completed Sit EOB <> Stand Transfer with stand by assistance  with  no assistive device  · Patient completed Stand > Sit Transfer to bedside chair with stand by assistance  with  no assistive device   · Pt perform toilet t/f with SBA while using GB and sink side for stability.   · Functional Mobility: Pt ambulated ~ 26 feet from EOB into bathroom then to bedside chair with SBA and no AD.     Activities of Daily Living:  · Feeding:  set up assistance sitting EOB   · Grooming: stand by assistance to complete oral hygiene, wash face, and comb hair standing at sink.       [facility] 6 Click ADL: 22    Treatment & Education:  Pt edu on POC   Pt reminded of safety/tech with functional mobility and t/fs  Pt edu on AD with mobility, particularly the rollator - Pt expressed she would like one to assist with functional mobility. The seat is appropriate for energy conservation with rest breaks.   Pt edu on energy conservation techniques with ADL performance      Patient left up in chair with call button in reachEducation:      GOALS:   Multidisciplinary Problems     Occupational Therapy Goals        Problem: Occupational Therapy Goal    Goal Priority Disciplines Outcome Interventions   Occupational Therapy Goal     OT, PT/OT Ongoing (interventions implemented as appropriate)    Description:  Goals to be met by: 7/25/19 ( Goals updated 7/16)    Patient will increase functional independence with ADLs by performing:    LE Dressing with Minimal Assistance.  Grooming while standing at sink with Contact Guard Assistance. - Met 7/22  Toileting from toilet with SBA for hygiene and  clothing management.  Supine to sit with supervision to increase bed mobility independence.  Step transfer with Stand-by Assistance and AD as needed to prepare for household mobility to complete ADLs of choice.- met on 7/18  Toilet transfer to toilet with SBA. - Met 7/22  Upper extremity exercise program x15 reps per handout, with independence to build strength and endurance for ADLs/IADLs.  Pt will tolerate dynamic standing task for ~5 minutes in preparation for standing ADLs with CGA.                         Time Tracking:     OT Date of Treatment: 07/22/19  OT Start Time: 0914  OT Stop Time: 0933  OT Total Time (min): 19 min    Billable Minutes:Self Care/Home Management 19    ROSA Garnica  7/22/2019

## 2019-07-22 NOTE — ASSESSMENT & PLAN NOTE
- no overt bleeding.    - keep type and screen current.  - transfused 1u prbc 7/9.  - H/H slowly decreased.  Plan to transfuse 1 u PRBC today followed by Lasix 60 mg IV x1 after.  Monitor with daily labs.

## 2019-07-22 NOTE — PLAN OF CARE
Problem: Adult Inpatient Plan of Care  Goal: Plan of Care Review  Outcome: Ongoing (interventions implemented as appropriate)  Pt is AAOx4 in bed wearing non-skid footwear, bed in low/locked position and with call bell within reach. Pt reminded to use call bell to call for assistance, pt verbalizes understanding. Pt is afebrile at this time. Proper hand hygiene performed before and after pt care activities. Bedtime BG of 142. Patient walked with  and daughter around unit at beginning of night. Patient c/o headache earlier in shift, prn tylenol administered. Denies any pain or discomfort at this time.

## 2019-07-22 NOTE — PROGRESS NOTES
Tentative Discharge Note:  (YULIYA) presented to the patient's (pt) room to discuss pt's tentative discharge planned for today.  Pt was observed to be sitting upright in the recliner alone, alert, oriented x3 and communicative.  Pt reported she was seen and evaluated by OT who recommend she goes home with home health.  Pt reported she is hoping for this plan as she feels it will help her cope much better.  Pt reported adequate coping denied any increased mental health symptoms but reports some and feels going to [facility] [address]) will be better.   Pt's  and daughter will present today to transport the pt to the apartment upon discharge.  Pt reported that she would like a rollator on discharge.  YULIYA advised SW on LTS rounds MELANIE Alberto to ask for orders for a rollator.  Pt will be discharging with HH needs; therefore, YULIYA contacted Marta w/[facility] ([id number]) to advise pt will discharge and require Home Health PT and OT.  Marta reported the pt is current and will be restarted at ID. Pt denied any questions or concerns.  Patient verbalized understanding and agreement with the information reviewed, social work availability, and how to access available resources if needed. SW remains available.

## 2019-07-22 NOTE — PROGRESS NOTES
"[facility]  Liver Transplant  Progress Note    Patient Name: Jihan Zamudio  MRN: [id number]  Admission Date: 2019  Hospital Length of Stay: 17 days  Code Status: Full Code  Primary Care Provider: Primary Doctor No  Post-Operative Day: 47    ORGAN:   LIVER  Disease Etiology: Cirrhosis: Fatty Liver ([place])  Donor Type:    - Brain Death  [facility] High Risk:   No  Donor CMV Status:   Donor CMV Status: Positive  Donor HBcAB:   Negative  Donor HCV Status:   Negative  Donor HBV GERTRUDIS: Negative  Donor HCV GERTRUDIS: Negative  Whole or Partial: Whole Liver  Biliary Anastomosis: End to End  Arterial Anatomy: Standard  Subjective:     History of Present Illness:  Ms. 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 Post transplant course significant for acute parenchymal hemorrhage within the right occipital lobe near the parieto-occipital junction measuring approximately 1.8 x 1.3 x 1.5 cm with mild surrounding vasogenic edema and localized mass effect after fall on . She was discharged home on 7 days of Keppra but nuerosurgery, completed on . She now presents to the ED with 1 day history of fever. Homehealth nurse checked patient's temperature and was noted to be elevated. Home health nurse also reported patient acting "off" with mental status change. Currently in the ED, tmax is 102.3. Patient feels well with no true complaints expect rhinorrhea. She denies chills, diarrhea, nausea/vomiting, abdominal pain. Denies sick contacts. Will admit for infectious work up (blood cx, UA, urine cx, CMV PCR, chest x-ray, CT A/P). Patient currently with no altered mental status. In light of recent hx of occipital hemorrhage, will obtain CT head without contrast.      Hospital Course:  50 y/o F s/p OLTx for KESSLER cirrhosis on  who was admitted on  for fevers. Pt febrile on admission, started on bx abx. ID consulted. Blood cultures NGTD, UA negative. CMV PCR drawn " on admit pending. Pt having loose stools, C diff EIA negative, CMV undetected. CT head showed reduction in size of hemorrhage. Abdominal CT reviewed and with moderate right pleural effusion. CXR with increased pleural effusion. Thora and para done 7/9; amount of fluid removed not recorded and pleural fluid labs were not completed as ordered. Abdominal gram stain reveals no WBC and no organisms; cell count not completed as ordered, cultures pending. TTE done 7/9 WNL, no vegetations. Pt tachy and tachypneic on 7/9; 1 L bolus given and pt responded well. 1 u pRBC transfused 7/9 for low H/H. Pt continued to have diarrhea, additional stool studies pending. Mentation continued to wax and wane, MRI w/ and w/o contrast done on 7/11 which revealed stable findings compared to prior. Pt transitioned to IV abx to PO cefpodoxime and flagyl on 7/11 per ID recs. Urine culture from 7/11 resulted as positive for VRE, thus cefpodoxime and flagyl stopped and pt started on zyvox on 7/12. Pt with worsening MS on 7/13 and claimed she had an unwitnessed seizure. STAT CT head without acute change. Keppra 500 mg bid started. Prograf and lovenox dc'ed. Stopped zyvox due to potential to lower seizure threshold and started IV daptomycin. IV cefepime restarted. Zyprexa qhs started for agitation and anxiety. Pt continued to have fevers despite therapeutic IV abx for VRE UTI. Viral and fungal labs pending per ID recs. 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  cc q8h started. Pt with persistent AMS, thus psych consulted who believe she is suffering from multifactorial delirium. PO thiamine and delirium precautions started. Unable to perform LP as unsafe due to ICH. CT a/p repeated on 7/15, revealed R pleural effusion, moderate ascites, and fluid in adam hepatis; no walled off fluid collections suggestive of abscess. Para repeated on 7/16 as labs not collected during para done on 7/9. Para labs on 7/16 negative for  infection. Empiric cefepime and acyclovir stopped on 7/17. Prograf restarted 7/17. LFTs mildly elevated while pt off of prograf for supposed seizures, so liver US obtained on 7/18 which revealed persistently elevated RIs, normal waveforms, stable L hepatic lobe transplant fluid collection. US reviewed by surgeon, no interventions indicated at this time. Hydrocortisone weaned off and keppra stopped 7/19. Mentation greatly improved by 7/16. Zyprexa weaned off per psych recs, and mental status remained stable. Started procardia for HTN on 7/19.    Interval history: No acute events overnight. Patient AOx4 this am.  She notes mild stomach ache.  Stool sent for c.diff 7/21 negative.  Per patient BMs less frequent today.  She reports decreased appetite but is tolerating diet.  Of note, patient weight is up 9 pounds.  H/H stable/low.  Plan to transfuse 1u PRBC followed by Lasix 60 mg IV x1.  Patient has completed tx for VRE UTI. Last dose of Daptomycin 7/18.  She was on Keppra for possible seizure, medicine has since been discontinued.  Patient has had no seizure like behavior. Remains afebrile. PT following patient, ok to d/c home w [facility], patient ambulating >75 ft w/o AD. Continue to monitor.    Scheduled Meds:   furosemide  60 mg Intravenous Once    levothyroxine  112 mcg Oral Before breakfast    lidocaine  1 patch Transdermal Q24H    NIFEdipine  30 mg Oral Daily    predniSONE  15 mg Oral Daily    Followed by    [START ON 7/27/2019] predniSONE  10 mg Oral Daily    Followed by    [START ON 8/3/2019] predniSONE  5 mg Oral Daily    sodium bicarbonate  1,300 mg Oral TID    sulfamethoxazole-trimethoprim 400-80mg  1 tablet Oral Daily    tacrolimus  4 mg Oral BID    thiamine  100 mg Oral Daily    valGANciclovir  450 mg Oral Daily     Continuous Infusions:  PRN Meds:sodium chloride, acetaminophen, calcium carbonate, dextrose 50%, dextrose 50%, glucagon (human recombinant), glucose, glucose, hydrALAZINE, insulin aspart  U-100, ondansetron, sodium chloride 0.9%    Review of Systems   Constitutional: Positive for activity change and appetite change. Negative for chills and fever.   HENT: Negative for congestion and facial swelling.    Eyes: Negative for pain, discharge and visual disturbance.   Respiratory: Negative for cough, chest tightness, shortness of breath and wheezing.    Cardiovascular: Negative for chest pain, palpitations and leg swelling.   Gastrointestinal: Negative for abdominal distention, abdominal pain, diarrhea, nausea and vomiting.   Endocrine: Negative.    Genitourinary: Negative for decreased urine volume, difficulty urinating and dysuria.   Skin: Positive for wound (chevron well healed).   Allergic/Immunologic: Positive for immunocompromised state.   Neurological: Positive for weakness. Negative for tremors, seizures, speech difficulty and light-headedness.   Psychiatric/Behavioral: Negative for agitation, confusion, decreased concentration and dysphoric mood. The patient is not nervous/anxious.      Objective:     Vital Signs (Most Recent):  Temp: 98.6 °F (37 °C) (07/22/19 1234)  Pulse: 100 (07/22/19 1234)  Resp: 18 (07/22/19 1234)  BP: 136/69 (07/22/19 1234)  SpO2: 97 % (07/22/19 1234) Vital Signs (24h Range):  Temp:  [98.1 °F (36.7 °C)-99.2 °F (37.3 °C)] 98.6 °F (37 °C)  Pulse:  [] 100  Resp:  [16-18] 18  SpO2:  [94 %-97 %] 97 %  BP: (131-156)/(64-81) 136/69     Weight: 92.3 kg (203 lb 7.8 oz)  Body mass index is 34.93 kg/m².    Intake/Output - Last 3 Shifts       07/20 0700 - 07/21 0659 07/21 0700 - 07/22 0659 07/22 0700 - 07/23 0659    P.O. 860 1280     I.V. (mL/kg)  765 (8.3)     Total Intake(mL/kg) 860 (9.4) 2045 (22.2)     Urine (mL/kg/hr) 650 (0.3) 700 (0.3)     Stool 0 0     Total Output 650 700     Net +210 +1345            Urine Occurrence 2 x 3 x     Stool Occurrence 3 x 5 x           Physical Exam   Constitutional: She appears well-developed. No distress.   HENT:   Head: Normocephalic and  atraumatic.   Eyes: Pupils are equal, round, and reactive to light. EOM are normal. No scleral icterus.   Neck: Normal range of motion. Neck supple.   Cardiovascular: Normal rate, regular rhythm, normal heart sounds and intact distal pulses.   Pulmonary/Chest: Effort normal and breath sounds normal. No respiratory distress.   Abdominal: Soft. Bowel sounds are normal. She exhibits no distension. There is no tenderness.   chevron incision well healed   Musculoskeletal: Normal range of motion. She exhibits edema (1+ BLE edema).   Neurological: She is alert.   Skin: Skin is warm and dry. Capillary refill takes 2 to 3 seconds. She is not diaphoretic.   Psychiatric: She has a normal mood and affect. Her behavior is normal. Judgment normal.   Nursing note and vitals reviewed.      Laboratory:  Immunosuppressants         Stop Route Frequency     tacrolimus capsule 4 mg      -- Oral 2 times daily        CBC:   Recent Labs   Lab 07/22/19  0613   WBC 3.75*   RBC 2.61*   HGB 7.4*   HCT 23.3*   *   MCV 89   MCH 28.4   MCHC 31.8*     CMP:   Recent Labs   Lab 07/22/19  0613   GLU 75   CALCIUM 7.9*   ALBUMIN 1.9*   PROT 4.6*      K 3.8   CO2 23      BUN 16   CREATININE 0.5   ALKPHOS 180*   ALT 23   AST 20   BILITOT 0.5     Labs within the past 24 hours have been reviewed.    Diagnostic Results:  US Liver Transplant Post:    Results for orders placed during the hospital encounter of 07/05/19   US Liver Transplant Post    Narrative EXAMINATION:  US LIVER TRANSPLANT POST    CLINICAL HISTORY:  s/p OLTx with elevated AlkP and LFTs;    TECHNIQUE:  Limited abdominal ultrasound of the transplant liver with Doppler evaluation.  Color and spectral Doppler were performed.    COMPARISON:  Ultrasound liver transplant 06/24/2019.    FINDINGS:  Patient is status post orthotopic liver transplant 6 weeks.  Liver allograft is stable in size, measuring 21.0 cm.  The liver demonstrates homogeneous echotexture.  No focal hepatic  "lesions are seen.  There is a left hepatic lobe peritransplant fluid collection measuring 3.6 x 2.6 x 4.2 cm (previously 4.1 x 3.9 x 4.8 cm).    The common duct is not dilated, measuring 4 mm.  No dilated intrahepatic radicles are seen.    Color flow and spectral waveform analysis was performed.  The main portal vein, right portal vein, left portal vein, middle hepatic vein, right hepatic vein, left hepatic vein, and IVC are patent with proper directional flow.    Anastomosis site of the main hepatic artery demonstrates a peak systolic velocity 191 cm/sec.    Main hepatic artery demonstrates resistive index 0.89 with normal waveform.    Left hepatic artery shows resistive index 0.91 with normal waveform.    Anterior branch of the right hepatic artery demonstrates resistive index 0.81 with normal waveform.    Posterior branch of the right hepatic artery demonstrates resistive index 0.75 with normal waveform.      Impression Persistent elevation of the hepatic arterial resistive indices with preservation of waveforms and normal main hepatic arterial peak systolic velocity.  Otherwise satisfactory Doppler evaluation of the liver allograft.  Continued follow up is recommended.    Minimally improved left hepatic lobe peritransplant fluid collection.    Electronically signed by resident: Yang Romero  Date:    07/18/2019  Time:    14:49    Electronically signed by: Edd Swanson MD  Date:    07/18/2019  Time:    15:15       Debility/Functional status: Patient debilitated by evidence of Muscle wasting and atrophy, Weakness, Chronic fatigue, unspecified, Limitation of activities due to disability and Other reduced mobility. Physical and occupational therapy ordered daily to evaluate and treat. Debility was: present on admission.    Assessment/Plan:     * Seizure-like activity  - pt seen sitting in chair AM of 7/13 stating "I just had a seizure, I just finished banging my head on the chair."  - pt couldn't recall events " surrounding seizure like activity.  - given recent hx of fall with ICH on 6/24, Neuro consulted in which pt had a very inconsistent neuro exam.   - Keppra 500 mg bid restarted and a STAT CT head performed which did not show a new infarct or bleed.  - dc'd Lovenox.   - prograf held for now. Started hydrocortisone.   - seizures likely psychogenic in nature as pt able to stop shaking to answer questions  - restarted prograf on 7/17  - stop keppra 7/19  - Monitor.       Impaired functional mobility and endurance  - [facility] PT ordered.      Altered mental status  - resolved.  Mental status back to basle ine.        Pressure injury of buttock, stage 1  - wound care following, appreciate recs      Delirium  - pt with intermittent disorientation to situation throughout stay  - CT head on 7/5 and 7/13 with no acute change; MRI head w/ contrast on 7/11 with no acute change  - ammonia WNL  - pt with acute personality change on 7/13- throwing items at nurses and agitated  - zyprexa qhs started 7/13; PRN zyprexa added 7/16  - psych consulted 7/15; believe she is suffering from multifactorial delirium 2/2 infection & ICH  - unable to do LP as unsafe at the moment due to recent ICH  - PO thiamine started.   - Delirium precautions started.   - mentation greatly improved since 7/17  - decreased zyprexa qhs from 5 mg to 2.5 mg per psych recs  - will stop zyprexa 7/20  - continue to monitor      UTI (urinary tract infection) due to Enterococcus  - UA 7/5 unremarkable.  - urine culture 7/11 positive for enterococcus faecium- susceptibilities pending.  - dc'd cefpodoxime and flagyl on 7/12.  - started Zyvox on 7/12.  - ID transitioned Zyvox to Dapto on 7/13 as Zyvox can lower the seizure threshold.  - stop dapto 7/18  - continue to monitor.    Hypokalemia  - replace as needed.  - monitor.       Fever  - Infectious work up ordered on admit - blood cx NGTD, UA unremarkable, CMV PCR 7/5 undetected, chest x-ray w right pleural effusion, CT A/P  reviewed.  - Started broad spectrum antibiotics on admit.   - Abdominal and head CT reviewed and no clear source for fever.   - ID consulted.  Apprec recs.   - Pt with intermittent confusion.  Easily re oriented.  Continue to monitor.   - Thora and para done 7/9; amount of fluid removed not recorded and pleural fluid labs were not completed as ordered.   - Abdominal gram stain reveals no WBC and no organisms; cell count not completed as ordered; pleural fluid cell count 84 WBCs, 24% segs.  - Pt still reports diarrhea- C diff negative, CMV undetected. Stool culture, WBC, ova cysts parasites, and GI pathogen panel pending.   - low grade fever 7/12, 100.8.  - deescalated abx to PO cefpodoxime and flagyl on 7/12.  - MRI head w/ and w/o contrast 7/12 without acute changes.  - Urine culture positive for enterococcus faecium- started Zyvox and dc'd cefpodoxime and flagyl 7/12.  - temp 100.4 on 7/13. Transitioned to Daptomycin 7/13-7/17 per ID.     - Zyvox changed to Dapto as pt with seizure like activity earlier in morning and zyvox can lower seizure threshold.  - repeat blood cultures ngtd.  - ammonia level wnl.   - pt also with confusion, AMS. D/w ID - restarted Cefepime.  - procalcitonin, lactate, EBV, RPR, fungitell, aspergillus, HIV, Saadia Delacruz, histoplasma antigen and blastomyces antigen and CMV PCR pending.  - Start Acyclovir treatment dose for possible viral meningitis.   - repeat blood cx on 7/13 and 7/14 remain NGTD. Last fever 102.3 F on 7/14 at 9 AM  - CT a/p 7/15 continued to redemonstrate R pleural effusion, moderate ascites, and fluid in adam hepatis; no walled off fluid collections suggestive of abscess.   - paracentesis done 7/16 to reassess for infection as cell count and other labs not sent off when pt underwent para on 7/9. Abdominal fluid labs negative for infection  - Stop empiric cefepime 7/17  -daptomycin x 5 days for VRE UTI, ended on 7/18  -Stopped acyclovir, restarted prophylactic valcyte.    -continue to monitor      Traumatic intracranial hemorrhage without loss of consciousness  - Head CT reviewed and noted with interval reduction in size of the patient's known right occipital lobe parenchymal hemorrhage.      At risk for opportunistic infections  - cont bactrim for PCP prophylaxis.  - (CMV D+/R+) hold Valcyte.   - Acyclovir started 7/14  - stopped acyclovir and restarted valcyte on 7/17  - pt c/o loose stools past 24 hours, stool for c.diff neg.  Will send CMV PCR in am.  Last CMv PCR 7/13 undetected.     Closed head injury  - admitted recently following fall diagnosed w acute parenchymal hemorrhage within right occipital live near parieto-occipital junction 1.8x1.3x1.5 w mild surrounding vasogenic edema and localized mass.  Repeat CT head showed reduction in size of hemorrhage.  - pt denies HA.      Long-term use of immunosuppressant medication  - holding prograf 7/13 due to seizure like activity.  - Holding MMF. Monitor prograf level daily, monitor for toxic side effects, and adjust for therapeutic dose.   - hydrocortisone 7/14.   - restarted prograf 7/17  - wean hydrocortisone to 25 mg qd. Stop hydrocort and start pred taper 7/20    Prophylactic immunotherapy  - See long term use of immunosuppression.  Decreased as likely w sepsis.    S/P liver transplant  - LFTs stable.  - Pt with good hepatic allograft function.   - Last Liver US 6/24 showed Mildly elevated hepatic arterial resistive indices, although improved from prior exam.  Otherwise, satisfactory vascular appearance of the liver, complex fluid collection adjacent to the left hepatic lobe, similar to slightly smaller compared to prior exam, small volume of ascites and partially visualized right pleural effusion.   - para completed 7/9, cultures pending. Cell count not collected as ordered.   - para repeated 7/16- abdominal fluid labs negative for infection  - LFTs and AlkP creeping up over past 4 days- likely due to holding prograf for  seizures  - liver US on 7/18 revealed persistently elevated RIs, normal waveforms, stable L hepatic lobe transplant fluid collection. US reviewed by surgeon, no interventions indicated at this time.    Anemia of chronic disease  - no overt bleeding.    - keep type and screen current.  - transfused 1u prbc 7/9.  - H/H slowly decreased.  Plan to transfuse 1 u PRBC today followed by Lasix 60 mg IV x1 after.  Monitor with daily labs.      Pleural effusion, right  - CXR reviewed.    - Thora done 7/9  - pleural fluid labs not completed as ordered- will try to get labs repeated if specimen is still available.  - cell count: 84 WBCs, 24% segs      Weakness  - PT/OT consulted.    - recommending inpatient rehab upon discharge  - PMR consulted  - patient no longer qualifies for rehab.  Plan to discharge to home w [facility].      Moderate malnutrition  - supplements ordered.      GERD (gastroesophageal reflux disease)  - cont Pepcid.          VTE Risk Mitigation (From admission, onward)        Ordered     Place sequential compression device  Until discontinued      07/05/19 1659     IP VTE HIGH RISK PATIENT  Once      07/05/19 1659          The patients clinical status was discussed at multidisplinary rounds, involving transplant surgery, transplant medicine, pharmacy, nursing, nutrition, and social work    Discharge Planning:  No Patient Care Coordination Note on file.      Eugenia Albrecht, NP  Liver Transplant  [facility]

## 2019-07-23 VITALS
RESPIRATION RATE: 18 BRPM | HEIGHT: 64 IN | TEMPERATURE: 99 F | SYSTOLIC BLOOD PRESSURE: 135 MMHG | WEIGHT: 205.25 LBS | HEART RATE: 93 BPM | DIASTOLIC BLOOD PRESSURE: 72 MMHG | OXYGEN SATURATION: 98 % | BODY MASS INDEX: 35.04 KG/M2

## 2019-07-23 PROBLEM — B95.2 UTI (URINARY TRACT INFECTION) DUE TO ENTEROCOCCUS: Status: RESOLVED | Noted: 2019-07-12 | Resolved: 2019-07-23

## 2019-07-23 PROBLEM — R50.9 FEVER: Status: RESOLVED | Noted: 2019-07-05 | Resolved: 2019-07-23

## 2019-07-23 PROBLEM — L89.301 PRESSURE INJURY OF BUTTOCK, STAGE 1: Status: RESOLVED | Noted: 2019-07-17 | Resolved: 2019-07-23

## 2019-07-23 PROBLEM — R41.0 DELIRIUM: Status: RESOLVED | Noted: 2019-07-15 | Resolved: 2019-07-23

## 2019-07-23 PROBLEM — J90 PLEURAL EFFUSION, RIGHT: Status: RESOLVED | Noted: 2019-05-16 | Resolved: 2019-07-23

## 2019-07-23 PROBLEM — R56.9 SEIZURE-LIKE ACTIVITY: Status: RESOLVED | Noted: 2019-07-13 | Resolved: 2019-07-23

## 2019-07-23 PROBLEM — N39.0 UTI (URINARY TRACT INFECTION) DUE TO ENTEROCOCCUS: Status: RESOLVED | Noted: 2019-07-12 | Resolved: 2019-07-23

## 2019-07-23 PROBLEM — E87.6 HYPOKALEMIA: Status: RESOLVED | Noted: 2019-07-10 | Resolved: 2019-07-23

## 2019-07-23 LAB
ALBUMIN SERPL BCP-MCNC: 2 G/DL (ref 3.5–5.2)
ALP SERPL-CCNC: 153 U/L (ref 55–135)
ALT SERPL W/O P-5'-P-CCNC: 19 U/L (ref 10–44)
ANION GAP SERPL CALC-SCNC: 7 MMOL/L (ref 8–16)
AST SERPL-CCNC: 17 U/L (ref 10–40)
BACTERIA SPEC ANAEROBE CULT: NORMAL
BASOPHILS # BLD AUTO: 0.02 K/UL (ref 0–0.2)
BASOPHILS NFR BLD: 0.5 % (ref 0–1.9)
BILIRUB SERPL-MCNC: 0.5 MG/DL (ref 0.1–1)
BUN SERPL-MCNC: 15 MG/DL (ref 6–20)
CALCIUM SERPL-MCNC: 7.4 MG/DL (ref 8.7–10.5)
CHLORIDE SERPL-SCNC: 107 MMOL/L (ref 95–110)
CO2 SERPL-SCNC: 24 MMOL/L (ref 23–29)
CREAT SERPL-MCNC: 0.5 MG/DL (ref 0.5–1.4)
DIFFERENTIAL METHOD: ABNORMAL
EOSINOPHIL # BLD AUTO: 0 K/UL (ref 0–0.5)
EOSINOPHIL NFR BLD: 0.5 % (ref 0–8)
ERYTHROCYTE [DISTWIDTH] IN BLOOD BY AUTOMATED COUNT: 17.2 % (ref 11.5–14.5)
EST. GFR  (AFRICAN AMERICAN): >60 ML/MIN/1.73 M^2
EST. GFR  (NON AFRICAN AMERICAN): >60 ML/MIN/1.73 M^2
GLUCOSE SERPL-MCNC: 76 MG/DL (ref 70–110)
HCT VFR BLD AUTO: 24.6 % (ref 37–48.5)
HGB BLD-MCNC: 7.7 G/DL (ref 12–16)
IMM GRANULOCYTES # BLD AUTO: 0.14 K/UL (ref 0–0.04)
IMM GRANULOCYTES NFR BLD AUTO: 3.8 % (ref 0–0.5)
LYMPHOCYTES # BLD AUTO: 0.3 K/UL (ref 1–4.8)
LYMPHOCYTES NFR BLD: 7.4 % (ref 18–48)
MAGNESIUM SERPL-MCNC: 1.5 MG/DL (ref 1.6–2.6)
MCH RBC QN AUTO: 28.2 PG (ref 27–31)
MCHC RBC AUTO-ENTMCNC: 31.3 G/DL (ref 32–36)
MCV RBC AUTO: 90 FL (ref 82–98)
MONOCYTES # BLD AUTO: 0.3 K/UL (ref 0.3–1)
MONOCYTES NFR BLD: 7.4 % (ref 4–15)
NEUTROPHILS # BLD AUTO: 2.9 K/UL (ref 1.8–7.7)
NEUTROPHILS NFR BLD: 80.4 % (ref 38–73)
NRBC BLD-RTO: 0 /100 WBC
PLATELET # BLD AUTO: 116 K/UL (ref 150–350)
PMV BLD AUTO: 9.6 FL (ref 9.2–12.9)
POCT GLUCOSE: 85 MG/DL (ref 70–110)
POTASSIUM SERPL-SCNC: 3.7 MMOL/L (ref 3.5–5.1)
PROT SERPL-MCNC: 4.7 G/DL (ref 6–8.4)
RBC # BLD AUTO: 2.73 M/UL (ref 4–5.4)
SODIUM SERPL-SCNC: 138 MMOL/L (ref 136–145)
TACROLIMUS BLD-MCNC: 4.2 NG/ML (ref 5–15)
WBC # BLD AUTO: 3.64 K/UL (ref 3.9–12.7)

## 2019-07-23 PROCEDURE — 99239 HOSP IP/OBS DSCHRG MGMT >30: CPT | Mod: 24,,, | Performed by: PHYSICIAN ASSISTANT

## 2019-07-23 PROCEDURE — 99239 PR HOSPITAL DISCHARGE DAY,>30 MIN: ICD-10-PCS | Mod: 24,,, | Performed by: PHYSICIAN ASSISTANT

## 2019-07-23 PROCEDURE — 63600175 PHARM REV CODE 636 W HCPCS: Performed by: PHYSICIAN ASSISTANT

## 2019-07-23 PROCEDURE — 83735 ASSAY OF MAGNESIUM: CPT

## 2019-07-23 PROCEDURE — 85025 COMPLETE CBC W/AUTO DIFF WBC: CPT

## 2019-07-23 PROCEDURE — 92507 TX SP LANG VOICE COMM INDIV: CPT

## 2019-07-23 PROCEDURE — 94761 N-INVAS EAR/PLS OXIMETRY MLT: CPT

## 2019-07-23 PROCEDURE — 25000003 PHARM REV CODE 250: Performed by: PHYSICIAN ASSISTANT

## 2019-07-23 PROCEDURE — 25000003 PHARM REV CODE 250: Performed by: INTERNAL MEDICINE

## 2019-07-23 PROCEDURE — 80197 ASSAY OF TACROLIMUS: CPT

## 2019-07-23 PROCEDURE — 80053 COMPREHEN METABOLIC PANEL: CPT

## 2019-07-23 RX ORDER — NIFEDIPINE 30 MG/1
30 TABLET, EXTENDED RELEASE ORAL DAILY
Qty: 30 TABLET | Refills: 11 | Status: SHIPPED | OUTPATIENT
Start: 2019-07-24 | End: 2024-02-15 | Stop reason: SDUPTHER

## 2019-07-23 RX ORDER — LANOLIN ALCOHOL/MO/W.PET/CERES
100 CREAM (GRAM) TOPICAL DAILY
Qty: 30 TABLET | Refills: 11 | Status: SHIPPED | OUTPATIENT
Start: 2019-07-24 | End: 2019-09-09 | Stop reason: SINTOL

## 2019-07-23 RX ORDER — TACROLIMUS 1 MG/1
5 CAPSULE ORAL 2 TIMES DAILY
Status: DISCONTINUED | OUTPATIENT
Start: 2019-07-23 | End: 2019-07-23 | Stop reason: HOSPADM

## 2019-07-23 RX ORDER — TACROLIMUS 1 MG/1
5 CAPSULE ORAL EVERY 12 HOURS
Qty: 300 CAPSULE | Refills: 11 | Status: SHIPPED | OUTPATIENT
Start: 2019-07-23 | End: 2019-09-04 | Stop reason: DRUGHIGH

## 2019-07-23 RX ORDER — MAGNESIUM SULFATE HEPTAHYDRATE 40 MG/ML
2 INJECTION, SOLUTION INTRAVENOUS ONCE
Status: COMPLETED | OUTPATIENT
Start: 2019-07-23 | End: 2019-07-23

## 2019-07-23 RX ORDER — PREDNISONE 5 MG/1
TABLET ORAL
Qty: 30 TABLET | Refills: 0 | Status: SHIPPED | OUTPATIENT
Start: 2019-07-23 | End: 2019-08-12

## 2019-07-23 RX ORDER — FUROSEMIDE 40 MG/1
40 TABLET ORAL DAILY
Qty: 14 TABLET | Refills: 1 | Status: SHIPPED | OUTPATIENT
Start: 2019-07-23 | End: 2020-01-27

## 2019-07-23 RX ORDER — SODIUM BICARBONATE 650 MG/1
1300 TABLET ORAL 2 TIMES DAILY
Qty: 120 TABLET | Refills: 11 | Status: SHIPPED | OUTPATIENT
Start: 2019-07-23 | End: 2020-08-13 | Stop reason: SDUPTHER

## 2019-07-23 RX ORDER — FAMOTIDINE 20 MG/1
20 TABLET, FILM COATED ORAL NIGHTLY
Qty: 30 TABLET | Refills: 0 | Status: SHIPPED | OUTPATIENT
Start: 2019-07-23

## 2019-07-23 RX ORDER — TACROLIMUS 1 MG/1
4 CAPSULE ORAL EVERY 12 HOURS
Qty: 240 CAPSULE | Refills: 11 | Status: SHIPPED | OUTPATIENT
Start: 2019-07-23 | End: 2019-07-23 | Stop reason: SDUPTHER

## 2019-07-23 RX ADMIN — VALGANCICLOVIR 450 MG: 450 TABLET, FILM COATED ORAL at 08:07

## 2019-07-23 RX ADMIN — MAGNESIUM SULFATE IN WATER 2 G: 40 INJECTION, SOLUTION INTRAVENOUS at 09:07

## 2019-07-23 RX ADMIN — LEVOTHYROXINE SODIUM 112 MCG: 50 TABLET ORAL at 05:07

## 2019-07-23 RX ADMIN — NIFEDIPINE 30 MG: 30 TABLET, FILM COATED, EXTENDED RELEASE ORAL at 08:07

## 2019-07-23 RX ADMIN — TACROLIMUS 4 MG: 1 CAPSULE ORAL at 08:07

## 2019-07-23 RX ADMIN — SULFAMETHOXAZOLE AND TRIMETHOPRIM 1 TABLET: 400; 80 TABLET ORAL at 08:07

## 2019-07-23 RX ADMIN — PREDNISONE 15 MG: 5 TABLET ORAL at 08:07

## 2019-07-23 RX ADMIN — Medication 100 MG: at 08:07

## 2019-07-23 RX ADMIN — SODIUM BICARBONATE 650 MG TABLET 1300 MG: at 08:07

## 2019-07-23 NOTE — PLAN OF CARE
Problem: Adult Inpatient Plan of Care  Goal: Plan of Care Review  Recommendations     Recommendation/Intervention: 1.) Continue regular diet with Boost Breeze TID. 2.) Daily weights. 3.) MD to manage fluid. 4.) If diarrhea persists, suggest metamucil BID,   Goals: 1.) Pt to consume/tolerate >75% EEN and EPN by follow up. 2.) Pt to maintain wt (+/-10% of 84.8kg) during admit.   Nutrition Goal Status: goal met  Communication of RD Recs: discussed on rounds(POC)    Assessment and Plan  Nutrition Problem  Inadequate oral intake     Related to (etiology):   Loss of appetite     Signs and Symptoms (as evidenced by):   Pt has diarrhea that is affecting her appetite.     Interventions/Recommendations (treatment strategy):  Collaboration of care to providers.     Nutrition Diagnosis Status:   Continues        Moderate protein-calorie malnutrition  Malnutrition in the context of Acute Illness        Related to (etiology):  Varied intake     Signs and Symptoms (as evidenced by):  Energy Intake: varied intake (per documentation <50% of meals consumed upon admit) of estimated energy requirement for ~5 days or more  Body Fat Depletion: mild depletion of orbitals, triceps and thoracic and lumbar region   Muscle Mass Depletion: mild/moderate depletion of temples, clavicle region, scapular region, interosseous muscle and lower extremities   Weight Loss: 16.9% x 2 months   Fluid Accumulation: moderate to abdomen     Interventions/Recommendations (treatment strategy):  Collaboration with providers; supplemental beverage; vitamin/mineral supplements     Nutrition Diagnosis Status:  Continues, improving

## 2019-07-23 NOTE — PT/OT/SLP PROGRESS
"Speech Language Pathology Treatment  Discharge    Patient Name:  Jihan Zamudio   MRN:  [id number]  Admitting Diagnosis: Seizure-like activity    Recommendations:                 General Recommendations:  Follow-up not indicated  General Precautions: Standard, fall  Communication strategies:  none    Subjective     Awake/alert    Pain/Comfort:  · Pain Rating 1: 0/10  · Pain Rating Post-Intervention 1: 0/10    Objective:     Has the patient been evaluated by SLP for swallowing?   No  Keep patient NPO? No   Current Respiratory Status: room air      Pt upright in chair upon entry. Pt participated in spontaneous conversation with no signs of motor speech or expressive deficits. During conversation pt stated " I think my speech gets slowed sometimes because of the medication. Ever since they have started to wean some of them off my speech has gotten better. My  even said I was back to normal." SLP reviewed speech strategies with pt who verbalized understanding. No further ST warranted at this time.     Assessment:     Jihan Zamudio is a 49 y.o. female with cognitive linguistic aspects deemed WFL. No further ST warranted.     Goals:   Multidisciplinary Problems     SLP Goals     Not on file                Plan:     · Plan of Care reviewed with:  patient   · SLP Follow-Up:  No           Time Tracking:     SLP Treatment Date:   07/23/19  Speech Start Time:  0930  Speech Stop Time:  0941     Speech Total Time (min):  11 min    Billable Minutes: Speech Therapy Individual 11    Yamile Costa CCC-SLP  07/23/2019  "

## 2019-07-23 NOTE — PT/OT/SLP DISCHARGE
Physical Therapy Discharge Summary    Name: Jihan Zamudio  MRN: [id number]   Principal Problem: Seizure-like activity     Patient Discharged from acute Physical Therapy on 2019.  Please refer to prior PT noted date on 2019 for functional status.     Assessment:     Goals partially met.    Objective:     GOALS:   Multidisciplinary Problems     Physical Therapy Goals     Not on file          Multidisciplinary Problems (Resolved)        Problem: Physical Therapy Goal    Goal Priority Disciplines Outcome Goal Variances Interventions   Physical Therapy Goal   (Resolved)     PT, PT/OT Outcome(s) achieved     Description:  Goals to be met by: 2019    Patient will increase functional independence with mobility by performin. Supine <> sit with supervision   2. Sit to stand transfer with Supervision-met  3. Gait  x 150 feet with Stand-by Assistance with or without using least restrictive AD. -met  4. Stand for 3 minutes with Stand-by Assistance while performing dynamic balance activities   5. Lower extremity exercise program x20 reps per handout, with supervision                       Reasons for Discontinuation of Therapy Services  Transfer to alternate level of care.      Plan:     Patient Discharged to: Home with Home Health Service.    Theodore Coles, PT  2019

## 2019-07-23 NOTE — PROGRESS NOTES
Discharge   (YULIYA) met with the patient (pt) and pt's , Freeman ([phone]), to discuss discharge plans for today.  Pt presented, A&Ox4, engaged and communicative.  SW observed pt to be sitting on the rollator which pt verified was delivered to her room in house on yesterday.  Pts  also present and both advised they were ready for the pts discharge on today.  Pt understands she will discharge with home health orders for PT/OT and did not have an agency identified to receive services.  SW verified her orders had been submitted to [facility] w/Marta ([id number]) on yesterday by YULIYA SHANNON.  Pts  advised new mattresses were being delivered today to their [place] ([place]) apartment as requested.  Pt's  stated he has a better rapport with Sonia (manager w/LR) than the supervisor Nallely; however, denied any serious issues.  Pt advised her best friend will provide caregiver services to the pts daughter should the pt not be released from care before Aug 7th at which time pts daughter will resume school.  Pt has not identified a secondary caregiver at this time; however, is encouraged she will return home before her daughter is expected to leave.  SW encouraged pt and pt's  to explore availability with all family/friends who are able to provide secondary assistance in case of an emergency.  Pt and pt's  verbalized understanding. No other issues or concerns were identified/addressed.  SW remains available for education, resources, support and discharge planning as appropriate.

## 2019-07-23 NOTE — PROGRESS NOTES
"[facility]  Adult Nutrition  Progress Note    SUMMARY       Recommendations    Recommendation/Intervention: 1.) Continue regular diet with Boost Breeze TID. 2.) Daily weights. 3.) MD to manage fluid. 4.) If diarrhea persists, suggest metamucil BID,   Goals: 1.) Pt to consume/tolerate >75% EEN and EPN by follow up. 2.) Pt to maintain wt (+/-10% of 84.8kg) during admit.   Nutrition Goal Status: goal met  Communication of RD Recs: discussed on rounds(POC)    Reason for Assessment    Reason For Assessment: RD follow-up  Diagnosis: transplant/postoperative complications(OLTx 6/6/19)  Relevant Medical History: KESSLER cirrhosis, esphageal varices, HTN, GERD, KADEEM, morbid obesity s/p band (released)  Interdisciplinary Rounds: attended  General Information Comments: Pt sitting at table about to consume bkfst at time of visit. Pt reports appetite is better. She reports consuming all of her meals. She reports diarrhea beginning to form and denies any other GI distress. She reports not consuming ONS 2/2 to flavor. Changed ONS flavor. NFPE completed 7/12. Acute malnutrition continues. Noted 18# wt gain, likely related to fluid. Will monitor.   Nutrition Discharge Planning: Adequate intake to meet nutritional needs.     Nutrition Risk Screen    Nutrition Risk Screen: no indicators present    Nutrition/Diet History    Patient Reported Diet/Restrictions/Preferences: general  Spiritual, Cultural Beliefs, Alevism Practices, Values that Affect Care: no    Anthropometrics    Temp: 99.2 °F (37.3 °C)  Height Method: Stated  Height: 5' 4" (162.6 cm)  Height (inches): 64 in  Weight Method: Bed Scale  Weight: 93.1 kg (205 lb 4 oz)  Weight (lb): 205.25 lb  Ideal Body Weight (IBW), Female: 120 lb  % Ideal Body Weight, Female (lb): 144.17 lb  BMI (Calculated): 29.8       Lab/Procedures/Meds    Pertinent Labs Reviewed: reviewed  Pertinent Labs Comments: Ca 7.4, Mg 1.5,   Pertinent Medications Reviewed: " reviewed  Pertinent Medications Comments: prednisone, tacrolimus, zosyn, thiamine      Estimated/Assessed Needs    Weight Used For Calorie Calculations: 78.5 kg (173 lb 1 oz)  Energy Calorie Requirements (kcal): 1731 kcal  Energy Need Method: [place]-St Jeor(PAL 1.25)  Protein Requirements: 78-94g  Weight Used For Protein Calculations: 78.5 kg (173 lb 1 oz)        RDA Method (mL): 1731         Nutrition Prescription Ordered    Current Diet Order: regular  Nutrition Order Comments: 1500mL FR  Oral Nutrition Supplement: Boost Breeze TID    Evaluation of Received Nutrient/Fluid Intake    IV Fluid (mL): 0  I/O: +16.2L since 7/9  Comments: LBM 7/22  % Intake of Estimated Energy Needs: 75 - 100 %  % Meal Intake: 75 - 100 %    Nutrition Risk    Level of Risk/Frequency of Follow-up: low     Assessment and Plan  Nutrition Problem  Inadequate oral intake     Related to (etiology):   Loss of appetite     Signs and Symptoms (as evidenced by):   Pt has diarrhea that is affecting her appetite.     Interventions/Recommendations (treatment strategy):  Collaboration of care to providers.     Nutrition Diagnosis Status:   Continues        Moderate protein-calorie malnutrition  Malnutrition in the context of Acute Illness        Related to (etiology):  Varied intake     Signs and Symptoms (as evidenced by):  Energy Intake: varied intake (per documentation <50% of meals consumed upon admit) of estimated energy requirement for ~5 days or more  Body Fat Depletion: mild depletion of orbitals, triceps and thoracic and lumbar region   Muscle Mass Depletion: mild/moderate depletion of temples, clavicle region, scapular region, interosseous muscle and lower extremities   Weight Loss: 16.9% x 2 months   Fluid Accumulation: moderate to abdomen     Interventions/Recommendations (treatment strategy):  Collaboration with providers; supplemental beverage; vitamin/mineral supplements     Nutrition Diagnosis Status:  Continues, improving        Monitor and Evaluation    Food and Nutrient Intake: energy intake, food and beverage intake  Food and Nutrient Adminstration: diet order  Knowledge/Beliefs/Attitudes: food and nutrition knowledge/skill  Physical Activity and Function: nutrition-related ADLs and IADLs  Anthropometric Measurements: weight, weight change, body mass index  Biochemical Data, Medical Tests and Procedures: electrolyte and renal panel, gastrointestinal profile, glucose/endocrine profile, lipid profile  Nutrition-Focused Physical Findings: overall appearance     Malnutrition Assessment                 Orbital Region (Subcutaneous Fat Loss): well nourished  Upper Arm Region (Subcutaneous Fat Loss): mild depletion   [place] Region (Muscle Loss): mild depletion  Clavicle Bone Region (Muscle Loss): mild depletion  Clavicle and Acromion Bone Region (Muscle Loss): well nourished  Dorsal Hand (Muscle Loss): moderate depletion  Patellar Region (Muscle Loss): well nourished  Anterior Thigh Region (Muscle Loss): well nourished  Posterior Calf Region (Muscle Loss): well nourished                 Nutrition Follow-Up    RD Follow-up?: Yes

## 2019-07-23 NOTE — PLAN OF CARE
Problem: Adult Inpatient Plan of Care  Goal: Plan of Care Review  Outcome: Outcome(s) achieved Date Met: 07/23/19  Pt adequate for discharge per MD order. Will proceed with discharge when all parameter are met.

## 2019-07-23 NOTE — DISCHARGE SUMMARY
"[facility]  Liver Transplant  Discharge Summary      Patient Name: Jihna Zamudio  MRN: [id number]  Admission Date: 2019  Hospital Length of Stay: 18 days  Discharge Date and Time:  2019 12:35 PM  Attending Physician: Jay Herbert MD   Discharging Provider: Laura Benitez PA-C  Primary Care Provider: Primary Doctor No  Post-Operative Day: 48     ORGAN:   LIVER  Disease Etiology: Cirrhosis: Fatty Liver ([place])  Donor Type:    - Brain Death  CDC High Risk:   No  Donor CMV Status:   Donor CMV Status: Positive  Donor HBcAB:   Negative  Donor HCV Status:   Negative  Whole or Partial: Whole Liver  Biliary Anastomosis: End to End  Arterial Anatomy: Standard    HPI:   Ms. Zamudio is a 50 y/o F s/p DBD OLTx (SM induction, CMV D+R+) for LOZOYA cirrhosis on 19. Post transplant course significant for acute parenchymal hemorrhage within the right occipital lobe near the parieto-occipital junction measuring approximately 1.8 x 1.3 x 1.5 cm with mild surrounding vasogenic edema and localized mass effect after fall on . She was discharged home on 7 days of Keppra [facility] . She now presents to the ED with 1 day history of fever. Homehealth nurse checked patient's temperature and was noted to be elevated. Home health nurse also reported patient acting "off" with mental status change. Currently in the ED, tmax is 102.3. Patient feels well with no true complaints expect rhinorrhea. She denies chills, diarrhea, nausea/vomiting, abdominal pain. Denies sick contacts. Will admit for infectious work up (blood cx, UA, urine cx, CMV PCR, chest x-ray, CT A/P). Patient currently with no altered mental status. In light of recent hx of occipital hemorrhage, will obtain CT head without contrast.      * No surgery found *     Hospital Course:    50 y/o F s/p OLTx for LOZOYA cirrhosis on  who was admitted on  for fevers. Pt febrile on admission, started on bx abx. ID " consulted. Blood cultures NGTD, UA negative. CMV PCR drawn on admit pending. Pt having loose stools, C diff EIA negative, CMV undetected. CT head showed reduction in size of hemorrhage. Abdominal CT reviewed and with moderate right pleural effusion. CXR with increased pleural effusion. Thora and para done 7/9; amount of fluid removed not recorded and pleural fluid labs were not completed as ordered. Abdominal gram stain reveals no WBC and no organisms; cell count not completed as ordered, cultures pending. TTE done 7/9 WNL, no vegetations. Pt tachy and tachypneic on 7/9; 1 L bolus given and pt responded well. 1 u pRBC transfused 7/9 for low H/H. Pt continued to have diarrhea, additional stool studies pending. Mentation continued to wax and wane, MRI w/ and w/o contrast done on 7/11 which revealed stable findings compared to prior. Pt transitioned to IV abx to PO cefpodoxime and flagyl on 7/11 per ID recs. Urine culture from 7/11 resulted as positive for VRE, thus cefpodoxime and flagyl stopped and pt started on zyvox on 7/12. Pt with worsening MS on 7/13 and claimed she had an unwitnessed seizure. STAT CT head without acute change. Keppra 500 mg bid started. Prograf and lovenox dc'ed. Stopped zyvox due to potential to lower seizure threshold and started IV daptomycin. IV cefepime restarted. Zyprexa qhs started for agitation and anxiety. Pt continued to have fevers despite therapeutic IV abx for VRE UTI. Viral and fungal labs pending per ID recs. 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  cc q8h started. Pt with persistent AMS, thus psych consulted who believe she is suffering from multifactorial delirium. PO thiamine and delirium precautions started. Unable to perform LP as unsafe due to ICH. CT a/p repeated on 7/15, revealed R pleural effusion, moderate ascites, and fluid in adam hepatis; no walled off fluid collections suggestive of abscess. Para repeated on 7/16 as labs not collected  during para done on 7/9. Para labs on 7/16 negative for infection. Empiric cefepime and acyclovir stopped on 7/17. Prograf restarted 7/17. LFTs mildly elevated while pt off of prograf for supposed seizures, so liver US obtained on 7/18 which revealed persistently elevated RIs, normal waveforms, stable L hepatic lobe transplant fluid collection. US reviewed by surgeon, no interventions indicated at this time. Hydrocortisone weaned off and keppra stopped 7/19. Mentation greatly improved by 7/16. Zyprexa weaned off per psych recs, and mental status remained stable. Started procardia for HTN on 7/19.    Pt with frequent BM, stool sent for c.diff 7/21, negative.  Per patient BMs less frequent today. Cellcept remains on hold at this time. Will restart when appropriate. She reports decreased appetite but is tolerating diet.  Of note, patient weight is up 13 pounds. Plan to discharge with Lasix 40mg BID x 2 weeks.  H/H stable/low. Transfused 1u PRBC 7/22, H/H 7.7/24.6 at d/c.  Patient has completed tx for VRE UTI. Last dose of Daptomycin 7/18.  She was on Keppra for possible seizure, medicine has since been discontinued. Patient has had no seizure like behavior. Remains afebrile. PT consulted for deconditioning, will d/c home with [facility], patient ambulating >75 ft w/o AD. Pt is stable and ready for discharge. She has met with PharmD and received education. Pt expressed understanding of discharge instructions and importance of follow-up.     .    Final Active Diagnoses:    Diagnosis Date Noted POA    Impaired functional mobility and endurance [Z74.09] 07/18/2019 Yes    Traumatic intracranial hemorrhage without loss of consciousness [S06.300A]  Yes    At risk for opportunistic infections [Z91.89] 06/24/2019 Yes    Closed head injury [S09.90XA] 06/24/2019 Yes    Long-term use of immunosuppressant medication [Z79.899] 06/08/2019 Not Applicable    Prophylactic immunotherapy [Z29.8] 06/06/2019 Not Applicable    S/P liver  transplant [Z94.4] 06/06/2019 Not Applicable    Anemia of chronic disease [D63.8] 05/29/2019 Yes    Weakness [R53.1] 05/16/2019 Yes    Moderate malnutrition [E44.0] 04/26/2019 Yes    GERD (gastroesophageal reflux disease) [K21.9] 02/26/2018 Yes      Problems Resolved During this Admission:    Diagnosis Date Noted Date Resolved POA    PRINCIPAL PROBLEM:  Seizure-like activity [R56.9] 07/13/2019 07/23/2019 No    Pressure injury of buttock, stage 1 [L89.301] 07/17/2019 07/23/2019 Yes    Delirium [R41.0] 07/15/2019 07/23/2019 Yes    UTI (urinary tract infection) due to Enterococcus [N39.0, B95.2] 07/12/2019 07/23/2019 Yes    Diarrhea [R19.7] 07/12/2019 07/16/2019 Yes    Hypokalemia [E87.6] 07/10/2019 07/23/2019 No    Fever [R50.9] 07/05/2019 07/23/2019 Yes    Acute on chronic anemia [D64.9]  07/08/2019 Yes    Pleural effusion, right [J90] 05/16/2019 07/23/2019 Yes       Consults (From admission, onward)        Status Ordering Provider     Inpatient consult to Infectious Diseases  Once     Provider:  (Not yet assigned)    WILTON Marin     Inpatient consult to Neurology  Once     Provider:  (Not yet assigned)    JOSE Barba     Inpatient consult to Physical Medicine Rehab  Once     Provider:  (Not yet assigned)    SAVITA Bruno     Inpatient consult to Psychiatry  Once     Provider:  (Not yet assigned)    SAVITA Bruno     Inpatient consult to Registered Dietitian/Nutritionist  Once     Provider:  (Not yet assigned)    SVAITA Bruno          Pending Diagnostic Studies:     Procedure Component Value Units Date/Time    CMV DNA, quantitative, PCR [[id number]] Collected:  07/23/19 0643    Order Status:  Sent Lab Status:  In process Updated:  07/23/19 0713    Specimen:  Blood     IR Paracentesis with Imaging [[id number]] Resulted:  07/16/19 1553    Order Status:  Sent Lab Status:  In process Updated:  07/16/19 1642    Specimen:  Peritoneal  "Fluid         Significant Diagnostic Studies: Labs:   CMP   Recent Labs   Lab 07/22/19  0613 07/23/19  0643    138   K 3.8 3.7    107   CO2 23 24   GLU 75 76   BUN 16 15   CREATININE 0.5 0.5   CALCIUM 7.9* 7.4*   PROT 4.6* 4.7*   ALBUMIN 1.9* 2.0*   BILITOT 0.5 0.5   ALKPHOS 180* 153*   AST 20 17   ALT 23 19   ANIONGAP 8 7*   ESTGFRAFRICA >60.0 >60.0   EGFRNONAA >60.0 >60.0   , CBC   Recent Labs   Lab 07/22/19  0613 07/23/19  0643   WBC 3.75* 3.64*   HGB 7.4* 7.7*   HCT 23.3* 24.6*   * 116*    and All labs within the past 24 hours have been reviewed    The patients clinical status was discussed at multidisplinary rounds, involving transplant surgery, transplant medicine, pharmacy, nursing, nutrition, and social work    Discharged Condition: good    Disposition: Home or Self Care    Follow Up:    Patient Instructions:      WALKER FOR HOME USE     Order Specific Question Answer Comments   Type of Walker: Rollator    With wheels? Yes    Height: 5' 4" (1.626 m)    Weight: 92.3 kg (203 lb 7.8 oz)    Length of need (1-99 months): 99    Does patient have medical equipment at home? wheelchair    Please check all that apply: Patient's condition impairs ambulation.    Vendor: [facility]    Expected Date of Delivery: 7/22/2019      Referral to Home health   Referral Priority: Routine Referral Type: Home Health Care   Referral Reason: Specialty Services Required   Requested Specialty: Home Health Services   Number of Visits Requested: 1     Diet Adult Regular     Notify your health care provider if you experience any of the following:  temperature >100.4     Notify your health care provider if you experience any of the following:  persistent nausea and vomiting or diarrhea     Notify your health care provider if you experience any of the following:  severe uncontrolled pain     Notify your health care provider if you experience any of the following:  redness, tenderness, or signs of infection (pain, " swelling, redness, odor or green/yellow discharge around incision site)     Notify your health care provider if you experience any of the following:  difficulty breathing or increased cough     Notify your health care provider if you experience any of the following:  severe persistent headache     Notify your health care provider if you experience any of the following:  worsening rash     Notify your health care provider if you experience any of the following:  persistent dizziness, light-headedness, or visual disturbances     Notify your health care provider if you experience any of the following:  increased confusion or weakness     Activity as tolerated     Medications:  Reconciled Home Medications:      Medication List      START taking these medications    NIFEdipine 30 MG (OSM) 24 hr tablet  Commonly known as:  PROCARDIA-XL  Take 1 tablet (30 mg total) by mouth once daily.  Start taking on:  7/24/2019     thiamine 100 MG tablet  Take 1 tablet (100 mg total) by mouth once daily.  Start taking on:  7/24/2019        CHANGE how you take these medications    predniSONE 5 MG tablet  Commonly known as:  DELTASONE  Take by mouth daily:  15mg 7/24-7/26, 10mg 7/27-8/2, 5mg 8/3-8/9  What changed:    · medication strength  · additional instructions     sodium bicarbonate 650 MG tablet  Take 2 tablets (1,300 mg total) by mouth 2 (two) times daily.  What changed:  how much to take     tacrolimus 1 MG Cap  Commonly known as:  PROGRAF  Take 4 capsules (4 mg total) by mouth every 12 (twelve) hours.  What changed:  how much to take        CONTINUE taking these medications    famotidine 20 MG tablet  Commonly known as:  PEPCID  Take 1 tablet (20 mg total) by mouth every evening.     furosemide 40 MG tablet  Commonly known as:  LASIX  Take 1 tablet (40 mg total) by mouth once daily. for 14 days     levothyroxine 112 MCG tablet  Commonly known as:  SYNTHROID  Take 1 tablet (112 mcg total) by mouth once daily.    "  sulfamethoxazole-trimethoprim 400-80mg 400-80 mg per tablet  Commonly known as:  BACTRIM,SEPTRA  Take 1 tablet by mouth once daily. Stop: 12/3/19     valGANciclovir 450 mg Tab  Commonly known as:  VALCYTE  Take 1 tablet (450 mg total) by mouth once daily. Stop: 9/4/19        STOP taking these medications    FREESTYLE LANCETS 28 gauge Misc  Generic drug:  lancets     FREESTYLE LITE METER kit  Generic drug:  blood-glucose meter     FREESTYLE LITE STRIPS Strp  Generic drug:  blood sugar diagnostic     levETIRAcetam 500 MG Tab  Commonly known as:  KEPPRA     mycophenolate 250 mg Cap  Commonly known as:  CELLCEPT     oxyCODONE 10 mg Tab immediate release tablet  Commonly known as:  ROXICODONE     pen needle, diabetic 32 gauge x 5/32" Ndle          Time spent caring for patient (Greater than 1/2 spent in direct face-to-face contact): > 30 minutes    Laura Benitez PA-C  Liver Transplant  [facility]  "

## 2019-07-23 NOTE — NURSING
Patient discharged per MD order. Patient received medication information and blue card updated by Megan Haley, Pharmacist. Patient aware of what medications were given today and when they are due tonight and tomorrow. Patient aware of future lab and clinic appointments. Patient educated on signs and symptoms require MD notification. Removed right PIV; catheter intact. Patient tolerated well. Patient verbalized full understanding of discharge instructions. Pt to be transported downstairs in personal wheelchair by  and daughter. Pt returning to [place] apartments with  and daughter.

## 2019-07-23 NOTE — PLAN OF CARE
Problem: Adult Inpatient Plan of Care  Goal: Plan of Care Review  Outcome: Ongoing (interventions implemented as appropriate)  Pt is AAOx4 in bed wearing non-skid footwear, bed in low/locked position and with call bell within reach. Pt reminded to use call bell to call for assistance, pt verbalizes understanding. Pt is afebrile at this time. Proper hand hygiene performed before and after pt care activities. Patient ambulated around unit during night. Bedtime BG of 130. Denies any pain or discomfort at this time.

## 2019-07-23 NOTE — PROGRESS NOTES
Discharge Medication Note:    Hospital Course:  Ms Zamudio is s/p liver transplant from 6/5/19 admitted for fever and infectious work up.  ID consulted, empiric abx initiated - however infectious work up negative and abx discontinued.  Patient also with mental status changes this admission and concern for unwitnessed seizures.  Tacro held, started on hydrocort for immunosuppression, keppra prophy, zyprexa nightly - pycj and neuro consulted.  By time of discharge - infectious work up negative, off antibiotics, mental status improved, on tacro and prednisone taper for immunosuppression, and off all psych meds.  Of note, MMF remains on hold as patient with increased diarrhea (CDiff work up negative) - please restart as an outpatient when appropriate.  Patient also with a recent history of occipital hemorrhage, aspirin has not been started yet.    Met with Jihan Zamudio at discharge to review discharge medications and to update the blue medication card.       Jihan Zamudio   Home Medication Instructions LUISA:[id number]    Printed on:07/23/19 1413   Medication Information                      famotidine (PEPCID) 20 MG tablet  Take 1 tablet (20 mg total) by mouth every evening.             furosemide (LASIX) 40 MG tablet  Take 1 tablet (40 mg total) by mouth once daily. for 14 days             levothyroxine (SYNTHROID) 112 MCG tablet  Take 1 tablet (112 mcg total) by mouth once daily.             NIFEdipine (PROCARDIA-XL) 30 MG (OSM) 24 hr tablet  Take 1 tablet (30 mg total) by mouth once daily.             predniSONE (DELTASONE) 5 MG tablet  Take by mouth daily:  15mg 7/24-7/26, 10mg 7/27-8/2, 5mg 8/3-8/9             sodium bicarbonate 650 MG tablet  Take 2 tablets (1,300 mg total) by mouth 2 (two) times daily.             sulfamethoxazole-trimethoprim 400-80mg (BACTRIM,SEPTRA) 400-80 mg per tablet  Take 1 tablet by mouth once daily. Stop: 12/3/19             tacrolimus (PROGRAF) 1 MG Cap  Take 5 capsules (5  mg total) by mouth every 12 (twelve) hours.             thiamine 100 MG tablet  Take 1 tablet (100 mg total) by mouth once daily.             valGANciclovir (VALCYTE) 450 mg Tab  Take 1 tablet (450 mg total) by mouth once daily. Stop: 9/4/19                 Pt was provided with prescriptions for the following meds:  E-rx: tacro, thiamine, sodium bicarbonate, famotidine, prednisone, lasix, nifedipine   Printed rx: none  Phone-in or faxed rx: none to none pharmacy per pt request.    The following medications have been placed on HOLD and should be restarted in the outpatient setting (when appropriate): MMF (diarrhea), aspirin (hx of ICH)    Jihan Zamudio verbalized understanding and had the opportunity to ask questions.

## 2019-07-25 ENCOUNTER — TELEPHONE (OUTPATIENT)
Dept: TRANSPLANT | Facility: CLINIC | Age: 49
End: 2019-07-25

## 2019-07-25 ENCOUNTER — LAB VISIT (OUTPATIENT)
Dept: LAB | Facility: HOSPITAL | Age: 49
End: 2019-07-25
Attending: SURGERY
Payer: OTHER GOVERNMENT

## 2019-07-25 DIAGNOSIS — Z94.4 LIVER REPLACED BY TRANSPLANT: ICD-10-CM

## 2019-07-25 DIAGNOSIS — Z94.4 LIVER REPLACED BY TRANSPLANT: Primary | ICD-10-CM

## 2019-07-25 LAB
ALBUMIN SERPL BCP-MCNC: 2.3 G/DL (ref 3.5–5.2)
ALP SERPL-CCNC: 148 U/L (ref 55–135)
ALT SERPL W/O P-5'-P-CCNC: 19 U/L (ref 10–44)
ANION GAP SERPL CALC-SCNC: 9 MMOL/L (ref 8–16)
AST SERPL-CCNC: 19 U/L (ref 10–40)
BASOPHILS # BLD AUTO: 0.02 K/UL (ref 0–0.2)
BASOPHILS NFR BLD: 0.5 % (ref 0–1.9)
BILIRUB DIRECT SERPL-MCNC: 0.3 MG/DL (ref 0.1–0.3)
BILIRUB SERPL-MCNC: 0.6 MG/DL (ref 0.1–1)
BUN SERPL-MCNC: 13 MG/DL (ref 6–20)
CALCIUM SERPL-MCNC: 8.5 MG/DL (ref 8.7–10.5)
CHLORIDE SERPL-SCNC: 106 MMOL/L (ref 95–110)
CMV DNA SERPL NAA+PROBE-ACNC: NORMAL IU/ML
CO2 SERPL-SCNC: 25 MMOL/L (ref 23–29)
CREAT SERPL-MCNC: 0.5 MG/DL (ref 0.5–1.4)
DIFFERENTIAL METHOD: ABNORMAL
EOSINOPHIL # BLD AUTO: 0.1 K/UL (ref 0–0.5)
EOSINOPHIL NFR BLD: 1.3 % (ref 0–8)
ERYTHROCYTE [DISTWIDTH] IN BLOOD BY AUTOMATED COUNT: 17.2 % (ref 11.5–14.5)
EST. GFR  (AFRICAN AMERICAN): >60 ML/MIN/1.73 M^2
EST. GFR  (NON AFRICAN AMERICAN): >60 ML/MIN/1.73 M^2
GLUCOSE SERPL-MCNC: 79 MG/DL (ref 70–110)
HCT VFR BLD AUTO: 27.5 % (ref 37–48.5)
HGB BLD-MCNC: 8.6 G/DL (ref 12–16)
IMM GRANULOCYTES # BLD AUTO: 0.06 K/UL (ref 0–0.04)
IMM GRANULOCYTES NFR BLD AUTO: 1.5 % (ref 0–0.5)
LYMPHOCYTES # BLD AUTO: 0.4 K/UL (ref 1–4.8)
LYMPHOCYTES NFR BLD: 9.4 % (ref 18–48)
MCH RBC QN AUTO: 28.4 PG (ref 27–31)
MCHC RBC AUTO-ENTMCNC: 31.3 G/DL (ref 32–36)
MCV RBC AUTO: 91 FL (ref 82–98)
MONOCYTES # BLD AUTO: 0.3 K/UL (ref 0.3–1)
MONOCYTES NFR BLD: 7.8 % (ref 4–15)
NEUTROPHILS # BLD AUTO: 3.1 K/UL (ref 1.8–7.7)
NEUTROPHILS NFR BLD: 79.5 % (ref 38–73)
NRBC BLD-RTO: 0 /100 WBC
PLATELET # BLD AUTO: 160 K/UL (ref 150–350)
PMV BLD AUTO: 9.1 FL (ref 9.2–12.9)
POTASSIUM SERPL-SCNC: 3.7 MMOL/L (ref 3.5–5.1)
PROT SERPL-MCNC: 5.7 G/DL (ref 6–8.4)
RBC # BLD AUTO: 3.03 M/UL (ref 4–5.4)
SODIUM SERPL-SCNC: 140 MMOL/L (ref 136–145)
TACROLIMUS BLD-MCNC: 7.7 NG/ML (ref 5–15)
WBC # BLD AUTO: 3.95 K/UL (ref 3.9–12.7)

## 2019-07-25 PROCEDURE — 82248 BILIRUBIN DIRECT: CPT

## 2019-07-25 PROCEDURE — 36415 COLL VENOUS BLD VENIPUNCTURE: CPT

## 2019-07-25 PROCEDURE — 80197 ASSAY OF TACROLIMUS: CPT

## 2019-07-25 PROCEDURE — 80053 COMPREHEN METABOLIC PANEL: CPT

## 2019-07-25 PROCEDURE — 85025 COMPLETE CBC W/AUTO DIFF WBC: CPT

## 2019-07-25 NOTE — TELEPHONE ENCOUNTER
Patient notified and instructed: labs reviewed, results stable. No medicine changes made. Repeat labs due on Monday 7/29/19. Patient able to repeat instructions and voiced understanding.

## 2019-07-25 NOTE — TELEPHONE ENCOUNTER
----- Message from Samra Torres MD sent at 7/25/2019  3:41 PM CDT -----  Results ok. No action needed.

## 2019-07-29 ENCOUNTER — OFFICE VISIT (OUTPATIENT)
Dept: NEUROSURGERY | Facility: CLINIC | Age: 49
End: 2019-07-29
Payer: OTHER GOVERNMENT

## 2019-07-29 ENCOUNTER — TELEPHONE (OUTPATIENT)
Dept: TRANSPLANT | Facility: CLINIC | Age: 49
End: 2019-07-29

## 2019-07-29 VITALS
RESPIRATION RATE: 18 BRPM | HEART RATE: 87 BPM | SYSTOLIC BLOOD PRESSURE: 151 MMHG | DIASTOLIC BLOOD PRESSURE: 70 MMHG | BODY MASS INDEX: 34.55 KG/M2 | WEIGHT: 202.38 LBS | TEMPERATURE: 98 F | HEIGHT: 64 IN

## 2019-07-29 DIAGNOSIS — Z94.4 LIVER REPLACED BY TRANSPLANT: Primary | ICD-10-CM

## 2019-07-29 DIAGNOSIS — S06.300D TRAUMATIC INTRACRANIAL HEMORRHAGE WITHOUT LOSS OF CONSCIOUSNESS, SUBSEQUENT ENCOUNTER: Primary | ICD-10-CM

## 2019-07-29 PROCEDURE — 99999 PR PBB SHADOW E&M-EST. PATIENT-LVL III: ICD-10-PCS | Mod: PBBFAC,,, | Performed by: PHYSICIAN ASSISTANT

## 2019-07-29 PROCEDURE — 99999 PR PBB SHADOW E&M-EST. PATIENT-LVL III: CPT | Mod: PBBFAC,,, | Performed by: PHYSICIAN ASSISTANT

## 2019-07-29 PROCEDURE — 99214 PR OFFICE/OUTPT VISIT, EST, LEVL IV, 30-39 MIN: ICD-10-PCS | Mod: S$PBB,,, | Performed by: PHYSICIAN ASSISTANT

## 2019-07-29 PROCEDURE — 99214 OFFICE O/P EST MOD 30 MIN: CPT | Mod: S$PBB,,, | Performed by: PHYSICIAN ASSISTANT

## 2019-07-29 PROCEDURE — 99213 OFFICE O/P EST LOW 20 MIN: CPT | Mod: PBBFAC | Performed by: PHYSICIAN ASSISTANT

## 2019-07-29 NOTE — PROGRESS NOTES
[facility]  Neurosurgery    SUBJECTIVE:     History of Present Illness:  Jihan Zamudio is a 49 y.o. female with liver transplant in 2019 who presents for one-month follow-up, status post ICH.  Patient fell backwards onto concrete and hit her head on 2019.  CT showed right occipital parenchymal hemorrhage.  Repeat CTs have shown continued resolution of occipital hemorrhage. She denies seizures, N/V, and vision changes. She has chronic numbness in BLE and chronic blurry vision. Endorses occasional tension headaches that are normal for her and easily treated with a tylenol.       (Not in a hospital admission)    Review of patient's allergies indicates:   Allergen Reactions    Metformin Rash    Pcn [penicillins] Other (See Comments)     Unsure of reaction, states it was as a child. Tolerated rocephin at [facility]       Past Medical History:   Diagnosis Date    Cirrhosis     Esophageal varices 2018    Small with no banding     Essential hypertension 2018    Fatty liver 2018    GERD (gastroesophageal reflux disease)     Hx of colonic polyps 2018    On colonoscopy     Hypertension     Hypothyroidism 2018    Kidney stones     Morbid obesity 2018    Lap band with subsequent release    KADEEM (obstructive sleep apnea) 2018    Osteoarthritis 2018    Pulmonary nodule 2018    Vitamin D deficiency 2018     Past Surgical History:   Procedure Laterality Date     SECTION      TIMES 2     CHOLECYSTECTOMY      LAPAROSCOPIC     CYSTOSCOPY W/ STONE MANIPULATION      kidney stone removal    EGD (ESOPHAGOGASTRODUODENOSCOPY) N/A 2019    Performed by [facility]ian [facility]dez MD at [facility] ENDO (2ND FLR)    LAPAROSCOPIC GASTRIC BANDING  2006    removal     LIVER BIOPSY  2017    KESSLER with bridging 17    TRANSPLANT, LIVER N/A 2019    Performed by Clemente Brown Jr., MD at [facility] OR 2ND FLR    TRANSPLANT, LIVER  N/A 6/4/2019    [facility] by [facility] at [facility] OR [address] [place] [address]     Family History   Problem Relation Age of Onset    Heart disease Mother     Cancer Mother 50        colon cancer    Heart disease Father     Cancer Father 65        liver cancer    Breast cancer Maternal Grandmother      Social History     Tobacco Use    Smoking status: Never Smoker    Smokeless tobacco: Never Used    Tobacco comment: patient denies   Substance Use Topics    Alcohol use: No     Comment: patient denies    Drug use: No     Comment: patient denies        Review of Systems:  As noted in hpi     OBJECTIVE:     Vital Signs (Most Recent):  Temp: 98.3 °F (36.8 °C) (07/29/19 1519)  Pulse: 87 (07/29/19 1519)  Resp: 18 (07/29/19 1519)  BP: (!) 151/70 (07/29/19 1519)    Physical Exam:  General: well developed, well nourished, no distress  Head: normocephalic, atraumatic  Neurologic: Alert and oriented. Thought content appropriate  GCS: Motor: 6/Verbal: 5/Eyes: 4 GCS Total: 15  Language: No aphasia  Speech: No dysarthria  Cranial nerves: face symmetric, tongue midline, CN II-XII grossly intact.   Eyes: pupils equal, round, reactive to light with accommodation on left. Right pupil is nonreactive since birth , EOMI.   Pulmonary: normal respirations, not labored, no accessory muscles used  Sensory: intact to light touch throughout  Motor Strength: Moves all extremities spontaneously with good tone.  Full strength upper and lower extremities. No abnormal movements seen.     Strength  Deltoids Triceps Biceps Wrist Extension Wrist Flexion Hand    Upper: R 5/5 5/5 5/5 5/5 5/5 5/5    L 5/5 5/5 5/5 5/5 5/5 5/5     Iliopsoas Quadriceps Knee  Flexion Tibialis  anterior Gastro- cnemius EHL   Lower: R 5/5 5/5 5/5 5/5 5/5 5/5    L 5/5 5/5 5/5 5/5 5/5 5/5     Gtz: absent  Skin: warm, dry and intact, no rashes  Pitting Edema to BLE   Gait: ambulating with rolling walker    Pronator drift: none noted  Finger to nose: intact bilaterally      Diagnostic Results:  I have personally reviewed imaging and agree with the findings.     CT head 7/13/19  Stable focus of encephalomalacia in the right occipital lobe with mild effacement of the posterior horn right lateral ventricle consistent with expected temporal evolution of previously identified hemorrhage.  No new infarct or bleed identified.    CT head 6/24/19  Acute intraparenchymal hemorrhage within the right occipital lobe with mild surrounding vasogenic edema.  No significant mass effect.  No midline shift.    Minimal intraventricular hemorrhage in the occipital horns.  No hydrocephalus.    ASSESSMENT/PLAN:     Jihan Zamudio is a 49 y.o. female who presents for one month follow-up, s/p traumatic right occipital ICH. FU CT's have shown near resolution of ICH. She is at her neurologic baseline with no significant complaints.  Of note, her right pupil is nonreactive since birth.  Patient was on aspirin prior to the fall.  She is cleared from Neurosurgery to resume aspirin, but she is concerned that her transplant team may want her to continue holding it for other reasons.  I will message to Dr. Woods regarding aspirin use.  If aspirin is to be held, she is cleared from Neurosurgery with no further follow-up needed.  If aspirin is to be resumed, she will need 1 additional follow-up 2 weeks after resuming.      Please feel free to call with any further questions        Xiomara Whiteside PA-C  [facility]  Department of Neurosurgery  [phone]

## 2019-07-29 NOTE — PHYSICIAN QUERY
PT Name: Jihan Zamudio  MR #: [id number]    Physician Query Form -Present on Admission (POA) Diagnosis Clarification     CDS/: Rachelle Sampson               Contact information: [email or web address]     This form is a permanent document in the medical record.     Query Date: July 29, 2019    By submitting this query, we are merely seeking further clarification of documentation. Please utilize your independent clinical judgment when addressing the question(s) below.       The Medical record contains the following:    Diagnosis      Supporting Clinical Information   Location in Medical Record   Pressure injury of buttock, stage 1             Pressure Injury 07/17/19 Left Buttocks Stage 1  Pressure Injury Present on Admission: suspected hospital acquired   DC Summary     Wound Care 7/17         Doctor, Please specify Present On Admission (POA) status of ________________________.    [  ] Present on Admission    [ X ] Not Present on Admission   [  ] Clinically Undetermined

## 2019-07-30 ENCOUNTER — TELEPHONE (OUTPATIENT)
Dept: TRANSPLANT | Facility: CLINIC | Age: 49
End: 2019-07-30

## 2019-07-30 ENCOUNTER — LAB VISIT (OUTPATIENT)
Dept: LAB | Facility: HOSPITAL | Age: 49
End: 2019-07-30
Attending: SURGERY
Payer: OTHER GOVERNMENT

## 2019-07-30 DIAGNOSIS — Z94.4 LIVER REPLACED BY TRANSPLANT: ICD-10-CM

## 2019-07-30 LAB
ALBUMIN SERPL BCP-MCNC: 2.5 G/DL (ref 3.5–5.2)
ALP SERPL-CCNC: 109 U/L (ref 55–135)
ALT SERPL W/O P-5'-P-CCNC: 11 U/L (ref 10–44)
ANION GAP SERPL CALC-SCNC: 9 MMOL/L (ref 8–16)
AST SERPL-CCNC: 12 U/L (ref 10–40)
BASOPHILS # BLD AUTO: 0.04 K/UL (ref 0–0.2)
BASOPHILS NFR BLD: 1.2 % (ref 0–1.9)
BILIRUB DIRECT SERPL-MCNC: 0.3 MG/DL (ref 0.1–0.3)
BILIRUB SERPL-MCNC: 0.6 MG/DL (ref 0.1–1)
BUN SERPL-MCNC: 10 MG/DL (ref 6–20)
CALCIUM SERPL-MCNC: 8.5 MG/DL (ref 8.7–10.5)
CHLORIDE SERPL-SCNC: 107 MMOL/L (ref 95–110)
CO2 SERPL-SCNC: 26 MMOL/L (ref 23–29)
CREAT SERPL-MCNC: 0.6 MG/DL (ref 0.5–1.4)
DIFFERENTIAL METHOD: ABNORMAL
EOSINOPHIL # BLD AUTO: 0.1 K/UL (ref 0–0.5)
EOSINOPHIL NFR BLD: 2.2 % (ref 0–8)
ERYTHROCYTE [DISTWIDTH] IN BLOOD BY AUTOMATED COUNT: 16.9 % (ref 11.5–14.5)
EST. GFR  (AFRICAN AMERICAN): >60 ML/MIN/1.73 M^2
EST. GFR  (NON AFRICAN AMERICAN): >60 ML/MIN/1.73 M^2
GLUCOSE SERPL-MCNC: 80 MG/DL (ref 70–110)
HCT VFR BLD AUTO: 29.4 % (ref 37–48.5)
HGB BLD-MCNC: 9.1 G/DL (ref 12–16)
IMM GRANULOCYTES # BLD AUTO: 0.04 K/UL (ref 0–0.04)
IMM GRANULOCYTES NFR BLD AUTO: 1.2 % (ref 0–0.5)
LYMPHOCYTES # BLD AUTO: 0.4 K/UL (ref 1–4.8)
LYMPHOCYTES NFR BLD: 11.8 % (ref 18–48)
MCH RBC QN AUTO: 28.2 PG (ref 27–31)
MCHC RBC AUTO-ENTMCNC: 31 G/DL (ref 32–36)
MCV RBC AUTO: 91 FL (ref 82–98)
MONOCYTES # BLD AUTO: 0.3 K/UL (ref 0.3–1)
MONOCYTES NFR BLD: 8.1 % (ref 4–15)
NEUTROPHILS # BLD AUTO: 2.4 K/UL (ref 1.8–7.7)
NEUTROPHILS NFR BLD: 75.5 % (ref 38–73)
NRBC BLD-RTO: 0 /100 WBC
PLATELET # BLD AUTO: 180 K/UL (ref 150–350)
PMV BLD AUTO: 9 FL (ref 9.2–12.9)
POTASSIUM SERPL-SCNC: 3.5 MMOL/L (ref 3.5–5.1)
PROT SERPL-MCNC: 5.9 G/DL (ref 6–8.4)
RBC # BLD AUTO: 3.23 M/UL (ref 4–5.4)
SODIUM SERPL-SCNC: 142 MMOL/L (ref 136–145)
TACROLIMUS BLD-MCNC: 9.4 NG/ML (ref 5–15)
WBC # BLD AUTO: 3.22 K/UL (ref 3.9–12.7)

## 2019-07-30 PROCEDURE — 36415 COLL VENOUS BLD VENIPUNCTURE: CPT

## 2019-07-30 PROCEDURE — 85025 COMPLETE CBC W/AUTO DIFF WBC: CPT

## 2019-07-30 PROCEDURE — 82248 BILIRUBIN DIRECT: CPT

## 2019-07-30 PROCEDURE — 80197 ASSAY OF TACROLIMUS: CPT

## 2019-07-30 PROCEDURE — 80053 COMPREHEN METABOLIC PANEL: CPT

## 2019-07-30 NOTE — TELEPHONE ENCOUNTER
----- Message from Lita Fair sent at 7/30/2019  8:08 AM CDT -----  Contact: pt   Please call pt  at [phone]    Patient has medical questions (no details)    Thank you

## 2019-07-30 NOTE — TELEPHONE ENCOUNTER
Patient asked if she can return home to [place], for her daughter's first day of school .  Informed her I will review with the doctor when I review her labs from today, she voiced understanding.

## 2019-07-30 NOTE — TELEPHONE ENCOUNTER
Labs reviewed by , results ok, no medicine changes made. Patient cleared to return to [place] now.  Patient notified and above instructions given, and to repeat labs on Monday 8/5/19.  Standing Lab Orders will be faxed to the transplant clinic for patient to  before leaving [facility].  She was able to repeat instructions and voiced understanding.

## 2019-07-31 ENCOUNTER — TELEPHONE (OUTPATIENT)
Dept: TRANSPLANT | Facility: CLINIC | Age: 49
End: 2019-07-31

## 2019-07-31 DIAGNOSIS — Z94.4 LIVER REPLACED BY TRANSPLANT: Primary | ICD-10-CM

## 2019-07-31 NOTE — TELEPHONE ENCOUNTER
Patient notified that standing lab orders are left in the clinic check in desk for her to  before leaving for home. She said she will pick them up tomorrow.

## 2019-08-02 ENCOUNTER — LAB VISIT (OUTPATIENT)
Dept: LAB | Facility: HOSPITAL | Age: 49
End: 2019-08-02
Attending: SURGERY
Payer: OTHER GOVERNMENT

## 2019-08-02 DIAGNOSIS — Z94.4 LIVER REPLACED BY TRANSPLANT: ICD-10-CM

## 2019-08-02 DIAGNOSIS — Z94.4 LIVER REPLACED BY TRANSPLANT: Primary | ICD-10-CM

## 2019-08-02 LAB
ACID FAST MOD KINY STN SPEC: NORMAL
ALBUMIN SERPL BCP-MCNC: 2.7 G/DL (ref 3.5–5.2)
ALP SERPL-CCNC: 103 U/L (ref 55–135)
ALT SERPL W/O P-5'-P-CCNC: 8 U/L (ref 10–44)
ANION GAP SERPL CALC-SCNC: 11 MMOL/L (ref 8–16)
AST SERPL-CCNC: 13 U/L (ref 10–40)
BASOPHILS # BLD AUTO: 0.04 K/UL (ref 0–0.2)
BASOPHILS NFR BLD: 1.2 % (ref 0–1.9)
BILIRUB DIRECT SERPL-MCNC: 0.4 MG/DL (ref 0.1–0.3)
BILIRUB SERPL-MCNC: 0.7 MG/DL (ref 0.1–1)
BUN SERPL-MCNC: 13 MG/DL (ref 6–20)
CALCIUM SERPL-MCNC: 9.1 MG/DL (ref 8.7–10.5)
CHLORIDE SERPL-SCNC: 106 MMOL/L (ref 95–110)
CO2 SERPL-SCNC: 24 MMOL/L (ref 23–29)
CREAT SERPL-MCNC: 0.7 MG/DL (ref 0.5–1.4)
DIFFERENTIAL METHOD: ABNORMAL
EOSINOPHIL # BLD AUTO: 0.1 K/UL (ref 0–0.5)
EOSINOPHIL NFR BLD: 1.6 % (ref 0–8)
ERYTHROCYTE [DISTWIDTH] IN BLOOD BY AUTOMATED COUNT: 16.6 % (ref 11.5–14.5)
EST. GFR  (AFRICAN AMERICAN): >60 ML/MIN/1.73 M^2
EST. GFR  (NON AFRICAN AMERICAN): >60 ML/MIN/1.73 M^2
GLUCOSE SERPL-MCNC: 81 MG/DL (ref 70–110)
HCT VFR BLD AUTO: 29.7 % (ref 37–48.5)
HGB BLD-MCNC: 9 G/DL (ref 12–16)
IMM GRANULOCYTES # BLD AUTO: 0.04 K/UL (ref 0–0.04)
IMM GRANULOCYTES NFR BLD AUTO: 1.2 % (ref 0–0.5)
LYMPHOCYTES # BLD AUTO: 0.4 K/UL (ref 1–4.8)
LYMPHOCYTES NFR BLD: 11.2 % (ref 18–48)
MCH RBC QN AUTO: 27.3 PG (ref 27–31)
MCHC RBC AUTO-ENTMCNC: 30.3 G/DL (ref 32–36)
MCV RBC AUTO: 90 FL (ref 82–98)
MONOCYTES # BLD AUTO: 0.2 K/UL (ref 0.3–1)
MONOCYTES NFR BLD: 6.5 % (ref 4–15)
MYCOBACTERIUM SPEC QL CULT: NORMAL
NEUTROPHILS # BLD AUTO: 2.5 K/UL (ref 1.8–7.7)
NEUTROPHILS NFR BLD: 78.3 % (ref 38–73)
NRBC BLD-RTO: 0 /100 WBC
PLATELET # BLD AUTO: 172 K/UL (ref 150–350)
PMV BLD AUTO: 9.6 FL (ref 9.2–12.9)
POTASSIUM SERPL-SCNC: 3.7 MMOL/L (ref 3.5–5.1)
PROT SERPL-MCNC: 6.1 G/DL (ref 6–8.4)
RBC # BLD AUTO: 3.3 M/UL (ref 4–5.4)
SODIUM SERPL-SCNC: 141 MMOL/L (ref 136–145)
TACROLIMUS BLD-MCNC: 9.1 NG/ML (ref 5–15)
WBC # BLD AUTO: 3.22 K/UL (ref 3.9–12.7)

## 2019-08-02 PROCEDURE — 36415 COLL VENOUS BLD VENIPUNCTURE: CPT

## 2019-08-02 PROCEDURE — 85025 COMPLETE CBC W/AUTO DIFF WBC: CPT

## 2019-08-02 PROCEDURE — 80197 ASSAY OF TACROLIMUS: CPT

## 2019-08-02 PROCEDURE — 80053 COMPREHEN METABOLIC PANEL: CPT

## 2019-08-02 PROCEDURE — 82248 BILIRUBIN DIRECT: CPT

## 2019-08-07 ENCOUNTER — TELEPHONE (OUTPATIENT)
Dept: TRANSPLANT | Facility: CLINIC | Age: 49
End: 2019-08-07
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
## 2019-08-09 ENCOUNTER — TELEPHONE (OUTPATIENT)
Dept: TRANSPLANT | Facility: CLINIC | Age: 49
End: 2019-08-09

## 2019-08-09 DIAGNOSIS — Z94.4 LIVER REPLACED BY TRANSPLANT: Primary | ICD-10-CM

## 2019-08-09 NOTE — TELEPHONE ENCOUNTER
----- Message from Kristin Fragoso sent at 8/7/2019  9:46 AM CDT -----      ----- Message -----  From: Kristin Fragoso  Sent: 8/2/2019   8:55 AM  To: Kristin Fragoso        ----- Message -----  From: Consuelo Hoffman MA  Sent: 8/2/2019   8:15 AM  To: Kristin Fraogso

## 2019-08-09 NOTE — TELEPHONE ENCOUNTER
"Called patient/ to check on patient; he reported she "is fine". He reported she did have her labs drawn yesterday at [facility] lab. Results pending.   Patient requests to have labs for next week at [facility] on Monday when she is coming in to see (appointment card given to ).   "

## 2019-08-12 ENCOUNTER — OFFICE VISIT (OUTPATIENT)
Dept: TRANSPLANT | Facility: CLINIC | Age: 49
End: 2019-08-12
Payer: OTHER GOVERNMENT

## 2019-08-12 ENCOUNTER — HOSPITAL ENCOUNTER (OUTPATIENT)
Dept: RADIOLOGY | Facility: HOSPITAL | Age: 49
Discharge: HOME | End: 2019-08-12
Attending: INTERNAL MEDICINE
Payer: OTHER GOVERNMENT

## 2019-08-12 VITALS
BODY MASS INDEX: 31.99 KG/M2 | RESPIRATION RATE: 18 BRPM | HEART RATE: 86 BPM | SYSTOLIC BLOOD PRESSURE: 166 MMHG | HEIGHT: 64 IN | TEMPERATURE: 100 F | DIASTOLIC BLOOD PRESSURE: 78 MMHG | WEIGHT: 187.38 LBS | OXYGEN SATURATION: 98 %

## 2019-08-12 DIAGNOSIS — Z29.89 PROPHYLACTIC IMMUNOTHERAPY: ICD-10-CM

## 2019-08-12 DIAGNOSIS — S06.300D TRAUMATIC INTRACRANIAL HEMORRHAGE WITHOUT LOSS OF CONSCIOUSNESS, SUBSEQUENT ENCOUNTER: ICD-10-CM

## 2019-08-12 DIAGNOSIS — Z94.4 S/P LIVER TRANSPLANT: Primary | ICD-10-CM

## 2019-08-12 DIAGNOSIS — Z94.4 LIVER REPLACED BY TRANSPLANT: ICD-10-CM

## 2019-08-12 LAB — FUNGUS SPEC CULT: NORMAL

## 2019-08-12 PROCEDURE — 76705 ECHO EXAM OF ABDOMEN: CPT | Mod: 26,59,, | Performed by: INTERNAL MEDICINE

## 2019-08-12 PROCEDURE — 99999 PR PBB SHADOW E&M-EST. PATIENT-LVL III: ICD-10-PCS | Mod: PBBFAC,,, | Performed by: INTERNAL MEDICINE

## 2019-08-12 PROCEDURE — 99215 PR OFFICE/OUTPT VISIT, EST, LEVL V, 40-54 MIN: ICD-10-PCS | Mod: S$PBB,,, | Performed by: INTERNAL MEDICINE

## 2019-08-12 PROCEDURE — 76705 US LIVER TRANSPLANT POST: ICD-10-PCS | Mod: 26,59,, | Performed by: INTERNAL MEDICINE

## 2019-08-12 PROCEDURE — 99215 OFFICE O/P EST HI 40 MIN: CPT | Mod: S$PBB,,, | Performed by: INTERNAL MEDICINE

## 2019-08-12 PROCEDURE — 99999 PR PBB SHADOW E&M-EST. PATIENT-LVL III: CPT | Mod: PBBFAC,,, | Performed by: INTERNAL MEDICINE

## 2019-08-12 PROCEDURE — 93975 VASCULAR STUDY: CPT | Mod: TC

## 2019-08-12 PROCEDURE — 99213 OFFICE O/P EST LOW 20 MIN: CPT | Mod: PBBFAC,25 | Performed by: INTERNAL MEDICINE

## 2019-08-12 PROCEDURE — 93975 US LIVER TRANSPLANT POST: ICD-10-PCS | Mod: 26,,, | Performed by: INTERNAL MEDICINE

## 2019-08-12 PROCEDURE — 93975 VASCULAR STUDY: CPT | Mod: 26,,, | Performed by: INTERNAL MEDICINE

## 2019-08-12 NOTE — PROGRESS NOTES
Subjective:       Patient ID: Jihan Zamudio is a 49 y.o. female.    Chief Complaint: Liver Transplant Follow-up    HPI  I saw this 49 y.o.lady who had a liver Tx for KESSLER cirrhosis on 2019.  She is now 2 months post op.    She was an inpatient in hospital prior to her liver transplant and her post op admission was prolonged.  She required a chest tube for a right pleural effusion.    Her recovery was further complicated by a fall and a subsequent intracranial hemorrhage (right occipital lobe).    ORGAN:   LIVER  Disease Etiology: Cirrhosis: Fatty Liver (Kessler)  Donor Type:    - Brain Death  [facility] High Risk:   No  Donor CMV Status:   Donor CMV Status: Positive  Donor HBcAB:   Negative  Donor HCV Status:   Negative  Whole or Partial: Whole Liver  Biliary Anastomosis: End to End  Arterial Anatomy: Standard    ORGAN: liver DIAGNOSIS: kessler MELD: 29  SEROLOGY Recipient/Donor : O/O CMV: +/+ HCV: -/ -HBcAb: -/-   INDUCTION: STEROIDS   ANASTOMOSIS: CDD   DONOR: DBD   PHS high risk: NO   HCV C Ab + donor or HCV Katty - or + donor: No    EXPLANT:( path report) KESSLER/no malignancy    Explant discussed: YES 19   -----------------------------------------------------------------  IMMUNOSUPPRESSION: Tacro/MMF/Prednisone until 7/10/19  PCP PROPHYLAXIS: Bactrim until 12/3/19  CMV PROPHYLAXIS: Valcyte until 19  FUNGAL PROPHYLAXIS: N/A  Aspirin: on 81 mg daily    Issues:  1) Latent TB  2) Prev Lap band bariatric surgery  3) Post op ANTONIO  4) Intracranial hemorrhage- treated with Keppra for 7 days  5) Readmitted with fever and altered mental status    CT abdo: 7/15/2019  The patient is status post liver transplant, there is continued appearance of moderate free fluid likely representing ascites fluid.  There is appearance of edema and or fluid about the region of the adam hepatis, and fluid adjacent to the liver that may relate to the aforementioned ascites fluid otherwise a well-formed walled off fluid  collection typical of abscess is not appreciated.  Prominence of the portal vein, splenic vein and varices are noted as well as splenomegaly likely represent sequelae chronic hepatic disease and portal venous hypertension.  Mildly prominent retroperitoneal lymph node appears stable and there are some prominent periportal lymph nodes that appear stable.  Right-sided pleural effusion with associated volume loss and atelectasis again noted appearing stable.  Pulmonary nodule on the left again noted appearing stable    Abdo US: 7/18/2016  Persistent elevation of the hepatic arterial resistive indices with preservation of waveforms and normal main hepatic arterial peak systolic velocity.  Otherwise satisfactory Doppler evaluation of the liver allograft.  Continued follow up is recommended.  Minimally improved left hepatic lobe peritransplant fluid collection.    She currently feels well and her edema has largely gone. She came off diuretics about 5 days ago.    Review of Systems   Constitutional: Negative for activity change, appetite change, chills, fatigue, fever and unexpected weight change.   HENT: Negative for ear pain, hearing loss, nosebleeds, sore throat and trouble swallowing.    Eyes: Negative for redness and visual disturbance.   Respiratory: Negative for cough, chest tightness, shortness of breath and wheezing.    Cardiovascular: Negative for chest pain and palpitations.   Gastrointestinal: Negative for abdominal distention, abdominal pain, blood in stool, constipation, diarrhea, nausea and vomiting.   Genitourinary: Negative for difficulty urinating, dysuria, frequency, hematuria and urgency.   Musculoskeletal: Negative for arthralgias, back pain, gait problem, joint swelling and myalgias.   Skin: Negative for rash.   Neurological: Negative for tremors, seizures, speech difficulty, weakness and headaches.   Hematological: Negative for adenopathy.   Psychiatric/Behavioral: Negative for confusion, decreased  concentration and sleep disturbance. The patient is not nervous/anxious.          Lab Results   Component Value Date    ALT 8 (L) 2019    AST 13 2019     (H) 2019    ALKPHOS 103 2019    BILITOT 0.7 2019     Past Medical History:   Diagnosis Date    Cirrhosis     Esophageal varices 2018    Small with no banding     Essential hypertension 2018    Fatty liver 2018    GERD (gastroesophageal reflux disease)     Hx of colonic polyps 2018    On colonoscopy     Hypertension     Hypothyroidism 2018    Kidney stones     Morbid obesity 2018    Lap band with subsequent release    KADEEM (obstructive sleep apnea) 2018    Osteoarthritis 2018    Pulmonary nodule 2018    Vitamin D deficiency 2018     Past Surgical History:   Procedure Laterality Date     SECTION      TIMES 2     CHOLECYSTECTOMY      LAPAROSCOPIC     CYSTOSCOPY W/ STONE MANIPULATION      kidney stone removal    EGD (ESOPHAGOGASTRODUODENOSCOPY) N/A 2019    [facility] at [facility] ENDO (2ND FLR)    LAPAROSCOPIC GASTRIC BANDING  2006    removal     LIVER BIOPSY  2017    KESSLER with bridging 17    TRANSPLANT, LIVER N/A 2019    Per[facility] by Clemente [facility] Jr[facility] at [facility] OR 2ND FLR    TRANSPLANT, LIVER N/A 2019    Performed by Clemente [facility] Jr[facility] at [facility] OR 2ND FLR     Current Outpatient Medications   Medication Sig    famotidine (PEPCID) 20 MG tablet Take 1 tablet (20 mg total) by mouth every evening.    levothyroxine (SYNTHROID) 112 MCG tablet Take 1 tablet (112 mcg total) by mouth once daily.    NIFEdipine (PROCARDIA-XL) 30 MG (OSM) 24 hr tablet Take 1 tablet (30 mg total) by mouth once daily.    sodium bicarbonate 650 MG tablet Take 2 tablets (1,300 mg total) by mouth 2 (two) times daily.    sulfamethoxazole-trimethoprim 400-80mg (BACTRIM,SEPTRA) 400-80 mg per tablet Take 1  tablet by mouth once daily. Stop: 12/3/19    tacrolimus (PROGRAF) 1 MG Cap Take 5 capsules (5 mg total) by mouth every 12 (twelve) hours.    thiamine 100 MG tablet Take 1 tablet (100 mg total) by mouth once daily.    valGANciclovir (VALCYTE) 450 mg Tab Take 1 tablet (450 mg total) by mouth once daily. Stop: 9/4/19    furosemide (LASIX) 40 MG tablet Take 1 tablet (40 mg total) by mouth once daily for 14 days.     No current facility-administered medications for this visit.        Objective:      Physical Exam   Constitutional: She appears well-nourished. No distress.   HENT:   Head: Normocephalic.   Eyes: Pupils are equal, round, and reactive to light.   Neck: No JVD present. No tracheal deviation present. No thyromegaly present.   Cardiovascular: Normal rate, regular rhythm and normal heart sounds.   No murmur heard.  Pulmonary/Chest: Effort normal and breath sounds normal. No stridor.   Right pleural effusion.   Abdominal: Soft.   Lymphadenopathy:        Head (right side): No submental, no submandibular, no tonsillar, no preauricular, no posterior auricular and no occipital adenopathy present.        Head (left side): No submental, no submandibular, no tonsillar, no preauricular, no posterior auricular and no occipital adenopathy present.     She has no cervical adenopathy.     She has no axillary adenopathy.   Neurological: She is alert. She has normal strength. She is not disoriented. No cranial nerve deficit or sensory deficit.   Skin: Skin is intact. No cyanosis.       Assessment:       1. S/P liver transplant    2. Traumatic intracranial hemorrhage without loss of consciousness, subsequent encounter    3. Prophylactic immunotherapy        Plan:    Overall she is recovering well and is now fully mobile independently.  - on Tac monotherapy  - bactrim/valcyte  Off lasix for a few days  Clinic in 4 weeks with labs weekly.      UNOS Patient Status  Functional Status: 100% - Normal, no complaints, no evidence of  disease  Physical Capacity: No Limitations

## 2019-08-12 NOTE — LETTER
August 12, 2019        Janes Retana  [address] [facility]  [place] [address]  Phone: [phone]  Fax: [phone]             [facility] - Liver Transplant  [address]  [facility] [address]  Phone: [phone]   Patient: Jihan Zamudio   MR Number: [id number]   YOB: 1970   Date of Visit: 8/12/2019       Dear Dr. Janes Retana    Thank you for referring Jihan Zamudio to me for evaluation. Attached you will find relevant portions of my assessment and plan of care.    If you have questions, please do not hesitate to call me. I look forward to following Jihan Zamudio along with you.    Sincerely,    Ronal Coello MD    Enclosure    If you would like to receive this communication electronically, please contact [email or web address] or [phone] to request [employer] Link access.    [employer] Link is a tool which provides read-only access to select patient information with whom you have a relationship. Its easy to use and provides real time access to review your patients record including encounter summaries, notes, results, and demographic information.    If you feel you have received this communication in error or would no longer like to receive these types of communications, please e-mail [email or web address]

## 2019-08-13 ENCOUNTER — TELEPHONE (OUTPATIENT)
Dept: TRANSPLANT | Facility: CLINIC | Age: 49
End: 2019-08-13

## 2019-08-13 NOTE — TELEPHONE ENCOUNTER
----- Message from Nasima Dolan sent at 8/13/2019  4:38 PM CDT -----  Ms. Zamudio would like to speak to you regarding her labs and scans. I already spoke to her and went over her scheduled appts

## 2019-08-14 ENCOUNTER — TELEPHONE (OUTPATIENT)
Dept: TRANSPLANT | Facility: CLINIC | Age: 49
End: 2019-08-14

## 2019-08-14 NOTE — TELEPHONE ENCOUNTER
Patient/ notified and instructed: labs reviewed by ; no medicine changes made. repeat labs labs due 8/26/19; no orders received re: recent ultrasound.  He was able to repeat instructions and voiced understanding.

## 2019-08-16 LAB
EXT ALBUMIN: 2.5
EXT ALKALINE PHOSPHATASE: 91
EXT ALT: 13
EXT AST: 22
EXT BILIRUBIN TOTAL: 0.7
EXT BUN: 12
EXT CALCIUM: 8.3
EXT CHLORIDE: 105
EXT CO2: 27
EXT CREATININE: 0.7 MG/DL
EXT EOSINOPHIL%: 1
EXT GFR MDRD NON AF AMER: 102
EXT GLUCOSE: 81
EXT HEMATOCRIT: 24.7
EXT HEMOGLOBIN: 8
EXT LYMPH%: 7.9
EXT MONOCYTES%: 9.6
EXT PLATELETS: 173
EXT POTASSIUM: 4
EXT PROTEIN TOTAL: 6
EXT SEGS%: 79.8
EXT SODIUM: 138 MMOL/L
EXT WBC: 2.91

## 2019-08-19 ENCOUNTER — TELEPHONE (OUTPATIENT)
Dept: TRANSPLANT | Facility: CLINIC | Age: 49
End: 2019-08-19

## 2019-08-19 NOTE — TELEPHONE ENCOUNTER
----- Message from Rosa Ruiz sent at 8/19/2019  8:58 AM CDT -----  Contact: Freeman (): [phone]  Needs Advice    Reason for call: Pt's  called to speak with Kristin reyes        Communication Preference: Freeman (): [phone]

## 2019-08-20 LAB — FUNGUS SPEC CULT: NORMAL

## 2019-08-21 ENCOUNTER — TELEPHONE (OUTPATIENT)
Dept: TRANSPLANT | Facility: CLINIC | Age: 49
End: 2019-08-21

## 2019-08-21 NOTE — TELEPHONE ENCOUNTER
----- Message from Rowan Armenta MA sent at 8/21/2019  1:31 PM CDT -----  Contact: Freeman ()  Needs Advice    Reason for call: Lab work        Communication Preference: [phone]    Additional Information: N/A

## 2019-08-22 ENCOUNTER — TELEPHONE (OUTPATIENT)
Dept: TRANSPLANT | Facility: CLINIC | Age: 49
End: 2019-08-22

## 2019-08-22 NOTE — TELEPHONE ENCOUNTER
----- Message from Renuka Irving sent at 8/22/2019  8:37 AM CDT -----  Contact: Freeman nette's   Patient Returning Call from [facility]    Who Left Message for Patient: Mamie    Communication Preference: [phone] or [phone]    Additional Information: N/A

## 2019-08-28 LAB
EXT ALBUMIN: 2.5
EXT ALKALINE PHOSPHATASE: 126
EXT ALT: 12
EXT AST: 25
EXT BILIRUBIN TOTAL: 0.7
EXT BUN: 6
EXT CALCIUM: 8
EXT CHLORIDE: 107
EXT CO2: 28
EXT CREATININE: 0.8 MG/DL
EXT EOSINOPHIL%: 1.2
EXT GFR MDRD NON AF AMER: 88
EXT GLUCOSE: 80
EXT HEMATOCRIT: 26.2
EXT HEMOGLOBIN: 8.9
EXT LYMPH%: 10.2
EXT MONOCYTES%: 8.2
EXT PLATELETS: 233
EXT POTASSIUM: 3.5
EXT PROTEIN TOTAL: 5.9
EXT SEGS%: 79.2
EXT SODIUM: 141 MMOL/L
EXT TACROLIMUS LVL: 11.7
EXT WBC: 2.45

## 2019-08-29 ENCOUNTER — TELEPHONE (OUTPATIENT)
Dept: TRANSPLANT | Facility: CLINIC | Age: 49
End: 2019-08-29

## 2019-08-29 DIAGNOSIS — Z94.4 LIVER REPLACED BY TRANSPLANT: Primary | ICD-10-CM

## 2019-08-29 NOTE — TELEPHONE ENCOUNTER
"Patient notified and instructed: recent labs have been received and reviewed by ; no medicine changes made. Repeat labs due 9/3/19 at 8am. Patient able to repeat instructions and reported writing it down.  Patient at this time requested to have her labs drawn at [facility] ; she will start going to [facility] lab. And to fill her prescriptions at [facility] now; " I'm over [place]!", she said.    "

## 2019-09-03 ENCOUNTER — LAB VISIT (OUTPATIENT)
Dept: LAB | Facility: HOSPITAL | Age: 49
End: 2019-09-03
Attending: INTERNAL MEDICINE
Payer: OTHER GOVERNMENT

## 2019-09-03 DIAGNOSIS — Z94.4 LIVER REPLACED BY TRANSPLANT: ICD-10-CM

## 2019-09-03 LAB
ALBUMIN SERPL BCP-MCNC: 2.8 G/DL (ref 3.5–5.2)
ALP SERPL-CCNC: 99 U/L (ref 55–135)
ALT SERPL W/O P-5'-P-CCNC: <5 U/L (ref 10–44)
ANION GAP SERPL CALC-SCNC: 8 MMOL/L (ref 8–16)
AST SERPL-CCNC: 14 U/L (ref 10–40)
BASOPHILS # BLD AUTO: 0.02 K/UL (ref 0–0.2)
BASOPHILS NFR BLD: 0.7 % (ref 0–1.9)
BILIRUB SERPL-MCNC: 0.6 MG/DL (ref 0.1–1)
BUN SERPL-MCNC: 7 MG/DL (ref 6–20)
CALCIUM SERPL-MCNC: 9.1 MG/DL (ref 8.7–10.5)
CHLORIDE SERPL-SCNC: 104 MMOL/L (ref 95–110)
CO2 SERPL-SCNC: 28 MMOL/L (ref 23–29)
CREAT SERPL-MCNC: 0.8 MG/DL (ref 0.5–1.4)
DIFFERENTIAL METHOD: ABNORMAL
EOSINOPHIL # BLD AUTO: 0 K/UL (ref 0–0.5)
EOSINOPHIL NFR BLD: 1 % (ref 0–8)
ERYTHROCYTE [DISTWIDTH] IN BLOOD BY AUTOMATED COUNT: 14.3 % (ref 11.5–14.5)
EST. GFR  (AFRICAN AMERICAN): >60 ML/MIN/1.73 M^2
EST. GFR  (NON AFRICAN AMERICAN): >60 ML/MIN/1.73 M^2
GLUCOSE SERPL-MCNC: 77 MG/DL (ref 70–110)
HCT VFR BLD AUTO: 27.3 % (ref 37–48.5)
HGB BLD-MCNC: 8.9 G/DL (ref 12–16)
LYMPHOCYTES # BLD AUTO: 0.3 K/UL (ref 1–4.8)
LYMPHOCYTES NFR BLD: 10.8 % (ref 18–48)
MCH RBC QN AUTO: 26.4 PG (ref 27–31)
MCHC RBC AUTO-ENTMCNC: 32.6 G/DL (ref 32–36)
MCV RBC AUTO: 81 FL (ref 82–98)
MONOCYTES # BLD AUTO: 0.2 K/UL (ref 0.3–1)
MONOCYTES NFR BLD: 7.4 % (ref 4–15)
NEUTROPHILS # BLD AUTO: 2.4 K/UL (ref 1.8–7.7)
NEUTROPHILS NFR BLD: 80.1 % (ref 38–73)
PLATELET # BLD AUTO: 229 K/UL (ref 150–350)
PMV BLD AUTO: 9.8 FL (ref 9.2–12.9)
POTASSIUM SERPL-SCNC: 3.7 MMOL/L (ref 3.5–5.1)
PROT SERPL-MCNC: 6 G/DL (ref 6–8.4)
RBC # BLD AUTO: 3.37 M/UL (ref 4–5.4)
SODIUM SERPL-SCNC: 140 MMOL/L (ref 136–145)
WBC # BLD AUTO: 2.97 K/UL (ref 3.9–12.7)

## 2019-09-03 PROCEDURE — 85025 COMPLETE CBC W/AUTO DIFF WBC: CPT | Mod: PO

## 2019-09-03 PROCEDURE — 36415 COLL VENOUS BLD VENIPUNCTURE: CPT | Mod: PO

## 2019-09-03 PROCEDURE — 80197 ASSAY OF TACROLIMUS: CPT

## 2019-09-03 PROCEDURE — 80053 COMPREHEN METABOLIC PANEL: CPT

## 2019-09-04 DIAGNOSIS — Z94.4 LIVER REPLACED BY TRANSPLANT: Primary | ICD-10-CM

## 2019-09-04 LAB — TACROLIMUS BLD-MCNC: 12.1 NG/ML (ref 5–15)

## 2019-09-04 NOTE — TELEPHONE ENCOUNTER
----- Message from Ronal Coello MD sent at 9/4/2019 11:12 AM CDT -----  Results reviewed  Decrease tac to 5/4

## 2019-09-04 NOTE — TELEPHONE ENCOUNTER
Patient notified and instructed to reduce Prograf dose to 5mg in AM and 4mg in PM; repeat labs on Monday 9/9/19. She was able to repeat instructions and reported writing it down.

## 2019-09-05 RX ORDER — TACROLIMUS 1 MG/1
CAPSULE ORAL
Qty: 270 CAPSULE | Refills: 11 | Status: SHIPPED | OUTPATIENT
Start: 2019-09-05 | End: 2019-11-13 | Stop reason: DRUGHIGH

## 2019-09-09 ENCOUNTER — TELEPHONE (OUTPATIENT)
Dept: TRANSPLANT | Facility: CLINIC | Age: 49
End: 2019-09-09

## 2019-09-09 NOTE — TELEPHONE ENCOUNTER
Attempted to contact patient to inform her she may stop Thiamine. No answer. Will continue to try to reach her.

## 2019-09-09 NOTE — TELEPHONE ENCOUNTER
Reached patient , notified her she may stop the Thiamine. She reported since she did not take it today, she is feeling better and was able to eat well today.

## 2019-09-09 NOTE — TELEPHONE ENCOUNTER
----- Message from Megan Haley PharmD sent at 9/9/2019 11:38 AM CDT -----  Yep - I would be fine with her stopping!    Megan    ----- Message -----  From: Kristin Fragoso  Sent: 9/9/2019  10:25 AM  To: Ronal Coello MD, #    Patient wants to get off of the Thiamine; said she gets so sick to her stomach with it, and can't keep food down. And is losing wt. Is this ok for her to stop?

## 2019-09-09 NOTE — TELEPHONE ENCOUNTER
----- Message from Ronal Coello MD sent at 9/9/2019  3:24 PM CDT -----  Yes    ----- Message -----  From: Kristin Fragoso  Sent: 9/9/2019  10:25 AM  To: Ronal Coello MD, #    Patient wants to get off of the Thiamine; said she gets so sick to her stomach with it, and can't keep food down. And is losing wt. Is this ok for her to stop?

## 2019-09-10 ENCOUNTER — LAB VISIT (OUTPATIENT)
Dept: LAB | Facility: HOSPITAL | Age: 49
End: 2019-09-10
Attending: INTERNAL MEDICINE
Payer: OTHER GOVERNMENT

## 2019-09-10 DIAGNOSIS — Z94.4 LIVER REPLACED BY TRANSPLANT: ICD-10-CM

## 2019-09-10 LAB
ALBUMIN SERPL BCP-MCNC: 3 G/DL (ref 3.5–5.2)
ALP SERPL-CCNC: 98 U/L (ref 55–135)
ALT SERPL W/O P-5'-P-CCNC: <5 U/L (ref 10–44)
ANION GAP SERPL CALC-SCNC: 9 MMOL/L (ref 8–16)
AST SERPL-CCNC: 14 U/L (ref 10–40)
BASOPHILS # BLD AUTO: 0.05 K/UL (ref 0–0.2)
BASOPHILS NFR BLD: 1.7 % (ref 0–1.9)
BILIRUB SERPL-MCNC: 0.4 MG/DL (ref 0.1–1)
BUN SERPL-MCNC: 10 MG/DL (ref 6–20)
CALCIUM SERPL-MCNC: 8.8 MG/DL (ref 8.7–10.5)
CHLORIDE SERPL-SCNC: 102 MMOL/L (ref 95–110)
CO2 SERPL-SCNC: 27 MMOL/L (ref 23–29)
CREAT SERPL-MCNC: 1 MG/DL (ref 0.5–1.4)
DIFFERENTIAL METHOD: ABNORMAL
EOSINOPHIL # BLD AUTO: 0 K/UL (ref 0–0.5)
EOSINOPHIL NFR BLD: 0.7 % (ref 0–8)
ERYTHROCYTE [DISTWIDTH] IN BLOOD BY AUTOMATED COUNT: 14.3 % (ref 11.5–14.5)
EST. GFR  (AFRICAN AMERICAN): >60 ML/MIN/1.73 M^2
EST. GFR  (NON AFRICAN AMERICAN): >60 ML/MIN/1.73 M^2
GLUCOSE SERPL-MCNC: 72 MG/DL (ref 70–110)
HCT VFR BLD AUTO: 27.8 % (ref 37–48.5)
HGB BLD-MCNC: 8.6 G/DL (ref 12–16)
IMM GRANULOCYTES # BLD AUTO: 0.02 K/UL (ref 0–0.04)
IMM GRANULOCYTES NFR BLD AUTO: 0.7 % (ref 0–0.5)
LYMPHOCYTES # BLD AUTO: 0.4 K/UL (ref 1–4.8)
LYMPHOCYTES NFR BLD: 12.2 % (ref 18–48)
MCH RBC QN AUTO: 26.1 PG (ref 27–31)
MCHC RBC AUTO-ENTMCNC: 30.9 G/DL (ref 32–36)
MCV RBC AUTO: 84 FL (ref 82–98)
MONOCYTES # BLD AUTO: 0.2 K/UL (ref 0.3–1)
MONOCYTES NFR BLD: 8 % (ref 4–15)
NEUTROPHILS # BLD AUTO: 2.2 K/UL (ref 1.8–7.7)
NEUTROPHILS NFR BLD: 76.7 % (ref 38–73)
NRBC BLD-RTO: 0 /100 WBC
PLATELET # BLD AUTO: 208 K/UL (ref 150–350)
PMV BLD AUTO: 11 FL (ref 9.2–12.9)
POTASSIUM SERPL-SCNC: 4.3 MMOL/L (ref 3.5–5.1)
PROT SERPL-MCNC: 6.1 G/DL (ref 6–8.4)
RBC # BLD AUTO: 3.3 M/UL (ref 4–5.4)
SODIUM SERPL-SCNC: 138 MMOL/L (ref 136–145)
TACROLIMUS BLD-MCNC: 9.6 NG/ML (ref 5–15)
WBC # BLD AUTO: 2.88 K/UL (ref 3.9–12.7)

## 2019-09-10 PROCEDURE — 85025 COMPLETE CBC W/AUTO DIFF WBC: CPT

## 2019-09-10 PROCEDURE — 80197 ASSAY OF TACROLIMUS: CPT

## 2019-09-10 PROCEDURE — 80053 COMPREHEN METABOLIC PANEL: CPT

## 2019-09-10 PROCEDURE — 36415 COLL VENOUS BLD VENIPUNCTURE: CPT | Mod: PO

## 2019-09-13 ENCOUNTER — TELEPHONE (OUTPATIENT)
Dept: TRANSPLANT | Facility: CLINIC | Age: 49
End: 2019-09-13

## 2019-09-13 NOTE — TELEPHONE ENCOUNTER
Patient notified and instructed ; labs reviewed by janey Dickson. No medicine changes made. Repeat labs on Monday as planned.   Patient able to repeat instructions and voiced understanding.

## 2019-09-16 ENCOUNTER — HOSPITAL ENCOUNTER (OUTPATIENT)
Dept: RADIOLOGY | Facility: HOSPITAL | Age: 49
Discharge: HOME | End: 2019-09-16
Attending: INTERNAL MEDICINE
Payer: OTHER GOVERNMENT

## 2019-09-16 ENCOUNTER — OFFICE VISIT (OUTPATIENT)
Dept: TRANSPLANT | Facility: CLINIC | Age: 49
End: 2019-09-16
Payer: OTHER GOVERNMENT

## 2019-09-16 VITALS
TEMPERATURE: 99 F | HEIGHT: 64 IN | SYSTOLIC BLOOD PRESSURE: 155 MMHG | OXYGEN SATURATION: 99 % | DIASTOLIC BLOOD PRESSURE: 77 MMHG | HEART RATE: 81 BPM | BODY MASS INDEX: 27.13 KG/M2 | RESPIRATION RATE: 17 BRPM | WEIGHT: 158.94 LBS

## 2019-09-16 DIAGNOSIS — Z94.4 LIVER REPLACED BY TRANSPLANT: ICD-10-CM

## 2019-09-16 DIAGNOSIS — S06.300D TRAUMATIC INTRACRANIAL HEMORRHAGE WITHOUT LOSS OF CONSCIOUSNESS, SUBSEQUENT ENCOUNTER: ICD-10-CM

## 2019-09-16 DIAGNOSIS — Z79.60 LONG-TERM USE OF IMMUNOSUPPRESSANT MEDICATION: ICD-10-CM

## 2019-09-16 DIAGNOSIS — Z94.4 S/P LIVER TRANSPLANT: Primary | ICD-10-CM

## 2019-09-16 PROBLEM — R18.8 OTHER ASCITES: Status: RESOLVED | Noted: 2019-04-27 | Resolved: 2019-09-16

## 2019-09-16 PROBLEM — Z74.09 IMPAIRED FUNCTIONAL MOBILITY AND ENDURANCE: Status: RESOLVED | Noted: 2019-07-18 | Resolved: 2019-09-16

## 2019-09-16 PROBLEM — R53.1 WEAKNESS: Status: RESOLVED | Noted: 2019-05-16 | Resolved: 2019-09-16

## 2019-09-16 PROCEDURE — 99214 OFFICE O/P EST MOD 30 MIN: CPT | Mod: S$PBB,,, | Performed by: INTERNAL MEDICINE

## 2019-09-16 PROCEDURE — 99214 PR OFFICE/OUTPT VISIT, EST, LEVL IV, 30-39 MIN: ICD-10-PCS | Mod: S$PBB,,, | Performed by: INTERNAL MEDICINE

## 2019-09-16 PROCEDURE — 99999 PR PBB SHADOW E&M-EST. PATIENT-LVL IV: ICD-10-PCS | Mod: PBBFAC,,, | Performed by: INTERNAL MEDICINE

## 2019-09-16 PROCEDURE — 76705 US LIVER TRANSPLANT POST: ICD-10-PCS | Mod: 26,59,, | Performed by: RADIOLOGY

## 2019-09-16 PROCEDURE — 76705 ECHO EXAM OF ABDOMEN: CPT | Mod: 26,59,, | Performed by: RADIOLOGY

## 2019-09-16 PROCEDURE — 99214 OFFICE O/P EST MOD 30 MIN: CPT | Mod: PBBFAC,25 | Performed by: INTERNAL MEDICINE

## 2019-09-16 PROCEDURE — 93975 VASCULAR STUDY: CPT | Mod: 26,,, | Performed by: RADIOLOGY

## 2019-09-16 PROCEDURE — 76705 ECHO EXAM OF ABDOMEN: CPT | Mod: TC

## 2019-09-16 PROCEDURE — 93975 VASCULAR STUDY: CPT | Mod: TC

## 2019-09-16 PROCEDURE — 99999 PR PBB SHADOW E&M-EST. PATIENT-LVL IV: CPT | Mod: PBBFAC,,, | Performed by: INTERNAL MEDICINE

## 2019-09-16 PROCEDURE — 93975 US LIVER TRANSPLANT POST: ICD-10-PCS | Mod: 26,,, | Performed by: RADIOLOGY

## 2019-09-16 NOTE — PROGRESS NOTES
Subjective:       Patient ID: Jihan Zamudio is a 49 y.o. female.    Chief Complaint: Liver Transplant Follow-up    HPI  I saw this 49 y.o.lady who had a liver Tx for KESSLER cirrhosis on 2019.  She is now 3 months post op.    She was an inpatient in hospital prior to her liver transplant and her post op admission was prolonged.  She required a chest tube for a right pleural effusion.    Her recovery was further complicated by a fall and a subsequent intracranial hemorrhage (right occipital lobe).    She is currently very well and has lost some weight related to medications.  She has no edema and stopped her furosemide.    ORGAN:   LIVER  Disease Etiology: Cirrhosis: Fatty Liver (Kessler)  Donor Type:    - Brain Death  [facility] High Risk:   No  Donor CMV Status:   Donor CMV Status: Positive  Donor HBcAB:   Negative  Donor HCV Status:   Negative  Whole or Partial: Whole Liver  Biliary Anastomosis: End to End  Arterial Anatomy: Standard    ORGAN: liver DIAGNOSIS: kessler MELD: 29  SEROLOGY Recipient/Donor : O/O CMV: +/+ HCV: -/ -HBcAb: -/-   INDUCTION: STEROIDS   ANASTOMOSIS: CDD   DONOR: DBD   [facility] high risk: NO   HCV C Ab + donor or HCV Katty - or + donor: No    EXPLANT:( path report) KESSLER/no malignancy    Explant discussed: YES 19   -----------------------------------------------------------------  IMMUNOSUPPRESSION: Tacro/MMF/Prednisone until 7/10/19  PCP PROPHYLAXIS: Bactrim until 12/3/19  CMV PROPHYLAXIS: Valcyte until 19  FUNGAL PROPHYLAXIS: N/A  Aspirin: on 81 mg daily    Issues:  1) Latent TB  2) Prev Lap band bariatric surgery  3) Post op ANTONIO  4) Intracranial hemorrhage- treated with Keppra for 7 days  5) Readmitted with fever and altered mental status    CT abdo: 7/15/2019  The patient is status post liver transplant, there is continued appearance of moderate free fluid likely representing ascites fluid.  There is appearance of edema and or fluid about the region of the adam hepatis,  and fluid adjacent to the liver that may relate to the aforementioned ascites fluid otherwise a well-formed walled off fluid collection typical of abscess is not appreciated.  Prominence of the portal vein, splenic vein and varices are noted as well as splenomegaly likely represent sequelae chronic hepatic disease and portal venous hypertension.  Mildly prominent retroperitoneal lymph node appears stable and there are some prominent periportal lymph nodes that appear stable.  Right-sided pleural effusion with associated volume loss and atelectasis again noted appearing stable.  Pulmonary nodule on the left again noted appearing stable    Abdo US: 7/18/2016  Persistent elevation of the hepatic arterial resistive indices with preservation of waveforms and normal main hepatic arterial peak systolic velocity.  Otherwise satisfactory Doppler evaluation of the liver allograft.  Continued follow up is recommended.  Minimally improved left hepatic lobe peritransplant fluid collection.    She currently feels well and her edema has largely gone. She came off diuretics about 5 days ago.    Review of Systems   Constitutional: Negative for activity change, appetite change, chills, fatigue, fever and unexpected weight change.   HENT: Negative for ear pain, hearing loss, nosebleeds, sore throat and trouble swallowing.    Eyes: Negative for redness and visual disturbance.   Respiratory: Negative for cough, chest tightness, shortness of breath and wheezing.    Cardiovascular: Negative for chest pain and palpitations.   Gastrointestinal: Negative for abdominal distention, abdominal pain, blood in stool, constipation, diarrhea, nausea and vomiting.   Genitourinary: Negative for difficulty urinating, dysuria, frequency, hematuria and urgency.   Musculoskeletal: Negative for arthralgias, back pain, gait problem, joint swelling and myalgias.   Skin: Negative for rash.   Neurological: Negative for tremors, seizures, speech difficulty,  weakness and headaches.   Hematological: Negative for adenopathy.   Psychiatric/Behavioral: Negative for confusion, decreased concentration and sleep disturbance. The patient is not nervous/anxious.          Lab Results   Component Value Date    ALT <5 (L) 2019    AST 13 2019     (H) 2019    ALKPHOS 109 2019    BILITOT 0.5 2019     Past Medical History:   Diagnosis Date    Cirrhosis     Esophageal varices 2018    Small with no banding     Essential hypertension 2018    Fatty liver 2018    GERD (gastroesophageal reflux disease)     Hx of colonic polyps 2018    On colonoscopy     Hypertension     Hypothyroidism 2018    Kidney stones     Morbid obesity 2018    Lap band with subsequent release    KADEEM (obstructive sleep apnea) 2018    Osteoarthritis 2018    Pulmonary nodule 2018    Vitamin D deficiency 2018     Past Surgical History:   Procedure Laterality Date     SECTION      TIMES 2     CHOLECYSTECTOMY      LAPAROSCOPIC     CYSTOSCOPY W/ STONE MANIPULATION      kidney stone removal    EGD (ESOPHAGOGASTRODUODENOSCOPY) N/A 2019    Performed by Davian Hernández MD at [facility] ENDO (2ND FLR)    LAPAROSCOPIC GASTRIC BANDING  2006    removal     LIVER BIOPSY  2017    KESSLER with bridging 17    TRANSPLANT, LIVER N/A 2019    Performed by Clemente Brown Jr., MD at [facility] OR 2ND FLR    TRANSPLANT, LIVER N/A 2019    Performed by Clemente Brown Jr., MD at [facility] OR 2ND FLR     Current Outpatient Medications   Medication Sig    famotidine (PEPCID) 20 MG tablet Take 1 tablet (20 mg total) by mouth every evening.    levothyroxine (SYNTHROID) 112 MCG tablet Take 1 tablet (112 mcg total) by mouth once daily.    NIFEdipine (PROCARDIA-XL) 30 MG (OSM) 24 hr tablet Take 1 tablet (30 mg total) by mouth once daily.    sodium bicarbonate 650 MG tablet Take 2 tablets (1,300 mg  total) by mouth 2 (two) times daily.    sulfamethoxazole-trimethoprim 400-80mg (BACTRIM,SEPTRA) 400-80 mg per tablet Take 1 tablet by mouth once daily. Stop: 12/3/19    tacrolimus (PROGRAF) 1 MG Cap Take 5 mg (5 capsules) by mouth in the morning and 4mg (4 capsules) in the evening    valGANciclovir (VALCYTE) 450 mg Tab Take 1 tablet (450 mg total) by mouth once daily. Stop: 9/4/19    furosemide (LASIX) 40 MG tablet Take 1 tablet (40 mg total) by mouth once daily for 14 days.     No current facility-administered medications for this visit.        Objective:      Physical Exam   Constitutional: She appears well-nourished. No distress.   HENT:   Head: Normocephalic.   Eyes: Pupils are equal, round, and reactive to light.   Neck: No JVD present. No tracheal deviation present. No thyromegaly present.   Cardiovascular: Normal rate, regular rhythm and normal heart sounds.   No murmur heard.  Pulmonary/Chest: Effort normal and breath sounds normal. No stridor.   Right pleural effusion.   Abdominal: Soft.   Lymphadenopathy:        Head (right side): No submental, no submandibular, no tonsillar, no preauricular, no posterior auricular and no occipital adenopathy present.        Head (left side): No submental, no submandibular, no tonsillar, no preauricular, no posterior auricular and no occipital adenopathy present.     She has no cervical adenopathy.     She has no axillary adenopathy.   Neurological: She is alert. She has normal strength. She is not disoriented. No cranial nerve deficit or sensory deficit.   Skin: Skin is intact. No cyanosis.       Assessment:       1. S/P liver transplant    2. Traumatic intracranial hemorrhage without loss of consciousness, subsequent encounter    3. Long-term use of immunosuppressant medication        Plan:    Overall she is recovering well and is now fully mobile independently.  - on Tac monotherapy  - bactrim/valcyte  Off lasix    Asking if she can have her lap band port taken  out  Getting US today and I will discuss with the surgeons.    Clinic in 3 months with labs every 2 weeks.      UNOS Patient Status  Functional Status: 100% - Normal, no complaints, no evidence of disease  Physical Capacity: No Limitations

## 2019-09-16 NOTE — LETTER
September 16, 2019        Janes Retana  [address] [facility]  [place] [address]  Phone: [phone]  Fax: [phone]             [facility] - Liver Transplant  [address]  [facility] [address]  Phone: [phone]   Patient: Jihan Zamudio   MR Number: [id number]   YOB: 1970   Date of Visit: 9/16/2019       Dear Dr. Janes Retana    Thank you for referring Jihan Zamudio to me for evaluation. Attached you will find relevant portions of my assessment and plan of care.    If you have questions, please do not hesitate to call me. I look forward to following Jihan Zamudio along with you.    Sincerely,    Ronal Coello MD    Enclosure    If you would like to receive this communication electronically, please contact [email or web address] or [phone] to request [employer] Link access.    [employer] Link is a tool which provides read-only access to select patient information with whom you have a relationship. Its easy to use and provides real time access to review your patients record including encounter summaries, notes, results, and demographic information.    If you feel you have received this communication in error or would no longer like to receive these types of communications, please e-mail [email or web address]

## 2019-09-19 ENCOUNTER — TELEPHONE (OUTPATIENT)
Dept: TRANSPLANT | Facility: CLINIC | Age: 49
End: 2019-09-19

## 2019-09-19 DIAGNOSIS — Z94.4 LIVER REPLACED BY TRANSPLANT: Primary | ICD-10-CM

## 2019-09-19 NOTE — TELEPHONE ENCOUNTER
Patient returned call; reviewed plan: labs reviewed, and u/s reviewed, no changes made. Repeat labs due 10/7/19; she was able to repeat instructions and voiced understanding

## 2019-09-19 NOTE — TELEPHONE ENCOUNTER
----- Message from Rowan Armenta MA sent at 9/19/2019  4:34 PM CDT -----  Contact:  Freeman Pt   Pt  is returning a call  To Kristin Millard# [phone] or [phone]

## 2019-09-24 ENCOUNTER — TELEPHONE (OUTPATIENT)
Dept: TRANSPLANT | Facility: CLINIC | Age: 49
End: 2019-09-24

## 2019-09-24 NOTE — TELEPHONE ENCOUNTER
----- Message from Ronal Coello MD sent at 9/24/2019  3:12 PM CDT -----  Let's discuss at lunchtime meeting next Tuesday    ----- Message -----  From: Kristin Fragoso  Sent: 9/19/2019  12:32 PM CDT  To: Ronal Coello MD    What about lap band port removal? Do you want me to put her on for discussion (you said you wanted to discuss this with U/S report , with surgeons.   ----- Message -----  From: Ronal Coello MD  Sent: 9/16/2019   3:50 PM  To: [facility] Post-Liver Transplant Clinical    Results reviewed

## 2019-10-01 ENCOUNTER — CONFERENCE (OUTPATIENT)
Dept: TRANSPLANT | Facility: CLINIC | Age: 49
End: 2019-10-01

## 2019-10-01 ENCOUNTER — TELEPHONE (OUTPATIENT)
Dept: TRANSPLANT | Facility: CLINIC | Age: 49
End: 2019-10-01

## 2019-10-01 NOTE — TELEPHONE ENCOUNTER
Discussion Committee Review:    Reviewed patient case at Liver Discussion Committee with surgeons and hepatologists re: Lap Band port (possible removal, due to c/o discomfort by patient).      Plan: will re-address after one year post OLT if patient still having complaints of problems.

## 2019-10-07 ENCOUNTER — LAB VISIT (OUTPATIENT)
Dept: LAB | Facility: HOSPITAL | Age: 49
End: 2019-10-07
Attending: INTERNAL MEDICINE
Payer: OTHER GOVERNMENT

## 2019-10-07 DIAGNOSIS — Z94.4 LIVER REPLACED BY TRANSPLANT: ICD-10-CM

## 2019-10-07 LAB
ALBUMIN SERPL BCP-MCNC: 3.4 G/DL (ref 3.5–5.2)
ALP SERPL-CCNC: 99 U/L (ref 55–135)
ALT SERPL W/O P-5'-P-CCNC: 7 U/L (ref 10–44)
ANION GAP SERPL CALC-SCNC: 11 MMOL/L (ref 8–16)
AST SERPL-CCNC: 20 U/L (ref 10–40)
BASOPHILS # BLD AUTO: 0.03 K/UL (ref 0–0.2)
BASOPHILS NFR BLD: 1.1 % (ref 0–1.9)
BILIRUB SERPL-MCNC: 0.5 MG/DL (ref 0.1–1)
BUN SERPL-MCNC: 15 MG/DL (ref 6–20)
CALCIUM SERPL-MCNC: 9.5 MG/DL (ref 8.7–10.5)
CHLORIDE SERPL-SCNC: 102 MMOL/L (ref 95–110)
CO2 SERPL-SCNC: 26 MMOL/L (ref 23–29)
CREAT SERPL-MCNC: 1.4 MG/DL (ref 0.5–1.4)
DIFFERENTIAL METHOD: ABNORMAL
EOSINOPHIL # BLD AUTO: 0 K/UL (ref 0–0.5)
EOSINOPHIL NFR BLD: 1.4 % (ref 0–8)
ERYTHROCYTE [DISTWIDTH] IN BLOOD BY AUTOMATED COUNT: 14 % (ref 11.5–14.5)
EST. GFR  (AFRICAN AMERICAN): 50.2 ML/MIN/1.73 M^2
EST. GFR  (NON AFRICAN AMERICAN): 43.5 ML/MIN/1.73 M^2
GLUCOSE SERPL-MCNC: 85 MG/DL (ref 70–110)
HCT VFR BLD AUTO: 29.5 % (ref 37–48.5)
HGB BLD-MCNC: 10 G/DL (ref 12–16)
IMM GRANULOCYTES # BLD AUTO: 0.01 K/UL (ref 0–0.04)
IMM GRANULOCYTES NFR BLD AUTO: 0.4 % (ref 0–0.5)
LYMPHOCYTES # BLD AUTO: 0.4 K/UL (ref 1–4.8)
LYMPHOCYTES NFR BLD: 13.5 % (ref 18–48)
MCH RBC QN AUTO: 26.7 PG (ref 27–31)
MCHC RBC AUTO-ENTMCNC: 33.9 G/DL (ref 32–36)
MCV RBC AUTO: 79 FL (ref 82–98)
MONOCYTES # BLD AUTO: 0.3 K/UL (ref 0.3–1)
MONOCYTES NFR BLD: 12.1 % (ref 4–15)
NEUTROPHILS # BLD AUTO: 2 K/UL (ref 1.8–7.7)
NEUTROPHILS NFR BLD: 71.5 % (ref 38–73)
NRBC BLD-RTO: 0 /100 WBC
PLATELET # BLD AUTO: 137 K/UL (ref 150–350)
PMV BLD AUTO: 12 FL (ref 9.2–12.9)
POTASSIUM SERPL-SCNC: 4.5 MMOL/L (ref 3.5–5.1)
PROT SERPL-MCNC: 6.6 G/DL (ref 6–8.4)
RBC # BLD AUTO: 3.74 M/UL (ref 4–5.4)
SODIUM SERPL-SCNC: 139 MMOL/L (ref 136–145)
WBC # BLD AUTO: 2.82 K/UL (ref 3.9–12.7)

## 2019-10-07 PROCEDURE — 36415 COLL VENOUS BLD VENIPUNCTURE: CPT | Mod: PO

## 2019-10-07 PROCEDURE — 80197 ASSAY OF TACROLIMUS: CPT

## 2019-10-07 PROCEDURE — 80053 COMPREHEN METABOLIC PANEL: CPT

## 2019-10-07 PROCEDURE — 85025 COMPLETE CBC W/AUTO DIFF WBC: CPT

## 2019-10-08 LAB — TACROLIMUS BLD-MCNC: 9.9 NG/ML (ref 5–15)

## 2019-10-09 ENCOUNTER — TELEPHONE (OUTPATIENT)
Dept: TRANSPLANT | Facility: CLINIC | Age: 49
End: 2019-10-09

## 2019-10-09 DIAGNOSIS — Z94.4 LIVER REPLACED BY TRANSPLANT: Primary | ICD-10-CM

## 2019-10-18 ENCOUNTER — TELEPHONE (OUTPATIENT)
Dept: TRANSPLANT | Facility: CLINIC | Age: 49
End: 2019-10-18

## 2019-10-18 NOTE — TELEPHONE ENCOUNTER
----- Message from Caron Singleton sent at 10/18/2019  1:15 PM CDT -----  Pt stated she thought she had an appt with Dr. Breaux coming up but I see she only has Labs on 10/21    Pt is requesting a call back     Pt contact [phone]

## 2019-10-21 ENCOUNTER — LAB VISIT (OUTPATIENT)
Dept: LAB | Facility: HOSPITAL | Age: 49
End: 2019-10-21
Attending: INTERNAL MEDICINE
Payer: OTHER GOVERNMENT

## 2019-10-21 DIAGNOSIS — Z94.4 LIVER REPLACED BY TRANSPLANT: ICD-10-CM

## 2019-10-21 LAB
ALBUMIN SERPL BCP-MCNC: 3.5 G/DL (ref 3.5–5.2)
ALP SERPL-CCNC: 85 U/L (ref 55–135)
ALT SERPL W/O P-5'-P-CCNC: 11 U/L (ref 10–44)
ANION GAP SERPL CALC-SCNC: 12 MMOL/L (ref 8–16)
AST SERPL-CCNC: 20 U/L (ref 10–40)
BASOPHILS # BLD AUTO: 0.02 K/UL (ref 0–0.2)
BASOPHILS NFR BLD: 0.9 % (ref 0–1.9)
BILIRUB SERPL-MCNC: 0.8 MG/DL (ref 0.1–1)
BUN SERPL-MCNC: 18 MG/DL (ref 6–20)
CALCIUM SERPL-MCNC: 9.7 MG/DL (ref 8.7–10.5)
CHLORIDE SERPL-SCNC: 104 MMOL/L (ref 95–110)
CO2 SERPL-SCNC: 25 MMOL/L (ref 23–29)
CREAT SERPL-MCNC: 1.5 MG/DL (ref 0.5–1.4)
DIFFERENTIAL METHOD: ABNORMAL
EOSINOPHIL # BLD AUTO: 0 K/UL (ref 0–0.5)
EOSINOPHIL NFR BLD: 1.7 % (ref 0–8)
ERYTHROCYTE [DISTWIDTH] IN BLOOD BY AUTOMATED COUNT: 14.6 % (ref 11.5–14.5)
EST. GFR  (AFRICAN AMERICAN): 46.2 ML/MIN/1.73 M^2
EST. GFR  (NON AFRICAN AMERICAN): 40.1 ML/MIN/1.73 M^2
GLUCOSE SERPL-MCNC: 85 MG/DL (ref 70–110)
HCT VFR BLD AUTO: 28.1 % (ref 37–48.5)
HGB BLD-MCNC: 9.3 G/DL (ref 12–16)
IMM GRANULOCYTES # BLD AUTO: 0.01 K/UL (ref 0–0.04)
IMM GRANULOCYTES NFR BLD AUTO: 0.4 % (ref 0–0.5)
LYMPHOCYTES # BLD AUTO: 0.5 K/UL (ref 1–4.8)
LYMPHOCYTES NFR BLD: 19.9 % (ref 18–48)
MCH RBC QN AUTO: 26.2 PG (ref 27–31)
MCHC RBC AUTO-ENTMCNC: 33.1 G/DL (ref 32–36)
MCV RBC AUTO: 79 FL (ref 82–98)
MONOCYTES # BLD AUTO: 0.3 K/UL (ref 0.3–1)
MONOCYTES NFR BLD: 14.7 % (ref 4–15)
NEUTROPHILS # BLD AUTO: 1.4 K/UL (ref 1.8–7.7)
NEUTROPHILS NFR BLD: 62.4 % (ref 38–73)
NRBC BLD-RTO: 0 /100 WBC
PLATELET # BLD AUTO: 150 K/UL (ref 150–350)
PMV BLD AUTO: 10.8 FL (ref 9.2–12.9)
POTASSIUM SERPL-SCNC: 4.7 MMOL/L (ref 3.5–5.1)
PROT SERPL-MCNC: 6.6 G/DL (ref 6–8.4)
RBC # BLD AUTO: 3.55 M/UL (ref 4–5.4)
SODIUM SERPL-SCNC: 141 MMOL/L (ref 136–145)
WBC # BLD AUTO: 2.31 K/UL (ref 3.9–12.7)

## 2019-10-21 PROCEDURE — 36415 COLL VENOUS BLD VENIPUNCTURE: CPT | Mod: PO

## 2019-10-21 PROCEDURE — 80197 ASSAY OF TACROLIMUS: CPT

## 2019-10-21 PROCEDURE — 85025 COMPLETE CBC W/AUTO DIFF WBC: CPT

## 2019-10-21 PROCEDURE — 80053 COMPREHEN METABOLIC PANEL: CPT

## 2019-10-22 LAB — TACROLIMUS BLD-MCNC: 9.4 NG/ML (ref 5–15)

## 2019-10-23 ENCOUNTER — TELEPHONE (OUTPATIENT)
Dept: TRANSPLANT | Facility: CLINIC | Age: 49
End: 2019-10-23

## 2019-10-23 DIAGNOSIS — Z94.4 LIVER REPLACED BY TRANSPLANT: Primary | ICD-10-CM

## 2019-10-29 ENCOUNTER — TELEPHONE (OUTPATIENT)
Dept: TRANSPLANT | Facility: CLINIC | Age: 49
End: 2019-10-29

## 2019-10-29 NOTE — TELEPHONE ENCOUNTER
----- Message from Gabo Anguiano sent at 10/29/2019  3:11 PM CDT -----  Contact: Mr. Zamudio () @ [phone]  Mr. Zamudio states correct fax number for dentist is

## 2019-10-29 NOTE — TELEPHONE ENCOUNTER
----- Message from Gabo Anguiano sent at 10/29/2019  2:08 PM CDT -----  Contact: pt @ [phone]  Pt states once approval has been received for ntibiotics and tooth extraction,  pls fax to dentist office at  or e-mail: [email or web address]

## 2019-10-29 NOTE — TELEPHONE ENCOUNTER
----- Message from Isa Oconnor MA sent at 10/29/2019  2:54 PM CDT -----  Contact: Pt      ----- Message -----  From: Kizzy Aquino  Sent: 10/29/2019   2:48 PM CDT  To: [facility] Pre-Liver Transplant Non-Clinical    Pt is waiting on clearance paperwork.    Pt# [phone]

## 2019-10-30 ENCOUNTER — TELEPHONE (OUTPATIENT)
Dept: TRANSPLANT | Facility: CLINIC | Age: 49
End: 2019-10-30

## 2019-10-30 NOTE — TELEPHONE ENCOUNTER
----- Message from Celeste Marte sent at 10/30/2019 11:55 AM CDT -----  Contact: Pt  berhane   Pt  Berhane Called to speak with Kristin Fragoso have some questions   Callback#[phone]  Thank You  MORALES Marte

## 2019-10-30 NOTE — TELEPHONE ENCOUNTER
Patient reported that dentist did not receive faxes with clearence.  Email address given for dental office and will email clearence to Dentist.

## 2019-11-05 ENCOUNTER — LAB VISIT (OUTPATIENT)
Dept: LAB | Facility: HOSPITAL | Age: 49
End: 2019-11-05
Attending: TRANSPLANT SURGERY
Payer: OTHER GOVERNMENT

## 2019-11-05 DIAGNOSIS — Z94.4 LIVER REPLACED BY TRANSPLANT: ICD-10-CM

## 2019-11-05 LAB
ALBUMIN SERPL BCP-MCNC: 3.6 G/DL (ref 3.5–5.2)
ALP SERPL-CCNC: 94 U/L (ref 55–135)
ALT SERPL W/O P-5'-P-CCNC: 12 U/L (ref 10–44)
ANION GAP SERPL CALC-SCNC: 7 MMOL/L (ref 8–16)
AST SERPL-CCNC: 20 U/L (ref 10–40)
BASOPHILS # BLD AUTO: 0.02 K/UL (ref 0–0.2)
BASOPHILS NFR BLD: 0.9 % (ref 0–1.9)
BILIRUB DIRECT SERPL-MCNC: 0.3 MG/DL (ref 0.1–0.3)
BILIRUB SERPL-MCNC: 0.9 MG/DL (ref 0.1–1)
BUN SERPL-MCNC: 19 MG/DL (ref 6–20)
CALCIUM SERPL-MCNC: 9.7 MG/DL (ref 8.7–10.5)
CHLORIDE SERPL-SCNC: 107 MMOL/L (ref 95–110)
CO2 SERPL-SCNC: 27 MMOL/L (ref 23–29)
CREAT SERPL-MCNC: 1.4 MG/DL (ref 0.5–1.4)
DIFFERENTIAL METHOD: ABNORMAL
EOSINOPHIL # BLD AUTO: 0 K/UL (ref 0–0.5)
EOSINOPHIL NFR BLD: 1.8 % (ref 0–8)
ERYTHROCYTE [DISTWIDTH] IN BLOOD BY AUTOMATED COUNT: 15.5 % (ref 11.5–14.5)
EST. GFR  (AFRICAN AMERICAN): 50.2 ML/MIN/1.73 M^2
EST. GFR  (NON AFRICAN AMERICAN): 43.5 ML/MIN/1.73 M^2
GLUCOSE SERPL-MCNC: 94 MG/DL (ref 70–110)
HCT VFR BLD AUTO: 26.5 % (ref 37–48.5)
HGB BLD-MCNC: 8.7 G/DL (ref 12–16)
IMM GRANULOCYTES # BLD AUTO: 0.01 K/UL (ref 0–0.04)
IMM GRANULOCYTES NFR BLD AUTO: 0.4 % (ref 0–0.5)
LYMPHOCYTES # BLD AUTO: 0.4 K/UL (ref 1–4.8)
LYMPHOCYTES NFR BLD: 16.3 % (ref 18–48)
MCH RBC QN AUTO: 27 PG (ref 27–31)
MCHC RBC AUTO-ENTMCNC: 32.8 G/DL (ref 32–36)
MCV RBC AUTO: 82 FL (ref 82–98)
MONOCYTES # BLD AUTO: 0.3 K/UL (ref 0.3–1)
MONOCYTES NFR BLD: 11.9 % (ref 4–15)
NEUTROPHILS # BLD AUTO: 1.6 K/UL (ref 1.8–7.7)
NEUTROPHILS NFR BLD: 68.7 % (ref 38–73)
NRBC BLD-RTO: 0 /100 WBC
PLATELET # BLD AUTO: 142 K/UL (ref 150–350)
PMV BLD AUTO: 10.6 FL (ref 9.2–12.9)
POTASSIUM SERPL-SCNC: 4.5 MMOL/L (ref 3.5–5.1)
PROT SERPL-MCNC: 6.5 G/DL (ref 6–8.4)
RBC # BLD AUTO: 3.22 M/UL (ref 4–5.4)
SODIUM SERPL-SCNC: 141 MMOL/L (ref 136–145)
WBC # BLD AUTO: 2.27 K/UL (ref 3.9–12.7)

## 2019-11-05 PROCEDURE — 80053 COMPREHEN METABOLIC PANEL: CPT

## 2019-11-05 PROCEDURE — 80197 ASSAY OF TACROLIMUS: CPT

## 2019-11-05 PROCEDURE — 85025 COMPLETE CBC W/AUTO DIFF WBC: CPT

## 2019-11-05 PROCEDURE — 82248 BILIRUBIN DIRECT: CPT

## 2019-11-05 PROCEDURE — 36415 COLL VENOUS BLD VENIPUNCTURE: CPT | Mod: PO

## 2019-11-06 LAB — TACROLIMUS BLD-MCNC: 11 NG/ML (ref 5–15)

## 2019-11-11 ENCOUNTER — TELEPHONE (OUTPATIENT)
Dept: TRANSPLANT | Facility: CLINIC | Age: 49
End: 2019-11-11

## 2019-11-11 DIAGNOSIS — Z94.4 LIVER REPLACED BY TRANSPLANT: Primary | ICD-10-CM

## 2019-11-11 NOTE — TELEPHONE ENCOUNTER
----- Message from Ronal Coello MD sent at 11/9/2019 10:04 AM CST -----  Results reviewed  Reduce tac to 5/4

## 2019-11-12 ENCOUNTER — TELEPHONE (OUTPATIENT)
Dept: TRANSPLANT | Facility: CLINIC | Age: 49
End: 2019-11-12

## 2019-11-12 NOTE — TELEPHONE ENCOUNTER
Attempted to call patient with medication change; no answer. Will continue to try to reach patient.

## 2019-11-13 ENCOUNTER — TELEPHONE (OUTPATIENT)
Dept: TRANSPLANT | Facility: CLINIC | Age: 49
End: 2019-11-13

## 2019-11-13 RX ORDER — TACROLIMUS 1 MG/1
4 CAPSULE ORAL EVERY 12 HOURS
Qty: 270 CAPSULE | Refills: 11 | Status: SHIPPED | OUTPATIENT
Start: 2019-11-13 | End: 2020-05-26 | Stop reason: DRUGHIGH

## 2019-11-13 NOTE — TELEPHONE ENCOUNTER
Patient notified and instructed to reduce Prograf dose to 5mg in AM and 4mg in PM; repeat labs due Monday. She reported she is already taking that dose. Will re-review with  and let her know.

## 2019-11-13 NOTE — TELEPHONE ENCOUNTER
----- Message from Ronal Coello MD sent at 11/13/2019  3:02 PM CST -----  Yes please    ----- Message -----  From: Kristin Richmond  Sent: 11/13/2019   1:43 PM CST  To: Ronal Coello MD    Patient states she is already taking FK 5/4, do you want to go to 4/4?  Thanks.   ----- Message -----  From: Ronal Coello MD  Sent: 11/9/2019  10:04 AM CST  To: [facility] Post-Liver Transplant Clinical    Results reviewed  Reduce tac to 5/4

## 2019-11-13 NOTE — TELEPHONE ENCOUNTER
Patient/ notified and instructed :Patient to reduce Prograf dose to 4mg twice daily.  He was able to repeat instructions and voiced understanding.  Repeat labs on Monday scheduled.

## 2019-11-26 ENCOUNTER — TELEPHONE (OUTPATIENT)
Dept: TRANSPLANT | Facility: CLINIC | Age: 49
End: 2019-11-26

## 2019-11-26 NOTE — TELEPHONE ENCOUNTER
Labs scheduled on 1/18/19; patient did not go, per Epic, patient cancelled.  Missed Lab Letter sent to patient.

## 2019-12-05 ENCOUNTER — HOSPITAL ENCOUNTER (OUTPATIENT)
Dept: RADIOLOGY | Facility: CLINIC | Age: 49
Discharge: HOME | End: 2019-12-05
Attending: SURGERY
Payer: OTHER GOVERNMENT

## 2019-12-05 ENCOUNTER — TELEPHONE (OUTPATIENT)
Dept: TRANSPLANT | Facility: CLINIC | Age: 49
End: 2019-12-05

## 2019-12-05 DIAGNOSIS — Z94.4 LIVER REPLACED BY TRANSPLANT: Primary | ICD-10-CM

## 2019-12-05 DIAGNOSIS — Z94.4 LIVER REPLACED BY TRANSPLANT: ICD-10-CM

## 2019-12-05 PROCEDURE — 76705 ECHO EXAM OF ABDOMEN: CPT | Mod: TC,PO

## 2019-12-05 PROCEDURE — 93975 US LIVER TRANSPLANT POST: ICD-10-PCS | Mod: 26,,, | Performed by: RADIOLOGY

## 2019-12-05 PROCEDURE — 93975 VASCULAR STUDY: CPT | Mod: TC,PO

## 2019-12-05 PROCEDURE — 93975 VASCULAR STUDY: CPT | Mod: 26,,, | Performed by: RADIOLOGY

## 2019-12-05 PROCEDURE — 76705 US LIVER TRANSPLANT POST: ICD-10-PCS | Mod: 26,59,, | Performed by: RADIOLOGY

## 2019-12-05 PROCEDURE — 76705 ECHO EXAM OF ABDOMEN: CPT | Mod: 26,59,, | Performed by: RADIOLOGY

## 2019-12-05 NOTE — TELEPHONE ENCOUNTER
----- Message from Gui Chavez MD sent at 12/5/2019  3:30 PM CST -----  Results ok. No action needed

## 2019-12-12 ENCOUNTER — TELEPHONE (OUTPATIENT)
Dept: TRANSPLANT | Facility: CLINIC | Age: 49
End: 2019-12-12

## 2019-12-12 NOTE — TELEPHONE ENCOUNTER
Patient reported she did not get Lab Letter in the mail yet. Reviewed with her that recent labs and U/S were stable. And repeat labs due 12/30/19. She was able to repeat instructions and voiced understanding.

## 2019-12-12 NOTE — TELEPHONE ENCOUNTER
----- Message from Rosa Ruiz sent at 12/12/2019  4:17 PM CST -----  Contact: pt: [phone]  pt called to speak with coord re lab results     Please contact pt: [phone]

## 2019-12-30 ENCOUNTER — LAB VISIT (OUTPATIENT)
Dept: LAB | Facility: HOSPITAL | Age: 49
End: 2019-12-30
Attending: INTERNAL MEDICINE
Payer: OTHER GOVERNMENT

## 2019-12-30 DIAGNOSIS — Z94.4 LIVER REPLACED BY TRANSPLANT: ICD-10-CM

## 2019-12-30 LAB
ALBUMIN SERPL BCP-MCNC: 4 G/DL (ref 3.5–5.2)
ALP SERPL-CCNC: 90 U/L (ref 55–135)
ALT SERPL W/O P-5'-P-CCNC: 15 U/L (ref 10–44)
ANION GAP SERPL CALC-SCNC: 9 MMOL/L (ref 8–16)
AST SERPL-CCNC: 28 U/L (ref 10–40)
BASOPHILS # BLD AUTO: 0.02 K/UL (ref 0–0.2)
BASOPHILS NFR BLD: 0.7 % (ref 0–1.9)
BILIRUB SERPL-MCNC: 0.8 MG/DL (ref 0.1–1)
BUN SERPL-MCNC: 15 MG/DL (ref 6–20)
CALCIUM SERPL-MCNC: 10.2 MG/DL (ref 8.7–10.5)
CHLORIDE SERPL-SCNC: 107 MMOL/L (ref 95–110)
CO2 SERPL-SCNC: 27 MMOL/L (ref 23–29)
CREAT SERPL-MCNC: 1.1 MG/DL (ref 0.5–1.4)
DIFFERENTIAL METHOD: ABNORMAL
EOSINOPHIL # BLD AUTO: 0.1 K/UL (ref 0–0.5)
EOSINOPHIL NFR BLD: 2.1 % (ref 0–8)
ERYTHROCYTE [DISTWIDTH] IN BLOOD BY AUTOMATED COUNT: 14.7 % (ref 11.5–14.5)
EST. GFR  (AFRICAN AMERICAN): >60 ML/MIN/1.73 M^2
EST. GFR  (NON AFRICAN AMERICAN): 58.3 ML/MIN/1.73 M^2
GLUCOSE SERPL-MCNC: 82 MG/DL (ref 70–110)
HCT VFR BLD AUTO: 33.1 % (ref 37–48.5)
HGB BLD-MCNC: 10.8 G/DL (ref 12–16)
IMM GRANULOCYTES # BLD AUTO: 0.02 K/UL (ref 0–0.04)
IMM GRANULOCYTES NFR BLD AUTO: 0.7 % (ref 0–0.5)
LYMPHOCYTES # BLD AUTO: 0.4 K/UL (ref 1–4.8)
LYMPHOCYTES NFR BLD: 14.6 % (ref 18–48)
MCH RBC QN AUTO: 27.9 PG (ref 27–31)
MCHC RBC AUTO-ENTMCNC: 32.6 G/DL (ref 32–36)
MCV RBC AUTO: 86 FL (ref 82–98)
MONOCYTES # BLD AUTO: 0.2 K/UL (ref 0.3–1)
MONOCYTES NFR BLD: 8.2 % (ref 4–15)
NEUTROPHILS # BLD AUTO: 2.1 K/UL (ref 1.8–7.7)
NEUTROPHILS NFR BLD: 73.7 % (ref 38–73)
NRBC BLD-RTO: 0 /100 WBC
PLATELET # BLD AUTO: 162 K/UL (ref 150–350)
PMV BLD AUTO: 10.8 FL (ref 9.2–12.9)
POTASSIUM SERPL-SCNC: 4.7 MMOL/L (ref 3.5–5.1)
PROT SERPL-MCNC: 6.9 G/DL (ref 6–8.4)
RBC # BLD AUTO: 3.87 M/UL (ref 4–5.4)
SODIUM SERPL-SCNC: 143 MMOL/L (ref 136–145)
WBC # BLD AUTO: 2.81 K/UL (ref 3.9–12.7)

## 2019-12-30 PROCEDURE — 85025 COMPLETE CBC W/AUTO DIFF WBC: CPT

## 2019-12-30 PROCEDURE — 80053 COMPREHEN METABOLIC PANEL: CPT

## 2019-12-30 PROCEDURE — 80197 ASSAY OF TACROLIMUS: CPT

## 2019-12-30 PROCEDURE — 36415 COLL VENOUS BLD VENIPUNCTURE: CPT | Mod: PO

## 2019-12-31 ENCOUNTER — TELEPHONE (OUTPATIENT)
Dept: TRANSPLANT | Facility: CLINIC | Age: 49
End: 2019-12-31

## 2019-12-31 DIAGNOSIS — Z94.4 LIVER REPLACED BY TRANSPLANT: Primary | ICD-10-CM

## 2019-12-31 LAB — TACROLIMUS BLD-MCNC: 12.6 NG/ML (ref 5–15)

## 2019-12-31 NOTE — TELEPHONE ENCOUNTER
Per [facility] Labs are stable and no  Medication changes at this time. Patient notified and  instructed lab and clinic appointment on 1/27. Patient understood with no further concerns or questions.

## 2020-01-27 ENCOUNTER — LAB VISIT (OUTPATIENT)
Dept: LAB | Facility: HOSPITAL | Age: 50
End: 2020-01-27
Attending: INTERNAL MEDICINE
Payer: OTHER GOVERNMENT

## 2020-01-27 ENCOUNTER — OFFICE VISIT (OUTPATIENT)
Dept: TRANSPLANT | Facility: CLINIC | Age: 50
End: 2020-01-27
Payer: OTHER GOVERNMENT

## 2020-01-27 VITALS
WEIGHT: 147.13 LBS | RESPIRATION RATE: 16 BRPM | SYSTOLIC BLOOD PRESSURE: 157 MMHG | HEIGHT: 64 IN | BODY MASS INDEX: 25.12 KG/M2 | DIASTOLIC BLOOD PRESSURE: 78 MMHG | OXYGEN SATURATION: 99 % | TEMPERATURE: 98 F | HEART RATE: 53 BPM

## 2020-01-27 DIAGNOSIS — Z94.4 S/P LIVER TRANSPLANT: ICD-10-CM

## 2020-01-27 DIAGNOSIS — A09 DIARRHEA OF INFECTIOUS ORIGIN: Primary | ICD-10-CM

## 2020-01-27 DIAGNOSIS — Z79.60 LONG-TERM USE OF IMMUNOSUPPRESSANT MEDICATION: ICD-10-CM

## 2020-01-27 DIAGNOSIS — Z94.4 LIVER REPLACED BY TRANSPLANT: ICD-10-CM

## 2020-01-27 DIAGNOSIS — Z29.89 PROPHYLACTIC IMMUNOTHERAPY: ICD-10-CM

## 2020-01-27 DIAGNOSIS — S06.300D TRAUMATIC INTRACRANIAL HEMORRHAGE WITHOUT LOSS OF CONSCIOUSNESS, SUBSEQUENT ENCOUNTER: ICD-10-CM

## 2020-01-27 PROBLEM — E66.01 MORBID OBESITY: Status: RESOLVED | Noted: 2018-02-26 | Resolved: 2020-01-27

## 2020-01-27 LAB
ALBUMIN SERPL BCP-MCNC: 3.4 G/DL (ref 3.5–5.2)
ALP SERPL-CCNC: 80 U/L (ref 55–135)
ALT SERPL W/O P-5'-P-CCNC: 12 U/L (ref 10–44)
ANION GAP SERPL CALC-SCNC: 7 MMOL/L (ref 8–16)
AST SERPL-CCNC: 20 U/L (ref 10–40)
BASOPHILS # BLD AUTO: 0.02 K/UL (ref 0–0.2)
BASOPHILS NFR BLD: 1 % (ref 0–1.9)
BILIRUB SERPL-MCNC: 0.6 MG/DL (ref 0.1–1)
BUN SERPL-MCNC: 12 MG/DL (ref 6–20)
C DIFF GDH STL QL: POSITIVE
C DIFF TOX A+B STL QL IA: NEGATIVE
C DIFF TOX GENS STL QL NAA+PROBE: POSITIVE
CALCIUM SERPL-MCNC: 8.6 MG/DL (ref 8.7–10.5)
CHLORIDE SERPL-SCNC: 107 MMOL/L (ref 95–110)
CO2 SERPL-SCNC: 29 MMOL/L (ref 23–29)
CREAT SERPL-MCNC: 0.8 MG/DL (ref 0.5–1.4)
DIFFERENTIAL METHOD: ABNORMAL
EOSINOPHIL # BLD AUTO: 0 K/UL (ref 0–0.5)
EOSINOPHIL NFR BLD: 1.4 % (ref 0–8)
ERYTHROCYTE [DISTWIDTH] IN BLOOD BY AUTOMATED COUNT: 13.9 % (ref 11.5–14.5)
EST. GFR  (AFRICAN AMERICAN): >60 ML/MIN/1.73 M^2
EST. GFR  (NON AFRICAN AMERICAN): >60 ML/MIN/1.73 M^2
GLUCOSE SERPL-MCNC: 81 MG/DL (ref 70–110)
HCT VFR BLD AUTO: 30.6 % (ref 37–48.5)
HGB BLD-MCNC: 9.7 G/DL (ref 12–16)
IMM GRANULOCYTES # BLD AUTO: 0 K/UL (ref 0–0.04)
IMM GRANULOCYTES NFR BLD AUTO: 0 % (ref 0–0.5)
LYMPHOCYTES # BLD AUTO: 0.4 K/UL (ref 1–4.8)
LYMPHOCYTES NFR BLD: 18.1 % (ref 18–48)
MCH RBC QN AUTO: 27 PG (ref 27–31)
MCHC RBC AUTO-ENTMCNC: 31.7 G/DL (ref 32–36)
MCV RBC AUTO: 85 FL (ref 82–98)
MONOCYTES # BLD AUTO: 0.2 K/UL (ref 0.3–1)
MONOCYTES NFR BLD: 8.1 % (ref 4–15)
NEUTROPHILS # BLD AUTO: 1.5 K/UL (ref 1.8–7.7)
NEUTROPHILS NFR BLD: 71.4 % (ref 38–73)
NRBC BLD-RTO: 0 /100 WBC
PLATELET # BLD AUTO: 136 K/UL (ref 150–350)
PMV BLD AUTO: 10.6 FL (ref 9.2–12.9)
POTASSIUM SERPL-SCNC: 4 MMOL/L (ref 3.5–5.1)
PROT SERPL-MCNC: 5.9 G/DL (ref 6–8.4)
RBC # BLD AUTO: 3.59 M/UL (ref 4–5.4)
SODIUM SERPL-SCNC: 143 MMOL/L (ref 136–145)
TACROLIMUS BLD-MCNC: 9.9 NG/ML (ref 5–15)
WBC # BLD AUTO: 2.1 K/UL (ref 3.9–12.7)

## 2020-01-27 PROCEDURE — 36415 COLL VENOUS BLD VENIPUNCTURE: CPT

## 2020-01-27 PROCEDURE — 80053 COMPREHEN METABOLIC PANEL: CPT

## 2020-01-27 PROCEDURE — 87449 NOS EACH ORGANISM AG IA: CPT

## 2020-01-27 PROCEDURE — 87324 CLOSTRIDIUM AG IA: CPT

## 2020-01-27 PROCEDURE — 85025 COMPLETE CBC W/AUTO DIFF WBC: CPT

## 2020-01-27 PROCEDURE — 87493 C DIFF AMPLIFIED PROBE: CPT

## 2020-01-27 PROCEDURE — 99214 PR OFFICE/OUTPT VISIT, EST, LEVL IV, 30-39 MIN: ICD-10-PCS | Mod: S$PBB,,, | Performed by: INTERNAL MEDICINE

## 2020-01-27 PROCEDURE — 99214 OFFICE O/P EST MOD 30 MIN: CPT | Mod: S$PBB,,, | Performed by: INTERNAL MEDICINE

## 2020-01-27 PROCEDURE — 99213 OFFICE O/P EST LOW 20 MIN: CPT | Mod: PBBFAC | Performed by: INTERNAL MEDICINE

## 2020-01-27 PROCEDURE — 80197 ASSAY OF TACROLIMUS: CPT

## 2020-01-27 PROCEDURE — 99999 PR PBB SHADOW E&M-EST. PATIENT-LVL III: CPT | Mod: PBBFAC,,, | Performed by: INTERNAL MEDICINE

## 2020-01-27 PROCEDURE — 99999 PR PBB SHADOW E&M-EST. PATIENT-LVL III: ICD-10-PCS | Mod: PBBFAC,,, | Performed by: INTERNAL MEDICINE

## 2020-01-27 NOTE — LETTER
January 27, 2020        Janes Retana  [address] [facility] [place] [address]  Phone: [phone]  Fax: [phone]             [facility] - Liver Transplant  [address]  [facility] [address]  Phone: [phone]   Patient: Jihan Zamudio   MR Number: [id number]   YOB: 1970   Date of Visit: 1/27/2020       Dear Dr. Janes Retana    Thank you for referring Jihan Zamudio to me for evaluation. Attached you will find relevant portions of my assessment and plan of care.    If you have questions, please do not hesitate to call me. I look forward to following Jihan Zamudio along with you.    Sincerely,    Ronal Coello MD    Enclosure    If you would like to receive this communication electronically, please contact [email or web address] or [phone] to request [employer] Link access.    [employer] Link is a tool which provides read-only access to select patient information with whom you have a relationship. Its easy to use and provides real time access to review your patients record including encounter summaries, notes, results, and demographic information.    If you feel you have received this communication in error or would no longer like to receive these types of communications, please e-mail [email or web address]

## 2020-01-27 NOTE — PROGRESS NOTES
Subjective:       Patient ID: Jihan Zamudio is a 49 y.o. female.    Chief Complaint: Liver Transplant Follow-up    HPI  I saw this 49 y.o.lady who had a liver Tx for KESSLER cirrhosis on 2019.  She is now 7 months post op.    She was an inpatient in hospital prior to her liver transplant and her post op admission was prolonged.  She required a chest tube for a right pleural effusion.    Her recovery was further complicated by a fall and a subsequent intracranial hemorrhage (right occipital lobe).    She is currently very well but has lost some weight. She has been troubled by diarrhea in the last 2 weeks that she thinks started after she received clindamycin for a tooth extraction.    She has no edema and stopped her furosemide.    ORGAN:   LIVER  Disease Etiology: Cirrhosis: Fatty Liver (Kessler)  Donor Type:    - Brain Death  [facility] High Risk:   No  Donor CMV Status:   Donor CMV Status: Positive  Donor HBcAB:   Negative  Donor HCV Status:   Negative  Whole or Partial: Whole Liver  Biliary Anastomosis: End to End  Arterial Anatomy: Standard    ORGAN: liver DIAGNOSIS: kessler MELD: 29  SEROLOGY Recipient/Donor : O/O CMV: +/+ HCV: -/ -HBcAb: -/-   INDUCTION: STEROIDS   ANASTOMOSIS: CDD   DONOR: DBD   [facility] high risk: NO   HCV C Ab + donor or HCV Katty - or + donor: No    EXPLANT:( path report) KESSLER/no malignancy    Explant discussed: YES 19   -----------------------------------------------------------------  IMMUNOSUPPRESSION: Tacro/MMF/Prednisone until 7/10/19  PCP PROPHYLAXIS: Bactrim until 12/3/19  CMV PROPHYLAXIS: Valcyte until 19  FUNGAL PROPHYLAXIS: N/A  Aspirin: on 81 mg daily    Issues:  1) Latent TB  2) Prev Lap band bariatric surgery  3) Post op ANTONIO  4) Intracranial hemorrhage- treated with Keppra for 7 days  5) Readmitted with fever and altered mental status    CT abdo: 7/15/2019  The patient is status post liver transplant, there is continued appearance of moderate free fluid likely  representing ascites fluid.  There is appearance of edema and or fluid about the region of the adam hepatis, and fluid adjacent to the liver that may relate to the aforementioned ascites fluid otherwise a well-formed walled off fluid collection typical of abscess is not appreciated.  Prominence of the portal vein, splenic vein and varices are noted as well as splenomegaly likely represent sequelae chronic hepatic disease and portal venous hypertension.  Mildly prominent retroperitoneal lymph node appears stable and there are some prominent periportal lymph nodes that appear stable.  Right-sided pleural effusion with associated volume loss and atelectasis again noted appearing stable.  Pulmonary nodule on the left again noted appearing stable    Abdo US: 12/5/2019  Satisfactory Doppler assessment the liver allograft    She currently feels well and her edema has largely gone. She came off diuretics about 5 days ago.    Review of Systems   Constitutional: Negative for activity change, appetite change, chills, fatigue, fever and unexpected weight change.   HENT: Negative for ear pain, hearing loss, nosebleeds, sore throat and trouble swallowing.    Eyes: Negative for redness and visual disturbance.   Respiratory: Negative for cough, chest tightness, shortness of breath and wheezing.    Cardiovascular: Negative for chest pain and palpitations.   Gastrointestinal: Negative for abdominal distention, abdominal pain, blood in stool, constipation, diarrhea, nausea and vomiting.   Genitourinary: Negative for difficulty urinating, dysuria, frequency, hematuria and urgency.   Musculoskeletal: Negative for arthralgias, back pain, gait problem, joint swelling and myalgias.   Skin: Negative for rash.   Neurological: Negative for tremors, seizures, speech difficulty, weakness and headaches.   Hematological: Negative for adenopathy.   Psychiatric/Behavioral: Negative for confusion, decreased concentration and sleep disturbance. The  patient is not nervous/anxious.          Lab Results   Component Value Date    ALT 12 2020    AST 20 2020     (H) 2019    ALKPHOS 80 2020    BILITOT 0.6 2020     Past Medical History:   Diagnosis Date    Cirrhosis     Esophageal varices 2018    Small with no banding     Essential hypertension 2018    Fatty liver 2018    GERD (gastroesophageal reflux disease)     Hx of colonic polyps 2018    On colonoscopy     Hypertension     Hypothyroidism 2018    Kidney stones     Morbid obesity 2018    Lap band with subsequent release    KADEEM (obstructive sleep apnea) 2018    Osteoarthritis 2018    Pulmonary nodule 2018    Vitamin D deficiency 2018     Past Surgical History:   Procedure Laterality Date     SECTION      TIMES 2     CHOLECYSTECTOMY      LAPAROSCOPIC     CYSTOSCOPY W/ STONE MANIPULATION      kidney stone removal    ESOPHAGOGASTRODUODENOSCOPY N/A 2019    Procedure: EGD (ESOPHAGOGASTRODUODENOSCOPY);  Surgeon: Davian Hernández MD;  Location: [facility] ([address] [place] [address]);  Service: Endoscopy;  Laterality: N/A;    LAPAROSCOPIC GASTRIC BANDING  2006    removal 2009    LIVER BIOPSY  2017    KESSLER with bridging 17    LIVER TRANSPLANT N/A 2019    Procedure: TRANSPLANT, LIVER;  Surgeon: Clemente Brown Jr., MD;  Location: [facility] OR [address] [place] [address];  Service: Transplant;  Laterality: N/A;     Current Outpatient Medications   Medication Sig    famotidine (PEPCID) 20 MG tablet Take 1 tablet (20 mg total) by mouth every evening.    levothyroxine (SYNTHROID) 112 MCG tablet Take 1 tablet (112 mcg total) by mouth once daily.    NIFEdipine (PROCARDIA-XL) 30 MG (OSM) 24 hr tablet Take 1 tablet (30 mg total) by mouth once daily.    sodium bicarbonate 650 MG tablet Take 2 tablets (1,300 mg total) by mouth 2 (two) times daily.    tacrolimus (PROGRAF) 1 MG Cap Take 4 capsules (4 mg total) by mouth  every 12 (twelve) hours.    valGANciclovir (VALCYTE) 450 mg Tab Take 1 tablet (450 mg total) by mouth once daily. Stop: 9/4/19     No current facility-administered medications for this visit.        Objective:      Physical Exam   Constitutional: She appears well-nourished. No distress.   HENT:   Head: Normocephalic.   Eyes: Pupils are equal, round, and reactive to light.   Neck: No JVD present. No tracheal deviation present. No thyromegaly present.   Cardiovascular: Normal rate, regular rhythm and normal heart sounds.   No murmur heard.  Pulmonary/Chest: Effort normal and breath sounds normal. No stridor.   Right pleural effusion.   Abdominal: Soft.   Lymphadenopathy:        Head (right side): No submental, no submandibular, no tonsillar, no preauricular, no posterior auricular and no occipital adenopathy present.        Head (left side): No submental, no submandibular, no tonsillar, no preauricular, no posterior auricular and no occipital adenopathy present.     She has no cervical adenopathy.     She has no axillary adenopathy.   Neurological: She is alert. She has normal strength. She is not disoriented. No cranial nerve deficit or sensory deficit.   Skin: Skin is intact. No cyanosis.       Assessment:       1. Diarrhea of infectious origin    2. S/P liver transplant    3. Traumatic intracranial hemorrhage without loss of consciousness, subsequent encounter    4. Prophylactic immunotherapy    5. Long-term use of immunosuppressant medication        Plan:    Overall she is recovering well and is now fully mobile independently.  - on Tac monotherapy  - stool culture and C dif sent today    - will schedule lap band removal at 1 year post OLT.    Clinic in 3 months with labs every 2 weeks.    UNOS Patient Status  Functional Status: 100% - Normal, no complaints, no evidence of disease  Physical Capacity: No Limitations

## 2020-01-28 ENCOUNTER — TELEPHONE (OUTPATIENT)
Dept: TRANSPLANT | Facility: CLINIC | Age: 50
End: 2020-01-28

## 2020-01-28 DIAGNOSIS — Z94.4 LIVER REPLACED BY TRANSPLANT: Primary | ICD-10-CM

## 2020-01-28 RX ORDER — VANCOMYCIN HYDROCHLORIDE 125 MG/1
125 CAPSULE ORAL 4 TIMES DAILY
Qty: 40 CAPSULE | Refills: 0 | Status: SHIPPED | OUTPATIENT
Start: 2020-01-28 | End: 2020-02-24

## 2020-01-28 NOTE — TELEPHONE ENCOUNTER
"----- Message from Octavia Figueroa PharmD sent at 1/28/2020  4:14 PM CST -----  There shouldn't be cross-reactivity with clindamycin and vancomycin- she should be okay to tolerate that.      ----- Message -----  From: Kristin Fragoso  Sent: 1/28/2020   3:55 PM CST  To: Ronal Coello MD, #    Patient has C-Diff infection and  and  ordered Vancomycin ,  However: today patient told me that she recently had allergic reaction to Clindamycin (prescribed by her dentist) she got a rash and had to take Benadryl x 2 days.  She also said that she "thinks" she once had a reaction to Flagyl.  Any recs??? Thanks.    "

## 2020-01-28 NOTE — TELEPHONE ENCOUNTER
Patient notified and instructed : labs reviewed , no changes to immunosuppression, repeat labs in 2 weeks(due 2/10/20)    Stool study revealed c-diff. Infection :  ordered Vancomycin (Rx. To be sent to [facility] per patient request).

## 2020-01-28 NOTE — TELEPHONE ENCOUNTER
----- Message from Ronal Coello MD sent at 1/28/2020 10:15 AM CST -----  Results reviewed  Treat with oral vancomycin 125mg QID for 10 days

## 2020-01-28 NOTE — TELEPHONE ENCOUNTER
"Message received from Pharm D:      Octavia Figueroa, PharmD  Kristin Fragoso; [facility]; Ronal Coello MD             There shouldn't be cross-reactivity with clindamycin and vancomycin- she should be okay to tolerate that.    Previous Messages      ----- Message -----   From: Kristin Fragoso   Sent: 1/28/2020   3:55 PM CST   To: Ronal Coello MD, *     Patient has C-Diff infection and  and  ordered Vancomycin ,  However: today patient told me that she recently had allergic reaction to Clindamycin (prescribed by her dentist) she got a rash and had to take Benadryl x 2 days.  She also said that she "thinks" she once had a reaction to Flagyl.  Any recs??? Thanks.           "

## 2020-02-03 ENCOUNTER — TELEPHONE (OUTPATIENT)
Dept: TRANSPLANT | Facility: CLINIC | Age: 50
End: 2020-02-03

## 2020-02-03 NOTE — TELEPHONE ENCOUNTER
----- Message from Regine Smith sent at 2/3/2020  1:25 PM CST -----  Contact: pt  Patient calling about medication       Call Back : [phone]

## 2020-02-12 ENCOUNTER — TELEPHONE (OUTPATIENT)
Dept: TRANSPLANT | Facility: CLINIC | Age: 50
End: 2020-02-12

## 2020-03-11 ENCOUNTER — TELEPHONE (OUTPATIENT)
Dept: TRANSPLANT | Facility: CLINIC | Age: 50
End: 2020-03-11

## 2020-03-12 ENCOUNTER — TELEPHONE (OUTPATIENT)
Dept: TRANSPLANT | Facility: CLINIC | Age: 50
End: 2020-03-12

## 2020-03-12 NOTE — TELEPHONE ENCOUNTER
----- Message from Consuelo Hoffman MA sent at 3/12/2020  3:57 PM CDT -----  Contact: pt#[phone]  She did not do lab work on 2-10-20.  I rescheduled her for Monday, 3-16-20 at the [facility]    Reminder is in epic    ----- Message -----  From: Maki Mujica  Sent: 3/12/2020   2:50 PM CDT  To: [facility] Non-Clinical    Pt is calling to change location for labs.Please call

## 2020-03-16 ENCOUNTER — LAB VISIT (OUTPATIENT)
Dept: LAB | Facility: HOSPITAL | Age: 50
End: 2020-03-16
Attending: INTERNAL MEDICINE
Payer: OTHER GOVERNMENT

## 2020-03-16 DIAGNOSIS — Z94.4 LIVER REPLACED BY TRANSPLANT: ICD-10-CM

## 2020-03-16 LAB
ALBUMIN SERPL BCP-MCNC: 4.4 G/DL (ref 3.5–5.2)
ALP SERPL-CCNC: 63 U/L (ref 55–135)
ALT SERPL W/O P-5'-P-CCNC: 16 U/L (ref 10–44)
ANION GAP SERPL CALC-SCNC: 9 MMOL/L (ref 8–16)
AST SERPL-CCNC: 24 U/L (ref 10–40)
BASOPHILS # BLD AUTO: 0.02 K/UL (ref 0–0.2)
BASOPHILS NFR BLD: 0.8 % (ref 0–1.9)
BILIRUB SERPL-MCNC: 0.7 MG/DL (ref 0.1–1)
BUN SERPL-MCNC: 15 MG/DL (ref 6–20)
CALCIUM SERPL-MCNC: 9.4 MG/DL (ref 8.7–10.5)
CHLORIDE SERPL-SCNC: 108 MMOL/L (ref 95–110)
CO2 SERPL-SCNC: 26 MMOL/L (ref 23–29)
CREAT SERPL-MCNC: 0.9 MG/DL (ref 0.5–1.4)
DIFFERENTIAL METHOD: ABNORMAL
EOSINOPHIL # BLD AUTO: 0.1 K/UL (ref 0–0.5)
EOSINOPHIL NFR BLD: 2.3 % (ref 0–8)
ERYTHROCYTE [DISTWIDTH] IN BLOOD BY AUTOMATED COUNT: 14.6 % (ref 11.5–14.5)
EST. GFR  (AFRICAN AMERICAN): >60 ML/MIN/1.73 M^2
EST. GFR  (NON AFRICAN AMERICAN): >60 ML/MIN/1.73 M^2
GLUCOSE SERPL-MCNC: 87 MG/DL (ref 70–110)
HCT VFR BLD AUTO: 35.9 % (ref 37–48.5)
HGB BLD-MCNC: 11.7 G/DL (ref 12–16)
IMM GRANULOCYTES # BLD AUTO: 0.01 K/UL (ref 0–0.04)
IMM GRANULOCYTES NFR BLD AUTO: 0.4 % (ref 0–0.5)
LYMPHOCYTES # BLD AUTO: 0.5 K/UL (ref 1–4.8)
LYMPHOCYTES NFR BLD: 17.4 % (ref 18–48)
MCH RBC QN AUTO: 28.2 PG (ref 27–31)
MCHC RBC AUTO-ENTMCNC: 32.6 G/DL (ref 32–36)
MCV RBC AUTO: 87 FL (ref 82–98)
MONOCYTES # BLD AUTO: 0.2 K/UL (ref 0.3–1)
MONOCYTES NFR BLD: 8.7 % (ref 4–15)
NEUTROPHILS # BLD AUTO: 1.9 K/UL (ref 1.8–7.7)
NEUTROPHILS NFR BLD: 70.4 % (ref 38–73)
NRBC BLD-RTO: 0 /100 WBC
PLATELET # BLD AUTO: 155 K/UL (ref 150–350)
PMV BLD AUTO: 11.8 FL (ref 9.2–12.9)
POTASSIUM SERPL-SCNC: 3.9 MMOL/L (ref 3.5–5.1)
PROT SERPL-MCNC: 7.6 G/DL (ref 6–8.4)
RBC # BLD AUTO: 4.15 M/UL (ref 4–5.4)
SODIUM SERPL-SCNC: 143 MMOL/L (ref 136–145)
WBC # BLD AUTO: 2.65 K/UL (ref 3.9–12.7)

## 2020-03-16 PROCEDURE — 80197 ASSAY OF TACROLIMUS: CPT

## 2020-03-16 PROCEDURE — 36415 COLL VENOUS BLD VENIPUNCTURE: CPT

## 2020-03-16 PROCEDURE — 85025 COMPLETE CBC W/AUTO DIFF WBC: CPT

## 2020-03-16 PROCEDURE — 80053 COMPREHEN METABOLIC PANEL: CPT

## 2020-03-17 ENCOUNTER — TELEPHONE (OUTPATIENT)
Dept: TRANSPLANT | Facility: CLINIC | Age: 50
End: 2020-03-17

## 2020-03-17 DIAGNOSIS — Z94.4 LIVER REPLACED BY TRANSPLANT: Primary | ICD-10-CM

## 2020-03-17 LAB — TACROLIMUS BLD-MCNC: 10 NG/ML (ref 5–15)

## 2020-03-17 NOTE — TELEPHONE ENCOUNTER
----- Message from Gui Chavez MD sent at 3/17/2020  1:16 PM CDT -----  Results ok. No action needed

## 2020-03-19 ENCOUNTER — TELEPHONE (OUTPATIENT)
Dept: TRANSPLANT | Facility: CLINIC | Age: 50
End: 2020-03-19

## 2020-03-27 ENCOUNTER — TELEPHONE (OUTPATIENT)
Dept: TRANSPLANT | Facility: CLINIC | Age: 50
End: 2020-03-27

## 2020-03-27 NOTE — TELEPHONE ENCOUNTER
----- Message from Regine Smith sent at 3/27/2020 10:59 AM CDT -----  Contact: pt  Patient calling to speak with coordinator       Call Back : [phone]

## 2020-03-27 NOTE — TELEPHONE ENCOUNTER
Returned call to pt. States she went to the local ER because she had kidney stones. [facility] we were supposed to receive labs from the doctor from 3/23/20 and CT scan results. [facility] base is closed for the day d/t high COVID-19 cases. Provided her with our fax number so she can call and have results faxed to us once base opens. Per pt, plan for kidneys stones is to let them pass. She was told to return to ER if she starts with worsening pain or fever.    While on phone, reviewed next labs date of 4/20/20 and that once the June schedule is open, we will call her to schedule her yearly visit with Dr. Coello. She agreed with plan.

## 2020-04-10 NOTE — ASSESSMENT & PLAN NOTE
- MELD-Na score: 29 at 5/31/2019  4:14 AM  MELD score: 23 at 5/31/2019  4:14 AM  Calculated from:  Serum Creatinine: 0.7 mg/dL (Rounded to 1 mg/dL) at 5/31/2019  4:14 AM  Serum Sodium: 127 mmol/L at 5/31/2019  4:14 AM  Total Bilirubin: 9.3 mg/dL at 5/31/2019  4:14 AM  INR(ratio): 2.1 at 5/31/2019  4:14 AM  Age: 49 years     Biopsy Photograph Reviewed: No (no photograph available)

## 2020-04-24 ENCOUNTER — TELEPHONE (OUTPATIENT)
Dept: TRANSPLANT | Facility: CLINIC | Age: 50
End: 2020-04-24

## 2020-04-24 NOTE — TELEPHONE ENCOUNTER
----- Message from Nasima Dolan sent at 4/23/2020  5:31 PM CDT -----  Contact: pt  She would like to ask you a question about getting hit in the stomach a toy gun. She also want to know if she can still take Zyrtec, also have a question about the vitamins she's taking.  I rescheduled her labs already     ----- Message -----  From: Kristin Fragoso  Sent: 4/23/2020   4:52 PM CDT  To: [facility]        ----- Message -----  From: Brooklynn Del Rosario  Sent: 4/23/2020   3:24 PM CDT  To: [facility]    Reason; Calling to speak with coordinator pertaining to lab location    Communication: [phone]

## 2020-04-27 ENCOUNTER — LAB VISIT (OUTPATIENT)
Dept: LAB | Facility: HOSPITAL | Age: 50
End: 2020-04-27
Attending: INTERNAL MEDICINE
Payer: OTHER GOVERNMENT

## 2020-04-27 DIAGNOSIS — Z94.4 LIVER REPLACED BY TRANSPLANT: ICD-10-CM

## 2020-04-27 LAB
ALBUMIN SERPL BCP-MCNC: 4.5 G/DL (ref 3.5–5.2)
ALP SERPL-CCNC: 56 U/L (ref 55–135)
ALT SERPL W/O P-5'-P-CCNC: 14 U/L (ref 10–44)
ANION GAP SERPL CALC-SCNC: 6 MMOL/L (ref 8–16)
AST SERPL-CCNC: 25 U/L (ref 10–40)
BASOPHILS # BLD AUTO: 0.01 K/UL (ref 0–0.2)
BASOPHILS NFR BLD: 0.4 % (ref 0–1.9)
BILIRUB SERPL-MCNC: 1.1 MG/DL (ref 0.1–1)
BUN SERPL-MCNC: 19 MG/DL (ref 6–20)
CALCIUM SERPL-MCNC: 9.6 MG/DL (ref 8.7–10.5)
CHLORIDE SERPL-SCNC: 108 MMOL/L (ref 95–110)
CO2 SERPL-SCNC: 29 MMOL/L (ref 23–29)
CREAT SERPL-MCNC: 0.9 MG/DL (ref 0.5–1.4)
DIFFERENTIAL METHOD: ABNORMAL
EOSINOPHIL # BLD AUTO: 0.1 K/UL (ref 0–0.5)
EOSINOPHIL NFR BLD: 2.7 % (ref 0–8)
ERYTHROCYTE [DISTWIDTH] IN BLOOD BY AUTOMATED COUNT: 13.5 % (ref 11.5–14.5)
EST. GFR  (AFRICAN AMERICAN): >60 ML/MIN/1.73 M^2
EST. GFR  (NON AFRICAN AMERICAN): >60 ML/MIN/1.73 M^2
GLUCOSE SERPL-MCNC: 88 MG/DL (ref 70–110)
HCT VFR BLD AUTO: 34.2 % (ref 37–48.5)
HGB BLD-MCNC: 11.6 G/DL (ref 12–16)
IMM GRANULOCYTES # BLD AUTO: 0.09 K/UL (ref 0–0.04)
IMM GRANULOCYTES NFR BLD AUTO: 3.5 % (ref 0–0.5)
LYMPHOCYTES # BLD AUTO: 0.4 K/UL (ref 1–4.8)
LYMPHOCYTES NFR BLD: 15.3 % (ref 18–48)
MCH RBC QN AUTO: 28.5 PG (ref 27–31)
MCHC RBC AUTO-ENTMCNC: 33.9 G/DL (ref 32–36)
MCV RBC AUTO: 84 FL (ref 82–98)
MONOCYTES # BLD AUTO: 0.2 K/UL (ref 0.3–1)
MONOCYTES NFR BLD: 8.2 % (ref 4–15)
NEUTROPHILS # BLD AUTO: 1.8 K/UL (ref 1.8–7.7)
NEUTROPHILS NFR BLD: 69.9 % (ref 38–73)
NRBC BLD-RTO: 0 /100 WBC
PLATELET # BLD AUTO: 143 K/UL (ref 150–350)
PMV BLD AUTO: 10.3 FL (ref 9.2–12.9)
POTASSIUM SERPL-SCNC: 4.5 MMOL/L (ref 3.5–5.1)
PROT SERPL-MCNC: 7.3 G/DL (ref 6–8.4)
RBC # BLD AUTO: 4.07 M/UL (ref 4–5.4)
SODIUM SERPL-SCNC: 143 MMOL/L (ref 136–145)
WBC # BLD AUTO: 2.55 K/UL (ref 3.9–12.7)

## 2020-04-27 PROCEDURE — 36415 COLL VENOUS BLD VENIPUNCTURE: CPT

## 2020-04-27 PROCEDURE — 80053 COMPREHEN METABOLIC PANEL: CPT

## 2020-04-27 PROCEDURE — 80197 ASSAY OF TACROLIMUS: CPT

## 2020-04-27 PROCEDURE — 85025 COMPLETE CBC W/AUTO DIFF WBC: CPT

## 2020-04-28 LAB — TACROLIMUS BLD-MCNC: 11.8 NG/ML (ref 5–15)

## 2020-04-30 ENCOUNTER — TELEPHONE (OUTPATIENT)
Dept: TRANSPLANT | Facility: CLINIC | Age: 50
End: 2020-04-30

## 2020-04-30 DIAGNOSIS — Z94.4 LIVER REPLACED BY TRANSPLANT: Primary | ICD-10-CM

## 2020-04-30 NOTE — TELEPHONE ENCOUNTER
----- Message from Gui Chavez MD sent at 4/29/2020  2:16 PM CDT -----  Results ok. No action needed

## 2020-05-05 ENCOUNTER — TELEPHONE (OUTPATIENT)
Dept: TRANSPLANT | Facility: CLINIC | Age: 50
End: 2020-05-05

## 2020-05-05 NOTE — TELEPHONE ENCOUNTER
----- Message from Rosa Ruiz sent at 5/5/2020  2:40 PM CDT -----  Contact: pt: [phone]  Pt state she has been having a upset stomach and would like to know what she can take       Please contact pt: [phone]

## 2020-05-05 NOTE — TELEPHONE ENCOUNTER
Returned patient's call she is having diarrhea off and on.  She wanted to take the Vancomycin she was prescribed for C-diff she had.  I advised I could not tell her to take that.  I will send a message to Dr Coello and cc her coordinator so she will know the plan.

## 2020-05-25 ENCOUNTER — LAB VISIT (OUTPATIENT)
Dept: LAB | Facility: HOSPITAL | Age: 50
End: 2020-05-25
Attending: INTERNAL MEDICINE
Payer: OTHER GOVERNMENT

## 2020-05-25 DIAGNOSIS — Z94.4 LIVER REPLACED BY TRANSPLANT: ICD-10-CM

## 2020-05-25 DIAGNOSIS — A09 DIARRHEA OF INFECTIOUS ORIGIN: ICD-10-CM

## 2020-05-25 LAB
ALBUMIN SERPL BCP-MCNC: 4.4 G/DL (ref 3.5–5.2)
ALP SERPL-CCNC: 68 U/L (ref 55–135)
ALT SERPL W/O P-5'-P-CCNC: 18 U/L (ref 10–44)
ANION GAP SERPL CALC-SCNC: 7 MMOL/L (ref 8–16)
AST SERPL-CCNC: 24 U/L (ref 10–40)
BASOPHILS # BLD AUTO: 0.02 K/UL (ref 0–0.2)
BASOPHILS NFR BLD: 0.6 % (ref 0–1.9)
BILIRUB SERPL-MCNC: 0.8 MG/DL (ref 0.1–1)
BUN SERPL-MCNC: 14 MG/DL (ref 6–20)
CALCIUM SERPL-MCNC: 9.1 MG/DL (ref 8.7–10.5)
CHLORIDE SERPL-SCNC: 106 MMOL/L (ref 95–110)
CO2 SERPL-SCNC: 28 MMOL/L (ref 23–29)
CREAT SERPL-MCNC: 0.8 MG/DL (ref 0.5–1.4)
DIFFERENTIAL METHOD: ABNORMAL
EOSINOPHIL # BLD AUTO: 0.1 K/UL (ref 0–0.5)
EOSINOPHIL NFR BLD: 2.1 % (ref 0–8)
ERYTHROCYTE [DISTWIDTH] IN BLOOD BY AUTOMATED COUNT: 13.1 % (ref 11.5–14.5)
EST. GFR  (AFRICAN AMERICAN): >60 ML/MIN/1.73 M^2
EST. GFR  (NON AFRICAN AMERICAN): >60 ML/MIN/1.73 M^2
GLUCOSE SERPL-MCNC: 94 MG/DL (ref 70–110)
HCT VFR BLD AUTO: 34.3 % (ref 37–48.5)
HGB BLD-MCNC: 11.7 G/DL (ref 12–16)
IMM GRANULOCYTES # BLD AUTO: 0.06 K/UL (ref 0–0.04)
IMM GRANULOCYTES NFR BLD AUTO: 1.8 % (ref 0–0.5)
LYMPHOCYTES # BLD AUTO: 0.5 K/UL (ref 1–4.8)
LYMPHOCYTES NFR BLD: 14.9 % (ref 18–48)
MCH RBC QN AUTO: 28.4 PG (ref 27–31)
MCHC RBC AUTO-ENTMCNC: 34.1 G/DL (ref 32–36)
MCV RBC AUTO: 83 FL (ref 82–98)
MONOCYTES # BLD AUTO: 0.3 K/UL (ref 0.3–1)
MONOCYTES NFR BLD: 10.1 % (ref 4–15)
NEUTROPHILS # BLD AUTO: 2.3 K/UL (ref 1.8–7.7)
NEUTROPHILS NFR BLD: 70.5 % (ref 38–73)
NRBC BLD-RTO: 0 /100 WBC
PLATELET # BLD AUTO: 160 K/UL (ref 150–350)
PMV BLD AUTO: 10 FL (ref 9.2–12.9)
POTASSIUM SERPL-SCNC: 3.6 MMOL/L (ref 3.5–5.1)
PROT SERPL-MCNC: 6.9 G/DL (ref 6–8.4)
RBC # BLD AUTO: 4.12 M/UL (ref 4–5.4)
SODIUM SERPL-SCNC: 141 MMOL/L (ref 136–145)
WBC # BLD AUTO: 3.28 K/UL (ref 3.9–12.7)

## 2020-05-25 PROCEDURE — 36415 COLL VENOUS BLD VENIPUNCTURE: CPT

## 2020-05-25 PROCEDURE — 87045 FECES CULTURE AEROBIC BACT: CPT

## 2020-05-25 PROCEDURE — 85025 COMPLETE CBC W/AUTO DIFF WBC: CPT

## 2020-05-25 PROCEDURE — 87046 STOOL CULTR AEROBIC BACT EA: CPT

## 2020-05-25 PROCEDURE — 87427 SHIGA-LIKE TOXIN AG IA: CPT

## 2020-05-25 PROCEDURE — 80053 COMPREHEN METABOLIC PANEL: CPT

## 2020-05-25 PROCEDURE — 80197 ASSAY OF TACROLIMUS: CPT

## 2020-05-26 ENCOUNTER — TELEPHONE (OUTPATIENT)
Dept: TRANSPLANT | Facility: CLINIC | Age: 50
End: 2020-05-26

## 2020-05-26 LAB
E COLI SXT1 STL QL IA: NEGATIVE
E COLI SXT2 STL QL IA: NEGATIVE
TACROLIMUS BLD-MCNC: 17.2 NG/ML (ref 5–15)

## 2020-05-26 NOTE — TELEPHONE ENCOUNTER
Patient returned call, instructed her to hold Prograf dose tonight and re-start tomorrow at 3mg twice daily. Repeat labs on Monday 6/1/20. She was able to repeat instructions and reported writing it down.

## 2020-05-26 NOTE — TELEPHONE ENCOUNTER
Attempted to call patient and  with no answer on either number. Will continue to attempt to reach patient.

## 2020-05-26 NOTE — TELEPHONE ENCOUNTER
----- Message from Ronal Coello MD sent at 5/26/2020  9:34 AM CDT -----  Results reviewed  Hold tonight and go to 3/3 from tomorrow

## 2020-05-27 DIAGNOSIS — Z94.4 LIVER REPLACED BY TRANSPLANT: Primary | ICD-10-CM

## 2020-05-27 RX ORDER — TACROLIMUS 1 MG/1
3 CAPSULE ORAL EVERY 12 HOURS
Qty: 270 CAPSULE | Refills: 11 | Status: SHIPPED | OUTPATIENT
Start: 2020-05-27 | End: 2021-06-02 | Stop reason: SDUPTHER

## 2020-05-28 LAB — BACTERIA STL CULT: NORMAL

## 2020-06-05 ENCOUNTER — TELEPHONE (OUTPATIENT)
Dept: TRANSPLANT | Facility: CLINIC | Age: 50
End: 2020-06-05

## 2020-06-15 ENCOUNTER — LAB VISIT (OUTPATIENT)
Dept: LAB | Facility: HOSPITAL | Age: 50
End: 2020-06-15
Attending: INTERNAL MEDICINE
Payer: OTHER GOVERNMENT

## 2020-06-15 DIAGNOSIS — Z94.4 LIVER REPLACED BY TRANSPLANT: ICD-10-CM

## 2020-06-15 LAB
ALBUMIN SERPL BCP-MCNC: 4.2 G/DL (ref 3.5–5.2)
ALP SERPL-CCNC: 57 U/L (ref 55–135)
ALT SERPL W/O P-5'-P-CCNC: 18 U/L (ref 10–44)
ANION GAP SERPL CALC-SCNC: 7 MMOL/L (ref 8–16)
AST SERPL-CCNC: 24 U/L (ref 10–40)
BASOPHILS # BLD AUTO: 0.02 K/UL (ref 0–0.2)
BASOPHILS NFR BLD: 0.7 % (ref 0–1.9)
BILIRUB SERPL-MCNC: 1 MG/DL (ref 0.1–1)
BUN SERPL-MCNC: 16 MG/DL (ref 6–20)
CALCIUM SERPL-MCNC: 9.3 MG/DL (ref 8.7–10.5)
CHLORIDE SERPL-SCNC: 106 MMOL/L (ref 95–110)
CO2 SERPL-SCNC: 27 MMOL/L (ref 23–29)
CREAT SERPL-MCNC: 0.8 MG/DL (ref 0.5–1.4)
DIFFERENTIAL METHOD: ABNORMAL
EOSINOPHIL # BLD AUTO: 0.1 K/UL (ref 0–0.5)
EOSINOPHIL NFR BLD: 2 % (ref 0–8)
ERYTHROCYTE [DISTWIDTH] IN BLOOD BY AUTOMATED COUNT: 13.2 % (ref 11.5–14.5)
EST. GFR  (AFRICAN AMERICAN): >60 ML/MIN/1.73 M^2
EST. GFR  (NON AFRICAN AMERICAN): >60 ML/MIN/1.73 M^2
GLUCOSE SERPL-MCNC: 82 MG/DL (ref 70–110)
HCT VFR BLD AUTO: 34.3 % (ref 37–48.5)
HGB BLD-MCNC: 11.5 G/DL (ref 12–16)
IMM GRANULOCYTES # BLD AUTO: 0.04 K/UL (ref 0–0.04)
IMM GRANULOCYTES NFR BLD AUTO: 1.4 % (ref 0–0.5)
LYMPHOCYTES # BLD AUTO: 0.5 K/UL (ref 1–4.8)
LYMPHOCYTES NFR BLD: 16.9 % (ref 18–48)
MCH RBC QN AUTO: 28.3 PG (ref 27–31)
MCHC RBC AUTO-ENTMCNC: 33.5 G/DL (ref 32–36)
MCV RBC AUTO: 84 FL (ref 82–98)
MONOCYTES # BLD AUTO: 0.2 K/UL (ref 0.3–1)
MONOCYTES NFR BLD: 8.1 % (ref 4–15)
NEUTROPHILS # BLD AUTO: 2.1 K/UL (ref 1.8–7.7)
NEUTROPHILS NFR BLD: 70.9 % (ref 38–73)
NRBC BLD-RTO: 0 /100 WBC
PLATELET # BLD AUTO: 142 K/UL (ref 150–350)
PMV BLD AUTO: 10.3 FL (ref 9.2–12.9)
POTASSIUM SERPL-SCNC: 4.1 MMOL/L (ref 3.5–5.1)
PROT SERPL-MCNC: 7 G/DL (ref 6–8.4)
RBC # BLD AUTO: 4.07 M/UL (ref 4–5.4)
SODIUM SERPL-SCNC: 140 MMOL/L (ref 136–145)
WBC # BLD AUTO: 2.95 K/UL (ref 3.9–12.7)

## 2020-06-15 PROCEDURE — 85025 COMPLETE CBC W/AUTO DIFF WBC: CPT

## 2020-06-15 PROCEDURE — 80197 ASSAY OF TACROLIMUS: CPT

## 2020-06-15 PROCEDURE — 36415 COLL VENOUS BLD VENIPUNCTURE: CPT

## 2020-06-15 PROCEDURE — 80053 COMPREHEN METABOLIC PANEL: CPT

## 2020-06-16 ENCOUNTER — TELEPHONE (OUTPATIENT)
Dept: TRANSPLANT | Facility: CLINIC | Age: 50
End: 2020-06-16

## 2020-06-16 LAB — TACROLIMUS BLD-MCNC: 9.4 NG/ML (ref 5–15)

## 2020-06-16 NOTE — TELEPHONE ENCOUNTER
----- Message from Brooklynn Del Rosario sent at 6/16/2020  8:09 AM CDT -----  Regarding: return call  Reason: Pt states she is returning a call     Communication: [phone]

## 2020-06-17 ENCOUNTER — TELEPHONE (OUTPATIENT)
Dept: TRANSPLANT | Facility: CLINIC | Age: 50
End: 2020-06-17

## 2020-06-17 DIAGNOSIS — Z94.4 LIVER REPLACED BY TRANSPLANT: Primary | ICD-10-CM

## 2020-06-17 DIAGNOSIS — M89.9 BONE DISORDER: ICD-10-CM

## 2020-07-20 ENCOUNTER — LAB VISIT (OUTPATIENT)
Dept: LAB | Facility: HOSPITAL | Age: 50
End: 2020-07-20
Attending: INTERNAL MEDICINE
Payer: OTHER GOVERNMENT

## 2020-07-20 DIAGNOSIS — Z94.4 LIVER REPLACED BY TRANSPLANT: ICD-10-CM

## 2020-07-20 LAB
ALBUMIN SERPL BCP-MCNC: 4.2 G/DL (ref 3.5–5.2)
ALP SERPL-CCNC: 62 U/L (ref 55–135)
ALT SERPL W/O P-5'-P-CCNC: 20 U/L (ref 10–44)
ANION GAP SERPL CALC-SCNC: 10 MMOL/L (ref 8–16)
AST SERPL-CCNC: 24 U/L (ref 10–40)
BASOPHILS # BLD AUTO: 0.02 K/UL (ref 0–0.2)
BASOPHILS NFR BLD: 0.8 % (ref 0–1.9)
BILIRUB SERPL-MCNC: 0.7 MG/DL (ref 0.1–1)
BUN SERPL-MCNC: 16 MG/DL (ref 6–20)
CALCIUM SERPL-MCNC: 9.3 MG/DL (ref 8.7–10.5)
CHLORIDE SERPL-SCNC: 103 MMOL/L (ref 95–110)
CO2 SERPL-SCNC: 29 MMOL/L (ref 23–29)
CREAT SERPL-MCNC: 0.8 MG/DL (ref 0.5–1.4)
DIFFERENTIAL METHOD: ABNORMAL
EOSINOPHIL # BLD AUTO: 0.1 K/UL (ref 0–0.5)
EOSINOPHIL NFR BLD: 1.9 % (ref 0–8)
ERYTHROCYTE [DISTWIDTH] IN BLOOD BY AUTOMATED COUNT: 12.9 % (ref 11.5–14.5)
EST. GFR  (AFRICAN AMERICAN): >60 ML/MIN/1.73 M^2
EST. GFR  (NON AFRICAN AMERICAN): >60 ML/MIN/1.73 M^2
GLUCOSE SERPL-MCNC: 86 MG/DL (ref 70–110)
HCT VFR BLD AUTO: 32.7 % (ref 37–48.5)
HGB BLD-MCNC: 11.1 G/DL (ref 12–16)
IMM GRANULOCYTES # BLD AUTO: 0.02 K/UL (ref 0–0.04)
IMM GRANULOCYTES NFR BLD AUTO: 0.8 % (ref 0–0.5)
LYMPHOCYTES # BLD AUTO: 0.5 K/UL (ref 1–4.8)
LYMPHOCYTES NFR BLD: 18.5 % (ref 18–48)
MCH RBC QN AUTO: 28.5 PG (ref 27–31)
MCHC RBC AUTO-ENTMCNC: 33.9 G/DL (ref 32–36)
MCV RBC AUTO: 84 FL (ref 82–98)
MONOCYTES # BLD AUTO: 0.2 K/UL (ref 0.3–1)
MONOCYTES NFR BLD: 8.9 % (ref 4–15)
NEUTROPHILS # BLD AUTO: 1.8 K/UL (ref 1.8–7.7)
NEUTROPHILS NFR BLD: 69.1 % (ref 38–73)
NRBC BLD-RTO: 0 /100 WBC
PLATELET # BLD AUTO: 134 K/UL (ref 150–350)
PMV BLD AUTO: 10.2 FL (ref 9.2–12.9)
POTASSIUM SERPL-SCNC: 4.4 MMOL/L (ref 3.5–5.1)
PROT SERPL-MCNC: 6.6 G/DL (ref 6–8.4)
RBC # BLD AUTO: 3.89 M/UL (ref 4–5.4)
SODIUM SERPL-SCNC: 142 MMOL/L (ref 136–145)
WBC # BLD AUTO: 2.59 K/UL (ref 3.9–12.7)

## 2020-07-20 PROCEDURE — 80197 ASSAY OF TACROLIMUS: CPT

## 2020-07-20 PROCEDURE — 85025 COMPLETE CBC W/AUTO DIFF WBC: CPT

## 2020-07-20 PROCEDURE — 80053 COMPREHEN METABOLIC PANEL: CPT

## 2020-07-20 PROCEDURE — 36415 COLL VENOUS BLD VENIPUNCTURE: CPT

## 2020-07-21 ENCOUNTER — TELEPHONE (OUTPATIENT)
Dept: TRANSPLANT | Facility: CLINIC | Age: 50
End: 2020-07-21

## 2020-07-21 DIAGNOSIS — Z94.4 LIVER REPLACED BY TRANSPLANT: Primary | ICD-10-CM

## 2020-07-21 LAB — TACROLIMUS BLD-MCNC: 9.1 NG/ML (ref 5–15)

## 2020-07-21 NOTE — TELEPHONE ENCOUNTER
----- Message from Michelle Guo sent at 7/21/2020  1:54 PM CDT -----  Pt is calling to speak to coordinator regarding lab results and if there is a medication change    Pt contact [phone]

## 2020-08-04 NOTE — PLAN OF CARE
Problem: Adult Inpatient Plan of Care  Goal: Plan of Care Review  Outcome: Ongoing (interventions implemented as appropriate)     05/30/19 0405   Plan of Care Review   Plan of Care Reviewed With patient   Progress no change   POC reviewed with Pt. No acute events overnight. Pt AAOx4. C/o headache, tylenol given prn with minimal relief, offered to call MD to request something stronger, pt refused. Up to BS with standby assist. IV abx given. 2L O2 via NC, sats >90%. Other vitals stable. Safety maintained. Will continue to monitor.        Detail Level: Simple Detail Level: Detailed

## 2020-08-13 RX ORDER — SODIUM BICARBONATE 650 MG/1
1300 TABLET ORAL 2 TIMES DAILY
Qty: 120 TABLET | Refills: 11 | Status: SHIPPED | OUTPATIENT
Start: 2020-08-13 | End: 2021-06-02 | Stop reason: SDUPTHER

## 2020-08-17 ENCOUNTER — LAB VISIT (OUTPATIENT)
Dept: LAB | Facility: HOSPITAL | Age: 50
End: 2020-08-17
Attending: INTERNAL MEDICINE
Payer: OTHER GOVERNMENT

## 2020-08-17 DIAGNOSIS — Z94.4 LIVER REPLACED BY TRANSPLANT: ICD-10-CM

## 2020-08-17 LAB
ALBUMIN SERPL BCP-MCNC: 4.1 G/DL (ref 3.5–5.2)
ALP SERPL-CCNC: 56 U/L (ref 55–135)
ALT SERPL W/O P-5'-P-CCNC: 15 U/L (ref 10–44)
ANION GAP SERPL CALC-SCNC: 10 MMOL/L (ref 8–16)
AST SERPL-CCNC: 23 U/L (ref 10–40)
BASOPHILS # BLD AUTO: 0.03 K/UL (ref 0–0.2)
BASOPHILS NFR BLD: 1 % (ref 0–1.9)
BILIRUB SERPL-MCNC: 0.9 MG/DL (ref 0.1–1)
BUN SERPL-MCNC: 12 MG/DL (ref 6–20)
CALCIUM SERPL-MCNC: 9.2 MG/DL (ref 8.7–10.5)
CHLORIDE SERPL-SCNC: 100 MMOL/L (ref 95–110)
CO2 SERPL-SCNC: 26 MMOL/L (ref 23–29)
CREAT SERPL-MCNC: 0.8 MG/DL (ref 0.5–1.4)
DIFFERENTIAL METHOD: ABNORMAL
EOSINOPHIL # BLD AUTO: 0.1 K/UL (ref 0–0.5)
EOSINOPHIL NFR BLD: 2.3 % (ref 0–8)
ERYTHROCYTE [DISTWIDTH] IN BLOOD BY AUTOMATED COUNT: 13 % (ref 11.5–14.5)
EST. GFR  (AFRICAN AMERICAN): >60 ML/MIN/1.73 M^2
EST. GFR  (NON AFRICAN AMERICAN): >60 ML/MIN/1.73 M^2
GLUCOSE SERPL-MCNC: 79 MG/DL (ref 70–110)
HCT VFR BLD AUTO: 35.1 % (ref 37–48.5)
HGB BLD-MCNC: 12 G/DL (ref 12–16)
IMM GRANULOCYTES # BLD AUTO: 0.02 K/UL (ref 0–0.04)
IMM GRANULOCYTES NFR BLD AUTO: 0.7 % (ref 0–0.5)
LYMPHOCYTES # BLD AUTO: 0.5 K/UL (ref 1–4.8)
LYMPHOCYTES NFR BLD: 16.9 % (ref 18–48)
MCH RBC QN AUTO: 28.7 PG (ref 27–31)
MCHC RBC AUTO-ENTMCNC: 34.2 G/DL (ref 32–36)
MCV RBC AUTO: 84 FL (ref 82–98)
MONOCYTES # BLD AUTO: 0.2 K/UL (ref 0.3–1)
MONOCYTES NFR BLD: 7.8 % (ref 4–15)
NEUTROPHILS # BLD AUTO: 2.2 K/UL (ref 1.8–7.7)
NEUTROPHILS NFR BLD: 71.3 % (ref 38–73)
NRBC BLD-RTO: 0 /100 WBC
PLATELET # BLD AUTO: 126 K/UL (ref 150–350)
PMV BLD AUTO: 10.7 FL (ref 9.2–12.9)
POTASSIUM SERPL-SCNC: 4.3 MMOL/L (ref 3.5–5.1)
PROT SERPL-MCNC: 7 G/DL (ref 6–8.4)
RBC # BLD AUTO: 4.18 M/UL (ref 4–5.4)
SODIUM SERPL-SCNC: 136 MMOL/L (ref 136–145)
WBC # BLD AUTO: 3.07 K/UL (ref 3.9–12.7)

## 2020-08-17 PROCEDURE — 36415 COLL VENOUS BLD VENIPUNCTURE: CPT

## 2020-08-17 PROCEDURE — 80197 ASSAY OF TACROLIMUS: CPT

## 2020-08-17 PROCEDURE — 85025 COMPLETE CBC W/AUTO DIFF WBC: CPT

## 2020-08-17 PROCEDURE — 80053 COMPREHEN METABOLIC PANEL: CPT

## 2020-08-18 ENCOUNTER — TELEPHONE (OUTPATIENT)
Dept: TRANSPLANT | Facility: CLINIC | Age: 50
End: 2020-08-18

## 2020-08-18 DIAGNOSIS — Z94.4 LIVER REPLACED BY TRANSPLANT: Primary | ICD-10-CM

## 2020-08-18 LAB — TACROLIMUS BLD-MCNC: 8.2 NG/ML (ref 5–15)

## 2020-09-04 ENCOUNTER — TELEPHONE (OUTPATIENT)
Dept: TRANSPLANT | Facility: CLINIC | Age: 50
End: 2020-09-04

## 2020-09-04 NOTE — TELEPHONE ENCOUNTER
"Returned patient call; she reported she "took a fall face first onto the concrete at my daughter's school yesterday"  Reported also that she went to ED and "nothing was broken and all my tests and liver tests were fine.", she said.  Patient asking what she can take for pain so she can tell her local doctor.  Instructed patient that she cannot take Non-steroidal Anti-inflamatory medications (such as Ibuprophen /Motrin), can take Tylenol (not to exceed 2000 mg in a 24 hr. Period). She was able to voice understanding.      "

## 2020-09-04 NOTE — TELEPHONE ENCOUNTER
----- Message from Michelle Guo sent at 9/4/2020 10:17 AM CDT -----  Pt is calling to asked what she can take she stated she had a fall      Pt contact [phone]

## 2020-09-21 ENCOUNTER — LAB VISIT (OUTPATIENT)
Dept: LAB | Facility: HOSPITAL | Age: 50
End: 2020-09-21
Attending: INTERNAL MEDICINE
Payer: OTHER GOVERNMENT

## 2020-09-21 DIAGNOSIS — Z94.4 LIVER REPLACED BY TRANSPLANT: ICD-10-CM

## 2020-09-21 LAB
ALBUMIN SERPL BCP-MCNC: 4.3 G/DL (ref 3.5–5.2)
ALP SERPL-CCNC: 57 U/L (ref 55–135)
ALT SERPL W/O P-5'-P-CCNC: 17 U/L (ref 10–44)
ANION GAP SERPL CALC-SCNC: 12 MMOL/L (ref 8–16)
AST SERPL-CCNC: 21 U/L (ref 10–40)
BASOPHILS # BLD AUTO: 0.03 K/UL (ref 0–0.2)
BASOPHILS NFR BLD: 0.9 % (ref 0–1.9)
BILIRUB SERPL-MCNC: 1 MG/DL (ref 0.1–1)
BUN SERPL-MCNC: 15 MG/DL (ref 6–20)
CALCIUM SERPL-MCNC: 9.2 MG/DL (ref 8.7–10.5)
CHLORIDE SERPL-SCNC: 104 MMOL/L (ref 95–110)
CO2 SERPL-SCNC: 27 MMOL/L (ref 23–29)
CREAT SERPL-MCNC: 0.8 MG/DL (ref 0.5–1.4)
DIFFERENTIAL METHOD: ABNORMAL
EOSINOPHIL # BLD AUTO: 0.1 K/UL (ref 0–0.5)
EOSINOPHIL NFR BLD: 2.1 % (ref 0–8)
ERYTHROCYTE [DISTWIDTH] IN BLOOD BY AUTOMATED COUNT: 12.6 % (ref 11.5–14.5)
EST. GFR  (AFRICAN AMERICAN): >60 ML/MIN/1.73 M^2
EST. GFR  (NON AFRICAN AMERICAN): >60 ML/MIN/1.73 M^2
GLUCOSE SERPL-MCNC: 94 MG/DL (ref 70–110)
HCT VFR BLD AUTO: 33.6 % (ref 37–48.5)
HGB BLD-MCNC: 11.5 G/DL (ref 12–16)
IMM GRANULOCYTES # BLD AUTO: 0.02 K/UL (ref 0–0.04)
IMM GRANULOCYTES NFR BLD AUTO: 0.6 % (ref 0–0.5)
LYMPHOCYTES # BLD AUTO: 0.5 K/UL (ref 1–4.8)
LYMPHOCYTES NFR BLD: 15.3 % (ref 18–48)
MCH RBC QN AUTO: 28.1 PG (ref 27–31)
MCHC RBC AUTO-ENTMCNC: 34.2 G/DL (ref 32–36)
MCV RBC AUTO: 82 FL (ref 82–98)
MONOCYTES # BLD AUTO: 0.3 K/UL (ref 0.3–1)
MONOCYTES NFR BLD: 8 % (ref 4–15)
NEUTROPHILS # BLD AUTO: 2.4 K/UL (ref 1.8–7.7)
NEUTROPHILS NFR BLD: 73.1 % (ref 38–73)
NRBC BLD-RTO: 0 /100 WBC
PLATELET # BLD AUTO: 140 K/UL (ref 150–350)
PMV BLD AUTO: 9.9 FL (ref 9.2–12.9)
POTASSIUM SERPL-SCNC: 4.2 MMOL/L (ref 3.5–5.1)
PROT SERPL-MCNC: 6.9 G/DL (ref 6–8.4)
RBC # BLD AUTO: 4.09 M/UL (ref 4–5.4)
SODIUM SERPL-SCNC: 143 MMOL/L (ref 136–145)
WBC # BLD AUTO: 3.27 K/UL (ref 3.9–12.7)

## 2020-09-21 PROCEDURE — 80053 COMPREHEN METABOLIC PANEL: CPT

## 2020-09-21 PROCEDURE — 85025 COMPLETE CBC W/AUTO DIFF WBC: CPT

## 2020-09-21 PROCEDURE — 36415 COLL VENOUS BLD VENIPUNCTURE: CPT

## 2020-09-21 PROCEDURE — 80197 ASSAY OF TACROLIMUS: CPT

## 2020-09-22 LAB — TACROLIMUS BLD-MCNC: 10.4 NG/ML (ref 5–15)

## 2020-09-24 ENCOUNTER — TELEPHONE (OUTPATIENT)
Dept: TRANSPLANT | Facility: CLINIC | Age: 50
End: 2020-09-24

## 2020-09-24 DIAGNOSIS — Z94.4 LIVER REPLACED BY TRANSPLANT: Primary | ICD-10-CM

## 2020-09-24 DIAGNOSIS — M89.9 BONE DISORDER: ICD-10-CM

## 2020-10-19 ENCOUNTER — LAB VISIT (OUTPATIENT)
Dept: LAB | Facility: HOSPITAL | Age: 50
End: 2020-10-19
Attending: INTERNAL MEDICINE
Payer: OTHER GOVERNMENT

## 2020-10-19 DIAGNOSIS — Z94.4 LIVER REPLACED BY TRANSPLANT: ICD-10-CM

## 2020-10-19 LAB
ALBUMIN SERPL BCP-MCNC: 4.5 G/DL (ref 3.5–5.2)
ALP SERPL-CCNC: 51 U/L (ref 55–135)
ALT SERPL W/O P-5'-P-CCNC: 17 U/L (ref 10–44)
ANION GAP SERPL CALC-SCNC: 6 MMOL/L (ref 8–16)
AST SERPL-CCNC: 20 U/L (ref 10–40)
BASOPHILS # BLD AUTO: 0.02 K/UL (ref 0–0.2)
BASOPHILS NFR BLD: 0.7 % (ref 0–1.9)
BILIRUB SERPL-MCNC: 1 MG/DL (ref 0.1–1)
BUN SERPL-MCNC: 12 MG/DL (ref 6–20)
CALCIUM SERPL-MCNC: 9.5 MG/DL (ref 8.7–10.5)
CHLORIDE SERPL-SCNC: 105 MMOL/L (ref 95–110)
CO2 SERPL-SCNC: 30 MMOL/L (ref 23–29)
CREAT SERPL-MCNC: 0.8 MG/DL (ref 0.5–1.4)
DIFFERENTIAL METHOD: ABNORMAL
EOSINOPHIL # BLD AUTO: 0.1 K/UL (ref 0–0.5)
EOSINOPHIL NFR BLD: 2.3 % (ref 0–8)
ERYTHROCYTE [DISTWIDTH] IN BLOOD BY AUTOMATED COUNT: 12.9 % (ref 11.5–14.5)
EST. GFR  (AFRICAN AMERICAN): >60 ML/MIN/1.73 M^2
EST. GFR  (NON AFRICAN AMERICAN): >60 ML/MIN/1.73 M^2
GLUCOSE SERPL-MCNC: 95 MG/DL (ref 70–110)
HCT VFR BLD AUTO: 35.7 % (ref 37–48.5)
HGB BLD-MCNC: 12 G/DL (ref 12–16)
IMM GRANULOCYTES # BLD AUTO: 0.02 K/UL (ref 0–0.04)
IMM GRANULOCYTES NFR BLD AUTO: 0.7 % (ref 0–0.5)
LYMPHOCYTES # BLD AUTO: 0.6 K/UL (ref 1–4.8)
LYMPHOCYTES NFR BLD: 19.4 % (ref 18–48)
MCH RBC QN AUTO: 28.4 PG (ref 27–31)
MCHC RBC AUTO-ENTMCNC: 33.6 G/DL (ref 32–36)
MCV RBC AUTO: 85 FL (ref 82–98)
MONOCYTES # BLD AUTO: 0.2 K/UL (ref 0.3–1)
MONOCYTES NFR BLD: 7.7 % (ref 4–15)
NEUTROPHILS # BLD AUTO: 2.1 K/UL (ref 1.8–7.7)
NEUTROPHILS NFR BLD: 69.2 % (ref 38–73)
NRBC BLD-RTO: 0 /100 WBC
PLATELET # BLD AUTO: 160 K/UL (ref 150–350)
PMV BLD AUTO: 10.1 FL (ref 9.2–12.9)
POTASSIUM SERPL-SCNC: 4.5 MMOL/L (ref 3.5–5.1)
PROT SERPL-MCNC: 7.1 G/DL (ref 6–8.4)
RBC # BLD AUTO: 4.22 M/UL (ref 4–5.4)
SODIUM SERPL-SCNC: 141 MMOL/L (ref 136–145)
WBC # BLD AUTO: 2.99 K/UL (ref 3.9–12.7)

## 2020-10-19 PROCEDURE — 36415 COLL VENOUS BLD VENIPUNCTURE: CPT

## 2020-10-19 PROCEDURE — 85025 COMPLETE CBC W/AUTO DIFF WBC: CPT

## 2020-10-19 PROCEDURE — 80053 COMPREHEN METABOLIC PANEL: CPT

## 2020-10-19 PROCEDURE — 80197 ASSAY OF TACROLIMUS: CPT

## 2020-10-20 LAB — TACROLIMUS BLD-MCNC: 8.1 NG/ML (ref 5–15)

## 2020-10-23 ENCOUNTER — TELEPHONE (OUTPATIENT)
Dept: TRANSPLANT | Facility: CLINIC | Age: 50
End: 2020-10-23

## 2020-10-23 DIAGNOSIS — Z94.4 LIVER REPLACED BY TRANSPLANT: Primary | ICD-10-CM

## 2020-11-30 ENCOUNTER — OFFICE VISIT (OUTPATIENT)
Dept: TRANSPLANT | Facility: CLINIC | Age: 50
End: 2020-11-30
Payer: MEDICARE

## 2020-11-30 ENCOUNTER — HOSPITAL ENCOUNTER (OUTPATIENT)
Dept: RADIOLOGY | Facility: HOSPITAL | Age: 50
Discharge: HOME | End: 2020-11-30
Attending: INTERNAL MEDICINE
Payer: MEDICARE

## 2020-11-30 ENCOUNTER — HOSPITAL ENCOUNTER (OUTPATIENT)
Dept: RADIOLOGY | Facility: CLINIC | Age: 50
Discharge: HOME | End: 2020-11-30
Attending: INTERNAL MEDICINE
Payer: MEDICARE

## 2020-11-30 VITALS
BODY MASS INDEX: 28.12 KG/M2 | DIASTOLIC BLOOD PRESSURE: 79 MMHG | RESPIRATION RATE: 18 BRPM | TEMPERATURE: 97 F | HEART RATE: 64 BPM | SYSTOLIC BLOOD PRESSURE: 168 MMHG | OXYGEN SATURATION: 98 % | WEIGHT: 164.69 LBS | HEIGHT: 64 IN

## 2020-11-30 DIAGNOSIS — Z29.89 PROPHYLACTIC IMMUNOTHERAPY: ICD-10-CM

## 2020-11-30 DIAGNOSIS — Z94.4 LIVER REPLACED BY TRANSPLANT: ICD-10-CM

## 2020-11-30 DIAGNOSIS — S06.300S: ICD-10-CM

## 2020-11-30 DIAGNOSIS — M89.9 BONE DISORDER: ICD-10-CM

## 2020-11-30 DIAGNOSIS — Z94.4 S/P LIVER TRANSPLANT: Primary | ICD-10-CM

## 2020-11-30 PROCEDURE — 93975 VASCULAR STUDY: CPT | Mod: 26,,, | Performed by: RADIOLOGY

## 2020-11-30 PROCEDURE — 99999 PR PBB SHADOW E&M-EST. PATIENT-LVL IV: CPT | Mod: PBBFAC,,, | Performed by: INTERNAL MEDICINE

## 2020-11-30 PROCEDURE — 77080 DXA BONE DENSITY AXIAL: CPT | Mod: 26,,, | Performed by: INTERNAL MEDICINE

## 2020-11-30 PROCEDURE — 76705 US LIVER TRANSPLANT POST: ICD-10-PCS | Mod: 26,59,, | Performed by: RADIOLOGY

## 2020-11-30 PROCEDURE — 93975 VASCULAR STUDY: CPT | Mod: TC

## 2020-11-30 PROCEDURE — 77080 DXA BONE DENSITY AXIAL: CPT | Mod: TC

## 2020-11-30 PROCEDURE — 93975 US LIVER TRANSPLANT POST: ICD-10-PCS | Mod: 26,,, | Performed by: RADIOLOGY

## 2020-11-30 PROCEDURE — 76705 ECHO EXAM OF ABDOMEN: CPT | Mod: 26,59,, | Performed by: RADIOLOGY

## 2020-11-30 PROCEDURE — 77080 DEXA BONE DENSITY SPINE HIP: ICD-10-PCS | Mod: 26,,, | Performed by: INTERNAL MEDICINE

## 2020-11-30 PROCEDURE — 76705 ECHO EXAM OF ABDOMEN: CPT | Mod: TC,59

## 2020-11-30 PROCEDURE — 99214 PR OFFICE/OUTPT VISIT, EST, LEVL IV, 30-39 MIN: ICD-10-PCS | Mod: S$PBB,,, | Performed by: INTERNAL MEDICINE

## 2020-11-30 PROCEDURE — 99214 OFFICE O/P EST MOD 30 MIN: CPT | Mod: PBBFAC,25 | Performed by: INTERNAL MEDICINE

## 2020-11-30 PROCEDURE — 99214 OFFICE O/P EST MOD 30 MIN: CPT | Mod: S$PBB,,, | Performed by: INTERNAL MEDICINE

## 2020-11-30 PROCEDURE — 99999 PR PBB SHADOW E&M-EST. PATIENT-LVL IV: ICD-10-PCS | Mod: PBBFAC,,, | Performed by: INTERNAL MEDICINE

## 2020-11-30 RX ORDER — FLUTICASONE PROPIONATE 50 MCG
1 SPRAY, SUSPENSION (ML) NASAL DAILY
COMMUNITY

## 2020-11-30 RX ORDER — GLUCOSAMINE/CHONDR SU A SOD 750-600 MG
TABLET ORAL DAILY
COMMUNITY

## 2020-11-30 NOTE — LETTER
November 30, 2020        Janes Retana  [address] [facility]  [place] [address]  Phone: [phone]  Fax: [phone]             [facility]  [address]  [facility] [address]  Phone: [phone]   Patient: Jihan Zamudio   MR Number: [id number]   YOB: 1970   Date of Visit: 11/30/2020       Dear Dr. Janes Retana    Thank you for referring Jihan Zamudio to me for evaluation. Attached you will find relevant portions of my assessment and plan of care.    If you have questions, please do not hesitate to call me. I look forward to following Jihan Zamudio along with you.    Sincerely,    Ronal Coello MD    Enclosure    If you would like to receive this communication electronically, please contact [email or web address] or [phone] to request [employer] access.    [employer] is a tool which provides read-only access to select patient information with whom you have a relationship. Its easy to use and provides real time access to review your patients record including encounter summaries, notes, results, and demographic information.    If you feel you have received this communication in error or would no longer like to receive these types of communications, please e-mail [email or web address]

## 2020-11-30 NOTE — PROGRESS NOTES
Subjective:       Patient ID: Jihan Zamudio is a 50 y.o. female.    Chief Complaint: Liver Transplant Follow-up    HPI  I saw this 50 y.o.lady who had a liver Tx for KESSLER cirrhosis on 2019.  She is now 1 year and 5 months post op.  She was last seen in 2020 (10 months ago).    She was an inpatient in hospital prior to her liver transplant and her post op admission was prolonged.  She required a chest tube for a right pleural effusion.    Her recovery was further complicated by a fall and a subsequent intracranial hemorrhage (right occipital lobe).    She is currently very well    She has no edema and stopped her furosemide.    Abdo US: 2020  Satisfactory Doppler evaluation of the liver allograft.    ORGAN:   LIVER  Disease Etiology: Cirrhosis: Fatty Liver (Kessler)  Donor Type:    - Brain Death  [facility] High Risk:   No  Donor CMV Status:   Donor CMV Status: Positive  Donor HBcAB:   Negative  Donor HCV Status:   Negative  Whole or Partial: Whole Liver  Biliary Anastomosis: End to End  Arterial Anatomy: Standard    ORGAN: liver DIAGNOSIS: kessler MELD: 29  SEROLOGY Recipient/Donor : O/O CMV: +/+ HCV: -/ -HBcAb: -/-   INDUCTION: STEROIDS   ANASTOMOSIS: CDD   DONOR: DBD   [facility] high risk: NO   HCV C Ab + donor or HCV Katty - or + donor: No    EXPLANT:( path report) KESSLER/no malignancy    Explant discussed: YES 19   -----------------------------------------------------------------  IMMUNOSUPPRESSION: Tacro/MMF/Prednisone until 7/10/19  PCP PROPHYLAXIS: Bactrim until 12/3/19  CMV PROPHYLAXIS: Valcyte until 19  FUNGAL PROPHYLAXIS: N/A  Aspirin: on 81 mg daily    Issues:  1) Latent TB  2) Prev Lap band bariatric surgery  3) Post op ANTONIO  4) Intracranial hemorrhage- treated with Keppra for 7 days  5) Readmitted with fever and altered mental status    Occ pain she attributes to her lap band.      Review of Systems   Constitutional: Negative for activity change, appetite change, chills, fatigue,  fever and unexpected weight change.   HENT: Negative for ear pain, hearing loss, nosebleeds, sore throat and trouble swallowing.    Eyes: Negative for redness and visual disturbance.   Respiratory: Negative for cough, chest tightness, shortness of breath and wheezing.    Cardiovascular: Negative for chest pain and palpitations.   Gastrointestinal: Negative for abdominal distention, abdominal pain, blood in stool, constipation, diarrhea, nausea and vomiting.   Genitourinary: Negative for difficulty urinating, dysuria, frequency, hematuria and urgency.   Musculoskeletal: Negative for arthralgias, back pain, gait problem, joint swelling and myalgias.   Skin: Negative for rash.   Neurological: Negative for tremors, seizures, speech difficulty, weakness and headaches.   Hematological: Negative for adenopathy.   Psychiatric/Behavioral: Negative for confusion, decreased concentration and sleep disturbance. The patient is not nervous/anxious.          Lab Results   Component Value Date    ALT 12 2020    AST 17 2020     (H) 2019    ALKPHOS 68 2020    BILITOT 0.8 2020     Past Medical History:   Diagnosis Date    Cirrhosis     Esophageal varices 2018    Small with no banding     Essential hypertension 2018    Fatty liver 2018    GERD (gastroesophageal reflux disease)     Hx of colonic polyps 2018    On colonoscopy     Hypertension     Hypothyroidism 2018    Kidney stones     Morbid obesity 2018    Lap band with subsequent release    KADEEM (obstructive sleep apnea) 2018    Osteoarthritis 2018    Pulmonary nodule 2018    Vitamin D deficiency 2018     Past Surgical History:   Procedure Laterality Date     SECTION      TIMES 2     CHOLECYSTECTOMY      LAPAROSCOPIC     CYSTOSCOPY W/ STONE MANIPULATION      kidney stone removal    ESOPHAGOGASTRODUODENOSCOPY N/A 2019    Procedure: EGD  (ESOPHAGOGASTRODUODENOSCOPY);  Surgeon: Davian Hernández MD;  Location[facility] (2ND FLR);  Service: Endoscopy;  Laterality: N/A;    LAPAROSCOPIC GASTRIC BANDING  2006    removal 2009    LIVER BIOPSY  11/29/2017    KESSLER with bridging 11/29/17    LIVER TRANSPLANT N/A 6/5/2019    Procedure: TRANSPLANT, LIVER;  Surgeon: Clemente Brown Jr., MD;  [facility];  Service: Transplant;  Laterality: N/A;     Current Outpatient Medications   Medication Sig    biotin 2,500 mcg Cap Take by mouth once daily.    cetirizine HCl (CETIRIZINE ORAL) Take 10 mg by mouth once. Nightly    famotidine (PEPCID) 20 MG tablet Take 1 tablet (20 mg total) by mouth every evening.    fluticasone propionate (FLONASE) 50 mcg/actuation nasal spray 1 spray by Each Nostril route once daily.    levothyroxine (SYNTHROID) 112 MCG tablet Take 1 tablet (112 mcg total) by mouth once daily.    sodium bicarbonate 650 MG tablet Take 2 tablets (1,300 mg total) by mouth 2 (two) times daily.    tacrolimus (PROGRAF) 1 MG Cap Take 3 capsules (3 mg total) by mouth every 12 (twelve) hours.    NIFEdipine (PROCARDIA-XL) 30 MG (OSM) 24 hr tablet Take 1 tablet (30 mg total) by mouth once daily.    valGANciclovir (VALCYTE) 450 mg Tab Take 1 tablet (450 mg total) by mouth once daily. Stop: 9/4/19     No current facility-administered medications for this visit.        Objective:      Physical Exam   Constitutional: She appears well-nourished. No distress.   HENT:   Head: Normocephalic.   Eyes: Pupils are equal, round, and reactive to light.   Neck: No JVD present. No tracheal deviation present. No thyromegaly present.   Cardiovascular: Normal rate, regular rhythm and normal heart sounds.   No murmur heard.  Pulmonary/Chest: Effort normal and breath sounds normal. No stridor.   Abdominal: Soft.   Lymphadenopathy:        Head (right side): No submental, no submandibular, no tonsillar, no preauricular, no posterior auricular and no occipital  adenopathy present.        Head (left side): No submental, no submandibular, no tonsillar, no preauricular, no posterior auricular and no occipital adenopathy present.     She has no cervical adenopathy.     She has no axillary adenopathy.   Neurological: She is alert. She has normal strength. She is not disoriented. No cranial nerve deficit or sensory deficit.   Skin: Skin is intact. No cyanosis.       Assessment:       1. S/P liver transplant    2. Traumatic intracranial hemorrhage without loss of consciousness, sequela    3. Prophylactic immunotherapy        Plan:    Overall she is well and is now fully mobile independently.  Excellent liver and kidney function    - hypertensive- asked to seek treatment from her PCP  - on Tac monotherapy - no change to dose    - will discuss lap band removal with surgical team.    Clinic in 6 months with labs every month.    UNOS Patient Status  Functional Status: 100% - Normal, no complaints, no evidence of disease  Physical Capacity: No Limitations

## 2020-12-01 ENCOUNTER — TELEPHONE (OUTPATIENT)
Dept: TRANSPLANT | Facility: CLINIC | Age: 50
End: 2020-12-01

## 2020-12-01 ENCOUNTER — CONFERENCE (OUTPATIENT)
Dept: TRANSPLANT | Facility: CLINIC | Age: 50
End: 2020-12-01

## 2020-12-01 DIAGNOSIS — Z94.4 LIVER REPLACED BY TRANSPLANT: Primary | ICD-10-CM

## 2020-12-01 NOTE — TELEPHONE ENCOUNTER
Liver Discussion Committee:    Discussion with team by  re: removal of Lap Band.    Plan: ok per team (surgeons/hepatogists) for lap band removal.  Send patient to see Bariatrics surgery (/ [facility]).

## 2020-12-03 ENCOUNTER — TELEPHONE (OUTPATIENT)
Dept: TRANSPLANT | Facility: CLINIC | Age: 50
End: 2020-12-03

## 2021-01-04 ENCOUNTER — TELEPHONE (OUTPATIENT)
Dept: TRANSPLANT | Facility: CLINIC | Age: 51
End: 2021-01-04

## 2021-01-04 ENCOUNTER — LAB VISIT (OUTPATIENT)
Dept: LAB | Facility: HOSPITAL | Age: 51
End: 2021-01-04
Attending: INTERNAL MEDICINE
Payer: OTHER GOVERNMENT

## 2021-01-04 DIAGNOSIS — Z94.4 LIVER REPLACED BY TRANSPLANT: Primary | ICD-10-CM

## 2021-01-04 DIAGNOSIS — Z94.4 LIVER REPLACED BY TRANSPLANT: ICD-10-CM

## 2021-01-04 LAB
ALBUMIN SERPL BCP-MCNC: 4.3 G/DL (ref 3.5–5.2)
ALP SERPL-CCNC: 69 U/L (ref 55–135)
ALT SERPL W/O P-5'-P-CCNC: 15 U/L (ref 10–44)
ANION GAP SERPL CALC-SCNC: 8 MMOL/L (ref 8–16)
AST SERPL-CCNC: 20 U/L (ref 10–40)
BASOPHILS # BLD AUTO: 0.02 K/UL (ref 0–0.2)
BASOPHILS NFR BLD: 0.5 % (ref 0–1.9)
BILIRUB SERPL-MCNC: 0.9 MG/DL (ref 0.1–1)
BUN SERPL-MCNC: 15 MG/DL (ref 6–20)
CALCIUM SERPL-MCNC: 9.1 MG/DL (ref 8.7–10.5)
CHLORIDE SERPL-SCNC: 103 MMOL/L (ref 95–110)
CO2 SERPL-SCNC: 29 MMOL/L (ref 23–29)
CREAT SERPL-MCNC: 0.8 MG/DL (ref 0.5–1.4)
DIFFERENTIAL METHOD: ABNORMAL
EOSINOPHIL # BLD AUTO: 0.1 K/UL (ref 0–0.5)
EOSINOPHIL NFR BLD: 2.4 % (ref 0–8)
ERYTHROCYTE [DISTWIDTH] IN BLOOD BY AUTOMATED COUNT: 12.8 % (ref 11.5–14.5)
EST. GFR  (AFRICAN AMERICAN): >60 ML/MIN/1.73 M^2
EST. GFR  (NON AFRICAN AMERICAN): >60 ML/MIN/1.73 M^2
GLUCOSE SERPL-MCNC: 89 MG/DL (ref 70–110)
HCT VFR BLD AUTO: 35.3 % (ref 37–48.5)
HGB BLD-MCNC: 11.9 G/DL (ref 12–16)
IMM GRANULOCYTES # BLD AUTO: 0.01 K/UL (ref 0–0.04)
IMM GRANULOCYTES NFR BLD AUTO: 0.3 % (ref 0–0.5)
LYMPHOCYTES # BLD AUTO: 0.6 K/UL (ref 1–4.8)
LYMPHOCYTES NFR BLD: 16.8 % (ref 18–48)
MCH RBC QN AUTO: 28.2 PG (ref 27–31)
MCHC RBC AUTO-ENTMCNC: 33.7 G/DL (ref 32–36)
MCV RBC AUTO: 84 FL (ref 82–98)
MONOCYTES # BLD AUTO: 0.3 K/UL (ref 0.3–1)
MONOCYTES NFR BLD: 7.6 % (ref 4–15)
NEUTROPHILS # BLD AUTO: 2.7 K/UL (ref 1.8–7.7)
NEUTROPHILS NFR BLD: 72.4 % (ref 38–73)
NRBC BLD-RTO: 0 /100 WBC
PLATELET # BLD AUTO: 163 K/UL (ref 150–350)
PMV BLD AUTO: 10 FL (ref 9.2–12.9)
POTASSIUM SERPL-SCNC: 4.3 MMOL/L (ref 3.5–5.1)
PROT SERPL-MCNC: 7.3 G/DL (ref 6–8.4)
RBC # BLD AUTO: 4.22 M/UL (ref 4–5.4)
SODIUM SERPL-SCNC: 140 MMOL/L (ref 136–145)
WBC # BLD AUTO: 3.7 K/UL (ref 3.9–12.7)

## 2021-01-04 PROCEDURE — 36415 COLL VENOUS BLD VENIPUNCTURE: CPT

## 2021-01-04 PROCEDURE — 80053 COMPREHEN METABOLIC PANEL: CPT

## 2021-01-04 PROCEDURE — 85025 COMPLETE CBC W/AUTO DIFF WBC: CPT

## 2021-01-04 PROCEDURE — 80197 ASSAY OF TACROLIMUS: CPT

## 2021-01-05 LAB — TACROLIMUS BLD-MCNC: 10 NG/ML (ref 5–15)

## 2021-01-08 ENCOUNTER — PATIENT MESSAGE (OUTPATIENT)
Dept: TRANSPLANT | Facility: CLINIC | Age: 51
End: 2021-01-08

## 2021-01-25 ENCOUNTER — TELEPHONE (OUTPATIENT)
Dept: BARIATRICS | Facility: CLINIC | Age: 51
End: 2021-01-25

## 2021-01-28 ENCOUNTER — TELEPHONE (OUTPATIENT)
Dept: BARIATRICS | Facility: CLINIC | Age: 51
End: 2021-01-28

## 2021-02-01 ENCOUNTER — LAB VISIT (OUTPATIENT)
Dept: LAB | Facility: HOSPITAL | Age: 51
End: 2021-02-01
Attending: INTERNAL MEDICINE
Payer: OTHER GOVERNMENT

## 2021-02-01 DIAGNOSIS — Z94.4 LIVER REPLACED BY TRANSPLANT: ICD-10-CM

## 2021-02-01 LAB
ALBUMIN SERPL BCP-MCNC: 4.3 G/DL (ref 3.5–5.2)
ALP SERPL-CCNC: 58 U/L (ref 55–135)
ALT SERPL W/O P-5'-P-CCNC: 18 U/L (ref 10–44)
ANION GAP SERPL CALC-SCNC: 9 MMOL/L (ref 8–16)
AST SERPL-CCNC: 21 U/L (ref 10–40)
BASOPHILS # BLD AUTO: 0.02 K/UL (ref 0–0.2)
BASOPHILS NFR BLD: 0.6 % (ref 0–1.9)
BILIRUB SERPL-MCNC: 0.5 MG/DL (ref 0.1–1)
BUN SERPL-MCNC: 15 MG/DL (ref 6–20)
CALCIUM SERPL-MCNC: 9.3 MG/DL (ref 8.7–10.5)
CHLORIDE SERPL-SCNC: 102 MMOL/L (ref 95–110)
CO2 SERPL-SCNC: 31 MMOL/L (ref 23–29)
CREAT SERPL-MCNC: 0.8 MG/DL (ref 0.5–1.4)
DIFFERENTIAL METHOD: ABNORMAL
EOSINOPHIL # BLD AUTO: 0.1 K/UL (ref 0–0.5)
EOSINOPHIL NFR BLD: 2.6 % (ref 0–8)
ERYTHROCYTE [DISTWIDTH] IN BLOOD BY AUTOMATED COUNT: 12.8 % (ref 11.5–14.5)
EST. GFR  (AFRICAN AMERICAN): >60 ML/MIN/1.73 M^2
EST. GFR  (NON AFRICAN AMERICAN): >60 ML/MIN/1.73 M^2
GLUCOSE SERPL-MCNC: 92 MG/DL (ref 70–110)
HCT VFR BLD AUTO: 37.2 % (ref 37–48.5)
HGB BLD-MCNC: 12.5 G/DL (ref 12–16)
IMM GRANULOCYTES # BLD AUTO: 0.01 K/UL (ref 0–0.04)
IMM GRANULOCYTES NFR BLD AUTO: 0.3 % (ref 0–0.5)
LYMPHOCYTES # BLD AUTO: 0.7 K/UL (ref 1–4.8)
LYMPHOCYTES NFR BLD: 18.8 % (ref 18–48)
MCH RBC QN AUTO: 28.3 PG (ref 27–31)
MCHC RBC AUTO-ENTMCNC: 33.6 G/DL (ref 32–36)
MCV RBC AUTO: 84 FL (ref 82–98)
MONOCYTES # BLD AUTO: 0.3 K/UL (ref 0.3–1)
MONOCYTES NFR BLD: 9.9 % (ref 4–15)
NEUTROPHILS # BLD AUTO: 2.3 K/UL (ref 1.8–7.7)
NEUTROPHILS NFR BLD: 67.8 % (ref 38–73)
NRBC BLD-RTO: 0 /100 WBC
PLATELET # BLD AUTO: 166 K/UL (ref 150–350)
PMV BLD AUTO: 10.1 FL (ref 9.2–12.9)
POTASSIUM SERPL-SCNC: 4.2 MMOL/L (ref 3.5–5.1)
PROT SERPL-MCNC: 7.1 G/DL (ref 6–8.4)
RBC # BLD AUTO: 4.41 M/UL (ref 4–5.4)
SODIUM SERPL-SCNC: 142 MMOL/L (ref 136–145)
WBC # BLD AUTO: 3.45 K/UL (ref 3.9–12.7)

## 2021-02-01 PROCEDURE — 80197 ASSAY OF TACROLIMUS: CPT

## 2021-02-01 PROCEDURE — 85025 COMPLETE CBC W/AUTO DIFF WBC: CPT

## 2021-02-01 PROCEDURE — 80053 COMPREHEN METABOLIC PANEL: CPT

## 2021-02-01 PROCEDURE — 36415 COLL VENOUS BLD VENIPUNCTURE: CPT

## 2021-02-02 LAB — TACROLIMUS BLD-MCNC: 8.8 NG/ML (ref 5–15)

## 2021-02-05 ENCOUNTER — TELEPHONE (OUTPATIENT)
Dept: TRANSPLANT | Facility: CLINIC | Age: 51
End: 2021-02-05

## 2021-02-05 DIAGNOSIS — Z94.4 LIVER REPLACED BY TRANSPLANT: Primary | ICD-10-CM

## 2021-03-01 ENCOUNTER — LAB VISIT (OUTPATIENT)
Dept: LAB | Facility: HOSPITAL | Age: 51
End: 2021-03-01
Attending: INTERNAL MEDICINE
Payer: OTHER GOVERNMENT

## 2021-03-01 DIAGNOSIS — Z94.4 LIVER REPLACED BY TRANSPLANT: ICD-10-CM

## 2021-03-01 LAB
ALBUMIN SERPL BCP-MCNC: 4.4 G/DL (ref 3.5–5.2)
ALP SERPL-CCNC: 57 U/L (ref 55–135)
ALT SERPL W/O P-5'-P-CCNC: 17 U/L (ref 10–44)
ANION GAP SERPL CALC-SCNC: 8 MMOL/L (ref 8–16)
AST SERPL-CCNC: 22 U/L (ref 10–40)
BASOPHILS # BLD AUTO: 0.04 K/UL (ref 0–0.2)
BASOPHILS NFR BLD: 1.2 % (ref 0–1.9)
BILIRUB SERPL-MCNC: 1.1 MG/DL (ref 0.1–1)
BUN SERPL-MCNC: 12 MG/DL (ref 6–20)
CALCIUM SERPL-MCNC: 9.2 MG/DL (ref 8.7–10.5)
CHLORIDE SERPL-SCNC: 103 MMOL/L (ref 95–110)
CO2 SERPL-SCNC: 30 MMOL/L (ref 23–29)
CREAT SERPL-MCNC: 0.8 MG/DL (ref 0.5–1.4)
DIFFERENTIAL METHOD: ABNORMAL
EOSINOPHIL # BLD AUTO: 0.1 K/UL (ref 0–0.5)
EOSINOPHIL NFR BLD: 2.3 % (ref 0–8)
ERYTHROCYTE [DISTWIDTH] IN BLOOD BY AUTOMATED COUNT: 12.7 % (ref 11.5–14.5)
EST. GFR  (AFRICAN AMERICAN): >60 ML/MIN/1.73 M^2
EST. GFR  (NON AFRICAN AMERICAN): >60 ML/MIN/1.73 M^2
GLUCOSE SERPL-MCNC: 96 MG/DL (ref 70–110)
HCT VFR BLD AUTO: 36.1 % (ref 37–48.5)
HGB BLD-MCNC: 12.3 G/DL (ref 12–16)
IMM GRANULOCYTES # BLD AUTO: 0.02 K/UL (ref 0–0.04)
IMM GRANULOCYTES NFR BLD AUTO: 0.6 % (ref 0–0.5)
LYMPHOCYTES # BLD AUTO: 0.5 K/UL (ref 1–4.8)
LYMPHOCYTES NFR BLD: 13.5 % (ref 18–48)
MCH RBC QN AUTO: 28.5 PG (ref 27–31)
MCHC RBC AUTO-ENTMCNC: 34.1 G/DL (ref 32–36)
MCV RBC AUTO: 84 FL (ref 82–98)
MONOCYTES # BLD AUTO: 0.3 K/UL (ref 0.3–1)
MONOCYTES NFR BLD: 7.6 % (ref 4–15)
NEUTROPHILS # BLD AUTO: 2.6 K/UL (ref 1.8–7.7)
NEUTROPHILS NFR BLD: 74.8 % (ref 38–73)
NRBC BLD-RTO: 0 /100 WBC
PLATELET # BLD AUTO: 185 K/UL (ref 150–350)
PMV BLD AUTO: 9.6 FL (ref 9.2–12.9)
POTASSIUM SERPL-SCNC: 4.4 MMOL/L (ref 3.5–5.1)
PROT SERPL-MCNC: 7.7 G/DL (ref 6–8.4)
RBC # BLD AUTO: 4.31 M/UL (ref 4–5.4)
SODIUM SERPL-SCNC: 141 MMOL/L (ref 136–145)
WBC # BLD AUTO: 3.41 K/UL (ref 3.9–12.7)

## 2021-03-01 PROCEDURE — 85025 COMPLETE CBC W/AUTO DIFF WBC: CPT

## 2021-03-01 PROCEDURE — 36415 COLL VENOUS BLD VENIPUNCTURE: CPT

## 2021-03-01 PROCEDURE — 80053 COMPREHEN METABOLIC PANEL: CPT

## 2021-03-01 PROCEDURE — 80197 ASSAY OF TACROLIMUS: CPT

## 2021-03-02 LAB — TACROLIMUS BLD-MCNC: 10.6 NG/ML (ref 5–15)

## 2021-03-03 ENCOUNTER — TELEPHONE (OUTPATIENT)
Dept: TRANSPLANT | Facility: CLINIC | Age: 51
End: 2021-03-03

## 2021-03-03 DIAGNOSIS — Z94.4 LIVER REPLACED BY TRANSPLANT: Primary | ICD-10-CM

## 2021-03-22 ENCOUNTER — PATIENT MESSAGE (OUTPATIENT)
Dept: TRANSPLANT | Facility: CLINIC | Age: 51
End: 2021-03-22

## 2021-03-22 ENCOUNTER — TELEPHONE (OUTPATIENT)
Dept: TRANSPLANT | Facility: CLINIC | Age: 51
End: 2021-03-22

## 2021-05-03 ENCOUNTER — LAB VISIT (OUTPATIENT)
Dept: LAB | Facility: HOSPITAL | Age: 51
End: 2021-05-03
Attending: INTERNAL MEDICINE
Payer: OTHER GOVERNMENT

## 2021-05-03 DIAGNOSIS — Z94.4 LIVER REPLACED BY TRANSPLANT: ICD-10-CM

## 2021-05-03 LAB
ALBUMIN SERPL BCP-MCNC: 4.4 G/DL (ref 3.5–5.2)
ALP SERPL-CCNC: 51 U/L (ref 55–135)
ALT SERPL W/O P-5'-P-CCNC: 15 U/L (ref 10–44)
ANION GAP SERPL CALC-SCNC: 10 MMOL/L (ref 8–16)
AST SERPL-CCNC: 21 U/L (ref 10–40)
BASOPHILS # BLD AUTO: 0.02 K/UL (ref 0–0.2)
BASOPHILS NFR BLD: 0.6 % (ref 0–1.9)
BILIRUB SERPL-MCNC: 1.1 MG/DL (ref 0.1–1)
BUN SERPL-MCNC: 13 MG/DL (ref 6–20)
CALCIUM SERPL-MCNC: 9.2 MG/DL (ref 8.7–10.5)
CHLORIDE SERPL-SCNC: 103 MMOL/L (ref 95–110)
CO2 SERPL-SCNC: 29 MMOL/L (ref 23–29)
CREAT SERPL-MCNC: 0.8 MG/DL (ref 0.5–1.4)
DIFFERENTIAL METHOD: ABNORMAL
EOSINOPHIL # BLD AUTO: 0.1 K/UL (ref 0–0.5)
EOSINOPHIL NFR BLD: 2.4 % (ref 0–8)
ERYTHROCYTE [DISTWIDTH] IN BLOOD BY AUTOMATED COUNT: 12.8 % (ref 11.5–14.5)
EST. GFR  (AFRICAN AMERICAN): >60 ML/MIN/1.73 M^2
EST. GFR  (NON AFRICAN AMERICAN): >60 ML/MIN/1.73 M^2
GLUCOSE SERPL-MCNC: 95 MG/DL (ref 70–110)
HCT VFR BLD AUTO: 33 % (ref 37–48.5)
HGB BLD-MCNC: 11.4 G/DL (ref 12–16)
IMM GRANULOCYTES # BLD AUTO: 0.01 K/UL (ref 0–0.04)
IMM GRANULOCYTES NFR BLD AUTO: 0.3 % (ref 0–0.5)
LYMPHOCYTES # BLD AUTO: 0.6 K/UL (ref 1–4.8)
LYMPHOCYTES NFR BLD: 16.9 % (ref 18–48)
MCH RBC QN AUTO: 28.6 PG (ref 27–31)
MCHC RBC AUTO-ENTMCNC: 34.5 G/DL (ref 32–36)
MCV RBC AUTO: 83 FL (ref 82–98)
MONOCYTES # BLD AUTO: 0.3 K/UL (ref 0.3–1)
MONOCYTES NFR BLD: 7.7 % (ref 4–15)
NEUTROPHILS # BLD AUTO: 2.4 K/UL (ref 1.8–7.7)
NEUTROPHILS NFR BLD: 72.1 % (ref 38–73)
NRBC BLD-RTO: 0 /100 WBC
PLATELET # BLD AUTO: 175 K/UL (ref 150–450)
PMV BLD AUTO: 9.9 FL (ref 9.2–12.9)
POTASSIUM SERPL-SCNC: 4.5 MMOL/L (ref 3.5–5.1)
PROT SERPL-MCNC: 6.6 G/DL (ref 6–8.4)
RBC # BLD AUTO: 3.99 M/UL (ref 4–5.4)
SODIUM SERPL-SCNC: 142 MMOL/L (ref 136–145)
WBC # BLD AUTO: 3.37 K/UL (ref 3.9–12.7)

## 2021-05-03 PROCEDURE — 80197 ASSAY OF TACROLIMUS: CPT | Performed by: INTERNAL MEDICINE

## 2021-05-03 PROCEDURE — 36415 COLL VENOUS BLD VENIPUNCTURE: CPT | Performed by: INTERNAL MEDICINE

## 2021-05-03 PROCEDURE — 85025 COMPLETE CBC W/AUTO DIFF WBC: CPT | Performed by: INTERNAL MEDICINE

## 2021-05-03 PROCEDURE — 80053 COMPREHEN METABOLIC PANEL: CPT | Performed by: INTERNAL MEDICINE

## 2021-05-06 ENCOUNTER — TELEPHONE (OUTPATIENT)
Dept: TRANSPLANT | Facility: CLINIC | Age: 51
End: 2021-05-06

## 2021-05-06 DIAGNOSIS — Z94.4 LIVER REPLACED BY TRANSPLANT: Primary | ICD-10-CM

## 2021-05-09 LAB — TACROLIMUS BLD-MCNC: 10.3 NG/ML (ref 5–15)

## 2021-06-02 ENCOUNTER — PATIENT MESSAGE (OUTPATIENT)
Dept: TRANSPLANT | Facility: CLINIC | Age: 51
End: 2021-06-02

## 2021-06-02 RX ORDER — SODIUM BICARBONATE 650 MG/1
1300 TABLET ORAL 2 TIMES DAILY
Qty: 120 TABLET | Refills: 11 | Status: SHIPPED | OUTPATIENT
Start: 2021-06-02 | End: 2021-08-05

## 2021-06-02 RX ORDER — TACROLIMUS 1 MG/1
3 CAPSULE ORAL EVERY 12 HOURS
Qty: 270 CAPSULE | Refills: 11 | Status: SHIPPED | OUTPATIENT
Start: 2021-06-02 | End: 2021-10-15 | Stop reason: DRUGHIGH

## 2021-06-25 ENCOUNTER — HOSPITAL ENCOUNTER (EMERGENCY)
Facility: HOSPITAL | Age: 51
Discharge: HOME | End: 2021-06-25
Attending: EMERGENCY MEDICINE
Payer: OTHER GOVERNMENT

## 2021-06-25 ENCOUNTER — TELEPHONE (OUTPATIENT)
Dept: TRANSPLANT | Facility: CLINIC | Age: 51
End: 2021-06-25

## 2021-06-25 VITALS
BODY MASS INDEX: 29.16 KG/M2 | TEMPERATURE: 99 F | RESPIRATION RATE: 18 BRPM | DIASTOLIC BLOOD PRESSURE: 92 MMHG | HEART RATE: 78 BPM | SYSTOLIC BLOOD PRESSURE: 126 MMHG | OXYGEN SATURATION: 99 % | HEIGHT: 65 IN | WEIGHT: 175 LBS

## 2021-06-25 DIAGNOSIS — R10.9 RIGHT SIDED ABDOMINAL PAIN: Primary | ICD-10-CM

## 2021-06-25 LAB
ALBUMIN SERPL BCP-MCNC: 4.2 G/DL (ref 3.5–5.2)
ALP SERPL-CCNC: 77 U/L (ref 55–135)
ALT SERPL W/O P-5'-P-CCNC: 12 U/L (ref 10–44)
ANION GAP SERPL CALC-SCNC: 14 MMOL/L (ref 8–16)
AST SERPL-CCNC: 17 U/L (ref 10–40)
BASOPHILS # BLD AUTO: 0.02 K/UL (ref 0–0.2)
BASOPHILS NFR BLD: 0.5 % (ref 0–1.9)
BILIRUB SERPL-MCNC: 0.8 MG/DL (ref 0.1–1)
BILIRUB UR QL STRIP: NEGATIVE
BUN SERPL-MCNC: 11 MG/DL (ref 6–20)
CALCIUM SERPL-MCNC: 9.4 MG/DL (ref 8.7–10.5)
CHLORIDE SERPL-SCNC: 107 MMOL/L (ref 95–110)
CLARITY UR: CLEAR
CO2 SERPL-SCNC: 20 MMOL/L (ref 23–29)
COLOR UR: YELLOW
CREAT SERPL-MCNC: 1 MG/DL (ref 0.5–1.4)
DIFFERENTIAL METHOD: ABNORMAL
EOSINOPHIL # BLD AUTO: 0.1 K/UL (ref 0–0.5)
EOSINOPHIL NFR BLD: 2.3 % (ref 0–8)
ERYTHROCYTE [DISTWIDTH] IN BLOOD BY AUTOMATED COUNT: 12.7 % (ref 11.5–14.5)
EST. GFR  (AFRICAN AMERICAN): >60 ML/MIN/1.73 M^2
EST. GFR  (NON AFRICAN AMERICAN): >60 ML/MIN/1.73 M^2
GLUCOSE SERPL-MCNC: 98 MG/DL (ref 70–110)
GLUCOSE UR QL STRIP: NEGATIVE
HCT VFR BLD AUTO: 36.1 % (ref 37–48.5)
HGB BLD-MCNC: 12.6 G/DL (ref 12–16)
HGB UR QL STRIP: NEGATIVE
IMM GRANULOCYTES # BLD AUTO: 0.01 K/UL (ref 0–0.04)
IMM GRANULOCYTES NFR BLD AUTO: 0.2 % (ref 0–0.5)
KETONES UR QL STRIP: NEGATIVE
LEUKOCYTE ESTERASE UR QL STRIP: NEGATIVE
LIPASE SERPL-CCNC: 42 U/L (ref 4–60)
LYMPHOCYTES # BLD AUTO: 0.7 K/UL (ref 1–4.8)
LYMPHOCYTES NFR BLD: 15.4 % (ref 18–48)
MCH RBC QN AUTO: 28.6 PG (ref 27–31)
MCHC RBC AUTO-ENTMCNC: 34.9 G/DL (ref 32–36)
MCV RBC AUTO: 82 FL (ref 82–98)
MONOCYTES # BLD AUTO: 0.3 K/UL (ref 0.3–1)
MONOCYTES NFR BLD: 7.7 % (ref 4–15)
NEUTROPHILS # BLD AUTO: 3.2 K/UL (ref 1.8–7.7)
NEUTROPHILS NFR BLD: 73.9 % (ref 38–73)
NITRITE UR QL STRIP: NEGATIVE
NRBC BLD-RTO: 0 /100 WBC
PH UR STRIP: 5 [PH] (ref 5–8)
PLATELET # BLD AUTO: 159 K/UL (ref 150–450)
PMV BLD AUTO: 9.9 FL (ref 9.2–12.9)
POTASSIUM SERPL-SCNC: 3.6 MMOL/L (ref 3.5–5.1)
PROT SERPL-MCNC: 7.2 G/DL (ref 6–8.4)
PROT UR QL STRIP: NEGATIVE
RBC # BLD AUTO: 4.4 M/UL (ref 4–5.4)
SODIUM SERPL-SCNC: 141 MMOL/L (ref 136–145)
SP GR UR STRIP: <=1.005 (ref 1–1.03)
URN SPEC COLLECT METH UR: NORMAL
UROBILINOGEN UR STRIP-ACNC: NEGATIVE EU/DL
WBC # BLD AUTO: 4.28 K/UL (ref 3.9–12.7)

## 2021-06-25 PROCEDURE — 99284 EMERGENCY DEPT VISIT MOD MDM: CPT | Mod: 25

## 2021-06-25 PROCEDURE — 83690 ASSAY OF LIPASE: CPT | Performed by: EMERGENCY MEDICINE

## 2021-06-25 PROCEDURE — 85025 COMPLETE CBC W/AUTO DIFF WBC: CPT | Performed by: EMERGENCY MEDICINE

## 2021-06-25 PROCEDURE — 74176 CT ABD & PELVIS W/O CONTRAST: CPT | Mod: TC

## 2021-06-25 PROCEDURE — 74176 CT ABD & PELVIS W/O CONTRAST: CPT | Mod: 26,,, | Performed by: RADIOLOGY

## 2021-06-25 PROCEDURE — 80053 COMPREHEN METABOLIC PANEL: CPT | Performed by: EMERGENCY MEDICINE

## 2021-06-25 PROCEDURE — 25000003 PHARM REV CODE 250: Performed by: EMERGENCY MEDICINE

## 2021-06-25 PROCEDURE — 81003 URINALYSIS AUTO W/O SCOPE: CPT | Performed by: EMERGENCY MEDICINE

## 2021-06-25 PROCEDURE — 74176 CT RENAL STONE STUDY ABD PELVIS WO: ICD-10-PCS | Mod: 26,,, | Performed by: RADIOLOGY

## 2021-06-25 RX ADMIN — SODIUM CHLORIDE 500 ML: 0.9 INJECTION, SOLUTION INTRAVENOUS at 03:06

## 2021-06-28 ENCOUNTER — TELEPHONE (OUTPATIENT)
Dept: TRANSPLANT | Facility: CLINIC | Age: 51
End: 2021-06-28

## 2021-07-09 ENCOUNTER — LAB VISIT (OUTPATIENT)
Dept: LAB | Facility: HOSPITAL | Age: 51
End: 2021-07-09
Payer: MEDICARE

## 2021-07-09 DIAGNOSIS — Z94.4 LIVER REPLACED BY TRANSPLANT: ICD-10-CM

## 2021-07-09 LAB
ALBUMIN SERPL BCP-MCNC: 4.2 G/DL (ref 3.5–5.2)
ALP SERPL-CCNC: 65 U/L (ref 55–135)
ALT SERPL W/O P-5'-P-CCNC: 13 U/L (ref 10–44)
ANION GAP SERPL CALC-SCNC: 11 MMOL/L (ref 8–16)
AST SERPL-CCNC: 16 U/L (ref 10–40)
BASOPHILS # BLD AUTO: 0.04 K/UL (ref 0–0.2)
BASOPHILS NFR BLD: 1.2 % (ref 0–1.9)
BILIRUB SERPL-MCNC: 0.9 MG/DL (ref 0.1–1)
BUN SERPL-MCNC: 12 MG/DL (ref 6–20)
CALCIUM SERPL-MCNC: 9.5 MG/DL (ref 8.7–10.5)
CHLORIDE SERPL-SCNC: 105 MMOL/L (ref 95–110)
CO2 SERPL-SCNC: 26 MMOL/L (ref 23–29)
CREAT SERPL-MCNC: 0.8 MG/DL (ref 0.5–1.4)
DIFFERENTIAL METHOD: ABNORMAL
EOSINOPHIL # BLD AUTO: 0.1 K/UL (ref 0–0.5)
EOSINOPHIL NFR BLD: 2.3 % (ref 0–8)
ERYTHROCYTE [DISTWIDTH] IN BLOOD BY AUTOMATED COUNT: 13 % (ref 11.5–14.5)
EST. GFR  (AFRICAN AMERICAN): >60 ML/MIN/1.73 M^2
EST. GFR  (NON AFRICAN AMERICAN): >60 ML/MIN/1.73 M^2
GLUCOSE SERPL-MCNC: 93 MG/DL (ref 70–110)
HCT VFR BLD AUTO: 35 % (ref 37–48.5)
HGB BLD-MCNC: 12.1 G/DL (ref 12–16)
IMM GRANULOCYTES # BLD AUTO: 0.02 K/UL (ref 0–0.04)
IMM GRANULOCYTES NFR BLD AUTO: 0.6 % (ref 0–0.5)
LYMPHOCYTES # BLD AUTO: 0.6 K/UL (ref 1–4.8)
LYMPHOCYTES NFR BLD: 16.8 % (ref 18–48)
MCH RBC QN AUTO: 28.5 PG (ref 27–31)
MCHC RBC AUTO-ENTMCNC: 34.6 G/DL (ref 32–36)
MCV RBC AUTO: 83 FL (ref 82–98)
MONOCYTES # BLD AUTO: 0.3 K/UL (ref 0.3–1)
MONOCYTES NFR BLD: 8.7 % (ref 4–15)
NEUTROPHILS # BLD AUTO: 2.4 K/UL (ref 1.8–7.7)
NEUTROPHILS NFR BLD: 70.4 % (ref 38–73)
NRBC BLD-RTO: 0 /100 WBC
PLATELET # BLD AUTO: 167 K/UL (ref 150–450)
PMV BLD AUTO: 9.6 FL (ref 9.2–12.9)
POTASSIUM SERPL-SCNC: 4 MMOL/L (ref 3.5–5.1)
PROT SERPL-MCNC: 6.8 G/DL (ref 6–8.4)
RBC # BLD AUTO: 4.24 M/UL (ref 4–5.4)
SODIUM SERPL-SCNC: 142 MMOL/L (ref 136–145)
WBC # BLD AUTO: 3.45 K/UL (ref 3.9–12.7)

## 2021-07-09 PROCEDURE — 85025 COMPLETE CBC W/AUTO DIFF WBC: CPT | Performed by: INTERNAL MEDICINE

## 2021-07-09 PROCEDURE — 80053 COMPREHEN METABOLIC PANEL: CPT | Performed by: INTERNAL MEDICINE

## 2021-07-09 PROCEDURE — 36415 COLL VENOUS BLD VENIPUNCTURE: CPT | Performed by: INTERNAL MEDICINE

## 2021-07-09 PROCEDURE — 80197 ASSAY OF TACROLIMUS: CPT | Performed by: INTERNAL MEDICINE

## 2021-07-10 LAB
TACROLIMUS BLD-MCNC: 9.2 NG/ML (ref 5–15)
TACROLIMUS, NORMALIZED: 8.1 NG/ML (ref 5–15)

## 2021-07-15 ENCOUNTER — TELEPHONE (OUTPATIENT)
Dept: TRANSPLANT | Facility: CLINIC | Age: 51
End: 2021-07-15

## 2021-07-15 DIAGNOSIS — Z94.4 LIVER REPLACED BY TRANSPLANT: Primary | ICD-10-CM

## 2021-09-13 ENCOUNTER — LAB VISIT (OUTPATIENT)
Dept: LAB | Facility: HOSPITAL | Age: 51
End: 2021-09-13
Attending: INTERNAL MEDICINE
Payer: OTHER GOVERNMENT

## 2021-09-13 DIAGNOSIS — Z94.4 LIVER REPLACED BY TRANSPLANT: ICD-10-CM

## 2021-09-13 LAB
ALBUMIN SERPL BCP-MCNC: 4.2 G/DL (ref 3.5–5.2)
ALP SERPL-CCNC: 64 U/L (ref 55–135)
ALT SERPL W/O P-5'-P-CCNC: 12 U/L (ref 10–44)
ANION GAP SERPL CALC-SCNC: 10 MMOL/L (ref 8–16)
AST SERPL-CCNC: 17 U/L (ref 10–40)
BASOPHILS # BLD AUTO: 0.03 K/UL (ref 0–0.2)
BASOPHILS NFR BLD: 0.9 % (ref 0–1.9)
BILIRUB SERPL-MCNC: 1.1 MG/DL (ref 0.1–1)
BUN SERPL-MCNC: 14 MG/DL (ref 6–20)
CALCIUM SERPL-MCNC: 10.3 MG/DL (ref 8.7–10.5)
CHLORIDE SERPL-SCNC: 104 MMOL/L (ref 95–110)
CO2 SERPL-SCNC: 26 MMOL/L (ref 23–29)
CREAT SERPL-MCNC: 1 MG/DL (ref 0.5–1.4)
DIFFERENTIAL METHOD: ABNORMAL
EOSINOPHIL # BLD AUTO: 0.1 K/UL (ref 0–0.5)
EOSINOPHIL NFR BLD: 2 % (ref 0–8)
ERYTHROCYTE [DISTWIDTH] IN BLOOD BY AUTOMATED COUNT: 12.7 % (ref 11.5–14.5)
EST. GFR  (AFRICAN AMERICAN): >60 ML/MIN/1.73 M^2
EST. GFR  (NON AFRICAN AMERICAN): >60 ML/MIN/1.73 M^2
GLUCOSE SERPL-MCNC: 89 MG/DL (ref 70–110)
HCT VFR BLD AUTO: 36.3 % (ref 37–48.5)
HGB BLD-MCNC: 12.5 G/DL (ref 12–16)
IMM GRANULOCYTES # BLD AUTO: 0 K/UL (ref 0–0.04)
IMM GRANULOCYTES NFR BLD AUTO: 0 % (ref 0–0.5)
LYMPHOCYTES # BLD AUTO: 0.6 K/UL (ref 1–4.8)
LYMPHOCYTES NFR BLD: 17.9 % (ref 18–48)
MCH RBC QN AUTO: 28.6 PG (ref 27–31)
MCHC RBC AUTO-ENTMCNC: 34.4 G/DL (ref 32–36)
MCV RBC AUTO: 83 FL (ref 82–98)
MONOCYTES # BLD AUTO: 0.3 K/UL (ref 0.3–1)
MONOCYTES NFR BLD: 8.4 % (ref 4–15)
NEUTROPHILS # BLD AUTO: 2.5 K/UL (ref 1.8–7.7)
NEUTROPHILS NFR BLD: 70.8 % (ref 38–73)
NRBC BLD-RTO: 0 /100 WBC
PLATELET # BLD AUTO: 166 K/UL (ref 150–450)
PMV BLD AUTO: 9.9 FL (ref 9.2–12.9)
POTASSIUM SERPL-SCNC: 4.5 MMOL/L (ref 3.5–5.1)
PROT SERPL-MCNC: 7.1 G/DL (ref 6–8.4)
RBC # BLD AUTO: 4.37 M/UL (ref 4–5.4)
SODIUM SERPL-SCNC: 140 MMOL/L (ref 136–145)
WBC # BLD AUTO: 3.46 K/UL (ref 3.9–12.7)

## 2021-09-13 PROCEDURE — 85025 COMPLETE CBC W/AUTO DIFF WBC: CPT | Performed by: INTERNAL MEDICINE

## 2021-09-13 PROCEDURE — 36415 COLL VENOUS BLD VENIPUNCTURE: CPT | Performed by: INTERNAL MEDICINE

## 2021-09-13 PROCEDURE — 80197 ASSAY OF TACROLIMUS: CPT | Performed by: INTERNAL MEDICINE

## 2021-09-13 PROCEDURE — 80053 COMPREHEN METABOLIC PANEL: CPT | Performed by: INTERNAL MEDICINE

## 2021-09-14 LAB
TACROLIMUS BLD-MCNC: 10.9 NG/ML (ref 5–15)
TACROLIMUS, NORMALIZED: 10.2 NG/ML (ref 5–15)

## 2021-09-16 ENCOUNTER — TELEPHONE (OUTPATIENT)
Dept: TRANSPLANT | Facility: CLINIC | Age: 51
End: 2021-09-16

## 2021-09-16 DIAGNOSIS — Z94.4 LIVER REPLACED BY TRANSPLANT: Primary | ICD-10-CM

## 2021-10-01 ENCOUNTER — TELEPHONE (OUTPATIENT)
Dept: TRANSPLANT | Facility: CLINIC | Age: 51
End: 2021-10-01

## 2021-10-01 ENCOUNTER — NURSE TRIAGE (OUTPATIENT)
Dept: ADMINISTRATIVE | Facility: CLINIC | Age: 51
End: 2021-10-01

## 2021-10-02 ENCOUNTER — NURSE TRIAGE (OUTPATIENT)
Dept: ADMINISTRATIVE | Facility: CLINIC | Age: 51
End: 2021-10-02

## 2021-10-04 ENCOUNTER — TELEPHONE (OUTPATIENT)
Dept: TRANSPLANT | Facility: CLINIC | Age: 51
End: 2021-10-04

## 2021-10-04 DIAGNOSIS — Z94.4 LIVER REPLACED BY TRANSPLANT: Primary | ICD-10-CM

## 2021-10-14 ENCOUNTER — LAB VISIT (OUTPATIENT)
Dept: LAB | Facility: HOSPITAL | Age: 51
End: 2021-10-14
Attending: INTERNAL MEDICINE
Payer: MEDICARE

## 2021-10-14 DIAGNOSIS — Z94.4 LIVER REPLACED BY TRANSPLANT: ICD-10-CM

## 2021-10-14 LAB
ALBUMIN SERPL BCP-MCNC: 3.8 G/DL (ref 3.5–5.2)
ALP SERPL-CCNC: 63 U/L (ref 55–135)
ALT SERPL W/O P-5'-P-CCNC: 11 U/L (ref 10–44)
ANION GAP SERPL CALC-SCNC: 11 MMOL/L (ref 8–16)
AST SERPL-CCNC: 15 U/L (ref 10–40)
BASOPHILS # BLD AUTO: 0.02 K/UL (ref 0–0.2)
BASOPHILS NFR BLD: 0.6 % (ref 0–1.9)
BILIRUB SERPL-MCNC: 0.8 MG/DL (ref 0.1–1)
BUN SERPL-MCNC: 15 MG/DL (ref 6–20)
CALCIUM SERPL-MCNC: 9.4 MG/DL (ref 8.7–10.5)
CHLORIDE SERPL-SCNC: 101 MMOL/L (ref 95–110)
CO2 SERPL-SCNC: 30 MMOL/L (ref 23–29)
CREAT SERPL-MCNC: 0.8 MG/DL (ref 0.5–1.4)
DIFFERENTIAL METHOD: ABNORMAL
EOSINOPHIL # BLD AUTO: 0 K/UL (ref 0–0.5)
EOSINOPHIL NFR BLD: 1.1 % (ref 0–8)
ERYTHROCYTE [DISTWIDTH] IN BLOOD BY AUTOMATED COUNT: 12.4 % (ref 11.5–14.5)
EST. GFR  (AFRICAN AMERICAN): >60 ML/MIN/1.73 M^2
EST. GFR  (NON AFRICAN AMERICAN): >60 ML/MIN/1.73 M^2
GLUCOSE SERPL-MCNC: 89 MG/DL (ref 70–110)
HCT VFR BLD AUTO: 34.1 % (ref 37–48.5)
HGB BLD-MCNC: 11.8 G/DL (ref 12–16)
IMM GRANULOCYTES # BLD AUTO: 0.01 K/UL (ref 0–0.04)
IMM GRANULOCYTES NFR BLD AUTO: 0.3 % (ref 0–0.5)
LYMPHOCYTES # BLD AUTO: 0.5 K/UL (ref 1–4.8)
LYMPHOCYTES NFR BLD: 15.1 % (ref 18–48)
MCH RBC QN AUTO: 28.1 PG (ref 27–31)
MCHC RBC AUTO-ENTMCNC: 34.6 G/DL (ref 32–36)
MCV RBC AUTO: 81 FL (ref 82–98)
MONOCYTES # BLD AUTO: 0.3 K/UL (ref 0.3–1)
MONOCYTES NFR BLD: 7.1 % (ref 4–15)
NEUTROPHILS # BLD AUTO: 2.7 K/UL (ref 1.8–7.7)
NEUTROPHILS NFR BLD: 75.8 % (ref 38–73)
NRBC BLD-RTO: 0 /100 WBC
PLATELET # BLD AUTO: 243 K/UL (ref 150–450)
PMV BLD AUTO: 9.8 FL (ref 9.2–12.9)
POTASSIUM SERPL-SCNC: 3.9 MMOL/L (ref 3.5–5.1)
PROT SERPL-MCNC: 6.7 G/DL (ref 6–8.4)
RBC # BLD AUTO: 4.2 M/UL (ref 4–5.4)
SODIUM SERPL-SCNC: 142 MMOL/L (ref 136–145)
WBC # BLD AUTO: 3.52 K/UL (ref 3.9–12.7)

## 2021-10-14 PROCEDURE — 80197 ASSAY OF TACROLIMUS: CPT | Performed by: INTERNAL MEDICINE

## 2021-10-14 PROCEDURE — 85025 COMPLETE CBC W/AUTO DIFF WBC: CPT | Performed by: INTERNAL MEDICINE

## 2021-10-14 PROCEDURE — 36415 COLL VENOUS BLD VENIPUNCTURE: CPT | Performed by: INTERNAL MEDICINE

## 2021-10-14 PROCEDURE — 80053 COMPREHEN METABOLIC PANEL: CPT | Performed by: INTERNAL MEDICINE

## 2021-10-15 LAB
TACROLIMUS BLD-MCNC: 15 NG/ML (ref 5–15)
TACROLIMUS, NORMALIZED: 14.2 NG/ML (ref 5–15)

## 2021-10-17 RX ORDER — TACROLIMUS 1 MG/1
CAPSULE ORAL
Qty: 150 CAPSULE | Refills: 11 | Status: SHIPPED | OUTPATIENT
Start: 2021-10-17 | End: 2022-01-18

## 2021-10-18 ENCOUNTER — PATIENT MESSAGE (OUTPATIENT)
Dept: HEPATOLOGY | Facility: CLINIC | Age: 51
End: 2021-10-18
Payer: MEDICARE

## 2021-10-18 ENCOUNTER — OFFICE VISIT (OUTPATIENT)
Dept: TRANSPLANT | Facility: CLINIC | Age: 51
End: 2021-10-18
Payer: OTHER GOVERNMENT

## 2021-10-18 DIAGNOSIS — Z79.60 LONG-TERM USE OF IMMUNOSUPPRESSANT MEDICATION: ICD-10-CM

## 2021-10-18 DIAGNOSIS — Z94.4 S/P LIVER TRANSPLANT: Primary | ICD-10-CM

## 2021-10-18 DIAGNOSIS — R10.11 RIGHT UPPER QUADRANT ABDOMINAL PAIN: Primary | ICD-10-CM

## 2021-10-18 PROCEDURE — 99213 OFFICE O/P EST LOW 20 MIN: CPT | Mod: 95,,, | Performed by: INTERNAL MEDICINE

## 2021-10-18 PROCEDURE — 99213 PR OFFICE/OUTPT VISIT, EST, LEVL III, 20-29 MIN: ICD-10-PCS | Mod: 95,,, | Performed by: INTERNAL MEDICINE

## 2021-10-26 ENCOUNTER — HOSPITAL ENCOUNTER (OUTPATIENT)
Dept: RADIOLOGY | Facility: HOSPITAL | Age: 51
Discharge: HOME | End: 2021-10-26
Attending: INTERNAL MEDICINE
Payer: MEDICARE

## 2021-10-26 DIAGNOSIS — R10.11 RIGHT UPPER QUADRANT ABDOMINAL PAIN: ICD-10-CM

## 2021-10-26 PROCEDURE — 76770 US EXAM ABDO BACK WALL COMP: CPT | Mod: TC,PO

## 2021-10-29 ENCOUNTER — TELEPHONE (OUTPATIENT)
Dept: TRANSPLANT | Facility: CLINIC | Age: 51
End: 2021-10-29
Payer: MEDICARE

## 2021-12-13 ENCOUNTER — LAB VISIT (OUTPATIENT)
Dept: LAB | Facility: HOSPITAL | Age: 51
End: 2021-12-13
Attending: INTERNAL MEDICINE
Payer: MEDICARE

## 2021-12-13 DIAGNOSIS — Z94.4 LIVER REPLACED BY TRANSPLANT: ICD-10-CM

## 2021-12-13 LAB
ALBUMIN SERPL BCP-MCNC: 4.2 G/DL (ref 3.5–5.2)
ALP SERPL-CCNC: 60 U/L (ref 55–135)
ALT SERPL W/O P-5'-P-CCNC: 14 U/L (ref 10–44)
ANION GAP SERPL CALC-SCNC: 11 MMOL/L (ref 8–16)
AST SERPL-CCNC: 16 U/L (ref 10–40)
BASOPHILS # BLD AUTO: 0.03 K/UL (ref 0–0.2)
BASOPHILS NFR BLD: 1 % (ref 0–1.9)
BILIRUB SERPL-MCNC: 0.7 MG/DL (ref 0.1–1)
BUN SERPL-MCNC: 16 MG/DL (ref 6–20)
CALCIUM SERPL-MCNC: 9.5 MG/DL (ref 8.7–10.5)
CHLORIDE SERPL-SCNC: 108 MMOL/L (ref 95–110)
CO2 SERPL-SCNC: 24 MMOL/L (ref 23–29)
CREAT SERPL-MCNC: 0.9 MG/DL (ref 0.5–1.4)
DIFFERENTIAL METHOD: ABNORMAL
EOSINOPHIL # BLD AUTO: 0.1 K/UL (ref 0–0.5)
EOSINOPHIL NFR BLD: 3 % (ref 0–8)
ERYTHROCYTE [DISTWIDTH] IN BLOOD BY AUTOMATED COUNT: 13.1 % (ref 11.5–14.5)
EST. GFR  (AFRICAN AMERICAN): >60 ML/MIN/1.73 M^2
EST. GFR  (NON AFRICAN AMERICAN): >60 ML/MIN/1.73 M^2
GLUCOSE SERPL-MCNC: 90 MG/DL (ref 70–110)
HCT VFR BLD AUTO: 36.6 % (ref 37–48.5)
HGB BLD-MCNC: 12.4 G/DL (ref 12–16)
IMM GRANULOCYTES # BLD AUTO: 0.02 K/UL (ref 0–0.04)
IMM GRANULOCYTES NFR BLD AUTO: 0.7 % (ref 0–0.5)
LYMPHOCYTES # BLD AUTO: 0.5 K/UL (ref 1–4.8)
LYMPHOCYTES NFR BLD: 15.1 % (ref 18–48)
MCH RBC QN AUTO: 28.3 PG (ref 27–31)
MCHC RBC AUTO-ENTMCNC: 33.9 G/DL (ref 32–36)
MCV RBC AUTO: 84 FL (ref 82–98)
MONOCYTES # BLD AUTO: 0.3 K/UL (ref 0.3–1)
MONOCYTES NFR BLD: 8.9 % (ref 4–15)
NEUTROPHILS # BLD AUTO: 2.2 K/UL (ref 1.8–7.7)
NEUTROPHILS NFR BLD: 71.3 % (ref 38–73)
NRBC BLD-RTO: 0 /100 WBC
PLATELET # BLD AUTO: 141 K/UL (ref 150–450)
PMV BLD AUTO: 10.3 FL (ref 9.2–12.9)
POTASSIUM SERPL-SCNC: 4 MMOL/L (ref 3.5–5.1)
PROT SERPL-MCNC: 7 G/DL (ref 6–8.4)
RBC # BLD AUTO: 4.38 M/UL (ref 4–5.4)
SODIUM SERPL-SCNC: 143 MMOL/L (ref 136–145)
WBC # BLD AUTO: 3.05 K/UL (ref 3.9–12.7)

## 2021-12-13 PROCEDURE — 36415 COLL VENOUS BLD VENIPUNCTURE: CPT | Performed by: INTERNAL MEDICINE

## 2021-12-13 PROCEDURE — 85025 COMPLETE CBC W/AUTO DIFF WBC: CPT | Performed by: INTERNAL MEDICINE

## 2021-12-13 PROCEDURE — 80197 ASSAY OF TACROLIMUS: CPT | Performed by: INTERNAL MEDICINE

## 2021-12-13 PROCEDURE — 80053 COMPREHEN METABOLIC PANEL: CPT | Performed by: INTERNAL MEDICINE

## 2021-12-14 LAB — TACROLIMUS BLD-MCNC: 9.8 NG/ML (ref 5–15)

## 2021-12-17 ENCOUNTER — TELEPHONE (OUTPATIENT)
Dept: TRANSPLANT | Facility: CLINIC | Age: 51
End: 2021-12-17
Payer: MEDICARE

## 2021-12-17 DIAGNOSIS — Z94.4 LIVER REPLACED BY TRANSPLANT: Primary | ICD-10-CM

## 2022-01-17 NOTE — SUBJECTIVE & OBJECTIVE
Interval History:   Patient reports OK however unable to sleep much during the night.  She denies any chest pain, shortness of breath, nausea, emesis, abdominal pain, or overt signs of bleeding.    Current Facility-Administered Medications   Medication    0.9%  NaCl infusion (for blood administration)    acetaminophen tablet 325 mg    cyclobenzaprine tablet 5 mg    dextrose 50% injection 12.5 g    dextrose 50% injection 25 g    glucagon (human recombinant) injection 1 mg    glucose chewable tablet 16 g    glucose chewable tablet 24 g    lactulose 20 gram/30 mL solution Soln 5 g    levothyroxine tablet 112 mcg    ondansetron injection 4 mg    pantoprazole EC tablet 40 mg    rifAXIMin tablet 550 mg    simethicone chewable tablet 80 mg    sodium chloride 0.9% flush 10 mL    sodium chloride 0.9% flush 10 mL    sodium chloride 0.9% flush 5 mL    trazodone split tablet 25 mg       Objective:     Vital Signs (Most Recent):  Temp: 99.1 °F (37.3 °C) (05/25/19 1347)  Pulse: 95 (05/25/19 1347)  Resp: 18 (05/25/19 1347)  BP: (!) 111/47 (05/25/19 1347)  SpO2: 95 % (05/25/19 1347) Vital Signs (24h Range):  Temp:  [98 °F (36.7 °C)-99.1 °F (37.3 °C)] 99.1 °F (37.3 °C)  Pulse:  [54-98] 95  Resp:  [16-18] 18  SpO2:  [91 %-95 %] 95 %  BP: (105-119)/(47-58) 111/47     Weight: 101.6 kg (224 lb) (05/24/19 1059)  Body mass index is 37.28 kg/m².    Physical Exam   Constitutional: She is oriented to person, place, and time. No distress.   HENT:   Head: Normocephalic and atraumatic.   Eyes: Scleral icterus is present.   Cardiovascular: Normal rate and regular rhythm.   Pulmonary/Chest:   On room air but decreased breath sounds bilaterally   Abdominal: Soft. Bowel sounds are normal. She exhibits distension. She exhibits no mass. There is no tenderness. There is no rebound and no guarding. No hernia.   Musculoskeletal: She exhibits no edema.   Neurological: She is alert and oriented to person, place, and time.   Skin: She is  not diaphoretic.   Nursing note and vitals reviewed.      MELD-Na score: 29 at 5/25/2019  5:56 AM  MELD score: 21 at 5/25/2019  5:13 AM  Calculated from:  Serum Creatinine: 0.8 mg/dL (Rounded to 1 mg/dL) at 5/25/2019  5:13 AM  Serum Sodium: 123 mmol/L (Rounded to 125 mmol/L) at 5/25/2019  5:56 AM  Total Bilirubin: 7.3 mg/dL at 5/25/2019  5:13 AM  INR(ratio): 1.8 at 5/25/2019  5:13 AM  Age: 49 years    Significant Labs:  CBC:   Recent Labs   Lab 05/25/19 0513 05/25/19  0724   WBC 1.84*  --    RBC 2.17*  --    HGB 6.9* 6.7*   HCT 19.7* 19.5*   PLT 46*  --      CMP:   Recent Labs   Lab 05/25/19 0513 05/25/19  0556   GLU 79  --    CALCIUM 8.2*  --    ALBUMIN 2.8*  --    PROT 5.8*  --    * 123*   K 4.2  --    CO2 23  --    CL 95  --    BUN 16  --    CREATININE 0.8  --    ALKPHOS 103  --    ALT 35  --    AST 95*  --    BILITOT 7.3*  --      Coagulation:   Recent Labs   Lab 05/25/19 0513   INR 1.8*       Significant Imaging:  X-Ray: Reviewed  US: Reviewed  CT: Reviewed   Normal rate, regular rhythm.  Heart sounds S1, S2.

## 2022-01-18 ENCOUNTER — LAB VISIT (OUTPATIENT)
Dept: LAB | Facility: HOSPITAL | Age: 52
End: 2022-01-18
Attending: INTERNAL MEDICINE
Payer: COMMERCIAL

## 2022-01-18 DIAGNOSIS — Z94.4 LIVER REPLACED BY TRANSPLANT: ICD-10-CM

## 2022-01-18 LAB
ALBUMIN SERPL BCP-MCNC: 4.2 G/DL (ref 3.5–5.2)
ALP SERPL-CCNC: 62 U/L (ref 55–135)
ALT SERPL W/O P-5'-P-CCNC: 16 U/L (ref 10–44)
ANION GAP SERPL CALC-SCNC: 12 MMOL/L (ref 8–16)
AST SERPL-CCNC: 18 U/L (ref 10–40)
BASOPHILS # BLD AUTO: 0.03 K/UL (ref 0–0.2)
BASOPHILS NFR BLD: 1 % (ref 0–1.9)
BILIRUB SERPL-MCNC: 0.9 MG/DL (ref 0.1–1)
BUN SERPL-MCNC: 11 MG/DL (ref 6–20)
CALCIUM SERPL-MCNC: 9.4 MG/DL (ref 8.7–10.5)
CHLORIDE SERPL-SCNC: 104 MMOL/L (ref 95–110)
CO2 SERPL-SCNC: 25 MMOL/L (ref 23–29)
CREAT SERPL-MCNC: 0.8 MG/DL (ref 0.5–1.4)
DIFFERENTIAL METHOD: ABNORMAL
EOSINOPHIL # BLD AUTO: 0.1 K/UL (ref 0–0.5)
EOSINOPHIL NFR BLD: 3.1 % (ref 0–8)
ERYTHROCYTE [DISTWIDTH] IN BLOOD BY AUTOMATED COUNT: 13.3 % (ref 11.5–14.5)
EST. GFR  (AFRICAN AMERICAN): >60 ML/MIN/1.73 M^2
EST. GFR  (NON AFRICAN AMERICAN): >60 ML/MIN/1.73 M^2
GLUCOSE SERPL-MCNC: 93 MG/DL (ref 70–110)
HCT VFR BLD AUTO: 35.9 % (ref 37–48.5)
HGB BLD-MCNC: 12.4 G/DL (ref 12–16)
IMM GRANULOCYTES # BLD AUTO: 0.01 K/UL (ref 0–0.04)
IMM GRANULOCYTES NFR BLD AUTO: 0.3 % (ref 0–0.5)
LYMPHOCYTES # BLD AUTO: 0.5 K/UL (ref 1–4.8)
LYMPHOCYTES NFR BLD: 18.5 % (ref 18–48)
MCH RBC QN AUTO: 28 PG (ref 27–31)
MCHC RBC AUTO-ENTMCNC: 34.5 G/DL (ref 32–36)
MCV RBC AUTO: 81 FL (ref 82–98)
MONOCYTES # BLD AUTO: 0.2 K/UL (ref 0.3–1)
MONOCYTES NFR BLD: 8.4 % (ref 4–15)
NEUTROPHILS # BLD AUTO: 2 K/UL (ref 1.8–7.7)
NEUTROPHILS NFR BLD: 68.7 % (ref 38–73)
NRBC BLD-RTO: 0 /100 WBC
PLATELET # BLD AUTO: 155 K/UL (ref 150–450)
PMV BLD AUTO: 9.8 FL (ref 9.2–12.9)
POTASSIUM SERPL-SCNC: 4.7 MMOL/L (ref 3.5–5.1)
PROT SERPL-MCNC: 7 G/DL (ref 6–8.4)
RBC # BLD AUTO: 4.43 M/UL (ref 4–5.4)
SODIUM SERPL-SCNC: 141 MMOL/L (ref 136–145)
WBC # BLD AUTO: 2.87 K/UL (ref 3.9–12.7)

## 2022-01-18 PROCEDURE — 85025 COMPLETE CBC W/AUTO DIFF WBC: CPT | Performed by: INTERNAL MEDICINE

## 2022-01-18 PROCEDURE — 80053 COMPREHEN METABOLIC PANEL: CPT | Performed by: INTERNAL MEDICINE

## 2022-01-18 PROCEDURE — 80197 ASSAY OF TACROLIMUS: CPT | Performed by: INTERNAL MEDICINE

## 2022-01-18 PROCEDURE — 36415 COLL VENOUS BLD VENIPUNCTURE: CPT | Performed by: INTERNAL MEDICINE

## 2022-01-19 ENCOUNTER — PATIENT MESSAGE (OUTPATIENT)
Dept: TRANSPLANT | Facility: CLINIC | Age: 52
End: 2022-01-19
Payer: MEDICARE

## 2022-01-19 LAB — TACROLIMUS BLD-MCNC: 8.9 NG/ML (ref 5–15)

## 2022-01-20 ENCOUNTER — TELEPHONE (OUTPATIENT)
Dept: TRANSPLANT | Facility: CLINIC | Age: 52
End: 2022-01-20
Payer: MEDICARE

## 2022-01-20 NOTE — TELEPHONE ENCOUNTER
----- Message from Dora Mayorga sent at 1/20/2022 12:19 PM CST -----  Regarding: results  Patient is unable to get on [id number] to see messages.  Would like that when her lab results come in and you need to speak with her, that she would prefer a phone call    Jihan  @ [phone]

## 2022-01-21 ENCOUNTER — TELEPHONE (OUTPATIENT)
Dept: TRANSPLANT | Facility: CLINIC | Age: 52
End: 2022-01-21
Payer: MEDICARE

## 2022-01-21 DIAGNOSIS — Z94.4 LIVER REPLACED BY TRANSPLANT: Primary | ICD-10-CM

## 2022-01-25 ENCOUNTER — TELEPHONE (OUTPATIENT)
Dept: TRANSPLANT | Facility: CLINIC | Age: 52
End: 2022-01-25
Payer: MEDICARE

## 2022-01-25 NOTE — TELEPHONE ENCOUNTER
----- Message from Eb Swanson sent at 1/25/2022  4:46 PM CST -----  Regarding: FW: labs  Patient calling concerning labs.     Call: [phone]     ----- Message -----  From: Raquel Dolan  Sent: 1/25/2022   4:27 PM CST  To: [facility] Pre-Liver Transplant Non-Clinical  Subject: labs                                             Patient calling concerning labs.    Call: [phone]

## 2022-01-29 ENCOUNTER — NURSE TRIAGE (OUTPATIENT)
Dept: ADMINISTRATIVE | Facility: CLINIC | Age: 52
End: 2022-01-29
Payer: MEDICARE

## 2022-01-29 NOTE — TELEPHONE ENCOUNTER
"Pt was calling to see how to find out what variant she had.   She was tested on Wednesday of this week and was covid positive, states she is feeling OK, but started to have vomiting with diarrhea yesterday.  Pt is a post liver transplant X3 years and doing well with that she added.  Pt care advice states to go to ED now, her  is there and will take her.  All instructions were verbally understood, all questions answered. Pt going to [facility] in [place] as its close to her home.      Reason for Disposition   High-risk adult (e.g., diabetes mellitus, brain tumor, V-P shunt, hernia)    Additional Information   Negative: Shock suspected (e.g., cold/pale/clammy skin, too weak to stand, low BP, rapid pulse)   Negative: Difficult to awaken or acting confused (e.g., disoriented, slurred speech)   Negative: Sounds like a life-threatening emergency to the triager   Negative: Vomiting occurs only while coughing   Negative: [1] Pregnant < 20 Weeks AND [2] nausea/vomiting began in early pregnancy (i.e., 4-8 weeks pregnant)   Negative: Chest pain   Negative: Headache is main symptom   Negative: Vomiting (or Nausea) in a cancer patient who is currently (or recently) receiving chemotherapy or radiation therapy, or cancer patient who has metastatic or end-stage cancer and is receiving palliative care   Negative: [1] Vomiting AND [2] contains red blood or black ("coffee ground") material  (Exception: few red streaks in vomit that only happened once)   Negative: Severe pain in one eye   Negative: Recent head injury (within last 3 days)   Negative: Recent abdominal injury (within last 3 days)   Negative: [1] Insulin-dependent diabetes (Type I) AND [2] glucose > 400 mg/dl (22 mmol/l)   Negative: [1] Vomiting AND [2] hernia is more painful or swollen than usual    Protocols used: VOMITING-A-AH      "

## 2022-01-31 ENCOUNTER — TELEPHONE (OUTPATIENT)
Dept: TRANSPLANT | Facility: CLINIC | Age: 52
End: 2022-01-31
Payer: MEDICARE

## 2022-01-31 NOTE — TELEPHONE ENCOUNTER
----- Message from Consuelo Bey sent at 1/31/2022  3:19 PM CST -----  Regarding: Patient Update  Contact: Jihan Lin called to let coordinator know she tested positive for Covid-19 on 01/26/2022, and would like to know what the next step is for her? She states her symptoms were vomiting and diarrhea.     Contact: [phone]

## 2022-01-31 NOTE — TELEPHONE ENCOUNTER
Spoke to patient; she reported she went to Local ER , tested + for COVID, had vomiting and diarrhes, gone now, she said. Feels fine.  Has been quarantine x 5 days. Instructed her to quarantine for total 20 days ; call back if symptoms worsen or come back. Patient voiced understanding.

## 2022-02-21 ENCOUNTER — LAB VISIT (OUTPATIENT)
Dept: LAB | Facility: HOSPITAL | Age: 52
End: 2022-02-21
Attending: INTERNAL MEDICINE
Payer: MEDICARE

## 2022-02-21 DIAGNOSIS — Z94.4 LIVER REPLACED BY TRANSPLANT: ICD-10-CM

## 2022-02-21 LAB
ALBUMIN SERPL BCP-MCNC: 4.1 G/DL (ref 3.5–5.2)
ALP SERPL-CCNC: 66 U/L (ref 55–135)
ALT SERPL W/O P-5'-P-CCNC: 13 U/L (ref 10–44)
ANION GAP SERPL CALC-SCNC: 12 MMOL/L (ref 8–16)
AST SERPL-CCNC: 14 U/L (ref 10–40)
BASOPHILS # BLD AUTO: 0.03 K/UL (ref 0–0.2)
BASOPHILS NFR BLD: 0.9 % (ref 0–1.9)
BILIRUB SERPL-MCNC: 0.7 MG/DL (ref 0.1–1)
BUN SERPL-MCNC: 13 MG/DL (ref 6–20)
CALCIUM SERPL-MCNC: 9.2 MG/DL (ref 8.7–10.5)
CHLORIDE SERPL-SCNC: 104 MMOL/L (ref 95–110)
CO2 SERPL-SCNC: 25 MMOL/L (ref 23–29)
CREAT SERPL-MCNC: 0.8 MG/DL (ref 0.5–1.4)
DIFFERENTIAL METHOD: ABNORMAL
EOSINOPHIL # BLD AUTO: 0.1 K/UL (ref 0–0.5)
EOSINOPHIL NFR BLD: 2.2 % (ref 0–8)
ERYTHROCYTE [DISTWIDTH] IN BLOOD BY AUTOMATED COUNT: 13.2 % (ref 11.5–14.5)
EST. GFR  (AFRICAN AMERICAN): >60 ML/MIN/1.73 M^2
EST. GFR  (NON AFRICAN AMERICAN): >60 ML/MIN/1.73 M^2
GLUCOSE SERPL-MCNC: 92 MG/DL (ref 70–110)
HCT VFR BLD AUTO: 37.1 % (ref 37–48.5)
HGB BLD-MCNC: 12.7 G/DL (ref 12–16)
IMM GRANULOCYTES # BLD AUTO: 0.01 K/UL (ref 0–0.04)
IMM GRANULOCYTES NFR BLD AUTO: 0.3 % (ref 0–0.5)
LYMPHOCYTES # BLD AUTO: 0.6 K/UL (ref 1–4.8)
LYMPHOCYTES NFR BLD: 17 % (ref 18–48)
MCH RBC QN AUTO: 28 PG (ref 27–31)
MCHC RBC AUTO-ENTMCNC: 34.2 G/DL (ref 32–36)
MCV RBC AUTO: 82 FL (ref 82–98)
MONOCYTES # BLD AUTO: 0.3 K/UL (ref 0.3–1)
MONOCYTES NFR BLD: 9.6 % (ref 4–15)
NEUTROPHILS # BLD AUTO: 2.3 K/UL (ref 1.8–7.7)
NEUTROPHILS NFR BLD: 70 % (ref 38–73)
NRBC BLD-RTO: 0 /100 WBC
PLATELET # BLD AUTO: 153 K/UL (ref 150–450)
PMV BLD AUTO: 9.8 FL (ref 9.2–12.9)
POTASSIUM SERPL-SCNC: 4.1 MMOL/L (ref 3.5–5.1)
PROT SERPL-MCNC: 6.6 G/DL (ref 6–8.4)
RBC # BLD AUTO: 4.53 M/UL (ref 4–5.4)
SODIUM SERPL-SCNC: 141 MMOL/L (ref 136–145)
WBC # BLD AUTO: 3.23 K/UL (ref 3.9–12.7)

## 2022-02-21 PROCEDURE — 80197 ASSAY OF TACROLIMUS: CPT | Performed by: INTERNAL MEDICINE

## 2022-02-21 PROCEDURE — 80053 COMPREHEN METABOLIC PANEL: CPT | Performed by: INTERNAL MEDICINE

## 2022-02-21 PROCEDURE — 85025 COMPLETE CBC W/AUTO DIFF WBC: CPT | Performed by: INTERNAL MEDICINE

## 2022-02-21 PROCEDURE — 36415 COLL VENOUS BLD VENIPUNCTURE: CPT | Performed by: INTERNAL MEDICINE

## 2022-02-22 LAB — TACROLIMUS BLD-MCNC: 8 NG/ML (ref 5–15)

## 2022-02-23 ENCOUNTER — TELEPHONE (OUTPATIENT)
Dept: TRANSPLANT | Facility: CLINIC | Age: 52
End: 2022-02-23
Payer: MEDICARE

## 2022-02-23 DIAGNOSIS — Z94.4 LIVER REPLACED BY TRANSPLANT: Primary | ICD-10-CM

## 2022-03-28 ENCOUNTER — LAB VISIT (OUTPATIENT)
Dept: LAB | Facility: HOSPITAL | Age: 52
End: 2022-03-28
Attending: INTERNAL MEDICINE
Payer: MEDICARE

## 2022-03-28 DIAGNOSIS — Z94.4 LIVER REPLACED BY TRANSPLANT: ICD-10-CM

## 2022-03-28 LAB
ALBUMIN SERPL BCP-MCNC: 4.3 G/DL (ref 3.5–5.2)
ALP SERPL-CCNC: 71 U/L (ref 55–135)
ALT SERPL W/O P-5'-P-CCNC: 17 U/L (ref 10–44)
ANION GAP SERPL CALC-SCNC: 11 MMOL/L (ref 8–16)
AST SERPL-CCNC: 17 U/L (ref 10–40)
BASOPHILS # BLD AUTO: 0.02 K/UL (ref 0–0.2)
BASOPHILS NFR BLD: 0.6 % (ref 0–1.9)
BILIRUB SERPL-MCNC: 0.8 MG/DL (ref 0.1–1)
BUN SERPL-MCNC: 11 MG/DL (ref 6–20)
CALCIUM SERPL-MCNC: 9.7 MG/DL (ref 8.7–10.5)
CHLORIDE SERPL-SCNC: 103 MMOL/L (ref 95–110)
CO2 SERPL-SCNC: 28 MMOL/L (ref 23–29)
CREAT SERPL-MCNC: 0.9 MG/DL (ref 0.5–1.4)
DIFFERENTIAL METHOD: ABNORMAL
EOSINOPHIL # BLD AUTO: 0.1 K/UL (ref 0–0.5)
EOSINOPHIL NFR BLD: 2.4 % (ref 0–8)
ERYTHROCYTE [DISTWIDTH] IN BLOOD BY AUTOMATED COUNT: 13.2 % (ref 11.5–14.5)
EST. GFR  (AFRICAN AMERICAN): >60 ML/MIN/1.73 M^2
EST. GFR  (NON AFRICAN AMERICAN): >60 ML/MIN/1.73 M^2
GLUCOSE SERPL-MCNC: 88 MG/DL (ref 70–110)
HCT VFR BLD AUTO: 37.7 % (ref 37–48.5)
HGB BLD-MCNC: 12.9 G/DL (ref 12–16)
IMM GRANULOCYTES # BLD AUTO: 0.01 K/UL (ref 0–0.04)
IMM GRANULOCYTES NFR BLD AUTO: 0.3 % (ref 0–0.5)
LYMPHOCYTES # BLD AUTO: 0.5 K/UL (ref 1–4.8)
LYMPHOCYTES NFR BLD: 13.4 % (ref 18–48)
MCH RBC QN AUTO: 28.4 PG (ref 27–31)
MCHC RBC AUTO-ENTMCNC: 34.2 G/DL (ref 32–36)
MCV RBC AUTO: 83 FL (ref 82–98)
MONOCYTES # BLD AUTO: 0.3 K/UL (ref 0.3–1)
MONOCYTES NFR BLD: 7.4 % (ref 4–15)
NEUTROPHILS # BLD AUTO: 2.6 K/UL (ref 1.8–7.7)
NEUTROPHILS NFR BLD: 75.9 % (ref 38–73)
NRBC BLD-RTO: 0 /100 WBC
PLATELET # BLD AUTO: 177 K/UL (ref 150–450)
PMV BLD AUTO: 10.2 FL (ref 9.2–12.9)
POTASSIUM SERPL-SCNC: 3.9 MMOL/L (ref 3.5–5.1)
PROT SERPL-MCNC: 7.2 G/DL (ref 6–8.4)
RBC # BLD AUTO: 4.54 M/UL (ref 4–5.4)
SODIUM SERPL-SCNC: 142 MMOL/L (ref 136–145)
WBC # BLD AUTO: 3.36 K/UL (ref 3.9–12.7)

## 2022-03-28 PROCEDURE — 36415 COLL VENOUS BLD VENIPUNCTURE: CPT | Performed by: INTERNAL MEDICINE

## 2022-03-28 PROCEDURE — 80053 COMPREHEN METABOLIC PANEL: CPT | Performed by: INTERNAL MEDICINE

## 2022-03-28 PROCEDURE — 85025 COMPLETE CBC W/AUTO DIFF WBC: CPT | Performed by: INTERNAL MEDICINE

## 2022-03-28 PROCEDURE — 80197 ASSAY OF TACROLIMUS: CPT | Performed by: INTERNAL MEDICINE

## 2022-03-29 ENCOUNTER — TELEPHONE (OUTPATIENT)
Dept: TRANSPLANT | Facility: CLINIC | Age: 52
End: 2022-03-29
Payer: MEDICARE

## 2022-03-29 DIAGNOSIS — Z94.4 LIVER REPLACED BY TRANSPLANT: Primary | ICD-10-CM

## 2022-03-29 LAB — TACROLIMUS BLD-MCNC: 10.3 NG/ML (ref 5–15)

## 2022-04-13 NOTE — PROGRESS NOTES
"[facility]  Liver Transplant  Progress Note    Patient Name: Jihan Zamudio  MRN: [id number]  Admission Date: 2019  Hospital Length of Stay: 12 days  Code Status: Full Code  Primary Care Provider: Primary Doctor No  Post-Operative Day: 42    ORGAN:   LIVER  Disease Etiology: Cirrhosis: Fatty Liver ([place])  Donor Type:    - Brain Death  [facility] High Risk:   No  Donor CMV Status:   Donor CMV Status: Positive  Donor HBcAB:   Negative  Donor HCV Status:   Negative  Donor HBV GERTRUDIS: Negative  Donor HCV GERTRUDIS: Negative  Whole or Partial: Whole Liver  Biliary Anastomosis: End to End  Arterial Anatomy: Standard  Subjective:     History of Present Illness:  Ms. Zamudio is a 48 y/o F s/p DBD OLTx (SM induction, CMV D+R+) for KESSLER cirrhosis on 19. Post transplant course significant for acute parenchymal hemorrhage within the right occipital lobe near the parieto-occipital junction measuring approximately 1.8 x 1.3 x 1.5 cm with mild surrounding vasogenic edema and localized mass effect after fall on . She was discharged home on 7 days of Keppra but nuerosurgery, completed on . She now presents to the ED with 1 day history of fever. Homehealth nurse checked patient's temperature and was noted to be elevated. Home health nurse also reported patient acting "off" with mental status change. Currently in the ED, tmax is 102.3. Patient feels well with no true complaints expect rhinorrhea. She denies chills, diarrhea, nausea/vomiting, abdominal pain. Denies sick contacts. Will admit for infectious work up (blood cx, UA, urine cx, CMV PCR, chest x-ray, CT A/P). Patient currently with no altered mental status. In light of recent hx of occipital hemorrhage, will obtain CT head without contrast.      Hospital Course:  48 y/o F s/p OLTx for KESSLER cirrhosis on  who was admitted on  for fevers. Pt febrile on admission, started on bx abx. ID consulted. Blood cultures NGTD, UA negative. CMV PCR drawn " on admit pending. Pt having loose stools, C diff EIA negative, CMV undetected. CT head showed reduction in size of hemorrhage. Abdominal CT reviewed and with moderate right pleural effusion. CXR with increased pleural effusion. Thora and para done 7/9; amount of fluid removed not recorded and pleural fluid labs were not completed as ordered. Abdominal gram stain reveals no WBC and no organisms; cell count not completed as ordered, cultures pending. TTE done 7/9 WNL, no vegetations. Pt tachy and tachypneic on 7/9; 1 L bolus given and pt responded well. 1 u pRBC transfused 7/9 for low H/H. Pt continued to have diarrhea, additional stool studies pending. Mentation continued to wax and wane, MRI w/ and w/o contrast done on 7/11 which revealed stable findings compared to prior. Pt transitioned to IV abx to PO cefpodoxime and flagyl on 7/11 per ID recs. Urine culture from 7/11 resulted as positive for VRE, thus cefpodoxime and flagyl stopped and pt started on zyvox on 7/12. Pt with worsening MS on 7/13 and claimed she had an unwitnessed seizure. STAT CT head without acute change. Keppra 500 mg bid started. Prograf and lovenox dc'ed. Stopped zyvox due to potential to lower seizure threshold and started IV daptomycin. IV cefepime restarted. Zyprexa qhs started for agitation and anxiety. Pt continued to have fevers despite therapeutic IV abx for VRE UTI. Viral and fungal labs pending per ID recs. 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  cc q8h started. Pt with persistent AMS, thus psych consulted who believe she is suffering from multifactorial delirium. PO thiamine and delirium precautions started. Unable to perform LP as unsafe due to ICH. CT a/p repeated on 7/15, revealed R pleural effusion, moderate ascites, and fluid in adam hepatis; no walled off fluid collections suggestive of abscess. Para repeated on 7/16 as labs not collected during para done on 7/9. Para labs on 7/16 negative for  infection.    Interval history: No acute events overnight. Mentation greatly improved this morning, although pt having visual hallucinations and disoriented to time and situation last night per nursing note. She AAO x 4 this AM and denies both hallucinations and seizures/tremors. Pt denies any complaints at this time and wants to go home.  Pt afebrile since AM of 7/14 and blood cultures drawn on 713 and 7/14 remain NGTD. Para repeated yesterday, abdominal fluid cell count 213 WBCs, 25% segs, cultures pending- infection unlikely. Viral and fungal labs still pending. Stop empiric cefepime. Will stop daptomycin for VRE UTI after last dose tomorrow. Stopped acyclovir, restarted prophylactic valcyte. Restart prograf today. Continue hydrocortisone. Continue keppra bid, zyprexa, qhs, and delirium precautions. PT/OT recommending inpatient rehab upon discharge, PMR consulted. Continue to monitor.    Scheduled Meds:   DAPTOmycin (CUBICIN)  IV  6 mg/kg Intravenous Q24H    hydrocortisone sodium succinate  50 mg Intravenous Q8H    levETIRAcetam  500 mg Oral BID    levothyroxine  112 mcg Oral Before breakfast    magnesium oxide  400 mg Oral Daily    OLANZapine  7.5 mg Oral QHS    sodium bicarbonate  650 mg Oral BID    sulfamethoxazole-trimethoprim 400-80mg  1 tablet Oral Daily    tacrolimus  1 mg Oral BID    thiamine  100 mg Oral Daily    valGANciclovir  450 mg Oral Daily     Continuous Infusions:  PRN Meds:acetaminophen, calcium carbonate, dextrose 50%, dextrose 50%, glucagon (human recombinant), glucose, glucose, hydrALAZINE, insulin aspart U-100, iohexol, OLANZapine, ondansetron, sodium chloride 0.9%    Review of Systems   Constitutional: Positive for activity change and appetite change. Negative for chills and fever.   HENT: Negative for congestion and facial swelling.    Eyes: Negative for pain, discharge and visual disturbance.   Respiratory: Negative for cough, chest tightness, shortness of breath and wheezing.     Cardiovascular: Negative for chest pain, palpitations and leg swelling.   Gastrointestinal: Negative for abdominal distention, abdominal pain, diarrhea, nausea and vomiting.   Endocrine: Negative.    Genitourinary: Negative for decreased urine volume, difficulty urinating and dysuria.   Skin: Positive for wound (chevron well healed).   Allergic/Immunologic: Positive for immunocompromised state.   Neurological: Positive for weakness. Negative for tremors, seizures, speech difficulty and light-headedness.   Psychiatric/Behavioral: Positive for decreased concentration and dysphoric mood. Negative for agitation and confusion. The patient is not nervous/anxious.      Objective:     Vital Signs (Most Recent):  Temp: 97.7 °F (36.5 °C) (07/17/19 1220)  Pulse: 80 (07/17/19 1220)  Resp: 14 (07/17/19 1220)  BP: (!) 149/70 (07/17/19 1220)  SpO2: 98 % (07/17/19 1220) Vital Signs (24h Range):  Temp:  [97.7 °F (36.5 °C)-98.4 °F (36.9 °C)] 97.7 °F (36.5 °C)  Pulse:  [74-94] 80  Resp:  [14-18] 14  SpO2:  [97 %-99 %] 98 %  BP: (144-160)/(70-88) 149/70     Weight: 84.8 kg (186 lb 15.2 oz)  Body mass index is 32.09 kg/m².    Intake/Output - Last 3 Shifts       07/15 0700 - 07/16 0659 07/16 0700 - 07/17 0659 07/17 0700 - 07/18 0659    P.O. 350 1200 360    I.V. (mL/kg) 0 (0) 0 (0)     Other  0     IV Piggyback 1750 2050     Total Intake(mL/kg) 2100 (25.7) 3250 (38.3) 360 (4.2)    Urine (mL/kg/hr) 500 (0.3) 1300 (0.6)     Emesis/NG output  0     Other  0     Stool 0 0     Blood  0     Total Output 500 1300     Net +1600 +1950 +360           Urine Occurrence 4 x 2 x     Stool Occurrence 3 x 2 x     Emesis Occurrence 0 x 0 x           Physical Exam   Constitutional: She is oriented to person, place, and time. She appears well-developed. No distress.   HENT:   Head: Normocephalic and atraumatic.   Eyes: No scleral icterus.   R pupil > L pupil; this is lifelong per    Neck: Normal range of motion. Neck supple. No thyromegaly present.  "  Cardiovascular: Normal rate and regular rhythm. Exam reveals no friction rub.   No murmur heard.  Pulmonary/Chest: Effort normal. She has decreased breath sounds in the right middle field, the right lower field and the left lower field. She has no wheezes. She has no rales.   Diminished RLL   Abdominal: Soft. She exhibits no distension. There is no tenderness. There is no rebound and no guarding.   Healed chevron incision   Musculoskeletal: Normal range of motion.   Neurological: She is alert and oriented to person, place, and time. She is not disoriented.   AAO x 4   Skin: Skin is warm and dry. She is not diaphoretic.   Psychiatric: Her behavior is normal. Judgment and thought content normal. Her mood appears anxious. Her speech is delayed. Cognition and memory are normal.   Nursing note and vitals reviewed.      Laboratory:  Immunosuppressants         Stop Route Frequency     tacrolimus capsule 1 mg      -- Oral 2 times daily        CBC:   Recent Labs   Lab 07/17/19  0655   WBC 6.09   RBC 2.91*   HGB 8.1*   HCT 26.0*      MCV 89   MCH 27.8   MCHC 31.2*     CMP:   Recent Labs   Lab 07/17/19  0655   *   CALCIUM 8.1*   ALBUMIN 2.1*   PROT 5.3*      K 3.8   CO2 18*   *   BUN 15   CREATININE 0.6   ALKPHOS 163*   ALT 18   AST 32   BILITOT 0.4     Labs within the past 24 hours have been reviewed.    Diagnostic Results:  None    Debility/Functional status: Patient debilitated by evidence of Muscle wasting and atrophy, Weakness, Chronic fatigue, unspecified, Limitation of activities due to disability and Other reduced mobility. Physical and occupational therapy ordered daily to evaluate and treat. Debility was: present on admission.    Assessment/Plan:     * Seizure-like activity  - pt seen sitting in chair AM of 7/13 stating "I just had a seizure, I just finished banging my head on the chair."  - pt couldn't recall events surrounding seizure like activity.  - given recent hx of fall with ICH on " 6/24, Neuro consulted in which pt had a very inconsistent neuro exam.   - Keppra 500 mg bid restarted and a STAT CT head performed which did not show a new infarct or bleed.  - dc'd Lovenox.   - prograf held for now. Started hydrocortisone.   - seizures likely psychogenic in nature as pt able to stop shaking to answer questions  - restarted prograf on 7/17; continue keppra for now  - Monitor.       Fever  - Infectious work up ordered on admit - blood cx NGTD, UA unremarkable, CMV PCR 7/5 undetected, chest x-ray w right pleural effusion, CT A/P reviewed.  - Started broad spectrum antibiotics on admit.   - Abdominal and head CT reviewed and no clear source for fever.   - ID consulted.  Apprec recs.   - Pt with intermittent confusion.  Easily re oriented.  Continue to monitor.   - Thora and para done 7/9; amount of fluid removed not recorded and pleural fluid labs were not completed as ordered.   - Abdominal gram stain reveals no WBC and no organisms; cell count not completed as ordered; pleural fluid cell count 84 WBCs, 24% segs.  - Pt still reports diarrhea- C diff negative, CMV undetected. Stool culture, WBC, ova cysts parasites, and GI pathogen panel pending.   - low grade fever 7/12, 100.8.  - deescalated abx to PO cefpodoxime and flagyl on 7/12.  - MRI head w/ and w/o contrast 7/12 without acute changes.  - Urine culture positive for enterococcus faecium- started Zyvox and dc'd cefpodoxime and flagyl 7/12.  - temp 100.4 on 7/13.  - Zyvox changed to Dapto as pt with seizure like activity earlier in morning and zyvox can lower seizure threshold.  - repeat blood cultures ngtd.  - ammonia level wnl.   - pt also with confusion, AMS. D/w ID - restarted Cefepime.  - procalcitonin, lactate, EBV, RPR, fungitell, aspergillus, HIV, Saadia Delacruz, histoplasma antigen and blastomyces antigen and CMV PCR pending.  - Start Acyclovir treatment dose for possible viral meningitis.   - repeat blood cx on 7/13 and 7/14 remain NGTD.  Last fever 102.3 F on 7/14 at 9 AM  - CT a/p 7/15 continued to redemonstrate R pleural effusion, moderate ascites, and fluid in adam hepatis; no walled off fluid collections suggestive of abscess.   - paracentesis done 7/16 to reassess for infection as cell count and other labs not sent off when pt underwent para on 7/9. Abdominal fluid labs negative for infection  - Stop empiric cefepime.   -Will stop daptomycin for VRE UTI after last dose tomorrow.   -Stopped acyclovir, restarted prophylactic valcyte.   -continue to monitor      UTI (urinary tract infection) due to Enterococcus  - UA 7/5 unremarkable.  - urine culture 7/11 positive for enterococcus faecium- susceptibilities pending.  - dc'd cefpodoxime and flagyl on 7/12.  - started Zyvox on 7/12.  - ID transitioned Zyvox to Dapto on 7/13 as Zyvox can lower the seizure threshold.  - stop dapto 7/18  - continue to monitor.    Delirium  - pt with intermittent disorientation to situation throughout stay  - CT head on 7/5 and 7/13 with no acute change; MRI head w/ contrast on 7/11 with no acute change  - ammonia WNL  - pt with acute personality change on 7/13- throwing items at nurses and agitated  - zyprexa qhs started 7/13; PRN zyprexa added 7/16  - psych consulted 7/15; believe she is suffering from multifactorial delirium 2/2 infection & ICH  - unable to do LP as unsafe at the moment due to recent ICH  - PO thiamine started.   - Delirium precautions started.   - mentation greatly improved today 7/17  - continue to monitor      Pressure injury of buttock, stage 1  - wound care following, appreciate recs      Hypokalemia  - replace as needed.  - Kdur 40 meq po x 1 dose.   - monitor.       Traumatic intracranial hemorrhage without loss of consciousness  - Head CT reviewed and noted with interval reduction in size of the patient's known right occipital lobe parenchymal hemorrhage.      At risk for opportunistic infections  - cont bactrim for PCP prophylaxis.  - (CMV  D+/R+) hold Valcyte.   - Acyclovir started 7/14.      Closed head injury  - admitted recently following fall diagnosed w acute parenchymal hemorrhage within right occipital live near parieto-occipital junction 1.8x1.3x1.5 w mild surrounding vasogenic edema and localized mass.  Repeat CT head showed reduction in size of hemorrhage.  - pt denies HA.      Long-term use of immunosuppressant medication  - holding prograf 7/13 due to seizure like activity.  - Holding MMF. Monitor prograf level daily, monitor for toxic side effects, and adjust for therapeutic dose.   - hydrocortisone 7/14.   - restarted prograf 7/17. Continue hydrocortisone for now    Prophylactic immunotherapy  - See long term use of immunosuppression.  Decreased as likely w sepsis.    S/P liver transplant  - LFTs stable.  - Pt with good hepatic allograft function.   - Last Liver US 6/24 showed Mildly elevated hepatic arterial resistive indices, although improved from prior exam.  Otherwise, satisfactory vascular appearance of the liver, complex fluid collection adjacent to the left hepatic lobe, similar to slightly smaller compared to prior exam, small volume of ascites and partially visualized right pleural effusion.   - para completed 7/9, cultures pending. Cell count not collected as ordered.   - para repeated 7/16- abdominal fluid labs negative for infection    Anemia of chronic disease  - no overt bleeding.    - keep type and screen current.  - transfused 1u prbc 7/9.  - monitor with daily labs.      Pleural effusion, right  - CXR reviewed.    - Thora done 7/9  - pleural fluid labs not completed as ordered- will try to get labs repeated if specimen is still available.  - cell count: 84 WBCs, 24% segs      Weakness  - PT/OT consulted  - PMR consulted to evaluate for inpatient rehab candidacy, appreciate recs      Moderate malnutrition  - supplements ordered.      GERD (gastroesophageal reflux disease)  - cont Pepcid.          VTE Risk Mitigation  (From admission, onward)        Ordered     Place sequential compression device  Until discontinued      07/05/19 1659     IP VTE HIGH RISK PATIENT  Once      07/05/19 1659          The patients clinical status was discussed at multidisplinary rounds, involving transplant surgery, transplant medicine, pharmacy, nursing, nutrition, and social work    Discharge Planning:  No Patient Care Coordination Note on file.      Amy Saunders PA-C  Liver Transplant  [facility]   Performing Laboratory: [phone] Expected Date Of Service: 04/13/2022 Billing Type: Third-Party Bill Bill For Surgical Tray: no

## 2022-05-09 ENCOUNTER — LAB VISIT (OUTPATIENT)
Dept: LAB | Facility: HOSPITAL | Age: 52
End: 2022-05-09
Attending: INTERNAL MEDICINE
Payer: MEDICARE

## 2022-05-09 DIAGNOSIS — Z94.4 LIVER REPLACED BY TRANSPLANT: ICD-10-CM

## 2022-05-09 LAB
ALBUMIN SERPL BCP-MCNC: 4.1 G/DL (ref 3.5–5.2)
ALP SERPL-CCNC: 68 U/L (ref 55–135)
ALT SERPL W/O P-5'-P-CCNC: 12 U/L (ref 10–44)
ANION GAP SERPL CALC-SCNC: 11 MMOL/L (ref 8–16)
AST SERPL-CCNC: 15 U/L (ref 10–40)
BASOPHILS # BLD AUTO: 0.02 K/UL (ref 0–0.2)
BASOPHILS NFR BLD: 0.5 % (ref 0–1.9)
BILIRUB SERPL-MCNC: 0.8 MG/DL (ref 0.1–1)
BUN SERPL-MCNC: 11 MG/DL (ref 6–20)
CALCIUM SERPL-MCNC: 9.2 MG/DL (ref 8.7–10.5)
CHLORIDE SERPL-SCNC: 105 MMOL/L (ref 95–110)
CO2 SERPL-SCNC: 25 MMOL/L (ref 23–29)
CREAT SERPL-MCNC: 0.8 MG/DL (ref 0.5–1.4)
DIFFERENTIAL METHOD: ABNORMAL
EOSINOPHIL # BLD AUTO: 0.1 K/UL (ref 0–0.5)
EOSINOPHIL NFR BLD: 2.4 % (ref 0–8)
ERYTHROCYTE [DISTWIDTH] IN BLOOD BY AUTOMATED COUNT: 13.7 % (ref 11.5–14.5)
EST. GFR  (AFRICAN AMERICAN): >60 ML/MIN/1.73 M^2
EST. GFR  (NON AFRICAN AMERICAN): >60 ML/MIN/1.73 M^2
GLUCOSE SERPL-MCNC: 90 MG/DL (ref 70–110)
HCT VFR BLD AUTO: 35.6 % (ref 37–48.5)
HGB BLD-MCNC: 12.3 G/DL (ref 12–16)
IMM GRANULOCYTES # BLD AUTO: 0.01 K/UL (ref 0–0.04)
IMM GRANULOCYTES NFR BLD AUTO: 0.3 % (ref 0–0.5)
LYMPHOCYTES # BLD AUTO: 0.5 K/UL (ref 1–4.8)
LYMPHOCYTES NFR BLD: 13.9 % (ref 18–48)
MCH RBC QN AUTO: 28.4 PG (ref 27–31)
MCHC RBC AUTO-ENTMCNC: 34.6 G/DL (ref 32–36)
MCV RBC AUTO: 82 FL (ref 82–98)
MONOCYTES # BLD AUTO: 0.3 K/UL (ref 0.3–1)
MONOCYTES NFR BLD: 7.7 % (ref 4–15)
NEUTROPHILS # BLD AUTO: 2.8 K/UL (ref 1.8–7.7)
NEUTROPHILS NFR BLD: 75.2 % (ref 38–73)
NRBC BLD-RTO: 0 /100 WBC
PLATELET # BLD AUTO: 168 K/UL (ref 150–450)
PMV BLD AUTO: 9.6 FL (ref 9.2–12.9)
POTASSIUM SERPL-SCNC: 3.5 MMOL/L (ref 3.5–5.1)
PROT SERPL-MCNC: 6.8 G/DL (ref 6–8.4)
RBC # BLD AUTO: 4.33 M/UL (ref 4–5.4)
SODIUM SERPL-SCNC: 141 MMOL/L (ref 136–145)
WBC # BLD AUTO: 3.75 K/UL (ref 3.9–12.7)

## 2022-05-09 PROCEDURE — 36415 COLL VENOUS BLD VENIPUNCTURE: CPT | Performed by: INTERNAL MEDICINE

## 2022-05-09 PROCEDURE — 80053 COMPREHEN METABOLIC PANEL: CPT | Performed by: INTERNAL MEDICINE

## 2022-05-09 PROCEDURE — 85025 COMPLETE CBC W/AUTO DIFF WBC: CPT | Performed by: INTERNAL MEDICINE

## 2022-05-09 PROCEDURE — 80197 ASSAY OF TACROLIMUS: CPT | Performed by: INTERNAL MEDICINE

## 2022-05-10 LAB — TACROLIMUS BLD-MCNC: 7.9 NG/ML (ref 5–15)

## 2022-05-11 ENCOUNTER — PATIENT MESSAGE (OUTPATIENT)
Dept: RESEARCH | Facility: CLINIC | Age: 52
End: 2022-05-11
Payer: MEDICARE

## 2022-05-12 ENCOUNTER — TELEPHONE (OUTPATIENT)
Dept: TRANSPLANT | Facility: CLINIC | Age: 52
End: 2022-05-12
Payer: MEDICARE

## 2022-05-12 DIAGNOSIS — Z94.4 LIVER REPLACED BY TRANSPLANT: Primary | ICD-10-CM

## 2022-05-12 NOTE — TELEPHONE ENCOUNTER
----- Message from Klaus Zee sent at 5/12/2022  8:27 AM CDT -----  Regarding: follow up  Contact: Jihan  Patient is calling to speak with nurse regarding labs she recently had done, concerned that she has not heard from anyone.    Contact: [phone]

## 2022-05-12 NOTE — TELEPHONE ENCOUNTER
Patient notified and instructed; labs reviewed and Lab Letter will be sent.  Also  will make a follow up clinic appointment with ; she voiced understanding.

## 2022-06-07 ENCOUNTER — TELEPHONE (OUTPATIENT)
Dept: TRANSPLANT | Facility: CLINIC | Age: 52
End: 2022-06-07
Payer: MEDICARE

## 2022-06-07 NOTE — TELEPHONE ENCOUNTER
"Received call from patient: she reported she has nausea/vomiting, sometimes not able to keep down food;  Noted "yellow eyes" and "a rash on my abdomen at my transplant scar area", reports symptoms "for last couple of days", she said.  Patient advised to seek evaluation of symptoms at local ER/ Urgent care. She voiced understanding. " My  will take me to [facility].", she said.     "

## 2022-06-20 ENCOUNTER — OFFICE VISIT (OUTPATIENT)
Dept: TRANSPLANT | Facility: CLINIC | Age: 52
End: 2022-06-20
Payer: MEDICARE

## 2022-06-20 VITALS
HEIGHT: 64 IN | WEIGHT: 187.19 LBS | BODY MASS INDEX: 31.96 KG/M2 | OXYGEN SATURATION: 95 % | RESPIRATION RATE: 16 BRPM | DIASTOLIC BLOOD PRESSURE: 77 MMHG | HEART RATE: 84 BPM | TEMPERATURE: 98 F | SYSTOLIC BLOOD PRESSURE: 128 MMHG

## 2022-06-20 DIAGNOSIS — Z94.4 S/P LIVER TRANSPLANT: Primary | ICD-10-CM

## 2022-06-20 PROCEDURE — 3078F DIAST BP <80 MM HG: CPT | Mod: CPTII,S$GLB,, | Performed by: INTERNAL MEDICINE

## 2022-06-20 PROCEDURE — 99999 PR PBB SHADOW E&M-EST. PATIENT-LVL III: CPT | Mod: PBBFAC,,, | Performed by: INTERNAL MEDICINE

## 2022-06-20 PROCEDURE — 99999 PR PBB SHADOW E&M-EST. PATIENT-LVL III: ICD-10-PCS | Mod: PBBFAC,,, | Performed by: INTERNAL MEDICINE

## 2022-06-20 PROCEDURE — 3008F BODY MASS INDEX DOCD: CPT | Mod: CPTII,S$GLB,, | Performed by: INTERNAL MEDICINE

## 2022-06-20 PROCEDURE — 1159F MED LIST DOCD IN RCRD: CPT | Mod: CPTII,S$GLB,, | Performed by: INTERNAL MEDICINE

## 2022-06-20 PROCEDURE — 99214 OFFICE O/P EST MOD 30 MIN: CPT | Mod: S$GLB,,, | Performed by: INTERNAL MEDICINE

## 2022-06-20 PROCEDURE — 3074F SYST BP LT 130 MM HG: CPT | Mod: CPTII,S$GLB,, | Performed by: INTERNAL MEDICINE

## 2022-06-20 PROCEDURE — 1159F PR MEDICATION LIST DOCUMENTED IN MEDICAL RECORD: ICD-10-PCS | Mod: CPTII,S$GLB,, | Performed by: INTERNAL MEDICINE

## 2022-06-20 PROCEDURE — 1160F PR REVIEW ALL MEDS BY PRESCRIBER/CLIN PHARMACIST DOCUMENTED: ICD-10-PCS | Mod: CPTII,S$GLB,, | Performed by: INTERNAL MEDICINE

## 2022-06-20 PROCEDURE — 3008F PR BODY MASS INDEX (BMI) DOCUMENTED: ICD-10-PCS | Mod: CPTII,S$GLB,, | Performed by: INTERNAL MEDICINE

## 2022-06-20 PROCEDURE — 3074F PR MOST RECENT SYSTOLIC BLOOD PRESSURE < 130 MM HG: ICD-10-PCS | Mod: CPTII,S$GLB,, | Performed by: INTERNAL MEDICINE

## 2022-06-20 PROCEDURE — 1160F RVW MEDS BY RX/DR IN RCRD: CPT | Mod: CPTII,S$GLB,, | Performed by: INTERNAL MEDICINE

## 2022-06-20 PROCEDURE — 99214 PR OFFICE/OUTPT VISIT, EST, LEVL IV, 30-39 MIN: ICD-10-PCS | Mod: S$GLB,,, | Performed by: INTERNAL MEDICINE

## 2022-06-20 PROCEDURE — 3078F PR MOST RECENT DIASTOLIC BLOOD PRESSURE < 80 MM HG: ICD-10-PCS | Mod: CPTII,S$GLB,, | Performed by: INTERNAL MEDICINE

## 2022-06-20 RX ORDER — FAMOTIDINE 20 MG/1
20 TABLET, FILM COATED ORAL 2 TIMES DAILY
COMMUNITY

## 2022-06-20 NOTE — LETTER
July 1, 2022        Janes Retana  [address]  [address]  [facility] [place] [address]  Phone: [phone]  Fax: [phone]             [facility]  [address]  [facility] [address]  Phone: [phone]   Patient: Jihan Zamudio   MR Number: [id number]   YOB: 1970   Date of Visit: 6/20/2022       Dear Dr. Janes Retana    Thank you for referring Jihan Zamduio to me for evaluation. Attached you will find relevant portions of my assessment and plan of care.    If you have questions, please do not hesitate to call me. I look forward to following Jihan Zamudio along with you.    Sincerely,    Ronal Coello MD    Enclosure    If you would like to receive this communication electronically, please contact [email or web address] or [phone] to request [employer] access.    [employer] is a tool which provides read-only access to select patient information with whom you have a relationship. Its easy to use and provides real time access to review your patients record including encounter summaries, notes, results, and demographic information.    If you feel you have received this communication in error or would no longer like to receive these types of communications, please e-mail [email or web address]

## 2022-06-20 NOTE — PROGRESS NOTES
Subjective:       Patient ID: Jihan Zamudio is a 52 y.o. female.    Chief Complaint: Liver Transplant Follow-up  TNotes:     HPI  I saw this 52 y.o.lady who had a liver Tx for KESSLER cirrhosis on 2019.  She is now 3 years post transplant.    She was an inpatient in hospital prior to her liver transplant and her post op admission was prolonged.  She required a chest tube for a right pleural effusion.    Her recovery was further complicated by a fall and a subsequent intracranial hemorrhage (right occipital lobe).    She is currently very well    She has no edema and stopped her furosemide.    Abdo US: 2020  Satisfactory Doppler evaluation of the liver allograft.    ORGAN:   LIVER  Disease Etiology: Cirrhosis: Fatty Liver (Kessler)  Donor Type:    - Brain Death  CDC High Risk:   No  Donor CMV Status:   Donor CMV Status: Positive  Donor HBcAB:   Negative  Donor HCV Status:   Negative  Whole or Partial: Whole Liver  Biliary Anastomosis: End to End  Arterial Anatomy: Standard    ORGAN: liver DIAGNOSIS: kessler MELD: 29  SEROLOGY Recipient/Donor : O/O CMV: +/+ HCV: -/ -HBcAb: -/-   INDUCTION: STEROIDS   ANASTOMOSIS: CDD   DONOR: DBD   [facility] high risk: NO   HCV C Ab + donor or HCV Katty - or + donor: No    EXPLANT:( path report) KESSLER/no malignancy    Explant discussed: YES 19   -----------------------------------------------------------------  IMMUNOSUPPRESSION: Tacro/MMF/Prednisone until 7/10/19  PCP PROPHYLAXIS: Bactrim until 12/3/19  CMV PROPHYLAXIS: Valcyte until 19  FUNGAL PROPHYLAXIS: N/A  Aspirin: on 81 mg daily    Issues:  1) Latent TB  2) Prev Lap band bariatric surgery  3) Post op ANTONIO  4) Intracranial hemorrhage- treated with Keppra for 7 days  5) Readmitted with fever and altered mental status    - LFTs and creatinine normal on 22    Review of Systems   Constitutional: Negative for activity change, appetite change, chills, fatigue, fever and unexpected weight change.   HENT:  Negative for ear pain, hearing loss, nosebleeds, sore throat and trouble swallowing.    Eyes: Negative for redness and visual disturbance.   Respiratory: Negative for cough, chest tightness, shortness of breath and wheezing.    Cardiovascular: Negative for chest pain and palpitations.   Gastrointestinal: Negative for abdominal distention, abdominal pain, blood in stool, constipation, diarrhea, nausea and vomiting.   Genitourinary: Negative for difficulty urinating, dysuria, frequency, hematuria and urgency.   Musculoskeletal: Negative for arthralgias, back pain, gait problem, joint swelling and myalgias.   Skin: Negative for rash.   Neurological: Negative for tremors, seizures, speech difficulty, weakness and headaches.   Hematological: Negative for adenopathy.   Psychiatric/Behavioral: Negative for confusion, decreased concentration and sleep disturbance. The patient is not nervous/anxious.          Lab Results   Component Value Date    ALT 12 2022    AST 15 2022     (H) 2019    ALKPHOS 68 2022    BILITOT 0.8 2022     Past Medical History:   Diagnosis Date    Cirrhosis     Esophageal varices 2018    Small with no banding     Essential hypertension 2018    Fatty liver 2018    GERD (gastroesophageal reflux disease)     Hx of colonic polyps 2018    On colonoscopy     Hypertension     Hypothyroidism 2018    Kidney stones     Morbid obesity 2018    Lap band with subsequent release    KADEEM (obstructive sleep apnea) 2018    Osteoarthritis 2018    Pulmonary nodule 2018    Vitamin D deficiency 2018     Past Surgical History:   Procedure Laterality Date     SECTION      TIMES 2     CHOLECYSTECTOMY      LAPAROSCOPIC     CYSTOSCOPY W/ STONE MANIPULATION      kidney stone removal    ESOPHAGOGASTRODUODENOSCOPY N/A 2019    Procedure: EGD (ESOPHAGOGASTRODUODENOSCOPY);  Surgeon: Davian Hernández,  MD;  Location: [facility] (2ND FLR);  Service: Endoscopy;  Laterality: N/A;    LAPAROSCOPIC GASTRIC BANDING  2006    removal 2009    LIVER BIOPSY  11/29/2017    KESSLER with bridging 11/29/17    LIVER TRANSPLANT N/A 6/5/2019    Procedure: TRANSPLANT, LIVER;  Surgeon: Clemente Brown Jr., MD;  [facility];  Service: Transplant;  Laterality: N/A;     Current Outpatient Medications   Medication Sig    biotin 2,500 mcg Cap Take by mouth once daily.    cetirizine HCl (CETIRIZINE ORAL) Take 10 mg by mouth once. Nightly    famotidine (PEPCID) 20 MG tablet Take 20 mg by mouth 2 (two) times daily.    fluticasone propionate (FLONASE) 50 mcg/actuation nasal spray 1 spray by Each Nostril route once daily.    levothyroxine (SYNTHROID) 112 MCG tablet Take 1 tablet (112 mcg total) by mouth once daily. (Patient taking differently: Take 100 mcg by mouth once daily.)    NIFEdipine (PROCARDIA-XL) 30 MG (OSM) 24 hr tablet Take 1 tablet (30 mg total) by mouth once daily.    sodium bicarbonate 650 MG tablet TAKE 2 TABLETS BY MOUTH TWICE DAILY    tacrolimus (PROGRAF) 1 MG Cap Take 3 mg in the morning and 2 mg at night    famotidine (PEPCID) 20 MG tablet Take 1 tablet (20 mg total) by mouth every evening. (Patient not taking: Reported on 6/20/2022)     No current facility-administered medications for this visit.       Objective:      Physical Exam   Constitutional: She appears well-nourished. No distress.   HENT:   Head: Normocephalic.   Eyes: Pupils are equal, round, and reactive to light.   Neck: No JVD present. No tracheal deviation present. No thyromegaly present.   Cardiovascular: Normal rate, regular rhythm and normal heart sounds.   No murmur heard.  Pulmonary/Chest: Effort normal and breath sounds normal. No stridor.   Abdominal: Soft.   Lymphadenopathy:        Head (right side): No submental, no submandibular, no tonsillar, no preauricular, no posterior auricular and no occipital adenopathy present.         Head (left side): No submental, no submandibular, no tonsillar, no preauricular, no posterior auricular and no occipital adenopathy present.     She has no cervical adenopathy.     She has no axillary adenopathy.   Neurological: She is alert. She has normal strength. She is not disoriented. No cranial nerve deficit or sensory deficit.   Skin: Skin is intact. No cyanosis.       Assessment:       1. S/P liver transplant        Plan:    Overall she is well and is now fully mobile independently.  Excellent liver and kidney function    - on Tac monotherapy (3/2)    Clinic in 1year.    UNOS Patient Status  Functional Status: 100% - Normal, no complaints, no evidence of disease  Physical Capacity: No Limitations

## 2022-07-11 ENCOUNTER — LAB VISIT (OUTPATIENT)
Dept: LAB | Facility: HOSPITAL | Age: 52
End: 2022-07-11
Attending: INTERNAL MEDICINE
Payer: MEDICARE

## 2022-07-11 DIAGNOSIS — Z94.4 LIVER REPLACED BY TRANSPLANT: ICD-10-CM

## 2022-07-11 LAB
ALBUMIN SERPL BCP-MCNC: 4.2 G/DL (ref 3.5–5.2)
ALP SERPL-CCNC: 62 U/L (ref 55–135)
ALT SERPL W/O P-5'-P-CCNC: 15 U/L (ref 10–44)
ANION GAP SERPL CALC-SCNC: 11 MMOL/L (ref 8–16)
AST SERPL-CCNC: 17 U/L (ref 10–40)
BASOPHILS # BLD AUTO: 0.03 K/UL (ref 0–0.2)
BASOPHILS NFR BLD: 0.9 % (ref 0–1.9)
BILIRUB SERPL-MCNC: 0.9 MG/DL (ref 0.1–1)
BUN SERPL-MCNC: 12 MG/DL (ref 6–20)
CALCIUM SERPL-MCNC: 8.9 MG/DL (ref 8.7–10.5)
CHLORIDE SERPL-SCNC: 105 MMOL/L (ref 95–110)
CO2 SERPL-SCNC: 25 MMOL/L (ref 23–29)
CREAT SERPL-MCNC: 0.9 MG/DL (ref 0.5–1.4)
DIFFERENTIAL METHOD: ABNORMAL
EOSINOPHIL # BLD AUTO: 0.1 K/UL (ref 0–0.5)
EOSINOPHIL NFR BLD: 2.8 % (ref 0–8)
ERYTHROCYTE [DISTWIDTH] IN BLOOD BY AUTOMATED COUNT: 13.1 % (ref 11.5–14.5)
EST. GFR  (AFRICAN AMERICAN): >60 ML/MIN/1.73 M^2
EST. GFR  (NON AFRICAN AMERICAN): >60 ML/MIN/1.73 M^2
GLUCOSE SERPL-MCNC: 86 MG/DL (ref 70–110)
HCT VFR BLD AUTO: 35 % (ref 37–48.5)
HGB BLD-MCNC: 12.3 G/DL (ref 12–16)
IMM GRANULOCYTES # BLD AUTO: 0.01 K/UL (ref 0–0.04)
IMM GRANULOCYTES NFR BLD AUTO: 0.3 % (ref 0–0.5)
LYMPHOCYTES # BLD AUTO: 0.6 K/UL (ref 1–4.8)
LYMPHOCYTES NFR BLD: 19.1 % (ref 18–48)
MCH RBC QN AUTO: 28.3 PG (ref 27–31)
MCHC RBC AUTO-ENTMCNC: 35.1 G/DL (ref 32–36)
MCV RBC AUTO: 81 FL (ref 82–98)
MONOCYTES # BLD AUTO: 0.3 K/UL (ref 0.3–1)
MONOCYTES NFR BLD: 8.4 % (ref 4–15)
NEUTROPHILS # BLD AUTO: 2.2 K/UL (ref 1.8–7.7)
NEUTROPHILS NFR BLD: 68.5 % (ref 38–73)
NRBC BLD-RTO: 0 /100 WBC
PLATELET # BLD AUTO: 178 K/UL (ref 150–450)
PMV BLD AUTO: 9.7 FL (ref 9.2–12.9)
POTASSIUM SERPL-SCNC: 3.6 MMOL/L (ref 3.5–5.1)
PROT SERPL-MCNC: 6.3 G/DL (ref 6–8.4)
RBC # BLD AUTO: 4.35 M/UL (ref 4–5.4)
SODIUM SERPL-SCNC: 141 MMOL/L (ref 136–145)
WBC # BLD AUTO: 3.2 K/UL (ref 3.9–12.7)

## 2022-07-11 PROCEDURE — 80053 COMPREHEN METABOLIC PANEL: CPT | Performed by: INTERNAL MEDICINE

## 2022-07-11 PROCEDURE — 36415 COLL VENOUS BLD VENIPUNCTURE: CPT | Performed by: INTERNAL MEDICINE

## 2022-07-11 PROCEDURE — 85025 COMPLETE CBC W/AUTO DIFF WBC: CPT | Performed by: INTERNAL MEDICINE

## 2022-07-11 PROCEDURE — 80197 ASSAY OF TACROLIMUS: CPT | Performed by: INTERNAL MEDICINE

## 2022-07-12 LAB — TACROLIMUS BLD-MCNC: 11.2 NG/ML (ref 5–15)

## 2022-07-14 ENCOUNTER — PATIENT MESSAGE (OUTPATIENT)
Dept: TRANSPLANT | Facility: CLINIC | Age: 52
End: 2022-07-14
Payer: MEDICARE

## 2022-07-14 DIAGNOSIS — Z94.4 LIVER REPLACED BY TRANSPLANT: Primary | ICD-10-CM

## 2022-07-14 NOTE — TELEPHONE ENCOUNTER
----- Message from Ronal Coello MD sent at 7/13/2022 12:20 AM CDT -----  Decrease tac to 2/2  Results reviewed

## 2022-07-14 NOTE — TELEPHONE ENCOUNTER
Patient notified and instructed via [facility]:    Your labs have been reviewed by ; please reduce your Prograf dose to 2mg twice daily. Repeat labs due 8/29/22, thanks.

## 2022-07-18 RX ORDER — TACROLIMUS 1 MG/1
2 CAPSULE ORAL EVERY 12 HOURS
Qty: 120 CAPSULE | Refills: 6 | Status: SHIPPED | OUTPATIENT
Start: 2022-07-18 | End: 2022-11-22 | Stop reason: SDUPTHER

## 2022-08-29 ENCOUNTER — LAB VISIT (OUTPATIENT)
Dept: LAB | Facility: HOSPITAL | Age: 52
End: 2022-08-29
Attending: INTERNAL MEDICINE
Payer: MEDICARE

## 2022-08-29 DIAGNOSIS — Z94.4 LIVER REPLACED BY TRANSPLANT: ICD-10-CM

## 2022-08-29 LAB
ALBUMIN SERPL BCP-MCNC: 4.3 G/DL (ref 3.5–5.2)
ALP SERPL-CCNC: 81 U/L (ref 55–135)
ALT SERPL W/O P-5'-P-CCNC: 17 U/L (ref 10–44)
ANION GAP SERPL CALC-SCNC: 13 MMOL/L (ref 8–16)
AST SERPL-CCNC: 19 U/L (ref 10–40)
BASOPHILS # BLD AUTO: 0.03 K/UL (ref 0–0.2)
BASOPHILS NFR BLD: 0.8 % (ref 0–1.9)
BILIRUB SERPL-MCNC: 0.7 MG/DL (ref 0.1–1)
BUN SERPL-MCNC: 10 MG/DL (ref 6–20)
CALCIUM SERPL-MCNC: 9.3 MG/DL (ref 8.7–10.5)
CHLORIDE SERPL-SCNC: 102 MMOL/L (ref 95–110)
CO2 SERPL-SCNC: 27 MMOL/L (ref 23–29)
CREAT SERPL-MCNC: 0.9 MG/DL (ref 0.5–1.4)
DIFFERENTIAL METHOD: ABNORMAL
EOSINOPHIL # BLD AUTO: 0.1 K/UL (ref 0–0.5)
EOSINOPHIL NFR BLD: 3.3 % (ref 0–8)
ERYTHROCYTE [DISTWIDTH] IN BLOOD BY AUTOMATED COUNT: 13.2 % (ref 11.5–14.5)
EST. GFR  (NO RACE VARIABLE): >60 ML/MIN/1.73 M^2
GLUCOSE SERPL-MCNC: 93 MG/DL (ref 70–110)
HCT VFR BLD AUTO: 39.7 % (ref 37–48.5)
HGB BLD-MCNC: 13.6 G/DL (ref 12–16)
IMM GRANULOCYTES # BLD AUTO: 0.02 K/UL (ref 0–0.04)
IMM GRANULOCYTES NFR BLD AUTO: 0.6 % (ref 0–0.5)
LYMPHOCYTES # BLD AUTO: 0.7 K/UL (ref 1–4.8)
LYMPHOCYTES NFR BLD: 19.4 % (ref 18–48)
MCH RBC QN AUTO: 28.4 PG (ref 27–31)
MCHC RBC AUTO-ENTMCNC: 34.3 G/DL (ref 32–36)
MCV RBC AUTO: 83 FL (ref 82–98)
MONOCYTES # BLD AUTO: 0.3 K/UL (ref 0.3–1)
MONOCYTES NFR BLD: 6.9 % (ref 4–15)
NEUTROPHILS # BLD AUTO: 2.5 K/UL (ref 1.8–7.7)
NEUTROPHILS NFR BLD: 69 % (ref 38–73)
NRBC BLD-RTO: 0 /100 WBC
PLATELET # BLD AUTO: 177 K/UL (ref 150–450)
PMV BLD AUTO: 10.1 FL (ref 9.2–12.9)
POTASSIUM SERPL-SCNC: 4.5 MMOL/L (ref 3.5–5.1)
PROT SERPL-MCNC: 7 G/DL (ref 6–8.4)
RBC # BLD AUTO: 4.79 M/UL (ref 4–5.4)
SODIUM SERPL-SCNC: 142 MMOL/L (ref 136–145)
WBC # BLD AUTO: 3.6 K/UL (ref 3.9–12.7)

## 2022-08-29 PROCEDURE — 36415 COLL VENOUS BLD VENIPUNCTURE: CPT | Performed by: INTERNAL MEDICINE

## 2022-08-29 PROCEDURE — 80197 ASSAY OF TACROLIMUS: CPT | Performed by: INTERNAL MEDICINE

## 2022-08-29 PROCEDURE — 85025 COMPLETE CBC W/AUTO DIFF WBC: CPT | Performed by: INTERNAL MEDICINE

## 2022-08-29 PROCEDURE — 80053 COMPREHEN METABOLIC PANEL: CPT | Performed by: INTERNAL MEDICINE

## 2022-08-30 LAB — TACROLIMUS BLD-MCNC: 9.3 NG/ML (ref 5–15)

## 2022-08-31 ENCOUNTER — PATIENT MESSAGE (OUTPATIENT)
Dept: TRANSPLANT | Facility: CLINIC | Age: 52
End: 2022-08-31
Payer: MEDICARE

## 2022-08-31 DIAGNOSIS — Z94.4 LIVER REPLACED BY TRANSPLANT: Primary | ICD-10-CM

## 2022-08-31 RX ORDER — SODIUM BICARBONATE 650 MG/1
TABLET ORAL
Qty: 120 TABLET | Refills: 11 | Status: SHIPPED | OUTPATIENT
Start: 2022-08-31 | End: 2023-09-25

## 2022-08-31 NOTE — TELEPHONE ENCOUNTER
Good morning,     Ms Zamudio just reached out this morning about wanting do do this study. Is this still available for her to do?

## 2022-10-31 ENCOUNTER — LAB VISIT (OUTPATIENT)
Dept: LAB | Facility: HOSPITAL | Age: 52
End: 2022-10-31
Attending: INTERNAL MEDICINE
Payer: MEDICARE

## 2022-10-31 DIAGNOSIS — Z94.4 LIVER REPLACED BY TRANSPLANT: ICD-10-CM

## 2022-10-31 LAB
ALBUMIN SERPL BCP-MCNC: 4 G/DL (ref 3.5–5.2)
ALP SERPL-CCNC: 65 U/L (ref 55–135)
ALT SERPL W/O P-5'-P-CCNC: 11 U/L (ref 10–44)
ANION GAP SERPL CALC-SCNC: 12 MMOL/L (ref 8–16)
AST SERPL-CCNC: 14 U/L (ref 10–40)
BASOPHILS # BLD AUTO: 0.03 K/UL (ref 0–0.2)
BASOPHILS NFR BLD: 1 % (ref 0–1.9)
BILIRUB SERPL-MCNC: 0.6 MG/DL (ref 0.1–1)
BUN SERPL-MCNC: 9 MG/DL (ref 6–20)
CALCIUM SERPL-MCNC: 9 MG/DL (ref 8.7–10.5)
CHLORIDE SERPL-SCNC: 105 MMOL/L (ref 95–110)
CO2 SERPL-SCNC: 25 MMOL/L (ref 23–29)
CREAT SERPL-MCNC: 0.9 MG/DL (ref 0.5–1.4)
DIFFERENTIAL METHOD: ABNORMAL
EOSINOPHIL # BLD AUTO: 0.1 K/UL (ref 0–0.5)
EOSINOPHIL NFR BLD: 3.6 % (ref 0–8)
ERYTHROCYTE [DISTWIDTH] IN BLOOD BY AUTOMATED COUNT: 13.3 % (ref 11.5–14.5)
EST. GFR  (NO RACE VARIABLE): >60 ML/MIN/1.73 M^2
GLUCOSE SERPL-MCNC: 115 MG/DL (ref 70–110)
HCT VFR BLD AUTO: 37.1 % (ref 37–48.5)
HGB BLD-MCNC: 12.7 G/DL (ref 12–16)
IMM GRANULOCYTES # BLD AUTO: 0.01 K/UL (ref 0–0.04)
IMM GRANULOCYTES NFR BLD AUTO: 0.3 % (ref 0–0.5)
LYMPHOCYTES # BLD AUTO: 0.5 K/UL (ref 1–4.8)
LYMPHOCYTES NFR BLD: 15.3 % (ref 18–48)
MCH RBC QN AUTO: 28.4 PG (ref 27–31)
MCHC RBC AUTO-ENTMCNC: 34.2 G/DL (ref 32–36)
MCV RBC AUTO: 83 FL (ref 82–98)
MONOCYTES # BLD AUTO: 0.2 K/UL (ref 0.3–1)
MONOCYTES NFR BLD: 6.5 % (ref 4–15)
NEUTROPHILS # BLD AUTO: 2.3 K/UL (ref 1.8–7.7)
NEUTROPHILS NFR BLD: 73.3 % (ref 38–73)
NRBC BLD-RTO: 0 /100 WBC
PLATELET # BLD AUTO: 173 K/UL (ref 150–450)
PMV BLD AUTO: 10.2 FL (ref 9.2–12.9)
POTASSIUM SERPL-SCNC: 4 MMOL/L (ref 3.5–5.1)
PROT SERPL-MCNC: 6.5 G/DL (ref 6–8.4)
RBC # BLD AUTO: 4.47 M/UL (ref 4–5.4)
SODIUM SERPL-SCNC: 142 MMOL/L (ref 136–145)
WBC # BLD AUTO: 3.08 K/UL (ref 3.9–12.7)

## 2022-10-31 PROCEDURE — 80197 ASSAY OF TACROLIMUS: CPT | Performed by: INTERNAL MEDICINE

## 2022-10-31 PROCEDURE — 36415 COLL VENOUS BLD VENIPUNCTURE: CPT | Performed by: INTERNAL MEDICINE

## 2022-10-31 PROCEDURE — 85025 COMPLETE CBC W/AUTO DIFF WBC: CPT | Performed by: INTERNAL MEDICINE

## 2022-10-31 PROCEDURE — 80053 COMPREHEN METABOLIC PANEL: CPT | Performed by: INTERNAL MEDICINE

## 2022-11-01 ENCOUNTER — TELEPHONE (OUTPATIENT)
Dept: TRANSPLANT | Facility: CLINIC | Age: 52
End: 2022-11-01
Payer: MEDICARE

## 2022-11-01 DIAGNOSIS — Z94.4 LIVER REPLACED BY TRANSPLANT: Primary | ICD-10-CM

## 2022-11-01 LAB — TACROLIMUS BLD-MCNC: 6.9 NG/ML (ref 5–15)

## 2022-11-22 RX ORDER — TACROLIMUS 1 MG/1
2 CAPSULE ORAL EVERY 12 HOURS
Qty: 120 CAPSULE | Refills: 6 | Status: SHIPPED | OUTPATIENT
Start: 2022-11-22 | End: 2022-12-23

## 2023-01-11 NOTE — TELEPHONE ENCOUNTER
Initial referral received via fax from Dr Janes Retana's office.   Patient with KESSLER cirrhosis  MELD 18  Referred for liver transplant for CONSULT  Referral completed and forwarded to [facility].      Insurance: Claire  #   Contact #        done

## 2023-01-17 NOTE — ASSESSMENT & PLAN NOTE
Continue prograf. Hold cellcept. Monitor prograf level daily, monitor for toxic side effects, and adjust for therapeutic dose.      no complications

## 2023-01-30 ENCOUNTER — LAB VISIT (OUTPATIENT)
Dept: LAB | Facility: HOSPITAL | Age: 53
End: 2023-01-30
Attending: INTERNAL MEDICINE
Payer: MEDICARE

## 2023-01-30 DIAGNOSIS — Z94.4 LIVER REPLACED BY TRANSPLANT: ICD-10-CM

## 2023-01-30 LAB
ALBUMIN SERPL BCP-MCNC: 4.1 G/DL (ref 3.5–5.2)
ALP SERPL-CCNC: 70 U/L (ref 55–135)
ALT SERPL W/O P-5'-P-CCNC: 15 U/L (ref 10–44)
ANION GAP SERPL CALC-SCNC: 12 MMOL/L (ref 8–16)
AST SERPL-CCNC: 17 U/L (ref 10–40)
BASOPHILS # BLD AUTO: 0.02 K/UL (ref 0–0.2)
BASOPHILS NFR BLD: 0.5 % (ref 0–1.9)
BILIRUB SERPL-MCNC: 0.9 MG/DL (ref 0.1–1)
BUN SERPL-MCNC: 10 MG/DL (ref 6–20)
CALCIUM SERPL-MCNC: 9.2 MG/DL (ref 8.7–10.5)
CHLORIDE SERPL-SCNC: 107 MMOL/L (ref 95–110)
CO2 SERPL-SCNC: 23 MMOL/L (ref 23–29)
CREAT SERPL-MCNC: 0.8 MG/DL (ref 0.5–1.4)
DIFFERENTIAL METHOD: ABNORMAL
EOSINOPHIL # BLD AUTO: 0.1 K/UL (ref 0–0.5)
EOSINOPHIL NFR BLD: 2.1 % (ref 0–8)
ERYTHROCYTE [DISTWIDTH] IN BLOOD BY AUTOMATED COUNT: 13.3 % (ref 11.5–14.5)
EST. GFR  (NO RACE VARIABLE): >60 ML/MIN/1.73 M^2
GLUCOSE SERPL-MCNC: 90 MG/DL (ref 70–110)
HCT VFR BLD AUTO: 37.3 % (ref 37–48.5)
HGB BLD-MCNC: 13 G/DL (ref 12–16)
IMM GRANULOCYTES # BLD AUTO: 0.04 K/UL (ref 0–0.04)
IMM GRANULOCYTES NFR BLD AUTO: 1.1 % (ref 0–0.5)
LYMPHOCYTES # BLD AUTO: 0.5 K/UL (ref 1–4.8)
LYMPHOCYTES NFR BLD: 13.6 % (ref 18–48)
MCH RBC QN AUTO: 28 PG (ref 27–31)
MCHC RBC AUTO-ENTMCNC: 34.9 G/DL (ref 32–36)
MCV RBC AUTO: 80 FL (ref 82–98)
MONOCYTES # BLD AUTO: 0.2 K/UL (ref 0.3–1)
MONOCYTES NFR BLD: 6.4 % (ref 4–15)
NEUTROPHILS # BLD AUTO: 2.9 K/UL (ref 1.8–7.7)
NEUTROPHILS NFR BLD: 76.3 % (ref 38–73)
NRBC BLD-RTO: 0 /100 WBC
PLATELET # BLD AUTO: 173 K/UL (ref 150–450)
PMV BLD AUTO: 9.7 FL (ref 9.2–12.9)
POTASSIUM SERPL-SCNC: 3.6 MMOL/L (ref 3.5–5.1)
PROT SERPL-MCNC: 6.9 G/DL (ref 6–8.4)
RBC # BLD AUTO: 4.64 M/UL (ref 4–5.4)
SODIUM SERPL-SCNC: 142 MMOL/L (ref 136–145)
WBC # BLD AUTO: 3.75 K/UL (ref 3.9–12.7)

## 2023-01-30 PROCEDURE — 85025 COMPLETE CBC W/AUTO DIFF WBC: CPT | Performed by: INTERNAL MEDICINE

## 2023-01-30 PROCEDURE — 36415 COLL VENOUS BLD VENIPUNCTURE: CPT | Performed by: INTERNAL MEDICINE

## 2023-01-30 PROCEDURE — 80053 COMPREHEN METABOLIC PANEL: CPT | Performed by: INTERNAL MEDICINE

## 2023-01-30 PROCEDURE — 80197 ASSAY OF TACROLIMUS: CPT | Performed by: INTERNAL MEDICINE

## 2023-01-30 NOTE — PLAN OF CARE
Addended byRadha Ceballos on: 1/30/2023 12:43 PM     Modules accepted: Orders Problem: Adult Inpatient Plan of Care  Goal: Plan of Care Review  Outcome: Ongoing (interventions implemented as appropriate)  Pt AAOx4, VSS, in NAD throughout shift.  Pt up to restroom or bedside commode throughout shift, now independent.  Pt compliant this shift.  Pt reports some pain this shift, RN administered PRN tylenol and continue to monitor.  RN maintaining fall risk precautions throughout shift.  Pt denies pain or other need at this time; RN will continue to monitor, assess, and alter plan of care as needed until report given to oncoming night shift nurse.

## 2023-01-31 LAB — TACROLIMUS BLD-MCNC: 7.1 NG/ML (ref 5–15)

## 2023-02-01 ENCOUNTER — TELEPHONE (OUTPATIENT)
Dept: TRANSPLANT | Facility: CLINIC | Age: 53
End: 2023-02-01
Payer: MEDICARE

## 2023-02-01 DIAGNOSIS — Z94.4 LIVER REPLACED BY TRANSPLANT: Primary | ICD-10-CM

## 2023-04-10 RX ORDER — TACROLIMUS 1 MG/1
CAPSULE ORAL
Qty: 150 CAPSULE | Refills: 11 | Status: SHIPPED | OUTPATIENT
Start: 2023-04-10 | End: 2023-04-21

## 2023-04-24 ENCOUNTER — LAB VISIT (OUTPATIENT)
Dept: LAB | Facility: CLINIC | Age: 53
End: 2023-04-24
Payer: MEDICARE

## 2023-04-24 DIAGNOSIS — Z94.4 LIVER REPLACED BY TRANSPLANT: ICD-10-CM

## 2023-04-24 LAB
ALBUMIN SERPL BCP-MCNC: 4.2 G/DL (ref 3.5–5.2)
ALP SERPL-CCNC: 72 U/L (ref 55–135)
ALT SERPL W/O P-5'-P-CCNC: 19 U/L (ref 10–44)
ANION GAP SERPL CALC-SCNC: 10 MMOL/L (ref 8–16)
AST SERPL-CCNC: 20 U/L (ref 10–40)
BASOPHILS # BLD AUTO: 0.03 K/UL (ref 0–0.2)
BASOPHILS NFR BLD: 0.9 % (ref 0–1.9)
BILIRUB SERPL-MCNC: 0.8 MG/DL (ref 0.1–1)
BUN SERPL-MCNC: 11 MG/DL (ref 6–20)
CALCIUM SERPL-MCNC: 9.1 MG/DL (ref 8.7–10.5)
CHLORIDE SERPL-SCNC: 105 MMOL/L (ref 95–110)
CO2 SERPL-SCNC: 25 MMOL/L (ref 23–29)
CREAT SERPL-MCNC: 0.8 MG/DL (ref 0.5–1.4)
DIFFERENTIAL METHOD: ABNORMAL
EOSINOPHIL # BLD AUTO: 0.1 K/UL (ref 0–0.5)
EOSINOPHIL NFR BLD: 3.4 % (ref 0–8)
ERYTHROCYTE [DISTWIDTH] IN BLOOD BY AUTOMATED COUNT: 13.2 % (ref 11.5–14.5)
EST. GFR  (NO RACE VARIABLE): >60 ML/MIN/1.73 M^2
GLUCOSE SERPL-MCNC: 120 MG/DL (ref 70–110)
HCT VFR BLD AUTO: 37.5 % (ref 37–48.5)
HGB BLD-MCNC: 13 G/DL (ref 12–16)
IMM GRANULOCYTES # BLD AUTO: 0.01 K/UL (ref 0–0.04)
IMM GRANULOCYTES NFR BLD AUTO: 0.3 % (ref 0–0.5)
LYMPHOCYTES # BLD AUTO: 0.7 K/UL (ref 1–4.8)
LYMPHOCYTES NFR BLD: 19.4 % (ref 18–48)
MCH RBC QN AUTO: 28.9 PG (ref 27–31)
MCHC RBC AUTO-ENTMCNC: 34.7 G/DL (ref 32–36)
MCV RBC AUTO: 83 FL (ref 82–98)
MONOCYTES # BLD AUTO: 0.2 K/UL (ref 0.3–1)
MONOCYTES NFR BLD: 6.3 % (ref 4–15)
NEUTROPHILS # BLD AUTO: 2.5 K/UL (ref 1.8–7.7)
NEUTROPHILS NFR BLD: 69.7 % (ref 38–73)
NRBC BLD-RTO: 0 /100 WBC
PLATELET # BLD AUTO: 178 K/UL (ref 150–450)
PMV BLD AUTO: 9.9 FL (ref 9.2–12.9)
POTASSIUM SERPL-SCNC: 4.3 MMOL/L (ref 3.5–5.1)
PROT SERPL-MCNC: 6.9 G/DL (ref 6–8.4)
RBC # BLD AUTO: 4.5 M/UL (ref 4–5.4)
SODIUM SERPL-SCNC: 140 MMOL/L (ref 136–145)
WBC # BLD AUTO: 3.51 K/UL (ref 3.9–12.7)

## 2023-04-24 PROCEDURE — 36415 COLL VENOUS BLD VENIPUNCTURE: CPT | Mod: ,,, | Performed by: STUDENT IN AN ORGANIZED HEALTH CARE EDUCATION/TRAINING PROGRAM

## 2023-04-24 PROCEDURE — 36415 PR COLLECTION VENOUS BLOOD,VENIPUNCTURE: ICD-10-PCS | Mod: ,,, | Performed by: STUDENT IN AN ORGANIZED HEALTH CARE EDUCATION/TRAINING PROGRAM

## 2023-04-24 PROCEDURE — 80197 ASSAY OF TACROLIMUS: CPT | Performed by: INTERNAL MEDICINE

## 2023-04-24 PROCEDURE — 80053 COMPREHEN METABOLIC PANEL: CPT | Performed by: INTERNAL MEDICINE

## 2023-04-24 PROCEDURE — 85025 COMPLETE CBC W/AUTO DIFF WBC: CPT | Performed by: INTERNAL MEDICINE

## 2023-04-25 LAB — TACROLIMUS BLD-MCNC: 12.3 NG/ML (ref 5–15)

## 2023-04-28 ENCOUNTER — TELEPHONE (OUTPATIENT)
Dept: TRANSPLANT | Facility: CLINIC | Age: 53
End: 2023-04-28
Payer: MEDICARE

## 2023-04-28 DIAGNOSIS — Z94.4 LIVER REPLACED BY TRANSPLANT: Primary | ICD-10-CM

## 2023-05-01 ENCOUNTER — PATIENT MESSAGE (OUTPATIENT)
Dept: TRANSPLANT | Facility: CLINIC | Age: 53
End: 2023-05-01
Payer: MEDICARE

## 2023-05-01 NOTE — PT/OT/SLP PROGRESS
Speech Language Pathology      Jihan Zamudio  MRN: [id number]    ST eval attempted x2 in AM, but patient unavailable. SLP unable to return in PM, but ST will f/u for speech/language/cognitive eval.     Carloz Bernard, CCC-SLP  Speech-Language Pathology  Pager: [phone]        Home Suture Removal Text: Patient was provided instructions on removing sutures and will remove their sutures at home.  If they have any questions or difficulties they will call the office.

## 2023-05-15 NOTE — ASSESSMENT & PLAN NOTE
- LFTs stable.  - Pt with good hepatic allograft function.   - Last Liver US 6/24 showed Mildly elevated hepatic arterial resistive indices, although improved from prior exam.  Otherwise, satisfactory vascular appearance of the liver, complex fluid collection adjacent to the left hepatic lobe, similar to slightly smaller compared to prior exam, small volume of ascites and partially visualized right pleural effusion.   - para completed 7/9, cultures pending. Cell count not collected as ordered.   - will repeat para today   Medication Name: Hydroxyzine    Please update the Medication Prior Authorization team on this encounter so we can update our records.

## 2023-06-30 NOTE — PHYSICIAN QUERY
PT Name: Jihan Zamudio  MR #: [id number]    Physician Query Form - CardioPulmonary Clarification      Denise Hernandez RN, CCDS  Desk # [phone]; smooth # [phone] [email or web address]      This form is a permanent document in the medical record.    Query Date: April 26, 2019    By submitting this query, we are merely seeking further clarification of documentation. Please utilize your independent clinical judgment when addressing the question(s) below.    The Medical record contains the following:   Indicators   Supporting Clinical Findings Location in Medical Record   x Pulmonary Hypertension documented Also has borderline pulmonary hypertension Hepatology CN 4/24   x Acute/Chronic Illness history of KESSLER cirrhosis(confirmed by liver biopsy 11/2017) with varices, latent TB, GERD, hypothyroidism, lap band 2007, HLD    Decompensated cirrhosis due to GI bleeding  Volume overload  HE  EV H&P  4/24          Hepatology CN 4/25   x Echo and/or Heart Cath Findings · Normal left ventricular systolic function. The estimated ejection fraction is 65%  · Normal LV diastolic function.  · No wall motion abnormalities.  · Normal right ventricular systolic function.  · Moderate left atrial enlargement.  · Mild right atrial enlargement.  · Mild mitral regurgitation.  · Mild tricuspid regurgitation. TTE 4/25   x BiPAP/Intubation/Supplemental O2 Room Air  Sats: 94 - 97% Hosp VS 4/24 - 26    SOB, HAMEED, Fatigue, Dizziness, LE Edema, Cyanosis, Chest Pain, Respiratory Distress, Hypoxia, etc.     x Treatment         Medication re-evaluate and repeat 2 D echo   spironolactone PO daily  Furosemide PO  20 mg daily 4/25-26  Furosemide IV 40 mg 3 x daily start 4/26 Hepatology CN 4/24  MAR   x Other Decompensated cirrhosis due to GI bleeding: s/p EGD with PHG s/p APC Hepatology CN 4/25     Provider, please specify the type of pulmonary hypertension:    [   ] Group 1:  Pulmonary Arterial Hypertension - includes Primary, Idiopathic,  Inheritable, and Secondary (due to drugs, toxins, congenital heart diease, HIV infection, etc.)     [   ] Group 2:  Pulmonary Hypertension due to Left Heart Disease, including left heart failure and/or left heart valve disease     [   ] Group 3:  Pulmonary Hypertension due to Lung Disease     [   ] Group 4:  Pulmonary Hypertension due to Obstruction of the Pulmonary Vessels caused by Chronic Thromboemboli, Tumor, or Foreign Bodies     [   ] Group 5:  Pulmonary Hypertension due to other, multifactorial, or unclear mechanisms     [   x] Pulmonary Hypertension, unspecified     [   ] Other Cardiopulmonary Condition (please specify):     [  ] Clinically Undetermined       Please document in your progress notes daily for the duration of treatment, until resolved, and include in your discharge summary.                                                                                           Length To Time In Minutes Device Was In Place: 10

## 2023-07-18 ENCOUNTER — OFFICE VISIT (OUTPATIENT)
Dept: TRANSPLANT | Facility: CLINIC | Age: 53
End: 2023-07-18
Payer: MEDICARE

## 2023-07-18 VITALS
RESPIRATION RATE: 18 BRPM | BODY MASS INDEX: 33.16 KG/M2 | SYSTOLIC BLOOD PRESSURE: 137 MMHG | HEART RATE: 82 BPM | DIASTOLIC BLOOD PRESSURE: 77 MMHG | WEIGHT: 194.25 LBS | TEMPERATURE: 97 F | HEIGHT: 64 IN | OXYGEN SATURATION: 97 %

## 2023-07-18 DIAGNOSIS — M25.551 RIGHT HIP PAIN: ICD-10-CM

## 2023-07-18 DIAGNOSIS — Z94.4 S/P LIVER TRANSPLANT: Primary | ICD-10-CM

## 2023-07-18 DIAGNOSIS — Z29.89 PROPHYLACTIC IMMUNOTHERAPY: ICD-10-CM

## 2023-07-18 PROCEDURE — 3075F SYST BP GE 130 - 139MM HG: CPT | Mod: CPTII,S$GLB,, | Performed by: INTERNAL MEDICINE

## 2023-07-18 PROCEDURE — 99215 PR OFFICE/OUTPT VISIT, EST, LEVL V, 40-54 MIN: ICD-10-PCS | Mod: S$GLB,,, | Performed by: INTERNAL MEDICINE

## 2023-07-18 PROCEDURE — 99999 PR PBB SHADOW E&M-EST. PATIENT-LVL IV: CPT | Mod: PBBFAC,,, | Performed by: INTERNAL MEDICINE

## 2023-07-18 PROCEDURE — 3075F PR MOST RECENT SYSTOLIC BLOOD PRESS GE 130-139MM HG: ICD-10-PCS | Mod: CPTII,S$GLB,, | Performed by: INTERNAL MEDICINE

## 2023-07-18 PROCEDURE — 3008F PR BODY MASS INDEX (BMI) DOCUMENTED: ICD-10-PCS | Mod: CPTII,S$GLB,, | Performed by: INTERNAL MEDICINE

## 2023-07-18 PROCEDURE — 99215 OFFICE O/P EST HI 40 MIN: CPT | Mod: S$GLB,,, | Performed by: INTERNAL MEDICINE

## 2023-07-18 PROCEDURE — 1159F PR MEDICATION LIST DOCUMENTED IN MEDICAL RECORD: ICD-10-PCS | Mod: CPTII,S$GLB,, | Performed by: INTERNAL MEDICINE

## 2023-07-18 PROCEDURE — 99999 PR PBB SHADOW E&M-EST. PATIENT-LVL IV: ICD-10-PCS | Mod: PBBFAC,,, | Performed by: INTERNAL MEDICINE

## 2023-07-18 PROCEDURE — 3078F DIAST BP <80 MM HG: CPT | Mod: CPTII,S$GLB,, | Performed by: INTERNAL MEDICINE

## 2023-07-18 PROCEDURE — 1160F RVW MEDS BY RX/DR IN RCRD: CPT | Mod: CPTII,S$GLB,, | Performed by: INTERNAL MEDICINE

## 2023-07-18 PROCEDURE — 3078F PR MOST RECENT DIASTOLIC BLOOD PRESSURE < 80 MM HG: ICD-10-PCS | Mod: CPTII,S$GLB,, | Performed by: INTERNAL MEDICINE

## 2023-07-18 PROCEDURE — 1160F PR REVIEW ALL MEDS BY PRESCRIBER/CLIN PHARMACIST DOCUMENTED: ICD-10-PCS | Mod: CPTII,S$GLB,, | Performed by: INTERNAL MEDICINE

## 2023-07-18 PROCEDURE — 1159F MED LIST DOCD IN RCRD: CPT | Mod: CPTII,S$GLB,, | Performed by: INTERNAL MEDICINE

## 2023-07-18 PROCEDURE — 3008F BODY MASS INDEX DOCD: CPT | Mod: CPTII,S$GLB,, | Performed by: INTERNAL MEDICINE

## 2023-07-18 RX ORDER — MULTIVITAMIN
1 TABLET ORAL DAILY
COMMUNITY

## 2023-07-18 NOTE — LETTER
July 18, 2023        Janes Retana  [address] [place] [address]  Phone: [phone]  Fax: [phone]             [facility]  [address]  [facility] [address]  Phone: [phone]   Patient: Jihan Zamudio   MR Number: [id number]   YOB: 1970   Date of Visit: 7/18/2023       Dear Dr. Janes Retana    Thank you for referring Jihan Zamudio to me for evaluation. Attached you will find relevant portions of my assessment and plan of care.    If you have questions, please do not hesitate to call me. I look forward to following Jihan Zamudio along with you.    Sincerely,    Ronal Coello MD    Enclosure    If you would like to receive this communication electronically, please contact [email or web address] or [phone] to request [employer] access.    [employer] is a tool which provides read-only access to select patient information with whom you have a relationship. Its easy to use and provides real time access to review your patients record including encounter summaries, notes, results, and demographic information.    If you feel you have received this communication in error or would no longer like to receive these types of communications, please e-mail [email or web address]

## 2023-07-18 NOTE — PROGRESS NOTES
Subjective:       Patient ID: Jihan Zamudio is a 53 y.o. female.    Chief Complaint: Liver Transplant Follow-up  TNotes:     HPI  I saw this 53 y.o.lady who had a liver Tx for KESSLER cirrhosis on 2019.  She is now 4 years post transplant.    She was an inpatient in hospital prior to her liver transplant and her post op admission was prolonged.  She required a chest tube for a right pleural effusion.    Her recovery was further complicated by a fall and a subsequent intracranial hemorrhage (right occipital lobe).    She is currently very well.  Some pain in the right hip after she tripped over her dog but can weight bear.    Abdo US: 2020  Satisfactory Doppler evaluation of the liver allograft.    ORGAN:   LIVER  Disease Etiology: Cirrhosis: Fatty Liver (Kessler)  Donor Type:    - Brain Death  [facility] High Risk:   No  Donor CMV Status:   Donor CMV Status: Positive  Donor HBcAB:   Negative  Donor HCV Status:   Negative  Whole or Partial: Whole Liver  Biliary Anastomosis: End to End  Arterial Anatomy: Standard    ORGAN: liver DIAGNOSIS: kessler MELD: 29  SEROLOGY Recipient/Donor : O/O CMV: +/+ HCV: -/ -HBcAb: -/-   INDUCTION: STEROIDS   ANASTOMOSIS: CDD   DONOR: DBD   PHS high risk: NO   HCV C Ab + donor or HCV Katty - or + donor: No    EXPLANT:( path report) KESSLER/no malignancy    Explant discussed: YES 19   -----------------------------------------------------------------  IMMUNOSUPPRESSION: Tacro/MMF/Prednisone until 7/10/19  PCP PROPHYLAXIS: Bactrim until 12/3/19  CMV PROPHYLAXIS: Valcyte until 19  FUNGAL PROPHYLAXIS: N/A  Aspirin: on 81 mg daily    Issues:  1) Latent TB  2) Prev Lap band bariatric surgery  3) Post op ANTONIO  4) Intracranial hemorrhage- treated with Keppra for 7 days  5) Readmitted with fever and altered mental status    - LFTs and creatinine normal on 22    Review of Systems   Constitutional: Negative for activity change, appetite change, chills, fatigue, fever and  unexpected weight change.   HENT: Negative for ear pain, hearing loss, nosebleeds, sore throat and trouble swallowing.    Eyes: Negative for redness and visual disturbance.   Respiratory: Negative for cough, chest tightness, shortness of breath and wheezing.    Cardiovascular: Negative for chest pain and palpitations.   Gastrointestinal: Negative for abdominal distention, abdominal pain, blood in stool, constipation, diarrhea, nausea and vomiting.   Genitourinary: Negative for difficulty urinating, dysuria, frequency, hematuria and urgency.   Musculoskeletal: Negative for arthralgias, back pain, gait problem, joint swelling and myalgias.   Skin: Negative for rash.   Neurological: Negative for tremors, seizures, speech difficulty, weakness and headaches.   Hematological: Negative for adenopathy.   Psychiatric/Behavioral: Negative for confusion, decreased concentration and sleep disturbance. The patient is not nervous/anxious.          Lab Results   Component Value Date    ALT 19 2023    AST 20 2023     (H) 2019    ALKPHOS 72 2023    BILITOT 0.8 2023     Past Medical History:   Diagnosis Date    Cirrhosis     Esophageal varices 2018    Small with no banding     Essential hypertension 2018    Fatty liver 2018    GERD (gastroesophageal reflux disease)     Hx of colonic polyps 2018    On colonoscopy     Hypertension     Hypothyroidism 2018    Kidney stones     Morbid obesity 2018    Lap band with subsequent release    KADEEM (obstructive sleep apnea) 2018    Osteoarthritis 2018    Pulmonary nodule 2018    Vitamin D deficiency 2018     Past Surgical History:   Procedure Laterality Date     SECTION      TIMES 2     CHOLECYSTECTOMY      LAPAROSCOPIC     CYSTOSCOPY W/ STONE MANIPULATION      kidney stone removal    ESOPHAGOGASTRODUODENOSCOPY N/A 2019    Procedure: EGD (ESOPHAGOGASTRODUODENOSCOPY);  Surgeon: Davian  MORALES Hernández MD;  Location: [facility] (2ND FLR);  Service: Endoscopy;  Laterality: N/A;    LAPAROSCOPIC GASTRIC BANDING  2006    removal 2009    LIVER BIOPSY  11/29/2017    KESSLER with bridging 11/29/17    LIVER TRANSPLANT N/A 6/5/2019    Procedure: TRANSPLANT, LIVER;  Surgeon: Clemente Brown Jr., MD;  [facility];  Service: Transplant;  Laterality: N/A;     Current Outpatient Medications   Medication Sig    biotin 2,500 mcg Cap Take by mouth once daily.    famotidine (PEPCID) 20 MG tablet Take 1 tablet (20 mg total) by mouth every evening.    fluticasone propionate (FLONASE) 50 mcg/actuation nasal spray 1 spray by Each Nostril route once daily.    levothyroxine (SYNTHROID) 112 MCG tablet Take 1 tablet (112 mcg total) by mouth once daily. (Patient taking differently: Take 100 mcg by mouth once daily.)    multivitamin (ONE DAILY MULTIVITAMIN) per tablet Take 1 tablet by mouth once daily.    NIFEdipine (PROCARDIA-XL) 30 MG (OSM) 24 hr tablet Take 1 tablet (30 mg total) by mouth once daily.    sodium bicarbonate 650 MG tablet TAKE 2 TABLETS(1300 MG) BY MOUTH TWICE DAILY    tacrolimus (PROGRAF) 1 MG Cap Take 3 mg in the morning and 2 mg at night (Patient taking differently: Take 2 mg in the morning and 2 mg at night)    cetirizine HCl (CETIRIZINE ORAL) Take 10 mg by mouth once. Nightly    famotidine (PEPCID) 20 MG tablet Take 20 mg by mouth 2 (two) times daily.     No current facility-administered medications for this visit.       Objective:      Physical Exam   Constitutional: She appears well-nourished. No distress.   HENT:   Head: Normocephalic.   Eyes: Pupils are equal, round, and reactive to light.   Neck: No JVD present. No tracheal deviation present. No thyromegaly present.   Cardiovascular: Normal rate, regular rhythm and normal heart sounds.   No murmur heard.  Pulmonary/Chest: Effort normal and breath sounds normal. No stridor.   Abdominal: Soft.   Lymphadenopathy:        Head (right side): No  submental, no submandibular, no tonsillar, no preauricular, no posterior auricular and no occipital adenopathy present.        Head (left side): No submental, no submandibular, no tonsillar, no preauricular, no posterior auricular and no occipital adenopathy present.     She has no cervical adenopathy.     She has no axillary adenopathy.   Neurological: She is alert. She has normal strength. She is not disoriented. No cranial nerve deficit or sensory deficit.   Skin: Skin is intact. No cyanosis.       Assessment:       1. S/P liver transplant    2. Prophylactic immunotherapy    3. Right hip pain      Plan:    Overall she is well and is now fully mobile independently.  Excellent liver and kidney function    - on Tac monotherapy (2/2)- recently decreased  - admits to occasionally forgetting meds  - weight gain- given some dietary advice  - interested in exploring the possibility of GLP1 agonist drugs- will disscuss with pharmacy    Clinic in 1 year.    UNOS Patient Status  Functional Status: 100% - Normal, no complaints, no evidence of disease  Physical Capacity: No Limitations

## 2023-07-27 LAB
EXT ALBUMIN: 3.6
EXT ALKALINE PHOSPHATASE: 67
EXT ALT: 21
EXT AST: 19
EXT BASOPHIL%: 0.6
EXT BILIRUBIN TOTAL: 0.7
EXT BUN: 10
EXT CALCIUM: 8.7
EXT CHLORIDE: 108
EXT CO2: 30
EXT CREATININE: 0.8 MG/DL
EXT EOSINOPHIL%: 2.6
EXT GFR MDRD NON AF AMER: 87
EXT GLUCOSE: 109
EXT HEMATOCRIT: 37.9
EXT HEMOGLOBIN: 12.4
EXT LYMPH%: 19.3
EXT MONOCYTES%: 7
EXT PLATELETS: 177
EXT POTASSIUM: 3.8
EXT PROTEIN TOTAL: 6.6
EXT SEGS%: 70.2
EXT SODIUM: 142 MMOL/L
EXT TACROLIMUS LVL: 5.1
EXT WBC: 3.4

## 2023-07-28 ENCOUNTER — TELEPHONE (OUTPATIENT)
Dept: TRANSPLANT | Facility: CLINIC | Age: 53
End: 2023-07-28
Payer: MEDICARE

## 2023-07-28 DIAGNOSIS — Z94.4 LIVER REPLACED BY TRANSPLANT: Primary | ICD-10-CM

## 2023-08-28 NOTE — ED NOTES
Physician at bedside.  Neuro critical care.   Pt has lab visit for Comprehensive Metabolic Panel   CBC Diff   Hbg A1C   TSH   Thyroxine / Free T4   Lipid Panel FASTING   Prolactin   Lithium     Lab visit tomorrow @730 AM

## 2023-09-21 NOTE — ASSESSMENT & PLAN NOTE
- Managed by primary team   Call Center TCM Note      Flowsheet Row Responses   [facility] patient discharged from? [place]   Does the patient have one of the following disease processes/diagnoses(primary or secondary)? COPD   TCM attempt successful? No  [no pcp vr on file]   Unsuccessful attempts Attempt 1   Call Status Left message            Roseann Snyder RN    9/21/2023, 11:59 EDT

## 2023-09-25 RX ORDER — SODIUM BICARBONATE 650 MG/1
TABLET ORAL
Qty: 120 TABLET | Refills: 11 | Status: SHIPPED | OUTPATIENT
Start: 2023-09-25 | End: 2024-02-15 | Stop reason: SDUPTHER

## 2023-10-02 ENCOUNTER — LAB VISIT (OUTPATIENT)
Dept: LAB | Facility: HOSPITAL | Age: 53
End: 2023-10-02
Attending: INTERNAL MEDICINE
Payer: MEDICARE

## 2023-10-02 DIAGNOSIS — Z94.4 LIVER REPLACED BY TRANSPLANT: ICD-10-CM

## 2023-10-02 LAB
ALBUMIN SERPL BCP-MCNC: 4.3 G/DL (ref 3.5–5.2)
ALP SERPL-CCNC: 81 U/L (ref 55–135)
ALT SERPL W/O P-5'-P-CCNC: 16 U/L (ref 10–44)
ANION GAP SERPL CALC-SCNC: 13 MMOL/L (ref 8–16)
AST SERPL-CCNC: 20 U/L (ref 10–40)
BASOPHILS # BLD AUTO: 0.03 K/UL (ref 0–0.2)
BASOPHILS NFR BLD: 0.8 % (ref 0–1.9)
BILIRUB SERPL-MCNC: 0.8 MG/DL (ref 0.1–1)
BUN SERPL-MCNC: 8 MG/DL (ref 6–20)
CALCIUM SERPL-MCNC: 9.4 MG/DL (ref 8.7–10.5)
CHLORIDE SERPL-SCNC: 105 MMOL/L (ref 95–110)
CO2 SERPL-SCNC: 26 MMOL/L (ref 23–29)
CREAT SERPL-MCNC: 0.8 MG/DL (ref 0.5–1.4)
DIFFERENTIAL METHOD: ABNORMAL
EOSINOPHIL # BLD AUTO: 0.1 K/UL (ref 0–0.5)
EOSINOPHIL NFR BLD: 3.8 % (ref 0–8)
ERYTHROCYTE [DISTWIDTH] IN BLOOD BY AUTOMATED COUNT: 13.2 % (ref 11.5–14.5)
EST. GFR  (NO RACE VARIABLE): >60 ML/MIN/1.73 M^2
GLUCOSE SERPL-MCNC: 91 MG/DL (ref 70–110)
HCT VFR BLD AUTO: 38.8 % (ref 37–48.5)
HGB BLD-MCNC: 13.3 G/DL (ref 12–16)
IMM GRANULOCYTES # BLD AUTO: 0 K/UL (ref 0–0.04)
IMM GRANULOCYTES NFR BLD AUTO: 0 % (ref 0–0.5)
LYMPHOCYTES # BLD AUTO: 0.7 K/UL (ref 1–4.8)
LYMPHOCYTES NFR BLD: 19.6 % (ref 18–48)
MCH RBC QN AUTO: 28.2 PG (ref 27–31)
MCHC RBC AUTO-ENTMCNC: 34.3 G/DL (ref 32–36)
MCV RBC AUTO: 82 FL (ref 82–98)
MONOCYTES # BLD AUTO: 0.3 K/UL (ref 0.3–1)
MONOCYTES NFR BLD: 7.4 % (ref 4–15)
NEUTROPHILS # BLD AUTO: 2.5 K/UL (ref 1.8–7.7)
NEUTROPHILS NFR BLD: 68.4 % (ref 38–73)
NRBC BLD-RTO: 0 /100 WBC
PLATELET # BLD AUTO: 182 K/UL (ref 150–450)
PMV BLD AUTO: 9.3 FL (ref 9.2–12.9)
POTASSIUM SERPL-SCNC: 4.2 MMOL/L (ref 3.5–5.1)
PROT SERPL-MCNC: 7 G/DL (ref 6–8.4)
RBC # BLD AUTO: 4.72 M/UL (ref 4–5.4)
SODIUM SERPL-SCNC: 144 MMOL/L (ref 136–145)
WBC # BLD AUTO: 3.67 K/UL (ref 3.9–12.7)

## 2023-10-02 PROCEDURE — 85025 COMPLETE CBC W/AUTO DIFF WBC: CPT | Performed by: INTERNAL MEDICINE

## 2023-10-02 PROCEDURE — 80197 ASSAY OF TACROLIMUS: CPT | Performed by: INTERNAL MEDICINE

## 2023-10-02 PROCEDURE — 36415 COLL VENOUS BLD VENIPUNCTURE: CPT | Performed by: INTERNAL MEDICINE

## 2023-10-02 PROCEDURE — 80053 COMPREHEN METABOLIC PANEL: CPT | Performed by: INTERNAL MEDICINE

## 2023-10-03 LAB — TACROLIMUS BLD-MCNC: 9.6 NG/ML (ref 5–15)

## 2023-10-06 ENCOUNTER — TELEPHONE (OUTPATIENT)
Dept: TRANSPLANT | Facility: CLINIC | Age: 53
End: 2023-10-06
Payer: MEDICARE

## 2023-10-06 DIAGNOSIS — Z94.4 LIVER REPLACED BY TRANSPLANT: Primary | ICD-10-CM

## 2023-11-21 ENCOUNTER — TELEPHONE (OUTPATIENT)
Dept: TRANSPLANT | Facility: CLINIC | Age: 53
End: 2023-11-21
Payer: MEDICARE

## 2023-11-21 NOTE — TELEPHONE ENCOUNTER
----- Message from Delmy Swartz sent at 11/21/2023  8:30 AM CST -----  Regarding: Patient advice  Contact: [phone]              Name of Caller: Consuelo     Contact Preference:    [phone]     Nature of Call:  requesting a call back have a few concerns with things like bruising easily

## 2023-11-21 NOTE — TELEPHONE ENCOUNTER
Returned patient call; instructed her to review with her Primary Care Doctor re: evaluation for easy bruising. She voiced understanding.

## 2024-01-17 ENCOUNTER — TELEPHONE (OUTPATIENT)
Dept: TRANSPLANT | Facility: CLINIC | Age: 54
End: 2024-01-17
Payer: MEDICARE

## 2024-02-02 ENCOUNTER — TELEPHONE (OUTPATIENT)
Dept: TRANSPLANT | Facility: CLINIC | Age: 54
End: 2024-02-02
Payer: MEDICARE

## 2024-02-08 ENCOUNTER — LAB VISIT (OUTPATIENT)
Dept: LAB | Facility: HOSPITAL | Age: 54
End: 2024-02-08
Attending: INTERNAL MEDICINE
Payer: MEDICARE

## 2024-02-08 DIAGNOSIS — Z94.4 LIVER REPLACED BY TRANSPLANT: ICD-10-CM

## 2024-02-08 LAB
ALBUMIN SERPL BCP-MCNC: 4.1 G/DL (ref 3.5–5.2)
ALP SERPL-CCNC: 76 U/L (ref 55–135)
ALT SERPL W/O P-5'-P-CCNC: 14 U/L (ref 10–44)
ANION GAP SERPL CALC-SCNC: 13 MMOL/L (ref 8–16)
AST SERPL-CCNC: 21 U/L (ref 10–40)
BASOPHILS # BLD AUTO: 0.03 K/UL (ref 0–0.2)
BASOPHILS NFR BLD: 1 % (ref 0–1.9)
BILIRUB SERPL-MCNC: 1 MG/DL (ref 0.1–1)
BUN SERPL-MCNC: 12 MG/DL (ref 6–20)
CALCIUM SERPL-MCNC: 9 MG/DL (ref 8.7–10.5)
CHLORIDE SERPL-SCNC: 107 MMOL/L (ref 95–110)
CO2 SERPL-SCNC: 23 MMOL/L (ref 23–29)
CREAT SERPL-MCNC: 0.8 MG/DL (ref 0.5–1.4)
DIFFERENTIAL METHOD BLD: ABNORMAL
EOSINOPHIL # BLD AUTO: 0.1 K/UL (ref 0–0.5)
EOSINOPHIL NFR BLD: 3.3 % (ref 0–8)
ERYTHROCYTE [DISTWIDTH] IN BLOOD BY AUTOMATED COUNT: 13.5 % (ref 11.5–14.5)
EST. GFR  (NO RACE VARIABLE): >60 ML/MIN/1.73 M^2
GLUCOSE SERPL-MCNC: 94 MG/DL (ref 70–110)
HCT VFR BLD AUTO: 35.8 % (ref 37–48.5)
HGB BLD-MCNC: 12.5 G/DL (ref 12–16)
IMM GRANULOCYTES # BLD AUTO: 0.01 K/UL (ref 0–0.04)
IMM GRANULOCYTES NFR BLD AUTO: 0.3 % (ref 0–0.5)
LYMPHOCYTES # BLD AUTO: 0.7 K/UL (ref 1–4.8)
LYMPHOCYTES NFR BLD: 23.4 % (ref 18–48)
MCH RBC QN AUTO: 27.8 PG (ref 27–31)
MCHC RBC AUTO-ENTMCNC: 34.9 G/DL (ref 32–36)
MCV RBC AUTO: 80 FL (ref 82–98)
MONOCYTES # BLD AUTO: 0.2 K/UL (ref 0.3–1)
MONOCYTES NFR BLD: 8 % (ref 4–15)
NEUTROPHILS # BLD AUTO: 1.9 K/UL (ref 1.8–7.7)
NEUTROPHILS NFR BLD: 64 % (ref 38–73)
NRBC BLD-RTO: 0 /100 WBC
PLATELET # BLD AUTO: 181 K/UL (ref 150–450)
PMV BLD AUTO: 9.3 FL (ref 9.2–12.9)
POTASSIUM SERPL-SCNC: 4 MMOL/L (ref 3.5–5.1)
PROT SERPL-MCNC: 6.8 G/DL (ref 6–8.4)
RBC # BLD AUTO: 4.49 M/UL (ref 4–5.4)
SODIUM SERPL-SCNC: 143 MMOL/L (ref 136–145)
WBC # BLD AUTO: 2.99 K/UL (ref 3.9–12.7)

## 2024-02-08 PROCEDURE — 36415 COLL VENOUS BLD VENIPUNCTURE: CPT | Performed by: INTERNAL MEDICINE

## 2024-02-08 PROCEDURE — 80197 ASSAY OF TACROLIMUS: CPT | Performed by: INTERNAL MEDICINE

## 2024-02-08 PROCEDURE — 80053 COMPREHEN METABOLIC PANEL: CPT | Performed by: INTERNAL MEDICINE

## 2024-02-08 PROCEDURE — 85025 COMPLETE CBC W/AUTO DIFF WBC: CPT | Performed by: INTERNAL MEDICINE

## 2024-02-09 ENCOUNTER — TELEPHONE (OUTPATIENT)
Dept: TRANSPLANT | Facility: CLINIC | Age: 54
End: 2024-02-09
Payer: MEDICARE

## 2024-02-09 DIAGNOSIS — Z94.4 LIVER REPLACED BY TRANSPLANT: Primary | ICD-10-CM

## 2024-02-09 LAB — TACROLIMUS BLD-MCNC: 7.8 NG/ML (ref 5–15)

## 2024-02-15 RX ORDER — NIFEDIPINE 30 MG/1
30 TABLET, EXTENDED RELEASE ORAL DAILY
Qty: 30 TABLET | Refills: 11 | Status: SHIPPED | OUTPATIENT
Start: 2024-02-15

## 2024-02-15 RX ORDER — SODIUM BICARBONATE 650 MG/1
TABLET ORAL
Qty: 120 TABLET | Refills: 11 | Status: SHIPPED | OUTPATIENT
Start: 2024-02-15

## 2024-02-15 RX ORDER — TACROLIMUS 1 MG/1
CAPSULE ORAL
Qty: 150 CAPSULE | Refills: 11 | Status: SHIPPED | OUTPATIENT
Start: 2024-02-15 | End: 2024-04-12

## 2024-04-12 RX ORDER — TACROLIMUS 1 MG/1
CAPSULE ORAL
Qty: 150 CAPSULE | Refills: 11 | Status: SHIPPED | OUTPATIENT
Start: 2024-04-12

## 2024-05-06 ENCOUNTER — LAB VISIT (OUTPATIENT)
Dept: LAB | Facility: HOSPITAL | Age: 54
End: 2024-05-06
Attending: INTERNAL MEDICINE
Payer: MEDICARE

## 2024-05-06 DIAGNOSIS — Z94.4 LIVER REPLACED BY TRANSPLANT: ICD-10-CM

## 2024-05-06 LAB
ALBUMIN SERPL BCP-MCNC: 3.9 G/DL (ref 3.5–5.2)
ALP SERPL-CCNC: 70 U/L (ref 55–135)
ALT SERPL W/O P-5'-P-CCNC: 14 U/L (ref 10–44)
ANION GAP SERPL CALC-SCNC: 9 MMOL/L (ref 8–16)
AST SERPL-CCNC: 19 U/L (ref 10–40)
BASOPHILS # BLD AUTO: 0.02 K/UL (ref 0–0.2)
BASOPHILS NFR BLD: 0.7 % (ref 0–1.9)
BILIRUB SERPL-MCNC: 0.8 MG/DL (ref 0.1–1)
BUN SERPL-MCNC: 11 MG/DL (ref 6–20)
CALCIUM SERPL-MCNC: 9.5 MG/DL (ref 8.7–10.5)
CHLORIDE SERPL-SCNC: 108 MMOL/L (ref 95–110)
CO2 SERPL-SCNC: 26 MMOL/L (ref 23–29)
CREAT SERPL-MCNC: 0.9 MG/DL (ref 0.5–1.4)
DIFFERENTIAL METHOD BLD: ABNORMAL
EOSINOPHIL # BLD AUTO: 0.2 K/UL (ref 0–0.5)
EOSINOPHIL NFR BLD: 4.9 % (ref 0–8)
ERYTHROCYTE [DISTWIDTH] IN BLOOD BY AUTOMATED COUNT: 12.8 % (ref 11.5–14.5)
EST. GFR  (NO RACE VARIABLE): >60 ML/MIN/1.73 M^2
GLUCOSE SERPL-MCNC: 90 MG/DL (ref 70–110)
HCT VFR BLD AUTO: 36.6 % (ref 37–48.5)
HGB BLD-MCNC: 12.5 G/DL (ref 12–16)
IMM GRANULOCYTES # BLD AUTO: 0.01 K/UL (ref 0–0.04)
IMM GRANULOCYTES NFR BLD AUTO: 0.3 % (ref 0–0.5)
LYMPHOCYTES # BLD AUTO: 0.7 K/UL (ref 1–4.8)
LYMPHOCYTES NFR BLD: 23.8 % (ref 18–48)
MCH RBC QN AUTO: 27.7 PG (ref 27–31)
MCHC RBC AUTO-ENTMCNC: 34.2 G/DL (ref 32–36)
MCV RBC AUTO: 81 FL (ref 82–98)
MONOCYTES # BLD AUTO: 0.2 K/UL (ref 0.3–1)
MONOCYTES NFR BLD: 7.8 % (ref 4–15)
NEUTROPHILS # BLD AUTO: 1.9 K/UL (ref 1.8–7.7)
NEUTROPHILS NFR BLD: 62.5 % (ref 38–73)
NRBC BLD-RTO: 0 /100 WBC
PLATELET # BLD AUTO: 172 K/UL (ref 150–450)
PMV BLD AUTO: 9.4 FL (ref 9.2–12.9)
POTASSIUM SERPL-SCNC: 4.2 MMOL/L (ref 3.5–5.1)
PROT SERPL-MCNC: 6.6 G/DL (ref 6–8.4)
RBC # BLD AUTO: 4.51 M/UL (ref 4–5.4)
SODIUM SERPL-SCNC: 143 MMOL/L (ref 136–145)
WBC # BLD AUTO: 3.07 K/UL (ref 3.9–12.7)

## 2024-05-06 PROCEDURE — 80053 COMPREHEN METABOLIC PANEL: CPT | Performed by: INTERNAL MEDICINE

## 2024-05-06 PROCEDURE — 85025 COMPLETE CBC W/AUTO DIFF WBC: CPT | Performed by: INTERNAL MEDICINE

## 2024-05-06 PROCEDURE — 36415 COLL VENOUS BLD VENIPUNCTURE: CPT | Performed by: INTERNAL MEDICINE

## 2024-05-06 PROCEDURE — 80197 ASSAY OF TACROLIMUS: CPT | Performed by: INTERNAL MEDICINE

## 2024-05-07 LAB — TACROLIMUS BLD-MCNC: 5.8 NG/ML (ref 5–15)

## 2024-05-09 ENCOUNTER — TELEPHONE (OUTPATIENT)
Dept: TRANSPLANT | Facility: CLINIC | Age: 54
End: 2024-05-09
Payer: MEDICARE

## 2024-05-09 DIAGNOSIS — Z94.4 LIVER REPLACED BY TRANSPLANT: Primary | ICD-10-CM

## 2024-05-19 ENCOUNTER — NURSE TRIAGE (OUTPATIENT)
Dept: ADMINISTRATIVE | Facility: CLINIC | Age: 54
End: 2024-05-19
Payer: MEDICARE

## 2024-05-19 NOTE — TELEPHONE ENCOUNTER
Pt states that she is completely out of Prograf 1 mg as ordered. States that insurance won't cover med because pharmacy is missing paperwork. Offered to call pharmacy;pt then states that she just spoke to pharmacy and they pushed something through for insurance to cover med. Pt states that she is unsure how much of the med is ready and without insurance the med was $416.00. Pt states that she only took 2 pills this am and [facility]. Advised to f/u with office on tomorrow for paperwork for future refills. VU. Encounter routed to provider.     Reason for Disposition   [1] Caller has NON-URGENT medicine question about med that PCP prescribed AND [2] triager unable to answer question    Protocols used: Medication Refill and Renewal Call-A-AH

## 2024-05-21 ENCOUNTER — TELEPHONE (OUTPATIENT)
Dept: TRANSPLANT | Facility: CLINIC | Age: 54
End: 2024-05-21
Payer: MEDICARE

## 2024-05-21 DIAGNOSIS — Z94.4 LIVER REPLACED BY TRANSPLANT: Primary | ICD-10-CM

## 2024-05-21 NOTE — TELEPHONE ENCOUNTER
5/20/24 4:00 PM:  Returned patient call; patient reported needing to get PA for her Tacrolimus,  as price is too high, she said.  She did miss about 2 doses of the Prograf but got a refill of smaller amount (affordable).  Now taking again.   PA request entered for Tacrolimus.   Patient will have repeat labs next week, to follow up.

## 2024-05-27 ENCOUNTER — LAB VISIT (OUTPATIENT)
Dept: LAB | Facility: HOSPITAL | Age: 54
End: 2024-05-27
Attending: INTERNAL MEDICINE
Payer: MEDICARE

## 2024-05-27 DIAGNOSIS — Z94.4 LIVER REPLACED BY TRANSPLANT: ICD-10-CM

## 2024-05-27 LAB
ALBUMIN SERPL BCP-MCNC: 3.9 G/DL (ref 3.5–5.2)
ALP SERPL-CCNC: 79 U/L (ref 55–135)
ALT SERPL W/O P-5'-P-CCNC: 11 U/L (ref 10–44)
ANION GAP SERPL CALC-SCNC: 10 MMOL/L (ref 8–16)
AST SERPL-CCNC: 18 U/L (ref 10–40)
BASOPHILS # BLD AUTO: 0.03 K/UL (ref 0–0.2)
BASOPHILS NFR BLD: 0.8 % (ref 0–1.9)
BILIRUB SERPL-MCNC: 0.7 MG/DL (ref 0.1–1)
BUN SERPL-MCNC: 11 MG/DL (ref 6–20)
CALCIUM SERPL-MCNC: 9.7 MG/DL (ref 8.7–10.5)
CHLORIDE SERPL-SCNC: 106 MMOL/L (ref 95–110)
CO2 SERPL-SCNC: 27 MMOL/L (ref 23–29)
CREAT SERPL-MCNC: 0.9 MG/DL (ref 0.5–1.4)
DIFFERENTIAL METHOD BLD: ABNORMAL
EOSINOPHIL # BLD AUTO: 0.1 K/UL (ref 0–0.5)
EOSINOPHIL NFR BLD: 3.4 % (ref 0–8)
ERYTHROCYTE [DISTWIDTH] IN BLOOD BY AUTOMATED COUNT: 13.2 % (ref 11.5–14.5)
EST. GFR  (NO RACE VARIABLE): >60 ML/MIN/1.73 M^2
GLUCOSE SERPL-MCNC: 89 MG/DL (ref 70–110)
HCT VFR BLD AUTO: 36.1 % (ref 37–48.5)
HGB BLD-MCNC: 12.5 G/DL (ref 12–16)
IMM GRANULOCYTES # BLD AUTO: 0.01 K/UL (ref 0–0.04)
IMM GRANULOCYTES NFR BLD AUTO: 0.3 % (ref 0–0.5)
LYMPHOCYTES # BLD AUTO: 0.8 K/UL (ref 1–4.8)
LYMPHOCYTES NFR BLD: 21.8 % (ref 18–48)
MCH RBC QN AUTO: 27.9 PG (ref 27–31)
MCHC RBC AUTO-ENTMCNC: 34.6 G/DL (ref 32–36)
MCV RBC AUTO: 81 FL (ref 82–98)
MONOCYTES # BLD AUTO: 0.3 K/UL (ref 0.3–1)
MONOCYTES NFR BLD: 7.6 % (ref 4–15)
NEUTROPHILS # BLD AUTO: 2.4 K/UL (ref 1.8–7.7)
NEUTROPHILS NFR BLD: 66.1 % (ref 38–73)
NRBC BLD-RTO: 0 /100 WBC
PLATELET # BLD AUTO: 183 K/UL (ref 150–450)
PMV BLD AUTO: 9.4 FL (ref 9.2–12.9)
POTASSIUM SERPL-SCNC: 4.3 MMOL/L (ref 3.5–5.1)
PROT SERPL-MCNC: 6.6 G/DL (ref 6–8.4)
RBC # BLD AUTO: 4.48 M/UL (ref 4–5.4)
SODIUM SERPL-SCNC: 143 MMOL/L (ref 136–145)
WBC # BLD AUTO: 3.57 K/UL (ref 3.9–12.7)

## 2024-05-27 PROCEDURE — 80197 ASSAY OF TACROLIMUS: CPT | Performed by: INTERNAL MEDICINE

## 2024-05-27 PROCEDURE — 80053 COMPREHEN METABOLIC PANEL: CPT | Performed by: INTERNAL MEDICINE

## 2024-05-27 PROCEDURE — 36415 COLL VENOUS BLD VENIPUNCTURE: CPT | Performed by: INTERNAL MEDICINE

## 2024-05-27 PROCEDURE — 85025 COMPLETE CBC W/AUTO DIFF WBC: CPT | Performed by: INTERNAL MEDICINE

## 2024-05-28 ENCOUNTER — TELEPHONE (OUTPATIENT)
Dept: TRANSPLANT | Facility: CLINIC | Age: 54
End: 2024-05-28
Payer: MEDICARE

## 2024-05-28 DIAGNOSIS — Z94.4 LIVER REPLACED BY TRANSPLANT: Primary | ICD-10-CM

## 2024-05-28 LAB — TACROLIMUS BLD-MCNC: 10.2 NG/ML (ref 5–15)

## 2024-07-24 ENCOUNTER — TELEPHONE (OUTPATIENT)
Dept: TRANSPLANT | Facility: CLINIC | Age: 54
End: 2024-07-24
Payer: MEDICARE

## 2024-07-24 NOTE — TELEPHONE ENCOUNTER
"----- Message from Goldy Peoples sent at 7/24/2024  1:55 PM CDT -----  Reschedule Existing Appointment    Appt Date: 8/5    Type of appt: FASTING LAB [[id number]]    Physician: [place]    Reason for rescheduling? Work schedule conflict; Requesting to r/s for 8/1 or 8/2    Caller: Self    Contact Preference: [phone]       Additional Information:  "Thank you for all that you do for our patients"  "

## 2024-08-02 ENCOUNTER — LAB VISIT (OUTPATIENT)
Dept: LAB | Facility: HOSPITAL | Age: 54
End: 2024-08-02
Attending: INTERNAL MEDICINE
Payer: MEDICARE

## 2024-08-02 DIAGNOSIS — Z94.4 LIVER REPLACED BY TRANSPLANT: ICD-10-CM

## 2024-08-02 LAB
ALBUMIN SERPL BCP-MCNC: 4 G/DL (ref 3.5–5.2)
ALP SERPL-CCNC: 65 U/L (ref 55–135)
ALT SERPL W/O P-5'-P-CCNC: 14 U/L (ref 10–44)
ANION GAP SERPL CALC-SCNC: 11 MMOL/L (ref 8–16)
AST SERPL-CCNC: 20 U/L (ref 10–40)
BASOPHILS # BLD AUTO: 0.03 K/UL (ref 0–0.2)
BASOPHILS NFR BLD: 0.9 % (ref 0–1.9)
BILIRUB SERPL-MCNC: 0.7 MG/DL (ref 0.1–1)
BUN SERPL-MCNC: 13 MG/DL (ref 6–20)
CALCIUM SERPL-MCNC: 9.1 MG/DL (ref 8.7–10.5)
CHLORIDE SERPL-SCNC: 106 MMOL/L (ref 95–110)
CO2 SERPL-SCNC: 24 MMOL/L (ref 23–29)
CREAT SERPL-MCNC: 0.8 MG/DL (ref 0.5–1.4)
DIFFERENTIAL METHOD BLD: ABNORMAL
EOSINOPHIL # BLD AUTO: 0.1 K/UL (ref 0–0.5)
EOSINOPHIL NFR BLD: 2.6 % (ref 0–8)
ERYTHROCYTE [DISTWIDTH] IN BLOOD BY AUTOMATED COUNT: 13.4 % (ref 11.5–14.5)
EST. GFR  (NO RACE VARIABLE): >60 ML/MIN/1.73 M^2
GLUCOSE SERPL-MCNC: 98 MG/DL (ref 70–110)
HCT VFR BLD AUTO: 34.8 % (ref 37–48.5)
HGB BLD-MCNC: 12.2 G/DL (ref 12–16)
IMM GRANULOCYTES # BLD AUTO: 0.01 K/UL (ref 0–0.04)
IMM GRANULOCYTES NFR BLD AUTO: 0.3 % (ref 0–0.5)
LYMPHOCYTES # BLD AUTO: 0.8 K/UL (ref 1–4.8)
LYMPHOCYTES NFR BLD: 24.7 % (ref 18–48)
MCH RBC QN AUTO: 28.4 PG (ref 27–31)
MCHC RBC AUTO-ENTMCNC: 35.1 G/DL (ref 32–36)
MCV RBC AUTO: 81 FL (ref 82–98)
MONOCYTES # BLD AUTO: 0.2 K/UL (ref 0.3–1)
MONOCYTES NFR BLD: 6.2 % (ref 4–15)
NEUTROPHILS # BLD AUTO: 2.2 K/UL (ref 1.8–7.7)
NEUTROPHILS NFR BLD: 65.3 % (ref 38–73)
NRBC BLD-RTO: 0 /100 WBC
PLATELET # BLD AUTO: 159 K/UL (ref 150–450)
PMV BLD AUTO: 9.5 FL (ref 9.2–12.9)
POTASSIUM SERPL-SCNC: 3.6 MMOL/L (ref 3.5–5.1)
PROT SERPL-MCNC: 6.7 G/DL (ref 6–8.4)
RBC # BLD AUTO: 4.3 M/UL (ref 4–5.4)
SODIUM SERPL-SCNC: 141 MMOL/L (ref 136–145)
WBC # BLD AUTO: 3.4 K/UL (ref 3.9–12.7)

## 2024-08-02 PROCEDURE — 36415 COLL VENOUS BLD VENIPUNCTURE: CPT | Performed by: INTERNAL MEDICINE

## 2024-08-02 PROCEDURE — 80053 COMPREHEN METABOLIC PANEL: CPT | Performed by: INTERNAL MEDICINE

## 2024-08-02 PROCEDURE — 85025 COMPLETE CBC W/AUTO DIFF WBC: CPT | Performed by: INTERNAL MEDICINE

## 2024-08-02 PROCEDURE — 80197 ASSAY OF TACROLIMUS: CPT | Performed by: INTERNAL MEDICINE

## 2024-08-03 LAB — TACROLIMUS BLD-MCNC: 10.3 NG/ML (ref 5–15)

## 2024-08-05 ENCOUNTER — TELEPHONE (OUTPATIENT)
Dept: TRANSPLANT | Facility: CLINIC | Age: 54
End: 2024-08-05
Payer: MEDICARE

## 2024-08-05 DIAGNOSIS — Z94.4 LIVER REPLACED BY TRANSPLANT: Primary | ICD-10-CM

## 2024-09-16 ENCOUNTER — TELEPHONE (OUTPATIENT)
Dept: TRANSPLANT | Facility: CLINIC | Age: 54
End: 2024-09-16
Payer: MEDICARE

## 2024-09-16 NOTE — TELEPHONE ENCOUNTER
"Returned patient call; patient reported she has a rash "I'm covered with it", she said she went to a banquet and thinks she ate something she is allergic to, she said.  Advised patient to go to urgent care today , or call her PCP office to get appointment.  She stated she will go to Urgent Care now.    "

## 2024-09-16 NOTE — TELEPHONE ENCOUNTER
"----- Message from Benny Edward sent at 9/16/2024  4:08 PM CDT -----  Regarding: call back  Consult/Advisory:        Name Of Caller: Self     Contact Preference?:[phone]     What is the nature of the call?: Calling to speak w/Kristin in regards to her having an allergic reaction to something she ate requesting call back   Additional Notes:  "Thank you for all that you do for our patients"  "

## 2024-11-04 ENCOUNTER — LAB VISIT (OUTPATIENT)
Dept: LAB | Facility: HOSPITAL | Age: 54
End: 2024-11-04
Attending: INTERNAL MEDICINE
Payer: MEDICARE

## 2024-11-04 DIAGNOSIS — Z94.4 LIVER REPLACED BY TRANSPLANT: ICD-10-CM

## 2024-11-04 LAB
ALBUMIN SERPL BCP-MCNC: 3.8 G/DL (ref 3.5–5.2)
ALP SERPL-CCNC: 66 U/L (ref 40–150)
ALT SERPL W/O P-5'-P-CCNC: 17 U/L (ref 10–44)
ANION GAP SERPL CALC-SCNC: 11 MMOL/L (ref 8–16)
AST SERPL-CCNC: 18 U/L (ref 10–40)
BASOPHILS # BLD AUTO: 0.02 K/UL (ref 0–0.2)
BASOPHILS NFR BLD: 0.6 % (ref 0–1.9)
BILIRUB SERPL-MCNC: 0.8 MG/DL (ref 0.1–1)
BUN SERPL-MCNC: 10 MG/DL (ref 6–20)
CALCIUM SERPL-MCNC: 9 MG/DL (ref 8.7–10.5)
CHLORIDE SERPL-SCNC: 109 MMOL/L (ref 95–110)
CO2 SERPL-SCNC: 21 MMOL/L (ref 23–29)
CREAT SERPL-MCNC: 0.8 MG/DL (ref 0.5–1.4)
DIFFERENTIAL METHOD BLD: ABNORMAL
EOSINOPHIL # BLD AUTO: 0.1 K/UL (ref 0–0.5)
EOSINOPHIL NFR BLD: 3.5 % (ref 0–8)
ERYTHROCYTE [DISTWIDTH] IN BLOOD BY AUTOMATED COUNT: 12.8 % (ref 11.5–14.5)
EST. GFR  (NO RACE VARIABLE): >60 ML/MIN/1.73 M^2
GLUCOSE SERPL-MCNC: 116 MG/DL (ref 70–110)
HCT VFR BLD AUTO: 35.5 % (ref 37–48.5)
HGB BLD-MCNC: 12.4 G/DL (ref 12–16)
IMM GRANULOCYTES # BLD AUTO: 0.01 K/UL (ref 0–0.04)
IMM GRANULOCYTES NFR BLD AUTO: 0.3 % (ref 0–0.5)
LYMPHOCYTES # BLD AUTO: 0.5 K/UL (ref 1–4.8)
LYMPHOCYTES NFR BLD: 15.8 % (ref 18–48)
MCH RBC QN AUTO: 28.6 PG (ref 27–31)
MCHC RBC AUTO-ENTMCNC: 34.9 G/DL (ref 32–36)
MCV RBC AUTO: 82 FL (ref 82–98)
MONOCYTES # BLD AUTO: 0.2 K/UL (ref 0.3–1)
MONOCYTES NFR BLD: 6.4 % (ref 4–15)
NEUTROPHILS # BLD AUTO: 2.5 K/UL (ref 1.8–7.7)
NEUTROPHILS NFR BLD: 73.4 % (ref 38–73)
NRBC BLD-RTO: 0 /100 WBC
PLATELET # BLD AUTO: 176 K/UL (ref 150–450)
PMV BLD AUTO: 9.8 FL (ref 9.2–12.9)
POTASSIUM SERPL-SCNC: 3.7 MMOL/L (ref 3.5–5.1)
PROT SERPL-MCNC: 6.3 G/DL (ref 6–8.4)
RBC # BLD AUTO: 4.33 M/UL (ref 4–5.4)
SODIUM SERPL-SCNC: 141 MMOL/L (ref 136–145)
WBC # BLD AUTO: 3.42 K/UL (ref 3.9–12.7)

## 2024-11-04 PROCEDURE — 80197 ASSAY OF TACROLIMUS: CPT | Performed by: INTERNAL MEDICINE

## 2024-11-04 PROCEDURE — 80053 COMPREHEN METABOLIC PANEL: CPT | Performed by: INTERNAL MEDICINE

## 2024-11-04 PROCEDURE — 36415 COLL VENOUS BLD VENIPUNCTURE: CPT | Performed by: INTERNAL MEDICINE

## 2024-11-04 PROCEDURE — 85025 COMPLETE CBC W/AUTO DIFF WBC: CPT | Performed by: INTERNAL MEDICINE

## 2024-11-05 LAB — TACROLIMUS BLD-MCNC: 9 NG/ML (ref 5–15)

## 2024-11-08 ENCOUNTER — TELEPHONE (OUTPATIENT)
Dept: TRANSPLANT | Facility: CLINIC | Age: 54
End: 2024-11-08
Payer: MEDICARE

## 2024-11-08 DIAGNOSIS — Z94.4 LIVER REPLACED BY TRANSPLANT: Primary | ICD-10-CM

## 2024-11-14 ENCOUNTER — NURSE TRIAGE (OUTPATIENT)
Dept: ADMINISTRATIVE | Facility: CLINIC | Age: 54
End: 2024-11-14
Payer: MEDICARE

## 2024-11-14 NOTE — TELEPHONE ENCOUNTER
BPA 19    To the ED on 11/6 and dx with the flu. Was given tamiflu. Temps up to 104. Now temp 99.0 ear.  Slight cough with small amount of mucus, a headache and diarrhea. Taking IV in a bag. She is requesting additional medications for cough. Please contact patient directly at your earliest avail.  Reason for Disposition   [1] Taking antiviral medication AND [2] has question about the medication that triager can't answer    Additional Information   Negative: SEVERE difficulty breathing (e.g., struggling for each breath, speaks in single words)   Negative: Bluish (or gray) lips or face now   Negative: Shock suspected (e.g., cold/pale/clammy skin, too weak to stand, low BP, rapid pulse)   Negative: Sounds like a life-threatening emergency to the triager   Negative: [1] Asthma attack (coughing, wheezing) is main concern AND [2] previously diagnosed with asthma OR using asthma medicines   Negative: [1] Sinus infection AND [2] taking an antibiotic   Negative: Taking antibiotics for an ear infection   Negative: Chest pain  (Exception: MILD central chest pain, present only when coughing.)   Negative: Headache and stiff neck (can't touch chin to chest)   Negative: [1] Difficulty breathing AND [2] not severe AND [3] not from stuffy nose (e.g., not relieved by cleaning out the nose)   Negative: Fever > 104 F (40 C)   Negative: Patient sounds very sick or weak to the triager    Protocols used: Influenza (Flu) Follow-up Call-A-

## 2024-11-28 NOTE — TELEPHONE ENCOUNTER
----- Message from Ronal Coello MD sent at 5/9/2024  1:35 PM CDT -----  Results reviewed    
Lab Letter sent to patient.  
- Review of records, telemetry, vital signs and daily labs.   - General and cardiovascular physical examination.  - Generation of cardiovascular treatment plan and completion of note .  - Coordination of care.      Patient was seen and examined by me on 11/28/2024 ,interim events noted,labs and radiology studies reviewed.  Solo Quick MD,FACC.  [address] [facility].  [phone]

## 2024-12-31 NOTE — PROGRESS NOTES
Pt arrived to IR room 189 for right thoracentesis, no acute distress noted. Orders, consent and labs reviewed on chart.    Pt returned call. Need for NST as part of 1/3/25 appt explained to pt. Pt varbalized understanding. Pt will present to clinic at 1530 for NST, and will have her OBV as scheduled at 1630.

## 2025-01-08 ENCOUNTER — TELEPHONE (OUTPATIENT)
Dept: TRANSPLANT | Facility: CLINIC | Age: 55
End: 2025-01-08
Payer: MEDICARE

## 2025-01-08 NOTE — TELEPHONE ENCOUNTER
"Patient asking if ok to take Penicillin "4 pills before my dental check up".  Informed her ok to take. She said her dentist gave it to her. She reported she "also took a Tylox yesterday after getting 4 fillings" , ok to take.     "

## 2025-01-08 NOTE — TELEPHONE ENCOUNTER
----- Message from [employer] sent at 1/8/2025  3:07 PM CST -----  Regarding: Consult/Advisory  Contact: Jihan Zamudio     Consult/Advisory     Name Of Caller:Jihan Zamudio         Contact Preference:[phone] (home) [phone] (work)      Nature of call:Patient is calling to speak to Kristin about medicine she received from Dentist and wants to make sure its ok to take. Requesting a call back

## 2025-02-03 ENCOUNTER — LAB VISIT (OUTPATIENT)
Dept: LAB | Facility: HOSPITAL | Age: 55
End: 2025-02-03
Attending: INTERNAL MEDICINE
Payer: COMMERCIAL

## 2025-02-03 DIAGNOSIS — Z94.4 LIVER REPLACED BY TRANSPLANT: ICD-10-CM

## 2025-02-03 LAB
ALBUMIN SERPL BCP-MCNC: 4 G/DL (ref 3.5–5.2)
ALP SERPL-CCNC: 61 U/L (ref 40–150)
ALT SERPL W/O P-5'-P-CCNC: 12 U/L (ref 10–44)
ANION GAP SERPL CALC-SCNC: 12 MMOL/L (ref 8–16)
AST SERPL-CCNC: 18 U/L (ref 10–40)
BASOPHILS # BLD AUTO: 0.03 K/UL (ref 0–0.2)
BASOPHILS NFR BLD: 0.9 % (ref 0–1.9)
BILIRUB SERPL-MCNC: 0.9 MG/DL (ref 0.1–1)
BUN SERPL-MCNC: 13 MG/DL (ref 6–20)
CALCIUM SERPL-MCNC: 9.4 MG/DL (ref 8.7–10.5)
CHLORIDE SERPL-SCNC: 106 MMOL/L (ref 95–110)
CO2 SERPL-SCNC: 24 MMOL/L (ref 23–29)
CREAT SERPL-MCNC: 0.8 MG/DL (ref 0.5–1.4)
DIFFERENTIAL METHOD BLD: ABNORMAL
EOSINOPHIL # BLD AUTO: 0.1 K/UL (ref 0–0.5)
EOSINOPHIL NFR BLD: 3.7 % (ref 0–8)
ERYTHROCYTE [DISTWIDTH] IN BLOOD BY AUTOMATED COUNT: 12.9 % (ref 11.5–14.5)
EST. GFR  (NO RACE VARIABLE): >60 ML/MIN/1.73 M^2
GLUCOSE SERPL-MCNC: 91 MG/DL (ref 70–110)
HCT VFR BLD AUTO: 34.7 % (ref 37–48.5)
HGB BLD-MCNC: 12.3 G/DL (ref 12–16)
IMM GRANULOCYTES # BLD AUTO: 0.01 K/UL (ref 0–0.04)
IMM GRANULOCYTES NFR BLD AUTO: 0.3 % (ref 0–0.5)
LYMPHOCYTES # BLD AUTO: 0.7 K/UL (ref 1–4.8)
LYMPHOCYTES NFR BLD: 19.7 % (ref 18–48)
MCH RBC QN AUTO: 28.5 PG (ref 27–31)
MCHC RBC AUTO-ENTMCNC: 35.4 G/DL (ref 32–36)
MCV RBC AUTO: 81 FL (ref 82–98)
MONOCYTES # BLD AUTO: 0.3 K/UL (ref 0.3–1)
MONOCYTES NFR BLD: 7.4 % (ref 4–15)
NEUTROPHILS # BLD AUTO: 2.4 K/UL (ref 1.8–7.7)
NEUTROPHILS NFR BLD: 68 % (ref 38–73)
NRBC BLD-RTO: 0 /100 WBC
PLATELET # BLD AUTO: 175 K/UL (ref 150–450)
PMV BLD AUTO: 9.4 FL (ref 9.2–12.9)
POTASSIUM SERPL-SCNC: 3.7 MMOL/L (ref 3.5–5.1)
PROT SERPL-MCNC: 6.5 G/DL (ref 6–8.4)
RBC # BLD AUTO: 4.31 M/UL (ref 4–5.4)
SODIUM SERPL-SCNC: 142 MMOL/L (ref 136–145)
WBC # BLD AUTO: 3.5 K/UL (ref 3.9–12.7)

## 2025-02-03 PROCEDURE — 85025 COMPLETE CBC W/AUTO DIFF WBC: CPT | Performed by: INTERNAL MEDICINE

## 2025-02-03 PROCEDURE — 80197 ASSAY OF TACROLIMUS: CPT | Performed by: INTERNAL MEDICINE

## 2025-02-03 PROCEDURE — 36415 COLL VENOUS BLD VENIPUNCTURE: CPT | Performed by: INTERNAL MEDICINE

## 2025-02-03 PROCEDURE — 80053 COMPREHEN METABOLIC PANEL: CPT | Performed by: INTERNAL MEDICINE

## 2025-02-04 ENCOUNTER — TELEPHONE (OUTPATIENT)
Dept: TRANSPLANT | Facility: CLINIC | Age: 55
End: 2025-02-04
Payer: OTHER GOVERNMENT

## 2025-02-04 DIAGNOSIS — Z94.4 LIVER REPLACED BY TRANSPLANT: Primary | ICD-10-CM

## 2025-02-04 LAB — TACROLIMUS BLD-MCNC: 5.1 NG/ML (ref 5–15)

## 2025-02-06 RX ORDER — NIFEDIPINE 30 MG/1
30 TABLET, EXTENDED RELEASE ORAL
Qty: 30 TABLET | Refills: 11 | Status: SHIPPED | OUTPATIENT
Start: 2025-02-06

## 2025-02-10 RX ORDER — SODIUM BICARBONATE 650 MG/1
TABLET ORAL
Qty: 120 TABLET | Refills: 11 | Status: SHIPPED | OUTPATIENT
Start: 2025-02-10

## 2025-02-25 ENCOUNTER — TELEPHONE (OUTPATIENT)
Dept: TRANSPLANT | Facility: CLINIC | Age: 55
End: 2025-02-25
Payer: OTHER GOVERNMENT

## 2025-02-25 NOTE — TELEPHONE ENCOUNTER
"----- Message from TERE Rand sent at 2/24/2025  2:41 PM CST -----  Regarding: FW: call back  I called her, not sure who was trying to reach her?  ----- Message -----  From: Benny Edward  Sent: 2/24/2025   2:00 PM CST  To: [facility] Post-Liver Transplant Clinical  Subject: call back                                        Consult/Advisory:  Name Of Caller: Self  Contact Preference?:[phone]  What is the nature of the call?: Returning call to Kristin  Additional Notes:"Thank you for all that you do for our patients"  "

## 2025-03-07 ENCOUNTER — TELEPHONE (OUTPATIENT)
Dept: TRANSPLANT | Facility: CLINIC | Age: 55
End: 2025-03-07
Payer: OTHER GOVERNMENT

## 2025-03-07 NOTE — TELEPHONE ENCOUNTER
----- Message from Liam sent at 3/7/2025  2:39 PM CST -----  Regarding: Consult/Advisory  Contact: [phone]  Consult/Advisory Name Of Caller: Consuelo  Contact Preference:  [phone] Nature of call: Pt is calling because she has diarrhea  and is trying to find out what she can take due to her liver.

## 2025-03-07 NOTE — TELEPHONE ENCOUNTER
"Returned patient call: she reported she has been having diarrhea , or loose stools "for 2 months", stated "everything I eat goes right through me" .  Patient stated that she is being worked up by her PCP at [facility] , and he has done stool samples to see , she said. Patient said her PCP wanting to know what she can take to help the loose stools.  Informed that could take 1 - 2 doses of Immodium to see if it helps.  Will review with  for further recommendations. Patient reported that she will call our office once she gets results of the stool studies from her PCP.     "

## 2025-05-05 ENCOUNTER — LAB VISIT (OUTPATIENT)
Dept: LAB | Facility: HOSPITAL | Age: 55
End: 2025-05-05
Attending: INTERNAL MEDICINE
Payer: OTHER GOVERNMENT

## 2025-05-05 ENCOUNTER — RESULTS FOLLOW-UP (OUTPATIENT)
Dept: TRANSPLANT | Facility: CLINIC | Age: 55
End: 2025-05-05
Payer: OTHER GOVERNMENT

## 2025-05-05 DIAGNOSIS — Z94.4 LIVER REPLACED BY TRANSPLANT: ICD-10-CM

## 2025-05-05 LAB
ABSOLUTE EOSINOPHIL (OHS): 0.1 K/UL
ABSOLUTE MONOCYTE (OHS): 0.28 K/UL (ref 0.3–1)
ABSOLUTE NEUTROPHIL COUNT (OHS): 2.43 K/UL (ref 1.8–7.7)
ALBUMIN SERPL BCP-MCNC: 4.1 G/DL (ref 3.5–5.2)
ALP SERPL-CCNC: 62 UNIT/L (ref 40–150)
ALT SERPL W/O P-5'-P-CCNC: 10 UNIT/L (ref 10–44)
ANION GAP (OHS): 10 MMOL/L (ref 8–16)
AST SERPL-CCNC: 16 UNIT/L (ref 11–45)
BASOPHILS # BLD AUTO: 0.04 K/UL
BASOPHILS NFR BLD AUTO: 1.1 %
BILIRUB SERPL-MCNC: 1 MG/DL (ref 0.1–1)
BUN SERPL-MCNC: 11 MG/DL (ref 6–20)
CALCIUM SERPL-MCNC: 9.4 MG/DL (ref 8.7–10.5)
CHLORIDE SERPL-SCNC: 106 MMOL/L (ref 95–110)
CO2 SERPL-SCNC: 27 MMOL/L (ref 23–29)
CREAT SERPL-MCNC: 0.8 MG/DL (ref 0.5–1.4)
ERYTHROCYTE [DISTWIDTH] IN BLOOD BY AUTOMATED COUNT: 13.2 % (ref 11.5–14.5)
GFR SERPLBLD CREATININE-BSD FMLA CKD-EPI: >60 ML/MIN/1.73/M2
GLUCOSE SERPL-MCNC: 95 MG/DL (ref 70–110)
HCT VFR BLD AUTO: 37.2 % (ref 37–48.5)
HGB BLD-MCNC: 12.9 GM/DL (ref 12–16)
IMM GRANULOCYTES # BLD AUTO: 0.04 K/UL (ref 0–0.04)
IMM GRANULOCYTES NFR BLD AUTO: 1.1 % (ref 0–0.5)
LYMPHOCYTES # BLD AUTO: 0.8 K/UL (ref 1–4.8)
MCH RBC QN AUTO: 28 PG (ref 27–31)
MCHC RBC AUTO-ENTMCNC: 34.7 G/DL (ref 32–36)
MCV RBC AUTO: 81 FL (ref 82–98)
NUCLEATED RBC (/100WBC) (OHS): 0 /100 WBC
PLATELET # BLD AUTO: 194 K/UL (ref 150–450)
PMV BLD AUTO: 9.3 FL (ref 9.2–12.9)
POTASSIUM SERPL-SCNC: 4.1 MMOL/L (ref 3.5–5.1)
PROT SERPL-MCNC: 6.7 GM/DL (ref 6–8.4)
RBC # BLD AUTO: 4.61 M/UL (ref 4–5.4)
RELATIVE EOSINOPHIL (OHS): 2.7 %
RELATIVE LYMPHOCYTE (OHS): 21.7 % (ref 18–48)
RELATIVE MONOCYTE (OHS): 7.6 % (ref 4–15)
RELATIVE NEUTROPHIL (OHS): 65.8 % (ref 38–73)
SODIUM SERPL-SCNC: 143 MMOL/L (ref 136–145)
WBC # BLD AUTO: 3.69 K/UL (ref 3.9–12.7)

## 2025-05-05 PROCEDURE — 80197 ASSAY OF TACROLIMUS: CPT

## 2025-05-05 PROCEDURE — 85025 COMPLETE CBC W/AUTO DIFF WBC: CPT

## 2025-05-05 PROCEDURE — 80053 COMPREHEN METABOLIC PANEL: CPT

## 2025-05-05 PROCEDURE — 36415 COLL VENOUS BLD VENIPUNCTURE: CPT

## 2025-05-05 RX ORDER — TACROLIMUS 1 MG/1
CAPSULE ORAL
Qty: 150 CAPSULE | Refills: 11 | Status: SHIPPED | OUTPATIENT
Start: 2025-05-05

## 2025-05-06 LAB — TACROLIMUS BLD-MCNC: 10 NG/ML (ref 5–15)

## 2025-05-09 DIAGNOSIS — Z94.4 LIVER REPLACED BY TRANSPLANT: Primary | ICD-10-CM

## 2025-05-09 NOTE — TELEPHONE ENCOUNTER
----- Message from Ronal Coello MD sent at 5/9/2025  7:44 AM CDT -----  Results reviewed    ----- Message -----  From: Lab, Background User  Sent: 5/5/2025   8:00 AM CDT  To: Ronal Coello MD

## 2025-05-21 ENCOUNTER — OFFICE VISIT (OUTPATIENT)
Dept: TRANSPLANT | Facility: CLINIC | Age: 55
End: 2025-05-21
Payer: OTHER GOVERNMENT

## 2025-05-21 VITALS
TEMPERATURE: 98 F | WEIGHT: 196.88 LBS | OXYGEN SATURATION: 97 % | RESPIRATION RATE: 16 BRPM | HEIGHT: 64 IN | HEART RATE: 70 BPM | SYSTOLIC BLOOD PRESSURE: 172 MMHG | BODY MASS INDEX: 33.61 KG/M2 | DIASTOLIC BLOOD PRESSURE: 83 MMHG

## 2025-05-21 DIAGNOSIS — Z94.4 S/P LIVER TRANSPLANT: ICD-10-CM

## 2025-05-21 DIAGNOSIS — S06.300S: Primary | ICD-10-CM

## 2025-05-21 DIAGNOSIS — Z29.89 PROPHYLACTIC IMMUNOTHERAPY: ICD-10-CM

## 2025-05-21 PROCEDURE — 99214 OFFICE O/P EST MOD 30 MIN: CPT | Mod: PBBFAC | Performed by: INTERNAL MEDICINE

## 2025-05-21 PROCEDURE — 99215 OFFICE O/P EST HI 40 MIN: CPT | Mod: S$PBB,,, | Performed by: INTERNAL MEDICINE

## 2025-05-21 PROCEDURE — 99999 PR PBB SHADOW E&M-EST. PATIENT-LVL IV: CPT | Mod: PBBFAC,,, | Performed by: INTERNAL MEDICINE

## 2025-05-21 PROCEDURE — G2211 COMPLEX E/M VISIT ADD ON: HCPCS | Mod: S$PBB,,, | Performed by: INTERNAL MEDICINE

## 2025-05-21 RX ORDER — ALBUTEROL SULFATE 90 UG/1
2 INHALANT RESPIRATORY (INHALATION) EVERY 4 HOURS PRN
COMMUNITY
Start: 2025-05-17

## 2025-05-21 RX ORDER — LEVOTHYROXINE SODIUM 100 UG/1
100 TABLET ORAL
COMMUNITY
Start: 2024-12-31

## 2025-05-21 RX ORDER — PANTOPRAZOLE SODIUM 20 MG/1
20 TABLET, DELAYED RELEASE ORAL DAILY
COMMUNITY
Start: 2025-03-07

## 2025-05-21 NOTE — PROGRESS NOTES
Subjective:       Patient ID: Jihan Zamudio is a 55 y.o. female.    Chief Complaint: Liver Transplant Follow-up  TNotes:     HPI  I saw this 55 y.o.lady who had a liver Tx for MASH cirrhosis on 2019.  She is now 5 years and 11 months post transplant.    She was an inpatient in hospital prior to her liver transplant and her post op admission was prolonged.  She required a chest tube for a right pleural effusion.    Her recovery was further complicated by a fall and a subsequent intracranial hemorrhage (right occipital lobe).    She is currently very well.  BP high today but seeing her PCP at [facility] soon.    Abdo US: 2020  Satisfactory Doppler evaluation of the liver allograft.    ORGAN:   LIVER  Disease Etiology: Cirrhosis: Fatty Liver (Massey)  Donor Type:    - Brain Death  [facility] High Risk:   No  Donor CMV Status:   Donor CMV Status: Positive  Donor HBcAB:   Negative  Donor HCV Status:   Negative  Whole or Partial: Whole Liver  Biliary Anastomosis: End to End  Arterial Anatomy: Standard    ORGAN: liver DIAGNOSIS: massey MELD: 29  SEROLOGY Recipient/Donor : O/O CMV: +/+ HCV: -/ -HBcAb: -/-   INDUCTION: STEROIDS   ANASTOMOSIS: CDD   DONOR: DBD   [facility] high risk: NO   HCV C Ab + donor or HCV Katty - or + donor: No    EXPLANT:( path report) MASSEY/no malignancy    Explant discussed: YES 19   -----------------------------------------------------------------  IMMUNOSUPPRESSION: Tacro/MMF/Prednisone until 7/10/19  PCP PROPHYLAXIS: Bactrim until 12/3/19  CMV PROPHYLAXIS: Valcyte until 19  FUNGAL PROPHYLAXIS: N/A  Aspirin: on 81 mg daily    Issues:  1) Latent TB  2) Prev Lap band bariatric surgery  3) Post op ANTONIO  4) Intracranial hemorrhage- treated with Keppra for 7 days  5) Readmitted with fever and altered mental status    - LFTs and creatinine normal on 25    Review of Systems   Constitutional: Negative for activity change, appetite change, chills, fatigue, fever and  unexpected weight change.   HENT: Negative for ear pain, hearing loss, nosebleeds, sore throat and trouble swallowing.    Eyes: Negative for redness and visual disturbance.   Respiratory: Negative for cough, chest tightness, shortness of breath and wheezing.    Cardiovascular: Negative for chest pain and palpitations.   Gastrointestinal: Negative for abdominal distention, abdominal pain, blood in stool, constipation, diarrhea, nausea and vomiting.   Genitourinary: Negative for difficulty urinating, dysuria, frequency, hematuria and urgency.   Musculoskeletal: Negative for arthralgias, back pain, gait problem, joint swelling and myalgias.   Skin: Negative for rash.   Neurological: Negative for tremors, seizures, speech difficulty, weakness and headaches.   Hematological: Negative for adenopathy.   Psychiatric/Behavioral: Negative for confusion, decreased concentration and sleep disturbance. The patient is not nervous/anxious.          Lab Results   Component Value Date    ALT 10 2025    AST 16 2025     (H) 2019    ALKPHOS 62 2025    BILITOT 1.0 2025     Past Medical History:   Diagnosis Date    Cirrhosis     Esophageal varices 2018    Small with no banding     Essential hypertension 2018    Fatty liver 2018    GERD (gastroesophageal reflux disease)     Hx of colonic polyps 2018    On colonoscopy     Hypertension     Hypothyroidism 2018    Kidney stones     Morbid obesity 2018    Lap band with subsequent release    KADEEM (obstructive sleep apnea) 2018    Osteoarthritis 2018    Pulmonary nodule 2018    Vitamin D deficiency 2018     Past Surgical History:   Procedure Laterality Date     SECTION      TIMES 2     CHOLECYSTECTOMY      LAPAROSCOPIC     CYSTOSCOPY W/ STONE MANIPULATION      kidney stone removal    ESOPHAGOGASTRODUODENOSCOPY N/A 2019    Procedure: EGD (ESOPHAGOGASTRODUODENOSCOPY);  Surgeon: Davian  MORALES Hernández MD;  Location: [facility] (2ND FLR);  Service: Endoscopy;  Laterality: N/A;    LAPAROSCOPIC GASTRIC BANDING  2006    removal 2009    LIVER BIOPSY  11/29/2017    KESSLER with bridging 11/29/17    LIVER TRANSPLANT N/A 6/5/2019    Procedure: TRANSPLANT, LIVER;  Surgeon: Clemente Brown Jr., MD;  [facility];  Service: Transplant;  Laterality: N/A;     Current Outpatient Medications   Medication Sig    albuterol (PROVENTIL/VENTOLIN HFA) 90 mcg/actuation inhaler Inhale 2 puffs into the lungs every 4 (four) hours as needed.    famotidine (PEPCID) 20 MG tablet Take 1 tablet (20 mg total) by mouth every evening. (Patient taking differently: Take 20 mg by mouth nightly as needed.)    fluticasone propionate (FLONASE) 50 mcg/actuation nasal spray 1 spray by Each Nostril route once daily.    multivitamin (ONE DAILY MULTIVITAMIN) per tablet Take 1 tablet by mouth once daily.    NIFEdipine (PROCARDIA-XL) 30 MG (OSM) 24 hr tablet TAKE 1 TABLET(30 MG) BY MOUTH EVERY DAY    pantoprazole (PROTONIX) 20 MG tablet Take 20 mg by mouth once daily.    sodium bicarbonate 650 MG tablet TAKE 2 TABLETS(1300 MG) BY MOUTH TWICE DAILY    SYNTHROID 100 mcg tablet Take 100 mcg by mouth before breakfast.    tacrolimus (PROGRAF) 1 MG Cap TAKE 3 CAPSULES BY MOUTH EVERY MORNING AND 2 CAPSULES AT NIGHT    biotin 2,500 mcg Cap Take by mouth once daily. (Patient not taking: Reported on 5/21/2025)    cetirizine HCl (CETIRIZINE ORAL) Take 10 mg by mouth once. Nightly (Patient not taking: Reported on 5/21/2025)     No current facility-administered medications for this visit.       Objective:      Physical Exam   Constitutional: She appears well-nourished. No distress.   HENT:   Head: Normocephalic.   Eyes: Pupils are equal, round, and reactive to light.   Neck: No JVD present. No tracheal deviation present. No thyromegaly present.   Cardiovascular: Normal rate, regular rhythm and normal heart sounds.   No murmur  heard.  Pulmonary/Chest: Effort normal and breath sounds normal. No stridor.   Abdominal: Soft.   Lymphadenopathy:        Head (right side): No submental, no submandibular, no tonsillar, no preauricular, no posterior auricular and no occipital adenopathy present.        Head (left side): No submental, no submandibular, no tonsillar, no preauricular, no posterior auricular and no occipital adenopathy present.     She has no cervical adenopathy.     She has no axillary adenopathy.   Neurological: She is alert. She has normal strength. She is not disoriented. No cranial nerve deficit or sensory deficit.   Skin: Skin is intact. No cyanosis.       Assessment:       1. Traumatic intracranial hemorrhage without loss of consciousness, sequela    2. Prophylactic immunotherapy    3. S/P liver transplant        Plan:   Overall she is well and is now fully mobile independently.  Excellent liver and kidney function    - on Tac monotherapy (3/2).  - admits to occasionally forgetting meds  - weight gain- given some dietary advice  - occasionally forgetful and havinf some episodes that may need investigation by neurology- asked her to discuss with her PCP.    Clinic in 1 year.    I spent a total of 40 minutes on the day of the visit.  This includes face to face time and non-face to face time preparing to see the patient (eg, review of tests), obtaining and/or reviewing separately obtained history, documenting clinical information in the electronic or other health record, independently interpreting results and communicating results to the patient/family/caregiver, or care coordinator.      UNOS Patient Status  Functional Status: 100% - Normal, no complaints, no evidence of disease  Physical Capacity: No Limitations

## 2025-05-21 NOTE — LETTER
May 21, 2025        Janes Retana  [address] [facility] [place] [address]  Phone: [phone]  Fax: [phone]             [facility] [address]  [address]  [facility] [address]  Phone: [phone]   Patient: Jihan Zamudio   MR Number: [id number]   YOB: 1970   Date of Visit: 5/21/2025       Dear Dr. Janes Retana    Thank you for referring Jihan Zamudio to me for evaluation. Attached you will find relevant portions of my assessment and plan of care.    If you have questions, please do not hesitate to call me. I look forward to following Jihan Zamudio along with you.    Sincerely,    Ronal Coello MD    Enclosure    If you would like to receive this communication electronically, please contact [email or web address] or [phone] to request [employer] access.    [employer] is a tool which provides read-only access to select patient information with whom you have a relationship. Its easy to use and provides real time access to review your patients record including encounter summaries, notes, results, and demographic information.    If you feel you have received this communication in error or would no longer like to receive these types of communications, please e-mail [email or web address]

## 2025-07-21 RX ORDER — TACROLIMUS 1 MG/1
CAPSULE ORAL
Qty: 150 CAPSULE | Refills: 11 | Status: SHIPPED | OUTPATIENT
Start: 2025-07-21

## 2025-08-04 ENCOUNTER — LAB VISIT (OUTPATIENT)
Dept: LAB | Facility: HOSPITAL | Age: 55
End: 2025-08-04
Attending: INTERNAL MEDICINE
Payer: OTHER GOVERNMENT

## 2025-08-04 ENCOUNTER — RESULTS FOLLOW-UP (OUTPATIENT)
Dept: HEPATOLOGY | Facility: CLINIC | Age: 55
End: 2025-08-04
Payer: OTHER GOVERNMENT

## 2025-08-04 DIAGNOSIS — Z94.4 LIVER REPLACED BY TRANSPLANT: ICD-10-CM

## 2025-08-04 LAB
ABSOLUTE EOSINOPHIL (OHS): 0.11 K/UL
ABSOLUTE MONOCYTE (OHS): 0.28 K/UL (ref 0.3–1)
ABSOLUTE NEUTROPHIL COUNT (OHS): 2.97 K/UL (ref 1.8–7.7)
ALBUMIN SERPL BCP-MCNC: 4 G/DL (ref 3.5–5.2)
ALP SERPL-CCNC: 75 UNIT/L (ref 40–150)
ALT SERPL W/O P-5'-P-CCNC: 8 UNIT/L (ref 10–44)
ANION GAP (OHS): 8 MMOL/L (ref 8–16)
AST SERPL-CCNC: 21 UNIT/L (ref 11–45)
BASOPHILS # BLD AUTO: 0.02 K/UL
BASOPHILS NFR BLD AUTO: 0.5 %
BILIRUB SERPL-MCNC: 0.5 MG/DL (ref 0.1–1)
BUN SERPL-MCNC: 15 MG/DL (ref 6–20)
CALCIUM SERPL-MCNC: 9 MG/DL (ref 8.7–10.5)
CHLORIDE SERPL-SCNC: 106 MMOL/L (ref 95–110)
CO2 SERPL-SCNC: 26 MMOL/L (ref 23–29)
CREAT SERPL-MCNC: 1 MG/DL (ref 0.5–1.4)
ERYTHROCYTE [DISTWIDTH] IN BLOOD BY AUTOMATED COUNT: 13.3 % (ref 11.5–14.5)
GFR SERPLBLD CREATININE-BSD FMLA CKD-EPI: >60 ML/MIN/1.73/M2
GLUCOSE SERPL-MCNC: 116 MG/DL (ref 70–110)
HCT VFR BLD AUTO: 35.1 % (ref 37–48.5)
HGB BLD-MCNC: 11.9 GM/DL (ref 12–16)
IMM GRANULOCYTES # BLD AUTO: 0.01 K/UL (ref 0–0.04)
IMM GRANULOCYTES NFR BLD AUTO: 0.2 % (ref 0–0.5)
LYMPHOCYTES # BLD AUTO: 0.66 K/UL (ref 1–4.8)
MCH RBC QN AUTO: 28 PG (ref 27–31)
MCHC RBC AUTO-ENTMCNC: 33.9 G/DL (ref 32–36)
MCV RBC AUTO: 83 FL (ref 82–98)
NUCLEATED RBC (/100WBC) (OHS): 0 /100 WBC
PLATELET # BLD AUTO: 179 K/UL (ref 150–450)
PMV BLD AUTO: 10 FL (ref 9.2–12.9)
POTASSIUM SERPL-SCNC: 4.1 MMOL/L (ref 3.5–5.1)
PROT SERPL-MCNC: 6.4 GM/DL (ref 6–8.4)
RBC # BLD AUTO: 4.25 M/UL (ref 4–5.4)
RELATIVE EOSINOPHIL (OHS): 2.7 %
RELATIVE LYMPHOCYTE (OHS): 16.3 % (ref 18–48)
RELATIVE MONOCYTE (OHS): 6.9 % (ref 4–15)
RELATIVE NEUTROPHIL (OHS): 73.4 % (ref 38–73)
SODIUM SERPL-SCNC: 140 MMOL/L (ref 136–145)
WBC # BLD AUTO: 4.05 K/UL (ref 3.9–12.7)

## 2025-08-04 PROCEDURE — 80197 ASSAY OF TACROLIMUS: CPT

## 2025-08-04 PROCEDURE — 36415 COLL VENOUS BLD VENIPUNCTURE: CPT

## 2025-08-04 PROCEDURE — 85025 COMPLETE CBC W/AUTO DIFF WBC: CPT

## 2025-08-04 PROCEDURE — 84460 ALANINE AMINO (ALT) (SGPT): CPT

## 2025-08-05 LAB — TACROLIMUS BLD-MCNC: 10.6 NG/ML (ref 5–15)

## 2025-08-11 DIAGNOSIS — Z94.4 LIVER REPLACED BY TRANSPLANT: Primary | ICD-10-CM

## (undated) DEVICE — GOWN SURGICAL X-LARGE

## (undated) DEVICE — CLIP SPRING 6MM

## (undated) DEVICE — SUT SILK 3-0 STRANDS 30IN

## (undated) DEVICE — SEE MEDLINE ITEM 146417

## (undated) DEVICE — TOWEL OR XRAY WHITE 17X26IN

## (undated) DEVICE — SUT 1 36IN PDS II VIO MONO

## (undated) DEVICE — HANDSET ARGON PLUS

## (undated) DEVICE — ELECTRODE PAD DEFIB STERILE

## (undated) DEVICE — DRESSING AQUACEL SACRAL 9 X 9

## (undated) DEVICE — SUT PROLENE 5-0 36IN C-1

## (undated) DEVICE — SET DRN PNEUMPERCRDL 8.5F 15CM

## (undated) DEVICE — SET DECANTER MEDICHOICE

## (undated) DEVICE — CATH URETHRAL RED RUBBER 18FR

## (undated) DEVICE — TRAY FOLEY 16FR INFECTION CONT

## (undated) DEVICE — ELECTRODE REM PLYHSV RETURN 9

## (undated) DEVICE — SPONGE IV DRAIN 4X4 STERILE

## (undated) DEVICE — WIPE ESENTA BARR STNG FREE 3ML

## (undated) DEVICE — SUT 2-0 12-18IN SILK

## (undated) DEVICE — SEE MEDLINE ITEM 146420

## (undated) DEVICE — DRAIN CHANNEL ROUND 19FR

## (undated) DEVICE — HEMOSTAT SURGICEL NU-KNIT 6X9

## (undated) DEVICE — SUT ETHILON 3-0 PS2 18 BLK

## (undated) DEVICE — SUT 4-0 12-30IN SILK

## (undated) DEVICE — SUT SILK 0 STRANDS 30IN BLK

## (undated) DEVICE — SUT PROLENE 3-0 SH DA 36 BL

## (undated) DEVICE — BOOT AIR FLUID HEEL ADLT STD

## (undated) DEVICE — SUT SILK 3-0 SH 18IN BLACK

## (undated) DEVICE — SUT SILK 2-0 STRANDS 30IN

## (undated) DEVICE — SET EXTENSION STERILE 30IN

## (undated) DEVICE — WARMER DRAPE STERILE LF

## (undated) DEVICE — PAD K-THERMIA 24IN X 60IN

## (undated) DEVICE — EVACUATOR WOUND BULB 100CC

## (undated) DEVICE — BLADE 4 INCH EDGE UN-INS

## (undated) DEVICE — SUT PDS BV 6-0

## (undated) DEVICE — SEE MEDLINE ITEM 156901

## (undated) DEVICE — SUT 4-0 12-18IN SILK BLACK

## (undated) DEVICE — SET IV ADMIN 15DROP 3 CARESITE

## (undated) DEVICE — STAPLER SKIN PROXIMATE WIDE

## (undated) DEVICE — DRESSING XEROFORM 1X8IN

## (undated) DEVICE — SOL NS 1000CC

## (undated) DEVICE — NDL MONOPTY BIOPSY 14GX10CM

## (undated) DEVICE — SUT 3-0 12-18IN SILK

## (undated) DEVICE — SUT PROLENE 4-0 SH BLU 36IN

## (undated) DEVICE — KIT SAHARA DRAPE DRAW/LIFT

## (undated) DEVICE — DRESSING ADH ISLAND 3.6 X 14

## (undated) DEVICE — SUT PROLENE 6-0 BV-1 30IN